# Patient Record
Sex: MALE | Race: WHITE | NOT HISPANIC OR LATINO | Employment: OTHER | URBAN - METROPOLITAN AREA
[De-identification: names, ages, dates, MRNs, and addresses within clinical notes are randomized per-mention and may not be internally consistent; named-entity substitution may affect disease eponyms.]

---

## 2017-02-08 ENCOUNTER — GENERIC CONVERSION - ENCOUNTER (OUTPATIENT)
Dept: OTHER | Facility: OTHER | Age: 67
End: 2017-02-08

## 2017-03-24 ENCOUNTER — GENERIC CONVERSION - ENCOUNTER (OUTPATIENT)
Dept: OTHER | Facility: OTHER | Age: 67
End: 2017-03-24

## 2017-04-10 ENCOUNTER — ALLSCRIPTS OFFICE VISIT (OUTPATIENT)
Dept: OTHER | Facility: OTHER | Age: 67
End: 2017-04-10

## 2017-05-02 ENCOUNTER — ALLSCRIPTS OFFICE VISIT (OUTPATIENT)
Dept: OTHER | Facility: OTHER | Age: 67
End: 2017-05-02

## 2017-05-04 ENCOUNTER — GENERIC CONVERSION - ENCOUNTER (OUTPATIENT)
Dept: OTHER | Facility: OTHER | Age: 67
End: 2017-05-04

## 2017-05-12 ENCOUNTER — GENERIC CONVERSION - ENCOUNTER (OUTPATIENT)
Dept: OTHER | Facility: OTHER | Age: 67
End: 2017-05-12

## 2017-06-14 ENCOUNTER — GENERIC CONVERSION - ENCOUNTER (OUTPATIENT)
Dept: OTHER | Facility: OTHER | Age: 67
End: 2017-06-14

## 2017-07-17 ENCOUNTER — GENERIC CONVERSION - ENCOUNTER (OUTPATIENT)
Dept: OTHER | Facility: OTHER | Age: 67
End: 2017-07-17

## 2017-07-19 ENCOUNTER — ALLSCRIPTS OFFICE VISIT (OUTPATIENT)
Dept: OTHER | Facility: OTHER | Age: 67
End: 2017-07-19

## 2017-08-07 DIAGNOSIS — R06.09 OTHER FORMS OF DYSPNEA: ICD-10-CM

## 2017-08-07 DIAGNOSIS — I48.91 ATRIAL FIBRILLATION (HCC): ICD-10-CM

## 2017-08-07 DIAGNOSIS — R60.9 EDEMA: ICD-10-CM

## 2017-08-23 ENCOUNTER — LAB CONVERSION - ENCOUNTER (OUTPATIENT)
Dept: OTHER | Facility: OTHER | Age: 67
End: 2017-08-23

## 2017-08-23 LAB
A/G RATIO (HISTORICAL): 1.3 (CALC) (ref 1–2.5)
ALBUMIN SERPL BCP-MCNC: 3.7 G/DL (ref 3.6–5.1)
ALP SERPL-CCNC: 74 U/L (ref 40–115)
ALT SERPL W P-5'-P-CCNC: 27 U/L (ref 9–46)
AST SERPL W P-5'-P-CCNC: 31 U/L (ref 10–35)
BILIRUB SERPL-MCNC: 0.4 MG/DL (ref 0.2–1.2)
BNP SERPL-MCNC: 142 PG/ML
BUN SERPL-MCNC: 13 MG/DL (ref 7–25)
BUN/CREA RATIO (HISTORICAL): ABNORMAL (CALC) (ref 6–22)
CALCIUM SERPL-MCNC: 9.4 MG/DL (ref 8.6–10.3)
CHLORIDE SERPL-SCNC: 105 MMOL/L (ref 98–110)
CO2 SERPL-SCNC: 26 MMOL/L (ref 20–31)
CREAT SERPL-MCNC: 0.75 MG/DL (ref 0.7–1.25)
DEPRECATED RDW RBC AUTO: 12.8 % (ref 11–15)
EGFR AFRICAN AMERICAN (HISTORICAL): 109 ML/MIN/1.73M2
EGFR-AMERICAN CALC (HISTORICAL): 94 ML/MIN/1.73M2
GAMMA GLOBULIN (HISTORICAL): 2.9 G/DL (CALC) (ref 1.9–3.7)
GLUCOSE (HISTORICAL): 110 MG/DL (ref 65–99)
HCT VFR BLD AUTO: 40.2 % (ref 38.5–50)
HGB BLD-MCNC: 13.5 G/DL (ref 13.2–17.1)
MCH RBC QN AUTO: 34.7 PG (ref 27–33)
MCHC RBC AUTO-ENTMCNC: 33.6 G/DL (ref 32–36)
MCV RBC AUTO: 103.3 FL (ref 80–100)
PLATELET # BLD AUTO: 183 THOUSAND/UL (ref 140–400)
PMV BLD AUTO: 11.1 FL (ref 7.5–12.5)
POTASSIUM SERPL-SCNC: 4.2 MMOL/L (ref 3.5–5.3)
RBC # BLD AUTO: 3.89 MILLION/UL (ref 4.2–5.8)
SODIUM SERPL-SCNC: 139 MMOL/L (ref 135–146)
TOTAL PROTEIN (HISTORICAL): 6.6 G/DL (ref 6.1–8.1)
TSH SERPL DL<=0.05 MIU/L-ACNC: 3.16 MIU/L (ref 0.4–4.5)
WBC # BLD AUTO: 5.1 THOUSAND/UL (ref 3.8–10.8)

## 2017-08-27 ENCOUNTER — GENERIC CONVERSION - ENCOUNTER (OUTPATIENT)
Dept: OTHER | Facility: OTHER | Age: 67
End: 2017-08-27

## 2017-08-29 ENCOUNTER — GENERIC CONVERSION - ENCOUNTER (OUTPATIENT)
Dept: OTHER | Facility: OTHER | Age: 67
End: 2017-08-29

## 2017-09-08 ENCOUNTER — GENERIC CONVERSION - ENCOUNTER (OUTPATIENT)
Dept: OTHER | Facility: OTHER | Age: 67
End: 2017-09-08

## 2017-09-08 LAB
FOLATE SERPL-MCNC: 12.8 NG/ML
VIT B12 SERPL-MCNC: 253 PG/ML (ref 200–1100)

## 2017-09-14 ENCOUNTER — ALLSCRIPTS OFFICE VISIT (OUTPATIENT)
Dept: OTHER | Facility: OTHER | Age: 67
End: 2017-09-14

## 2017-09-14 DIAGNOSIS — I87.2 VENOUS INSUFFICIENCY (CHRONIC) (PERIPHERAL): ICD-10-CM

## 2017-09-14 DIAGNOSIS — E55.9 VITAMIN D DEFICIENCY: ICD-10-CM

## 2017-09-14 DIAGNOSIS — I48.91 ATRIAL FIBRILLATION (HCC): ICD-10-CM

## 2017-09-14 DIAGNOSIS — I10 ESSENTIAL (PRIMARY) HYPERTENSION: ICD-10-CM

## 2017-09-14 DIAGNOSIS — D75.89 OTHER SPECIFIED DISEASES OF BLOOD AND BLOOD-FORMING ORGANS(289.89): ICD-10-CM

## 2017-10-20 ENCOUNTER — TRANSCRIBE ORDERS (OUTPATIENT)
Dept: SLEEP CENTER | Facility: CLINIC | Age: 67
End: 2017-10-20

## 2017-10-20 ENCOUNTER — ALLSCRIPTS OFFICE VISIT (OUTPATIENT)
Dept: OTHER | Facility: OTHER | Age: 67
End: 2017-10-20

## 2017-10-20 DIAGNOSIS — R06.83 SNORING: Primary | ICD-10-CM

## 2017-10-21 NOTE — PROGRESS NOTES
Assessment  1  Snoring (786 09) (R06 83)   2  Witnessed episode of apnea (786 03) (R06 81)   3  Excessive daytime sleepiness (780 54) (G47 19)   4  Morbid obesity with body mass index (BMI) of 40 0 to 44 9 in adult (278 01,V85 41)   (E66 01,Z68 41)    Plan  Excessive daytime sleepiness, Morbid obesity with body mass index (BMI) of 40 0 to  44 9 in adult, Snoring, Witnessed episode of apnea    · Sleep Study Unattended - Home; Status:Need Information - Financial Authorization; Requested BUP:[id number];     Discussion/Summary    Discussed patient's history involving snoring, witnessed apneas, excessive daytime somnolence, and also combine this with the exam findings of large neck circumference and a fair amount of redundant tissue in the oral cavity and posterior pharynx and the fact that he is morbidly obese and has a cardiac condition, specifically atrial fibrillation  All these factors support sending patient for a diagnostic sleep study which was ordered today  Once results are obtained, the patient will be contacted and he will arrange to be seen by a sleep specialist for consult to determine potential treatment options if he indeed does have confirmed obstructive sleep apnea  The treatment plan was reviewed with the patient/guardian  The patient/guardian understands and agrees with the treatment plan      Chief Complaint  Pt presents to discuss possible sleep study, states he snores excessively at night and at times stops breathing  Pt feels Sx's getting worse  Pt defers flu shot  Pt states he is no longer taking Furosemide and Potassium Chloride  History of Present Illness  HPI: Pt  presents with concerns for possible sleep apnea  He reports several year history of snoring and now reports witnessed apneas and excessive daytime somnolence  He has talked to people who have raised awareness and concern that he may have this condition  He reports his snoring is so severe that it wakes the dogs out at home   He reports he is a side sleeper but does try to make sure he has enough pillow support at night  He has history of atrial fibrillation for which he sees Cardiology and reports this is stable and he denies any history of hypertension  Review of Systems    Constitutional: no fever or chills, feels well, no tiredness, no recent weight loss or weight gain  ENT: as noted in HPI  Cardiovascular: no complaints of slow or fast heart rate, no chest pain, no palpitations, no leg claudication or lower extremity edema  Respiratory: as noted in HPI  Gastrointestinal: no complaints of abdominal pain, no constipation, no nausea or vomiting, no diarrhea or bloody stools  Genitourinary: no complaints of dysuria or incontinence, no hesitancy, no nocturia, no genital lesion, no inadequacy of penile erection  Active Problems  1  Accessory skin tags (757 39) (Q82 8)   2  Atrial fibrillation (427 31) (I48 91)   3  Benign essential hypertension (401 1) (I10)   4  Chronic venous insufficiency (459 81) (I87 2)   5  Dyspnea on exertion (786 09) (R06 09)   6  Edema (782 3) (R60 9)   7  Macrocytosis without anemia (289 89) (D75 89)   8  Otitis externa of both ears, unspecified chronicity, unspecified type (380 10) (H60 93)   9  Vitamin D deficiency (268 9) (E55 9)    Past Medical History  1  History of Abdominal wall strain (848 8) (S39 011A)   2  History of Acute conjunctivitis of both eyes (372 00) (H10 33)   3  History of Encounter for prostate cancer screening (V76 44) (Z12 5)   4  History of Erectile dysfunction of non-organic origin (302 72) (F52 21)   5  History of Fatigue (780 79) (R53 83)   6  History of acute bronchitis (V12 69) (Z87 09)   7  History of acute conjunctivitis (V12 49) (Z86 69)   8  History of acute otitis media (V12 49) (Z86 69)   9  History of acute sinusitis (V12 69) (Z87 09)   10  History of allergic rhinitis (V12 69) (Z87 09)   11  History of sinusitis (V12 69) (Z87 09)   12   History of skin disorder (V13 3) (Z87 2)   13  History of Lumbar strain (847 2) (S39 012A)   14  History of Multiple acquired skin tags (701 9) (L91 8)   15  History of Need for influenza vaccination (V04 81) (Z23)   16  History of Palpitations (785 1) (R00 2)   17  History of Plantar fasciitis (728 71) (M72 2)    Family History  Mother    1  Family history of Cancer    Social History   · Being A Social Drinker   · Never A Smoker  The social history was reviewed and is unchanged  Surgical History  1  History of Cataract Surgery   2  History of Foot Surgery   3  History of Knee Replacement   4  History of Umbilical Hernia Repair    Current Meds   1  Ammonium Lactate 12 % External Lotion; APPLY  AND RUB  IN A THIN FILM TO   AFFECTED AREAS TWICE DAILY  (AM AND PM); Therapy: 31Vil7257 to (Last Rx:27Hnj9745)  Requested for: 51Ija5794 Ordered   2  Aspirin 325 MG Oral Tablet; Take 1 tablet daily Recorded   3  Atenolol 25 MG Oral Tablet; take 1 tablet by mouth daily; Therapy: 48PFF1131 to (Senora Christa)  Requested for: 37WGQ8772; Last   Rx:55Xpc0028 Ordered   4  BL Vitamin C 1000 MG TABS; Take 1 tablet daily Recorded   5  Furosemide 40 MG Oral Tablet; One tablet every 3 days as needed for edema; Therapy: 49Qmu5402 to (Evaluate:15Jan2018)  Requested for: 08UVW3391; Last   Rx:83Fav3943 Ordered   6  Meloxicam 15 MG Oral Tablet; TAKE 1 TABLET BY MOUTH EVERY DAY AS NEEDED; Therapy: 76Nkt8764 to (Evaluate:13Nov2017) Recorded   7  Mens Multiple Vitamin/Lycopene TABS Recorded   8  Potassium Chloride ER 10 MEQ Oral Capsule Extended Release; one tablet every 3   days with furosemide; Therapy: 55SVZ6328 to (Evaluate:15Jan2018)  Requested for: 01KWP9790; Last   Rx:28Etu3007 Ordered   9  Vitamin D 2000 UNIT Oral Capsule; 2 daily; Therapy: 72DTN4588 to (Last Rx:42Qmr1513)  Requested for: 15CGU2444 Ordered    The medication list was reviewed and updated today  Allergies  1  No Known Drug Allergies  2   Seasonal    Vitals Recorded: 57WCL9937 09:09AM   Temperature 98 1 F, Tympanic   Heart Rate 93   Pulse Quality Normal   Respiration Quality Normal   Respiration 16   Systolic 467, LUE, Sitting   Diastolic 74, LUE, Sitting   BP CUFF SIZE Large   Height 6 ft 1 in   Weight 322 lb 5 oz   BMI Calculated 42 52   BSA Calculated 2 64   O2 Saturation 92, RA   Pain Scale 0     Physical Exam    Constitutional   General appearance: No acute distress, well appearing and well nourished  Ears, Nose, Mouth, and Throat   Oropharynx: Abnormal  -- Elongated uvula enlarged tongue making it difficult to visualize posterior pharynx  Pulmonary   Respiratory effort: No increased work of breathing or signs of respiratory distress  Auscultation of lungs: Clear to auscultation, equal breath sounds bilaterally, no wheezes, no rales, no rhonci  Cardiovascular   Auscultation of heart: Normal rate and rhythm, normal S1 and S2, without murmurs  Carotid pulses: Normal     Lymphatic   Palpation of lymph nodes in neck: No lymphadenopathy  Psychiatric   Orientation to person, place and time: Normal     Mood and affect: Normal     Additional Exam:  Vital signs reviewed; patient has very large neck circumference  Signatures   Electronically signed by :  Michele Arango, [facility]; Oct 20 2017 10:26AM EST                       (Author)    Electronically signed by : Eileen Cardona DO; Oct 20 2017 10:03PM EST

## 2017-10-27 ENCOUNTER — APPOINTMENT (OUTPATIENT)
Dept: PHYSICAL THERAPY | Facility: CLINIC | Age: 67
End: 2017-10-27
Payer: COMMERCIAL

## 2017-10-27 PROCEDURE — G8979 MOBILITY GOAL STATUS: HCPCS

## 2017-10-27 PROCEDURE — G8978 MOBILITY CURRENT STATUS: HCPCS

## 2017-10-27 PROCEDURE — 97162 PT EVAL MOD COMPLEX 30 MIN: CPT

## 2017-11-05 ENCOUNTER — GENERIC CONVERSION - ENCOUNTER (OUTPATIENT)
Dept: OTHER | Facility: OTHER | Age: 67
End: 2017-11-05

## 2017-11-06 ENCOUNTER — APPOINTMENT (OUTPATIENT)
Dept: PHYSICAL THERAPY | Facility: CLINIC | Age: 67
End: 2017-11-06
Payer: COMMERCIAL

## 2017-11-06 PROCEDURE — 97140 MANUAL THERAPY 1/> REGIONS: CPT

## 2017-11-08 ENCOUNTER — APPOINTMENT (OUTPATIENT)
Dept: PHYSICAL THERAPY | Facility: CLINIC | Age: 67
End: 2017-11-08
Payer: COMMERCIAL

## 2017-11-08 PROCEDURE — 97140 MANUAL THERAPY 1/> REGIONS: CPT

## 2017-11-10 ENCOUNTER — APPOINTMENT (OUTPATIENT)
Dept: PHYSICAL THERAPY | Facility: CLINIC | Age: 67
End: 2017-11-10
Payer: COMMERCIAL

## 2017-11-10 PROCEDURE — 97140 MANUAL THERAPY 1/> REGIONS: CPT

## 2017-11-13 ENCOUNTER — APPOINTMENT (OUTPATIENT)
Dept: PHYSICAL THERAPY | Facility: CLINIC | Age: 67
End: 2017-11-13
Payer: COMMERCIAL

## 2017-11-13 PROCEDURE — 97140 MANUAL THERAPY 1/> REGIONS: CPT

## 2017-11-14 ENCOUNTER — APPOINTMENT (OUTPATIENT)
Dept: PHYSICAL THERAPY | Facility: CLINIC | Age: 67
End: 2017-11-14
Payer: COMMERCIAL

## 2017-11-15 ENCOUNTER — HOSPITAL ENCOUNTER (OUTPATIENT)
Dept: SLEEP CENTER | Facility: CLINIC | Age: 67
Discharge: HOME | End: 2017-11-15
Payer: COMMERCIAL

## 2017-11-15 ENCOUNTER — APPOINTMENT (OUTPATIENT)
Dept: PHYSICAL THERAPY | Facility: CLINIC | Age: 67
End: 2017-11-15
Payer: COMMERCIAL

## 2017-11-15 DIAGNOSIS — R06.83 SNORING: ICD-10-CM

## 2017-11-15 DIAGNOSIS — G47.33 OBSTRUCTIVE SLEEP APNEA: ICD-10-CM

## 2017-11-15 PROCEDURE — G0399 HOME SLEEP TEST/TYPE 3 PORTA: HCPCS

## 2017-11-15 PROCEDURE — 97140 MANUAL THERAPY 1/> REGIONS: CPT

## 2017-11-17 ENCOUNTER — APPOINTMENT (OUTPATIENT)
Dept: PHYSICAL THERAPY | Facility: CLINIC | Age: 67
End: 2017-11-17
Payer: COMMERCIAL

## 2017-11-17 PROCEDURE — 97140 MANUAL THERAPY 1/> REGIONS: CPT

## 2017-11-20 ENCOUNTER — GENERIC CONVERSION - ENCOUNTER (OUTPATIENT)
Dept: OTHER | Facility: OTHER | Age: 67
End: 2017-11-20

## 2017-11-20 ENCOUNTER — APPOINTMENT (OUTPATIENT)
Dept: PHYSICAL THERAPY | Facility: CLINIC | Age: 67
End: 2017-11-20
Payer: COMMERCIAL

## 2017-11-20 PROCEDURE — 97140 MANUAL THERAPY 1/> REGIONS: CPT

## 2017-11-20 PROCEDURE — G8978 MOBILITY CURRENT STATUS: HCPCS

## 2017-11-20 PROCEDURE — G8979 MOBILITY GOAL STATUS: HCPCS

## 2017-11-22 ENCOUNTER — APPOINTMENT (OUTPATIENT)
Dept: PHYSICAL THERAPY | Facility: CLINIC | Age: 67
End: 2017-11-22
Payer: COMMERCIAL

## 2017-11-27 ENCOUNTER — APPOINTMENT (OUTPATIENT)
Dept: PHYSICAL THERAPY | Facility: CLINIC | Age: 67
End: 2017-11-27
Payer: COMMERCIAL

## 2017-11-27 PROCEDURE — 97140 MANUAL THERAPY 1/> REGIONS: CPT

## 2017-11-29 ENCOUNTER — APPOINTMENT (OUTPATIENT)
Dept: PHYSICAL THERAPY | Facility: CLINIC | Age: 67
End: 2017-11-29
Payer: COMMERCIAL

## 2017-11-29 PROCEDURE — 97140 MANUAL THERAPY 1/> REGIONS: CPT

## 2017-12-01 ENCOUNTER — APPOINTMENT (OUTPATIENT)
Dept: PHYSICAL THERAPY | Facility: CLINIC | Age: 67
End: 2017-12-01
Payer: COMMERCIAL

## 2017-12-01 PROCEDURE — G8978 MOBILITY CURRENT STATUS: HCPCS | Performed by: PHYSICAL THERAPIST

## 2017-12-01 PROCEDURE — G8979 MOBILITY GOAL STATUS: HCPCS | Performed by: PHYSICAL THERAPIST

## 2017-12-01 PROCEDURE — 97140 MANUAL THERAPY 1/> REGIONS: CPT

## 2017-12-08 ENCOUNTER — APPOINTMENT (OUTPATIENT)
Dept: PHYSICAL THERAPY | Facility: CLINIC | Age: 67
End: 2017-12-08
Payer: COMMERCIAL

## 2017-12-08 PROCEDURE — 97140 MANUAL THERAPY 1/> REGIONS: CPT

## 2017-12-13 ENCOUNTER — APPOINTMENT (OUTPATIENT)
Dept: PHYSICAL THERAPY | Facility: CLINIC | Age: 67
End: 2017-12-13
Payer: COMMERCIAL

## 2017-12-13 PROCEDURE — 97140 MANUAL THERAPY 1/> REGIONS: CPT

## 2017-12-14 ENCOUNTER — APPOINTMENT (OUTPATIENT)
Dept: PHYSICAL THERAPY | Facility: CLINIC | Age: 67
End: 2017-12-14
Payer: COMMERCIAL

## 2017-12-14 PROCEDURE — 97140 MANUAL THERAPY 1/> REGIONS: CPT

## 2017-12-14 PROCEDURE — G8980 MOBILITY D/C STATUS: HCPCS | Performed by: PHYSICAL THERAPIST

## 2017-12-16 ENCOUNTER — GENERIC CONVERSION - ENCOUNTER (OUTPATIENT)
Dept: FAMILY MEDICINE CLINIC | Facility: CLINIC | Age: 67
End: 2017-12-16

## 2017-12-16 ENCOUNTER — GENERIC CONVERSION - ENCOUNTER (OUTPATIENT)
Dept: OTHER | Facility: OTHER | Age: 67
End: 2017-12-16

## 2018-01-05 ENCOUNTER — HOSPITAL ENCOUNTER (OUTPATIENT)
Dept: SLEEP CENTER | Facility: CLINIC | Age: 68
Discharge: HOME | End: 2018-01-06
Payer: MEDICARE

## 2018-01-05 ENCOUNTER — TRANSCRIBE ORDERS (OUTPATIENT)
Dept: SLEEP CENTER | Facility: CLINIC | Age: 68
End: 2018-01-05

## 2018-01-05 DIAGNOSIS — G47.33 OBSTRUCTIVE SLEEP APNEA SYNDROME: Primary | ICD-10-CM

## 2018-01-05 DIAGNOSIS — R06.83 SNORING: ICD-10-CM

## 2018-01-05 NOTE — PROGRESS NOTES
Consultation - Demianpatel 4724  75 y o  male  PZK:9[birth year]4  WMQ:[id number]    Physician Requesting Consult: Parminder Blackmon PA-C     Reason for Consult : At your kind request I saw this patient for initial sleep evaluation today  He had a home sleep study and is here to review results and further options  The study demonstrated a respiratory event index of 28 per hour  Minimum oxygen saturation was 74%  The snore index was 39%  Medications, Past, Family and Social Histories were reviewed  Comorbidities include:  Hypertension, atrial fibrillation and swelling of the legs due to chronic venous insufficiency  Herewith findings in summary  Full details are available from our records  HPI:  He reports snoring of over 10 years duration  This is loud enough to disturb others and he has wife has witnessed apneas  He is not aware of breathing difficulties during sleep or modifying factors  He has lower extremity discomfort due to edema but denied restless leg symptoms or features of parasomnia  Sleep Routine:  He is spending 8 hours in bed  He typically falls asleep in less than 15 minutes  Sleep is interrupted 2-4 times a night  He awakens with the aid of an alarm and is not rested  He reports constant fatigue and excessive daytime drowsiness  He rated himself at 13/24 on the [place] Sleepiness Scale, feels like napping and tends to doze off when sedentary at home in the evenings  Habits:  He has never smoked  , he uses alcohol occasionally  ,  has no drug history on file  , he uses limited caffeine  He does not exercise  ROS:  He has had approximately 20 lb weight gain in the past 1 year  He reports episodic irregular heart rhythm and shortness of breath  A 10 point review of systems was otherwise unremarkable  Physical Exam: Salient findings on exam, are a body mass index 43 6  Vitals are stable    Mental state    appears normal   Craniofacial anatomy is normal  Neck Circumference is 56 cm  There is excess fatty tissue and neck is thick  There are no abnormal neck masses  Nasal airway is patent  Mucous membranes appeared normal  The oral airway is crowded  Base of tongue is at Modified Mallampati class IV  Heart sounds are irregularly irregular, there are no murmurs, no JVD  He has bilateral 3+ pedal edema  He has truncal obesity  The rest of his exam was unremarkable  Impression:   1  Severe obstructive sleep apnea  2  Hypersomnolence secondary to the above  3  Swelling of the legs  4  Morbid obesity   5  Comorbidities:  Hypertension and chronic atrial fibrillation    Plan:   1  I reviewed results of the Sleep study with the patient  2  With respect to above conditions, I counseled on pathophysiology, diagnosis, treatment options, risks and benefits; inter-relationship and effects on symptoms and comorbidities; risks of no treatment; costs and insurance aspects  3  I also advised on weight reduction  4  Patient elected positive airway pressure therapy and is to be scheduled for a titration study  5  Follow-up to be scheduled after the study to review results and to initiate therapy           Sincerely,    Kina Cuello MD  Board Certified Specialist

## 2018-01-08 ENCOUNTER — GENERIC CONVERSION - ENCOUNTER (OUTPATIENT)
Dept: FAMILY MEDICINE CLINIC | Facility: CLINIC | Age: 68
End: 2018-01-08

## 2018-01-09 NOTE — RESULT NOTES
Verified Results  (1) COMPREHENSIVE METABOLIC PANEL 51WMC1640 96:02SQ Terry Loose     Test Name Result Flag Reference   GLUCOSE 110 mg/dL H 65-99   Fasting reference interval     For someone without known diabetes, a glucose value  between 100 and 125 mg/dL is consistent with  prediabetes and should be confirmed with a  follow-up test    UREA NITROGEN (BUN) 13 mg/dL  7-25   CREATININE 0 75 mg/dL  0 70-1 25   For patients >52years of age, the reference limit  for Creatinine is approximately 13% higher for people  identified as -American  eGFR NON-AFR   AMERICAN 94 mL/min/1 73m2  > OR = 60   eGFR AFRICAN AMERICAN 109 mL/min/1 73m2  > OR = 60   BUN/CREATININE RATIO   8-14   NOT APPLICABLE (calc)   SODIUM 139 mmol/L  135-146   POTASSIUM 4 2 mmol/L  3 5-5 3   CHLORIDE 105 mmol/L     CARBON DIOXIDE 26 mmol/L  20-31   CALCIUM 9 4 mg/dL  8 6-10 3   PROTEIN, TOTAL 6 6 g/dL  6 1-8 1   ALBUMIN 3 7 g/dL  3 6-5 1   GLOBULIN 2 9 g/dL (calc)  1 9-3 7   ALBUMIN/GLOBULIN RATIO 1 3 (calc)  1 0-2 5   BILIRUBIN, TOTAL 0 4 mg/dL  0 2-1 2   ALKALINE PHOSPHATASE 74 U/L     AST 31 U/L  10-35   ALT 27 U/L  9-46     (Q) CBC (H/H, RBC, INDICES, WBC, PLT) 79Lcu8553 08:35AM Terry Loose     Test Name Result Flag Reference   WHITE BLOOD CELL COUNT 5 1 Thousand/uL  3 8-10 8   RED BLOOD CELL COUNT 3 89 Million/uL L 4 20-5 80   HEMOGLOBIN 13 5 g/dL  13 2-17 1   HEMATOCRIT 40 2 %  38 5-50 0    3 fL H 80 0-100 0   MCH 34 7 pg H 27 0-33 0   MCHC 33 6 g/dL  32 0-36 0   RDW 12 8 %  11 0-15 0   PLATELET COUNT 945 Thousand/uL  140-400   MPV 11 1 fL  7 5-12 5     (Q) TSH, 3RD GENERATION W/REFLEX TO FT4 84Eue3995 08:35AM Terry Loose   REPORT COMMENT:  FASTING:NO     Test Name Result Flag Reference   TSH W/REFLEX TO FT4 3 16 mIU/L  0 40-4 50     (Q) B TYPE NATRIURETIC PEPTIDE (BNP) 79Zjf4211 08:35AM Terry Pool     Test Name Result Flag Reference   B TYPE NATRIURETIC$PEPTIDE (BNP) 142 pg/mL H <100   BNP levels increase with age in the general  population with the highest values seen in  individuals greater than 76years of age  Reference: J  Rocky Grider  Cardiol  2002; 92:422-589

## 2018-01-10 NOTE — RESULT NOTES
Verified Results  (Q) B TYPE NATRIURETIC PEPTIDE (BNP) 30AFI9056 10:46AM Jairon Lambert   REPORT COMMENT:  FASTING:NO     Test Name Result Flag Reference   B TYPE NATRIURETIC$PEPTIDE (BNP) 121 pg/mL H <100   BNP levels increase with age in the general  population with the highest values seen in  individuals greater than 76years of age  Reference: J  Am  Brad Gell  Cardiol  2002; 96:843-043

## 2018-01-10 NOTE — PROGRESS NOTES
Assessment    1  Acute sinusitis (461 9) (J01 90)   2  Acute otitis media (382 9) (H66 90)    Plan  Acute otitis media, Acute sinusitis    · Levofloxacin 500 MG Oral Tablet; Take 1 tablet daily    Chief Complaint    1  Ear Pain  Pt c/o left ear pain for 1 week now  History of Present Illness  HPI: Has had left ear pain for a week now, he tried ear drops and that made it worse  He started with sinus pressure and pain in the teeth  He has pressure and stabbing pain in the ear  Review of Systems    Constitutional: no fever or chills, feels well, no tiredness, no recent weight loss or weight gain  ENT: as noted in HPI  Cardiovascular: no complaints of slow or fast heart rate, no chest pain, no palpitations, no leg claudication or lower extremity edema  Respiratory: no complaints of shortness of breath, no wheezing or cough, no dyspnea on exertion, no orthopnea or PND  Active Problems    1  Acute bronchitis (466 0) (J20 9)   2  Acute sinusitis (461 9) (J01 90)   3  Atrial fibrillation (427 31) (I48 91)   4  Benign essential hypertension (401 1) (I10)   5  Encounter for prostate cancer screening (V76 44) (Z12 5)   6  Vitamin D deficiency (268 9) (E55 9)    Past Medical History    1  History of Abdominal wall strain (848 8) (S39 011A)   2  History of Acute conjunctivitis of both eyes (372 00) (H10 33)   3  History of Allergic rhinitis (477 9) (J30 9)   4  History of Erectile dysfunction of non-organic origin (302 72) (F52 21)   5  History of Fatigue (780 79) (R53 83)   6  History of acute conjunctivitis (V12 49) (Z86 69)   7  History of edema (V13 89) (Z87 898)   8  History of sinusitis (V12 69) (Z87 09)   9  History of skin disorder (V13 3) (Z87 2)   10  History of Lumbar strain (847 2) (S39 012A)   11  History of Need for influenza vaccination (V04 81) (Z23)   12  History of Palpitations (785 1) (R00 2)   13  History of Plantar fasciitis (728 71) (M72 2)    Family History    1   Family history of Cancer    Social History    · Being A Social Drinker   · Never A Smoker    Surgical History    1  History of Cataract Surgery   2  History of Foot Surgery   3  History of Knee Replacement   4  History of Umbilical Hernia Repair    Current Meds   1  Aspirin 325 MG Oral Tablet; Take 1 tablet daily Recorded   2  Atenolol 25 MG Oral Tablet; take 1 tablet by mouth daily; Therapy: 97EAV1776 to (Evaluate:05Nfx8531)  Requested for: 10OXB3234; Last   Rx:76Kue0929 Ordered   3  BL Vitamin C 1000 MG TABS; Take 1 tablet daily Recorded   4  Levitra 20 MG Oral Tablet; TAKE AS DIRECTED; Therapy: 00RUB5675 to (Last Rx:30Jun2015)  Requested for: 37BBR3738 Ordered   5  Levofloxacin 500 MG Oral Tablet; Take 1 tablet daily; Therapy: 76YCG9415 to (Last Rx:59Nga0583)  Requested for: 74Dqk1932 Ordered   6  Mens Multiple Vitamin/Lycopene Oral Tablet Recorded   7  PredniSONE 10 MG Oral Tablet; 6,5,4,3,2,1;   Therapy: 67HWK9701 to (Last Rx:28Fnp3788)  Requested for: 93Zly9492 Ordered   8  Vitamin D 2000 UNIT Oral Capsule; 2 daily; Therapy: 36QMU8125 to (Last Rx:25Bmu7609)  Requested for: 76GHH4189 Ordered    Allergies    1  No Known Drug Allergies    2  Seasonal    Vitals   Recorded: 03ERH1698 10:20AM   Temperature 98 9 F   Heart Rate 87   Systolic 981   Diastolic 82   Height 6 ft 1 in   Weight 304 lb 8 0 oz   BMI Calculated 40 17   BSA Calculated 2 56   O2 Saturation 96     Physical Exam    Constitutional   General appearance: No acute distress, well appearing and well nourished  Eyes   Conjunctiva and lids: No swelling, erythema, or discharge  Pupils and irises: Equal, round and reactive to light  Ears, Nose, Mouth, and Throat   External inspection of ears and nose: Normal     Otoscopic examination: Abnormal   left TM erythematous and mild bulge  Nasal mucosa, septum, and turbinates: Abnormal   mucosal erythema and edema L>R  Oropharynx: Normal with no erythema, edema, exudate or lesions      Pulmonary   Respiratory effort: No increased work of breathing or signs of respiratory distress  Auscultation of lungs: Clear to auscultation, equal breath sounds bilaterally, no wheezes, no rales, no rhonci  Future Appointments    Date/Time Provider Specialty Site   03/22/2016 09:00 AM BERNY Quinn   Cardiology  CARDIOLOGY  [place]     Signatures   Electronically signed by : Alvin Sharp DO; Philippe 15 2016 10:42AM EST                       (Author)

## 2018-01-11 ENCOUNTER — HOSPITAL ENCOUNTER (OUTPATIENT)
Dept: SLEEP CENTER | Facility: CLINIC | Age: 68
Discharge: HOME | End: 2018-01-12
Payer: MEDICARE

## 2018-01-11 DIAGNOSIS — G47.33 OBSTRUCTIVE SLEEP APNEA SYNDROME: ICD-10-CM

## 2018-01-11 PROCEDURE — 95811 POLYSOM 6/>YRS CPAP 4/> PARM: CPT

## 2018-01-12 VITALS
WEIGHT: 315 LBS | SYSTOLIC BLOOD PRESSURE: 150 MMHG | HEIGHT: 73 IN | BODY MASS INDEX: 41.75 KG/M2 | DIASTOLIC BLOOD PRESSURE: 90 MMHG | RESPIRATION RATE: 16 BRPM | TEMPERATURE: 96.8 F | HEART RATE: 87 BPM | OXYGEN SATURATION: 97 %

## 2018-01-13 VITALS
OXYGEN SATURATION: 92 % | WEIGHT: 315 LBS | HEART RATE: 93 BPM | DIASTOLIC BLOOD PRESSURE: 74 MMHG | HEIGHT: 73 IN | SYSTOLIC BLOOD PRESSURE: 102 MMHG | BODY MASS INDEX: 41.75 KG/M2 | RESPIRATION RATE: 16 BRPM | TEMPERATURE: 98.1 F

## 2018-01-13 VITALS
HEART RATE: 87 BPM | SYSTOLIC BLOOD PRESSURE: 115 MMHG | BODY MASS INDEX: 41.48 KG/M2 | WEIGHT: 313 LBS | DIASTOLIC BLOOD PRESSURE: 82 MMHG | HEIGHT: 73 IN

## 2018-01-13 VITALS
OXYGEN SATURATION: 96 % | TEMPERATURE: 98.6 F | HEIGHT: 73 IN | WEIGHT: 315 LBS | BODY MASS INDEX: 41.75 KG/M2 | RESPIRATION RATE: 17 BRPM | SYSTOLIC BLOOD PRESSURE: 112 MMHG | DIASTOLIC BLOOD PRESSURE: 70 MMHG | HEART RATE: 86 BPM

## 2018-01-14 NOTE — RESULT NOTES
Verified Results  ECHO STRESS TEST W CONTRAST IF INDICATED 2016 09:33AM Gerardo Hogan     Test Name Result Flag Reference   ECHO STRESS TEST W CONTRAST IF INDICATED (Report)     [address]  [place], [address]   [phone]     Dobutamine Stress Echocardiography     Study date: 2016     Patient: Justine Smalls   MR number: [id number]   Account number: [de-identified]   : 1950   Age: 76 years   Gender: Male   Study date: 2016   Status: Outpatient   [facility]   Height: 73 in   Weight: 301 lb   BP: 82// 148 mmHg     Indications: Detection of coronary artery disease  Shortness of breath  Sonographer: NAOMI Quiles   Primary Physician: Sajan Morrison DO   Referring Physician: Berry Gonzalez MD   Group: [facility]   Interpreting Physician: Berry Gonzalez MD     IMPRESSIONS:   Normal study after pharmacologic stress  SUMMARY     STRESS RESULTS:   Maximal heart rate during stress was 139 bpm ( 90 % of maximal predicted heart   rate)  Target heart rate was achieved  There was normal resting blood pressure with a blunted response to stress  There was no chest pain during stress  ECG CONCLUSIONS:   The stress ECG was negative for ischemia  BASELINE:   Estimated left ventricular ejection fraction was 60 %   HISTORY: The patient is a 76year old male  Chest pain status: no chest pain  Other symptoms: dyspnea of recent onset  Coronary artery disease risk factors:   hypertension  Cardiovascular history: none significant  Co-morbidity: obesity  Medications: a beta blocker and aspirin  REST ECG: Atrial fibrillation  Poor septal R wave progression  PROCEDURE: The study was performed in the stress lab  The study was performed   in the [address] [place] [address]  The procedure was explained to the   patient and informed consent was obtained   A dobutamine infusion pharmacologic   stress test was performed  Stress and rest echocardiographic evaluation with 2D   imaging, spectral Doppler, and color Doppler was performed from multiple   acoustic windows for evaluation of ventricular function  DOBUTAMINE PROTOCOL:   HR bpm SBP mmHg DBP mmHg Symptoms   Baseline 82 148 95 none   10 mcg/kg/min 96 122 60 --   20 mcg/kg/min 111 -- -- --   30 mcg/kg/min 139 80 50 --   Recovery  118 60 --   Recovery  154 70 --   Ext Recovery 96 -- -- --     MEDICATIONS GIVEN: No medications or fluids given  STRESS RESULTS: Duration of pharmacologic stress was 9 min  The patient   finished protocol stage 3  Maximal heart rate during stress was 139 bpm ( 90 %   of maximal predicted heart rate)  Target heart rate was achieved  The heart   rate response to stress was normal  Maximal systolic blood pressure during   stress was 148 mmHg  There was normal resting blood pressure with a blunted   response to stress  The rate-pressure product for the peak heart rate and blood   pressure was 23587  There was no chest pain during stress  The stress test was   terminated due to achievement of target heart rate  ECG CONCLUSIONS: The stress ECG was negative for ischemia  Arrhythmia during   stress: isolated premature ventricular beats  STRESS 2D ECHO RESULTS:     BASELINE: Left ventricular size was normal  Top normal wall thickness  RV size   normal Biatrial enlargement  Mild MR  Overall left ventricular systolic   function was normal  Estimated left ventricular ejection fraction was 60 %   LOW STRESS: There was an appropriate reduction in left ventricular size  There   was an appropriate augmentation in LV function  PEAK STRESS: There was an appropriate reduction in left ventricular size  There   was an appropriate augmentation in LV function       Prepared and electronically signed by     Victoriano Jon MD   Signed [id number] 12:37:35

## 2018-01-18 NOTE — RESULT NOTES
Verified Results  (Q) VITAMIN B12/FOLATE, SERUM PANEL 71Trt8095 10:25AM Valorie Farnsworth   REPORT COMMENT:  FASTING:NO     Test Name Result Flag Reference   VITAMIN B12 253 pg/mL  200-1100   Please Note: Although the reference range for vitamin  B12 is 200-1100 pg/mL, it has been reported that between  5 and 10% of patients with values between 200 and 400  pg/mL may experience neuropsychiatric and hematologic  abnormalities due to occult B12 deficiency; less than 1%  of patients with values above 400 pg/mL will have symptoms     FOLATE, SERUM 12 8 ng/mL     Reference Range                             Low:           <3 4                             Borderline:    3 4-5 4                             Normal:        >5 4

## 2018-02-02 ENCOUNTER — HOSPITAL ENCOUNTER (OUTPATIENT)
Dept: SLEEP CENTER | Facility: CLINIC | Age: 68
Discharge: HOME | End: 2018-02-02

## 2018-02-02 ENCOUNTER — OFFICE VISIT (OUTPATIENT)
Dept: SLEEP CENTER | Facility: CLINIC | Age: 68
End: 2018-02-02

## 2018-02-02 VITALS
DIASTOLIC BLOOD PRESSURE: 72 MMHG | HEIGHT: 72 IN | WEIGHT: 315 LBS | HEART RATE: 70 BPM | SYSTOLIC BLOOD PRESSURE: 112 MMHG | BODY MASS INDEX: 42.66 KG/M2

## 2018-02-02 DIAGNOSIS — R06.02 SHORTNESS OF BREATH: ICD-10-CM

## 2018-02-02 DIAGNOSIS — G47.33 OBSTRUCTIVE SLEEP APNEA SYNDROME: ICD-10-CM

## 2018-02-02 DIAGNOSIS — E66.01 MORBID OBESITY WITH BMI OF 40.0-44.9, ADULT (HCC): ICD-10-CM

## 2018-02-02 DIAGNOSIS — I10 ESSENTIAL HYPERTENSION: ICD-10-CM

## 2018-02-02 DIAGNOSIS — I48.19 PERSISTENT ATRIAL FIBRILLATION (HCC): ICD-10-CM

## 2018-02-02 DIAGNOSIS — G47.33 OBSTRUCTIVE SLEEP APNEA SYNDROME: Primary | ICD-10-CM

## 2018-02-02 DIAGNOSIS — G47.10 HYPERSOMNIA: ICD-10-CM

## 2018-02-02 RX ORDER — ASPIRIN 325 MG
1 TABLET ORAL DAILY
COMMUNITY
End: 2021-10-12 | Stop reason: ALTCHOICE

## 2018-02-02 RX ORDER — ATENOLOL 25 MG/1
25 TABLET ORAL DAILY
Refills: 6 | COMMUNITY
Start: 2018-01-07 | End: 2018-06-07 | Stop reason: ALTCHOICE

## 2018-02-02 RX ORDER — MULTIVIT-MIN/IRON/FOLIC ACID/K 18-600-40
1000 CAPSULE ORAL DAILY
Status: ON HOLD | COMMUNITY
Start: 2015-05-13

## 2018-02-02 NOTE — PROGRESS NOTES
Follow-up Note - Asael [id number]  75 y o  male  KBE:[birth year]  PSQ:[id number]    Physician Requesting Consult:     I saw Suzanne Simpson in sleep clinic today for his sleep disordered breathing, coexisting sleep complaints & medical conditions  A sleep study to titrate Positive airway pressure therapy was undertaken and patient is here to review results and to initiate therapy  The study demonstrated sleep disordered breathing was adequately remediated with PAP at 13 cm cm H2O  He was observed during stage REM but not while supine  Sleep efficiency was 71%  Medications, Past, Family, Social History & ROS were reviewed  12 point ROS:  Attached  He has ongoing shortness of breath but states swelling of the legs has improved  HPI:  He struggle to adjustTo the mask but tolerated CPAP  He had difficulty maintaining sleep and was up from 3 a m    However, he felt he slept better than usual and was more rested the next day  He has sinus congestion was improved and my head was clear in the morning  He did not have dry mouth, chest or abdominal discomfort  Sleep Routine:  He is getting around 6 hours sleep at night and falls asleep easily but has frequent interruptions of sleep  He typically sleeps in the lateral position at home  [facility] rated himself at Total score: 7 /24 on the [place] sleepiness scale ]  He reported no other interval changes  On Exam: Vitals are stable: Blood pressure 112/72, pulse 70, height 5' 11 5" (1 816 m), weight (!) 144 kg (317 lb 3 2 oz)  BM:Body mass index is 43 62 kg/m²  Dara Soulier Patient is [well groomed] alert, orientated, cooperative [and in no distress]  Mental state [appeared normal]  [  ] Physical findings are essentially unchanged from previous  Impression:  1  Obstructive sleep apnea syndrome  Sleep F/U  - established patient    Cpap DME   2  Hypersomnia     3  Shortness of breath     4  Persistent atrial fibrillation ([address] )     5   Essential hypertension 6  Morbid obesity with BMI of 40 0-44 9, adult ([facility] )         Plan:    1  I reviewed results of the Sleep studies with the patient  2  With respect to above conditions, I counseled on pathophysiology, diagnosis, treatment options, risks and benefits; inter-relationship and effects on symptoms and comorbidities; risks of no treatment; costs and insurance aspects  3  Patient elected positive airway pressure therapy and care coordinated with the DME provider for set up in clinic today  4  Need for compliance with therapy and weight reduction were emphasized  5  Follow-up to be scheduled in 2 months to monitor compliance, progress and to adjust therapy  Thank you for allowing me to participate in the care of this patient  I will keep you apprised of developments      Sincerely,    Christopher Leach MD  Board Certified Specialist

## 2018-02-02 NOTE — PROGRESS NOTES
Review of Systems      Genitourinary erectile dysfunction   Cardiology none   Gastrointestinal none   Neurology muscle weakness and none   Constitutional weight change of 10 or more pounds in the past year gain of 20 pounds   Integumentary none   Psychiatry none   Musculoskeletal none   Pulmonary shortness of breath   ENT nasal or sinus congestion, post nasal drip and ringing in ears   Endocrine intolerance of cold   Hematological none

## 2018-04-13 ENCOUNTER — OFFICE VISIT (OUTPATIENT)
Dept: SLEEP CENTER | Facility: CLINIC | Age: 68
End: 2018-04-13

## 2018-04-13 VITALS
SYSTOLIC BLOOD PRESSURE: 110 MMHG | HEART RATE: 72 BPM | WEIGHT: 315 LBS | DIASTOLIC BLOOD PRESSURE: 68 MMHG | HEIGHT: 72 IN | BODY MASS INDEX: 42.66 KG/M2

## 2018-04-13 DIAGNOSIS — G47.10 HYPERSOMNIA: ICD-10-CM

## 2018-04-13 DIAGNOSIS — I48.19 PERSISTENT ATRIAL FIBRILLATION (HCC): ICD-10-CM

## 2018-04-13 DIAGNOSIS — G47.33 OBSTRUCTIVE SLEEP APNEA SYNDROME: Primary | ICD-10-CM

## 2018-04-13 DIAGNOSIS — R06.02 SHORTNESS OF BREATH: ICD-10-CM

## 2018-04-13 DIAGNOSIS — R68.2 DRY MOUTH: ICD-10-CM

## 2018-04-13 DIAGNOSIS — I10 ESSENTIAL HYPERTENSION: ICD-10-CM

## 2018-04-13 DIAGNOSIS — E66.01 MORBID OBESITY WITH BMI OF 40.0-44.9, ADULT (HCC): ICD-10-CM

## 2018-04-13 NOTE — PROGRESS NOTES
Follow-Up Note - Asael Bailey24  75 y o  male  :1950  EUQ:[id number]    CC: I saw this patient for follow-up in clinic today for his Sleep Disordered Breathing, Coexisting Sleep and Medical Problems  Medications, Past, Family & Social History were reviewed  [Interval changes notable for he is now sleeping better]     He has a current medication list which includes the following prescription(s): ascorbic acid, aspirin, atenolol, vitamin d, cyanocobalamin, and mens multiple vitamin/lycopene  ROS: reviewed, see attached [& significant for ongoing nasal congestion and postnasal drip  He also reports dyspnea on effort and swelling of the legs  ]  HPI:  With respect to positive airway pressure therapy (PAP) compliance data download shows: [ he is using PAP > 4 hours per night 83% of the time and AHI is 2 3/hour at  pressure of 13cm H2O ]   Virginia Hamman reports:   -  some difficulty tolerating PAP; [ dry mouth]  -  benefiting from use ; [more rested ]       Sleep Routine:  Virginia Hamman reports getting 7 hr sleep; he has no difficulty initiating or maintaining sleep   He awakens spontaneously feeling refreshed He denies excessive drowsiness and is now on napping only rarely  [facility] rated himself at Total score: 5 /24 on the [place] sleepiness scale ]  He reports fatigue that he attributes to his atrial fibrillation  EXAM: Vitals are stable: Blood pressure 110/68, pulse 72, height 5' 11 5" (1 816 m), weight (!) 144 kg (317 lb 6 4 oz)  Body mass index is 43 65 kg/m²  Sherre Soulier Neck Circumference: 56 cm  Patient is alert, orientated, cooperative [and in no distress]  Mental state [appears normal]  There are  facial pressure marks from the stress of his mask, but no rashes  Physical findings are essentially unchanged from previous  IMPRESSION:     1  Obstructive sleep apnea syndrome  Cpap DME   2  Hypersomnia     3  Dry mouth     4  Shortness of breath     5  Persistent atrial fibrillation ([address] )     6  Essential hypertension     7  Morbid obesity with BMI of 40 0-44 9, adult ([facility] )         PLAN:  1  Treatment with  PAP is medically necessary and Pat Later is agreable to continue use  2  Pressure setting: [Continue at 12 cm H2O]   3  Instruction on care of equipment and strategies to improve comfort with use of PAP were discussed [:controlling mucosal dryness, nasal symptoms and Mask leaks]  4  Care coordinated with DME provider and prescription to replace supplies as needed was provided  5  Need for compliance with therapy and weight reduction were emphasized  6  With your consent, follow-up is advised in [[place] [or sooner if needed] [to monitor progress, compliance and to adjust therapy]  Thank you for allowing me to participate in the care of this patient      Sincerely,    Jo Pope MD  Board Certified Specialist

## 2018-04-13 NOTE — PROGRESS NOTES
Review of Systems      Genitourinary erectile dysfunction   Cardiology none   Gastrointestinal none   Neurology none   Constitutional weight change of 10 or more pounds in the past year gain of 20 pounds   Integumentary none   Psychiatry none   Musculoskeletal none   Pulmonary shortness of breath   ENT nasal or sinus congestion and post nasal drip   Endocrine none   Hematological none

## 2018-05-09 ENCOUNTER — OFFICE VISIT (OUTPATIENT)
Dept: FAMILY MEDICINE CLINIC | Facility: CLINIC | Age: 68
End: 2018-05-09
Payer: MEDICARE

## 2018-05-09 VITALS
TEMPERATURE: 98.3 F | BODY MASS INDEX: 43.58 KG/M2 | HEART RATE: 98 BPM | WEIGHT: 315 LBS | OXYGEN SATURATION: 98 % | SYSTOLIC BLOOD PRESSURE: 126 MMHG | DIASTOLIC BLOOD PRESSURE: 84 MMHG | RESPIRATION RATE: 20 BRPM

## 2018-05-09 DIAGNOSIS — J40 BRONCHITIS: ICD-10-CM

## 2018-05-09 DIAGNOSIS — J01.00 ACUTE NON-RECURRENT MAXILLARY SINUSITIS: Primary | ICD-10-CM

## 2018-05-09 PROCEDURE — 99213 OFFICE O/P EST LOW 20 MIN: CPT | Performed by: FAMILY MEDICINE

## 2018-05-09 RX ORDER — CEFUROXIME AXETIL 500 MG/1
500 TABLET ORAL EVERY 12 HOURS SCHEDULED
Qty: 20 TABLET | Refills: 0 | Status: SHIPPED | OUTPATIENT
Start: 2018-05-09 | End: 2018-05-19

## 2018-05-09 NOTE — ASSESSMENT & PLAN NOTE
Cough x a few weeks, but worse in the past 2-3 days  Productive at times  No fevers  Non smoker  Patient denies any chest pain  Upon examination today, patient has some mild expiratory wheeze  Will give Rx for Ceftin  Lastly I gave him an Rx for a chest x-ray to get done in 1 week if his symptoms do not improve

## 2018-05-09 NOTE — PROGRESS NOTES
Assessment/Plan:    Acute non-recurrent maxillary sinusitis  Cough x a few weeks, but worse in the past 2-3 days  Productive at times  No fevers  Non smoker  Patient denies any chest pain  Upon examination today, patient has some mild expiratory wheeze  Will give Rx for Ceftin  Lastly I gave him an Rx for a chest x-ray to get done in 1 week if his symptoms do not improve  Diagnoses and all orders for this visit:    Acute non-recurrent maxillary sinusitis  -     cefuroxime (CEFTIN) 500 mg tablet; Take 1 tablet (500 mg total) by mouth every 12 (twelve) hours for 10 days    Bronchitis  -     cefuroxime (CEFTIN) 500 mg tablet; Take 1 tablet (500 mg total) by mouth every 12 (twelve) hours for 10 days  -     XR chest pa & lateral; Future      I ASKED PATIENT TO SCHEDULE A PHYSICAL/AWV IN NEAR FUTURE    Subjective:      Patient ID: Dorothea Nesbitt is a 75 y o  male  Cough x a few weeks, but worse in the past 2-3 days  Productive at times  No fevers  Non smoker  The following portions of the patient's history were reviewed and updated as appropriate: allergies, current medications, past family history, past medical history, past social history, past surgical history and problem list     Review of Systems   Constitutional: Negative for chills and fever  HENT: Positive for congestion and postnasal drip  Respiratory: Positive for cough  Negative for shortness of breath  Cardiovascular: Negative  Gastrointestinal: Negative  Genitourinary: Negative            Objective:      /84 (BP Location: Left arm, Patient Position: Sitting, Cuff Size: Large)   Pulse 98   Temp 98 3 °F (36 8 °C) (Tympanic)   Resp 20   Wt (!) 144 kg (316 lb 14 4 oz)   SpO2 98%   BMI 43 58 kg/m²          Physical Exam

## 2018-05-10 DIAGNOSIS — I10 HYPERTENSION, UNSPECIFIED TYPE: Primary | ICD-10-CM

## 2018-05-10 RX ORDER — ATENOLOL 25 MG/1
25 TABLET ORAL DAILY
Qty: 30 TABLET | Refills: 5 | Status: SHIPPED | OUTPATIENT
Start: 2018-05-10 | End: 2018-10-31 | Stop reason: SDUPTHER

## 2018-06-07 ENCOUNTER — OFFICE VISIT (OUTPATIENT)
Dept: FAMILY MEDICINE CLINIC | Facility: CLINIC | Age: 68
End: 2018-06-07
Payer: MEDICARE

## 2018-06-07 VITALS
TEMPERATURE: 98.8 F | OXYGEN SATURATION: 96 % | RESPIRATION RATE: 16 BRPM | WEIGHT: 313.9 LBS | HEART RATE: 78 BPM | BODY MASS INDEX: 43.94 KG/M2 | DIASTOLIC BLOOD PRESSURE: 78 MMHG | SYSTOLIC BLOOD PRESSURE: 118 MMHG | HEIGHT: 71 IN

## 2018-06-07 DIAGNOSIS — G47.33 OBSTRUCTIVE SLEEP APNEA SYNDROME: ICD-10-CM

## 2018-06-07 DIAGNOSIS — J01.00 ACUTE NON-RECURRENT MAXILLARY SINUSITIS: ICD-10-CM

## 2018-06-07 DIAGNOSIS — Z12.5 SCREENING FOR PROSTATE CANCER: ICD-10-CM

## 2018-06-07 DIAGNOSIS — Z23 NEED FOR VACCINATION: ICD-10-CM

## 2018-06-07 DIAGNOSIS — I48.19 PERSISTENT ATRIAL FIBRILLATION (HCC): ICD-10-CM

## 2018-06-07 DIAGNOSIS — Z00.00 MEDICARE ANNUAL WELLNESS VISIT, INITIAL: Primary | ICD-10-CM

## 2018-06-07 DIAGNOSIS — R73.03 PREDIABETES: ICD-10-CM

## 2018-06-07 DIAGNOSIS — I10 ESSENTIAL HYPERTENSION: ICD-10-CM

## 2018-06-07 DIAGNOSIS — E66.01 MORBID OBESITY WITH BMI OF 40.0-44.9, ADULT (HCC): ICD-10-CM

## 2018-06-07 PROBLEM — J40 BRONCHITIS: Status: RESOLVED | Noted: 2018-05-09 | Resolved: 2018-06-07

## 2018-06-07 PROCEDURE — 99214 OFFICE O/P EST MOD 30 MIN: CPT | Performed by: FAMILY MEDICINE

## 2018-06-07 PROCEDURE — G0438 PPPS, INITIAL VISIT: HCPCS | Performed by: FAMILY MEDICINE

## 2018-06-07 PROCEDURE — G0009 ADMIN PNEUMOCOCCAL VACCINE: HCPCS | Performed by: FAMILY MEDICINE

## 2018-06-07 PROCEDURE — 90670 PCV13 VACCINE IM: CPT | Performed by: FAMILY MEDICINE

## 2018-06-07 NOTE — PROGRESS NOTES
Assessment/Plan:    Obstructive sleep apnea syndrome  Doing pretty well on CPAP  Persistent atrial fibrillation (HCC)  Stable  Rate controlled on atenolol 25 mg qd  Pt also taking asa 325 mg daily  Continue regular f/u w/ cardio (dr Martina Gresham)  Morbid obesity with BMI of 40 0-44 9, adult ([facility])   BMI 43 46  Recommend increase exercise and weight loss  Will continue to follow    Acute non-recurrent maxillary sinusitis   Resolved since last visit       Diagnoses and all orders for this visit:    Medicare annual wellness visit, initial    Obstructive sleep apnea syndrome    Persistent atrial fibrillation ([facility] )    Essential hypertension  -     Lipid Panel with Direct LDL reflex; Future    Morbid obesity with BMI of 40 0-44 9, adult ([facility] )    Need for vaccination  -     PNEUMOCOCCAL CONJUGATE VACCINE 13-VALENT GREATER THAN 6 MONTHS    Acute non-recurrent maxillary sinusitis    Screening for prostate cancer  -     PSA, Total Screen; Future  -     PSA, Total Screen    Prediabetes  -     Comprehensive metabolic panel; Future  -     HEMOGLOBIN A1C W/ EAG ESTIMATION; Future  -     Lipid Panel with Direct LDL reflex; Future  -     TSH, 3rd generation with T4 reflex; Future       WILL GIVE PREVNAR TODAY   RX GIVEN FOR FASTING BLOOD WORK AT [facility]  WILL CALL WITH RESULTS   YEARLY/PRN    Subjective:      Patient ID: Piyush Ferraro is a 75 y o  male  Patient presents for recheck of chronic medical problems today  Overall he is feeling well  Still sees cardiologist for atrial fibrillation  Doing well on CPAP for sleep apnea  Patient's sinus infection has cleared since last visit        The following portions of the patient's history were reviewed and updated as appropriate: allergies, current medications, past family history, past medical history, past social history, past surgical history and problem list     Review of Systems   Respiratory: Negative  Cardiovascular: Negative  Gastrointestinal: Negative  Genitourinary: Negative  Objective:      /78 (BP Location: Right arm, Patient Position: Sitting, Cuff Size: Standard)   Pulse 78   Temp 98 8 °F (37 1 °C) (Tympanic)   Resp 16   Ht 5' 11 26" (1 81 m)   Wt (!) 142 kg (313 lb 14 4 oz)   SpO2 96%   BMI 43 46 kg/m²          Physical Exam   Cardiovascular: Normal rate, regular rhythm, normal heart sounds and intact distal pulses  Carotids: no bruits  Ext: no edema   Pulmonary/Chest: Effort normal  No respiratory distress  He has no wheezes  He has no rales  Psychiatric: He has a normal mood and affect   His behavior is normal  Thought content normal

## 2018-06-07 NOTE — PROGRESS NOTES
Assessment and Plan:    Problem List Items Addressed This Visit     None      Visit Diagnoses     Medicare annual wellness visit, initial    -  Primary        Health Maintenance Due   Topic Date Due    Hepatitis C Screening  1950    DTaP,Tdap,and Td Vaccines (1 - Tdap) 06/29/1970    Fall Risk  06/29/2014    PNEUMOCOCCAL POLYSACCHARIDE VACCINE AGE 72 AND OVER  06/29/2014    GLAUCOMA SCREENING 67+ YR  06/29/2016         HPI:  Erenest Cabot is a 75 y o  male here for his Initial Wellness Visit      Patient Active Problem List   Diagnosis    Obstructive sleep apnea syndrome    Hypersomnia    Shortness of breath    Persistent atrial fibrillation ([address] )    Essential hypertension    Morbid obesity with BMI of 40 0-44 9, adult ([facility])    Dry mouth    Acute non-recurrent maxillary sinusitis    Bronchitis     Past Medical History:   Diagnosis Date    Abdominal wall strain     last assessed: 10/21/14    Allergic rhinitis     last assessed: 05/03/16    Erectile dysfunction of non-organic origin     last assessed: 03/17/14    Lumbar strain     last assessed: 10/21/14    Multiple acquired skin tags     last assessed: 2/18/16    Palpitations     last assessed: 03/17/14    Plantar fasciitis     Skin disorder     last assessed: 05/28/13     Past Surgical History:   Procedure Laterality Date    CATARACT EXTRACTION      FOOT SURGERY      Due to plantar fascitis    REPLACEMENT TOTAL KNEE Left     UMBILICAL HERNIA REPAIR       Family History   Problem Relation Age of Onset    Cancer Mother      History   Smoking Status    Never Smoker   Smokeless Tobacco    Never Used     History   Alcohol Use    Yes     Comment: occasionaly      History   Drug Use No       Current Outpatient Prescriptions   Medication Sig Dispense Refill    ascorbic acid (VITAMIN C) 1000 MG tablet Take 1 tablet by mouth daily      aspirin 325 mg tablet Take 1 tablet by mouth daily      atenolol (TENORMIN) 25 mg tablet Take 25 mg by mouth daily  6    atenolol (TENORMIN) 25 mg tablet Take 1 tablet (25 mg total) by mouth daily 30 tablet 5    Cholecalciferol (VITAMIN D) 2000 units CAPS Take by mouth daily      Cyanocobalamin (VITAMIN B 12 PO) Take by mouth      Multiple Vitamins-Minerals (MENS MULTIPLE VITAMIN/LYCOPENE) TABS Take by mouth       No current facility-administered medications for this visit        Allergies   Allergen Reactions    Seasonal Ic  [Cholestatin]      Immunization History   Administered Date(s) Administered    Influenza Split High Dose Preservative Free IM 10/21/2014    Influenza TIV (IM) 01/16/2013, 01/16/2013       Patient Care Team:  Caroline Soriano DO as PCP - Rosa Guthrie MD      Medicare Screening Tests and Risk Assessments:  AWV Clinical     ISAR:       Once in a Lifetime Medicare Screening:       Medicare Screening Tests and Risk Assessment:   AAA Risk Assessment    Osteoporosis Risk Assessment    HIV Risk Assessment        Drug and Alcohol Use:   Tobacco use    Tobacco use duration    Tobacco Cessation Readiness    Alcohol use    Alcohol Treatment Readiness   Illicit Drug Use        Diet & Exercise:   Diet   How many servings a day of the following:   Exercise        Cognitive Impairment Screening:   Cognitive Impairment Screening        Functional Ability/Level of Safety:   Hearing    Hearing Impairment Assessment    Current Activities    Help needed with the folllowing:    ADL    Fall Risk   Injury History       Home Safety:   Home Safety Risk Factors       Advanced Directives:   Advanced Directives    Patient's End of Life Decisions        Urinary Incontinence:       Glaucoma:            Provider Screening     Preventative Screening/Counseling:   Cardiovascular Screening/Counseling:   (Labs Q5 years, EKG optional one-time)   General:  Risks and Benefits Discussed           Diabetes Screening/Counseling:   (2 tests/year if Pre-Diabetes or 1 test/year if no Diabetes) General:  Risks and Benefits Discussed           Colorectal Cancer Screening/Counseling:   (FOBT Q1 yr; Flex Sig Q4 yrs or Q10 yrs after Screening Colonoscopy; Screening Colonoscpy Q2 yrs High Risk or Q10 yrs Low Risk; Barium Enema Q2 yrs High Risk or Q4 yrs Low Risk)   General:  Risks and Benefits Discussed           Prostate Cancer Screening/Counseling:   (Annual)    General:  Risks and Benefits Discussed          Breast Cancer Screening/Counseling:   (Baseline Age 28 - 43; Annual Age 36+)         Cervical Cancer Screening/Counseling:   (Annual for High Risk or Childbearing Age with Abnormal Pap in Last 3 yrs; Every 2 all others)         Osteoporosis Screening/Counseling:   (Every 2 Yrs if at risk or more if medically necessary)   General:  Risks and Benefits Discussed           AAA Screening/Counseling:   (Once per Lifetime with risk factors)    General:  Risks and Benefits Discussed           Glaucoma Screening/Counseling:   (Annual)   General:  Risks and Benefits Discussed          HIV Screening/Counseling:   (Voluntary; Once annually for high risk OR 3 times for Pregnancy at diagnosis of IUP; 3rd trimester; and at Labor   General:  Risks and Benefits Discussed           Hepatitis C Screening:             Immunizations:   Influenza (annual):  Risks & Benefits Discussed   Pneumococcal (Once in a Lifetime):  Risks & Benefits Discussed   Hepatitis B Series (medium to high risk patients scheduled series):  Risks & Benefits Discussedj   Zostavax (Medicare D Coverage, Pt >66 yo):  Risks & Benefits Discussed   TD (Non-Medicare Wellness  Visit required):  Risks & Benefits Discussed       Other Preventative Couseling (Non-Medicare Wellness Visit Required):   nutrition counseling performed       Referrals (Non-Medicare Wellness Visit Required):       Medical Equipment/Suppliers:            initial Medicare wellness visit today  Depression screen, fall risk assessments were negative today    Patient has a living will and healthcare power of   I also gave him a freezer packet today  Discussed age appropriate vaccinations  Colonoscopy  PSA screenings

## 2018-06-07 NOTE — ASSESSMENT & PLAN NOTE
Stable  Rate controlled on atenolol 25 mg qd  Pt also taking asa 325 mg daily  Continue regular f/u w/ cardio (dr Noble Landin)

## 2018-06-27 ENCOUNTER — OFFICE VISIT (OUTPATIENT)
Dept: FAMILY MEDICINE CLINIC | Facility: CLINIC | Age: 68
End: 2018-06-27
Payer: MEDICARE

## 2018-06-27 VITALS
WEIGHT: 315 LBS | TEMPERATURE: 97.3 F | RESPIRATION RATE: 16 BRPM | SYSTOLIC BLOOD PRESSURE: 138 MMHG | OXYGEN SATURATION: 96 % | HEIGHT: 74 IN | BODY MASS INDEX: 40.43 KG/M2 | HEART RATE: 80 BPM | DIASTOLIC BLOOD PRESSURE: 88 MMHG

## 2018-06-27 DIAGNOSIS — H60.502 ACUTE OTITIS EXTERNA OF LEFT EAR, UNSPECIFIED TYPE: Primary | ICD-10-CM

## 2018-06-27 DIAGNOSIS — H61.21 IMPACTED CERUMEN OF RIGHT EAR: ICD-10-CM

## 2018-06-27 PROCEDURE — 99213 OFFICE O/P EST LOW 20 MIN: CPT | Performed by: PHYSICIAN ASSISTANT

## 2018-06-27 PROCEDURE — 69210 REMOVE IMPACTED EAR WAX UNI: CPT | Performed by: PHYSICIAN ASSISTANT

## 2018-06-27 RX ORDER — NEOMYCIN SULFATE, POLYMYXIN B SULFATE, HYDROCORTISONE 3.5; 10000; 1 MG/ML; [USP'U]/ML; MG/ML
4 SOLUTION/ DROPS AURICULAR (OTIC) EVERY 6 HOURS SCHEDULED
Qty: 10 ML | Refills: 1 | Status: SHIPPED | OUTPATIENT
Start: 2018-06-27 | End: 2018-07-02

## 2018-06-27 NOTE — PROGRESS NOTES
Assessment/Plan:      Diagnoses and all orders for this visit:    Acute otitis externa of left ear, unspecified type  -     neomycin-polymyxin-hydrocortisone (CORTISPORIN) 1 % SOLN; Administer 4 drops into the left ear every 6 (six) hours    Impacted cerumen of right ear    Other orders  -     Ear cerumen removal        [place] male presenting today for concern of bilateral decreased hearing, pain and clogged sensation in his ears which is significantly more prominent on the left side  Overall he does have general narrow ear canals and did have cerumen impaction on the right side  With verbal consent use of irrigation using warm water and peroxide mixture as well as curette instrumentation was used to completely remove the cerumen successfully revealing a normal tympanic membrane on the right side  He tolerated the procedure well  Unfortunately even starting at the opening of the ear canal he has substantial erythema and swelling of the ear canal and I could not visualize distant structures such as the tympanic membrane or any cerumen that could be present  I explained to patient that I could not visualize anything else aside from the extensive swelling and erythema and that he has otitis externa  He still believes that he has ear wax in that year but I told him that the infection could also be causing muffled sounds and pain as well and I do not feel comfortable irrigating the ear if I cannot visualize any cerumen at this time  I recommended that we use antibiotic ear drops and I will see him back in 1 week for recheck and if the ear canal is back to normal at that time and there  Is indeed cerumen I can lavage at that time  Unfortunately it is appearing that Ciprodex is not covered by his insurance and will be quite expensive  Unfortunately the only other option is neomycin-polymyxin -HC which I am concerned with being that I cannot visualize the tympanic membrane    I gave patient the option and he prefers a cheaper option  I discussed with patient risk of using the medicine and  Risk of hearing deficits if there is tympanic membrane trauma and patient understands and would prefer the neomycin polymyxin HC  Once again he will use the eardrops to the left ear as directed  He can use Tylenol as needed for pain  He will follow up in 1 week for recheck  Chief Complaint   Patient presents with    blocked ear     left ear x 3days        Subjective:     Patient ID: Mikey Patterson is a 75 y o  male  68y/o amle here today for ears blocked and some discomfort for past several days  He has chronic sinus pressure and congestion  He has seasonal allergies - takes zyrtec  Denies dizziness  Hearing muffled  Review of Systems   Constitutional: Negative  HENT: Positive for ear pain  Neurological: Negative for dizziness  The following portions of the patient's history were reviewed and updated as appropriate: allergies, current medications, past family history, past medical history, past social history, past surgical history and problem list       Objective:     Physical Exam   Constitutional: He appears well-developed  Morbid obesity   HENT:   Right ear canal very narrow but no redness or swelling within  There is cerumen impaction visible and cannot view TM  Left ear canal with significant swelling and erythema, tenderness especially at opening to ear canal  Even with small speculum unable to visualize any deeper structures  I cannot see TM and I cannot visualize any cerumen secondary to all of the swelling  Neck: Neck supple  Psychiatric: He has a normal mood and affect  Vitals reviewed        Vitals:    06/27/18 0826   BP: 138/88   BP Location: Left arm   Patient Position: Sitting   Cuff Size: Large   Pulse: 80   Resp: 16   Temp: (!) 97 3 °F (36 3 °C)   TempSrc: Tympanic   SpO2: 96%   Weight: (!) 144 kg (317 lb 4 8 oz)   Height: 6' 2" (1 88 m)     Ear cerumen removal  Date/Time: 6/27/2018 10:22 AM  Performed by: Daisy Teran  Authorized by: Daisy Teran     Patient location:  Clinic  Consent:     Consent obtained:  Verbal    Consent given by:  Patient    Risks discussed:  Bleeding, dizziness, infection, incomplete removal, pain and TM perforation  Procedure details:     Location:  R ear    Procedure type comment:  Curette and irrigation    Approach:  External    Visualization (free text):  Otoscope    Equipment used:  Otoscope and curette  Post-procedure details:     Complication:  None    Hearing quality:  Normal    Patient tolerance of procedure:   Tolerated well, no immediate complications

## 2018-07-02 ENCOUNTER — OFFICE VISIT (OUTPATIENT)
Dept: FAMILY MEDICINE CLINIC | Facility: CLINIC | Age: 68
End: 2018-07-02
Payer: MEDICARE

## 2018-07-02 VITALS
TEMPERATURE: 98.6 F | OXYGEN SATURATION: 96 % | HEIGHT: 73 IN | DIASTOLIC BLOOD PRESSURE: 80 MMHG | SYSTOLIC BLOOD PRESSURE: 122 MMHG | BODY MASS INDEX: 41.75 KG/M2 | HEART RATE: 79 BPM | WEIGHT: 315 LBS

## 2018-07-02 DIAGNOSIS — H61.22 CERUMEN DEBRIS ON TYMPANIC MEMBRANE OF LEFT EAR: ICD-10-CM

## 2018-07-02 DIAGNOSIS — H60.502 ACUTE OTITIS EXTERNA OF LEFT EAR, UNSPECIFIED TYPE: Primary | ICD-10-CM

## 2018-07-02 PROBLEM — J01.00 ACUTE NON-RECURRENT MAXILLARY SINUSITIS: Status: RESOLVED | Noted: 2018-05-09 | Resolved: 2018-07-02

## 2018-07-02 PROCEDURE — 69209 REMOVE IMPACTED EAR WAX UNI: CPT | Performed by: PHYSICIAN ASSISTANT

## 2018-07-02 PROCEDURE — 99213 OFFICE O/P EST LOW 20 MIN: CPT | Performed by: PHYSICIAN ASSISTANT

## 2018-07-02 NOTE — PROGRESS NOTES
Assessment/Plan:      Diagnoses and all orders for this visit:    Acute otitis externa of left ear, unspecified type    Cerumen debris on tympanic membrane of left ear    Other orders  -     Ear cerumen removal        69-year-old male presenting today for follow-up to otitis externa of the left ear  On exam there is complete resolution of the redness and swelling of the opening of the ear canal in the ear canal itself  There appears to be some slight cerumen debris against the tympanic membrane  With verbal consent procedure was performed gently below to remove the debris  His  Tympanic membrane does appear slightly distorted possibly with some mild scarring  Which I suspect to be chronic as patient states that he has had ear problems all his life  He feels well and has no complaints at this time  I will have him discontinue ear drops and continue to keep his ear canal is dry as possible  He may resume his swimming but advised avoiding putting his head under the water  He can also use a cotton ball  He should call or follow up with any concerns  Chief Complaint   Patient presents with    Follow-up     L ear pain, better but not gone       Subjective:     Patient ID: Brice Cartagena is a 70 y o  male  69-year-old male presenting today for follow-up to otitis externa of the left ear with possible cerumen impaction  He overall is feeling better with the antibiotic ear drops I gave him  He does still note some slight muffled sound but no pain any more  Review of Systems   Constitutional: Negative  HENT:        As in HPI   Eyes: Negative  Neurological: Negative  The following portions of the patient's history were reviewed and updated as appropriate: allergies, current medications, past family history, past medical history, past social history, past surgical history and problem list       Objective:     Physical Exam   Constitutional: He is oriented to person, place, and time  He appears well-developed and well-nourished  HENT:   Nose: Nose normal    Mouth/Throat: Oropharynx is clear and moist    Left ear canal with resolution of infection within ear canal  There is some cerumen debris against TM  Neck: Neck supple  Lymphadenopathy:     He has no cervical adenopathy  Neurological: He is alert and oriented to person, place, and time  Psychiatric: He has a normal mood and affect  Vitals reviewed  Vitals:    07/02/18 1135   BP: 122/80   BP Location: Left arm   Patient Position: Sitting   Pulse: 79   Temp: 98 6 °F (37 °C)   TempSrc: Tympanic   SpO2: 96%   Weight: (!) 147 kg (324 lb)   Height: 6' 1" (1 854 m)     Ear cerumen removal  Date/Time: 7/2/2018 11:54 AM  Performed by: Berry Putnam  Authorized by: Berry Putnam     Patient location:  Clinic  Consent:     Consent obtained:  Verbal    Consent given by:  Patient    Risks discussed:  Bleeding, dizziness, infection, incomplete removal, pain and TM perforation  Procedure details:     Location:  L ear    Procedure type: irrigation      Approach:  External    Visualization (free text):  Otoscope    Equipment used:  Otoscope, metal syringe  Post-procedure details:     Complication:  None    Hearing quality:  Normal    Patient tolerance of procedure:   Tolerated well, no immediate complications

## 2018-07-29 PROBLEM — I48.21 PERMANENT ATRIAL FIBRILLATION (HCC): Status: ACTIVE | Noted: 2018-04-13

## 2018-07-29 PROBLEM — R60.0 LOWER EXTREMITY EDEMA: Status: ACTIVE | Noted: 2018-07-29

## 2018-07-30 ENCOUNTER — OFFICE VISIT (OUTPATIENT)
Dept: CARDIOLOGY CLINIC | Facility: CLINIC | Age: 68
End: 2018-07-30
Payer: MEDICARE

## 2018-07-30 VITALS
HEART RATE: 70 BPM | DIASTOLIC BLOOD PRESSURE: 70 MMHG | WEIGHT: 315 LBS | BODY MASS INDEX: 41.75 KG/M2 | HEIGHT: 73 IN | RESPIRATION RATE: 18 BRPM | SYSTOLIC BLOOD PRESSURE: 128 MMHG

## 2018-07-30 DIAGNOSIS — R60.0 LOWER EXTREMITY EDEMA: ICD-10-CM

## 2018-07-30 DIAGNOSIS — R06.02 SHORTNESS OF BREATH: ICD-10-CM

## 2018-07-30 DIAGNOSIS — I48.21 PERMANENT ATRIAL FIBRILLATION (HCC): Primary | ICD-10-CM

## 2018-07-30 PROCEDURE — 99213 OFFICE O/P EST LOW 20 MIN: CPT | Performed by: INTERNAL MEDICINE

## 2018-07-30 PROCEDURE — 93000 ELECTROCARDIOGRAM COMPLETE: CPT | Performed by: INTERNAL MEDICINE

## 2018-07-30 NOTE — PROGRESS NOTES
Cardiology Follow Up    Balbir Laughlin  1950  [id number]  [address]  [address] [address] [phone]    Reason for visit: One year FU for Edema (lymphedema), AFIB, AGARWAL      1  Permanent atrial fibrillation (HCC)  POCT ECG   2  Shortness of breath     3  Lower extremity edema         Interval History: Since his last visit he denies CP  He does have ongoing AGARWAL with modest activity  He is being Rx'd for lymphedema  He states this has helped when he wears the compression devices  He denies palpitations or dizziness  Patient Active Problem List   Diagnosis    Obstructive sleep apnea syndrome    Hypersomnia    Shortness of breath    Permanent [facility] fibrillation ([facility]    Morbid obesity with BMI of 40 0-44 9, adult ([facility] )    Dry mouth    Lower extremity edema     Past Medical History:   Diagnosis Date    Abdominal wall strain     last assessed: 10/21/14    Allergic rhinitis     last assessed: 05/03/16    Erectile dysfunction of non-organic origin     last assessed: 03/17/14    Lumbar strain     last assessed: 10/21/14    Multiple acquired skin tags     last assessed: 2/18/16    Palpitations     last assessed: 03/17/14    Plantar fasciitis     Skin disorder     last assessed: 05/28/13     Social History     Social History    Marital status: /Civil Union     Spouse name: N/A    Number of children: N/A    Years of education: N/A     Occupational History    Not on file       Social History Main Topics    Smoking status: Never Smoker    Smokeless tobacco: Never Used    Alcohol use Yes      Comment: occasionaly    Drug use: No    Sexual activity: Not on file     Other Topics Concern    Not on file     Social History Narrative    No narrative on file      Family History   Problem Relation Age of Onset    Cancer Mother      Past Surgical History:   Procedure Laterality Date    CATARACT EXTRACTION      FOOT SURGERY      Due to plantar fascitis    REPLACEMENT TOTAL KNEE Left     UMBILICAL HERNIA REPAIR         Current Outpatient Prescriptions:     ascorbic acid (VITAMIN C) 1000 MG tablet, Take 1 tablet by mouth daily, Disp: , Rfl:     aspirin 325 mg tablet, Take 1 tablet by mouth daily, Disp: , Rfl:     atenolol (TENORMIN) 25 mg tablet, Take 1 tablet (25 mg total) by mouth daily, Disp: 30 tablet, Rfl: 5    Cholecalciferol (VITAMIN D) 2000 units CAPS, Take by mouth daily, Disp: , Rfl:     Cyanocobalamin (VITAMIN B 12 PO), Take by mouth, Disp: , Rfl:     Multiple Vitamins-Minerals (MENS MULTIPLE VITAMIN/LYCOPENE) TABS, Take by mouth, Disp: , Rfl:   Allergies   Allergen Reactions    Seasonal Ic  [Cholestatin]      Review of Systems:  Review of Systems   Constitutional: Positive for fatigue  Respiratory: Positive for shortness of breath  Negative for wheezing  Cardiovascular: Positive for leg swelling  Negative for chest pain and palpitations  Gastrointestinal: Negative for blood in stool, constipation and diarrhea  Genitourinary: Negative for frequency  Musculoskeletal: Positive for arthralgias and back pain  Psychiatric/Behavioral: Negative for agitation, behavioral problems, confusion and decreased concentration  Physical Exam:  Vitals:    07/30/18 0752   BP: 128/70   Pulse: 70   Resp: 18   Weight: (!) 147 kg (324 lb 3 2 oz)   Height: 6' 1" (1 854 m)       Physical Exam   Constitutional: He appears well-developed and well-nourished  No distress  obese   HENT:   Head: Normocephalic and atraumatic  Mouth/Throat: No oropharyngeal exudate  Eyes: Conjunctivae are normal  No scleral icterus  Neck: Neck supple  Normal carotid pulses and no JVD present  Carotid bruit is not present  No thyromegaly present  Cardiovascular: Normal rate  An irregularly irregular rhythm present  Exam reveals no gallop and no friction rub  No murmur heard    Pulses:       Dorsalis pedis pulses are 2+ on the right side, and 2+ on the left side  Pulmonary/Chest: Effort normal  He has no wheezes  He has no rhonchi  He has no rales  Abdominal: Soft  He exhibits no mass  There is no hepatosplenomegaly  There is no tenderness  Musculoskeletal: He exhibits edema (2+ partially pitting edema of the LEs)  Discussion/Summary:  1  [id number]-yzer well controlled on atenolol 25 mg daily  On  mg as AC strategy in light of patient preference and CHADS Vasc2 of only 1  Continue same Rx  2  AGARWAL  Likely not cardiac  BNP was in 140 range last year  No improvement with lasix when he was taking it  3  LE edema    Felt to be due to lymphedema however there is pitting component    Suspect some venostasis disease    Diuretics did not help hence will not prescribe  4  Elevated 10 yr risk of MI based on FLP, gender, age    Patient did not want to be on statin rx         FU one year    Conrad Baltazar MD

## 2018-10-31 DIAGNOSIS — I48.21 PERMANENT ATRIAL FIBRILLATION (HCC): Primary | ICD-10-CM

## 2018-10-31 DIAGNOSIS — I10 HYPERTENSION, UNSPECIFIED TYPE: ICD-10-CM

## 2018-10-31 RX ORDER — ATENOLOL 25 MG/1
TABLET ORAL
Qty: 30 TABLET | Refills: 5 | Status: SHIPPED | OUTPATIENT
Start: 2018-10-31 | End: 2019-03-01 | Stop reason: SDUPTHER

## 2018-11-28 ENCOUNTER — APPOINTMENT (OUTPATIENT)
Dept: LAB | Facility: MEDICAL CENTER | Age: 68
End: 2018-11-28
Payer: MEDICARE

## 2018-11-28 DIAGNOSIS — R73.03 PREDIABETES: ICD-10-CM

## 2018-11-28 DIAGNOSIS — I10 ESSENTIAL HYPERTENSION: ICD-10-CM

## 2018-11-28 LAB
ALBUMIN SERPL BCP-MCNC: 3.7 G/DL (ref 3.5–5)
ALP SERPL-CCNC: 96 U/L (ref 46–116)
ALT SERPL W P-5'-P-CCNC: 43 U/L (ref 12–78)
ANION GAP SERPL CALCULATED.3IONS-SCNC: 5 MMOL/L (ref 4–13)
AST SERPL W P-5'-P-CCNC: 40 U/L (ref 5–45)
BILIRUB SERPL-MCNC: 0.51 MG/DL (ref 0.2–1)
BUN SERPL-MCNC: 12 MG/DL (ref 5–25)
CALCIUM SERPL-MCNC: 9.9 MG/DL (ref 8.3–10.1)
CHLORIDE SERPL-SCNC: 103 MMOL/L (ref 100–108)
CHOLEST SERPL-MCNC: 166 MG/DL (ref 50–200)
CO2 SERPL-SCNC: 27 MMOL/L (ref 21–32)
CREAT SERPL-MCNC: 0.82 MG/DL (ref 0.6–1.3)
EST. AVERAGE GLUCOSE BLD GHB EST-MCNC: 123 MG/DL
GFR SERPL CREATININE-BSD FRML MDRD: 90 ML/MIN/1.73SQ M
GLUCOSE P FAST SERPL-MCNC: 107 MG/DL (ref 65–99)
HBA1C MFR BLD: 5.9 % (ref 4.2–6.3)
HDLC SERPL-MCNC: 45 MG/DL (ref 40–60)
LDLC SERPL CALC-MCNC: 104 MG/DL (ref 0–100)
POTASSIUM SERPL-SCNC: 4.3 MMOL/L (ref 3.5–5.3)
PROT SERPL-MCNC: 8.2 G/DL (ref 6.4–8.2)
SODIUM SERPL-SCNC: 135 MMOL/L (ref 136–145)
TRIGL SERPL-MCNC: 84 MG/DL
TSH SERPL DL<=0.05 MIU/L-ACNC: 3.06 UIU/ML (ref 0.36–3.74)

## 2018-11-28 PROCEDURE — 36415 COLL VENOUS BLD VENIPUNCTURE: CPT

## 2018-11-28 PROCEDURE — 80053 COMPREHEN METABOLIC PANEL: CPT

## 2018-11-28 PROCEDURE — 84443 ASSAY THYROID STIM HORMONE: CPT

## 2018-11-28 PROCEDURE — 80061 LIPID PANEL: CPT

## 2018-11-28 PROCEDURE — G0103 PSA SCREENING: HCPCS

## 2018-11-28 PROCEDURE — 83036 HEMOGLOBIN GLYCOSYLATED A1C: CPT

## 2018-11-29 ENCOUNTER — OFFICE VISIT (OUTPATIENT)
Dept: FAMILY MEDICINE CLINIC | Facility: CLINIC | Age: 68
End: 2018-11-29
Payer: MEDICARE

## 2018-11-29 VITALS
WEIGHT: 315 LBS | BODY MASS INDEX: 43.09 KG/M2 | OXYGEN SATURATION: 96 % | DIASTOLIC BLOOD PRESSURE: 84 MMHG | SYSTOLIC BLOOD PRESSURE: 130 MMHG | TEMPERATURE: 98.7 F | HEART RATE: 83 BPM

## 2018-11-29 DIAGNOSIS — R10.84 GENERALIZED ABDOMINAL PAIN: Primary | ICD-10-CM

## 2018-11-29 PROCEDURE — 99213 OFFICE O/P EST LOW 20 MIN: CPT | Performed by: FAMILY MEDICINE

## 2018-11-29 NOTE — PROGRESS NOTES
Assessment/Plan:      Patient has a vague hard to define abdominal and back pain  His exam is fairly unremarkable  Recent blood work including thyroid CMP lipid and PSA unremarkable  Will check CT of the abdomen and pelvis and follow up accordingly  Diagnoses and all orders for this visit:    Generalized abdominal pain  -     CT abdomen pelvis w contrast; Future          Subjective:      Patient ID: Eloisa Elizabeth is a 70 y o  male  Patient presents with a chief complaint abdominal and back pain  Symptoms have been present for several months  He finds it difficult to accurately describes his symptoms  States that it feels "like I have a brick in my abdomen" symptoms do not seem to change with change in position  He has had note alteration in his bowel or urinary habit  He had a negative  Colonoscopy within the last year  He has no fever chills or shortness of breath  His appetite is normal and his weight has not changed significantly in the last 4 months  The following portions of the patient's history were reviewed and updated as appropriate: allergies, current medications, past family history, past medical history, past social history, past surgical history and problem list     Review of Systems   Gastrointestinal: Positive for abdominal distention and abdominal pain  Musculoskeletal: Positive for back pain  Objective:      /84 (BP Location: Left arm, Patient Position: Sitting)   Pulse 83   Temp 98 7 °F (37 1 °C) (Tympanic)   Wt (!) 148 kg (326 lb 9 6 oz)   SpO2 96%   BMI 43 09 kg/m²          Physical Exam   Constitutional: He is oriented to person, place, and time  He appears well-developed and well-nourished  No distress  HENT:   Head: Normocephalic and atraumatic  Eyes: Pupils are equal, round, and reactive to light  Conjunctivae are normal  Right eye exhibits no discharge  Neck: Normal range of motion  No thyromegaly present     Cardiovascular: Normal rate and regular rhythm  Pulmonary/Chest: Effort normal and breath sounds normal  No respiratory distress  Abdominal: Soft  He exhibits distension  There is no tenderness  Lymphadenopathy:     He has no cervical adenopathy  Neurological: He is alert and oriented to person, place, and time  Skin: Skin is warm and dry  He is not diaphoretic  Psychiatric: He has a normal mood and affect  His behavior is normal  Judgment and thought content normal    Nursing note and vitals reviewed

## 2018-11-30 LAB — PSA SERPL-MCNC: 1 NG/ML (ref 0–4)

## 2018-12-05 ENCOUNTER — TELEPHONE (OUTPATIENT)
Dept: FAMILY MEDICINE CLINIC | Facility: CLINIC | Age: 68
End: 2018-12-05

## 2018-12-05 ENCOUNTER — HOSPITAL ENCOUNTER (OUTPATIENT)
Dept: CT IMAGING | Facility: HOSPITAL | Age: 68
Discharge: HOME | End: 2018-12-05
Payer: MEDICARE

## 2018-12-05 DIAGNOSIS — R10.9 ABDOMINAL PAIN, UNSPECIFIED ABDOMINAL LOCATION: Primary | ICD-10-CM

## 2018-12-05 DIAGNOSIS — R10.84 GENERALIZED ABDOMINAL PAIN: ICD-10-CM

## 2018-12-05 DIAGNOSIS — R93.5 ABNORMAL ABDOMINAL CT SCAN: Primary | ICD-10-CM

## 2018-12-05 PROCEDURE — 74177 CT ABD & PELVIS W/CONTRAST: CPT

## 2018-12-05 RX ORDER — TRAMADOL HYDROCHLORIDE 50 MG/1
50 TABLET ORAL EVERY 6 HOURS PRN
Qty: 20 TABLET | Refills: 0 | Status: SHIPPED | OUTPATIENT
Start: 2018-12-05 | End: 2018-12-18 | Stop reason: SDUPTHER

## 2018-12-05 RX ADMIN — IOHEXOL 100 ML: 350 INJECTION, SOLUTION INTRAVENOUS at 09:34

## 2018-12-05 NOTE — TELEPHONE ENCOUNTER
Patient called stating that he had the done the CT today but he is in a lot of pain, specially back pain, patient is requesting something to help him with the pain until we get results, please advice  If something is prescribe please send to pharmacy on file, thank you

## 2018-12-05 NOTE — TELEPHONE ENCOUNTER
Spoke to patient regarding his CT scan  Abnormal findings in pancreas requiring further workup  Will refer to Surgical Oncology for further evaluation

## 2018-12-06 ENCOUNTER — TELEPHONE (OUTPATIENT)
Dept: FAMILY MEDICINE CLINIC | Facility: CLINIC | Age: 68
End: 2018-12-06

## 2018-12-06 PROBLEM — K86.89 PANCREATIC DUCT DILATED: Status: ACTIVE | Noted: 2018-12-06

## 2018-12-11 ENCOUNTER — CONSULT (OUTPATIENT)
Dept: SURGICAL ONCOLOGY | Facility: CLINIC | Age: 68
End: 2018-12-11
Payer: MEDICARE

## 2018-12-11 ENCOUNTER — TELEPHONE (OUTPATIENT)
Dept: GASTROENTEROLOGY | Facility: CLINIC | Age: 68
End: 2018-12-11

## 2018-12-11 VITALS
RESPIRATION RATE: 18 BRPM | WEIGHT: 315 LBS | BODY MASS INDEX: 44.1 KG/M2 | SYSTOLIC BLOOD PRESSURE: 130 MMHG | TEMPERATURE: 98.7 F | DIASTOLIC BLOOD PRESSURE: 88 MMHG | HEIGHT: 71 IN | HEART RATE: 82 BPM

## 2018-12-11 DIAGNOSIS — R93.5 ABNORMAL ABDOMINAL CT SCAN: ICD-10-CM

## 2018-12-11 DIAGNOSIS — K86.89 PANCREATIC DUCT DILATED: Primary | ICD-10-CM

## 2018-12-11 PROCEDURE — 99214 OFFICE O/P EST MOD 30 MIN: CPT | Performed by: SURGERY

## 2018-12-11 NOTE — LETTER
December 11, 2018     DO Willian Iglesias [address]  [address] [facility]    Patient: Drew Sexton   YOB: 1950   Date of Visit: 12/11/2018       Dear Dr Massiel Gonzalez: Thank you for referring Thomas Gomez to me for evaluation  Below are my notes for this consultation  If you have questions, please do not hesitate to call me  I look forward to following your patient along with you  Sincerely,        Lauro Wagoner MD        CC: DO John Mitchell MD Lunda Genin, MD Walt Campi, MD  12/11/2018 12:03 PM  Sign at close encounter               Surgical Oncology Follow Up       [facility] [address]  [place] [address]  [address] [place] [address]  1950  [id number]  [address] [facility]  [address]  [facility] [address] [facility] [address] [place] [address]  [phone]    Chief Complaint   Patient presents with    Consult     New patient referral, abnormal CT scan, IPMN  Assessment/Plan:    No problem-specific Assessment & Plan notes found for this encounter  Diagnoses and all orders for this visit:    Pancreatic duct dilated  -     MRI abdomen w wo contrast and mrcp; Future  -     Ambulatory referral to Gastroenterology; Future  -     US right upper quadrant; Future  -     Amylase; Future  -     Lipase; Future    Abnormal abdominal CT scan  -     Ambulatory referral to Hematology / Oncology  -     MRI abdomen w wo contrast and mrcp; Future  -     Ambulatory referral to Gastroenterology; Future        Advance Care Planning/Advance Directives:  Discussed disease status, cancer treatment plans and/or cancer treatment goals with the patient  No history exists  History of Present Illness:   Patient is a 42-year-old man with several month history of vague abdominal pain   This led to a CT scan which revealed a slightly dilated pancreatic duct as well as multiple pancreatic cysts  He has been referred for evaluation and treatment  He has had no nausea, vomiting, fevers, chills, night sweats, bowel changes, episodes of jaundice, steatorrhea, melena  He denies a prior history of pancreatitis  He has no known gallbladder history  Review of Systems   Constitutional: Negative  HENT: Negative  Eyes: Negative  Respiratory: Negative  Cardiovascular: Negative  Gastrointestinal: Negative  Endocrine: Negative  Genitourinary: Negative  Musculoskeletal: Negative  Allergic/Immunologic: Negative  Neurological: Negative  Hematological: Negative  Psychiatric/Behavioral: Negative            Patient Active Problem List   Diagnosis    Obstructive sleep apnea syndrome    Hypersomnia    Shortness of breath    Permanent atrial fibrillation (HCC)    Morbid obesity with BMI of 40 0-44 9, adult ([facility] )    Dry mouth    Lower extremity edema    Pancreatic duct dilated     Past Medical History:   Diagnosis Date    A-fib ([facility] )     Abdominal wall strain     last assessed: 10/21/14    Allergic rhinitis     last assessed: 05/03/16    Arthritis     Erectile dysfunction of non-organic origin     last assessed: 03/17/14    Lumbar strain     last assessed: 10/21/14    Multiple acquired skin tags     last assessed: 2/18/16    Palpitations     last assessed: 03/17/14    Plantar fasciitis     Skin disorder     last assessed: 05/28/13     Past Surgical History:   Procedure Laterality Date    CATARACT EXTRACTION      FOOT SURGERY      Due to plantar fascitis    REPLACEMENT TOTAL KNEE Left     UMBILICAL HERNIA REPAIR       Family History   Problem Relation Age of Onset    Breast cancer Mother     Cervical cancer Paternal Aunt     Pancreatic cancer Other     Liver cancer Other      Social History     Social History    Marital status: /Civil Union     Spouse name: N/A  Number of children: N/A    Years of education: N/A     Occupational History    Not on file  Social History Main Topics    Smoking status: Never Smoker    Smokeless tobacco: Never Used    Alcohol use Yes      Comment: occasionaly    Drug use: No    Sexual activity: Not on file     Other Topics Concern    Not on file     Social History Narrative    No narrative on file       Current Outpatient Prescriptions:     ascorbic acid (VITAMIN C) 1000 MG tablet, Take 1 tablet by mouth daily, Disp: , Rfl:     aspirin 325 mg tablet, Take 1 tablet by mouth daily, Disp: , Rfl:     atenolol (TENORMIN) 25 mg tablet, TAKE 1 TABLET BY MOUTH EVERY DAY, Disp: 30 tablet, Rfl: 5    Cholecalciferol (VITAMIN D) 2000 units CAPS, Take by mouth daily, Disp: , Rfl:     Cyanocobalamin (VITAMIN B 12 PO), Take by mouth, Disp: , Rfl:     traMADol (ULTRAM) 50 mg tablet, Take 1 tablet (50 mg total) by mouth every 6 (six) hours as needed for moderate pain, Disp: 20 tablet, Rfl: 0  Allergies   Allergen Reactions    Seasonal Ic  [Cholestatin]      Vitals:    12/11/18 1054   BP: 130/88   Pulse: 82   Resp: 18   Temp: 98 7 °F (37 1 °C)       Physical Exam   Constitutional: He is oriented to person, place, and time  He appears well-developed and well-nourished  HENT:   Head: Normocephalic and atraumatic  Right Ear: External ear normal    Left Ear: External ear normal    Eyes: Pupils are equal, round, and reactive to light  Conjunctivae are normal  No scleral icterus  Neck: Normal range of motion  Neck supple  Cardiovascular: Normal rate, regular rhythm, normal heart sounds and intact distal pulses  Pulmonary/Chest: Effort normal and breath sounds normal    Abdominal: Soft  Bowel sounds are normal  He exhibits no distension and no mass  There is no tenderness  There is no rebound  Musculoskeletal: Normal range of motion  Lymphadenopathy:     He has no cervical adenopathy     Neurological: He is alert and oriented to person, place, and time  Skin: Skin is warm and dry  Psychiatric: He has a normal mood and affect  His behavior is normal  Judgment and thought content normal        Pathology:  none    Labs:  None    Imaging  Ct Abdomen Pelvis W Contrast    Result Date: 12/5/2018  Narrative: CT ABDOMEN AND PELVIS WITH IV CONTRAST INDICATION:   R10 84: Generalized abdominal pain  COMPARISON:  Abdominal ultrasound 2/21/2012  TECHNIQUE:  CT examination of the abdomen and pelvis was performed  Axial, sagittal, and coronal 2D reformatted images were created from the source data and submitted for interpretation  Radiation dose length product (DLP) for this visit:  1407 mGy-cm   This examination, like all CT scans performed in the [facility], was performed utilizing techniques to minimize radiation dose exposure, including the use of iterative reconstruction and automated exposure control  IV Contrast:  100 mL of iohexol (OMNIPAQUE) Enteric Contrast:  Enteric contrast was administered  FINDINGS: ABDOMEN LOWER CHEST:  No clinically significant abnormality identified in the visualized lower chest  LIVER/BILIARY TREE:  Liver is diffusely decreased in density consistent with fatty change  No CT evidence of suspicious hepatic mass  Normal hepatic contours  No biliary dilatation  GALLBLADDER:  No calcified gallstones  No pericholecystic inflammatory change  SPLEEN:  Unremarkable  PANCREAS:  There is dilatation of the cystic duct in the body and tail, measuring approximately 8 mm in caliber  There appear to be relative enlargement of the pancreatic tail with multiple cysts identified, largest measuring 2 0 cm  No inflammatory changes  A mildly enlarged portacaval lymph node measuring 1 5 cm in short axis is noted  ADRENAL GLANDS:  Unremarkable  KIDNEYS/URETERS:  No hydronephrosis  There is a punctate calculus in the interpolar left kidney  STOMACH AND BOWEL:  Unremarkable   APPENDIX:  No findings to suggest appendicitis  ABDOMINOPELVIC CAVITY: Mildly enlarged portacaval lymph node, as above  No other evidence of adenopathy  No ascites  No pneumoperitoneum  VESSELS:  Atherosclerotic changes are present  No evidence of aneurysm  PELVIS REPRODUCTIVE ORGANS:  Unremarkable for patient's age  URINARY BLADDER:  Unremarkable  ABDOMINAL WALL/INGUINAL REGIONS:  Status post umbilical hernia repair  OSSEOUS STRUCTURES:  No acute fracture or destructive osseous lesion  Impression: Enlarged pancreatic duct, with multiple small cysts in the pancreatic tail  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  Because there are worrisome features, recommend further evaluation by gastroenterology for workup by endoscopic ultrasound (EUS )  Mildly enlarged portacaval lymph node noted  Recommendations are based on recent consensus statements on management of pancreatic cystic lesions from [address] [place] [address] Gastro[employer] Association, [employer] of [employer], the journal Pancreatology, and our own institutional consensus  Fatty liver  Punctate nonobstructing left renal calculus  I personally discussed this study with Jayna Jacobo on 12/5/2018 at 2:52 PM  Workstation performed: [id number]     I reviewed the above laboratory and imaging data  Discussion/Summary:  Pancreatic cysts, with slightly enlarged pancreatic duct  Will order gallbladder ultrasound to evaluate for possibility of gallbladder disease as a cause of his pain  Will also order MRI/MRCP along with endoscopic ultrasound and biopsy for further workup  Follow-up here once studies available for review

## 2018-12-11 NOTE — TELEPHONE ENCOUNTER
Request dr Robyn Langston patient referral from dr Britt Poe for an eus  Does he need an office visit prior?

## 2018-12-11 NOTE — PROGRESS NOTES
Surgical Oncology Follow Up       [address] [facility] CANCER CARE SURGICAL ONCOLOGY [facility]  [address]  [facility] [address] [address]  [address] [facility]  1950  [id number]  [address] [facility] SURGICAL ONCOLOGY [facility]  [address]  [facility] [address] [address] [phone]    Chief Complaint   Patient presents with    Consult     New patient referral, abnormal CT scan, IPMN  Assessment/Plan:    No problem-specific Assessment & Plan notes found for this encounter  Diagnoses and all orders for this visit:    Pancreatic duct dilated  -     MRI abdomen w wo contrast and mrcp; Future  -     Ambulatory referral to Gastroenterology; Future  -     US right upper quadrant; Future  -     Amylase; Future  -     Lipase; Future    Abnormal abdominal CT scan  -     Ambulatory referral to Hematology / Oncology  -     MRI abdomen w wo contrast and mrcp; Future  -     Ambulatory referral to Gastroenterology; Future        Advance Care Planning/Advance Directives:  Discussed disease status, cancer treatment plans and/or cancer treatment goals with the patient  No history exists  History of Present Illness:   Patient is a 58-year-old man with several month history of vague abdominal pain  This led to a CT scan which revealed a slightly dilated pancreatic duct as well as multiple pancreatic cysts  He has been referred for evaluation and treatment  He has had no nausea, vomiting, fevers, chills, night sweats, bowel changes, episodes of jaundice, steatorrhea, melena  He denies a prior history of pancreatitis  He has no known gallbladder history  Review of Systems   Constitutional: Negative  HENT: Negative  Eyes: Negative  Respiratory: Negative  Cardiovascular: Negative  Gastrointestinal: Negative  Endocrine: Negative  Genitourinary: Negative  Musculoskeletal: Negative  Allergic/Immunologic: Negative  Neurological: Negative  Hematological: Negative  Psychiatric/Behavioral: Negative  Patient Active Problem List   Diagnosis    Obstructive sleep apnea syndrome    Hypersomnia    Shortness of breath    Permanent atrial fibrillation ([place] [address] )    Morbid obesity with BMI of 40 0-44 9, adult ([facility] )    Dry mouth    Lower extremity edema    Pancreatic duct dilated     Past Medical History:   Diagnosis Date    A-fib ([place] [address] )     Abdominal wall strain     last assessed: 10/21/14    Allergic rhinitis     last assessed: 05/03/16    Arthritis     Erectile dysfunction of non-organic origin     last assessed: 03/17/14    Lumbar strain     last assessed: 10/21/14    Multiple acquired skin tags     last assessed: 2/18/16    Palpitations     last assessed: 03/17/14    Plantar fasciitis     Skin disorder     last assessed: 05/28/13     Past Surgical History:   Procedure Laterality Date    CATARACT EXTRACTION      FOOT SURGERY      Due to plantar fascitis    REPLACEMENT TOTAL KNEE Left     UMBILICAL HERNIA REPAIR       Family History   Problem Relation Age of Onset    Breast cancer Mother     Cervical cancer Paternal Aunt     Pancreatic cancer Other     Liver cancer Other      Social History     Social History    Marital status: /Civil Union     Spouse name: N/A    Number of children: N/A    Years of education: N/A     Occupational History    Not on file       Social History Main Topics    Smoking status: Never Smoker    Smokeless tobacco: Never Used    Alcohol use Yes      Comment: occasionaly    Drug use: No    Sexual activity: Not on file     Other Topics Concern    Not on file     Social History Narrative    No narrative on file       Current Outpatient Prescriptions:     ascorbic acid (VITAMIN C) 1000 MG tablet, Take 1 tablet by mouth daily, Disp: , Rfl:     aspirin 325 mg tablet, Take 1 tablet by mouth daily, Disp: , Rfl:     atenolol (TENORMIN) 25 mg tablet, TAKE 1 TABLET BY MOUTH EVERY DAY, Disp: 30 tablet, Rfl: 5    Cholecalciferol (VITAMIN D) 2000 units CAPS, Take by mouth daily, Disp: , Rfl:     Cyanocobalamin (VITAMIN B 12 PO), Take by mouth, Disp: , Rfl:     traMADol (ULTRAM) 50 mg tablet, Take 1 tablet (50 mg total) by mouth every 6 (six) hours as needed for moderate pain, Disp: 20 tablet, Rfl: 0  Allergies   Allergen Reactions    Seasonal Ic  [Cholestatin]      Vitals:    12/11/18 1054   BP: 130/88   Pulse: 82   Resp: 18   Temp: 98 7 °F (37 1 °C)       Physical Exam   Constitutional: He is oriented to person, place, and time  He appears well-developed and well-nourished  HENT:   Head: Normocephalic and atraumatic  Right Ear: External ear normal    Left Ear: External ear normal    Eyes: Pupils are equal, round, and reactive to light  Conjunctivae are normal  No scleral icterus  Neck: Normal range of motion  Neck supple  Cardiovascular: Normal rate, regular rhythm, normal heart sounds and intact distal pulses  Pulmonary/Chest: Effort normal and breath sounds normal    Abdominal: Soft  Bowel sounds are normal  He exhibits no distension and no mass  There is no tenderness  There is no rebound  Musculoskeletal: Normal range of motion  Lymphadenopathy:     He has no cervical adenopathy  Neurological: He is alert and oriented to person, place, and time  Skin: Skin is warm and dry  Psychiatric: He has a normal mood and affect  His behavior is normal  Judgment and thought content normal        Pathology:  none    Labs:  None    Imaging  Ct Abdomen Pelvis W Contrast    Result Date: 12/5/2018  Narrative: CT ABDOMEN AND PELVIS WITH IV CONTRAST INDICATION:   R10 84: Generalized abdominal pain  COMPARISON:  Abdominal ultrasound 2/21/2012  TECHNIQUE:  CT examination of the abdomen and pelvis was performed  Axial, sagittal, and coronal 2D reformatted images were created from the source data and submitted for interpretation  Radiation dose length product (DLP) for this visit:  1407 mGy-cm   This examination, like all CT scans performed in the [facility], was performed utilizing techniques to minimize radiation dose exposure, including the use of iterative reconstruction and automated exposure control  IV Contrast:  100 mL of iohexol (OMNIPAQUE) Enteric Contrast:  Enteric contrast was administered  FINDINGS: ABDOMEN LOWER CHEST:  No clinically significant abnormality identified in the visualized lower chest  LIVER/BILIARY TREE:  Liver is diffusely decreased in density consistent with fatty change  No CT evidence of suspicious hepatic mass  Normal hepatic contours  No biliary dilatation  GALLBLADDER:  No calcified gallstones  No pericholecystic inflammatory change  SPLEEN:  Unremarkable  PANCREAS:  There is dilatation of the cystic duct in the body and tail, measuring approximately 8 mm in caliber  There appear to be relative enlargement of the pancreatic tail with multiple cysts identified, largest measuring 2 0 cm  No inflammatory changes  A mildly enlarged portacaval lymph node measuring 1 5 cm in short axis is noted  ADRENAL GLANDS:  Unremarkable  KIDNEYS/URETERS:  No hydronephrosis  There is a punctate calculus in the interpolar left kidney  STOMACH AND BOWEL:  Unremarkable  APPENDIX:  No findings to suggest appendicitis  ABDOMINOPELVIC CAVITY: Mildly enlarged portacaval lymph node, as above  No other evidence of adenopathy  No ascites  No pneumoperitoneum  VESSELS:  Atherosclerotic changes are present  No evidence of aneurysm  PELVIS REPRODUCTIVE ORGANS:  Unremarkable for patient's age  URINARY BLADDER:  Unremarkable  ABDOMINAL WALL/INGUINAL REGIONS:  Status post umbilical hernia repair  OSSEOUS STRUCTURES:  No acute fracture or destructive osseous lesion  Impression: Enlarged pancreatic duct, with multiple small cysts in the pancreatic tail   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  Because there are worrisome features, recommend further evaluation by gastroenterology for workup by endoscopic ultrasound (EUS )  Mildly enlarged portacaval lymph node noted  Recommendations are based on recent consensus statements on management of pancreatic cystic lesions from [address] [place] [address] [employer], [employer], the journal Pancreatology, and our own institutional consensus  Fatty liver  Punctate nonobstructing left renal calculus  I personally discussed this study with Karel Jones on 12/5/2018 at 2:52 PM  Workstation performed: [id number]     I reviewed the above laboratory and imaging data  Discussion/Summary:  Pancreatic cysts, with slightly enlarged pancreatic duct  Will order gallbladder ultrasound to evaluate for possibility of gallbladder disease as a cause of his pain  Will also order MRI/MRCP along with endoscopic ultrasound and biopsy for further workup  Follow-up here once studies available for review

## 2018-12-12 ENCOUNTER — HOSPITAL ENCOUNTER (OUTPATIENT)
Dept: ULTRASOUND IMAGING | Facility: HOSPITAL | Age: 68
Discharge: HOME | End: 2018-12-12
Attending: SURGERY
Payer: MEDICARE

## 2018-12-12 ENCOUNTER — APPOINTMENT (OUTPATIENT)
Dept: LAB | Facility: HOSPITAL | Age: 68
End: 2018-12-12
Attending: SURGERY
Payer: MEDICARE

## 2018-12-12 ENCOUNTER — HOSPITAL ENCOUNTER (OUTPATIENT)
Dept: MRI IMAGING | Facility: HOSPITAL | Age: 68
Discharge: HOME | End: 2018-12-12
Attending: SURGERY
Payer: MEDICARE

## 2018-12-12 DIAGNOSIS — K86.89 PANCREATIC DUCT DILATED: ICD-10-CM

## 2018-12-12 DIAGNOSIS — K86.89 PANCREATIC DUCT DILATED: Primary | ICD-10-CM

## 2018-12-12 DIAGNOSIS — R93.5 ABNORMAL ABDOMINAL CT SCAN: ICD-10-CM

## 2018-12-12 LAB
AMYLASE SERPL-CCNC: 41 IU/L (ref 25–115)
LIPASE SERPL-CCNC: 104 U/L (ref 73–393)

## 2018-12-12 PROCEDURE — 82150 ASSAY OF AMYLASE: CPT

## 2018-12-12 PROCEDURE — 36415 COLL VENOUS BLD VENIPUNCTURE: CPT

## 2018-12-12 PROCEDURE — 76705 ECHO EXAM OF ABDOMEN: CPT

## 2018-12-12 PROCEDURE — 83690 ASSAY OF LIPASE: CPT

## 2018-12-13 NOTE — TELEPHONE ENCOUNTER
He can have an office visit before EUS if he prefers  If he wants to schedule EUS directly, that is fine also   Thanks

## 2018-12-15 DIAGNOSIS — R10.9 ABDOMINAL PAIN, UNSPECIFIED ABDOMINAL LOCATION: ICD-10-CM

## 2018-12-16 RX ORDER — TRAMADOL HYDROCHLORIDE 50 MG/1
50 TABLET ORAL EVERY 6 HOURS PRN
Qty: 20 TABLET | Refills: 0 | OUTPATIENT
Start: 2018-12-16

## 2018-12-18 DIAGNOSIS — R10.9 ABDOMINAL PAIN, UNSPECIFIED ABDOMINAL LOCATION: ICD-10-CM

## 2018-12-18 RX ORDER — TRAMADOL HYDROCHLORIDE 50 MG/1
50 TABLET ORAL EVERY 6 HOURS PRN
Qty: 20 TABLET | Refills: 0 | Status: SHIPPED | OUTPATIENT
Start: 2018-12-18 | End: 2018-12-21

## 2018-12-18 RX ORDER — TRAMADOL HYDROCHLORIDE 50 MG/1
50 TABLET ORAL EVERY 6 HOURS PRN
Qty: 12 TABLET | Refills: 0 | Status: SHIPPED | OUTPATIENT
Start: 2018-12-18 | End: 2018-12-21

## 2018-12-18 NOTE — TELEPHONE ENCOUNTER
Pt is currently going to Oncology to Dr Olayinka Ocampo and Alethea Woodruff to Dr Rimma Colon  I stated to pt if he is having pain then he should be asking one of the 2 Dr's he is  currently seeing  Thank you!

## 2018-12-19 ENCOUNTER — TELEPHONE (OUTPATIENT)
Dept: SURGICAL ONCOLOGY | Facility: HOSPITAL | Age: 68
End: 2018-12-19

## 2018-12-21 ENCOUNTER — TELEPHONE (OUTPATIENT)
Dept: SURGICAL ONCOLOGY | Facility: CLINIC | Age: 68
End: 2018-12-21

## 2018-12-21 DIAGNOSIS — K86.2 PANCREAS CYST: Primary | ICD-10-CM

## 2018-12-21 RX ORDER — TRAMADOL HYDROCHLORIDE 50 MG/1
50 TABLET ORAL EVERY 6 HOURS PRN
Qty: 12 TABLET | Refills: 0 | Status: SHIPPED | OUTPATIENT
Start: 2018-12-21 | End: 2019-02-15 | Stop reason: HOSPADM

## 2018-12-21 NOTE — TELEPHONE ENCOUNTER
----- Message from Rosalva Bardales [employer] sent at 12/20/2018 10:01 AM EST -----  Regarding: Med Script  Contact: [phone]  Patient is calling in regards to the Tramadol script he requested yesterday: to request to have the Tramadol script printed and he will pick it [facility] on 12/31 (tomorrow)

## 2019-01-02 ENCOUNTER — HOSPITAL ENCOUNTER (OUTPATIENT)
Dept: RADIOLOGY | Facility: HOSPITAL | Age: 69
Discharge: HOME | End: 2019-01-02
Attending: SURGERY
Payer: MEDICARE

## 2019-01-02 DIAGNOSIS — K86.89 PANCREATIC DUCT DILATED: ICD-10-CM

## 2019-01-02 PROCEDURE — 74183 MRI ABD W/O CNTR FLWD CNTR: CPT

## 2019-01-02 PROCEDURE — 76377 3D RENDER W/INTRP POSTPROCES: CPT

## 2019-01-02 PROCEDURE — A9585 GADOBUTROL INJECTION: HCPCS | Performed by: SURGERY

## 2019-01-02 RX ADMIN — GADOBUTROL 13 ML: 604.72 INJECTION INTRAVENOUS at 17:49

## 2019-01-23 ENCOUNTER — ANESTHESIA EVENT (OUTPATIENT)
Dept: GASTROENTEROLOGY | Facility: HOSPITAL | Age: 69
End: 2019-01-23
Payer: MEDICARE

## 2019-01-23 RX ORDER — SODIUM CHLORIDE 9 MG/ML
125 INJECTION, SOLUTION INTRAVENOUS CONTINUOUS
Status: CANCELLED | OUTPATIENT
Start: 2019-01-23

## 2019-01-24 ENCOUNTER — ANESTHESIA (OUTPATIENT)
Dept: GASTROENTEROLOGY | Facility: HOSPITAL | Age: 69
End: 2019-01-24
Payer: MEDICARE

## 2019-01-24 ENCOUNTER — HOSPITAL ENCOUNTER (OUTPATIENT)
Facility: HOSPITAL | Age: 69
Setting detail: OUTPATIENT SURGERY
Discharge: HOME | End: 2019-01-24
Attending: INTERNAL MEDICINE | Admitting: INTERNAL MEDICINE
Payer: MEDICARE

## 2019-01-24 ENCOUNTER — TELEPHONE (OUTPATIENT)
Dept: GASTROENTEROLOGY | Facility: MEDICAL CENTER | Age: 69
End: 2019-01-24

## 2019-01-24 VITALS
HEART RATE: 66 BPM | BODY MASS INDEX: 40.63 KG/M2 | DIASTOLIC BLOOD PRESSURE: 66 MMHG | OXYGEN SATURATION: 95 % | SYSTOLIC BLOOD PRESSURE: 111 MMHG | WEIGHT: 300 LBS | RESPIRATION RATE: 16 BRPM | HEIGHT: 72 IN | TEMPERATURE: 97.8 F

## 2019-01-24 DIAGNOSIS — K86.2 PANCREATIC CYST: Primary | ICD-10-CM

## 2019-01-24 DIAGNOSIS — K86.89 PANCREATIC DUCT DILATED: ICD-10-CM

## 2019-01-24 PROCEDURE — 88305 TISSUE EXAM BY PATHOLOGIST: CPT | Performed by: PATHOLOGY

## 2019-01-24 PROCEDURE — 43238 EGD US FINE NEEDLE BX/ASPIR: CPT | Performed by: INTERNAL MEDICINE

## 2019-01-24 PROCEDURE — 88112 CYTOPATH CELL ENHANCE TECH: CPT | Performed by: PATHOLOGY

## 2019-01-24 PROCEDURE — 43239 EGD BIOPSY SINGLE/MULTIPLE: CPT | Performed by: INTERNAL MEDICINE

## 2019-01-24 RX ORDER — LIDOCAINE HYDROCHLORIDE 10 MG/ML
INJECTION, SOLUTION INFILTRATION; PERINEURAL AS NEEDED
Status: DISCONTINUED | OUTPATIENT
Start: 2019-01-24 | End: 2019-01-24 | Stop reason: SURG

## 2019-01-24 RX ORDER — PROPOFOL 10 MG/ML
INJECTION, EMULSION INTRAVENOUS AS NEEDED
Status: DISCONTINUED | OUTPATIENT
Start: 2019-01-24 | End: 2019-01-24 | Stop reason: SURG

## 2019-01-24 RX ORDER — PROPOFOL 10 MG/ML
INJECTION, EMULSION INTRAVENOUS CONTINUOUS PRN
Status: DISCONTINUED | OUTPATIENT
Start: 2019-01-24 | End: 2019-01-24 | Stop reason: SURG

## 2019-01-24 RX ORDER — CIPROFLOXACIN 500 MG/1
500 TABLET, FILM COATED ORAL EVERY 12 HOURS SCHEDULED
Qty: 6 TABLET | Refills: 0 | Status: SHIPPED | OUTPATIENT
Start: 2019-01-24 | End: 2019-01-27

## 2019-01-24 RX ORDER — SODIUM CHLORIDE 9 MG/ML
INJECTION, SOLUTION INTRAVENOUS CONTINUOUS PRN
Status: DISCONTINUED | OUTPATIENT
Start: 2019-01-24 | End: 2019-01-24 | Stop reason: SURG

## 2019-01-24 RX ORDER — FENTANYL CITRATE 50 UG/ML
INJECTION, SOLUTION INTRAMUSCULAR; INTRAVENOUS AS NEEDED
Status: DISCONTINUED | OUTPATIENT
Start: 2019-01-24 | End: 2019-01-24 | Stop reason: SURG

## 2019-01-24 RX ADMIN — FENTANYL CITRATE 25 MCG: 50 INJECTION, SOLUTION INTRAMUSCULAR; INTRAVENOUS at 11:42

## 2019-01-24 RX ADMIN — SODIUM CHLORIDE: 0.9 INJECTION, SOLUTION INTRAVENOUS at 11:16

## 2019-01-24 RX ADMIN — PHENYLEPHRINE HYDROCHLORIDE 100 MCG/MIN: 10 INJECTION INTRAVENOUS at 12:05

## 2019-01-24 RX ADMIN — FENTANYL CITRATE 25 MCG: 50 INJECTION, SOLUTION INTRAMUSCULAR; INTRAVENOUS at 11:38

## 2019-01-24 RX ADMIN — LIDOCAINE HYDROCHLORIDE 50 MG: 10 INJECTION, SOLUTION INFILTRATION; PERINEURAL at 11:34

## 2019-01-24 RX ADMIN — CIPROFLOXACIN 400 MG: 2 INJECTION INTRAVENOUS at 12:06

## 2019-01-24 RX ADMIN — FENTANYL CITRATE 25 MCG: 50 INJECTION, SOLUTION INTRAMUSCULAR; INTRAVENOUS at 11:31

## 2019-01-24 RX ADMIN — PROPOFOL 50 MG: 10 INJECTION, EMULSION INTRAVENOUS at 11:35

## 2019-01-24 RX ADMIN — PROPOFOL 50 MG: 10 INJECTION, EMULSION INTRAVENOUS at 11:55

## 2019-01-24 RX ADMIN — PROPOFOL 100 MCG/KG/MIN: 10 INJECTION, EMULSION INTRAVENOUS at 11:37

## 2019-01-24 RX ADMIN — PROPOFOL 50 MG: 10 INJECTION, EMULSION INTRAVENOUS at 11:34

## 2019-01-24 RX ADMIN — FENTANYL CITRATE 25 MCG: 50 INJECTION, SOLUTION INTRAMUSCULAR; INTRAVENOUS at 11:34

## 2019-01-24 RX ADMIN — PROPOFOL 50 MG: 10 INJECTION, EMULSION INTRAVENOUS at 11:38

## 2019-01-24 NOTE — H&P
History and Physical - [facility]  Cheryl Dominguez 70 y o  male MRN: [id number]                  HPI: Cheryl Dominguez is a 70y o  year old male who presents for dilated PD and multiple pancreatic cysts and lymphadenopathy  REVIEW OF SYSTEMS: Per the HPI, and otherwise unremarkable  Historical Information   Past Medical History:   Diagnosis Date    A-fib ([facility] )     Abdominal wall strain     last assessed: 10/21/14    Allergic rhinitis     last assessed: 05/03/16    Arthritis     CPAP (continuous positive airway pressure) dependence     Erectile dysfunction of non-organic origin     last assessed: 03/17/14    Lumbar strain     last assessed: 10/21/14    Multiple acquired skin tags     last assessed: 2/18/16    Palpitations     last assessed: 03/17/14    Pancreas cyst     Plantar fasciitis     Skin disorder     last assessed: 05/28/13    Sleep apnea      Past Surgical History:   Procedure Laterality Date    CATARACT EXTRACTION      FOOT SURGERY      Due to plantar fascitis    REPLACEMENT TOTAL KNEE Left     UMBILICAL HERNIA REPAIR       Social History   History   Alcohol Use    1 2 oz/week    2 Cans of beer per week     Comment: couple times per week;       History   Drug Use No     History   Smoking Status    Never Smoker   Smokeless Tobacco    Never Used     Family History   Problem Relation Age of Onset    Breast cancer Mother     Cervical cancer Paternal Aunt     Pancreatic cancer Other     Liver cancer Other        Meds/Allergies     Prescriptions Prior to Admission   Medication    ascorbic acid (VITAMIN C) 1000 MG tablet    aspirin 325 mg tablet    atenolol (TENORMIN) 25 mg tablet    Cholecalciferol (VITAMIN D) 2000 units CAPS    Cyanocobalamin (VITAMIN B 12 PO)    traMADol (ULTRAM) 50 mg tablet       Allergies   Allergen Reactions    Seasonal Ic  [Cholestatin]        Objective     Blood pressure 145/88, pulse 65, temperature 97 8 °F (36 6 °C), temperature source Oral, resp  rate 20, height 6' (1 829 m), weight 136 kg (300 lb), SpO2 95 %  PHYSICAL EXAM    Gen: NAD  CV: RRR  CHEST: Clear  ABD: soft, NT/ND  EXT: no edema      ASSESSMENT/PLAN:  This is a 70y o  year old male here for EUS with FNA, and he is stable and optimized for his procedure

## 2019-01-24 NOTE — ANESTHESIA POSTPROCEDURE EVALUATION
Post-Op Assessment Note      CV Status:  Stable    Mental Status:  Alert and awake    Hydration Status:  Euvolemic    PONV Controlled:  Controlled    Airway Patency:  Patent    Post Op Vitals Reviewed: Yes          Staff: CRNA           BP   117/75   Temp      Pulse     Resp   22   SpO2   99 RA

## 2019-01-24 NOTE — OP NOTE
ENDOSCOPIC ULTRASOUND    PROCEDURE: ENDOSCOPIC ULTRASOUND    SEDATION: Monitored anesthesia care, check anesthesia records  IV cipro given    ASA Class: 2    INDICATIONS: Pancreatic cysts on cross sectional imaging  CONSENT:  Informed consent was obtained for the procedure, including sedation after explaining the risks and benefits of the procedure  Risks including but not limited to bleeding, perforation, infection, and missed lesion  PREPARATION:   Telemetry, pulse oximetry, blood pressure were monitored throughout the procedure  Patient was identified by myself both verbally and by visual inspection of ID band  DESCRIPTION:   Patient was placed in the left lateral decubitus position and was sedated with the above medication  The echoendoscope was introduced in to the oropharynx and the esophagus was intubated under direct visualization  Scope was passed down the esophagus up to 3rd part of the duodenum  A careful inspection was made as the echoendoscope was withdrawn, including a retroflexed view of the stomach; findings and interventions are described below  FINDINGS:    EGD FINDINGS:      #1  Esophagus- Medium hiatal hernia  Questionable short-segment Tovar's esophagus  This was biopsied  #2  Stomach- normal appearing stomach  #3  Duodenum- no abnormalities noted in the duodenal bulb or second portion of the duodenum    EUS FINDINGS:  Linear echoendoscope was used for this exam   Numerous cysts were tail of the pancreas, the largest measuring up to 3 cm in cross-sectional diameter  The main pancreatic duct was dilated to 10 mm the body of the pancreas  At the head of the pancreas, the pancreatic duct was dilated measuring up to 4 mm  The common bile duct was normal   There were no masses or cysts noted in the head the pancreas  No lymph nodes were appreciated  22 gauge needle was used to puncture the cysts at the tail of the pancreas  Four passes were performed   Minimal fluid was aspirated from this cyst, most likely due to viscosity of the cyst fluid  Fluid was sent to cytology  IMPRESSIONS:      EGD:  Medium hiatal hernia  Questionable short-segment Tovar's esophagus  EUS:  Cluster of cysts in the tail of the pancreas  FNA was performed using a 22 gauge needle however minimal fluid was aspirated  Dilated pancreatic duct at the body and tail of the pancreas  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  RECOMMENDATIONS:     Follow up cytology results  Follow up with Dr William Leon or Dr Iftikhar Lynn  Follow up with Dr Tyson Khan  Depending on cytology results, may repeat EUS with FNA dilated main duct with 25 gauge needle  Cipro [id number] po bid    COMPLICATIONS:  None; patient tolerated the procedure well      SPECIMENS:    ID Type Source Tests Collected by Time Destination   1 : distal esophagus bx - r/o barretts Tissue Esophagus TISSUE EXAM Torie Swan MD 1/24/2019 1138    2 : pancreatic tail cyst fluid Other FNA NON-GYNECOLOGIC CYTOLOGY Torie Swan MD 1/24/2019 1213        ESTIMATED BLOOD LOSS:  Minimal

## 2019-01-24 NOTE — ANESTHESIA PREPROCEDURE EVALUATION
Review of Systems/Medical History          Cardiovascular  Dysrhythmias , atrial fibrillation,    Pulmonary  Not a smoker , Shortness of breath, Sleep apnea CPAP,        GI/Hepatic            Endo/Other    Obesity (BMI-41)  morbid obesity   GYN       Hematology   Musculoskeletal    Arthritis     Neurology   Psychology           Physical Exam    Airway  Comment: Large Neck and Abdomen  Mallampati score: I  TM Distance: >3 FB  Neck ROM: full     Dental       Cardiovascular      Pulmonary      Other Findings        Anesthesia Plan  ASA Score- 3     Anesthesia Type- IV sedation with anesthesia with ASA Monitors  Additional Monitors:   Airway Plan:         Plan Factors-Patient not instructed to abstain from smoking on day of procedure  Patient did not smoke on day of surgery  Induction- intravenous  Postoperative Plan-     Informed Consent- Anesthetic plan and risks discussed with patient  I personally reviewed this patient with the CRNA  Discussed and agreed on the Anesthesia Plan with the CRNA  Sanaz Kurtz

## 2019-01-24 NOTE — TELEPHONE ENCOUNTER
----- Message from Marco A Sawyer MD sent at 1/24/2019  1:05 PM EST -----  Regarding: appt  Please schedule office appt with Dr Miranda Cedeno or Dr Hickman in 1 week

## 2019-01-24 NOTE — DISCHARGE INSTR - AVS FIRST PAGE
ENDOSCOPIC ULTRASOUND    PROCEDURE: ENDOSCOPIC ULTRASOUND    SEDATION: Monitored anesthesia care, check anesthesia records  IV cipro given    ASA Class: 2    INDICATIONS: Pancreatic cysts on cross sectional imaging  CONSENT:  Informed consent was obtained for the procedure, including sedation after explaining the risks and benefits of the procedure  Risks including but not limited to bleeding, perforation, infection, and missed lesion  PREPARATION:   Telemetry, pulse oximetry, blood pressure were monitored throughout the procedure  Patient was identified by myself both verbally and by visual inspection of ID band  DESCRIPTION:   Patient was placed in the left lateral decubitus position and was sedated with the above medication  The echoendoscope was introduced in to the oropharynx and the esophagus was intubated under direct visualization  Scope was passed down the esophagus up to 3rd part of the duodenum  A careful inspection was made as the echoendoscope was withdrawn, including a retroflexed view of the stomach; findings and interventions are described below  FINDINGS:    EGD FINDINGS:      #1  Esophagus- Medium hiatal hernia  Questionable short-segment Tovar's esophagus  This was biopsied  #2  Stomach- normal appearing stomach  #3  Duodenum- no abnormalities noted in the duodenal bulb or second portion of the duodenum    EUS FINDINGS:  Linear echoendoscope was used for this exam   Numerous cysts were tail of the pancreas, the largest measuring up to 3 cm in cross-sectional diameter  The main pancreatic duct was dilated to 10 mm the body of the pancreas  At the head of the pancreas, the pancreatic duct was dilated measuring up to 4 mm  The common bile duct was normal   There were no masses or cysts noted in the head the pancreas  No lymph nodes were appreciated  22 gauge needle was used to puncture the cysts at the tail of the pancreas    Minimal fluid was aspirated from this cyst, most likely due to viscosity of the cyst fluid  Fluid was sent to cytology  IMPRESSIONS:      EGD:  Medium hiatal hernia  Questionable short-segment Tovar's esophagus  EUS:  Cluster of cysts in the tail of the pancreas  FNA was performed using a 22 gauge needle however minimal fluid was aspirated  Dilated pancreatic duct at the body and tail of the pancreas  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  RECOMMENDATIONS:     Follow up cytology results  Follow up with Dr Green Blizzard or Dr Tia Ramos  Follow up with Dr Anish Richmond  Depending on cytology results, may repeat EUS with FNA dilated main duct with 25 gauge needle  Cipro 288AP po bid    COMPLICATIONS:  None; patient tolerated the procedure well      SPECIMENS:    ID Type Source Tests Collected by Time Destination   1 : distal esophagus bx - r/o barretts Tissue Esophagus TISSUE EXAM Morenita Mary MD 1/24/2019 1138    2 : pancreatic tail cyst fluid Other FNA NON-GYNECOLOGIC CYTOLOGY Morenita Mary MD 1/24/2019 1213        ESTIMATED BLOOD LOSS:  Minimal

## 2019-01-27 NOTE — PROGRESS NOTES
I called him with his results and left a voicemail  Biopsies showed short segment Tovar's, so repeat EGD with surveillance biopsies in 3 years

## 2019-01-30 NOTE — PROGRESS NOTES
I called him with his results and left a voicemail to call me back  I called him with his results and left a voicemail  Cytology and EUS findings suggestive of main duct and branch duct IPMN

## 2019-02-05 ENCOUNTER — OFFICE VISIT (OUTPATIENT)
Dept: GASTROENTEROLOGY | Facility: CLINIC | Age: 69
End: 2019-02-05
Payer: MEDICARE

## 2019-02-05 VITALS
TEMPERATURE: 97.1 F | WEIGHT: 315 LBS | HEART RATE: 77 BPM | BODY MASS INDEX: 42.66 KG/M2 | DIASTOLIC BLOOD PRESSURE: 88 MMHG | HEIGHT: 72 IN | SYSTOLIC BLOOD PRESSURE: 148 MMHG

## 2019-02-05 DIAGNOSIS — D49.0 IPMN (INTRADUCTAL PAPILLARY MUCINOUS NEOPLASM): Primary | ICD-10-CM

## 2019-02-05 PROCEDURE — 99214 OFFICE O/P EST MOD 30 MIN: CPT | Performed by: INTERNAL MEDICINE

## 2019-02-05 NOTE — PROGRESS NOTES
[facility] - Outpatient Consultation  Joel Carlos 70 y o  male MRN: [id number]  Encounter: [id number]          ASSESSMENT AND PLAN:      1  IPMN (intraductal papillary mucinous neoplasm)  Pathology consistent with mucinous cells, which is consistent with an IPMN  Given numerable cysts and size of 3 cm, suggest surgical removal with Dr Hyun Plummer who he is already following  Questionable abdominal pain that could be related to the cysts (unlikely scenario)  ______________________________________________________________    HPI:  Joel Carlos is a 70y o  year old male who presents to the office for follow up after EUS and FNA of pancreatic cysts  He had EUS/FNA with Dr Ankush Lemon and myself on 1/24/2019  FNA aspirate was mucinous fluid from the cyst     The pancreas cyst fluid returned as scant atypical mucinous epithelial cells, consistent with the diagnosis of IPMN  Current complaints include epigastric abdominal pain that radiates to the back  It is a vague pain  He says is constant and is not relieved or exacerbated by any specific factors  The ongoing hypothesis is that this could be related to innumerable cysts in his pancreas as his pain could be pancreatic type pain  REVIEW OF SYSTEMS:    CONSTITUTIONAL: Denies any fever, chills, rigors, and weight loss  HEENT: No earache or tinnitus  Denies hearing loss or visual disturbances  CARDIOVASCULAR: No chest pain or palpitations  RESPIRATORY: Denies any cough, hemoptysis, shortness of breath or dyspnea on exertion  GASTROINTESTINAL: As noted in the History of Present Illness  GENITOURINARY: No problems with urination  Denies any hematuria or dysuria  NEUROLOGIC: No dizziness or vertigo, denies headaches  MUSCULOSKELETAL: Denies any muscle or joint pain  SKIN: Denies skin rashes or itching  ENDOCRINE: Denies excessive thirst  Denies intolerance to heat or cold    PSYCHOSOCIAL: Denies depression or anxiety  Denies any recent memory loss  Historical Information   Past Medical History:   Diagnosis Date    A-fib ([address] )     Abdominal wall strain     last assessed: 10/21/14    Allergic rhinitis     last assessed: 05/03/16    Arthritis     CPAP (continuous positive airway pressure) dependence     Erectile dysfunction of non-organic origin     last assessed: 03/17/14    Lumbar strain     last assessed: 10/21/14    Multiple acquired skin tags     last assessed: 2/18/16    Palpitations     last assessed: 03/17/14    Pancreas cyst     Plantar fasciitis     Skin disorder     last assessed: 05/28/13    Sleep apnea      Past Surgical History:   Procedure Laterality Date    CATARACT EXTRACTION      FOOT SURGERY      Due to plantar fascitis    LINEAR ENDOSCOPIC U/S      TN EDG US EXAM SURGICAL ALTER STOM DUODENUM/JEJUNUM N/A 1/24/2019    Procedure: LINEAR ENDOSCOPIC U/S;  Surgeon: Juliana Hernandes MD;  Location: [facility]; Service: Gastroenterology    REPLACEMENT TOTAL KNEE Left     UMBILICAL HERNIA REPAIR       Social History   History   Alcohol Use    1 2 oz/week    2 Cans of beer per week     Comment: couple times per week;       History   Drug Use No     History   Smoking Status    Never Smoker   Smokeless Tobacco    Never Used     Family History   Problem Relation Age of Onset    Breast cancer Mother     Cervical cancer Paternal Aunt     Pancreatic cancer Other     Liver cancer Other        Meds/Allergies       Current Outpatient Prescriptions:     ascorbic acid (VITAMIN C) 1000 MG tablet    aspirin 325 mg tablet    atenolol (TENORMIN) 25 mg tablet    Cholecalciferol (VITAMIN D) 2000 units CAPS    Cyanocobalamin (VITAMIN B 12 PO)    traMADol (ULTRAM) 50 mg tablet    Allergies   Allergen Reactions    Seasonal Ic  [Cholestatin]            Objective     Blood pressure 148/88, pulse 77, temperature (!) 97 1 °F (36 2 °C), temperature source Tympanic, height 6' (1 829 m), weight (!) 148 kg (325 lb 6 4 oz)  Body mass index is 44 13 kg/m²  PHYSICAL EXAM:      General Appearance:   Alert, cooperative, no distress   HEENT:   Normocephalic, atraumatic, anicteric      Neck:  Supple, symmetrical, trachea midline   Lungs:   Clear to auscultation bilaterally; no rales, rhonchi or wheezing; respirations unlabored    Heart[de-identified]   Regular rate and rhythm; no murmur, rub, or gallop  Abdomen:   Soft, non-tender, non-distended; normal bowel sounds; no masses, no organomegaly    Genitalia:   Deferred    Rectal:   Deferred    Extremities:  No cyanosis, clubbing or edema    Pulses:  2+ and symmetric    Skin:  No jaundice, rashes, or lesions    Lymph nodes:  No palpable cervical lymphadenopathy        Lab Results:   No visits with results within 1 Day(s) from this visit  Latest known visit with results is:   Admission on 01/24/2019, Discharged on 01/24/2019   Component Date Value    Case Report 01/24/2019                      Value:Surgical Pathology Report                         Case: [id number]                                   Authorizing Provider:  Elizabeth Nuno MD           Collected:           01/24/2019 1138              Ordering Location:     [address] [place] [address]      Received:            01/24/2019 [address] [facility]                                                           Pathologist:           Benitez Carranza MD                                                   Specimen:    Esophagus, distal esophagus bx - r/o barretts                                              Final Diagnosis 01/24/2019                      Value: This result contains rich text formatting which cannot be displayed here   Additional Information 01/24/2019                      Value: This result contains rich text formatting which cannot be displayed here  [facility] Description 01/24/2019                      Value: This result contains rich text formatting which cannot be displayed here   Case Report 01/24/2019                      Value:Non-gynecologic Cytology                          Case: [id number]                                  Authorizing Provider:  Abelino Forbes MD           Collected:           01/24/2019 1213              Ordering Location:     [address] [place] [address]      Received:            01/24/2019 [place] [address] Endoscopy                                                           Pathologist:           Supa Pierce DO                                                            Specimens:   A) - Pancreas, cyst fluid                                                                           B) - Pancreas, cyst fluid, cell block                                                      Final Diagnosis 01/24/2019                      Value: This result contains rich text formatting which cannot be displayed here  [facility] Description 01/24/2019                      Value: This result contains rich text formatting which cannot be displayed here   Clinical Information 01/24/2019                      Value:EUS FINDINGS:  Linear echoendoscope was used for this exam   Numerous cysts were tail of the pancreas, the largest measuring up to 3 cm in cross-sectional diameter  The main pancreatic duct was dilated to 10 mm the body of the pancreas  At the head of the pancreas, the pancreatic duct was dilated measuring up to 4 mm  The common bile duct was normal   There were no masses or cysts noted in the head the pancreas  No lymph nodes were appreciated      22 gauge needle was used to puncture the cysts at the tail of the pancreas  Four passes were performed  Minimal fluid was aspirated from this cyst, most likely due to viscosity of the cyst fluid  Fluid was sent to cytology  MRI: IMPRESSION:  1  Innumerable pancreatic cysts, mostly in the tail the pancreas, measuring up to 2 cm in diameter    2   Dilatation of the main pancreatic duct, up to 1 cm in diameter  3   Several enlarged peripancreatic and portacaval lymph nodes  4   Based on institutional guidelines, further evaluation of                           the pancreatic cysts is recommended with endoscopic ultrasound; surgical oncology consultation should also be considered   Additional Information 01/24/2019                      Value: This result contains rich text formatting which cannot be displayed here  Radiology Results:   No results found

## 2019-02-15 ENCOUNTER — CLINICAL SUPPORT (OUTPATIENT)
Dept: URGENT CARE | Facility: MEDICAL CENTER | Age: 69
End: 2019-02-15
Payer: MEDICARE

## 2019-02-15 ENCOUNTER — APPOINTMENT (OUTPATIENT)
Dept: RADIOLOGY | Facility: MEDICAL CENTER | Age: 69
End: 2019-02-15
Payer: MEDICARE

## 2019-02-15 ENCOUNTER — OFFICE VISIT (OUTPATIENT)
Dept: SURGICAL ONCOLOGY | Facility: CLINIC | Age: 69
End: 2019-02-15
Payer: MEDICARE

## 2019-02-15 ENCOUNTER — LAB (OUTPATIENT)
Dept: LAB | Facility: MEDICAL CENTER | Age: 69
End: 2019-02-15
Payer: MEDICARE

## 2019-02-15 VITALS
DIASTOLIC BLOOD PRESSURE: 78 MMHG | WEIGHT: 315 LBS | RESPIRATION RATE: 16 BRPM | TEMPERATURE: 99.9 F | HEART RATE: 72 BPM | HEIGHT: 72 IN | SYSTOLIC BLOOD PRESSURE: 122 MMHG | BODY MASS INDEX: 42.66 KG/M2

## 2019-02-15 DIAGNOSIS — D49.0 DUDLEY-KLINGENSTEIN SYNDROME: Primary | ICD-10-CM

## 2019-02-15 DIAGNOSIS — D49.0 IPMN (INTRADUCTAL PAPILLARY MUCINOUS NEOPLASM): ICD-10-CM

## 2019-02-15 DIAGNOSIS — K86.89 PANCREATIC DUCT DILATED: Primary | ICD-10-CM

## 2019-02-15 LAB
ABO GROUP BLD: NORMAL
ALBUMIN SERPL BCP-MCNC: 3.5 G/DL (ref 3.5–5)
ALP SERPL-CCNC: 85 U/L (ref 46–116)
ALT SERPL W P-5'-P-CCNC: 31 U/L (ref 12–78)
ANION GAP SERPL CALCULATED.3IONS-SCNC: 5 MMOL/L (ref 4–13)
APTT PPP: 30 SECONDS (ref 26–38)
AST SERPL W P-5'-P-CCNC: 31 U/L (ref 5–45)
BASOPHILS # BLD AUTO: 0.02 THOUSANDS/ΜL (ref 0–0.1)
BASOPHILS NFR BLD AUTO: 0 % (ref 0–1)
BILIRUB SERPL-MCNC: 0.36 MG/DL (ref 0.2–1)
BLD GP AB SCN SERPL QL: NEGATIVE
BUN SERPL-MCNC: 12 MG/DL (ref 5–25)
CALCIUM SERPL-MCNC: 8.8 MG/DL (ref 8.3–10.1)
CHLORIDE SERPL-SCNC: 107 MMOL/L (ref 100–108)
CO2 SERPL-SCNC: 24 MMOL/L (ref 21–32)
CREAT SERPL-MCNC: 0.73 MG/DL (ref 0.6–1.3)
EOSINOPHIL # BLD AUTO: 0.05 THOUSAND/ΜL (ref 0–0.61)
EOSINOPHIL NFR BLD AUTO: 1 % (ref 0–6)
ERYTHROCYTE [DISTWIDTH] IN BLOOD BY AUTOMATED COUNT: 13.1 % (ref 11.6–15.1)
GFR SERPL CREATININE-BSD FRML MDRD: 95 ML/MIN/1.73SQ M
GLUCOSE P FAST SERPL-MCNC: 108 MG/DL (ref 65–99)
HCT VFR BLD AUTO: 40.6 % (ref 36.5–49.3)
HGB BLD-MCNC: 13.9 G/DL (ref 12–17)
IMM GRANULOCYTES # BLD AUTO: 0.01 THOUSAND/UL (ref 0–0.2)
IMM GRANULOCYTES NFR BLD AUTO: 0 % (ref 0–2)
INR PPP: 1.08 (ref 0.86–1.17)
LYMPHOCYTES # BLD AUTO: 1.1 THOUSANDS/ΜL (ref 0.6–4.47)
LYMPHOCYTES NFR BLD AUTO: 23 % (ref 14–44)
MCH RBC QN AUTO: 35.4 PG (ref 26.8–34.3)
MCHC RBC AUTO-ENTMCNC: 34.2 G/DL (ref 31.4–37.4)
MCV RBC AUTO: 103 FL (ref 82–98)
MONOCYTES # BLD AUTO: 0.68 THOUSAND/ΜL (ref 0.17–1.22)
MONOCYTES NFR BLD AUTO: 14 % (ref 4–12)
NEUTROPHILS # BLD AUTO: 3 THOUSANDS/ΜL (ref 1.85–7.62)
NEUTS SEG NFR BLD AUTO: 62 % (ref 43–75)
NRBC BLD AUTO-RTO: 0 /100 WBCS
PLATELET # BLD AUTO: 149 THOUSANDS/UL (ref 149–390)
PMV BLD AUTO: 11.6 FL (ref 8.9–12.7)
POTASSIUM SERPL-SCNC: 4.1 MMOL/L (ref 3.5–5.3)
PROT SERPL-MCNC: 7.7 G/DL (ref 6.4–8.2)
PROTHROMBIN TIME: 14.1 SECONDS (ref 11.8–14.2)
RBC # BLD AUTO: 3.93 MILLION/UL (ref 3.88–5.62)
RH BLD: NEGATIVE
SODIUM SERPL-SCNC: 136 MMOL/L (ref 136–145)
SPECIMEN EXPIRATION DATE: NORMAL
WBC # BLD AUTO: 4.86 THOUSAND/UL (ref 4.31–10.16)

## 2019-02-15 PROCEDURE — 1123F ACP DISCUSS/DSCN MKR DOCD: CPT | Performed by: SURGERY

## 2019-02-15 PROCEDURE — 99214 OFFICE O/P EST MOD 30 MIN: CPT | Performed by: SURGERY

## 2019-02-15 PROCEDURE — 85730 THROMBOPLASTIN TIME PARTIAL: CPT | Performed by: SURGERY

## 2019-02-15 PROCEDURE — 86850 RBC ANTIBODY SCREEN: CPT

## 2019-02-15 PROCEDURE — 86900 BLOOD TYPING SEROLOGIC ABO: CPT

## 2019-02-15 PROCEDURE — 71046 X-RAY EXAM CHEST 2 VIEWS: CPT

## 2019-02-15 PROCEDURE — 85025 COMPLETE CBC W/AUTO DIFF WBC: CPT | Performed by: SURGERY

## 2019-02-15 PROCEDURE — 36415 COLL VENOUS BLD VENIPUNCTURE: CPT | Performed by: SURGERY

## 2019-02-15 PROCEDURE — 93005 ELECTROCARDIOGRAM TRACING: CPT

## 2019-02-15 PROCEDURE — 85610 PROTHROMBIN TIME: CPT | Performed by: SURGERY

## 2019-02-15 PROCEDURE — 86901 BLOOD TYPING SEROLOGIC RH(D): CPT

## 2019-02-15 PROCEDURE — 80053 COMPREHEN METABOLIC PANEL: CPT | Performed by: SURGERY

## 2019-02-15 NOTE — PATIENT INSTRUCTIONS
Pre-Surgery Instructions:   Medication Instructions    ascorbic acid (VITAMIN C) 1000 MG tablet Stop taking 1 days prior to surgery    aspirin 325 mg tablet Consult prescribing physician    atenolol (TENORMIN) 25 mg tablet Take morning of surgery    Cholecalciferol (VITAMIN D) 2000 units CAPS Stop taking 1 days prior to surgery    Cyanocobalamin (VITAMIN B 12 PO) Stop taking 1 days prior to surgery    Naproxen Sodium (ALEVE PO) Stop taking 1 week prior to surgery             Manpreet-Antonio Drain Care   WHAT YOU NEED TO KNOW:   What is a Manpreet-Antonio drain and how does it work? · A Manpreet-Antonio (STU) drain is used to remove fluids that build up in an area of your body after surgery  The STU drain is a bulb-shaped device connected to a tube  One end of the tube is placed inside you during surgery  The other end comes out through a small cut in your skin  The bulb is connected to this end  You may have a stitch to hold the tube in place  · The STU drain removes fluids by creating suction in the tube  The bulb is squeezed flat and connected to the tube that sticks out of your body  The bulb expands as it fills with fluid  How do I change the bandage around my Manpreet-Antonio drain? If you have a bandage, change it once a day  You may need to change your bandage more than once a day if it gets completely wet  · Wash your hands with soap and water  · Loosen the tape and gently remove the old bandage  Throw the old bandage into a plastic trash bag  · Use soap and water or saline (salt water) solution to clean your STU drain site  Dip a cotton swab or gauze pad in the solution and gently clean your skin  · Pat the area dry  · Place a new bandage on your STU drain site and secure it to your skin with medical tape  · Wash your hands  How do I empty the Manpreet-Antonio drain? Empty the bulb when it is half full or every 8 to 12 hours  · Wash your hands with soap and water       · Remove the plug from the bulb  · Pour the fluid into a measuring cup  · Clean the plug with an alcohol swab or a cotton ball dipped in rubbing alcohol  · Squeeze the bulb flat and put the plug back in  The bulb should stay flat until it starts to fill with fluid again  · Measure the amount of fluid you pour out  Write down how much fluid you empty from the STU drain and the date and time you collected it  · Flush the fluid down the toilet  Wash your hands  What should I do if the tubing becomes clogged? Use the following steps to clear your Manpreet-Antonio tubing:  · Hold the tubing between your thumb and first finger at the place closest to your skin  This hand will prevent the tube from being pulled out of your skin  · Use your other thumb and first finger to slide the clog down the tubing toward the bulb  You may have to repeat the sliding until the tubing is unclogged  When will my Manpreet-Antonio drain be removed? The amount of fluid that you drain will decrease as your wound heals  The STU drain usually is removed when less than 30 milliliters (2 tablespoons) is collected in 24 hours  Ask your healthcare provider when and how your STU drain will be removed  What are the risks of having a Manpreet-Antonio drain? The STU drain site may be painful  You may have trouble lying on the side with your STU drain  Your STU drain site may leak  The STU drain may be pulled out by accident  The tubing may get blocked, crack, or break  The tubing may damage your tissue  You may have a scar  The STU drain site may get infected  This infection could spread inside your body  When should I seek immediate care? · Your STU drain breaks or comes out  · You have cloudy yellow or brown drainage from your STU drain site, or the drainage smells bad  When should I contact my healthcare provider? · You drain less than 30 milliliters (2 tablespoons) in 24 hours  This may mean your drain can be removed      · You suddenly stop draining fluid or think your STU drain is blocked  · You have a fever higher than 101 5°F (38 6°C)  · You have increased pain, redness, or swelling around the drain site  · You have questions about your STU drain care  CARE AGREEMENT:   You have the right to help plan your care  Learn about your health condition and how it may be treated  Discuss treatment options with your caregivers to decide what care you want to receive  You always have the right to refuse treatment  The above information is an  only  It is not intended as medical advice for individual conditions or treatments  Talk to your doctor, nurse or pharmacist before following any medical regimen to see if it is safe and effective for you  © 2017 [address] [employer] [employer] Information is for End User's use only and may not be sold, redistributed or otherwise used for commercial purposes  All illustrations and images included in CareNotes® are the copyrighted property of [employer]  or [employer]

## 2019-02-15 NOTE — PROGRESS NOTES
Surgical Oncology Follow Up       [facility] SURGICAL ONCOLOGY [place]  [address] [address]    Qing Burgess  1950  [id number]  [facility] SURGICAL ONCOLOGY [place]  [address] [facility] [address]    Chief Complaint   Patient presents with    Follow-up     Patient is here following EUS and biopsy  Assessment/Plan:    No problem-specific Assessment & Plan notes found for this encounter  Diagnoses and all orders for this visit:    Pancreatic duct dilated    IPMN (intraductal papillary mucinous neoplasm)  -     Case request operating room: DISTAL PANCREATECTOMY LAPAROSCOPIC/POSSIBLE OPEN; Standing  -     Ambulatory referral to Cardiology; Future  -     Prepare RBC; Future  -     Comprehensive metabolic panel  -     CBC and differential  -     APTT  -     Protime-INR  -     EKG 12 lead; Future  -     XR chest pa & lateral; Future  -     Case request operating room: DISTAL PANCREATECTOMY LAPAROSCOPIC/POSSIBLE OPEN    Other orders  -     Incentive spirometry; Standing  -     Insert and maintain IV line; Standing  -     Void On-Call to O R ; Standing  -     Place sequential compression device; Standing        Advance Care Planning/Advance Directives:  Discussed disease status, cancer treatment plans and/or cancer treatment goals with the patient  No history exists  History of Present Illness:  Patient is a 59-year-old man who we have been working up for pancreatic tail cysts  He underwent EUS with biopsy by Dr Meenu Orr  He is now here to discuss results   -Interval History: He underwent EUS with biopsy by Dr Meenu Orr  He is now here to discuss results  Review of Systems:  Review of Systems   Constitutional: Negative  HENT: Negative  Eyes: Negative  Respiratory: Negative  Cardiovascular: Negative  Gastrointestinal: Negative  Endocrine: Negative  Genitourinary: Negative  Musculoskeletal: Negative  Skin: Negative  Allergic/Immunologic: Negative  Neurological: Negative  Hematological: Negative  Psychiatric/Behavioral: Negative  Patient Active Problem List   Diagnosis    Obstructive sleep apnea syndrome    Hypersomnia    Shortness of breath    Permanent atrial fibrillation ([place] [address] )    Morbid obesity with BMI of 40 0-44 9, adult ([facility] )    Dry mouth    Lower extremity edema    Pancreatic duct dilated    IPMN (intraductal papillary mucinous neoplasm)     Past Medical History:   Diagnosis Date    A-fib ([place] [address] )     Abdominal wall strain     last assessed: 10/21/14    Allergic rhinitis     last assessed: 05/03/16    Arthritis     CPAP (continuous positive airway pressure) dependence     Erectile dysfunction of non-organic origin     last assessed: 03/17/14    Lumbar strain     last assessed: 10/21/14    Multiple acquired skin tags     last assessed: 2/18/16    Palpitations     last assessed: 03/17/14    Pancreas cyst     Plantar fasciitis     Skin disorder     last assessed: 05/28/13    Sleep apnea      Past Surgical History:   Procedure Laterality Date    CATARACT EXTRACTION      FOOT SURGERY      Due to plantar fascitis    LINEAR ENDOSCOPIC U/S      OK EDG US EXAM SURGICAL ALTER STOM DUODENUM/JEJUNUM N/A 1/24/2019    Procedure: LINEAR ENDOSCOPIC U/S;  Surgeon: Sanam Major MD;  [facility];   Service: Gastroenterology    REPLACEMENT TOTAL KNEE Left     UMBILICAL HERNIA REPAIR       Family History   Problem Relation Age of Onset    Breast cancer Mother     Cervical cancer Paternal Aunt     Pancreatic cancer Other     Liver cancer Other      Social History     Socioeconomic History    Marital status: /Civil Union     Spouse name: Not on file    Number of children: Not on file    Years of education: Not on file    Highest education level: Not on file   Occupational History    Not on file   Social Needs    Financial resource strain: Not on file    Food insecurity:     Worry: Not on file     Inability: Not on file    Transportation needs:     Medical: Not on file     Non-medical: Not on file   Tobacco Use    Smoking status: Never Smoker    Smokeless tobacco: Never Used   Substance and Sexual Activity    Alcohol use: Yes     Alcohol/week: 1 2 oz     Types: 2 Cans of beer per week     Comment: couple times per week;     Drug use: No    Sexual activity: Not on file   Lifestyle    Physical activity:     Days per week: Not on file     Minutes per session: Not on file    Stress: Not on file   Relationships    Social connections:     Talks on phone: Not on file     Gets together: Not on file     Attends Yazdanism service: Not on file     Active member of club or organization: Not on file     Attends meetings of clubs or organizations: Not on file     Relationship status: Not on file    Intimate partner violence:     Fear of current or ex partner: Not on file     Emotionally abused: Not on file     Physically abused: Not on file     Forced sexual activity: Not on file   Other Topics Concern    Not on file   Social History Narrative    Not on file       Current Outpatient Medications:     ascorbic acid (VITAMIN C) 1000 MG tablet, Take 1 tablet by mouth daily, Disp: , Rfl:     aspirin 325 mg tablet, Take 1 tablet by mouth daily, Disp: , Rfl:     atenolol (TENORMIN) 25 mg tablet, TAKE 1 TABLET BY MOUTH EVERY DAY, Disp: 30 tablet, Rfl: 5    Cholecalciferol (VITAMIN D) 2000 units CAPS, Take by mouth daily, Disp: , Rfl:     Cyanocobalamin (VITAMIN B 12 PO), Take by mouth, Disp: , Rfl:     Naproxen Sodium (ALEVE PO), Take by mouth, Disp: , Rfl:   Allergies   Allergen Reactions    Seasonal Ic  [Cholestatin]      Vitals:    02/15/19 0915   BP: 122/78   Pulse: 72   Resp: 16   Temp: 99 9 °F (37 7 °C)       Physical Exam   Constitutional: He is oriented to person, place, and time  He appears well-developed and well-nourished  HENT:   Head: Normocephalic and atraumatic  Eyes: Pupils are equal, round, and reactive to light  EOM are normal    Neck: Normal range of motion  Neck supple  Cardiovascular: Normal rate, regular rhythm and normal heart sounds  Pulmonary/Chest: Effort normal and breath sounds normal    Abdominal: Soft  Bowel sounds are normal  He exhibits no distension and no mass  There is no tenderness  There is no rebound and no guarding  Musculoskeletal: Normal range of motion  Neurological: He is alert and oriented to person, place, and time  Skin: Skin is warm and dry  Psychiatric: He has a normal mood and affect  His behavior is normal  Judgment and thought content normal          Results:  Labs:  Case Report   Non-gynecologic Cytology                          Case: [id number]                                   Authorizing Provider: Ching Castrejon MD           Collected:           01/24/2019 1213               Ordering Location:     [facility]      Received:            01/24/2019 [address] [place] [address] Endoscopy                                                            Pathologist:           Luis Vincent DO                                                             Specimens:   A) - Pancreas, cyst fluid                                                                            B) - Pancreas, cyst fluid, cell block                                                      Final Diagnosis   A -B  Pancreas, cyst fluid (Thin-prep and cell block preparations): Atypical ([facility] Category III) -See note    Scant atypical mucinous epithelial cells (see note)      NOTE:  The ThinPrep slide shows a few clusters of atypical mucinous epithelial cells in a background  of extracellular mucin   The findings are suggestive of a neoplastic process, but  the paucity of the sample precludes a definitive diagnosis   Recommend correlation with endoscopic ultrasound findings along with biochemical assays would be helpful        The above diagnostic category is part of the six-tiered system proposed by The Papanicolaou Society of Cytopathology for the reporting of pancreaticobiliary specimens  This proposed scheme provides terminology that correlates the cytologic diagnostic nomenclature with the 2010 WHO classification of pancreatic tumors and standardizes the categorization of the various diseases of the pancreas, some of which are difficult to diagnose specifically by cytology  *The Papanicolaou Society of Cytopathology System for Reporting Pancreaticobiliary Cytology: Definitions, Criteria and Explanatory Notes  Jacob Esquivel; Metcalf, 2015      Interpretation performed at [facility] [address]  [facility] [address] [address]  Intradepartmental consultation is in agreement with the above diagnosis (Diamond Nan)      Electronically signed by Winsome Guzmán DO on 1/28/2019 at  2:11 PM         Imaging    ENDOSCOPIC ULTRASOUND     PROCEDURE: ENDOSCOPIC ULTRASOUND     SEDATION: Monitored anesthesia care, check anesthesia records  IV cipro given     ASA Class: 2     INDICATIONS: Pancreatic cysts on cross sectional imaging      CONSENT:  Informed consent was obtained for the procedure, including sedation after explaining the risks and benefits of the procedure  Risks including but not limited to bleeding, perforation, infection, and missed lesion      PREPARATION:   Telemetry, pulse oximetry, blood pressure were monitored throughout the procedure  Patient was identified by myself both verbally and by visual inspection of ID band      DESCRIPTION:   Patient was placed in the left lateral decubitus position and was sedated with the above medication  The echoendoscope was introduced in to the oropharynx and the esophagus was intubated under direct visualization  Scope was passed down the esophagus up to 3rd part of the duodenum   A careful inspection was made as the echoendoscope was withdrawn, including a retroflexed view of the stomach; findings and interventions are described below       FINDINGS:     EGD FINDINGS:       #1  Esophagus- Medium hiatal hernia  Questionable short-segment Tovar's esophagus  This was biopsied      #2  Stomach- normal appearing stomach      #3  Duodenum- no abnormalities noted in the duodenal bulb or second portion of the duodenum     EUS FINDINGS:  Linear echoendoscope was used for this exam   Numerous cysts were tail of the pancreas, the largest measuring up to 3 cm in cross-sectional diameter  The main pancreatic duct was dilated to 10 mm the body of the pancreas  At the head of the pancreas, the pancreatic duct was dilated measuring up to 4 mm  The common bile duct was normal   There were no masses or cysts noted in the head the pancreas  No lymph nodes were appreciated      22 gauge needle was used to puncture the cysts at the tail of the pancreas  Four passes were performed  Minimal fluid was aspirated from this cyst, most likely due to viscosity of the cyst fluid  Fluid was sent to cytology  IMPRESSIONS:       EGD:  Medium hiatal hernia  Questionable short-segment Tovar's esophagus      EUS:  Cluster of cysts in the tail of the pancreas  FNA was performed using a 22 gauge needle however minimal fluid was aspirated  Dilated pancreatic duct at the body and tail of the pancreas  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          RECOMMENDATIONS:      Follow up cytology results  Follow up with Dr Jimmie Garcia or Dr Yamel Mattson  Follow up with Dr Quevedo Printalissa  Depending on cytology results, may repeat EUS with FNA dilated main duct with 25 gauge needle  Cipro 262IP po bid     COMPLICATIONS:  None; patient tolerated the procedure well      SPECIMENS:    ID Type Source Tests Collected by Time Destination   1 : distal esophagus bx - r/o barretts Tissue Esophagus TISSUE EXAM Aleida Lira MD 1/24/2019 1138     2 : pancreatic tail cyst fluid Other FNA NON-GYNECOLOGIC CYTOLOGY Abelino Forbes MD 1/24/2019 1213           ESTIMATED BLOOD LOSS:  Minimal             Electronically signed by Abelino Forbes MD at 1/24/2019 12:50 PM   Electronically signed by Abelino Forbes MD at 1/24/2019  1:02 PM        MRI OF THE ABDOMEN WITH AND WITHOUT CONTRAST WITH MRCP     INDICATION:  57-year-old man with main pancreatic ductal dilatation and multiple pancreatic cysts noted on prior CT      COMPARISON: CT of the abdomen and pelvis from December 5, 2018        TECHNIQUE:  The following pulse sequences were obtained on a 1 5 T scanner:  Coronal and axial T2 with TE of 90 and 180 respectively, axial T2 with fat saturation, axial FIESTA fat-sat, axial  T1-weighted in-and-out-of phase, axial DWI/ADC, pre-contrast   axial T1 with fat saturation, post-contrast dynamic axial T1 with fat saturation at 20, 70, and 180 seconds, followed by coronal T1 with fat saturation and 7-minute delayed axial T1 with fat saturation  3D MRCP images were obtained with radial thick   slabs and projections  3D rendering was performed from the acquisition scanner      IV Contrast:  13 mL Gadavist       FINDINGS:     PANCREAS:    General: Generalized atrophy  Lesions: Innumerable pancreatic cysts  Several are in the pancreatic head, measuring up to 5 mm without evidence of ductal communication  Most of the cysts are in the distal pancreatic body and tail, clustered together and measuring up to 2 cm in   diameter  Some of the cysts appear to communicate with the main pancreatic duct  No evidence of solid mass or abnormal enhancement  Duct: Dilatation of the main pancreatic duct in the body and tail, up to 1 cm in diameter  In a patient with pancreatic cysts, this degree of ductal dilatation is considered a "high risk" factor for development of carcinoma       BILARY DUCTS:    No intra-hepatic biliary ductal dilatation  Common bile duct is normal in caliber    No evidence of bile duct stricture or choledocholithiasis  No mass identified      LIVER:  Diffusely decreased signal on opposed phase images, indicating fatty infiltration  Several subcentimeter hepatic cysts, best appreciated on MRCP  Otherwise, no evidence of liver mass        GALLBLADDER:  Contracted  Otherwise normal      ADRENAL GLANDS:  Normal      SPLEEN: Normal      KIDNEYS:  Normal      ABDOMINAL CAVITY:  1 6 x 2 1 cm portacaval lymph node (series 5, image 21) several prominent peripancreatic lymph nodes, the largest superior to the proximal pancreatic body, measuring 1 2 x 2 0 cm (series 5, image 17 no other evidence of lymphadenopathy   or mass  No ascites      BOWEL:  Unremarkable MRI appearance      OSSEOUS STRUCTURES:  Normal marrow signal and enhancement  No evidence of recent fracture or marrow replacing process      VASCULAR STRUCTURES:  Visualized vasculature is normal      ABDOMINAL WALL:  Normal      LOWER CHEST:  Unremarkable MRI appearance      IMPRESSION:  1  Innumerable pancreatic cysts, mostly in the tail the pancreas, measuring up to 2 cm in diameter  2   Dilatation of the main pancreatic duct, up to 1 cm in diameter  3   Several enlarged peripancreatic and portacaval lymph nodes  4   Based on institutional guidelines, further evaluation of the pancreatic cysts is recommended with endoscopic ultrasound; surgical oncology consultation should also be considered         The recommendations regarding pancreatic findings assumes that patient does not have family history of pancreatic cancer nor have any symptoms potentially attributable to pancreatic cystic lesions (hyperamylasemia, recent-onset diabetes, severe   epigastric pain, weight loss, steatorrhea, or jaundice ) If these conditions are not true, then management should be deferred to judgement of specialists such as gastroenterologists or oncologic surgeons   Recommendations are based on recent consensus   statements on management of pancreatic cystic lesions from American Gastroenterology Association, [employer] of Radiology, the journal Pancreatology, and our own institutional consensus  I reviewed the above laboratory and imaging data  Discussion/Summary:  Multiple pancreatic cysts, main and side branch IPMN based on imaging and biopsy  Treatment options including distal pancreatectomy were discussed with patient  Rationale for this along with possible risk of malignancy still in main duct IPMN were discussed  All questions answered  Patient like to pursue distal pancreatectomy  Set this up to take place laparoscopically, possible open  Risks and benefits of surgery, including infection, bleeding, pancreatic leak/fistula, were discussed with patient  He understands the plan wishes to proceed with surgery as outlined

## 2019-02-17 LAB
QRS AXIS: -4 DEGREES
QRSD INTERVAL: 82 MS
QT INTERVAL: 382 MS
QTC INTERVAL: 438 MS
T WAVE AXIS: 6 DEGREES
VENTRICULAR RATE: 79 BPM

## 2019-02-17 PROCEDURE — 93010 ELECTROCARDIOGRAM REPORT: CPT | Performed by: INTERNAL MEDICINE

## 2019-02-28 ENCOUNTER — ANESTHESIA EVENT (OUTPATIENT)
Dept: PERIOP | Facility: HOSPITAL | Age: 69
DRG: 406 | End: 2019-02-28
Payer: MEDICARE

## 2019-03-01 DIAGNOSIS — I10 HYPERTENSION, UNSPECIFIED TYPE: Primary | ICD-10-CM

## 2019-03-01 DIAGNOSIS — I48.21 PERMANENT ATRIAL FIBRILLATION (HCC): ICD-10-CM

## 2019-03-04 RX ORDER — ATENOLOL 25 MG/1
TABLET ORAL
Qty: 30 TABLET | Refills: 11 | Status: SHIPPED | OUTPATIENT
Start: 2019-03-04 | End: 2020-03-02 | Stop reason: SDUPTHER

## 2019-03-04 NOTE — PROGRESS NOTES
Cardiology Follow Up        Priyanka Mills      1950      [id number]      Discussion/Summary:  1  Perioperative Risk Stratification  Distal pancreatectomy with Dr Corey Canavan for IPMN  (Intraductal Papillary Mucinous Neoplasm)  Surgery scheduled for 3/12/19 at [facility]    2  Permanent atrial fibrillation  Stroke ppx with full dose aspirin per pt preference  Rate control with atenolol  CHADS VASC = 1    3  Lower Extremity Edema  Noted on prior office visits  None today    Plan:  Pre-op risk stratification: Mild risk given hx of A-fib   Ok to go for surgery   No further cardiac testing needed prior   Recommend to stop aspirin 5-7 days before    A-Fib --> Low CHADS VASC (1)   Stroke ppx with aspirin (held for surgery)   Continue Atenolol   Resume aspirin at recommendation of surgeon post-op    Mr Bill Vieira has a follow up appt with his regular cardiologist, Dr Isidoro Philip, in July  He can proceed with surgery from our standpoint and follow up this summer  I advised him to call our office he anything comes up or he has any questions  Interval History: This is a 70yo gentleman who is normally cared for by Dr Isidoro Philip of our group who is presenting today to obtain cardiac risk stratification for an upcoming surgery  He has a PMH as detailed above which includes permanent atrial fibrillation and chronic lower extremity edema  His last visit with our office was 07/2018  He is maintained on 324mg aspirin daily as stroke prophylaxis due to low CHADS VASC and patient preference over full AC  Over the last few months he has been being worked up by GI and surgical oncology for abdominal pain  He has undergone a CT scan, MRI of abdomen, EUS, and MRCP  Ultimately he was found to have multiple pancreatic cysts and main and side branch intraductal papillary mucinous neoplasm  It was recommended (and agreed with by the patient) that he undergo a laparoscopic distal pancreatectomy   We are seeing him today as risk stratification for his upcoming procedure  Today Mr Kalyani Carter is feeling quite well  He is nervous about his surgery but otherwise in good spirits  He has not specific complaints  Denies chest pain, palpitations, dizziness, syncope, or JESSICA  He does have some shortness of breath but this is baseline and has not changed  Vitals: There were no vitals filed for this visit  /70  Pulse 72  Wt  146 kg      Past Medical History:   Diagnosis Date    A-fib ([facility] [address] )     Abdominal wall strain     last assessed: 10/21/14    Allergic rhinitis     last assessed: 05/03/16    Arthritis     CPAP (continuous positive airway pressure) dependence     Erectile dysfunction of non-organic origin     last assessed: 03/17/14    Lumbar strain     last assessed: 10/21/14    Multiple acquired skin tags     last assessed: 2/18/16    Palpitations     last assessed: 03/17/14    Pancreas cyst     Plantar fasciitis     Skin disorder     last assessed: 05/28/13    Sleep apnea      Social History     Socioeconomic History    Marital status: /Civil Union     Spouse name: Not on file    Number of children: Not on file    Years of education: Not on file    Highest education level: Not on file   Occupational History    Not on file   Social Needs    Financial resource strain: Not on file    Food insecurity:     Worry: Not on file     Inability: Not on file    Transportation needs:     Medical: Not on file     Non-medical: Not on file   Tobacco Use    Smoking status: Never Smoker    Smokeless tobacco: Never Used   Substance and Sexual Activity    Alcohol use:  Yes     Alcohol/week: 1 2 oz     Types: 2 Cans of beer per week     Drinks per session: 1 or 2     Comment: couple times per week;     Drug use: No    Sexual activity: Yes   Lifestyle    Physical activity:     Days per week: Not on file     Minutes per session: Not on file    Stress: Not on file   Relationships    Social connections:     Talks on phone: Not on file     Gets together: Not on file     Attends Buddhism service: Not on file     Active member of club or organization: Not on file     Attends meetings of clubs or organizations: Not on file     Relationship status: Not on file    Intimate partner violence:     Fear of current or ex partner: Not on file     Emotionally abused: Not on file     Physically abused: Not on file     Forced sexual activity: Not on file   Other Topics Concern    Not on file   Social History Narrative    Not on file      Family History   Problem Relation Age of Onset    Breast cancer Mother     Cervical cancer Paternal Aunt     Pancreatic cancer Other     Liver cancer Other     No Known Problems Father      Past Surgical History:   Procedure Laterality Date    CATARACT EXTRACTION Bilateral     COLONOSCOPY      FOOT SURGERY      Due to plantar fascitis    JOINT REPLACEMENT Left     LTK    LINEAR ENDOSCOPIC U/S      CO EDG US EXAM SURGICAL ALTER STOM DUODENUM/JEJUNUM N/A 1/24/2019    Procedure: LINEAR ENDOSCOPIC U/S;  Surgeon: Stevenson Grimm MD;  Location: [facility]; Service: Gastroenterology    REPLACEMENT TOTAL KNEE Left     UMBILICAL HERNIA REPAIR         Current Outpatient Medications:     ascorbic acid (VITAMIN C) 1000 MG tablet, Take 1 tablet by mouth daily, Disp: , Rfl:     aspirin 325 mg tablet, Take 1 tablet by mouth daily, Disp: , Rfl:     atenolol (TENORMIN) 25 mg tablet, TAKE 1 TABLET BY MOUTH EVERY DAY, Disp: 30 tablet, Rfl: 11    Cholecalciferol (VITAMIN D) 2000 units CAPS, Take by mouth daily, Disp: , Rfl:     Cyanocobalamin (VITAMIN B 12 PO), Take by mouth, Disp: , Rfl:     Naproxen Sodium (ALEVE PO), Take by mouth, Disp: , Rfl:         Review of Systems:  Review of Systems   Constitutional: Negative for chills, fatigue, fever and unexpected weight change  HENT: Negative for congestion and sore throat  Respiratory: Positive for shortness of breath   Negative for cough and chest tightness  Cardiovascular: Negative for chest pain, palpitations and leg swelling  Gastrointestinal: Negative for abdominal distention, abdominal pain, diarrhea, nausea and vomiting  Musculoskeletal: Negative for arthralgias and back pain  Neurological: Negative for dizziness and syncope  Physical Exam:  Physical Exam   Constitutional: He is oriented to person, place, and time  He appears well-developed and well-nourished  HENT:   Head: Normocephalic and atraumatic  Eyes: Pupils are equal, round, and reactive to light  Neck: Normal range of motion  Neck supple  Cardiovascular: Exam reveals no gallop and no friction rub  No murmur heard  Irregular rhythm but rate controlled   Pulmonary/Chest: Effort normal and breath sounds normal  No respiratory distress  He has no wheezes  He has no rales  Abdominal: Soft  Bowel sounds are normal  He exhibits no distension  There is no tenderness  obese   Musculoskeletal: Normal range of motion  He exhibits no edema or deformity  Neurological: He is alert and oriented to person, place, and time  Skin: Skin is warm and dry  Psychiatric: He has a normal mood and affect  His behavior is normal        This note was completed in part utilizing [employer] Software  Grammatical errors, random word insertions, spelling mistakes, and incomplete sentences can be an occasional consequence of this system secondary to software limitations, ambient noise, and hardware issues  If you have any questions or concerns about the content, text, or information contained within the body of this dictation, please contact the provider for clarification

## 2019-03-05 ENCOUNTER — OFFICE VISIT (OUTPATIENT)
Dept: CARDIOLOGY CLINIC | Facility: CLINIC | Age: 69
End: 2019-03-05
Payer: MEDICARE

## 2019-03-05 VITALS
SYSTOLIC BLOOD PRESSURE: 120 MMHG | WEIGHT: 315 LBS | HEIGHT: 72 IN | HEART RATE: 72 BPM | BODY MASS INDEX: 42.66 KG/M2 | DIASTOLIC BLOOD PRESSURE: 70 MMHG

## 2019-03-05 DIAGNOSIS — I48.21 PERMANENT ATRIAL FIBRILLATION (HCC): Primary | ICD-10-CM

## 2019-03-05 PROCEDURE — 99214 OFFICE O/P EST MOD 30 MIN: CPT | Performed by: PHYSICIAN ASSISTANT

## 2019-03-05 NOTE — PATIENT INSTRUCTIONS
Cleared for surgery from a cardiac standpoint  Please stop taking your aspirin 5-7 days prior to the surgery  Resume aspirin afterwards at the recommendation of your surgeon  We will see you for your follow up appointment with Dr Sabrina Al in July  Please do not hesitate to call if you have questions in the mean time

## 2019-03-12 ENCOUNTER — ANESTHESIA (OUTPATIENT)
Dept: PERIOP | Facility: HOSPITAL | Age: 69
DRG: 406 | End: 2019-03-12
Payer: MEDICARE

## 2019-03-12 ENCOUNTER — HOSPITAL ENCOUNTER (INPATIENT)
Facility: HOSPITAL | Age: 69
LOS: 7 days | Discharge: HOME | DRG: 406 | End: 2019-03-19
Attending: SURGERY | Admitting: SURGERY
Payer: MEDICARE

## 2019-03-12 DIAGNOSIS — D49.0 IPMN (INTRADUCTAL PAPILLARY MUCINOUS NEOPLASM): ICD-10-CM

## 2019-03-12 LAB
ABO GROUP BLD: NORMAL
BLD GP AB SCN SERPL QL: NEGATIVE
GLUCOSE SERPL-MCNC: 149 MG/DL (ref 65–140)
PLATELET # BLD AUTO: 103 THOUSANDS/UL (ref 149–390)
PMV BLD AUTO: 11.4 FL (ref 8.9–12.7)
RH BLD: NEGATIVE
SPECIMEN EXPIRATION DATE: NORMAL

## 2019-03-12 PROCEDURE — 85014 HEMATOCRIT: CPT

## 2019-03-12 PROCEDURE — 86901 BLOOD TYPING SEROLOGIC RH(D): CPT | Performed by: ANESTHESIOLOGY

## 2019-03-12 PROCEDURE — 86900 BLOOD TYPING SEROLOGIC ABO: CPT | Performed by: ANESTHESIOLOGY

## 2019-03-12 PROCEDURE — 48140 PARTIAL REMOVAL OF PANCREAS: CPT | Performed by: SURGERY

## 2019-03-12 PROCEDURE — 82803 BLOOD GASES ANY COMBINATION: CPT

## 2019-03-12 PROCEDURE — 86923 COMPATIBILITY TEST ELECTRIC: CPT

## 2019-03-12 PROCEDURE — 86850 RBC ANTIBODY SCREEN: CPT | Performed by: ANESTHESIOLOGY

## 2019-03-12 PROCEDURE — 84132 ASSAY OF SERUM POTASSIUM: CPT

## 2019-03-12 PROCEDURE — 82947 ASSAY GLUCOSE BLOOD QUANT: CPT

## 2019-03-12 PROCEDURE — 82330 ASSAY OF CALCIUM: CPT

## 2019-03-12 PROCEDURE — 85049 AUTOMATED PLATELET COUNT: CPT | Performed by: SURGERY

## 2019-03-12 PROCEDURE — 84295 ASSAY OF SERUM SODIUM: CPT

## 2019-03-12 PROCEDURE — 94760 N-INVAS EAR/PLS OXIMETRY 1: CPT

## 2019-03-12 PROCEDURE — 0FBG0ZZ EXCISION OF PANCREAS, OPEN APPROACH: ICD-10-PCS | Performed by: SURGERY

## 2019-03-12 PROCEDURE — 88309 TISSUE EXAM BY PATHOLOGIST: CPT | Performed by: PATHOLOGY

## 2019-03-12 PROCEDURE — 82948 REAGENT STRIP/BLOOD GLUCOSE: CPT

## 2019-03-12 RX ORDER — LIDOCAINE HYDROCHLORIDE 10 MG/ML
INJECTION, SOLUTION INFILTRATION; PERINEURAL AS NEEDED
Status: DISCONTINUED | OUTPATIENT
Start: 2019-03-12 | End: 2019-03-12 | Stop reason: SURG

## 2019-03-12 RX ORDER — EPHEDRINE SULFATE 50 MG/ML
INJECTION INTRAVENOUS AS NEEDED
Status: DISCONTINUED | OUTPATIENT
Start: 2019-03-12 | End: 2019-03-12 | Stop reason: SURG

## 2019-03-12 RX ORDER — DEXAMETHASONE SODIUM PHOSPHATE 10 MG/ML
INJECTION, SOLUTION INTRAMUSCULAR; INTRAVENOUS AS NEEDED
Status: DISCONTINUED | OUTPATIENT
Start: 2019-03-12 | End: 2019-03-12 | Stop reason: SURG

## 2019-03-12 RX ORDER — FENTANYL CITRATE 50 UG/ML
INJECTION, SOLUTION INTRAMUSCULAR; INTRAVENOUS AS NEEDED
Status: DISCONTINUED | OUTPATIENT
Start: 2019-03-12 | End: 2019-03-12 | Stop reason: SURG

## 2019-03-12 RX ORDER — MIDAZOLAM HYDROCHLORIDE 1 MG/ML
INJECTION INTRAMUSCULAR; INTRAVENOUS AS NEEDED
Status: DISCONTINUED | OUTPATIENT
Start: 2019-03-12 | End: 2019-03-12 | Stop reason: SURG

## 2019-03-12 RX ORDER — ALBUMIN, HUMAN INJ 5% 5 %
SOLUTION INTRAVENOUS CONTINUOUS PRN
Status: DISCONTINUED | OUTPATIENT
Start: 2019-03-12 | End: 2019-03-12 | Stop reason: SURG

## 2019-03-12 RX ORDER — ALBUMIN, HUMAN INJ 5% 5 %
12.5 SOLUTION INTRAVENOUS ONCE
Status: COMPLETED | OUTPATIENT
Start: 2019-03-12 | End: 2019-03-12

## 2019-03-12 RX ORDER — HYDROMORPHONE HCL/PF 1 MG/ML
0.5 SYRINGE (ML) INJECTION
Status: DISCONTINUED | OUTPATIENT
Start: 2019-03-12 | End: 2019-03-12 | Stop reason: HOSPADM

## 2019-03-12 RX ORDER — MAGNESIUM HYDROXIDE 1200 MG/15ML
LIQUID ORAL AS NEEDED
Status: DISCONTINUED | OUTPATIENT
Start: 2019-03-12 | End: 2019-03-12 | Stop reason: HOSPADM

## 2019-03-12 RX ORDER — HYDROMORPHONE HCL/PF 1 MG/ML
0.5 SYRINGE (ML) INJECTION
Status: DISCONTINUED | OUTPATIENT
Start: 2019-03-12 | End: 2019-03-15

## 2019-03-12 RX ORDER — ROCURONIUM BROMIDE 10 MG/ML
INJECTION, SOLUTION INTRAVENOUS AS NEEDED
Status: DISCONTINUED | OUTPATIENT
Start: 2019-03-12 | End: 2019-03-12 | Stop reason: SURG

## 2019-03-12 RX ORDER — SUCCINYLCHOLINE/SOD CL,ISO/PF 100 MG/5ML
SYRINGE (ML) INTRAVENOUS AS NEEDED
Status: DISCONTINUED | OUTPATIENT
Start: 2019-03-12 | End: 2019-03-12 | Stop reason: SURG

## 2019-03-12 RX ORDER — SODIUM CHLORIDE, SODIUM LACTATE, POTASSIUM CHLORIDE, CALCIUM CHLORIDE 600; 310; 30; 20 MG/100ML; MG/100ML; MG/100ML; MG/100ML
125 INJECTION, SOLUTION INTRAVENOUS CONTINUOUS
Status: DISCONTINUED | OUTPATIENT
Start: 2019-03-12 | End: 2019-03-13

## 2019-03-12 RX ORDER — LIDOCAINE HYDROCHLORIDE AND EPINEPHRINE 15; 5 MG/ML; UG/ML
INJECTION, SOLUTION EPIDURAL
Status: COMPLETED | OUTPATIENT
Start: 2019-03-12 | End: 2019-03-12

## 2019-03-12 RX ORDER — ONDANSETRON 2 MG/ML
4 INJECTION INTRAMUSCULAR; INTRAVENOUS EVERY 6 HOURS PRN
Status: DISCONTINUED | OUTPATIENT
Start: 2019-03-12 | End: 2019-03-19 | Stop reason: HOSPADM

## 2019-03-12 RX ORDER — PROPOFOL 10 MG/ML
INJECTION, EMULSION INTRAVENOUS AS NEEDED
Status: DISCONTINUED | OUTPATIENT
Start: 2019-03-12 | End: 2019-03-12 | Stop reason: SURG

## 2019-03-12 RX ORDER — ONDANSETRON 2 MG/ML
4 INJECTION INTRAMUSCULAR; INTRAVENOUS ONCE AS NEEDED
Status: DISCONTINUED | OUTPATIENT
Start: 2019-03-12 | End: 2019-03-12 | Stop reason: HOSPADM

## 2019-03-12 RX ORDER — METOCLOPRAMIDE HYDROCHLORIDE 5 MG/ML
10 INJECTION INTRAMUSCULAR; INTRAVENOUS ONCE AS NEEDED
Status: DISCONTINUED | OUTPATIENT
Start: 2019-03-12 | End: 2019-03-12 | Stop reason: HOSPADM

## 2019-03-12 RX ORDER — SODIUM CHLORIDE 9 MG/ML
INJECTION, SOLUTION INTRAVENOUS CONTINUOUS PRN
Status: DISCONTINUED | OUTPATIENT
Start: 2019-03-12 | End: 2019-03-12 | Stop reason: SURG

## 2019-03-12 RX ORDER — ONDANSETRON 2 MG/ML
INJECTION INTRAMUSCULAR; INTRAVENOUS AS NEEDED
Status: DISCONTINUED | OUTPATIENT
Start: 2019-03-12 | End: 2019-03-12 | Stop reason: SURG

## 2019-03-12 RX ORDER — LIDOCAINE HYDROCHLORIDE 10 MG/ML
0.5 INJECTION, SOLUTION INFILTRATION; PERINEURAL ONCE AS NEEDED
Status: COMPLETED | OUTPATIENT
Start: 2019-03-12 | End: 2019-03-12

## 2019-03-12 RX ORDER — LIDOCAINE HYDROCHLORIDE 10 MG/ML
INJECTION, SOLUTION EPIDURAL; INFILTRATION; INTRACAUDAL; PERINEURAL AS NEEDED
Status: DISCONTINUED | OUTPATIENT
Start: 2019-03-12 | End: 2019-03-12 | Stop reason: SURG

## 2019-03-12 RX ORDER — HEPARIN SODIUM 5000 [USP'U]/ML
5000 INJECTION, SOLUTION INTRAVENOUS; SUBCUTANEOUS EVERY 12 HOURS SCHEDULED
Status: DISCONTINUED | OUTPATIENT
Start: 2019-03-12 | End: 2019-03-15

## 2019-03-12 RX ADMIN — PHENYLEPHRINE HYDROCHLORIDE 100 MCG: 10 INJECTION INTRAVENOUS at 13:06

## 2019-03-12 RX ADMIN — HEPARIN SODIUM 5000 UNITS: 5000 INJECTION INTRAVENOUS; SUBCUTANEOUS at 21:40

## 2019-03-12 RX ADMIN — PHENYLEPHRINE HYDROCHLORIDE 100 MCG: 10 INJECTION INTRAVENOUS at 15:37

## 2019-03-12 RX ADMIN — DEXAMETHASONE SODIUM PHOSPHATE 10 MG: 10 INJECTION, SOLUTION INTRAMUSCULAR; INTRAVENOUS at 14:58

## 2019-03-12 RX ADMIN — ALBUMIN (HUMAN): 12.5 SOLUTION INTRAVENOUS at 17:15

## 2019-03-12 RX ADMIN — PHENYLEPHRINE HYDROCHLORIDE 100 MCG: 10 INJECTION INTRAVENOUS at 17:15

## 2019-03-12 RX ADMIN — ROCURONIUM BROMIDE 30 MG: 10 INJECTION INTRAVENOUS at 13:33

## 2019-03-12 RX ADMIN — EPHEDRINE SULFATE 10 MG: 50 INJECTION, SOLUTION INTRAVENOUS at 17:19

## 2019-03-12 RX ADMIN — SODIUM CHLORIDE, SODIUM LACTATE, POTASSIUM CHLORIDE, AND CALCIUM CHLORIDE 125 ML/HR: .6; .31; .03; .02 INJECTION, SOLUTION INTRAVENOUS at 10:49

## 2019-03-12 RX ADMIN — ROPIVACAINE HYDROCHLORIDE: 5 INJECTION, SOLUTION EPIDURAL; INFILTRATION; PERINEURAL at 19:00

## 2019-03-12 RX ADMIN — LIDOCAINE HYDROCHLORIDE 5 ML: 10 INJECTION, SOLUTION EPIDURAL; INFILTRATION; INTRACAUDAL; PERINEURAL at 18:09

## 2019-03-12 RX ADMIN — Medication 3000 MG: at 16:42

## 2019-03-12 RX ADMIN — ALBUMIN (HUMAN): 12.5 SOLUTION INTRAVENOUS at 15:00

## 2019-03-12 RX ADMIN — EPHEDRINE SULFATE 5 MG: 50 INJECTION, SOLUTION INTRAVENOUS at 14:23

## 2019-03-12 RX ADMIN — LIDOCAINE HYDROCHLORIDE 50 MG: 10 INJECTION, SOLUTION INFILTRATION; PERINEURAL at 12:38

## 2019-03-12 RX ADMIN — FENTANYL CITRATE 100 MCG: 50 INJECTION, SOLUTION INTRAMUSCULAR; INTRAVENOUS at 13:12

## 2019-03-12 RX ADMIN — ALBUMIN (HUMAN) 12.5 G: 12.5 SOLUTION INTRAVENOUS at 18:55

## 2019-03-12 RX ADMIN — EPHEDRINE SULFATE 5 MG: 50 INJECTION, SOLUTION INTRAVENOUS at 13:27

## 2019-03-12 RX ADMIN — PHENYLEPHRINE HYDROCHLORIDE 200 MCG: 10 INJECTION INTRAVENOUS at 13:27

## 2019-03-12 RX ADMIN — ALBUMIN (HUMAN): 12.5 SOLUTION INTRAVENOUS at 14:04

## 2019-03-12 RX ADMIN — LIDOCAINE HYDROCHLORIDE 0.5 ML: 10 INJECTION, SOLUTION INFILTRATION; PERINEURAL at 10:50

## 2019-03-12 RX ADMIN — PHENYLEPHRINE HYDROCHLORIDE 100 MCG: 10 INJECTION INTRAVENOUS at 13:25

## 2019-03-12 RX ADMIN — ROCURONIUM BROMIDE 30 MG: 10 INJECTION INTRAVENOUS at 14:55

## 2019-03-12 RX ADMIN — EPHEDRINE SULFATE 5 MG: 50 INJECTION, SOLUTION INTRAVENOUS at 14:19

## 2019-03-12 RX ADMIN — FENTANYL CITRATE 25 MCG: 50 INJECTION, SOLUTION INTRAMUSCULAR; INTRAVENOUS at 11:28

## 2019-03-12 RX ADMIN — Medication 3000 MG: at 12:44

## 2019-03-12 RX ADMIN — PROPOFOL 250 MG: 10 INJECTION, EMULSION INTRAVENOUS at 12:38

## 2019-03-12 RX ADMIN — SODIUM CHLORIDE: 0.9 INJECTION, SOLUTION INTRAVENOUS at 12:42

## 2019-03-12 RX ADMIN — Medication 160 MG: at 12:38

## 2019-03-12 RX ADMIN — LIDOCAINE HYDROCHLORIDE AND EPINEPHRINE 3 ML: 15; 5 INJECTION, SOLUTION EPIDURAL at 11:31

## 2019-03-12 RX ADMIN — PHENYLEPHRINE HYDROCHLORIDE 200 MCG: 10 INJECTION INTRAVENOUS at 17:19

## 2019-03-12 RX ADMIN — PHENYLEPHRINE HYDROCHLORIDE 100 MCG: 10 INJECTION INTRAVENOUS at 14:10

## 2019-03-12 RX ADMIN — PHENYLEPHRINE HYDROCHLORIDE 100 MCG: 10 INJECTION INTRAVENOUS at 14:05

## 2019-03-12 RX ADMIN — PHENYLEPHRINE HYDROCHLORIDE 100 MCG: 10 INJECTION INTRAVENOUS at 12:43

## 2019-03-12 RX ADMIN — SUGAMMADEX 200 MG: 100 INJECTION, SOLUTION INTRAVENOUS at 18:23

## 2019-03-12 RX ADMIN — ROCURONIUM BROMIDE 20 MG: 10 INJECTION INTRAVENOUS at 15:49

## 2019-03-12 RX ADMIN — ROCURONIUM BROMIDE 50 MG: 10 INJECTION INTRAVENOUS at 12:47

## 2019-03-12 RX ADMIN — PHENYLEPHRINE HYDROCHLORIDE 100 MCG: 10 INJECTION INTRAVENOUS at 13:11

## 2019-03-12 RX ADMIN — SODIUM CHLORIDE: 0.9 INJECTION, SOLUTION INTRAVENOUS at 13:56

## 2019-03-12 RX ADMIN — MIDAZOLAM 2 MG: 1 INJECTION INTRAMUSCULAR; INTRAVENOUS at 11:28

## 2019-03-12 RX ADMIN — ONDANSETRON 4 MG: 2 INJECTION INTRAMUSCULAR; INTRAVENOUS at 18:03

## 2019-03-12 NOTE — ANESTHESIA PROCEDURE NOTES
Arterial Line Insertion  Performed by: Ángel Silveira MD  Authorized by: Ángel Silveira MD   Consent: Verbal consent obtained  Written consent obtained  Consent given by: patient  Patient understanding: patient states understanding of the procedure being performed  Patient consent: the patient's understanding of the procedure matches consent given  Procedure consent: procedure consent matches procedure scheduled  Relevant documents: relevant documents present and verified  Test results: test results available and properly labeled  Site marked: the operative site was marked  Radiology Images: Radiology Images displayed and confirmed  If images not available, report reviewed  Patient identity confirmed: verbally with patient and arm band  Time out: Immediately prior to procedure a "time out" was called to verify the correct patient, procedure, equipment, support staff and site/side marked as required  Preparation: Patient was prepped and draped in the usual sterile fashion    Indications: hemodynamic monitoring  Orientation:  Right  Location: radial artery  Procedure Details:  Needle gauge: 20  Number of attempts: 1    Post-procedure:  Post-procedure: dressing applied  Waveform: good waveform  Patient tolerance: Patient tolerated the procedure well with no immediate complications

## 2019-03-12 NOTE — ANESTHESIA PROCEDURE NOTES
Epidural Block    Patient location during procedure: holding area  Start time: 3/12/2019 11:30 AM  Reason for block: procedure for pain and at surgeon's request  Staffing  Anesthesiologist: Abhilash Menezes MD  Performed: anesthesiologist   Preanesthetic Checklist  Completed: patient identified, site marked, surgical consent, pre-op evaluation, timeout performed, IV checked, risks and benefits discussed and monitors and equipment checked  Epidural  Patient position: sitting  Prep: ChloraPrep  Patient monitoring: cardiac monitor and frequent blood pressure checks  Approach: midline  Location: thoracic (1-12) (T8-9)  Injection technique: SHREYAS saline  Needle  Needle type: Tuohy   Needle gauge: 18 G  Catheter type: side hole  Catheter size: 20 G  Catheter at skin depth: 12 5 cm  Test dose: negativelidocaine 1 5% with epinephrine 1:200,000 test dose, 3 mLnegative aspiration for CSF, negative aspiration for heme and no paresthesia on injection  patient tolerated the procedure well with no immediate complications  Additional Notes  One attempt, easy

## 2019-03-12 NOTE — ANESTHESIA POSTPROCEDURE EVALUATION
Post-Op Assessment Note    CV Status:  Stable    Pain management: adequate     Mental Status:  Alert and awake   Hydration Status:  Stable   PONV Controlled:  Controlled   Airway Patency:  Patent   Post Op Vitals Reviewed: Yes      Staff: Anesthesiologist, CRNA           BP   128/66   Temp   98 5   Pulse  88   Resp   18   SpO2   98

## 2019-03-12 NOTE — ANESTHESIA PREPROCEDURE EVALUATION
Review of Systems/Medical History  Patient summary reviewed  Chart reviewed  No history of anesthetic complications     Cardiovascular  Dysrhythmias , atrial fibrillation,    Pulmonary  Not a smoker , Shortness of breath, Sleep apnea CPAP,        GI/Hepatic    GI malignancy (Pancreatic Neoplasm),             Endo/Other    Obesity (BMI-41)  morbid obesity   GYN       Hematology   Musculoskeletal    Arthritis     Neurology   Psychology           Physical Exam    Airway    Mallampati score: II  TM Distance: >3 FB  Neck ROM: full     Dental       Cardiovascular      Pulmonary      Other Findings        Anesthesia Plan  ASA Score- 3     Anesthesia Type- general and epidural with ASA Monitors  Additional Monitors: arterial line  Airway Plan: ETT  Comment: Thoracic Epidural for post op pain per surgeon request secondary to high chance of transition to laparotomy        Plan Factors-    Induction- intravenous  Postoperative Plan-     Informed Consent- Anesthetic plan and risks discussed with patient  I personally reviewed this patient with the CRNA  Discussed and agreed on the Anesthesia Plan with the CRNA  Hafsa Burciaga

## 2019-03-13 LAB
ANION GAP SERPL CALCULATED.3IONS-SCNC: 9 MMOL/L (ref 4–13)
BASOPHILS # BLD AUTO: 0 THOUSANDS/ΜL (ref 0–0.1)
BASOPHILS NFR BLD AUTO: 0 % (ref 0–1)
BUN SERPL-MCNC: 13 MG/DL (ref 5–25)
CALCIUM SERPL-MCNC: 7.7 MG/DL (ref 8.3–10.1)
CHLORIDE SERPL-SCNC: 108 MMOL/L (ref 100–108)
CO2 SERPL-SCNC: 24 MMOL/L (ref 21–32)
CREAT SERPL-MCNC: 0.73 MG/DL (ref 0.6–1.3)
EOSINOPHIL # BLD AUTO: 0.01 THOUSAND/ΜL (ref 0–0.61)
EOSINOPHIL NFR BLD AUTO: 0 % (ref 0–6)
ERYTHROCYTE [DISTWIDTH] IN BLOOD BY AUTOMATED COUNT: 13.3 % (ref 11.6–15.1)
GFR SERPL CREATININE-BSD FRML MDRD: 95 ML/MIN/1.73SQ M
GLUCOSE SERPL-MCNC: 164 MG/DL (ref 65–140)
GLUCOSE SERPL-MCNC: 175 MG/DL (ref 65–140)
GLUCOSE SERPL-MCNC: 177 MG/DL (ref 65–140)
GLUCOSE SERPL-MCNC: 179 MG/DL (ref 65–140)
GLUCOSE SERPL-MCNC: 180 MG/DL (ref 65–140)
HCT VFR BLD AUTO: 33.2 % (ref 36.5–49.3)
HGB BLD-MCNC: 11.3 G/DL (ref 12–17)
IMM GRANULOCYTES # BLD AUTO: 0.03 THOUSAND/UL (ref 0–0.2)
IMM GRANULOCYTES NFR BLD AUTO: 1 % (ref 0–2)
LYMPHOCYTES # BLD AUTO: 0.42 THOUSANDS/ΜL (ref 0.6–4.47)
LYMPHOCYTES NFR BLD AUTO: 9 % (ref 14–44)
MAGNESIUM SERPL-MCNC: 1.9 MG/DL (ref 1.6–2.6)
MCH RBC QN AUTO: 35.4 PG (ref 26.8–34.3)
MCHC RBC AUTO-ENTMCNC: 34 G/DL (ref 31.4–37.4)
MCV RBC AUTO: 104 FL (ref 82–98)
MONOCYTES # BLD AUTO: 0.41 THOUSAND/ΜL (ref 0.17–1.22)
MONOCYTES NFR BLD AUTO: 9 % (ref 4–12)
NEUTROPHILS # BLD AUTO: 3.93 THOUSANDS/ΜL (ref 1.85–7.62)
NEUTS SEG NFR BLD AUTO: 81 % (ref 43–75)
NRBC BLD AUTO-RTO: 0 /100 WBCS
PLATELET # BLD AUTO: 97 THOUSANDS/UL (ref 149–390)
PMV BLD AUTO: 11.7 FL (ref 8.9–12.7)
POTASSIUM SERPL-SCNC: 3.8 MMOL/L (ref 3.5–5.3)
RBC # BLD AUTO: 3.19 MILLION/UL (ref 3.88–5.62)
SODIUM SERPL-SCNC: 141 MMOL/L (ref 136–145)
WBC # BLD AUTO: 4.8 THOUSAND/UL (ref 4.31–10.16)

## 2019-03-13 PROCEDURE — G8978 MOBILITY CURRENT STATUS: HCPCS

## 2019-03-13 PROCEDURE — 82948 REAGENT STRIP/BLOOD GLUCOSE: CPT

## 2019-03-13 PROCEDURE — 97163 PT EVAL HIGH COMPLEX 45 MIN: CPT

## 2019-03-13 PROCEDURE — 97167 OT EVAL HIGH COMPLEX 60 MIN: CPT

## 2019-03-13 PROCEDURE — 94760 N-INVAS EAR/PLS OXIMETRY 1: CPT | Performed by: SOCIAL WORKER

## 2019-03-13 PROCEDURE — G8987 SELF CARE CURRENT STATUS: HCPCS

## 2019-03-13 PROCEDURE — 80048 BASIC METABOLIC PNL TOTAL CA: CPT | Performed by: SURGERY

## 2019-03-13 PROCEDURE — 94762 N-INVAS EAR/PLS OXIMTRY CONT: CPT

## 2019-03-13 PROCEDURE — 83735 ASSAY OF MAGNESIUM: CPT | Performed by: SURGERY

## 2019-03-13 PROCEDURE — G8988 SELF CARE GOAL STATUS: HCPCS

## 2019-03-13 PROCEDURE — G8979 MOBILITY GOAL STATUS: HCPCS

## 2019-03-13 PROCEDURE — 85025 COMPLETE CBC W/AUTO DIFF WBC: CPT | Performed by: SURGERY

## 2019-03-13 PROCEDURE — 94760 N-INVAS EAR/PLS OXIMETRY 1: CPT

## 2019-03-13 RX ORDER — DEXTROSE, SODIUM CHLORIDE, AND POTASSIUM CHLORIDE 5; .45; .15 G/100ML; G/100ML; G/100ML
60 INJECTION INTRAVENOUS CONTINUOUS
Status: DISCONTINUED | OUTPATIENT
Start: 2019-03-13 | End: 2019-03-14

## 2019-03-13 RX ORDER — MAGNESIUM SULFATE 1 G/100ML
1 INJECTION INTRAVENOUS ONCE
Status: COMPLETED | OUTPATIENT
Start: 2019-03-13 | End: 2019-03-13

## 2019-03-13 RX ORDER — ATENOLOL 25 MG/1
25 TABLET ORAL DAILY
Status: DISCONTINUED | OUTPATIENT
Start: 2019-03-14 | End: 2019-03-19 | Stop reason: HOSPADM

## 2019-03-13 RX ORDER — LANOLIN ALCOHOL/MO/W.PET/CERES
3 CREAM (GRAM) TOPICAL
Status: DISCONTINUED | OUTPATIENT
Start: 2019-03-13 | End: 2019-03-19 | Stop reason: HOSPADM

## 2019-03-13 RX ORDER — POTASSIUM CHLORIDE 14.9 MG/ML
20 INJECTION INTRAVENOUS
Status: COMPLETED | OUTPATIENT
Start: 2019-03-13 | End: 2019-03-13

## 2019-03-13 RX ADMIN — MELATONIN 3 MG: at 02:10

## 2019-03-13 RX ADMIN — POTASSIUM CHLORIDE 20 MEQ: 200 INJECTION, SOLUTION INTRAVENOUS at 13:24

## 2019-03-13 RX ADMIN — HYDROMORPHONE HYDROCHLORIDE 0.5 MG: 1 INJECTION, SOLUTION INTRAMUSCULAR; INTRAVENOUS; SUBCUTANEOUS at 04:55

## 2019-03-13 RX ADMIN — POTASSIUM CHLORIDE 20 MEQ: 200 INJECTION, SOLUTION INTRAVENOUS at 09:30

## 2019-03-13 RX ADMIN — MAGNESIUM SULFATE HEPTAHYDRATE 1 G: 1 INJECTION, SOLUTION INTRAVENOUS at 13:31

## 2019-03-13 RX ADMIN — HEPARIN SODIUM 5000 UNITS: 5000 INJECTION INTRAVENOUS; SUBCUTANEOUS at 08:42

## 2019-03-13 RX ADMIN — DEXTROSE, SODIUM CHLORIDE, AND POTASSIUM CHLORIDE 75 ML/HR: 5; .45; .15 INJECTION INTRAVENOUS at 09:20

## 2019-03-13 RX ADMIN — MELATONIN 3 MG: at 21:49

## 2019-03-13 RX ADMIN — SODIUM CHLORIDE, SODIUM LACTATE, POTASSIUM CHLORIDE, AND CALCIUM CHLORIDE 125 ML/HR: .6; .31; .03; .02 INJECTION, SOLUTION INTRAVENOUS at 04:56

## 2019-03-13 RX ADMIN — HEPARIN SODIUM 5000 UNITS: 5000 INJECTION INTRAVENOUS; SUBCUTANEOUS at 21:49

## 2019-03-14 LAB
ABO GROUP BLD BPU: NORMAL
ABO GROUP BLD BPU: NORMAL
ANION GAP SERPL CALCULATED.3IONS-SCNC: 6 MMOL/L (ref 4–13)
BASE EXCESS BLDA CALC-SCNC: -2 MMOL/L (ref -2–3)
BASE EXCESS BLDA CALC-SCNC: -3 MMOL/L (ref -2–3)
BASE EXCESS BLDA CALC-SCNC: -4 MMOL/L (ref -2–3)
BASOPHILS # BLD AUTO: 0.01 THOUSANDS/ΜL (ref 0–0.1)
BASOPHILS NFR BLD AUTO: 0 % (ref 0–1)
BPU ID: NORMAL
BPU ID: NORMAL
BUN SERPL-MCNC: 13 MG/DL (ref 5–25)
CA-I BLD-SCNC: 1.12 MMOL/L (ref 1.12–1.32)
CA-I BLD-SCNC: 1.18 MMOL/L (ref 1.12–1.32)
CA-I BLD-SCNC: 1.21 MMOL/L (ref 1.12–1.32)
CALCIUM SERPL-MCNC: 7.8 MG/DL (ref 8.3–10.1)
CHLORIDE SERPL-SCNC: 108 MMOL/L (ref 100–108)
CO2 SERPL-SCNC: 25 MMOL/L (ref 21–32)
CREAT SERPL-MCNC: 0.66 MG/DL (ref 0.6–1.3)
CROSSMATCH: NORMAL
CROSSMATCH: NORMAL
EOSINOPHIL # BLD AUTO: 0.04 THOUSAND/ΜL (ref 0–0.61)
EOSINOPHIL NFR BLD AUTO: 1 % (ref 0–6)
ERYTHROCYTE [DISTWIDTH] IN BLOOD BY AUTOMATED COUNT: 13.4 % (ref 11.6–15.1)
GFR SERPL CREATININE-BSD FRML MDRD: 99 ML/MIN/1.73SQ M
GLUCOSE SERPL-MCNC: 108 MG/DL (ref 65–140)
GLUCOSE SERPL-MCNC: 110 MG/DL (ref 65–140)
GLUCOSE SERPL-MCNC: 119 MG/DL (ref 65–140)
GLUCOSE SERPL-MCNC: 123 MG/DL (ref 65–140)
GLUCOSE SERPL-MCNC: 124 MG/DL (ref 65–140)
GLUCOSE SERPL-MCNC: 134 MG/DL (ref 65–140)
GLUCOSE SERPL-MCNC: 140 MG/DL (ref 65–140)
HCO3 BLDA-SCNC: 21.7 MMOL/L (ref 22–28)
HCO3 BLDA-SCNC: 23.3 MMOL/L (ref 22–28)
HCO3 BLDA-SCNC: 24.4 MMOL/L (ref 24–30)
HCT VFR BLD AUTO: 32 % (ref 36.5–49.3)
HCT VFR BLD CALC: 35 % (ref 36.5–49.3)
HCT VFR BLD CALC: 36 % (ref 36.5–49.3)
HCT VFR BLD CALC: 36 % (ref 36.5–49.3)
HGB BLD-MCNC: 10.4 G/DL (ref 12–17)
HGB BLDA-MCNC: 11.9 G/DL (ref 12–17)
HGB BLDA-MCNC: 12.2 G/DL (ref 12–17)
HGB BLDA-MCNC: 12.2 G/DL (ref 12–17)
IMM GRANULOCYTES # BLD AUTO: 0.01 THOUSAND/UL (ref 0–0.2)
IMM GRANULOCYTES NFR BLD AUTO: 0 % (ref 0–2)
LYMPHOCYTES # BLD AUTO: 1.35 THOUSANDS/ΜL (ref 0.6–4.47)
LYMPHOCYTES NFR BLD AUTO: 25 % (ref 14–44)
MCH RBC QN AUTO: 35.1 PG (ref 26.8–34.3)
MCHC RBC AUTO-ENTMCNC: 32.5 G/DL (ref 31.4–37.4)
MCV RBC AUTO: 108 FL (ref 82–98)
MONOCYTES # BLD AUTO: 0.68 THOUSAND/ΜL (ref 0.17–1.22)
MONOCYTES NFR BLD AUTO: 13 % (ref 4–12)
NEUTROPHILS # BLD AUTO: 3.29 THOUSANDS/ΜL (ref 1.85–7.62)
NEUTS SEG NFR BLD AUTO: 61 % (ref 43–75)
NRBC BLD AUTO-RTO: 0 /100 WBCS
PCO2 BLD: 23 MMOL/L (ref 21–32)
PCO2 BLD: 25 MMOL/L (ref 21–32)
PCO2 BLD: 26 MMOL/L (ref 21–32)
PCO2 BLD: 39.8 MM HG (ref 36–44)
PCO2 BLD: 45 MM HG (ref 36–44)
PCO2 BLD: 47 MM HG (ref 42–50)
PH BLD: 7.32 [PH] (ref 7.35–7.45)
PH BLD: 7.32 [PH] (ref 7.3–7.4)
PH BLD: 7.34 [PH] (ref 7.35–7.45)
PLATELET # BLD AUTO: 87 THOUSANDS/UL (ref 149–390)
PMV BLD AUTO: 11.7 FL (ref 8.9–12.7)
PO2 BLD: 124 MM HG (ref 75–129)
PO2 BLD: 85 MM HG (ref 75–129)
PO2 BLD: 88 MM HG (ref 35–45)
POTASSIUM BLD-SCNC: 3.8 MMOL/L (ref 3.5–5.3)
POTASSIUM BLD-SCNC: 4 MMOL/L (ref 3.5–5.3)
POTASSIUM BLD-SCNC: 4.2 MMOL/L (ref 3.5–5.3)
POTASSIUM SERPL-SCNC: 3.8 MMOL/L (ref 3.5–5.3)
RBC # BLD AUTO: 2.96 MILLION/UL (ref 3.88–5.62)
SAO2 % BLD FROM PO2: 95 % (ref 95–98)
SAO2 % BLD FROM PO2: 96 % (ref 95–98)
SAO2 % BLD FROM PO2: 99 % (ref 95–98)
SODIUM BLD-SCNC: 140 MMOL/L (ref 136–145)
SODIUM SERPL-SCNC: 139 MMOL/L (ref 136–145)
SPECIMEN SOURCE: ABNORMAL
UNIT DISPENSE STATUS: NORMAL
UNIT DISPENSE STATUS: NORMAL
UNIT PRODUCT CODE: NORMAL
UNIT PRODUCT CODE: NORMAL
UNIT RH: NORMAL
UNIT RH: NORMAL
WBC # BLD AUTO: 5.38 THOUSAND/UL (ref 4.31–10.16)

## 2019-03-14 PROCEDURE — 82948 REAGENT STRIP/BLOOD GLUCOSE: CPT

## 2019-03-14 PROCEDURE — 85025 COMPLETE CBC W/AUTO DIFF WBC: CPT | Performed by: PHYSICIAN ASSISTANT

## 2019-03-14 PROCEDURE — 80048 BASIC METABOLIC PNL TOTAL CA: CPT | Performed by: PHYSICIAN ASSISTANT

## 2019-03-14 RX ORDER — POTASSIUM CHLORIDE 14.9 MG/ML
20 INJECTION INTRAVENOUS
Status: COMPLETED | OUTPATIENT
Start: 2019-03-14 | End: 2019-03-14

## 2019-03-14 RX ADMIN — POTASSIUM CHLORIDE 20 MEQ: 200 INJECTION, SOLUTION INTRAVENOUS at 08:56

## 2019-03-14 RX ADMIN — ROPIVACAINE HYDROCHLORIDE: 5 INJECTION, SOLUTION EPIDURAL; INFILTRATION; PERINEURAL at 08:53

## 2019-03-14 RX ADMIN — HEPARIN SODIUM 5000 UNITS: 5000 INJECTION INTRAVENOUS; SUBCUTANEOUS at 21:06

## 2019-03-14 RX ADMIN — DEXTROSE, SODIUM CHLORIDE, AND POTASSIUM CHLORIDE 75 ML/HR: 5; .45; .15 INJECTION INTRAVENOUS at 01:31

## 2019-03-14 RX ADMIN — MELATONIN 3 MG: at 21:06

## 2019-03-14 RX ADMIN — POTASSIUM CHLORIDE 20 MEQ: 200 INJECTION, SOLUTION INTRAVENOUS at 10:45

## 2019-03-14 RX ADMIN — HYDROMORPHONE HYDROCHLORIDE 0.5 MG: 1 INJECTION, SOLUTION INTRAMUSCULAR; INTRAVENOUS; SUBCUTANEOUS at 19:24

## 2019-03-14 RX ADMIN — ATENOLOL 25 MG: 25 TABLET ORAL at 08:56

## 2019-03-14 RX ADMIN — HEPARIN SODIUM 5000 UNITS: 5000 INJECTION INTRAVENOUS; SUBCUTANEOUS at 08:56

## 2019-03-15 LAB
AMYLASE FLD QL: 17 U/L
ANION GAP SERPL CALCULATED.3IONS-SCNC: 5 MMOL/L (ref 4–13)
BASOPHILS # BLD AUTO: 0.01 THOUSANDS/ΜL (ref 0–0.1)
BASOPHILS NFR BLD AUTO: 0 % (ref 0–1)
BUN SERPL-MCNC: 12 MG/DL (ref 5–25)
CALCIUM SERPL-MCNC: 8 MG/DL (ref 8.3–10.1)
CHLORIDE SERPL-SCNC: 105 MMOL/L (ref 100–108)
CO2 SERPL-SCNC: 26 MMOL/L (ref 21–32)
CREAT SERPL-MCNC: 0.69 MG/DL (ref 0.6–1.3)
EOSINOPHIL # BLD AUTO: 0.06 THOUSAND/ΜL (ref 0–0.61)
EOSINOPHIL NFR BLD AUTO: 1 % (ref 0–6)
ERYTHROCYTE [DISTWIDTH] IN BLOOD BY AUTOMATED COUNT: 13.2 % (ref 11.6–15.1)
GFR SERPL CREATININE-BSD FRML MDRD: 97 ML/MIN/1.73SQ M
GLUCOSE SERPL-MCNC: 104 MG/DL (ref 65–140)
GLUCOSE SERPL-MCNC: 143 MG/DL (ref 65–140)
GLUCOSE SERPL-MCNC: 159 MG/DL (ref 65–140)
GLUCOSE SERPL-MCNC: 160 MG/DL (ref 65–140)
HCT VFR BLD AUTO: 33.6 % (ref 36.5–49.3)
HGB BLD-MCNC: 11 G/DL (ref 12–17)
IMM GRANULOCYTES # BLD AUTO: 0.04 THOUSAND/UL (ref 0–0.2)
IMM GRANULOCYTES NFR BLD AUTO: 1 % (ref 0–2)
LYMPHOCYTES # BLD AUTO: 1.33 THOUSANDS/ΜL (ref 0.6–4.47)
LYMPHOCYTES NFR BLD AUTO: 22 % (ref 14–44)
MAGNESIUM SERPL-MCNC: 2.2 MG/DL (ref 1.6–2.6)
MCH RBC QN AUTO: 34.9 PG (ref 26.8–34.3)
MCHC RBC AUTO-ENTMCNC: 32.7 G/DL (ref 31.4–37.4)
MCV RBC AUTO: 107 FL (ref 82–98)
MONOCYTES # BLD AUTO: 0.8 THOUSAND/ΜL (ref 0.17–1.22)
MONOCYTES NFR BLD AUTO: 13 % (ref 4–12)
NEUTROPHILS # BLD AUTO: 3.71 THOUSANDS/ΜL (ref 1.85–7.62)
NEUTS SEG NFR BLD AUTO: 63 % (ref 43–75)
NRBC BLD AUTO-RTO: 0 /100 WBCS
PLATELET # BLD AUTO: 88 THOUSANDS/UL (ref 149–390)
PMV BLD AUTO: 12 FL (ref 8.9–12.7)
POTASSIUM SERPL-SCNC: 3.9 MMOL/L (ref 3.5–5.3)
RBC # BLD AUTO: 3.15 MILLION/UL (ref 3.88–5.62)
SODIUM SERPL-SCNC: 136 MMOL/L (ref 136–145)
WBC # BLD AUTO: 5.95 THOUSAND/UL (ref 4.31–10.16)

## 2019-03-15 PROCEDURE — 97530 THERAPEUTIC ACTIVITIES: CPT

## 2019-03-15 PROCEDURE — 82150 ASSAY OF AMYLASE: CPT | Performed by: SURGERY

## 2019-03-15 PROCEDURE — 82948 REAGENT STRIP/BLOOD GLUCOSE: CPT

## 2019-03-15 PROCEDURE — 94760 N-INVAS EAR/PLS OXIMETRY 1: CPT

## 2019-03-15 PROCEDURE — 80048 BASIC METABOLIC PNL TOTAL CA: CPT | Performed by: PHYSICIAN ASSISTANT

## 2019-03-15 PROCEDURE — 83735 ASSAY OF MAGNESIUM: CPT | Performed by: PHYSICIAN ASSISTANT

## 2019-03-15 PROCEDURE — 85025 COMPLETE CBC W/AUTO DIFF WBC: CPT | Performed by: PHYSICIAN ASSISTANT

## 2019-03-15 PROCEDURE — 97116 GAIT TRAINING THERAPY: CPT

## 2019-03-15 RX ORDER — HYDROMORPHONE HCL/PF 1 MG/ML
1 SYRINGE (ML) INJECTION
Status: DISCONTINUED | OUTPATIENT
Start: 2019-03-15 | End: 2019-03-16

## 2019-03-15 RX ORDER — HEPARIN SODIUM 5000 [USP'U]/ML
5000 INJECTION, SOLUTION INTRAVENOUS; SUBCUTANEOUS EVERY 8 HOURS SCHEDULED
Status: DISCONTINUED | OUTPATIENT
Start: 2019-03-15 | End: 2019-03-19 | Stop reason: HOSPADM

## 2019-03-15 RX ORDER — HYDROMORPHONE HCL/PF 1 MG/ML
1 SYRINGE (ML) INJECTION
Status: DISCONTINUED | OUTPATIENT
Start: 2019-03-15 | End: 2019-03-15

## 2019-03-15 RX ORDER — ACETAMINOPHEN 325 MG/1
650 TABLET ORAL EVERY 6 HOURS PRN
Status: DISCONTINUED | OUTPATIENT
Start: 2019-03-15 | End: 2019-03-19 | Stop reason: HOSPADM

## 2019-03-15 RX ORDER — TRAMADOL HYDROCHLORIDE 50 MG/1
50 TABLET ORAL EVERY 6 HOURS PRN
Status: DISCONTINUED | OUTPATIENT
Start: 2019-03-15 | End: 2019-03-16

## 2019-03-15 RX ORDER — TRAMADOL HYDROCHLORIDE 50 MG/1
100 TABLET ORAL EVERY 6 HOURS PRN
Status: DISCONTINUED | OUTPATIENT
Start: 2019-03-15 | End: 2019-03-16

## 2019-03-15 RX ADMIN — HYDROMORPHONE HYDROCHLORIDE 1 MG: 1 INJECTION, SOLUTION INTRAMUSCULAR; INTRAVENOUS; SUBCUTANEOUS at 15:23

## 2019-03-15 RX ADMIN — MELATONIN 3 MG: at 21:19

## 2019-03-15 RX ADMIN — HYDROMORPHONE HYDROCHLORIDE 1 MG: 1 INJECTION, SOLUTION INTRAMUSCULAR; INTRAVENOUS; SUBCUTANEOUS at 11:56

## 2019-03-15 RX ADMIN — TRAMADOL HYDROCHLORIDE 100 MG: 50 TABLET, COATED ORAL at 13:07

## 2019-03-15 RX ADMIN — HYDROMORPHONE HYDROCHLORIDE 1 MG: 1 INJECTION, SOLUTION INTRAMUSCULAR; INTRAVENOUS; SUBCUTANEOUS at 18:30

## 2019-03-15 RX ADMIN — ATENOLOL 25 MG: 25 TABLET ORAL at 09:19

## 2019-03-15 RX ADMIN — HEPARIN SODIUM 5000 UNITS: 5000 INJECTION INTRAVENOUS; SUBCUTANEOUS at 21:20

## 2019-03-15 RX ADMIN — HEPARIN SODIUM 5000 UNITS: 5000 INJECTION INTRAVENOUS; SUBCUTANEOUS at 13:07

## 2019-03-15 RX ADMIN — TRAMADOL HYDROCHLORIDE 50 MG: 50 TABLET, FILM COATED ORAL at 10:55

## 2019-03-15 RX ADMIN — TRAMADOL HYDROCHLORIDE 100 MG: 50 TABLET, COATED ORAL at 21:19

## 2019-03-16 LAB
ANION GAP SERPL CALCULATED.3IONS-SCNC: 8 MMOL/L (ref 4–13)
BUN SERPL-MCNC: 11 MG/DL (ref 5–25)
CALCIUM SERPL-MCNC: 8.5 MG/DL (ref 8.3–10.1)
CHLORIDE SERPL-SCNC: 104 MMOL/L (ref 100–108)
CO2 SERPL-SCNC: 24 MMOL/L (ref 21–32)
CREAT SERPL-MCNC: 0.66 MG/DL (ref 0.6–1.3)
GFR SERPL CREATININE-BSD FRML MDRD: 99 ML/MIN/1.73SQ M
GLUCOSE SERPL-MCNC: 121 MG/DL (ref 65–140)
GLUCOSE SERPL-MCNC: 122 MG/DL (ref 65–140)
GLUCOSE SERPL-MCNC: 133 MG/DL (ref 65–140)
GLUCOSE SERPL-MCNC: 178 MG/DL (ref 65–140)
POTASSIUM SERPL-SCNC: 3.9 MMOL/L (ref 3.5–5.3)
SODIUM SERPL-SCNC: 136 MMOL/L (ref 136–145)

## 2019-03-16 PROCEDURE — 80048 BASIC METABOLIC PNL TOTAL CA: CPT | Performed by: PHYSICIAN ASSISTANT

## 2019-03-16 PROCEDURE — 99024 POSTOP FOLLOW-UP VISIT: CPT | Performed by: SURGERY

## 2019-03-16 PROCEDURE — 82948 REAGENT STRIP/BLOOD GLUCOSE: CPT

## 2019-03-16 RX ORDER — HYDROMORPHONE HCL/PF 1 MG/ML
1 SYRINGE (ML) INJECTION EVERY 6 HOURS PRN
Status: DISCONTINUED | OUTPATIENT
Start: 2019-03-16 | End: 2019-03-18

## 2019-03-16 RX ORDER — OXYCODONE HYDROCHLORIDE 10 MG/1
10 TABLET ORAL EVERY 6 HOURS PRN
Status: DISCONTINUED | OUTPATIENT
Start: 2019-03-16 | End: 2019-03-17

## 2019-03-16 RX ORDER — OXYCODONE HYDROCHLORIDE 5 MG/1
5 TABLET ORAL EVERY 4 HOURS PRN
Status: DISCONTINUED | OUTPATIENT
Start: 2019-03-16 | End: 2019-03-18

## 2019-03-16 RX ADMIN — TRAMADOL HYDROCHLORIDE 100 MG: 50 TABLET, COATED ORAL at 15:37

## 2019-03-16 RX ADMIN — HYDROMORPHONE HYDROCHLORIDE 1 MG: 1 INJECTION, SOLUTION INTRAMUSCULAR; INTRAVENOUS; SUBCUTANEOUS at 05:23

## 2019-03-16 RX ADMIN — MELATONIN 3 MG: at 21:51

## 2019-03-16 RX ADMIN — HEPARIN SODIUM 5000 UNITS: 5000 INJECTION INTRAVENOUS; SUBCUTANEOUS at 05:27

## 2019-03-16 RX ADMIN — TRAMADOL HYDROCHLORIDE 100 MG: 50 TABLET, COATED ORAL at 03:18

## 2019-03-16 RX ADMIN — HYDROMORPHONE HYDROCHLORIDE 1 MG: 1 INJECTION, SOLUTION INTRAMUSCULAR; INTRAVENOUS; SUBCUTANEOUS at 19:29

## 2019-03-16 RX ADMIN — HEPARIN SODIUM 5000 UNITS: 5000 INJECTION INTRAVENOUS; SUBCUTANEOUS at 15:34

## 2019-03-16 RX ADMIN — OXYCODONE HYDROCHLORIDE 10 MG: 10 TABLET ORAL at 17:40

## 2019-03-16 RX ADMIN — ATENOLOL 25 MG: 25 TABLET ORAL at 08:37

## 2019-03-16 RX ADMIN — HEPARIN SODIUM 5000 UNITS: 5000 INJECTION INTRAVENOUS; SUBCUTANEOUS at 21:52

## 2019-03-16 RX ADMIN — TRAMADOL HYDROCHLORIDE 100 MG: 50 TABLET, COATED ORAL at 08:40

## 2019-03-16 RX ADMIN — OXYCODONE HYDROCHLORIDE 10 MG: 10 TABLET ORAL at 23:38

## 2019-03-17 LAB
BASOPHILS # BLD AUTO: 0.01 THOUSANDS/ΜL (ref 0–0.1)
BASOPHILS NFR BLD AUTO: 0 % (ref 0–1)
EOSINOPHIL # BLD AUTO: 0.17 THOUSAND/ΜL (ref 0–0.61)
EOSINOPHIL NFR BLD AUTO: 3 % (ref 0–6)
ERYTHROCYTE [DISTWIDTH] IN BLOOD BY AUTOMATED COUNT: 13.1 % (ref 11.6–15.1)
GLUCOSE SERPL-MCNC: 123 MG/DL (ref 65–140)
GLUCOSE SERPL-MCNC: 126 MG/DL (ref 65–140)
GLUCOSE SERPL-MCNC: 174 MG/DL (ref 65–140)
HCT VFR BLD AUTO: 35.7 % (ref 36.5–49.3)
HGB BLD-MCNC: 12 G/DL (ref 12–17)
IMM GRANULOCYTES # BLD AUTO: 0.01 THOUSAND/UL (ref 0–0.2)
IMM GRANULOCYTES NFR BLD AUTO: 0 % (ref 0–2)
LYMPHOCYTES # BLD AUTO: 1.06 THOUSANDS/ΜL (ref 0.6–4.47)
LYMPHOCYTES NFR BLD AUTO: 21 % (ref 14–44)
MCH RBC QN AUTO: 35.4 PG (ref 26.8–34.3)
MCHC RBC AUTO-ENTMCNC: 33.6 G/DL (ref 31.4–37.4)
MCV RBC AUTO: 105 FL (ref 82–98)
MONOCYTES # BLD AUTO: 0.78 THOUSAND/ΜL (ref 0.17–1.22)
MONOCYTES NFR BLD AUTO: 15 % (ref 4–12)
NEUTROPHILS # BLD AUTO: 3.11 THOUSANDS/ΜL (ref 1.85–7.62)
NEUTS SEG NFR BLD AUTO: 61 % (ref 43–75)
NRBC BLD AUTO-RTO: 0 /100 WBCS
PLATELET # BLD AUTO: 109 THOUSANDS/UL (ref 149–390)
PMV BLD AUTO: 12 FL (ref 8.9–12.7)
RBC # BLD AUTO: 3.39 MILLION/UL (ref 3.88–5.62)
WBC # BLD AUTO: 5.14 THOUSAND/UL (ref 4.31–10.16)

## 2019-03-17 PROCEDURE — 99024 POSTOP FOLLOW-UP VISIT: CPT | Performed by: SURGERY

## 2019-03-17 PROCEDURE — 85025 COMPLETE CBC W/AUTO DIFF WBC: CPT | Performed by: SURGERY

## 2019-03-17 PROCEDURE — 82948 REAGENT STRIP/BLOOD GLUCOSE: CPT

## 2019-03-17 RX ORDER — OXYCODONE HYDROCHLORIDE 10 MG/1
10 TABLET ORAL EVERY 6 HOURS PRN
Status: DISCONTINUED | OUTPATIENT
Start: 2019-03-17 | End: 2019-03-18

## 2019-03-17 RX ADMIN — HYDROMORPHONE HYDROCHLORIDE 1 MG: 1 INJECTION, SOLUTION INTRAMUSCULAR; INTRAVENOUS; SUBCUTANEOUS at 08:43

## 2019-03-17 RX ADMIN — HYDROMORPHONE HYDROCHLORIDE 1 MG: 1 INJECTION, SOLUTION INTRAMUSCULAR; INTRAVENOUS; SUBCUTANEOUS at 15:25

## 2019-03-17 RX ADMIN — ATENOLOL 25 MG: 25 TABLET ORAL at 08:38

## 2019-03-17 RX ADMIN — MELATONIN 3 MG: at 20:38

## 2019-03-17 RX ADMIN — HYDROMORPHONE HYDROCHLORIDE 1 MG: 1 INJECTION, SOLUTION INTRAMUSCULAR; INTRAVENOUS; SUBCUTANEOUS at 21:24

## 2019-03-17 RX ADMIN — HEPARIN SODIUM 5000 UNITS: 5000 INJECTION INTRAVENOUS; SUBCUTANEOUS at 20:37

## 2019-03-17 RX ADMIN — HEPARIN SODIUM 5000 UNITS: 5000 INJECTION INTRAVENOUS; SUBCUTANEOUS at 14:38

## 2019-03-17 RX ADMIN — HEPARIN SODIUM 5000 UNITS: 5000 INJECTION INTRAVENOUS; SUBCUTANEOUS at 05:53

## 2019-03-17 RX ADMIN — OXYCODONE HYDROCHLORIDE 10 MG: 10 TABLET ORAL at 20:37

## 2019-03-17 RX ADMIN — OXYCODONE HYDROCHLORIDE 10 MG: 10 TABLET ORAL at 05:53

## 2019-03-17 RX ADMIN — OXYCODONE HYDROCHLORIDE 10 MG: 10 TABLET ORAL at 12:04

## 2019-03-18 VITALS
OXYGEN SATURATION: 98 % | BODY MASS INDEX: 42.66 KG/M2 | DIASTOLIC BLOOD PRESSURE: 84 MMHG | HEART RATE: 82 BPM | TEMPERATURE: 97.9 F | SYSTOLIC BLOOD PRESSURE: 142 MMHG | HEIGHT: 72 IN | WEIGHT: 315 LBS | RESPIRATION RATE: 18 BRPM

## 2019-03-18 LAB
ANION GAP SERPL CALCULATED.3IONS-SCNC: 4 MMOL/L (ref 4–13)
BASOPHILS # BLD AUTO: 0.02 THOUSANDS/ΜL (ref 0–0.1)
BASOPHILS NFR BLD AUTO: 0 % (ref 0–1)
BUN SERPL-MCNC: 10 MG/DL (ref 5–25)
CALCIUM SERPL-MCNC: 8.5 MG/DL (ref 8.3–10.1)
CHLORIDE SERPL-SCNC: 103 MMOL/L (ref 100–108)
CO2 SERPL-SCNC: 27 MMOL/L (ref 21–32)
CREAT SERPL-MCNC: 0.74 MG/DL (ref 0.6–1.3)
EOSINOPHIL # BLD AUTO: 0.16 THOUSAND/ΜL (ref 0–0.61)
EOSINOPHIL NFR BLD AUTO: 3 % (ref 0–6)
ERYTHROCYTE [DISTWIDTH] IN BLOOD BY AUTOMATED COUNT: 13.2 % (ref 11.6–15.1)
GFR SERPL CREATININE-BSD FRML MDRD: 94 ML/MIN/1.73SQ M
GLUCOSE SERPL-MCNC: 111 MG/DL (ref 65–140)
GLUCOSE SERPL-MCNC: 112 MG/DL (ref 65–140)
GLUCOSE SERPL-MCNC: 124 MG/DL (ref 65–140)
GLUCOSE SERPL-MCNC: 127 MG/DL (ref 65–140)
GLUCOSE SERPL-MCNC: 130 MG/DL (ref 65–140)
GLUCOSE SERPL-MCNC: 147 MG/DL (ref 65–140)
HCT VFR BLD AUTO: 34.7 % (ref 36.5–49.3)
HGB BLD-MCNC: 11.8 G/DL (ref 12–17)
IMM GRANULOCYTES # BLD AUTO: 0.02 THOUSAND/UL (ref 0–0.2)
IMM GRANULOCYTES NFR BLD AUTO: 0 % (ref 0–2)
LYMPHOCYTES # BLD AUTO: 0.93 THOUSANDS/ΜL (ref 0.6–4.47)
LYMPHOCYTES NFR BLD AUTO: 18 % (ref 14–44)
MCH RBC QN AUTO: 35.5 PG (ref 26.8–34.3)
MCHC RBC AUTO-ENTMCNC: 34 G/DL (ref 31.4–37.4)
MCV RBC AUTO: 105 FL (ref 82–98)
MONOCYTES # BLD AUTO: 0.76 THOUSAND/ΜL (ref 0.17–1.22)
MONOCYTES NFR BLD AUTO: 15 % (ref 4–12)
NEUTROPHILS # BLD AUTO: 3.22 THOUSANDS/ΜL (ref 1.85–7.62)
NEUTS SEG NFR BLD AUTO: 64 % (ref 43–75)
NRBC BLD AUTO-RTO: 0 /100 WBCS
PLATELET # BLD AUTO: 136 THOUSANDS/UL (ref 149–390)
PMV BLD AUTO: 11.8 FL (ref 8.9–12.7)
POTASSIUM SERPL-SCNC: 3.7 MMOL/L (ref 3.5–5.3)
RBC # BLD AUTO: 3.32 MILLION/UL (ref 3.88–5.62)
SODIUM SERPL-SCNC: 134 MMOL/L (ref 136–145)
WBC # BLD AUTO: 5.11 THOUSAND/UL (ref 4.31–10.16)

## 2019-03-18 PROCEDURE — 97530 THERAPEUTIC ACTIVITIES: CPT

## 2019-03-18 PROCEDURE — 85025 COMPLETE CBC W/AUTO DIFF WBC: CPT | Performed by: PHYSICIAN ASSISTANT

## 2019-03-18 PROCEDURE — 97116 GAIT TRAINING THERAPY: CPT

## 2019-03-18 PROCEDURE — 82948 REAGENT STRIP/BLOOD GLUCOSE: CPT

## 2019-03-18 PROCEDURE — 97535 SELF CARE MNGMENT TRAINING: CPT

## 2019-03-18 PROCEDURE — 80048 BASIC METABOLIC PNL TOTAL CA: CPT | Performed by: PHYSICIAN ASSISTANT

## 2019-03-18 RX ORDER — HYDROMORPHONE HYDROCHLORIDE 2 MG/1
4 TABLET ORAL EVERY 4 HOURS PRN
Status: DISCONTINUED | OUTPATIENT
Start: 2019-03-18 | End: 2019-03-19 | Stop reason: HOSPADM

## 2019-03-18 RX ORDER — ACETAMINOPHEN 160 MG/5ML
650 SUSPENSION, ORAL (FINAL DOSE FORM) ORAL EVERY 6 HOURS
Status: DISCONTINUED | OUTPATIENT
Start: 2019-03-18 | End: 2019-03-19 | Stop reason: HOSPADM

## 2019-03-18 RX ORDER — DOCUSATE SODIUM 100 MG/1
100 CAPSULE, LIQUID FILLED ORAL 2 TIMES DAILY
Status: DISCONTINUED | OUTPATIENT
Start: 2019-03-18 | End: 2019-03-19 | Stop reason: HOSPADM

## 2019-03-18 RX ORDER — POTASSIUM CHLORIDE 20 MEQ/1
20 TABLET, EXTENDED RELEASE ORAL ONCE
Status: COMPLETED | OUTPATIENT
Start: 2019-03-18 | End: 2019-03-18

## 2019-03-18 RX ORDER — HYDROMORPHONE HYDROCHLORIDE 2 MG/1
6 TABLET ORAL EVERY 6 HOURS PRN
Status: DISCONTINUED | OUTPATIENT
Start: 2019-03-18 | End: 2019-03-19 | Stop reason: HOSPADM

## 2019-03-18 RX ADMIN — HYDROMORPHONE HYDROCHLORIDE 6 MG: 2 TABLET ORAL at 05:46

## 2019-03-18 RX ADMIN — POTASSIUM CHLORIDE 20 MEQ: 1500 TABLET, EXTENDED RELEASE ORAL at 11:31

## 2019-03-18 RX ADMIN — DOCUSATE SODIUM 100 MG: 100 CAPSULE, LIQUID FILLED ORAL at 08:32

## 2019-03-18 RX ADMIN — ACETAMINOPHEN 650 MG: 160 SUSPENSION ORAL at 22:45

## 2019-03-18 RX ADMIN — HYDROMORPHONE HYDROCHLORIDE 4 MG: 2 TABLET ORAL at 22:43

## 2019-03-18 RX ADMIN — HEPARIN SODIUM 5000 UNITS: 5000 INJECTION INTRAVENOUS; SUBCUTANEOUS at 05:04

## 2019-03-18 RX ADMIN — ACETAMINOPHEN 650 MG: 160 SUSPENSION ORAL at 05:46

## 2019-03-18 RX ADMIN — HEPARIN SODIUM 5000 UNITS: 5000 INJECTION INTRAVENOUS; SUBCUTANEOUS at 22:44

## 2019-03-18 RX ADMIN — HYDROMORPHONE HYDROCHLORIDE 1 MG: 1 INJECTION, SOLUTION INTRAMUSCULAR; INTRAVENOUS; SUBCUTANEOUS at 03:48

## 2019-03-18 RX ADMIN — ACETAMINOPHEN 650 MG: 160 SUSPENSION ORAL at 17:05

## 2019-03-18 RX ADMIN — HYDROMORPHONE HYDROCHLORIDE 4 MG: 2 TABLET ORAL at 17:05

## 2019-03-18 RX ADMIN — ACETAMINOPHEN 650 MG: 160 SUSPENSION ORAL at 11:31

## 2019-03-18 RX ADMIN — MELATONIN 3 MG: at 22:43

## 2019-03-18 RX ADMIN — HEPARIN SODIUM 5000 UNITS: 5000 INJECTION INTRAVENOUS; SUBCUTANEOUS at 13:01

## 2019-03-18 RX ADMIN — HYDROMORPHONE HYDROCHLORIDE 4 MG: 2 TABLET ORAL at 11:31

## 2019-03-18 RX ADMIN — ATENOLOL 25 MG: 25 TABLET ORAL at 08:32

## 2019-03-18 RX ADMIN — DOCUSATE SODIUM 100 MG: 100 CAPSULE, LIQUID FILLED ORAL at 17:06

## 2019-03-18 RX ADMIN — OXYCODONE HYDROCHLORIDE 10 MG: 10 TABLET ORAL at 02:51

## 2019-03-19 LAB
ANION GAP SERPL CALCULATED.3IONS-SCNC: 3 MMOL/L (ref 4–13)
BASOPHILS # BLD AUTO: 0.02 THOUSANDS/ΜL (ref 0–0.1)
BASOPHILS NFR BLD AUTO: 0 % (ref 0–1)
BUN SERPL-MCNC: 9 MG/DL (ref 5–25)
CALCIUM SERPL-MCNC: 8.3 MG/DL (ref 8.3–10.1)
CHLORIDE SERPL-SCNC: 103 MMOL/L (ref 100–108)
CO2 SERPL-SCNC: 30 MMOL/L (ref 21–32)
CREAT SERPL-MCNC: 0.58 MG/DL (ref 0.6–1.3)
EOSINOPHIL # BLD AUTO: 0.12 THOUSAND/ΜL (ref 0–0.61)
EOSINOPHIL NFR BLD AUTO: 3 % (ref 0–6)
ERYTHROCYTE [DISTWIDTH] IN BLOOD BY AUTOMATED COUNT: 13.1 % (ref 11.6–15.1)
GFR SERPL CREATININE-BSD FRML MDRD: 104 ML/MIN/1.73SQ M
GLUCOSE SERPL-MCNC: 100 MG/DL (ref 65–140)
HCT VFR BLD AUTO: 33.2 % (ref 36.5–49.3)
HGB BLD-MCNC: 11.1 G/DL (ref 12–17)
IMM GRANULOCYTES # BLD AUTO: 0.02 THOUSAND/UL (ref 0–0.2)
IMM GRANULOCYTES NFR BLD AUTO: 0 % (ref 0–2)
LYMPHOCYTES # BLD AUTO: 1.09 THOUSANDS/ΜL (ref 0.6–4.47)
LYMPHOCYTES NFR BLD AUTO: 24 % (ref 14–44)
MCH RBC QN AUTO: 35.6 PG (ref 26.8–34.3)
MCHC RBC AUTO-ENTMCNC: 33.4 G/DL (ref 31.4–37.4)
MCV RBC AUTO: 106 FL (ref 82–98)
MONOCYTES # BLD AUTO: 0.81 THOUSAND/ΜL (ref 0.17–1.22)
MONOCYTES NFR BLD AUTO: 18 % (ref 4–12)
NEUTROPHILS # BLD AUTO: 2.43 THOUSANDS/ΜL (ref 1.85–7.62)
NEUTS SEG NFR BLD AUTO: 55 % (ref 43–75)
NRBC BLD AUTO-RTO: 0 /100 WBCS
PLATELET # BLD AUTO: 109 THOUSANDS/UL (ref 149–390)
PMV BLD AUTO: 12.1 FL (ref 8.9–12.7)
POTASSIUM SERPL-SCNC: 3.8 MMOL/L (ref 3.5–5.3)
RBC # BLD AUTO: 3.12 MILLION/UL (ref 3.88–5.62)
SODIUM SERPL-SCNC: 136 MMOL/L (ref 136–145)
WBC # BLD AUTO: 4.49 THOUSAND/UL (ref 4.31–10.16)

## 2019-03-19 PROCEDURE — 97530 THERAPEUTIC ACTIVITIES: CPT

## 2019-03-19 PROCEDURE — 80048 BASIC METABOLIC PNL TOTAL CA: CPT | Performed by: SURGERY

## 2019-03-19 PROCEDURE — 85025 COMPLETE CBC W/AUTO DIFF WBC: CPT | Performed by: SURGERY

## 2019-03-19 PROCEDURE — 97116 GAIT TRAINING THERAPY: CPT

## 2019-03-19 RX ORDER — ACETAMINOPHEN 325 MG/1
650 TABLET ORAL EVERY 6 HOURS PRN
Qty: 30 TABLET | Refills: 0 | Status: ON HOLD | OUTPATIENT
Start: 2019-03-19

## 2019-03-19 RX ORDER — DOCUSATE SODIUM 100 MG/1
100 CAPSULE, LIQUID FILLED ORAL 2 TIMES DAILY
Qty: 10 CAPSULE | Refills: 0 | Status: SHIPPED | OUTPATIENT
Start: 2019-03-19 | End: 2019-04-11

## 2019-03-19 RX ORDER — HYDROMORPHONE HYDROCHLORIDE 4 MG/1
4 TABLET ORAL EVERY 4 HOURS PRN
Qty: 20 TABLET | Refills: 0 | Status: SHIPPED | OUTPATIENT
Start: 2019-03-19 | End: 2019-03-25

## 2019-03-19 RX ADMIN — DOCUSATE SODIUM 100 MG: 100 CAPSULE, LIQUID FILLED ORAL at 10:26

## 2019-03-19 RX ADMIN — HYDROMORPHONE HYDROCHLORIDE 4 MG: 2 TABLET ORAL at 05:46

## 2019-03-19 RX ADMIN — HYDROMORPHONE HYDROCHLORIDE 4 MG: 2 TABLET ORAL at 10:27

## 2019-03-19 RX ADMIN — HEPARIN SODIUM 5000 UNITS: 5000 INJECTION INTRAVENOUS; SUBCUTANEOUS at 05:45

## 2019-03-19 RX ADMIN — ATENOLOL 25 MG: 25 TABLET ORAL at 10:26

## 2019-03-19 RX ADMIN — ACETAMINOPHEN 650 MG: 160 SUSPENSION ORAL at 05:44

## 2019-03-20 ENCOUNTER — TRANSITIONAL CARE MANAGEMENT (OUTPATIENT)
Dept: FAMILY MEDICINE CLINIC | Facility: CLINIC | Age: 69
End: 2019-03-20

## 2019-03-25 ENCOUNTER — TELEPHONE (OUTPATIENT)
Dept: SURGICAL ONCOLOGY | Facility: CLINIC | Age: 69
End: 2019-03-25

## 2019-03-25 DIAGNOSIS — D49.0 IPMN (INTRADUCTAL PAPILLARY MUCINOUS NEOPLASM): Primary | ICD-10-CM

## 2019-03-25 RX ORDER — HYDROMORPHONE HYDROCHLORIDE 4 MG/1
2 TABLET ORAL EVERY 4 HOURS PRN
Qty: 15 TABLET | Refills: 0 | Status: SHIPPED | OUTPATIENT
Start: 2019-03-25 | End: 2019-03-30

## 2019-03-25 NOTE — TELEPHONE ENCOUNTER
Patient called regarding refill for his hydromorphone prescription post op  Denies signs/symptoms of infection or nausea/vomiing  Patient also stating that since urinary catheter removal he has had issues with holding in his urine  Patient states when he stands up he urinates without being able to control it  Discussed with Dr Barbara Flores  Order placed for a lower dose hydromorphone  Per, Dr Barbara Flores patient was advised to see primary care physician for urinary frequency issues  Patient verbalized understanding

## 2019-04-01 ENCOUNTER — OFFICE VISIT (OUTPATIENT)
Dept: SURGICAL ONCOLOGY | Facility: CLINIC | Age: 69
End: 2019-04-01

## 2019-04-01 VITALS
SYSTOLIC BLOOD PRESSURE: 130 MMHG | WEIGHT: 307 LBS | HEIGHT: 72 IN | RESPIRATION RATE: 16 BRPM | HEART RATE: 92 BPM | DIASTOLIC BLOOD PRESSURE: 78 MMHG | TEMPERATURE: 98.6 F | BODY MASS INDEX: 41.58 KG/M2

## 2019-04-01 DIAGNOSIS — D49.0 IPMN (INTRADUCTAL PAPILLARY MUCINOUS NEOPLASM): Primary | ICD-10-CM

## 2019-04-01 PROCEDURE — 99024 POSTOP FOLLOW-UP VISIT: CPT | Performed by: SURGERY

## 2019-04-01 RX ORDER — TRAMADOL HYDROCHLORIDE 50 MG/1
50 TABLET ORAL EVERY 6 HOURS PRN
Qty: 15 TABLET | Refills: 0 | Status: SHIPPED | OUTPATIENT
Start: 2019-04-01 | End: 2019-04-11

## 2019-04-03 ENCOUNTER — TELEPHONE (OUTPATIENT)
Dept: SURGICAL ONCOLOGY | Facility: HOSPITAL | Age: 69
End: 2019-04-03

## 2019-04-03 DIAGNOSIS — C25.2 MALIGNANT NEOPLASM OF TAIL OF PANCREAS (HCC): Primary | ICD-10-CM

## 2019-04-10 ENCOUNTER — OFFICE VISIT (OUTPATIENT)
Dept: FAMILY MEDICINE CLINIC | Facility: CLINIC | Age: 69
End: 2019-04-10
Payer: MEDICARE

## 2019-04-10 VITALS
OXYGEN SATURATION: 97 % | DIASTOLIC BLOOD PRESSURE: 88 MMHG | SYSTOLIC BLOOD PRESSURE: 136 MMHG | BODY MASS INDEX: 40.76 KG/M2 | TEMPERATURE: 98.1 F | RESPIRATION RATE: 17 BRPM | HEART RATE: 97 BPM | WEIGHT: 300.9 LBS | HEIGHT: 72 IN

## 2019-04-10 DIAGNOSIS — N48.9 PENIS DISORDER: Primary | ICD-10-CM

## 2019-04-10 DIAGNOSIS — E66.01 MORBID OBESITY WITH BMI OF 40.0-44.9, ADULT (HCC): ICD-10-CM

## 2019-04-10 PROCEDURE — 99214 OFFICE O/P EST MOD 30 MIN: CPT | Performed by: FAMILY MEDICINE

## 2019-04-11 ENCOUNTER — CONSULT (OUTPATIENT)
Dept: HEMATOLOGY ONCOLOGY | Facility: CLINIC | Age: 69
End: 2019-04-11
Payer: MEDICARE

## 2019-04-11 VITALS
SYSTOLIC BLOOD PRESSURE: 124 MMHG | WEIGHT: 305 LBS | DIASTOLIC BLOOD PRESSURE: 79 MMHG | OXYGEN SATURATION: 96 % | HEART RATE: 92 BPM | RESPIRATION RATE: 18 BRPM | HEIGHT: 72 IN | BODY MASS INDEX: 41.31 KG/M2

## 2019-04-11 DIAGNOSIS — C25.2 MALIGNANT NEOPLASM OF TAIL OF PANCREAS (HCC): Primary | ICD-10-CM

## 2019-04-11 PROCEDURE — 99205 OFFICE O/P NEW HI 60 MIN: CPT | Performed by: INTERNAL MEDICINE

## 2019-04-15 ENCOUNTER — TELEPHONE (OUTPATIENT)
Dept: HEMATOLOGY ONCOLOGY | Facility: CLINIC | Age: 69
End: 2019-04-15

## 2019-04-15 DIAGNOSIS — R11.0 NAUSEA: Primary | ICD-10-CM

## 2019-04-15 RX ORDER — PROCHLORPERAZINE MALEATE 10 MG
10 TABLET ORAL EVERY 6 HOURS PRN
Qty: 30 TABLET | Refills: 1 | Status: SHIPPED | OUTPATIENT
Start: 2019-04-15 | End: 2019-09-09 | Stop reason: ALTCHOICE

## 2019-04-19 ENCOUNTER — OFFICE VISIT (OUTPATIENT)
Dept: SURGICAL ONCOLOGY | Facility: CLINIC | Age: 69
End: 2019-04-19

## 2019-04-19 ENCOUNTER — APPOINTMENT (OUTPATIENT)
Dept: LAB | Facility: MEDICAL CENTER | Age: 69
End: 2019-04-19
Payer: MEDICARE

## 2019-04-19 VITALS
HEART RATE: 92 BPM | TEMPERATURE: 98.4 F | WEIGHT: 304 LBS | SYSTOLIC BLOOD PRESSURE: 130 MMHG | HEIGHT: 72 IN | RESPIRATION RATE: 16 BRPM | BODY MASS INDEX: 41.17 KG/M2 | DIASTOLIC BLOOD PRESSURE: 80 MMHG

## 2019-04-19 DIAGNOSIS — C25.2 MALIGNANT NEOPLASM OF TAIL OF PANCREAS (HCC): Primary | ICD-10-CM

## 2019-04-19 DIAGNOSIS — C25.2 MALIGNANT NEOPLASM OF TAIL OF PANCREAS (HCC): ICD-10-CM

## 2019-04-19 LAB
ALBUMIN SERPL BCP-MCNC: 3.2 G/DL (ref 3.5–5)
ALP SERPL-CCNC: 118 U/L (ref 46–116)
ALT SERPL W P-5'-P-CCNC: 23 U/L (ref 12–78)
ANION GAP SERPL CALCULATED.3IONS-SCNC: 3 MMOL/L (ref 4–13)
AST SERPL W P-5'-P-CCNC: 25 U/L (ref 5–45)
BASOPHILS # BLD AUTO: 0.03 THOUSANDS/ΜL (ref 0–0.1)
BASOPHILS NFR BLD AUTO: 1 % (ref 0–1)
BILIRUB SERPL-MCNC: 0.42 MG/DL (ref 0.2–1)
BUN SERPL-MCNC: 9 MG/DL (ref 5–25)
CALCIUM SERPL-MCNC: 8.6 MG/DL (ref 8.3–10.1)
CHLORIDE SERPL-SCNC: 104 MMOL/L (ref 100–108)
CO2 SERPL-SCNC: 27 MMOL/L (ref 21–32)
CREAT SERPL-MCNC: 0.78 MG/DL (ref 0.6–1.3)
EOSINOPHIL # BLD AUTO: 0.07 THOUSAND/ΜL (ref 0–0.61)
EOSINOPHIL NFR BLD AUTO: 1 % (ref 0–6)
ERYTHROCYTE [DISTWIDTH] IN BLOOD BY AUTOMATED COUNT: 13.3 % (ref 11.6–15.1)
GFR SERPL CREATININE-BSD FRML MDRD: 92 ML/MIN/1.73SQ M
GLUCOSE SERPL-MCNC: 105 MG/DL (ref 65–140)
HCT VFR BLD AUTO: 38.7 % (ref 36.5–49.3)
HGB BLD-MCNC: 12.8 G/DL (ref 12–17)
IMM GRANULOCYTES # BLD AUTO: 0.01 THOUSAND/UL (ref 0–0.2)
IMM GRANULOCYTES NFR BLD AUTO: 0 % (ref 0–2)
LYMPHOCYTES # BLD AUTO: 1.05 THOUSANDS/ΜL (ref 0.6–4.47)
LYMPHOCYTES NFR BLD AUTO: 19 % (ref 14–44)
MCH RBC QN AUTO: 34.2 PG (ref 26.8–34.3)
MCHC RBC AUTO-ENTMCNC: 33.1 G/DL (ref 31.4–37.4)
MCV RBC AUTO: 104 FL (ref 82–98)
MONOCYTES # BLD AUTO: 0.75 THOUSAND/ΜL (ref 0.17–1.22)
MONOCYTES NFR BLD AUTO: 13 % (ref 4–12)
NEUTROPHILS # BLD AUTO: 3.74 THOUSANDS/ΜL (ref 1.85–7.62)
NEUTS SEG NFR BLD AUTO: 66 % (ref 43–75)
NRBC BLD AUTO-RTO: 0 /100 WBCS
PLATELET # BLD AUTO: 172 THOUSANDS/UL (ref 149–390)
PMV BLD AUTO: 12.8 FL (ref 8.9–12.7)
POTASSIUM SERPL-SCNC: 4.4 MMOL/L (ref 3.5–5.3)
PROT SERPL-MCNC: 7.6 G/DL (ref 6.4–8.2)
RBC # BLD AUTO: 3.74 MILLION/UL (ref 3.88–5.62)
SODIUM SERPL-SCNC: 134 MMOL/L (ref 136–145)
WBC # BLD AUTO: 5.65 THOUSAND/UL (ref 4.31–10.16)

## 2019-04-19 PROCEDURE — 85025 COMPLETE CBC W/AUTO DIFF WBC: CPT

## 2019-04-19 PROCEDURE — 80053 COMPREHEN METABOLIC PANEL: CPT

## 2019-04-19 PROCEDURE — 99024 POSTOP FOLLOW-UP VISIT: CPT | Performed by: SURGERY

## 2019-04-19 PROCEDURE — 36415 COLL VENOUS BLD VENIPUNCTURE: CPT

## 2019-04-23 ENCOUNTER — APPOINTMENT (OUTPATIENT)
Dept: RADIOLOGY | Facility: HOSPITAL | Age: 69
End: 2019-04-23
Attending: SURGERY
Payer: MEDICARE

## 2019-04-23 ENCOUNTER — HOSPITAL ENCOUNTER (OUTPATIENT)
Facility: HOSPITAL | Age: 69
Setting detail: OUTPATIENT SURGERY
Discharge: HOME | End: 2019-04-23
Attending: SURGERY | Admitting: SURGERY
Payer: MEDICARE

## 2019-04-23 ENCOUNTER — ANESTHESIA EVENT (OUTPATIENT)
Dept: PERIOP | Facility: HOSPITAL | Age: 69
End: 2019-04-23
Payer: MEDICARE

## 2019-04-23 ENCOUNTER — APPOINTMENT (OUTPATIENT)
Dept: RADIOLOGY | Facility: HOSPITAL | Age: 69
End: 2019-04-23
Payer: MEDICARE

## 2019-04-23 ENCOUNTER — ANESTHESIA (OUTPATIENT)
Dept: PERIOP | Facility: HOSPITAL | Age: 69
End: 2019-04-23
Payer: MEDICARE

## 2019-04-23 VITALS
OXYGEN SATURATION: 94 % | SYSTOLIC BLOOD PRESSURE: 131 MMHG | HEART RATE: 105 BPM | RESPIRATION RATE: 16 BRPM | TEMPERATURE: 97.2 F | DIASTOLIC BLOOD PRESSURE: 87 MMHG

## 2019-04-23 PROCEDURE — 36561 INSERT TUNNELED CV CATH: CPT | Performed by: SURGERY

## 2019-04-23 PROCEDURE — 71045 X-RAY EXAM CHEST 1 VIEW: CPT

## 2019-04-23 PROCEDURE — 77001 FLUOROGUIDE FOR VEIN DEVICE: CPT

## 2019-04-23 PROCEDURE — C1788 PORT, INDWELLING, IMP: HCPCS | Performed by: SURGERY

## 2019-04-23 DEVICE — 8F PLASTIC PRO-FUSE® CT PORT W/ATTACHABLE CHRONOFLEX® POLYURETHANE CATHETER
Type: IMPLANTABLE DEVICE | Site: CHEST | Status: FUNCTIONAL
Brand: PRO-FUSE® CT PORT

## 2019-04-23 RX ORDER — OXYCODONE HYDROCHLORIDE AND ACETAMINOPHEN 5; 325 MG/1; MG/1
1 TABLET ORAL EVERY 4 HOURS PRN
Status: DISCONTINUED | OUTPATIENT
Start: 2019-04-23 | End: 2019-04-23 | Stop reason: HOSPADM

## 2019-04-23 RX ORDER — SODIUM CHLORIDE, SODIUM LACTATE, POTASSIUM CHLORIDE, CALCIUM CHLORIDE 600; 310; 30; 20 MG/100ML; MG/100ML; MG/100ML; MG/100ML
75 INJECTION, SOLUTION INTRAVENOUS CONTINUOUS
Status: DISCONTINUED | OUTPATIENT
Start: 2019-04-23 | End: 2019-04-23 | Stop reason: HOSPADM

## 2019-04-23 RX ORDER — LIDOCAINE HYDROCHLORIDE 10 MG/ML
INJECTION, SOLUTION EPIDURAL; INFILTRATION; INTRACAUDAL; PERINEURAL AS NEEDED
Status: DISCONTINUED | OUTPATIENT
Start: 2019-04-23 | End: 2019-04-23 | Stop reason: HOSPADM

## 2019-04-23 RX ORDER — MIDAZOLAM HYDROCHLORIDE 1 MG/ML
INJECTION INTRAMUSCULAR; INTRAVENOUS AS NEEDED
Status: DISCONTINUED | OUTPATIENT
Start: 2019-04-23 | End: 2019-04-23 | Stop reason: SURG

## 2019-04-23 RX ORDER — CEFAZOLIN SODIUM 1 G/3ML
INJECTION, POWDER, FOR SOLUTION INTRAMUSCULAR; INTRAVENOUS AS NEEDED
Status: DISCONTINUED | OUTPATIENT
Start: 2019-04-23 | End: 2019-04-23 | Stop reason: SURG

## 2019-04-23 RX ORDER — BUPIVACAINE HYDROCHLORIDE 2.5 MG/ML
INJECTION, SOLUTION EPIDURAL; INFILTRATION; INTRACAUDAL AS NEEDED
Status: DISCONTINUED | OUTPATIENT
Start: 2019-04-23 | End: 2019-04-23 | Stop reason: HOSPADM

## 2019-04-23 RX ORDER — FENTANYL CITRATE/PF 50 MCG/ML
25 SYRINGE (ML) INJECTION
Status: DISCONTINUED | OUTPATIENT
Start: 2019-04-23 | End: 2019-04-23 | Stop reason: HOSPADM

## 2019-04-23 RX ORDER — ATROPINE SULFATE 1 MG/ML
0.25 INJECTION, SOLUTION INTRAMUSCULAR; INTRAVENOUS; SUBCUTANEOUS ONCE
Status: COMPLETED | OUTPATIENT
Start: 2019-04-24 | End: 2019-04-24

## 2019-04-23 RX ORDER — SODIUM CHLORIDE, SODIUM LACTATE, POTASSIUM CHLORIDE, CALCIUM CHLORIDE 600; 310; 30; 20 MG/100ML; MG/100ML; MG/100ML; MG/100ML
INJECTION, SOLUTION INTRAVENOUS CONTINUOUS PRN
Status: DISCONTINUED | OUTPATIENT
Start: 2019-04-23 | End: 2019-04-23 | Stop reason: SURG

## 2019-04-23 RX ORDER — PROPOFOL 10 MG/ML
INJECTION, EMULSION INTRAVENOUS AS NEEDED
Status: DISCONTINUED | OUTPATIENT
Start: 2019-04-23 | End: 2019-04-23 | Stop reason: SURG

## 2019-04-23 RX ORDER — SODIUM CHLORIDE 9 MG/ML
20 INJECTION, SOLUTION INTRAVENOUS CONTINUOUS
Status: DISCONTINUED | OUTPATIENT
Start: 2019-04-24 | End: 2019-04-27 | Stop reason: HOSPADM

## 2019-04-23 RX ORDER — FENTANYL CITRATE 50 UG/ML
INJECTION, SOLUTION INTRAMUSCULAR; INTRAVENOUS AS NEEDED
Status: DISCONTINUED | OUTPATIENT
Start: 2019-04-23 | End: 2019-04-23 | Stop reason: SURG

## 2019-04-23 RX ORDER — ONDANSETRON 2 MG/ML
4 INJECTION INTRAMUSCULAR; INTRAVENOUS ONCE AS NEEDED
Status: DISCONTINUED | OUTPATIENT
Start: 2019-04-23 | End: 2019-04-23 | Stop reason: HOSPADM

## 2019-04-23 RX ORDER — METOPROLOL TARTRATE 5 MG/5ML
INJECTION INTRAVENOUS AS NEEDED
Status: DISCONTINUED | OUTPATIENT
Start: 2019-04-23 | End: 2019-04-23 | Stop reason: SURG

## 2019-04-23 RX ORDER — LIDOCAINE HYDROCHLORIDE 10 MG/ML
INJECTION, SOLUTION INFILTRATION; PERINEURAL AS NEEDED
Status: DISCONTINUED | OUTPATIENT
Start: 2019-04-23 | End: 2019-04-23 | Stop reason: SURG

## 2019-04-23 RX ORDER — MAGNESIUM HYDROXIDE 1200 MG/15ML
LIQUID ORAL AS NEEDED
Status: DISCONTINUED | OUTPATIENT
Start: 2019-04-23 | End: 2019-04-23 | Stop reason: HOSPADM

## 2019-04-23 RX ORDER — DEXAMETHASONE SODIUM PHOSPHATE 4 MG/ML
INJECTION, SOLUTION INTRA-ARTICULAR; INTRALESIONAL; INTRAMUSCULAR; INTRAVENOUS; SOFT TISSUE AS NEEDED
Status: DISCONTINUED | OUTPATIENT
Start: 2019-04-23 | End: 2019-04-23 | Stop reason: SURG

## 2019-04-23 RX ORDER — ONDANSETRON 2 MG/ML
INJECTION INTRAMUSCULAR; INTRAVENOUS AS NEEDED
Status: DISCONTINUED | OUTPATIENT
Start: 2019-04-23 | End: 2019-04-23 | Stop reason: SURG

## 2019-04-23 RX ADMIN — DEXAMETHASONE SODIUM PHOSPHATE 4 MG: 4 INJECTION, SOLUTION INTRAMUSCULAR; INTRAVENOUS at 08:19

## 2019-04-23 RX ADMIN — CEFAZOLIN 3000 MG: 1 INJECTION, POWDER, FOR SOLUTION INTRAVENOUS at 08:13

## 2019-04-23 RX ADMIN — FENTANYL CITRATE 50 MCG: 50 INJECTION, SOLUTION INTRAMUSCULAR; INTRAVENOUS at 08:53

## 2019-04-23 RX ADMIN — MIDAZOLAM 1 MG: 1 INJECTION INTRAMUSCULAR; INTRAVENOUS at 07:59

## 2019-04-23 RX ADMIN — METOPROLOL TARTRATE 1 MG: 1 INJECTION, SOLUTION INTRAVENOUS at 08:22

## 2019-04-23 RX ADMIN — PHENYLEPHRINE HYDROCHLORIDE 100 MCG: 10 INJECTION INTRAVENOUS at 08:41

## 2019-04-23 RX ADMIN — PHENYLEPHRINE HYDROCHLORIDE 100 MCG: 10 INJECTION INTRAVENOUS at 08:36

## 2019-04-23 RX ADMIN — PHENYLEPHRINE HYDROCHLORIDE 100 MCG: 10 INJECTION INTRAVENOUS at 08:30

## 2019-04-23 RX ADMIN — PHENYLEPHRINE HYDROCHLORIDE 100 MCG: 10 INJECTION INTRAVENOUS at 08:44

## 2019-04-23 RX ADMIN — PHENYLEPHRINE HYDROCHLORIDE 100 MCG: 10 INJECTION INTRAVENOUS at 08:22

## 2019-04-23 RX ADMIN — PROPOFOL 350 MG: 10 INJECTION, EMULSION INTRAVENOUS at 08:08

## 2019-04-23 RX ADMIN — PHENYLEPHRINE HYDROCHLORIDE 100 MCG: 10 INJECTION INTRAVENOUS at 08:39

## 2019-04-23 RX ADMIN — PHENYLEPHRINE HYDROCHLORIDE 100 MCG: 10 INJECTION INTRAVENOUS at 08:18

## 2019-04-23 RX ADMIN — MIDAZOLAM 1 MG: 1 INJECTION INTRAMUSCULAR; INTRAVENOUS at 08:05

## 2019-04-23 RX ADMIN — ONDANSETRON 4 MG: 2 INJECTION INTRAMUSCULAR; INTRAVENOUS at 08:19

## 2019-04-23 RX ADMIN — PHENYLEPHRINE HYDROCHLORIDE 100 MCG: 10 INJECTION INTRAVENOUS at 08:34

## 2019-04-23 RX ADMIN — PHENYLEPHRINE HYDROCHLORIDE 100 MCG: 10 INJECTION INTRAVENOUS at 08:48

## 2019-04-23 RX ADMIN — LIDOCAINE HYDROCHLORIDE 50 MG: 10 INJECTION, SOLUTION INFILTRATION; PERINEURAL at 08:08

## 2019-04-23 RX ADMIN — SODIUM CHLORIDE, SODIUM LACTATE, POTASSIUM CHLORIDE, AND CALCIUM CHLORIDE: .6; .31; .03; .02 INJECTION, SOLUTION INTRAVENOUS at 07:29

## 2019-04-23 RX ADMIN — PHENYLEPHRINE HYDROCHLORIDE 100 MCG: 10 INJECTION INTRAVENOUS at 08:32

## 2019-04-23 RX ADMIN — FENTANYL CITRATE 50 MCG: 50 INJECTION, SOLUTION INTRAMUSCULAR; INTRAVENOUS at 08:13

## 2019-04-24 ENCOUNTER — HOSPITAL ENCOUNTER (OUTPATIENT)
Dept: INFUSION CENTER | Facility: CLINIC | Age: 69
Discharge: HOME | End: 2019-04-24
Payer: MEDICARE

## 2019-04-24 VITALS
RESPIRATION RATE: 18 BRPM | SYSTOLIC BLOOD PRESSURE: 113 MMHG | DIASTOLIC BLOOD PRESSURE: 65 MMHG | BODY MASS INDEX: 43.06 KG/M2 | HEIGHT: 71 IN | HEART RATE: 84 BPM | WEIGHT: 307.54 LBS | TEMPERATURE: 98.5 F

## 2019-04-24 DIAGNOSIS — C25.2 MALIGNANT NEOPLASM OF TAIL OF PANCREAS (HCC): Primary | ICD-10-CM

## 2019-04-24 PROCEDURE — 96367 TX/PROPH/DG ADDL SEQ IV INF: CPT

## 2019-04-24 PROCEDURE — 96415 CHEMO IV INFUSION ADDL HR: CPT

## 2019-04-24 PROCEDURE — 96417 CHEMO IV INFUS EACH ADDL SEQ: CPT

## 2019-04-24 PROCEDURE — 96375 TX/PRO/DX INJ NEW DRUG ADDON: CPT

## 2019-04-24 PROCEDURE — 96413 CHEMO IV INFUSION 1 HR: CPT

## 2019-04-24 PROCEDURE — G0498 CHEMO EXTEND IV INFUS W/PUMP: HCPCS

## 2019-04-24 RX ORDER — DEXTROSE MONOHYDRATE 50 MG/ML
20 INJECTION, SOLUTION INTRAVENOUS CONTINUOUS
Status: DISCONTINUED | OUTPATIENT
Start: 2019-04-24 | End: 2019-04-27 | Stop reason: HOSPADM

## 2019-04-24 RX ADMIN — ATROPINE SULFATE 0.25 MG: 1 INJECTION, SOLUTION INTRAMUSCULAR; INTRAVENOUS; SUBCUTANEOUS at 12:32

## 2019-04-24 RX ADMIN — IRINOTECAN HYDROCHLORIDE 340 MG: 20 INJECTION, SOLUTION INTRAVENOUS at 12:32

## 2019-04-24 RX ADMIN — DEXTROSE 20 ML/HR: 5 SOLUTION INTRAVENOUS at 10:20

## 2019-04-24 RX ADMIN — OXALIPLATIN 165 MG: 5 INJECTION, SOLUTION INTRAVENOUS at 10:30

## 2019-04-24 RX ADMIN — DEXAMETHASONE SODIUM PHOSPHATE: 10 INJECTION, SOLUTION INTRAMUSCULAR; INTRAVENOUS at 10:00

## 2019-04-24 NOTE — PROGRESS NOTES
Patient tolerated infusion well  Denies any discomfort  5FU cadd pump connected without complications  Pt is aware to return in 46 hours for disconnect   Refused AVS

## 2019-04-24 NOTE — PLAN OF CARE
Problem: Potential for Falls  Goal: Patient will remain free of falls  Description  INTERVENTIONS:  - Assess patient frequently for physical needs  -  Identify cognitive and physical deficits and behaviors that affect risk of falls    -  [place] fall precautions as indicated by assessment   - Educate patient/family on patient safety including physical limitations  - Instruct patient to call for assistance with activity based on assessment  - Modify environment to reduce risk of injury  - Consider OT/PT consult to assist with strengthening/mobility  Outcome: Progressing

## 2019-04-25 DIAGNOSIS — C25.2 MALIGNANT NEOPLASM OF TAIL OF PANCREAS (HCC): ICD-10-CM

## 2019-04-25 DIAGNOSIS — T45.1X5A CHEMOTHERAPY INDUCED NEUTROPENIA (HCC): Primary | ICD-10-CM

## 2019-04-25 DIAGNOSIS — D70.1 CHEMOTHERAPY INDUCED NEUTROPENIA (HCC): Primary | ICD-10-CM

## 2019-04-25 RX ORDER — SODIUM CHLORIDE 9 MG/ML
20 INJECTION, SOLUTION INTRAVENOUS ONCE AS NEEDED
Status: CANCELLED | OUTPATIENT
Start: 2019-06-19

## 2019-04-25 RX ORDER — DEXTROSE MONOHYDRATE 50 MG/ML
20 INJECTION, SOLUTION INTRAVENOUS ONCE
Status: CANCELLED | OUTPATIENT
Start: 2019-06-19

## 2019-04-25 RX ORDER — DEXTROSE MONOHYDRATE 50 MG/ML
20 INJECTION, SOLUTION INTRAVENOUS ONCE
Status: CANCELLED | OUTPATIENT
Start: 2019-06-05

## 2019-04-25 RX ORDER — ATROPINE SULFATE 1 MG/ML
0.25 INJECTION, SOLUTION INTRAMUSCULAR; INTRAVENOUS; SUBCUTANEOUS ONCE AS NEEDED
Status: CANCELLED | OUTPATIENT
Start: 2019-06-19

## 2019-04-25 RX ORDER — SODIUM CHLORIDE 9 MG/ML
20 INJECTION, SOLUTION INTRAVENOUS ONCE AS NEEDED
Status: CANCELLED | OUTPATIENT
Start: 2019-06-05

## 2019-04-25 RX ORDER — ATROPINE SULFATE 1 MG/ML
0.25 INJECTION, SOLUTION INTRAMUSCULAR; INTRAVENOUS; SUBCUTANEOUS ONCE AS NEEDED
Status: CANCELLED | OUTPATIENT
Start: 2019-06-05

## 2019-04-25 RX ORDER — ATROPINE SULFATE 1 MG/ML
0.25 INJECTION, SOLUTION INTRAMUSCULAR; INTRAVENOUS; SUBCUTANEOUS ONCE
Status: CANCELLED | OUTPATIENT
Start: 2019-06-05

## 2019-04-25 RX ORDER — ATROPINE SULFATE 1 MG/ML
0.25 INJECTION, SOLUTION INTRAMUSCULAR; INTRAVENOUS; SUBCUTANEOUS ONCE
Status: CANCELLED | OUTPATIENT
Start: 2019-06-19

## 2019-04-26 ENCOUNTER — HOSPITAL ENCOUNTER (OUTPATIENT)
Dept: INFUSION CENTER | Facility: CLINIC | Age: 69
Discharge: HOME | End: 2019-04-26
Payer: MEDICARE

## 2019-04-26 VITALS
RESPIRATION RATE: 16 BRPM | TEMPERATURE: 96.6 F | DIASTOLIC BLOOD PRESSURE: 71 MMHG | SYSTOLIC BLOOD PRESSURE: 112 MMHG | HEART RATE: 76 BPM

## 2019-04-26 PROCEDURE — 96372 THER/PROPH/DIAG INJ SC/IM: CPT

## 2019-04-26 RX ADMIN — PEGFILGRASTIM 6 MG: KIT SUBCUTANEOUS at 12:26

## 2019-04-26 NOTE — PROGRESS NOTES
Patient arrived on unit  CADD pump D/C as ordered  Res volume- 0ml  Neulasta Onpro explained to patient and applied as per orders  Information given to patient  Denied any questions at present time  Educated on potential bone discomfort from neulasta  Encouraged to call physician if any questions or concerns after leaving  AVS given to patient   Patient aware of next appointment

## 2019-04-30 ENCOUNTER — DOCUMENTATION (OUTPATIENT)
Dept: HEMATOLOGY ONCOLOGY | Facility: CLINIC | Age: 69
End: 2019-04-30

## 2019-05-06 ENCOUNTER — HOSPITAL ENCOUNTER (OUTPATIENT)
Dept: INFUSION CENTER | Facility: CLINIC | Age: 69
Discharge: HOME | End: 2019-05-06
Payer: MEDICARE

## 2019-05-06 LAB
ALBUMIN SERPL BCP-MCNC: 3.2 G/DL (ref 3.5–5.7)
ALP SERPL-CCNC: 119 U/L (ref 46–116)
ALT SERPL W P-5'-P-CCNC: 26 U/L (ref 12–78)
ANION GAP SERPL CALCULATED.3IONS-SCNC: 7 MMOL/L (ref 4–13)
AST SERPL W P-5'-P-CCNC: 32 U/L (ref 5–45)
BASOPHILS # BLD MANUAL: 0 THOUSAND/UL (ref 0–0.1)
BASOPHILS NFR MAR MANUAL: 0 % (ref 0–1)
BILIRUB SERPL-MCNC: 0.5 MG/DL (ref 0.2–1)
BUN SERPL-MCNC: 11 MG/DL (ref 5–25)
CALCIUM SERPL-MCNC: 8.7 MG/DL (ref 8.3–10.1)
CHLORIDE SERPL-SCNC: 104 MMOL/L (ref 98–108)
CO2 SERPL-SCNC: 28 MMOL/L (ref 21–32)
CREAT SERPL-MCNC: 0.5 MG/DL (ref 0.6–1.3)
EOSINOPHIL # BLD MANUAL: 0.08 THOUSAND/UL (ref 0–0.4)
EOSINOPHIL NFR BLD MANUAL: 1 % (ref 0–6)
ERYTHROCYTE [DISTWIDTH] IN BLOOD BY AUTOMATED COUNT: 14.5 % (ref 11.6–15.1)
GFR SERPL CREATININE-BSD FRML MDRD: 111 ML/MIN/1.73SQ M
GLUCOSE SERPL-MCNC: 124 MG/DL (ref 65–140)
HCT VFR BLD AUTO: 35.6 % (ref 36.5–49.3)
HGB BLD-MCNC: 12.4 G/DL (ref 12–17)
LYMPHOCYTES # BLD AUTO: 1.49 THOUSAND/UL (ref 0.6–4.47)
LYMPHOCYTES # BLD AUTO: 18 % (ref 14–44)
MCH RBC QN AUTO: 34.3 PG (ref 26.8–34.3)
MCHC RBC AUTO-ENTMCNC: 34.8 G/DL (ref 31.4–37.4)
MCV RBC AUTO: 98 FL (ref 82–98)
MONOCYTES # BLD AUTO: 0.83 THOUSAND/UL (ref 0–1.22)
MONOCYTES NFR BLD: 10 % (ref 4–12)
NEUTROPHILS # BLD MANUAL: 5.89 THOUSAND/UL (ref 1.85–7.62)
NEUTS BAND NFR BLD MANUAL: 16 % (ref 0–8)
NEUTS SEG NFR BLD AUTO: 55 % (ref 43–75)
PLATELET # BLD AUTO: 115 THOUSANDS/UL (ref 149–390)
PLATELET BLD QL SMEAR: ABNORMAL
PMV BLD AUTO: 11.7 FL (ref 8.9–12.7)
POTASSIUM SERPL-SCNC: 3.9 MMOL/L (ref 3.5–5.3)
PROT SERPL-MCNC: 6.7 G/DL (ref 6.4–8.2)
RBC # BLD AUTO: 3.62 MILLION/UL (ref 3.88–5.62)
RBC MORPH BLD: NORMAL
SODIUM SERPL-SCNC: 139 MMOL/L (ref 136–145)
TOTAL CELLS COUNTED SPEC: 100
WBC # BLD AUTO: 8.29 THOUSAND/UL (ref 4.31–10.16)

## 2019-05-06 PROCEDURE — 80053 COMPREHEN METABOLIC PANEL: CPT | Performed by: INTERNAL MEDICINE

## 2019-05-06 PROCEDURE — 85027 COMPLETE CBC AUTOMATED: CPT | Performed by: INTERNAL MEDICINE

## 2019-05-06 PROCEDURE — 85007 BL SMEAR W/DIFF WBC COUNT: CPT | Performed by: INTERNAL MEDICINE

## 2019-05-06 NOTE — PLAN OF CARE
Problem: INFECTION - ADULT  Goal: Absence or prevention of progression during hospitalization  Description  INTERVENTIONS:  - Assess and monitor for signs and symptoms of infection  - Monitor lab/diagnostic results  - Monitor all insertion sites, i e  indwelling lines, tubes, and drains  - Monitor endotracheal (as able) and nasal secretions for changes in amount and color  - [place] appropriate cooling/warming therapies per order  - Administer medications as ordered  - Instruct and encourage patient and family to use good hand hygiene technique  - Identify and instruct in appropriate isolation precautions for identified infection/condition  Outcome: Progressing

## 2019-05-07 RX ORDER — SODIUM CHLORIDE 9 MG/ML
20 INJECTION, SOLUTION INTRAVENOUS CONTINUOUS
Status: DISCONTINUED | OUTPATIENT
Start: 2019-05-08 | End: 2019-05-11 | Stop reason: HOSPADM

## 2019-05-07 RX ORDER — DEXTROSE MONOHYDRATE 50 MG/ML
20 INJECTION, SOLUTION INTRAVENOUS CONTINUOUS
Status: DISCONTINUED | OUTPATIENT
Start: 2019-05-08 | End: 2019-05-11 | Stop reason: HOSPADM

## 2019-05-07 RX ORDER — ATROPINE SULFATE 1 MG/ML
0.25 INJECTION, SOLUTION INTRAMUSCULAR; INTRAVENOUS; SUBCUTANEOUS ONCE
Status: COMPLETED | OUTPATIENT
Start: 2019-05-08 | End: 2019-05-08

## 2019-05-08 ENCOUNTER — HOSPITAL ENCOUNTER (OUTPATIENT)
Dept: INFUSION CENTER | Facility: CLINIC | Age: 69
Discharge: HOME | End: 2019-05-08
Payer: MEDICARE

## 2019-05-08 VITALS
TEMPERATURE: 98.9 F | BODY MASS INDEX: 43.04 KG/M2 | HEIGHT: 71 IN | HEART RATE: 71 BPM | WEIGHT: 307.43 LBS | DIASTOLIC BLOOD PRESSURE: 72 MMHG | SYSTOLIC BLOOD PRESSURE: 120 MMHG | RESPIRATION RATE: 20 BRPM

## 2019-05-08 PROCEDURE — 96415 CHEMO IV INFUSION ADDL HR: CPT

## 2019-05-08 PROCEDURE — 96417 CHEMO IV INFUS EACH ADDL SEQ: CPT

## 2019-05-08 PROCEDURE — 96413 CHEMO IV INFUSION 1 HR: CPT

## 2019-05-08 PROCEDURE — 36593 DECLOT VASCULAR DEVICE: CPT

## 2019-05-08 PROCEDURE — G0498 CHEMO EXTEND IV INFUS W/PUMP: HCPCS

## 2019-05-08 PROCEDURE — 96367 TX/PROPH/DG ADDL SEQ IV INF: CPT

## 2019-05-08 PROCEDURE — 96375 TX/PRO/DX INJ NEW DRUG ADDON: CPT

## 2019-05-08 RX ADMIN — ATROPINE SULFATE 0.25 MG: 1 INJECTION, SOLUTION INTRAMUSCULAR; INTRAVENOUS; SUBCUTANEOUS at 13:45

## 2019-05-08 RX ADMIN — OXALIPLATIN 165 MG: 5 INJECTION, SOLUTION INTRAVENOUS at 11:34

## 2019-05-08 RX ADMIN — DEXTROSE 20 ML/HR: 5 SOLUTION INTRAVENOUS at 10:49

## 2019-05-08 RX ADMIN — DEXAMETHASONE SODIUM PHOSPHATE: 10 INJECTION, SOLUTION INTRAMUSCULAR; INTRAVENOUS at 10:49

## 2019-05-08 RX ADMIN — IRINOTECAN HYDROCHLORIDE 340 MG: 20 INJECTION, SOLUTION INTRAVENOUS at 13:47

## 2019-05-08 RX ADMIN — ALTEPLASE 2 MG: 2.2 INJECTION, POWDER, LYOPHILIZED, FOR SOLUTION INTRAVENOUS at 09:41

## 2019-05-08 NOTE — PLAN OF CARE
Problem: Potential for Falls  Goal: Patient will remain free of falls  Description  INTERVENTIONS:  - Assess patient frequently for physical needs  -  Identify cognitive and physical deficits and behaviors that affect risk of falls    -  [place] fall precautions as indicated by assessment   - Educate patient/family on patient safety including physical limitations  - Instruct patient to call for assistance with activity based on assessment  - Modify environment to reduce risk of injury  - Consider OT/PT consult to assist with strengthening/mobility  Outcome: Progressing

## 2019-05-08 NOTE — PROGRESS NOTES
After over 1 hour of TPA instillation, able to aspirate blood from port a cath    Blood return aspirated before chemotherapy and pump connection

## 2019-05-10 ENCOUNTER — HOSPITAL ENCOUNTER (OUTPATIENT)
Dept: INFUSION CENTER | Facility: CLINIC | Age: 69
Discharge: HOME | End: 2019-05-10
Payer: MEDICARE

## 2019-05-10 VITALS — TEMPERATURE: 98.1 F

## 2019-05-10 PROCEDURE — 96372 THER/PROPH/DIAG INJ SC/IM: CPT

## 2019-05-10 RX ADMIN — PEGFILGRASTIM 6 MG: KIT SUBCUTANEOUS at 14:05

## 2019-05-10 NOTE — PROGRESS NOTES
5FU completed via [address] [place] [address] without adverse event  RV - 0  Neulasta ONPRO placed on YENIFER  Pt verbalizes understanding of process  Pt  Instructed medication will be delivered at 5:15 PM tomorrow and should be ready for removal by 6-6:30  Future appointments reviewed    Declined need for AVS

## 2019-05-10 NOTE — PLAN OF CARE
Problem: PAIN - ADULT  Goal: Verbalizes/displays adequate comfort level or baseline comfort level  Description  Interventions:  - Encourage patient to monitor pain and request assistance  - Assess pain using appropriate pain scale  - Administer analgesics based on type and severity of pain and evaluate response  - Implement non-pharmacological measures as appropriate and evaluate response  - Consider cultural and social influences on pain and pain management  - Notify physician/advanced practitioner if interventions unsuccessful or patient reports new pain  Outcome: Progressing     Problem: INFECTION - ADULT  Goal: Absence or prevention of progression during hospitalization  Description  INTERVENTIONS:  - Assess and monitor for signs and symptoms of infection  - Monitor lab/diagnostic results  - Monitor all insertion sites, i e  indwelling lines, tubes, and drains  - Monitor endotracheal (as able) and nasal secretions for changes in amount and color  - [place] appropriate cooling/warming therapies per order  - Administer medications as ordered  - Instruct and encourage patient and family to use good hand hygiene technique  - Identify and instruct in appropriate isolation precautions for identified infection/condition  Outcome: Progressing  Goal: Absence of fever/infection during neutropenic period  Description  INTERVENTIONS:  - Monitor WBC  - Implement neutropenic guidelines  Outcome: Progressing     Problem: Knowledge Deficit  Goal: Patient/family/caregiver demonstrates understanding of disease process, treatment plan, medications, and discharge instructions  Description  Complete learning assessment and assess knowledge base    Interventions:  - Provide teaching at level of understanding  - Provide teaching via preferred learning methods  Outcome: Progressing

## 2019-05-13 ENCOUNTER — DOCUMENTATION (OUTPATIENT)
Dept: HEMATOLOGY ONCOLOGY | Facility: CLINIC | Age: 69
End: 2019-05-13

## 2019-05-13 ENCOUNTER — HOSPITAL ENCOUNTER (OUTPATIENT)
Dept: INFUSION CENTER | Facility: CLINIC | Age: 69
Discharge: HOME | End: 2019-05-13
Payer: MEDICARE

## 2019-05-13 VITALS — TEMPERATURE: 98.9 F

## 2019-05-13 PROCEDURE — 96372 THER/PROPH/DIAG INJ SC/IM: CPT

## 2019-05-13 RX ADMIN — PEGFILGRASTIM 6 MG: 6 INJECTION SUBCUTANEOUS at 10:17

## 2019-05-16 ENCOUNTER — OFFICE VISIT (OUTPATIENT)
Dept: HEMATOLOGY ONCOLOGY | Facility: CLINIC | Age: 69
End: 2019-05-16
Payer: MEDICARE

## 2019-05-16 ENCOUNTER — TELEPHONE (OUTPATIENT)
Dept: HEMATOLOGY ONCOLOGY | Facility: CLINIC | Age: 69
End: 2019-05-16

## 2019-05-16 VITALS
OXYGEN SATURATION: 98 % | BODY MASS INDEX: 42.42 KG/M2 | HEIGHT: 71 IN | RESPIRATION RATE: 16 BRPM | TEMPERATURE: 98.3 F | SYSTOLIC BLOOD PRESSURE: 122 MMHG | WEIGHT: 303 LBS | DIASTOLIC BLOOD PRESSURE: 70 MMHG | HEART RATE: 73 BPM

## 2019-05-16 DIAGNOSIS — C25.2 MALIGNANT NEOPLASM OF TAIL OF PANCREAS (HCC): Primary | ICD-10-CM

## 2019-05-16 DIAGNOSIS — R05.9 COUGH: ICD-10-CM

## 2019-05-16 DIAGNOSIS — R05.9 COUGH: Primary | ICD-10-CM

## 2019-05-16 PROCEDURE — 99214 OFFICE O/P EST MOD 30 MIN: CPT | Performed by: PHYSICIAN ASSISTANT

## 2019-05-16 RX ORDER — BENZONATATE 100 MG/1
100 CAPSULE ORAL 3 TIMES DAILY PRN
Qty: 20 CAPSULE | Refills: 0 | Status: SHIPPED | OUTPATIENT
Start: 2019-05-16 | End: 2019-10-04 | Stop reason: HOSPADM

## 2019-05-16 RX ORDER — GUAIFENESIN 100 MG/5ML
200 SYRUP ORAL 3 TIMES DAILY PRN
Qty: 120 ML | Refills: 0 | Status: SHIPPED | OUTPATIENT
Start: 2019-05-16 | End: 2019-05-26

## 2019-05-20 ENCOUNTER — HOSPITAL ENCOUNTER (OUTPATIENT)
Dept: INFUSION CENTER | Facility: CLINIC | Age: 69
Discharge: HOME | End: 2019-05-20
Payer: MEDICARE

## 2019-05-20 DIAGNOSIS — C25.2 MALIGNANT NEOPLASM OF TAIL OF PANCREAS (HCC): ICD-10-CM

## 2019-05-20 LAB
ALBUMIN SERPL BCP-MCNC: 3.3 G/DL (ref 3.5–5.7)
ALP SERPL-CCNC: 144 U/L (ref 46–116)
ALT SERPL W P-5'-P-CCNC: 29 U/L (ref 12–78)
ANION GAP SERPL CALCULATED.3IONS-SCNC: 7 MMOL/L (ref 4–13)
ANISOCYTOSIS BLD QL SMEAR: PRESENT
AST SERPL W P-5'-P-CCNC: 36 U/L (ref 5–45)
BASOPHILS # BLD MANUAL: 0.12 THOUSAND/UL (ref 0–0.1)
BASOPHILS NFR MAR MANUAL: 1 % (ref 0–1)
BILIRUB SERPL-MCNC: 0.6 MG/DL (ref 0.2–1)
BUN SERPL-MCNC: 9 MG/DL (ref 5–25)
BURR CELLS BLD QL SMEAR: PRESENT
CALCIUM SERPL-MCNC: 8.9 MG/DL (ref 8.3–10.1)
CHLORIDE SERPL-SCNC: 105 MMOL/L (ref 98–108)
CO2 SERPL-SCNC: 28 MMOL/L (ref 21–32)
CREAT SERPL-MCNC: 0.8 MG/DL (ref 0.6–1.3)
EOSINOPHIL # BLD MANUAL: 0 THOUSAND/UL (ref 0–0.4)
EOSINOPHIL NFR BLD MANUAL: 0 % (ref 0–6)
ERYTHROCYTE [DISTWIDTH] IN BLOOD BY AUTOMATED COUNT: 15.9 % (ref 11.6–15.1)
GFR SERPL CREATININE-BSD FRML MDRD: 91 ML/MIN/1.73SQ M
GLUCOSE SERPL-MCNC: 117 MG/DL (ref 65–140)
HCT VFR BLD AUTO: 34.2 % (ref 36.5–49.3)
HGB BLD-MCNC: 11.8 G/DL (ref 12–17)
LYMPHOCYTES # BLD AUTO: 1.85 THOUSAND/UL (ref 0.6–4.47)
LYMPHOCYTES # BLD AUTO: 16 % (ref 14–44)
MCH RBC QN AUTO: 34 PG (ref 26.8–34.3)
MCHC RBC AUTO-ENTMCNC: 34.5 G/DL (ref 31.4–37.4)
MCV RBC AUTO: 99 FL (ref 82–98)
MONOCYTES # BLD AUTO: 0.35 THOUSAND/UL (ref 0–1.22)
MONOCYTES NFR BLD: 3 % (ref 4–12)
NEUTROPHILS # BLD MANUAL: 9.27 THOUSAND/UL (ref 1.85–7.62)
NEUTS BAND NFR BLD MANUAL: 2 % (ref 0–8)
NEUTS SEG NFR BLD AUTO: 78 % (ref 43–75)
NRBC BLD AUTO-RTO: 0 /100 WBCS
PLATELET # BLD AUTO: 58 THOUSANDS/UL (ref 149–390)
PLATELET BLD QL SMEAR: ABNORMAL
PMV BLD AUTO: 12.4 FL (ref 8.9–12.7)
POTASSIUM SERPL-SCNC: 3.8 MMOL/L (ref 3.5–5.3)
PROT SERPL-MCNC: 6.8 G/DL (ref 6.4–8.2)
RBC # BLD AUTO: 3.47 MILLION/UL (ref 3.88–5.62)
RBC MORPH BLD: PRESENT
SODIUM SERPL-SCNC: 140 MMOL/L (ref 136–145)
TOTAL CELLS COUNTED SPEC: 100
WBC # BLD AUTO: 11.59 THOUSAND/UL (ref 4.31–10.16)

## 2019-05-20 PROCEDURE — 85007 BL SMEAR W/DIFF WBC COUNT: CPT | Performed by: INTERNAL MEDICINE

## 2019-05-20 PROCEDURE — 85027 COMPLETE CBC AUTOMATED: CPT | Performed by: INTERNAL MEDICINE

## 2019-05-20 PROCEDURE — 80053 COMPREHEN METABOLIC PANEL: CPT | Performed by: INTERNAL MEDICINE

## 2019-05-21 ENCOUNTER — DOCUMENTATION (OUTPATIENT)
Dept: HEMATOLOGY ONCOLOGY | Facility: CLINIC | Age: 69
End: 2019-05-21

## 2019-05-21 DIAGNOSIS — C25.2 MALIGNANT NEOPLASM OF TAIL OF PANCREAS (HCC): Primary | ICD-10-CM

## 2019-05-22 ENCOUNTER — HOSPITAL ENCOUNTER (OUTPATIENT)
Dept: INFUSION CENTER | Facility: CLINIC | Age: 69
Discharge: HOME | End: 2019-05-22

## 2019-05-22 ENCOUNTER — TELEPHONE (OUTPATIENT)
Dept: HEMATOLOGY ONCOLOGY | Facility: CLINIC | Age: 69
End: 2019-05-22

## 2019-05-23 ENCOUNTER — TELEPHONE (OUTPATIENT)
Dept: HEMATOLOGY ONCOLOGY | Facility: CLINIC | Age: 69
End: 2019-05-23

## 2019-05-28 ENCOUNTER — DOCUMENTATION (OUTPATIENT)
Dept: HEMATOLOGY ONCOLOGY | Facility: CLINIC | Age: 69
End: 2019-05-28

## 2019-05-28 ENCOUNTER — HOSPITAL ENCOUNTER (OUTPATIENT)
Dept: INFUSION CENTER | Facility: CLINIC | Age: 69
Discharge: HOME | End: 2019-05-28
Payer: MEDICARE

## 2019-05-28 DIAGNOSIS — C25.2 MALIGNANT NEOPLASM OF TAIL OF PANCREAS (HCC): Primary | ICD-10-CM

## 2019-05-28 DIAGNOSIS — D61.818 PANCYTOPENIA (HCC): ICD-10-CM

## 2019-05-28 DIAGNOSIS — D69.6 THROMBOCYTOPENIA (HCC): Primary | ICD-10-CM

## 2019-05-28 LAB
BASOPHILS # BLD AUTO: 0.04 THOUSANDS/ΜL (ref 0–0.1)
BASOPHILS NFR BLD AUTO: 1 % (ref 0–1)
EOSINOPHIL # BLD AUTO: 0.07 THOUSAND/ΜL (ref 0–0.61)
EOSINOPHIL NFR BLD AUTO: 1 % (ref 0–6)
ERYTHROCYTE [DISTWIDTH] IN BLOOD BY AUTOMATED COUNT: 17.2 % (ref 11.6–15.1)
HCT VFR BLD AUTO: 33.7 % (ref 36.5–49.3)
HGB BLD-MCNC: 12 G/DL (ref 12–17)
LYMPHOCYTES # BLD AUTO: 1.21 THOUSANDS/ΜL (ref 0.6–4.47)
LYMPHOCYTES NFR BLD AUTO: 22 % (ref 14–44)
MCH RBC QN AUTO: 34.9 PG (ref 26.8–34.3)
MCHC RBC AUTO-ENTMCNC: 35.6 G/DL (ref 31.4–37.4)
MCV RBC AUTO: 98 FL (ref 82–98)
MONOCYTES # BLD AUTO: 0.82 THOUSAND/ΜL (ref 0.17–1.22)
MONOCYTES NFR BLD AUTO: 15 % (ref 4–12)
NEUTROPHILS # BLD AUTO: 3.41 THOUSANDS/ΜL (ref 1.85–7.62)
NEUTS SEG NFR BLD AUTO: 61 % (ref 43–75)
PLATELET # BLD AUTO: 5 THOUSANDS/UL (ref 149–390)
RBC # BLD AUTO: 3.44 MILLION/UL (ref 3.88–5.62)
WBC # BLD AUTO: 5.55 THOUSAND/UL (ref 4.31–10.16)

## 2019-05-28 PROCEDURE — 85025 COMPLETE CBC W/AUTO DIFF WBC: CPT | Performed by: INTERNAL MEDICINE

## 2019-05-28 RX ORDER — SODIUM CHLORIDE 9 MG/ML
20 INJECTION, SOLUTION INTRAVENOUS ONCE
Status: CANCELLED | OUTPATIENT
Start: 2019-05-29

## 2019-05-28 RX ORDER — ACETAMINOPHEN 325 MG/1
650 TABLET ORAL ONCE
Status: CANCELLED | OUTPATIENT
Start: 2019-05-29

## 2019-05-28 RX ORDER — METHYLPREDNISOLONE 4 MG/1
TABLET ORAL
Qty: 1 EACH | Refills: 0 | Status: SHIPPED | OUTPATIENT
Start: 2019-05-28 | End: 2019-06-05 | Stop reason: ALTCHOICE

## 2019-05-29 ENCOUNTER — HOSPITAL ENCOUNTER (OUTPATIENT)
Dept: INFUSION CENTER | Facility: CLINIC | Age: 69
Discharge: HOME | End: 2019-05-29
Payer: MEDICARE

## 2019-05-29 VITALS
TEMPERATURE: 97.5 F | HEART RATE: 71 BPM | DIASTOLIC BLOOD PRESSURE: 85 MMHG | SYSTOLIC BLOOD PRESSURE: 145 MMHG | RESPIRATION RATE: 16 BRPM

## 2019-05-29 DIAGNOSIS — D61.818 PANCYTOPENIA (HCC): Primary | ICD-10-CM

## 2019-05-29 DIAGNOSIS — C25.2 MALIGNANT NEOPLASM OF TAIL OF PANCREAS (HCC): ICD-10-CM

## 2019-05-29 LAB
BASOPHILS # BLD AUTO: 0.01 THOUSANDS/ΜL (ref 0–0.1)
BASOPHILS NFR BLD AUTO: 0 % (ref 0–1)
EOSINOPHIL # BLD AUTO: 0 THOUSAND/ΜL (ref 0–0.61)
EOSINOPHIL NFR BLD AUTO: 0 % (ref 0–6)
ERYTHROCYTE [DISTWIDTH] IN BLOOD BY AUTOMATED COUNT: 16.9 % (ref 11.6–15.1)
HCT VFR BLD AUTO: 32.6 % (ref 36.5–49.3)
HGB BLD-MCNC: 11.6 G/DL (ref 12–17)
LYMPHOCYTES # BLD AUTO: 0.68 THOUSANDS/ΜL (ref 0.6–4.47)
LYMPHOCYTES NFR BLD AUTO: 12 % (ref 14–44)
MCH RBC QN AUTO: 35 PG (ref 26.8–34.3)
MCHC RBC AUTO-ENTMCNC: 35.6 G/DL (ref 31.4–37.4)
MCV RBC AUTO: 99 FL (ref 82–98)
MONOCYTES # BLD AUTO: 0.43 THOUSAND/ΜL (ref 0.17–1.22)
MONOCYTES NFR BLD AUTO: 8 % (ref 4–12)
NEUTROPHILS # BLD AUTO: 4.64 THOUSANDS/ΜL (ref 1.85–7.62)
NEUTS SEG NFR BLD AUTO: 80 % (ref 43–75)
PLATELET # BLD AUTO: 54 THOUSANDS/UL (ref 149–390)
PMV BLD AUTO: 10.2 FL (ref 8.9–12.7)
RBC # BLD AUTO: 3.31 MILLION/UL (ref 3.88–5.62)
WBC # BLD AUTO: 5.76 THOUSAND/UL (ref 4.31–10.16)

## 2019-05-29 PROCEDURE — 36430 TRANSFUSION BLD/BLD COMPNT: CPT

## 2019-05-29 PROCEDURE — P9037 PLATE PHERES LEUKOREDU IRRAD: HCPCS

## 2019-05-29 PROCEDURE — 85025 COMPLETE CBC W/AUTO DIFF WBC: CPT | Performed by: INTERNAL MEDICINE

## 2019-05-29 RX ORDER — SODIUM CHLORIDE 9 MG/ML
20 INJECTION, SOLUTION INTRAVENOUS ONCE
Status: CANCELLED | OUTPATIENT
Start: 2019-05-29

## 2019-05-29 RX ORDER — ACETAMINOPHEN 325 MG/1
650 TABLET ORAL ONCE
Status: CANCELLED | OUTPATIENT
Start: 2019-05-29

## 2019-05-29 RX ORDER — SODIUM CHLORIDE 9 MG/ML
20 INJECTION, SOLUTION INTRAVENOUS ONCE
Status: COMPLETED | OUTPATIENT
Start: 2019-05-29 | End: 2019-05-29

## 2019-05-29 RX ORDER — ACETAMINOPHEN 325 MG/1
650 TABLET ORAL ONCE
Status: COMPLETED | OUTPATIENT
Start: 2019-05-29 | End: 2019-05-29

## 2019-05-29 RX ADMIN — ACETAMINOPHEN 650 MG: 325 TABLET, FILM COATED ORAL at 09:15

## 2019-05-29 RX ADMIN — SODIUM CHLORIDE 20 ML/HR: 0.9 INJECTION, SOLUTION INTRAVENOUS at 09:23

## 2019-05-29 NOTE — PROGRESS NOTES
Patient tolerated transfusion of 2 units of platelets well  CBC drawn 1 hour post transfusion as ordered  Results reviewed with Garfield Xavier RN with Dr Tori Reid  Pt will return on Friday for repeat CBC for possible chemo next well  Pt discharged in stable condition  AVS provided

## 2019-05-30 LAB
ABO GROUP BLD BPU: NORMAL
ABO GROUP BLD BPU: NORMAL
BPU ID: NORMAL
BPU ID: NORMAL
UNIT DISPENSE STATUS: NORMAL
UNIT DISPENSE STATUS: NORMAL
UNIT PRODUCT CODE: NORMAL
UNIT PRODUCT CODE: NORMAL
UNIT RH: NORMAL
UNIT RH: NORMAL

## 2019-05-31 ENCOUNTER — DOCUMENTATION (OUTPATIENT)
Dept: HEMATOLOGY ONCOLOGY | Facility: CLINIC | Age: 69
End: 2019-05-31

## 2019-05-31 ENCOUNTER — HOSPITAL ENCOUNTER (OUTPATIENT)
Dept: INFUSION CENTER | Facility: CLINIC | Age: 69
Discharge: HOME | End: 2019-05-31
Payer: MEDICARE

## 2019-05-31 ENCOUNTER — HOSPITAL ENCOUNTER (OUTPATIENT)
Dept: INFUSION CENTER | Facility: CLINIC | Age: 69
End: 2019-05-31

## 2019-05-31 DIAGNOSIS — D61.818 PANCYTOPENIA (HCC): ICD-10-CM

## 2019-05-31 DIAGNOSIS — C25.2 MALIGNANT NEOPLASM OF TAIL OF PANCREAS (HCC): Primary | ICD-10-CM

## 2019-05-31 LAB
BASOPHILS # BLD AUTO: 0.03 THOUSANDS/ΜL (ref 0–0.1)
BASOPHILS NFR BLD AUTO: 1 % (ref 0–1)
EOSINOPHIL # BLD AUTO: 0 THOUSAND/ΜL (ref 0–0.61)
EOSINOPHIL NFR BLD AUTO: 0 % (ref 0–6)
ERYTHROCYTE [DISTWIDTH] IN BLOOD BY AUTOMATED COUNT: 17.9 % (ref 11.6–15.1)
HCT VFR BLD AUTO: 34.8 % (ref 36.5–49.3)
HGB BLD-MCNC: 12.3 G/DL (ref 12–17)
LYMPHOCYTES # BLD AUTO: 0.89 THOUSANDS/ΜL (ref 0.6–4.47)
LYMPHOCYTES NFR BLD AUTO: 16 % (ref 14–44)
MCH RBC QN AUTO: 35.1 PG (ref 26.8–34.3)
MCHC RBC AUTO-ENTMCNC: 35.3 G/DL (ref 31.4–37.4)
MCV RBC AUTO: 99 FL (ref 82–98)
MONOCYTES # BLD AUTO: 0.78 THOUSAND/ΜL (ref 0.17–1.22)
MONOCYTES NFR BLD AUTO: 14 % (ref 4–12)
NEUTROPHILS # BLD AUTO: 3.8 THOUSANDS/ΜL (ref 1.85–7.62)
NEUTS SEG NFR BLD AUTO: 69 % (ref 43–75)
PLATELET # BLD AUTO: 56 THOUSANDS/UL (ref 149–390)
PMV BLD AUTO: 12.5 FL (ref 8.9–12.7)
RBC # BLD AUTO: 3.5 MILLION/UL (ref 3.88–5.62)
WBC # BLD AUTO: 5.5 THOUSAND/UL (ref 4.31–10.16)

## 2019-05-31 PROCEDURE — 85025 COMPLETE CBC W/AUTO DIFF WBC: CPT | Performed by: INTERNAL MEDICINE

## 2019-05-31 NOTE — PLAN OF CARE
Problem: Potential for Falls  Goal: Patient will remain free of falls  Description  INTERVENTIONS:  - Assess patient frequently for physical needs  -  Identify cognitive and physical deficits and behaviors that affect risk of falls    -  [place] fall precautions as indicated by assessment   - Educate patient/family on patient safety including physical limitations  - Instruct patient to call for assistance with activity based on assessment  - Modify environment to reduce risk of injury  - Consider OT/PT consult to assist with strengthening/mobility  Outcome: Progressing

## 2019-05-31 NOTE — PROGRESS NOTES
Patient here for central labs  Port with good blood return after multiple flushes  Labs drawn and sent  Patient declines AVS, aware of upcoming appointments, left unit in stable condition

## 2019-05-31 NOTE — PROGRESS NOTES
TIME OUT - Call received from [facility] with Dr Mathew Yun  Patient's chemotherapy dose is being reduced and orders have been updated in [place]  Rolf Schroeder in pharmacy made aware

## 2019-06-05 ENCOUNTER — HOSPITAL ENCOUNTER (OUTPATIENT)
Dept: INFUSION CENTER | Facility: CLINIC | Age: 69
Discharge: HOME | End: 2019-06-05
Payer: MEDICARE

## 2019-06-05 VITALS
HEIGHT: 70 IN | RESPIRATION RATE: 16 BRPM | BODY MASS INDEX: 43.08 KG/M2 | WEIGHT: 300.93 LBS | SYSTOLIC BLOOD PRESSURE: 117 MMHG | HEART RATE: 76 BPM | DIASTOLIC BLOOD PRESSURE: 75 MMHG | TEMPERATURE: 98 F

## 2019-06-05 DIAGNOSIS — C25.2 MALIGNANT NEOPLASM OF TAIL OF PANCREAS (HCC): Primary | ICD-10-CM

## 2019-06-05 DIAGNOSIS — D61.818 PANCYTOPENIA (HCC): Primary | ICD-10-CM

## 2019-06-05 DIAGNOSIS — C25.2 MALIGNANT NEOPLASM OF TAIL OF PANCREAS (HCC): ICD-10-CM

## 2019-06-05 LAB
ALBUMIN SERPL BCP-MCNC: 3 G/DL (ref 3.5–5.7)
ALP SERPL-CCNC: 103 U/L (ref 46–116)
ALT SERPL W P-5'-P-CCNC: 38 U/L (ref 12–78)
ANION GAP SERPL CALCULATED.3IONS-SCNC: 10 MMOL/L (ref 4–13)
AST SERPL W P-5'-P-CCNC: 41 U/L (ref 5–45)
BASOPHILS # BLD AUTO: 0.01 THOUSANDS/ΜL (ref 0–0.1)
BASOPHILS NFR BLD AUTO: 0 % (ref 0–1)
BILIRUB SERPL-MCNC: 1.1 MG/DL (ref 0.2–1)
BUN SERPL-MCNC: 14 MG/DL (ref 5–25)
CALCIUM SERPL-MCNC: 8.8 MG/DL (ref 8.3–10.1)
CHLORIDE SERPL-SCNC: 100 MMOL/L (ref 98–108)
CO2 SERPL-SCNC: 28 MMOL/L (ref 21–32)
CREAT SERPL-MCNC: 0.8 MG/DL (ref 0.6–1.3)
EOSINOPHIL # BLD AUTO: 0.04 THOUSAND/ΜL (ref 0–0.61)
EOSINOPHIL NFR BLD AUTO: 1 % (ref 0–6)
ERYTHROCYTE [DISTWIDTH] IN BLOOD BY AUTOMATED COUNT: 18.3 % (ref 11.6–15.1)
GFR SERPL CREATININE-BSD FRML MDRD: 91 ML/MIN/1.73SQ M
GLUCOSE SERPL-MCNC: 167 MG/DL (ref 65–140)
HCT VFR BLD AUTO: 36.1 % (ref 36.5–49.3)
HGB BLD-MCNC: 12.8 G/DL (ref 12–17)
LYMPHOCYTES # BLD AUTO: 1 THOUSANDS/ΜL (ref 0.6–4.47)
LYMPHOCYTES NFR BLD AUTO: 25 % (ref 14–44)
MCH RBC QN AUTO: 35.1 PG (ref 26.8–34.3)
MCHC RBC AUTO-ENTMCNC: 35.5 G/DL (ref 31.4–37.4)
MCV RBC AUTO: 99 FL (ref 82–98)
MONOCYTES # BLD AUTO: 0.62 THOUSAND/ΜL (ref 0.17–1.22)
MONOCYTES NFR BLD AUTO: 16 % (ref 4–12)
NEUTROPHILS # BLD AUTO: 2.3 THOUSANDS/ΜL (ref 1.85–7.62)
NEUTS SEG NFR BLD AUTO: 58 % (ref 43–75)
PLATELET # BLD AUTO: 76 THOUSANDS/UL (ref 149–390)
PMV BLD AUTO: 11.7 FL (ref 8.9–12.7)
POTASSIUM SERPL-SCNC: 3.9 MMOL/L (ref 3.5–5.3)
PROT SERPL-MCNC: 6.9 G/DL (ref 6.4–8.2)
RBC # BLD AUTO: 3.65 MILLION/UL (ref 3.88–5.62)
SODIUM SERPL-SCNC: 138 MMOL/L (ref 136–145)
WBC # BLD AUTO: 3.97 THOUSAND/UL (ref 4.31–10.16)

## 2019-06-05 PROCEDURE — 96413 CHEMO IV INFUSION 1 HR: CPT

## 2019-06-05 PROCEDURE — 85025 COMPLETE CBC W/AUTO DIFF WBC: CPT | Performed by: INTERNAL MEDICINE

## 2019-06-05 PROCEDURE — 96367 TX/PROPH/DG ADDL SEQ IV INF: CPT

## 2019-06-05 PROCEDURE — G0498 CHEMO EXTEND IV INFUS W/PUMP: HCPCS

## 2019-06-05 PROCEDURE — 96375 TX/PRO/DX INJ NEW DRUG ADDON: CPT

## 2019-06-05 PROCEDURE — 80053 COMPREHEN METABOLIC PANEL: CPT | Performed by: INTERNAL MEDICINE

## 2019-06-05 PROCEDURE — 96417 CHEMO IV INFUS EACH ADDL SEQ: CPT

## 2019-06-05 PROCEDURE — 96415 CHEMO IV INFUSION ADDL HR: CPT

## 2019-06-05 RX ORDER — SODIUM CHLORIDE 9 MG/ML
20 INJECTION, SOLUTION INTRAVENOUS ONCE AS NEEDED
Status: DISCONTINUED | OUTPATIENT
Start: 2019-06-05 | End: 2019-06-05

## 2019-06-05 RX ORDER — ATROPINE SULFATE 1 MG/ML
0.25 INJECTION, SOLUTION INTRAMUSCULAR; INTRAVENOUS; SUBCUTANEOUS ONCE AS NEEDED
Status: DISCONTINUED | OUTPATIENT
Start: 2019-06-05 | End: 2019-06-08 | Stop reason: HOSPADM

## 2019-06-05 RX ORDER — DEXTROSE MONOHYDRATE 50 MG/ML
20 INJECTION, SOLUTION INTRAVENOUS ONCE
Status: COMPLETED | OUTPATIENT
Start: 2019-06-05 | End: 2019-06-05

## 2019-06-05 RX ORDER — ATROPINE SULFATE 1 MG/ML
0.25 INJECTION, SOLUTION INTRAMUSCULAR; INTRAVENOUS; SUBCUTANEOUS ONCE
Status: COMPLETED | OUTPATIENT
Start: 2019-06-05 | End: 2019-06-05

## 2019-06-05 RX ADMIN — ATROPINE SULFATE 0.25 MG: 1 INJECTION, SOLUTION INTRAMUSCULAR; INTRAVENOUS; SUBCUTANEOUS at 13:10

## 2019-06-05 RX ADMIN — OXALIPLATIN 151.8 MG: 5 INJECTION, SOLUTION INTRAVENOUS at 11:12

## 2019-06-05 RX ADMIN — DEXAMETHASONE SODIUM PHOSPHATE: 10 INJECTION, SOLUTION INTRAMUSCULAR; INTRAVENOUS at 10:49

## 2019-06-05 RX ADMIN — DEXTROSE 20 ML/HR: 5 SOLUTION INTRAVENOUS at 10:35

## 2019-06-05 RX ADMIN — IRINOTECAN HYDROCHLORIDE 316 MG: 20 INJECTION, SOLUTION INTRAVENOUS at 13:24

## 2019-06-05 NOTE — PROGRESS NOTES
Patient arrived on unit for treatment today  Platelets on 2/17/6371 56,000  Spoke with Drew Salinas Rn with Dr Raúl Lewis  CBC/CMP redrawn to check levels   Patient made aware and understands

## 2019-06-05 NOTE — PROGRESS NOTES
Spoke with Sebastian Kirkpatrick RN who made Dr Maren Dhaliwal aware of today's  Platelets- 74,128  Patient cleared for chemotherapy as per Dr Maren Dhaliwal  Order placed in computer  Patient was dose reduced 1 week ago as per Kisha and Dr Maren Dhaliwal due to previous thrombocytopenia  Patient aware of above   Treatment initiated

## 2019-06-05 NOTE — PROGRESS NOTES
Patient tolerated treatment  Denies any discomforts  CADD pump connected as per orders  Patient aware of next appointment   AVS given to patient

## 2019-06-07 ENCOUNTER — HOSPITAL ENCOUNTER (OUTPATIENT)
Dept: INFUSION CENTER | Facility: CLINIC | Age: 69
Discharge: HOME | End: 2019-06-07
Payer: MEDICARE

## 2019-06-07 ENCOUNTER — TELEPHONE (OUTPATIENT)
Dept: HEMATOLOGY ONCOLOGY | Facility: CLINIC | Age: 69
End: 2019-06-07

## 2019-06-07 DIAGNOSIS — C25.2 MALIGNANT NEOPLASM OF TAIL OF PANCREAS (HCC): Primary | ICD-10-CM

## 2019-06-07 PROCEDURE — 96372 THER/PROPH/DIAG INJ SC/IM: CPT

## 2019-06-07 RX ADMIN — PEGFILGRASTIM 6 MG: KIT SUBCUTANEOUS at 13:26

## 2019-06-07 NOTE — PROGRESS NOTES
Pt  Completed and tolerated 5FU via CADD PUMP  RV 0  Pump disconnected with excellent blood return noted  Neulasta ONPRO 6 mg placed on YENIFER and secured with tape at Pt  Request   Pt  Verbalizes understanding of process and capable of removing device independently  Pt  Verbalizes leakage with last device and will call with any concerns  Future appointments reviewed    Declined need for AVS

## 2019-06-13 ENCOUNTER — OFFICE VISIT (OUTPATIENT)
Dept: HEMATOLOGY ONCOLOGY | Facility: CLINIC | Age: 69
End: 2019-06-13
Payer: MEDICARE

## 2019-06-13 VITALS
SYSTOLIC BLOOD PRESSURE: 142 MMHG | WEIGHT: 303 LBS | DIASTOLIC BLOOD PRESSURE: 82 MMHG | TEMPERATURE: 98.4 F | RESPIRATION RATE: 16 BRPM | HEART RATE: 84 BPM | HEIGHT: 71 IN | BODY MASS INDEX: 42.42 KG/M2

## 2019-06-13 DIAGNOSIS — C25.2 MALIGNANT NEOPLASM OF TAIL OF PANCREAS (HCC): Primary | ICD-10-CM

## 2019-06-13 PROCEDURE — 99214 OFFICE O/P EST MOD 30 MIN: CPT | Performed by: INTERNAL MEDICINE

## 2019-06-14 DIAGNOSIS — C25.2 MALIGNANT NEOPLASM OF TAIL OF PANCREAS (HCC): Primary | ICD-10-CM

## 2019-06-18 ENCOUNTER — HOSPITAL ENCOUNTER (OUTPATIENT)
Dept: INFUSION CENTER | Facility: CLINIC | Age: 69
Discharge: HOME | End: 2019-06-18
Payer: MEDICARE

## 2019-06-18 DIAGNOSIS — D70.1 CHEMOTHERAPY INDUCED NEUTROPENIA (HCC): ICD-10-CM

## 2019-06-18 DIAGNOSIS — T45.1X5A CHEMOTHERAPY INDUCED NEUTROPENIA (HCC): ICD-10-CM

## 2019-06-18 DIAGNOSIS — C25.2 MALIGNANT NEOPLASM OF TAIL OF PANCREAS (HCC): ICD-10-CM

## 2019-06-18 PROCEDURE — 36593 DECLOT VASCULAR DEVICE: CPT

## 2019-06-18 PROCEDURE — 36415 COLL VENOUS BLD VENIPUNCTURE: CPT | Performed by: INTERNAL MEDICINE

## 2019-06-18 PROCEDURE — 86301 IMMUNOASSAY TUMOR CA 19-9: CPT | Performed by: INTERNAL MEDICINE

## 2019-06-18 RX ADMIN — ALTEPLASE 2 MG: 2.2 INJECTION, POWDER, LYOPHILIZED, FOR SOLUTION INTRAVENOUS at 09:41

## 2019-06-18 NOTE — PROGRESS NOTES
Pt here for central labs  Unable to aspirate blood from port  TPA instilled per protocol  After 1 hr able to aspirate excellent blood return from port  Labs drawn and port flushed per protocol  AVS provided

## 2019-06-19 ENCOUNTER — HOSPITAL ENCOUNTER (OUTPATIENT)
Dept: INFUSION CENTER | Facility: CLINIC | Age: 69
Discharge: HOME | End: 2019-06-19
Payer: MEDICARE

## 2019-06-19 VITALS
RESPIRATION RATE: 20 BRPM | BODY MASS INDEX: 44.01 KG/M2 | DIASTOLIC BLOOD PRESSURE: 78 MMHG | WEIGHT: 307.43 LBS | HEART RATE: 90 BPM | TEMPERATURE: 98.5 F | SYSTOLIC BLOOD PRESSURE: 111 MMHG | HEIGHT: 70 IN

## 2019-06-19 DIAGNOSIS — C25.2 MALIGNANT NEOPLASM OF TAIL OF PANCREAS (HCC): Primary | ICD-10-CM

## 2019-06-19 LAB — CANCER AG19-9 SERPL-ACNC: 25 U/ML (ref 0–35)

## 2019-06-19 PROCEDURE — 96375 TX/PRO/DX INJ NEW DRUG ADDON: CPT

## 2019-06-19 PROCEDURE — 96367 TX/PROPH/DG ADDL SEQ IV INF: CPT

## 2019-06-19 PROCEDURE — G0498 CHEMO EXTEND IV INFUS W/PUMP: HCPCS

## 2019-06-19 PROCEDURE — 96417 CHEMO IV INFUS EACH ADDL SEQ: CPT

## 2019-06-19 PROCEDURE — 96415 CHEMO IV INFUSION ADDL HR: CPT

## 2019-06-19 PROCEDURE — 96413 CHEMO IV INFUSION 1 HR: CPT

## 2019-06-19 RX ORDER — DEXTROSE MONOHYDRATE 50 MG/ML
20 INJECTION, SOLUTION INTRAVENOUS ONCE
Status: COMPLETED | OUTPATIENT
Start: 2019-06-19 | End: 2019-06-19

## 2019-06-19 RX ORDER — SODIUM CHLORIDE 9 MG/ML
20 INJECTION, SOLUTION INTRAVENOUS ONCE AS NEEDED
Status: DISCONTINUED | OUTPATIENT
Start: 2019-06-19 | End: 2019-06-22 | Stop reason: HOSPADM

## 2019-06-19 RX ORDER — ATROPINE SULFATE 1 MG/ML
0.25 INJECTION, SOLUTION INTRAMUSCULAR; INTRAVENOUS; SUBCUTANEOUS ONCE
Status: COMPLETED | OUTPATIENT
Start: 2019-06-19 | End: 2019-06-19

## 2019-06-19 RX ORDER — ATROPINE SULFATE 1 MG/ML
0.25 INJECTION, SOLUTION INTRAMUSCULAR; INTRAVENOUS; SUBCUTANEOUS ONCE AS NEEDED
Status: DISCONTINUED | OUTPATIENT
Start: 2019-06-19 | End: 2019-06-22 | Stop reason: HOSPADM

## 2019-06-19 RX ADMIN — IRINOTECAN HYDROCHLORIDE 300 MG: 20 INJECTION, SOLUTION INTRAVENOUS at 12:07

## 2019-06-19 RX ADMIN — DEXAMETHASONE SODIUM PHOSPHATE: 10 INJECTION, SOLUTION INTRAMUSCULAR; INTRAVENOUS at 09:25

## 2019-06-19 RX ADMIN — ATROPINE SULFATE 0.25 MG: 1 INJECTION, SOLUTION INTRAMUSCULAR; INTRAVENOUS; SUBCUTANEOUS at 12:06

## 2019-06-19 RX ADMIN — DEXTROSE 20 ML/HR: 5 SOLUTION INTRAVENOUS at 09:25

## 2019-06-19 RX ADMIN — OXALIPLATIN 151.8 MG: 5 INJECTION, SOLUTION INTRAVENOUS at 10:09

## 2019-06-19 NOTE — PLAN OF CARE
Problem: Potential for Falls  Goal: Patient will remain free of falls  Description  INTERVENTIONS:  - Assess patient frequently for physical needs  -  Identify cognitive and physical deficits and behaviors that affect risk of falls    -  [place] fall precautions as indicated by assessment   - Educate patient/family on patient safety including physical limitations  - Instruct patient to call for assistance with activity based on assessment  - Modify environment to reduce risk of injury  - Consider OT/PT consult to assist with strengthening/mobility  Outcome: Progressing

## 2019-06-19 NOTE — PROGRESS NOTES
Pt connected to CADD pump as ordered  Pt instructed to return to infusion center on Friday 6/21/19 for CADD d/c  Pt received AVS and is aware of appt

## 2019-06-21 ENCOUNTER — HOSPITAL ENCOUNTER (OUTPATIENT)
Dept: INFUSION CENTER | Facility: CLINIC | Age: 69
Discharge: HOME | End: 2019-06-21
Payer: MEDICARE

## 2019-06-21 VITALS
HEART RATE: 85 BPM | SYSTOLIC BLOOD PRESSURE: 124 MMHG | DIASTOLIC BLOOD PRESSURE: 80 MMHG | RESPIRATION RATE: 16 BRPM | TEMPERATURE: 97.6 F

## 2019-06-21 DIAGNOSIS — C25.2 MALIGNANT NEOPLASM OF TAIL OF PANCREAS (HCC): Primary | ICD-10-CM

## 2019-06-21 PROCEDURE — 96372 THER/PROPH/DIAG INJ SC/IM: CPT

## 2019-06-21 RX ADMIN — PEGFILGRASTIM 6 MG: KIT SUBCUTANEOUS at 12:22

## 2019-06-21 NOTE — PROGRESS NOTES
Patient arrived on unit to D//C CADD pump  Denies any discomforts  Res volume- 0ml  Neulasta onpro applied to RA  Patient aware of time it will infuse on 6/22/2019  Patient has f/u for port flush as this is last scheduled treatment at this time  Aware of next appointment   AVS given to patient

## 2019-07-05 ENCOUNTER — OFFICE VISIT (OUTPATIENT)
Dept: SLEEP CENTER | Facility: CLINIC | Age: 69
End: 2019-07-05
Payer: MEDICARE

## 2019-07-05 VITALS
HEIGHT: 70 IN | DIASTOLIC BLOOD PRESSURE: 70 MMHG | BODY MASS INDEX: 43.52 KG/M2 | WEIGHT: 304 LBS | HEART RATE: 85 BPM | SYSTOLIC BLOOD PRESSURE: 132 MMHG

## 2019-07-05 DIAGNOSIS — J34.89 DRY NOSE: ICD-10-CM

## 2019-07-05 DIAGNOSIS — R68.2 DRY MOUTH: ICD-10-CM

## 2019-07-05 DIAGNOSIS — I10 ESSENTIAL HYPERTENSION: ICD-10-CM

## 2019-07-05 DIAGNOSIS — I48.19 PERSISTENT ATRIAL FIBRILLATION (HCC): ICD-10-CM

## 2019-07-05 DIAGNOSIS — E66.01 MORBID OBESITY WITH BMI OF 40.0-44.9, ADULT (HCC): ICD-10-CM

## 2019-07-05 DIAGNOSIS — G47.33 OBSTRUCTIVE SLEEP APNEA SYNDROME: Primary | ICD-10-CM

## 2019-07-05 PROCEDURE — 99214 OFFICE O/P EST MOD 30 MIN: CPT | Performed by: INTERNAL MEDICINE

## 2019-07-05 NOTE — PROGRESS NOTES
Follow-Up Note - Asael [id number]  78 y o  male  :1950  CANDIDO:[id number]    CC: I saw this patient for follow-up in clinic today for his Sleep Disordered Breathing, Coexisting Sleep and Medical Problems  PFSH, Problem List, Medications & Allergies were reviewed in EMR  Interval changes: none reported  He  has a past medical history of A-fib ([address] ), Abdominal wall strain, Allergic rhinitis, Arthritis, CPAP (continuous positive airway pressure) dependence, Erectile dysfunction of non-organic origin, Lumbar strain, Multiple acquired skin tags, Palpitations, Pancreas cyst, Plantar fasciitis, Skin disorder, and Sleep apnea  He has a current medication list which includes the following prescription(s): acetaminophen, ascorbic acid, aspirin, atenolol, vitamin d, cyanocobalamin, benzonatate, and prochlorperazine  ROS: Reviewed (see attached)  DATA REVIEWED:  using PAP > 4 hours/night 70% of the time  Estimated TULIO 0 7/hour at pressure of 13cm H2O    SUBJECTIVE: Regarding use of PAP, Martine Reyes reports:   · He is experiencing some adverse effects: dry mouth and dry nose  · He is   benefiting from use: sleeping better   Sleep Routine: He reports getting 6 5-7 hrs sleep  ; he has no difficulty initiating or maintaining sleep   He awakens spontaneously and feels refreshed  He denied excessive drowsiness   He rated himself at Total score: 5 /24 on the [place] sleepiness scale  Habits: reports that he has never smoked  He has never used smokeless tobacco ,  reports that he drinks about 2 0 standard drinks of alcohol per week  ,  reports that he does not use drugs  , Caffeine use: limited , Exercise routine: none   OBJECTIVE: /70   Pulse 85   Ht 5' 10" (1 778 m)   Wt (!) 138 kg (304 lb)   BMI 43 62 kg/m²    Constitutional: Patient is well groomed; well appearing  Skin/Extrem: warm & dry; col & hydration normal; no edema  Psych: cooperativeand in no distress   Normal mood, affect & thought   CNS: Alert, orientated, clear & coherent speech  H&N: EOMI; NC/AT:no facial pressure marks, no rashes  Neck Circumference: 56 cm  ENMT Mucus membranes normal Nasal airway:patent  Oral airway: crowded  Resp:effort is normal CVS: irreg RR ABD:truncal obesity MSK:Gait normal     ASSESSMENT: Primary Sleep/Secondary(to Medical or Psych conditions) & comorbidities   1  Obstructive sleep apnea syndrome     2  Dry mouth     3  Dry nose     4  Persistent atrial fibrillation ([facility] )     5  Essential hypertension     6  Morbid obesity with BMI of 40 0-44 9, adult ([facility] )       PLAN:  1  Treatment with  PAP is medically necessary and Sb Donaldson is agreable to continue use  2  Care of equipment, methods to improve comfort using PAP and importance of compliance with therapy were discussed  3  Pressure setting: continue 13 cmH2O     4  Rx provided to replace supplies and Care coordinated with DME provider  5  Strategies for weight reduction were discussed  6  Follow-up is advised in 1 year or sooner if needed to monitor progress, compliance and to adjust therapy  Thank you for allowing me to participate in the care of this patient      Sincerely,    Authenticated electronically by Nadeem Palafox MD on 91/76/79   Board Certified Specialist

## 2019-07-05 NOTE — PROGRESS NOTES
Review of Systems      Genitourinary none   Cardiology ankle/leg swelling   Gastrointestinal none   Neurology none   Constitutional none   Integumentary none   Psychiatry none   Musculoskeletal none   Pulmonary shortness of breath with activity   ENT none   Endocrine none   Hematological none

## 2019-07-31 DIAGNOSIS — G47.33 OBSTRUCTIVE SLEEP APNEA SYNDROME: Primary | ICD-10-CM

## 2019-08-08 ENCOUNTER — HOSPITAL ENCOUNTER (OUTPATIENT)
Dept: INFUSION CENTER | Facility: CLINIC | Age: 69
Discharge: HOME | End: 2019-08-08
Payer: MEDICARE

## 2019-08-08 DIAGNOSIS — T45.1X5A CHEMOTHERAPY INDUCED NEUTROPENIA (HCC): ICD-10-CM

## 2019-08-08 DIAGNOSIS — C25.2 MALIGNANT NEOPLASM OF TAIL OF PANCREAS (HCC): Primary | ICD-10-CM

## 2019-08-08 DIAGNOSIS — D70.1 CHEMOTHERAPY INDUCED NEUTROPENIA (HCC): ICD-10-CM

## 2019-08-08 PROCEDURE — 96523 IRRIG DRUG DELIVERY DEVICE: CPT

## 2019-08-08 NOTE — PLAN OF CARE
Problem: Potential for Falls  Goal: Patient will remain free of falls  Description  INTERVENTIONS:  - Assess patient frequently for physical needs  -  Identify cognitive and physical deficits and behaviors that affect risk of falls    -  [place] fall precautions as indicated by assessment   - Educate patient/family on patient safety including physical limitations  - Instruct patient to call for assistance with activity based on assessment  - Modify environment to reduce risk of injury  - Consider OT/PT consult to assist with strengthening/mobility  Outcome: Progressing

## 2019-09-09 ENCOUNTER — OFFICE VISIT (OUTPATIENT)
Dept: FAMILY MEDICINE CLINIC | Facility: CLINIC | Age: 69
End: 2019-09-09
Payer: MEDICARE

## 2019-09-09 VITALS
WEIGHT: 309.4 LBS | TEMPERATURE: 98.6 F | OXYGEN SATURATION: 97 % | DIASTOLIC BLOOD PRESSURE: 88 MMHG | HEIGHT: 70 IN | HEART RATE: 82 BPM | SYSTOLIC BLOOD PRESSURE: 128 MMHG | BODY MASS INDEX: 44.29 KG/M2

## 2019-09-09 DIAGNOSIS — H60.502 ACUTE OTITIS EXTERNA OF LEFT EAR, UNSPECIFIED TYPE: Primary | ICD-10-CM

## 2019-09-09 PROCEDURE — 1124F ACP DISCUSS-NO DSCNMKR DOCD: CPT | Performed by: NURSE PRACTITIONER

## 2019-09-09 PROCEDURE — 99213 OFFICE O/P EST LOW 20 MIN: CPT | Performed by: NURSE PRACTITIONER

## 2019-09-09 RX ORDER — CIPROFLOXACIN AND DEXAMETHASONE 3; 1 MG/ML; MG/ML
4 SUSPENSION/ DROPS AURICULAR (OTIC) 2 TIMES DAILY
Qty: 5 ML | Refills: 0 | Status: SHIPPED | OUTPATIENT
Start: 2019-09-09 | End: 2019-10-04 | Stop reason: HOSPADM

## 2019-09-09 NOTE — PROGRESS NOTES
[facility]    ASSESSMENT AND PLAN     1  Acute otitis externa of left ear, unspecified type  Presents with S/S consistent of external ear infection  Rx today for Cipro drops  Dose and use reviewed  Treat x7 days  Keep ear clean and dry  Re-evaluate if symptoms persist or worsen    - ciprofloxacin-dexamethasone (CIPRODEX) otic suspension; Administer 4 drops into the left ear 2 (two) times a day  Dispense: 5 mL; Refill: 0            SUBJECTIVE       Patient ID: Balbir Laughlin is a 78 y o  male  Chief Complaint   Patient presents with   Fidencio Mitts     left ear infection, started in August       HISTORY [address] today with complaint of left ear pain  States that it feels clogged  It has been intermittently sore for the last several months  It has been worsening lately  Does have some drainage  No decrease in hearing  The following portions of the patient's history were reviewed and updated as appropriate: allergies, current medications, past family history, past medical history, past social history, past surgical history and problem list     REVIEW OF SYSTEMS  Review of Systems   HENT: Positive for ear discharge and ear pain          OBJECTIVE      VITAL SIGNS  /88 (BP Location: Right arm, Patient Position: Sitting, Cuff Size: Large)   Pulse 82   Temp 98 6 °F (37 °C)   Ht 5' 10" (1 778 m)   Wt (!) 140 kg (309 lb 6 4 oz)   SpO2 97%   BMI 44 39 kg/m²     CURRENT MEDICATIONS    Current Outpatient Medications:     ascorbic acid (VITAMIN C) 1000 MG tablet, Take 1 tablet by mouth daily, Disp: , Rfl:     aspirin 325 mg tablet, Take 1 tablet by mouth daily, Disp: , Rfl:     atenolol (TENORMIN) 25 mg tablet, TAKE 1 TABLET BY MOUTH EVERY DAY, Disp: 30 tablet, Rfl: 11    Cholecalciferol (VITAMIN D) 2000 units CAPS, Take by mouth daily, Disp: , Rfl:     Cyanocobalamin (VITAMIN B 12 PO), Take by mouth, Disp: , Rfl:     acetaminophen (TYLENOL) 325 mg tablet, Take 2 tablets (650 mg total) by mouth every 6 (six) hours as needed for mild pain (Patient not taking: Reported on 9/9/2019), Disp: 30 tablet, Rfl: 0    benzonatate (TESSALON PERLES) 100 mg capsule, Take 1 capsule (100 mg total) by mouth 3 (three) times a day as needed for cough (Patient not taking: Reported on 7/5/2019), Disp: 20 capsule, Rfl: 0    ciprofloxacin-dexamethasone (CIPRODEX) otic suspension, Administer 4 drops into the left ear 2 (two) times a day, Disp: 5 mL, Rfl: 0      PHYSICAL EXAMINATION   Physical Exam   Constitutional: He appears well-developed and well-nourished  HENT:   Right Ear: Hearing, tympanic membrane, external ear and ear canal normal    Left Ear: There is drainage and swelling  Left ear canal red/inflamed  Scant drainage noted  Skin on external ear and pinna scaly  Lobe mildly inflamed   Nursing note and vitals reviewed

## 2019-09-10 ENCOUNTER — TELEPHONE (OUTPATIENT)
Dept: HEMATOLOGY ONCOLOGY | Facility: CLINIC | Age: 69
End: 2019-09-10

## 2019-09-18 ENCOUNTER — HOSPITAL ENCOUNTER (OUTPATIENT)
Dept: INFUSION CENTER | Facility: CLINIC | Age: 69
Discharge: HOME | End: 2019-09-18
Payer: MEDICARE

## 2019-09-18 DIAGNOSIS — T45.1X5A CHEMOTHERAPY INDUCED NEUTROPENIA (HCC): ICD-10-CM

## 2019-09-18 DIAGNOSIS — D70.1 CHEMOTHERAPY INDUCED NEUTROPENIA (HCC): ICD-10-CM

## 2019-09-18 DIAGNOSIS — C25.2 MALIGNANT NEOPLASM OF TAIL OF PANCREAS (HCC): Primary | ICD-10-CM

## 2019-09-18 PROCEDURE — 96523 IRRIG DRUG DELIVERY DEVICE: CPT

## 2019-09-18 NOTE — PROGRESS NOTES
Pt seen today for port flush  Port flushed well and blood return noted after numerous attempts at flushing it along with repositioning  AVS provided

## 2019-10-01 ENCOUNTER — HOSPITAL ENCOUNTER (OUTPATIENT)
Dept: CT IMAGING | Facility: HOSPITAL | Age: 69
Discharge: HOME | End: 2019-10-01
Payer: MEDICARE

## 2019-10-01 DIAGNOSIS — D49.0 IPMN (INTRADUCTAL PAPILLARY MUCINOUS NEOPLASM): ICD-10-CM

## 2019-10-01 PROCEDURE — 74177 CT ABD & PELVIS W/CONTRAST: CPT

## 2019-10-01 RX ADMIN — IOHEXOL 100 ML: 350 INJECTION, SOLUTION INTRAVENOUS at 10:45

## 2019-10-04 ENCOUNTER — OFFICE VISIT (OUTPATIENT)
Dept: SURGICAL ONCOLOGY | Facility: CLINIC | Age: 69
End: 2019-10-04
Payer: MEDICARE

## 2019-10-04 ENCOUNTER — OFFICE VISIT (OUTPATIENT)
Dept: CARDIOLOGY CLINIC | Facility: CLINIC | Age: 69
End: 2019-10-04
Payer: MEDICARE

## 2019-10-04 VITALS
SYSTOLIC BLOOD PRESSURE: 112 MMHG | OXYGEN SATURATION: 92 % | WEIGHT: 314 LBS | DIASTOLIC BLOOD PRESSURE: 70 MMHG | BODY MASS INDEX: 44.95 KG/M2 | HEART RATE: 83 BPM | HEIGHT: 70 IN

## 2019-10-04 VITALS
HEART RATE: 76 BPM | BODY MASS INDEX: 44.81 KG/M2 | DIASTOLIC BLOOD PRESSURE: 80 MMHG | SYSTOLIC BLOOD PRESSURE: 110 MMHG | HEIGHT: 70 IN | TEMPERATURE: 98.7 F | RESPIRATION RATE: 16 BRPM | WEIGHT: 313 LBS

## 2019-10-04 DIAGNOSIS — C25.2 MALIGNANT NEOPLASM OF TAIL OF PANCREAS (HCC): Primary | ICD-10-CM

## 2019-10-04 DIAGNOSIS — I48.21 PERMANENT ATRIAL FIBRILLATION (HCC): Primary | ICD-10-CM

## 2019-10-04 DIAGNOSIS — R60.0 LOWER EXTREMITY EDEMA: ICD-10-CM

## 2019-10-04 PROCEDURE — 99214 OFFICE O/P EST MOD 30 MIN: CPT | Performed by: SURGERY

## 2019-10-04 PROCEDURE — 99213 OFFICE O/P EST LOW 20 MIN: CPT | Performed by: INTERNAL MEDICINE

## 2019-10-04 NOTE — PROGRESS NOTES
Cardiology Follow Up    Aric Graham  1950  [id number]  [address]  [address]  [place] [id number] [phone]    Reason for visit: Patient here for FU of chronic AFIB and LE edema    1  Permanent atrial fibrillation     2  Lower extremity edema         Interval History:   Since the patient was last here he had distal pancreatectomy for adenoca in March    He also has chemorx  He has AGARWAL which is about the same as before He does have ongoing edema which is about the same  He denies palpitations or lightheadedness   He denies chest pain    Patient Active Problem List   Diagnosis    Obstructive sleep apnea syndrome    Hypersomnia    Shortness of breath    Permanent atrial fibrillation    Morbid obesity with BMI of 40 0-44 9, adult (HCC)    Dry mouth    Lower extremity edema    Pancreatic duct dilated    Malignant [facility] )    Chemotherapy induced neutropenia (HCC)    Cough    Pancytopenia (HCC)     Past Medical History:   Diagnosis Date    A-fib ([facility] )     Abdominal wall strain     last assessed: 10/21/14    Allergic rhinitis     last assessed: 05/03/16    Arthritis     CPAP (continuous positive airway pressure) dependence     Erectile dysfunction of non-organic origin     last assessed: 03/17/14    Lumbar strain     last assessed: 10/21/14    Multiple acquired skin tags     last assessed: 2/18/16    Palpitations     last assessed: 03/17/14    Pancreas cyst     Plantar fasciitis     Skin disorder     last assessed: 05/28/13    Sleep apnea      Social History     Socioeconomic History    Marital status: /Civil Union     Spouse name: Not on file    Number of children: Not on file    Years of education: Not on file    Highest education level: Not on file   Occupational History    Not on file   Social Needs    Financial resource strain: Not on file   James-Alex insecurity:     Worry: Not on file     Inability: Not on file    Transportation needs:     Medical: Not on file     Non-medical: Not on file   Tobacco Use    Smoking status: Never Smoker    Smokeless tobacco: Never Used   Substance and Sexual Activity    Alcohol use:  Yes     Alcohol/week: 2 0 standard drinks     Types: 2 Cans of beer per week     Frequency: 2-3 times a week     Drinks per session: 1 or 2     Binge frequency: Less than monthly     Comment: couple times per week;     Drug use: No    Sexual activity: Yes   Lifestyle    Physical activity:     Days per week: Not on file     Minutes per session: Not on file    Stress: Not on file   Relationships    Social connections:     Talks on phone: Not on file     Gets together: Not on file     Attends [employer] service: Not on file     Active member of club or organization: Not on file     Attends meetings of clubs or organizations: Not on file     Relationship status: Not on file    Intimate partner violence:     Fear of current or ex partner: Not on file     Emotionally abused: Not on file     Physically abused: Not on file     Forced sexual activity: Not on file   Other Topics Concern    Not on file   Social History Narrative    Not on file      Family History   Problem Relation Age of Onset    Breast cancer Mother     Cervical cancer Paternal Aunt     Pancreatic cancer Other     Liver cancer Other     No Known Problems Father      Past Surgical History:   Procedure Laterality Date    CATARACT EXTRACTION Bilateral     COLONOSCOPY      FL GUIDED CENTRAL VENOUS ACCESS DEVICE INSERTION  4/23/2019    FOOT SURGERY      Due to plantar fascitis    JOINT REPLACEMENT Left     LTK    LINEAR ENDOSCOPIC U/S      PANCREATECTOMY LAPAROSCOPIC N/A 3/12/2019    Procedure: DISTAL PANCREATECTOMY LAPAROSCOPIC/POSSIBLE OPEN;  Surgeon: Joe Josue MD;  [facility];  Service: Surgical Oncology    UT EDG US EXAM SURGICAL ALTER STOM DUODENUM/JEJUNUM N/A 1/24/2019    Procedure: LINEAR ENDOSCOPIC U/S;  Surgeon: Angel Benites MD;  Location[facility]; Service: Gastroenterology    REPLACEMENT TOTAL KNEE Left     TUNNELED VENOUS PORT PLACEMENT Left 4/23/2019    Procedure: INSERTION VENOUS PORT (PORT-A-CATH); Surgeon: Luis Borrero MD;  [facility];  Service: Surgical Oncology    UMBILICAL HERNIA REPAIR         Current Outpatient Medications:     acetaminophen (TYLENOL) 325 mg tablet, Take 2 tablets (650 mg total) by mouth every 6 (six) hours as needed for mild pain, Disp: 30 tablet, Rfl: 0    ascorbic acid (VITAMIN C) 1000 MG tablet, Take 1 tablet by mouth daily, Disp: , Rfl:     aspirin 325 mg tablet, Take 1 tablet by mouth daily, Disp: , Rfl:     atenolol (TENORMIN) 25 mg tablet, TAKE 1 TABLET BY MOUTH EVERY DAY, Disp: 30 tablet, Rfl: 11    Cholecalciferol (VITAMIN D) 2000 units CAPS, Take by mouth daily, Disp: , Rfl:     ciprofloxacin-dexamethasone (CIPRODEX) otic suspension, Administer 4 drops into the left ear 2 (two) times a day, Disp: 5 mL, Rfl: 0    Cyanocobalamin (VITAMIN B 12 PO), Take by mouth, Disp: , Rfl:     benzonatate (TESSALON PERLES) 100 mg capsule, Take 1 capsule (100 mg total) by mouth 3 (three) times a day as needed for cough (Patient not taking: Reported on 10/4/2019), Disp: 20 capsule, Rfl: 0  No Known Allergies      Review of Systems:  Review of Systems   Constitutional: Positive for fatigue  Negative for activity change, appetite change and unexpected weight change  Respiratory: Positive for shortness of breath  Negative for cough and wheezing  Cardiovascular: Positive for leg swelling  Negative for chest pain and palpitations  Gastrointestinal: Positive for diarrhea  Negative for abdominal pain and constipation  Genitourinary: Positive for frequency  Negative for hematuria  Musculoskeletal: Positive for arthralgias and back pain  Neurological: Negative for dizziness and headaches  Psychiatric/Behavioral: Negative for agitation, behavioral problems, confusion and decreased concentration  Physical Exam:  Vitals:    10/04/19 0753   BP: 112/70   BP Location: Left arm   Patient Position: Sitting   Cuff Size: Large   Pulse: 83   SpO2: 92%   Weight: (!) 142 kg (314 lb)   Height: 5' 10" (1 778 m)       Physical Exam   Constitutional: He appears well-developed and well-nourished  No distress  obese   HENT:   Head: Normocephalic and atraumatic  Mouth/Throat: Oropharynx is clear and moist  No oropharyngeal exudate  Eyes: Conjunctivae are normal  No scleral icterus  Neck: Neck supple  Normal carotid pulses and no JVD present  Carotid bruit is not present  No thyromegaly present  Cardiovascular: Normal rate  An irregularly irregular rhythm present  Exam reveals no gallop and no friction rub  No murmur heard  Pulses:       Dorsalis pedis pulses are 2+ on the right side, and 2+ on the left side  Posterior tibial pulses are 2+ on the right side, and 2+ on the left side  Pulmonary/Chest: Breath sounds normal  He has no wheezes  He has no rhonchi  He has no rales  Abdominal: Soft  He exhibits no mass  There is no hepatosplenomegaly  There is no tenderness  Musculoskeletal: He exhibits edema (2+ edema of the LEs)  Discussion/Summary:  1  Permanent atrial fibrillation  Rate well controlled on atenolol  Chads Vasc 2 score is 1 and will continue aspirin only  2  Lower extremity edema  Stable  Does have chronic dyspnea which is no worse  Patient got through a 9 hour operation indicating other than atrial fibrillation he has a normal cardiac status          Follow-up 1 year      Barbara Mathur MD

## 2019-10-04 NOTE — PROGRESS NOTES
Surgical Oncology Follow Up       [facility]  CANCER [facility] SURGICAL ONCOLOGY [facility] [id number]    Teagan Wagner  1950  [id number]  [address]  [facility]ANCER CARE [facility] SURGICAL ONCOLOGY [facility]    Chief Complaint   Patient presents with    Follow-up     6 month follow up  Assessment/Plan:    No problem-specific Assessment & Plan notes found for this encounter  Diagnoses and all orders for this visit:    Malignant neoplasm of tail of pancreas ([facility] )  -     CT chest abdomen pelvis w contrast; Future  -     Basic metabolic panel; Future  -     Cancer antigen 19-9; Future  -     Ambulatory referral to Hematology / Oncology; Future        Advance Care Planning/Advance Directives:  Discussed disease status, cancer treatment plans and/or cancer treatment goals with the patient  Malignant neoplasm of tail of pancreas ([facility] )    3/12/2019 Surgery     Distal pancreatectomy  Several foci of invasive colloid cancer  Grade 1  IPMN with high grade dysplasia at margin  T1b, NO MX Stage IA      4/11/2019 - 6/21/2019 Chemotherapy     Saw Dr Maren Dhaliwal  Will be starting Folfirinox 4/24   Ended 6/21/2019 4/23/2019 -  Chemotherapy     fluorouracil (ADRUCIL) injection 1,010 mg, 400 mg/m2 = 1,010 mg, Intravenous, Once, 2 of 2 cycles  pegfilgrastim (NEULASTA ONPRO) subcutaneous injection kit 6 mg, 6 mg, Subcutaneous, Once, 2 of 2 cycles  Administration: 6 mg (6/21/2019), 6 mg (6/7/2019)  irinotecan (CAMPTOSAR) 455 mg in dextrose 5 % 500 mL chemo infusion, 180 mg/m2 = 455 mg, Intravenous, Once, 4 of 4 cycles  Dose modification: 140 mg/m2 (original dose 180 mg/m2, Cycle 3, Reason: Other (See Comments)), 125 mg/m2 (original dose 180 mg/m2, Cycle 3, Reason: Dose Not Tolerated)  Administration: 316 mg (6/5/2019), 300 mg (6/19/2019)  leucovorin 1,012 mg in dextrose 5 % 250 mL IVPB, 400 mg/m2 = 1,012 mg, Intravenous, Once, 2 of 2 cycles  oxaliplatin (ELOXATIN) 215 05 mg in dextrose 5 % 250 mL chemo infusion, 85 mg/m2 = 215 05 mg, Intravenous, Once, 4 of 4 cycles  Dose modification: 65 mg/m2 (original dose 85 mg/m2, Cycle 3, Reason: Other (See Comments)), 60 mg/m2 (original dose 85 mg/m2, Cycle 3, Reason: Dose Not Tolerated)  Administration: 151 8 mg (6/5/2019), 151 8 mg (6/19/2019)         History of Present Illness:  Stage I pancreatic tail cancer status post distal pancreatectomy followed by chemotherapy  -Interval History:  He is here for surveillance visit with no new complaints to report  Review of Systems:  Review of Systems   Constitutional: Negative  HENT: Negative  Eyes: Negative  Respiratory: Negative  Cardiovascular: Negative  Gastrointestinal: Negative  Endocrine: Negative  Genitourinary: Negative  Musculoskeletal: Negative  Skin: Negative  Allergic/Immunologic: Negative  Neurological: Negative  Hematological: Negative  Psychiatric/Behavioral: Negative          Patient Active Problem List   Diagnosis    Obstructive sleep apnea syndrome    Hypersomnia    Shortness of breath    Permanent atrial fibrillation    Morbid obesity with BMI of 40 0-44 9, adult (HCC)    Dry mouth    Lower extremity edema    Pancreatic duct dilated    Malignant ne[facility] of tail of pancreas [facility]    Chemotherapy induced neutropenia (HCC)    Cough    Pancytopenia ([facility] )     Past Medical History:   Diagnosis Date    A-fib ([facility] )     Abdominal wall strain     last assessed: 10/21/14    Allergic rhinitis     last assessed: 05/03/16    Arthritis     CPAP (continuous positive airway pressure) dependence     Erectile dysfunction of non-organic origin     last assessed: 03/17/14    Lumbar strain     last assessed: 10/21/14    Multiple acquired skin tags     last assessed: 2/18/16    Palpitations     last assessed: 03/17/14    Pancreas cyst     Plantar fasciitis     Skin disorder     last assessed: 05/28/13    Sleep apnea      Past Surgical History:   Procedure Laterality Date    CATARACT EXTRACTION Bilateral     COLONOSCOPY      FL GUIDED CENTRAL VENOUS ACCESS DEVICE INSERTION  4/23/2019    FOOT SURGERY      Due to plantar fascitis    JOINT REPLACEMENT Left     LTK    LINEAR ENDOSCOPIC U/S      PANCREATECTOMY LAPAROSCOPIC N/A 3/12/2019    Procedure: DISTAL PANCREATECTOMY LAPAROSCOPIC/POSSIBLE OPEN;  Surgeon: Rivka Barney MD;  [facility];  Service: Surgical Oncology    OR EDG US EXAM SURGICAL ALTER STOM DUODENUM/JEJUNUM N/A 1/24/2019    Procedure: LINEAR ENDOSCOPIC U/S;  Surgeon: Thuy Restrepo MD;  [facility]; Service: Gastroenterology    REPLACEMENT TOTAL KNEE Left     TUNNELED VENOUS PORT PLACEMENT Left 4/23/2019    Procedure: INSERTION VENOUS PORT (PORT-A-CATH); Surgeon: Rivka Barney MD;  [facility];  Service: Surgical Oncology    UMBILICAL HERNIA REPAIR       Family History   Problem Relation Age of Onset    Breast cancer Mother     Cervical cancer Paternal Aunt     Pancreatic cancer Other     Liver cancer Other     No Known Problems Father      Social History     Socioeconomic History    Marital status: /Civil Union     Spouse name: Not on file    Number of children: Not on file    Years of education: Not on file    Highest education level: Not on file   Occupational History    Not on file   Social Needs    Financial resource strain: Not on file    Food insecurity:     Worry: Not on file     Inability: Not on file    Transportation needs:     Medical: Not on file     Non-medical: Not on file   Tobacco Use    Smoking status: Never Smoker    Smokeless tobacco: Never Used   Substance and Sexual Activity    Alcohol use:  Yes     Alcohol/week: 2 0 standard drinks     Types: 2 Cans of beer per week     Frequency: 2-3 times a week     Drinks per session: 1 or 2     Binge frequency: Less than monthly     Comment: couple times per week;     Drug use: No    Sexual activity: Yes   Lifestyle    Physical activity:     Days per week: Not on file     Minutes per session: Not on file    Stress: Not on file   Relationships    Social connections:     Talks on phone: Not on file     Gets together: Not on file     Attends Yarsanism service: Not on file     Active member of club or organization: Not on file     Attends meetings of clubs or organizations: Not on file     Relationship status: Not on file    Intimate partner violence:     Fear of current or ex partner: Not on file     Emotionally abused: Not on file     Physically abused: Not on file     Forced sexual activity: Not on file   Other Topics Concern    Not on file   Social History Narrative    Not on file       Current Outpatient Medications:     acetaminophen (TYLENOL) 325 mg tablet, Take 2 tablets (650 mg total) by mouth every 6 (six) hours as needed for mild pain, Disp: 30 tablet, Rfl: 0    ascorbic acid (VITAMIN C) 1000 MG tablet, Take 1 tablet by mouth daily, Disp: , Rfl:     aspirin 325 mg tablet, Take 1 tablet by mouth daily, Disp: , Rfl:     atenolol (TENORMIN) 25 mg tablet, TAKE 1 TABLET BY MOUTH EVERY DAY, Disp: 30 tablet, Rfl: 11    Cholecalciferol (VITAMIN D) 2000 units CAPS, Take by mouth daily, Disp: , Rfl:     Cyanocobalamin (VITAMIN B 12 PO), Take by mouth, Disp: , Rfl:   No Known Allergies  Vitals:    10/04/19 1027   BP: 110/80   Pulse: 76   Resp: 16   Temp: 98 7 °F (37 1 °C)       Physical Exam   Constitutional: He is oriented to person, place, and time  He appears well-developed and well-nourished  HENT:   Head: Normocephalic and atraumatic  Right Ear: External ear normal    Left Ear: External ear normal    Eyes: Pupils are equal, round, and reactive to light  EOM are normal    Neck: Normal range of motion  Neck supple  Cardiovascular: Normal rate, regular rhythm and normal heart sounds  Pulmonary/Chest: Effort normal and breath sounds normal    Abdominal: Soft  Bowel sounds are normal  He exhibits no distension and no mass  There is no tenderness  There is no rebound and no guarding  No hernia  Musculoskeletal: Normal range of motion  Neurological: He is alert and oriented to person, place, and time  Skin: Skin is warm and dry  Psychiatric: He has a normal mood and affect  His behavior is normal  Judgment and thought content normal          Results:  Labs:  Component      Latest Ref Rng & Units 6/18/2019   CA 19-9      0 - 35 U/mL 25       Imaging  Ct Abdomen Pelvis W Contrast    Result Date: 10/2/2019  Narrative: CT ABDOMEN AND PELVIS WITH IV CONTRAST INDICATION: Stage IA well-differentiated adenocarcinoma of the distal pancreas status post partial pancreatectomy in March 2019 tumor size between 0 5 cm-1 cm with several foci of invasive colloid carcinoma, tumor confined to the pancreas with IPMN, high-grade dysplasia, no evidence of lymphovascular invasions, no evidence of perineural invasion, 1 regional lymph node is negative for carcinoma and the margins were negative as well  Status post chemotherapy  Restaging exam  COMPARISON:  Abdominal MRI 1/2/2019  CT abdomen and pelvis 12/5/2018  TECHNIQUE:  CT examination of the abdomen and pelvis was performed  Scanning through the abdomen was performed in arterial, venous and delayed phases according a protocol specifically designed to evaluate upper abdominal viscera  Axial, sagittal, and coronal 2D reformatted images were created from the source data and submitted for interpretation  Radiation dose length product (DLP) for this visit:  3550 mGy-cm   This examination, like all CT scans performed in the [facility], was performed utilizing techniques to minimize radiation dose exposure, including the use of iterative reconstruction and automated exposure control  IV Contrast:  100 mL of iohexol (OMNIPAQUE) Enteric Contrast:  Enteric contrast was not administered   FINDINGS: ABDOMEN LOWER CHEST:  No clinically significant abnormality identified in the visualized lower chest  LIVER/BILIARY TREE:  Unremarkable  GALLBLADDER:  No calcified gallstones  No pericholecystic inflammatory change  SPLEEN: Normal size  Small perisplenic varices and splenorenal shunt are identified  PANCREAS: Surgical changes of distal pancreatectomy without measurable residual soft tissue mass, noting mild linear infiltrative soft tissue density within the surgical bed extending inferiorly along the perinephric fascia, likely post surgical granulation tissue (series 5, images 49-59)  The proximal pancreas is homogeneously enhancing, without main ductal dilation  ADRENAL GLANDS:  Unremarkable  KIDNEYS/URETERS:  Unremarkable  No hydronephrosis  STOMACH AND BOWEL: No significant abnormality  Incidental note is made of a small duodenal diverticulum  APPENDIX:  No findings to suggest appendicitis  ABDOMINOPELVIC CAVITY:  No ascites or free intraperitoneal air  Infiltration of the greater omentum within the upper abdomen is likely postsurgical  Again seen are mildly enlarged upper abdominal lymph nodes, as follows: *  Portacaval node measuring 2 4 x 1 6 cm (series 5, image 61), previously measured 2 3 x 1 6 cm  *  Periceliac node measuring 1 4 x 1 3 cm (series 5, image 49), previously measured 1 4 x 1 2 cm  These are nonspecific but stable compared to 12/5/2018 CT  No new or enlarging abdominal or pelvic lymphadenopathy is identified  VESSELS: Moderate aortic atherosclerotic calcification  PELVIS REPRODUCTIVE ORGANS:  Unremarkable for patient's age  URINARY BLADDER: Mild circumferential bladder wall thickening likely related to underdistention  ABDOMINAL WALL/INGUINAL REGIONS: Incisional changes in the anterior abdominal wall  OSSEOUS STRUCTURES:  No acute fracture or destructive osseous lesion  Impression: 1  Status post distal pancreatectomy for well-differentiated pancreatic adenocarcinoma, without measurable residual soft tissue mass  Mild linear infiltrative soft tissue within the surgical bed extending along the perinephric fascia, favored to represent granulation tissue  Attention on follow-up is recommended  2  Residual infiltration of mildly enlarged upper abdominal lymph nodes, stable compared to 12/5/2018 CT; attention on follow-up is recommended  No new or enlarging abdominopelvic lymphadenopathy  3  No findings of distant metastatic disease within the abdomen or pelvis  Workstation performed: [id number]     I reviewed the above laboratory and imaging data  Discussion/Summary:  History of stage I pancreatic cancer status post distal pancreatectomy  He is doing well and is any D  Plan on follow-up in 6 months with repeat CT scan and CA 19 9 levels to be drawn at that time  He will be seen Dr J Carlos Fernandes  Presumably he will discuss removal of port at which time he will call us to get this set up for an office removal   All questions answered  Radiology report reviewed with patient and a copy given to him for his records

## 2019-10-04 NOTE — LETTER
October 4, 2019     Betsey Cheung, [address] [facility] [address]  [address]    Patient: Kimber Cristobal   YOB: 1950   Date of Visit: 10/4/2019       Dear Dr Jane Howe:    Thank you for referring Nga Francois to me for evaluation  Below are my notes for this consultation  If you have questions, please do not hesitate to call me  I look forward to following your patient along with you  Sincerely,        Em Esqueda MD        CC: MD Flora Harris MD Dorrie Sato, MD Elveria Hong, MD  10/4/2019 11:08 AM  Sign at close encounter     Surgical Oncology Follow Up       [facility] [address]  1950  [id number]  [address] [facility] SURGICAL ONCOLOGY [address] [address]    Chief Complaint   Patient presents with    Follow-up     6 month follow up  Assessment/Plan:    No problem-specific Assessment & Plan notes found for this encounter  Diagnoses and all orders for this visit:    Malignant neoplasm of tail of pancreas ([facility] [address] )  -     CT chest abdomen pelvis w contrast; Future  -     Basic metabolic panel; Future  -     Cancer antigen 19-9; Future  -     Ambulatory referral to Hematology / Oncology; Future        Advance Care Planning/Advance Directives:  Discussed disease status, cancer treatment plans and/or cancer treatment goals with the patient  Malignant neoplasm of tail of pancreas ([facility] [address] )    3/12/2019 Surgery     Distal pancreatectomy  Several foci of invasive colloid cancer  Grade 1  IPMN with high grade dysplasia at margin  T1b, NO MX Stage IA      4/11/2019 - 6/21/2019 Chemotherapy     Saw Dr Miki Russell  Will be starting Folfirinox 4/24   Ended 6/21/2019 4/23/2019 -  Chemotherapy     fluorouracil (ADRUCIL) injection 1,010 mg, 400 mg/m2 = 1,010 mg, Intravenous, Once, 2 of 2 cycles  pegfilgrastim (NEULASTA ONPRO) subcutaneous injection kit 6 mg, 6 mg, Subcutaneous, Once, 2 of 2 cycles  Administration: 6 mg (6/21/2019), 6 mg (6/7/2019)  irinotecan (CAMPTOSAR) 455 mg in dextrose 5 % 500 mL chemo infusion, 180 mg/m2 = 455 mg, Intravenous, Once, 4 of 4 cycles  Dose modification: 140 mg/m2 (original dose 180 mg/m2, Cycle 3, Reason: Other (See Comments)), 125 mg/m2 (original dose 180 mg/m2, Cycle 3, Reason: Dose Not Tolerated)  Administration: 316 mg (6/5/2019), 300 mg (6/19/2019)  leucovorin 1,012 mg in dextrose 5 % 250 mL IVPB, 400 mg/m2 = 1,012 mg, Intravenous, Once, 2 of 2 cycles  oxaliplatin (ELOXATIN) 215 05 mg in dextrose 5 % 250 mL chemo infusion, 85 mg/m2 = 215 05 mg, Intravenous, Once, 4 of 4 cycles  Dose modification: 65 mg/m2 (original dose 85 mg/m2, Cycle 3, Reason: Other (See Comments)), 60 mg/m2 (original dose 85 mg/m2, Cycle 3, Reason: Dose Not Tolerated)  Administration: 151 8 mg (6/5/2019), 151 8 mg (6/19/2019)         History of Present Illness:  Stage I pancreatic tail cancer status post distal pancreatectomy followed by chemotherapy  -Interval History:  He is here for surveillance visit with no new complaints to report  Review of Systems:  Review of Systems   Constitutional: Negative  HENT: Negative  Eyes: Negative  Respiratory: Negative  Cardiovascular: Negative  Gastrointestinal: Negative  Endocrine: Negative  Genitourinary: Negative  Musculoskeletal: Negative  Skin: Negative  Allergic/Immunologic: Negative  Neurological: Negative  Hematological: Negative  Psychiatric/Behavioral: Negative          Patient Active Problem List   Diagnosis    Obstructive sleep apnea syndrome    Hypersomnia    Shortness of breath    Permanent atrial fibrillation    Morbid obesity with BMI of 40 0-44 9, adult (HCC)    Dry mouth    Lower extremity edema    Pancreatic duct dilated    Malignant neoplasm of tail of pancreas ([address] )    [facility]    Cough    Pancytopenia ([facility] [address] )     Past Medical History:   Diagnosis Date    A-fib ([facility] )     Abdominal wall strain     last assessed: 10/21/14    Allergic rhinitis     last assessed: 05/03/16    Arthritis     CPAP (continuous positive airway pressure) dependence     Erectile dysfunction of non-organic origin     last assessed: 03/17/14    Lumbar strain     last assessed: 10/21/14    Multiple acquired skin tags     last assessed: 2/18/16    Palpitations     last assessed: 03/17/14    Pancreas cyst     Plantar fasciitis     Skin disorder     last assessed: 05/28/13    Sleep apnea      Past Surgical History:   Procedure Laterality Date    CATARACT EXTRACTION Bilateral     COLONOSCOPY      FL GUIDED CENTRAL VENOUS ACCESS DEVICE INSERTION  4/23/2019    FOOT SURGERY      Due to plantar fascitis    JOINT REPLACEMENT Left     LTK    LINEAR ENDOSCOPIC U/S      PANCREATECTOMY LAPAROSCOPIC N/A 3/12/2019    Procedure: DISTAL PANCREATECTOMY LAPAROSCOPIC/POSSIBLE OPEN;  Surgeon: Nati Ramirez MD;  [facility];  Service: Surgical Oncology    TN EDG US EXAM SURGICAL ALTER STOM DUODENUM/JEJUNUM N/A 1/24/2019    Procedure: LINEAR ENDOSCOPIC U/S;  Surgeon: Guero Zarco MD;  [facility]; Service: Gastroenterology    REPLACEMENT TOTAL KNEE Left     TUNNELED VENOUS PORT PLACEMENT Left 4/23/2019    Procedure: INSERTION VENOUS PORT (PORT-A-CATH);   Surgeon: Nati Ramirez MD;  [facility];  Service: Surgical Oncology    UMBILICAL HERNIA REPAIR       Family History   Problem Relation Age of Onset    Breast cancer Mother     Cervical cancer Paternal Aunt     Pancreatic cancer Other     Liver cancer Other     No Known Problems Father      Social History     Socioeconomic History    Marital status: /Civil Union     Spouse name: Not on file    Number of children: Not on file    Years of education: Not on file    Highest education level: Not on file   Occupational History    Not on file   Social Needs    Financial resource strain: Not on file    Food insecurity:     Worry: Not on file     Inability: Not on file    Transportation needs:     Medical: Not on file     Non-medical: Not on file   Tobacco Use    Smoking status: Never Smoker    Smokeless tobacco: Never Used   Substance and Sexual Activity    Alcohol use:  Yes     Alcohol/week: 2 0 standard drinks     Types: 2 Cans of beer per week     Frequency: 2-3 times a week     Drinks per session: 1 or 2     Binge frequency: Less than monthly     Comment: couple times per week;     Drug use: No    Sexual activity: Yes   Lifestyle    Physical activity:     Days per week: Not on file     Minutes per session: Not on file    Stress: Not on file   Relationships    Social connections:     Talks on phone: Not on file     Gets together: Not on file     Attends Christianity service: Not on file     Active member of club or organization: Not on file     Attends meetings of clubs or organizations: Not on file     Relationship status: Not on file    Intimate partner violence:     Fear of current or ex partner: Not on file     Emotionally abused: Not on file     Physically abused: Not on file     Forced sexual activity: Not on file   Other Topics Concern    Not on file   Social History Narrative    Not on file       Current Outpatient Medications:     acetaminophen (TYLENOL) 325 mg tablet, Take 2 tablets (650 mg total) by mouth every 6 (six) hours as needed for mild pain, Disp: 30 tablet, Rfl: 0    ascorbic acid (VITAMIN C) 1000 MG tablet, Take 1 tablet by mouth daily, Disp: , Rfl:     aspirin 325 mg tablet, Take 1 tablet by mouth daily, Disp: , Rfl:     atenolol (TENORMIN) 25 mg tablet, TAKE 1 TABLET BY MOUTH EVERY DAY, Disp: 30 tablet, Rfl: 11    Cholecalciferol (VITAMIN D) 2000 units CAPS, Take by mouth daily, Disp: , Rfl:     Cyanocobalamin (VITAMIN B 12 PO), Take by mouth, Disp: , Rfl:   No Known Allergies  Vitals: 10/04/19 1027   BP: 110/80   Pulse: 76   Resp: 16   Temp: 98 7 °F (37 1 °C)       Physical Exam   Constitutional: He is oriented to person, place, and time  He appears well-developed and well-nourished  HENT:   Head: Normocephalic and atraumatic  Right Ear: External ear normal    Left Ear: External ear normal    Eyes: Pupils are equal, round, and reactive to light  EOM are normal    Neck: Normal range of motion  Neck supple  Cardiovascular: Normal rate, regular rhythm and normal heart sounds  Pulmonary/Chest: Effort normal and breath sounds normal    Abdominal: Soft  Bowel sounds are normal  He exhibits no distension and no mass  There is no tenderness  There is no rebound and no guarding  No hernia  Musculoskeletal: Normal range of motion  Neurological: He is alert and oriented to person, place, and time  Skin: Skin is warm and dry  Psychiatric: He has a normal mood and affect  His behavior is normal  Judgment and thought content normal          Results:  Labs:  Component      Latest Ref Rng & Units 6/18/2019   CA 19-9      0 - 35 U/mL 25       Imaging  Ct Abdomen Pelvis W Contrast    Result Date: 10/2/2019  Narrative: CT ABDOMEN AND PELVIS WITH IV CONTRAST INDICATION: Stage IA well-differentiated adenocarcinoma of the distal pancreas status post partial pancreatectomy in March 2019 tumor size between 0 5 cm-1 cm with several foci of invasive colloid carcinoma, tumor confined to the pancreas with IPMN, high-grade dysplasia, no evidence of lymphovascular invasions, no evidence of perineural invasion, 1 regional lymph node is negative for carcinoma and the margins were negative as well  Status post chemotherapy  Restaging exam  COMPARISON:  Abdominal MRI 1/2/2019  CT abdomen and pelvis 12/5/2018  TECHNIQUE:  CT examination of the abdomen and pelvis was performed   Scanning through the abdomen was performed in arterial, venous and delayed phases according a protocol specifically designed to evaluate upper abdominal viscera  Axial, sagittal, and coronal 2D reformatted images were created from the source data and submitted for interpretation  Radiation dose length product (DLP) for this visit:  3550 mGy-cm   This examination, like all CT scans performed in the [facility], was performed utilizing techniques to minimize radiation dose exposure, including the use of iterative reconstruction and automated exposure control  IV Contrast:  100 mL of iohexol (OMNIPAQUE) Enteric Contrast:  Enteric contrast was not administered  FINDINGS: ABDOMEN LOWER CHEST:  No clinically significant abnormality identified in the visualized lower chest  LIVER/BILIARY TREE:  Unremarkable  GALLBLADDER:  No calcified gallstones  No pericholecystic inflammatory change  SPLEEN: Normal size  Small perisplenic varices and splenorenal shunt are identified  PANCREAS: Surgical changes of distal pancreatectomy without measurable residual soft tissue mass, noting mild linear infiltrative soft tissue density within the surgical bed extending inferiorly along the perinephric fascia, likely post surgical granulation tissue (series 5, images 49-59)  The proximal pancreas is homogeneously enhancing, without main ductal dilation  ADRENAL GLANDS:  Unremarkable  KIDNEYS/URETERS:  Unremarkable  No hydronephrosis  STOMACH AND BOWEL: No significant abnormality  Incidental note is made of a small duodenal diverticulum  APPENDIX:  No findings to suggest appendicitis  ABDOMINOPELVIC CAVITY:  No ascites or free intraperitoneal air  Infiltration of the greater omentum within the upper abdomen is likely postsurgical  Again seen are mildly enlarged upper abdominal lymph nodes, as follows: *  Portacaval node measuring 2 4 x 1 6 cm (series 5, image 61), previously measured 2 3 x 1 6 cm  *  Periceliac node measuring 1 4 x 1 3 cm (series 5, image 49), previously measured 1 4 x 1 2 cm  These are nonspecific but stable compared to 12/5/2018 CT   No new or enlarging abdominal or pelvic lymphadenopathy is identified  VESSELS: Moderate aortic atherosclerotic calcification  PELVIS REPRODUCTIVE ORGANS:  Unremarkable for patient's age  URINARY BLADDER: Mild circumferential bladder wall thickening likely related to underdistention  ABDOMINAL WALL/INGUINAL REGIONS: Incisional changes in the anterior abdominal wall  OSSEOUS STRUCTURES:  No acute fracture or destructive osseous lesion  Impression: 1  Status post distal pancreatectomy for well-differentiated pancreatic adenocarcinoma, without measurable residual soft tissue mass  Mild linear infiltrative soft tissue within the surgical bed extending along the perinephric fascia, favored to represent granulation tissue  Attention on follow-up is recommended  2  Residual infiltration of mildly enlarged upper abdominal lymph nodes, stable compared to 12/5/2018 CT; attention on follow-up is recommended  No new or enlarging abdominopelvic lymphadenopathy  3  No findings of distant metastatic disease within the abdomen or pelvis  Workstation performed: [id number]     I reviewed the above laboratory and imaging data  Discussion/Summary:  History of stage I pancreatic cancer status post distal pancreatectomy  He is doing well and is any D  Plan on follow-up in 6 months with repeat CT scan and CA 19 9 levels to be drawn at that time  He will be seen Dr Phuong Nunez  Presumably he will discuss removal of port at which time he will call us to get this set up for an office removal   All questions answered  Radiology report reviewed with patient and a copy given to him for his records

## 2019-10-09 ENCOUNTER — OFFICE VISIT (OUTPATIENT)
Dept: FAMILY MEDICINE CLINIC | Facility: CLINIC | Age: 69
End: 2019-10-09
Payer: MEDICARE

## 2019-10-09 VITALS
WEIGHT: 315 LBS | TEMPERATURE: 97.7 F | BODY MASS INDEX: 42.66 KG/M2 | SYSTOLIC BLOOD PRESSURE: 130 MMHG | HEIGHT: 72 IN | HEART RATE: 69 BPM | DIASTOLIC BLOOD PRESSURE: 86 MMHG | OXYGEN SATURATION: 97 %

## 2019-10-09 DIAGNOSIS — Z13.29 SCREENING FOR THYROID DISORDER: ICD-10-CM

## 2019-10-09 DIAGNOSIS — Z11.59 NEED FOR HEPATITIS C SCREENING TEST: ICD-10-CM

## 2019-10-09 DIAGNOSIS — C25.2 MALIGNANT NEOPLASM OF TAIL OF PANCREAS (HCC): ICD-10-CM

## 2019-10-09 DIAGNOSIS — Z13.220 SCREENING FOR LIPID DISORDERS: ICD-10-CM

## 2019-10-09 DIAGNOSIS — Z12.5 SCREENING FOR PROSTATE CANCER: ICD-10-CM

## 2019-10-09 DIAGNOSIS — R60.0 LOWER EXTREMITY EDEMA: ICD-10-CM

## 2019-10-09 DIAGNOSIS — R73.03 PREDIABETES: ICD-10-CM

## 2019-10-09 DIAGNOSIS — I48.21 PERMANENT ATRIAL FIBRILLATION (HCC): ICD-10-CM

## 2019-10-09 DIAGNOSIS — Z00.00 MEDICARE ANNUAL WELLNESS VISIT, SUBSEQUENT: ICD-10-CM

## 2019-10-09 DIAGNOSIS — Z23 NEED FOR DTAP VACCINE: ICD-10-CM

## 2019-10-09 DIAGNOSIS — H60.502 ACUTE OTITIS EXTERNA OF LEFT EAR, UNSPECIFIED TYPE: Primary | ICD-10-CM

## 2019-10-09 PROCEDURE — G0439 PPPS, SUBSEQ VISIT: HCPCS | Performed by: NURSE PRACTITIONER

## 2019-10-09 PROCEDURE — 99214 OFFICE O/P EST MOD 30 MIN: CPT | Performed by: NURSE PRACTITIONER

## 2019-10-09 RX ORDER — NEOMYCIN SULFATE, POLYMYXIN B SULFATE, HYDROCORTISONE 3.5; 10000; 1 MG/ML; [USP'U]/ML; MG/ML
4 SOLUTION/ DROPS AURICULAR (OTIC) EVERY 6 HOURS SCHEDULED
Qty: 10 ML | Refills: 1 | Status: SHIPPED | OUTPATIENT
Start: 2019-10-09 | End: 2020-02-14 | Stop reason: HOSPADM

## 2019-10-09 NOTE — PROGRESS NOTES
Assessment and Plan:     Presents for Medicare wellness visit    Problem List Items Addressed This Visit     None        BMI Counseling: Body mass index is 43 55 kg/m²  The BMI is above normal  Nutrition recommendations include decreasing portion sizes, encouraging healthy choices of fruits and vegetables and reducing intake of saturated and trans fat  No pharmacotherapy was ordered  Preventive health issues were discussed with patient, and age appropriate screening tests were ordered as noted in patient's After Visit Summary  Personalized health advice and appropriate referrals for health education or preventive services given if needed, as noted in patient's After Visit Summary  History of Present Illness:     Patient presents for Welcome to Medicare visit  Patient Care Team:  Gulshan Bolton DO as PCP - Immanuel Reis MD as Cardiologist (Cardiology)  Gaurang Qureshi MD as Surgeon (Surgical Oncology)  Sheila Owens MD as Consulting Physician (Gastroenterology)  Lakeshia Nicole MD (Hematology and Oncology)     Review of Systems:     Review of Systems   Constitutional: Negative  HENT: Positive for ear discharge (left) and ear pain (left)  Negative for postnasal drip and sore throat  Respiratory: Negative  Cardiovascular: Positive for leg swelling  Negative for chest pain  Palpitations: occasional    Gastrointestinal: Negative  Genitourinary: Negative  Neurological: Negative  Psychiatric/Behavioral: Negative         Problem List:     Patient Active Problem List   Diagnosis    Obstructive sleep apnea syndrome    Hypersomnia    Shortness of breath    Permanent atrial fibrillation    Morbid obesity with BMI of 40 0-44 9, adult (HCC)    Dry mouth    Lower extremity edema    Pancreatic duct dilated    Malignant neoplasm of tail of pancreas ([facility] [address] )    Chemotherapy induced neutropenia (HCC)    Cough    Pancytopenia ([facility] [address] )      Past Medical and Surgical History:     Past Medical History:   Diagnosis Date    [facility])     Abdominal wall strain     last assessed: 10/21/14    Allergic rhinitis     last assessed: 05/03/16    Arthritis     CPAP (continuous positive airway pressure) dependence     Erectile dysfunction of non-organic origin     last assessed: 03/17/14    Lumbar strain     last assessed: 10/21/14    Multiple acquired skin tags     last assessed: 2/18/16    Palpitations     last assessed: 03/17/14    Pancreas cyst     Plantar fasciitis     Skin disorder     last assessed: 05/28/13    Sleep apnea      Past Surgical History:   Procedure Laterality Date    CATARACT EXTRACTION Bilateral     COLONOSCOPY      FL GUIDED CENTRAL VENOUS ACCESS DEVICE INSERTION  4/23/2019    FOOT SURGERY      Due to plantar fascitis    JOINT REPLACEMENT Left     LTK    LINEAR ENDOSCOPIC U/S      PANCREATECTOMY LAPAROSCOPIC N/A 3/12/2019    Procedure: DISTAL PANCREATECTOMY LAPAROSCOPIC/POSSIBLE OPEN;  Surgeon: Siva Stanley MD;  [facility];  Service: Surgical Oncology    RI EDG US EXAM SURGICAL ALTER STOM DUODENUM/JEJUNUM N/A 1/24/2019    Procedure: LINEAR ENDOSCOPIC U/S;  Surgeon: Saud Manjarrez MD;  [facility]; Service: Gastroenterology    REPLACEMENT TOTAL KNEE Left     TUNNELED VENOUS PORT PLACEMENT Left 4/23/2019    Procedure: INSERTION VENOUS PORT (PORT-A-CATH);   Surgeon: Siva Stanley MD;  [facility];  Service: Surgical Oncology    UMBILICAL HERNIA REPAIR        Family History:     Family History   Problem Relation Age of Onset    Breast cancer Mother     Cervical cancer Paternal Aunt     Pancreatic cancer Other     Liver cancer Other     No Known Problems Father       Social History:     Social History     Socioeconomic History    Marital status: /Civil Union     Spouse name: None    Number of children: None    Years of education: None    Highest education level: None   Occupational History    None   Social Needs    Financial resource strain: None    Food insecurity:     Worry: None     Inability: None    Transportation needs:     Medical: None     Non-medical: None   Tobacco Use    Smoking status: Never Smoker    Smokeless tobacco: Never Used   Substance and Sexual Activity    Alcohol use: Yes     Alcohol/week: 2 0 standard drinks     Types: 2 Cans of beer per week     Frequency: 2-3 times a week     Drinks per session: 1 or 2     Binge frequency: Less than monthly     Comment: couple times per week;     Drug use: No    Sexual activity: Yes   Lifestyle    Physical activity:     Days per week: None     Minutes per session: None    Stress: None   Relationships    Social connections:     Talks on phone: None     Gets together: None     Attends Church service: None     Active member of club or organization: None     Attends meetings of clubs or organizations: None     Relationship status: None    Intimate partner violence:     Fear of current or ex partner: None     Emotionally abused: None     Physically abused: None     Forced sexual activity: None   Other Topics Concern    None   Social History Narrative    None      Medications and Allergies:     Current Outpatient Medications   Medication Sig Dispense Refill    acetaminophen (TYLENOL) 325 mg tablet Take 2 tablets (650 mg total) by mouth every 6 (six) hours as needed for mild pain 30 tablet 0    ascorbic acid (VITAMIN C) 1000 MG tablet Take 1 tablet by mouth daily      aspirin 325 mg tablet Take 1 tablet by mouth daily      atenolol (TENORMIN) 25 mg tablet TAKE 1 TABLET BY MOUTH EVERY DAY 30 tablet 11    Cholecalciferol (VITAMIN D) 2000 units CAPS Take by mouth daily      Cyanocobalamin (VITAMIN B 12 PO) Take by mouth      pancrelipase, Lip-Prot-Amyl, (CREON) 6,000 units delayed release capsule Take 6,000 units of lipase by mouth 3 (three) times a day with meals 90 capsule 3     No current facility-administered medications for this visit        No Known Allergies   Immunizations:     Immunization History   Administered Date(s) Administered    Influenza Split High Dose Preservative Free IM 10/21/2014    Influenza TIV (IM) 01/16/2013, 01/16/2013    Pneumococcal Conjugate 13-Valent 06/07/2018      Health Maintenance:         Topic Date Due    Hepatitis C Screening  1950    CRC Screening: Colonoscopy  02/22/2019    CRC Screening: Sigmoidoscopy  07/25/2024         Topic Date Due    DTaP,Tdap,and Td Vaccines (1 - Tdap) 06/29/1970    Pneumococcal Vaccine: 65+ Years (2 of 2 - PPSV23) 06/07/2019      Medicare Screening Tests and Risk Assessments:     Fadi Harrell is here for his Subsequent Wellness visit  Health Risk Assessment:   Patient rates overall health as good  Patient feels that their physical health rating is same  Eyesight was rated as slightly worse  Hearing was rated as same  Patient feels that their emotional and mental health rating is same  Pain experienced in the last 7 days has been none  Patient states that he has experienced no weight loss or gain in last 6 months  BMI 43 5  Diet and exercise reviewed    Depression Screening:   PHQ-2 Score: 0      Fall Risk Screening: In the past year, patient has experienced: history of falling in past year    Number of falls: 2 or more  Injured during fall?: No    Feels unsteady when standing or walking?: No    Worried about falling?: No      Home Safety:  Patient does not have trouble with stairs inside or outside of their home  Patient has working smoke alarms and has working carbon monoxide detector  Home safety hazards include: none  Nutrition:   Current diet is Regular  Medications:   Patient is currently taking over-the-counter supplements  OTC medications include: see medication list  Patient is able to manage medications   Admits to inconsistently taking ASA    Activities of Daily Living (ADLs)/Instrumental Activities of Daily Living (IADLs):   Walk and transfer into and out of bed and chair?: Yes  Dress and groom yourself?: Yes    Bathe or shower yourself?: Yes    Do your laundry/housekeeping?: Yes  Manage your money, pay your bills and track your expenses?: Yes  Make your own meals?: Yes    Do your own shopping?: Yes    Previous Hospitalizations:   Any hospitalizations or ED visits within the last 12 months?: Yes    How many hospitalizations have you had in the last year?: 1-2    Advance Care Planning:   Living will: Yes    Durable POA for healthcare: Yes    Advanced directive: Yes    Advanced directive counseling given: Yes    Five wishes given: Yes    End of Life Decisions reviewed with patient: Yes    Provider agrees with end of life decisions: Yes      Cognitive Screening:   Provider or family/friend/caregiver concerned regarding cognition?: No    PREVENTIVE SCREENINGS      Cardiovascular Screening:    General: Screening Current      Diabetes Screening:     General: Screening Current      Colorectal Cancer Screening:     General: Screening Current      Prostate Cancer Screening:    General: Screening Current      Abdominal Aortic Aneurysm (AAA) Screening:    Risk factors include: age between 73-67 yo        Lung Cancer Screening:     General: Screening Not Indicated      Hepatitis C Screening:    General: Risks and Benefits Discussed    Hep C Screening Accepted: Yes      No exam data present     Physical Exam:     /86 (BP Location: Left arm, Patient Position: Sitting, Cuff Size: Large)   Pulse 69   Temp 97 7 °F (36 5 °C)   Ht 5' 11 65" (1 82 m)   Wt (!) 144 kg (318 lb)   SpO2 97%   BMI 43 55 kg/m²   Repeat BP: 128/78     Physical Exam   Constitutional: He is oriented to person, place, and time  Vital signs are normal  He appears well-developed and well-nourished  HENT:   Head: Normocephalic  Right Ear: Hearing, tympanic membrane, external ear and ear canal normal    Left Ear: Hearing normal  There is drainage, swelling and tenderness  Tympanic membrane is scarred     Nose: Nose normal    Mouth/Throat: Uvula is midline and oropharynx is clear and moist    Neck: Carotid bruit is not present  Cardiovascular: Normal rate  An irregular rhythm present  Mild nonpitting edema bilaterally-chronic  History a fib   Pulmonary/Chest: Effort normal and breath sounds normal  No respiratory distress  Musculoskeletal: Normal range of motion  Lymphadenopathy:        Head (right side): No submental and no submandibular adenopathy present  Head (left side): No submental and no submandibular adenopathy present  Neurological: He is alert and oriented to person, place, and time  Skin: Skin is warm, dry and intact  Skin with dry/scaly areas  Psychiatric: He has a normal mood and affect  Judgment and thought content normal  Cognition and memory are normal    Nursing note and vitals reviewed        MIRNA Dong

## 2019-10-09 NOTE — PATIENT INSTRUCTIONS
Medicare Preventive Visit Patient Instructions  Thank you for completing your Welcome to Medicare Visit or Medicare Annual Wellness Visit today  Your next wellness visit will be due in one year (10/9/2020)  The screening/preventive services that you may require over the next 5-10 years are detailed below  Some tests may not apply to you based off risk factors and/or age  Screening tests ordered at today's visit but not completed yet may show as past due  Also, please note that scanned in results may not display below  Preventive Screenings:  Service Recommendations Previous Testing/Comments   Colorectal Cancer Screening  · Colonoscopy    · Fecal Occult Blood Test (FOBT)/Fecal Immunochemical Test (FIT)  · Fecal DNA/Cologuard Test  · Flexible Sigmoidoscopy Age: 54-65 years old   Colonoscopy: every 10 years (May be performed more frequently if at higher risk)  OR  FOBT/FIT: every 1 year  OR  Cologuard: every 3 years  OR  Sigmoidoscopy: every 5 years  Screening may be recommended earlier than age 48 if at higher risk for colorectal cancer  Also, an individualized decision between you and your healthcare provider will decide whether screening between the ages of 74-80 would be appropriate   Colonoscopy: 02/22/2018  FOBT/FIT: Not on file  Cologuard: Not on file  Sigmoidoscopy: 07/25/2019    Screening Current     Prostate Cancer Screening Individualized decision between patient and health care provider in men between ages of 53-78   Medicare will cover every 12 months beginning on the day after your 50th birthday PSA: 1 0 ng/mL     Screening Current     Hepatitis C Screening Once for adults born between [facility]  More frequently in patients at high risk for Hepatitis C Hep C Antibody: Not on file       Diabetes Screening 1-2 times per year if you're at risk for diabetes or have pre-diabetes Fasting glucose: 108 mg/dL   A1C: 5 9 %    Screening Current   Cholesterol Screening Once every 5 years if you don't have a lipid disorder  May order more often based on risk factors  Lipid panel: 11/28/2018    Screening Current      Other Preventive Screenings Covered by Medicare:  1  Abdominal Aortic Aneurysm (AAA) Screening: covered once if your at risk  You're considered to be at risk if you have a family history of AAA or a male between the age of 73-68 who smoking at least 100 cigarettes in your lifetime  2  Lung Cancer Screening: covers low dose CT scan once per year if you meet all of the following conditions: (1) Age 50-69; (2) No signs or symptoms of lung cancer; (3) Current smoker or have quit smoking within the last 15 years; (4) You have a tobacco smoking history of at least 30 pack years (packs per day x number of years you smoked); (5) You get a written order from a healthcare provider  3  Glaucoma Screening: covered annually if you're considered high risk: (1) You have diabetes OR (2) Family history of glaucoma OR (3)  aged 48 and older OR (3)  American aged 72 and older  3  Osteoporosis Screening: covered every 2 years if you meet one of the following conditions: (1) Have a vertebral abnormality; (2) On glucocorticoid therapy for more than 3 months; (3) Have primary hyperparathyroidism; (4) On osteoporosis medications and need to assess response to drug therapy  5  HIV Screening: covered annually if you're between the age of 12-76  Also covered annually if you are younger than 13 and older than 72 with risk factors for HIV infection  For pregnant patients, it is covered up to 3 times per pregnancy      Immunizations:  Immunization Recommendations   Influenza Vaccine Annual influenza vaccination during flu season is recommended for all persons aged >= 6 months who do not have contraindications   Pneumococcal Vaccine (Prevnar and Pneumovax)  * Prevnar = PCV13  * Pneumovax = PPSV23 Adults 25-60 years old: 1-3 doses may be recommended based on certain risk factors  Adults 72 years old: Prevnar (PCV13) vaccine recommended followed by Pneumovax (PPSV23) vaccine  If already received PPSV23 since turning 65, then PCV13 recommended at least one year after PPSV23 dose  Hepatitis B Vaccine 3 dose series if at intermediate or high risk (ex: diabetes, end stage renal disease, liver disease)   Tetanus (Td) Vaccine - COST NOT COVERED BY MEDICARE PART B Following completion of primary series, a booster dose should be given every 10 years to maintain immunity against tetanus  Td may also be given as tetanus wound prophylaxis  Tdap Vaccine - COST NOT COVERED BY MEDICARE PART B Recommended at least once for all adults  For pregnant patients, recommended with each pregnancy  Shingles Vaccine (Shingrix) - COST NOT COVERED BY MEDICARE PART B  2 shot series recommended in those aged 48 and above     Health Maintenance Due:      Topic Date Due    Hepatitis C Screening  1950    CRC Screening: Colonoscopy  02/22/2019    CRC Screening: Sigmoidoscopy  07/25/2024     Immunizations Due:      Topic Date Due    DTaP,Tdap,and Td Vaccines (1 - Tdap) 06/29/1970    Pneumococcal Vaccine: 65+ Years (2 of 2 - PPSV23) 06/07/2019     Advance Directives   What are advance directives? Advance directives are legal documents that state your wishes and plans for medical care  These plans are made ahead of time in case you lose your ability to make decisions for yourself  Advance directives can apply to any medical decision, such as the treatments you want, and if you want to donate organs  What are the types of advance directives? There are many types of advance directives, and each state has rules about how to use them  You may choose a combination of any of the following:  · Living will: This is a written record of the treatment you want  You can also choose which treatments you do not want, which to limit, and which to stop at a certain time  This includes surgery, medicine, IV fluid, and tube feedings     · Durable power of  for [facility]): This is a written record that states who you want to make healthcare choices for you when you are unable to make them for yourself  This person, called a proxy, is usually a family member or a friend  You may choose more than 1 proxy  · Do not resuscitate (DNR) order:  A DNR order is used in case your heart stops beating or you stop breathing  It is a request not to have certain forms of treatment, such as CPR  A DNR order may be included in other types of advance directives  · Medical directive: This covers the care that you want if you are in a coma, near death, or unable to make decisions for yourself  You can list the treatments you want for each condition  Treatment may include pain medicine, surgery, blood transfusions, dialysis, IV or tube feedings, and a ventilator (breathing machine)  · Values history: This document has questions about your views, beliefs, and how you feel and think about life  This information can help others choose the care that you would choose  Why are advance directives important? An advance directive helps you control your care  Although spoken wishes may be used, it is better to have your wishes written down  Spoken wishes can be misunderstood, or not followed  Treatments may be given even if you do not want them  An advance directive may make it easier for your family to make difficult choices about your care  Fall Prevention    Fall prevention  includes ways to make your home and other areas safer  It also includes ways you can move more carefully to prevent a fall  Health conditions that cause changes in your blood pressure, vision, or muscle strength and coordination may increase your risk for falls  Medicines may also increase your risk for falls if they make you dizzy, weak, or sleepy  Fall prevention tips:   · Stand or sit up slowly  · Use assistive devices as directed  · Wear shoes that fit well and have soles that   · Wear a personal alarm  · Stay active  · Manage your medical conditions  Home Safety Tips:  · Add items to prevent falls in the bathroom  · Keep paths clear  · Install bright lights in your home  · Keep items you use often on shelves within reach  · Paint or place reflective tape on the edges of your stairs  Weight Management   Why it is important to manage your weight:  Being overweight increases your risk of health conditions such as heart disease, high blood pressure, type 2 diabetes, and certain types of cancer  It can also increase your risk for osteoarthritis, sleep apnea, and other respiratory problems  Aim for a slow, steady weight loss  Even a small amount of weight loss can lower your risk of health problems  How to lose weight safely:  A safe and healthy way to lose weight is to eat fewer calories and get regular exercise  You can lose up about 1 pound a week by decreasing the number of calories you eat by 500 calories each day  Healthy meal plan for weight management:  A healthy meal plan includes a variety of foods, contains fewer calories, and helps you stay healthy  A healthy meal plan includes the following:  · Eat whole-grain foods more often  A healthy meal plan should contain fiber  Fiber is the part of grains, fruits, and vegetables that is not broken down by your body  Whole-grain foods are healthy and provide extra fiber in your diet  Some examples of whole-grain foods are whole-wheat breads and pastas, oatmeal, brown rice, and bulgur  · Eat a variety of vegetables every day  Include dark, leafy greens such as spinach, kale, april greens, and mustard greens  Eat yellow and orange vegetables such as carrots, sweet potatoes, and winter squash  · Eat a variety of fruits every day  Choose fresh or canned fruit (canned in its own juice or light syrup) instead of juice  Fruit juice has very little or no fiber  · Eat low-fat dairy foods    Drink fat-free (skim) milk or 1% milk  Eat fat-free yogurt and low-fat cottage cheese  Try low-fat cheeses such as mozzarella and other reduced-fat cheeses  · Choose meat and other protein foods that are low in fat  Choose beans or other legumes such as split peas or lentils  Choose fish, skinless poultry (chicken or turkey), or lean cuts of red meat (beef or pork)  Before you cook meat or poultry, cut off any visible fat  · Use less fat and oil  Try baking foods instead of frying them  Add less fat, such as margarine, sour cream, regular salad dressing and mayonnaise to foods  Eat fewer high-fat foods  Some examples of high-fat foods include french fries, doughnuts, ice cream, and cakes  · Eat fewer sweets  Limit foods and drinks that are high in sugar  This includes candy, cookies, regular soda, and sweetened drinks  Exercise:  Exercise at least 30 minutes per day on most days of the week  Some examples of exercise include walking, biking, dancing, and swimming  You can also fit in more physical activity by taking the stairs instead of the elevator or parking farther away from stores  Ask your healthcare provider about the best exercise plan for you  © Copyright [employer] 2018 Information is for End User's use only and may not be sold, redistributed or otherwise used for commercial purposes   All illustrations and images included in CareNotes® are the copyrighted property of A D A M , Inc  or [address] [place] [address]

## 2019-10-09 NOTE — PROGRESS NOTES
[facility]    ASSESSMENT AND PLAN     Presents today for a checkup and annual Medicare wellness  1  Acute otitis externa of left ear, unspecified type  S/S consistent with otitis externa  Frequent recurrences  Rx today for Cortisporin  Consider referral to ENT if continues to persist    - neomycin-polymyxin-hydrocortisone (CORTISPORIN) 1 % SOLN; Administer 4 drops into the left ear every 6 (six) hours  Dispense: 10 mL; Refill: 1    2  BMI 40 0-44 9, adult (HCC)  BMI noted  43 5  Diet and exercise reviewed  Portion size discussed    3  Need for hepatitis C screening test  Age-appropriate screening    - Hepatitis C antibody; Future    4  Lower extremity edema  Chronic, lower extremity edema  Generalized today  Nonpitting  May benefit from a/compression wraps  Elevate as able    5  Prediabetes  Last hemoglobin A1c 11/2018:5 9    Reassess    - HEMOGLOBIN A1C W/ EAG ESTIMATION; Future    6  Screening for prostate cancer    - PSA, total and free; Future    7  Screening for thyroid disorder    - TSH, 3rd generation with Free T4 reflex; Future    8  Screening for lipid disorders  Last lipid panel within normal limits 11/2018  Repeat    - Lipid panel; Future    9  Permanent atrial fibrillation  Admits to only taking  on occasion  Recommend daily  Follows with Cardiology  Most recent visit 10/4    - CBC and differential; Future    10  Need for DTaP vaccine  Last Tdap not on file  Patient is a  and works frequently with [employer]   Recommend obtaining for prophylactic treatment  Rx given to obtain through the local pharmacy    - tetanus-diphtheria-acellular pertussis (ADACEL) 5-2-15 5 LF-mcg/0 5 injection; Inject 0 5 mL into a muscle once for 1 dose  Dispense: 0 5 mL; Refill: 0    11  Medicare annual wellness visit, subsequent  Medicare wellness visit completed today  12  Malignant neoplasm of tail of pancreas ([facility] [address] )  Follows with Oncology    Most recent visit 10/2019        SUBJECTIVE       Patient ID: Christella Bumpers is a 78 y o  male  Chief Complaint   Patient presents with   [facility] Wellness Visit       HISTORY OF PRESENT ILLNESS    Presents today for Medicare wellness visit  Feels generally well  No complaints other than some left ear pain and drainage  Happens frequently  He is out of drops        The following portions of the patient's history were reviewed and updated as appropriate: allergies, current medications, past family history, past medical history, past social history, past surgical history and problem list     REVIEW OF SYSTEMS  Review of Systems   Constitutional: Negative  HENT: Positive for ear discharge and ear pain  Respiratory: Negative  Cardiovascular: Positive for palpitations and leg swelling  Negative for chest pain  Gastrointestinal: Negative  Genitourinary: Negative  Neurological: Negative  Psychiatric/Behavioral: Negative          OBJECTIVE      VITAL SIGNS  /86 (BP Location: Left arm, Patient Position: Sitting, Cuff Size: Large)   Pulse 69   Temp 97 7 °F (36 5 °C)   Ht 5' 11 65" (1 82 m)   Wt (!) 144 kg (318 lb)   SpO2 97%   BMI 43 55 kg/m²     CURRENT MEDICATIONS    Current Outpatient Medications:     acetaminophen (TYLENOL) 325 mg tablet, Take 2 tablets (650 mg total) by mouth every 6 (six) hours as needed for mild pain, Disp: 30 tablet, Rfl: 0    ascorbic acid (VITAMIN C) 1000 MG tablet, Take 1 tablet by mouth daily, Disp: , Rfl:     aspirin 325 mg tablet, Take 1 tablet by mouth daily, Disp: , Rfl:     atenolol (TENORMIN) 25 mg tablet, TAKE 1 TABLET BY MOUTH EVERY DAY, Disp: 30 tablet, Rfl: 11    Cholecalciferol (VITAMIN D) 2000 units CAPS, Take by mouth daily, Disp: , Rfl:     Cyanocobalamin (VITAMIN B 12 PO), Take by mouth, Disp: , Rfl:     pancrelipase, Lip-Prot-Amyl, (CREON) 6,000 units delayed release capsule, Take 6,000 units of lipase by mouth 3 (three) times a day with meals, Disp: 90 capsule, Rfl: 3    neomycin-polymyxin-hydrocortisone (CORTISPORIN) 1 % SOLN, Administer 4 drops into the left ear every 6 (six) hours, Disp: 10 mL, Rfl: 1    tetanus-diphtheria-acellular pertussis (ADACEL) 5-2-15 5 LF-mcg/0 5 injection, Inject 0 5 mL into a muscle once for 1 dose, Disp: 0 5 mL, Rfl: 0      PHYSICAL EXAMINATION   Physical Exam   Constitutional: He is oriented to person, place, and time  Vital signs are normal  He appears well-developed and well-nourished  HENT:   Head: Normocephalic  Right Ear: Hearing, tympanic membrane, external ear and ear canal normal  No middle ear effusion  Left Ear: Hearing normal  There is drainage, swelling and tenderness  Tympanic membrane is scarred  Nose: Nose normal    Mouth/Throat: Uvula is midline and mucous membranes are normal    Cardiovascular: Normal rate  An irregular rhythm present  Bilateral lower extremity edema-nonpitting  Chronic AFib   Pulmonary/Chest: Effort normal and breath sounds normal  No respiratory distress  Musculoskeletal: Normal range of motion  Lymphadenopathy:        Head (right side): No submental and no submandibular adenopathy present  Head (left side): No submental and no submandibular adenopathy present  Neurological: He is alert and oriented to person, place, and time  Skin: Skin is warm, dry and intact  Psychiatric: He has a normal mood and affect  Judgment and thought content normal  Cognition and memory are normal    Nursing note and vitals reviewed

## 2019-10-25 ENCOUNTER — APPOINTMENT (OUTPATIENT)
Dept: LAB | Facility: MEDICAL CENTER | Age: 69
End: 2019-10-25
Payer: MEDICARE

## 2019-10-25 DIAGNOSIS — I48.21 PERMANENT ATRIAL FIBRILLATION (HCC): ICD-10-CM

## 2019-10-25 DIAGNOSIS — Z13.29 SCREENING FOR THYROID DISORDER: ICD-10-CM

## 2019-10-25 DIAGNOSIS — Z11.59 NEED FOR HEPATITIS C SCREENING TEST: ICD-10-CM

## 2019-10-25 DIAGNOSIS — Z13.220 SCREENING FOR LIPID DISORDERS: ICD-10-CM

## 2019-10-25 DIAGNOSIS — R73.03 PREDIABETES: ICD-10-CM

## 2019-10-25 DIAGNOSIS — Z12.5 SCREENING FOR PROSTATE CANCER: ICD-10-CM

## 2019-10-25 DIAGNOSIS — C25.2 MALIGNANT NEOPLASM OF TAIL OF PANCREAS (HCC): ICD-10-CM

## 2019-10-25 LAB
ALBUMIN SERPL BCP-MCNC: 3.5 G/DL (ref 3.5–5)
ALP SERPL-CCNC: 124 U/L (ref 46–116)
ALT SERPL W P-5'-P-CCNC: 40 U/L (ref 12–78)
ANION GAP SERPL CALCULATED.3IONS-SCNC: 6 MMOL/L (ref 4–13)
AST SERPL W P-5'-P-CCNC: 43 U/L (ref 5–45)
BASOPHILS # BLD AUTO: 0.02 THOUSANDS/ΜL (ref 0–0.1)
BASOPHILS NFR BLD AUTO: 1 % (ref 0–1)
BILIRUB SERPL-MCNC: 0.54 MG/DL (ref 0.2–1)
BUN SERPL-MCNC: 9 MG/DL (ref 5–25)
CALCIUM SERPL-MCNC: 9.2 MG/DL (ref 8.3–10.1)
CHLORIDE SERPL-SCNC: 105 MMOL/L (ref 100–108)
CHOLEST SERPL-MCNC: 141 MG/DL (ref 50–200)
CO2 SERPL-SCNC: 26 MMOL/L (ref 21–32)
CREAT SERPL-MCNC: 0.82 MG/DL (ref 0.6–1.3)
EOSINOPHIL # BLD AUTO: 0.13 THOUSAND/ΜL (ref 0–0.61)
EOSINOPHIL NFR BLD AUTO: 3 % (ref 0–6)
ERYTHROCYTE [DISTWIDTH] IN BLOOD BY AUTOMATED COUNT: 13.2 % (ref 11.6–15.1)
EST. AVERAGE GLUCOSE BLD GHB EST-MCNC: 151 MG/DL
GFR SERPL CREATININE-BSD FRML MDRD: 90 ML/MIN/1.73SQ M
GLUCOSE P FAST SERPL-MCNC: 128 MG/DL (ref 65–99)
HBA1C MFR BLD: 6.9 % (ref 4.2–6.3)
HCT VFR BLD AUTO: 46.1 % (ref 36.5–49.3)
HCV AB SER QL: NORMAL
HDLC SERPL-MCNC: 32 MG/DL
HGB BLD-MCNC: 15.4 G/DL (ref 12–17)
IMM GRANULOCYTES # BLD AUTO: 0.01 THOUSAND/UL (ref 0–0.2)
IMM GRANULOCYTES NFR BLD AUTO: 0 % (ref 0–2)
LDLC SERPL CALC-MCNC: 86 MG/DL (ref 0–100)
LYMPHOCYTES # BLD AUTO: 0.68 THOUSANDS/ΜL (ref 0.6–4.47)
LYMPHOCYTES NFR BLD AUTO: 16 % (ref 14–44)
MCH RBC QN AUTO: 34.8 PG (ref 26.8–34.3)
MCHC RBC AUTO-ENTMCNC: 33.4 G/DL (ref 31.4–37.4)
MCV RBC AUTO: 104 FL (ref 82–98)
MONOCYTES # BLD AUTO: 0.68 THOUSAND/ΜL (ref 0.17–1.22)
MONOCYTES NFR BLD AUTO: 16 % (ref 4–12)
NEUTROPHILS # BLD AUTO: 2.75 THOUSANDS/ΜL (ref 1.85–7.62)
NEUTS SEG NFR BLD AUTO: 64 % (ref 43–75)
NONHDLC SERPL-MCNC: 109 MG/DL
NRBC BLD AUTO-RTO: 0 /100 WBCS
PLATELET # BLD AUTO: 113 THOUSANDS/UL (ref 149–390)
PMV BLD AUTO: 12 FL (ref 8.9–12.7)
POTASSIUM SERPL-SCNC: 4.4 MMOL/L (ref 3.5–5.3)
PROT SERPL-MCNC: 8.5 G/DL (ref 6.4–8.2)
RBC # BLD AUTO: 4.43 MILLION/UL (ref 3.88–5.62)
SODIUM SERPL-SCNC: 137 MMOL/L (ref 136–145)
TRIGL SERPL-MCNC: 117 MG/DL
TSH SERPL DL<=0.05 MIU/L-ACNC: 3.5 UIU/ML (ref 0.36–3.74)
WBC # BLD AUTO: 4.27 THOUSAND/UL (ref 4.31–10.16)

## 2019-10-25 PROCEDURE — 84443 ASSAY THYROID STIM HORMONE: CPT

## 2019-10-25 PROCEDURE — 86803 HEPATITIS C AB TEST: CPT

## 2019-10-25 PROCEDURE — 80061 LIPID PANEL: CPT

## 2019-10-25 PROCEDURE — 80053 COMPREHEN METABOLIC PANEL: CPT

## 2019-10-25 PROCEDURE — 36415 COLL VENOUS BLD VENIPUNCTURE: CPT

## 2019-10-25 PROCEDURE — 85025 COMPLETE CBC W/AUTO DIFF WBC: CPT

## 2019-10-25 PROCEDURE — G0103 PSA SCREENING: HCPCS

## 2019-10-25 PROCEDURE — 83036 HEMOGLOBIN GLYCOSYLATED A1C: CPT

## 2019-10-26 LAB
PSA FREE MFR SERPL: 15 %
PSA FREE SERPL-MCNC: 0.18 NG/ML
PSA SERPL-MCNC: 1.2 NG/ML (ref 0–4)

## 2019-10-30 ENCOUNTER — HOSPITAL ENCOUNTER (OUTPATIENT)
Dept: INFUSION CENTER | Facility: CLINIC | Age: 69
Discharge: HOME | End: 2019-10-30
Payer: MEDICARE

## 2019-10-30 DIAGNOSIS — D70.1 CHEMOTHERAPY INDUCED NEUTROPENIA (HCC): ICD-10-CM

## 2019-10-30 DIAGNOSIS — C25.2 MALIGNANT NEOPLASM OF TAIL OF PANCREAS (HCC): Primary | ICD-10-CM

## 2019-10-30 DIAGNOSIS — T45.1X5A CHEMOTHERAPY INDUCED NEUTROPENIA (HCC): ICD-10-CM

## 2019-10-30 PROCEDURE — 96523 IRRIG DRUG DELIVERY DEVICE: CPT

## 2019-10-30 NOTE — PLAN OF CARE

## 2019-11-07 ENCOUNTER — APPOINTMENT (OUTPATIENT)
Dept: LAB | Facility: MEDICAL CENTER | Age: 69
End: 2019-11-07
Payer: MEDICARE

## 2019-11-07 ENCOUNTER — OFFICE VISIT (OUTPATIENT)
Dept: HEMATOLOGY ONCOLOGY | Facility: CLINIC | Age: 69
End: 2019-11-07
Payer: MEDICARE

## 2019-11-07 VITALS
TEMPERATURE: 98.1 F | BODY MASS INDEX: 43.54 KG/M2 | SYSTOLIC BLOOD PRESSURE: 142 MMHG | DIASTOLIC BLOOD PRESSURE: 80 MMHG | HEART RATE: 81 BPM | WEIGHT: 311 LBS | RESPIRATION RATE: 14 BRPM | HEIGHT: 71 IN

## 2019-11-07 DIAGNOSIS — R60.0 LOWER EXTREMITY EDEMA: ICD-10-CM

## 2019-11-07 DIAGNOSIS — C25.2 MALIGNANT NEOPLASM OF TAIL OF PANCREAS (HCC): ICD-10-CM

## 2019-11-07 DIAGNOSIS — C25.2 MALIGNANT NEOPLASM OF TAIL OF PANCREAS (HCC): Primary | ICD-10-CM

## 2019-11-07 PROBLEM — T45.1X5A CHEMOTHERAPY INDUCED NEUTROPENIA (HCC): Status: RESOLVED | Noted: 2019-04-25 | Resolved: 2019-11-07

## 2019-11-07 PROBLEM — D61.818 PANCYTOPENIA (HCC): Status: RESOLVED | Noted: 2019-05-28 | Resolved: 2019-11-07

## 2019-11-07 PROBLEM — R06.02 SHORTNESS OF BREATH: Status: RESOLVED | Noted: 2018-04-13 | Resolved: 2019-11-07

## 2019-11-07 PROBLEM — R68.2 DRY MOUTH: Status: RESOLVED | Noted: 2018-04-13 | Resolved: 2019-11-07

## 2019-11-07 PROBLEM — R05.9 COUGH: Status: RESOLVED | Noted: 2019-05-16 | Resolved: 2019-11-07

## 2019-11-07 PROBLEM — D70.1 CHEMOTHERAPY INDUCED NEUTROPENIA (HCC): Status: RESOLVED | Noted: 2019-04-25 | Resolved: 2019-11-07

## 2019-11-07 PROCEDURE — 99214 OFFICE O/P EST MOD 30 MIN: CPT | Performed by: INTERNAL MEDICINE

## 2019-11-07 PROCEDURE — 86301 IMMUNOASSAY TUMOR CA 19-9: CPT

## 2019-11-07 PROCEDURE — 36415 COLL VENOUS BLD VENIPUNCTURE: CPT

## 2019-11-07 NOTE — PROGRESS NOTES
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, high-grade dysplasia, no evidence of lymphovascular invasion, no evidence of perineural invasion, 1 regional lymph node negative for carcinoma and the margins were negative (pT1b, pN0, grade 1) status post adjuvant chemotherapy with FOLFIRINOX for 4 cycles for finished in June 2019    No evidence of disease by CT scan    Patient to have CA 19-9 today if this is normal you might proceed with Port-A-Cath removal    2  Malabsorption and greasy diarrhea, he is on Creon    3  Visual disturbance, I suggest evaluation by optometrist    4   Follow-up on as-needed basis he will follow up with Surgical Oncology with frequent CT scans             HPI:  Dc Ernst is a 78-year-old  male seen initially 4/11/2019 regarding Stage I adenocarcinoma of the body and the distal pancreas status post resection referred by surgical oncology  Deja Ornelas was experiencing several months of vague abdominal pain   CT scan 12/5/2018  revealed a slightly dilated pancreatic duct as well as multiple pancreatic cysts   He met with Dr Yasmeen Schaefer      MRI abdomen 1/2/2019:  Innumerable pancreatic cysts, mostly in the tail the pancreas, measuring up to 2 cm in diameter   Dilatation of the main pancreatic duct, up to 1 cm in diameter   Several enlarged peripancreatic and portacaval lymph nodes      EUS  January 2019 with numerous cyst in the tail of the pancreas the largest 1 measuring 3 cm with dilatation of the main pancreatic duct to 10 mm at the body of the pancreas, the needle was used to puncture the cysts at the tail of the pancreas with for past this performed, cytology showed atypical changes category 3 with cluster of atypical mucinous epithelial cells suggestive of neoplastic process     CA 19-9 was normal     Status post distal pancreatectomy on 03/12/2019, final pathology 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), well-differentiated measuring between 0 5 cm to 1 cm with several foci of invasive carcinoma  present the largest 0 6 cm   The tumor is confined to the pancreas with IPMN with high-grade dysplasia  present at the margin of the resection, no evidence of lymphovascular invasion, no evidence of perineural invasion, 1 lymph node negative for carcinoma with background of extensive chronic pancreatitis at least stage IA (pT1b, pN0, grade 1)  Treated with FOLFIRINOX for 4 cycles in June 2019    CT scan in October 2019 showed no evidence of disease, he has upper abdomen lymphadenopathy stable from 2019 measuring 2 cm      Interval History:        Previous Treatment:         Test Results:    Imaging: No results found  Labs:   Lab Results   Component Value Date    WBC 4 27 (L) 10/25/2019    HGB 15 4 10/25/2019    HCT 46 1 10/25/2019     (H) 10/25/2019     (L) 10/25/2019     Lab Results   Component Value Date     08/22/2017    K 4 4 10/25/2019     10/25/2019    CO2 26 10/25/2019    BUN 9 10/25/2019    CREATININE 0 82 10/25/2019    GLUCOSE 123 03/12/2019    GLUF 128 (H) 10/25/2019    CALCIUM 9 2 10/25/2019    AST 43 10/25/2019    ALT 40 10/25/2019    ALKPHOS 124 (H) 10/25/2019    PROT 6 6 08/22/2017    BILITOT 0 4 08/22/2017    EGFR 90 10/25/2019       No results found for: IRON, TIBC, FERRITIN    Lab Results   Component Value Date    XCERMMEN30 253 09/07/2017         ROS: Review of Systems   Constitutional: Negative  Negative for appetite change, chills, diaphoresis, fatigue, fever and unexpected weight change  HENT:   Negative for hearing loss, lump/mass, mouth sores, nosebleeds, sore throat, trouble swallowing and voice change      Eyes: Positive for eye problems ( which will problem bilaterally not able to see the fine print)  Negative for icterus  Respiratory: Negative  Negative for chest tightness, cough, hemoptysis and shortness of breath  Cardiovascular: Negative for chest pain and leg swelling  Gastrointestinal: Positive for diarrhea (Greasy most in the morning)  Negative for abdominal distention, abdominal pain, blood in stool, constipation and nausea  Endocrine: Negative  Genitourinary: Negative for dysuria, frequency, hematuria and pelvic pain  Musculoskeletal: Negative  Negative for arthralgias, back pain, flank pain, gait problem, myalgias and neck stiffness  Skin: Negative for itching and rash  Neurological: Negative for dizziness, gait problem, headaches, light-headedness, numbness and speech difficulty  Hematological: Negative for adenopathy  Does not bruise/bleed easily  Psychiatric/Behavioral: Negative for confusion, decreased concentration, depression and sleep disturbance  The patient is not nervous/anxious  Current Medications: Reviewed  Allergies: Reviewed  PMH/FH/SH:  Reviewed      Physical Exam:    Body surface area is 2 54 meters squared  Wt Readings from Last 3 Encounters:   11/07/19 (!) 141 kg (311 lb)   10/09/19 (!) 144 kg (318 lb)   10/04/19 (!) 142 kg (313 lb)        Temp Readings from Last 3 Encounters:   11/07/19 98 1 °F (36 7 °C)   10/09/19 97 7 °F (36 5 °C)   10/04/19 98 7 °F (37 1 °C)        BP Readings from Last 3 Encounters:   11/07/19 142/80   10/09/19 130/86   10/04/19 110/80         Pulse Readings from Last 3 Encounters:   11/07/19 81   10/09/19 69   10/04/19 76        Physical Exam   Constitutional: He is oriented to person, place, and time  He appears well-developed and well-nourished  No distress  HENT:   Head: Normocephalic and atraumatic  Eyes: Conjunctivae are normal    Neck: Normal range of motion  Neck supple  No tracheal deviation present     Cardiovascular: Normal rate and regular rhythm  Exam reveals no gallop and no friction rub  No murmur heard  Pulmonary/Chest: Effort normal and breath sounds normal  No respiratory distress  He has no wheezes  He has no rales  He exhibits no tenderness  Abdominal: Soft  He exhibits no distension  There is no tenderness  Obese   Musculoskeletal: He exhibits edema (Plus two edema bilaterally)  Lymphadenopathy:     He has no cervical adenopathy  Neurological: He is alert and oriented to person, place, and time  Skin: Skin is warm and dry  He is not diaphoretic  No erythema  No pallor  Psychiatric: He has a normal mood and affect  His behavior is normal  Judgment and thought content normal    Vitals reviewed  ECO    Goals and Barriers:  Current Goal: Minimize effects of disease  Barriers: None  Patient's Capacity to Self Care:  Patient is able to self care      Code Status: @[facility] CorporationCHANTEPres[facility]@

## 2019-11-08 LAB — CANCER AG19-9 SERPL-ACNC: 16 U/ML (ref 0–35)

## 2019-11-25 ENCOUNTER — OFFICE VISIT (OUTPATIENT)
Dept: FAMILY MEDICINE CLINIC | Facility: CLINIC | Age: 69
End: 2019-11-25
Payer: MEDICARE

## 2019-11-25 VITALS
RESPIRATION RATE: 17 BRPM | TEMPERATURE: 97.6 F | OXYGEN SATURATION: 97 % | WEIGHT: 314.2 LBS | DIASTOLIC BLOOD PRESSURE: 72 MMHG | HEIGHT: 72 IN | HEART RATE: 66 BPM | SYSTOLIC BLOOD PRESSURE: 120 MMHG | BODY MASS INDEX: 42.56 KG/M2

## 2019-11-25 DIAGNOSIS — I48.21 PERMANENT ATRIAL FIBRILLATION (HCC): ICD-10-CM

## 2019-11-25 DIAGNOSIS — E11.9 TYPE 2 DIABETES MELLITUS WITHOUT COMPLICATION, WITHOUT LONG-TERM CURRENT USE OF INSULIN (HCC): ICD-10-CM

## 2019-11-25 DIAGNOSIS — C25.2 MALIGNANT NEOPLASM OF TAIL OF PANCREAS (HCC): Primary | ICD-10-CM

## 2019-11-25 PROCEDURE — 99214 OFFICE O/P EST MOD 30 MIN: CPT | Performed by: FAMILY MEDICINE

## 2019-11-25 NOTE — PROGRESS NOTES
[facility]      NAME: Hilaria Galeano  AGE: 78 y o  SEX: male  : 1950   MRN: [id number]    DATE: 2019  TIME: 10:35 AM    Assessment and Plan     Problem List Items Addressed This Visit     Type 2 diabetes mellitus without complication, without long-term current use of insulin ([facility] )       Lab Results   Component Value Date    HGBA1C 6 9 (H) 10/25/2019   Hemoglobin A1c up to 6 9  Discussed diet and exercise habits  Will recheck in 3 months and if he has been unable to lower his blood sugars will need to consider treatment with oral agent  Permanent atrial fibrillation     Continue atenolol  Malignant neoplasm of tail of pancreas ([facility] ) - Primary     Stable per Oncology  CA 19 9 is normal                    Return to office in:  3 months    Chief Complaint     Chief Complaint   Patient presents with    Follow-up     BW (High A1C)       History of Present Illness     Patient presents to follow-up on recent blood work  He had chemistry panel lipid hemoglobin A1c and CA 19-9 done recently  He has a history of pancreatic carcinoma which was treated with partial resection  His last hemoglobin A1c was 5 9  He is on no medications for his blood sugar  He takes atenolol for blood pressure and Creon for his pancreatic insufficiency  The following portions of the patient's history were reviewed and updated as appropriate: allergies, current medications, past family history, past medical history, past social history, past surgical history and problem list     Review of Systems   Review of Systems   Constitutional: Negative  Respiratory: Negative  Cardiovascular: Negative  Gastrointestinal: Negative  Genitourinary: Negative  Musculoskeletal: Negative  Psychiatric/Behavioral: Negative          Active Problem List     Patient Active Problem List   Diagnosis    Obstructive sleep apnea syndrome    Hypersomnia    Permanent atrial fibrillation    Morbid obesity with BMI of 40 0-44 9, adult ([facility] )    Lower extremity edema    Pancreatic duct dilated    Malignant neoplasm of tail of pancreas (HCC)    Type 2 diabetes mellitus without complication, without long-term current use of insulin (HCC)       Objective   /72 (BP Location: Left arm, Patient Position: Sitting, Cuff Size: Large)   Pulse 66   Temp 97 6 °F (36 4 °C) (Tympanic)   Resp 17   Ht 5' 11 7" (1 821 m)   Wt (!) 143 kg (314 lb 3 2 oz)   SpO2 97%   BMI 42 97 kg/m²     Physical Exam   Constitutional: He is oriented to person, place, and time  He appears well-developed and well-nourished  No distress  HENT:   Head: Normocephalic and atraumatic  Eyes: Pupils are equal, round, and reactive to light  Conjunctivae are normal  Right eye exhibits no discharge  Neck: Normal range of motion  No thyromegaly present  Cardiovascular: Normal rate and regular rhythm  Pulmonary/Chest: Effort normal and breath sounds normal  No respiratory distress  Lymphadenopathy:     He has no cervical adenopathy  Neurological: He is alert and oriented to person, place, and time  Skin: Skin is warm and dry  He is not diaphoretic  Psychiatric: He has a normal mood and affect  His behavior is normal  Judgment and thought content normal    Nursing note and vitals reviewed          Current Medications     Current Outpatient Medications:     acetaminophen (TYLENOL) 325 mg tablet, Take 2 tablets (650 mg total) by mouth every 6 (six) hours as needed for mild pain, Disp: 30 tablet, Rfl: 0    ascorbic acid (VITAMIN C) 1000 MG tablet, Take 1 tablet by mouth daily, Disp: , Rfl:     aspirin 325 mg tablet, Take 1 tablet by mouth daily, Disp: , Rfl:     atenolol (TENORMIN) 25 mg tablet, TAKE 1 TABLET BY MOUTH EVERY DAY, Disp: 30 tablet, Rfl: 11    Cholecalciferol (VITAMIN D) 2000 units CAPS, Take by mouth daily, Disp: , Rfl:     Cyanocobalamin (VITAMIN B 12 PO), Take by mouth, Disp: , Rfl:     pancrelipase, Lip-Prot-Amyl, (CREON) 6,000 units delayed release capsule, Take 6,000 units of lipase by mouth 3 (three) times a day with meals, Disp: 90 capsule, Rfl: 3    neomycin-polymyxin-hydrocortisone (CORTISPORIN) 1 % SOLN, Administer 4 drops into the left ear every 6 (six) hours (Patient not taking: Reported on 11/25/2019), Disp: 10 mL, Rfl: 1    Health Maintenance     Health Maintenance   Topic Date Due    Diabetic Foot Exam  06/29/1959    DM Eye Exam  06/29/1959    URINE MICROALBUMIN  06/29/1959    DTaP,Tdap,and Td Vaccines (1 - Tdap) 06/29/1960    Falls: Plan of Care  06/29/2014    CRC Screening: Colonoscopy  02/22/2019    Pneumococcal Vaccine: 65+ Years (2 of 2 - PPSV23) 06/07/2019    Influenza Vaccine  12/02/2019 (Originally 7/1/2019)    HEMOGLOBIN A1C  04/25/2020    Fall Risk  10/09/2020    Depression Screening PHQ  10/09/2020    Medicare Annual Wellness Visit (AWV)  10/09/2020    BMI: Followup Plan  10/09/2020    BMI: Adult  11/25/2020    CRC Screening: Sigmoidoscopy  07/25/2024    Hepatitis C Screening  Completed    Pneumococcal Vaccine: Pediatrics (0 to 5 Years) and At-Risk Patients (6 to 59 Years)  Aged Out    HIB Vaccine  Aged Out    Hepatitis B Vaccine  Aged Out    IPV Vaccine  Aged Out    Hepatitis A Vaccine  Aged Out    Meningococcal ACWY Vaccine  Aged Out    HPV Vaccine  Aged [employer] History   Administered Date(s) Administered    Influenza Split High Dose Preservative Free IM 10/21/2014    Influenza TIV (IM) 01/16/2013, 01/16/2013    Pneumococcal Conjugate 13-Valent 06/07/2018       Tobi Ovalle DO  [facility]

## 2019-11-25 NOTE — ASSESSMENT & PLAN NOTE
Lab Results   Component Value Date    HGBA1C 6 9 (H) 10/25/2019   Hemoglobin A1c up to 6 9  Discussed diet and exercise habits  Will recheck in 3 months and if he has been unable to lower his blood sugars will need to consider treatment with oral agent

## 2019-11-27 ENCOUNTER — HOSPITAL ENCOUNTER (OUTPATIENT)
Dept: INFUSION CENTER | Facility: CLINIC | Age: 69
Discharge: HOME | End: 2019-11-27
Payer: MEDICARE

## 2019-11-27 DIAGNOSIS — C25.2 MALIGNANT NEOPLASM OF TAIL OF PANCREAS (HCC): Primary | ICD-10-CM

## 2019-11-27 PROCEDURE — 96523 IRRIG DRUG DELIVERY DEVICE: CPT

## 2019-11-27 NOTE — PLAN OF CARE
Problem: Potential for Falls  Goal: Patient will remain free of falls  Description  INTERVENTIONS:  - Assess patient frequently for physical needs  -  Identify cognitive and physical deficits and behaviors that affect risk of falls    -  [place] fall precautions as indicated by assessment   - Educate patient/family on patient safety including physical limitations  - Instruct patient to call for assistance with activity based on assessment  - Modify environment to reduce risk of injury  - Consider OT/PT consult to assist with strengthening/mobility  Outcome: Progressing

## 2019-12-04 DIAGNOSIS — C25.2 MALIGNANT NEOPLASM OF TAIL OF PANCREAS (HCC): Primary | ICD-10-CM

## 2020-01-15 ENCOUNTER — HOSPITAL ENCOUNTER (OUTPATIENT)
Dept: INFUSION CENTER | Facility: CLINIC | Age: 70
Discharge: HOME | End: 2020-01-15
Payer: MEDICARE

## 2020-01-15 DIAGNOSIS — C25.2 MALIGNANT NEOPLASM OF TAIL OF PANCREAS (HCC): Primary | ICD-10-CM

## 2020-01-15 PROCEDURE — 96523 IRRIG DRUG DELIVERY DEVICE: CPT

## 2020-01-25 DIAGNOSIS — C25.2 MALIGNANT NEOPLASM OF TAIL OF PANCREAS (HCC): ICD-10-CM

## 2020-01-27 LAB
LEFT EYE DIABETIC RETINOPATHY: NORMAL
RIGHT EYE DIABETIC RETINOPATHY: NORMAL

## 2020-01-27 RX ORDER — PANCRELIPASE 30000; 6000; 19000 [USP'U]/1; [USP'U]/1; [USP'U]/1
CAPSULE, DELAYED RELEASE PELLETS ORAL
Qty: 90 CAPSULE | Refills: 0 | Status: SHIPPED | OUTPATIENT
Start: 2020-01-27 | End: 2020-01-28

## 2020-01-28 DIAGNOSIS — C25.2 MALIGNANT NEOPLASM OF TAIL OF PANCREAS (HCC): ICD-10-CM

## 2020-01-28 RX ORDER — PANCRELIPASE 30000; 6000; 19000 [USP'U]/1; [USP'U]/1; [USP'U]/1
CAPSULE, DELAYED RELEASE PELLETS ORAL
Qty: 90 CAPSULE | Refills: 0 | Status: SHIPPED | OUTPATIENT
Start: 2020-01-28 | End: 2020-02-26 | Stop reason: ALTCHOICE

## 2020-02-07 ENCOUNTER — HOSPITAL ENCOUNTER (OUTPATIENT)
Dept: CT IMAGING | Facility: HOSPITAL | Age: 70
Discharge: HOME | End: 2020-02-07
Payer: MEDICARE

## 2020-02-07 DIAGNOSIS — C25.2 MALIGNANT NEOPLASM OF TAIL OF PANCREAS (HCC): ICD-10-CM

## 2020-02-07 PROCEDURE — 71260 CT THORAX DX C+: CPT

## 2020-02-07 PROCEDURE — 74177 CT ABD & PELVIS W/CONTRAST: CPT

## 2020-02-07 RX ADMIN — IOHEXOL 100 ML: 350 INJECTION, SOLUTION INTRAVENOUS at 10:08

## 2020-02-12 ENCOUNTER — HOSPITAL ENCOUNTER (OUTPATIENT)
Dept: INFUSION CENTER | Facility: CLINIC | Age: 70
Discharge: HOME | End: 2020-02-12
Payer: MEDICARE

## 2020-02-12 DIAGNOSIS — C25.2 MALIGNANT NEOPLASM OF TAIL OF PANCREAS (HCC): Primary | ICD-10-CM

## 2020-02-12 PROCEDURE — 96523 IRRIG DRUG DELIVERY DEVICE: CPT

## 2020-02-12 NOTE — PROGRESS NOTES
Presented today for port flush, port flushed per protocol, tolerated well, will return for next appointment as scheduled

## 2020-02-13 PROBLEM — Z08 ENCOUNTER FOR FOLLOW-UP SURVEILLANCE OF PANCREATIC CANCER: Status: ACTIVE | Noted: 2020-02-13

## 2020-02-13 PROBLEM — Z85.07 ENCOUNTER FOR FOLLOW-UP SURVEILLANCE OF PANCREATIC CANCER: Status: ACTIVE | Noted: 2020-02-13

## 2020-02-13 PROBLEM — Z85.07 HISTORY OF PANCREATIC CANCER: Status: ACTIVE | Noted: 2019-04-11

## 2020-02-14 ENCOUNTER — APPOINTMENT (OUTPATIENT)
Dept: LAB | Facility: MEDICAL CENTER | Age: 70
End: 2020-02-14
Payer: MEDICARE

## 2020-02-14 ENCOUNTER — OFFICE VISIT (OUTPATIENT)
Dept: SURGICAL ONCOLOGY | Facility: CLINIC | Age: 70
End: 2020-02-14
Payer: MEDICARE

## 2020-02-14 VITALS
SYSTOLIC BLOOD PRESSURE: 122 MMHG | WEIGHT: 310 LBS | HEART RATE: 60 BPM | RESPIRATION RATE: 16 BRPM | DIASTOLIC BLOOD PRESSURE: 80 MMHG | TEMPERATURE: 98.7 F | HEIGHT: 71 IN | BODY MASS INDEX: 43.4 KG/M2

## 2020-02-14 DIAGNOSIS — Z85.07 ENCOUNTER FOR FOLLOW-UP SURVEILLANCE OF PANCREATIC CANCER: Primary | ICD-10-CM

## 2020-02-14 DIAGNOSIS — Z08 ENCOUNTER FOR FOLLOW-UP SURVEILLANCE OF PANCREATIC CANCER: Primary | ICD-10-CM

## 2020-02-14 DIAGNOSIS — Z85.07 HISTORY OF PANCREATIC CANCER: ICD-10-CM

## 2020-02-14 DIAGNOSIS — Z08 ENCOUNTER FOR FOLLOW-UP SURVEILLANCE OF PANCREATIC CANCER: ICD-10-CM

## 2020-02-14 DIAGNOSIS — Z85.07 ENCOUNTER FOR FOLLOW-UP SURVEILLANCE OF PANCREATIC CANCER: ICD-10-CM

## 2020-02-14 LAB
ANION GAP SERPL CALCULATED.3IONS-SCNC: 6 MMOL/L (ref 4–13)
BUN SERPL-MCNC: 15 MG/DL (ref 5–25)
CALCIUM SERPL-MCNC: 9.3 MG/DL (ref 8.3–10.1)
CHLORIDE SERPL-SCNC: 108 MMOL/L (ref 100–108)
CO2 SERPL-SCNC: 25 MMOL/L (ref 21–32)
CREAT SERPL-MCNC: 0.79 MG/DL (ref 0.6–1.3)
GFR SERPL CREATININE-BSD FRML MDRD: 91 ML/MIN/1.73SQ M
GLUCOSE SERPL-MCNC: 149 MG/DL (ref 65–140)
POTASSIUM SERPL-SCNC: 4.2 MMOL/L (ref 3.5–5.3)
SODIUM SERPL-SCNC: 139 MMOL/L (ref 136–145)

## 2020-02-14 PROCEDURE — 1160F RVW MEDS BY RX/DR IN RCRD: CPT | Performed by: SURGERY

## 2020-02-14 PROCEDURE — 80048 BASIC METABOLIC PNL TOTAL CA: CPT

## 2020-02-14 PROCEDURE — 36415 COLL VENOUS BLD VENIPUNCTURE: CPT

## 2020-02-14 PROCEDURE — 3008F BODY MASS INDEX DOCD: CPT | Performed by: SURGERY

## 2020-02-14 PROCEDURE — 3074F SYST BP LT 130 MM HG: CPT | Performed by: SURGERY

## 2020-02-14 PROCEDURE — 4040F PNEUMOC VAC/ADMIN/RCVD: CPT | Performed by: SURGERY

## 2020-02-14 PROCEDURE — 86301 IMMUNOASSAY TUMOR CA 19-9: CPT

## 2020-02-14 PROCEDURE — 3079F DIAST BP 80-89 MM HG: CPT | Performed by: SURGERY

## 2020-02-14 PROCEDURE — 1036F TOBACCO NON-USER: CPT | Performed by: SURGERY

## 2020-02-14 PROCEDURE — 99213 OFFICE O/P EST LOW 20 MIN: CPT | Performed by: SURGERY

## 2020-02-14 NOTE — LETTER
February 14, 2020     Tessradames SmithDO  [address]  [address]    Patient: Jacobo Cassidy   YOB: 1950   Date of Visit: 2/14/2020       Dear Dr Ivania Ortiz: Thank you for referring Ilene Ramirez to me for evaluation  Below are my notes for this consultation  If you have questions, please do not hesitate to call me  I look forward to following your patient along with you  Sincerely,        Rivka Barney MD        CC: MD Thuy Shane MD Kemp Patch, MD Cletis Loge, MD  2/14/2020 10:06 AM  Sign at close encounter     Surgical Oncology Follow Up       [address] [id number]    Jacobo Cassidy  1950  [id number]  [facility]  [facility]  [phone]    Chief Complaint   Patient presents with    Follow-up     F/U after CT scan  Assessment/Plan:    No problem-specific Assessment & Plan notes found for this encounter  Diagnoses and all orders for this visit:    Encounter for follow-up surveillance of pancreatic cancer  -     MRI lumbar spine w wo contrast; Future  -     Basic metabolic panel; Future  -     Cancer antigen 19-9; Future    History of pancreatic cancer        Advance Care Planning/Advance Directives:  Discussed disease status, cancer treatment plans and/or cancer treatment goals with the patient  History of pancreatic cancer    3/12/2019 Surgery     Distal pancreatectomy  Several foci of invasive colloid cancer  Grade 1  IPMN with high grade dysplasia at margin  T1b, NO MX Stage IA      4/11/2019 - 6/21/2019 Chemotherapy     Saw Dr Cantu Dad  Will be starting Folfirinox 4/24   Ended 6/21/2019 4/23/2019 -  Chemotherapy     fluorouracil (ADRUCIL) injection 1,010 mg, 400 mg/m2 = 1,010 mg, Intravenous, Once, 2 of 2 cycles  pegfilgrastim (NEULASTA ONPRO) subcutaneous injection kit 6 mg, 6 mg, Subcutaneous, Once, 2 of 2 cycles  Administration: 6 mg (6/21/2019), 6 mg (6/7/2019)  irinotecan (CAMPTOSAR) 455 mg in dextrose 5 % 500 mL chemo infusion, 180 mg/m2 = 455 mg, Intravenous, Once, 4 of 4 cycles  Dose modification: 140 mg/m2 (original dose 180 mg/m2, Cycle 3, Reason: Other (See Comments)), 125 mg/m2 (original dose 180 mg/m2, Cycle 3, Reason: Dose Not Tolerated)  Administration: 316 mg (6/5/2019), 300 mg (6/19/2019)  leucovorin 1,012 mg in dextrose 5 % 250 mL IVPB, 400 mg/m2 = 1,012 mg, Intravenous, Once, 2 of 2 cycles  oxaliplatin (ELOXATIN) 215 05 mg in dextrose 5 % 250 mL chemo infusion, 85 mg/m2 = 215 05 mg, Intravenous, Once, 4 of 4 cycles  Dose modification: 65 mg/m2 (original dose 85 mg/m2, Cycle 3, Reason: Other (See Comments)), 60 mg/m2 (original dose 85 mg/m2, Cycle 3, Reason: Dose Not Tolerated)  Administration: 151 8 mg (6/5/2019), 151 8 mg (6/19/2019)         History of Present Illness:  Patient is a 71-year-old man with a history of low-grade stage I pancreas cancer status post distal pancreatectomy here for surveillance visit   -Interval History:  He has done well, though over the last several weeks he has had right-sided abdominal pain along with right-sided back pain  He otherwise has no GI complaints  No nausea, vomiting, stool changes, or weight loss  He is able to eat fairly well without any difficulty  The pain is constant, and is reported as being a dull achiness in the right back radiating towards the right flank and abdomen  Review of Systems:  Review of Systems   Constitutional: Negative  HENT: Negative  Eyes: Negative  Respiratory: Negative  Cardiovascular: Negative  Gastrointestinal: Positive for abdominal pain  Endocrine: Negative  Genitourinary: Negative  Musculoskeletal: Positive for back pain  Skin: Negative  Allergic/Immunologic: Negative  Neurological: Negative  Hematological: Negative  Psychiatric/Behavioral: Negative  Patient Active Problem List   Diagnosis    Obstructive sleep apnea syndrome    Hypersomnia    Permanent atrial fibrillation    Morbid obesity with BMI of 40 0-44 9, adult ([facility] )    Lower extremity edema    Pancreatic duct dilated    History of pancreatic cancer    Type 2 diabetes mellitus without complication, without long-term current use of insulin ([facility] )    Encounter for follow-up surveillance of pancreatic cancer     Past Medical History:   Diagnosis Date    A-fib ([place] [address] )     Abdominal wall strain     last assessed: 10/21/14    Allergic rhinitis     last assessed: 05/03/16    Arthritis     CPAP (continuous positive airway pressure) dependence     Erectile dysfunction of non-organic origin     last assessed: 03/17/14    Lumbar strain     last assessed: 10/21/14    Multiple acquired skin tags     last assessed: 2/18/16    Palpitations     last assessed: 03/17/14    Pancreas cyst     Plantar fasciitis     Skin disorder     last assessed: 05/28/13    Sleep apnea      Past Surgical History:   Procedure Laterality Date    CATARACT EXTRACTION Bilateral     COLONOSCOPY      FL GUIDED CENTRAL VENOUS ACCESS DEVICE INSERTION  4/23/2019    FOOT SURGERY      Due to plantar fascitis    JOINT REPLACEMENT Left     LTK    LINEAR ENDOSCOPIC U/S      PANCREATECTOMY LAPAROSCOPIC N/A 3/12/2019    Procedure: DISTAL PANCREATECTOMY LAPAROSCOPIC/POSSIBLE OPEN;  Surgeon: Siva Stanley MD;  [facility]: [facility];  Service: Surgical Oncology    MT EDG US EXAM SURGICAL ALTER STOM DUODENUM/JEJUNUM N/A 1/24/2019    Procedure: LINEAR ENDOSCOPIC U/S;  Surgeon: Saud Manjarrez MD;  Location: [facility]; Service: Gastroenterology    REPLACEMENT TOTAL KNEE Left     TUNNELED VENOUS PORT PLACEMENT Left 4/23/2019    Procedure: INSERTION VENOUS PORT (PORT-A-CATH);   Surgeon: Siva Stanley MD;  [facility]: [facility];  Service: Surgical Oncology    UMBILICAL HERNIA REPAIR Family History   Problem Relation Age of Onset    Breast cancer Mother     Cervical cancer Paternal Aunt     Pancreatic cancer Other     Liver cancer Other     No Known Problems Father      Social History     Socioeconomic History    Marital status: /Civil Union     Spouse name: Not on file    Number of children: Not on file    Years of education: Not on file    Highest education level: Not on file   Occupational History    Not on file   Social Needs    Financial resource strain: Not on file    Food insecurity:     Worry: Not on file     Inability: Not on file    Transportation needs:     Medical: Not on file     Non-medical: Not on file   Tobacco Use    Smoking status: Never Smoker    Smokeless tobacco: Never Used   Substance and Sexual Activity    Alcohol use:  Yes     Alcohol/week: 2 0 standard drinks     Types: 2 Cans of beer per week     Frequency: 2-3 times a week     Drinks per session: 1 or 2     Binge frequency: Less than monthly     Comment: couple times per week;     Drug use: No    Sexual activity: Yes   Lifestyle    Physical activity:     Days per week: Not on file     Minutes per session: Not on file    Stress: Not on file   Relationships    Social connections:     Talks on phone: Not on file     Gets together: Not on file     Attends [employer] service: Not on file     Active member of club or organization: Not on file     Attends meetings of clubs or organizations: Not on file     Relationship status: Not on file    Intimate partner violence:     Fear of current or ex partner: Not on file     Emotionally abused: Not on file     Physically abused: Not on file     Forced sexual activity: Not on file   Other Topics Concern    Not on file   Social History Narrative    Not on file       Current Outpatient Medications:     ascorbic acid (VITAMIN C) 1000 MG tablet, Take 1 tablet by mouth daily, Disp: , Rfl:     aspirin 325 mg tablet, Take 1 tablet by mouth daily, Disp: , Rfl:     atenolol (TENORMIN) 25 mg tablet, TAKE 1 TABLET BY MOUTH EVERY DAY, Disp: 30 tablet, Rfl: 11    Cholecalciferol (VITAMIN D) 2000 units CAPS, Take by mouth daily, Disp: , Rfl:     Cyanocobalamin (VITAMIN B 12 PO), Take by mouth, Disp: , Rfl:     acetaminophen (TYLENOL) 325 mg tablet, Take 2 tablets (650 mg total) by mouth every 6 (six) hours as needed for mild pain (Patient not taking: Reported on 2/14/2020), Disp: 30 tablet, Rfl: 0    CREON 6000 units delayed release capsule, TAKE 6,000 UNITS OF LIPASE BY MOUTH 3 (THREE) TIMES A DAY WITH MEALS (Patient not taking: Reported on 2/14/2020), Disp: 90 capsule, Rfl: 0    pancrelipase, Lip-Prot-Amyl, (CREON) 6,000 units delayed release capsule, Take 6,000 units of lipase by mouth 3 (three) times a day with meals Take 3 tablets before each meal (Patient not taking: Reported on 2/14/2020), Disp: 240 capsule, Rfl: 3  No Known Allergies  Vitals:    02/14/20 0923   BP: 122/80   Pulse: 60   Resp: 16   Temp: 98 7 °F (37 1 °C)       Physical Exam   Constitutional: He is oriented to person, place, and time  He appears well-developed and well-nourished  HENT:   Head: Normocephalic and atraumatic  Right Ear: External ear normal    Left Ear: External ear normal    Eyes: Pupils are equal, round, and reactive to light  EOM are normal    Neck: Normal range of motion  Neck supple  Cardiovascular: Normal rate and regular rhythm  Pulmonary/Chest: Effort normal and breath sounds normal    Abdominal: Soft  Bowel sounds are normal  He exhibits no distension and no mass  There is no tenderness  There is no rebound and no guarding  No hernia  Musculoskeletal: Normal range of motion  Neurological: He is alert and oriented to person, place, and time  Skin: Skin is warm and dry  Psychiatric: He has a normal mood and affect   His behavior is normal  Judgment and thought content normal          Results:  Labs:  none    Imaging  Ct Chest Abdomen Pelvis W Contrast    Result Date: 2/11/2020  Narrative: CT CHEST, ABDOMEN AND PELVIS WITH IV CONTRAST INDICATION:   C25 2: Malignant neoplasm of tail of pancreas  COMPARISON:  10/1/2019 and 12/5/2018 TECHNIQUE: CT examination of the chest, abdomen and pelvis was performed  Axial, sagittal, and coronal 2D reformatted images were created from the source data and submitted for interpretation  Radiation dose length product (DLP) for this visit:  96 [id number] mGy-cm   This examination, like all CT scans performed in the [facility], was performed utilizing techniques to minimize radiation dose exposure, including the use of iterative reconstruction and automated exposure control  IV Contrast:  100 mL of iohexol (OMNIPAQUE) Enteric Contrast: Enteric contrast was administered  FINDINGS: CHEST LUNGS:  There is very mild peripheral reticular and groundglass density, diffusely distributed  There is a 4 mm right upper lobe nodule on series 3, image 42  There is no tracheal or endobronchial lesion  PLEURA:  Unremarkable  HEART/GREAT VESSELS:  Unremarkable for patient's age  MEDIASTINUM AND JOSE:  Multiple small mediastinal lymph nodes are identified  These measure up to 8 mm  No pathologic lymphadenopathy by CT criteria is seen  Note that there is no prior chest CT for comparison, however  CHEST WALL AND LOWER NECK:   Unremarkable  ABDOMEN LIVER/BILIARY TREE:  There is geographic hepatic steatosis  GALLBLADDER:  No calcified gallstones  No pericholecystic inflammatory change  SPLEEN:  Unremarkable  PANCREAS:  The patient is status post distal pancreatectomy  Again seen is mild fluid in the surgical bed tracking inferiorly, unchanged from the prior study  There is no recurrent soft tissue mass identified  There is mild focal dilatation of the pancreatic duct in the mid pancreas extending to the staple line    While this is difficult to visualize on the most recent prior study, the appearance is unchanged from the preoperative CT and MRI  There is a small side branch IPMN in this region measuring approximately 5 mm, also seen previously on MRCP images  The pancreatic duct within the head and neck is normal in caliber  ADRENAL GLANDS:  Unremarkable  KIDNEYS/URETERS:  Unremarkable  No hydronephrosis  STOMACH AND BOWEL:  Unremarkable  APPENDIX:  No findings to suggest appendicitis  ABDOMINOPELVIC CAVITY:  No ascites or free intraperitoneal air  No lymphadenopathy  There is improving density in the omentum, likely resolving omental infarct related to prior surgery as this was not present on preoperative imaging  VESSELS:  Unremarkable for patient's age  PELVIS REPRODUCTIVE ORGANS:  Unremarkable for patient's age  URINARY BLADDER:  There is stable mild circumferential bladder wall thickening  ABDOMINAL WALL/INGUINAL REGIONS:  Unremarkable  OSSEOUS STRUCTURES:  No acute fracture or destructive osseous lesion  Impression: 1  No definite evidence of recurrent or metastatic disease in the chest, abdomen, or pelvis  A 4 mm right upper lobe lung nodule is nonspecific and three-month follow-up chest CT is recommended  2   Focal dilatation of the pancreatic duct from the mid gland to the anastomosis with side branch IPMN, unchanged from the preoperative imaging  Attention on follow-up recommended  3   Shoddy mediastinal lymphadenopathy  While there are no prior studies available for comparison, there are no lymph nodes which meet criteria for pathologic enlargement  4   Stable mild fluid in the surgical bed extending into the pelvis  5   Improving omental densities which may represent resolving omental infarct related to prior surgery  6   Subtle subpleural reticular and groundglass densities throughout the lung fields  This is likely early interstitial lung disease and indeterminate for UIP pattern based on ATS criteria  7   Geographic hepatic steatosis  The study was marked in EPIC for significant notification   Workstation performed: [id number]     I reviewed the above laboratory and imaging data  Discussion/Summary:  History of stage I pancreas cancer distal pancreatectomy  No evidence of recurrence  Back pain  Will order MRI for further workup  Otherwise, plan on follow-up in 3-6 months per protocol

## 2020-02-14 NOTE — PROGRESS NOTES
Surgical Oncology Follow Up       [facility] [place]  [facility] [id number]    Uzma Solomon  1950  [id number]  [address] [place]  [address] [address]    Chief Complaint   Patient presents with    Follow-up     F/U after CT scan  Assessment/Plan:    No problem-specific Assessment & Plan notes found for this encounter  Diagnoses and all orders for this visit:    Encounter for follow-up surveillance of pancreatic cancer  -     MRI lumbar spine w wo contrast; Future  -     Basic metabolic panel; Future  -     Cancer antigen 19-9; Future    History of pancreatic cancer        Advance Care Planning/Advance Directives:  Discussed disease status, cancer treatment plans and/or cancer treatment goals with the patient  History of pancreatic cancer    3/12/2019 Surgery     Distal pancreatectomy  Several foci of invasive colloid cancer  Grade 1  IPMN with high grade dysplasia at margin  T1b, NO MX Stage IA      4/11/2019 - 6/21/2019 Chemotherapy     Saw Dr Bassam Bhandari  Will be starting Folfirinox 4/24   Ended 6/21/2019 4/23/2019 -  Chemotherapy     fluorouracil (ADRUCIL) injection 1,010 mg, 400 mg/m2 = 1,010 mg, Intravenous, Once, 2 of 2 cycles  pegfilgrastim (NEULASTA ONPRO) subcutaneous injection kit 6 mg, 6 mg, Subcutaneous, Once, 2 of 2 cycles  Administration: 6 mg (6/21/2019), 6 mg (6/7/2019)  irinotecan (CAMPTOSAR) 455 mg in dextrose 5 % 500 mL chemo infusion, 180 mg/m2 = 455 mg, Intravenous, Once, 4 of 4 cycles  Dose modification: 140 mg/m2 (original dose 180 mg/m2, Cycle 3, Reason: Other (See Comments)), 125 mg/m2 (original dose 180 mg/m2, Cycle 3, Reason: Dose Not Tolerated)  Administration: 316 mg (6/5/2019), 300 mg (6/19/2019)  leucovorin 1,012 mg in dextrose 5 % 250 mL IVPB, 400 mg/m2 = 1,012 mg, Intravenous, Once, 2 of 2 cycles  oxaliplatin (ELOXATIN) 215 05 mg in dextrose 5 % 250 mL chemo infusion, 85 mg/m2 = 215 05 mg, Intravenous, Once, 4 of 4 cycles  Dose modification: 65 mg/m2 (original dose 85 mg/m2, Cycle 3, Reason: Other (See Comments)), 60 mg/m2 (original dose 85 mg/m2, Cycle 3, Reason: Dose Not Tolerated)  Administration: 151 8 mg (6/5/2019), 151 8 mg (6/19/2019)         History of Present Illness:  Patient is a 79-year-old man with a history of low-grade stage I pancreas cancer status post distal pancreatectomy here for surveillance visit   -Interval History:  He has done well, though over the last several weeks he has had right-sided abdominal pain along with right-sided back pain  He otherwise has no GI complaints  No nausea, vomiting, stool changes, or weight loss  He is able to eat fairly well without any difficulty  The pain is constant, and is reported as being a dull achiness in the right back radiating towards the right flank and abdomen  Review of Systems:  Review of Systems   Constitutional: Negative  HENT: Negative  Eyes: Negative  Respiratory: Negative  Cardiovascular: Negative  Gastrointestinal: Positive for abdominal pain  Endocrine: Negative  Genitourinary: Negative  Musculoskeletal: Positive for back pain  Skin: Negative  Allergic/Immunologic: Negative  Neurological: Negative  Hematological: Negative  Psychiatric/Behavioral: Negative          Patient Active Problem List   Diagnosis    Obstructive sleep apnea syndrome    Hypersomnia    Permanent atrial fibrillation    Morbid obesity with BMI of 40 0-44 9, adult ([place] [address] )    Lower extremity edema    Pancreatic duct dilated    History of pancreatic cancer    Type 2 diabetes mellitus without complication, without long-term current use of insulin ([place] [address] )    Encounter for follow-up surveillance of pancreatic cancer     Past Medical History:   Diagnosis Date    A-fib ([place] [address] )     Abdominal wall strain     last assessed: 10/21/14    Allergic rhinitis     last assessed: 05/03/16    Arthritis     CPAP (continuous positive airway pressure) dependence     Erectile dysfunction of non-organic origin     last assessed: 03/17/14    Lumbar strain     last assessed: 10/21/14    Multiple acquired skin tags     last assessed: 2/18/16    Palpitations     last assessed: 03/17/14    Pancreas cyst     Plantar fasciitis     Skin disorder     last assessed: 05/28/13    Sleep apnea      Past Surgical History:   Procedure Laterality Date    CATARACT EXTRACTION Bilateral     COLONOSCOPY      FL GUIDED CENTRAL VENOUS ACCESS DEVICE INSERTION  4/23/2019    FOOT SURGERY      Due to plantar fascitis    JOINT REPLACEMENT Left     LTK    LINEAR ENDOSCOPIC U/S      PANCREATECTOMY LAPAROSCOPIC N/A 3/12/2019    Procedure: DISTAL PANCREATECTOMY LAPAROSCOPIC/POSSIBLE OPEN;  Surgeon: Maurice Bremeo MD;  [facility];  Service: Surgical Oncology    DC EDG US EXAM SURGICAL ALTER STOM DUODENUM/JEJUNUM N/A 1/24/2019    Procedure: LINEAR ENDOSCOPIC U/S;  Surgeon: Pancho Michelle MD;  Location: [facility]; Service: Gastroenterology    REPLACEMENT TOTAL KNEE Left     TUNNELED VENOUS PORT PLACEMENT Left 4/23/2019    Procedure: INSERTION VENOUS PORT (PORT-A-CATH);   Surgeon: Maurice Bermeo MD;  [facility];  Service: Surgical Oncology    UMBILICAL HERNIA REPAIR       Family History   Problem Relation Age of Onset    Breast cancer Mother     Cervical cancer Paternal Aunt     Pancreatic cancer Other     Liver cancer Other     No Known Problems Father      Social History     Socioeconomic History    Marital status: /Civil Union     Spouse name: Not on file    Number of children: Not on file    Years of education: Not on file    Highest education level: Not on file   Occupational History    Not on file   Social Needs    Financial resource strain: Not on file    Food insecurity:     Worry: Not on file     Inability: Not on file   Kaden Taylor Transportation needs:     Medical: Not on file     Non-medical: Not on file   Tobacco Use    Smoking status: Never Smoker    Smokeless tobacco: Never Used   Substance and Sexual Activity    Alcohol use:  Yes     Alcohol/week: 2 0 standard drinks     Types: 2 Cans of beer per week     Frequency: 2-3 times a week     Drinks per session: 1 or 2     Binge frequency: Less than monthly     Comment: couple times per week;     Drug use: No    Sexual activity: Yes   Lifestyle    Physical activity:     Days per week: Not on file     Minutes per session: Not on file    Stress: Not on file   Relationships    Social connections:     Talks on phone: Not on file     Gets together: Not on file     Attends Latter day service: Not on file     Active member of club or organization: Not on file     Attends meetings of clubs or organizations: Not on file     Relationship status: Not on file    Intimate partner violence:     Fear of current or ex partner: Not on file     Emotionally abused: Not on file     Physically abused: Not on file     Forced sexual activity: Not on file   Other Topics Concern    Not on file   Social History Narrative    Not on file       Current Outpatient Medications:     ascorbic acid (VITAMIN C) 1000 MG tablet, Take 1 tablet by mouth daily, Disp: , Rfl:     aspirin 325 mg tablet, Take 1 tablet by mouth daily, Disp: , Rfl:     atenolol (TENORMIN) 25 mg tablet, TAKE 1 TABLET BY MOUTH EVERY DAY, Disp: 30 tablet, Rfl: 11    Cholecalciferol (VITAMIN D) 2000 units CAPS, Take by mouth daily, Disp: , Rfl:     Cyanocobalamin (VITAMIN B 12 PO), Take by mouth, Disp: , Rfl:     acetaminophen (TYLENOL) 325 mg tablet, Take 2 tablets (650 mg total) by mouth every 6 (six) hours as needed for mild pain (Patient not taking: Reported on 2/14/2020), Disp: 30 tablet, Rfl: 0    CREON 6000 units delayed release capsule, TAKE 6,000 UNITS OF LIPASE BY MOUTH 3 (THREE) TIMES A DAY WITH MEALS (Patient not taking: Reported on 2/14/2020), Disp: 90 capsule, Rfl: 0    pancrelipase, Lip-Prot-Amyl, (CREON) 6,000 units delayed release capsule, Take 6,000 units of lipase by mouth 3 (three) times a day with meals Take 3 tablets before each meal (Patient not taking: Reported on 2/14/2020), Disp: 240 capsule, Rfl: 3  No Known Allergies  Vitals:    02/14/20 0923   BP: 122/80   Pulse: 60   Resp: 16   Temp: 98 7 °F (37 1 °C)       Physical Exam   Constitutional: He is oriented to person, place, and time  He appears well-developed and well-nourished  HENT:   Head: Normocephalic and atraumatic  Right Ear: External ear normal    Left Ear: External ear normal    Eyes: Pupils are equal, round, and reactive to light  EOM are normal    Neck: Normal range of motion  Neck supple  Cardiovascular: Normal rate and regular rhythm  Pulmonary/Chest: Effort normal and breath sounds normal    Abdominal: Soft  Bowel sounds are normal  He exhibits no distension and no mass  There is no tenderness  There is no rebound and no guarding  No hernia  Musculoskeletal: Normal range of motion  Neurological: He is alert and oriented to person, place, and time  Skin: Skin is warm and dry  Psychiatric: He has a normal mood and affect  His behavior is normal  Judgment and thought content normal          Results:  Labs:  none    Imaging  Ct Chest Abdomen Pelvis W Contrast    Result Date: 2/11/2020  Narrative: CT CHEST, ABDOMEN AND PELVIS WITH IV CONTRAST INDICATION:   C25 2: Malignant neoplasm of tail of pancreas  COMPARISON:  10/1/2019 and 12/5/2018 TECHNIQUE: CT examination of the chest, abdomen and pelvis was performed  Axial, sagittal, and coronal 2D reformatted images were created from the source data and submitted for interpretation  Radiation dose length product (DLP) for this visit:  96 [id number] mGy-cm     This examination, like all CT scans performed in the [facility], was performed utilizing techniques to minimize radiation dose exposure, including the use of iterative reconstruction and automated exposure control  IV Contrast:  100 mL of iohexol (OMNIPAQUE) Enteric Contrast: Enteric contrast was administered  FINDINGS: CHEST LUNGS:  There is very mild peripheral reticular and groundglass density, diffusely distributed  There is a 4 mm right upper lobe nodule on series 3, image 42  There is no tracheal or endobronchial lesion  PLEURA:  Unremarkable  HEART/GREAT VESSELS:  Unremarkable for patient's age  MEDIASTINUM AND JOSE:  Multiple small mediastinal lymph nodes are identified  These measure up to 8 mm  No pathologic lymphadenopathy by CT criteria is seen  Note that there is no prior chest CT for comparison, however  CHEST WALL AND LOWER NECK:   Unremarkable  ABDOMEN LIVER/BILIARY TREE:  There is geographic hepatic steatosis  GALLBLADDER:  No calcified gallstones  No pericholecystic inflammatory change  SPLEEN:  Unremarkable  PANCREAS:  The patient is status post distal pancreatectomy  Again seen is mild fluid in the surgical bed tracking inferiorly, unchanged from the prior study  There is no recurrent soft tissue mass identified  There is mild focal dilatation of the pancreatic duct in the mid pancreas extending to the staple line  While this is difficult to visualize on the most recent prior study, the appearance is unchanged from the preoperative CT and MRI  There is a small side branch IPMN in this region measuring approximately 5 mm, also seen previously on MRCP images  The pancreatic duct within the head and neck is normal in caliber  ADRENAL GLANDS:  Unremarkable  KIDNEYS/URETERS:  Unremarkable  No hydronephrosis  STOMACH AND BOWEL:  Unremarkable  APPENDIX:  No findings to suggest appendicitis  ABDOMINOPELVIC CAVITY:  No ascites or free intraperitoneal air  No lymphadenopathy  There is improving density in the omentum, likely resolving omental infarct related to prior surgery as this was not present on preoperative imaging  VESSELS:  Unremarkable for patient's age  PELVIS REPRODUCTIVE ORGANS:  Unremarkable for patient's age  URINARY BLADDER:  There is stable mild circumferential bladder wall thickening  ABDOMINAL WALL/INGUINAL REGIONS:  Unremarkable  OSSEOUS STRUCTURES:  No acute fracture or destructive osseous lesion  Impression: 1  No definite evidence of recurrent or metastatic disease in the chest, abdomen, or pelvis  A 4 mm right upper lobe lung nodule is nonspecific and three-month follow-up chest CT is recommended  2   Focal dilatation of the pancreatic duct from the mid gland to the anastomosis with side branch IPMN, unchanged from the preoperative imaging  Attention on follow-up recommended  3   Shoddy mediastinal lymphadenopathy  While there are no prior studies available for comparison, there are no lymph nodes which meet criteria for pathologic enlargement  4   Stable mild fluid in the surgical bed extending into the pelvis  5   Improving omental densities which may represent resolving omental infarct related to prior surgery  6   Subtle subpleural reticular and groundglass densities throughout the lung fields  This is likely early interstitial lung disease and indeterminate for UIP pattern based on ATS criteria  7   Geographic hepatic steatosis  The study was marked in EPIC for significant notification  Workstation performed: [id number]     I reviewed the above laboratory and imaging data  Discussion/Summary:  History of stage I pancreas cancer distal pancreatectomy  No evidence of recurrence  Back pain  Will order MRI for further workup  Otherwise, plan on follow-up in 3-6 months per protocol

## 2020-02-15 LAB — CANCER AG19-9 SERPL-ACNC: 16 U/ML (ref 0–35)

## 2020-02-26 ENCOUNTER — OFFICE VISIT (OUTPATIENT)
Dept: FAMILY MEDICINE CLINIC | Facility: CLINIC | Age: 70
End: 2020-02-26
Payer: MEDICARE

## 2020-02-26 VITALS
HEIGHT: 71 IN | OXYGEN SATURATION: 96 % | SYSTOLIC BLOOD PRESSURE: 116 MMHG | BODY MASS INDEX: 44.1 KG/M2 | WEIGHT: 315 LBS | DIASTOLIC BLOOD PRESSURE: 70 MMHG | HEART RATE: 91 BPM | TEMPERATURE: 98.6 F

## 2020-02-26 DIAGNOSIS — Z12.5 SCREENING FOR PROSTATE CANCER: ICD-10-CM

## 2020-02-26 DIAGNOSIS — E66.01 MORBID OBESITY WITH BMI OF 40.0-44.9, ADULT (HCC): ICD-10-CM

## 2020-02-26 DIAGNOSIS — D69.6 THROMBOCYTOPENIA (HCC): ICD-10-CM

## 2020-02-26 DIAGNOSIS — Z12.11 SCREENING FOR COLON CANCER: ICD-10-CM

## 2020-02-26 DIAGNOSIS — E11.9 TYPE 2 DIABETES MELLITUS WITHOUT COMPLICATION, WITHOUT LONG-TERM CURRENT USE OF INSULIN (HCC): ICD-10-CM

## 2020-02-26 DIAGNOSIS — Z12.11 ENCOUNTER FOR SCREENING COLONOSCOPY: Primary | ICD-10-CM

## 2020-02-26 DIAGNOSIS — Z85.07 HISTORY OF PANCREATIC CANCER: ICD-10-CM

## 2020-02-26 DIAGNOSIS — I48.21 PERMANENT ATRIAL FIBRILLATION (HCC): ICD-10-CM

## 2020-02-26 PROCEDURE — 3078F DIAST BP <80 MM HG: CPT | Performed by: FAMILY MEDICINE

## 2020-02-26 PROCEDURE — 3074F SYST BP LT 130 MM HG: CPT | Performed by: FAMILY MEDICINE

## 2020-02-26 PROCEDURE — 1160F RVW MEDS BY RX/DR IN RCRD: CPT | Performed by: FAMILY MEDICINE

## 2020-02-26 PROCEDURE — 4040F PNEUMOC VAC/ADMIN/RCVD: CPT | Performed by: FAMILY MEDICINE

## 2020-02-26 PROCEDURE — 99214 OFFICE O/P EST MOD 30 MIN: CPT | Performed by: FAMILY MEDICINE

## 2020-02-26 PROCEDURE — 1036F TOBACCO NON-USER: CPT | Performed by: FAMILY MEDICINE

## 2020-02-26 PROCEDURE — 3008F BODY MASS INDEX DOCD: CPT | Performed by: FAMILY MEDICINE

## 2020-02-26 NOTE — ASSESSMENT & PLAN NOTE
Lab Results   Component Value Date    HGBA1C 6 9 (H) 10/25/2019   Hemoglobin A1c 6 9 in October  Recheck level now  If further elevated will definitely need to be treated medically  Continue diet and exercise

## 2020-02-26 NOTE — PROGRESS NOTES
[facility]      NAME: Koffi Schroeder  AGE: 78 y o  SEX: male  : 1950   MRN: [id number]    DATE: 2020  TIME: 11:07 AM    Assessment and Plan     Problem List Items Addressed This Visit     Type 2 diabetes mellitus without complication, without long-term current use of insulin ([facility] )       Lab Results   Component Value Date    HGBA1C 6 9 (H) 10/25/2019   Hemoglobin A1c 6 9 in October  Recheck level now  If further elevated will definitely need to be treated medically  Continue diet and exercise  Relevant Orders    Comprehensive metabolic panel    Hemoglobin A1C    Thrombocytopenia (HCC)    Permanent atrial fibrillation     Stable on no anticoagulation other than daily aspirin  Morbid obesity with BMI of 40 0-44 9, adult ([facility] )    History of pancreatic cancer     Asymptomatic with no sign of recurrent disease  Continue follow-up with Surgical Oncology  Other Visit Diagnoses     Encounter for screening colonoscopy    -  Primary    Screening for colon cancer        Screening for prostate cancer        Relevant Orders    PSA, Total Screen        BMI Counseling: Body mass index is 44 02 kg/m²  The BMI is above normal  Nutrition recommendations include decreasing portion sizes, encouraging healthy choices of fruits and vegetables, consuming healthier snacks and moderation in carbohydrate intake  Exercise recommendations include exercising 3-5 times per week  No pharmacotherapy was ordered  Return to office in:  6 months, annual wellness visit    Chief Complaint     Chief Complaint   Patient presents with    Follow-up     Pt is here for a 3 month follow up  Pt has no new complains at this moment  History of Present Illness     Patient presents for routine follow-up of chronic medical problems and to review recent blood work  His past medical history is positive for pancreatic cancer    He has approximately 1 year status post pancreatic surgery  His tumor markers have remained low     His only medication is atorvastatin for hyperlipidemia  The following portions of the patient's history were reviewed and updated as appropriate: allergies, current medications, past family history, past medical history, past social history, past surgical history and problem list     Review of Systems   Review of Systems   Constitutional: Negative  Respiratory: Negative  Cardiovascular: Negative  Gastrointestinal: Negative  Genitourinary: Negative  Musculoskeletal: Positive for back pain  Psychiatric/Behavioral: Negative  Active Problem List     Patient Active Problem List   Diagnosis    Obstructive sleep apnea syndrome    Hypersomnia    Permanent atrial fibrillation    Morbid obesity with BMI of 40 0-44 9, adult ([facility] )    Lower extremity edema    Pancreatic duct dilated    History of pancreatic cancer    Type 2 diabetes mellitus without complication, without long-term current use of insulin ([facility] )    Encounter for follow-up surveillance of pancreatic cancer    Thrombocytopenia (HCC)       Objective   /70 (BP Location: Left arm, Patient Position: Sitting, Cuff Size: Adult)   Pulse 91   Temp 98 6 °F (37 °C) (Tympanic)   Ht 5' 11" (1 803 m)   Wt (!) 143 kg (315 lb 9 6 oz)   SpO2 96%   BMI 44 02 kg/m²     Physical Exam   Constitutional: He is oriented to person, place, and time  He appears well-developed and well-nourished  No distress  HENT:   Head: Normocephalic and atraumatic  Eyes: Pupils are equal, round, and reactive to light  Conjunctivae are normal  Right eye exhibits no discharge  Neck: Normal range of motion  No thyromegaly present  Cardiovascular: Normal rate and regular rhythm  Pulses are no weak pulses  Pulmonary/Chest: Effort normal and breath sounds normal  No respiratory distress  Lymphadenopathy:     He has no cervical adenopathy     Neurological: He is alert and oriented to person, place, and time    Skin: Skin is warm and dry  He is not diaphoretic  Psychiatric: He has a normal mood and affect  His behavior is normal  Judgment and thought content normal    Nursing note and vitals reviewed  Patient's shoes and socks removed  Assign Risk Category:  No deformity present; No loss of protective sensation;  No weak pulses       Risk: 0        Current Medications     Current Outpatient Medications:     ascorbic acid (VITAMIN C) 1000 MG tablet, Take 1 tablet by mouth daily, Disp: , Rfl:     aspirin 325 mg tablet, Take 1 tablet by mouth daily, Disp: , Rfl:     atenolol (TENORMIN) 25 mg tablet, TAKE 1 TABLET BY MOUTH EVERY DAY, Disp: 30 tablet, Rfl: 11    Cholecalciferol (VITAMIN D) 2000 units CAPS, Take by mouth daily, Disp: , Rfl:     Cyanocobalamin (VITAMIN B 12 PO), Take by mouth, Disp: , Rfl:     acetaminophen (TYLENOL) 325 mg tablet, Take 2 tablets (650 mg total) by mouth every 6 (six) hours as needed for mild pain (Patient not taking: Reported on 2/14/2020), Disp: 30 tablet, Rfl: 0    Health Maintenance     Health Maintenance   Topic Date Due    Diabetic Foot Exam  06/29/1959    DM Eye Exam  06/29/1959    URINE MICROALBUMIN  06/29/1959    DTaP,Tdap,and Td Vaccines (1 - Tdap) 06/29/1960    Falls: Plan of Care  06/29/2014    Pneumococcal Vaccine: 65+ Years (2 of 2 - PPSV23) 06/07/2019    Influenza Vaccine  03/24/2020 (Originally 7/1/2019)    HEMOGLOBIN A1C  04/25/2020    Fall Risk  10/09/2020    Depression Screening PHQ  10/09/2020    Medicare Annual Wellness Visit (AWV)  10/09/2020    BMI: Followup Plan  10/09/2020    BMI: Adult  02/14/2021    CRC Screening: Sigmoidoscopy  07/25/2024    Hepatitis C Screening  Completed    Pneumococcal Vaccine: Pediatrics (0 to 5 Years) and At-Risk Patients (6 to 59 Years)  Aged Out    HIB Vaccine  Aged Out    Hepatitis B Vaccine  Aged Out    IPV Vaccine  Aged Out    Hepatitis A Vaccine  Aged Out    Meningococcal ACWY Vaccine  Aged Out    HPV Vaccine  Aged Out     Immunization History   Administered Date(s) Administered    Influenza Split High Dose Preservative Free IM 10/21/2014    Influenza TIV (IM) 01/16/2013, 01/16/2013    Pneumococcal Conjugate 13-Valent 06/07/2018       Anant De Luna DO  [facility]

## 2020-02-28 ENCOUNTER — APPOINTMENT (OUTPATIENT)
Dept: LAB | Facility: MEDICAL CENTER | Age: 70
End: 2020-02-28
Payer: MEDICARE

## 2020-02-28 DIAGNOSIS — Z12.5 SCREENING FOR PROSTATE CANCER: ICD-10-CM

## 2020-02-28 DIAGNOSIS — E11.9 TYPE 2 DIABETES MELLITUS WITHOUT COMPLICATION, WITHOUT LONG-TERM CURRENT USE OF INSULIN (HCC): ICD-10-CM

## 2020-02-28 LAB
ALBUMIN SERPL BCP-MCNC: 3.5 G/DL (ref 3.5–5)
ALP SERPL-CCNC: 106 U/L (ref 46–116)
ALT SERPL W P-5'-P-CCNC: 38 U/L (ref 12–78)
ANION GAP SERPL CALCULATED.3IONS-SCNC: 5 MMOL/L (ref 4–13)
AST SERPL W P-5'-P-CCNC: 40 U/L (ref 5–45)
BILIRUB SERPL-MCNC: 0.61 MG/DL (ref 0.2–1)
BUN SERPL-MCNC: 16 MG/DL (ref 5–25)
CALCIUM SERPL-MCNC: 9.2 MG/DL (ref 8.3–10.1)
CHLORIDE SERPL-SCNC: 106 MMOL/L (ref 100–108)
CO2 SERPL-SCNC: 27 MMOL/L (ref 21–32)
CREAT SERPL-MCNC: 0.86 MG/DL (ref 0.6–1.3)
EST. AVERAGE GLUCOSE BLD GHB EST-MCNC: 143 MG/DL
GFR SERPL CREATININE-BSD FRML MDRD: 88 ML/MIN/1.73SQ M
GLUCOSE P FAST SERPL-MCNC: 139 MG/DL (ref 65–99)
HBA1C MFR BLD: 6.6 %
POTASSIUM SERPL-SCNC: 4.9 MMOL/L (ref 3.5–5.3)
PROT SERPL-MCNC: 7.9 G/DL (ref 6.4–8.2)
PSA SERPL-MCNC: 1.2 NG/ML (ref 0–4)
SODIUM SERPL-SCNC: 138 MMOL/L (ref 136–145)

## 2020-02-28 PROCEDURE — 83036 HEMOGLOBIN GLYCOSYLATED A1C: CPT

## 2020-02-28 PROCEDURE — G0103 PSA SCREENING: HCPCS

## 2020-02-28 PROCEDURE — 36415 COLL VENOUS BLD VENIPUNCTURE: CPT

## 2020-02-28 PROCEDURE — 80053 COMPREHEN METABOLIC PANEL: CPT

## 2020-03-02 ENCOUNTER — HOSPITAL ENCOUNTER (OUTPATIENT)
Dept: RADIOLOGY | Facility: HOSPITAL | Age: 70
Discharge: HOME | End: 2020-03-02
Payer: MEDICARE

## 2020-03-02 ENCOUNTER — HOSPITAL ENCOUNTER (OUTPATIENT)
Dept: RADIOLOGY | Facility: HOSPITAL | Age: 70
Discharge: HOME | End: 2020-03-02
Attending: SURGERY
Payer: MEDICARE

## 2020-03-02 DIAGNOSIS — Z85.07 PERSONAL HISTORY OF MALIGNANT NEOPLASM OF PANCREAS: ICD-10-CM

## 2020-03-02 DIAGNOSIS — I10 HYPERTENSION, UNSPECIFIED TYPE: ICD-10-CM

## 2020-03-02 DIAGNOSIS — I48.21 PERMANENT ATRIAL FIBRILLATION (HCC): ICD-10-CM

## 2020-03-02 DIAGNOSIS — Z08 ENCOUNTER FOR FOLLOW-UP SURVEILLANCE OF PANCREATIC CANCER: ICD-10-CM

## 2020-03-02 DIAGNOSIS — Z85.07 ENCOUNTER FOR FOLLOW-UP SURVEILLANCE OF PANCREATIC CANCER: ICD-10-CM

## 2020-03-02 PROCEDURE — A9585 GADOBUTROL INJECTION: HCPCS | Performed by: SURGERY

## 2020-03-02 PROCEDURE — 72158 MRI LUMBAR SPINE W/O & W/DYE: CPT

## 2020-03-02 RX ORDER — ATENOLOL 25 MG/1
25 TABLET ORAL DAILY
Qty: 30 TABLET | Refills: 11 | Status: SHIPPED | OUTPATIENT
Start: 2020-03-02 | End: 2021-02-24

## 2020-03-02 RX ADMIN — GADOBUTROL 14 ML: 604.72 INJECTION INTRAVENOUS at 08:38

## 2020-03-02 NOTE — TELEPHONE ENCOUNTER
Mr Wilfredo Jacob called the office today and left a message on the office prescription line stating that he is needing his Atenolol 25MG to be refilled to the [facility] pharmacy  The patient is a patient of Dr Sonny Martínez and was last seen on 10/04/2019

## 2020-03-18 ENCOUNTER — HOSPITAL ENCOUNTER (OUTPATIENT)
Dept: INFUSION CENTER | Facility: CLINIC | Age: 70
Discharge: HOME | End: 2020-03-18

## 2020-03-18 NOTE — PROGRESS NOTES
Patient to the unit for port flush  Port accessed, good blood return, flushed per protocol  Tolerated without adverse reaction  AVS given, aware of future appointment  Voices no questions or concerns at discharge

## 2020-03-18 NOTE — PLAN OF CARE
Problem: PAIN - ADULT  Goal: Verbalizes/displays adequate comfort level or baseline comfort level  Description  Interventions:  - Encourage patient to monitor pain and request assistance  - Assess pain using appropriate pain scale  - Administer analgesics based on type and severity of pain and evaluate response  - Implement non-pharmacological measures as appropriate and evaluate response  - Consider cultural and social influences on pain and pain management  - Notify physician/advanced practitioner if interventions unsuccessful or patient reports new pain  Outcome: Progressing     Problem: SAFETY ADULT  Goal: Patient will remain free of falls  Description  INTERVENTIONS:  - Assess patient frequently for physical needs  -  Identify cognitive and physical deficits and behaviors that affect risk of falls    -  [place] fall precautions as indicated by assessment   - Educate patient/family on patient safety including physical limitations  - Instruct patient to call for assistance with activity based on assessment  - Modify environment to reduce risk of injury  - Consider OT/PT consult to assist with strengthening/mobility  Outcome: Progressing     Problem: DISCHARGE PLANNING  Goal: Discharge to home or other facility with appropriate resources  Description  INTERVENTIONS:  - Identify barriers to discharge w/patient and caregiver  - Arrange for needed discharge resources and transportation as appropriate  - Identify discharge learning needs (meds, wound care, etc )  - Arrange for interpretive services to assist at discharge as needed  - Refer to Case Management Department for coordinating discharge planning if the patient needs post-hospital services based on physician/advanced practitioner order or complex needs related to functional status, cognitive ability, or social support system  Outcome: Progressing     Problem: Knowledge Deficit  Goal: Patient/family/caregiver demonstrates understanding of disease process, treatment plan, medications, and discharge instructions  Description  Complete learning assessment and assess knowledge base    Interventions:  - Provide teaching at level of understanding  - Provide teaching via preferred learning methods  Outcome: Progressing

## 2020-04-23 ENCOUNTER — HOSPITAL ENCOUNTER (OUTPATIENT)
Dept: INFUSION CENTER | Facility: CLINIC | Age: 70
Discharge: HOME | End: 2020-04-23
Payer: MEDICARE

## 2020-04-23 VITALS — TEMPERATURE: 98 F

## 2020-04-23 DIAGNOSIS — Z85.07 HISTORY OF PANCREATIC CANCER: Primary | ICD-10-CM

## 2020-04-23 PROCEDURE — 96523 IRRIG DRUG DELIVERY DEVICE: CPT

## 2020-05-27 DIAGNOSIS — Z85.07 HISTORY OF PANCREATIC CANCER: Primary | ICD-10-CM

## 2020-05-28 ENCOUNTER — TELEPHONE (OUTPATIENT)
Dept: SURGICAL ONCOLOGY | Facility: CLINIC | Age: 70
End: 2020-05-28

## 2020-06-04 ENCOUNTER — HOSPITAL ENCOUNTER (OUTPATIENT)
Dept: INFUSION CENTER | Facility: CLINIC | Age: 70
Discharge: HOME | End: 2020-06-04
Payer: MEDICARE

## 2020-06-04 VITALS — TEMPERATURE: 99.5 F

## 2020-06-04 DIAGNOSIS — Z85.07 HISTORY OF PANCREATIC CANCER: Primary | ICD-10-CM

## 2020-06-04 PROCEDURE — 96523 IRRIG DRUG DELIVERY DEVICE: CPT

## 2020-06-10 ENCOUNTER — APPOINTMENT (OUTPATIENT)
Dept: LAB | Facility: MEDICAL CENTER | Age: 70
End: 2020-06-10
Payer: MEDICARE

## 2020-06-10 DIAGNOSIS — Z85.07 HISTORY OF PANCREATIC CANCER: ICD-10-CM

## 2020-06-10 LAB
ANION GAP SERPL CALCULATED.3IONS-SCNC: 5 MMOL/L (ref 4–13)
BUN SERPL-MCNC: 12 MG/DL (ref 5–25)
CALCIUM SERPL-MCNC: 8.7 MG/DL (ref 8.3–10.1)
CHLORIDE SERPL-SCNC: 103 MMOL/L (ref 100–108)
CO2 SERPL-SCNC: 26 MMOL/L (ref 21–32)
CREAT SERPL-MCNC: 0.8 MG/DL (ref 0.6–1.3)
GFR SERPL CREATININE-BSD FRML MDRD: 91 ML/MIN/1.73SQ M
GLUCOSE P FAST SERPL-MCNC: 164 MG/DL (ref 65–99)
POTASSIUM SERPL-SCNC: 4.2 MMOL/L (ref 3.5–5.3)
SODIUM SERPL-SCNC: 134 MMOL/L (ref 136–145)

## 2020-06-10 PROCEDURE — 86301 IMMUNOASSAY TUMOR CA 19-9: CPT

## 2020-06-10 PROCEDURE — 36415 COLL VENOUS BLD VENIPUNCTURE: CPT

## 2020-06-10 PROCEDURE — 80048 BASIC METABOLIC PNL TOTAL CA: CPT

## 2020-06-11 LAB — CANCER AG19-9 SERPL-ACNC: 16 U/ML (ref 0–35)

## 2020-06-13 ENCOUNTER — HOSPITAL ENCOUNTER (OUTPATIENT)
Dept: CT IMAGING | Facility: HOSPITAL | Age: 70
Discharge: HOME | End: 2020-06-13
Attending: SURGERY
Payer: MEDICARE

## 2020-06-13 DIAGNOSIS — Z85.07 HISTORY OF PANCREATIC CANCER: ICD-10-CM

## 2020-06-13 PROCEDURE — 74177 CT ABD & PELVIS W/CONTRAST: CPT

## 2020-06-13 PROCEDURE — 71260 CT THORAX DX C+: CPT

## 2020-06-13 RX ADMIN — IOHEXOL 100 ML: 350 INJECTION, SOLUTION INTRAVENOUS at 10:54

## 2020-06-18 ENCOUNTER — TELEPHONE (OUTPATIENT)
Dept: SURGICAL ONCOLOGY | Facility: CLINIC | Age: 70
End: 2020-06-18

## 2020-06-19 ENCOUNTER — OFFICE VISIT (OUTPATIENT)
Dept: SURGICAL ONCOLOGY | Facility: CLINIC | Age: 70
End: 2020-06-19
Payer: MEDICARE

## 2020-06-19 VITALS
DIASTOLIC BLOOD PRESSURE: 88 MMHG | RESPIRATION RATE: 16 BRPM | TEMPERATURE: 98.6 F | HEART RATE: 74 BPM | HEIGHT: 71 IN | BODY MASS INDEX: 44.1 KG/M2 | SYSTOLIC BLOOD PRESSURE: 126 MMHG | WEIGHT: 315 LBS

## 2020-06-19 DIAGNOSIS — Z85.07 ENCOUNTER FOR FOLLOW-UP SURVEILLANCE OF PANCREATIC CANCER: Primary | ICD-10-CM

## 2020-06-19 DIAGNOSIS — Z85.07 HISTORY OF PANCREATIC CANCER: ICD-10-CM

## 2020-06-19 DIAGNOSIS — Z08 ENCOUNTER FOR FOLLOW-UP SURVEILLANCE OF PANCREATIC CANCER: Primary | ICD-10-CM

## 2020-06-19 PROCEDURE — 99214 OFFICE O/P EST MOD 30 MIN: CPT | Performed by: SURGERY

## 2020-06-19 PROCEDURE — 4040F PNEUMOC VAC/ADMIN/RCVD: CPT | Performed by: SURGERY

## 2020-06-19 PROCEDURE — 3008F BODY MASS INDEX DOCD: CPT | Performed by: SURGERY

## 2020-06-19 PROCEDURE — 1160F RVW MEDS BY RX/DR IN RCRD: CPT | Performed by: SURGERY

## 2020-06-19 PROCEDURE — 3074F SYST BP LT 130 MM HG: CPT | Performed by: SURGERY

## 2020-06-19 PROCEDURE — 1036F TOBACCO NON-USER: CPT | Performed by: SURGERY

## 2020-06-19 PROCEDURE — 3079F DIAST BP 80-89 MM HG: CPT | Performed by: SURGERY

## 2020-06-19 PROCEDURE — 3044F HG A1C LEVEL LT 7.0%: CPT | Performed by: SURGERY

## 2020-07-16 ENCOUNTER — TELEPHONE (OUTPATIENT)
Dept: SURGICAL ONCOLOGY | Facility: CLINIC | Age: 70
End: 2020-07-16

## 2020-07-16 NOTE — TELEPHONE ENCOUNTER
Patient is not currently experiencing any symptoms of fever, cough, shortness of breath, chills, repeated shaking with chills, muscle pain, headache, sore throat, or new loss of taste/smell  Patient has not been tested for COVID-19  Patient has not been in contact with someone confirmed to have COVID-19  Reviewed visitor policy with patient  Reviewed masking policy with patient  Instructed patient to call office if any symptoms develop between now and appointment time

## 2020-07-17 ENCOUNTER — PROCEDURE VISIT (OUTPATIENT)
Dept: SURGICAL ONCOLOGY | Facility: CLINIC | Age: 70
End: 2020-07-17

## 2020-07-17 VITALS
BODY MASS INDEX: 42.84 KG/M2 | HEIGHT: 71 IN | SYSTOLIC BLOOD PRESSURE: 118 MMHG | TEMPERATURE: 98.6 F | WEIGHT: 306 LBS | HEART RATE: 85 BPM | RESPIRATION RATE: 16 BRPM | DIASTOLIC BLOOD PRESSURE: 80 MMHG

## 2020-07-17 DIAGNOSIS — Z85.07 HISTORY OF PANCREATIC CANCER: Primary | ICD-10-CM

## 2020-07-17 NOTE — PROGRESS NOTES
Insert/Remove Port     Date/Time 7/17/2020 2:47 PM     Performed by  Gertrude Fisher MD     Authorized by Gertrude Fisher MD      Universal Protocol Consent: Verbal consent obtained  Written consent obtained  Consent given by: patient        Preparation: Patient was prepped and draped in the usual sterile fashion  Site preparation: Betadine and Isopropyl alcohol    Local anesthesia used: yes      Anesthesia: local infiltration     Anesthesia   Local anesthesia used: yes  Local Anesthetic: lidocaine 1% without epinephrine  Anesthetic total: 20 mL     Sedation   Patient sedated: no        Specimen: no    Culture: no   Procedure Details   Procedure Notes: The port site was prepped with alcohol and Betadine, then anesthetized with local anesthesia  Following this, an incision was made over the previous scar  Cautery was used to dissect through the dermis and subcutaneous tissue down to the port capsule  The port capsule was then entered, exposing the port  Anchoring sutures were then cut  The port was then removed easily in its entirety  Pressure was held for hemostasis as needed  Cautery was also used for hemostasis  Once we were sure of hemostasis, closure was performed using 3-0 Vicryl to close the subcutaneous fat, followed by 3-0 Vicryl to close the dermis in interrupted fashion, followed by 4-0 Monocryl placed in running subcuticular fashion to close the skin  Benzoin and Steri-Strips were applied followed by a 4 x 4 dressing and Tegaderm gauze  The patient tolerated the procedure well without any difficulties    Patient tolerance: Patient tolerated the procedure well with no immediate complications

## 2020-08-14 ENCOUNTER — OFFICE VISIT (OUTPATIENT)
Dept: SLEEP CENTER | Facility: CLINIC | Age: 70
End: 2020-08-14
Payer: MEDICARE

## 2020-08-14 VITALS
DIASTOLIC BLOOD PRESSURE: 74 MMHG | HEIGHT: 72 IN | OXYGEN SATURATION: 96 % | HEART RATE: 65 BPM | SYSTOLIC BLOOD PRESSURE: 122 MMHG | BODY MASS INDEX: 42.42 KG/M2 | WEIGHT: 313.2 LBS

## 2020-08-14 DIAGNOSIS — R68.2 DRY MOUTH: ICD-10-CM

## 2020-08-14 DIAGNOSIS — G47.33 OBSTRUCTIVE SLEEP APNEA SYNDROME: Primary | ICD-10-CM

## 2020-08-14 DIAGNOSIS — I10 ESSENTIAL HYPERTENSION: ICD-10-CM

## 2020-08-14 DIAGNOSIS — E66.01 MORBID OBESITY WITH BMI OF 40.0-44.9, ADULT (HCC): ICD-10-CM

## 2020-08-14 DIAGNOSIS — I48.19 PERSISTENT ATRIAL FIBRILLATION (HCC): ICD-10-CM

## 2020-08-14 PROCEDURE — 4040F PNEUMOC VAC/ADMIN/RCVD: CPT | Performed by: INTERNAL MEDICINE

## 2020-08-14 PROCEDURE — 3008F BODY MASS INDEX DOCD: CPT | Performed by: INTERNAL MEDICINE

## 2020-08-14 PROCEDURE — 99214 OFFICE O/P EST MOD 30 MIN: CPT | Performed by: INTERNAL MEDICINE

## 2020-08-14 PROCEDURE — 1160F RVW MEDS BY RX/DR IN RCRD: CPT | Performed by: INTERNAL MEDICINE

## 2020-08-14 PROCEDURE — 3078F DIAST BP <80 MM HG: CPT | Performed by: INTERNAL MEDICINE

## 2020-08-14 PROCEDURE — 3044F HG A1C LEVEL LT 7.0%: CPT | Performed by: INTERNAL MEDICINE

## 2020-08-14 PROCEDURE — 1036F TOBACCO NON-USER: CPT | Performed by: INTERNAL MEDICINE

## 2020-08-14 PROCEDURE — 3074F SYST BP LT 130 MM HG: CPT | Performed by: INTERNAL MEDICINE

## 2020-08-14 NOTE — PROGRESS NOTES
Review of Systems      Genitourinary difficulty with erection   Cardiology ankle/leg swelling   Gastrointestinal none   Neurology none   Constitutional none   Integumentary none   Psychiatry none   Musculoskeletal none   Pulmonary shortness of breath with activity   ENT none   Endocrine none   Hematological none

## 2020-08-14 NOTE — PATIENT INSTRUCTIONS

## 2020-08-14 NOTE — PROGRESS NOTES
Follow-Up Note - Asael Uriarte  69 y o  male  :1950  BPW:[id number]    CC: I saw this patient for follow-up in clinic today for Sleep disordered breathing, Coexisting Sleep and Medical Problems  Results of prior studies:  Home sleep testing in 2017 demonstrated a respiratory event index of 28 per hour  Minimum oxygen saturation was 74%  The snore index was 39%  During the subsequent therapeutic study, sleep disordered breathing was adequately remediated with nasal CPAP at 13 cm H2O  PFSH, Problem List, Medications & Allergies were reviewed in EMR  Interval changes: none reported  He  has a past medical history of A-fib ([address] ), Abdominal wall strain, Allergic rhinitis, Arthritis, CPAP (continuous positive airway pressure) dependence, Erectile dysfunction of non-organic origin, Lumbar strain, Multiple acquired skin tags, Palpitations, Pancreas cyst, Plantar fasciitis, Skin disorder, and Sleep apnea  He has a current medication list which includes the following prescription(s): acetaminophen, ascorbic acid, aspirin, atenolol, vitamin d, and cyanocobalamin  ROS: constitutional, psychiatric, ENT, respiratory,CVS, GI, UGS, CNS, MSK, integumentary, endocrine, hematological reviewed  Significant for some weight gain  He is in permanent atrial fibrillation  He has some dyspnea on effort  Noemi Fields DATA REVIEWED:  using PAP > 4 hours/night 100% of the time  Estimated TULIO 0 9/hour at pressure of 13cm H2O     SUBJECTIVE: Regarding use of PAP, Alvarez Bright reports:   · He is experiencing minor adverse effects: dry mouth/throat  · He is   benefiting from use: sleeping better and no longer snoring / having breathing difficulties   Sleep Routine: He reports getting 8 hrs sleep  ; he has no difficulty initiating or maintaining sleep   He awakens spontaneously and feels refreshed  He denied excessive drowsiness   He rated himself at Total score: 4 /24 on the [place] sleepiness scale  Habits: reports that he has never smoked  He has never used smokeless tobacco ,  reports current alcohol use of about 2 0 standard drinks of alcohol per week  ,  reports no history of drug use , Caffeine use: limited , Exercise routine: none   EXAM: /74   Pulse 65   Ht 6' (1 829 m)   Wt (!) 142 kg (313 lb 3 2 oz)   SpO2 96%   BMI 42 48 kg/m²     Patient is well groomed; well appearing  Skin/Extrem: warm & dry; col & hydration normal; no edema  Psych: cooperativeand in no distress  Mental state appears normal   CNS: Alert, orientated, clear & coherent speech  H&N: EOMI; NC/AT:no facial pressure marks, no rashes  Neck Circumference: 22 cm  ENMT Mucus membranes appear normal Nasal airway:patent  Oral airway:  crowded  Resp:effort is normal CVS:  Irregularly irregular ABD:truncal obesity MSK:Gait normal     IMPRESSION: Primary Sleep/Secondary(to Medical or Psych conditions) & comorbidities   1  Obstructive sleep apnea syndrome  PAP DME Resupply/Reorder   2  Dry mouth     3  Persistent atrial fibrillation ([facility] )     4  Essential hypertension     5  Morbid obesity with BMI of 40 0-44 9, adult ([facility] )         PLAN:  1  Treatment with  PAP is medically necessary and Malinda Greene is agreable to continue use  2  Care of equipment, methods to improve comfort using PAP and importance of compliance with therapy were discussed  3  Pressure setting: continue 13 cmH2O     4  Rx provided to replace supplies and Care coordinated with DME provider  5  Strategies for weight reduction were discussed  6  Follow-up is advised in 1 year or sooner if needed to monitor progress, compliance and to adjust therapy  Thank you for allowing me to participate in the care of this patient      Sincerely,    Authenticated electronically by Elen Saavedra MD on 99/78/43   Board Certified Specialist

## 2020-08-17 ENCOUNTER — TELEPHONE (OUTPATIENT)
Dept: SLEEP CENTER | Facility: CLINIC | Age: 70
End: 2020-08-17

## 2020-10-06 ENCOUNTER — OFFICE VISIT (OUTPATIENT)
Dept: CARDIOLOGY CLINIC | Facility: CLINIC | Age: 70
End: 2020-10-06
Payer: MEDICARE

## 2020-10-06 VITALS
HEIGHT: 72 IN | BODY MASS INDEX: 41.63 KG/M2 | TEMPERATURE: 98.2 F | DIASTOLIC BLOOD PRESSURE: 70 MMHG | HEART RATE: 71 BPM | SYSTOLIC BLOOD PRESSURE: 122 MMHG | WEIGHT: 307.4 LBS

## 2020-10-06 DIAGNOSIS — R53.83 FATIGUE, UNSPECIFIED TYPE: ICD-10-CM

## 2020-10-06 DIAGNOSIS — R60.0 LOWER EXTREMITY EDEMA: ICD-10-CM

## 2020-10-06 DIAGNOSIS — N52.9 ERECTILE DYSFUNCTION, UNSPECIFIED ERECTILE DYSFUNCTION TYPE: ICD-10-CM

## 2020-10-06 DIAGNOSIS — I48.21 PERMANENT ATRIAL FIBRILLATION (HCC): Primary | ICD-10-CM

## 2020-10-06 PROCEDURE — 99213 OFFICE O/P EST LOW 20 MIN: CPT | Performed by: INTERNAL MEDICINE

## 2020-10-06 PROCEDURE — 93000 ELECTROCARDIOGRAM COMPLETE: CPT | Performed by: INTERNAL MEDICINE

## 2020-10-06 RX ORDER — SILDENAFIL 50 MG/1
50 TABLET, FILM COATED ORAL DAILY PRN
Qty: 30 TABLET | Refills: 1
Start: 2020-10-06 | End: 2020-10-06 | Stop reason: SDUPTHER

## 2020-10-06 RX ORDER — SILDENAFIL 50 MG/1
50 TABLET, FILM COATED ORAL DAILY PRN
Qty: 30 TABLET | Refills: 1 | Status: SHIPPED | OUTPATIENT
Start: 2020-10-06 | End: 2021-10-12 | Stop reason: ALTCHOICE

## 2020-10-14 ENCOUNTER — TELEPHONE (OUTPATIENT)
Dept: ADMINISTRATIVE | Facility: OTHER | Age: 70
End: 2020-10-14

## 2020-10-14 ENCOUNTER — OFFICE VISIT (OUTPATIENT)
Dept: FAMILY MEDICINE CLINIC | Facility: CLINIC | Age: 70
End: 2020-10-14
Payer: MEDICARE

## 2020-10-14 VITALS
SYSTOLIC BLOOD PRESSURE: 124 MMHG | BODY MASS INDEX: 42.56 KG/M2 | HEART RATE: 78 BPM | OXYGEN SATURATION: 96 % | WEIGHT: 314.2 LBS | DIASTOLIC BLOOD PRESSURE: 82 MMHG | TEMPERATURE: 97.5 F | HEIGHT: 72 IN

## 2020-10-14 DIAGNOSIS — R35.0 BENIGN PROSTATIC HYPERPLASIA WITH URINARY FREQUENCY: ICD-10-CM

## 2020-10-14 DIAGNOSIS — Z23 NEED FOR VACCINATION: ICD-10-CM

## 2020-10-14 DIAGNOSIS — Z85.07 HISTORY OF PANCREATIC CANCER: ICD-10-CM

## 2020-10-14 DIAGNOSIS — I48.21 PERMANENT ATRIAL FIBRILLATION (HCC): ICD-10-CM

## 2020-10-14 DIAGNOSIS — Z00.00 MEDICARE ANNUAL WELLNESS VISIT, SUBSEQUENT: ICD-10-CM

## 2020-10-14 DIAGNOSIS — E66.01 MORBID OBESITY WITH BMI OF 40.0-44.9, ADULT (HCC): ICD-10-CM

## 2020-10-14 DIAGNOSIS — Z23 NEED FOR INFLUENZA VACCINATION: ICD-10-CM

## 2020-10-14 DIAGNOSIS — E11.9 TYPE 2 DIABETES MELLITUS WITHOUT COMPLICATION, WITHOUT LONG-TERM CURRENT USE OF INSULIN (HCC): Primary | ICD-10-CM

## 2020-10-14 DIAGNOSIS — N40.1 BENIGN PROSTATIC HYPERPLASIA WITH URINARY FREQUENCY: ICD-10-CM

## 2020-10-14 LAB — SL AMB POCT HEMOGLOBIN AIC: 9.9 (ref ?–6.5)

## 2020-10-14 PROCEDURE — G0008 ADMIN INFLUENZA VIRUS VAC: HCPCS | Performed by: FAMILY MEDICINE

## 2020-10-14 PROCEDURE — G0439 PPPS, SUBSEQ VISIT: HCPCS | Performed by: FAMILY MEDICINE

## 2020-10-14 PROCEDURE — 83036 HEMOGLOBIN GLYCOSYLATED A1C: CPT | Performed by: FAMILY MEDICINE

## 2020-10-14 PROCEDURE — 90732 PPSV23 VACC 2 YRS+ SUBQ/IM: CPT | Performed by: FAMILY MEDICINE

## 2020-10-14 PROCEDURE — 99214 OFFICE O/P EST MOD 30 MIN: CPT | Performed by: FAMILY MEDICINE

## 2020-10-14 PROCEDURE — G0009 ADMIN PNEUMOCOCCAL VACCINE: HCPCS | Performed by: FAMILY MEDICINE

## 2020-10-14 PROCEDURE — 90662 IIV NO PRSV INCREASED AG IM: CPT | Performed by: FAMILY MEDICINE

## 2020-11-11 ENCOUNTER — OFFICE VISIT (OUTPATIENT)
Dept: UROLOGY | Facility: CLINIC | Age: 70
End: 2020-11-11
Payer: MEDICARE

## 2020-11-11 VITALS
HEART RATE: 72 BPM | SYSTOLIC BLOOD PRESSURE: 144 MMHG | DIASTOLIC BLOOD PRESSURE: 90 MMHG | HEIGHT: 72 IN | BODY MASS INDEX: 42.26 KG/M2 | WEIGHT: 312 LBS

## 2020-11-11 DIAGNOSIS — N39.41 URGENCY INCONTINENCE: Primary | ICD-10-CM

## 2020-11-11 DIAGNOSIS — N48.1 BALANITIS: ICD-10-CM

## 2020-11-11 LAB
SL AMB  POCT GLUCOSE, UA: ABNORMAL
SL AMB LEUKOCYTE ESTERASE,UA: ABNORMAL
SL AMB POCT BILIRUBIN,UA: ABNORMAL
SL AMB POCT BLOOD,UA: ABNORMAL
SL AMB POCT CLARITY,UA: CLEAR
SL AMB POCT COLOR,UA: YELLOW
SL AMB POCT KETONES,UA: ABNORMAL
SL AMB POCT NITRITE,UA: ABNORMAL
SL AMB POCT PH,UA: 5
SL AMB POCT SPECIFIC GRAVITY,UA: 1.02
SL AMB POCT URINE PROTEIN: ABNORMAL
SL AMB POCT UROBILINOGEN: ABNORMAL

## 2020-11-11 PROCEDURE — 81002 URINALYSIS NONAUTO W/O SCOPE: CPT | Performed by: UROLOGY

## 2020-11-11 PROCEDURE — 99203 OFFICE O/P NEW LOW 30 MIN: CPT | Performed by: UROLOGY

## 2020-11-11 RX ORDER — NYSTATIN AND TRIAMCINOLONE ACETONIDE 100000; 1 [USP'U]/G; MG/G
OINTMENT TOPICAL 2 TIMES DAILY
Qty: 30 G | Refills: 0 | Status: SHIPPED | OUTPATIENT
Start: 2020-11-11 | End: 2022-01-19 | Stop reason: ALTCHOICE

## 2020-12-09 ENCOUNTER — LAB (OUTPATIENT)
Dept: LAB | Facility: MEDICAL CENTER | Age: 70
End: 2020-12-09
Payer: MEDICARE

## 2020-12-09 DIAGNOSIS — R53.83 FATIGUE, UNSPECIFIED TYPE: ICD-10-CM

## 2020-12-09 DIAGNOSIS — Z08 ENCOUNTER FOR FOLLOW-UP SURVEILLANCE OF PANCREATIC CANCER: ICD-10-CM

## 2020-12-09 DIAGNOSIS — Z85.07 ENCOUNTER FOR FOLLOW-UP SURVEILLANCE OF PANCREATIC CANCER: ICD-10-CM

## 2020-12-09 LAB
ANION GAP SERPL CALCULATED.3IONS-SCNC: 5 MMOL/L (ref 4–13)
BUN SERPL-MCNC: 11 MG/DL (ref 5–25)
CALCIUM SERPL-MCNC: 9.7 MG/DL (ref 8.3–10.1)
CHLORIDE SERPL-SCNC: 103 MMOL/L (ref 100–108)
CO2 SERPL-SCNC: 29 MMOL/L (ref 21–32)
CREAT SERPL-MCNC: 0.85 MG/DL (ref 0.6–1.3)
CREAT UR-MCNC: 217 MG/DL
ERYTHROCYTE [DISTWIDTH] IN BLOOD BY AUTOMATED COUNT: 13 % (ref 11.6–15.1)
GFR SERPL CREATININE-BSD FRML MDRD: 88 ML/MIN/1.73SQ M
GLUCOSE P FAST SERPL-MCNC: 235 MG/DL (ref 65–99)
HCT VFR BLD AUTO: 39.6 % (ref 36.5–49.3)
HGB BLD-MCNC: 13.1 G/DL (ref 12–17)
MCH RBC QN AUTO: 35.2 PG (ref 26.8–34.3)
MCHC RBC AUTO-ENTMCNC: 33.1 G/DL (ref 31.4–37.4)
MCV RBC AUTO: 107 FL (ref 82–98)
MICROALBUMIN UR-MCNC: 164 MG/L (ref 0–20)
MICROALBUMIN/CREAT 24H UR: 76 MG/G CREATININE (ref 0–30)
PLATELET # BLD AUTO: 96 THOUSANDS/UL (ref 149–390)
PMV BLD AUTO: 12.7 FL (ref 8.9–12.7)
POTASSIUM SERPL-SCNC: 4.7 MMOL/L (ref 3.5–5.3)
RBC # BLD AUTO: 3.72 MILLION/UL (ref 3.88–5.62)
SODIUM SERPL-SCNC: 137 MMOL/L (ref 136–145)
TSH SERPL DL<=0.05 MIU/L-ACNC: 3.58 UIU/ML (ref 0.36–3.74)
WBC # BLD AUTO: 4.14 THOUSAND/UL (ref 4.31–10.16)

## 2020-12-09 PROCEDURE — 36415 COLL VENOUS BLD VENIPUNCTURE: CPT

## 2020-12-09 PROCEDURE — 82570 ASSAY OF URINE CREATININE: CPT | Performed by: FAMILY MEDICINE

## 2020-12-09 PROCEDURE — 84443 ASSAY THYROID STIM HORMONE: CPT

## 2020-12-09 PROCEDURE — 80048 BASIC METABOLIC PNL TOTAL CA: CPT

## 2020-12-09 PROCEDURE — 82043 UR ALBUMIN QUANTITATIVE: CPT | Performed by: FAMILY MEDICINE

## 2020-12-09 PROCEDURE — 85027 COMPLETE CBC AUTOMATED: CPT

## 2020-12-09 PROCEDURE — 86301 IMMUNOASSAY TUMOR CA 19-9: CPT

## 2020-12-10 LAB — CANCER AG19-9 SERPL-ACNC: 22 U/ML (ref 0–35)

## 2020-12-15 ENCOUNTER — PROCEDURE VISIT (OUTPATIENT)
Dept: UROLOGY | Facility: MEDICAL CENTER | Age: 70
End: 2020-12-15
Payer: MEDICARE

## 2020-12-15 VITALS — WEIGHT: 312 LBS | HEIGHT: 72 IN | BODY MASS INDEX: 42.26 KG/M2

## 2020-12-15 DIAGNOSIS — N32.81 OAB (OVERACTIVE BLADDER): Primary | ICD-10-CM

## 2020-12-15 DIAGNOSIS — N40.0 BPH WITHOUT OBSTRUCTION/LOWER URINARY TRACT SYMPTOMS: ICD-10-CM

## 2020-12-15 PROCEDURE — 81003 URINALYSIS AUTO W/O SCOPE: CPT | Performed by: UROLOGY

## 2020-12-15 PROCEDURE — 99214 OFFICE O/P EST MOD 30 MIN: CPT | Performed by: UROLOGY

## 2020-12-15 PROCEDURE — 52000 CYSTOURETHROSCOPY: CPT | Performed by: UROLOGY

## 2020-12-15 RX ORDER — OXYBUTYNIN CHLORIDE 5 MG/1
5 TABLET ORAL 3 TIMES DAILY PRN
Qty: 90 TABLET | Refills: 3 | Status: SHIPPED | OUTPATIENT
Start: 2020-12-15 | End: 2020-12-23

## 2020-12-19 ENCOUNTER — HOSPITAL ENCOUNTER (OUTPATIENT)
Dept: CT IMAGING | Facility: HOSPITAL | Age: 70
Discharge: HOME | End: 2020-12-19
Payer: MEDICARE

## 2020-12-19 DIAGNOSIS — Z85.07 ENCOUNTER FOR FOLLOW-UP SURVEILLANCE OF PANCREATIC CANCER: ICD-10-CM

## 2020-12-19 DIAGNOSIS — Z08 ENCOUNTER FOR FOLLOW-UP SURVEILLANCE OF PANCREATIC CANCER: ICD-10-CM

## 2020-12-19 PROCEDURE — 71260 CT THORAX DX C+: CPT

## 2020-12-19 PROCEDURE — 74177 CT ABD & PELVIS W/CONTRAST: CPT

## 2020-12-19 RX ADMIN — IOHEXOL 100 ML: 350 INJECTION, SOLUTION INTRAVENOUS at 09:14

## 2020-12-22 DIAGNOSIS — N32.81 OAB (OVERACTIVE BLADDER): Primary | ICD-10-CM

## 2020-12-23 RX ORDER — TOLTERODINE 4 MG/1
4 CAPSULE, EXTENDED RELEASE ORAL DAILY
Qty: 30 CAPSULE | Refills: 3 | Status: SHIPPED | OUTPATIENT
Start: 2020-12-23 | End: 2020-12-29 | Stop reason: SDUPTHER

## 2020-12-29 ENCOUNTER — TELEPHONE (OUTPATIENT)
Dept: SURGICAL ONCOLOGY | Facility: CLINIC | Age: 70
End: 2020-12-29

## 2020-12-29 RX ORDER — TOLTERODINE 4 MG/1
4 CAPSULE, EXTENDED RELEASE ORAL DAILY
Qty: 30 CAPSULE | Refills: 3 | Status: SHIPPED | OUTPATIENT
Start: 2020-12-29 | End: 2021-02-19 | Stop reason: SDUPTHER

## 2020-12-30 ENCOUNTER — OFFICE VISIT (OUTPATIENT)
Dept: SURGICAL ONCOLOGY | Facility: CLINIC | Age: 70
End: 2020-12-30
Payer: MEDICARE

## 2020-12-30 VITALS
WEIGHT: 310 LBS | TEMPERATURE: 97.2 F | DIASTOLIC BLOOD PRESSURE: 80 MMHG | SYSTOLIC BLOOD PRESSURE: 126 MMHG | BODY MASS INDEX: 41.99 KG/M2 | RESPIRATION RATE: 16 BRPM | HEART RATE: 79 BPM | HEIGHT: 72 IN

## 2020-12-30 DIAGNOSIS — Z08 ENCOUNTER FOR FOLLOW-UP SURVEILLANCE OF PANCREATIC CANCER: Primary | ICD-10-CM

## 2020-12-30 DIAGNOSIS — Z85.07 HISTORY OF PANCREATIC CANCER: ICD-10-CM

## 2020-12-30 DIAGNOSIS — Z85.07 ENCOUNTER FOR FOLLOW-UP SURVEILLANCE OF PANCREATIC CANCER: Primary | ICD-10-CM

## 2020-12-30 PROCEDURE — 99214 OFFICE O/P EST MOD 30 MIN: CPT | Performed by: SURGERY

## 2021-01-15 ENCOUNTER — TELEPHONE (OUTPATIENT)
Dept: ADMINISTRATIVE | Facility: OTHER | Age: 71
End: 2021-01-15

## 2021-01-15 ENCOUNTER — OFFICE VISIT (OUTPATIENT)
Dept: FAMILY MEDICINE CLINIC | Facility: CLINIC | Age: 71
End: 2021-01-15
Payer: MEDICARE

## 2021-01-15 VITALS
HEART RATE: 95 BPM | OXYGEN SATURATION: 97 % | TEMPERATURE: 97.1 F | RESPIRATION RATE: 20 BRPM | WEIGHT: 313.6 LBS | DIASTOLIC BLOOD PRESSURE: 82 MMHG | SYSTOLIC BLOOD PRESSURE: 132 MMHG | HEIGHT: 72 IN | BODY MASS INDEX: 42.48 KG/M2

## 2021-01-15 DIAGNOSIS — E66.01 MORBID OBESITY WITH BMI OF 40.0-44.9, ADULT (HCC): ICD-10-CM

## 2021-01-15 DIAGNOSIS — E11.9 TYPE 2 DIABETES MELLITUS WITHOUT COMPLICATION, WITHOUT LONG-TERM CURRENT USE OF INSULIN (HCC): Primary | ICD-10-CM

## 2021-01-15 LAB — SL AMB POCT HEMOGLOBIN AIC: 9 (ref ?–6.5)

## 2021-01-15 PROCEDURE — 83036 HEMOGLOBIN GLYCOSYLATED A1C: CPT | Performed by: FAMILY MEDICINE

## 2021-01-15 PROCEDURE — 99214 OFFICE O/P EST MOD 30 MIN: CPT | Performed by: FAMILY MEDICINE

## 2021-01-15 NOTE — PROGRESS NOTES
[facility]      NAME: Jadon Hinojosa  AGE: 69 y o  SEX: male  : 1950   MRN: [id number]    DATE: 1/15/2021  TIME: 9:15 AM    Assessment and Plan     Problem List Items Addressed This Visit     Type 2 diabetes mellitus without complication, without long-term current use of insulin ([facility] ) - Primary       Lab Results   Component Value Date    HGBA1C 9 0 (A) 01/15/2021   Hemoglobin A1c in the office today has decreased from 9 9-9 0  Increase metformin to 1000 mg twice daily  Relevant Medications    metFORMIN (GLUCOPHAGE) 1000 MG tablet    Other Relevant Orders    POCT hemoglobin A1c (Completed)    Morbid obesity with BMI of 40 0-44 9, adult ([facility] )      Recommend diet and exercise for weight loss  Continue with other current treatment including surveillance with Hematology-Oncology, urology for BPH  Continue all other regular medications including blood pressure medication  Return to office in:  3 months    Chief Complaint     Chief Complaint   Patient presents with    Follow-up     3 month check       History of Present Illness     Patient presents today to follow-up primarily on his type 2 diabetes  In October he was found have a hemoglobin A1c of 9 9  Was started on metformin that time  He is taking 500 mg twice daily  He is tolerating medication  He is still undergoing surveillance for his pancreatic cancer  There is currently no evidence of recurrent disease  The following portions of the patient's history were reviewed and updated as appropriate: allergies, current medications, past family history, past medical history, past social history, past surgical history and problem list     Review of Systems   Review of Systems   Constitutional: Negative  Eyes: Positive for visual disturbance  Respiratory: Negative  Cardiovascular: Negative  Gastrointestinal: Negative  Genitourinary: Negative  Musculoskeletal: Negative      Psychiatric/Behavioral: Negative  Active Problem List     Patient Active Problem List   Diagnosis    Obstructive sleep apnea syndrome    Hypersomnia    Permanent atrial fibrillation (HCC)    Morbid obesity with BMI of 40 0-44 9, adult ([facility] )    Lower extremity edema    Pancreatic duct dilated    History of pancreatic cancer    Type 2 diabetes mellitus without complication, without long-term current use of insulin ([facility] )    Encounter for follow-up surveillance of pancreatic cancer    Thrombocytopenia ([facility] )    Urgency incontinence    Balanitis    OAB (overactive bladder)    BPH without obstruction/lower urinary tract symptoms       Objective   /82 (BP Location: Left arm, Patient Position: Sitting)   Pulse 95   Temp (!) 97 1 °F (36 2 °C) (Tympanic)   Resp 20   Ht 6' (1 829 m)   Wt (!) 142 kg (313 lb 9 6 oz)   SpO2 97%   BMI 42 53 kg/m²     Physical Exam  Vitals signs and nursing note reviewed  Constitutional:       General: He is not in acute distress  Appearance: He is well-developed  He is not diaphoretic  HENT:      Head: Normocephalic and atraumatic  Eyes:      General:         Right eye: No discharge  Conjunctiva/sclera: Conjunctivae normal       Pupils: Pupils are equal, round, and reactive to light  Neck:      Musculoskeletal: Normal range of motion  Thyroid: No thyromegaly  Cardiovascular:      Rate and Rhythm: Normal rate and regular rhythm  Pulses: no weak pulses          Dorsalis pedis pulses are 2+ on the right side and 2+ on the left side  Posterior tibial pulses are 2+ on the right side and 2+ on the left side  Pulmonary:      Effort: Pulmonary effort is normal  No respiratory distress  Breath sounds: Normal breath sounds  Feet:      Right foot:      Skin integrity: No ulcer, skin breakdown, erythema, warmth, callus or dry skin  Left foot:      Skin integrity: No ulcer, skin breakdown, erythema, warmth, callus or dry skin     Lymphadenopathy: Cervical: No cervical adenopathy  Skin:     General: Skin is warm and dry  Neurological:      Mental Status: He is alert and oriented to person, place, and time  Psychiatric:         Behavior: Behavior normal          Thought Content: Thought content normal          Judgment: Judgment normal          Patient's shoes and socks removed  Right Foot/Ankle   Right Foot Inspection  Skin Exam: skin normal and skin intact no dry skin, no warmth, no callus, no erythema, no maceration, no abnormal color, no pre-ulcer, no ulcer and no callus                          Toe Exam: ROM and strength within normal limits  Sensory   Vibration: intact  Proprioception: intact   Monofilament testing: intact  Vascular  Capillary refills: < 3 seconds  The right DP pulse is 2+  The right PT pulse is 2+  Left Foot/Ankle  Left Foot Inspection  Skin Exam: skin normal and skin intactno dry skin, no warmth, no erythema, no maceration, normal color, no pre-ulcer, no ulcer and no callus                         Toe Exam: ROM and strength within normal limits                   Sensory   Vibration: intact  Proprioception: intact  Monofilament: intact  Vascular  Capillary refills: < 3 seconds  The left DP pulse is 2+  The left PT pulse is 2+  Assign Risk Category:  No deformity present; No loss of protective sensation;  No weak pulses       Risk: 0    Current Medications     Current Outpatient Medications:     acetaminophen (TYLENOL) 325 mg tablet, Take 2 tablets (650 mg total) by mouth every 6 (six) hours as needed for mild pain, Disp: 30 tablet, Rfl: 0    ascorbic acid (VITAMIN C) 1000 MG tablet, Take 1 tablet by mouth daily, Disp: , Rfl:     aspirin 325 mg tablet, Take 1 tablet by mouth daily, Disp: , Rfl:     atenolol (TENORMIN) 25 mg tablet, Take 1 tablet (25 mg total) by mouth daily, Disp: 30 tablet, Rfl: 11    Cholecalciferol (VITAMIN D) 2000 units CAPS, Take by mouth daily, Disp: , Rfl:     Cyanocobalamin (VITAMIN B 12 PO), Take by mouth, Disp: , Rfl:     dicyclomine (BENTYL) 10 mg capsule, Take 1 capsule (10 mg total) by mouth daily, Disp: 90 capsule, Rfl: 1    metFORMIN (GLUCOPHAGE) 1000 MG tablet, Take 1 tablet (1,000 mg total) by mouth 2 (two) times a day with meals, Disp: 180 tablet, Rfl: 1    nystatin-triamcinolone (MYCOLOG-II) ointment, Apply topically 2 (two) times a day, Disp: 30 g, Rfl: 0    sildenafil (VIAGRA) 50 MG tablet, Take 1 tablet (50 mg total) by mouth daily as needed for erectile dysfunction, Disp: 30 tablet, Rfl: 1    tolterodine (DETROL LA) 4 mg 24 hr capsule, Take 1 capsule (4 mg total) by mouth daily, Disp: 30 capsule, Rfl: 3    Health Maintenance     Health Maintenance   Topic Date Due    DM Eye Exam  06/29/1959    DTaP,Tdap,and Td Vaccines (1 - Tdap) 06/29/1970    BMI: Followup Plan  02/26/2021    HEMOGLOBIN A1C  07/15/2021    Fall Risk  10/14/2021    Depression Screening PHQ  10/14/2021    Medicare Annual Wellness Visit (AWV)  10/14/2021    URINE MICROALBUMIN  12/09/2021    BMI: Adult  01/15/2022    Diabetic Foot Exam  01/15/2022    Colorectal Cancer Screening  02/22/2028    Hepatitis C Screening  Completed    Pneumococcal Vaccine: 65+ Years  Completed    Influenza Vaccine  Completed    HIB Vaccine  Aged Out    Hepatitis B Vaccine  Aged Out    IPV Vaccine  Aged Out    Hepatitis A Vaccine  Aged Out    Meningococcal ACWY Vaccine  Aged Out    HPV Vaccine  Aged Out     Immunization History   Administered Date(s) Administered    Influenza Split High Dose Preservative Free IM 10/21/2014    Influenza, high dose seasonal 0 7 mL 10/14/2020    Influenza, seasonal, injectable 01/16/2013, 01/16/2013    Pneumococcal Conjugate 13-Valent 06/07/2018    Pneumococcal Polysaccharide PPV23 10/14/2020       Martha Jain DO  [facility]

## 2021-01-15 NOTE — TELEPHONE ENCOUNTER
Upon review of the In Basket request and the patient's chart, initial outreach has been made via fax, please see Contacts section for details       Thank you  Voncile Merlin, MA

## 2021-01-15 NOTE — ASSESSMENT & PLAN NOTE
Lab Results   Component Value Date    HGBA1C 9 0 (A) 01/15/2021   Hemoglobin A1c in the office today has decreased from 9 9-9 0  Increase metformin to 1000 mg twice daily

## 2021-01-15 NOTE — TELEPHONE ENCOUNTER
----- Message from Magda Rodriguez sent at 1/15/2021  9:05 AM EST -----  Regarding: [place] [facility]  01/15/21 9:06 AM    Hello, our patient Morgan Esteban has had Diabetic Eye Exam completed/performed  Please assist in updating the patient chart by making an External outreach to [facility] facility located on [facility]  The date of service is 2020      Thank you,  Zhanna Last MA  [facility]

## 2021-01-15 NOTE — LETTER
Diabetic Eye Exam Form    Date Requested: 01/15/21  Patient: Dominique Bailey  Patient : 1950   Referring Provider: Rogelio Calixto,     Dilated Retinal Exam, Optomap-Iris Exam, or Fundus Photography Done         Yes ([place] one above)         No     Date of Diabetic Eye Exam ______________________________  Left Eye      Exam did show retinopathy    Exam did not show retinopathy         Mild       Moderate       None       Proliferative       Severe     Right Eye     Exam did show retinopathy    Exam did not show retinopathy         Mild       Moderate       None       Proliferative       Severe     Comments __________________________________________________________    Practice Providing Exam ______________________________________________    Exam Performed By (print name) _______________________________________      Provider Signature ___________________________________________________      These reports are needed for  compliance    Please fax this completed form and a copy of the Diabetic Eye Exam report to our office located at [place] [address] as soon as possible to [phone] jase Cuevas: Phone [phone]    We thank you for your assistance in treating our mutual patient

## 2021-01-18 NOTE — TELEPHONE ENCOUNTER
Upon review of the In Basket request we were able to locate, review, and update the patient chart as requested for Diabetic Eye Exam     Any additional questions or concerns should be emailed to the Practice Liaisons via [email or web address] email, please do not reply via In Basket      Thank you  Evie Higuera MA

## 2021-01-20 ENCOUNTER — OFFICE VISIT (OUTPATIENT)
Dept: UROLOGY | Facility: CLINIC | Age: 71
End: 2021-01-20
Payer: MEDICARE

## 2021-01-20 VITALS
BODY MASS INDEX: 42.26 KG/M2 | HEART RATE: 81 BPM | DIASTOLIC BLOOD PRESSURE: 84 MMHG | SYSTOLIC BLOOD PRESSURE: 144 MMHG | HEIGHT: 72 IN | WEIGHT: 312 LBS

## 2021-01-20 DIAGNOSIS — N32.81 OAB (OVERACTIVE BLADDER): Primary | ICD-10-CM

## 2021-01-20 DIAGNOSIS — N52.8 OTHER MALE ERECTILE DYSFUNCTION: ICD-10-CM

## 2021-01-20 PROCEDURE — 99214 OFFICE O/P EST MOD 30 MIN: CPT | Performed by: UROLOGY

## 2021-01-20 PROCEDURE — 81003 URINALYSIS AUTO W/O SCOPE: CPT | Performed by: UROLOGY

## 2021-01-20 RX ORDER — TADALAFIL 5 MG/1
5 TABLET ORAL DAILY PRN
Qty: 30 TABLET | Refills: 5 | Status: SHIPPED | OUTPATIENT
Start: 2021-01-20 | End: 2021-10-12 | Stop reason: SDUPTHER

## 2021-01-20 NOTE — PROGRESS NOTES
Assessment/Plan:    OAB (overactive bladder)  Continue anticholinergics  Reassess in 1 year  Diagnoses and all orders for this visit:    OAB (overactive bladder)    Other male erectile dysfunction  -     tadalafil (CIALIS) 5 MG tablet; Take 1 tablet (5 mg total) by mouth daily as needed for erectile dysfunction          Subjective:      Patient ID: [place] is a 69 y o  male  HPI  Overactive bladder:  Cystoscopy on December 15, 2020 indicated the patient's primary problem is overactive bladder rather than obstructive BPH  He was placed on oxybutynin which was extremely expensive and then switched to tolterodine with a Good Rx coupon  Notes a dry mouth  Prior to treatment, the patient's AUA score was 17 and his quality of life was pleased  Paradoxically, his current AUA score 7 but he lists his quality of life as "unhappy" because his penis is to short to clear his pannus  Penis is engulfed  He would like his penis to be longer to be able to clear his pannus and the engulfment  He wants to  try tadalafil 5 mg daily thinking it will lengthen the shaft of his penis  I am doubtful, but he still wants to give it a try         The following portions of the patient's history were reviewed and updated as appropriate: allergies, current medications, past family history, past medical history, past social history, past surgical history and problem list     Review of Systems    Constitutional: Negative for activity change and fatigue  Respiratory: Negative for shortness of breath and wheezing     Cardiovascular: Negative for chest pain         Hypertension    Gastrointestinal: Negative for abdominal pain         Status post partial pancreatectomy for cancer   No evidence of active disease now    Endocrine:        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    Genitourinary: Negative for difficulty urinating, dysuria, frequency, hematuria and urgency     Musculoskeletal: Negative for back pain and gait problem  Skin: Negative     Allergic/Immunologic: Negative     Neurological: Negative     Psychiatric/Behavioral: Negative      Objective:      /84   Pulse 81   Ht 6' (1 829 m)   Wt (!) 142 kg (312 lb)   BMI 42 31 kg/m²          Physical Exam  Constitutional:       Appearance: He is well-developed  HENT:      Head: Normocephalic and atraumatic  Neck:      Musculoskeletal: Normal range of motion  Pulmonary:      Effort: Pulmonary effort is normal    Musculoskeletal: Normal range of motion  Skin:     General: Skin is warm and dry  Neurological:      Mental Status: He is alert and oriented to person, place, and time  Psychiatric:         Behavior: Behavior normal          Thought Content:  Thought content normal          Judgment: Judgment normal

## 2021-02-19 DIAGNOSIS — N32.81 OAB (OVERACTIVE BLADDER): ICD-10-CM

## 2021-02-19 RX ORDER — TOLTERODINE 4 MG/1
4 CAPSULE, EXTENDED RELEASE ORAL DAILY
Qty: 30 CAPSULE | Refills: 11 | Status: SHIPPED | OUTPATIENT
Start: 2021-02-19 | End: 2021-02-24 | Stop reason: CLARIF

## 2021-02-19 NOTE — TELEPHONE ENCOUNTER
Patient left a message on the Medication Refill voice mail line requesting a new prescription for Tolterodine ER 4mg, 30 day supply  Pharmacy was not specified

## 2021-02-19 NOTE — TELEPHONE ENCOUNTER
The patient was last seen on 1/20/21 by Dr Dickson Hunt in the [facility] location; continuation of the medication was authorized at that time    Request for same, 30 day supply with 11 refills was queued and forwarded to the Advanced Practitioner covering the [facility] location for approval

## 2021-02-22 ENCOUNTER — HOSPITAL ENCOUNTER (OUTPATIENT)
Dept: RADIOLOGY | Facility: HOSPITAL | Age: 71
Discharge: HOME | End: 2021-02-22
Attending: SURGERY
Payer: MEDICARE

## 2021-02-22 DIAGNOSIS — Z85.07 ENCOUNTER FOR FOLLOW-UP SURVEILLANCE OF PANCREATIC CANCER: ICD-10-CM

## 2021-02-22 DIAGNOSIS — Z08 ENCOUNTER FOR FOLLOW-UP SURVEILLANCE OF PANCREATIC CANCER: ICD-10-CM

## 2021-02-22 PROCEDURE — G1004 CDSM NDSC: HCPCS

## 2021-02-22 PROCEDURE — 74183 MRI ABD W/O CNTR FLWD CNTR: CPT

## 2021-02-22 PROCEDURE — A9585 GADOBUTROL INJECTION: HCPCS | Performed by: SURGERY

## 2021-02-22 RX ADMIN — GADOBUTROL 13 ML: 604.72 INJECTION INTRAVENOUS at 16:22

## 2021-02-24 DIAGNOSIS — I48.21 PERMANENT ATRIAL FIBRILLATION (HCC): ICD-10-CM

## 2021-02-24 RX ORDER — ATENOLOL 25 MG/1
TABLET ORAL
Qty: 90 TABLET | Refills: 3 | Status: SHIPPED | OUTPATIENT
Start: 2021-02-24 | End: 2022-01-31

## 2021-02-24 RX ORDER — TOLTERODINE 4 MG/1
4 CAPSULE, EXTENDED RELEASE ORAL DAILY
Qty: 30 CAPSULE | Refills: 11 | Status: SHIPPED | OUTPATIENT
Start: 2021-02-24 | End: 2021-06-22

## 2021-02-24 NOTE — TELEPHONE ENCOUNTER
I spoke with the patient and again verified what pill he needed  Apparently script sent to wrong pharmacy, the medication refill was not wrong      Script requeued and forwarded to the Advanced Practitioner covering the [facility] location for approval

## 2021-02-24 NOTE — TELEPHONE ENCOUNTER
Pt states wrong pill was called into pharmacy, he says it is a blue pill, he does not know the name of the pill and the pharmacy had thrown his old bottle away, he wants someone to call him, says the pill that was called in he did not need yet, that he can not be running all around to pharmacies, that he lives in [facility], he disconnected call

## 2021-03-02 ENCOUNTER — TRANSCRIBE ORDERS (OUTPATIENT)
Dept: INTERNAL MEDICINE CLINIC | Facility: CLINIC | Age: 71
End: 2021-03-02

## 2021-03-02 DIAGNOSIS — Z03.818 ENCOUNTER FOR OBSERVATION FOR SUSPECTED EXPOSURE TO OTHER BIOLOGICAL AGENTS RULED OUT: Primary | ICD-10-CM

## 2021-03-02 DIAGNOSIS — Z03.818 ENCOUNTER FOR OBSERVATION FOR SUSPECTED EXPOSURE TO OTHER BIOLOGICAL AGENTS RULED OUT: ICD-10-CM

## 2021-03-02 PROCEDURE — U0005 INFEC AGEN DETEC AMPLI PROBE: HCPCS | Performed by: FAMILY MEDICINE

## 2021-03-02 PROCEDURE — U0003 INFECTIOUS AGENT DETECTION BY NUCLEIC ACID (DNA OR RNA); SEVERE ACUTE RESPIRATORY SYNDROME CORONAVIRUS 2 (SARS-COV-2) (CORONAVIRUS DISEASE [COVID-19]), AMPLIFIED PROBE TECHNIQUE, MAKING USE OF HIGH THROUGHPUT TECHNOLOGIES AS DESCRIBED BY CMS-2020-01-R: HCPCS | Performed by: FAMILY MEDICINE

## 2021-03-03 ENCOUNTER — TELEMEDICINE (OUTPATIENT)
Dept: FAMILY MEDICINE CLINIC | Facility: CLINIC | Age: 71
End: 2021-03-03
Payer: MEDICARE

## 2021-03-03 VITALS
HEART RATE: 72 BPM | TEMPERATURE: 96.7 F | WEIGHT: 300 LBS | DIASTOLIC BLOOD PRESSURE: 96 MMHG | HEIGHT: 72 IN | BODY MASS INDEX: 40.63 KG/M2 | SYSTOLIC BLOOD PRESSURE: 159 MMHG

## 2021-03-03 DIAGNOSIS — E66.01 MORBID OBESITY WITH BMI OF 40.0-44.9, ADULT (HCC): ICD-10-CM

## 2021-03-03 DIAGNOSIS — E11.9 TYPE 2 DIABETES MELLITUS WITHOUT COMPLICATION, WITHOUT LONG-TERM CURRENT USE OF INSULIN (HCC): ICD-10-CM

## 2021-03-03 DIAGNOSIS — G47.33 OBSTRUCTIVE SLEEP APNEA SYNDROME: ICD-10-CM

## 2021-03-03 DIAGNOSIS — U07.1 COVID-19 VIRUS INFECTION: Primary | ICD-10-CM

## 2021-03-03 DIAGNOSIS — Z85.07 HISTORY OF PANCREATIC CANCER: ICD-10-CM

## 2021-03-03 LAB — SARS-COV-2 RNA RESP QL NAA+PROBE: POSITIVE

## 2021-03-03 PROCEDURE — 99213 OFFICE O/P EST LOW 20 MIN: CPT | Performed by: FAMILY MEDICINE

## 2021-03-03 RX ORDER — ACETAMINOPHEN 325 MG/1
650 TABLET ORAL ONCE AS NEEDED
Status: CANCELLED | OUTPATIENT
Start: 2021-03-04

## 2021-03-03 RX ORDER — SODIUM CHLORIDE 9 MG/ML
20 INJECTION, SOLUTION INTRAVENOUS ONCE
Status: CANCELLED | OUTPATIENT
Start: 2021-03-04

## 2021-03-03 RX ORDER — ALBUTEROL SULFATE 90 UG/1
3 AEROSOL, METERED RESPIRATORY (INHALATION) ONCE AS NEEDED
Status: CANCELLED | OUTPATIENT
Start: 2021-03-04

## 2021-03-03 NOTE — PROGRESS NOTES
COVID-19 Virtual Visit     Assessment/Plan:    Problem List Items Addressed This Visit        Endocrine    Type 2 diabetes mellitus without complication, without long-term current use of insulin ([facility] )       Respiratory    Obstructive sleep apnea syndrome       Other    Morbid obesity with BMI of 40 0-44 9, adult ([facility] )    History of pancreatic cancer    COVID-19 virus infection - Primary     Patient tested positive for COVID-19  Patient agrees to monoclonal antibody treatment  Patient was advised to self-quarantine at home to reduce risk of spreading infection  The patient was counseled on risk factors and what signs and symptoms to monitor for:  Shortness of breath, chest pain, difficulty breathing, or fever that is not responding to antipyretic  If symptoms worsen the patient was advised to notify their PCP and go to the ED for immediate attention  Increase fluid intake and get plenty of rest     You may use over the counter cough syrup with cough suppressant, such as Robitussin DM or Mucinex DM  You may also use Acetaminophen containing product for symptom relief  According to current CDC guidelines, people with COVID-19 who have stayed home (home isolated) can stop home isolation under the following conditions:     1) You have had no fever for at least 72 hours (that is three full days of no fever without the use medicine that reduces fevers)   AND   2) other symptoms have improved (for example, when your cough or shortness of breath have improved)   AND   3) at least 10 days have passed since your symptoms first appeared  Please call the office if you have any questions  The patient verbalized understanding of treatment plan                  Disposition:     I recommended continued isolation until at least 24 hours have passed since recovery defined as resolution of fever without the use of fever-reducing medications AND improvement in COVID symptoms AND 10 days have passed since onset of symptoms (or 10 days have passed since date of first positive viral diagnostic test for asymptomatic patients)  Patient is a candidate for Bamlanivimab  They were counseled in regards to risks, benefits, and side effects of this infusion  Possible side effects of bamlanivimab are: Allergic reactions   Allergic reactions can happen during and after infusion with bamlanivimab which include:    Fever, chills, nausea, headache, shortness of breath, low blood pressure, wheezing, swelling of your lips, face, or throat, rash including hives, itching, muscle aches, and dizziness  The side effects of getting any medicine by vein may include brief pain, bleeding, bruising of the skin, soreness, swelling, and possible infection at the infusion site  These are not all the possible side effects of bamlanivimab  Not a lot of people have been given bamlanivimab  Serious and unexpected side effects may happen  Pod Valentina Marques is still being studied so it is possible that all of the risks are not known at this time  Please note that this drug was approved under the Emergency Use Authorization of the FDA and has not gone through the full, formal FDA approval process    It is possible that bamlanivimab could interfere with your body's own ability to fight off a future infection of SARS-CoV-2  Similarly, bamlanivimab may reduce your bodys immune response to a vaccine for SARS-CoV-2  Specific studies have not been conducted to address these possible risks  Currently there is no data or safety or efficacy of COVID-19 vaccination in persons who received monoclonal antibodies as part of COVID-19 treatment  Treatment should be deferred for at least 90 days to avoid interference of the treatment with vaccine-induced immune responses (this is based on estimated half-life of therapies and evidence suggesting reinfection is uncommon within 90 days of initial infection)      The patient consents to proceed with bamlanivimab infusion  *[email or web address]  [email or web address]    I have spent 15 minutes directly with the patient  Greater than 50% of this time was spent in counseling/coordination of care regarding: diagnostic results, prognosis, risks and benefits of treatment options, instructions for management, patient and family education, importance of treatment compliance, risk factor reductions and impressions  Encounter provider Atul Sanon DO    Provider located at [place] [id number] [facility] [address] [facility]  [phone]    Recent Visits  No visits were found meeting these conditions  Showing recent visits within past 7 days and meeting all other requirements     Today's Visits  Date Type Provider Dept   03/03/21 Telemedicine [place] [id number] [address] today's visits and meeting all other requirements     Future Appointments  No visits were found meeting these conditions  Showing future appointments within next 150 days and meeting all other requirements      This virtual check-in was done via [employer] and patient was informed that this is a secure, HIPAA-compliant platform  He agrees to proceed  Patient agrees to participate in a virtual check in via telephone or video visit instead of presenting to the office to address urgent/immediate medical needs  Patient is aware this is a billable service  After connecting through [facility], the patient was identified by name and date of birth  Antwan Argueta was informed that this was a telemedicine visit and that the exam was being conducted confidentially over secure lines  My office door was closed  No one else was in the room  Antwan Argueta acknowledged consent and understanding of privacy and security of the telemedicine visit   I informed the patient that I have reviewed his record in Epic and presented the opportunity for him to ask any questions regarding the visit today  The patient agreed to participate  Subjective:   Snow Mattson is a 69 y o  male who has been screened for COVID-19  Symptom change since last report: improving  Patient's symptoms include chills, fatigue, cough and myalgias  Patient denies fever, malaise, congestion, rhinorrhea, sore throat, anosmia, loss of taste, shortness of breath, chest tightness, abdominal pain, nausea, vomiting, diarrhea and headaches  Collin Bailey has been staying home and has isolated themselves in his home  He is taking care to not share personal items and is cleaning all surfaces that are touched often, like counters, tabletops, and doorknobs using household cleaning sprays or wipes  He is wearing a mask when he leaves his room  Date of symptom onset: 2/26/2021  Date of positive COVID-19 PCR: 3/2/2021    Patient started feeling sick over the weekend  He felt fatigue, cough and chills  He did not take any medications  Today the patient reports improvement in his symptoms  His cough has resolved        Lab Results   Component Value Date    SARSCOV2 Positive (A) 03/02/2021     Past Medical History:   Diagnosis Date    A-fib ([facility] )     Abdominal wall strain     last assessed: 10/21/14    Allergic rhinitis     last assessed: 05/03/16    Arthritis     CPAP (continuous positive airway pressure) dependence     Erectile dysfunction of non-organic origin     last assessed: 03/17/14    Lumbar strain     last assessed: 10/21/14    Multiple acquired skin tags     last assessed: 2/18/16    Palpitations     last assessed: 03/17/14    Pancreas cyst     Plantar fasciitis     Skin disorder     last assessed: 05/28/13    Sleep apnea      Past Surgical History:   Procedure Laterality Date    CATARACT EXTRACTION Bilateral     COLONOSCOPY  01/17/2013    COLONOSCOPY  01/08/2018    EGD  06/05/2020    EGD AND COLONOSCOPY  02/06/2014, 2/8/2017    FL GUIDED CENTRAL VENOUS ACCESS DEVICE INSERTION  4/23/2019    FLEXIBLE SIGMOIDOSCOPY  06/11/2019    FOOT SURGERY      Due to plantar fascitis    JOINT REPLACEMENT Left     LTK    LINEAR ENDOSCOPIC U/S      PANCREATECTOMY LAPAROSCOPIC N/A 3/12/2019    Procedure: DISTAL PANCREATECTOMY LAPAROSCOPIC/POSSIBLE OPEN;  Surgeon: Dale Case MD;  [facility]: [facility];  Service: Surgical Oncology    OK EDG US EXAM SURGICAL ALTER STOM DUODENUM/JEJUNUM N/A 1/24/2019    Procedure: LINEAR ENDOSCOPIC U/S;  Surgeon: Shelli Live MD;  Location: [facility]; Service: Gastroenterology    REPLACEMENT TOTAL KNEE Left     TUNNELED VENOUS PORT PLACEMENT Left 4/23/2019    Procedure: INSERTION VENOUS PORT (PORT-A-CATH);   Surgeon: Dale Case MD;  [facility]: [facility];  Service: Surgical Oncology    UMBILICAL HERNIA REPAIR       Current Outpatient Medications   Medication Sig Dispense Refill    acetaminophen (TYLENOL) 325 mg tablet Take 2 tablets (650 mg total) by mouth every 6 (six) hours as needed for mild pain 30 tablet 0    ascorbic acid (VITAMIN C) 1000 MG tablet Take 1 tablet by mouth daily      aspirin 325 mg tablet Take 1 tablet by mouth daily      atenolol (TENORMIN) 25 mg tablet TAKE 1 TABLET BY MOUTH EVERY DAY 90 tablet 3    Cholecalciferol (VITAMIN D) 2000 units CAPS Take by mouth daily      Cyanocobalamin (VITAMIN B 12 PO) Take by mouth      dicyclomine (BENTYL) 10 mg capsule Take 1 capsule (10 mg total) by mouth daily 90 capsule 1    metFORMIN (GLUCOPHAGE) 1000 MG tablet Take 1 tablet (1,000 mg total) by mouth 2 (two) times a day with meals 180 tablet 1    tadalafil (CIALIS) 5 MG tablet Take 1 tablet (5 mg total) by mouth daily as needed for erectile dysfunction 30 tablet 5    tolterodine (DETROL LA) 4 mg 24 hr capsule Take 1 capsule (4 mg total) by mouth daily 30 capsule 11    Na Sulfate-K Sulfate-Mg Sulf (Suprep Bowel Prep Kit) 17 5-3 13-1 6 GM/177ML SOLN Follow office instructions (Patient not taking: Reported on 3/3/2021) 1 Bottle 0    nystatin-triamcinolone (MYCOLOG-II) ointment Apply topically 2 (two) times a day (Patient not taking: Reported on 1/20/2021) 30 g 0    sildenafil (VIAGRA) 50 MG tablet Take 1 tablet (50 mg total) by mouth daily as needed for erectile dysfunction (Patient not taking: Reported on 1/20/2021) 30 tablet 1     No current facility-administered medications for this visit  No Known Allergies    Review of Systems   Constitutional: Positive for chills and fatigue  Negative for fever  HENT: Negative for congestion, rhinorrhea and sore throat  Respiratory: Positive for cough  Negative for chest tightness and shortness of breath  Gastrointestinal: Negative for abdominal pain, diarrhea, nausea and vomiting  Musculoskeletal: Positive for myalgias  Neurological: Negative for headaches  Objective:    Vitals:    03/03/21 1712   BP: 159/96   BP Location: Left arm   Patient Position: Sitting   Cuff Size: Large   Pulse: 72   Temp: (!) 96 7 °F (35 9 °C)   TempSrc: Temporal   Weight: 136 kg (300 lb)   Height: 6' (1 829 m)       Physical Exam  Constitutional:       General: He is not in acute distress  Appearance: Normal appearance  He is not ill-appearing  HENT:      Head: Normocephalic and atraumatic  Pulmonary:      Effort: Pulmonary effort is normal  No respiratory distress  Neurological:      General: No focal deficit present  Mental Status: He is alert and oriented to person, place, and time  Psychiatric:         Mood and Affect: Mood normal          Behavior: Behavior normal          Thought Content: Thought content normal        VIRTUAL VISIT DISCLAIMER    Penny Lennon acknowledges that he has consented to an online visit or consultation   He understands that the online visit is based solely on information provided by him, and that, in the absence of a face-to-face physical evaluation by the physician, the diagnosis he receives is both limited and provisional in terms of accuracy and completeness  This is not intended to replace a full medical face-to-face evaluation by the physician  Aric Graham understands and accepts these terms

## 2021-03-03 NOTE — ASSESSMENT & PLAN NOTE
Patient tested positive for COVID-19  Patient agrees to monoclonal antibody treatment  Patient was advised to self-quarantine at home to reduce risk of spreading infection  The patient was counseled on risk factors and what signs and symptoms to monitor for:  Shortness of breath, chest pain, difficulty breathing, or fever that is not responding to antipyretic  If symptoms worsen the patient was advised to notify their PCP and go to the ED for immediate attention  Increase fluid intake and get plenty of rest     You may use over the counter cough syrup with cough suppressant, such as Robitussin DM or Mucinex DM  You may also use Acetaminophen containing product for symptom relief  According to current CDC guidelines, people with COVID-19 who have stayed home (home isolated) can stop home isolation under the following conditions:     1) You have had no fever for at least 72 hours (that is three full days of no fever without the use medicine that reduces fevers)   AND   2) other symptoms have improved (for example, when your cough or shortness of breath have improved)   AND   3) at least 10 days have passed since your symptoms first appeared  Please call the office if you have any questions  The patient verbalized understanding of treatment plan

## 2021-03-04 ENCOUNTER — HOSPITAL ENCOUNTER (OUTPATIENT)
Dept: INFUSION CENTER | Facility: HOSPITAL | Age: 71
Discharge: HOME | End: 2021-03-04
Payer: MEDICARE

## 2021-03-04 VITALS
TEMPERATURE: 97.8 F | HEART RATE: 90 BPM | DIASTOLIC BLOOD PRESSURE: 74 MMHG | RESPIRATION RATE: 18 BRPM | SYSTOLIC BLOOD PRESSURE: 133 MMHG | OXYGEN SATURATION: 97 %

## 2021-03-04 DIAGNOSIS — Z85.07 HISTORY OF PANCREATIC CANCER: ICD-10-CM

## 2021-03-04 DIAGNOSIS — E11.9 TYPE 2 DIABETES MELLITUS WITHOUT COMPLICATION, WITHOUT LONG-TERM CURRENT USE OF INSULIN (HCC): ICD-10-CM

## 2021-03-04 DIAGNOSIS — E66.01 MORBID OBESITY WITH BMI OF 40.0-44.9, ADULT (HCC): ICD-10-CM

## 2021-03-04 DIAGNOSIS — G47.33 OBSTRUCTIVE SLEEP APNEA SYNDROME: Primary | ICD-10-CM

## 2021-03-04 DIAGNOSIS — U07.1 COVID-19 VIRUS INFECTION: ICD-10-CM

## 2021-03-04 PROCEDURE — M0239 BAMLANIVIMAB-XXXX INFUSION: HCPCS | Performed by: FAMILY MEDICINE

## 2021-03-04 RX ORDER — ACETAMINOPHEN 325 MG/1
650 TABLET ORAL ONCE AS NEEDED
Status: CANCELLED | OUTPATIENT
Start: 2021-03-04

## 2021-03-04 RX ORDER — ACETAMINOPHEN 325 MG/1
650 TABLET ORAL ONCE AS NEEDED
Status: DISCONTINUED | OUTPATIENT
Start: 2021-03-04 | End: 2021-03-07 | Stop reason: HOSPADM

## 2021-03-04 RX ORDER — SODIUM CHLORIDE 9 MG/ML
20 INJECTION, SOLUTION INTRAVENOUS ONCE
Status: COMPLETED | OUTPATIENT
Start: 2021-03-04 | End: 2021-03-04

## 2021-03-04 RX ORDER — ALBUTEROL SULFATE 90 UG/1
3 AEROSOL, METERED RESPIRATORY (INHALATION) ONCE AS NEEDED
Status: CANCELLED | OUTPATIENT
Start: 2021-03-04

## 2021-03-04 RX ORDER — SODIUM CHLORIDE 9 MG/ML
20 INJECTION, SOLUTION INTRAVENOUS ONCE
Status: CANCELLED | OUTPATIENT
Start: 2021-03-04

## 2021-03-04 RX ORDER — ALBUTEROL SULFATE 90 UG/1
3 AEROSOL, METERED RESPIRATORY (INHALATION) ONCE AS NEEDED
Status: DISCONTINUED | OUTPATIENT
Start: 2021-03-04 | End: 2021-03-07 | Stop reason: HOSPADM

## 2021-03-04 RX ADMIN — SODIUM CHLORIDE 20 ML/HR: 0.9 INJECTION, SOLUTION INTRAVENOUS at 12:39

## 2021-03-04 RX ADMIN — SODIUM CHLORIDE 700 MG: 9 INJECTION, SOLUTION INTRAVENOUS at 12:52

## 2021-03-04 NOTE — NURSING NOTE
Pt has entered 1 hour obs period, remains free of s/s of reaction, aware to alert RNs to development of symptoms   Will continue to monitor

## 2021-03-04 NOTE — PLAN OF CARE
Problem: PAIN - ADULT  Goal: Verbalizes/displays adequate comfort level or baseline comfort level  Description: Interventions:  - Encourage patient to monitor pain and request assistance  - Assess pain using appropriate pain scale  - Administer analgesics based on type and severity of pain and evaluate response  - Implement non-pharmacological measures as appropriate and evaluate response  - Consider cultural and social influences on pain and pain management  - Notify physician/advanced practitioner if interventions unsuccessful or patient reports new pain  Outcome: Progressing     Problem: INFECTION - ADULT  Goal: Absence or prevention of progression during hospitalization  Description: INTERVENTIONS:  - Assess and monitor for signs and symptoms of infection  - Monitor lab/diagnostic results  - Monitor all insertion sites, i e  indwelling lines, tubes, and drains  - Monitor endotracheal if appropriate and nasal secretions for changes in amount and color  - [place] appropriate cooling/warming therapies per order  - Administer medications as ordered  - Instruct and encourage patient and family to use good hand hygiene technique  - Identify and instruct in appropriate isolation precautions for identified infection/condition  Outcome: Progressing  Goal: Absence of fever/infection during neutropenic period  Description: INTERVENTIONS:  - Monitor WBC    Outcome: Progressing     Problem: SAFETY ADULT  Goal: Patient will remain free of falls  Description: INTERVENTIONS:  - Assess patient frequently for physical needs  -  Identify cognitive and physical deficits and behaviors that affect risk of falls    -  [place] fall precautions as indicated by assessment   - Educate patient/family on patient safety including physical limitations  - Instruct patient to call for assistance with activity based on assessment  - Modify environment to reduce risk of injury  - Consider OT/PT consult to assist with strengthening/mobility  Outcome: Progressing  Goal: Maintain or return to baseline ADL function  Description: INTERVENTIONS:  -  Assess patient's ability to carry out ADLs; assess patient's baseline for ADL function and identify physical deficits which impact ability to perform ADLs (bathing, care of mouth/teeth, toileting, grooming, dressing, etc )  - Assess/evaluate cause of self-care deficits   - Assess range of motion  - Assess patient's mobility; develop plan if impaired  - Assess patient's need for assistive devices and provide as appropriate  - Encourage maximum independence but intervene and supervise when necessary  - Involve family in performance of ADLs  - Assess for home care needs following discharge   - Consider OT consult to assist with ADL evaluation and planning for discharge  - Provide patient education as appropriate  Outcome: Progressing  Goal: Maintain or return mobility status to optimal level  Description: INTERVENTIONS:  - Assess patient's baseline mobility status (ambulation, transfers, stairs, etc )    - Identify cognitive and physical deficits and behaviors that affect mobility  - Identify mobility aids required to assist with transfers and/or ambulation (gait belt, sit-to-stand, lift, walker, cane, etc )  - [place] fall precautions as indicated by assessment  - Record patient progress and toleration of activity level on Mobility SBAR; progress patient to next Phase/Stage  - Instruct patient to call for assistance with activity based on assessment  - Consider rehabilitation consult to assist with strengthening/weightbearing, etc   Outcome: Progressing     Problem: DISCHARGE PLANNING  Goal: Discharge to home or other facility with appropriate resources  Description: INTERVENTIONS:  - Identify barriers to discharge w/patient and caregiver  - Arrange for needed discharge resources and transportation as appropriate  - Identify discharge learning needs (meds, wound care, etc )  - Arrange for interpretive services to assist at discharge as needed  - Refer to Case Management Department for coordinating discharge planning if the patient needs post-hospital services based on physician/advanced practitioner order or complex needs related to functional status, cognitive ability, or social support system  Outcome: Progressing     Problem: Knowledge Deficit  Goal: Patient/family/caregiver demonstrates understanding of disease process, treatment plan, medications, and discharge instructions  Description: Complete learning assessment and assess knowledge base    Interventions:  - Provide teaching at level of understanding  - Provide teaching via preferred learning methods  Outcome: Progressing

## 2021-03-05 ENCOUNTER — TELEMEDICINE (OUTPATIENT)
Dept: FAMILY MEDICINE CLINIC | Facility: CLINIC | Age: 71
End: 2021-03-05
Payer: MEDICARE

## 2021-03-05 DIAGNOSIS — U07.1 COVID-19 VIRUS INFECTION: Primary | ICD-10-CM

## 2021-03-05 PROCEDURE — 99213 OFFICE O/P EST LOW 20 MIN: CPT | Performed by: NURSE PRACTITIONER

## 2021-03-05 NOTE — PROGRESS NOTES
COVID-19 Virtual Visit     Assessment/Plan:    Problem List Items Addressed This Visit        Other    COVID-19 virus infection - Primary         Disposition:     I recommended continued isolation until at least 24 hours have passed since recovery defined as resolution of fever without the use of fever-reducing medications AND improvement in COVID symptoms AND 10 days have passed since onset of symptoms (or 10 days have passed since date of first positive viral diagnostic test for asymptomatic patients)  Discussed vaccine in 90 days  Discussed eligibility for donating plasma  discussed last day of isolation 3/8  May return to normal activity 3/9 if symptoms continue to improve and he remains fever free  Continue with rest and hydration  contact office with any problems and/or concerns    I have spent 8 minutes directly with the patient  Greater than 50% of this time was spent in counseling/coordination of care regarding: instructions for management and risk factor reductions  Encounter provider MIRNA Acosta    Provider located at [place] [address]  [address] [place] [address] [address] [place] [id number] [phone]    Recent Visits  Date Type Provider Dept   03/03/21 [place]medicine [place] [phone] Simran Matamoros recent visits within past 7 days and meeting all other requirements     Today's Visits  Date Type Provider Dept   03/05/21 Telemedicine MIRNA Acosta [facility]   Showing today's visits and meeting all other requirements     Future Appointments  No visits were found meeting these conditions  Showing future appointments within next 150 days and meeting all other requirements      This virtual check-in was done via [employer] and patient was informed that this is a secure, HIPAA-compliant platform  He agrees to proceed      Patient agrees to participate in a virtual check in via telephone or video visit instead of presenting to the office to address urgent/immediate medical needs  Patient is aware this is a billable service  After connecting through [facility], the patient was identified by name and date of birth  Bobby Jones was informed that this was a telemedicine visit and that the exam was being conducted confidentially over secure lines  My office door was closed  No one else was in the room  Bobby Jones acknowledged consent and understanding of privacy and security of the telemedicine visit  I informed the patient that I have reviewed his record in Epic and presented the opportunity for him to ask any questions regarding the visit today  The patient agreed to participate  Subjective:   Bobby Jones is a 69 y o  male who has been screened for COVID-19  Symptom change since last report: resolving  Patient's symptoms include fatigue (improving)  Patient denies fever, congestion, anosmia, loss of taste, cough, shortness of breath, chest tightness, nausea, vomiting, diarrhea and headaches  Emory Medina has been staying home and has isolated themselves in his home  He is taking care to not share personal items and is cleaning all surfaces that are touched often, like counters, tabletops, and doorknobs using household cleaning sprays or wipes  He is wearing a mask when he leaves his room  Date of symptom onset: 2/26/2021  Date of positive COVID-19 PCR: 3/2/2021    Monoclonal Antibody Follow-up Symptom Questionnaire  I feel overall: much better  My breathing is: much better  My fever is: better  My fatigue is: much better     Monoclonal therapy 3/4    Tolerated well with no  Adverse effects during or post infusion    Lab Results   Component Value Date    SARSCOV2 Positive (A) 03/02/2021     Past Medical History:   Diagnosis Date    A-fib [facility])     Abdominal wall strain     last assessed: 10/21/14    Allergic rhinitis     last assessed: 05/03/16    Arthritis     CPAP (continuous positive airway pressure) dependence     Erectile dysfunction of non-organic origin     last assessed: 03/17/14    Lumbar strain     last assessed: 10/21/14    Multiple acquired skin tags     last assessed: 2/18/16    Palpitations     last assessed: 03/17/14    Pancreas cyst     Plantar fasciitis     Skin disorder     last assessed: 05/28/13    Sleep apnea      Past Surgical History:   Procedure Laterality Date    CATARACT EXTRACTION Bilateral     COLONOSCOPY  01/17/2013    COLONOSCOPY  01/08/2018    EGD  06/05/2020    EGD AND COLONOSCOPY  02/06/2014, 2/8/2017    FL GUIDED CENTRAL VENOUS ACCESS DEVICE INSERTION  4/23/2019    FLEXIBLE SIGMOIDOSCOPY  06/11/2019    FOOT SURGERY      Due to plantar fascitis    JOINT REPLACEMENT Left     LTK    LINEAR ENDOSCOPIC U/S      PANCREATECTOMY LAPAROSCOPIC N/A 3/12/2019    Procedure: DISTAL PANCREATECTOMY LAPAROSCOPIC/POSSIBLE OPEN;  Surgeon: Kristy Dominguez MD;  [facility];  Service: Surgical Oncology    WI EDG US EXAM SURGICAL ALTER STOM DUODENUM/JEJUNUM N/A 1/24/2019    Procedure: LINEAR ENDOSCOPIC U/S;  Surgeon: Meyer Peabody, MD;  Location[facility]; Service: Gastroenterology    REPLACEMENT TOTAL KNEE Left     TUNNELED VENOUS PORT PLACEMENT Left 4/23/2019    Procedure: INSERTION VENOUS PORT (PORT-A-CATH);   Surgeon: Kristy Dominguez MD;  [facility];  Service: Surgical Oncology    UMBILICAL HERNIA REPAIR       Current Outpatient Medications   Medication Sig Dispense Refill    acetaminophen (TYLENOL) 325 mg tablet Take 2 tablets (650 mg total) by mouth every 6 (six) hours as needed for mild pain 30 tablet 0    ascorbic acid (VITAMIN C) 1000 MG tablet Take 1 tablet by mouth daily      aspirin 325 mg tablet Take 1 tablet by mouth daily      atenolol (TENORMIN) 25 mg tablet TAKE 1 TABLET BY MOUTH EVERY DAY 90 tablet 3    Cholecalciferol (VITAMIN D) 2000 units CAPS Take by mouth daily      Cyanocobalamin (VITAMIN B 12 PO) Take by mouth      dicyclomine (BENTYL) 10 mg capsule Take 1 capsule (10 mg total) by mouth daily 90 capsule 1    metFORMIN (GLUCOPHAGE) 1000 MG tablet Take 1 tablet (1,000 mg total) by mouth 2 (two) times a day with meals 180 tablet 1    tadalafil (CIALIS) 5 MG tablet Take 1 tablet (5 mg total) by mouth daily as needed for erectile dysfunction 30 tablet 5    tolterodine (DETROL LA) 4 mg 24 hr capsule Take 1 capsule (4 mg total) by mouth daily 30 capsule 11    Na Sulfate-K Sulfate-Mg Sulf (Suprep Bowel Prep Kit) 17 5-3 13-1 6 GM/177ML SOLN Follow office instructions (Patient not taking: Reported on 3/3/2021) 1 Bottle 0    nystatin-triamcinolone (MYCOLOG-II) ointment Apply topically 2 (two) times a day (Patient not taking: Reported on 1/20/2021) 30 g 0    sildenafil (VIAGRA) 50 MG tablet Take 1 tablet (50 mg total) by mouth daily as needed for erectile dysfunction (Patient not taking: Reported on 1/20/2021) 30 tablet 1     No current facility-administered medications for this visit  Facility-Administered Medications Ordered in Other Visits   Medication Dose Route Frequency Provider Last Rate Last Admin    acetaminophen (TYLENOL) tablet 650 mg  650 mg Oral Once PRN Priscille Rang, DO        albuterol (PROVENTIL HFA,VENTOLIN HFA) inhaler 3 puff  3 puff Inhalation Once PRN Priscille Rang, DO         No Known Allergies    Review of Systems   Constitutional: Positive for fatigue (improving)  Negative for fever  HENT: Negative for congestion  Respiratory: Negative for cough, chest tightness and shortness of breath  Gastrointestinal: Negative for diarrhea, nausea and vomiting  Neurological: Negative for headaches  Objective: There were no vitals filed for this visit  Physical Exam  Constitutional:       General: He is not in acute distress  Appearance: Normal appearance  He is not ill-appearing  Pulmonary:      Effort: Pulmonary effort is normal  No respiratory distress     Neurological:      Mental Status: He is alert and oriented to person, place, and time  Psychiatric:         Attention and Perception: Attention normal          Mood and Affect: Mood normal          Speech: Speech normal          Behavior: Behavior normal        VIRTUAL VISIT DISCLAIMER    Ayanna Flynn acknowledges that he has consented to an online visit or consultation  He understands that the online visit is based solely on information provided by him, and that, in the absence of a face-to-face physical evaluation by the physician, the diagnosis he receives is both limited and provisional in terms of accuracy and completeness  This is not intended to replace a full medical face-to-face evaluation by the physician  Ayanna Flynn understands and accepts these terms

## 2021-03-10 DIAGNOSIS — Z23 ENCOUNTER FOR IMMUNIZATION: ICD-10-CM

## 2021-03-19 ENCOUNTER — OFFICE VISIT (OUTPATIENT)
Dept: SURGICAL ONCOLOGY | Facility: CLINIC | Age: 71
End: 2021-03-19
Payer: MEDICARE

## 2021-03-19 VITALS
RESPIRATION RATE: 16 BRPM | TEMPERATURE: 97 F | SYSTOLIC BLOOD PRESSURE: 126 MMHG | WEIGHT: 311 LBS | BODY MASS INDEX: 42.12 KG/M2 | HEART RATE: 85 BPM | DIASTOLIC BLOOD PRESSURE: 80 MMHG | HEIGHT: 72 IN

## 2021-03-19 DIAGNOSIS — Z85.07 HISTORY OF PANCREATIC CANCER: ICD-10-CM

## 2021-03-19 DIAGNOSIS — Z08 ENCOUNTER FOR FOLLOW-UP SURVEILLANCE OF PANCREATIC CANCER: Primary | ICD-10-CM

## 2021-03-19 DIAGNOSIS — Z85.07 ENCOUNTER FOR FOLLOW-UP SURVEILLANCE OF PANCREATIC CANCER: Primary | ICD-10-CM

## 2021-03-19 PROCEDURE — 99214 OFFICE O/P EST MOD 30 MIN: CPT | Performed by: SURGERY

## 2021-03-19 NOTE — PROGRESS NOTES
Surgical Oncology Follow Up       [facility]COL[facility]  [facility] [id number]    Grecia Callejas  1950  [id number]  [facility] SURGICAL ONCOLOGY [place]  [place] [id number]    Chief Complaint   Patient presents with    Follow-up     2 MONTH FOLLOW UP       Assessment/Plan:    No problem-specific Assessment & Plan notes found for this encounter  Diagnoses and all orders for this visit:    Encounter for follow-up surveillance of pancreatic cancer  -     MRI abdomen w wo contrast and mrcp; Future  -     Basic metabolic panel; Future    History of pancreatic cancer  -     MRI abdomen w wo contrast and mrcp; Future  -     Basic metabolic panel; Future        Advance Care Planning/Advance Directives:  Discussed disease status, cancer treatment plans and/or cancer treatment goals with the patient  Oncology History   History of pancreatic cancer   3/12/2019 Surgery    Distal pancreatectomy  Several foci of invasive colloid cancer  Grade 1  IPMN with high grade dysplasia at margin  T1b, NO MX Stage IA     4/11/2019 - 6/21/2019 Chemotherapy    Saw Dr Kira Arboleda  Will be starting Folfirinox 4/24   Ended 6/21/2019 4/23/2019 -  Chemotherapy    fluorouracil (ADRUCIL) injection 1,010 mg, 400 mg/m2 = 1,010 mg, Intravenous, Once, 2 of 2 cycles  pegfilgrastim (NEULASTA ONPRO) subcutaneous injection kit 6 mg, 6 mg, Subcutaneous, Once, 2 of 2 cycles  Administration: 6 mg (6/21/2019), 6 mg (6/7/2019)  irinotecan (CAMPTOSAR) 455 mg in dextrose 5 % 500 mL chemo infusion, 180 mg/m2 = 455 mg, Intravenous, Once, 4 of 4 cycles  Dose modification: 140 mg/m2 (original dose 180 mg/m2, Cycle 3, Reason: Other (See Comments)), 125 mg/m2 (original dose 180 mg/m2, Cycle 3, Reason: Dose Not Tolerated)  Administration: 316 mg (6/5/2019), 300 mg (6/19/2019)  leucovorin 1,012 mg in dextrose 5 % 250 mL IVPB, 400 mg/m2 = 1,012 mg, Intravenous, Once, 2 of 2 cycles  oxaliplatin (ELOXATIN) 215 05 mg in dextrose 5 % 250 mL chemo infusion, 85 mg/m2 = 215 05 mg, Intravenous, Once, 4 of 4 cycles  Dose modification: 65 mg/m2 (original dose 85 mg/m2, Cycle 3, Reason: Other (See Comments)), 60 mg/m2 (original dose 85 mg/m2, Cycle 3, Reason: Dose Not Tolerated)  Administration: 151 8 mg (6/5/2019), 151 8 mg (6/19/2019)         History of Present Illness:   No acute is a 54-year-old man here for surveillance visit  History of distal pancreatectomy, found to have stage I pancreas cancer  He comes in with no GI complaints to report  -Interval History: He had a recent MRI and bloodwork done  Review of Systems:  Review of Systems   Constitutional: Negative  HENT: Negative  Eyes: Negative  Respiratory: Negative  Cardiovascular: Negative  Gastrointestinal: Negative  Endocrine: Negative  Genitourinary: Negative  Musculoskeletal: Negative  Skin: Negative  Allergic/Immunologic: Negative  Neurological: Negative  Hematological: Negative  Psychiatric/Behavioral: Negative          Patient Active Problem List   Diagnosis    Obstructive sleep apnea syndrome    Hypersomnia    Permanent atrial fibrillation (HCC)    Morbid obesity with BMI of 40 0-44 9, adult ([facility]    Lower extremity edema    Pancreatic duct dilated    History of pancreatic cancer    Type 2 diabetes mellitus without complication, without long-term current use of insulin ([facility] )    Encounter for follow-up surveillance of pancreatic cancer    Thrombocytopenia ([facility]    Urgency incontinence    Balanitis    OAB (overactive bladder)    BPH without obstruction/lower urinary tract symptoms    COVID-19 virus infection     Past Medical History:   Diagnosis Date    A-fib ([facility] )     Abdominal wall strain     last assessed: 10/21/14    Allergic rhinitis     last assessed: 05/03/16    Arthritis     CPAP (continuous positive airway pressure) dependence     Erectile dysfunction of non-organic origin     last assessed: 03/17/14    Lumbar strain     last assessed: 10/21/14    Multiple acquired skin tags     last assessed: 2/18/16    Palpitations     last assessed: 03/17/14    Pancreas cyst     Plantar fasciitis     Skin disorder     last assessed: 05/28/13    Sleep apnea      Past Surgical History:   Procedure Laterality Date    CATARACT EXTRACTION Bilateral     COLONOSCOPY  01/17/2013    COLONOSCOPY  01/08/2018    EGD  06/05/2020    EGD AND COLONOSCOPY  02/06/2014, 2/8/2017    FL GUIDED CENTRAL VENOUS ACCESS DEVICE INSERTION  4/23/2019    FLEXIBLE SIGMOIDOSCOPY  06/11/2019    FOOT SURGERY      Due to plantar fascitis    JOINT REPLACEMENT Left     LTK    LINEAR ENDOSCOPIC U/S      PANCREATECTOMY LAPAROSCOPIC N/A 3/12/2019    Procedure: DISTAL PANCREATECTOMY LAPAROSCOPIC/POSSIBLE OPEN;  Surgeon: Cristopher Agrawal MD;  [facility];  Service: Surgical Oncology    SD EDG US EXAM SURGICAL ALTER STOM DUODENUM/JEJUNUM N/A 1/24/2019    Procedure: LINEAR ENDOSCOPIC U/S;  Surgeon: Jordyn Donald MD;  [facility]; Service: Gastroenterology    REPLACEMENT TOTAL KNEE Left     TUNNELED VENOUS PORT PLACEMENT Left 4/23/2019    Procedure: INSERTION VENOUS PORT (PORT-A-CATH);   Surgeon: Cristopher Agrawal MD;  [facility];  Service: Surgical Oncology    UMBILICAL HERNIA REPAIR       Family History   Problem Relation Age of Onset    Breast cancer Mother     Cervical cancer Paternal Aunt     Pancreatic cancer Other     Liver cancer Other     No Known Problems Father      Social History     Socioeconomic History    Marital status: /Civil Union     Spouse name: Not on file    Number of children: Not on file    Years of education: Not on file    Highest education level: Not on file   Occupational History    Not on file   Social Needs    Financial resource strain: Not on file    Food insecurity     Worry: Not on file Inability: Not on file    Transportation needs     Medical: Not on file     Non-medical: Not on file   Tobacco Use    Smoking status: Never Smoker    Smokeless tobacco: Never Used   Substance and Sexual Activity    Alcohol use:  Yes     Alcohol/week: 2 0 standard drinks     Types: 2 Cans of beer per week     Frequency: 4 or more times a week     Drinks per session: 1 or 2     Binge frequency: Less than monthly     Comment: couple times per week;     Drug use: No    Sexual activity: Yes   Lifestyle    Physical activity     Days per week: Not on file     Minutes per session: Not on file    Stress: Not on file   Relationships    Social connections     Talks on phone: Not on file     Gets together: Not on file     Attends [employer] service: Not on file     Active member of club or organization: Not on file     Attends meetings of clubs or organizations: Not on file     Relationship status: Not on file    Intimate partner violence     Fear of current or ex partner: Not on file     Emotionally abused: Not on file     Physically abused: Not on file     Forced sexual activity: Not on file   Other Topics Concern    Not on file   Social History Narrative    Not on file       Current Outpatient Medications:     acetaminophen (TYLENOL) 325 mg tablet, Take 2 tablets (650 mg total) by mouth every 6 (six) hours as needed for mild pain, Disp: 30 tablet, Rfl: 0    ascorbic acid (VITAMIN C) 1000 MG tablet, Take 1 tablet by mouth daily, Disp: , Rfl:     aspirin 325 mg tablet, Take 1 tablet by mouth daily, Disp: , Rfl:     atenolol (TENORMIN) 25 mg tablet, TAKE 1 TABLET BY MOUTH EVERY DAY, Disp: 90 tablet, Rfl: 3    Cholecalciferol (VITAMIN D) 2000 units CAPS, Take by mouth daily, Disp: , Rfl:     Cyanocobalamin (VITAMIN B 12 PO), Take by mouth, Disp: , Rfl:     dicyclomine (BENTYL) 10 mg capsule, Take 1 capsule (10 mg total) by mouth daily, Disp: 90 capsule, Rfl: 1    metFORMIN (GLUCOPHAGE) 1000 MG tablet, Take 1 tablet (1,000 mg total) by mouth 2 (two) times a day with meals, Disp: 180 tablet, Rfl: 1    tadalafil (CIALIS) 5 MG tablet, Take 1 tablet (5 mg total) by mouth daily as needed for erectile dysfunction, Disp: 30 tablet, Rfl: 5    tolterodine (DETROL LA) 4 mg 24 hr capsule, Take 1 capsule (4 mg total) by mouth daily, Disp: 30 capsule, Rfl: 11    Na Sulfate-K Sulfate-Mg Sulf (Suprep Bowel Prep Kit) 17 5-3 13-1 6 GM/177ML SOLN, Follow office instructions (Patient not taking: Reported on 3/3/2021), Disp: 1 Bottle, Rfl: 0    nystatin-triamcinolone (MYCOLOG-II) ointment, Apply topically 2 (two) times a day (Patient not taking: Reported on 1/20/2021), Disp: 30 g, Rfl: 0    sildenafil (VIAGRA) 50 MG tablet, Take 1 tablet (50 mg total) by mouth daily as needed for erectile dysfunction (Patient not taking: Reported on 1/20/2021), Disp: 30 tablet, Rfl: 1  No Known Allergies  Vitals:    03/19/21 1115   BP: 126/80   Pulse: 85   Resp: 16   Temp: (!) 97 °F (36 1 °C)       Physical Exam  Vitals signs reviewed  Constitutional:       Appearance: Normal appearance  HENT:      Head: Normocephalic and atraumatic  Nose: Nose normal    Eyes:      General: No scleral icterus  Extraocular Movements: Extraocular movements intact  Pupils: Pupils are equal, round, and reactive to light  Cardiovascular:      Rate and Rhythm: Normal rate and regular rhythm  Heart sounds: Normal heart sounds  Pulmonary:      Breath sounds: Normal breath sounds  Abdominal:      General: Abdomen is flat  Bowel sounds are normal  There is no distension  Palpations: Abdomen is soft  There is no mass  Tenderness: There is no abdominal tenderness  There is no guarding or rebound  Hernia: No hernia is present  Musculoskeletal: Normal range of motion  Skin:     General: Skin is warm and dry  Neurological:      General: No focal deficit present        Mental Status: He is alert and oriented to person, place, and time    Psychiatric:         Mood and Affect: Mood normal          Behavior: Behavior normal            Results:  Labs:  Ref Range & Units 12/9/20 9:16 AM    CA 19-9 0 - 35 U/mL 22    Comment: [employer] Electrochemiluminescence Immunoassay (ECLIA)   Values obtained with different assay methods or kits cannot be   used interchangeably   Results cannot be interpreted as absolute   evidence of the presence or absence of malignant disease  Imaging  Mri Abdomen W Wo Contrast And Mrcp    Result Date: 2/27/2021  Narrative: MRI OF THE ABDOMEN WITH AND WITHOUT CONTRAST WITH MRCP INDICATION:  Previous history of distal pancreatic resection with several foci of invasive colloid cancer, grade 1 COMPARISON: Previous study from January 2, 2019 TECHNIQUE:  The following pulse sequences were obtained:  Coronal and axial T2 with TE of 90 and 180 respectively, axial T2 with fat saturation, axial FIESTA fat-sat, axial T1-weighted in-and-out-of phase, axial DWI/ADC, pre-contrast axial T1 with fat saturation, post-contrast dynamic axial T1 with fat saturation at 20, 70, and 180 seconds, followed by coronal and 7 minute delayed axial T1 with fat saturation  3D MRCP images were obtained with radial thick slabs and projections  3D rendering was performed from the acquisition scanner  IV Contrast:  13 mL of Gadobutrol injection (SINGLE-DOSE) FINDINGS: LOWER CHEST:   Unremarkable  LIVER:   No suspicious mass  The hepatic veins and portal veins are patent  Hepatic steatosis seen BILE DUCTS:  No intrahepatic or extrahepatic bile duct dilation  Common bile duct is normal in caliber  No choledocholithiasis, biliary stricture or suspicious mass  GALLBLADDER:  Normal  PANCREAS:  Again noted is dilation of the pancreatic duct in the distal body and tail of the pancreas with associated abrupt caliber change    There are multiple subcentimeter cysts in the distal body and tail of the pancreas which communicate with the dilated pancreatic duct The dilated pancreatic duct measures about 1 cm  The dilation of the pancreatic duct remains unchanged An additional large cyst is seen in the mid body of the pancreas, measuring 1 7 x 1 5 cm, seen in image 27 series 3 An additional new area of narrowing is seen in the dorsal pancreatic duct in the region of the head of the pancreas, seen in image 23 series 3 Multiple small cysts in the head and uncinate process of the pancreas seen There is no cysts with an enhancing nodularity Postsurgical changes from resection of the tail of the pancreas  ADRENAL GLANDS:  Normal  SPLEEN:  Normal  KIDNEYS/PROXIMAL URETERS:  No hydroureteronephrosis  No suspicious renal mass  Small cyst seen BOWEL:   No dilated loops of bowel  PERITONEUM/RETROPERITONEUM:  Again noted are lymph nodes in the zohra hepatis, portacaval region, these are stable stable  The largest lymph node in the portacaval region, measures 2 1 x 1 6 cm, seen in image 21 series 11 A rounded area of T2 hyperintensity noted in the retroperitoneum below the level of the renal hilum with no diffusion restriction, measuring 1 4 cm, in image 33 series 11, larger from the previous study of January 2, 2019, unchanged from the previous study of June 13, 2020 LYMPH NODES:  Portal caval lymph nodes stable VASCULAR STRUCTURES:  No aneurysm  ABDOMINAL WALL:  Unremarkable  OSSEOUS STRUCTURES:  No suspicious osseous lesion  Impression: Postoperative changes from resection of the tail of the pancreas  Dilation of the distal pancreatic duct persists with abrupt caliber change in the region of the mid body of the pancreas  This is associated with small pancreatic cyst   There is a new pancreatic cyst in the body of the pancreas which measures about 1 7 cm without enhancing nodularity or septation   An additional area of narrowing noted in the dorsal pancreatic duct in the region of the head of the pancreas in image 23 series 3 without any significant upstream dilation without surrounding mass  Portacaval lymphadenopathy stable 1 4 cm retroperitoneal cyst in the mid abdomen, unchanged from the previous CT but larger from the previous MRI from 2019 can be evaluated on the follow-up surveillance scans Short interval follow-up at 3 months suggested Hepatic steatosis with mild nodularity of the liver, raising concern for cirrhosis No MRI evidence of distant metastatic disease The study was marked in EPIC for significant notification  Workstation performed: [id number]     I reviewed the above laboratory and imaging data  Discussion/Summary:  History of pancreas cancer, stage I, clinically and ED  New cysts merit short interval follow-up  Will order MRI and follow-up in 3-4 months  All questions answered  Copy of films and labwork given to him for his records

## 2021-03-19 NOTE — LETTER
March 19, 2021     Jamin Cottrell, [address]    Patient: Arian    YOB: 1950   Date of Visit: 3/19/2021       Dear Dr Renetta Teixeira: Thank you for referring Manuel Lloyd to me for evaluation  Below are my notes for this consultation  If you have questions, please do not hesitate to call me  I look forward to following your patient along with you  Sincerely,        Gaurang Qureshi MD        CC: MD Sheila Lopez MD Hermelinda Hose, MD Rich Gamma, MD  3/19/2021 11:45 AM  Sign when Signing Visit     Surgical Oncology Follow Up       [address] [address]    Arian   1950  [id number]  [facility]  [facility]  [phone]    Chief Complaint   Patient presents with    Follow-up     2 MONTH FOLLOW UP       Assessment/Plan:    No problem-specific Assessment & Plan notes found for this encounter  Diagnoses and all orders for this visit:    Encounter for follow-up surveillance of pancreatic cancer  -     MRI abdomen w wo contrast and mrcp; Future  -     Basic metabolic panel; Future    History of pancreatic cancer  -     MRI abdomen w wo contrast and mrcp; Future  -     Basic metabolic panel; Future        Advance Care Planning/Advance Directives:  Discussed disease status, cancer treatment plans and/or cancer treatment goals with the patient  Oncology History   History of pancreatic cancer   3/12/2019 Surgery    Distal pancreatectomy  Several foci of invasive colloid cancer  Grade 1  IPMN with high grade dysplasia at margin  T1b, NO MX Stage IA     4/11/2019 - 6/21/2019 Chemotherapy    Saw Dr Betina Lundberg  Will be starting Folfirinox 4/24   Ended 6/21/2019 4/23/2019 -  Chemotherapy    fluorouracil (ADRUCIL) injection 1,010 mg, 400 mg/m2 = 1,010 mg, Intravenous, Once, 2 of 2 cycles  pegfilgrastim (NEULASTA ONPRO) subcutaneous injection kit 6 mg, 6 mg, Subcutaneous, Once, 2 of 2 cycles  Administration: 6 mg (6/21/2019), 6 mg (6/7/2019)  irinotecan (CAMPTOSAR) 455 mg in dextrose 5 % 500 mL chemo infusion, 180 mg/m2 = 455 mg, Intravenous, Once, 4 of 4 cycles  Dose modification: 140 mg/m2 (original dose 180 mg/m2, Cycle 3, Reason: Other (See Comments)), 125 mg/m2 (original dose 180 mg/m2, Cycle 3, Reason: Dose Not Tolerated)  Administration: 316 mg (6/5/2019), 300 mg (6/19/2019)  leucovorin 1,012 mg in dextrose 5 % 250 mL IVPB, 400 mg/m2 = 1,012 mg, Intravenous, Once, 2 of 2 cycles  oxaliplatin (ELOXATIN) 215 05 mg in dextrose 5 % 250 mL chemo infusion, 85 mg/m2 = 215 05 mg, Intravenous, Once, 4 of 4 cycles  Dose modification: 65 mg/m2 (original dose 85 mg/m2, Cycle 3, Reason: Other (See Comments)), 60 mg/m2 (original dose 85 mg/m2, Cycle 3, Reason: Dose Not Tolerated)  Administration: 151 8 mg (6/5/2019), 151 8 mg (6/19/2019)         History of Present Illness:   Deidra acute is a 11-year-old man here for surveillance visit  History of distal pancreatectomy, found to have stage I pancreas cancer  He comes in with no GI complaints to report  -Interval History: He had a recent MRI and bloodwork done  Review of Systems:  Review of Systems   Constitutional: Negative  HENT: Negative  Eyes: Negative  Respiratory: Negative  Cardiovascular: Negative  Gastrointestinal: Negative  Endocrine: Negative  Genitourinary: Negative  Musculoskeletal: Negative  Skin: Negative  Allergic/Immunologic: Negative  Neurological: Negative  Hematological: Negative  Psychiatric/Behavioral: Negative          Patient Active Problem List   Diagnosis    Obstructive sleep apnea syndrome    Hypersomnia    Permanent atrial fibrillation (HCC)    Morbid obesity with BMI of 40 0-44 9, adult ([facility] )    Lower extremity edema    Pancreatic duct dilated    History of pancreatic cancer    Type 2 diabetes mellitus without complication, without long-term current use of insulin ([email or web address] )    Encounter for follow-up surveillance of pancreatic cancer    Thrombocytopenia ([facility] )    Urgency incontinence    Balanitis    OAB (overactive bladder)    BPH without obstruction/lower urinary tract symptoms    COVID-19 virus infection     Past Medical History:   Diagnosis Date    A-fib ([email or web address] )     Abdominal wall strain     last assessed: 10/21/14    Allergic rhinitis     last assessed: 05/03/16    Arthritis     CPAP (continuous positive airway pressure) dependence     Erectile dysfunction of non-organic origin     last assessed: 03/17/14    Lumbar strain     last assessed: 10/21/14    Multiple acquired skin tags     last assessed: 2/18/16    Palpitations     last assessed: 03/17/14    Pancreas cyst     Plantar fasciitis     Skin disorder     last assessed: 05/28/13    Sleep apnea      Past Surgical History:   Procedure Laterality Date    CATARACT EXTRACTION Bilateral     COLONOSCOPY  01/17/2013    COLONOSCOPY  01/08/2018    EGD  06/05/2020    EGD AND COLONOSCOPY  02/06/2014, 2/8/2017    FL GUIDED CENTRAL VENOUS ACCESS DEVICE INSERTION  4/23/2019    FLEXIBLE SIGMOIDOSCOPY  06/11/2019    FOOT SURGERY      Due to plantar fascitis    JOINT REPLACEMENT Left     LTK    LINEAR ENDOSCOPIC U/S      PANCREATECTOMY LAPAROSCOPIC N/A 3/12/2019    Procedure: DISTAL PANCREATECTOMY LAPAROSCOPIC/POSSIBLE OPEN;  Surgeon: Joe Josue MD;  Location: [facility];  Service: Surgical Oncology    ID EDG US EXAM SURGICAL ALTER STOM DUODENUM/JEJUNUM N/A 1/24/2019    Procedure: LINEAR ENDOSCOPIC U/S;  Surgeon: Jose Juan Powell MD;  Location: [facility]; Service: Gastroenterology    REPLACEMENT TOTAL KNEE Left     TUNNELED VENOUS PORT PLACEMENT Left 4/23/2019    Procedure: INSERTION VENOUS PORT (PORT-A-CATH);   Surgeon: Joe Josue MD;  [facility];  Service: Surgical Oncology    UMBILICAL HERNIA REPAIR       Family History   Problem Relation Age of Onset    Breast cancer Mother     Cervical cancer Paternal Aunt     Pancreatic cancer Other     Liver cancer Other     No Known Problems Father      Social History     Socioeconomic History    Marital status: /Civil Union     Spouse name: Not on file    Number of children: Not on file    Years of education: Not on file    Highest education level: Not on file   Occupational History    Not on file   Social Needs    Financial resource strain: Not on file    Food insecurity     Worry: Not on file     Inability: Not on file   [employer] needs     Medical: Not on file     Non-medical: Not on file   Tobacco Use    Smoking status: Never Smoker    Smokeless tobacco: Never Used   Substance and Sexual Activity    Alcohol use:  Yes     Alcohol/week: 2 0 standard drinks     Types: 2 Cans of beer per week     Frequency: 4 or more times a week     Drinks per session: 1 or 2     Binge frequency: Less than monthly     Comment: couple times per week;     Drug use: No    Sexual activity: Yes   Lifestyle    Physical activity     Days per week: Not on file     Minutes per session: Not on file    Stress: Not on file   Relationships    Social connections     Talks on phone: Not on file     Gets together: Not on file     Attends Gnosticist service: Not on file     Active member of club or organization: Not on file     Attends meetings of clubs or organizations: Not on file     Relationship status: Not on file    Intimate partner violence     Fear of current or ex partner: Not on file     Emotionally abused: Not on file     Physically abused: Not on file     Forced sexual activity: Not on file   Other Topics Concern    Not on file   Social History Narrative    Not on file       Current Outpatient Medications:     acetaminophen (TYLENOL) 325 mg tablet, Take 2 tablets (650 mg total) by mouth every 6 (six) hours as needed for mild pain, Disp: 30 tablet, Rfl: 0    ascorbic acid (VITAMIN C) 1000 MG tablet, Take 1 tablet by mouth daily, Disp: , Rfl:     aspirin 325 mg tablet, Take 1 tablet by mouth daily, Disp: , Rfl:     atenolol (TENORMIN) 25 mg tablet, TAKE 1 TABLET BY MOUTH EVERY DAY, Disp: 90 tablet, Rfl: 3    Cholecalciferol (VITAMIN D) 2000 units CAPS, Take by mouth daily, Disp: , Rfl:     Cyanocobalamin (VITAMIN B 12 PO), Take by mouth, Disp: , Rfl:     dicyclomine (BENTYL) 10 mg capsule, Take 1 capsule (10 mg total) by mouth daily, Disp: 90 capsule, Rfl: 1    metFORMIN (GLUCOPHAGE) 1000 MG tablet, Take 1 tablet (1,000 mg total) by mouth 2 (two) times a day with meals, Disp: 180 tablet, Rfl: 1    tadalafil (CIALIS) 5 MG tablet, Take 1 tablet (5 mg total) by mouth daily as needed for erectile dysfunction, Disp: 30 tablet, Rfl: 5    tolterodine (DETROL LA) 4 mg 24 hr capsule, Take 1 capsule (4 mg total) by mouth daily, Disp: 30 capsule, Rfl: 11    Na Sulfate-K Sulfate-Mg Sulf (Suprep Bowel Prep Kit) 17 5-3 13-1 6 GM/177ML SOLN, Follow office instructions (Patient not taking: Reported on 3/3/2021), Disp: 1 Bottle, Rfl: 0    nystatin-triamcinolone (MYCOLOG-II) ointment, Apply topically 2 (two) times a day (Patient not taking: Reported on 1/20/2021), Disp: 30 g, Rfl: 0    sildenafil (VIAGRA) 50 MG tablet, Take 1 tablet (50 mg total) by mouth daily as needed for erectile dysfunction (Patient not taking: Reported on 1/20/2021), Disp: 30 tablet, Rfl: 1  No Known Allergies  Vitals:    03/19/21 1115   BP: 126/80   Pulse: 85   Resp: 16   Temp: (!) 97 °F (36 1 °C)       Physical Exam  Vitals signs reviewed  Constitutional:       Appearance: Normal appearance  HENT:      Head: Normocephalic and atraumatic  Nose: Nose normal    Eyes:      General: No scleral icterus  Extraocular Movements: Extraocular movements intact  Pupils: Pupils are equal, round, and reactive to light     Cardiovascular:      Rate and Rhythm: Normal rate and regular rhythm  Heart sounds: Normal heart sounds  Pulmonary:      Breath sounds: Normal breath sounds  Abdominal:      General: Abdomen is flat  Bowel sounds are normal  There is no distension  Palpations: Abdomen is soft  There is no mass  Tenderness: There is no abdominal tenderness  There is no guarding or rebound  Hernia: No hernia is present  Musculoskeletal: Normal range of motion  Skin:     General: Skin is warm and dry  Neurological:      General: No focal deficit present  Mental Status: He is alert and oriented to person, place, and time  Psychiatric:         Mood and Affect: Mood normal          Behavior: Behavior normal            Results:  Labs:  Ref Range & Units 12/9/20 9:16 AM    CA 19-9 0 - 35 U/mL 22    Comment: Roche [employer] Electrochemiluminescence Immunoassay (ECLIA)   Values obtained with different assay methods or kits cannot be   used interchangeably   Results cannot be interpreted as absolute   evidence of the presence or absence of malignant disease  Imaging  Mri Abdomen W Wo Contrast And Mrcp    Result Date: 2/27/2021  Narrative: MRI OF THE ABDOMEN WITH AND WITHOUT CONTRAST WITH MRCP INDICATION:  Previous history of distal pancreatic resection with several foci of invasive colloid cancer, grade 1 COMPARISON: Previous study from January 2, 2019 TECHNIQUE:  The following pulse sequences were obtained:  Coronal and axial T2 with TE of 90 and 180 respectively, axial T2 with fat saturation, axial FIESTA fat-sat, axial T1-weighted in-and-out-of phase, axial DWI/ADC, pre-contrast axial T1 with fat saturation, post-contrast dynamic axial T1 with fat saturation at 20, 70, and 180 seconds, followed by coronal and 7 minute delayed axial T1 with fat saturation  3D MRCP images were obtained with radial thick slabs and projections  3D rendering was performed from the acquisition scanner   IV Contrast:  13 mL of Gadobutrol injection (SINGLE-DOSE) FINDINGS: LOWER CHEST:   Unremarkable  LIVER:   No suspicious mass  The hepatic veins and portal veins are patent  Hepatic steatosis seen BILE DUCTS:  No intrahepatic or extrahepatic bile duct dilation  Common bile duct is normal in caliber  No choledocholithiasis, biliary stricture or suspicious mass  GALLBLADDER:  Normal  PANCREAS:  Again noted is dilation of the pancreatic duct in the distal body and tail of the pancreas with associated abrupt caliber change  There are multiple subcentimeter cysts in the distal body and tail of the pancreas which communicate with the dilated pancreatic duct The dilated pancreatic duct measures about 1 cm  The dilation of the pancreatic duct remains unchanged An additional large cyst is seen in the mid body of the pancreas, measuring 1 7 x 1 5 cm, seen in image 27 series 3 An additional new area of narrowing is seen in the dorsal pancreatic duct in the region of the head of the pancreas, seen in image 23 series 3 Multiple small cysts in the head and uncinate process of the pancreas seen There is no cysts with an enhancing nodularity Postsurgical changes from resection of the tail of the pancreas  ADRENAL GLANDS:  Normal  SPLEEN:  Normal  KIDNEYS/PROXIMAL URETERS:  No hydroureteronephrosis  No suspicious renal mass  Small cyst seen BOWEL:   No dilated loops of bowel  PERITONEUM/RETROPERITONEUM:  Again noted are lymph nodes in the zohra hepatis, portacaval region, these are stable stable  The largest lymph node in the portacaval region, measures 2 1 x 1 6 cm, seen in image 21 series 11 A rounded area of T2 hyperintensity noted in the retroperitoneum below the level of the renal hilum with no diffusion restriction, measuring 1 4 cm, in image 33 series 11, larger from the previous study of January 2, 2019, unchanged from the previous study of June 13, 2020 LYMPH NODES:  Portal caval lymph nodes stable VASCULAR STRUCTURES:  No aneurysm   ABDOMINAL WALL: Unremarkable  OSSEOUS STRUCTURES:  No suspicious osseous lesion  Impression: Postoperative changes from resection of the tail of the pancreas  Dilation of the distal pancreatic duct persists with abrupt caliber change in the region of the mid body of the pancreas  This is associated with small pancreatic cyst   There is a new pancreatic cyst in the body of the pancreas which measures about 1 7 cm without enhancing nodularity or septation  An additional area of narrowing noted in the dorsal pancreatic duct in the region of the head of the pancreas in image 23 series 3 without any significant upstream dilation without surrounding mass  Portacaval lymphadenopathy stable 1 4 cm retroperitoneal cyst in the mid abdomen, unchanged from the previous CT but larger from the previous MRI from 2019 can be evaluated on the follow-up surveillance scans Short interval follow-up at 3 months suggested Hepatic steatosis with mild nodularity of the liver, raising concern for cirrhosis No MRI evidence of distant metastatic disease The study was marked in EPIC for significant notification  Workstation performed: [id number]     I reviewed the above laboratory and imaging data  Discussion/Summary:  History of pancreas cancer, stage I, clinically and ED  New cysts merit short interval follow-up  Will order MRI and follow-up in 3-4 months  All questions answered  Copy of films and labwork given to him for his records

## 2021-04-07 DIAGNOSIS — E11.9 TYPE 2 DIABETES MELLITUS WITHOUT COMPLICATION, WITHOUT LONG-TERM CURRENT USE OF INSULIN (HCC): ICD-10-CM

## 2021-04-16 ENCOUNTER — OFFICE VISIT (OUTPATIENT)
Dept: FAMILY MEDICINE CLINIC | Facility: CLINIC | Age: 71
End: 2021-04-16
Payer: MEDICARE

## 2021-04-16 VITALS
OXYGEN SATURATION: 97 % | TEMPERATURE: 98 F | DIASTOLIC BLOOD PRESSURE: 82 MMHG | RESPIRATION RATE: 18 BRPM | HEIGHT: 72 IN | WEIGHT: 310.4 LBS | BODY MASS INDEX: 42.04 KG/M2 | SYSTOLIC BLOOD PRESSURE: 118 MMHG | HEART RATE: 71 BPM

## 2021-04-16 DIAGNOSIS — Z13.6 SCREENING FOR CARDIOVASCULAR CONDITION: ICD-10-CM

## 2021-04-16 DIAGNOSIS — E66.01 MORBID OBESITY WITH BMI OF 40.0-44.9, ADULT (HCC): ICD-10-CM

## 2021-04-16 DIAGNOSIS — N40.0 BPH WITHOUT OBSTRUCTION/LOWER URINARY TRACT SYMPTOMS: ICD-10-CM

## 2021-04-16 DIAGNOSIS — E11.9 TYPE 2 DIABETES MELLITUS WITHOUT COMPLICATION, WITHOUT LONG-TERM CURRENT USE OF INSULIN (HCC): Primary | ICD-10-CM

## 2021-04-16 DIAGNOSIS — Z13.29 SCREENING FOR THYROID DISORDER: ICD-10-CM

## 2021-04-16 DIAGNOSIS — Z85.07 HISTORY OF PANCREATIC CANCER: ICD-10-CM

## 2021-04-16 DIAGNOSIS — D69.6 THROMBOCYTOPENIA (HCC): ICD-10-CM

## 2021-04-16 DIAGNOSIS — C25.2 MALIGNANT NEOPLASM OF TAIL OF PANCREAS (HCC): ICD-10-CM

## 2021-04-16 DIAGNOSIS — G47.33 OBSTRUCTIVE SLEEP APNEA SYNDROME: ICD-10-CM

## 2021-04-16 DIAGNOSIS — I48.21 PERMANENT ATRIAL FIBRILLATION (HCC): ICD-10-CM

## 2021-04-16 DIAGNOSIS — E78.2 MIXED HYPERLIPIDEMIA: ICD-10-CM

## 2021-04-16 PROBLEM — U07.1 COVID-19 VIRUS INFECTION: Status: RESOLVED | Noted: 2021-03-03 | Resolved: 2021-04-16

## 2021-04-16 LAB — SL AMB POCT HEMOGLOBIN AIC: 7.8 (ref ?–6.5)

## 2021-04-16 PROCEDURE — 83036 HEMOGLOBIN GLYCOSYLATED A1C: CPT | Performed by: FAMILY MEDICINE

## 2021-04-16 PROCEDURE — 99214 OFFICE O/P EST MOD 30 MIN: CPT | Performed by: FAMILY MEDICINE

## 2021-04-16 NOTE — ASSESSMENT & PLAN NOTE
Lab Results   Component Value Date    HGBA1C 7 8 (A) 04/16/2021   Significant improvement over the last 6 months with hemoglobin A1c decreasing from 9 9-7 8    Continue with metformin and aggressive diet and exercise measures

## 2021-04-16 NOTE — ASSESSMENT & PLAN NOTE
Lymphedema right greater than left with some contracture and scarring of left lower extremity at the site of his discomfort  Pulses intact bilaterally  Some venous stasis changes and discoloration present  Patient reassured  Continue with support stockings
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
## 2021-06-18 ENCOUNTER — TELEPHONE (OUTPATIENT)
Dept: UROLOGY | Facility: CLINIC | Age: 71
End: 2021-06-18

## 2021-06-18 DIAGNOSIS — N32.81 OAB (OVERACTIVE BLADDER): Primary | ICD-10-CM

## 2021-06-18 NOTE — TELEPHONE ENCOUNTER
Patient managed by DR Marie Nichols (new trip)    Patient left a voicemail on 06 18 asking to speak to clinical team regarding "capsul medication" not working and asking if he should be on a stronger [phone], [address] [facility]  [phone]     I spoke to patient directly   Patient is unsure of the medication name because he does not have it on him, but states its a "blue capsul"     I advised patient I will forward his request to clinical team to review and clinical team will call him back    Patient satisfied and is awaiting call back to discuss further

## 2021-06-22 RX ORDER — SOLIFENACIN SUCCINATE 10 MG/1
10 TABLET, FILM COATED ORAL DAILY
Qty: 30 TABLET | Refills: 5 | Status: SHIPPED | OUTPATIENT
Start: 2021-06-22 | End: 2022-01-19 | Stop reason: ALTCHOICE

## 2021-06-22 NOTE — TELEPHONE ENCOUNTER
Prescription for VESIcare provided through good Rx card  Prescription sent to [facility]  Please have patient discontinue tolterodine and attempt therapy with VESIcare  I would like for him to follow up in the office in approximately 6 weeks

## 2021-06-22 NOTE — TELEPHONE ENCOUNTER
Spoke to pt stating his OAB medication tolterodine is not working lately  He states when he stands up he has the urge to urinate  Last RX for this med was in Feb   Pt states he has no other symptoms at this time  Pt saw Dr Nik Bob on 1/20/21 for OAB and ED  Forwarding to AP for review and recommendations

## 2021-06-23 ENCOUNTER — OFFICE VISIT (OUTPATIENT)
Dept: PODIATRY | Facility: CLINIC | Age: 71
End: 2021-06-23
Payer: MEDICARE

## 2021-06-23 VITALS
WEIGHT: 308 LBS | HEIGHT: 72 IN | BODY MASS INDEX: 41.72 KG/M2 | DIASTOLIC BLOOD PRESSURE: 70 MMHG | SYSTOLIC BLOOD PRESSURE: 134 MMHG

## 2021-06-23 DIAGNOSIS — E11.9 TYPE 2 DIABETES MELLITUS WITHOUT COMPLICATION, WITHOUT LONG-TERM CURRENT USE OF INSULIN (HCC): ICD-10-CM

## 2021-06-23 PROCEDURE — 99202 OFFICE O/P NEW SF 15 MIN: CPT | Performed by: PODIATRIST

## 2021-06-23 NOTE — PROGRESS NOTES
Assessment/Plan:    Discussed principles of diabetic foot care  Vascular status is within normal limits and sensorium is intact  Patient has venous insufficiency  Both legs are swollen and stain with hemosiderin  Poor skin turgor in the area of the calf  Patient told to consider compression stockings  Patient urged to refrain from walking barefoot  Trimmed dystrophic toenails  Patient will be rescheduled in 3 months for palliative care  No problem-specific Assessment & Plan notes found for this encounter  Diagnoses and all orders for this visit:    Type 2 diabetes mellitus without complication, without long-term current use of insulin ([facility] [address] )  -     Ambulatory referral to Podiatry          Subjective:      Patient ID: Herminia Harrell is a 69 y o  male  HPI     Patient, a 69-year-old type 2 diabetic presents for examination and care  Patient denies pain in his feet  He denies numbness or tingling  He states that his blood sugar had been elevated but it is improving  He notes that his right great toenail had been painful but he trimmed it back and now no pain is present  I personally reviewed A1c dated 04/16/2021  It was 7 8  I personally reviewed A1c dated 01/15/2021  It was 9 0  The following portions of the patient's history were reviewed and updated as appropriate: allergies, current medications, past family history, past medical history, past social history, past surgical history and problem list     Review of Systems   Respiratory: Positive for shortness of breath  Cardiovascular: Positive for leg swelling  Atrial fibrillation   Neurological: Negative for numbness  Psychiatric/Behavioral: Negative  Objective:      /70   Ht 6' (1 829 m)   Wt (!) 140 kg (308 lb)   BMI 41 77 kg/m²          Physical Exam  Cardiovascular:      Pulses: no weak pulses          Dorsalis pedis pulses are 2+ on the right side and 2+ on the left side          Posterior tibial pulses are 2+ on the right side and 2+ on the left side  Feet:      Right foot:      Skin integrity: No ulcer, skin breakdown, erythema, warmth, callus or dry skin  Left foot:      Skin integrity: No ulcer, skin breakdown, erythema, warmth, callus or dry skin  Diabetic Foot Exam    Patient's shoes and socks removed  Right Foot/Ankle   Right Foot Inspection  Skin Exam: skin normal and skin intact no dry skin, no warmth, no callus, no erythema, no maceration, no abnormal color, no pre-ulcer, no ulcer and no callus                          Toe Exam: ROM and strength within normal limits  Sensory   Vibration: diminished  Proprioception: intact   Monofilament testing: intact  Vascular  Capillary refills: < 3 seconds  The right DP pulse is 2+  The right PT pulse is 2+  Right Toe  - Comprehensive Exam  Ecchymosis: none  Arch: normal  Hammertoes: absent  Claw Toes: absent  Swelling: none   Tenderness: none         Left Foot/Ankle  Left Foot Inspection  Skin Exam: skin normal and skin intactno dry skin, no warmth, no erythema, no maceration, normal color, no pre-ulcer, no ulcer and no callus                         Toe Exam: ROM and strength within normal limits                   Sensory   Vibration: diminished  Proprioception: intact  Monofilament: intact  Vascular  Capillary refills: < 3 seconds  The left DP pulse is 2+  The left PT pulse is 2+  Left Toe  - Comprehensive Exam  Ecchymosis: none  Arch: normal  Hammertoes: absent  Claw toes: absent  Swelling: none   Tenderness: none       Assign Risk Category:  No deformity present; No loss of protective sensation;  No weak pulses       Risk: 0

## 2021-06-23 NOTE — TELEPHONE ENCOUNTER
Call placed to patient and spoke with him  Informed him of the recommendations of the CRNP at this time  Pt is aware and will take medication as directed  Pt is scheduled for a follow up with Dr Shruthi Palacios in 6-7 weeks for medication review and follow up

## 2021-07-09 ENCOUNTER — APPOINTMENT (OUTPATIENT)
Dept: LAB | Facility: MEDICAL CENTER | Age: 71
End: 2021-07-09
Payer: MEDICARE

## 2021-07-09 DIAGNOSIS — E11.9 TYPE 2 DIABETES MELLITUS WITHOUT COMPLICATION, WITHOUT LONG-TERM CURRENT USE OF INSULIN (HCC): ICD-10-CM

## 2021-07-09 DIAGNOSIS — Z13.29 SCREENING FOR THYROID DISORDER: ICD-10-CM

## 2021-07-09 DIAGNOSIS — Z13.6 SCREENING FOR CARDIOVASCULAR CONDITION: ICD-10-CM

## 2021-07-09 DIAGNOSIS — D69.6 THROMBOCYTOPENIA (HCC): ICD-10-CM

## 2021-07-09 LAB
ALBUMIN SERPL BCP-MCNC: 3 G/DL (ref 3.5–5)
ALP SERPL-CCNC: 99 U/L (ref 46–116)
ALT SERPL W P-5'-P-CCNC: 34 U/L (ref 12–78)
ANION GAP SERPL CALCULATED.3IONS-SCNC: 6 MMOL/L (ref 4–13)
AST SERPL W P-5'-P-CCNC: 36 U/L (ref 5–45)
BASOPHILS # BLD AUTO: 0.03 THOUSANDS/ΜL (ref 0–0.1)
BASOPHILS NFR BLD AUTO: 1 % (ref 0–1)
BILIRUB SERPL-MCNC: 0.76 MG/DL (ref 0.2–1)
BUN SERPL-MCNC: 13 MG/DL (ref 5–25)
CALCIUM ALBUM COR SERPL-MCNC: 10.3 MG/DL (ref 8.3–10.1)
CALCIUM SERPL-MCNC: 9.5 MG/DL (ref 8.3–10.1)
CHLORIDE SERPL-SCNC: 103 MMOL/L (ref 100–108)
CHOLEST SERPL-MCNC: 155 MG/DL (ref 50–200)
CO2 SERPL-SCNC: 25 MMOL/L (ref 21–32)
CREAT SERPL-MCNC: 0.78 MG/DL (ref 0.6–1.3)
EOSINOPHIL # BLD AUTO: 0.09 THOUSAND/ΜL (ref 0–0.61)
EOSINOPHIL NFR BLD AUTO: 2 % (ref 0–6)
ERYTHROCYTE [DISTWIDTH] IN BLOOD BY AUTOMATED COUNT: 12.7 % (ref 11.6–15.1)
EST. AVERAGE GLUCOSE BLD GHB EST-MCNC: 171 MG/DL
GFR SERPL CREATININE-BSD FRML MDRD: 90 ML/MIN/1.73SQ M
GLUCOSE P FAST SERPL-MCNC: 147 MG/DL (ref 65–99)
HBA1C MFR BLD: 7.6 %
HCT VFR BLD AUTO: 39.9 % (ref 36.5–49.3)
HDLC SERPL-MCNC: 45 MG/DL
HGB BLD-MCNC: 13.6 G/DL (ref 12–17)
IMM GRANULOCYTES # BLD AUTO: 0.01 THOUSAND/UL (ref 0–0.2)
IMM GRANULOCYTES NFR BLD AUTO: 0 % (ref 0–2)
LDLC SERPL CALC-MCNC: 99 MG/DL (ref 0–100)
LYMPHOCYTES # BLD AUTO: 0.92 THOUSANDS/ΜL (ref 0.6–4.47)
LYMPHOCYTES NFR BLD AUTO: 21 % (ref 14–44)
MCH RBC QN AUTO: 36.8 PG (ref 26.8–34.3)
MCHC RBC AUTO-ENTMCNC: 34.1 G/DL (ref 31.4–37.4)
MCV RBC AUTO: 108 FL (ref 82–98)
MONOCYTES # BLD AUTO: 0.66 THOUSAND/ΜL (ref 0.17–1.22)
MONOCYTES NFR BLD AUTO: 15 % (ref 4–12)
NEUTROPHILS # BLD AUTO: 2.7 THOUSANDS/ΜL (ref 1.85–7.62)
NEUTS SEG NFR BLD AUTO: 61 % (ref 43–75)
NRBC BLD AUTO-RTO: 0 /100 WBCS
PLATELET # BLD AUTO: 95 THOUSANDS/UL (ref 149–390)
PMV BLD AUTO: 12.1 FL (ref 8.9–12.7)
POTASSIUM SERPL-SCNC: 5.1 MMOL/L (ref 3.5–5.3)
PROT SERPL-MCNC: 7.9 G/DL (ref 6.4–8.2)
RBC # BLD AUTO: 3.7 MILLION/UL (ref 3.88–5.62)
SODIUM SERPL-SCNC: 134 MMOL/L (ref 136–145)
TRIGL SERPL-MCNC: 55 MG/DL
TSH SERPL DL<=0.05 MIU/L-ACNC: 3.43 UIU/ML (ref 0.36–3.74)
WBC # BLD AUTO: 4.41 THOUSAND/UL (ref 4.31–10.16)

## 2021-07-09 PROCEDURE — 85025 COMPLETE CBC W/AUTO DIFF WBC: CPT

## 2021-07-09 PROCEDURE — 84443 ASSAY THYROID STIM HORMONE: CPT

## 2021-07-09 PROCEDURE — 80061 LIPID PANEL: CPT

## 2021-07-09 PROCEDURE — 83036 HEMOGLOBIN GLYCOSYLATED A1C: CPT

## 2021-07-09 PROCEDURE — 80053 COMPREHEN METABOLIC PANEL: CPT

## 2021-07-09 PROCEDURE — 36415 COLL VENOUS BLD VENIPUNCTURE: CPT

## 2021-07-26 ENCOUNTER — TELEPHONE (OUTPATIENT)
Dept: OTHER | Facility: OTHER | Age: 71
End: 2021-07-26

## 2021-07-26 NOTE — TELEPHONE ENCOUNTER
Patient states that the medication that Dr Juan Luis Mccarthy gave him about a month ago is not working at all and he needs something else  The medication was solifenacin 10mg  Please call back

## 2021-07-30 ENCOUNTER — HOSPITAL ENCOUNTER (OUTPATIENT)
Dept: RADIOLOGY | Facility: HOSPITAL | Age: 71
Discharge: HOME | End: 2021-07-30
Attending: SURGERY
Payer: MEDICARE

## 2021-07-30 DIAGNOSIS — Z85.07 ENCOUNTER FOR FOLLOW-UP SURVEILLANCE OF PANCREATIC CANCER: ICD-10-CM

## 2021-07-30 DIAGNOSIS — Z85.07 HISTORY OF PANCREATIC CANCER: ICD-10-CM

## 2021-07-30 DIAGNOSIS — Z08 ENCOUNTER FOR FOLLOW-UP SURVEILLANCE OF PANCREATIC CANCER: ICD-10-CM

## 2021-07-30 PROCEDURE — G1004 CDSM NDSC: HCPCS

## 2021-07-30 PROCEDURE — A9585 GADOBUTROL INJECTION: HCPCS | Performed by: SURGERY

## 2021-07-30 PROCEDURE — 74183 MRI ABD W/O CNTR FLWD CNTR: CPT

## 2021-07-30 RX ADMIN — GADOBUTROL 13 ML: 604.72 INJECTION INTRAVENOUS at 17:58

## 2021-08-03 PROBLEM — Z08 ENCOUNTER FOR FOLLOW-UP SURVEILLANCE OF PANCREATIC CANCER: Status: ACTIVE | Noted: 2021-08-03

## 2021-08-03 PROBLEM — Z85.07 ENCOUNTER FOR FOLLOW-UP SURVEILLANCE OF PANCREATIC CANCER: Status: ACTIVE | Noted: 2021-08-03

## 2021-08-06 ENCOUNTER — OFFICE VISIT (OUTPATIENT)
Dept: SURGICAL ONCOLOGY | Facility: CLINIC | Age: 71
End: 2021-08-06
Payer: MEDICARE

## 2021-08-06 VITALS
RESPIRATION RATE: 18 BRPM | SYSTOLIC BLOOD PRESSURE: 140 MMHG | HEART RATE: 91 BPM | DIASTOLIC BLOOD PRESSURE: 82 MMHG | OXYGEN SATURATION: 97 % | BODY MASS INDEX: 41.04 KG/M2 | TEMPERATURE: 98 F | HEIGHT: 72 IN | WEIGHT: 303 LBS

## 2021-08-06 DIAGNOSIS — Z85.07 HISTORY OF PANCREATIC CANCER: ICD-10-CM

## 2021-08-06 DIAGNOSIS — Z85.07 ENCOUNTER FOR FOLLOW-UP SURVEILLANCE OF PANCREATIC CANCER: Primary | ICD-10-CM

## 2021-08-06 DIAGNOSIS — Z08 ENCOUNTER FOR FOLLOW-UP SURVEILLANCE OF PANCREATIC CANCER: Primary | ICD-10-CM

## 2021-08-06 PROCEDURE — 99214 OFFICE O/P EST MOD 30 MIN: CPT | Performed by: SURGERY

## 2021-08-06 NOTE — PROGRESS NOTES
Surgical Oncology Follow Up       [facility]  [facility] [id number]    Dayan Garcia  1950  [id number]  [facility] [place]  [place] [address]    Chief Complaint   Patient presents with    Follow-up       Assessment/Plan:    No problem-specific Assessment & Plan notes found for this encounter  Diagnoses and all orders for this visit:    Encounter for follow-up surveillance of pancreatic cancer    History of pancreatic cancer        Advance Care Planning/Advance Directives:  Discussed disease status, cancer treatment plans and/or cancer treatment goals with the patient  Oncology History   History of pancreatic cancer   3/12/2019 Surgery    Distal pancreatectomy  Several foci of invasive colloid cancer  Grade 1  IPMN with high grade dysplasia at margin  T1b, NO MX Stage IA     4/11/2019 - 6/21/2019 Chemotherapy    Saw Dr Patricia Ricci  Will be starting Folfirinox 4/24   Ended 6/21/2019 4/24/2019 - 7/2/2019 Chemotherapy    fluorouracil (ADRUCIL), 400 mg/m2 = 1,010 mg, Intravenous, Once, 2 of 2 cycles  pegfilgrastim (Edmonia Peon), 6 mg, Subcutaneous, Once, 2 of 2 cycles  Administration: 6 mg (6/21/2019), 6 mg (6/7/2019)  irinotecan (CAMPTOSAR) chemo infusion, 180 mg/m2 = 455 mg, Intravenous, Once, 4 of 4 cycles  Dose modification: 140 mg/m2 (original dose 180 mg/m2, Cycle 3, Reason: Other (Must fill in a comment)), 125 mg/m2 (original dose 180 mg/m2, Cycle 3, Reason: Dose Not Tolerated)  Administration: 316 mg (6/5/2019), 300 mg (6/19/2019)  leucovorin calcium IVPB, 400 mg/m2 = 1,012 mg, Intravenous, Once, 2 of 2 cycles  oxaliplatin (ELOXATIN) chemo infusion, 85 mg/m2 = 215 05 mg, Intravenous, Once, 4 of 4 cycles  Dose modification: 65 mg/m2 (original dose 85 mg/m2, Cycle 3, Reason: Other (Must fill in a comment)), 60 mg/m2 (original dose 85 mg/m2, Cycle 3, Reason: Dose Not Tolerated)  Administration: 151 8 mg (6/5/2019), 151 8 mg (6/19/2019)  fluorouracil (ADRUCIL) ambulatory infusion Soln, 1,200 mg/m2/day = 6,070 mg, Intravenous, Over 46 hours, 4 of 4 cycles         History of Present Illness:   Patient is a 26-year-old man with history of stage I pancreas cancer status post distal pancreatectomy  He is status post chemotherapy completed 2 years ago  He is doing well clinically  He had recent MRI done in anticipation of today's visit   -Interval History:  No jaundice or weight loss  Review of Systems:  Review of Systems   Constitutional: Negative  HENT: Negative  Eyes: Negative  Respiratory: Negative  Cardiovascular: Negative  Gastrointestinal: Negative  Endocrine: Negative  Genitourinary: Negative  Musculoskeletal: Negative  Skin: Negative  Allergic/Immunologic: Negative  Neurological: Negative  Hematological: Negative  Psychiatric/Behavioral: Negative          Patient Active Problem List   Diagnosis    Obstructive sleep apnea syndrome    Hypersomnia    Permanent atrial fibrillation (HCC)    Morbid obesity with BMI of 40 0-44 9, adult ([facility] )    Lower extremity edema    Pancreatic duct dilated    History of pancreatic cancer    Type 2 diabetes mellitus without complication, without long-term current use of insulin (HCC)    Malignant neoplasm of tail of pancreas (HCC)    Thrombocytopenia (HCC)    Urgency incontinence    Balanitis    OAB (overactive bladder)    BPH without obstruction/lower urinary tract symptoms    Encounter for follow-up surveillance of pancreatic cancer     Past Medical History:   Diagnosis Date    A-fib ([facility] )     Abdominal wall strain     last assessed: 10/21/14    Allergic rhinitis     last assessed: 05/03/16    Arthritis     COVID-19 virus infection 3/3/2021    CPAP (continuous positive airway pressure) dependence     Erectile dysfunction of non-organic origin     last assessed: 03/17/14    Lumbar strain     last assessed: 10/21/14    Multiple acquired skin tags     last assessed: 2/18/16    Palpitations     last assessed: 03/17/14    Pancreas cyst     Plantar fasciitis     Skin disorder     last assessed: 05/28/13    Sleep apnea      Past Surgical History:   Procedure Laterality Date    CATARACT EXTRACTION Bilateral     COLONOSCOPY  01/17/2013    COLONOSCOPY  01/08/2018    COLONOSCOPY  04/2021    EGD  06/05/2020    EGD AND COLONOSCOPY  02/06/2014, 2/8/2017    FL GUIDED CENTRAL VENOUS ACCESS DEVICE INSERTION  4/23/2019    FLEXIBLE SIGMOIDOSCOPY  06/11/2019    FOOT SURGERY      Due to plantar fascitis    JOINT REPLACEMENT Left     LTK    LINEAR ENDOSCOPIC U/S      PANCREATECTOMY LAPAROSCOPIC N/A 3/12/2019    Procedure: DISTAL PANCREATECTOMY LAPAROSCOPIC/POSSIBLE OPEN;  Surgeon: Alfred Ferrari MD;  [facility];  Service: Surgical Oncology    PA EDG US EXAM SURGICAL ALTER STOM DUODENUM/JEJUNUM N/A 1/24/2019    Procedure: LINEAR ENDOSCOPIC U/S;  Surgeon: Stacey Acosta MD;  Location: [facility]; Service: Gastroenterology    REPLACEMENT TOTAL KNEE Left     TUNNELED VENOUS PORT PLACEMENT Left 4/23/2019    Procedure: INSERTION VENOUS PORT (PORT-A-CATH);   Surgeon: Alfred Ferrari MD;  [facility];  Service: Surgical Oncology    UMBILICAL HERNIA REPAIR       Family History   Problem Relation Age of Onset    Breast cancer Mother     Cervical cancer Paternal Aunt     Pancreatic cancer Other     Liver cancer Other     No Known Problems Father      Social History     Socioeconomic History    Marital status: /Civil Union     Spouse name: Not on file    Number of children: Not on file    Years of education: Not on file    Highest education level: Not on file   Occupational History    Not on file   Tobacco Use    Smoking status: Never Smoker    Smokeless tobacco: Never Used   Vaping Use    Vaping Use: Never used   Substance and Sexual Activity  Alcohol use: Yes     Alcohol/week: 2 0 standard drinks     Types: 2 Cans of beer per week     Comment: couple times per week;     Drug use: No    Sexual activity: Yes   Other Topics Concern    Not on file   Social History Narrative    Not on file     Social Determinants of Health     Financial Resource Strain:     Difficulty of Paying Living Expenses:    Food Insecurity:     Worried About Running Out of Food in the Last Year:     Ran Out of Food in the Last Year:    Transportation Needs:     Lack of Transportation (Medical):      Lack of Transportation (Non-Medical):    Physical Activity:     Days of Exercise per Week:     Minutes of Exercise per Session:    Stress:     Feeling of Stress :    Social Connections:     Frequency of Communication with Friends and Family:     Frequency of Social Gatherings with Friends and Family:     Attends Rastafari Services:     Active Member of Clubs or Organizations:     Attends Club or Organization Meetings:     Marital Status:    Intimate Partner Violence:     Fear of Current or Ex-Partner:     Emotionally Abused:     Physically Abused:     Sexually Abused:        Current Outpatient Medications:     acetaminophen (TYLENOL) 325 mg tablet, Take 2 tablets (650 mg total) by mouth every 6 (six) hours as needed for mild pain, Disp: 30 tablet, Rfl: 0    ascorbic acid (VITAMIN C) 1000 MG tablet, Take 1 tablet by mouth daily, Disp: , Rfl:     aspirin 325 mg tablet, Take 1 tablet by mouth daily, Disp: , Rfl:     atenolol (TENORMIN) 25 mg tablet, TAKE 1 TABLET BY MOUTH EVERY DAY, Disp: 90 tablet, Rfl: 3    Cholecalciferol (VITAMIN D) 2000 units CAPS, Take by mouth daily, Disp: , Rfl:     Cyanocobalamin (VITAMIN B 12 PO), Take by mouth, Disp: , Rfl:     dicyclomine (BENTYL) 10 mg capsule, TAKE 1 CAPSULE BY MOUTH EVERY DAY, Disp: 90 capsule, Rfl: 1    dicyclomine (BENTYL) 10 mg capsule, Take 1 capsule (10 mg total) by mouth daily, Disp: 90 capsule, Rfl: 2   metFORMIN (GLUCOPHAGE) 1000 MG tablet, Take 1 tablet (1,000 mg total) by mouth 2 (two) times a day with meals, Disp: 180 tablet, Rfl: 1    solifenacin (VESICARE) 10 MG tablet, Take 1 tablet (10 mg total) by mouth daily, Disp: 30 tablet, Rfl: 5    tadalafil (CIALIS) 5 MG tablet, Take 1 tablet (5 mg total) by mouth daily as needed for erectile dysfunction, Disp: 30 tablet, Rfl: 5    metFORMIN (GLUCOPHAGE) 500 mg tablet, TAKE 1 TABLET BY MOUTH TWICE A DAY WITH MEALS (Patient not taking: Reported on 4/16/2021), Disp: 180 tablet, Rfl: 1    Na Sulfate-K Sulfate-Mg Sulf (Suprep Bowel Prep Kit) 17 5-3 13-1 6 GM/177ML SOLN, Follow office instructions (Patient not taking: Reported on 3/3/2021), Disp: 1 Bottle, Rfl: 0    nystatin-triamcinolone (MYCOLOG-II) ointment, Apply topically 2 (two) times a day (Patient not taking: Reported on 1/20/2021), Disp: 30 g, Rfl: 0    sildenafil (VIAGRA) 50 MG tablet, Take 1 tablet (50 mg total) by mouth daily as needed for erectile dysfunction (Patient not taking: Reported on 1/20/2021), Disp: 30 tablet, Rfl: 1  No Known Allergies  Vitals:    08/06/21 0951   BP: 140/82   Pulse: 91   Resp: 18   Temp: 98 °F (36 7 °C)   SpO2: 97%       Physical Exam  Constitutional:       Appearance: Normal appearance  HENT:      Head: Normocephalic and atraumatic  Eyes:      Extraocular Movements: Extraocular movements intact  Pupils: Pupils are equal, round, and reactive to light  Cardiovascular:      Rate and Rhythm: Normal rate and regular rhythm  Pulses: Normal pulses  Heart sounds: Normal heart sounds  Pulmonary:      Effort: Pulmonary effort is normal       Breath sounds: Normal breath sounds  Abdominal:      General: Abdomen is flat  Bowel sounds are normal       Palpations: Abdomen is soft  Musculoskeletal:         General: Normal range of motion  Cervical back: Normal range of motion and neck supple  Skin:     General: Skin is warm and dry     Neurological: General: No focal deficit present  Mental Status: He is alert and oriented to person, place, and time  Psychiatric:         Mood and Affect: Mood normal          Behavior: Behavior normal          Thought Content: Thought content normal            Results:  Labs:   0 Result Notes   Ref Range & Units 12/9/20  9:16 AM   CA 19-9 0 - 35 U/mL 22                Imaging  MRI abdomen w wo contrast and mrcp    Result Date: 8/4/2021  Narrative: MRI OF THE ABDOMEN WITH AND WITHOUT CONTRAST WITH MRCP INDICATION:  Pancreatic malignancy diagnosed in March 2019 at the time of distal pancreatectomy  Pathology revealed several foci of invasive colloid carcinoma and intraductal papillary and mucinous neoplasm with high-grade dysplasia, present at the resection margin  Additionally, chronic pancreatitis was noted  Patient has being treated with chemotherapy  COMPARISON: Multiple prior examinations including most recent abdominal MRI performed February 22, 2021 and most recent CT examination of the chest, abdomen and pelvis performed December 2020  TECHNIQUE:  The following pulse sequences were obtained:  Coronal and axial T2 with TE of 90 and 180 respectively, axial T2 with fat saturation, axial FIESTA fat-sat, axial T1-weighted in-and-out-of phase, axial DWI/ADC, pre-contrast axial T1 with fat saturation, post-contrast dynamic axial T1 with fat saturation at 20, 70, and 180 seconds, followed by coronal and 7 minute delayed axial T1 with fat saturation  3D MRCP images were obtained with radial thick slabs and projections  3D rendering was performed from the acquisition scanner  IV Contrast:  13 mL of Gadobutrol injection (SINGLE-DOSE) FINDINGS: LOWER CHEST:   Unremarkable  LIVER: There is mild hepatomegaly  The borders of the left hepatic lobe are lobulated  There is diffuse dropout of signal on out of phase gradient T1 imaging consistent with steatosis  The hepatic veins and portal veins are patent    No suspicious solid hepatic mass  BILE DUCTS:  No intrahepatic or extrahepatic bile duct dilation  Common bile duct is normal in caliber  No choledocholithiasis, biliary stricture or suspicious mass  GALLBLADDER:  Mild diffuse gallbladder wall thickening  No gallstones  PANCREAS:  Again noted there are surgical changes of distal pancreatectomy  Also again noted are innumerable pancreatic parenchymal cysts in continuity with the main pancreatic duct and enlargement of main pancreatic duct to as much as 9 mm  There is focal narrowing of the pancreatic duct in the pancreatic head similar from prior examination  The cysts vary in size with the largest measuring approximately 12 to 15 mm in greatest dimension in the pancreatic body, not significantly changed from prior examination  No new or enlarging solid pancreatic mass  No pancreatic or peripancreatic collection seen  There are enlarged peripancreatic/periceliac lymph nodes that are not significantly changed from February 2021  A representative paraceliac node on image 50 of series 12 measures 13 mm  A representative portacaval node on image 72 of series 12 measures  19 mm  ADRENAL GLANDS:  Normal  SPLEEN:  Normal  KIDNEYS/PROXIMAL URETERS:  No hydroureteronephrosis  No suspicious renal mass  Tiny cortical cyst noted at the lower pole the left kidney  BOWEL:   No dilated loops of bowel  PERITONEUM/RETROPERITONEUM:  Again noted are unchanged borderline and mildly enlarged nodes in the zohra hepatis, portacaval region, and periceliac nodes, described in greater detail above  An unchanged small 13 mm retroperitoneal cyst or lymphocele on image 33 of series 7 is unchanged from previous examinations as far back as June 2020  No new or enlarging lymphadenopathy  No abdominal or pelvic abscess  LYMPH NODES:  See above description of periportal appendix VASCULAR STRUCTURES:  No aneurysm  ABDOMINAL WALL:  Unremarkable  OSSEOUS STRUCTURES:  No suspicious osseous lesion  Impression: No significant interval change from February 22, 2021  Specifically, surgical changes of prior distal pancreatectomy  Persistent enlargement of the main pancreatic duct with cysts distributed throughout the pancreatic parenchyma  In this patient who was shown to have intraductal papillary and mucinous neoplasm with high-grade dysplasia present at the resection margin at the time of distal pancreatectomy, 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  Continued management as per surgical oncology is recommended  Hepatic parenchymal disease, possibly early cirrhosis, as evidenced by hepatomegaly, steatosis, and lobulated hepatic contours  Borderline and mildly enlarged periportal and periceliac lymph nodes are unchanged from previous examination  Workstation performed: [id number]     I reviewed the above laboratory and imaging data  Discussion/Summary:66 Year old man, history of stage I pancreas cancer, here without evidence of recurrence  He does have 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

## 2021-08-12 ENCOUNTER — DOCUMENTATION (OUTPATIENT)
Dept: HEMATOLOGY ONCOLOGY | Facility: CLINIC | Age: 71
End: 2021-08-12

## 2021-08-12 NOTE — PROGRESS NOTES
RECTAL/GI MULTIDISCIPLINARY CASE REVIEW    DATE: 8/12/21      PRESENTING DOCTOR: Dr Colleen Dawn      DIAGNOSIS: Pancreas Cancer      Susan Conn was presented at the Rectal/GI Multidisciplinary Conference today  PHYSICIAN RECOMMENDED PLAN:    -Dr Colleen Dawn to discuss surgical options with patient  -MRI and follow up appointment in December  -Genetic counselor recommends genetic testing    Team agreed to plan  The final treatment plan will be left to the discretion of the patient and the treating physician  DISCLAIMERS:  TO THE TREATING PHYSICIAN:  This conference is a meeting of clinicians from various specialty areas who evaluate and discuss patients for whom a multidisciplinary treatment approach is being considered  Please note that the above opinion was a consensus of the conference attendees and is intended only to assist in quality care of the discussed patient  The responsibility for follow up on the input given during the conference, along with any final decisions regarding plan of care, is that of the patient and the patient's provider  Accordingly, appointments have only been recommended based on this information and have NOT been scheduled unless otherwise noted  TO THE PATIENT:  This summary is a brief record of major aspects of your cancer treatment  You may choose to share a copy with any of your doctors or nurses  However, this is not a detailed or comprehensive record of your care        NCCN guidelines were readily available for review at this discussion

## 2021-08-13 ENCOUNTER — TELEPHONE (OUTPATIENT)
Dept: SURGICAL ONCOLOGY | Facility: CLINIC | Age: 71
End: 2021-08-13

## 2021-08-13 NOTE — TELEPHONE ENCOUNTER
I discussed recent MRI findings again with Mr  Bridgett Lundborg  He has what appears to be main duct IPMN, and is status post prior distal pancreatectomy for what turned out to be a stage I pancreas cancer  Risks and benefits of continued observation versus surgery were discussed  New data on patient's over 79 with IPMNs discussed as well  He has an MRI scheduled for December of this year  Will continue as planned with that study, and follow-up thereafter  Further management will depend on results of that study  All questions answered

## 2021-08-24 ENCOUNTER — OFFICE VISIT (OUTPATIENT)
Dept: SLEEP CENTER | Facility: CLINIC | Age: 71
End: 2021-08-24
Payer: MEDICARE

## 2021-08-24 VITALS
BODY MASS INDEX: 41.99 KG/M2 | DIASTOLIC BLOOD PRESSURE: 72 MMHG | HEIGHT: 72 IN | SYSTOLIC BLOOD PRESSURE: 136 MMHG | WEIGHT: 310 LBS

## 2021-08-24 DIAGNOSIS — R68.2 DRY MOUTH: ICD-10-CM

## 2021-08-24 DIAGNOSIS — I10 ESSENTIAL HYPERTENSION: ICD-10-CM

## 2021-08-24 DIAGNOSIS — G47.33 OBSTRUCTIVE SLEEP APNEA SYNDROME: Primary | ICD-10-CM

## 2021-08-24 DIAGNOSIS — I48.19 PERSISTENT ATRIAL FIBRILLATION (HCC): ICD-10-CM

## 2021-08-24 PROCEDURE — 99214 OFFICE O/P EST MOD 30 MIN: CPT | Performed by: INTERNAL MEDICINE

## 2021-08-24 NOTE — PROGRESS NOTES
Follow-Up Note - Sleep Center   Vince Fernandez  66 y o  male  OYV:2/86/7357  [facility]:[id number]  DOS:8/24/2021    CC: I saw this patient for follow-up in clinic today for Sleep disordered breathing, Coexisting Sleep and Medical Problems  Results of prior studies:  Home sleep testing in 2017 demonstrated a respiratory event index of 28 per hour  Minimum oxygen saturation was 74% and 4 2% of the study was spent with saturations below 90%  The snore index was 39%  The subsequent therapeutic study demonstrated sleep disordered breathing was adequately remediated with PAP at 13 cm H2O  He was observed during stage REM but not while supine  Sleep efficiency was 71%  PFSH, Problem List, Medications & Allergies were reviewed in EMR  Interval changes: none reported  He  has a past medical history of A-fib ([address] ), Abdominal wall strain, Allergic rhinitis, Arthritis, COVID-19 virus infection (3/3/2021), CPAP (continuous positive airway pressure) dependence, Erectile dysfunction of non-organic origin, Lumbar strain, Multiple acquired skin tags, Palpitations, Pancreas cyst, Plantar fasciitis, Skin disorder, and Sleep apnea  He has a current medication list which includes the following prescription(s): acetaminophen, ascorbic acid, aspirin, atenolol, vitamin d, cyanocobalamin, dicyclomine, dicyclomine, metformin, solifenacin, tadalafil, metformin, suprep bowel prep kit, nystatin-triamcinolone, and sildenafil  PHYSIOLOGICAL DATA REVIEW AND INTERPRETATION:   using PAP > 4 hours/night 100%  Estimated TULIO 1 3/hour with pressure of 13cm H2O @90th percentile; Compliance: excellent; Sleep disordered breathing:stable & within target range; Patient has not been using ozone based or other devices to sanitize the machine  SUBJECTIVE: Regarding use of PAP, Virginia Hamman reports:   · He is experiencing no  adverse effects: ; has noticed black fibres or foreign material in water chamber just once yesterday for the 1st time  · He is benefiting from use: sleeping better and unable to sleep without PAP   Sleep Routine: Rob Hightower reports getting 8-9 hrs sleep  ; he has no difficulty initiating or maintaining sleep   He arises spontaneously and feels refreshed  Rob Hightower denies Excessive Daytime Sleepiness, on nap and rated himself at Total score: 10 /24 on the [employer] Sleepiness Scale  Habits: reports that he has never smoked  He has never used smokeless tobacco ,  reports current alcohol use of about 2 0 standard drinks of alcohol per week  ,  reports no history of drug use , Caffeine use: limited , Exercise routine: sometimes   ROS: as attached  Significant for few lb weight reduction  He reported no nasal, respiratory or cardiac symptoms  Marshall Olvera EXAM: /72   Ht 6' (1 829 m)   Wt (!) 141 kg (310 lb)   BMI 42 04 kg/m²     Patient is well groomed; well appearing  H&N: EOMI; NC/AT:no facial pressure marks, no rashes  Skin/Extrem: col & hydration normal; no edema  Psych: cooperativeand in no distress  Mental state:appears normal   Resp: Respiratory effort is normal  CNS: Alert, orientated, clear & coherent speech  Physical findings otherwise essentially unchanged from previous  IMPRESSION: Problem List Items & Comorbidities Addressed this Visit    1  Obstructive sleep apnea syndrome  PAP DME Resupply/Reorder   2  Dry mouth     3  Persistent atrial fibrillation ([address] )     4  Essential hypertension         PLAN:  I reviewed results of prior studies and physiologic data with the patient  Notified regarding recall of [employer] devices and the recommendation to discontinue use pending resolution of degradation of the foam by replacing it  I discussed treatment options with risks and benefits  Risks of discontinuing PAP vs continuing use while awaiting resolution of the recall were explained and patient indicates understanding      Procedure for registering machine with [employer] provided to patients who have not already done so  Advised to track on Eva Mcintosh  Further updates may be avaialble on web sites of DME provider, [facility] and [facility]  Patient elected to continue using Pap while awaiting remediation  He will attempt to obtain a rental machine (unfortunately none are available from [employer] at present)  Care of equipment, methods to improve comfort using PAP and importance of compliance with therapy were discussed  Instructed not to use ozone based or other cleaning devices unless approved by /FDA  Pressure settin cmH2O  Rx provided to replace  supplies and Care coordinated with DME provider  Discussed strategies for weight reduction  Follow-up is advised in 1 year or sooner if needed to monitor progress, compliance and to adjust therapy  Thank you for allowing me to participate in the care of this patient  Sincerely,    Authenticated electronically by Silver Solano MD on    Board Certified Specialist     Portions of the record may have been created with voice recognition software  Occasional wrong word or "sound a like" substitutions may have occurred due to the inherent limitations of voice recognition software  There may also be notations and random deletions of words or characters from malfunctioning software  Read the chart carefully and recognize, using context, where substitutions/deletions have occurred

## 2021-08-24 NOTE — PROGRESS NOTES
Review of Systems      Genitourinary need to urinate more than twice a night and difficulty with erection   Cardiology ankle/leg swelling   Gastrointestinal none   Neurology none   Constitutional none   Integumentary none   Psychiatry none   Musculoskeletal none   Pulmonary shortness of breath with activity   ENT none   Endocrine excessive thirst and frequent urination   Hematological none

## 2021-08-24 NOTE — PATIENT INSTRUCTIONS
Continuous Positive Airway Pressure (CPAP) therapy was prescribed to you as a medical necessity and there are risks of discontinuing  use of the device, some of which may be long term  Symptoms you experienced before using CPAP may return such as snoring, apneas, excessive daytime sleepiness, hypertension, cardiac arrhythmias, risk of stroke, congestive heart-failure, exacerbation of COPD and potential respiratory failure  Ultimately, it is a personal decision for you to make if you continue use of an affected device or discontinue until a replacement is provided  Unfortunately, [employer] has not yet provided us with information about available devices  You can visit their website at www  [employer]/scr-update to register your device or www  [employer]  expertinquiry  com to learn more about how [place] to replace your device  Another option would be to check with your medical equipment provider to determine if you are eligible for a new machine through your insurance, if not you can pay out of pocket for a new machine  According to [facility], an in line bacterial filter is not recommended for use with CPAP/BiPAP  If you wish to get a replacement machine, we are able to provide you with a script  P;ease include your mask type  Nursing Support:  When: Monday through Friday 7A-5PM except holidays  Where: Our direct line is [phone]  If you are having a true emergency please call 911  In the event that the line is busy or it is after hours please leave a voice message and we will return your call  Please speak clearly, leaving your full name, birth date, best number to reach you and the reason for your call  Medication refills: We will need the name of the medication, the dosage, the ordering provider, whether you get a 30 or 90 day refill, and the pharmacy name and address  Medications will be ordered by the provider only  Nurses cannot call in prescriptions    Please allow 7 days for medication refills  Physician requested updates: If your provider requested that you call with an update after starting medication, please be ready to provide us the medication and dosage, what time you take your medication, the time you attempt to fall asleep, time you fall asleep, when you wake up, and what time you get out of bed  Sleep Study Results: We will contact you with sleep study results and/or next steps after the physician has reviewed your testing

## 2021-08-25 ENCOUNTER — TELEPHONE (OUTPATIENT)
Dept: SLEEP CENTER | Facility: CLINIC | Age: 71
End: 2021-08-25

## 2021-08-27 DIAGNOSIS — E11.9 TYPE 2 DIABETES MELLITUS WITHOUT COMPLICATION, WITHOUT LONG-TERM CURRENT USE OF INSULIN (HCC): ICD-10-CM

## 2021-08-27 NOTE — PROGRESS NOTES
Noted. Thank you!     Future Appointments   Date Time Provider Missael Luz Marina   9/15/2021  3:30 PM TOMY Sow - [facility] Xiomara Chavarria   10/21/2021  4:15 PM [facility]  Pt is aware to return to infusion center for CADD d/c and Neulasta onpro

## 2021-09-02 ENCOUNTER — TELEPHONE (OUTPATIENT)
Dept: OTHER | Facility: OTHER | Age: 71
End: 2021-09-02

## 2021-09-02 NOTE — TELEPHONE ENCOUNTER
Patient called concerning his upcoming urology appointment, would like his urology provider Dr Alka Humphrey know that he had stopped taking Vesicare and Cialis  Patient stated that these medications are not working for him

## 2021-09-08 ENCOUNTER — TELEPHONE (OUTPATIENT)
Dept: UROLOGY | Facility: AMBULATORY SURGERY CENTER | Age: 71
End: 2021-09-08

## 2021-09-10 ENCOUNTER — TELEPHONE (OUTPATIENT)
Dept: UROLOGY | Facility: CLINIC | Age: 71
End: 2021-09-10

## 2021-09-10 NOTE — TELEPHONE ENCOUNTER
Received message from patient concerning his upcoming appointment with Dr Felix Patel at the [facility] office on 9/15/21  Patient reports that the medication he was prescribed is not helping at all and does not want to waste his time or Dr Vivas [place] time if the appointment was only to discuss how the medication is working  Patient can be reached at [phone]

## 2021-09-15 ENCOUNTER — OFFICE VISIT (OUTPATIENT)
Dept: UROLOGY | Facility: CLINIC | Age: 71
End: 2021-09-15
Payer: MEDICARE

## 2021-09-15 VITALS
HEIGHT: 72 IN | BODY MASS INDEX: 41.58 KG/M2 | DIASTOLIC BLOOD PRESSURE: 80 MMHG | HEART RATE: 72 BPM | WEIGHT: 307 LBS | SYSTOLIC BLOOD PRESSURE: 144 MMHG

## 2021-09-15 DIAGNOSIS — N32.81 OAB (OVERACTIVE BLADDER): Primary | ICD-10-CM

## 2021-09-15 PROCEDURE — 99215 OFFICE O/P EST HI 40 MIN: CPT | Performed by: UROLOGY

## 2021-09-15 NOTE — PROGRESS NOTES
Assessment/Plan:    OAB (overactive bladder)  Multiple oral medications have not worked  Botox injections scheduled  Diagnoses and all orders for this visit:    OAB (overactive bladder)          Subjective:      Patient ID: Miguel Whelan is a 66 y o  male  HPI  Overactive bladder:  Cystoscopy on December 15, 2020 indicated the patient's primary problem is overactive bladder rather than obstructive BPH  He was placed on oxybutynin which was extremely expensive and then switched to tolterodine with a Good Rx coupon  He notes urinary frequency and urinary urgency  He denies other significant urinary symptoms  He denies gross hematuria, urinary tract infections or incontinence  He is taking Solifenacin (VESIcare)  for his symptoms  PSA:  [  0   Lab Value Date/Time    PSA 1 2 02/28/2020 0751    PSA 1 2 10/25/2019 0935    PSA 1 0 11/28/2018 1034   ]    AUA SYMPTOM SCORE      Most Recent Value   AUA SYMPTOM SCORE   How often have you had a sensation of not emptying your bladder completely after you finished urinating? 0   How often have you had to urinate again less than two hours after you finished urinating? 4   How often have you found you stopped and started again several times when you urinate? 1   How often have you found it difficult to postpone urination? 5   How often have you had a weak urinary stream?  1   How often have you had to push or strain to begin urination? 0   How many times did you most typically get up to urinate from the time you went to bed at night until the time you got up in the morning?   3   Quality of Life: If you were to spend the rest of your life with your urinary condition just the way it is now, how would you feel about that?  6   AUA SYMPTOM SCORE  14          The following portions of the patient's history were reviewed and updated as appropriate: allergies, current medications, past family history, past medical history, past social history, past surgical history and problem list     Review of Systems    Constitutional: Negative for activity change and fatigue  Respiratory: Negative for shortness of breath and wheezing     Cardiovascular: Negative for chest pain         Hypertension    Gastrointestinal: Negative for abdominal pain         Status post partial pancreatectomy for cancer   No evidence of active disease now    Endocrine:        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    Genitourinary: Negative for difficulty urinating, dysuria, frequency, hematuria and urgency  Musculoskeletal: Negative for back pain and gait problem  Skin: Negative     Allergic/Immunologic: Negative     Neurological: Negative     Psychiatric/Behavioral: Negative      Objective:      /80   Pulse 72   Ht 6' (1 829 m)   Wt (!) 139 kg (307 lb)   BMI 41 64 kg/m²          Physical Exam  Constitutional:       Appearance: He is well-developed  HENT:      Head: Normocephalic and atraumatic  Cardiovascular:      Rate and Rhythm: Normal rate and regular rhythm  Heart sounds: Normal heart sounds  Pulmonary:      Effort: Pulmonary effort is normal       Breath sounds: Normal breath sounds  Abdominal:      Palpations: Abdomen is soft  Tenderness: There is no abdominal tenderness  Genitourinary:     Comments: The prostate is approximately 20 g and benign feeling  Musculoskeletal:         General: Normal range of motion  Cervical back: Normal range of motion and neck supple  Skin:     General: Skin is warm and dry  Neurological:      Mental Status: He is alert and oriented to person, place, and time  Psychiatric:         Behavior: Behavior normal          Thought Content:  Thought content normal          Judgment: Judgment normal

## 2021-09-24 ENCOUNTER — TELEPHONE (OUTPATIENT)
Dept: UROLOGY | Facility: MEDICAL CENTER | Age: 71
End: 2021-09-24

## 2021-09-24 ENCOUNTER — PREP FOR PROCEDURE (OUTPATIENT)
Dept: UROLOGY | Facility: MEDICAL CENTER | Age: 71
End: 2021-09-24

## 2021-09-24 DIAGNOSIS — N32.81 OAB (OVERACTIVE BLADDER): Primary | ICD-10-CM

## 2021-09-24 NOTE — TELEPHONE ENCOUNTER
Rico Carnes 1950  Dr Nabila Flores  11/5/2021  [address] [address] [place] [address] [address],  [address]  [id number]  [facility]

## 2021-09-24 NOTE — TELEPHONE ENCOUNTER
I spoke to the patient and scheduled his Cysto, Botox ordered by Dr Dayanna Davidson for 11/5/2021 at [address] with Dr Norman Murillo     -instructions given verbally and Emailed  -CBC, CMP, Urine C&S and EKG 2 weeks prior  -patient [place] top his Aspirin 7 days prior  -[facility] [facility] - sent ot [facility] 5808

## 2021-09-29 ENCOUNTER — OFFICE VISIT (OUTPATIENT)
Dept: PODIATRY | Facility: CLINIC | Age: 71
End: 2021-09-29

## 2021-09-29 VITALS
BODY MASS INDEX: 41.26 KG/M2 | DIASTOLIC BLOOD PRESSURE: 70 MMHG | SYSTOLIC BLOOD PRESSURE: 132 MMHG | HEIGHT: 72 IN | WEIGHT: 304.6 LBS

## 2021-09-29 DIAGNOSIS — I87.2 PERIPHERAL VENOUS INSUFFICIENCY: Primary | ICD-10-CM

## 2021-09-29 DIAGNOSIS — E11.9 TYPE 2 DIABETES MELLITUS WITHOUT COMPLICATION, WITHOUT LONG-TERM CURRENT USE OF INSULIN (HCC): ICD-10-CM

## 2021-09-29 PROCEDURE — NCFTCARE PR NON-COVERED FOOT CARE: Performed by: PODIATRIST

## 2021-09-30 NOTE — H&P
Assessment/Plan:    OAB (overactive bladder)  Multiple oral medications have not worked  Botox injections scheduled  Diagnoses and all orders for this visit:    OAB (overactive bladder)          Subjective:      Patient ID: Adam Maldonado is a 66 y o  male  HPI  Overactive bladder:  Cystoscopy on December 15, 2020 indicated the patient's primary problem is overactive bladder rather than obstructive BPH  He was placed on oxybutynin which was extremely expensive and then switched to tolterodine with a Good Rx coupon  He notes urinary frequency and urinary urgency  He denies other significant urinary symptoms  He denies gross hematuria, urinary tract infections or incontinence  He is taking Solifenacin (VESIcare)  for his symptoms  PSA:  [  0   Lab Value Date/Time    PSA 1 2 02/28/2020 0751    PSA 1 2 10/25/2019 0935    PSA 1 0 11/28/2018 1034   ]    AUA SYMPTOM SCORE      Most Recent Value   AUA SYMPTOM SCORE   How often have you had a sensation of not emptying your bladder completely after you finished urinating? 0   How often have you had to urinate again less than two hours after you finished urinating? 4   How often have you found you stopped and started again several times when you urinate? 1   How often have you found it difficult to postpone urination? 5   How often have you had a weak urinary stream?  1   How often have you had to push or strain to begin urination? 0   How many times did you most typically get up to urinate from the time you went to bed at night until the time you got up in the morning?   3   Quality of Life: If you were to spend the rest of your life with your urinary condition just the way it is now, how would you feel about that?  6   AUA SYMPTOM SCORE  14          The following portions of the patient's history were reviewed and updated as appropriate: allergies, current medications, past family history, past medical history, past social history, past surgical history and problem list     Review of Systems    Constitutional: Negative for activity change and fatigue  Respiratory: Negative for shortness of breath and wheezing     Cardiovascular: Negative for chest pain         Hypertension    Gastrointestinal: Negative for abdominal pain         Status post partial pancreatectomy for cancer   No evidence of active disease now    Endocrine:        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    Genitourinary: Negative for difficulty urinating, dysuria, frequency, hematuria and urgency  Musculoskeletal: Negative for back pain and gait problem  Skin: Negative     Allergic/Immunologic: Negative     Neurological: Negative     Psychiatric/Behavioral: Negative      Objective:      /80   Pulse 72   Ht 6' (1 829 m)   Wt (!) 139 kg (307 lb)   BMI 41 64 kg/m²          Physical Exam  Constitutional:       Appearance: He is well-developed  HENT:      Head: Normocephalic and atraumatic  Cardiovascular:      Rate and Rhythm: Normal rate and regular rhythm  Heart sounds: Normal heart sounds  Pulmonary:      Effort: Pulmonary effort is normal       Breath sounds: Normal breath sounds  Abdominal:      Palpations: Abdomen is soft  Tenderness: There is no abdominal tenderness  Genitourinary:     Comments: The prostate is approximately *** g, firm, smooth, non-tender  Musculoskeletal:         General: Normal range of motion  Cervical back: Normal range of motion and neck supple  Skin:     General: Skin is warm and dry  Neurological:      Mental Status: He is alert and oriented to person, place, and time  Psychiatric:         Behavior: Behavior normal          Thought Content:  Thought content normal          Judgment: Judgment normal

## 2021-10-11 ENCOUNTER — APPOINTMENT (OUTPATIENT)
Dept: LAB | Facility: MEDICAL CENTER | Age: 71
End: 2021-10-11
Attending: UROLOGY
Payer: MEDICARE

## 2021-10-11 DIAGNOSIS — N32.81 OAB (OVERACTIVE BLADDER): ICD-10-CM

## 2021-10-11 DIAGNOSIS — Z01.812 PRE-OPERATIVE LABORATORY EXAMINATION: ICD-10-CM

## 2021-10-11 DIAGNOSIS — R39.89 SUSPECTED UTI: ICD-10-CM

## 2021-10-11 LAB
ALBUMIN SERPL BCP-MCNC: 3 G/DL (ref 3.5–5)
ALP SERPL-CCNC: 132 U/L (ref 46–116)
ALT SERPL W P-5'-P-CCNC: 43 U/L (ref 12–78)
ANION GAP SERPL CALCULATED.3IONS-SCNC: 5 MMOL/L (ref 4–13)
AST SERPL W P-5'-P-CCNC: 42 U/L (ref 5–45)
BASOPHILS # BLD AUTO: 0.01 THOUSANDS/ΜL (ref 0–0.1)
BASOPHILS NFR BLD AUTO: 0 % (ref 0–1)
BILIRUB SERPL-MCNC: 0.84 MG/DL (ref 0.2–1)
BUN SERPL-MCNC: 13 MG/DL (ref 5–25)
CALCIUM ALBUM COR SERPL-MCNC: 10.7 MG/DL (ref 8.3–10.1)
CALCIUM SERPL-MCNC: 9.9 MG/DL (ref 8.3–10.1)
CHLORIDE SERPL-SCNC: 105 MMOL/L (ref 100–108)
CO2 SERPL-SCNC: 26 MMOL/L (ref 21–32)
CREAT SERPL-MCNC: 0.87 MG/DL (ref 0.6–1.3)
EOSINOPHIL # BLD AUTO: 0.07 THOUSAND/ΜL (ref 0–0.61)
EOSINOPHIL NFR BLD AUTO: 2 % (ref 0–6)
ERYTHROCYTE [DISTWIDTH] IN BLOOD BY AUTOMATED COUNT: 13.1 % (ref 11.6–15.1)
GFR SERPL CREATININE-BSD FRML MDRD: 86 ML/MIN/1.73SQ M
GLUCOSE P FAST SERPL-MCNC: 164 MG/DL (ref 65–99)
HCT VFR BLD AUTO: 40.9 % (ref 36.5–49.3)
HGB BLD-MCNC: 13.8 G/DL (ref 12–17)
IMM GRANULOCYTES # BLD AUTO: 0.01 THOUSAND/UL (ref 0–0.2)
IMM GRANULOCYTES NFR BLD AUTO: 0 % (ref 0–2)
LYMPHOCYTES # BLD AUTO: 0.88 THOUSANDS/ΜL (ref 0.6–4.47)
LYMPHOCYTES NFR BLD AUTO: 20 % (ref 14–44)
MCH RBC QN AUTO: 36.4 PG (ref 26.8–34.3)
MCHC RBC AUTO-ENTMCNC: 33.7 G/DL (ref 31.4–37.4)
MCV RBC AUTO: 108 FL (ref 82–98)
MONOCYTES # BLD AUTO: 0.62 THOUSAND/ΜL (ref 0.17–1.22)
MONOCYTES NFR BLD AUTO: 14 % (ref 4–12)
NEUTROPHILS # BLD AUTO: 2.89 THOUSANDS/ΜL (ref 1.85–7.62)
NEUTS SEG NFR BLD AUTO: 64 % (ref 43–75)
NRBC BLD AUTO-RTO: 0 /100 WBCS
PLATELET # BLD AUTO: 91 THOUSANDS/UL (ref 149–390)
PMV BLD AUTO: 12.8 FL (ref 8.9–12.7)
POTASSIUM SERPL-SCNC: 4.7 MMOL/L (ref 3.5–5.3)
PROT SERPL-MCNC: 8.1 G/DL (ref 6.4–8.2)
RBC # BLD AUTO: 3.79 MILLION/UL (ref 3.88–5.62)
SODIUM SERPL-SCNC: 136 MMOL/L (ref 136–145)
WBC # BLD AUTO: 4.48 THOUSAND/UL (ref 4.31–10.16)

## 2021-10-11 PROCEDURE — 80053 COMPREHEN METABOLIC PANEL: CPT

## 2021-10-11 PROCEDURE — 87086 URINE CULTURE/COLONY COUNT: CPT

## 2021-10-11 PROCEDURE — 36415 COLL VENOUS BLD VENIPUNCTURE: CPT

## 2021-10-11 PROCEDURE — 85025 COMPLETE CBC W/AUTO DIFF WBC: CPT

## 2021-10-12 ENCOUNTER — OFFICE VISIT (OUTPATIENT)
Dept: CARDIOLOGY CLINIC | Facility: CLINIC | Age: 71
End: 2021-10-12
Payer: MEDICARE

## 2021-10-12 VITALS
WEIGHT: 306 LBS | SYSTOLIC BLOOD PRESSURE: 136 MMHG | BODY MASS INDEX: 41.45 KG/M2 | DIASTOLIC BLOOD PRESSURE: 80 MMHG | HEIGHT: 72 IN | HEART RATE: 77 BPM

## 2021-10-12 DIAGNOSIS — R60.0 LOWER EXTREMITY EDEMA: ICD-10-CM

## 2021-10-12 DIAGNOSIS — N52.8 OTHER MALE ERECTILE DYSFUNCTION: ICD-10-CM

## 2021-10-12 DIAGNOSIS — N52.9 ERECTILE DYSFUNCTION, UNSPECIFIED ERECTILE DYSFUNCTION TYPE: ICD-10-CM

## 2021-10-12 DIAGNOSIS — I48.21 PERMANENT ATRIAL FIBRILLATION (HCC): Primary | ICD-10-CM

## 2021-10-12 LAB — BACTERIA UR CULT: NORMAL

## 2021-10-12 PROCEDURE — 93000 ELECTROCARDIOGRAM COMPLETE: CPT | Performed by: INTERNAL MEDICINE

## 2021-10-12 PROCEDURE — 99213 OFFICE O/P EST LOW 20 MIN: CPT | Performed by: INTERNAL MEDICINE

## 2021-10-12 RX ORDER — TADALAFIL 20 MG/1
20 TABLET ORAL DAILY PRN
Qty: 10 TABLET | Refills: 5 | Status: SHIPPED | OUTPATIENT
Start: 2021-10-12 | End: 2022-01-14

## 2021-10-12 RX ORDER — TADALAFIL 20 MG/1
20 TABLET ORAL DAILY PRN
Qty: 10 TABLET | Refills: 5 | Status: SHIPPED | OUTPATIENT
Start: 2021-10-12 | End: 2021-10-12 | Stop reason: SDUPTHER

## 2021-10-15 NOTE — TELEPHONE ENCOUNTER
Rescheduled due to OR changes: I spoke to the patient and rescheduled to 11/12/21 at [facility]

## 2021-10-25 ENCOUNTER — OFFICE VISIT (OUTPATIENT)
Dept: FAMILY MEDICINE CLINIC | Facility: CLINIC | Age: 71
End: 2021-10-25
Payer: MEDICARE

## 2021-10-25 VITALS
SYSTOLIC BLOOD PRESSURE: 124 MMHG | BODY MASS INDEX: 41.66 KG/M2 | WEIGHT: 307.6 LBS | TEMPERATURE: 96.5 F | HEART RATE: 60 BPM | OXYGEN SATURATION: 97 % | DIASTOLIC BLOOD PRESSURE: 78 MMHG | HEIGHT: 72 IN

## 2021-10-25 DIAGNOSIS — Z23 NEED FOR VACCINATION: ICD-10-CM

## 2021-10-25 DIAGNOSIS — N40.0 BPH WITHOUT OBSTRUCTION/LOWER URINARY TRACT SYMPTOMS: ICD-10-CM

## 2021-10-25 DIAGNOSIS — E11.9 TYPE 2 DIABETES MELLITUS WITHOUT COMPLICATION, WITHOUT LONG-TERM CURRENT USE OF INSULIN (HCC): ICD-10-CM

## 2021-10-25 DIAGNOSIS — Z00.00 MEDICARE ANNUAL WELLNESS VISIT, SUBSEQUENT: Primary | ICD-10-CM

## 2021-10-25 DIAGNOSIS — N32.81 OAB (OVERACTIVE BLADDER): ICD-10-CM

## 2021-10-25 DIAGNOSIS — I48.21 PERMANENT ATRIAL FIBRILLATION (HCC): ICD-10-CM

## 2021-10-25 DIAGNOSIS — E78.2 MIXED HYPERLIPIDEMIA: ICD-10-CM

## 2021-10-25 DIAGNOSIS — D69.6 THROMBOCYTOPENIA (HCC): ICD-10-CM

## 2021-10-25 DIAGNOSIS — C25.2 MALIGNANT NEOPLASM OF TAIL OF PANCREAS (HCC): ICD-10-CM

## 2021-10-25 PROCEDURE — 99214 OFFICE O/P EST MOD 30 MIN: CPT | Performed by: FAMILY MEDICINE

## 2021-10-25 PROCEDURE — 90662 IIV NO PRSV INCREASED AG IM: CPT | Performed by: FAMILY MEDICINE

## 2021-10-25 PROCEDURE — G0439 PPPS, SUBSEQ VISIT: HCPCS | Performed by: FAMILY MEDICINE

## 2021-10-25 PROCEDURE — G0008 ADMIN INFLUENZA VIRUS VAC: HCPCS | Performed by: FAMILY MEDICINE

## 2021-10-25 PROCEDURE — 1123F ACP DISCUSS/DSCN MKR DOCD: CPT | Performed by: FAMILY MEDICINE

## 2021-11-04 ENCOUNTER — ANESTHESIA EVENT (OUTPATIENT)
Dept: PERIOP | Facility: HOSPITAL | Age: 71
End: 2021-11-04
Payer: MEDICARE

## 2021-11-08 ENCOUNTER — TELEPHONE (OUTPATIENT)
Dept: CARDIOLOGY CLINIC | Facility: CLINIC | Age: 71
End: 2021-11-08

## 2021-11-12 ENCOUNTER — ANESTHESIA (OUTPATIENT)
Dept: PERIOP | Facility: HOSPITAL | Age: 71
End: 2021-11-12
Payer: MEDICARE

## 2021-11-12 ENCOUNTER — HOSPITAL ENCOUNTER (OUTPATIENT)
Facility: HOSPITAL | Age: 71
Setting detail: OUTPATIENT SURGERY
Discharge: HOME | End: 2021-11-12
Attending: UROLOGY | Admitting: UROLOGY
Payer: MEDICARE

## 2021-11-12 VITALS
RESPIRATION RATE: 18 BRPM | HEIGHT: 72 IN | HEART RATE: 65 BPM | BODY MASS INDEX: 40.63 KG/M2 | WEIGHT: 300 LBS | TEMPERATURE: 97.2 F | SYSTOLIC BLOOD PRESSURE: 136 MMHG | DIASTOLIC BLOOD PRESSURE: 73 MMHG | OXYGEN SATURATION: 95 %

## 2021-11-12 LAB
EOSINOPHIL # BLD AUTO: 0.1 THOUSAND/UL (ref 0–0.4)
EOSINOPHIL NFR BLD MANUAL: 2 % (ref 0–6)
ERYTHROCYTE [DISTWIDTH] IN BLOOD BY AUTOMATED COUNT: 13.9 %
GLUCOSE SERPL-MCNC: 172 MG/DL (ref 65–140)
GLUCOSE SERPL-MCNC: 185 MG/DL (ref 65–140)
HCT VFR BLD AUTO: 40.1 % (ref 41–53)
HGB BLD-MCNC: 13.9 G/DL (ref 13.5–17.5)
LYMPHOCYTES # BLD AUTO: 0.59 THOUSAND/UL (ref 0.5–4)
LYMPHOCYTES # BLD AUTO: 12 % (ref 25–45)
MACROCYTES BLD QL AUTO: PRESENT
MCH RBC QN AUTO: 37.1 PG (ref 26–34)
MCHC RBC AUTO-ENTMCNC: 34.5 G/DL (ref 31–36)
MCV RBC AUTO: 107 FL (ref 80–100)
MONOCYTES # BLD AUTO: 0.25 THOUSAND/UL (ref 0.2–0.9)
MONOCYTES NFR BLD AUTO: 5 % (ref 1–10)
NEUTS SEG # BLD: 3.97 THOUSAND/UL (ref 1.8–7.8)
NEUTS SEG NFR BLD AUTO: 81 %
PLATELET # BLD AUTO: 118 THOUSANDS/UL (ref 150–450)
PLATELET BLD QL SMEAR: ABNORMAL
PMV BLD AUTO: 10.7 FL (ref 8.9–12.7)
RBC # BLD AUTO: 3.74 MILLION/UL (ref 4.5–5.9)
RBC MORPH BLD: ABNORMAL
TOTAL CELLS COUNTED SPEC: 100
WBC # BLD AUTO: 4.9 THOUSAND/UL (ref 4.5–11)

## 2021-11-12 PROCEDURE — 85007 BL SMEAR W/DIFF WBC COUNT: CPT | Performed by: NURSE PRACTITIONER

## 2021-11-12 PROCEDURE — 82948 REAGENT STRIP/BLOOD GLUCOSE: CPT

## 2021-11-12 PROCEDURE — NC001 PR NO CHARGE: Performed by: UROLOGY

## 2021-11-12 PROCEDURE — 85027 COMPLETE CBC AUTOMATED: CPT | Performed by: NURSE PRACTITIONER

## 2021-11-12 PROCEDURE — 52287 CYSTOSCOPY CHEMODENERVATION: CPT | Performed by: UROLOGY

## 2021-11-12 RX ORDER — PROPOFOL 10 MG/ML
INJECTION, EMULSION INTRAVENOUS AS NEEDED
Status: DISCONTINUED | OUTPATIENT
Start: 2021-11-12 | End: 2021-11-12

## 2021-11-12 RX ORDER — FENTANYL CITRATE 50 UG/ML
INJECTION, SOLUTION INTRAMUSCULAR; INTRAVENOUS AS NEEDED
Status: DISCONTINUED | OUTPATIENT
Start: 2021-11-12 | End: 2021-11-12

## 2021-11-12 RX ORDER — ONDANSETRON 2 MG/ML
4 INJECTION INTRAMUSCULAR; INTRAVENOUS ONCE AS NEEDED
Status: DISCONTINUED | OUTPATIENT
Start: 2021-11-12 | End: 2021-11-12 | Stop reason: HOSPADM

## 2021-11-12 RX ORDER — ACETAMINOPHEN 325 MG/1
650 TABLET ORAL EVERY 6 HOURS PRN
Status: CANCELLED | OUTPATIENT
Start: 2021-11-12

## 2021-11-12 RX ORDER — FENTANYL CITRATE/PF 50 MCG/ML
25 SYRINGE (ML) INJECTION
Status: DISCONTINUED | OUTPATIENT
Start: 2021-11-12 | End: 2021-11-12 | Stop reason: HOSPADM

## 2021-11-12 RX ORDER — LIDOCAINE HYDROCHLORIDE 10 MG/ML
INJECTION, SOLUTION EPIDURAL; INFILTRATION; INTRACAUDAL; PERINEURAL AS NEEDED
Status: DISCONTINUED | OUTPATIENT
Start: 2021-11-12 | End: 2021-11-12

## 2021-11-12 RX ORDER — ONDANSETRON 2 MG/ML
INJECTION INTRAMUSCULAR; INTRAVENOUS AS NEEDED
Status: DISCONTINUED | OUTPATIENT
Start: 2021-11-12 | End: 2021-11-12

## 2021-11-12 RX ORDER — SODIUM CHLORIDE 9 MG/ML
INJECTION, SOLUTION INTRAVENOUS AS NEEDED
Status: DISCONTINUED | OUTPATIENT
Start: 2021-11-12 | End: 2021-11-12 | Stop reason: HOSPADM

## 2021-11-12 RX ORDER — SODIUM CHLORIDE, SODIUM LACTATE, POTASSIUM CHLORIDE, CALCIUM CHLORIDE 600; 310; 30; 20 MG/100ML; MG/100ML; MG/100ML; MG/100ML
INJECTION, SOLUTION INTRAVENOUS CONTINUOUS PRN
Status: DISCONTINUED | OUTPATIENT
Start: 2021-11-12 | End: 2021-11-12

## 2021-11-12 RX ADMIN — SODIUM CHLORIDE, SODIUM LACTATE, POTASSIUM CHLORIDE, AND CALCIUM CHLORIDE: .6; .31; .03; .02 INJECTION, SOLUTION INTRAVENOUS at 11:55

## 2021-11-12 RX ADMIN — FENTANYL CITRATE 100 MCG: 50 INJECTION, SOLUTION INTRAMUSCULAR; INTRAVENOUS at 12:02

## 2021-11-12 RX ADMIN — CEFAZOLIN 3000 MG: 1 INJECTION, POWDER, FOR SOLUTION INTRAMUSCULAR; INTRAVENOUS; PARENTERAL at 11:58

## 2021-11-12 RX ADMIN — LIDOCAINE HYDROCHLORIDE 50 MG: 10 INJECTION, SOLUTION EPIDURAL; INFILTRATION; INTRACAUDAL; PERINEURAL at 12:03

## 2021-11-12 RX ADMIN — ONDANSETRON 4 MG: 2 INJECTION INTRAMUSCULAR; INTRAVENOUS at 12:08

## 2021-11-12 RX ADMIN — PROPOFOL 200 MG: 10 INJECTION, EMULSION INTRAVENOUS at 12:05

## 2021-11-12 RX ADMIN — SODIUM CHLORIDE, SODIUM LACTATE, POTASSIUM CHLORIDE, AND CALCIUM CHLORIDE: .6; .31; .03; .02 INJECTION, SOLUTION INTRAVENOUS at 12:29

## 2021-11-29 ENCOUNTER — APPOINTMENT (OUTPATIENT)
Dept: LAB | Facility: MEDICAL CENTER | Age: 71
End: 2021-11-29
Payer: MEDICARE

## 2021-11-29 ENCOUNTER — TELEPHONE (OUTPATIENT)
Dept: SLEEP CENTER | Facility: CLINIC | Age: 71
End: 2021-11-29

## 2021-11-29 DIAGNOSIS — Z08 ENCOUNTER FOR FOLLOW-UP SURVEILLANCE OF PANCREATIC CANCER: ICD-10-CM

## 2021-11-29 DIAGNOSIS — Z85.07 ENCOUNTER FOR FOLLOW-UP SURVEILLANCE OF PANCREATIC CANCER: ICD-10-CM

## 2021-11-29 LAB
ANION GAP SERPL CALCULATED.3IONS-SCNC: 4 MMOL/L (ref 4–13)
BUN SERPL-MCNC: 12 MG/DL (ref 5–25)
CALCIUM SERPL-MCNC: 10.5 MG/DL (ref 8.3–10.1)
CHLORIDE SERPL-SCNC: 104 MMOL/L (ref 100–108)
CO2 SERPL-SCNC: 27 MMOL/L (ref 21–32)
CREAT SERPL-MCNC: 0.77 MG/DL (ref 0.6–1.3)
GFR SERPL CREATININE-BSD FRML MDRD: 91 ML/MIN/1.73SQ M
GLUCOSE P FAST SERPL-MCNC: 144 MG/DL (ref 65–99)
POTASSIUM SERPL-SCNC: 5 MMOL/L (ref 3.5–5.3)
SODIUM SERPL-SCNC: 135 MMOL/L (ref 136–145)

## 2021-11-29 PROCEDURE — 80048 BASIC METABOLIC PNL TOTAL CA: CPT

## 2021-11-29 PROCEDURE — 86301 IMMUNOASSAY TUMOR CA 19-9: CPT

## 2021-11-29 PROCEDURE — 36415 COLL VENOUS BLD VENIPUNCTURE: CPT

## 2021-11-30 LAB — CANCER AG19-9 SERPL-ACNC: 33 U/ML (ref 0–35)

## 2021-12-06 ENCOUNTER — HOSPITAL ENCOUNTER (OUTPATIENT)
Dept: RADIOLOGY | Facility: HOSPITAL | Age: 71
Discharge: HOME | End: 2021-12-06
Attending: SURGERY

## 2021-12-06 DIAGNOSIS — Z85.07 ENCOUNTER FOR FOLLOW-UP SURVEILLANCE OF PANCREATIC CANCER: ICD-10-CM

## 2021-12-06 DIAGNOSIS — Z08 ENCOUNTER FOR FOLLOW-UP SURVEILLANCE OF PANCREATIC CANCER: ICD-10-CM

## 2021-12-08 ENCOUNTER — OFFICE VISIT (OUTPATIENT)
Dept: UROLOGY | Facility: MEDICAL CENTER | Age: 71
End: 2021-12-08
Payer: MEDICARE

## 2021-12-08 VITALS
WEIGHT: 300 LBS | HEART RATE: 82 BPM | HEIGHT: 72 IN | DIASTOLIC BLOOD PRESSURE: 80 MMHG | SYSTOLIC BLOOD PRESSURE: 130 MMHG | BODY MASS INDEX: 40.63 KG/M2

## 2021-12-08 DIAGNOSIS — N32.81 OAB (OVERACTIVE BLADDER): Primary | ICD-10-CM

## 2021-12-08 LAB — POST-VOID RESIDUAL VOLUME, ML POC: 249 ML

## 2021-12-08 PROCEDURE — 99213 OFFICE O/P EST LOW 20 MIN: CPT | Performed by: PHYSICIAN ASSISTANT

## 2021-12-08 PROCEDURE — 51798 US URINE CAPACITY MEASURE: CPT | Performed by: PHYSICIAN ASSISTANT

## 2021-12-08 RX ORDER — TAMSULOSIN HYDROCHLORIDE 0.4 MG/1
0.4 CAPSULE ORAL
Qty: 30 CAPSULE | Refills: 0 | Status: ON HOLD | OUTPATIENT
Start: 2021-12-08 | End: 2022-02-15 | Stop reason: ALTCHOICE

## 2021-12-10 ENCOUNTER — HOSPITAL ENCOUNTER (OUTPATIENT)
Dept: RADIOLOGY | Facility: HOSPITAL | Age: 71
Discharge: HOME | End: 2021-12-10
Attending: SURGERY
Payer: MEDICARE

## 2021-12-10 PROCEDURE — 74183 MRI ABD W/O CNTR FLWD CNTR: CPT

## 2021-12-10 PROCEDURE — G1004 CDSM NDSC: HCPCS

## 2021-12-10 PROCEDURE — A9585 GADOBUTROL INJECTION: HCPCS | Performed by: SURGERY

## 2021-12-10 RX ADMIN — GADOBUTROL 12 ML: 604.72 INJECTION INTRAVENOUS at 13:45

## 2021-12-15 ENCOUNTER — OFFICE VISIT (OUTPATIENT)
Dept: SURGICAL ONCOLOGY | Facility: CLINIC | Age: 71
End: 2021-12-15
Payer: MEDICARE

## 2021-12-15 VITALS
DIASTOLIC BLOOD PRESSURE: 80 MMHG | WEIGHT: 309 LBS | RESPIRATION RATE: 16 BRPM | HEART RATE: 76 BPM | SYSTOLIC BLOOD PRESSURE: 132 MMHG | HEIGHT: 72 IN | TEMPERATURE: 99 F | BODY MASS INDEX: 41.85 KG/M2 | OXYGEN SATURATION: 97 %

## 2021-12-15 DIAGNOSIS — Z85.07 HISTORY OF PANCREATIC CANCER: ICD-10-CM

## 2021-12-15 DIAGNOSIS — Z85.07 ENCOUNTER FOR FOLLOW-UP SURVEILLANCE OF PANCREATIC CANCER: Primary | ICD-10-CM

## 2021-12-15 DIAGNOSIS — Z08 ENCOUNTER FOR FOLLOW-UP SURVEILLANCE OF PANCREATIC CANCER: Primary | ICD-10-CM

## 2021-12-15 PROCEDURE — 99213 OFFICE O/P EST LOW 20 MIN: CPT | Performed by: SURGERY

## 2021-12-21 ENCOUNTER — PREP FOR PROCEDURE (OUTPATIENT)
Dept: GASTROENTEROLOGY | Facility: CLINIC | Age: 71
End: 2021-12-21

## 2021-12-21 ENCOUNTER — TELEPHONE (OUTPATIENT)
Dept: GASTROENTEROLOGY | Facility: CLINIC | Age: 71
End: 2021-12-21

## 2021-12-21 DIAGNOSIS — C25.9 MALIGNANT NEOPLASM OF PANCREAS, UNSPECIFIED LOCATION OF MALIGNANCY (HCC): Primary | ICD-10-CM

## 2021-12-30 ENCOUNTER — TELEPHONE (OUTPATIENT)
Dept: GASTROENTEROLOGY | Facility: HOSPITAL | Age: 71
End: 2021-12-30

## 2021-12-30 ENCOUNTER — DOCUMENTATION (OUTPATIENT)
Dept: HEMATOLOGY ONCOLOGY | Facility: CLINIC | Age: 71
End: 2021-12-30

## 2022-01-01 ENCOUNTER — APPOINTMENT (INPATIENT)
Dept: RADIOLOGY | Facility: HOSPITAL | Age: 72
DRG: 405 | End: 2022-01-01
Payer: MEDICARE

## 2022-01-01 ENCOUNTER — TELEPHONE (OUTPATIENT)
Dept: ENDOCRINOLOGY | Facility: CLINIC | Age: 72
End: 2022-01-01

## 2022-01-01 ENCOUNTER — TELEPHONE (OUTPATIENT)
Dept: SURGICAL ONCOLOGY | Facility: CLINIC | Age: 72
End: 2022-01-01

## 2022-01-01 ENCOUNTER — APPOINTMENT (INPATIENT)
Dept: NON INVASIVE DIAGNOSTICS | Facility: HOSPITAL | Age: 72
DRG: 405 | End: 2022-01-01
Payer: MEDICARE

## 2022-01-01 ENCOUNTER — PREP FOR PROCEDURE (OUTPATIENT)
Dept: SURGICAL ONCOLOGY | Facility: CLINIC | Age: 72
End: 2022-01-01

## 2022-01-01 ENCOUNTER — TELEPHONE (OUTPATIENT)
Dept: HEMATOLOGY ONCOLOGY | Facility: CLINIC | Age: 72
End: 2022-01-01

## 2022-01-01 ENCOUNTER — OFFICE VISIT (OUTPATIENT)
Dept: SURGICAL ONCOLOGY | Facility: CLINIC | Age: 72
End: 2022-01-01

## 2022-01-01 ENCOUNTER — OFFICE VISIT (OUTPATIENT)
Dept: PODIATRY | Facility: CLINIC | Age: 72
End: 2022-01-01
Payer: MEDICARE

## 2022-01-01 ENCOUNTER — HOSPITAL ENCOUNTER (INPATIENT)
Facility: HOSPITAL | Age: 72
LOS: 20 days | DRG: 405 | End: 2022-08-15
Attending: SURGERY | Admitting: SURGERY
Payer: MEDICARE

## 2022-01-01 ENCOUNTER — PATIENT OUTREACH (OUTPATIENT)
Dept: HEMATOLOGY ONCOLOGY | Facility: CLINIC | Age: 72
End: 2022-01-01

## 2022-01-01 ENCOUNTER — ANESTHESIA (OUTPATIENT)
Dept: PERIOP | Facility: HOSPITAL | Age: 72
DRG: 405 | End: 2022-01-01
Payer: MEDICARE

## 2022-01-01 ENCOUNTER — APPOINTMENT (OUTPATIENT)
Dept: LAB | Facility: HOSPITAL | Age: 72
End: 2022-01-01
Attending: SURGERY
Payer: MEDICARE

## 2022-01-01 ENCOUNTER — APPOINTMENT (OUTPATIENT)
Dept: RADIOLOGY | Facility: MEDICAL CENTER | Age: 72
End: 2022-01-01
Payer: MEDICARE

## 2022-01-01 ENCOUNTER — APPOINTMENT (OUTPATIENT)
Dept: LAB | Facility: CLINIC | Age: 72
End: 2022-01-01
Payer: COMMERCIAL

## 2022-01-01 ENCOUNTER — OFFICE VISIT (OUTPATIENT)
Dept: ENDOCRINOLOGY | Facility: CLINIC | Age: 72
End: 2022-01-01
Payer: MEDICARE

## 2022-01-01 ENCOUNTER — TELEPHONE (OUTPATIENT)
Dept: OTHER | Facility: OTHER | Age: 72
End: 2022-01-01

## 2022-01-01 ENCOUNTER — APPOINTMENT (OUTPATIENT)
Dept: LAB | Facility: MEDICAL CENTER | Age: 72
End: 2022-01-01
Payer: MEDICARE

## 2022-01-01 ENCOUNTER — CONSULT (OUTPATIENT)
Dept: ANESTHESIOLOGY | Facility: CLINIC | Age: 72
End: 2022-01-01
Payer: MEDICARE

## 2022-01-01 ENCOUNTER — TELEPHONE (OUTPATIENT)
Dept: FAMILY MEDICINE CLINIC | Facility: CLINIC | Age: 72
End: 2022-01-01

## 2022-01-01 ENCOUNTER — ANESTHESIA EVENT (OUTPATIENT)
Dept: PERIOP | Facility: HOSPITAL | Age: 72
DRG: 405 | End: 2022-01-01
Payer: MEDICARE

## 2022-01-01 ENCOUNTER — APPOINTMENT (INPATIENT)
Dept: GASTROENTEROLOGY | Facility: HOSPITAL | Age: 72
DRG: 405 | End: 2022-01-01
Payer: MEDICARE

## 2022-01-01 VITALS
OXYGEN SATURATION: 97 % | RESPIRATION RATE: 14 BRPM | DIASTOLIC BLOOD PRESSURE: 73 MMHG | WEIGHT: 293.8 LBS | TEMPERATURE: 96 F | BODY MASS INDEX: 41.74 KG/M2 | HEART RATE: 78 BPM | SYSTOLIC BLOOD PRESSURE: 137 MMHG

## 2022-01-01 VITALS
HEIGHT: 70 IN | BODY MASS INDEX: 40.97 KG/M2 | WEIGHT: 286.2 LBS | DIASTOLIC BLOOD PRESSURE: 80 MMHG | HEART RATE: 76 BPM | SYSTOLIC BLOOD PRESSURE: 126 MMHG

## 2022-01-01 VITALS
HEIGHT: 70 IN | RESPIRATION RATE: 16 BRPM | BODY MASS INDEX: 40.37 KG/M2 | HEART RATE: 73 BPM | OXYGEN SATURATION: 92 % | SYSTOLIC BLOOD PRESSURE: 126 MMHG | DIASTOLIC BLOOD PRESSURE: 86 MMHG | WEIGHT: 282 LBS | TEMPERATURE: 97.8 F

## 2022-01-01 VITALS
SYSTOLIC BLOOD PRESSURE: 122 MMHG | HEIGHT: 70 IN | WEIGHT: 286 LBS | DIASTOLIC BLOOD PRESSURE: 62 MMHG | BODY MASS INDEX: 40.94 KG/M2

## 2022-01-01 VITALS
HEART RATE: 135 BPM | WEIGHT: 297.62 LBS | TEMPERATURE: 98.6 F | BODY MASS INDEX: 40.31 KG/M2 | RESPIRATION RATE: 10 BRPM | SYSTOLIC BLOOD PRESSURE: 83 MMHG | HEIGHT: 72 IN | OXYGEN SATURATION: 95 % | DIASTOLIC BLOOD PRESSURE: 45 MMHG

## 2022-01-01 DIAGNOSIS — D13.7 BENIGN NEOPLASM OF ENDOCRINE PANCREAS: ICD-10-CM

## 2022-01-01 DIAGNOSIS — T17.908A ASPIRATION INTO AIRWAY, INITIAL ENCOUNTER: ICD-10-CM

## 2022-01-01 DIAGNOSIS — Z79.4 TYPE 2 DIABETES MELLITUS WITH HYPERGLYCEMIA, WITH LONG-TERM CURRENT USE OF INSULIN (HCC): ICD-10-CM

## 2022-01-01 DIAGNOSIS — C25.8 OVERLAPPING MALIGNANT NEOPLASM OF PANCREAS (HCC): ICD-10-CM

## 2022-01-01 DIAGNOSIS — D72.829 LEUKOCYTOSIS, UNSPECIFIED TYPE: ICD-10-CM

## 2022-01-01 DIAGNOSIS — D49.0 IPMN (INTRADUCTAL PAPILLARY MUCINOUS NEOPLASM): Primary | ICD-10-CM

## 2022-01-01 DIAGNOSIS — T45.1X5A CHEMOTHERAPY-INDUCED NEUTROPENIA (HCC): ICD-10-CM

## 2022-01-01 DIAGNOSIS — N17.9 AKI (ACUTE KIDNEY INJURY) (HCC): ICD-10-CM

## 2022-01-01 DIAGNOSIS — T45.1X5A CHEMOTHERAPY-INDUCED NEUTROPENIA (HCC): Primary | ICD-10-CM

## 2022-01-01 DIAGNOSIS — I48.21 PERMANENT ATRIAL FIBRILLATION (HCC): ICD-10-CM

## 2022-01-01 DIAGNOSIS — E87.70 VOLUME OVERLOAD: ICD-10-CM

## 2022-01-01 DIAGNOSIS — D70.1 CHEMOTHERAPY-INDUCED NEUTROPENIA (HCC): ICD-10-CM

## 2022-01-01 DIAGNOSIS — Z01.89 ENCOUNTER FOR GERIATRIC ASSESSMENT: ICD-10-CM

## 2022-01-01 DIAGNOSIS — E11.9 TYPE 2 DIABETES MELLITUS WITHOUT COMPLICATION, WITHOUT LONG-TERM CURRENT USE OF INSULIN (HCC): Primary | ICD-10-CM

## 2022-01-01 DIAGNOSIS — C25.8 OVERLAPPING MALIGNANT NEOPLASM OF PANCREAS (HCC): Primary | ICD-10-CM

## 2022-01-01 DIAGNOSIS — T45.1X5A CHEMOTHERAPY INDUCED NEUTROPENIA (HCC): ICD-10-CM

## 2022-01-01 DIAGNOSIS — E08.65 DIABETES MELLITUS DUE TO UNDERLYING CONDITION WITH HYPERGLYCEMIA, WITHOUT LONG-TERM CURRENT USE OF INSULIN (HCC): ICD-10-CM

## 2022-01-01 DIAGNOSIS — Z01.818 PRE-OP EXAMINATION: Primary | ICD-10-CM

## 2022-01-01 DIAGNOSIS — R60.0 LOCALIZED EDEMA: Primary | ICD-10-CM

## 2022-01-01 DIAGNOSIS — D70.1 CHEMOTHERAPY INDUCED NEUTROPENIA (HCC): ICD-10-CM

## 2022-01-01 DIAGNOSIS — Z79.4 TYPE 2 DIABETES MELLITUS WITH HYPERGLYCEMIA, WITH LONG-TERM CURRENT USE OF INSULIN (HCC): Primary | ICD-10-CM

## 2022-01-01 DIAGNOSIS — E11.9 TYPE 2 DIABETES MELLITUS WITHOUT COMPLICATION, WITHOUT LONG-TERM CURRENT USE OF INSULIN (HCC): ICD-10-CM

## 2022-01-01 DIAGNOSIS — D70.1 CHEMOTHERAPY-INDUCED NEUTROPENIA (HCC): Primary | ICD-10-CM

## 2022-01-01 DIAGNOSIS — D69.6 THROMBOCYTOPENIA (HCC): ICD-10-CM

## 2022-01-01 DIAGNOSIS — R18.8 INTRA-ABDOMINAL FLUID COLLECTION: ICD-10-CM

## 2022-01-01 DIAGNOSIS — E11.65 TYPE 2 DIABETES MELLITUS WITH HYPERGLYCEMIA, WITH LONG-TERM CURRENT USE OF INSULIN (HCC): ICD-10-CM

## 2022-01-01 DIAGNOSIS — K76.7 HEPATORENAL FAILURE (HCC): ICD-10-CM

## 2022-01-01 DIAGNOSIS — R34 OLIGURIA: ICD-10-CM

## 2022-01-01 DIAGNOSIS — G47.33 OBSTRUCTIVE SLEEP APNEA SYNDROME: ICD-10-CM

## 2022-01-01 DIAGNOSIS — E11.65 TYPE 2 DIABETES MELLITUS WITH HYPERGLYCEMIA, WITH LONG-TERM CURRENT USE OF INSULIN (HCC): Primary | ICD-10-CM

## 2022-01-01 LAB
2HR DELTA HS TROPONIN: -1 NG/L
4HR DELTA HS TROPONIN: -2 NG/L
ABO GROUP BLD BPU: NORMAL
ABO GROUP BLD: NORMAL
ALBUMIN SERPL BCP-MCNC: 1.5 G/DL (ref 3.5–5)
ALBUMIN SERPL BCP-MCNC: 1.6 G/DL (ref 3.5–5)
ALBUMIN SERPL BCP-MCNC: 1.8 G/DL (ref 3.5–5)
ALBUMIN SERPL BCP-MCNC: 1.8 G/DL (ref 3.5–5)
ALBUMIN SERPL BCP-MCNC: 2 G/DL (ref 3.5–5)
ALBUMIN SERPL BCP-MCNC: 2 G/DL (ref 3.5–5)
ALBUMIN SERPL BCP-MCNC: 2.1 G/DL (ref 3.5–5)
ALBUMIN SERPL BCP-MCNC: 2.3 G/DL (ref 3.5–5)
ALBUMIN SERPL BCP-MCNC: 2.4 G/DL (ref 3.5–5)
ALBUMIN SERPL BCP-MCNC: 2.4 G/DL (ref 3.5–5)
ALBUMIN SERPL BCP-MCNC: 2.5 G/DL (ref 3.5–5)
ALBUMIN SERPL BCP-MCNC: 2.6 G/DL (ref 3.5–5)
ALBUMIN SERPL BCP-MCNC: 2.9 G/DL (ref 3.5–5)
ALBUMIN SERPL BCP-MCNC: 2.9 G/DL (ref 3.5–5)
ALBUMIN SERPL BCP-MCNC: 3 G/DL (ref 3.5–5)
ALP SERPL-CCNC: 112 U/L (ref 46–116)
ALP SERPL-CCNC: 113 U/L (ref 46–116)
ALP SERPL-CCNC: 116 U/L (ref 46–116)
ALP SERPL-CCNC: 128 U/L (ref 46–116)
ALP SERPL-CCNC: 140 U/L (ref 46–116)
ALP SERPL-CCNC: 142 U/L (ref 46–116)
ALP SERPL-CCNC: 154 U/L (ref 46–116)
ALP SERPL-CCNC: 155 U/L (ref 46–116)
ALP SERPL-CCNC: 161 U/L (ref 46–116)
ALP SERPL-CCNC: 177 U/L (ref 46–116)
ALP SERPL-CCNC: 191 U/L (ref 46–116)
ALP SERPL-CCNC: 193 U/L (ref 46–116)
ALP SERPL-CCNC: 199 U/L (ref 46–116)
ALP SERPL-CCNC: 221 U/L (ref 46–116)
ALP SERPL-CCNC: 59 U/L (ref 46–116)
ALP SERPL-CCNC: 69 U/L (ref 46–116)
ALP SERPL-CCNC: 73 U/L (ref 46–116)
ALP SERPL-CCNC: 94 U/L (ref 46–116)
ALP SERPL-CCNC: 99 U/L (ref 46–116)
ALT SERPL W P-5'-P-CCNC: 20 U/L (ref 12–78)
ALT SERPL W P-5'-P-CCNC: 21 U/L (ref 12–78)
ALT SERPL W P-5'-P-CCNC: 24 U/L (ref 12–78)
ALT SERPL W P-5'-P-CCNC: 25 U/L (ref 12–78)
ALT SERPL W P-5'-P-CCNC: 26 U/L (ref 12–78)
ALT SERPL W P-5'-P-CCNC: 28 U/L (ref 12–78)
ALT SERPL W P-5'-P-CCNC: 28 U/L (ref 12–78)
ALT SERPL W P-5'-P-CCNC: 29 U/L (ref 12–78)
ALT SERPL W P-5'-P-CCNC: 32 U/L (ref 12–78)
ALT SERPL W P-5'-P-CCNC: 32 U/L (ref 12–78)
ALT SERPL W P-5'-P-CCNC: 57 U/L (ref 12–78)
ALT SERPL W P-5'-P-CCNC: 67 U/L (ref 12–78)
ALT SERPL W P-5'-P-CCNC: 68 U/L (ref 12–78)
ALT SERPL W P-5'-P-CCNC: 68 U/L (ref 12–78)
AMMONIA PLAS-SCNC: 41 UMOL/L (ref 11–35)
AMMONIA PLAS-SCNC: 49 UMOL/L (ref 11–35)
AMMONIA PLAS-SCNC: 61 UMOL/L (ref 11–35)
AMYLASE FLD QL: 24 U/L
AMYLASE FLD QL: 56 U/L
ANION GAP SERPL CALCULATED.3IONS-SCNC: 0 MMOL/L (ref 4–13)
ANION GAP SERPL CALCULATED.3IONS-SCNC: 1 MMOL/L (ref 4–13)
ANION GAP SERPL CALCULATED.3IONS-SCNC: 1 MMOL/L (ref 4–13)
ANION GAP SERPL CALCULATED.3IONS-SCNC: 10 MMOL/L (ref 4–13)
ANION GAP SERPL CALCULATED.3IONS-SCNC: 10 MMOL/L (ref 4–13)
ANION GAP SERPL CALCULATED.3IONS-SCNC: 11 MMOL/L (ref 4–13)
ANION GAP SERPL CALCULATED.3IONS-SCNC: 11 MMOL/L (ref 4–13)
ANION GAP SERPL CALCULATED.3IONS-SCNC: 12 MMOL/L (ref 4–13)
ANION GAP SERPL CALCULATED.3IONS-SCNC: 12 MMOL/L (ref 4–13)
ANION GAP SERPL CALCULATED.3IONS-SCNC: 13 MMOL/L (ref 4–13)
ANION GAP SERPL CALCULATED.3IONS-SCNC: 14 MMOL/L (ref 4–13)
ANION GAP SERPL CALCULATED.3IONS-SCNC: 2 MMOL/L (ref 4–13)
ANION GAP SERPL CALCULATED.3IONS-SCNC: 3 MMOL/L (ref 4–13)
ANION GAP SERPL CALCULATED.3IONS-SCNC: 4 MMOL/L (ref 4–13)
ANION GAP SERPL CALCULATED.3IONS-SCNC: 5 MMOL/L (ref 4–13)
ANION GAP SERPL CALCULATED.3IONS-SCNC: 6 MMOL/L (ref 4–13)
ANION GAP SERPL CALCULATED.3IONS-SCNC: 7 MMOL/L (ref 4–13)
ANION GAP SERPL CALCULATED.3IONS-SCNC: 8 MMOL/L (ref 4–13)
ANISOCYTOSIS BLD QL SMEAR: PRESENT
AORTIC ROOT: 3.6 CM
AORTIC ROOT: 3.9 CM
APICAL FOUR CHAMBER EJECTION FRACTION: 53 %
APTT PPP: 32 SECONDS (ref 23–37)
APTT PPP: 37 SECONDS (ref 23–37)
APTT PPP: 48 SECONDS (ref 23–37)
ARTERIAL PATENCY WRIST A: NO
ARTERIAL PATENCY WRIST A: YES
ASCENDING AORTA: 4.1 CM
AST SERPL W P-5'-P-CCNC: 22 U/L (ref 5–45)
AST SERPL W P-5'-P-CCNC: 25 U/L (ref 5–45)
AST SERPL W P-5'-P-CCNC: 29 U/L (ref 5–45)
AST SERPL W P-5'-P-CCNC: 32 U/L (ref 5–45)
AST SERPL W P-5'-P-CCNC: 35 U/L (ref 5–45)
AST SERPL W P-5'-P-CCNC: 35 U/L (ref 5–45)
AST SERPL W P-5'-P-CCNC: 37 U/L (ref 5–45)
AST SERPL W P-5'-P-CCNC: 40 U/L (ref 5–45)
AST SERPL W P-5'-P-CCNC: 43 U/L (ref 5–45)
AST SERPL W P-5'-P-CCNC: 45 U/L (ref 5–45)
AST SERPL W P-5'-P-CCNC: 50 U/L (ref 5–45)
AST SERPL W P-5'-P-CCNC: 51 U/L (ref 5–45)
AST SERPL W P-5'-P-CCNC: 56 U/L (ref 5–45)
AST SERPL W P-5'-P-CCNC: 57 U/L (ref 5–45)
AST SERPL W P-5'-P-CCNC: 59 U/L (ref 5–45)
AST SERPL W P-5'-P-CCNC: 64 U/L (ref 5–45)
AST SERPL W P-5'-P-CCNC: 69 U/L (ref 5–45)
AST SERPL W P-5'-P-CCNC: 97 U/L (ref 5–45)
AST SERPL W P-5'-P-CCNC: 98 U/L (ref 5–45)
ATRIAL RATE: 100 BPM
ATRIAL RATE: 113 BPM
ATRIAL RATE: 127 BPM
ATRIAL RATE: 250 BPM
ATRIAL RATE: 357 BPM
ATRIAL RATE: 357 BPM
ATRIAL RATE: 441 BPM
ATRIAL RATE: 88 BPM
BACTERIA BLD CULT: ABNORMAL
BACTERIA BLD CULT: NORMAL
BACTERIA BRONCH AEROBE CULT: ABNORMAL
BACTERIA BRONCH AEROBE CULT: ABNORMAL
BACTERIA SPEC BFLD CULT: ABNORMAL
BACTERIA UR QL AUTO: ABNORMAL /HPF
BASE EX.OXY STD BLDV CALC-SCNC: 77.9 % (ref 60–80)
BASE EX.OXY STD BLDV CALC-SCNC: 82.7 % (ref 60–80)
BASE EXCESS BLDA CALC-SCNC: -0.5 MMOL/L
BASE EXCESS BLDA CALC-SCNC: -0.7 MMOL/L
BASE EXCESS BLDA CALC-SCNC: -1 MMOL/L
BASE EXCESS BLDA CALC-SCNC: -1 MMOL/L
BASE EXCESS BLDA CALC-SCNC: -1 MMOL/L (ref -2–3)
BASE EXCESS BLDA CALC-SCNC: -1.5 MMOL/L
BASE EXCESS BLDA CALC-SCNC: -1.5 MMOL/L
BASE EXCESS BLDA CALC-SCNC: -2 MMOL/L
BASE EXCESS BLDA CALC-SCNC: -2.1 MMOL/L
BASE EXCESS BLDA CALC-SCNC: -2.1 MMOL/L
BASE EXCESS BLDA CALC-SCNC: -2.3 MMOL/L
BASE EXCESS BLDA CALC-SCNC: -2.6 MMOL/L
BASE EXCESS BLDA CALC-SCNC: -3.1 MMOL/L
BASE EXCESS BLDA CALC-SCNC: -3.3 MMOL/L
BASE EXCESS BLDA CALC-SCNC: -3.5 MMOL/L
BASE EXCESS BLDA CALC-SCNC: -3.6 MMOL/L
BASE EXCESS BLDA CALC-SCNC: -3.9 MMOL/L
BASE EXCESS BLDA CALC-SCNC: -4.6 MMOL/L
BASE EXCESS BLDA CALC-SCNC: -4.6 MMOL/L
BASE EXCESS BLDA CALC-SCNC: -5 MMOL/L (ref -2–3)
BASE EXCESS BLDA CALC-SCNC: -5.5 MMOL/L
BASE EXCESS BLDA CALC-SCNC: -6.1 MMOL/L
BASE EXCESS BLDA CALC-SCNC: -6.1 MMOL/L
BASE EXCESS BLDA CALC-SCNC: -6.5 MMOL/L
BASE EXCESS BLDA CALC-SCNC: -8.4 MMOL/L
BASE EXCESS BLDA CALC-SCNC: -9.5 MMOL/L
BASE EXCESS BLDA CALC-SCNC: 0 MMOL/L
BASE EXCESS BLDA CALC-SCNC: 0 MMOL/L (ref -2–3)
BASE EXCESS BLDA CALC-SCNC: 0.1 MMOL/L
BASE EXCESS BLDA CALC-SCNC: 0.1 MMOL/L
BASE EXCESS BLDA CALC-SCNC: 0.5 MMOL/L
BASE EXCESS BLDA CALC-SCNC: 0.5 MMOL/L
BASE EXCESS BLDA CALC-SCNC: 0.8 MMOL/L
BASE EXCESS BLDA CALC-SCNC: 0.9 MMOL/L
BASE EXCESS BLDA CALC-SCNC: 1.4 MMOL/L
BASE EXCESS BLDA CALC-SCNC: 1.5 MMOL/L
BASE EXCESS BLDA CALC-SCNC: 11 MMOL/L (ref -2–3)
BASE EXCESS BLDA CALC-SCNC: 2.1 MMOL/L
BASE EXCESS BLDA CALC-SCNC: 2.1 MMOL/L
BASE EXCESS BLDA CALC-SCNC: 2.4 MMOL/L
BASE EXCESS BLDV CALC-SCNC: -4.6 MMOL/L
BASE EXCESS BLDV CALC-SCNC: -7.4 MMOL/L
BASO STIPL BLD QL SMEAR: PRESENT
BASOPHILS # BLD AUTO: 0 THOUSANDS/ΜL (ref 0–0.1)
BASOPHILS # BLD AUTO: 0.02 THOUSANDS/ΜL (ref 0–0.1)
BASOPHILS # BLD AUTO: 0.03 THOUSANDS/ΜL (ref 0–0.1)
BASOPHILS # BLD AUTO: 0.03 THOUSANDS/ΜL (ref 0–0.1)
BASOPHILS # BLD AUTO: 0.05 THOUSANDS/ΜL (ref 0–0.1)
BASOPHILS # BLD MANUAL: 0 THOUSAND/UL (ref 0–0.1)
BASOPHILS # BLD MANUAL: 0.04 THOUSAND/UL (ref 0–0.1)
BASOPHILS NFR BLD AUTO: 0 % (ref 0–1)
BASOPHILS NFR BLD AUTO: 1 % (ref 0–1)
BASOPHILS NFR MAR MANUAL: 0 % (ref 0–1)
BASOPHILS NFR MAR MANUAL: 2 % (ref 0–1)
BILIRUB DIRECT SERPL-MCNC: 2.42 MG/DL (ref 0–0.2)
BILIRUB DIRECT SERPL-MCNC: 2.65 MG/DL (ref 0–0.2)
BILIRUB DIRECT SERPL-MCNC: 2.68 MG/DL (ref 0–0.2)
BILIRUB DIRECT SERPL-MCNC: 2.79 MG/DL (ref 0–0.2)
BILIRUB DIRECT SERPL-MCNC: 3.02 MG/DL (ref 0–0.2)
BILIRUB DIRECT SERPL-MCNC: 3.78 MG/DL (ref 0–0.2)
BILIRUB DIRECT SERPL-MCNC: 3.79 MG/DL (ref 0–0.2)
BILIRUB DIRECT SERPL-MCNC: 3.8 MG/DL (ref 0–0.2)
BILIRUB DIRECT SERPL-MCNC: 4.34 MG/DL (ref 0–0.2)
BILIRUB SERPL-MCNC: 0.85 MG/DL (ref 0.2–1)
BILIRUB SERPL-MCNC: 1.14 MG/DL (ref 0.2–1)
BILIRUB SERPL-MCNC: 1.46 MG/DL (ref 0.2–1)
BILIRUB SERPL-MCNC: 1.49 MG/DL (ref 0.2–1)
BILIRUB SERPL-MCNC: 1.72 MG/DL (ref 0.2–1)
BILIRUB SERPL-MCNC: 2.01 MG/DL (ref 0.2–1)
BILIRUB SERPL-MCNC: 2.33 MG/DL (ref 0.2–1)
BILIRUB SERPL-MCNC: 2.96 MG/DL (ref 0.2–1)
BILIRUB SERPL-MCNC: 3.11 MG/DL (ref 0.2–1)
BILIRUB SERPL-MCNC: 3.31 MG/DL (ref 0.2–1)
BILIRUB SERPL-MCNC: 3.74 MG/DL (ref 0.2–1)
BILIRUB SERPL-MCNC: 3.87 MG/DL (ref 0.2–1)
BILIRUB SERPL-MCNC: 3.98 MG/DL (ref 0.2–1)
BILIRUB SERPL-MCNC: 4.03 MG/DL (ref 0.2–1)
BILIRUB SERPL-MCNC: 4.73 MG/DL (ref 0.2–1)
BILIRUB SERPL-MCNC: 5.25 MG/DL (ref 0.2–1)
BILIRUB SERPL-MCNC: 5.34 MG/DL (ref 0.2–1)
BILIRUB SERPL-MCNC: 5.38 MG/DL (ref 0.2–1)
BILIRUB SERPL-MCNC: 5.95 MG/DL (ref 0.2–1)
BILIRUB UR QL STRIP: NEGATIVE
BLD GP AB SCN SERPL QL: NEGATIVE
BLD SMEAR INTERP: NORMAL
BODY TEMPERATURE: 97.5 DEGREES FEHRENHEIT
BODY TEMPERATURE: 97.9 DEGREES FEHRENHEIT
BODY TEMPERATURE: 98.1 DEGREES FEHRENHEIT
BODY TEMPERATURE: 98.1 DEGREES FEHRENHEIT
BODY TEMPERATURE: 98.2 DEGREES FEHRENHEIT
BODY TEMPERATURE: 98.2 DEGREES FEHRENHEIT
BODY TEMPERATURE: 98.4 DEGREES FEHRENHEIT
BODY TEMPERATURE: 98.4 DEGREES FEHRENHEIT
BODY TEMPERATURE: 98.8 DEGREES FEHRENHEIT
BPU ID: NORMAL
BUN SERPL-MCNC: 10 MG/DL (ref 5–25)
BUN SERPL-MCNC: 11 MG/DL (ref 5–25)
BUN SERPL-MCNC: 12 MG/DL (ref 5–25)
BUN SERPL-MCNC: 13 MG/DL (ref 5–25)
BUN SERPL-MCNC: 14 MG/DL (ref 5–25)
BUN SERPL-MCNC: 15 MG/DL (ref 5–25)
BUN SERPL-MCNC: 16 MG/DL (ref 5–25)
BUN SERPL-MCNC: 17 MG/DL (ref 5–25)
BUN SERPL-MCNC: 17 MG/DL (ref 5–25)
BUN SERPL-MCNC: 18 MG/DL (ref 5–25)
BUN SERPL-MCNC: 19 MG/DL (ref 5–25)
BUN SERPL-MCNC: 21 MG/DL (ref 5–25)
BUN SERPL-MCNC: 22 MG/DL (ref 5–25)
BUN SERPL-MCNC: 23 MG/DL (ref 5–25)
BUN SERPL-MCNC: 23 MG/DL (ref 5–25)
BUN SERPL-MCNC: 24 MG/DL (ref 5–25)
BUN SERPL-MCNC: 25 MG/DL (ref 5–25)
BUN SERPL-MCNC: 27 MG/DL (ref 5–25)
BUN SERPL-MCNC: 29 MG/DL (ref 5–25)
BUN SERPL-MCNC: 31 MG/DL (ref 5–25)
BUN SERPL-MCNC: 32 MG/DL (ref 5–25)
BUN SERPL-MCNC: 33 MG/DL (ref 5–25)
BUN SERPL-MCNC: 34 MG/DL (ref 5–25)
BUN SERPL-MCNC: 41 MG/DL (ref 5–25)
BUN SERPL-MCNC: 41 MG/DL (ref 5–25)
BUN SERPL-MCNC: 42 MG/DL (ref 5–25)
BUN SERPL-MCNC: 43 MG/DL (ref 5–25)
BUN SERPL-MCNC: 45 MG/DL (ref 5–25)
BUN SERPL-MCNC: 8 MG/DL (ref 5–25)
BUN SERPL-MCNC: 9 MG/DL (ref 5–25)
BURR CELLS BLD QL SMEAR: PRESENT
CA-I BLD-SCNC: 0.98 MMOL/L (ref 1.12–1.32)
CA-I BLD-SCNC: 1.02 MMOL/L (ref 1.12–1.32)
CA-I BLD-SCNC: 1.02 MMOL/L (ref 1.12–1.32)
CA-I BLD-SCNC: 1.05 MMOL/L (ref 1.12–1.32)
CA-I BLD-SCNC: 1.06 MMOL/L (ref 1.12–1.32)
CA-I BLD-SCNC: 1.07 MMOL/L (ref 1.12–1.32)
CA-I BLD-SCNC: 1.09 MMOL/L (ref 1.12–1.32)
CA-I BLD-SCNC: 1.13 MMOL/L (ref 1.12–1.32)
CA-I BLD-SCNC: 1.14 MMOL/L (ref 1.12–1.32)
CA-I BLD-SCNC: 1.15 MMOL/L (ref 1.12–1.32)
CA-I BLD-SCNC: 1.16 MMOL/L (ref 1.12–1.32)
CA-I BLD-SCNC: 1.17 MMOL/L (ref 1.12–1.32)
CA-I BLD-SCNC: 1.18 MMOL/L (ref 1.12–1.32)
CA-I BLD-SCNC: 1.19 MMOL/L (ref 1.12–1.32)
CA-I BLD-SCNC: 1.2 MMOL/L (ref 1.12–1.32)
CA-I BLD-SCNC: 1.2 MMOL/L (ref 1.12–1.32)
CA-I BLD-SCNC: 1.21 MMOL/L (ref 1.12–1.32)
CA-I BLD-SCNC: 1.22 MMOL/L (ref 1.12–1.32)
CA-I BLD-SCNC: 1.23 MMOL/L (ref 1.12–1.32)
CA-I BLD-SCNC: 1.23 MMOL/L (ref 1.12–1.32)
CA-I BLD-SCNC: 1.24 MMOL/L (ref 1.12–1.32)
CA-I BLD-SCNC: 1.24 MMOL/L (ref 1.12–1.32)
CA-I BLD-SCNC: 1.25 MMOL/L (ref 1.12–1.32)
CA-I BLD-SCNC: 1.29 MMOL/L (ref 1.12–1.32)
CA-I BLD-SCNC: 1.31 MMOL/L (ref 1.12–1.32)
CALCIUM ALBUM COR SERPL-MCNC: 10.2 MG/DL (ref 8.3–10.1)
CALCIUM ALBUM COR SERPL-MCNC: 9.1 MG/DL (ref 8.3–10.1)
CALCIUM ALBUM COR SERPL-MCNC: 9.2 MG/DL (ref 8.3–10.1)
CALCIUM ALBUM COR SERPL-MCNC: 9.2 MG/DL (ref 8.3–10.1)
CALCIUM ALBUM COR SERPL-MCNC: 9.3 MG/DL (ref 8.3–10.1)
CALCIUM ALBUM COR SERPL-MCNC: 9.5 MG/DL (ref 8.3–10.1)
CALCIUM ALBUM COR SERPL-MCNC: 9.6 MG/DL (ref 8.3–10.1)
CALCIUM ALBUM COR SERPL-MCNC: 9.7 MG/DL (ref 8.3–10.1)
CALCIUM ALBUM COR SERPL-MCNC: 9.8 MG/DL (ref 8.3–10.1)
CALCIUM ALBUM COR SERPL-MCNC: 9.9 MG/DL (ref 8.3–10.1)
CALCIUM SERPL-MCNC: 7.3 MG/DL (ref 8.3–10.1)
CALCIUM SERPL-MCNC: 7.5 MG/DL (ref 8.3–10.1)
CALCIUM SERPL-MCNC: 7.5 MG/DL (ref 8.3–10.1)
CALCIUM SERPL-MCNC: 7.6 MG/DL (ref 8.3–10.1)
CALCIUM SERPL-MCNC: 7.6 MG/DL (ref 8.3–10.1)
CALCIUM SERPL-MCNC: 7.7 MG/DL (ref 8.3–10.1)
CALCIUM SERPL-MCNC: 7.8 MG/DL (ref 8.3–10.1)
CALCIUM SERPL-MCNC: 7.9 MG/DL (ref 8.3–10.1)
CALCIUM SERPL-MCNC: 8 MG/DL (ref 8.3–10.1)
CALCIUM SERPL-MCNC: 8.1 MG/DL (ref 8.3–10.1)
CALCIUM SERPL-MCNC: 8.2 MG/DL (ref 8.3–10.1)
CALCIUM SERPL-MCNC: 8.3 MG/DL (ref 8.3–10.1)
CALCIUM SERPL-MCNC: 8.4 MG/DL (ref 8.3–10.1)
CALCIUM SERPL-MCNC: 8.5 MG/DL (ref 8.3–10.1)
CALCIUM SERPL-MCNC: 8.6 MG/DL (ref 8.3–10.1)
CALCIUM SERPL-MCNC: 8.6 MG/DL (ref 8.3–10.1)
CALCIUM SERPL-MCNC: 8.7 MG/DL (ref 8.3–10.1)
CALCIUM SERPL-MCNC: 8.8 MG/DL (ref 8.3–10.1)
CALCIUM SERPL-MCNC: 8.8 MG/DL (ref 8.3–10.1)
CALCIUM SERPL-MCNC: 8.9 MG/DL (ref 8.3–10.1)
CALCIUM SERPL-MCNC: 9 MG/DL (ref 8.3–10.1)
CANCER AG19-9 SERPL-ACNC: 33 U/ML (ref 0–35)
CARDIAC TROPONIN I PNL SERPL HS: 5 NG/L
CARDIAC TROPONIN I PNL SERPL HS: 6 NG/L
CARDIAC TROPONIN I PNL SERPL HS: 63 NG/L (ref 8–18)
CARDIAC TROPONIN I PNL SERPL HS: 7 NG/L
CEA SERPL-MCNC: 2.3 NG/ML (ref 0–3)
CFFMA (FUNCTIONAL FIBRINOGEN MAX AMPLITUDE): 13.9 MM (ref 15–32)
CHLORIDE SERPL-SCNC: 102 MMOL/L (ref 100–108)
CHLORIDE SERPL-SCNC: 102 MMOL/L (ref 96–108)
CHLORIDE SERPL-SCNC: 103 MMOL/L (ref 96–108)
CHLORIDE SERPL-SCNC: 104 MMOL/L (ref 96–108)
CHLORIDE SERPL-SCNC: 105 MMOL/L (ref 96–108)
CHLORIDE SERPL-SCNC: 106 MMOL/L (ref 96–108)
CHLORIDE SERPL-SCNC: 107 MMOL/L (ref 96–108)
CHLORIDE SERPL-SCNC: 108 MMOL/L (ref 96–108)
CHLORIDE SERPL-SCNC: 109 MMOL/L (ref 96–108)
CHLORIDE SERPL-SCNC: 110 MMOL/L (ref 96–108)
CHLORIDE SERPL-SCNC: 111 MMOL/L (ref 96–108)
CHLORIDE SERPL-SCNC: 112 MMOL/L (ref 96–108)
CHLORIDE SERPL-SCNC: 113 MMOL/L (ref 96–108)
CKLY30: 0.3 % (ref 0–2.6)
CKR(REACTION TIME): 5.6 MIN (ref 4.6–9.1)
CLARITY UR: CLEAR
CO2 SERPL-SCNC: 18 MMOL/L (ref 21–32)
CO2 SERPL-SCNC: 21 MMOL/L (ref 21–32)
CO2 SERPL-SCNC: 22 MMOL/L (ref 21–32)
CO2 SERPL-SCNC: 23 MMOL/L (ref 21–32)
CO2 SERPL-SCNC: 24 MMOL/L (ref 21–32)
CO2 SERPL-SCNC: 25 MMOL/L (ref 21–32)
CO2 SERPL-SCNC: 26 MMOL/L (ref 21–32)
CO2 SERPL-SCNC: 27 MMOL/L (ref 21–32)
CO2 SERPL-SCNC: 28 MMOL/L (ref 21–32)
CO2 SERPL-SCNC: 29 MMOL/L (ref 21–32)
COLOR UR: YELLOW
CORTIS SERPL-MCNC: 13.5 UG/DL
CORTIS SERPL-MCNC: 22.2 UG/DL
CREAT SERPL-MCNC: 0.47 MG/DL (ref 0.6–1.3)
CREAT SERPL-MCNC: 0.54 MG/DL (ref 0.6–1.3)
CREAT SERPL-MCNC: 0.57 MG/DL (ref 0.6–1.3)
CREAT SERPL-MCNC: 0.57 MG/DL (ref 0.6–1.3)
CREAT SERPL-MCNC: 0.58 MG/DL (ref 0.6–1.3)
CREAT SERPL-MCNC: 0.58 MG/DL (ref 0.6–1.3)
CREAT SERPL-MCNC: 0.6 MG/DL (ref 0.6–1.3)
CREAT SERPL-MCNC: 0.6 MG/DL (ref 0.6–1.3)
CREAT SERPL-MCNC: 0.61 MG/DL (ref 0.6–1.3)
CREAT SERPL-MCNC: 0.63 MG/DL (ref 0.6–1.3)
CREAT SERPL-MCNC: 0.64 MG/DL (ref 0.6–1.3)
CREAT SERPL-MCNC: 0.65 MG/DL (ref 0.6–1.3)
CREAT SERPL-MCNC: 0.66 MG/DL (ref 0.6–1.3)
CREAT SERPL-MCNC: 0.66 MG/DL (ref 0.6–1.3)
CREAT SERPL-MCNC: 0.67 MG/DL (ref 0.6–1.3)
CREAT SERPL-MCNC: 0.67 MG/DL (ref 0.6–1.3)
CREAT SERPL-MCNC: 0.7 MG/DL (ref 0.6–1.3)
CREAT SERPL-MCNC: 0.7 MG/DL (ref 0.6–1.3)
CREAT SERPL-MCNC: 0.73 MG/DL (ref 0.6–1.3)
CREAT SERPL-MCNC: 0.74 MG/DL (ref 0.6–1.3)
CREAT SERPL-MCNC: 0.76 MG/DL (ref 0.6–1.3)
CREAT SERPL-MCNC: 0.77 MG/DL (ref 0.6–1.3)
CREAT SERPL-MCNC: 0.78 MG/DL (ref 0.6–1.3)
CREAT SERPL-MCNC: 0.79 MG/DL (ref 0.6–1.3)
CREAT SERPL-MCNC: 0.79 MG/DL (ref 0.6–1.3)
CREAT SERPL-MCNC: 0.8 MG/DL (ref 0.6–1.3)
CREAT SERPL-MCNC: 0.8 MG/DL (ref 0.6–1.3)
CREAT SERPL-MCNC: 0.81 MG/DL (ref 0.6–1.3)
CREAT SERPL-MCNC: 0.81 MG/DL (ref 0.6–1.3)
CREAT SERPL-MCNC: 0.83 MG/DL (ref 0.6–1.3)
CREAT SERPL-MCNC: 0.86 MG/DL (ref 0.6–1.3)
CREAT SERPL-MCNC: 0.9 MG/DL (ref 0.6–1.3)
CREAT SERPL-MCNC: 0.9 MG/DL (ref 0.6–1.3)
CREAT SERPL-MCNC: 0.93 MG/DL (ref 0.6–1.3)
CREAT SERPL-MCNC: 0.97 MG/DL (ref 0.6–1.3)
CREAT SERPL-MCNC: 0.97 MG/DL (ref 0.6–1.3)
CREAT SERPL-MCNC: 0.98 MG/DL (ref 0.6–1.3)
CREAT SERPL-MCNC: 1 MG/DL (ref 0.6–1.3)
CREAT SERPL-MCNC: 1.03 MG/DL (ref 0.6–1.3)
CREAT SERPL-MCNC: 1.03 MG/DL (ref 0.6–1.3)
CREAT SERPL-MCNC: 1.09 MG/DL (ref 0.6–1.3)
CREAT SERPL-MCNC: 1.14 MG/DL (ref 0.6–1.3)
CREAT SERPL-MCNC: 1.16 MG/DL (ref 0.6–1.3)
CREAT SERPL-MCNC: 1.17 MG/DL (ref 0.6–1.3)
CREAT SERPL-MCNC: 1.27 MG/DL (ref 0.6–1.3)
CREAT SERPL-MCNC: 1.35 MG/DL (ref 0.6–1.3)
CREAT SERPL-MCNC: 1.39 MG/DL (ref 0.6–1.3)
CREAT SERPL-MCNC: 1.4 MG/DL (ref 0.6–1.3)
CREAT SERPL-MCNC: 1.74 MG/DL (ref 0.6–1.3)
CREAT SERPL-MCNC: 1.98 MG/DL (ref 0.6–1.3)
CREAT SERPL-MCNC: 2.18 MG/DL (ref 0.6–1.3)
CREAT SERPL-MCNC: 2.6 MG/DL (ref 0.6–1.3)
CREAT SERPL-MCNC: 2.8 MG/DL (ref 0.6–1.3)
CREAT SERPL-MCNC: 2.97 MG/DL (ref 0.6–1.3)
CREAT SERPL-MCNC: 3.02 MG/DL (ref 0.6–1.3)
CREAT SERPL-MCNC: 3.14 MG/DL (ref 0.6–1.3)
CREAT SERPL-MCNC: 3.14 MG/DL (ref 0.6–1.3)
CROSSMATCH: NORMAL
CRTMA(RAPIDTEG MAX AMPLITUDE): 49.3 MM (ref 52–70)
D DIMER PPP FEU-MCNC: 12.43 UG/ML FEU
DACRYOCYTES BLD QL SMEAR: PRESENT
DEPRECATED AT III PPP: 31 % OF NORMAL (ref 92–136)
EOSINOPHIL # BLD AUTO: 0 THOUSAND/ΜL (ref 0–0.61)
EOSINOPHIL # BLD AUTO: 0.01 THOUSAND/ΜL (ref 0–0.61)
EOSINOPHIL # BLD AUTO: 0.02 THOUSAND/ΜL (ref 0–0.61)
EOSINOPHIL # BLD AUTO: 0.03 THOUSAND/ΜL (ref 0–0.61)
EOSINOPHIL # BLD AUTO: 0.07 THOUSAND/ΜL (ref 0–0.61)
EOSINOPHIL # BLD AUTO: 0.08 THOUSAND/ΜL (ref 0–0.61)
EOSINOPHIL # BLD AUTO: 0.15 THOUSAND/ΜL (ref 0–0.61)
EOSINOPHIL # BLD AUTO: 0.19 THOUSAND/ΜL (ref 0–0.61)
EOSINOPHIL # BLD MANUAL: 0 THOUSAND/UL (ref 0–0.4)
EOSINOPHIL # BLD MANUAL: 0.02 THOUSAND/UL (ref 0–0.4)
EOSINOPHIL # BLD MANUAL: 0.09 THOUSAND/UL (ref 0–0.4)
EOSINOPHIL # BLD MANUAL: 0.17 THOUSAND/UL (ref 0–0.4)
EOSINOPHIL # BLD MANUAL: 0.27 THOUSAND/UL (ref 0–0.4)
EOSINOPHIL NFR BLD AUTO: 0 % (ref 0–6)
EOSINOPHIL NFR BLD AUTO: 2 % (ref 0–6)
EOSINOPHIL NFR BLD AUTO: 2 % (ref 0–6)
EOSINOPHIL NFR BLD AUTO: 3 % (ref 0–6)
EOSINOPHIL NFR BLD AUTO: 3 % (ref 0–6)
EOSINOPHIL NFR BLD MANUAL: 0 % (ref 0–6)
EOSINOPHIL NFR BLD MANUAL: 1 % (ref 0–6)
EOSINOPHIL NFR BLD MANUAL: 2 % (ref 0–6)
ERYTHROCYTE [DISTWIDTH] IN BLOOD BY AUTOMATED COUNT: 13.8 % (ref 11.6–15.1)
ERYTHROCYTE [DISTWIDTH] IN BLOOD BY AUTOMATED COUNT: 14.4 % (ref 11.6–15.1)
ERYTHROCYTE [DISTWIDTH] IN BLOOD BY AUTOMATED COUNT: 14.6 % (ref 11.6–15.1)
ERYTHROCYTE [DISTWIDTH] IN BLOOD BY AUTOMATED COUNT: 15.8 % (ref 11.6–15.1)
ERYTHROCYTE [DISTWIDTH] IN BLOOD BY AUTOMATED COUNT: 15.8 % (ref 11.6–15.1)
ERYTHROCYTE [DISTWIDTH] IN BLOOD BY AUTOMATED COUNT: 15.9 % (ref 11.6–15.1)
ERYTHROCYTE [DISTWIDTH] IN BLOOD BY AUTOMATED COUNT: 16.1 % (ref 11.6–15.1)
ERYTHROCYTE [DISTWIDTH] IN BLOOD BY AUTOMATED COUNT: 16.2 % (ref 11.6–15.1)
ERYTHROCYTE [DISTWIDTH] IN BLOOD BY AUTOMATED COUNT: 16.3 % (ref 11.6–15.1)
ERYTHROCYTE [DISTWIDTH] IN BLOOD BY AUTOMATED COUNT: 16.5 % (ref 11.6–15.1)
ERYTHROCYTE [DISTWIDTH] IN BLOOD BY AUTOMATED COUNT: 16.6 % (ref 11.6–15.1)
ERYTHROCYTE [DISTWIDTH] IN BLOOD BY AUTOMATED COUNT: 16.7 % (ref 11.6–15.1)
ERYTHROCYTE [DISTWIDTH] IN BLOOD BY AUTOMATED COUNT: 16.8 % (ref 11.6–15.1)
ERYTHROCYTE [DISTWIDTH] IN BLOOD BY AUTOMATED COUNT: 17 % (ref 11.6–15.1)
ERYTHROCYTE [DISTWIDTH] IN BLOOD BY AUTOMATED COUNT: 17.1 % (ref 11.6–15.1)
ERYTHROCYTE [DISTWIDTH] IN BLOOD BY AUTOMATED COUNT: 17.2 % (ref 11.6–15.1)
ERYTHROCYTE [DISTWIDTH] IN BLOOD BY AUTOMATED COUNT: 17.5 % (ref 11.6–15.1)
ERYTHROCYTE [DISTWIDTH] IN BLOOD BY AUTOMATED COUNT: 21.3 % (ref 11.6–15.1)
ERYTHROCYTE [DISTWIDTH] IN BLOOD BY AUTOMATED COUNT: 21.6 % (ref 11.6–15.1)
ERYTHROCYTE [DISTWIDTH] IN BLOOD BY AUTOMATED COUNT: 21.7 % (ref 11.6–15.1)
ERYTHROCYTE [DISTWIDTH] IN BLOOD BY AUTOMATED COUNT: 23.3 % (ref 11.6–15.1)
ERYTHROCYTE [DISTWIDTH] IN BLOOD BY AUTOMATED COUNT: 23.7 % (ref 11.6–15.1)
ERYTHROCYTE [DISTWIDTH] IN BLOOD BY AUTOMATED COUNT: 23.9 % (ref 11.6–15.1)
ERYTHROCYTE [DISTWIDTH] IN BLOOD BY AUTOMATED COUNT: 24 % (ref 11.6–15.1)
ERYTHROCYTE [DISTWIDTH] IN BLOOD BY AUTOMATED COUNT: 24.2 % (ref 11.6–15.1)
ERYTHROCYTE [DISTWIDTH] IN BLOOD BY AUTOMATED COUNT: 24.3 % (ref 11.6–15.1)
ERYTHROCYTE [DISTWIDTH] IN BLOOD BY AUTOMATED COUNT: 24.5 % (ref 11.6–15.1)
ERYTHROCYTE [DISTWIDTH] IN BLOOD BY AUTOMATED COUNT: 24.7 % (ref 11.6–15.1)
EST. AVERAGE GLUCOSE BLD GHB EST-MCNC: 203 MG/DL
EST. AVERAGE GLUCOSE BLD GHB EST-MCNC: 217 MG/DL
FDP BLD QL AGGL: >20 <40
FIBRINOGEN PPP-MCNC: 191 MG/DL (ref 227–495)
FIBRINOGEN PPP-MCNC: 201 MG/DL (ref 227–495)
FIO2: 100 %
FIO2: 40 %
FRACTIONAL SHORTENING: 20 % (ref 28–44)
FRACTIONAL SHORTENING: 30 % (ref 28–44)
GFR SERPL CREATININE-BSD FRML MDRD: 101 ML/MIN/1.73SQ M
GFR SERPL CREATININE-BSD FRML MDRD: 104 ML/MIN/1.73SQ M
GFR SERPL CREATININE-BSD FRML MDRD: 110 ML/MIN/1.73SQ M
GFR SERPL CREATININE-BSD FRML MDRD: 18 ML/MIN/1.73SQ M
GFR SERPL CREATININE-BSD FRML MDRD: 18 ML/MIN/1.73SQ M
GFR SERPL CREATININE-BSD FRML MDRD: 19 ML/MIN/1.73SQ M
GFR SERPL CREATININE-BSD FRML MDRD: 19 ML/MIN/1.73SQ M
GFR SERPL CREATININE-BSD FRML MDRD: 21 ML/MIN/1.73SQ M
GFR SERPL CREATININE-BSD FRML MDRD: 23 ML/MIN/1.73SQ M
GFR SERPL CREATININE-BSD FRML MDRD: 28 ML/MIN/1.73SQ M
GFR SERPL CREATININE-BSD FRML MDRD: 32 ML/MIN/1.73SQ M
GFR SERPL CREATININE-BSD FRML MDRD: 38 ML/MIN/1.73SQ M
GFR SERPL CREATININE-BSD FRML MDRD: 49 ML/MIN/1.73SQ M
GFR SERPL CREATININE-BSD FRML MDRD: 49 ML/MIN/1.73SQ M
GFR SERPL CREATININE-BSD FRML MDRD: 51 ML/MIN/1.73SQ M
GFR SERPL CREATININE-BSD FRML MDRD: 55 ML/MIN/1.73SQ M
GFR SERPL CREATININE-BSD FRML MDRD: 61 ML/MIN/1.73SQ M
GFR SERPL CREATININE-BSD FRML MDRD: 62 ML/MIN/1.73SQ M
GFR SERPL CREATININE-BSD FRML MDRD: 63 ML/MIN/1.73SQ M
GFR SERPL CREATININE-BSD FRML MDRD: 66 ML/MIN/1.73SQ M
GFR SERPL CREATININE-BSD FRML MDRD: 71 ML/MIN/1.73SQ M
GFR SERPL CREATININE-BSD FRML MDRD: 71 ML/MIN/1.73SQ M
GFR SERPL CREATININE-BSD FRML MDRD: 74 ML/MIN/1.73SQ M
GFR SERPL CREATININE-BSD FRML MDRD: 76 ML/MIN/1.73SQ M
GFR SERPL CREATININE-BSD FRML MDRD: 77 ML/MIN/1.73SQ M
GFR SERPL CREATININE-BSD FRML MDRD: 77 ML/MIN/1.73SQ M
GFR SERPL CREATININE-BSD FRML MDRD: 81 ML/MIN/1.73SQ M
GFR SERPL CREATININE-BSD FRML MDRD: 84 ML/MIN/1.73SQ M
GFR SERPL CREATININE-BSD FRML MDRD: 84 ML/MIN/1.73SQ M
GFR SERPL CREATININE-BSD FRML MDRD: 86 ML/MIN/1.73SQ M
GFR SERPL CREATININE-BSD FRML MDRD: 87 ML/MIN/1.73SQ M
GFR SERPL CREATININE-BSD FRML MDRD: 88 ML/MIN/1.73SQ M
GFR SERPL CREATININE-BSD FRML MDRD: 89 ML/MIN/1.73SQ M
GFR SERPL CREATININE-BSD FRML MDRD: 90 ML/MIN/1.73SQ M
GFR SERPL CREATININE-BSD FRML MDRD: 90 ML/MIN/1.73SQ M
GFR SERPL CREATININE-BSD FRML MDRD: 91 ML/MIN/1.73SQ M
GFR SERPL CREATININE-BSD FRML MDRD: 92 ML/MIN/1.73SQ M
GFR SERPL CREATININE-BSD FRML MDRD: 93 ML/MIN/1.73SQ M
GFR SERPL CREATININE-BSD FRML MDRD: 93 ML/MIN/1.73SQ M
GFR SERPL CREATININE-BSD FRML MDRD: 95 ML/MIN/1.73SQ M
GFR SERPL CREATININE-BSD FRML MDRD: 96 ML/MIN/1.73SQ M
GFR SERPL CREATININE-BSD FRML MDRD: 97 ML/MIN/1.73SQ M
GFR SERPL CREATININE-BSD FRML MDRD: 97 ML/MIN/1.73SQ M
GFR SERPL CREATININE-BSD FRML MDRD: 99 ML/MIN/1.73SQ M
GIANT PLATELETS BLD QL SMEAR: PRESENT
GLUCOSE P FAST SERPL-MCNC: 315 MG/DL (ref 65–99)
GLUCOSE SERPL-MCNC: 101 MG/DL (ref 65–140)
GLUCOSE SERPL-MCNC: 101 MG/DL (ref 65–140)
GLUCOSE SERPL-MCNC: 102 MG/DL (ref 65–140)
GLUCOSE SERPL-MCNC: 103 MG/DL (ref 65–140)
GLUCOSE SERPL-MCNC: 103 MG/DL (ref 65–140)
GLUCOSE SERPL-MCNC: 104 MG/DL (ref 65–140)
GLUCOSE SERPL-MCNC: 104 MG/DL (ref 65–140)
GLUCOSE SERPL-MCNC: 105 MG/DL (ref 65–140)
GLUCOSE SERPL-MCNC: 107 MG/DL (ref 65–140)
GLUCOSE SERPL-MCNC: 107 MG/DL (ref 65–140)
GLUCOSE SERPL-MCNC: 108 MG/DL (ref 65–140)
GLUCOSE SERPL-MCNC: 108 MG/DL (ref 65–140)
GLUCOSE SERPL-MCNC: 111 MG/DL (ref 65–140)
GLUCOSE SERPL-MCNC: 111 MG/DL (ref 65–140)
GLUCOSE SERPL-MCNC: 113 MG/DL (ref 65–140)
GLUCOSE SERPL-MCNC: 114 MG/DL (ref 65–140)
GLUCOSE SERPL-MCNC: 115 MG/DL (ref 65–140)
GLUCOSE SERPL-MCNC: 116 MG/DL (ref 65–140)
GLUCOSE SERPL-MCNC: 117 MG/DL (ref 65–140)
GLUCOSE SERPL-MCNC: 118 MG/DL (ref 65–140)
GLUCOSE SERPL-MCNC: 119 MG/DL (ref 65–140)
GLUCOSE SERPL-MCNC: 120 MG/DL (ref 65–140)
GLUCOSE SERPL-MCNC: 121 MG/DL (ref 65–140)
GLUCOSE SERPL-MCNC: 122 MG/DL (ref 65–140)
GLUCOSE SERPL-MCNC: 123 MG/DL (ref 65–140)
GLUCOSE SERPL-MCNC: 123 MG/DL (ref 65–140)
GLUCOSE SERPL-MCNC: 124 MG/DL (ref 65–140)
GLUCOSE SERPL-MCNC: 124 MG/DL (ref 65–140)
GLUCOSE SERPL-MCNC: 125 MG/DL (ref 65–140)
GLUCOSE SERPL-MCNC: 125 MG/DL (ref 65–140)
GLUCOSE SERPL-MCNC: 126 MG/DL (ref 65–140)
GLUCOSE SERPL-MCNC: 127 MG/DL (ref 65–140)
GLUCOSE SERPL-MCNC: 128 MG/DL (ref 65–140)
GLUCOSE SERPL-MCNC: 129 MG/DL (ref 65–140)
GLUCOSE SERPL-MCNC: 129 MG/DL (ref 65–140)
GLUCOSE SERPL-MCNC: 130 MG/DL (ref 65–140)
GLUCOSE SERPL-MCNC: 130 MG/DL (ref 65–140)
GLUCOSE SERPL-MCNC: 131 MG/DL (ref 65–140)
GLUCOSE SERPL-MCNC: 132 MG/DL (ref 65–140)
GLUCOSE SERPL-MCNC: 132 MG/DL (ref 65–140)
GLUCOSE SERPL-MCNC: 133 MG/DL (ref 65–140)
GLUCOSE SERPL-MCNC: 134 MG/DL (ref 65–140)
GLUCOSE SERPL-MCNC: 135 MG/DL (ref 65–140)
GLUCOSE SERPL-MCNC: 135 MG/DL (ref 65–140)
GLUCOSE SERPL-MCNC: 136 MG/DL (ref 65–140)
GLUCOSE SERPL-MCNC: 137 MG/DL (ref 65–140)
GLUCOSE SERPL-MCNC: 137 MG/DL (ref 65–140)
GLUCOSE SERPL-MCNC: 138 MG/DL (ref 65–140)
GLUCOSE SERPL-MCNC: 139 MG/DL (ref 65–140)
GLUCOSE SERPL-MCNC: 140 MG/DL (ref 65–140)
GLUCOSE SERPL-MCNC: 141 MG/DL (ref 65–140)
GLUCOSE SERPL-MCNC: 141 MG/DL (ref 65–140)
GLUCOSE SERPL-MCNC: 142 MG/DL (ref 65–140)
GLUCOSE SERPL-MCNC: 142 MG/DL (ref 65–140)
GLUCOSE SERPL-MCNC: 143 MG/DL (ref 65–140)
GLUCOSE SERPL-MCNC: 144 MG/DL (ref 65–140)
GLUCOSE SERPL-MCNC: 144 MG/DL (ref 65–140)
GLUCOSE SERPL-MCNC: 145 MG/DL (ref 65–140)
GLUCOSE SERPL-MCNC: 146 MG/DL (ref 65–140)
GLUCOSE SERPL-MCNC: 147 MG/DL (ref 65–140)
GLUCOSE SERPL-MCNC: 147 MG/DL (ref 65–140)
GLUCOSE SERPL-MCNC: 148 MG/DL (ref 65–140)
GLUCOSE SERPL-MCNC: 149 MG/DL (ref 65–140)
GLUCOSE SERPL-MCNC: 150 MG/DL (ref 65–140)
GLUCOSE SERPL-MCNC: 151 MG/DL (ref 65–140)
GLUCOSE SERPL-MCNC: 153 MG/DL (ref 65–140)
GLUCOSE SERPL-MCNC: 154 MG/DL (ref 65–140)
GLUCOSE SERPL-MCNC: 155 MG/DL (ref 65–140)
GLUCOSE SERPL-MCNC: 155 MG/DL (ref 65–140)
GLUCOSE SERPL-MCNC: 156 MG/DL (ref 65–140)
GLUCOSE SERPL-MCNC: 157 MG/DL (ref 65–140)
GLUCOSE SERPL-MCNC: 158 MG/DL (ref 65–140)
GLUCOSE SERPL-MCNC: 158 MG/DL (ref 65–140)
GLUCOSE SERPL-MCNC: 159 MG/DL (ref 65–140)
GLUCOSE SERPL-MCNC: 159 MG/DL (ref 65–140)
GLUCOSE SERPL-MCNC: 160 MG/DL (ref 65–140)
GLUCOSE SERPL-MCNC: 161 MG/DL (ref 65–140)
GLUCOSE SERPL-MCNC: 162 MG/DL (ref 65–140)
GLUCOSE SERPL-MCNC: 162 MG/DL (ref 65–140)
GLUCOSE SERPL-MCNC: 163 MG/DL (ref 65–140)
GLUCOSE SERPL-MCNC: 164 MG/DL (ref 65–140)
GLUCOSE SERPL-MCNC: 165 MG/DL (ref 65–140)
GLUCOSE SERPL-MCNC: 166 MG/DL (ref 65–140)
GLUCOSE SERPL-MCNC: 166 MG/DL (ref 65–140)
GLUCOSE SERPL-MCNC: 168 MG/DL (ref 65–140)
GLUCOSE SERPL-MCNC: 168 MG/DL (ref 65–140)
GLUCOSE SERPL-MCNC: 170 MG/DL (ref 65–140)
GLUCOSE SERPL-MCNC: 171 MG/DL (ref 65–140)
GLUCOSE SERPL-MCNC: 172 MG/DL (ref 65–140)
GLUCOSE SERPL-MCNC: 173 MG/DL (ref 65–140)
GLUCOSE SERPL-MCNC: 174 MG/DL (ref 65–140)
GLUCOSE SERPL-MCNC: 174 MG/DL (ref 65–140)
GLUCOSE SERPL-MCNC: 175 MG/DL (ref 65–140)
GLUCOSE SERPL-MCNC: 176 MG/DL (ref 65–140)
GLUCOSE SERPL-MCNC: 176 MG/DL (ref 65–140)
GLUCOSE SERPL-MCNC: 177 MG/DL (ref 65–140)
GLUCOSE SERPL-MCNC: 178 MG/DL (ref 65–140)
GLUCOSE SERPL-MCNC: 178 MG/DL (ref 65–140)
GLUCOSE SERPL-MCNC: 190 MG/DL (ref 65–140)
GLUCOSE SERPL-MCNC: 191 MG/DL (ref 65–140)
GLUCOSE SERPL-MCNC: 192 MG/DL (ref 65–140)
GLUCOSE SERPL-MCNC: 197 MG/DL (ref 65–140)
GLUCOSE SERPL-MCNC: 198 MG/DL (ref 65–140)
GLUCOSE SERPL-MCNC: 200 MG/DL (ref 65–140)
GLUCOSE SERPL-MCNC: 200 MG/DL (ref 65–140)
GLUCOSE SERPL-MCNC: 201 MG/DL (ref 65–140)
GLUCOSE SERPL-MCNC: 201 MG/DL (ref 65–140)
GLUCOSE SERPL-MCNC: 203 MG/DL (ref 65–140)
GLUCOSE SERPL-MCNC: 204 MG/DL (ref 65–140)
GLUCOSE SERPL-MCNC: 206 MG/DL (ref 65–140)
GLUCOSE SERPL-MCNC: 206 MG/DL (ref 65–140)
GLUCOSE SERPL-MCNC: 212 MG/DL (ref 65–140)
GLUCOSE SERPL-MCNC: 215 MG/DL (ref 65–140)
GLUCOSE SERPL-MCNC: 220 MG/DL (ref 65–140)
GLUCOSE SERPL-MCNC: 221 MG/DL (ref 65–140)
GLUCOSE SERPL-MCNC: 248 MG/DL (ref 65–140)
GLUCOSE SERPL-MCNC: 254 MG/DL (ref 65–140)
GLUCOSE SERPL-MCNC: 261 MG/DL (ref 65–140)
GLUCOSE SERPL-MCNC: 306 MG/DL (ref 65–140)
GLUCOSE SERPL-MCNC: 317 MG/DL (ref 65–140)
GLUCOSE SERPL-MCNC: 324 MG/DL (ref 65–140)
GLUCOSE SERPL-MCNC: 356 MG/DL (ref 65–140)
GLUCOSE SERPL-MCNC: 370 MG/DL (ref 65–140)
GLUCOSE SERPL-MCNC: 410 MG/DL (ref 65–140)
GLUCOSE SERPL-MCNC: 418 MG/DL (ref 65–140)
GLUCOSE SERPL-MCNC: 422 MG/DL (ref 65–140)
GLUCOSE SERPL-MCNC: 467 MG/DL (ref 65–140)
GLUCOSE SERPL-MCNC: 475 MG/DL (ref 65–140)
GLUCOSE SERPL-MCNC: 490 MG/DL (ref 65–140)
GLUCOSE SERPL-MCNC: 492 MG/DL (ref 65–140)
GLUCOSE SERPL-MCNC: 56 MG/DL (ref 65–140)
GLUCOSE SERPL-MCNC: 62 MG/DL (ref 65–140)
GLUCOSE SERPL-MCNC: 62 MG/DL (ref 65–140)
GLUCOSE SERPL-MCNC: 71 MG/DL (ref 65–140)
GLUCOSE SERPL-MCNC: 76 MG/DL (ref 65–140)
GLUCOSE SERPL-MCNC: 77 MG/DL (ref 65–140)
GLUCOSE SERPL-MCNC: 82 MG/DL (ref 65–140)
GLUCOSE SERPL-MCNC: 82 MG/DL (ref 65–140)
GLUCOSE SERPL-MCNC: 84 MG/DL (ref 65–140)
GLUCOSE SERPL-MCNC: 88 MG/DL (ref 65–140)
GLUCOSE SERPL-MCNC: 89 MG/DL (ref 65–140)
GLUCOSE SERPL-MCNC: 91 MG/DL (ref 65–140)
GLUCOSE SERPL-MCNC: 92 MG/DL (ref 65–140)
GLUCOSE SERPL-MCNC: 92 MG/DL (ref 65–140)
GLUCOSE SERPL-MCNC: 93 MG/DL (ref 65–140)
GLUCOSE SERPL-MCNC: 94 MG/DL (ref 65–140)
GLUCOSE SERPL-MCNC: 94 MG/DL (ref 65–140)
GLUCOSE SERPL-MCNC: >500 MG/DL (ref 65–140)
GLUCOSE SERPL-MCNC: >500 MG/DL (ref 65–140)
GLUCOSE UR STRIP-MCNC: NEGATIVE MG/DL
GRAM STN SPEC: ABNORMAL
HAPTOGLOB SERPL-MCNC: 67 MG/DL (ref 34–355)
HBA1C MFR BLD: 8.7 %
HBA1C MFR BLD: 9.2 %
HCO3 BLDA-SCNC: 17.4 MMOL/L (ref 22–28)
HCO3 BLDA-SCNC: 17.7 MMOL/L (ref 22–28)
HCO3 BLDA-SCNC: 18.4 MMOL/L (ref 22–28)
HCO3 BLDA-SCNC: 19.2 MMOL/L (ref 22–28)
HCO3 BLDA-SCNC: 20.3 MMOL/L (ref 22–28)
HCO3 BLDA-SCNC: 20.7 MMOL/L (ref 22–28)
HCO3 BLDA-SCNC: 20.9 MMOL/L (ref 22–28)
HCO3 BLDA-SCNC: 21.2 MMOL/L (ref 22–28)
HCO3 BLDA-SCNC: 21.4 MMOL/L (ref 22–28)
HCO3 BLDA-SCNC: 21.5 MMOL/L (ref 22–28)
HCO3 BLDA-SCNC: 21.7 MMOL/L (ref 22–28)
HCO3 BLDA-SCNC: 21.8 MMOL/L (ref 22–28)
HCO3 BLDA-SCNC: 21.9 MMOL/L (ref 22–28)
HCO3 BLDA-SCNC: 22 MMOL/L (ref 22–28)
HCO3 BLDA-SCNC: 22.1 MMOL/L (ref 22–28)
HCO3 BLDA-SCNC: 22.4 MMOL/L (ref 22–28)
HCO3 BLDA-SCNC: 22.5 MMOL/L (ref 22–28)
HCO3 BLDA-SCNC: 22.7 MMOL/L (ref 22–28)
HCO3 BLDA-SCNC: 22.7 MMOL/L (ref 22–28)
HCO3 BLDA-SCNC: 22.8 MMOL/L (ref 22–28)
HCO3 BLDA-SCNC: 22.9 MMOL/L (ref 22–28)
HCO3 BLDA-SCNC: 23.5 MMOL/L (ref 22–28)
HCO3 BLDA-SCNC: 23.7 MMOL/L (ref 22–28)
HCO3 BLDA-SCNC: 23.7 MMOL/L (ref 22–28)
HCO3 BLDA-SCNC: 23.9 MMOL/L (ref 22–28)
HCO3 BLDA-SCNC: 23.9 MMOL/L (ref 22–28)
HCO3 BLDA-SCNC: 24.4 MMOL/L (ref 22–28)
HCO3 BLDA-SCNC: 24.5 MMOL/L (ref 22–28)
HCO3 BLDA-SCNC: 24.6 MMOL/L (ref 22–28)
HCO3 BLDA-SCNC: 25.3 MMOL/L (ref 22–28)
HCO3 BLDA-SCNC: 25.6 MMOL/L (ref 22–28)
HCO3 BLDA-SCNC: 25.7 MMOL/L (ref 22–28)
HCO3 BLDA-SCNC: 25.7 MMOL/L (ref 22–28)
HCO3 BLDA-SCNC: 26.1 MMOL/L (ref 22–28)
HCO3 BLDA-SCNC: 26.3 MMOL/L (ref 22–28)
HCO3 BLDA-SCNC: 26.3 MMOL/L (ref 22–28)
HCO3 BLDA-SCNC: 26.4 MMOL/L (ref 22–28)
HCO3 BLDA-SCNC: 26.6 MMOL/L (ref 22–28)
HCO3 BLDA-SCNC: 26.7 MMOL/L (ref 22–28)
HCO3 BLDA-SCNC: 27.1 MMOL/L (ref 22–28)
HCO3 BLDA-SCNC: 27.3 MMOL/L (ref 22–28)
HCO3 BLDA-SCNC: 27.4 MMOL/L (ref 22–28)
HCO3 BLDA-SCNC: 28.4 MMOL/L (ref 22–28)
HCO3 BLDA-SCNC: 37.1 MMOL/L (ref 22–28)
HCO3 BLDV-SCNC: 19.3 MMOL/L (ref 24–30)
HCO3 BLDV-SCNC: 19.6 MMOL/L (ref 24–30)
HCT VFR BLD AUTO: 21.2 % (ref 36.5–49.3)
HCT VFR BLD AUTO: 21.6 % (ref 36.5–49.3)
HCT VFR BLD AUTO: 21.7 % (ref 36.5–49.3)
HCT VFR BLD AUTO: 21.8 % (ref 36.5–49.3)
HCT VFR BLD AUTO: 21.9 % (ref 36.5–49.3)
HCT VFR BLD AUTO: 22.3 % (ref 36.5–49.3)
HCT VFR BLD AUTO: 22.6 % (ref 36.5–49.3)
HCT VFR BLD AUTO: 22.8 % (ref 36.5–49.3)
HCT VFR BLD AUTO: 22.9 % (ref 36.5–49.3)
HCT VFR BLD AUTO: 24.1 % (ref 36.5–49.3)
HCT VFR BLD AUTO: 24.3 % (ref 36.5–49.3)
HCT VFR BLD AUTO: 24.4 % (ref 36.5–49.3)
HCT VFR BLD AUTO: 24.4 % (ref 36.5–49.3)
HCT VFR BLD AUTO: 24.5 % (ref 36.5–49.3)
HCT VFR BLD AUTO: 24.8 % (ref 36.5–49.3)
HCT VFR BLD AUTO: 24.9 % (ref 36.5–49.3)
HCT VFR BLD AUTO: 25 % (ref 36.5–49.3)
HCT VFR BLD AUTO: 25.1 % (ref 36.5–49.3)
HCT VFR BLD AUTO: 25.2 % (ref 36.5–49.3)
HCT VFR BLD AUTO: 25.3 % (ref 36.5–49.3)
HCT VFR BLD AUTO: 25.5 % (ref 36.5–49.3)
HCT VFR BLD AUTO: 25.6 % (ref 36.5–49.3)
HCT VFR BLD AUTO: 25.7 % (ref 36.5–49.3)
HCT VFR BLD AUTO: 25.9 % (ref 36.5–49.3)
HCT VFR BLD AUTO: 26.4 % (ref 36.5–49.3)
HCT VFR BLD AUTO: 26.6 % (ref 36.5–49.3)
HCT VFR BLD AUTO: 27.1 % (ref 36.5–49.3)
HCT VFR BLD AUTO: 27.6 % (ref 36.5–49.3)
HCT VFR BLD AUTO: 27.9 % (ref 36.5–49.3)
HCT VFR BLD AUTO: 29.3 % (ref 36.5–49.3)
HCT VFR BLD AUTO: 29.5 % (ref 36.5–49.3)
HCT VFR BLD AUTO: 29.6 % (ref 36.5–49.3)
HCT VFR BLD AUTO: 30 % (ref 36.5–49.3)
HCT VFR BLD AUTO: 31.2 % (ref 36.5–49.3)
HCT VFR BLD AUTO: 33.9 % (ref 36.5–49.3)
HCT VFR BLD AUTO: 35.5 % (ref 36.5–49.3)
HCT VFR BLD AUTO: 36.5 % (ref 36.5–49.3)
HCT VFR BLD AUTO: 38.2 % (ref 36.5–49.3)
HCT VFR BLD CALC: 23 % (ref 36.5–49.3)
HCT VFR BLD CALC: 26 % (ref 36.5–49.3)
HCT VFR BLD CALC: 28 % (ref 36.5–49.3)
HCT VFR BLD CALC: 29 % (ref 36.5–49.3)
HCT VFR BLD CALC: 30 % (ref 36.5–49.3)
HCT VFR BLD CALC: 31 % (ref 36.5–49.3)
HGB BLD-MCNC: 10 G/DL (ref 12–17)
HGB BLD-MCNC: 11.4 G/DL (ref 12–17)
HGB BLD-MCNC: 12.2 G/DL (ref 12–17)
HGB BLD-MCNC: 12.3 G/DL (ref 12–17)
HGB BLD-MCNC: 13 G/DL (ref 12–17)
HGB BLD-MCNC: 6.6 G/DL (ref 12–17)
HGB BLD-MCNC: 6.6 G/DL (ref 12–17)
HGB BLD-MCNC: 7 G/DL (ref 12–17)
HGB BLD-MCNC: 7.1 G/DL (ref 12–17)
HGB BLD-MCNC: 7.2 G/DL (ref 12–17)
HGB BLD-MCNC: 7.2 G/DL (ref 12–17)
HGB BLD-MCNC: 7.3 G/DL (ref 12–17)
HGB BLD-MCNC: 7.4 G/DL (ref 12–17)
HGB BLD-MCNC: 7.4 G/DL (ref 12–17)
HGB BLD-MCNC: 7.6 G/DL (ref 12–17)
HGB BLD-MCNC: 7.7 G/DL (ref 12–17)
HGB BLD-MCNC: 7.8 G/DL (ref 12–17)
HGB BLD-MCNC: 7.9 G/DL (ref 12–17)
HGB BLD-MCNC: 7.9 G/DL (ref 12–17)
HGB BLD-MCNC: 8 G/DL (ref 12–17)
HGB BLD-MCNC: 8.1 G/DL (ref 12–17)
HGB BLD-MCNC: 8.2 G/DL (ref 12–17)
HGB BLD-MCNC: 8.3 G/DL (ref 12–17)
HGB BLD-MCNC: 8.3 G/DL (ref 12–17)
HGB BLD-MCNC: 8.6 G/DL (ref 12–17)
HGB BLD-MCNC: 9 G/DL (ref 12–17)
HGB BLD-MCNC: 9.4 G/DL (ref 12–17)
HGB BLD-MCNC: 9.4 G/DL (ref 12–17)
HGB BLD-MCNC: 9.9 G/DL (ref 12–17)
HGB BLDA-MCNC: 10.2 G/DL (ref 12–17)
HGB BLDA-MCNC: 10.5 G/DL (ref 12–17)
HGB BLDA-MCNC: 7.8 G/DL (ref 12–17)
HGB BLDA-MCNC: 8.8 G/DL (ref 12–17)
HGB BLDA-MCNC: 9.5 G/DL (ref 12–17)
HGB BLDA-MCNC: 9.9 G/DL (ref 12–17)
HGB UR QL STRIP.AUTO: NEGATIVE
HOROWITZ INDEX BLDA+IHG-RTO: 100 MM[HG]
HOROWITZ INDEX BLDA+IHG-RTO: 50 MM[HG]
HOROWITZ INDEX BLDA+IHG-RTO: 50 MM[HG]
HOROWITZ INDEX BLDA+IHG-RTO: 85 MM[HG]
HOROWITZ INDEX BLDA+IHG-RTO: 85 MM[HG]
HOROWITZ INDEX BLDA+IHG-RTO: 95 MM[HG]
I-TIME: 1
IMM GRANULOCYTES # BLD AUTO: 0.01 THOUSAND/UL (ref 0–0.2)
IMM GRANULOCYTES # BLD AUTO: 0.02 THOUSAND/UL (ref 0–0.2)
IMM GRANULOCYTES # BLD AUTO: 0.02 THOUSAND/UL (ref 0–0.2)
IMM GRANULOCYTES # BLD AUTO: 0.21 THOUSAND/UL (ref 0–0.2)
IMM GRANULOCYTES # BLD AUTO: 0.44 THOUSAND/UL (ref 0–0.2)
IMM GRANULOCYTES # BLD AUTO: >0.5 THOUSAND/UL (ref 0–0.2)
IMM GRANULOCYTES NFR BLD AUTO: 0 % (ref 0–2)
IMM GRANULOCYTES NFR BLD AUTO: 1 % (ref 0–2)
IMM GRANULOCYTES NFR BLD AUTO: 1 % (ref 0–2)
IMM GRANULOCYTES NFR BLD AUTO: 2 % (ref 0–2)
IMM GRANULOCYTES NFR BLD AUTO: 5 % (ref 0–2)
INR PPP: 1.16 (ref 0.84–1.19)
INR PPP: 1.3 (ref 0.84–1.19)
INR PPP: 1.61 (ref 0.84–1.19)
INR PPP: 1.63 (ref 0.84–1.19)
INR PPP: 1.67 (ref 0.84–1.19)
INR PPP: 1.78 (ref 0.84–1.19)
INR PPP: 1.78 (ref 0.84–1.19)
INR PPP: 1.82 (ref 0.84–1.19)
INR PPP: 1.96 (ref 0.84–1.19)
INTERVENTRICULAR SEPTUM IN DIASTOLE (PARASTERNAL SHORT AXIS VIEW): 1.1 CM
INTERVENTRICULAR SEPTUM IN DIASTOLE (PARASTERNAL SHORT AXIS VIEW): 1.6 CM
INTERVENTRICULAR SEPTUM: 1.1 CM (ref 0.6–1.1)
INTERVENTRICULAR SEPTUM: 1.6 CM (ref 0.6–1.1)
KETONES UR STRIP-MCNC: NEGATIVE MG/DL
LAAS-AP2: 25.6 CM2
LAAS-AP4: 34.9 CM2
LACTATE SERPL-SCNC: 1.9 MMOL/L (ref 0.5–2)
LACTATE SERPL-SCNC: 2.2 MMOL/L (ref 0.5–2)
LACTATE SERPL-SCNC: 2.4 MMOL/L (ref 0.5–2)
LACTATE SERPL-SCNC: 2.4 MMOL/L (ref 0.5–2)
LACTATE SERPL-SCNC: 2.5 MMOL/L (ref 0.5–2)
LACTATE SERPL-SCNC: 2.6 MMOL/L (ref 0.5–2)
LACTATE SERPL-SCNC: 3 MMOL/L (ref 0.5–2)
LACTATE SERPL-SCNC: 3.2 MMOL/L (ref 0.5–2)
LACTATE SERPL-SCNC: 3.3 MMOL/L (ref 0.5–2)
LACTATE SERPL-SCNC: 3.3 MMOL/L (ref 0.5–2)
LACTATE SERPL-SCNC: 3.5 MMOL/L (ref 0.5–2)
LACTATE SERPL-SCNC: 3.6 MMOL/L (ref 0.5–2)
LACTATE SERPL-SCNC: 3.7 MMOL/L (ref 0.5–2)
LACTATE SERPL-SCNC: 3.7 MMOL/L (ref 0.5–2)
LACTATE SERPL-SCNC: 3.8 MMOL/L (ref 0.5–2)
LACTATE SERPL-SCNC: 3.9 MMOL/L (ref 0.5–2)
LACTATE SERPL-SCNC: 3.9 MMOL/L (ref 0.5–2)
LACTATE SERPL-SCNC: 4 MMOL/L (ref 0.5–2)
LACTATE SERPL-SCNC: 4.1 MMOL/L (ref 0.5–2)
LACTATE SERPL-SCNC: 4.6 MMOL/L (ref 0.5–2)
LACTATE SERPL-SCNC: 4.7 MMOL/L (ref 0.5–2)
LACTATE SERPL-SCNC: 4.9 MMOL/L (ref 0.5–2)
LACTATE SERPL-SCNC: 4.9 MMOL/L (ref 0.5–2)
LACTATE SERPL-SCNC: 5 MMOL/L (ref 0.5–2)
LACTATE SERPL-SCNC: 5.3 MMOL/L (ref 0.5–2)
LACTATE SERPL-SCNC: 5.6 MMOL/L (ref 0.5–2)
LACTATE SERPL-SCNC: 5.7 MMOL/L (ref 0.5–2)
LACTATE SERPL-SCNC: 5.9 MMOL/L (ref 0.5–2)
LACTATE SERPL-SCNC: 6.1 MMOL/L (ref 0.5–2)
LACTATE SERPL-SCNC: 6.9 MMOL/L (ref 0.5–2)
LACTATE SERPL-SCNC: 7 MMOL/L (ref 0.5–2)
LACTATE SERPL-SCNC: 7.1 MMOL/L (ref 0.5–2)
LACTATE SERPL-SCNC: 7.6 MMOL/L (ref 0.5–2)
LDH SERPL-CCNC: 299 U/L (ref 81–234)
LEFT ATRIUM SIZE: 4.2 CM
LEFT ATRIUM SIZE: 5.7 CM
LEFT INTERNAL DIMENSION IN SYSTOLE: 3.9 CM (ref 2.1–4)
LEFT INTERNAL DIMENSION IN SYSTOLE: 4.5 CM (ref 2.1–4)
LEFT VENTRICULAR INTERNAL DIMENSION IN DIASTOLE: 5.6 CM (ref 3.5–6)
LEFT VENTRICULAR INTERNAL DIMENSION IN DIASTOLE: 5.6 CM (ref 3.5–6)
LEFT VENTRICULAR POSTERIOR WALL IN END DIASTOLE: 1.1 CM
LEFT VENTRICULAR POSTERIOR WALL IN END DIASTOLE: 1.5 CM
LEFT VENTRICULAR STROKE VOLUME: 58 ML
LEFT VENTRICULAR STROKE VOLUME: 87 ML
LEUKOCYTE ESTERASE UR QL STRIP: NEGATIVE
LVSV (TEICH): 58 ML
LVSV (TEICH): 87 ML
LYMPHOCYTES # BLD AUTO: 0 % (ref 14–44)
LYMPHOCYTES # BLD AUTO: 0 THOUSAND/UL (ref 0.6–4.47)
LYMPHOCYTES # BLD AUTO: 0.13 THOUSAND/UL (ref 0.6–4.47)
LYMPHOCYTES # BLD AUTO: 0.14 THOUSAND/UL (ref 0.6–4.47)
LYMPHOCYTES # BLD AUTO: 0.17 THOUSAND/UL (ref 0.6–4.47)
LYMPHOCYTES # BLD AUTO: 0.33 THOUSAND/UL (ref 0.6–4.47)
LYMPHOCYTES # BLD AUTO: 0.37 THOUSAND/UL (ref 0.6–4.47)
LYMPHOCYTES # BLD AUTO: 0.55 THOUSAND/UL (ref 0.6–4.47)
LYMPHOCYTES # BLD AUTO: 0.59 THOUSANDS/ΜL (ref 0.6–4.47)
LYMPHOCYTES # BLD AUTO: 0.65 THOUSANDS/ΜL (ref 0.6–4.47)
LYMPHOCYTES # BLD AUTO: 0.65 THOUSANDS/ΜL (ref 0.6–4.47)
LYMPHOCYTES # BLD AUTO: 0.86 THOUSANDS/ΜL (ref 0.6–4.47)
LYMPHOCYTES # BLD AUTO: 0.87 THOUSANDS/ΜL (ref 0.6–4.47)
LYMPHOCYTES # BLD AUTO: 0.92 THOUSANDS/ΜL (ref 0.6–4.47)
LYMPHOCYTES # BLD AUTO: 0.94 THOUSANDS/ΜL (ref 0.6–4.47)
LYMPHOCYTES # BLD AUTO: 1 % (ref 14–44)
LYMPHOCYTES # BLD AUTO: 1 % (ref 14–44)
LYMPHOCYTES # BLD AUTO: 1.31 THOUSANDS/ΜL (ref 0.6–4.47)
LYMPHOCYTES # BLD AUTO: 17 % (ref 14–44)
LYMPHOCYTES # BLD AUTO: 2 % (ref 14–44)
LYMPHOCYTES # BLD AUTO: 2 % (ref 14–44)
LYMPHOCYTES # BLD AUTO: 8 % (ref 14–44)
LYMPHOCYTES NFR BLD AUTO: 13 % (ref 14–44)
LYMPHOCYTES NFR BLD AUTO: 16 % (ref 14–44)
LYMPHOCYTES NFR BLD AUTO: 16 % (ref 14–44)
LYMPHOCYTES NFR BLD AUTO: 18 % (ref 14–44)
LYMPHOCYTES NFR BLD AUTO: 3 % (ref 14–44)
LYMPHOCYTES NFR BLD AUTO: 5 % (ref 14–44)
LYMPHOCYTES NFR BLD AUTO: 6 % (ref 14–44)
LYMPHOCYTES NFR BLD AUTO: 8 % (ref 14–44)
MACROCYTES BLD QL AUTO: PRESENT
MAGNESIUM SERPL-MCNC: 1.2 MG/DL (ref 1.6–2.6)
MAGNESIUM SERPL-MCNC: 1.5 MG/DL (ref 1.6–2.6)
MAGNESIUM SERPL-MCNC: 1.6 MG/DL (ref 1.6–2.6)
MAGNESIUM SERPL-MCNC: 1.7 MG/DL (ref 1.6–2.6)
MAGNESIUM SERPL-MCNC: 1.8 MG/DL (ref 1.6–2.6)
MAGNESIUM SERPL-MCNC: 1.9 MG/DL (ref 1.6–2.6)
MAGNESIUM SERPL-MCNC: 2 MG/DL (ref 1.6–2.6)
MAGNESIUM SERPL-MCNC: 2.1 MG/DL (ref 1.6–2.6)
MAGNESIUM SERPL-MCNC: 2.2 MG/DL (ref 1.6–2.6)
MAGNESIUM SERPL-MCNC: 2.2 MG/DL (ref 1.6–2.6)
MAGNESIUM SERPL-MCNC: 2.4 MG/DL (ref 1.6–2.6)
MAGNESIUM SERPL-MCNC: 2.5 MG/DL (ref 1.6–2.6)
MCH RBC QN AUTO: 33.5 PG (ref 26.8–34.3)
MCH RBC QN AUTO: 33.6 PG (ref 26.8–34.3)
MCH RBC QN AUTO: 34.1 PG (ref 26.8–34.3)
MCH RBC QN AUTO: 34.4 PG (ref 26.8–34.3)
MCH RBC QN AUTO: 34.4 PG (ref 26.8–34.3)
MCH RBC QN AUTO: 34.7 PG (ref 26.8–34.3)
MCH RBC QN AUTO: 34.9 PG (ref 26.8–34.3)
MCH RBC QN AUTO: 35 PG (ref 26.8–34.3)
MCH RBC QN AUTO: 35 PG (ref 26.8–34.3)
MCH RBC QN AUTO: 35.3 PG (ref 26.8–34.3)
MCH RBC QN AUTO: 35.5 PG (ref 26.8–34.3)
MCH RBC QN AUTO: 35.5 PG (ref 26.8–34.3)
MCH RBC QN AUTO: 35.6 PG (ref 26.8–34.3)
MCH RBC QN AUTO: 35.7 PG (ref 26.8–34.3)
MCH RBC QN AUTO: 35.8 PG (ref 26.8–34.3)
MCH RBC QN AUTO: 35.9 PG (ref 26.8–34.3)
MCH RBC QN AUTO: 36 PG (ref 26.8–34.3)
MCH RBC QN AUTO: 36.1 PG (ref 26.8–34.3)
MCH RBC QN AUTO: 36.4 PG (ref 26.8–34.3)
MCH RBC QN AUTO: 36.5 PG (ref 26.8–34.3)
MCH RBC QN AUTO: 36.6 PG (ref 26.8–34.3)
MCH RBC QN AUTO: 36.7 PG (ref 26.8–34.3)
MCH RBC QN AUTO: 36.9 PG (ref 26.8–34.3)
MCH RBC QN AUTO: 36.9 PG (ref 26.8–34.3)
MCH RBC QN AUTO: 37.1 PG (ref 26.8–34.3)
MCH RBC QN AUTO: 37.2 PG (ref 26.8–34.3)
MCH RBC QN AUTO: 37.7 PG (ref 26.8–34.3)
MCH RBC QN AUTO: 37.7 PG (ref 26.8–34.3)
MCH RBC QN AUTO: 37.8 PG (ref 26.8–34.3)
MCHC RBC AUTO-ENTMCNC: 30 G/DL (ref 31.4–37.4)
MCHC RBC AUTO-ENTMCNC: 30.6 G/DL (ref 31.4–37.4)
MCHC RBC AUTO-ENTMCNC: 30.8 G/DL (ref 31.4–37.4)
MCHC RBC AUTO-ENTMCNC: 30.8 G/DL (ref 31.4–37.4)
MCHC RBC AUTO-ENTMCNC: 31.1 G/DL (ref 31.4–37.4)
MCHC RBC AUTO-ENTMCNC: 31.1 G/DL (ref 31.4–37.4)
MCHC RBC AUTO-ENTMCNC: 31.3 G/DL (ref 31.4–37.4)
MCHC RBC AUTO-ENTMCNC: 31.4 G/DL (ref 31.4–37.4)
MCHC RBC AUTO-ENTMCNC: 31.5 G/DL (ref 31.4–37.4)
MCHC RBC AUTO-ENTMCNC: 31.6 G/DL (ref 31.4–37.4)
MCHC RBC AUTO-ENTMCNC: 31.7 G/DL (ref 31.4–37.4)
MCHC RBC AUTO-ENTMCNC: 31.8 G/DL (ref 31.4–37.4)
MCHC RBC AUTO-ENTMCNC: 31.8 G/DL (ref 31.4–37.4)
MCHC RBC AUTO-ENTMCNC: 31.9 G/DL (ref 31.4–37.4)
MCHC RBC AUTO-ENTMCNC: 32 G/DL (ref 31.4–37.4)
MCHC RBC AUTO-ENTMCNC: 32.2 G/DL (ref 31.4–37.4)
MCHC RBC AUTO-ENTMCNC: 32.3 G/DL (ref 31.4–37.4)
MCHC RBC AUTO-ENTMCNC: 32.3 G/DL (ref 31.4–37.4)
MCHC RBC AUTO-ENTMCNC: 32.4 G/DL (ref 31.4–37.4)
MCHC RBC AUTO-ENTMCNC: 32.5 G/DL (ref 31.4–37.4)
MCHC RBC AUTO-ENTMCNC: 32.5 G/DL (ref 31.4–37.4)
MCHC RBC AUTO-ENTMCNC: 32.6 G/DL (ref 31.4–37.4)
MCHC RBC AUTO-ENTMCNC: 33.2 G/DL (ref 31.4–37.4)
MCHC RBC AUTO-ENTMCNC: 33.2 G/DL (ref 31.4–37.4)
MCHC RBC AUTO-ENTMCNC: 33.3 G/DL (ref 31.4–37.4)
MCHC RBC AUTO-ENTMCNC: 33.6 G/DL (ref 31.4–37.4)
MCHC RBC AUTO-ENTMCNC: 33.6 G/DL (ref 31.4–37.4)
MCHC RBC AUTO-ENTMCNC: 33.7 G/DL (ref 31.4–37.4)
MCHC RBC AUTO-ENTMCNC: 33.8 G/DL (ref 31.4–37.4)
MCHC RBC AUTO-ENTMCNC: 34 G/DL (ref 31.4–37.4)
MCHC RBC AUTO-ENTMCNC: 34.4 G/DL (ref 31.4–37.4)
MCV RBC AUTO: 106 FL (ref 82–98)
MCV RBC AUTO: 107 FL (ref 82–98)
MCV RBC AUTO: 108 FL (ref 82–98)
MCV RBC AUTO: 109 FL (ref 82–98)
MCV RBC AUTO: 110 FL (ref 82–98)
MCV RBC AUTO: 111 FL (ref 82–98)
MCV RBC AUTO: 112 FL (ref 82–98)
MCV RBC AUTO: 113 FL (ref 82–98)
MCV RBC AUTO: 114 FL (ref 82–98)
MCV RBC AUTO: 115 FL (ref 82–98)
METAMYELOCYTES NFR BLD MANUAL: 1 % (ref 0–1)
MONOCYTES # BLD AUTO: 0 THOUSAND/UL (ref 0–1.22)
MONOCYTES # BLD AUTO: 0 THOUSAND/UL (ref 0–1.22)
MONOCYTES # BLD AUTO: 0.05 THOUSAND/UL (ref 0–1.22)
MONOCYTES # BLD AUTO: 0.17 THOUSAND/UL (ref 0–1.22)
MONOCYTES # BLD AUTO: 0.25 THOUSAND/UL (ref 0–1.22)
MONOCYTES # BLD AUTO: 0.53 THOUSAND/UL (ref 0–1.22)
MONOCYTES # BLD AUTO: 0.53 THOUSAND/ΜL (ref 0.17–1.22)
MONOCYTES # BLD AUTO: 0.69 THOUSAND/ΜL (ref 0.17–1.22)
MONOCYTES # BLD AUTO: 0.74 THOUSAND/ΜL (ref 0.17–1.22)
MONOCYTES # BLD AUTO: 0.74 THOUSAND/ΜL (ref 0.17–1.22)
MONOCYTES # BLD AUTO: 0.76 THOUSAND/ΜL (ref 0.17–1.22)
MONOCYTES # BLD AUTO: 0.77 THOUSAND/ΜL (ref 0.17–1.22)
MONOCYTES # BLD AUTO: 0.83 THOUSAND/UL (ref 0–1.22)
MONOCYTES # BLD AUTO: 0.85 THOUSAND/ΜL (ref 0.17–1.22)
MONOCYTES # BLD AUTO: 0.93 THOUSAND/ΜL (ref 0.17–1.22)
MONOCYTES NFR BLD AUTO: 1 % (ref 4–12)
MONOCYTES NFR BLD AUTO: 11 % (ref 4–12)
MONOCYTES NFR BLD AUTO: 11 % (ref 4–12)
MONOCYTES NFR BLD AUTO: 13 % (ref 4–12)
MONOCYTES NFR BLD AUTO: 16 % (ref 4–12)
MONOCYTES NFR BLD AUTO: 19 % (ref 4–12)
MONOCYTES NFR BLD AUTO: 4 % (ref 4–12)
MONOCYTES NFR BLD AUTO: 6 % (ref 4–12)
MONOCYTES NFR BLD: 0 % (ref 4–12)
MONOCYTES NFR BLD: 0 % (ref 4–12)
MONOCYTES NFR BLD: 1 % (ref 4–12)
MONOCYTES NFR BLD: 13 % (ref 4–12)
MONOCYTES NFR BLD: 2 % (ref 4–12)
MONOCYTES NFR BLD: 3 % (ref 4–12)
MONOCYTES NFR BLD: 4 % (ref 4–12)
MUCOUS THREADS UR QL AUTO: ABNORMAL
MYELOCYTES NFR BLD MANUAL: 1 % (ref 0–1)
MYELOCYTES NFR BLD MANUAL: 1 % (ref 0–1)
NASAL CANNULA: 2
NASAL CANNULA: 2
NASAL CANNULA: 6
NASAL CANNULA: ABNORMAL
NEUTROPHILS # BLD AUTO: 12.78 THOUSANDS/ΜL (ref 1.85–7.62)
NEUTROPHILS # BLD AUTO: 16.33 THOUSANDS/ΜL (ref 1.85–7.62)
NEUTROPHILS # BLD AUTO: 2.28 THOUSANDS/ΜL (ref 1.85–7.62)
NEUTROPHILS # BLD AUTO: 2.89 THOUSANDS/ΜL (ref 1.85–7.62)
NEUTROPHILS # BLD AUTO: 2.9 THOUSANDS/ΜL (ref 1.85–7.62)
NEUTROPHILS # BLD AUTO: 4.98 THOUSANDS/ΜL (ref 1.85–7.62)
NEUTROPHILS # BLD AUTO: 48.47 THOUSANDS/ΜL (ref 1.85–7.62)
NEUTROPHILS # BLD AUTO: 6.52 THOUSANDS/ΜL (ref 1.85–7.62)
NEUTROPHILS # BLD MANUAL: 1.22 THOUSAND/UL (ref 1.85–7.62)
NEUTROPHILS # BLD MANUAL: 12.64 THOUSAND/UL (ref 1.85–7.62)
NEUTROPHILS # BLD MANUAL: 13.23 THOUSAND/UL (ref 1.85–7.62)
NEUTROPHILS # BLD MANUAL: 26.19 THOUSAND/UL (ref 1.85–7.62)
NEUTROPHILS # BLD MANUAL: 4.11 THOUSAND/UL (ref 1.85–7.62)
NEUTROPHILS # BLD MANUAL: 48.36 THOUSAND/UL (ref 1.85–7.62)
NEUTROPHILS # BLD MANUAL: 8.04 THOUSAND/UL (ref 1.85–7.62)
NEUTS BAND NFR BLD MANUAL: 10 % (ref 0–8)
NEUTS BAND NFR BLD MANUAL: 15 % (ref 0–8)
NEUTS BAND NFR BLD MANUAL: 17 % (ref 0–8)
NEUTS BAND NFR BLD MANUAL: 2 % (ref 0–8)
NEUTS BAND NFR BLD MANUAL: 6 % (ref 0–8)
NEUTS SEG NFR BLD AUTO: 60 % (ref 43–75)
NEUTS SEG NFR BLD AUTO: 61 % (ref 43–75)
NEUTS SEG NFR BLD AUTO: 62 % (ref 43–75)
NEUTS SEG NFR BLD AUTO: 68 % (ref 43–75)
NEUTS SEG NFR BLD AUTO: 73 % (ref 43–75)
NEUTS SEG NFR BLD AUTO: 80 % (ref 43–75)
NEUTS SEG NFR BLD AUTO: 81 % (ref 43–75)
NEUTS SEG NFR BLD AUTO: 82 % (ref 43–75)
NEUTS SEG NFR BLD AUTO: 85 % (ref 43–75)
NEUTS SEG NFR BLD AUTO: 87 % (ref 43–75)
NEUTS SEG NFR BLD AUTO: 89 % (ref 43–75)
NEUTS SEG NFR BLD AUTO: 89 % (ref 43–75)
NEUTS SEG NFR BLD AUTO: 91 % (ref 43–75)
NEUTS SEG NFR BLD AUTO: 91 % (ref 43–75)
NEUTS SEG NFR BLD AUTO: 93 % (ref 43–75)
NITRITE UR QL STRIP: NEGATIVE
NON-SQ EPI CELLS URNS QL MICRO: ABNORMAL /HPF
NRBC BLD AUTO-RTO: 0 /100 WBCS
NRBC BLD AUTO-RTO: 1 /100 WBCS
NRBC BLD AUTO-RTO: 1 /100 WBCS
NRBC BLD AUTO-RTO: 10 /100 WBC (ref 0–2)
NRBC BLD AUTO-RTO: 2 /100 WBC (ref 0–2)
O2 CT BLDA-SCNC: 10 ML/DL (ref 16–23)
O2 CT BLDA-SCNC: 10 ML/DL (ref 16–23)
O2 CT BLDA-SCNC: 10.1 ML/DL (ref 16–23)
O2 CT BLDA-SCNC: 10.5 ML/DL (ref 16–23)
O2 CT BLDA-SCNC: 10.7 ML/DL (ref 16–23)
O2 CT BLDA-SCNC: 10.9 ML/DL (ref 16–23)
O2 CT BLDA-SCNC: 11 ML/DL (ref 16–23)
O2 CT BLDA-SCNC: 11 ML/DL (ref 16–23)
O2 CT BLDA-SCNC: 11.2 ML/DL (ref 16–23)
O2 CT BLDA-SCNC: 11.2 ML/DL (ref 16–23)
O2 CT BLDA-SCNC: 11.3 ML/DL (ref 16–23)
O2 CT BLDA-SCNC: 11.4 ML/DL (ref 16–23)
O2 CT BLDA-SCNC: 11.5 ML/DL (ref 16–23)
O2 CT BLDA-SCNC: 11.6 ML/DL (ref 16–23)
O2 CT BLDA-SCNC: 11.7 ML/DL (ref 16–23)
O2 CT BLDA-SCNC: 11.7 ML/DL (ref 16–23)
O2 CT BLDA-SCNC: 11.9 ML/DL (ref 16–23)
O2 CT BLDA-SCNC: 12.1 ML/DL (ref 16–23)
O2 CT BLDA-SCNC: 12.1 ML/DL (ref 16–23)
O2 CT BLDA-SCNC: 12.3 ML/DL (ref 16–23)
O2 CT BLDA-SCNC: 12.4 ML/DL (ref 16–23)
O2 CT BLDA-SCNC: 12.9 ML/DL (ref 16–23)
O2 CT BLDA-SCNC: 12.9 ML/DL (ref 16–23)
O2 CT BLDA-SCNC: 13.6 ML/DL (ref 16–23)
O2 CT BLDA-SCNC: 13.7 ML/DL (ref 16–23)
O2 CT BLDA-SCNC: 13.9 ML/DL (ref 16–23)
O2 CT BLDA-SCNC: 14.8 ML/DL (ref 16–23)
O2 CT BLDA-SCNC: 15.1 ML/DL (ref 16–23)
O2 CT BLDA-SCNC: 15.4 ML/DL (ref 16–23)
O2 CT BLDA-SCNC: 15.7 ML/DL (ref 16–23)
O2 CT BLDA-SCNC: 15.9 ML/DL (ref 16–23)
O2 CT BLDA-SCNC: 9.5 ML/DL (ref 16–23)
O2 CT BLDV-SCNC: 12.8 ML/DL
O2 CT BLDV-SCNC: 8.8 ML/DL
OXYHGB MFR BLDA: 82.4 % (ref 94–97)
OXYHGB MFR BLDA: 86.8 % (ref 94–97)
OXYHGB MFR BLDA: 87.5 % (ref 94–97)
OXYHGB MFR BLDA: 88.1 % (ref 94–97)
OXYHGB MFR BLDA: 89.8 % (ref 94–97)
OXYHGB MFR BLDA: 91 % (ref 94–97)
OXYHGB MFR BLDA: 91.3 % (ref 94–97)
OXYHGB MFR BLDA: 91.5 % (ref 94–97)
OXYHGB MFR BLDA: 92.6 % (ref 94–97)
OXYHGB MFR BLDA: 93.2 % (ref 94–97)
OXYHGB MFR BLDA: 93.9 % (ref 94–97)
OXYHGB MFR BLDA: 94.4 % (ref 94–97)
OXYHGB MFR BLDA: 94.6 % (ref 94–97)
OXYHGB MFR BLDA: 94.9 % (ref 94–97)
OXYHGB MFR BLDA: 95 % (ref 94–97)
OXYHGB MFR BLDA: 95.3 % (ref 94–97)
OXYHGB MFR BLDA: 95.8 % (ref 94–97)
OXYHGB MFR BLDA: 96.1 % (ref 94–97)
OXYHGB MFR BLDA: 96.1 % (ref 94–97)
OXYHGB MFR BLDA: 96.4 % (ref 94–97)
OXYHGB MFR BLDA: 96.8 % (ref 94–97)
OXYHGB MFR BLDA: 96.8 % (ref 94–97)
OXYHGB MFR BLDA: 96.9 % (ref 94–97)
OXYHGB MFR BLDA: 97.1 % (ref 94–97)
OXYHGB MFR BLDA: 97.1 % (ref 94–97)
OXYHGB MFR BLDA: 97.2 % (ref 94–97)
OXYHGB MFR BLDA: 97.6 % (ref 94–97)
OXYHGB MFR BLDA: 97.7 % (ref 94–97)
OXYHGB MFR BLDA: 97.7 % (ref 94–97)
OXYHGB MFR BLDA: 97.9 % (ref 94–97)
OXYHGB MFR BLDA: 98.1 % (ref 94–97)
PAPPENHEIMER BOD BLD QL SMEAR: PRESENT
PCO2 BLD: 22 MMOL/L (ref 21–32)
PCO2 BLD: 23 MMOL/L (ref 21–32)
PCO2 BLD: 24 MMOL/L (ref 21–32)
PCO2 BLD: 25 MMOL/L (ref 21–32)
PCO2 BLD: 25 MMOL/L (ref 21–32)
PCO2 BLD: 26 MMOL/L (ref 21–32)
PCO2 BLD: 26 MMOL/L (ref 21–32)
PCO2 BLD: 30.6 MM HG (ref 36–44)
PCO2 BLD: 32.3 MM HG (ref 36–44)
PCO2 BLD: 35.8 MM HG (ref 36–44)
PCO2 BLD: 36.7 MM HG (ref 36–44)
PCO2 BLD: 39 MMOL/L (ref 21–32)
PCO2 BLD: 39.4 MM HG (ref 36–44)
PCO2 BLD: 40.6 MM HG (ref 36–44)
PCO2 BLD: 40.7 MM HG (ref 36–44)
PCO2 BLD: 47.4 MM HG (ref 42–50)
PCO2 BLD: 59.8 MM HG (ref 36–44)
PCO2 BLDA: 30.1 MM HG (ref 36–44)
PCO2 BLDA: 30.5 MM HG (ref 36–44)
PCO2 BLDA: 32 MM HG (ref 36–44)
PCO2 BLDA: 32.5 MM HG (ref 36–44)
PCO2 BLDA: 32.7 MM HG (ref 36–44)
PCO2 BLDA: 32.7 MM HG (ref 36–44)
PCO2 BLDA: 33.8 MM HG (ref 36–44)
PCO2 BLDA: 34 MM HG (ref 36–44)
PCO2 BLDA: 34.2 MM HG (ref 36–44)
PCO2 BLDA: 34.7 MM HG (ref 36–44)
PCO2 BLDA: 34.8 MM HG (ref 36–44)
PCO2 BLDA: 35.8 MM HG (ref 36–44)
PCO2 BLDA: 36.5 MM HG (ref 36–44)
PCO2 BLDA: 38.7 MM HG (ref 36–44)
PCO2 BLDA: 39.4 MM HG (ref 36–44)
PCO2 BLDA: 39.4 MM HG (ref 36–44)
PCO2 BLDA: 40.1 MM HG (ref 36–44)
PCO2 BLDA: 41.5 MM HG (ref 36–44)
PCO2 BLDA: 42.9 MM HG (ref 36–44)
PCO2 BLDA: 44.9 MM HG (ref 36–44)
PCO2 BLDA: 46.5 MM HG (ref 36–44)
PCO2 BLDA: 46.9 MM HG (ref 36–44)
PCO2 BLDA: 48.1 MM HG (ref 36–44)
PCO2 BLDA: 48.1 MM HG (ref 36–44)
PCO2 BLDA: 49.8 MM HG (ref 36–44)
PCO2 BLDA: 51.8 MM HG (ref 36–44)
PCO2 BLDA: 53.9 MM HG (ref 36–44)
PCO2 BLDA: 54.4 MM HG (ref 36–44)
PCO2 BLDA: 54.5 MM HG (ref 36–44)
PCO2 BLDA: 57.3 MM HG (ref 36–44)
PCO2 BLDA: 57.4 MM HG (ref 36–44)
PCO2 BLDA: 57.7 MM HG (ref 36–44)
PCO2 BLDA: 58 MM HG (ref 36–44)
PCO2 BLDA: 58.5 MM HG (ref 36–44)
PCO2 BLDA: 62.2 MM HG (ref 36–44)
PCO2 BLDA: 69.9 MM HG (ref 36–44)
PCO2 BLDV: 31.6 MM HG (ref 42–50)
PCO2 BLDV: 47.6 MM HG (ref 42–50)
PCO2 TEMP ADJ BLDA: 33.4 MM HG (ref 36–44)
PCO2 TEMP ADJ BLDA: 34.1 MM HG (ref 36–44)
PCO2 TEMP ADJ BLDA: 38.9 MM HG (ref 36–44)
PCO2 TEMP ADJ BLDA: 39.1 MM HG (ref 36–44)
PCO2 TEMP ADJ BLDA: 41.8 MM HG (ref 36–44)
PCO2 TEMP ADJ BLDA: 46.1 MM HG (ref 36–44)
PEEP RESPIRATORY: 16 CM[H2O]
PEEP RESPIRATORY: 16 CM[H2O]
PEEP RESPIRATORY: 8 CM[H2O]
PH BLD: 7.23 [PH] (ref 7.3–7.4)
PH BLD: 7.29 [PH] (ref 7.35–7.45)
PH BLD: 7.32 [PH] (ref 7.35–7.45)
PH BLD: 7.38 [PH] (ref 7.35–7.45)
PH BLD: 7.4 [PH] (ref 7.35–7.45)
PH BLD: 7.4 [PH] (ref 7.35–7.45)
PH BLD: 7.41 [PH] (ref 7.35–7.45)
PH BLD: 7.42 [PH] (ref 7.35–7.45)
PH BLD: 7.43 [PH] (ref 7.35–7.45)
PH BLD: 7.43 [PH] (ref 7.35–7.45)
PH BLD: 7.44 [PH] (ref 7.35–7.45)
PH BLD: 7.45 [PH] (ref 7.35–7.45)
PH BLD: 7.45 [PH] (ref 7.35–7.45)
PH BLD: 7.46 [PH] (ref 7.35–7.45)
PH BLD: 7.46 [PH] (ref 7.35–7.45)
PH BLDA: 7.14 [PH] (ref 7.35–7.45)
PH BLDA: 7.23 [PH] (ref 7.35–7.45)
PH BLDA: 7.23 [PH] (ref 7.35–7.45)
PH BLDA: 7.26 [PH] (ref 7.35–7.45)
PH BLDA: 7.27 [PH] (ref 7.35–7.45)
PH BLDA: 7.28 [PH] (ref 7.35–7.45)
PH BLDA: 7.28 [PH] (ref 7.35–7.45)
PH BLDA: 7.29 [PH] (ref 7.35–7.45)
PH BLDA: 7.31 [PH] (ref 7.35–7.45)
PH BLDA: 7.31 [PH] (ref 7.35–7.45)
PH BLDA: 7.32 [PH] (ref 7.35–7.45)
PH BLDA: 7.32 [PH] (ref 7.35–7.45)
PH BLDA: 7.37 [PH] (ref 7.35–7.45)
PH BLDA: 7.38 [PH] (ref 7.35–7.45)
PH BLDA: 7.4 [PH] (ref 7.35–7.45)
PH BLDA: 7.41 [PH] (ref 7.35–7.45)
PH BLDA: 7.42 [PH] (ref 7.35–7.45)
PH BLDA: 7.43 [PH] (ref 7.35–7.45)
PH BLDA: 7.44 [PH] (ref 7.35–7.45)
PH BLDA: 7.45 [PH] (ref 7.35–7.45)
PH BLDA: 7.46 [PH] (ref 7.35–7.45)
PH BLDA: 7.47 [PH] (ref 7.35–7.45)
PH BLDV: 7.23 [PH] (ref 7.3–7.4)
PH BLDV: 7.4 [PH] (ref 7.3–7.4)
PH UR STRIP.AUTO: 5.5 [PH]
PHOSPHATE SERPL-MCNC: 1.7 MG/DL (ref 2.3–4.1)
PHOSPHATE SERPL-MCNC: 1.8 MG/DL (ref 2.3–4.1)
PHOSPHATE SERPL-MCNC: 1.9 MG/DL (ref 2.3–4.1)
PHOSPHATE SERPL-MCNC: 2 MG/DL (ref 2.3–4.1)
PHOSPHATE SERPL-MCNC: 2.1 MG/DL (ref 2.3–4.1)
PHOSPHATE SERPL-MCNC: 2.2 MG/DL (ref 2.3–4.1)
PHOSPHATE SERPL-MCNC: 2.3 MG/DL (ref 2.3–4.1)
PHOSPHATE SERPL-MCNC: 2.4 MG/DL (ref 2.3–4.1)
PHOSPHATE SERPL-MCNC: 2.5 MG/DL (ref 2.3–4.1)
PHOSPHATE SERPL-MCNC: 2.6 MG/DL (ref 2.3–4.1)
PHOSPHATE SERPL-MCNC: 2.8 MG/DL (ref 2.3–4.1)
PHOSPHATE SERPL-MCNC: 2.9 MG/DL (ref 2.3–4.1)
PHOSPHATE SERPL-MCNC: 2.9 MG/DL (ref 2.3–4.1)
PHOSPHATE SERPL-MCNC: 3 MG/DL (ref 2.3–4.1)
PHOSPHATE SERPL-MCNC: 3 MG/DL (ref 2.3–4.1)
PHOSPHATE SERPL-MCNC: 3.4 MG/DL (ref 2.3–4.1)
PHOSPHATE SERPL-MCNC: 3.4 MG/DL (ref 2.3–4.1)
PHOSPHATE SERPL-MCNC: 3.7 MG/DL (ref 2.3–4.1)
PHOSPHATE SERPL-MCNC: 3.8 MG/DL (ref 2.3–4.1)
PHOSPHATE SERPL-MCNC: 3.9 MG/DL (ref 2.3–4.1)
PHOSPHATE SERPL-MCNC: 4 MG/DL (ref 2.3–4.1)
PHOSPHATE SERPL-MCNC: 4.2 MG/DL (ref 2.3–4.1)
PHOSPHATE SERPL-MCNC: 4.4 MG/DL (ref 2.3–4.1)
PHOSPHATE SERPL-MCNC: 4.7 MG/DL (ref 2.3–4.1)
PHOSPHATE SERPL-MCNC: 4.8 MG/DL (ref 2.3–4.1)
PHOSPHATE SERPL-MCNC: 5.4 MG/DL (ref 2.3–4.1)
PLATELET # BLD AUTO: 101 THOUSANDS/UL (ref 149–390)
PLATELET # BLD AUTO: 107 THOUSANDS/UL (ref 149–390)
PLATELET # BLD AUTO: 108 THOUSANDS/UL (ref 149–390)
PLATELET # BLD AUTO: 112 THOUSANDS/UL (ref 149–390)
PLATELET # BLD AUTO: 126 THOUSANDS/UL (ref 149–390)
PLATELET # BLD AUTO: 130 THOUSANDS/UL (ref 149–390)
PLATELET # BLD AUTO: 14 THOUSANDS/UL (ref 149–390)
PLATELET # BLD AUTO: 147 THOUSANDS/UL (ref 149–390)
PLATELET # BLD AUTO: 147 THOUSANDS/UL (ref 149–390)
PLATELET # BLD AUTO: 20 THOUSANDS/UL (ref 149–390)
PLATELET # BLD AUTO: 20 THOUSANDS/UL (ref 149–390)
PLATELET # BLD AUTO: 21 THOUSANDS/UL (ref 149–390)
PLATELET # BLD AUTO: 21 THOUSANDS/UL (ref 149–390)
PLATELET # BLD AUTO: 22 THOUSANDS/UL (ref 149–390)
PLATELET # BLD AUTO: 27 THOUSANDS/UL (ref 149–390)
PLATELET # BLD AUTO: 29 THOUSANDS/UL (ref 149–390)
PLATELET # BLD AUTO: 30 THOUSANDS/UL (ref 149–390)
PLATELET # BLD AUTO: 31 THOUSANDS/UL (ref 149–390)
PLATELET # BLD AUTO: 35 THOUSANDS/UL (ref 149–390)
PLATELET # BLD AUTO: 39 THOUSANDS/UL (ref 149–390)
PLATELET # BLD AUTO: 39 THOUSANDS/UL (ref 149–390)
PLATELET # BLD AUTO: 50 THOUSANDS/UL (ref 149–390)
PLATELET # BLD AUTO: 60 THOUSANDS/UL (ref 149–390)
PLATELET # BLD AUTO: 70 THOUSANDS/UL (ref 149–390)
PLATELET # BLD AUTO: 72 THOUSANDS/UL (ref 149–390)
PLATELET # BLD AUTO: 73 THOUSANDS/UL (ref 149–390)
PLATELET # BLD AUTO: 92 THOUSANDS/UL (ref 149–390)
PLATELET # BLD AUTO: 95 THOUSANDS/UL (ref 149–390)
PLATELET # BLD AUTO: 97 THOUSANDS/UL (ref 149–390)
PLATELET # BLD AUTO: 97 THOUSANDS/UL (ref 149–390)
PLATELET BLD QL SMEAR: ABNORMAL
PLOW: 0
PLOW: 0
PLOW: 5
PMV BLD AUTO: 11.3 FL (ref 8.9–12.7)
PMV BLD AUTO: 11.4 FL (ref 8.9–12.7)
PMV BLD AUTO: 11.5 FL (ref 8.9–12.7)
PMV BLD AUTO: 11.6 FL (ref 8.9–12.7)
PMV BLD AUTO: 11.7 FL (ref 8.9–12.7)
PMV BLD AUTO: 11.8 FL (ref 8.9–12.7)
PMV BLD AUTO: 11.9 FL (ref 8.9–12.7)
PMV BLD AUTO: 12 FL (ref 8.9–12.7)
PMV BLD AUTO: 12 FL (ref 8.9–12.7)
PMV BLD AUTO: 12.1 FL (ref 8.9–12.7)
PMV BLD AUTO: 12.2 FL (ref 8.9–12.7)
PMV BLD AUTO: 12.2 FL (ref 8.9–12.7)
PMV BLD AUTO: 12.3 FL (ref 8.9–12.7)
PMV BLD AUTO: 12.3 FL (ref 8.9–12.7)
PMV BLD AUTO: 12.4 FL (ref 8.9–12.7)
PMV BLD AUTO: 12.5 FL (ref 8.9–12.7)
PMV BLD AUTO: 12.7 FL (ref 8.9–12.7)
PMV BLD AUTO: 12.7 FL (ref 8.9–12.7)
PMV BLD AUTO: 13.4 FL (ref 8.9–12.7)
PMV BLD AUTO: 14.5 FL (ref 8.9–12.7)
PMV BLD AUTO: 15 FL (ref 8.9–12.7)
PO2 BLD: 105.4 MM HG (ref 75–129)
PO2 BLD: 125 MM HG (ref 75–129)
PO2 BLD: 129 MM HG (ref 75–129)
PO2 BLD: 137.6 MM HG (ref 75–129)
PO2 BLD: 163 MM HG (ref 75–129)
PO2 BLD: 173 MM HG (ref 75–129)
PO2 BLD: 188 MM HG (ref 75–129)
PO2 BLD: 196 MM HG (ref 75–129)
PO2 BLD: 59 MM HG (ref 75–129)
PO2 BLD: 70 MM HG (ref 75–129)
PO2 BLD: 80 MM HG (ref 75–129)
PO2 BLD: 80.1 MM HG (ref 75–129)
PO2 BLD: 80.1 MM HG (ref 75–129)
PO2 BLD: 95 MM HG (ref 75–129)
PO2 BLDA: 100.4 MM HG (ref 75–129)
PO2 BLDA: 101.2 MM HG (ref 75–129)
PO2 BLDA: 108.5 MM HG (ref 75–129)
PO2 BLDA: 109 MM HG (ref 75–129)
PO2 BLDA: 110.3 MM HG (ref 75–129)
PO2 BLDA: 110.5 MM HG (ref 75–129)
PO2 BLDA: 122.9 MM HG (ref 75–129)
PO2 BLDA: 127 MM HG (ref 75–129)
PO2 BLDA: 130.2 MM HG (ref 75–129)
PO2 BLDA: 130.7 MM HG (ref 75–129)
PO2 BLDA: 130.8 MM HG (ref 75–129)
PO2 BLDA: 134.4 MM HG (ref 75–129)
PO2 BLDA: 135.2 MM HG (ref 75–129)
PO2 BLDA: 137.1 MM HG (ref 75–129)
PO2 BLDA: 138.2 MM HG (ref 75–129)
PO2 BLDA: 150.8 MM HG (ref 75–129)
PO2 BLDA: 164.9 MM HG (ref 75–129)
PO2 BLDA: 54.8 MM HG (ref 75–129)
PO2 BLDA: 58.6 MM HG (ref 75–129)
PO2 BLDA: 65.5 MM HG (ref 75–129)
PO2 BLDA: 66.6 MM HG (ref 75–129)
PO2 BLDA: 67.9 MM HG (ref 75–129)
PO2 BLDA: 68.1 MM HG (ref 75–129)
PO2 BLDA: 71.7 MM HG (ref 75–129)
PO2 BLDA: 72 MM HG (ref 75–129)
PO2 BLDA: 77.1 MM HG (ref 75–129)
PO2 BLDA: 79 MM HG (ref 75–129)
PO2 BLDA: 81.2 MM HG (ref 75–129)
PO2 BLDA: 81.7 MM HG (ref 75–129)
PO2 BLDA: 82.7 MM HG (ref 75–129)
PO2 BLDA: 84.9 MM HG (ref 75–129)
PO2 BLDA: 85.4 MM HG (ref 75–129)
PO2 BLDA: 89.3 MM HG (ref 75–129)
PO2 BLDA: 89.5 MM HG (ref 75–129)
PO2 BLDA: 91 MM HG (ref 75–129)
PO2 BLDA: 96.2 MM HG (ref 75–129)
PO2 BLDV: 49.3 MM HG (ref 35–45)
PO2 BLDV: 50.9 MM HG (ref 35–45)
PO2 VENOUS TEMP CORRECTED: 49 MM HG (ref 35–45)
POIKILOCYTOSIS BLD QL SMEAR: PRESENT
POLYCHROMASIA BLD QL SMEAR: PRESENT
POTASSIUM BLD-SCNC: 3.5 MMOL/L (ref 3.5–5.3)
POTASSIUM BLD-SCNC: 3.7 MMOL/L (ref 3.5–5.3)
POTASSIUM BLD-SCNC: 3.7 MMOL/L (ref 3.5–5.3)
POTASSIUM BLD-SCNC: 4.6 MMOL/L (ref 3.5–5.3)
POTASSIUM BLD-SCNC: 4.9 MMOL/L (ref 3.5–5.3)
POTASSIUM BLD-SCNC: 5.3 MMOL/L (ref 3.5–5.3)
POTASSIUM SERPL-SCNC: 3.1 MMOL/L (ref 3.5–5.3)
POTASSIUM SERPL-SCNC: 3.2 MMOL/L (ref 3.5–5.3)
POTASSIUM SERPL-SCNC: 3.5 MMOL/L (ref 3.5–5.3)
POTASSIUM SERPL-SCNC: 3.5 MMOL/L (ref 3.5–5.3)
POTASSIUM SERPL-SCNC: 3.7 MMOL/L (ref 3.5–5.3)
POTASSIUM SERPL-SCNC: 3.8 MMOL/L (ref 3.5–5.3)
POTASSIUM SERPL-SCNC: 3.9 MMOL/L (ref 3.5–5.3)
POTASSIUM SERPL-SCNC: 4 MMOL/L (ref 3.5–5.3)
POTASSIUM SERPL-SCNC: 4.1 MMOL/L (ref 3.5–5.3)
POTASSIUM SERPL-SCNC: 4.2 MMOL/L (ref 3.5–5.3)
POTASSIUM SERPL-SCNC: 4.3 MMOL/L (ref 3.5–5.3)
POTASSIUM SERPL-SCNC: 4.4 MMOL/L (ref 3.5–5.3)
POTASSIUM SERPL-SCNC: 4.5 MMOL/L (ref 3.5–5.3)
POTASSIUM SERPL-SCNC: 4.6 MMOL/L (ref 3.5–5.3)
POTASSIUM SERPL-SCNC: 4.7 MMOL/L (ref 3.5–5.3)
POTASSIUM SERPL-SCNC: 4.8 MMOL/L (ref 3.5–5.3)
POTASSIUM SERPL-SCNC: 4.9 MMOL/L (ref 3.5–5.3)
POTASSIUM SERPL-SCNC: 4.9 MMOL/L (ref 3.5–5.3)
POTASSIUM SERPL-SCNC: 5.1 MMOL/L (ref 3.5–5.3)
POTASSIUM SERPL-SCNC: 5.2 MMOL/L (ref 3.5–5.3)
POTASSIUM SERPL-SCNC: 5.4 MMOL/L (ref 3.5–5.3)
PREALB SERPL-MCNC: 12.2 MG/DL (ref 18–40)
PREALB SERPL-MCNC: 3.8 MG/DL (ref 18–40)
PRESSURE CONTROL: 13
PROT SERPL-MCNC: 4.2 G/DL (ref 6.4–8.4)
PROT SERPL-MCNC: 4.5 G/DL (ref 6.4–8.4)
PROT SERPL-MCNC: 4.5 G/DL (ref 6.4–8.4)
PROT SERPL-MCNC: 4.6 G/DL (ref 6.4–8.4)
PROT SERPL-MCNC: 4.6 G/DL (ref 6.4–8.4)
PROT SERPL-MCNC: 4.8 G/DL (ref 6.4–8.4)
PROT SERPL-MCNC: 5 G/DL (ref 6.4–8.4)
PROT SERPL-MCNC: 5 G/DL (ref 6.4–8.4)
PROT SERPL-MCNC: 5.1 G/DL (ref 6.4–8.4)
PROT SERPL-MCNC: 5.2 G/DL (ref 6.4–8.4)
PROT SERPL-MCNC: 5.2 G/DL (ref 6.4–8.4)
PROT SERPL-MCNC: 5.4 G/DL (ref 6.4–8.4)
PROT SERPL-MCNC: 5.4 G/DL (ref 6.4–8.4)
PROT SERPL-MCNC: 5.5 G/DL (ref 6.4–8.4)
PROT SERPL-MCNC: 5.6 G/DL (ref 6.4–8.4)
PROT SERPL-MCNC: 5.8 G/DL (ref 6.4–8.4)
PROT SERPL-MCNC: 6 G/DL (ref 6.4–8.4)
PROT SERPL-MCNC: 7.2 G/DL (ref 6.4–8.2)
PROT SERPL-MCNC: 7.5 G/DL (ref 6.4–8.4)
PROT UR STRIP-MCNC: ABNORMAL MG/DL
PROTHROMBIN TIME: 14.4 SECONDS (ref 11.6–14.5)
PROTHROMBIN TIME: 16.4 SECONDS (ref 11.6–14.5)
PROTHROMBIN TIME: 19.4 SECONDS (ref 11.6–14.5)
PROTHROMBIN TIME: 19.6 SECONDS (ref 11.6–14.5)
PROTHROMBIN TIME: 20 SECONDS (ref 11.6–14.5)
PROTHROMBIN TIME: 21 SECONDS (ref 11.6–14.5)
PROTHROMBIN TIME: 21 SECONDS (ref 11.6–14.5)
PROTHROMBIN TIME: 21.3 SECONDS (ref 11.6–14.5)
PROTHROMBIN TIME: 22.6 SECONDS (ref 11.6–14.5)
PS CM H2O: 16
PS VENT FIO2: 60
PS VENT PEEP: 12
QRS AXIS: 11 DEGREES
QRS AXIS: 12 DEGREES
QRS AXIS: 15 DEGREES
QRS AXIS: 17 DEGREES
QRS AXIS: 40 DEGREES
QRS AXIS: 57 DEGREES
QRS AXIS: 66 DEGREES
QRS AXIS: 8 DEGREES
QRSD INTERVAL: 106 MS
QRSD INTERVAL: 68 MS
QRSD INTERVAL: 82 MS
QRSD INTERVAL: 84 MS
QRSD INTERVAL: 88 MS
QRSD INTERVAL: 88 MS
QRSD INTERVAL: 90 MS
QRSD INTERVAL: 92 MS
QT INTERVAL: 270 MS
QT INTERVAL: 304 MS
QT INTERVAL: 304 MS
QT INTERVAL: 324 MS
QT INTERVAL: 338 MS
QT INTERVAL: 370 MS
QT INTERVAL: 388 MS
QT INTERVAL: 394 MS
QTC INTERVAL: 400 MS
QTC INTERVAL: 417 MS
QTC INTERVAL: 429 MS
QTC INTERVAL: 436 MS
QTC INTERVAL: 440 MS
QTC INTERVAL: 457 MS
QTC INTERVAL: 479 MS
QTC INTERVAL: 485 MS
RBC # BLD AUTO: 1.84 MILLION/UL (ref 3.88–5.62)
RBC # BLD AUTO: 1.9 MILLION/UL (ref 3.88–5.62)
RBC # BLD AUTO: 1.91 MILLION/UL (ref 3.88–5.62)
RBC # BLD AUTO: 1.96 MILLION/UL (ref 3.88–5.62)
RBC # BLD AUTO: 1.97 MILLION/UL (ref 3.88–5.62)
RBC # BLD AUTO: 1.99 MILLION/UL (ref 3.88–5.62)
RBC # BLD AUTO: 2.04 MILLION/UL (ref 3.88–5.62)
RBC # BLD AUTO: 2.05 MILLION/UL (ref 3.88–5.62)
RBC # BLD AUTO: 2.13 MILLION/UL (ref 3.88–5.62)
RBC # BLD AUTO: 2.17 MILLION/UL (ref 3.88–5.62)
RBC # BLD AUTO: 2.18 MILLION/UL (ref 3.88–5.62)
RBC # BLD AUTO: 2.2 MILLION/UL (ref 3.88–5.62)
RBC # BLD AUTO: 2.21 MILLION/UL (ref 3.88–5.62)
RBC # BLD AUTO: 2.26 MILLION/UL (ref 3.88–5.62)
RBC # BLD AUTO: 2.27 MILLION/UL (ref 3.88–5.62)
RBC # BLD AUTO: 2.28 MILLION/UL (ref 3.88–5.62)
RBC # BLD AUTO: 2.28 MILLION/UL (ref 3.88–5.62)
RBC # BLD AUTO: 2.31 MILLION/UL (ref 3.88–5.62)
RBC # BLD AUTO: 2.32 MILLION/UL (ref 3.88–5.62)
RBC # BLD AUTO: 2.32 MILLION/UL (ref 3.88–5.62)
RBC # BLD AUTO: 2.33 MILLION/UL (ref 3.88–5.62)
RBC # BLD AUTO: 2.34 MILLION/UL (ref 3.88–5.62)
RBC # BLD AUTO: 2.45 MILLION/UL (ref 3.88–5.62)
RBC # BLD AUTO: 2.45 MILLION/UL (ref 3.88–5.62)
RBC # BLD AUTO: 2.48 MILLION/UL (ref 3.88–5.62)
RBC # BLD AUTO: 2.5 MILLION/UL (ref 3.88–5.62)
RBC # BLD AUTO: 2.64 MILLION/UL (ref 3.88–5.62)
RBC # BLD AUTO: 2.66 MILLION/UL (ref 3.88–5.62)
RBC # BLD AUTO: 2.68 MILLION/UL (ref 3.88–5.62)
RBC # BLD AUTO: 2.75 MILLION/UL (ref 3.88–5.62)
RBC # BLD AUTO: 2.77 MILLION/UL (ref 3.88–5.62)
RBC # BLD AUTO: 3.07 MILLION/UL (ref 3.88–5.62)
RBC # BLD AUTO: 3.23 MILLION/UL (ref 3.88–5.62)
RBC # BLD AUTO: 3.26 MILLION/UL (ref 3.88–5.62)
RBC # BLD AUTO: 3.45 MILLION/UL (ref 3.88–5.62)
RBC #/AREA URNS AUTO: ABNORMAL /HPF
RBC MORPH BLD: PRESENT
RH BLD: NEGATIVE
RIGHT ATRIUM AREA SYSTOLE A4C: 32.1 CM2
RIGHT VENTRICLE ID DIMENSION: 5.7 CM
SAO2 % BLD FROM PO2: 100 % (ref 60–85)
SAO2 % BLD FROM PO2: 93 % (ref 60–85)
SAO2 % BLD FROM PO2: 97 % (ref 60–85)
SAO2 % BLD FROM PO2: 98 % (ref 60–85)
SAO2 % BLD FROM PO2: 99 % (ref 60–85)
SAO2 % BLD FROM PO2: 99 % (ref 60–85)
SARS-COV-2 RNA RESP QL NAA+PROBE: NEGATIVE
SL CV LEFT ATRIUM LENGTH A2C: 5.9 CM
SL CV LV EF: 45
SL CV LV EF: 60
SL CV PED ECHO LEFT VENTRICLE DIASTOLIC VOLUME (MOD BIPLANE) 2D: 152 ML
SL CV PED ECHO LEFT VENTRICLE DIASTOLIC VOLUME (MOD BIPLANE) 2D: 154 ML
SL CV PED ECHO LEFT VENTRICLE SYSTOLIC VOLUME (MOD BIPLANE) 2D: 68 ML
SL CV PED ECHO LEFT VENTRICLE SYSTOLIC VOLUME (MOD BIPLANE) 2D: 94 ML
SODIUM BLD-SCNC: 135 MMOL/L (ref 136–145)
SODIUM BLD-SCNC: 138 MMOL/L (ref 136–145)
SODIUM BLD-SCNC: 138 MMOL/L (ref 136–145)
SODIUM BLD-SCNC: 139 MMOL/L (ref 136–145)
SODIUM BLD-SCNC: 140 MMOL/L (ref 136–145)
SODIUM BLD-SCNC: 142 MMOL/L (ref 136–145)
SODIUM SERPL-SCNC: 134 MMOL/L (ref 136–145)
SODIUM SERPL-SCNC: 135 MMOL/L (ref 135–147)
SODIUM SERPL-SCNC: 136 MMOL/L (ref 135–147)
SODIUM SERPL-SCNC: 137 MMOL/L (ref 135–147)
SODIUM SERPL-SCNC: 138 MMOL/L (ref 135–147)
SODIUM SERPL-SCNC: 139 MMOL/L (ref 135–147)
SODIUM SERPL-SCNC: 140 MMOL/L (ref 135–147)
SODIUM SERPL-SCNC: 141 MMOL/L (ref 135–147)
SODIUM SERPL-SCNC: 141 MMOL/L (ref 135–147)
SODIUM SERPL-SCNC: 142 MMOL/L (ref 135–147)
SODIUM SERPL-SCNC: 143 MMOL/L (ref 135–147)
SP GR UR STRIP.AUTO: 1.04 (ref 1–1.03)
SPECIMEN EXPIRATION DATE: NORMAL
SPECIMEN SOURCE: ABNORMAL
STREPTOCOCCUS DNA BLD POS NAA+NON-PROBE: DETECTED
T WAVE AXIS: 154 DEGREES
T WAVE AXIS: 16 DEGREES
T WAVE AXIS: 185 DEGREES
T WAVE AXIS: 185 DEGREES
T WAVE AXIS: 2 DEGREES
T WAVE AXIS: 256 DEGREES
T WAVE AXIS: 266 DEGREES
T WAVE AXIS: 266 DEGREES
T4 FREE SERPL-MCNC: 1.1 NG/DL (ref 0.76–1.46)
T4 FREE SERPL-MCNC: 1.11 NG/DL (ref 0.76–1.46)
TARGETS BLD QL SMEAR: PRESENT
THIGH: 2.9
TLOW: 0.4
TLOW: 1
TOXIC GRANULES BLD QL SMEAR: PRESENT
TPA PPP QL CHRO: 44 % OF NORMAL (ref 77–138)
TRIGL SERPL-MCNC: 105 MG/DL
TSH SERPL DL<=0.05 MIU/L-ACNC: 5.94 UIU/ML (ref 0.45–4.5)
TSH SERPL DL<=0.05 MIU/L-ACNC: 7.34 UIU/ML (ref 0.45–4.5)
UNIT DISPENSE STATUS: NORMAL
UNIT PRODUCT CODE: NORMAL
UNIT PRODUCT VOLUME: 198 ML
UNIT PRODUCT VOLUME: 250 ML
UNIT PRODUCT VOLUME: 264 ML
UNIT PRODUCT VOLUME: 280 ML
UNIT PRODUCT VOLUME: 300 ML
UNIT PRODUCT VOLUME: 300 ML
UNIT PRODUCT VOLUME: 350 ML
UNIT RH: NORMAL
UROBILINOGEN UR STRIP-ACNC: 2 MG/DL
VARIANT LYMPHS # BLD AUTO: 4 %
VENT - PS: ABNORMAL
VENT AC: 14
VENT AC: 14
VENT AC: 24
VENT AC: 24
VENT AC: 28
VENT AC: 28
VENT APRV: 15
VENT- AC: AC
VENT- APRV: ABNORMAL
VENTRICULAR RATE: 100 BPM
VENTRICULAR RATE: 101 BPM
VENTRICULAR RATE: 124 BPM
VENTRICULAR RATE: 126 BPM
VENTRICULAR RATE: 132 BPM
VENTRICULAR RATE: 81 BPM
VENTRICULAR RATE: 94 BPM
VENTRICULAR RATE: 97 BPM
VT SETTING VENT: 15 ML
VT SETTING VENT: 490 ML
VT SETTING VENT: 550 ML
WBC # BLD AUTO: 1.96 THOUSAND/UL (ref 4.31–10.16)
WBC # BLD AUTO: 10.37 THOUSAND/UL (ref 4.31–10.16)
WBC # BLD AUTO: 10.47 THOUSAND/UL (ref 4.31–10.16)
WBC # BLD AUTO: 10.98 THOUSAND/UL (ref 4.31–10.16)
WBC # BLD AUTO: 11.21 THOUSAND/UL (ref 4.31–10.16)
WBC # BLD AUTO: 11.46 THOUSAND/UL (ref 4.31–10.16)
WBC # BLD AUTO: 11.79 THOUSAND/UL (ref 4.31–10.16)
WBC # BLD AUTO: 11.89 THOUSAND/UL (ref 4.31–10.16)
WBC # BLD AUTO: 12.71 THOUSAND/UL (ref 4.31–10.16)
WBC # BLD AUTO: 12.8 THOUSAND/UL (ref 4.31–10.16)
WBC # BLD AUTO: 13.31 THOUSAND/UL (ref 4.31–10.16)
WBC # BLD AUTO: 13.64 THOUSAND/UL (ref 4.31–10.16)
WBC # BLD AUTO: 14.55 THOUSAND/UL (ref 4.31–10.16)
WBC # BLD AUTO: 14.91 THOUSAND/UL (ref 4.31–10.16)
WBC # BLD AUTO: 15.01 THOUSAND/UL (ref 4.31–10.16)
WBC # BLD AUTO: 15.19 THOUSAND/UL (ref 4.31–10.16)
WBC # BLD AUTO: 18.4 THOUSAND/UL (ref 4.31–10.16)
WBC # BLD AUTO: 24.39 THOUSAND/UL (ref 4.31–10.16)
WBC # BLD AUTO: 27.57 THOUSAND/UL (ref 4.31–10.16)
WBC # BLD AUTO: 3.68 THOUSAND/UL (ref 4.31–10.16)
WBC # BLD AUTO: 3.88 THOUSAND/UL (ref 4.31–10.16)
WBC # BLD AUTO: 4.18 THOUSAND/UL (ref 4.31–10.16)
WBC # BLD AUTO: 4.62 THOUSAND/UL (ref 4.31–10.16)
WBC # BLD AUTO: 4.74 THOUSAND/UL (ref 4.31–10.16)
WBC # BLD AUTO: 48.85 THOUSAND/UL (ref 4.31–10.16)
WBC # BLD AUTO: 5.2 THOUSAND/UL (ref 4.31–10.16)
WBC # BLD AUTO: 5.43 THOUSAND/UL (ref 4.31–10.16)
WBC # BLD AUTO: 53.13 THOUSAND/UL (ref 4.31–10.16)
WBC # BLD AUTO: 53.82 THOUSAND/UL (ref 4.31–10.16)
WBC # BLD AUTO: 6.9 THOUSAND/UL (ref 4.31–10.16)
WBC # BLD AUTO: 8.03 THOUSAND/UL (ref 4.31–10.16)
WBC # BLD AUTO: 8.34 THOUSAND/UL (ref 4.31–10.16)
WBC # BLD AUTO: 8.48 THOUSAND/UL (ref 4.31–10.16)
WBC # BLD AUTO: 8.65 THOUSAND/UL (ref 4.31–10.16)
WBC # BLD AUTO: 9.67 THOUSAND/UL (ref 4.31–10.16)
WBC #/AREA URNS AUTO: ABNORMAL /HPF

## 2022-01-01 PROCEDURE — 77012 CT SCAN FOR NEEDLE BIOPSY: CPT

## 2022-01-01 PROCEDURE — 83615 LACTATE (LD) (LDH) ENZYME: CPT | Performed by: INTERNAL MEDICINE

## 2022-01-01 PROCEDURE — 99024 POSTOP FOLLOW-UP VISIT: CPT | Performed by: STUDENT IN AN ORGANIZED HEALTH CARE EDUCATION/TRAINING PROGRAM

## 2022-01-01 PROCEDURE — 88304 TISSUE EXAM BY PATHOLOGIST: CPT | Performed by: PATHOLOGY

## 2022-01-01 PROCEDURE — 80048 BASIC METABOLIC PNL TOTAL CA: CPT | Performed by: PHYSICIAN ASSISTANT

## 2022-01-01 PROCEDURE — 84443 ASSAY THYROID STIM HORMONE: CPT | Performed by: SURGERY

## 2022-01-01 PROCEDURE — 76937 US GUIDE VASCULAR ACCESS: CPT | Performed by: SURGERY

## 2022-01-01 PROCEDURE — 80076 HEPATIC FUNCTION PANEL: CPT | Performed by: PHYSICIAN ASSISTANT

## 2022-01-01 PROCEDURE — 94760 N-INVAS EAR/PLS OXIMETRY 1: CPT

## 2022-01-01 PROCEDURE — 94003 VENT MGMT INPAT SUBQ DAY: CPT

## 2022-01-01 PROCEDURE — 84100 ASSAY OF PHOSPHORUS: CPT | Performed by: NURSE PRACTITIONER

## 2022-01-01 PROCEDURE — 94640 AIRWAY INHALATION TREATMENT: CPT

## 2022-01-01 PROCEDURE — 83605 ASSAY OF LACTIC ACID: CPT | Performed by: PHYSICIAN ASSISTANT

## 2022-01-01 PROCEDURE — 90945 DIALYSIS ONE EVALUATION: CPT

## 2022-01-01 PROCEDURE — 99152 MOD SED SAME PHYS/QHP 5/>YRS: CPT

## 2022-01-01 PROCEDURE — 71045 X-RAY EXAM CHEST 1 VIEW: CPT

## 2022-01-01 PROCEDURE — 87077 CULTURE AEROBIC IDENTIFY: CPT | Performed by: PHYSICIAN ASSISTANT

## 2022-01-01 PROCEDURE — 82805 BLOOD GASES W/O2 SATURATION: CPT | Performed by: SURGERY

## 2022-01-01 PROCEDURE — 85018 HEMOGLOBIN: CPT | Performed by: SURGERY

## 2022-01-01 PROCEDURE — 82330 ASSAY OF CALCIUM: CPT | Performed by: SURGERY

## 2022-01-01 PROCEDURE — 83605 ASSAY OF LACTIC ACID: CPT

## 2022-01-01 PROCEDURE — 82948 REAGENT STRIP/BLOOD GLUCOSE: CPT

## 2022-01-01 PROCEDURE — 99024 POSTOP FOLLOW-UP VISIT: CPT | Performed by: SURGERY

## 2022-01-01 PROCEDURE — 86850 RBC ANTIBODY SCREEN: CPT

## 2022-01-01 PROCEDURE — 82805 BLOOD GASES W/O2 SATURATION: CPT | Performed by: PHYSICIAN ASSISTANT

## 2022-01-01 PROCEDURE — 36556 INSERT NON-TUNNEL CV CATH: CPT | Performed by: SURGERY

## 2022-01-01 PROCEDURE — 99232 SBSQ HOSP IP/OBS MODERATE 35: CPT | Performed by: NURSE PRACTITIONER

## 2022-01-01 PROCEDURE — 99232 SBSQ HOSP IP/OBS MODERATE 35: CPT | Performed by: INTERNAL MEDICINE

## 2022-01-01 PROCEDURE — 85027 COMPLETE CBC AUTOMATED: CPT | Performed by: PHYSICIAN ASSISTANT

## 2022-01-01 PROCEDURE — 93306 TTE W/DOPPLER COMPLETE: CPT | Performed by: INTERNAL MEDICINE

## 2022-01-01 PROCEDURE — 82947 ASSAY GLUCOSE BLOOD QUANT: CPT

## 2022-01-01 PROCEDURE — 83735 ASSAY OF MAGNESIUM: CPT | Performed by: SURGERY

## 2022-01-01 PROCEDURE — NC001 PR NO CHARGE: Performed by: PHYSICIAN ASSISTANT

## 2022-01-01 PROCEDURE — 82330 ASSAY OF CALCIUM: CPT | Performed by: INTERNAL MEDICINE

## 2022-01-01 PROCEDURE — 87070 CULTURE OTHR SPECIMN AEROBIC: CPT | Performed by: PHYSICIAN ASSISTANT

## 2022-01-01 PROCEDURE — 82378 CARCINOEMBRYONIC ANTIGEN: CPT

## 2022-01-01 PROCEDURE — 99291 CRITICAL CARE FIRST HOUR: CPT | Performed by: SURGERY

## 2022-01-01 PROCEDURE — 90945 DIALYSIS ONE EVALUATION: CPT | Performed by: INTERNAL MEDICINE

## 2022-01-01 PROCEDURE — 5A1D90Z PERFORMANCE OF URINARY FILTRATION, CONTINUOUS, GREATER THAN 18 HOURS PER DAY: ICD-10-PCS | Performed by: INTERNAL MEDICINE

## 2022-01-01 PROCEDURE — 80076 HEPATIC FUNCTION PANEL: CPT | Performed by: SURGERY

## 2022-01-01 PROCEDURE — 82805 BLOOD GASES W/O2 SATURATION: CPT | Performed by: NURSE PRACTITIONER

## 2022-01-01 PROCEDURE — 99232 SBSQ HOSP IP/OBS MODERATE 35: CPT | Performed by: ANESTHESIOLOGY

## 2022-01-01 PROCEDURE — 87181 SC STD AGAR DILUTION PER AGT: CPT | Performed by: PHYSICIAN ASSISTANT

## 2022-01-01 PROCEDURE — 99223 1ST HOSP IP/OBS HIGH 75: CPT | Performed by: INTERNAL MEDICINE

## 2022-01-01 PROCEDURE — 02HV33Z INSERTION OF INFUSION DEVICE INTO SUPERIOR VENA CAVA, PERCUTANEOUS APPROACH: ICD-10-PCS | Performed by: RADIOLOGY

## 2022-01-01 PROCEDURE — 82140 ASSAY OF AMMONIA: CPT

## 2022-01-01 PROCEDURE — C9113 INJ PANTOPRAZOLE SODIUM, VIA: HCPCS

## 2022-01-01 PROCEDURE — 85384 FIBRINOGEN ACTIVITY: CPT | Performed by: INTERNAL MEDICINE

## 2022-01-01 PROCEDURE — 0FT40ZZ RESECTION OF GALLBLADDER, OPEN APPROACH: ICD-10-PCS | Performed by: SURGERY

## 2022-01-01 PROCEDURE — 80053 COMPREHEN METABOLIC PANEL: CPT

## 2022-01-01 PROCEDURE — 93010 ELECTROCARDIOGRAM REPORT: CPT | Performed by: INTERNAL MEDICINE

## 2022-01-01 PROCEDURE — NC001 PR NO CHARGE: Performed by: SURGERY

## 2022-01-01 PROCEDURE — 85014 HEMATOCRIT: CPT

## 2022-01-01 PROCEDURE — 83605 ASSAY OF LACTIC ACID: CPT | Performed by: STUDENT IN AN ORGANIZED HEALTH CARE EDUCATION/TRAINING PROGRAM

## 2022-01-01 PROCEDURE — 82533 TOTAL CORTISOL: CPT | Performed by: SURGERY

## 2022-01-01 PROCEDURE — C9113 INJ PANTOPRAZOLE SODIUM, VIA: HCPCS | Performed by: NURSE PRACTITIONER

## 2022-01-01 PROCEDURE — 85610 PROTHROMBIN TIME: CPT

## 2022-01-01 PROCEDURE — P9037 PLATE PHERES LEUKOREDU IRRAD: HCPCS

## 2022-01-01 PROCEDURE — 71260 CT THORAX DX C+: CPT

## 2022-01-01 PROCEDURE — 85610 PROTHROMBIN TIME: CPT | Performed by: SURGERY

## 2022-01-01 PROCEDURE — 36573 INSJ PICC RS&I 5 YR+: CPT

## 2022-01-01 PROCEDURE — 31622 DX BRONCHOSCOPE/WASH: CPT | Performed by: SURGERY

## 2022-01-01 PROCEDURE — 84100 ASSAY OF PHOSPHORUS: CPT | Performed by: INTERNAL MEDICINE

## 2022-01-01 PROCEDURE — 99233 SBSQ HOSP IP/OBS HIGH 50: CPT | Performed by: INTERNAL MEDICINE

## 2022-01-01 PROCEDURE — 92526 ORAL FUNCTION THERAPY: CPT

## 2022-01-01 PROCEDURE — 84100 ASSAY OF PHOSPHORUS: CPT | Performed by: SURGERY

## 2022-01-01 PROCEDURE — 31500 INSERT EMERGENCY AIRWAY: CPT | Performed by: PHYSICIAN ASSISTANT

## 2022-01-01 PROCEDURE — 84484 ASSAY OF TROPONIN QUANT: CPT | Performed by: SURGERY

## 2022-01-01 PROCEDURE — 82805 BLOOD GASES W/O2 SATURATION: CPT | Performed by: STUDENT IN AN ORGANIZED HEALTH CARE EDUCATION/TRAINING PROGRAM

## 2022-01-01 PROCEDURE — 85384 FIBRINOGEN ACTIVITY: CPT | Performed by: STUDENT IN AN ORGANIZED HEALTH CARE EDUCATION/TRAINING PROGRAM

## 2022-01-01 PROCEDURE — 84132 ASSAY OF SERUM POTASSIUM: CPT

## 2022-01-01 PROCEDURE — 83735 ASSAY OF MAGNESIUM: CPT | Performed by: NURSE PRACTITIONER

## 2022-01-01 PROCEDURE — 83605 ASSAY OF LACTIC ACID: CPT | Performed by: SURGERY

## 2022-01-01 PROCEDURE — 80048 BASIC METABOLIC PNL TOTAL CA: CPT

## 2022-01-01 PROCEDURE — 84100 ASSAY OF PHOSPHORUS: CPT | Performed by: PHYSICIAN ASSISTANT

## 2022-01-01 PROCEDURE — 80048 BASIC METABOLIC PNL TOTAL CA: CPT | Performed by: INTERNAL MEDICINE

## 2022-01-01 PROCEDURE — 36556 INSERT NON-TUNNEL CV CATH: CPT | Performed by: PHYSICIAN ASSISTANT

## 2022-01-01 PROCEDURE — 85025 COMPLETE CBC W/AUTO DIFF WBC: CPT

## 2022-01-01 PROCEDURE — 84134 ASSAY OF PREALBUMIN: CPT

## 2022-01-01 PROCEDURE — 87106 FUNGI IDENTIFICATION YEAST: CPT | Performed by: PHYSICIAN ASSISTANT

## 2022-01-01 PROCEDURE — 99292 CRITICAL CARE ADDL 30 MIN: CPT | Performed by: PHYSICIAN ASSISTANT

## 2022-01-01 PROCEDURE — 80048 BASIC METABOLIC PNL TOTAL CA: CPT | Performed by: STUDENT IN AN ORGANIZED HEALTH CARE EDUCATION/TRAINING PROGRAM

## 2022-01-01 PROCEDURE — 87040 BLOOD CULTURE FOR BACTERIA: CPT | Performed by: PHYSICIAN ASSISTANT

## 2022-01-01 PROCEDURE — C9113 INJ PANTOPRAZOLE SODIUM, VIA: HCPCS | Performed by: PHYSICIAN ASSISTANT

## 2022-01-01 PROCEDURE — 93321 DOPPLER ECHO F-UP/LMTD STD: CPT | Performed by: INTERNAL MEDICINE

## 2022-01-01 PROCEDURE — 74177 CT ABD & PELVIS W/CONTRAST: CPT

## 2022-01-01 PROCEDURE — 84295 ASSAY OF SERUM SODIUM: CPT

## 2022-01-01 PROCEDURE — 99214 OFFICE O/P EST MOD 30 MIN: CPT | Performed by: INTERNAL MEDICINE

## 2022-01-01 PROCEDURE — 85610 PROTHROMBIN TIME: CPT | Performed by: INTERNAL MEDICINE

## 2022-01-01 PROCEDURE — 82330 ASSAY OF CALCIUM: CPT

## 2022-01-01 PROCEDURE — 03HY32Z INSERTION OF MONITORING DEVICE INTO UPPER ARTERY, PERCUTANEOUS APPROACH: ICD-10-PCS | Performed by: EMERGENCY MEDICINE

## 2022-01-01 PROCEDURE — 87106 FUNGI IDENTIFICATION YEAST: CPT | Performed by: STUDENT IN AN ORGANIZED HEALTH CARE EDUCATION/TRAINING PROGRAM

## 2022-01-01 PROCEDURE — 85027 COMPLETE CBC AUTOMATED: CPT | Performed by: SURGERY

## 2022-01-01 PROCEDURE — 80076 HEPATIC FUNCTION PANEL: CPT | Performed by: INTERNAL MEDICINE

## 2022-01-01 PROCEDURE — 85025 COMPLETE CBC W/AUTO DIFF WBC: CPT | Performed by: PHYSICIAN ASSISTANT

## 2022-01-01 PROCEDURE — 83735 ASSAY OF MAGNESIUM: CPT | Performed by: PHYSICIAN ASSISTANT

## 2022-01-01 PROCEDURE — 85362 FIBRIN DEGRADATION PRODUCTS: CPT | Performed by: INTERNAL MEDICINE

## 2022-01-01 PROCEDURE — 84100 ASSAY OF PHOSPHORUS: CPT

## 2022-01-01 PROCEDURE — 85347 COAGULATION TIME ACTIVATED: CPT | Performed by: PHYSICIAN ASSISTANT

## 2022-01-01 PROCEDURE — 48150 PARTIAL REMOVAL OF PANCREAS: CPT | Performed by: SURGERY

## 2022-01-01 PROCEDURE — 99222 1ST HOSP IP/OBS MODERATE 55: CPT | Performed by: INTERNAL MEDICINE

## 2022-01-01 PROCEDURE — 85027 COMPLETE CBC AUTOMATED: CPT

## 2022-01-01 PROCEDURE — 0BJ08ZZ INSPECTION OF TRACHEOBRONCHIAL TREE, VIA NATURAL OR ARTIFICIAL OPENING ENDOSCOPIC: ICD-10-PCS | Performed by: SURGERY

## 2022-01-01 PROCEDURE — 93005 ELECTROCARDIOGRAM TRACING: CPT

## 2022-01-01 PROCEDURE — 85007 BL SMEAR W/DIFF WBC COUNT: CPT | Performed by: NURSE PRACTITIONER

## 2022-01-01 PROCEDURE — 88309 TISSUE EXAM BY PATHOLOGIST: CPT | Performed by: PATHOLOGY

## 2022-01-01 PROCEDURE — C1751 CATH, INF, PER/CENT/MIDLINE: HCPCS

## 2022-01-01 PROCEDURE — 85300 ANTITHROMBIN III ACTIVITY: CPT | Performed by: INTERNAL MEDICINE

## 2022-01-01 PROCEDURE — 87186 SC STD MICRODIL/AGAR DIL: CPT | Performed by: SURGERY

## 2022-01-01 PROCEDURE — 99214 OFFICE O/P EST MOD 30 MIN: CPT | Performed by: NURSE PRACTITIONER

## 2022-01-01 PROCEDURE — 94645 CONT INHLJ TX EACH ADDL HOUR: CPT

## 2022-01-01 PROCEDURE — 85379 FIBRIN DEGRADATION QUANT: CPT | Performed by: INTERNAL MEDICINE

## 2022-01-01 PROCEDURE — 36415 COLL VENOUS BLD VENIPUNCTURE: CPT

## 2022-01-01 PROCEDURE — 83605 ASSAY OF LACTIC ACID: CPT | Performed by: NURSE PRACTITIONER

## 2022-01-01 PROCEDURE — 86901 BLOOD TYPING SEROLOGIC RH(D): CPT

## 2022-01-01 PROCEDURE — 85025 COMPLETE CBC W/AUTO DIFF WBC: CPT | Performed by: SURGERY

## 2022-01-01 PROCEDURE — P9040 RBC LEUKOREDUCED IRRADIATED: HCPCS

## 2022-01-01 PROCEDURE — 84439 ASSAY OF FREE THYROXINE: CPT

## 2022-01-01 PROCEDURE — 84478 ASSAY OF TRIGLYCERIDES: CPT | Performed by: PHYSICIAN ASSISTANT

## 2022-01-01 PROCEDURE — 86923 COMPATIBILITY TEST ELECTRIC: CPT

## 2022-01-01 PROCEDURE — 94002 VENT MGMT INPAT INIT DAY: CPT

## 2022-01-01 PROCEDURE — 83036 HEMOGLOBIN GLYCOSYLATED A1C: CPT

## 2022-01-01 PROCEDURE — 82542 COL CHROMOTOGRAPHY QUAL/QUAN: CPT | Performed by: PHYSICIAN ASSISTANT

## 2022-01-01 PROCEDURE — 85018 HEMOGLOBIN: CPT | Performed by: PHYSICIAN ASSISTANT

## 2022-01-01 PROCEDURE — 80076 HEPATIC FUNCTION PANEL: CPT

## 2022-01-01 PROCEDURE — 87186 SC STD MICRODIL/AGAR DIL: CPT | Performed by: STUDENT IN AN ORGANIZED HEALTH CARE EDUCATION/TRAINING PROGRAM

## 2022-01-01 PROCEDURE — 03HY32Z INSERTION OF MONITORING DEVICE INTO UPPER ARTERY, PERCUTANEOUS APPROACH: ICD-10-PCS | Performed by: SURGERY

## 2022-01-01 PROCEDURE — 99233 SBSQ HOSP IP/OBS HIGH 50: CPT | Performed by: ANESTHESIOLOGY

## 2022-01-01 PROCEDURE — 82805 BLOOD GASES W/O2 SATURATION: CPT

## 2022-01-01 PROCEDURE — 36620 INSERTION CATHETER ARTERY: CPT | Performed by: PHYSICIAN ASSISTANT

## 2022-01-01 PROCEDURE — 99223 1ST HOSP IP/OBS HIGH 75: CPT | Performed by: PHYSICIAN ASSISTANT

## 2022-01-01 PROCEDURE — 99291 CRITICAL CARE FIRST HOUR: CPT | Performed by: PHYSICIAN ASSISTANT

## 2022-01-01 PROCEDURE — 99291 CRITICAL CARE FIRST HOUR: CPT | Performed by: EMERGENCY MEDICINE

## 2022-01-01 PROCEDURE — 5A1955Z RESPIRATORY VENTILATION, GREATER THAN 96 CONSECUTIVE HOURS: ICD-10-PCS | Performed by: ANESTHESIOLOGY

## 2022-01-01 PROCEDURE — 85610 PROTHROMBIN TIME: CPT | Performed by: PHYSICIAN ASSISTANT

## 2022-01-01 PROCEDURE — 83010 ASSAY OF HAPTOGLOBIN QUANT: CPT | Performed by: INTERNAL MEDICINE

## 2022-01-01 PROCEDURE — 85730 THROMBOPLASTIN TIME PARTIAL: CPT

## 2022-01-01 PROCEDURE — 80053 COMPREHEN METABOLIC PANEL: CPT | Performed by: SURGERY

## 2022-01-01 PROCEDURE — 85007 BL SMEAR W/DIFF WBC COUNT: CPT

## 2022-01-01 PROCEDURE — NC001 PR NO CHARGE: Performed by: NURSE PRACTITIONER

## 2022-01-01 PROCEDURE — ND001 PR NO DOCUMENTATION: Performed by: NURSE PRACTITIONER

## 2022-01-01 PROCEDURE — C1729 CATH, DRAINAGE: HCPCS

## 2022-01-01 PROCEDURE — 05HM33Z INSERTION OF INFUSION DEVICE INTO RIGHT INTERNAL JUGULAR VEIN, PERCUTANEOUS APPROACH: ICD-10-PCS | Performed by: SURGERY

## 2022-01-01 PROCEDURE — 0W9F30Z DRAINAGE OF ABDOMINAL WALL WITH DRAINAGE DEVICE, PERCUTANEOUS APPROACH: ICD-10-PCS | Performed by: RADIOLOGY

## 2022-01-01 PROCEDURE — 99292 CRITICAL CARE ADDL 30 MIN: CPT | Performed by: SURGERY

## 2022-01-01 PROCEDURE — U0005 INFEC AGEN DETEC AMPLI PROBE: HCPCS

## 2022-01-01 PROCEDURE — 86900 BLOOD TYPING SEROLOGIC ABO: CPT

## 2022-01-01 PROCEDURE — 94644 CONT INHLJ TX 1ST HOUR: CPT

## 2022-01-01 PROCEDURE — 84439 ASSAY OF FREE THYROXINE: CPT | Performed by: SURGERY

## 2022-01-01 PROCEDURE — 85576 BLOOD PLATELET AGGREGATION: CPT | Performed by: PHYSICIAN ASSISTANT

## 2022-01-01 PROCEDURE — 85027 COMPLETE CBC AUTOMATED: CPT | Performed by: NURSE PRACTITIONER

## 2022-01-01 PROCEDURE — 88342 IMHCHEM/IMCYTCHM 1ST ANTB: CPT | Performed by: PATHOLOGY

## 2022-01-01 PROCEDURE — 97163 PT EVAL HIGH COMPLEX 45 MIN: CPT

## 2022-01-01 PROCEDURE — 82330 ASSAY OF CALCIUM: CPT | Performed by: PHYSICIAN ASSISTANT

## 2022-01-01 PROCEDURE — 83735 ASSAY OF MAGNESIUM: CPT

## 2022-01-01 PROCEDURE — 86850 RBC ANTIBODY SCREEN: CPT | Performed by: PHYSICIAN ASSISTANT

## 2022-01-01 PROCEDURE — 80053 COMPREHEN METABOLIC PANEL: CPT | Performed by: NURSE PRACTITIONER

## 2022-01-01 PROCEDURE — 82803 BLOOD GASES ANY COMBINATION: CPT

## 2022-01-01 PROCEDURE — 83735 ASSAY OF MAGNESIUM: CPT | Performed by: STUDENT IN AN ORGANIZED HEALTH CARE EDUCATION/TRAINING PROGRAM

## 2022-01-01 PROCEDURE — 71046 X-RAY EXAM CHEST 2 VIEWS: CPT

## 2022-01-01 PROCEDURE — 86901 BLOOD TYPING SEROLOGIC RH(D): CPT | Performed by: PHYSICIAN ASSISTANT

## 2022-01-01 PROCEDURE — 80048 BASIC METABOLIC PNL TOTAL CA: CPT | Performed by: SURGERY

## 2022-01-01 PROCEDURE — P9017 PLASMA 1 DONOR FRZ W/IN 8 HR: HCPCS

## 2022-01-01 PROCEDURE — 85014 HEMATOCRIT: CPT | Performed by: SURGERY

## 2022-01-01 PROCEDURE — 93325 DOPPLER ECHO COLOR FLOW MAPG: CPT | Performed by: INTERNAL MEDICINE

## 2022-01-01 PROCEDURE — 31500 INSERT EMERGENCY AIRWAY: CPT

## 2022-01-01 PROCEDURE — 74018 RADEX ABDOMEN 1 VIEW: CPT

## 2022-01-01 PROCEDURE — 87205 SMEAR GRAM STAIN: CPT | Performed by: SURGERY

## 2022-01-01 PROCEDURE — G1004 CDSM NDSC: HCPCS

## 2022-01-01 PROCEDURE — 82140 ASSAY OF AMMONIA: CPT | Performed by: SURGERY

## 2022-01-01 PROCEDURE — 85049 AUTOMATED PLATELET COUNT: CPT | Performed by: INTERNAL MEDICINE

## 2022-01-01 PROCEDURE — 85420 FIBRINOLYTIC PLASMINOGEN: CPT | Performed by: INTERNAL MEDICINE

## 2022-01-01 PROCEDURE — 84484 ASSAY OF TROPONIN QUANT: CPT | Performed by: STUDENT IN AN ORGANIZED HEALTH CARE EDUCATION/TRAINING PROGRAM

## 2022-01-01 PROCEDURE — 0BH18EZ INSERTION OF ENDOTRACHEAL AIRWAY INTO TRACHEA, VIA NATURAL OR ARTIFICIAL OPENING ENDOSCOPIC: ICD-10-PCS | Performed by: SURGERY

## 2022-01-01 PROCEDURE — C8924 2D TTE W OR W/O FOL W/CON,FU: HCPCS

## 2022-01-01 PROCEDURE — 87186 SC STD MICRODIL/AGAR DIL: CPT | Performed by: PHYSICIAN ASSISTANT

## 2022-01-01 PROCEDURE — 87103 BLOOD FUNGUS CULTURE: CPT | Performed by: PHYSICIAN ASSISTANT

## 2022-01-01 PROCEDURE — 92610 EVALUATE SWALLOWING FUNCTION: CPT

## 2022-01-01 PROCEDURE — 30243N1 TRANSFUSION OF NONAUTOLOGOUS RED BLOOD CELLS INTO CENTRAL VEIN, PERCUTANEOUS APPROACH: ICD-10-PCS | Performed by: SURGERY

## 2022-01-01 PROCEDURE — 94664 DEMO&/EVAL PT USE INHALER: CPT

## 2022-01-01 PROCEDURE — 82533 TOTAL CORTISOL: CPT

## 2022-01-01 PROCEDURE — 87102 FUNGUS ISOLATION CULTURE: CPT | Performed by: STUDENT IN AN ORGANIZED HEALTH CARE EDUCATION/TRAINING PROGRAM

## 2022-01-01 PROCEDURE — 87077 CULTURE AEROBIC IDENTIFY: CPT | Performed by: STUDENT IN AN ORGANIZED HEALTH CARE EDUCATION/TRAINING PROGRAM

## 2022-01-01 PROCEDURE — 3E0436Z INTRODUCTION OF NUTRITIONAL SUBSTANCE INTO CENTRAL VEIN, PERCUTANEOUS APPROACH: ICD-10-PCS | Performed by: STUDENT IN AN ORGANIZED HEALTH CARE EDUCATION/TRAINING PROGRAM

## 2022-01-01 PROCEDURE — NC001 PR NO CHARGE: Performed by: ANESTHESIOLOGY

## 2022-01-01 PROCEDURE — 86022 PLATELET ANTIBODIES: CPT | Performed by: PHYSICIAN ASSISTANT

## 2022-01-01 PROCEDURE — 36573 INSJ PICC RS&I 5 YR+: CPT | Performed by: RADIOLOGY

## 2022-01-01 PROCEDURE — 80048 BASIC METABOLIC PNL TOTAL CA: CPT | Performed by: NURSE PRACTITIONER

## 2022-01-01 PROCEDURE — U0003 INFECTIOUS AGENT DETECTION BY NUCLEIC ACID (DNA OR RNA); SEVERE ACUTE RESPIRATORY SYNDROME CORONAVIRUS 2 (SARS-COV-2) (CORONAVIRUS DISEASE [COVID-19]), AMPLIFIED PROBE TECHNIQUE, MAKING USE OF HIGH THROUGHPUT TECHNOLOGIES AS DESCRIBED BY CMS-2020-01-R: HCPCS

## 2022-01-01 PROCEDURE — 97167 OT EVAL HIGH COMPLEX 60 MIN: CPT

## 2022-01-01 PROCEDURE — 4A133B1 MONITORING OF ARTERIAL PRESSURE, PERIPHERAL, PERCUTANEOUS APPROACH: ICD-10-PCS | Performed by: EMERGENCY MEDICINE

## 2022-01-01 PROCEDURE — 85397 CLOTTING FUNCT ACTIVITY: CPT | Performed by: PHYSICIAN ASSISTANT

## 2022-01-01 PROCEDURE — 86301 IMMUNOASSAY TUMOR CA 19-9: CPT

## 2022-01-01 PROCEDURE — 87070 CULTURE OTHR SPECIMN AEROBIC: CPT | Performed by: STUDENT IN AN ORGANIZED HEALTH CARE EDUCATION/TRAINING PROGRAM

## 2022-01-01 PROCEDURE — 30233R1 TRANSFUSION OF NONAUTOLOGOUS PLATELETS INTO PERIPHERAL VEIN, PERCUTANEOUS APPROACH: ICD-10-PCS | Performed by: SURGERY

## 2022-01-01 PROCEDURE — 87070 CULTURE OTHR SPECIMN AEROBIC: CPT | Performed by: SURGERY

## 2022-01-01 PROCEDURE — 87147 CULTURE TYPE IMMUNOLOGIC: CPT | Performed by: PHYSICIAN ASSISTANT

## 2022-01-01 PROCEDURE — 87205 SMEAR GRAM STAIN: CPT | Performed by: PHYSICIAN ASSISTANT

## 2022-01-01 PROCEDURE — 88331 PATH CONSLTJ SURG 1 BLK 1SPC: CPT | Performed by: PATHOLOGY

## 2022-01-01 PROCEDURE — 3E1F88Z IRRIGATION OF RESPIRATORY TRACT USING IRRIGATING SUBSTANCE, VIA NATURAL OR ARTIFICIAL OPENING ENDOSCOPIC: ICD-10-PCS | Performed by: SURGERY

## 2022-01-01 PROCEDURE — 85384 FIBRINOGEN ACTIVITY: CPT | Performed by: PHYSICIAN ASSISTANT

## 2022-01-01 PROCEDURE — 86900 BLOOD TYPING SEROLOGIC ABO: CPT | Performed by: PHYSICIAN ASSISTANT

## 2022-01-01 PROCEDURE — 84100 ASSAY OF PHOSPHORUS: CPT | Performed by: STUDENT IN AN ORGANIZED HEALTH CARE EDUCATION/TRAINING PROGRAM

## 2022-01-01 PROCEDURE — 99291 CRITICAL CARE FIRST HOUR: CPT | Performed by: STUDENT IN AN ORGANIZED HEALTH CARE EDUCATION/TRAINING PROGRAM

## 2022-01-01 PROCEDURE — NC001 PR NO CHARGE: Performed by: STUDENT IN AN ORGANIZED HEALTH CARE EDUCATION/TRAINING PROGRAM

## 2022-01-01 PROCEDURE — 85027 COMPLETE CBC AUTOMATED: CPT | Performed by: ANESTHESIOLOGY

## 2022-01-01 PROCEDURE — 49406 IMAGE CATH FLUID PERI/RETRO: CPT | Performed by: RADIOLOGY

## 2022-01-01 PROCEDURE — 85014 HEMATOCRIT: CPT | Performed by: STUDENT IN AN ORGANIZED HEALTH CARE EDUCATION/TRAINING PROGRAM

## 2022-01-01 PROCEDURE — 81001 URINALYSIS AUTO W/SCOPE: CPT | Performed by: PHYSICIAN ASSISTANT

## 2022-01-01 PROCEDURE — PREOP

## 2022-01-01 PROCEDURE — 93306 TTE W/DOPPLER COMPLETE: CPT

## 2022-01-01 PROCEDURE — 80053 COMPREHEN METABOLIC PANEL: CPT | Performed by: PHYSICIAN ASSISTANT

## 2022-01-01 PROCEDURE — G0127 TRIM NAIL(S): HCPCS | Performed by: PODIATRIST

## 2022-01-01 PROCEDURE — 85018 HEMOGLOBIN: CPT | Performed by: STUDENT IN AN ORGANIZED HEALTH CARE EDUCATION/TRAINING PROGRAM

## 2022-01-01 PROCEDURE — 49406 IMAGE CATH FLUID PERI/RETRO: CPT

## 2022-01-01 PROCEDURE — 87106 FUNGI IDENTIFICATION YEAST: CPT | Performed by: SURGERY

## 2022-01-01 PROCEDURE — 82140 ASSAY OF AMMONIA: CPT | Performed by: PHYSICIAN ASSISTANT

## 2022-01-01 PROCEDURE — 87154 CUL TYP ID BLD PTHGN 6+ TRGT: CPT | Performed by: PHYSICIAN ASSISTANT

## 2022-01-01 PROCEDURE — 4A133J1 MONITORING OF ARTERIAL PULSE, PERIPHERAL, PERCUTANEOUS APPROACH: ICD-10-PCS | Performed by: EMERGENCY MEDICINE

## 2022-01-01 PROCEDURE — 0DT90ZZ RESECTION OF DUODENUM, OPEN APPROACH: ICD-10-PCS | Performed by: SURGERY

## 2022-01-01 PROCEDURE — 87077 CULTURE AEROBIC IDENTIFY: CPT | Performed by: SURGERY

## 2022-01-01 PROCEDURE — 4A133J1 MONITORING OF ARTERIAL PULSE, PERIPHERAL, PERCUTANEOUS APPROACH: ICD-10-PCS | Performed by: SURGERY

## 2022-01-01 PROCEDURE — 82150 ASSAY OF AMYLASE: CPT | Performed by: PHYSICIAN ASSISTANT

## 2022-01-01 PROCEDURE — 0DQV0ZZ REPAIR MESENTERY, OPEN APPROACH: ICD-10-PCS | Performed by: SURGERY

## 2022-01-01 PROCEDURE — 99233 SBSQ HOSP IP/OBS HIGH 50: CPT | Performed by: EMERGENCY MEDICINE

## 2022-01-01 PROCEDURE — 97530 THERAPEUTIC ACTIVITIES: CPT

## 2022-01-01 PROCEDURE — 84443 ASSAY THYROID STIM HORMONE: CPT

## 2022-01-01 PROCEDURE — 0DNW0ZZ RELEASE PERITONEUM, OPEN APPROACH: ICD-10-PCS | Performed by: SURGERY

## 2022-01-01 PROCEDURE — 4A133B1 MONITORING OF ARTERIAL PRESSURE, PERIPHERAL, PERCUTANEOUS APPROACH: ICD-10-PCS | Performed by: SURGERY

## 2022-01-01 PROCEDURE — 99153 MOD SED SAME PHYS/QHP EA: CPT

## 2022-01-01 PROCEDURE — 85730 THROMBOPLASTIN TIME PARTIAL: CPT | Performed by: INTERNAL MEDICINE

## 2022-01-01 PROCEDURE — 93308 TTE F-UP OR LMTD: CPT | Performed by: INTERNAL MEDICINE

## 2022-01-01 PROCEDURE — 85007 BL SMEAR W/DIFF WBC COUNT: CPT | Performed by: PHYSICIAN ASSISTANT

## 2022-01-01 PROCEDURE — 36569 INSJ PICC 5 YR+ W/O IMAGING: CPT

## 2022-01-01 PROCEDURE — 30233K1 TRANSFUSION OF NONAUTOLOGOUS FROZEN PLASMA INTO PERIPHERAL VEIN, PERCUTANEOUS APPROACH: ICD-10-PCS | Performed by: SURGERY

## 2022-01-01 PROCEDURE — 85007 BL SMEAR W/DIFF WBC COUNT: CPT | Performed by: STUDENT IN AN ORGANIZED HEALTH CARE EDUCATION/TRAINING PROGRAM

## 2022-01-01 PROCEDURE — 83605 ASSAY OF LACTIC ACID: CPT | Performed by: EMERGENCY MEDICINE

## 2022-01-01 PROCEDURE — 0FBG0ZZ EXCISION OF PANCREAS, OPEN APPROACH: ICD-10-PCS | Performed by: SURGERY

## 2022-01-01 PROCEDURE — 83735 ASSAY OF MAGNESIUM: CPT | Performed by: INTERNAL MEDICINE

## 2022-01-01 PROCEDURE — 85027 COMPLETE CBC AUTOMATED: CPT | Performed by: STUDENT IN AN ORGANIZED HEALTH CARE EDUCATION/TRAINING PROGRAM

## 2022-01-01 RX ORDER — PANTOPRAZOLE SODIUM 40 MG/1
40 TABLET, DELAYED RELEASE ORAL
Status: DISCONTINUED | OUTPATIENT
Start: 2022-01-01 | End: 2022-01-01

## 2022-01-01 RX ORDER — INSULIN GLARGINE 100 [IU]/ML
8 INJECTION, SOLUTION SUBCUTANEOUS
Status: DISCONTINUED | OUTPATIENT
Start: 2022-01-01 | End: 2022-01-01

## 2022-01-01 RX ORDER — HYDROMORPHONE HCL/PF 1 MG/ML
0.5 SYRINGE (ML) INJECTION EVERY 4 HOURS PRN
Status: DISCONTINUED | OUTPATIENT
Start: 2022-01-01 | End: 2022-01-01

## 2022-01-01 RX ORDER — LIDOCAINE HYDROCHLORIDE 10 MG/ML
INJECTION, SOLUTION EPIDURAL; INFILTRATION; INTRACAUDAL; PERINEURAL AS NEEDED
Status: DISCONTINUED | OUTPATIENT
Start: 2022-01-01 | End: 2022-01-01

## 2022-01-01 RX ORDER — FENTANYL CITRATE 50 UG/ML
100 INJECTION, SOLUTION INTRAMUSCULAR; INTRAVENOUS ONCE
Status: COMPLETED | OUTPATIENT
Start: 2022-01-01 | End: 2022-01-01

## 2022-01-01 RX ORDER — OXYCODONE HYDROCHLORIDE 10 MG/1
10 TABLET ORAL EVERY 4 HOURS PRN
Status: DISCONTINUED | OUTPATIENT
Start: 2022-01-01 | End: 2022-01-01

## 2022-01-01 RX ORDER — FENTANYL CITRATE-0.9 % NACL/PF 10 MCG/ML
100 PLASTIC BAG, INJECTION (ML) INTRAVENOUS CONTINUOUS
Status: DISCONTINUED | OUTPATIENT
Start: 2022-01-01 | End: 2022-08-15 | Stop reason: HOSPADM

## 2022-01-01 RX ORDER — GLYCOPYRROLATE 0.2 MG/ML
INJECTION INTRAMUSCULAR; INTRAVENOUS AS NEEDED
Status: DISCONTINUED | OUTPATIENT
Start: 2022-01-01 | End: 2022-01-01

## 2022-01-01 RX ORDER — DEXMEDETOMIDINE HYDROCHLORIDE 4 UG/ML
.1-1 INJECTION, SOLUTION INTRAVENOUS
Status: DISCONTINUED | OUTPATIENT
Start: 2022-01-01 | End: 2022-01-01

## 2022-01-01 RX ORDER — LANOLIN ALCOHOL/MO/W.PET/CERES
6 CREAM (GRAM) TOPICAL
Status: DISCONTINUED | OUTPATIENT
Start: 2022-01-01 | End: 2022-01-01

## 2022-01-01 RX ORDER — POLYETHYLENE GLYCOL 3350 17 G/17G
17 POWDER, FOR SOLUTION ORAL DAILY
Status: DISCONTINUED | OUTPATIENT
Start: 2022-01-01 | End: 2022-01-01

## 2022-01-01 RX ORDER — ALBUMIN, HUMAN INJ 5% 5 %
25 SOLUTION INTRAVENOUS ONCE
Status: COMPLETED | OUTPATIENT
Start: 2022-01-01 | End: 2022-01-01

## 2022-01-01 RX ORDER — VECURONIUM BROMIDE 1 MG/ML
10 INJECTION, POWDER, LYOPHILIZED, FOR SOLUTION INTRAVENOUS ONCE
Status: COMPLETED | OUTPATIENT
Start: 2022-01-01 | End: 2022-01-01

## 2022-01-01 RX ORDER — LIDOCAINE HYDROCHLORIDE 10 MG/ML
INJECTION, SOLUTION EPIDURAL; INFILTRATION; INTRACAUDAL; PERINEURAL
Status: COMPLETED
Start: 2022-01-01 | End: 2022-01-01

## 2022-01-01 RX ORDER — ATROPINE SULFATE 1 MG/ML
0.25 INJECTION, SOLUTION INTRAVENOUS ONCE
Status: CANCELLED | OUTPATIENT
Start: 2022-01-01

## 2022-01-01 RX ORDER — ROPIVACAINE HYDROCHLORIDE 2 MG/ML
INJECTION, SOLUTION EPIDURAL; INFILTRATION; PERINEURAL CONTINUOUS PRN
Status: DISCONTINUED | OUTPATIENT
Start: 2022-01-01 | End: 2022-01-01

## 2022-01-01 RX ORDER — FUROSEMIDE 20 MG/1
20 TABLET ORAL 2 TIMES DAILY
Qty: 30 TABLET | Refills: 0 | Status: SHIPPED | OUTPATIENT
Start: 2022-01-01 | End: 2022-08-15

## 2022-01-01 RX ORDER — DEXTROSE MONOHYDRATE 50 MG/ML
20 INJECTION, SOLUTION INTRAVENOUS ONCE
Status: CANCELLED | OUTPATIENT
Start: 2022-01-01

## 2022-01-01 RX ORDER — ALBUMIN, HUMAN INJ 5% 5 %
SOLUTION INTRAVENOUS
Status: COMPLETED
Start: 2022-01-01 | End: 2022-01-01

## 2022-01-01 RX ORDER — ACETAMINOPHEN 160 MG/5ML
975 SUSPENSION, ORAL (FINAL DOSE FORM) ORAL EVERY 8 HOURS
Status: DISCONTINUED | OUTPATIENT
Start: 2022-01-01 | End: 2022-01-01 | Stop reason: SDUPTHER

## 2022-01-01 RX ORDER — SODIUM CHLORIDE, SODIUM GLUCONATE, SODIUM ACETATE, POTASSIUM CHLORIDE, MAGNESIUM CHLORIDE, SODIUM PHOSPHATE, DIBASIC, AND POTASSIUM PHOSPHATE .53; .5; .37; .037; .03; .012; .00082 G/100ML; G/100ML; G/100ML; G/100ML; G/100ML; G/100ML; G/100ML
500 INJECTION, SOLUTION INTRAVENOUS ONCE
Status: COMPLETED | OUTPATIENT
Start: 2022-01-01 | End: 2022-01-01

## 2022-01-01 RX ORDER — HYDROMORPHONE HCL/PF 1 MG/ML
0.5 SYRINGE (ML) INJECTION EVERY 2 HOUR PRN
Status: DISCONTINUED | OUTPATIENT
Start: 2022-01-01 | End: 2022-01-01

## 2022-01-01 RX ORDER — DEXTROSE MONOHYDRATE 25 G/50ML
INJECTION, SOLUTION INTRAVENOUS
Status: COMPLETED
Start: 2022-01-01 | End: 2022-01-01

## 2022-01-01 RX ORDER — CHLORHEXIDINE GLUCONATE 0.12 MG/ML
15 RINSE ORAL EVERY 12 HOURS SCHEDULED
Status: DISCONTINUED | OUTPATIENT
Start: 2022-01-01 | End: 2022-01-01

## 2022-01-01 RX ORDER — FUROSEMIDE 10 MG/ML
40 INJECTION INTRAMUSCULAR; INTRAVENOUS ONCE
Status: CANCELLED | OUTPATIENT
Start: 2022-01-01

## 2022-01-01 RX ORDER — CALCIUM GLUCONATE 20 MG/ML
1 INJECTION, SOLUTION INTRAVENOUS ONCE
Status: COMPLETED | OUTPATIENT
Start: 2022-01-01 | End: 2022-01-01

## 2022-01-01 RX ORDER — INSULIN LISPRO 100 [IU]/ML
3 INJECTION, SOLUTION INTRAVENOUS; SUBCUTANEOUS
Status: DISCONTINUED | OUTPATIENT
Start: 2022-01-01 | End: 2022-01-01

## 2022-01-01 RX ORDER — OXYCODONE HYDROCHLORIDE 5 MG/1
5 TABLET ORAL EVERY 4 HOURS PRN
Status: DISCONTINUED | OUTPATIENT
Start: 2022-01-01 | End: 2022-01-01

## 2022-01-01 RX ORDER — ACETYLCYSTEINE 200 MG/ML
3 SOLUTION ORAL; RESPIRATORY (INHALATION)
Status: DISCONTINUED | OUTPATIENT
Start: 2022-01-01 | End: 2022-01-01

## 2022-01-01 RX ORDER — MAGNESIUM SULFATE 1 G/100ML
1 INJECTION INTRAVENOUS ONCE
Status: COMPLETED | OUTPATIENT
Start: 2022-01-01 | End: 2022-01-01

## 2022-01-01 RX ORDER — INSULIN LISPRO 100 [IU]/ML
2-12 INJECTION, SOLUTION INTRAVENOUS; SUBCUTANEOUS EVERY 6 HOURS SCHEDULED
Status: COMPLETED | OUTPATIENT
Start: 2022-01-01 | End: 2022-01-01

## 2022-01-01 RX ORDER — POTASSIUM CHLORIDE 29.8 MG/ML
40 INJECTION INTRAVENOUS ONCE
Status: COMPLETED | OUTPATIENT
Start: 2022-01-01 | End: 2022-01-01

## 2022-01-01 RX ORDER — SODIUM CHLORIDE, SODIUM GLUCONATE, SODIUM ACETATE, POTASSIUM CHLORIDE, MAGNESIUM CHLORIDE, SODIUM PHOSPHATE, DIBASIC, AND POTASSIUM PHOSPHATE .53; .5; .37; .037; .03; .012; .00082 G/100ML; G/100ML; G/100ML; G/100ML; G/100ML; G/100ML; G/100ML
1000 INJECTION, SOLUTION INTRAVENOUS ONCE
Status: COMPLETED | OUTPATIENT
Start: 2022-01-01 | End: 2022-01-01

## 2022-01-01 RX ORDER — SODIUM CHLORIDE 9 MG/ML
20 INJECTION, SOLUTION INTRAVENOUS ONCE AS NEEDED
Status: CANCELLED | OUTPATIENT
Start: 2022-01-01

## 2022-01-01 RX ORDER — EPINEPHRINE 1 MG/ML
0.1 INJECTION, SOLUTION, CONCENTRATE INTRAVENOUS ONCE
Status: COMPLETED | OUTPATIENT
Start: 2022-01-01 | End: 2022-01-01

## 2022-01-01 RX ORDER — SODIUM CHLORIDE 9 MG/ML
INJECTION, SOLUTION INTRAVENOUS CONTINUOUS PRN
Status: DISCONTINUED | OUTPATIENT
Start: 2022-01-01 | End: 2022-01-01

## 2022-01-01 RX ORDER — OLANZAPINE 10 MG/1
10 INJECTION, POWDER, LYOPHILIZED, FOR SOLUTION INTRAMUSCULAR
Status: DISCONTINUED | OUTPATIENT
Start: 2022-01-01 | End: 2022-01-01

## 2022-01-01 RX ORDER — INSULIN LISPRO 100 [IU]/ML
1-5 INJECTION, SOLUTION INTRAVENOUS; SUBCUTANEOUS EVERY 6 HOURS SCHEDULED
Status: DISCONTINUED | OUTPATIENT
Start: 2022-01-01 | End: 2022-01-01

## 2022-01-01 RX ORDER — PANTOPRAZOLE SODIUM 40 MG/10ML
40 INJECTION, POWDER, LYOPHILIZED, FOR SOLUTION INTRAVENOUS
Status: DISCONTINUED | OUTPATIENT
Start: 2022-01-01 | End: 2022-01-01

## 2022-01-01 RX ORDER — CALCIUM CHLORIDE 100 MG/ML
INJECTION INTRAVENOUS; INTRAVENTRICULAR AS NEEDED
Status: DISCONTINUED | OUTPATIENT
Start: 2022-01-01 | End: 2022-01-01

## 2022-01-01 RX ORDER — SODIUM CHLORIDE FOR INHALATION 3 %
4 VIAL, NEBULIZER (ML) INHALATION
Status: DISCONTINUED | OUTPATIENT
Start: 2022-01-01 | End: 2022-01-01

## 2022-01-01 RX ORDER — HYDROMORPHONE HCL/PF 1 MG/ML
0.5 SYRINGE (ML) INJECTION ONCE
Status: COMPLETED | OUTPATIENT
Start: 2022-01-01 | End: 2022-01-01

## 2022-01-01 RX ORDER — CALCIUM GLUCONATE 20 MG/ML
2 INJECTION, SOLUTION INTRAVENOUS ONCE
Status: COMPLETED | OUTPATIENT
Start: 2022-01-01 | End: 2022-01-01

## 2022-01-01 RX ORDER — NOREPINEPHRINE BITARTRATE 1 MG/ML
INJECTION, SOLUTION INTRAVENOUS
Status: COMPLETED
Start: 2022-01-01 | End: 2022-01-01

## 2022-01-01 RX ORDER — INSULIN LISPRO 100 [IU]/ML
1-5 INJECTION, SOLUTION INTRAVENOUS; SUBCUTANEOUS
Status: DISCONTINUED | OUTPATIENT
Start: 2022-01-01 | End: 2022-01-01

## 2022-01-01 RX ORDER — MIDAZOLAM HYDROCHLORIDE 2 MG/2ML
2 INJECTION, SOLUTION INTRAMUSCULAR; INTRAVENOUS ONCE
Status: COMPLETED | OUTPATIENT
Start: 2022-01-01 | End: 2022-01-01

## 2022-01-01 RX ORDER — MIDAZOLAM HYDROCHLORIDE 2 MG/2ML
4 INJECTION, SOLUTION INTRAMUSCULAR; INTRAVENOUS ONCE
Status: COMPLETED | OUTPATIENT
Start: 2022-01-01 | End: 2022-01-01

## 2022-01-01 RX ORDER — ALBUMIN (HUMAN) 12.5 G/50ML
25 SOLUTION INTRAVENOUS EVERY 6 HOURS
Status: DISCONTINUED | OUTPATIENT
Start: 2022-01-01 | End: 2022-01-01

## 2022-01-01 RX ORDER — AMOXICILLIN 250 MG
1 CAPSULE ORAL 2 TIMES DAILY
Status: DISCONTINUED | OUTPATIENT
Start: 2022-01-01 | End: 2022-01-01

## 2022-01-01 RX ORDER — MAGNESIUM SULFATE HEPTAHYDRATE 40 MG/ML
2 INJECTION, SOLUTION INTRAVENOUS ONCE
Status: COMPLETED | OUTPATIENT
Start: 2022-01-01 | End: 2022-01-01

## 2022-01-01 RX ORDER — INSULIN LISPRO 100 [IU]/ML
1-5 INJECTION, SOLUTION INTRAVENOUS; SUBCUTANEOUS EVERY 6 HOURS
Status: DISCONTINUED | OUTPATIENT
Start: 2022-01-01 | End: 2022-01-01

## 2022-01-01 RX ORDER — METOPROLOL TARTRATE 5 MG/5ML
5 INJECTION INTRAVENOUS EVERY 6 HOURS PRN
Status: DISCONTINUED | OUTPATIENT
Start: 2022-01-01 | End: 2022-01-01

## 2022-01-01 RX ORDER — DEXTROSE MONOHYDRATE 25 G/50ML
INJECTION, SOLUTION INTRAVENOUS AS NEEDED
Status: DISCONTINUED | OUTPATIENT
Start: 2022-01-01 | End: 2022-01-01

## 2022-01-01 RX ORDER — ATROPINE SULFATE 1 MG/ML
0.25 INJECTION, SOLUTION INTRAVENOUS ONCE AS NEEDED
Status: CANCELLED | OUTPATIENT
Start: 2022-01-01

## 2022-01-01 RX ORDER — EPINEPHRINE 1 MG/ML
INJECTION, SOLUTION, CONCENTRATE INTRAVENOUS
Status: DISPENSED
Start: 2022-01-01 | End: 2022-01-01

## 2022-01-01 RX ORDER — INSULIN LISPRO 100 [IU]/ML
5 INJECTION, SOLUTION INTRAVENOUS; SUBCUTANEOUS
Status: DISCONTINUED | OUTPATIENT
Start: 2022-01-01 | End: 2022-01-01

## 2022-01-01 RX ORDER — INSULIN GLARGINE 100 [IU]/ML
5 INJECTION, SOLUTION SUBCUTANEOUS ONCE
Status: COMPLETED | OUTPATIENT
Start: 2022-01-01 | End: 2022-01-01

## 2022-01-01 RX ORDER — LANCETS 33 GAUGE
EACH MISCELLANEOUS
Qty: 100 EACH | Refills: 1 | Status: SHIPPED | OUTPATIENT
Start: 2022-01-01

## 2022-01-01 RX ORDER — METOPROLOL TARTRATE 5 MG/5ML
5 INJECTION INTRAVENOUS ONCE
Status: COMPLETED | OUTPATIENT
Start: 2022-01-01 | End: 2022-01-01

## 2022-01-01 RX ORDER — ONDANSETRON 2 MG/ML
INJECTION INTRAMUSCULAR; INTRAVENOUS AS NEEDED
Status: DISCONTINUED | OUTPATIENT
Start: 2022-01-01 | End: 2022-01-01

## 2022-01-01 RX ORDER — MAGNESIUM SULFATE HEPTAHYDRATE 40 MG/ML
4 INJECTION, SOLUTION INTRAVENOUS ONCE
Status: COMPLETED | OUTPATIENT
Start: 2022-01-01 | End: 2022-01-01

## 2022-01-01 RX ORDER — ALBUMIN, HUMAN INJ 5% 5 %
12.5 SOLUTION INTRAVENOUS ONCE
Status: COMPLETED | OUTPATIENT
Start: 2022-01-01 | End: 2022-01-01

## 2022-01-01 RX ORDER — HALOPERIDOL 5 MG/ML
0.5 INJECTION INTRAMUSCULAR EVERY 2 HOUR PRN
Status: DISCONTINUED | OUTPATIENT
Start: 2022-01-01 | End: 2022-08-15 | Stop reason: HOSPADM

## 2022-01-01 RX ORDER — LANOLIN ALCOHOL/MO/W.PET/CERES
3 CREAM (GRAM) TOPICAL
Status: DISCONTINUED | OUTPATIENT
Start: 2022-01-01 | End: 2022-01-01

## 2022-01-01 RX ORDER — SODIUM CHLORIDE, SODIUM GLUCONATE, SODIUM ACETATE, POTASSIUM CHLORIDE, MAGNESIUM CHLORIDE, SODIUM PHOSPHATE, DIBASIC, AND POTASSIUM PHOSPHATE .53; .5; .37; .037; .03; .012; .00082 G/100ML; G/100ML; G/100ML; G/100ML; G/100ML; G/100ML; G/100ML
75 INJECTION, SOLUTION INTRAVENOUS CONTINUOUS
Status: DISCONTINUED | OUTPATIENT
Start: 2022-01-01 | End: 2022-01-01

## 2022-01-01 RX ORDER — FENTANYL CITRATE 50 UG/ML
50 INJECTION, SOLUTION INTRAMUSCULAR; INTRAVENOUS ONCE
Status: DISCONTINUED | OUTPATIENT
Start: 2022-01-01 | End: 2022-01-01

## 2022-01-01 RX ORDER — FENTANYL CITRATE 50 UG/ML
50 INJECTION, SOLUTION INTRAMUSCULAR; INTRAVENOUS
Status: DISCONTINUED | OUTPATIENT
Start: 2022-01-01 | End: 2022-01-01

## 2022-01-01 RX ORDER — MIDAZOLAM HYDROCHLORIDE 2 MG/2ML
INJECTION, SOLUTION INTRAMUSCULAR; INTRAVENOUS AS NEEDED
Status: DISCONTINUED | OUTPATIENT
Start: 2022-01-01 | End: 2022-01-01

## 2022-01-01 RX ORDER — WATER 1000 ML/1000ML
INJECTION, SOLUTION INTRAVENOUS
Status: COMPLETED
Start: 2022-01-01 | End: 2022-01-01

## 2022-01-01 RX ORDER — POTASSIUM CHLORIDE 14.9 MG/ML
INJECTION INTRAVENOUS CONTINUOUS PRN
Status: DISCONTINUED | OUTPATIENT
Start: 2022-01-01 | End: 2022-01-01

## 2022-01-01 RX ORDER — BISACODYL 10 MG
10 SUPPOSITORY, RECTAL RECTAL DAILY
Status: DISCONTINUED | OUTPATIENT
Start: 2022-01-01 | End: 2022-01-01

## 2022-01-01 RX ORDER — NOREPINEPHRINE BITARTRATE 1 MG/ML
INJECTION, SOLUTION INTRAVENOUS
Status: DISPENSED
Start: 2022-01-01 | End: 2022-01-01

## 2022-01-01 RX ORDER — METHOCARBAMOL 500 MG/1
500 TABLET, FILM COATED ORAL EVERY 6 HOURS PRN
Status: DISCONTINUED | OUTPATIENT
Start: 2022-01-01 | End: 2022-01-01

## 2022-01-01 RX ORDER — FENTANYL CITRATE 50 UG/ML
50 INJECTION, SOLUTION INTRAMUSCULAR; INTRAVENOUS ONCE
Status: COMPLETED | OUTPATIENT
Start: 2022-01-01 | End: 2022-01-01

## 2022-01-01 RX ORDER — HYDROMORPHONE HCL IN WATER/PF 6 MG/30 ML
0.2 PATIENT CONTROLLED ANALGESIA SYRINGE INTRAVENOUS EVERY 4 HOURS PRN
Status: DISCONTINUED | OUTPATIENT
Start: 2022-01-01 | End: 2022-01-01

## 2022-01-01 RX ORDER — CEFAZOLIN SODIUM 1 G/3ML
INJECTION, POWDER, FOR SOLUTION INTRAMUSCULAR; INTRAVENOUS AS NEEDED
Status: DISCONTINUED | OUTPATIENT
Start: 2022-01-01 | End: 2022-01-01

## 2022-01-01 RX ORDER — HYDROMORPHONE HCL/PF 1 MG/ML
0.5 SYRINGE (ML) INJECTION
Status: DISCONTINUED | OUTPATIENT
Start: 2022-01-01 | End: 2022-08-15 | Stop reason: HOSPADM

## 2022-01-01 RX ORDER — PANTOPRAZOLE SODIUM 40 MG/10ML
40 INJECTION, POWDER, LYOPHILIZED, FOR SOLUTION INTRAVENOUS EVERY 12 HOURS SCHEDULED
Status: DISCONTINUED | OUTPATIENT
Start: 2022-01-01 | End: 2022-01-01

## 2022-01-01 RX ORDER — LEVALBUTEROL 1.25 MG/.5ML
1.25 SOLUTION, CONCENTRATE RESPIRATORY (INHALATION)
Status: DISCONTINUED | OUTPATIENT
Start: 2022-01-01 | End: 2022-01-01

## 2022-01-01 RX ORDER — MAGNESIUM HYDROXIDE 1200 MG/15ML
LIQUID ORAL AS NEEDED
Status: DISCONTINUED | OUTPATIENT
Start: 2022-01-01 | End: 2022-01-01 | Stop reason: HOSPADM

## 2022-01-01 RX ORDER — FLUCONAZOLE 2 MG/ML
400 INJECTION, SOLUTION INTRAVENOUS EVERY 24 HOURS
Status: DISCONTINUED | OUTPATIENT
Start: 2022-01-01 | End: 2022-01-01

## 2022-01-01 RX ORDER — BUMETANIDE 0.25 MG/ML
2 INJECTION, SOLUTION INTRAMUSCULAR; INTRAVENOUS ONCE
Status: COMPLETED | OUTPATIENT
Start: 2022-01-01 | End: 2022-01-01

## 2022-01-01 RX ORDER — CEFAZOLIN SODIUM 2 G/50ML
2000 SOLUTION INTRAVENOUS ONCE
Status: CANCELLED | OUTPATIENT
Start: 2022-01-01

## 2022-01-01 RX ORDER — ALBUMIN, HUMAN INJ 5% 5 %
SOLUTION INTRAVENOUS
Status: DISCONTINUED
Start: 2022-01-01 | End: 2022-08-15 | Stop reason: HOSPADM

## 2022-01-01 RX ORDER — INSULIN LISPRO 100 [IU]/ML
4-20 INJECTION, SOLUTION INTRAVENOUS; SUBCUTANEOUS
Status: DISCONTINUED | OUTPATIENT
Start: 2022-01-01 | End: 2022-01-01

## 2022-01-01 RX ORDER — POTASSIUM CHLORIDE 14.9 MG/ML
20 INJECTION INTRAVENOUS 2 TIMES DAILY
Status: DISPENSED | OUTPATIENT
Start: 2022-01-01 | End: 2022-01-01

## 2022-01-01 RX ORDER — QUETIAPINE FUMARATE 25 MG/1
50 TABLET, FILM COATED ORAL 2 TIMES DAILY
Status: DISCONTINUED | OUTPATIENT
Start: 2022-01-01 | End: 2022-01-01

## 2022-01-01 RX ORDER — CEFAZOLIN SODIUM 2 G/50ML
2000 SOLUTION INTRAVENOUS ONCE
Status: DISCONTINUED | OUTPATIENT
Start: 2022-01-01 | End: 2022-01-01

## 2022-01-01 RX ORDER — ROCURONIUM BROMIDE 10 MG/ML
INJECTION, SOLUTION INTRAVENOUS AS NEEDED
Status: DISCONTINUED | OUTPATIENT
Start: 2022-01-01 | End: 2022-01-01

## 2022-01-01 RX ORDER — DEXTROSE MONOHYDRATE 25 G/50ML
25 INJECTION, SOLUTION INTRAVENOUS ONCE
Status: COMPLETED | OUTPATIENT
Start: 2022-01-01 | End: 2022-01-01

## 2022-01-01 RX ORDER — ETOMIDATE 2 MG/ML
30 INJECTION INTRAVENOUS ONCE
Status: COMPLETED | OUTPATIENT
Start: 2022-01-01 | End: 2022-01-01

## 2022-01-01 RX ORDER — MIDAZOLAM HYDROCHLORIDE 2 MG/2ML
1 INJECTION, SOLUTION INTRAMUSCULAR; INTRAVENOUS ONCE
Status: COMPLETED | OUTPATIENT
Start: 2022-01-01 | End: 2022-01-01

## 2022-01-01 RX ORDER — ATENOLOL 25 MG/1
25 TABLET ORAL DAILY
Status: DISCONTINUED | OUTPATIENT
Start: 2022-01-01 | End: 2022-01-01

## 2022-01-01 RX ORDER — INSULIN GLARGINE 100 [IU]/ML
15 INJECTION, SOLUTION SUBCUTANEOUS
Status: DISCONTINUED | OUTPATIENT
Start: 2022-01-01 | End: 2022-01-01

## 2022-01-01 RX ORDER — ALBUMIN (HUMAN) 12.5 G/50ML
25 SOLUTION INTRAVENOUS ONCE
Status: COMPLETED | OUTPATIENT
Start: 2022-01-01 | End: 2022-01-01

## 2022-01-01 RX ORDER — FENTANYL CITRATE-0.9 % NACL/PF 10 MCG/ML
50 PLASTIC BAG, INJECTION (ML) INTRAVENOUS CONTINUOUS
Status: DISCONTINUED | OUTPATIENT
Start: 2022-01-01 | End: 2022-01-01

## 2022-01-01 RX ORDER — IPRATROPIUM BROMIDE AND ALBUTEROL SULFATE 2.5; .5 MG/3ML; MG/3ML
3 SOLUTION RESPIRATORY (INHALATION) EVERY 4 HOURS PRN
Status: DISCONTINUED | OUTPATIENT
Start: 2022-01-01 | End: 2022-01-01

## 2022-01-01 RX ORDER — ONDANSETRON 2 MG/ML
4 INJECTION INTRAMUSCULAR; INTRAVENOUS ONCE AS NEEDED
Status: CANCELLED | OUTPATIENT
Start: 2022-01-01

## 2022-01-01 RX ORDER — GLYCOPYRROLATE 0.2 MG/ML
0.1 INJECTION INTRAMUSCULAR; INTRAVENOUS EVERY 4 HOURS PRN
Status: DISCONTINUED | OUTPATIENT
Start: 2022-01-01 | End: 2022-08-15 | Stop reason: HOSPADM

## 2022-01-01 RX ORDER — ALBUMIN, HUMAN INJ 5% 5 %
SOLUTION INTRAVENOUS
Status: DISPENSED
Start: 2022-01-01 | End: 2022-01-01

## 2022-01-01 RX ORDER — METOPROLOL TARTRATE 5 MG/5ML
2.5 INJECTION INTRAVENOUS ONCE
Status: DISCONTINUED | OUTPATIENT
Start: 2022-01-01 | End: 2022-01-01

## 2022-01-01 RX ORDER — HEPARIN SODIUM 5000 [USP'U]/ML
5000 INJECTION, SOLUTION INTRAVENOUS; SUBCUTANEOUS EVERY 8 HOURS SCHEDULED
Status: DISCONTINUED | OUTPATIENT
Start: 2022-01-01 | End: 2022-01-01

## 2022-01-01 RX ORDER — HYDROCHLOROTHIAZIDE 25 MG/1
25 TABLET ORAL DAILY
Qty: 30 TABLET | Refills: 0 | Status: SHIPPED | OUTPATIENT
Start: 2022-01-01 | End: 2022-08-15

## 2022-01-01 RX ORDER — SODIUM CHLORIDE, SODIUM LACTATE, POTASSIUM CHLORIDE, CALCIUM CHLORIDE 600; 310; 30; 20 MG/100ML; MG/100ML; MG/100ML; MG/100ML
50 INJECTION, SOLUTION INTRAVENOUS CONTINUOUS
Status: DISCONTINUED | OUTPATIENT
Start: 2022-01-01 | End: 2022-01-01

## 2022-01-01 RX ORDER — ALBUMIN, HUMAN INJ 5% 5 %
SOLUTION INTRAVENOUS CONTINUOUS PRN
Status: DISCONTINUED | OUTPATIENT
Start: 2022-01-01 | End: 2022-01-01

## 2022-01-01 RX ORDER — HEPARIN SODIUM 5000 [USP'U]/ML
7500 INJECTION, SOLUTION INTRAVENOUS; SUBCUTANEOUS EVERY 8 HOURS SCHEDULED
Status: DISCONTINUED | OUTPATIENT
Start: 2022-01-01 | End: 2022-01-01

## 2022-01-01 RX ORDER — INSULIN GLARGINE 100 [IU]/ML
5 INJECTION, SOLUTION SUBCUTANEOUS ONCE
Status: DISCONTINUED | OUTPATIENT
Start: 2022-01-01 | End: 2022-01-01

## 2022-01-01 RX ORDER — METRONIDAZOLE 500 MG/100ML
500 INJECTION, SOLUTION INTRAVENOUS ONCE
Status: COMPLETED | OUTPATIENT
Start: 2022-01-01 | End: 2022-01-01

## 2022-01-01 RX ORDER — KETAMINE HCL IN NACL, ISO-OSM 100MG/10ML
SYRINGE (ML) INJECTION AS NEEDED
Status: DISCONTINUED | OUTPATIENT
Start: 2022-01-01 | End: 2022-01-01

## 2022-01-01 RX ORDER — ONDANSETRON 2 MG/ML
4 INJECTION INTRAMUSCULAR; INTRAVENOUS EVERY 6 HOURS PRN
Status: DISCONTINUED | OUTPATIENT
Start: 2022-01-01 | End: 2022-08-15 | Stop reason: HOSPADM

## 2022-01-01 RX ORDER — PROPOFOL 10 MG/ML
INJECTION, EMULSION INTRAVENOUS CONTINUOUS PRN
Status: DISCONTINUED | OUTPATIENT
Start: 2022-01-01 | End: 2022-01-01

## 2022-01-01 RX ORDER — DEXMEDETOMIDINE HYDROCHLORIDE 4 UG/ML
.1-1.2 INJECTION, SOLUTION INTRAVENOUS
Status: DISCONTINUED | OUTPATIENT
Start: 2022-01-01 | End: 2022-01-01

## 2022-01-01 RX ORDER — HYDROMORPHONE HCL/PF 1 MG/ML
0.4 SYRINGE (ML) INJECTION
Status: DISCONTINUED | OUTPATIENT
Start: 2022-01-01 | End: 2022-01-01

## 2022-01-01 RX ORDER — IPRATROPIUM BROMIDE AND ALBUTEROL SULFATE 2.5; .5 MG/3ML; MG/3ML
3 SOLUTION RESPIRATORY (INHALATION)
Status: DISCONTINUED | OUTPATIENT
Start: 2022-01-01 | End: 2022-01-01

## 2022-01-01 RX ORDER — ONDANSETRON 2 MG/ML
4 INJECTION INTRAMUSCULAR; INTRAVENOUS ONCE
Status: COMPLETED | OUTPATIENT
Start: 2022-01-01 | End: 2022-01-01

## 2022-01-01 RX ORDER — HYDROMORPHONE HCL/PF 1 MG/ML
0.5 SYRINGE (ML) INJECTION
Status: DISCONTINUED | OUTPATIENT
Start: 2022-01-01 | End: 2022-01-01

## 2022-01-01 RX ORDER — FENTANYL CITRATE 50 UG/ML
50 INJECTION, SOLUTION INTRAMUSCULAR; INTRAVENOUS EVERY 2 HOUR PRN
Status: DISCONTINUED | OUTPATIENT
Start: 2022-01-01 | End: 2022-01-01

## 2022-01-01 RX ORDER — ONDANSETRON 2 MG/ML
INJECTION INTRAMUSCULAR; INTRAVENOUS
Status: COMPLETED
Start: 2022-01-01 | End: 2022-01-01

## 2022-01-01 RX ORDER — SUCCINYLCHOLINE/SOD CL,ISO/PF 100 MG/5ML
SYRINGE (ML) INTRAVENOUS AS NEEDED
Status: DISCONTINUED | OUTPATIENT
Start: 2022-01-01 | End: 2022-01-01

## 2022-01-01 RX ORDER — INSULIN GLARGINE 100 [IU]/ML
5 INJECTION, SOLUTION SUBCUTANEOUS
Status: DISCONTINUED | OUTPATIENT
Start: 2022-01-01 | End: 2022-01-01

## 2022-01-01 RX ORDER — MIDAZOLAM HYDROCHLORIDE 2 MG/2ML
0.5 INJECTION, SOLUTION INTRAMUSCULAR; INTRAVENOUS ONCE
Status: COMPLETED | OUTPATIENT
Start: 2022-01-01 | End: 2022-01-01

## 2022-01-01 RX ORDER — ETOMIDATE 2 MG/ML
20 INJECTION INTRAVENOUS ONCE
Status: DISCONTINUED | OUTPATIENT
Start: 2022-01-01 | End: 2022-01-01

## 2022-01-01 RX ORDER — INSULIN LISPRO 100 [IU]/ML
2-12 INJECTION, SOLUTION INTRAVENOUS; SUBCUTANEOUS EVERY 6 HOURS SCHEDULED
Status: DISCONTINUED | OUTPATIENT
Start: 2022-01-01 | End: 2022-01-01

## 2022-01-01 RX ORDER — MAGNESIUM SULFATE HEPTAHYDRATE 40 MG/ML
2 INJECTION, SOLUTION INTRAVENOUS ONCE
Status: DISCONTINUED | OUTPATIENT
Start: 2022-01-01 | End: 2022-01-01

## 2022-01-01 RX ORDER — EPINEPHRINE 0.1 MG/ML
SYRINGE (ML) INJECTION
Status: DISCONTINUED
Start: 2022-01-01 | End: 2022-01-01 | Stop reason: WASHOUT

## 2022-01-01 RX ORDER — ALBUMIN (HUMAN) 12.5 G/50ML
12.5 SOLUTION INTRAVENOUS ONCE
Status: COMPLETED | OUTPATIENT
Start: 2022-01-01 | End: 2022-01-01

## 2022-01-01 RX ORDER — LORAZEPAM 2 MG/ML
1 INJECTION INTRAMUSCULAR
Status: DISCONTINUED | OUTPATIENT
Start: 2022-01-01 | End: 2022-08-15 | Stop reason: HOSPADM

## 2022-01-01 RX ORDER — SODIUM CHLORIDE 9 MG/ML
10 INJECTION INTRAVENOUS DAILY
Qty: 300 ML | Refills: 0 | Status: SHIPPED | OUTPATIENT
Start: 2022-01-01 | End: 2022-11-01

## 2022-01-01 RX ORDER — INSULIN GLARGINE 100 [IU]/ML
18 INJECTION, SOLUTION SUBCUTANEOUS
Status: DISCONTINUED | OUTPATIENT
Start: 2022-01-01 | End: 2022-01-01

## 2022-01-01 RX ORDER — FENTANYL CITRATE 50 UG/ML
25 INJECTION, SOLUTION INTRAMUSCULAR; INTRAVENOUS EVERY 2 HOUR PRN
Status: DISCONTINUED | OUTPATIENT
Start: 2022-01-01 | End: 2022-08-15 | Stop reason: HOSPADM

## 2022-01-01 RX ORDER — ALBUMIN (HUMAN) 12.5 G/50ML
12.5 SOLUTION INTRAVENOUS EVERY 6 HOURS
Status: DISCONTINUED | OUTPATIENT
Start: 2022-01-01 | End: 2022-01-01

## 2022-01-01 RX ORDER — FUROSEMIDE 10 MG/ML
20 INJECTION INTRAMUSCULAR; INTRAVENOUS ONCE
Status: COMPLETED | OUTPATIENT
Start: 2022-01-01 | End: 2022-01-01

## 2022-01-01 RX ORDER — HYDROMORPHONE HCL/PF 1 MG/ML
0.5 SYRINGE (ML) INJECTION
Status: CANCELLED | OUTPATIENT
Start: 2022-01-01

## 2022-01-01 RX ORDER — PROPOFOL 10 MG/ML
INJECTION, EMULSION INTRAVENOUS AS NEEDED
Status: DISCONTINUED | OUTPATIENT
Start: 2022-01-01 | End: 2022-01-01

## 2022-01-01 RX ORDER — SODIUM CHLORIDE FOR INHALATION 0.9 %
VIAL, NEBULIZER (ML) INHALATION
Status: COMPLETED
Start: 2022-01-01 | End: 2022-01-01

## 2022-01-01 RX ORDER — FUROSEMIDE 20 MG/1
20 TABLET ORAL 2 TIMES DAILY
Status: DISCONTINUED | OUTPATIENT
Start: 2022-01-01 | End: 2022-01-01

## 2022-01-01 RX ORDER — METRONIDAZOLE 500 MG/100ML
500 INJECTION, SOLUTION INTRAVENOUS ONCE
Status: CANCELLED | OUTPATIENT
Start: 2022-01-01

## 2022-01-01 RX ORDER — INSULIN GLARGINE 100 [IU]/ML
10 INJECTION, SOLUTION SUBCUTANEOUS
Status: DISCONTINUED | OUTPATIENT
Start: 2022-01-01 | End: 2022-01-01

## 2022-01-01 RX ORDER — FENTANYL CITRATE 50 UG/ML
INJECTION, SOLUTION INTRAMUSCULAR; INTRAVENOUS AS NEEDED
Status: DISCONTINUED | OUTPATIENT
Start: 2022-01-01 | End: 2022-01-01

## 2022-01-01 RX ORDER — WATER 1000 ML/1000ML
INJECTION, SOLUTION INTRAVENOUS
Status: DISPENSED
Start: 2022-01-01 | End: 2022-01-01

## 2022-01-01 RX ORDER — CISATRACURIUM BESYLATE 2 MG/ML
20 INJECTION, SOLUTION INTRAVENOUS ONCE
Status: DISCONTINUED | OUTPATIENT
Start: 2022-01-01 | End: 2022-01-01

## 2022-01-01 RX ORDER — MIDAZOLAM HYDROCHLORIDE 2 MG/2ML
INJECTION, SOLUTION INTRAMUSCULAR; INTRAVENOUS CODE/TRAUMA/SEDATION MEDICATION
Status: COMPLETED | OUTPATIENT
Start: 2022-01-01 | End: 2022-01-01

## 2022-01-01 RX ORDER — PROPOFOL 10 MG/ML
5-50 INJECTION, EMULSION INTRAVENOUS
Status: DISCONTINUED | OUTPATIENT
Start: 2022-01-01 | End: 2022-01-01

## 2022-01-01 RX ORDER — DEXTROSE MONOHYDRATE 25 G/50ML
25 INJECTION, SOLUTION INTRAVENOUS ONCE
Status: DISCONTINUED | OUTPATIENT
Start: 2022-01-01 | End: 2022-01-01

## 2022-01-01 RX ORDER — SUCCINYLCHOLINE/SOD CL,ISO/PF 100 MG/5ML
1 SYRINGE (ML) INTRAVENOUS ONCE
Status: COMPLETED | OUTPATIENT
Start: 2022-01-01 | End: 2022-01-01

## 2022-01-01 RX ORDER — DEXMEDETOMIDINE HYDROCHLORIDE 4 UG/ML
.1-.9 INJECTION, SOLUTION INTRAVENOUS
Status: DISCONTINUED | OUTPATIENT
Start: 2022-01-01 | End: 2022-01-01

## 2022-01-01 RX ORDER — ACETAMINOPHEN 160 MG/5ML
975 SUSPENSION, ORAL (FINAL DOSE FORM) ORAL EVERY 8 HOURS
Status: DISCONTINUED | OUTPATIENT
Start: 2022-01-01 | End: 2022-01-01

## 2022-01-01 RX ORDER — ACETAMINOPHEN 325 MG/1
975 TABLET ORAL EVERY 6 HOURS SCHEDULED
Status: DISCONTINUED | OUTPATIENT
Start: 2022-01-01 | End: 2022-01-01

## 2022-01-01 RX ORDER — INSULIN LISPRO 100 [IU]/ML
1-6 INJECTION, SOLUTION INTRAVENOUS; SUBCUTANEOUS EVERY 6 HOURS SCHEDULED
Status: DISCONTINUED | OUTPATIENT
Start: 2022-01-01 | End: 2022-01-01

## 2022-01-01 RX ORDER — PHENYLEPHRINE HYDROCHLORIDE 10 MG/ML
INJECTION INTRAVENOUS
Status: DISCONTINUED
Start: 2022-01-01 | End: 2022-08-15 | Stop reason: HOSPADM

## 2022-01-01 RX ORDER — EPHEDRINE SULFATE 50 MG/ML
INJECTION INTRAVENOUS AS NEEDED
Status: DISCONTINUED | OUTPATIENT
Start: 2022-01-01 | End: 2022-01-01

## 2022-01-01 RX ORDER — AMOXICILLIN 250 MG
2 CAPSULE ORAL 2 TIMES DAILY
Status: DISCONTINUED | OUTPATIENT
Start: 2022-01-01 | End: 2022-01-01

## 2022-01-01 RX ORDER — BUMETANIDE 0.25 MG/ML
2 INJECTION INTRAMUSCULAR; INTRAVENOUS CONTINUOUS
Status: DISCONTINUED | OUTPATIENT
Start: 2022-01-01 | End: 2022-01-01

## 2022-01-01 RX ORDER — FENTANYL CITRATE 50 UG/ML
INJECTION, SOLUTION INTRAMUSCULAR; INTRAVENOUS CODE/TRAUMA/SEDATION MEDICATION
Status: COMPLETED | OUTPATIENT
Start: 2022-01-01 | End: 2022-01-01

## 2022-01-01 RX ADMIN — MIDAZOLAM 2 MG: 1 INJECTION INTRAMUSCULAR; INTRAVENOUS at 19:27

## 2022-01-01 RX ADMIN — NOREPINEPHRINE BITARTRATE 9 MCG/MIN: 1 INJECTION, SOLUTION, CONCENTRATE INTRAVENOUS at 13:13

## 2022-01-01 RX ADMIN — Medication 20000 ML: at 23:05

## 2022-01-01 RX ADMIN — CHLORHEXIDINE GLUCONATE 15 ML: 1.2 SOLUTION ORAL at 09:00

## 2022-01-01 RX ADMIN — SODIUM CHLORIDE SOLN NEBU 3% 4 ML: 3 NEBU SOLN at 14:09

## 2022-01-01 RX ADMIN — INSULIN GLARGINE 8 UNITS: 100 INJECTION, SOLUTION SUBCUTANEOUS at 22:33

## 2022-01-01 RX ADMIN — SODIUM CHLORIDE, SODIUM LACTATE, POTASSIUM CHLORIDE, AND CALCIUM CHLORIDE: .6; .31; .03; .02 INJECTION, SOLUTION INTRAVENOUS at 15:43

## 2022-01-01 RX ADMIN — HEPARIN SODIUM 5000 UNITS: 5000 INJECTION INTRAVENOUS; SUBCUTANEOUS at 21:42

## 2022-01-01 RX ADMIN — FENTANYL CITRATE 100 MCG/HR: 50 INJECTION INTRAMUSCULAR; INTRAVENOUS at 22:18

## 2022-01-01 RX ADMIN — HEPARIN SODIUM 5000 UNITS: 5000 INJECTION INTRAVENOUS; SUBCUTANEOUS at 21:13

## 2022-01-01 RX ADMIN — IPRATROPIUM BROMIDE 0.5 MG: 0.5 SOLUTION RESPIRATORY (INHALATION) at 13:46

## 2022-01-01 RX ADMIN — ALBUMIN (HUMAN) 12.5 G: 0.25 INJECTION, SOLUTION INTRAVENOUS at 03:56

## 2022-01-01 RX ADMIN — ACETAMINOPHEN 975 MG: 325 TABLET ORAL at 01:24

## 2022-01-01 RX ADMIN — SODIUM CHLORIDE 6 UNITS/HR: 9 INJECTION, SOLUTION INTRAVENOUS at 15:56

## 2022-01-01 RX ADMIN — ACETYLCYSTEINE 5000 MG: 200 INJECTION, SOLUTION INTRAVENOUS at 10:58

## 2022-01-01 RX ADMIN — ALBUMIN (HUMAN) 25 G: 0.25 INJECTION, SOLUTION INTRAVENOUS at 01:14

## 2022-01-01 RX ADMIN — HYDROMORPHONE HYDROCHLORIDE 0.4 MG: 1 INJECTION, SOLUTION INTRAMUSCULAR; INTRAVENOUS; SUBCUTANEOUS at 05:05

## 2022-01-01 RX ADMIN — MAGNESIUM SULFATE HEPTAHYDRATE 4 G: 40 INJECTION, SOLUTION INTRAVENOUS at 22:42

## 2022-01-01 RX ADMIN — WATER 2.1 ML: 1 INJECTION INTRAMUSCULAR; INTRAVENOUS; SUBCUTANEOUS at 23:11

## 2022-01-01 RX ADMIN — LEVALBUTEROL HYDROCHLORIDE 1.25 MG: 1.25 SOLUTION, CONCENTRATE RESPIRATORY (INHALATION) at 00:12

## 2022-01-01 RX ADMIN — SODIUM PHOSPHATE, MONOBASIC, MONOHYDRATE 6 MMOL: 276; 142 INJECTION, SOLUTION INTRAVENOUS at 07:23

## 2022-01-01 RX ADMIN — INSULIN GLARGINE 15 UNITS: 100 INJECTION, SOLUTION SUBCUTANEOUS at 21:13

## 2022-01-01 RX ADMIN — CHLORHEXIDINE GLUCONATE 15 ML: 1.2 SOLUTION ORAL at 20:30

## 2022-01-01 RX ADMIN — LORAZEPAM 1 MG: 2 INJECTION INTRAMUSCULAR; INTRAVENOUS at 22:44

## 2022-01-01 RX ADMIN — LEVALBUTEROL HYDROCHLORIDE 1.25 MG: 1.25 SOLUTION, CONCENTRATE RESPIRATORY (INHALATION) at 14:09

## 2022-01-01 RX ADMIN — ASCORBIC ACID: 500 INJECTION INTRAVENOUS at 21:23

## 2022-01-01 RX ADMIN — PROPOFOL 50 MCG/KG/MIN: 10 INJECTION, EMULSION INTRAVENOUS at 21:33

## 2022-01-01 RX ADMIN — SODIUM PHOSPHATE, MONOBASIC, MONOHYDRATE 6 MMOL: 276; 142 INJECTION, SOLUTION INTRAVENOUS at 03:55

## 2022-01-01 RX ADMIN — ALBUMIN (HUMAN) 25 G: 0.25 INJECTION, SOLUTION INTRAVENOUS at 20:48

## 2022-01-01 RX ADMIN — LEVALBUTEROL HYDROCHLORIDE 1.25 MG: 1.25 SOLUTION, CONCENTRATE RESPIRATORY (INHALATION) at 00:37

## 2022-01-01 RX ADMIN — MAGNESIUM SULFATE HEPTAHYDRATE 2 G: 40 INJECTION, SOLUTION INTRAVENOUS at 08:00

## 2022-01-01 RX ADMIN — MIDAZOLAM 1 MG: 1 INJECTION INTRAMUSCULAR; INTRAVENOUS at 16:18

## 2022-01-01 RX ADMIN — LEVALBUTEROL HYDROCHLORIDE 1.25 MG: 1.25 SOLUTION, CONCENTRATE RESPIRATORY (INHALATION) at 02:13

## 2022-01-01 RX ADMIN — HYDROMORPHONE HYDROCHLORIDE 0.5 MG: 1 INJECTION, SOLUTION INTRAMUSCULAR; INTRAVENOUS; SUBCUTANEOUS at 06:21

## 2022-01-01 RX ADMIN — PIPERACILLIN AND TAZOBACTAM 3.38 G: 36; 4.5 INJECTION, POWDER, FOR SOLUTION INTRAVENOUS at 05:08

## 2022-01-01 RX ADMIN — PROPOFOL 10 MCG/KG/MIN: 10 INJECTION, EMULSION INTRAVENOUS at 21:24

## 2022-01-01 RX ADMIN — CHLORHEXIDINE GLUCONATE 15 ML: 1.2 SOLUTION ORAL at 21:22

## 2022-01-01 RX ADMIN — ACETAMINOPHEN 975 MG: 650 SUSPENSION ORAL at 14:24

## 2022-01-01 RX ADMIN — SODIUM PHOSPHATE, MONOBASIC, MONOHYDRATE 6 MMOL: 276; 142 INJECTION, SOLUTION INTRAVENOUS at 14:27

## 2022-01-01 RX ADMIN — HEPARIN SODIUM 5000 UNITS: 5000 INJECTION INTRAVENOUS; SUBCUTANEOUS at 14:04

## 2022-01-01 RX ADMIN — ONDANSETRON 4 MG: 2 INJECTION INTRAMUSCULAR; INTRAVENOUS at 07:34

## 2022-01-01 RX ADMIN — VASOPRESSIN 0.04 UNITS/MIN: 20 INJECTION INTRAVENOUS at 00:25

## 2022-01-01 RX ADMIN — Medication 20000 ML: at 07:16

## 2022-01-01 RX ADMIN — MAGNESIUM SULFATE IN DEXTROSE 1 G: 10 INJECTION, SOLUTION INTRAVENOUS at 13:46

## 2022-01-01 RX ADMIN — SODIUM CHLORIDE SOLN NEBU 3% 4 ML: 3 NEBU SOLN at 19:37

## 2022-01-01 RX ADMIN — IPRATROPIUM BROMIDE 0.5 MG: 0.5 SOLUTION RESPIRATORY (INHALATION) at 19:00

## 2022-01-01 RX ADMIN — IPRATROPIUM BROMIDE AND ALBUTEROL SULFATE 3 ML: 2.5; .5 SOLUTION RESPIRATORY (INHALATION) at 08:08

## 2022-01-01 RX ADMIN — SENNOSIDES AND DOCUSATE SODIUM 1 TABLET: 50; 8.6 TABLET ORAL at 18:03

## 2022-01-01 RX ADMIN — HEPARIN SODIUM 5000 UNITS: 5000 INJECTION INTRAVENOUS; SUBCUTANEOUS at 14:33

## 2022-01-01 RX ADMIN — Medication 20000 ML: at 00:15

## 2022-01-01 RX ADMIN — HEPARIN SODIUM 5000 UNITS: 5000 INJECTION INTRAVENOUS; SUBCUTANEOUS at 21:22

## 2022-01-01 RX ADMIN — EPOPROSTENOL 50 NG/KG/MIN: 1.5 INJECTION, POWDER, LYOPHILIZED, FOR SOLUTION INTRAVENOUS at 03:40

## 2022-01-01 RX ADMIN — Medication 1.2 MCG/KG/HR: at 12:44

## 2022-01-01 RX ADMIN — NOREPINEPHRINE BITARTRATE 9 MCG/MIN: 1 INJECTION, SOLUTION, CONCENTRATE INTRAVENOUS at 06:07

## 2022-01-01 RX ADMIN — AMIODARONE HYDROCHLORIDE 0.5 MG/MIN: 50 INJECTION, SOLUTION INTRAVENOUS at 18:18

## 2022-01-01 RX ADMIN — HYDROMORPHONE HYDROCHLORIDE 0.5 MG: 1 INJECTION, SOLUTION INTRAMUSCULAR; INTRAVENOUS; SUBCUTANEOUS at 12:42

## 2022-01-01 RX ADMIN — HEPARIN SODIUM 5000 UNITS: 5000 INJECTION INTRAVENOUS; SUBCUTANEOUS at 06:06

## 2022-01-01 RX ADMIN — Medication 20000 ML: at 16:15

## 2022-01-01 RX ADMIN — IPRATROPIUM BROMIDE 0.5 MG: 0.5 SOLUTION RESPIRATORY (INHALATION) at 13:22

## 2022-01-01 RX ADMIN — NOREPINEPHRINE BITARTRATE 14 MCG/MIN: 1 INJECTION, SOLUTION, CONCENTRATE INTRAVENOUS at 17:27

## 2022-01-01 RX ADMIN — HEPARIN SODIUM 5000 UNITS: 5000 INJECTION INTRAVENOUS; SUBCUTANEOUS at 22:02

## 2022-01-01 RX ADMIN — PHENYLEPHRINE HYDROCHLORIDE 100 MCG/MIN: 10 INJECTION INTRAVENOUS at 18:55

## 2022-01-01 RX ADMIN — INSULIN GLARGINE 5 UNITS: 100 INJECTION, SOLUTION SUBCUTANEOUS at 22:47

## 2022-01-01 RX ADMIN — POTASSIUM PHOSPHATE, MONOBASIC AND POTASSIUM PHOSPHATE, DIBASIC 21 MMOL: 224; 236 INJECTION, SOLUTION, CONCENTRATE INTRAVENOUS at 08:24

## 2022-01-01 RX ADMIN — INSULIN LISPRO 2 UNITS: 100 INJECTION, SOLUTION INTRAVENOUS; SUBCUTANEOUS at 21:32

## 2022-01-01 RX ADMIN — OLANZAPINE 10 MG: 10 INJECTION, POWDER, LYOPHILIZED, FOR SOLUTION INTRAMUSCULAR at 22:17

## 2022-01-01 RX ADMIN — VASOPRESSIN 0.04 UNITS/MIN: 20 INJECTION INTRAVENOUS at 02:10

## 2022-01-01 RX ADMIN — Medication 1.2 MCG/KG/HR: at 08:09

## 2022-01-01 RX ADMIN — PANTOPRAZOLE SODIUM 40 MG: 40 TABLET, DELAYED RELEASE ORAL at 05:41

## 2022-01-01 RX ADMIN — SODIUM CHLORIDE 3 G: 9 INJECTION, SOLUTION INTRAVENOUS at 04:10

## 2022-01-01 RX ADMIN — SODIUM CHLORIDE, SODIUM GLUCONATE, SODIUM ACETATE, POTASSIUM CHLORIDE, MAGNESIUM CHLORIDE, SODIUM PHOSPHATE, DIBASIC, AND POTASSIUM PHOSPHATE 1000 ML: .53; .5; .37; .037; .03; .012; .00082 INJECTION, SOLUTION INTRAVENOUS at 05:45

## 2022-01-01 RX ADMIN — LEVALBUTEROL HYDROCHLORIDE 1.25 MG: 1.25 SOLUTION, CONCENTRATE RESPIRATORY (INHALATION) at 08:40

## 2022-01-01 RX ADMIN — CHLORHEXIDINE GLUCONATE 15 ML: 1.2 SOLUTION ORAL at 08:25

## 2022-01-01 RX ADMIN — LEVALBUTEROL HYDROCHLORIDE 1.25 MG: 1.25 SOLUTION, CONCENTRATE RESPIRATORY (INHALATION) at 19:31

## 2022-01-01 RX ADMIN — DEXMEDETOMIDINE HYDROCHLORIDE 0.4 MCG/KG/HR: 400 INJECTION INTRAVENOUS at 12:43

## 2022-01-01 RX ADMIN — SODIUM CHLORIDE SOLN NEBU 3% 4 ML: 3 NEBU SOLN at 08:10

## 2022-01-01 RX ADMIN — MIDAZOLAM 2 MG: 1 INJECTION INTRAMUSCULAR; INTRAVENOUS at 14:02

## 2022-01-01 RX ADMIN — CALCIUM GLUCONATE 3 G: 98 INJECTION, SOLUTION INTRAVENOUS at 08:46

## 2022-01-01 RX ADMIN — ACETAMINOPHEN 975 MG: 650 SUSPENSION ORAL at 13:45

## 2022-01-01 RX ADMIN — CALCIUM GLUCONATE 2 G: 20 INJECTION, SOLUTION INTRAVENOUS at 08:00

## 2022-01-01 RX ADMIN — MAGNESIUM SULFATE IN DEXTROSE 1 G: 10 INJECTION, SOLUTION INTRAVENOUS at 07:26

## 2022-01-01 RX ADMIN — SENNOSIDES AND DOCUSATE SODIUM 1 TABLET: 50; 8.6 TABLET ORAL at 17:47

## 2022-01-01 RX ADMIN — MAGNESIUM SULFATE IN DEXTROSE 1 G: 10 INJECTION, SOLUTION INTRAVENOUS at 14:42

## 2022-01-01 RX ADMIN — DEXMEDETOMIDINE HYDROCHLORIDE 0.9 MCG/KG/HR: 400 INJECTION INTRAVENOUS at 21:46

## 2022-01-01 RX ADMIN — Medication 6 MG: at 21:12

## 2022-01-01 RX ADMIN — HYDROCORTISONE SODIUM SUCCINATE 50 MG: 100 INJECTION, POWDER, FOR SOLUTION INTRAMUSCULAR; INTRAVENOUS at 18:36

## 2022-01-01 RX ADMIN — ACETYLCYSTEINE 600 MG: 200 SOLUTION ORAL; RESPIRATORY (INHALATION) at 07:42

## 2022-01-01 RX ADMIN — POTASSIUM CHLORIDE 40 MEQ: 29.8 INJECTION, SOLUTION INTRAVENOUS at 08:18

## 2022-01-01 RX ADMIN — Medication 20000 ML: at 05:30

## 2022-01-01 RX ADMIN — CEFAZOLIN 3000 MG: 1 INJECTION, POWDER, FOR SOLUTION INTRAMUSCULAR; INTRAVENOUS at 15:59

## 2022-01-01 RX ADMIN — Medication 20000 ML: at 18:11

## 2022-01-01 RX ADMIN — CALCIUM GLUCONATE 1 G: 20 INJECTION, SOLUTION INTRAVENOUS at 07:21

## 2022-01-01 RX ADMIN — VASOPRESSIN 0.04 UNITS/MIN: 20 INJECTION INTRAVENOUS at 00:53

## 2022-01-01 RX ADMIN — METRONIDAZOLE: 500 INJECTION, SOLUTION INTRAVENOUS at 14:46

## 2022-01-01 RX ADMIN — NOREPINEPHRINE BITARTRATE 10 MCG/MIN: 1 INJECTION, SOLUTION, CONCENTRATE INTRAVENOUS at 16:05

## 2022-01-01 RX ADMIN — INSULIN LISPRO 6 UNITS: 100 INJECTION, SOLUTION INTRAVENOUS; SUBCUTANEOUS at 00:29

## 2022-01-01 RX ADMIN — ACETAMINOPHEN 975 MG: 650 SUSPENSION ORAL at 21:56

## 2022-01-01 RX ADMIN — ALBUMIN (HUMAN): 12.5 INJECTION, SOLUTION INTRAVENOUS at 15:17

## 2022-01-01 RX ADMIN — NOREPINEPHRINE BITARTRATE 5 MCG/MIN: 1 INJECTION, SOLUTION, CONCENTRATE INTRAVENOUS at 00:37

## 2022-01-01 RX ADMIN — PANTOPRAZOLE SODIUM 40 MG: 40 INJECTION, POWDER, FOR SOLUTION INTRAVENOUS at 08:19

## 2022-01-01 RX ADMIN — INSULIN LISPRO 2 UNITS: 100 INJECTION, SOLUTION INTRAVENOUS; SUBCUTANEOUS at 11:58

## 2022-01-01 RX ADMIN — CALCIUM GLUCONATE 1 G: 20 INJECTION, SOLUTION INTRAVENOUS at 13:49

## 2022-01-01 RX ADMIN — VASOPRESSIN 0.04 UNITS/MIN: 20 INJECTION INTRAVENOUS at 00:34

## 2022-01-01 RX ADMIN — SENNOSIDES AND DOCUSATE SODIUM 1 TABLET: 50; 8.6 TABLET ORAL at 08:25

## 2022-01-01 RX ADMIN — HEPARIN SODIUM 5000 UNITS: 5000 INJECTION INTRAVENOUS; SUBCUTANEOUS at 14:02

## 2022-01-01 RX ADMIN — CHLORHEXIDINE GLUCONATE 15 ML: 1.2 SOLUTION ORAL at 20:18

## 2022-01-01 RX ADMIN — EPHEDRINE SULFATE 10 MG: 50 INJECTION INTRAVENOUS at 08:19

## 2022-01-01 RX ADMIN — DESMOPRESSIN ACETATE 40 MCG: 4 SOLUTION INTRAVENOUS at 03:29

## 2022-01-01 RX ADMIN — NOREPINEPHRINE BITARTRATE 35 MCG/MIN: 1 INJECTION, SOLUTION, CONCENTRATE INTRAVENOUS at 20:55

## 2022-01-01 RX ADMIN — LEVALBUTEROL HYDROCHLORIDE 1.25 MG: 1.25 SOLUTION, CONCENTRATE RESPIRATORY (INHALATION) at 07:24

## 2022-01-01 RX ADMIN — CHLORHEXIDINE GLUCONATE 15 ML: 1.2 SOLUTION ORAL at 09:20

## 2022-01-01 RX ADMIN — BISACODYL 10 MG: 10 SUPPOSITORY RECTAL at 09:00

## 2022-01-01 RX ADMIN — HYDROCORTISONE SODIUM SUCCINATE 50 MG: 100 INJECTION, POWDER, FOR SOLUTION INTRAMUSCULAR; INTRAVENOUS at 11:50

## 2022-01-01 RX ADMIN — PANTOPRAZOLE SODIUM 40 MG: 40 TABLET, DELAYED RELEASE ORAL at 05:39

## 2022-01-01 RX ADMIN — SENNOSIDES AND DOCUSATE SODIUM 2 TABLET: 50; 8.6 TABLET ORAL at 08:40

## 2022-01-01 RX ADMIN — ACETYLCYSTEINE 600 MG: 200 SOLUTION ORAL; RESPIRATORY (INHALATION) at 14:09

## 2022-01-01 RX ADMIN — VASOPRESSIN 0.04 UNITS/MIN: 20 INJECTION INTRAVENOUS at 16:13

## 2022-01-01 RX ADMIN — LEVALBUTEROL HYDROCHLORIDE 1.25 MG: 1.25 SOLUTION, CONCENTRATE RESPIRATORY (INHALATION) at 07:42

## 2022-01-01 RX ADMIN — LEVALBUTEROL HYDROCHLORIDE 1.25 MG: 1.25 SOLUTION, CONCENTRATE RESPIRATORY (INHALATION) at 19:37

## 2022-01-01 RX ADMIN — CALCIUM GLUCONATE 1 G: 20 INJECTION, SOLUTION INTRAVENOUS at 13:31

## 2022-01-01 RX ADMIN — ASCORBIC ACID: 500 INJECTION INTRAVENOUS at 22:44

## 2022-01-01 RX ADMIN — HYDROCORTISONE SODIUM SUCCINATE 50 MG: 100 INJECTION, POWDER, FOR SOLUTION INTRAMUSCULAR; INTRAVENOUS at 05:15

## 2022-01-01 RX ADMIN — SENNOSIDES AND DOCUSATE SODIUM 1 TABLET: 8.6; 5 TABLET ORAL at 17:22

## 2022-01-01 RX ADMIN — ACETAMINOPHEN 975 MG: 650 SUSPENSION ORAL at 14:14

## 2022-01-01 RX ADMIN — Medication 1.2 MCG/KG/HR: at 11:43

## 2022-01-01 RX ADMIN — ALBUMIN (HUMAN): 12.5 INJECTION, SOLUTION INTRAVENOUS at 12:22

## 2022-01-01 RX ADMIN — FENTANYL CITRATE 100 MCG/HR: 50 INJECTION INTRAMUSCULAR; INTRAVENOUS at 16:28

## 2022-01-01 RX ADMIN — INSULIN LISPRO 2 UNITS: 100 INJECTION, SOLUTION INTRAVENOUS; SUBCUTANEOUS at 00:54

## 2022-01-01 RX ADMIN — ROPIVACAINE HYDROCHLORIDE 250 ML: 5 INJECTION EPIDURAL; INFILTRATION; PERINEURAL at 12:17

## 2022-01-01 RX ADMIN — MAGNESIUM SULFATE IN DEXTROSE 1 G: 10 INJECTION, SOLUTION INTRAVENOUS at 21:23

## 2022-01-01 RX ADMIN — LEVALBUTEROL HYDROCHLORIDE 1.25 MG: 1.25 SOLUTION, CONCENTRATE RESPIRATORY (INHALATION) at 13:34

## 2022-01-01 RX ADMIN — ALBUMIN (HUMAN): 12.5 INJECTION, SOLUTION INTRAVENOUS at 13:30

## 2022-01-01 RX ADMIN — PANTOPRAZOLE SODIUM 40 MG: 40 INJECTION, POWDER, FOR SOLUTION INTRAVENOUS at 21:29

## 2022-01-01 RX ADMIN — ASCORBIC ACID: 500 INJECTION INTRAVENOUS at 21:15

## 2022-01-01 RX ADMIN — DEXTROSE MONOHYDRATE 12.5 G: 500 INJECTION PARENTERAL at 19:59

## 2022-01-01 RX ADMIN — CALCIUM GLUCONATE 3 G: 98 INJECTION, SOLUTION INTRAVENOUS at 07:57

## 2022-01-01 RX ADMIN — SODIUM PHOSPHATE, MONOBASIC, MONOHYDRATE 6 MMOL: 276; 142 INJECTION, SOLUTION INTRAVENOUS at 14:20

## 2022-01-01 RX ADMIN — IPRATROPIUM BROMIDE 0.5 MG: 0.5 SOLUTION RESPIRATORY (INHALATION) at 07:52

## 2022-01-01 RX ADMIN — MICAFUNGIN SODIUM 100 MG: 50 INJECTION, POWDER, LYOPHILIZED, FOR SOLUTION INTRAVENOUS at 16:10

## 2022-01-01 RX ADMIN — FENTANYL CITRATE 100 MCG/HR: 50 INJECTION INTRAMUSCULAR; INTRAVENOUS at 13:16

## 2022-01-01 RX ADMIN — HEPARIN SODIUM 5000 UNITS: 5000 INJECTION INTRAVENOUS; SUBCUTANEOUS at 22:56

## 2022-01-01 RX ADMIN — METHOCARBAMOL 500 MG: 500 TABLET ORAL at 14:04

## 2022-01-01 RX ADMIN — SODIUM PHOSPHATE, MONOBASIC, MONOHYDRATE 6 MMOL: 276; 142 INJECTION, SOLUTION INTRAVENOUS at 08:16

## 2022-01-01 RX ADMIN — CALCIUM GLUCONATE 1 G: 20 INJECTION, SOLUTION INTRAVENOUS at 07:43

## 2022-01-01 RX ADMIN — DEXMEDETOMIDINE HYDROCHLORIDE 0.7 MCG/KG/HR: 400 INJECTION INTRAVENOUS at 21:30

## 2022-01-01 RX ADMIN — HYDROCORTISONE SODIUM SUCCINATE 50 MG: 100 INJECTION, POWDER, FOR SOLUTION INTRAMUSCULAR; INTRAVENOUS at 17:53

## 2022-01-01 RX ADMIN — NOREPINEPHRINE BITARTRATE 2 MCG/MIN: 1 INJECTION, SOLUTION, CONCENTRATE INTRAVENOUS at 20:36

## 2022-01-01 RX ADMIN — PANTOPRAZOLE SODIUM 40 MG: 40 INJECTION, POWDER, FOR SOLUTION INTRAVENOUS at 20:25

## 2022-01-01 RX ADMIN — FENTANYL CITRATE 50 MCG: 50 INJECTION INTRAMUSCULAR; INTRAVENOUS at 19:55

## 2022-01-01 RX ADMIN — HYDROMORPHONE HYDROCHLORIDE 0.5 MG: 1 INJECTION, SOLUTION INTRAMUSCULAR; INTRAVENOUS; SUBCUTANEOUS at 17:25

## 2022-01-01 RX ADMIN — ROCURONIUM BROMIDE 50 MG: 50 INJECTION INTRAVENOUS at 10:55

## 2022-01-01 RX ADMIN — CALCIUM GLUCONATE 1 G: 20 INJECTION, SOLUTION INTRAVENOUS at 19:37

## 2022-01-01 RX ADMIN — FENTANYL CITRATE 50 MCG: 50 INJECTION INTRAMUSCULAR; INTRAVENOUS at 21:00

## 2022-01-01 RX ADMIN — HEPARIN SODIUM 5000 UNITS: 5000 INJECTION INTRAVENOUS; SUBCUTANEOUS at 14:31

## 2022-01-01 RX ADMIN — POLYETHYLENE GLYCOL 3350 17 G: 17 POWDER, FOR SOLUTION ORAL at 10:02

## 2022-01-01 RX ADMIN — EPOPROSTENOL 50 NG/KG/MIN: 1.5 INJECTION, POWDER, LYOPHILIZED, FOR SOLUTION INTRAVENOUS at 08:04

## 2022-01-01 RX ADMIN — PANTOPRAZOLE SODIUM 40 MG: 40 INJECTION, POWDER, FOR SOLUTION INTRAVENOUS at 08:44

## 2022-01-01 RX ADMIN — NOREPINEPHRINE BITARTRATE: 1 INJECTION, SOLUTION, CONCENTRATE INTRAVENOUS at 01:52

## 2022-01-01 RX ADMIN — ACETAMINOPHEN 975 MG: 650 SUSPENSION ORAL at 21:38

## 2022-01-01 RX ADMIN — HEPARIN SODIUM 5000 UNITS: 5000 INJECTION INTRAVENOUS; SUBCUTANEOUS at 06:46

## 2022-01-01 RX ADMIN — IPRATROPIUM BROMIDE 0.5 MG: 0.5 SOLUTION RESPIRATORY (INHALATION) at 14:10

## 2022-01-01 RX ADMIN — Medication: at 13:34

## 2022-01-01 RX ADMIN — PANTOPRAZOLE SODIUM 40 MG: 40 INJECTION, POWDER, FOR SOLUTION INTRAVENOUS at 09:00

## 2022-01-01 RX ADMIN — VECURONIUM BROMIDE 10 MG: 1 INJECTION, POWDER, LYOPHILIZED, FOR SOLUTION INTRAVENOUS at 11:48

## 2022-01-01 RX ADMIN — FENTANYL CITRATE 100 MCG/HR: 50 INJECTION INTRAMUSCULAR; INTRAVENOUS at 10:51

## 2022-01-01 RX ADMIN — PIPERACILLIN AND TAZOBACTAM 3.38 G: 36; 4.5 INJECTION, POWDER, FOR SOLUTION INTRAVENOUS at 20:18

## 2022-01-01 RX ADMIN — Medication 20000 ML: at 17:58

## 2022-01-01 RX ADMIN — DEXMEDETOMIDINE HYDROCHLORIDE 0.9 MCG/KG/HR: 400 INJECTION INTRAVENOUS at 04:57

## 2022-01-01 RX ADMIN — FENTANYL CITRATE 50 MCG: 50 INJECTION INTRAMUSCULAR; INTRAVENOUS at 16:18

## 2022-01-01 RX ADMIN — CHLORHEXIDINE GLUCONATE 15 ML: 1.2 SOLUTION ORAL at 08:20

## 2022-01-01 RX ADMIN — FENTANYL CITRATE 100 MCG/HR: 50 INJECTION INTRAMUSCULAR; INTRAVENOUS at 04:27

## 2022-01-01 RX ADMIN — HEPARIN SODIUM 5000 UNITS: 5000 INJECTION INTRAVENOUS; SUBCUTANEOUS at 13:19

## 2022-01-01 RX ADMIN — VASOPRESSIN 0.04 UNITS/MIN: 20 INJECTION INTRAVENOUS at 08:16

## 2022-01-01 RX ADMIN — IPRATROPIUM BROMIDE 0.5 MG: 0.5 SOLUTION RESPIRATORY (INHALATION) at 00:37

## 2022-01-01 RX ADMIN — CHLORHEXIDINE GLUCONATE 15 ML: 1.2 SOLUTION ORAL at 08:19

## 2022-01-01 RX ADMIN — MICAFUNGIN SODIUM 100 MG: 50 INJECTION, POWDER, LYOPHILIZED, FOR SOLUTION INTRAVENOUS at 16:07

## 2022-01-01 RX ADMIN — SENNOSIDES AND DOCUSATE SODIUM 2 TABLET: 50; 8.6 TABLET ORAL at 08:57

## 2022-01-01 RX ADMIN — ALBUMIN (HUMAN): 12.5 INJECTION, SOLUTION INTRAVENOUS at 09:55

## 2022-01-01 RX ADMIN — INSULIN LISPRO 2 UNITS: 100 INJECTION, SOLUTION INTRAVENOUS; SUBCUTANEOUS at 12:00

## 2022-01-01 RX ADMIN — DEXMEDETOMIDINE HYDROCHLORIDE 0.2 MCG/KG/HR: 400 INJECTION INTRAVENOUS at 09:38

## 2022-01-01 RX ADMIN — MAGNESIUM SULFATE IN DEXTROSE 1 G: 10 INJECTION, SOLUTION INTRAVENOUS at 19:33

## 2022-01-01 RX ADMIN — METOROPROLOL TARTRATE 5 MG: 5 INJECTION, SOLUTION INTRAVENOUS at 23:02

## 2022-01-01 RX ADMIN — Medication 0.6 MCG/KG/HR: at 14:14

## 2022-01-01 RX ADMIN — SODIUM CHLORIDE, SODIUM GLUCONATE, SODIUM ACETATE, POTASSIUM CHLORIDE, MAGNESIUM CHLORIDE, SODIUM PHOSPHATE, DIBASIC, AND POTASSIUM PHOSPHATE 1000 ML: .53; .5; .37; .037; .03; .012; .00082 INJECTION, SOLUTION INTRAVENOUS at 21:40

## 2022-01-01 RX ADMIN — HEPARIN SODIUM 5000 UNITS: 5000 INJECTION INTRAVENOUS; SUBCUTANEOUS at 06:39

## 2022-01-01 RX ADMIN — SODIUM CHLORIDE SOLN NEBU 3% 4 ML: 3 NEBU SOLN at 19:31

## 2022-01-01 RX ADMIN — NOREPINEPHRINE BITARTRATE 2 MCG/MIN: 1 INJECTION, SOLUTION, CONCENTRATE INTRAVENOUS at 22:05

## 2022-01-01 RX ADMIN — Medication: at 03:22

## 2022-01-01 RX ADMIN — ACETYLCYSTEINE 600 MG: 200 SOLUTION ORAL; RESPIRATORY (INHALATION) at 08:10

## 2022-01-01 RX ADMIN — HYDROMORPHONE HYDROCHLORIDE 0.4 MG: 1 INJECTION, SOLUTION INTRAMUSCULAR; INTRAVENOUS; SUBCUTANEOUS at 07:46

## 2022-01-01 RX ADMIN — CALCIUM CHLORIDE 0.5 G: 100 INJECTION INTRAVENOUS; INTRAVENTRICULAR at 18:20

## 2022-01-01 RX ADMIN — Medication 50 MG: at 08:03

## 2022-01-01 RX ADMIN — ACETAMINOPHEN 975 MG: 650 SUSPENSION ORAL at 14:02

## 2022-01-01 RX ADMIN — INSULIN LISPRO 2 UNITS: 100 INJECTION, SOLUTION INTRAVENOUS; SUBCUTANEOUS at 18:27

## 2022-01-01 RX ADMIN — SENNOSIDES AND DOCUSATE SODIUM 1 TABLET: 50; 8.6 TABLET ORAL at 09:30

## 2022-01-01 RX ADMIN — HYDROMORPHONE HYDROCHLORIDE 0.5 MG: 1 INJECTION, SOLUTION INTRAMUSCULAR; INTRAVENOUS; SUBCUTANEOUS at 08:32

## 2022-01-01 RX ADMIN — SODIUM PHOSPHATE, MONOBASIC, MONOHYDRATE 6 MMOL: 276; 142 INJECTION, SOLUTION INTRAVENOUS at 02:34

## 2022-01-01 RX ADMIN — IPRATROPIUM BROMIDE 0.5 MG: 0.5 SOLUTION RESPIRATORY (INHALATION) at 19:31

## 2022-01-01 RX ADMIN — MAGNESIUM SULFATE HEPTAHYDRATE 1 G: 1 INJECTION, SOLUTION INTRAVENOUS at 01:16

## 2022-01-01 RX ADMIN — OLANZAPINE 10 MG: 10 INJECTION, POWDER, LYOPHILIZED, FOR SOLUTION INTRAMUSCULAR at 23:07

## 2022-01-01 RX ADMIN — PIPERACILLIN AND TAZOBACTAM 3.38 G: 36; 4.5 INJECTION, POWDER, FOR SOLUTION INTRAVENOUS at 11:50

## 2022-01-01 RX ADMIN — LEVALBUTEROL HYDROCHLORIDE 1.25 MG: 1.25 SOLUTION, CONCENTRATE RESPIRATORY (INHALATION) at 01:34

## 2022-01-01 RX ADMIN — PIPERACILLIN AND TAZOBACTAM 4.5 G: 36; 4.5 INJECTION, POWDER, FOR SOLUTION INTRAVENOUS at 05:26

## 2022-01-01 RX ADMIN — HEPARIN SODIUM 5000 UNITS: 5000 INJECTION INTRAVENOUS; SUBCUTANEOUS at 14:10

## 2022-01-01 RX ADMIN — BUMETANIDE 2 MG: 0.25 INJECTION INTRAMUSCULAR; INTRAVENOUS at 10:03

## 2022-01-01 RX ADMIN — CHLOROTHIAZIDE SODIUM 500 MG: 500 INJECTION, POWDER, LYOPHILIZED, FOR SOLUTION INTRAVENOUS at 14:42

## 2022-01-01 RX ADMIN — POTASSIUM PHOSPHATE, MONOBASIC AND POTASSIUM PHOSPHATE, DIBASIC 21 MMOL: 224; 236 INJECTION, SOLUTION, CONCENTRATE INTRAVENOUS at 07:36

## 2022-01-01 RX ADMIN — CHLORHEXIDINE GLUCONATE 15 ML: 1.2 SOLUTION ORAL at 22:31

## 2022-01-01 RX ADMIN — Medication 1.2 MCG/KG/HR: at 03:19

## 2022-01-01 RX ADMIN — EPHEDRINE SULFATE 10 MG: 50 INJECTION INTRAVENOUS at 08:23

## 2022-01-01 RX ADMIN — ACETAMINOPHEN 975 MG: 325 TABLET ORAL at 17:25

## 2022-01-01 RX ADMIN — ACETAMINOPHEN 975 MG: 650 SUSPENSION ORAL at 14:58

## 2022-01-01 RX ADMIN — NOREPINEPHRINE BITARTRATE 10 MCG/MIN: 1 INJECTION, SOLUTION, CONCENTRATE INTRAVENOUS at 21:02

## 2022-01-01 RX ADMIN — MAGNESIUM SULFATE IN DEXTROSE 1 G: 10 INJECTION, SOLUTION INTRAVENOUS at 14:43

## 2022-01-01 RX ADMIN — LIDOCAINE HYDROCHLORIDE 300 MG: 10 INJECTION, SOLUTION EPIDURAL; INFILTRATION; INTRACAUDAL; PERINEURAL at 12:03

## 2022-01-01 RX ADMIN — MIDAZOLAM 2 MG: 1 INJECTION INTRAMUSCULAR; INTRAVENOUS at 12:28

## 2022-01-01 RX ADMIN — NOREPINEPHRINE BITARTRATE 30 MCG/MIN: 1 INJECTION, SOLUTION, CONCENTRATE INTRAVENOUS at 13:06

## 2022-01-01 RX ADMIN — HYDROCORTISONE SODIUM SUCCINATE 50 MG: 100 INJECTION, POWDER, FOR SOLUTION INTRAMUSCULAR; INTRAVENOUS at 05:10

## 2022-01-01 RX ADMIN — NOREPINEPHRINE BITARTRATE 22 MCG/MIN: 1 INJECTION, SOLUTION, CONCENTRATE INTRAVENOUS at 18:57

## 2022-01-01 RX ADMIN — MAGNESIUM SULFATE HEPTAHYDRATE 1 G: 1 INJECTION, SOLUTION INTRAVENOUS at 01:18

## 2022-01-01 RX ADMIN — ONDANSETRON: 2 INJECTION INTRAMUSCULAR; INTRAVENOUS at 15:30

## 2022-01-01 RX ADMIN — SODIUM PHOSPHATE, MONOBASIC, MONOHYDRATE 9 MMOL: 276; 142 INJECTION, SOLUTION INTRAVENOUS at 07:45

## 2022-01-01 RX ADMIN — Medication 20000 ML: at 10:24

## 2022-01-01 RX ADMIN — VASOPRESSIN 0.04 UNITS/MIN: 20 INJECTION INTRAVENOUS at 19:38

## 2022-01-01 RX ADMIN — SODIUM CHLORIDE, SODIUM GLUCONATE, SODIUM ACETATE, POTASSIUM CHLORIDE, MAGNESIUM CHLORIDE, SODIUM PHOSPHATE, DIBASIC, AND POTASSIUM PHOSPHATE 125 ML/HR: .53; .5; .37; .037; .03; .012; .00082 INJECTION, SOLUTION INTRAVENOUS at 17:37

## 2022-01-01 RX ADMIN — PANTOPRAZOLE SODIUM 40 MG: 40 INJECTION, POWDER, FOR SOLUTION INTRAVENOUS at 08:27

## 2022-01-01 RX ADMIN — FENTANYL CITRATE 100 MCG/HR: 50 INJECTION INTRAMUSCULAR; INTRAVENOUS at 03:45

## 2022-01-01 RX ADMIN — ACETAMINOPHEN 975 MG: 325 TABLET ORAL at 12:37

## 2022-01-01 RX ADMIN — METRONIDAZOLE: 500 INJECTION, SOLUTION INTRAVENOUS at 08:33

## 2022-01-01 RX ADMIN — HYDROCORTISONE SODIUM SUCCINATE 50 MG: 100 INJECTION, POWDER, FOR SOLUTION INTRAMUSCULAR; INTRAVENOUS at 12:11

## 2022-01-01 RX ADMIN — INSULIN LISPRO 2 UNITS: 100 INJECTION, SOLUTION INTRAVENOUS; SUBCUTANEOUS at 21:14

## 2022-01-01 RX ADMIN — LEVALBUTEROL HYDROCHLORIDE 1.25 MG: 1.25 SOLUTION, CONCENTRATE RESPIRATORY (INHALATION) at 19:53

## 2022-01-01 RX ADMIN — Medication 1.2 MCG/KG/HR: at 11:00

## 2022-01-01 RX ADMIN — Medication 1.2 MCG/KG/HR: at 07:06

## 2022-01-01 RX ADMIN — INSULIN HUMAN 6 UNITS: 100 INJECTION, SOLUTION PARENTERAL at 07:06

## 2022-01-01 RX ADMIN — QUETIAPINE FUMARATE 50 MG: 25 TABLET ORAL at 10:04

## 2022-01-01 RX ADMIN — INSULIN LISPRO 1 UNITS: 100 INJECTION, SOLUTION INTRAVENOUS; SUBCUTANEOUS at 05:06

## 2022-01-01 RX ADMIN — Medication 20000 ML: at 09:02

## 2022-01-01 RX ADMIN — SODIUM PHOSPHATE, MONOBASIC, MONOHYDRATE 21 MMOL: 276; 142 INJECTION, SOLUTION INTRAVENOUS at 03:07

## 2022-01-01 RX ADMIN — CALCIUM GLUCONATE 1 G: 20 INJECTION, SOLUTION INTRAVENOUS at 06:34

## 2022-01-01 RX ADMIN — IOHEXOL 70 ML: 350 INJECTION, SOLUTION INTRAVENOUS at 15:52

## 2022-01-01 RX ADMIN — SODIUM CHLORIDE, SODIUM GLUCONATE, SODIUM ACETATE, POTASSIUM CHLORIDE, MAGNESIUM CHLORIDE, SODIUM PHOSPHATE, DIBASIC, AND POTASSIUM PHOSPHATE 125 ML/HR: .53; .5; .37; .037; .03; .012; .00082 INJECTION, SOLUTION INTRAVENOUS at 18:37

## 2022-01-01 RX ADMIN — METHYLNALTREXONE BROMIDE 8 MG: 8 INJECTION, SOLUTION SUBCUTANEOUS at 14:26

## 2022-01-01 RX ADMIN — SODIUM CHLORIDE 17 UNITS/HR: 9 INJECTION, SOLUTION INTRAVENOUS at 23:12

## 2022-01-01 RX ADMIN — INSULIN LISPRO 1 UNITS: 100 INJECTION, SOLUTION INTRAVENOUS; SUBCUTANEOUS at 11:47

## 2022-01-01 RX ADMIN — FENTANYL CITRATE 100 MCG/HR: 50 INJECTION INTRAMUSCULAR; INTRAVENOUS at 06:07

## 2022-01-01 RX ADMIN — PANTOPRAZOLE SODIUM 40 MG: 40 INJECTION, POWDER, FOR SOLUTION INTRAVENOUS at 07:59

## 2022-01-01 RX ADMIN — FENTANYL CITRATE 50 MCG: 50 INJECTION INTRAMUSCULAR; INTRAVENOUS at 00:38

## 2022-01-01 RX ADMIN — SENNOSIDES AND DOCUSATE SODIUM 1 TABLET: 50; 8.6 TABLET ORAL at 17:03

## 2022-01-01 RX ADMIN — PANTOPRAZOLE SODIUM 40 MG: 40 INJECTION, POWDER, FOR SOLUTION INTRAVENOUS at 09:20

## 2022-01-01 RX ADMIN — PANTOPRAZOLE SODIUM 40 MG: 40 TABLET, DELAYED RELEASE ORAL at 05:28

## 2022-01-01 RX ADMIN — ACETYLCYSTEINE 600 MG: 200 SOLUTION ORAL; RESPIRATORY (INHALATION) at 15:16

## 2022-01-01 RX ADMIN — Medication 0.6 MCG/KG/HR: at 19:21

## 2022-01-01 RX ADMIN — FENTANYL CITRATE 50 MCG: 50 INJECTION INTRAMUSCULAR; INTRAVENOUS at 06:08

## 2022-01-01 RX ADMIN — LEVALBUTEROL HYDROCHLORIDE 1.25 MG: 1.25 SOLUTION, CONCENTRATE RESPIRATORY (INHALATION) at 01:54

## 2022-01-01 RX ADMIN — PIPERACILLIN AND TAZOBACTAM 3.38 G: 36; 4.5 INJECTION, POWDER, FOR SOLUTION INTRAVENOUS at 21:08

## 2022-01-01 RX ADMIN — MAGNESIUM SULFATE IN DEXTROSE 1 G: 10 INJECTION, SOLUTION INTRAVENOUS at 08:43

## 2022-01-01 RX ADMIN — CALCIUM GLUCONATE 1 G: 20 INJECTION, SOLUTION INTRAVENOUS at 01:27

## 2022-01-01 RX ADMIN — IPRATROPIUM BROMIDE 0.5 MG: 0.5 SOLUTION RESPIRATORY (INHALATION) at 20:07

## 2022-01-01 RX ADMIN — DEXMEDETOMIDINE HYDROCHLORIDE 0.4 MCG/KG/HR: 400 INJECTION INTRAVENOUS at 09:38

## 2022-01-01 RX ADMIN — NOREPINEPHRINE BITARTRATE 4 MG: 1 INJECTION, SOLUTION, CONCENTRATE INTRAVENOUS at 13:30

## 2022-01-01 RX ADMIN — INSULIN LISPRO 1 UNITS: 100 INJECTION, SOLUTION INTRAVENOUS; SUBCUTANEOUS at 17:43

## 2022-01-01 RX ADMIN — ASCORBIC ACID: 500 INJECTION INTRAVENOUS at 11:48

## 2022-01-01 RX ADMIN — Medication 20000 ML: at 23:16

## 2022-01-01 RX ADMIN — ACETYLCYSTEINE 15000 MG: 200 INJECTION, SOLUTION INTRAVENOUS at 09:48

## 2022-01-01 RX ADMIN — Medication 20000 ML: at 03:00

## 2022-01-01 RX ADMIN — SODIUM PHOSPHATE, MONOBASIC, MONOHYDRATE 6 MMOL: 276; 142 INJECTION, SOLUTION INTRAVENOUS at 04:07

## 2022-01-01 RX ADMIN — SODIUM CHLORIDE, SODIUM GLUCONATE, SODIUM ACETATE, POTASSIUM CHLORIDE, MAGNESIUM CHLORIDE, SODIUM PHOSPHATE, DIBASIC, AND POTASSIUM PHOSPHATE 75 ML/HR: .53; .5; .37; .037; .03; .012; .00082 INJECTION, SOLUTION INTRAVENOUS at 06:05

## 2022-01-01 RX ADMIN — DEXTROSE 150 MG: 50 INJECTION, SOLUTION INTRAVENOUS at 07:59

## 2022-01-01 RX ADMIN — SODIUM CHLORIDE 3 UNITS/HR: 9 INJECTION, SOLUTION INTRAVENOUS at 18:41

## 2022-01-01 RX ADMIN — FENTANYL CITRATE 100 MCG/HR: 50 INJECTION INTRAMUSCULAR; INTRAVENOUS at 02:19

## 2022-01-01 RX ADMIN — SODIUM CHLORIDE SOLN NEBU 3% 4 ML: 3 NEBU SOLN at 07:28

## 2022-01-01 RX ADMIN — FUROSEMIDE 20 MG: 20 TABLET ORAL at 08:13

## 2022-01-01 RX ADMIN — SODIUM PHOSPHATE, MONOBASIC, MONOHYDRATE 6 MMOL: 276; 142 INJECTION, SOLUTION INTRAVENOUS at 20:30

## 2022-01-01 RX ADMIN — CHLORHEXIDINE GLUCONATE 15 ML: 1.2 SOLUTION ORAL at 08:21

## 2022-01-01 RX ADMIN — HYDROMORPHONE HYDROCHLORIDE 0.5 MG: 1 INJECTION, SOLUTION INTRAMUSCULAR; INTRAVENOUS; SUBCUTANEOUS at 19:22

## 2022-01-01 RX ADMIN — ACETAMINOPHEN 975 MG: 325 TABLET ORAL at 05:27

## 2022-01-01 RX ADMIN — VASOPRESSIN 0.04 UNITS/MIN: 20 INJECTION INTRAVENOUS at 14:38

## 2022-01-01 RX ADMIN — EPOPROSTENOL 50 NG/KG/MIN: 1.5 INJECTION, POWDER, LYOPHILIZED, FOR SOLUTION INTRAVENOUS at 21:01

## 2022-01-01 RX ADMIN — Medication 20000 ML: at 17:51

## 2022-01-01 RX ADMIN — DEXMEDETOMIDINE HYDROCHLORIDE 0.5 MCG/KG/HR: 400 INJECTION INTRAVENOUS at 22:54

## 2022-01-01 RX ADMIN — IPRATROPIUM BROMIDE 0.5 MG: 0.5 SOLUTION RESPIRATORY (INHALATION) at 01:54

## 2022-01-01 RX ADMIN — HEPARIN SODIUM 5000 UNITS: 5000 INJECTION INTRAVENOUS; SUBCUTANEOUS at 15:13

## 2022-01-01 RX ADMIN — ATENOLOL 25 MG: 25 TABLET ORAL at 08:00

## 2022-01-01 RX ADMIN — CHLORHEXIDINE GLUCONATE 15 ML: 1.2 SOLUTION ORAL at 20:36

## 2022-01-01 RX ADMIN — SODIUM CHLORIDE, SODIUM GLUCONATE, SODIUM ACETATE, POTASSIUM CHLORIDE, MAGNESIUM CHLORIDE, SODIUM PHOSPHATE, DIBASIC, AND POTASSIUM PHOSPHATE 125 ML/HR: .53; .5; .37; .037; .03; .012; .00082 INJECTION, SOLUTION INTRAVENOUS at 10:02

## 2022-01-01 RX ADMIN — Medication: at 21:37

## 2022-01-01 RX ADMIN — LEVALBUTEROL HYDROCHLORIDE 1.25 MG: 1.25 SOLUTION, CONCENTRATE RESPIRATORY (INHALATION) at 13:25

## 2022-01-01 RX ADMIN — NOREPINEPHRINE BITARTRATE 6 MCG/MIN: 1 INJECTION, SOLUTION, CONCENTRATE INTRAVENOUS at 01:52

## 2022-01-01 RX ADMIN — PIPERACILLIN AND TAZOBACTAM 3.38 G: 36; 4.5 INJECTION, POWDER, FOR SOLUTION INTRAVENOUS at 04:12

## 2022-01-01 RX ADMIN — MICAFUNGIN SODIUM 100 MG: 50 INJECTION, POWDER, LYOPHILIZED, FOR SOLUTION INTRAVENOUS at 16:16

## 2022-01-01 RX ADMIN — ACETAMINOPHEN 975 MG: 650 SUSPENSION ORAL at 13:32

## 2022-01-01 RX ADMIN — QUETIAPINE FUMARATE 50 MG: 25 TABLET ORAL at 00:06

## 2022-01-01 RX ADMIN — HEPARIN SODIUM 5000 UNITS: 5000 INJECTION INTRAVENOUS; SUBCUTANEOUS at 00:49

## 2022-01-01 RX ADMIN — DEXMEDETOMIDINE HYDROCHLORIDE 0.4 MCG/KG/HR: 400 INJECTION INTRAVENOUS at 04:04

## 2022-01-01 RX ADMIN — Medication 20000 ML: at 10:30

## 2022-01-01 RX ADMIN — LIDOCAINE HYDROCHLORIDE: 10 INJECTION, SOLUTION EPIDURAL; INFILTRATION; INTRACAUDAL at 01:53

## 2022-01-01 RX ADMIN — ROCURONIUM BROMIDE 20 MG: 50 INJECTION INTRAVENOUS at 13:46

## 2022-01-01 RX ADMIN — HEPARIN SODIUM 5000 UNITS: 5000 INJECTION INTRAVENOUS; SUBCUTANEOUS at 06:35

## 2022-01-01 RX ADMIN — IPRATROPIUM BROMIDE 0.5 MG: 0.5 SOLUTION RESPIRATORY (INHALATION) at 13:25

## 2022-01-01 RX ADMIN — ACETYLCYSTEINE 10000 MG: 200 INJECTION, SOLUTION INTRAVENOUS at 16:05

## 2022-01-01 RX ADMIN — FENTANYL CITRATE 50 MCG: 50 INJECTION INTRAMUSCULAR; INTRAVENOUS at 13:29

## 2022-01-01 RX ADMIN — INSULIN LISPRO 5 UNITS: 100 INJECTION, SOLUTION INTRAVENOUS; SUBCUTANEOUS at 17:44

## 2022-01-01 RX ADMIN — HYDROMORPHONE HYDROCHLORIDE 0.5 MG: 1 INJECTION, SOLUTION INTRAMUSCULAR; INTRAVENOUS; SUBCUTANEOUS at 18:08

## 2022-01-01 RX ADMIN — HYDROCORTISONE SODIUM SUCCINATE 50 MG: 100 INJECTION, POWDER, FOR SOLUTION INTRAMUSCULAR; INTRAVENOUS at 18:10

## 2022-01-01 RX ADMIN — CHLORHEXIDINE GLUCONATE 15 ML: 1.2 SOLUTION ORAL at 20:42

## 2022-01-01 RX ADMIN — SENNOSIDES AND DOCUSATE SODIUM 2 TABLET: 50; 8.6 TABLET ORAL at 18:45

## 2022-01-01 RX ADMIN — SODIUM PHOSPHATE, MONOBASIC, MONOHYDRATE 9 MMOL: 276; 142 INJECTION, SOLUTION INTRAVENOUS at 14:45

## 2022-01-01 RX ADMIN — ACETAMINOPHEN 975 MG: 325 TABLET ORAL at 21:13

## 2022-01-01 RX ADMIN — ACETAMINOPHEN 975 MG: 650 SUSPENSION ORAL at 13:17

## 2022-01-01 RX ADMIN — HYDROMORPHONE HYDROCHLORIDE 0.4 MG: 1 INJECTION, SOLUTION INTRAMUSCULAR; INTRAVENOUS; SUBCUTANEOUS at 20:11

## 2022-01-01 RX ADMIN — SODIUM CHLORIDE SOLN NEBU 3% 4 ML: 3 NEBU SOLN at 07:42

## 2022-01-01 RX ADMIN — PIPERACILLIN AND TAZOBACTAM 4.5 G: 36; 4.5 INJECTION, POWDER, FOR SOLUTION INTRAVENOUS at 04:12

## 2022-01-01 RX ADMIN — SODIUM PHOSPHATE, MONOBASIC, MONOHYDRATE 6 MMOL: 276; 142 INJECTION, SOLUTION INTRAVENOUS at 14:36

## 2022-01-01 RX ADMIN — EPINEPHRINE 10 MCG/MIN: 1 INJECTION, SOLUTION, CONCENTRATE INTRAVENOUS at 11:46

## 2022-01-01 RX ADMIN — IPRATROPIUM BROMIDE AND ALBUTEROL SULFATE 3 ML: .5; 3 SOLUTION RESPIRATORY (INHALATION) at 18:21

## 2022-01-01 RX ADMIN — CEFAZOLIN 3000 MG: 1 INJECTION, POWDER, FOR SOLUTION INTRAMUSCULAR; INTRAVENOUS at 08:15

## 2022-01-01 RX ADMIN — ASCORBIC ACID: 500 INJECTION INTRAVENOUS at 22:14

## 2022-01-01 RX ADMIN — INSULIN LISPRO 5 UNITS: 100 INJECTION, SOLUTION INTRAVENOUS; SUBCUTANEOUS at 07:37

## 2022-01-01 RX ADMIN — AMIODARONE HYDROCHLORIDE 0.5 MG/MIN: 50 INJECTION, SOLUTION INTRAVENOUS at 15:44

## 2022-01-01 RX ADMIN — DEXMEDETOMIDINE HYDROCHLORIDE 0.2 MCG/KG/HR: 400 INJECTION INTRAVENOUS at 08:14

## 2022-01-01 RX ADMIN — Medication 20000 ML: at 02:56

## 2022-01-01 RX ADMIN — SODIUM CHLORIDE, SODIUM GLUCONATE, SODIUM ACETATE, POTASSIUM CHLORIDE, MAGNESIUM CHLORIDE, SODIUM PHOSPHATE, DIBASIC, AND POTASSIUM PHOSPHATE 1000 ML: .53; .5; .37; .037; .03; .012; .00082 INJECTION, SOLUTION INTRAVENOUS at 23:39

## 2022-01-01 RX ADMIN — MICAFUNGIN SODIUM 100 MG: 50 INJECTION, POWDER, LYOPHILIZED, FOR SOLUTION INTRAVENOUS at 16:42

## 2022-01-01 RX ADMIN — METOROPROLOL TARTRATE 5 MG: 5 INJECTION, SOLUTION INTRAVENOUS at 18:07

## 2022-01-01 RX ADMIN — VASOPRESSIN 0.04 UNITS/MIN: 20 INJECTION INTRAVENOUS at 06:07

## 2022-01-01 RX ADMIN — FENTANYL CITRATE 100 MCG/HR: 50 INJECTION INTRAMUSCULAR; INTRAVENOUS at 11:43

## 2022-01-01 RX ADMIN — Medication 20000 ML: at 01:44

## 2022-01-01 RX ADMIN — INSULIN LISPRO 5 UNITS: 100 INJECTION, SOLUTION INTRAVENOUS; SUBCUTANEOUS at 16:12

## 2022-01-01 RX ADMIN — CALCIUM GLUCONATE 1 G: 20 INJECTION, SOLUTION INTRAVENOUS at 21:10

## 2022-01-01 RX ADMIN — SODIUM CHLORIDE, SODIUM GLUCONATE, SODIUM ACETATE, POTASSIUM CHLORIDE, MAGNESIUM CHLORIDE, SODIUM PHOSPHATE, DIBASIC, AND POTASSIUM PHOSPHATE 1000 ML: .53; .5; .37; .037; .03; .012; .00082 INJECTION, SOLUTION INTRAVENOUS at 09:19

## 2022-01-01 RX ADMIN — FENTANYL CITRATE 100 MCG/HR: 50 INJECTION INTRAMUSCULAR; INTRAVENOUS at 22:19

## 2022-01-01 RX ADMIN — PROPOFOL 20 MCG/KG/MIN: 10 INJECTION, EMULSION INTRAVENOUS at 06:33

## 2022-01-01 RX ADMIN — SODIUM PHOSPHATE, MONOBASIC, MONOHYDRATE 9 MMOL: 276; 142 INJECTION, SOLUTION INTRAVENOUS at 22:28

## 2022-01-01 RX ADMIN — Medication 20000 ML: at 13:55

## 2022-01-01 RX ADMIN — FENTANYL CITRATE 50 MCG: 50 INJECTION INTRAMUSCULAR; INTRAVENOUS at 05:14

## 2022-01-01 RX ADMIN — FLUCONAZOLE IN SODIUM CHLORIDE 400 MG: 2 INJECTION, SOLUTION INTRAVENOUS at 07:58

## 2022-01-01 RX ADMIN — OLANZAPINE 10 MG: 10 INJECTION, POWDER, LYOPHILIZED, FOR SOLUTION INTRAMUSCULAR at 22:20

## 2022-01-01 RX ADMIN — SODIUM CHLORIDE, SODIUM GLUCONATE, SODIUM ACETATE, POTASSIUM CHLORIDE, MAGNESIUM CHLORIDE, SODIUM PHOSPHATE, DIBASIC, AND POTASSIUM PHOSPHATE 500 ML: .53; .5; .37; .037; .03; .012; .00082 INJECTION, SOLUTION INTRAVENOUS at 19:31

## 2022-01-01 RX ADMIN — INSULIN LISPRO 1 UNITS: 100 INJECTION, SOLUTION INTRAVENOUS; SUBCUTANEOUS at 08:20

## 2022-01-01 RX ADMIN — SODIUM CHLORIDE, SODIUM LACTATE, POTASSIUM CHLORIDE, AND CALCIUM CHLORIDE: .6; .31; .03; .02 INJECTION, SOLUTION INTRAVENOUS at 13:10

## 2022-01-01 RX ADMIN — MAGNESIUM SULFATE IN DEXTROSE 1 G: 10 INJECTION, SOLUTION INTRAVENOUS at 10:09

## 2022-01-01 RX ADMIN — AMIODARONE HYDROCHLORIDE 0.5 MG/MIN: 50 INJECTION, SOLUTION INTRAVENOUS at 21:15

## 2022-01-01 RX ADMIN — SODIUM CHLORIDE 3 UNITS/HR: 9 INJECTION, SOLUTION INTRAVENOUS at 15:24

## 2022-01-01 RX ADMIN — EPINEPHRINE 10 MCG/MIN: 1 INJECTION, SOLUTION, CONCENTRATE INTRAVENOUS at 23:40

## 2022-01-01 RX ADMIN — CALCIUM GLUCONATE 1 G: 20 INJECTION, SOLUTION INTRAVENOUS at 18:59

## 2022-01-01 RX ADMIN — NOREPINEPHRINE BITARTRATE 26 MCG/MIN: 1 INJECTION, SOLUTION, CONCENTRATE INTRAVENOUS at 09:19

## 2022-01-01 RX ADMIN — POTASSIUM CHLORIDE 40 MEQ: 29.8 INJECTION, SOLUTION INTRAVENOUS at 02:00

## 2022-01-01 RX ADMIN — WATER 10 ML: 1 INJECTION INTRAMUSCULAR; INTRAVENOUS; SUBCUTANEOUS at 22:20

## 2022-01-01 RX ADMIN — CHLORHEXIDINE GLUCONATE 15 ML: 1.2 SOLUTION ORAL at 20:49

## 2022-01-01 RX ADMIN — Medication 20000 ML: at 15:50

## 2022-01-01 RX ADMIN — QUETIAPINE FUMARATE 50 MG: 25 TABLET ORAL at 10:55

## 2022-01-01 RX ADMIN — ACETAMINOPHEN 975 MG: 650 SUSPENSION ORAL at 05:07

## 2022-01-01 RX ADMIN — ISODIUM CHLORIDE: 0.03 SOLUTION RESPIRATORY (INHALATION) at 19:34

## 2022-01-01 RX ADMIN — SODIUM PHOSPHATE, MONOBASIC, MONOHYDRATE 6 MMOL: 276; 142 INJECTION, SOLUTION INTRAVENOUS at 09:16

## 2022-01-01 RX ADMIN — Medication 20000 ML: at 00:50

## 2022-01-01 RX ADMIN — AMIODARONE HYDROCHLORIDE 0.5 MG/MIN: 50 INJECTION, SOLUTION INTRAVENOUS at 06:29

## 2022-01-01 RX ADMIN — IPRATROPIUM BROMIDE 0.5 MG: 0.5 SOLUTION RESPIRATORY (INHALATION) at 02:22

## 2022-01-01 RX ADMIN — Medication 1.1 MCG/KG/HR: at 07:44

## 2022-01-01 RX ADMIN — ATENOLOL 25 MG: 25 TABLET ORAL at 08:13

## 2022-01-01 RX ADMIN — SODIUM CHLORIDE, SODIUM GLUCONATE, SODIUM ACETATE, POTASSIUM CHLORIDE, MAGNESIUM CHLORIDE, SODIUM PHOSPHATE, DIBASIC, AND POTASSIUM PHOSPHATE 125 ML/HR: .53; .5; .37; .037; .03; .012; .00082 INJECTION, SOLUTION INTRAVENOUS at 00:59

## 2022-01-01 RX ADMIN — PROPOFOL 200 MG: 10 INJECTION, EMULSION INTRAVENOUS at 08:03

## 2022-01-01 RX ADMIN — HEPARIN SODIUM 5000 UNITS: 5000 INJECTION INTRAVENOUS; SUBCUTANEOUS at 05:10

## 2022-01-01 RX ADMIN — INSULIN LISPRO 1 UNITS: 100 INJECTION, SOLUTION INTRAVENOUS; SUBCUTANEOUS at 12:00

## 2022-01-01 RX ADMIN — INSULIN LISPRO 1 UNITS: 100 INJECTION, SOLUTION INTRAVENOUS; SUBCUTANEOUS at 05:34

## 2022-01-01 RX ADMIN — SODIUM CHLORIDE 3 G: 9 INJECTION, SOLUTION INTRAVENOUS at 21:25

## 2022-01-01 RX ADMIN — VASOPRESSIN 0.04 UNITS/MIN: 20 INJECTION INTRAVENOUS at 05:04

## 2022-01-01 RX ADMIN — NOREPINEPHRINE BITARTRATE 4 MCG/MIN: 1 INJECTION, SOLUTION, CONCENTRATE INTRAVENOUS at 19:43

## 2022-01-01 RX ADMIN — Medication 2 MG/HR: at 21:01

## 2022-01-01 RX ADMIN — SODIUM CHLORIDE, SODIUM GLUCONATE, SODIUM ACETATE, POTASSIUM CHLORIDE, MAGNESIUM CHLORIDE, SODIUM PHOSPHATE, DIBASIC, AND POTASSIUM PHOSPHATE 125 ML/HR: .53; .5; .37; .037; .03; .012; .00082 INJECTION, SOLUTION INTRAVENOUS at 07:47

## 2022-01-01 RX ADMIN — DEXMEDETOMIDINE HYDROCHLORIDE 0.7 MCG/KG/HR: 400 INJECTION INTRAVENOUS at 17:20

## 2022-01-01 RX ADMIN — DEXMEDETOMIDINE HYDROCHLORIDE 0.3 MCG/KG/HR: 400 INJECTION INTRAVENOUS at 06:28

## 2022-01-01 RX ADMIN — SODIUM CHLORIDE, SODIUM GLUCONATE, SODIUM ACETATE, POTASSIUM CHLORIDE, MAGNESIUM CHLORIDE, SODIUM PHOSPHATE, DIBASIC, AND POTASSIUM PHOSPHATE 1000 ML: .53; .5; .37; .037; .03; .012; .00082 INJECTION, SOLUTION INTRAVENOUS at 07:27

## 2022-01-01 RX ADMIN — POLYETHYLENE GLYCOL 3350 17 G: 17 POWDER, FOR SOLUTION ORAL at 09:30

## 2022-01-01 RX ADMIN — ALBUMIN (HUMAN) 12.5 G: 0.25 INJECTION, SOLUTION INTRAVENOUS at 01:18

## 2022-01-01 RX ADMIN — IPRATROPIUM BROMIDE 0.5 MG: 0.5 SOLUTION RESPIRATORY (INHALATION) at 08:16

## 2022-01-01 RX ADMIN — BISACODYL 10 MG: 10 SUPPOSITORY RECTAL at 10:10

## 2022-01-01 RX ADMIN — DEXMEDETOMIDINE HYDROCHLORIDE 0.4 MCG/KG/HR: 400 INJECTION INTRAVENOUS at 00:28

## 2022-01-01 RX ADMIN — Medication 200 MG: at 08:03

## 2022-01-01 RX ADMIN — ROCURONIUM BROMIDE 30 MG: 50 INJECTION INTRAVENOUS at 17:21

## 2022-01-01 RX ADMIN — NOREPINEPHRINE BITARTRATE 4 MCG/MIN: 1 INJECTION, SOLUTION, CONCENTRATE INTRAVENOUS at 10:48

## 2022-01-01 RX ADMIN — CALCIUM GLUCONATE 1 G: 20 INJECTION, SOLUTION INTRAVENOUS at 01:07

## 2022-01-01 RX ADMIN — IPRATROPIUM BROMIDE 0.5 MG: 0.5 SOLUTION RESPIRATORY (INHALATION) at 19:53

## 2022-01-01 RX ADMIN — ASCORBIC ACID: 500 INJECTION INTRAVENOUS at 13:12

## 2022-01-01 RX ADMIN — SODIUM PHOSPHATE, MONOBASIC, MONOHYDRATE 9 MMOL: 276; 142 INJECTION, SOLUTION INTRAVENOUS at 08:23

## 2022-01-01 RX ADMIN — INSULIN LISPRO 4 UNITS: 100 INJECTION, SOLUTION INTRAVENOUS; SUBCUTANEOUS at 06:54

## 2022-01-01 RX ADMIN — PANTOPRAZOLE SODIUM 40 MG: 40 INJECTION, POWDER, FOR SOLUTION INTRAVENOUS at 08:17

## 2022-01-01 RX ADMIN — INSULIN GLARGINE 5 UNITS: 100 INJECTION, SOLUTION SUBCUTANEOUS at 21:37

## 2022-01-01 RX ADMIN — SODIUM CHLORIDE: 0.9 INJECTION, SOLUTION INTRAVENOUS at 19:50

## 2022-01-01 RX ADMIN — INSULIN LISPRO 1 UNITS: 100 INJECTION, SOLUTION INTRAVENOUS; SUBCUTANEOUS at 00:59

## 2022-01-01 RX ADMIN — MAGNESIUM SULFATE IN DEXTROSE 1 G: 10 INJECTION, SOLUTION INTRAVENOUS at 19:47

## 2022-01-01 RX ADMIN — SENNOSIDES AND DOCUSATE SODIUM 1 TABLET: 50; 8.6 TABLET ORAL at 10:01

## 2022-01-01 RX ADMIN — ALBUMIN (HUMAN) 25 G: 0.25 INJECTION, SOLUTION INTRAVENOUS at 09:21

## 2022-01-01 RX ADMIN — LIDOCAINE HYDROCHLORIDE 300 MG: 10 INJECTION, SOLUTION EPIDURAL; INFILTRATION; INTRACAUDAL; PERINEURAL at 14:09

## 2022-01-01 RX ADMIN — HYDROMORPHONE HYDROCHLORIDE 0.4 MG: 1 INJECTION, SOLUTION INTRAMUSCULAR; INTRAVENOUS; SUBCUTANEOUS at 01:10

## 2022-01-01 RX ADMIN — LEVALBUTEROL HYDROCHLORIDE 1.25 MG: 1.25 SOLUTION, CONCENTRATE RESPIRATORY (INHALATION) at 08:16

## 2022-01-01 RX ADMIN — MIDAZOLAM 0.5 MG: 1 INJECTION INTRAMUSCULAR; INTRAVENOUS at 18:39

## 2022-01-01 RX ADMIN — PIPERACILLIN AND TAZOBACTAM 4.5 G: 36; 4.5 INJECTION, POWDER, FOR SOLUTION INTRAVENOUS at 21:11

## 2022-01-01 RX ADMIN — MAGNESIUM SULFATE HEPTAHYDRATE 1 G: 1 INJECTION, SOLUTION INTRAVENOUS at 02:22

## 2022-01-01 RX ADMIN — IPRATROPIUM BROMIDE 0.5 MG: 0.5 SOLUTION RESPIRATORY (INHALATION) at 02:13

## 2022-01-01 RX ADMIN — Medication 20000 ML: at 05:28

## 2022-01-01 RX ADMIN — ACETAMINOPHEN 975 MG: 650 SUSPENSION ORAL at 22:20

## 2022-01-01 RX ADMIN — ACETAMINOPHEN 975 MG: 650 SUSPENSION ORAL at 21:15

## 2022-01-01 RX ADMIN — CHLORHEXIDINE GLUCONATE 15 ML: 1.2 SOLUTION ORAL at 08:17

## 2022-01-01 RX ADMIN — LEVALBUTEROL HYDROCHLORIDE 1.25 MG: 1.25 SOLUTION, CONCENTRATE RESPIRATORY (INHALATION) at 19:02

## 2022-01-01 RX ADMIN — Medication 1.2 MCG/KG/HR: at 04:27

## 2022-01-01 RX ADMIN — POTASSIUM PHOSPHATE, MONOBASIC AND POTASSIUM PHOSPHATE, DIBASIC 21 MMOL: 224; 236 INJECTION, SOLUTION, CONCENTRATE INTRAVENOUS at 06:56

## 2022-01-01 RX ADMIN — FENTANYL CITRATE 100 MCG/HR: 50 INJECTION INTRAMUSCULAR; INTRAVENOUS at 00:30

## 2022-01-01 RX ADMIN — NOREPINEPHRINE BITARTRATE 14 MCG/MIN: 1 INJECTION, SOLUTION, CONCENTRATE INTRAVENOUS at 16:01

## 2022-01-01 RX ADMIN — CALCIUM GLUCONATE 1 G: 20 INJECTION, SOLUTION INTRAVENOUS at 00:31

## 2022-01-01 RX ADMIN — SODIUM CHLORIDE, SODIUM GLUCONATE, SODIUM ACETATE, POTASSIUM CHLORIDE, MAGNESIUM CHLORIDE, SODIUM PHOSPHATE, DIBASIC, AND POTASSIUM PHOSPHATE 125 ML/HR: .53; .5; .37; .037; .03; .012; .00082 INJECTION, SOLUTION INTRAVENOUS at 19:12

## 2022-01-01 RX ADMIN — POTASSIUM CHLORIDE: 14.9 INJECTION, SOLUTION INTRAVENOUS at 13:18

## 2022-01-01 RX ADMIN — PERFLUTREN 0.8 ML/MIN: 6.52 INJECTION, SUSPENSION INTRAVENOUS at 09:00

## 2022-01-01 RX ADMIN — Medication 20000 ML: at 12:18

## 2022-01-01 RX ADMIN — HEPARIN SODIUM 5000 UNITS: 5000 INJECTION INTRAVENOUS; SUBCUTANEOUS at 07:39

## 2022-01-01 RX ADMIN — LEVALBUTEROL HYDROCHLORIDE 1.25 MG: 1.25 SOLUTION, CONCENTRATE RESPIRATORY (INHALATION) at 13:22

## 2022-01-01 RX ADMIN — PIPERACILLIN AND TAZOBACTAM 3.38 G: 36; 4.5 INJECTION, POWDER, FOR SOLUTION INTRAVENOUS at 12:14

## 2022-01-01 RX ADMIN — ACETYLCYSTEINE 600 MG: 200 SOLUTION ORAL; RESPIRATORY (INHALATION) at 01:54

## 2022-01-01 RX ADMIN — EPOPROSTENOL 50 NG/KG/MIN: 1.5 INJECTION, POWDER, LYOPHILIZED, FOR SOLUTION INTRAVENOUS at 14:36

## 2022-01-01 RX ADMIN — CALCIUM GLUCONATE 1 G: 20 INJECTION, SOLUTION INTRAVENOUS at 20:26

## 2022-01-01 RX ADMIN — CEFAZOLIN 3000 MG: 1 INJECTION, POWDER, FOR SOLUTION INTRAMUSCULAR; INTRAVENOUS at 12:11

## 2022-01-01 RX ADMIN — PIPERACILLIN AND TAZOBACTAM 3.38 G: 36; 4.5 INJECTION, POWDER, FOR SOLUTION INTRAVENOUS at 04:05

## 2022-01-01 RX ADMIN — IPRATROPIUM BROMIDE 0.5 MG: 0.5 SOLUTION RESPIRATORY (INHALATION) at 07:28

## 2022-01-01 RX ADMIN — HYDROMORPHONE HYDROCHLORIDE 0.5 MG: 1 INJECTION, SOLUTION INTRAMUSCULAR; INTRAVENOUS; SUBCUTANEOUS at 11:41

## 2022-01-01 RX ADMIN — DEXMEDETOMIDINE HYDROCHLORIDE 0.2 MCG/KG/HR: 400 INJECTION INTRAVENOUS at 19:37

## 2022-01-01 RX ADMIN — FENTANYL CITRATE 100 MCG/HR: 50 INJECTION INTRAMUSCULAR; INTRAVENOUS at 15:06

## 2022-01-01 RX ADMIN — SENNOSIDES AND DOCUSATE SODIUM 1 TABLET: 8.6; 5 TABLET ORAL at 07:59

## 2022-01-01 RX ADMIN — ALBUMIN (HUMAN) 12.5 G: 0.25 INJECTION, SOLUTION INTRAVENOUS at 20:38

## 2022-01-01 RX ADMIN — NOREPINEPHRINE BITARTRATE 2 MCG/MIN: 1 INJECTION, SOLUTION, CONCENTRATE INTRAVENOUS at 20:21

## 2022-01-01 RX ADMIN — IPRATROPIUM BROMIDE 0.5 MG: 0.5 SOLUTION RESPIRATORY (INHALATION) at 07:32

## 2022-01-01 RX ADMIN — PANTOPRAZOLE SODIUM 40 MG: 40 TABLET, DELAYED RELEASE ORAL at 07:37

## 2022-01-01 RX ADMIN — FENTANYL CITRATE 50 MCG/HR: 50 INJECTION INTRAMUSCULAR; INTRAVENOUS at 21:54

## 2022-01-01 RX ADMIN — PANTOPRAZOLE SODIUM 40 MG: 40 INJECTION, POWDER, FOR SOLUTION INTRAVENOUS at 08:25

## 2022-01-01 RX ADMIN — PIPERACILLIN AND TAZOBACTAM 3.38 G: 36; 4.5 INJECTION, POWDER, FOR SOLUTION INTRAVENOUS at 20:17

## 2022-01-01 RX ADMIN — IPRATROPIUM BROMIDE 0.5 MG: 0.5 SOLUTION RESPIRATORY (INHALATION) at 08:05

## 2022-01-01 RX ADMIN — FENTANYL CITRATE 50 MCG: 50 INJECTION INTRAMUSCULAR; INTRAVENOUS at 01:57

## 2022-01-01 RX ADMIN — GLYCOPYRROLATE 0.1 MG: 0.2 INJECTION, SOLUTION INTRAMUSCULAR; INTRAVENOUS at 07:58

## 2022-01-01 RX ADMIN — ALBUMIN (HUMAN) 12.5 G: 0.25 INJECTION, SOLUTION INTRAVENOUS at 08:09

## 2022-01-01 RX ADMIN — INSULIN LISPRO 1 UNITS: 100 INJECTION, SOLUTION INTRAVENOUS; SUBCUTANEOUS at 07:37

## 2022-01-01 RX ADMIN — LEVALBUTEROL HYDROCHLORIDE 1.25 MG: 1.25 SOLUTION, CONCENTRATE RESPIRATORY (INHALATION) at 07:32

## 2022-01-01 RX ADMIN — POTASSIUM CHLORIDE 40 MEQ: 29.8 INJECTION, SOLUTION INTRAVENOUS at 03:58

## 2022-01-01 RX ADMIN — MICAFUNGIN SODIUM 100 MG: 50 INJECTION, POWDER, LYOPHILIZED, FOR SOLUTION INTRAVENOUS at 15:56

## 2022-01-01 RX ADMIN — LEVALBUTEROL HYDROCHLORIDE 1.25 MG: 1.25 SOLUTION, CONCENTRATE RESPIRATORY (INHALATION) at 07:28

## 2022-01-01 RX ADMIN — SODIUM CHLORIDE 3 G: 9 INJECTION, SOLUTION INTRAVENOUS at 02:30

## 2022-01-01 RX ADMIN — Medication 20000 ML: at 10:15

## 2022-01-01 RX ADMIN — POLYETHYLENE GLYCOL 3350 17 G: 17 POWDER, FOR SOLUTION ORAL at 08:57

## 2022-01-01 RX ADMIN — PANTOPRAZOLE SODIUM 40 MG: 40 INJECTION, POWDER, FOR SOLUTION INTRAVENOUS at 21:15

## 2022-01-01 RX ADMIN — Medication 20000 ML: at 04:24

## 2022-01-01 RX ADMIN — HEPARIN SODIUM 5000 UNITS: 5000 INJECTION INTRAVENOUS; SUBCUTANEOUS at 22:35

## 2022-01-01 RX ADMIN — ACETAMINOPHEN 975 MG: 650 SUSPENSION ORAL at 06:08

## 2022-01-01 RX ADMIN — INSULIN LISPRO 2 UNITS: 100 INJECTION, SOLUTION INTRAVENOUS; SUBCUTANEOUS at 17:50

## 2022-01-01 RX ADMIN — IPRATROPIUM BROMIDE 0.5 MG: 0.5 SOLUTION RESPIRATORY (INHALATION) at 18:54

## 2022-01-01 RX ADMIN — FENTANYL CITRATE 50 MCG: 50 INJECTION INTRAMUSCULAR; INTRAVENOUS at 03:24

## 2022-01-01 RX ADMIN — HEPARIN SODIUM 5000 UNITS: 5000 INJECTION INTRAVENOUS; SUBCUTANEOUS at 14:15

## 2022-01-01 RX ADMIN — VASOPRESSIN 0.04 UNITS/MIN: 20 INJECTION INTRAVENOUS at 05:08

## 2022-01-01 RX ADMIN — Medication 20000 ML: at 20:15

## 2022-01-01 RX ADMIN — SODIUM PHOSPHATE, MONOBASIC, MONOHYDRATE 6 MMOL: 276; 142 INJECTION, SOLUTION INTRAVENOUS at 07:57

## 2022-01-01 RX ADMIN — IOHEXOL 75 ML: 350 INJECTION, SOLUTION INTRAVENOUS at 01:04

## 2022-01-01 RX ADMIN — HYDROMORPHONE HYDROCHLORIDE 0.5 MG: 1 INJECTION, SOLUTION INTRAMUSCULAR; INTRAVENOUS; SUBCUTANEOUS at 09:34

## 2022-01-01 RX ADMIN — FENTANYL CITRATE 25 MCG/HR: 0.05 INJECTION, SOLUTION INTRAMUSCULAR; INTRAVENOUS at 23:01

## 2022-01-01 RX ADMIN — FUROSEMIDE 20 MG: 20 TABLET ORAL at 21:12

## 2022-01-01 RX ADMIN — SODIUM CHLORIDE: 0.9 INJECTION, SOLUTION INTRAVENOUS at 08:27

## 2022-01-01 RX ADMIN — INSULIN GLARGINE 15 UNITS: 100 INJECTION, SOLUTION SUBCUTANEOUS at 21:32

## 2022-01-01 RX ADMIN — SODIUM CHLORIDE 3 G: 9 INJECTION, SOLUTION INTRAVENOUS at 12:30

## 2022-01-01 RX ADMIN — INSULIN GLARGINE 15 UNITS: 100 INJECTION, SOLUTION SUBCUTANEOUS at 22:35

## 2022-01-01 RX ADMIN — CHLORHEXIDINE GLUCONATE 15 ML: 1.2 SOLUTION ORAL at 09:13

## 2022-01-01 RX ADMIN — ACETAMINOPHEN 975 MG: 325 TABLET ORAL at 00:44

## 2022-01-01 RX ADMIN — HEPARIN SODIUM 5000 UNITS: 5000 INJECTION INTRAVENOUS; SUBCUTANEOUS at 13:29

## 2022-01-01 RX ADMIN — LEVALBUTEROL HYDROCHLORIDE 1.25 MG: 1.25 SOLUTION, CONCENTRATE RESPIRATORY (INHALATION) at 08:51

## 2022-01-01 RX ADMIN — SODIUM CHLORIDE, SODIUM GLUCONATE, SODIUM ACETATE, POTASSIUM CHLORIDE, MAGNESIUM CHLORIDE, SODIUM PHOSPHATE, DIBASIC, AND POTASSIUM PHOSPHATE 500 ML: .53; .5; .37; .037; .03; .012; .00082 INJECTION, SOLUTION INTRAVENOUS at 11:47

## 2022-01-01 RX ADMIN — SODIUM CHLORIDE SOLN NEBU 3% 4 ML: 3 NEBU SOLN at 15:21

## 2022-01-01 RX ADMIN — IPRATROPIUM BROMIDE 0.5 MG: 0.5 SOLUTION RESPIRATORY (INHALATION) at 19:37

## 2022-01-01 RX ADMIN — Medication 6 MG: at 21:42

## 2022-01-01 RX ADMIN — DEXMEDETOMIDINE HYDROCHLORIDE 0.2 MCG/KG/HR: 400 INJECTION INTRAVENOUS at 16:40

## 2022-01-01 RX ADMIN — ONDANSETRON 4 MG: 2 INJECTION INTRAMUSCULAR; INTRAVENOUS at 19:26

## 2022-01-01 RX ADMIN — ACETAMINOPHEN 975 MG: 650 SUSPENSION ORAL at 05:16

## 2022-01-01 RX ADMIN — PIPERACILLIN AND TAZOBACTAM 3.38 G: 36; 4.5 INJECTION, POWDER, FOR SOLUTION INTRAVENOUS at 04:27

## 2022-01-01 RX ADMIN — ROCURONIUM BROMIDE 30 MG: 50 INJECTION INTRAVENOUS at 12:01

## 2022-01-01 RX ADMIN — VASOPRESSIN 0.04 UNITS/MIN: 20 INJECTION INTRAVENOUS at 00:04

## 2022-01-01 RX ADMIN — HYDROCORTISONE SODIUM SUCCINATE 50 MG: 100 INJECTION, POWDER, FOR SOLUTION INTRAMUSCULAR; INTRAVENOUS at 12:18

## 2022-01-01 RX ADMIN — VASOPRESSIN 0.04 UNITS/MIN: 20 INJECTION INTRAVENOUS at 09:35

## 2022-01-01 RX ADMIN — POLYETHYLENE GLYCOL 3350 17 G: 17 POWDER, FOR SOLUTION ORAL at 09:00

## 2022-01-01 RX ADMIN — ACETAMINOPHEN 975 MG: 650 SUSPENSION ORAL at 22:52

## 2022-01-01 RX ADMIN — MAGNESIUM SULFATE HEPTAHYDRATE 1 G: 1 INJECTION, SOLUTION INTRAVENOUS at 19:34

## 2022-01-01 RX ADMIN — ROCURONIUM BROMIDE 30 MG: 50 INJECTION INTRAVENOUS at 18:11

## 2022-01-01 RX ADMIN — FENTANYL CITRATE 100 MCG/HR: 50 INJECTION INTRAMUSCULAR; INTRAVENOUS at 16:18

## 2022-01-01 RX ADMIN — IPRATROPIUM BROMIDE 0.5 MG: 0.5 SOLUTION RESPIRATORY (INHALATION) at 02:05

## 2022-01-01 RX ADMIN — Medication 0.3 MCG/KG/HR: at 10:47

## 2022-01-01 RX ADMIN — SODIUM CHLORIDE 3 UNITS/HR: 9 INJECTION, SOLUTION INTRAVENOUS at 12:25

## 2022-01-01 RX ADMIN — NOREPINEPHRINE BITARTRATE 16 MCG/MIN: 1 INJECTION, SOLUTION, CONCENTRATE INTRAVENOUS at 01:59

## 2022-01-01 RX ADMIN — ACETAMINOPHEN 975 MG: 650 SUSPENSION ORAL at 22:17

## 2022-01-01 RX ADMIN — FENTANYL CITRATE 50 MCG: 50 INJECTION INTRAMUSCULAR; INTRAVENOUS at 06:55

## 2022-01-01 RX ADMIN — ALBUMIN (HUMAN) 25 G: 12.5 INJECTION, SOLUTION INTRAVENOUS at 11:52

## 2022-01-01 RX ADMIN — PIPERACILLIN AND TAZOBACTAM 3.38 G: 36; 4.5 INJECTION, POWDER, FOR SOLUTION INTRAVENOUS at 12:13

## 2022-01-01 RX ADMIN — Medication 1.2 MCG/KG/HR: at 09:21

## 2022-01-01 RX ADMIN — Medication 1.1 MCG/KG/HR: at 11:18

## 2022-01-01 RX ADMIN — Medication 20000 ML: at 11:18

## 2022-01-01 RX ADMIN — AMIODARONE HYDROCHLORIDE 0.5 MG/MIN: 50 INJECTION, SOLUTION INTRAVENOUS at 15:00

## 2022-01-01 RX ADMIN — Medication 1.2 MCG/KG/HR: at 01:53

## 2022-01-01 RX ADMIN — DEXMEDETOMIDINE HYDROCHLORIDE 0.6 MCG/KG/HR: 400 INJECTION INTRAVENOUS at 13:57

## 2022-01-01 RX ADMIN — ACETAMINOPHEN 975 MG: 650 SUSPENSION ORAL at 05:11

## 2022-01-01 RX ADMIN — SODIUM PHOSPHATE, MONOBASIC, MONOHYDRATE 6 MMOL: 276; 142 INJECTION, SOLUTION INTRAVENOUS at 10:11

## 2022-01-01 RX ADMIN — SODIUM CHLORIDE SOLN NEBU 3% 4 ML: 3 NEBU SOLN at 14:12

## 2022-01-01 RX ADMIN — PIPERACILLIN AND TAZOBACTAM 3.38 G: 36; 4.5 INJECTION, POWDER, FOR SOLUTION INTRAVENOUS at 05:23

## 2022-01-01 RX ADMIN — FENTANYL CITRATE 50 MCG: 50 INJECTION INTRAMUSCULAR; INTRAVENOUS at 21:10

## 2022-01-01 RX ADMIN — IPRATROPIUM BROMIDE 0.5 MG: 0.5 SOLUTION RESPIRATORY (INHALATION) at 15:16

## 2022-01-01 RX ADMIN — PIPERACILLIN AND TAZOBACTAM 4.5 G: 36; 4.5 INJECTION, POWDER, FOR SOLUTION INTRAVENOUS at 17:08

## 2022-01-01 RX ADMIN — Medication 20000 ML: at 06:13

## 2022-01-01 RX ADMIN — CALCIUM GLUCONATE 2 G: 20 INJECTION, SOLUTION INTRAVENOUS at 07:22

## 2022-01-01 RX ADMIN — INSULIN GLARGINE 5 UNITS: 100 INJECTION, SOLUTION SUBCUTANEOUS at 21:16

## 2022-01-01 RX ADMIN — INSULIN LISPRO 5 UNITS: 100 INJECTION, SOLUTION INTRAVENOUS; SUBCUTANEOUS at 08:08

## 2022-01-01 RX ADMIN — IPRATROPIUM BROMIDE 0.5 MG: 0.5 SOLUTION RESPIRATORY (INHALATION) at 13:06

## 2022-01-01 RX ADMIN — LIDOCAINE HYDROCHLORIDE 50 MG: 10 INJECTION, SOLUTION EPIDURAL; INFILTRATION; INTRACAUDAL; PERINEURAL at 08:03

## 2022-01-01 RX ADMIN — IPRATROPIUM BROMIDE 0.5 MG: 0.5 SOLUTION RESPIRATORY (INHALATION) at 08:40

## 2022-01-01 RX ADMIN — CALCIUM GLUCONATE 2 G: 20 INJECTION, SOLUTION INTRAVENOUS at 04:56

## 2022-01-01 RX ADMIN — Medication 1.1 MCG/KG/HR: at 17:04

## 2022-01-01 RX ADMIN — DEXTROSE MONOHYDRATE 25 ML: 25 INJECTION, SOLUTION INTRAVENOUS at 05:33

## 2022-01-01 RX ADMIN — SODIUM CHLORIDE SOLN NEBU 3% 4 ML: 3 NEBU SOLN at 20:08

## 2022-01-01 RX ADMIN — POTASSIUM CHLORIDE: 14.9 INJECTION, SOLUTION INTRAVENOUS at 10:47

## 2022-01-01 RX ADMIN — FENTANYL CITRATE 50 MCG: 50 INJECTION INTRAMUSCULAR; INTRAVENOUS at 05:55

## 2022-01-01 RX ADMIN — PIPERACILLIN AND TAZOBACTAM 3.38 G: 36; 4.5 INJECTION, POWDER, FOR SOLUTION INTRAVENOUS at 03:31

## 2022-01-01 RX ADMIN — PIPERACILLIN AND TAZOBACTAM 3.38 G: 36; 4.5 INJECTION, POWDER, FOR SOLUTION INTRAVENOUS at 05:05

## 2022-01-01 RX ADMIN — ALBUMIN (HUMAN) 12.5 G: 12.5 INJECTION, SOLUTION INTRAVENOUS at 11:41

## 2022-01-01 RX ADMIN — Medication 20000 ML: at 09:57

## 2022-01-01 RX ADMIN — CALCIUM GLUCONATE 1 G: 20 INJECTION, SOLUTION INTRAVENOUS at 02:13

## 2022-01-01 RX ADMIN — Medication 20000 ML: at 22:23

## 2022-01-01 RX ADMIN — MIDAZOLAM 1 MG: 1 INJECTION INTRAMUSCULAR; INTRAVENOUS at 16:01

## 2022-01-01 RX ADMIN — CHLORHEXIDINE GLUCONATE 15 ML: 1.2 SOLUTION ORAL at 20:35

## 2022-01-01 RX ADMIN — LEVALBUTEROL HYDROCHLORIDE 1.25 MG: 1.25 SOLUTION, CONCENTRATE RESPIRATORY (INHALATION) at 13:46

## 2022-01-01 RX ADMIN — CHLORHEXIDINE GLUCONATE 15 ML: 1.2 SOLUTION ORAL at 21:29

## 2022-01-01 RX ADMIN — Medication 20000 ML: at 08:33

## 2022-01-01 RX ADMIN — SODIUM CHLORIDE, SODIUM GLUCONATE, SODIUM ACETATE, POTASSIUM CHLORIDE, MAGNESIUM CHLORIDE, SODIUM PHOSPHATE, DIBASIC, AND POTASSIUM PHOSPHATE 125 ML/HR: .53; .5; .37; .037; .03; .012; .00082 INJECTION, SOLUTION INTRAVENOUS at 10:38

## 2022-01-01 RX ADMIN — CALCIUM CHLORIDE 0.5 G: 100 INJECTION INTRAVENOUS; INTRAVENTRICULAR at 19:39

## 2022-01-01 RX ADMIN — HYDROMORPHONE HYDROCHLORIDE 0.5 MG: 1 INJECTION, SOLUTION INTRAMUSCULAR; INTRAVENOUS; SUBCUTANEOUS at 07:27

## 2022-01-01 RX ADMIN — CISATRACURIUM BESYLATE 0.5 MCG/KG/MIN: 200 INJECTION INTRAVENOUS at 13:14

## 2022-01-01 RX ADMIN — FENTANYL CITRATE 50 MCG: 50 INJECTION INTRAMUSCULAR; INTRAVENOUS at 14:31

## 2022-01-01 RX ADMIN — VASOPRESSIN 0.04 UNITS/MIN: 20 INJECTION INTRAVENOUS at 23:42

## 2022-01-01 RX ADMIN — HYDROMORPHONE HYDROCHLORIDE 0.5 MG: 1 INJECTION, SOLUTION INTRAMUSCULAR; INTRAVENOUS; SUBCUTANEOUS at 19:53

## 2022-01-01 RX ADMIN — POLYETHYLENE GLYCOL 3350 17 G: 17 POWDER, FOR SOLUTION ORAL at 08:42

## 2022-01-01 RX ADMIN — NOREPINEPHRINE BITARTRATE 4 MCG/MIN: 1 INJECTION, SOLUTION, CONCENTRATE INTRAVENOUS at 17:44

## 2022-01-01 RX ADMIN — PROPOFOL 20 MCG/KG/MIN: 10 INJECTION, EMULSION INTRAVENOUS at 20:08

## 2022-01-01 RX ADMIN — CHLORHEXIDINE GLUCONATE 15 ML: 1.2 SOLUTION ORAL at 21:15

## 2022-01-01 RX ADMIN — SODIUM CHLORIDE, SODIUM GLUCONATE, SODIUM ACETATE, POTASSIUM CHLORIDE, MAGNESIUM CHLORIDE, SODIUM PHOSPHATE, DIBASIC, AND POTASSIUM PHOSPHATE 500 ML: .53; .5; .37; .037; .03; .012; .00082 INJECTION, SOLUTION INTRAVENOUS at 22:13

## 2022-01-01 RX ADMIN — LEVALBUTEROL HYDROCHLORIDE 1.25 MG: 1.25 SOLUTION, CONCENTRATE RESPIRATORY (INHALATION) at 07:52

## 2022-01-01 RX ADMIN — ACETYLCYSTEINE 600 MG: 200 SOLUTION ORAL; RESPIRATORY (INHALATION) at 08:51

## 2022-01-01 RX ADMIN — PANTOPRAZOLE SODIUM 40 MG: 40 INJECTION, POWDER, FOR SOLUTION INTRAVENOUS at 20:42

## 2022-01-01 RX ADMIN — FUROSEMIDE 20 MG: 20 TABLET ORAL at 08:00

## 2022-01-01 RX ADMIN — HYDROCORTISONE SODIUM SUCCINATE 50 MG: 100 INJECTION, POWDER, FOR SOLUTION INTRAMUSCULAR; INTRAVENOUS at 05:16

## 2022-01-01 RX ADMIN — DEXMEDETOMIDINE HYDROCHLORIDE 0.9 MCG/KG/HR: 400 INJECTION INTRAVENOUS at 03:51

## 2022-01-01 RX ADMIN — FENTANYL CITRATE 100 MCG/HR: 50 INJECTION INTRAMUSCULAR; INTRAVENOUS at 00:28

## 2022-01-01 RX ADMIN — MAGNESIUM SULFATE HEPTAHYDRATE 1 G: 1 INJECTION, SOLUTION INTRAVENOUS at 20:37

## 2022-01-01 RX ADMIN — ROPIVACAINE HYDROCHLORIDE 3 ML/HR: 2 INJECTION, SOLUTION EPIDURAL; INFILTRATION at 11:46

## 2022-01-01 RX ADMIN — MICAFUNGIN SODIUM 100 MG: 50 INJECTION, POWDER, LYOPHILIZED, FOR SOLUTION INTRAVENOUS at 15:48

## 2022-01-01 RX ADMIN — PIPERACILLIN AND TAZOBACTAM 3.38 G: 36; 4.5 INJECTION, POWDER, FOR SOLUTION INTRAVENOUS at 12:23

## 2022-01-01 RX ADMIN — Medication 1.2 MCG/KG/HR: at 04:21

## 2022-01-01 RX ADMIN — FENTANYL CITRATE 25 MCG: 50 INJECTION INTRAMUSCULAR; INTRAVENOUS at 23:10

## 2022-01-01 RX ADMIN — CALCIUM GLUCONATE 3 G: 98 INJECTION, SOLUTION INTRAVENOUS at 21:06

## 2022-01-01 RX ADMIN — LEVALBUTEROL HYDROCHLORIDE 1.25 MG: 1.25 SOLUTION, CONCENTRATE RESPIRATORY (INHALATION) at 19:00

## 2022-01-01 RX ADMIN — ACETAMINOPHEN 975 MG: 650 SUSPENSION ORAL at 22:08

## 2022-01-01 RX ADMIN — CALCIUM GLUCONATE 1 G: 20 INJECTION, SOLUTION INTRAVENOUS at 06:20

## 2022-01-01 RX ADMIN — FENTANYL CITRATE 75 MCG/HR: 50 INJECTION INTRAMUSCULAR; INTRAVENOUS at 08:44

## 2022-01-01 RX ADMIN — ETOMIDATE 30 MG: 20 INJECTION, SOLUTION INTRAVENOUS at 20:30

## 2022-01-01 RX ADMIN — PIPERACILLIN AND TAZOBACTAM 3.38 G: 36; 4.5 INJECTION, POWDER, FOR SOLUTION INTRAVENOUS at 12:40

## 2022-01-01 RX ADMIN — FENTANYL CITRATE 50 MCG: 50 INJECTION INTRAMUSCULAR; INTRAVENOUS at 12:15

## 2022-01-01 RX ADMIN — Medication 20000 ML: at 05:23

## 2022-01-01 RX ADMIN — PANTOPRAZOLE SODIUM 40 MG: 40 INJECTION, POWDER, FOR SOLUTION INTRAVENOUS at 20:18

## 2022-01-01 RX ADMIN — SODIUM CHLORIDE, SODIUM LACTATE, POTASSIUM CHLORIDE, AND CALCIUM CHLORIDE: .6; .31; .03; .02 INJECTION, SOLUTION INTRAVENOUS at 07:22

## 2022-01-01 RX ADMIN — PHENYLEPHRINE HYDROCHLORIDE 50 MCG/MIN: 10 INJECTION INTRAVENOUS at 08:26

## 2022-01-01 RX ADMIN — LEVALBUTEROL HYDROCHLORIDE 1.25 MG: 1.25 SOLUTION, CONCENTRATE RESPIRATORY (INHALATION) at 13:31

## 2022-01-01 RX ADMIN — PANTOPRAZOLE SODIUM 40 MG: 40 INJECTION, POWDER, FOR SOLUTION INTRAVENOUS at 08:40

## 2022-01-01 RX ADMIN — HYDROMORPHONE HYDROCHLORIDE 0.5 MG: 1 INJECTION, SOLUTION INTRAMUSCULAR; INTRAVENOUS; SUBCUTANEOUS at 22:47

## 2022-01-01 RX ADMIN — PANTOPRAZOLE SODIUM 40 MG: 40 INJECTION, POWDER, FOR SOLUTION INTRAVENOUS at 09:13

## 2022-01-01 RX ADMIN — AMIODARONE HYDROCHLORIDE 1 MG/MIN: 50 INJECTION, SOLUTION INTRAVENOUS at 12:23

## 2022-01-01 RX ADMIN — NOREPINEPHRINE BITARTRATE 30 MCG/MIN: 1 INJECTION, SOLUTION, CONCENTRATE INTRAVENOUS at 02:50

## 2022-01-01 RX ADMIN — HEPARIN SODIUM 5000 UNITS: 5000 INJECTION INTRAVENOUS; SUBCUTANEOUS at 05:05

## 2022-01-01 RX ADMIN — FENTANYL CITRATE 50 MCG: 50 INJECTION INTRAMUSCULAR; INTRAVENOUS at 22:24

## 2022-01-01 RX ADMIN — ACETYLCYSTEINE 600 MG: 200 SOLUTION ORAL; RESPIRATORY (INHALATION) at 00:37

## 2022-01-01 RX ADMIN — NOREPINEPHRINE BITARTRATE 2 MCG/MIN: 1 INJECTION, SOLUTION, CONCENTRATE INTRAVENOUS at 13:33

## 2022-01-01 RX ADMIN — CISATRACURIUM BESYLATE 0.5 MCG/KG/MIN: 200 INJECTION INTRAVENOUS at 20:05

## 2022-01-01 RX ADMIN — Medication 20000 ML: at 06:47

## 2022-01-01 RX ADMIN — Medication 0.6 MCG/KG/HR: at 19:50

## 2022-01-01 RX ADMIN — MAGNESIUM SULFATE IN DEXTROSE 1 G: 10 INJECTION, SOLUTION INTRAVENOUS at 20:51

## 2022-01-01 RX ADMIN — ONDANSETRON 4 MG: 2 INJECTION INTRAMUSCULAR; INTRAVENOUS at 19:54

## 2022-01-01 RX ADMIN — ACETAMINOPHEN 975 MG: 650 SUSPENSION ORAL at 22:56

## 2022-01-01 RX ADMIN — DEXMEDETOMIDINE HYDROCHLORIDE 0.5 MCG/KG/HR: 400 INJECTION INTRAVENOUS at 18:34

## 2022-01-01 RX ADMIN — HYDROCORTISONE SODIUM SUCCINATE 50 MG: 100 INJECTION, POWDER, FOR SOLUTION INTRAMUSCULAR; INTRAVENOUS at 17:46

## 2022-01-01 RX ADMIN — PANTOPRAZOLE SODIUM 40 MG: 40 INJECTION, POWDER, FOR SOLUTION INTRAVENOUS at 09:17

## 2022-01-01 RX ADMIN — LEVALBUTEROL HYDROCHLORIDE 1.25 MG: 1.25 SOLUTION, CONCENTRATE RESPIRATORY (INHALATION) at 19:07

## 2022-01-01 RX ADMIN — VASOPRESSIN 0.04 UNITS/MIN: 20 INJECTION INTRAVENOUS at 12:46

## 2022-01-01 RX ADMIN — OXYCODONE HYDROCHLORIDE 10 MG: 10 TABLET ORAL at 07:24

## 2022-01-01 RX ADMIN — ASCORBIC ACID: 500 INJECTION INTRAVENOUS at 21:10

## 2022-01-01 RX ADMIN — HYDROMORPHONE HYDROCHLORIDE 0.5 MG: 1 INJECTION, SOLUTION INTRAMUSCULAR; INTRAVENOUS; SUBCUTANEOUS at 06:05

## 2022-01-01 RX ADMIN — SODIUM CHLORIDE SOLN NEBU 3% 4 ML: 3 NEBU SOLN at 13:46

## 2022-01-01 RX ADMIN — IPRATROPIUM BROMIDE 0.5 MG: 0.5 SOLUTION RESPIRATORY (INHALATION) at 13:34

## 2022-01-01 RX ADMIN — ACETAMINOPHEN 975 MG: 650 SUSPENSION ORAL at 06:06

## 2022-01-01 RX ADMIN — IPRATROPIUM BROMIDE 0.5 MG: 0.5 SOLUTION RESPIRATORY (INHALATION) at 00:12

## 2022-01-01 RX ADMIN — LEVALBUTEROL HYDROCHLORIDE 1.25 MG: 1.25 SOLUTION, CONCENTRATE RESPIRATORY (INHALATION) at 02:04

## 2022-01-01 RX ADMIN — NOREPINEPHRINE BITARTRATE: 1 INJECTION, SOLUTION, CONCENTRATE INTRAVENOUS at 20:36

## 2022-01-01 RX ADMIN — ROCURONIUM BROMIDE 40 MG: 50 INJECTION INTRAVENOUS at 08:18

## 2022-01-01 RX ADMIN — LEVOTHYROXINE SODIUM 25 MCG: 100 TABLET ORAL at 05:16

## 2022-01-01 RX ADMIN — Medication 144 MG: at 20:26

## 2022-01-01 RX ADMIN — HYDROCORTISONE SODIUM SUCCINATE 100 MG: 100 INJECTION, POWDER, FOR SOLUTION INTRAMUSCULAR; INTRAVENOUS at 23:58

## 2022-01-01 RX ADMIN — PIPERACILLIN AND TAZOBACTAM 3.38 G: 36; 4.5 INJECTION, POWDER, FOR SOLUTION INTRAVENOUS at 04:23

## 2022-01-01 RX ADMIN — FENTANYL CITRATE 50 MCG: 50 INJECTION INTRAMUSCULAR; INTRAVENOUS at 18:05

## 2022-01-01 RX ADMIN — VASOPRESSIN 0.04 UNITS/MIN: 20 INJECTION INTRAVENOUS at 11:07

## 2022-01-01 RX ADMIN — SODIUM PHOSPHATE, MONOBASIC, MONOHYDRATE 6 MMOL: 276; 142 INJECTION, SOLUTION INTRAVENOUS at 20:18

## 2022-01-01 RX ADMIN — SODIUM CHLORIDE, SODIUM GLUCONATE, SODIUM ACETATE, POTASSIUM CHLORIDE, MAGNESIUM CHLORIDE, SODIUM PHOSPHATE, DIBASIC, AND POTASSIUM PHOSPHATE 125 ML/HR: .53; .5; .37; .037; .03; .012; .00082 INJECTION, SOLUTION INTRAVENOUS at 01:51

## 2022-01-01 RX ADMIN — INSULIN LISPRO 6 UNITS: 100 INJECTION, SOLUTION INTRAVENOUS; SUBCUTANEOUS at 06:08

## 2022-01-01 RX ADMIN — Medication 20000 ML: at 17:36

## 2022-01-01 RX ADMIN — MAGNESIUM SULFATE HEPTAHYDRATE 2 G: 40 INJECTION, SOLUTION INTRAVENOUS at 08:26

## 2022-01-01 RX ADMIN — ALBUMIN (HUMAN) 25 G: 12.5 INJECTION, SOLUTION INTRAVENOUS at 01:43

## 2022-01-01 RX ADMIN — SODIUM PHOSPHATE, MONOBASIC, MONOHYDRATE 6 MMOL: 276; 142 INJECTION, SOLUTION INTRAVENOUS at 13:53

## 2022-01-01 RX ADMIN — POTASSIUM CHLORIDE 40 MEQ: 29.8 INJECTION, SOLUTION INTRAVENOUS at 02:01

## 2022-01-01 RX ADMIN — METOROPROLOL TARTRATE 5 MG: 5 INJECTION, SOLUTION INTRAVENOUS at 01:47

## 2022-01-01 RX ADMIN — VASOPRESSIN 0.04 UNITS/MIN: 20 INJECTION INTRAVENOUS at 15:15

## 2022-01-01 RX ADMIN — OXYCODONE HYDROCHLORIDE 10 MG: 10 TABLET ORAL at 12:38

## 2022-01-01 RX ADMIN — BISACODYL 10 MG: 10 SUPPOSITORY RECTAL at 08:25

## 2022-01-01 RX ADMIN — CALCIUM CHLORIDE 0.5 G: 100 INJECTION INTRAVENOUS; INTRAVENTRICULAR at 19:58

## 2022-01-01 RX ADMIN — PANTOPRAZOLE SODIUM 40 MG: 40 INJECTION, POWDER, FOR SOLUTION INTRAVENOUS at 08:56

## 2022-01-01 RX ADMIN — METOROPROLOL TARTRATE 5 MG: 5 INJECTION, SOLUTION INTRAVENOUS at 06:21

## 2022-01-01 RX ADMIN — LEVALBUTEROL HYDROCHLORIDE 1.25 MG: 1.25 SOLUTION, CONCENTRATE RESPIRATORY (INHALATION) at 02:21

## 2022-01-01 RX ADMIN — Medication 1 MG/HR: at 10:21

## 2022-01-01 RX ADMIN — SODIUM PHOSPHATE, MONOBASIC, MONOHYDRATE 9 MMOL: 276; 142 INJECTION, SOLUTION INTRAVENOUS at 01:40

## 2022-01-01 RX ADMIN — ACETAMINOPHEN 975 MG: 325 TABLET ORAL at 05:43

## 2022-01-01 RX ADMIN — FENTANYL CITRATE 100 MCG/HR: 50 INJECTION INTRAMUSCULAR; INTRAVENOUS at 13:02

## 2022-01-01 RX ADMIN — EPINEPHRINE 0.1 MG: 1 INJECTION, SOLUTION, CONCENTRATE INTRAVENOUS at 11:47

## 2022-01-01 RX ADMIN — POLYETHYLENE GLYCOL 3350 17 G: 17 POWDER, FOR SOLUTION ORAL at 08:25

## 2022-01-01 RX ADMIN — IPRATROPIUM BROMIDE 0.5 MG: 0.5 SOLUTION RESPIRATORY (INHALATION) at 13:31

## 2022-01-01 RX ADMIN — MICAFUNGIN SODIUM 100 MG: 50 INJECTION, POWDER, LYOPHILIZED, FOR SOLUTION INTRAVENOUS at 17:33

## 2022-01-01 RX ADMIN — ACETAMINOPHEN 975 MG: 325 TABLET ORAL at 05:38

## 2022-01-01 RX ADMIN — PANTOPRAZOLE SODIUM 40 MG: 40 INJECTION, POWDER, FOR SOLUTION INTRAVENOUS at 08:08

## 2022-01-01 RX ADMIN — SODIUM CHLORIDE SOLN NEBU 3% 4 ML: 3 NEBU SOLN at 19:02

## 2022-01-01 RX ADMIN — IPRATROPIUM BROMIDE 0.5 MG: 0.5 SOLUTION RESPIRATORY (INHALATION) at 01:34

## 2022-01-01 RX ADMIN — Medication 1.2 MCG/KG/HR: at 15:10

## 2022-01-01 RX ADMIN — MIDAZOLAM 2 MG: 1 INJECTION INTRAMUSCULAR; INTRAVENOUS at 07:33

## 2022-01-01 RX ADMIN — DEXMEDETOMIDINE HYDROCHLORIDE 0.9 MCG/KG/HR: 400 INJECTION INTRAVENOUS at 00:42

## 2022-01-01 RX ADMIN — VASOPRESSIN 0.04 UNITS/MIN: 20 INJECTION INTRAVENOUS at 08:22

## 2022-01-01 RX ADMIN — PIPERACILLIN AND TAZOBACTAM 3.38 G: 36; 4.5 INJECTION, POWDER, FOR SOLUTION INTRAVENOUS at 20:21

## 2022-01-01 RX ADMIN — PIPERACILLIN AND TAZOBACTAM 3.38 G: 36; 4.5 INJECTION, POWDER, FOR SOLUTION INTRAVENOUS at 13:21

## 2022-01-01 RX ADMIN — BISACODYL 10 MG: 10 SUPPOSITORY RECTAL at 08:57

## 2022-01-01 RX ADMIN — PIPERACILLIN AND TAZOBACTAM 4.5 G: 36; 4.5 INJECTION, POWDER, FOR SOLUTION INTRAVENOUS at 01:09

## 2022-01-01 RX ADMIN — POTASSIUM CHLORIDE 40 MEQ: 29.8 INJECTION, SOLUTION INTRAVENOUS at 19:12

## 2022-01-01 RX ADMIN — FENTANYL CITRATE 100 MCG/HR: 50 INJECTION INTRAMUSCULAR; INTRAVENOUS at 22:24

## 2022-01-01 RX ADMIN — SODIUM CHLORIDE 3 G: 9 INJECTION, SOLUTION INTRAVENOUS at 12:05

## 2022-01-01 RX ADMIN — SODIUM CHLORIDE SOLN NEBU 3% 4 ML: 3 NEBU SOLN at 13:25

## 2022-01-01 RX ADMIN — ACETAMINOPHEN 975 MG: 325 TABLET ORAL at 18:09

## 2022-01-01 RX ADMIN — Medication 0.6 MCG/KG/HR: at 00:25

## 2022-01-01 RX ADMIN — EPOPROSTENOL 50 NG/KG/MIN: 1.5 INJECTION, POWDER, LYOPHILIZED, FOR SOLUTION INTRAVENOUS at 09:26

## 2022-01-01 RX ADMIN — NOREPINEPHRINE BITARTRATE 8 MCG/MIN: 1 INJECTION, SOLUTION, CONCENTRATE INTRAVENOUS at 08:52

## 2022-01-01 RX ADMIN — Medication 1.2 MCG/KG/HR: at 00:56

## 2022-01-01 RX ADMIN — PIPERACILLIN AND TAZOBACTAM 3.38 G: 36; 4.5 INJECTION, POWDER, FOR SOLUTION INTRAVENOUS at 21:15

## 2022-01-01 RX ADMIN — Medication 2 MG/HR: at 14:57

## 2022-01-01 RX ADMIN — Medication 20000 ML: at 00:16

## 2022-01-01 RX ADMIN — PIPERACILLIN AND TAZOBACTAM 3.38 G: 36; 4.5 INJECTION, POWDER, FOR SOLUTION INTRAVENOUS at 12:28

## 2022-01-01 RX ADMIN — ACETAMINOPHEN 975 MG: 325 TABLET ORAL at 00:01

## 2022-01-01 RX ADMIN — SODIUM PHOSPHATE, MONOBASIC, MONOHYDRATE 6 MMOL: 276; 142 INJECTION, SOLUTION INTRAVENOUS at 19:47

## 2022-01-01 RX ADMIN — ASCORBIC ACID: 500 INJECTION INTRAVENOUS at 21:19

## 2022-01-01 RX ADMIN — HYDROMORPHONE HYDROCHLORIDE 0.2 MG: 0.2 INJECTION, SOLUTION INTRAMUSCULAR; INTRAVENOUS; SUBCUTANEOUS at 13:33

## 2022-01-01 RX ADMIN — SODIUM CHLORIDE SOLN NEBU 3% 4 ML: 3 NEBU SOLN at 08:51

## 2022-01-01 RX ADMIN — HEPARIN SODIUM 5000 UNITS: 5000 INJECTION INTRAVENOUS; SUBCUTANEOUS at 22:32

## 2022-01-01 RX ADMIN — HEPARIN SODIUM 5000 UNITS: 5000 INJECTION INTRAVENOUS; SUBCUTANEOUS at 05:43

## 2022-01-01 RX ADMIN — ALBUMIN, HUMAN INJ 5% 12.5 G: 5 SOLUTION at 11:41

## 2022-01-01 RX ADMIN — VASOPRESSIN 0.04 UNITS/MIN: 20 INJECTION INTRAVENOUS at 15:25

## 2022-01-01 RX ADMIN — VASOPRESSIN 0.04 UNITS/MIN: 20 INJECTION INTRAVENOUS at 01:32

## 2022-01-01 RX ADMIN — PROPOFOL 15 MCG/KG/MIN: 10 INJECTION, EMULSION INTRAVENOUS at 10:34

## 2022-01-01 RX ADMIN — HYDROCORTISONE SODIUM SUCCINATE 50 MG: 100 INJECTION, POWDER, FOR SOLUTION INTRAMUSCULAR; INTRAVENOUS at 23:07

## 2022-01-01 RX ADMIN — SODIUM CHLORIDE SOLN NEBU 3% 4 ML: 3 NEBU SOLN at 07:52

## 2022-01-01 RX ADMIN — PANTOPRAZOLE SODIUM 40 MG: 40 INJECTION, POWDER, FOR SOLUTION INTRAVENOUS at 08:21

## 2022-01-01 RX ADMIN — INSULIN HUMAN 10 UNITS: 100 INJECTION, SOLUTION PARENTERAL at 19:59

## 2022-01-01 RX ADMIN — NOREPINEPHRINE BITARTRATE 4 MCG/MIN: 1 INJECTION, SOLUTION, CONCENTRATE INTRAVENOUS at 22:32

## 2022-01-01 RX ADMIN — BISACODYL 10 MG: 10 SUPPOSITORY RECTAL at 09:13

## 2022-01-01 RX ADMIN — PIPERACILLIN AND TAZOBACTAM 3.38 G: 36; 4.5 INJECTION, POWDER, FOR SOLUTION INTRAVENOUS at 04:06

## 2022-01-01 RX ADMIN — SODIUM PHOSPHATE, MONOBASIC, MONOHYDRATE 9 MMOL: 276; 142 INJECTION, SOLUTION INTRAVENOUS at 15:15

## 2022-01-01 RX ADMIN — MICAFUNGIN SODIUM 100 MG: 50 INJECTION, POWDER, LYOPHILIZED, FOR SOLUTION INTRAVENOUS at 16:27

## 2022-01-01 RX ADMIN — PIPERACILLIN AND TAZOBACTAM 4.5 G: 36; 4.5 INJECTION, POWDER, FOR SOLUTION INTRAVENOUS at 09:47

## 2022-01-01 RX ADMIN — SODIUM CHLORIDE 3 UNITS/HR: 9 INJECTION, SOLUTION INTRAVENOUS at 18:11

## 2022-01-01 RX ADMIN — HEPARIN SODIUM 5000 UNITS: 5000 INJECTION INTRAVENOUS; SUBCUTANEOUS at 21:33

## 2022-01-01 RX ADMIN — CHLORHEXIDINE GLUCONATE 15 ML: 1.2 SOLUTION ORAL at 08:57

## 2022-01-01 RX ADMIN — SENNOSIDES AND DOCUSATE SODIUM 1 TABLET: 50; 8.6 TABLET ORAL at 09:00

## 2022-01-01 RX ADMIN — Medication 0.8 MCG/KG/HR: at 23:21

## 2022-01-01 RX ADMIN — FENTANYL CITRATE 50 MCG: 50 INJECTION INTRAMUSCULAR; INTRAVENOUS at 23:09

## 2022-01-01 RX ADMIN — INSULIN GLARGINE 15 UNITS: 100 INJECTION, SOLUTION SUBCUTANEOUS at 22:32

## 2022-01-01 RX ADMIN — ACETAMINOPHEN 975 MG: 325 TABLET ORAL at 06:38

## 2022-01-01 RX ADMIN — PANTOPRAZOLE SODIUM 40 MG: 40 INJECTION, POWDER, FOR SOLUTION INTRAVENOUS at 21:22

## 2022-01-01 RX ADMIN — INSULIN HUMAN 10 UNITS: 100 INJECTION, SOLUTION PARENTERAL at 10:03

## 2022-01-01 RX ADMIN — HYDROMORPHONE HYDROCHLORIDE 0.5 MG: 1 INJECTION, SOLUTION INTRAMUSCULAR; INTRAVENOUS; SUBCUTANEOUS at 05:41

## 2022-01-01 RX ADMIN — NOREPINEPHRINE BITARTRATE 2 MCG/MIN: 1 INJECTION, SOLUTION, CONCENTRATE INTRAVENOUS at 06:38

## 2022-01-01 RX ADMIN — HYDROCORTISONE SODIUM SUCCINATE 50 MG: 100 INJECTION, POWDER, FOR SOLUTION INTRAMUSCULAR; INTRAVENOUS at 12:52

## 2022-01-01 RX ADMIN — Medication 20000 ML: at 11:20

## 2022-01-01 RX ADMIN — IPRATROPIUM BROMIDE 0.5 MG: 0.5 SOLUTION RESPIRATORY (INHALATION) at 02:44

## 2022-01-01 RX ADMIN — FENTANYL CITRATE 50 MCG: 50 INJECTION INTRAMUSCULAR; INTRAVENOUS at 20:20

## 2022-01-01 RX ADMIN — NOREPINEPHRINE BITARTRATE 5 MCG/MIN: 1 INJECTION, SOLUTION, CONCENTRATE INTRAVENOUS at 10:25

## 2022-01-01 RX ADMIN — ALBUMIN (HUMAN) 12.5 G: 12.5 INJECTION, SOLUTION INTRAVENOUS at 16:58

## 2022-01-01 RX ADMIN — NOREPINEPHRINE BITARTRATE 25 MCG/MIN: 1 INJECTION, SOLUTION, CONCENTRATE INTRAVENOUS at 07:27

## 2022-01-01 RX ADMIN — Medication 1.1 MCG/KG/HR: at 14:12

## 2022-01-01 RX ADMIN — HYDROMORPHONE HYDROCHLORIDE 0.5 MG: 1 INJECTION, SOLUTION INTRAMUSCULAR; INTRAVENOUS; SUBCUTANEOUS at 14:40

## 2022-01-01 RX ADMIN — ACETAMINOPHEN 975 MG: 325 TABLET ORAL at 11:17

## 2022-01-01 RX ADMIN — SODIUM PHOSPHATE, MONOBASIC, MONOHYDRATE 6 MMOL: 276; 142 INJECTION, SOLUTION INTRAVENOUS at 22:11

## 2022-01-01 RX ADMIN — LACTULOSE 200 G: 10 SOLUTION ORAL; RECTAL at 12:48

## 2022-01-01 RX ADMIN — IPRATROPIUM BROMIDE 0.5 MG: 0.5 SOLUTION RESPIRATORY (INHALATION) at 14:09

## 2022-01-01 RX ADMIN — INSULIN LISPRO 1 UNITS: 100 INJECTION, SOLUTION INTRAVENOUS; SUBCUTANEOUS at 23:48

## 2022-01-01 RX ADMIN — FENTANYL CITRATE 50 MCG: 50 INJECTION INTRAMUSCULAR; INTRAVENOUS at 16:01

## 2022-01-01 RX ADMIN — ALBUMIN (HUMAN) 12.5 G: 0.25 INJECTION, SOLUTION INTRAVENOUS at 15:04

## 2022-01-01 RX ADMIN — FENTANYL CITRATE 100 MCG/HR: 50 INJECTION INTRAMUSCULAR; INTRAVENOUS at 00:19

## 2022-01-01 RX ADMIN — MICAFUNGIN SODIUM 100 MG: 50 INJECTION, POWDER, LYOPHILIZED, FOR SOLUTION INTRAVENOUS at 17:17

## 2022-01-01 RX ADMIN — FENTANYL CITRATE 50 MCG: 50 INJECTION INTRAMUSCULAR; INTRAVENOUS at 14:02

## 2022-01-01 RX ADMIN — PIPERACILLIN AND TAZOBACTAM 4.5 G: 36; 4.5 INJECTION, POWDER, FOR SOLUTION INTRAVENOUS at 13:12

## 2022-01-01 RX ADMIN — LEVALBUTEROL HYDROCHLORIDE 1.25 MG: 1.25 SOLUTION, CONCENTRATE RESPIRATORY (INHALATION) at 14:10

## 2022-01-01 RX ADMIN — MAGNESIUM SULFATE IN DEXTROSE 1 G: 10 INJECTION, SOLUTION INTRAVENOUS at 08:15

## 2022-01-01 RX ADMIN — VASOPRESSIN 0.04 UNITS/MIN: 20 INJECTION INTRAVENOUS at 06:35

## 2022-01-01 RX ADMIN — PROPOFOL 25 MCG/KG/MIN: 10 INJECTION, EMULSION INTRAVENOUS at 01:39

## 2022-01-01 RX ADMIN — ALBUMIN (HUMAN) 25 G: 0.25 INJECTION, SOLUTION INTRAVENOUS at 22:58

## 2022-01-01 RX ADMIN — CALCIUM GLUCONATE 1 G: 20 INJECTION, SOLUTION INTRAVENOUS at 19:19

## 2022-01-01 RX ADMIN — ALBUMIN (HUMAN): 12.5 INJECTION, SOLUTION INTRAVENOUS at 17:07

## 2022-01-01 RX ADMIN — VASOPRESSIN 0.04 UNITS/MIN: 20 INJECTION INTRAVENOUS at 22:29

## 2022-01-01 RX ADMIN — IPRATROPIUM BROMIDE 0.5 MG: 0.5 SOLUTION RESPIRATORY (INHALATION) at 19:02

## 2022-01-01 RX ADMIN — BISACODYL 10 MG: 10 SUPPOSITORY RECTAL at 08:21

## 2022-01-01 RX ADMIN — CALCIUM GLUCONATE 1 G: 20 INJECTION, SOLUTION INTRAVENOUS at 14:37

## 2022-01-01 RX ADMIN — FUROSEMIDE 20 MG: 10 INJECTION, SOLUTION INTRAVENOUS at 07:39

## 2022-01-01 RX ADMIN — FENTANYL CITRATE 50 MCG: 50 INJECTION INTRAMUSCULAR; INTRAVENOUS at 04:42

## 2022-01-01 RX ADMIN — ACETAMINOPHEN 975 MG: 325 TABLET ORAL at 11:50

## 2022-01-01 RX ADMIN — SODIUM CHLORIDE, SODIUM LACTATE, POTASSIUM CHLORIDE, AND CALCIUM CHLORIDE 2500 ML: .6; .31; .03; .02 INJECTION, SOLUTION INTRAVENOUS at 01:53

## 2022-01-01 RX ADMIN — SODIUM CHLORIDE, SODIUM GLUCONATE, SODIUM ACETATE, POTASSIUM CHLORIDE, MAGNESIUM CHLORIDE, SODIUM PHOSPHATE, DIBASIC, AND POTASSIUM PHOSPHATE 125 ML/HR: .53; .5; .37; .037; .03; .012; .00082 INJECTION, SOLUTION INTRAVENOUS at 22:41

## 2022-01-01 RX ADMIN — INSULIN LISPRO 1 UNITS: 100 INJECTION, SOLUTION INTRAVENOUS; SUBCUTANEOUS at 08:02

## 2022-01-01 RX ADMIN — INSULIN LISPRO 3 UNITS: 100 INJECTION, SOLUTION INTRAVENOUS; SUBCUTANEOUS at 17:27

## 2022-01-01 RX ADMIN — Medication 0.9 MCG/KG/HR: at 05:10

## 2022-01-01 RX ADMIN — FUROSEMIDE 20 MG: 20 TABLET ORAL at 16:11

## 2022-01-01 RX ADMIN — SODIUM PHOSPHATE, MONOBASIC, MONOHYDRATE 6 MMOL: 276; 142 INJECTION, SOLUTION INTRAVENOUS at 21:18

## 2022-01-01 RX ADMIN — LEVALBUTEROL HYDROCHLORIDE 1.25 MG: 1.25 SOLUTION, CONCENTRATE RESPIRATORY (INHALATION) at 08:05

## 2022-01-01 RX ADMIN — ALBUMIN (HUMAN) 12.5 G: 0.25 INJECTION, SOLUTION INTRAVENOUS at 09:57

## 2022-01-01 RX ADMIN — PROPOFOL 10 MCG/KG/MIN: 10 INJECTION, EMULSION INTRAVENOUS at 10:42

## 2022-01-01 RX ADMIN — MICAFUNGIN SODIUM 100 MG: 50 INJECTION, POWDER, LYOPHILIZED, FOR SOLUTION INTRAVENOUS at 17:03

## 2022-01-01 RX ADMIN — ASCORBIC ACID: 500 INJECTION INTRAVENOUS at 21:18

## 2022-01-01 RX ADMIN — LEVALBUTEROL HYDROCHLORIDE 1.25 MG: 1.25 SOLUTION, CONCENTRATE RESPIRATORY (INHALATION) at 20:07

## 2022-01-01 RX ADMIN — FENTANYL CITRATE 50 MCG: 50 INJECTION INTRAMUSCULAR; INTRAVENOUS at 11:45

## 2022-01-01 RX ADMIN — ALBUMIN (HUMAN) 12.5 G: 12.5 INJECTION, SOLUTION INTRAVENOUS at 18:41

## 2022-01-01 RX ADMIN — HYDROCORTISONE SODIUM SUCCINATE 50 MG: 100 INJECTION, POWDER, FOR SOLUTION INTRAMUSCULAR; INTRAVENOUS at 23:58

## 2022-01-01 RX ADMIN — BISACODYL 10 MG: 10 SUPPOSITORY RECTAL at 07:59

## 2022-01-01 RX ADMIN — SODIUM CHLORIDE 0.5 UNITS/HR: 9 INJECTION, SOLUTION INTRAVENOUS at 22:29

## 2022-01-01 RX ADMIN — INSULIN GLARGINE 15 UNITS: 100 INJECTION, SOLUTION SUBCUTANEOUS at 22:36

## 2022-01-01 RX ADMIN — FENTANYL CITRATE 50 MCG: 50 INJECTION INTRAMUSCULAR; INTRAVENOUS at 05:22

## 2022-01-01 RX ADMIN — SODIUM BICARBONATE 100 MEQ: 84 INJECTION INTRAVENOUS at 00:53

## 2022-01-01 RX ADMIN — NOREPINEPHRINE BITARTRATE 17 MCG/MIN: 1 INJECTION, SOLUTION, CONCENTRATE INTRAVENOUS at 03:14

## 2022-01-01 RX ADMIN — ALBUMIN (HUMAN) 25 G: 12.5 INJECTION, SOLUTION INTRAVENOUS at 02:17

## 2022-01-01 RX ADMIN — Medication 3 MG: at 21:33

## 2022-01-01 RX ADMIN — ALBUMIN (HUMAN): 12.5 INJECTION, SOLUTION INTRAVENOUS at 09:13

## 2022-01-01 RX ADMIN — ACETAMINOPHEN 975 MG: 650 SUSPENSION ORAL at 21:22

## 2022-01-01 RX ADMIN — INSULIN LISPRO 3 UNITS: 100 INJECTION, SOLUTION INTRAVENOUS; SUBCUTANEOUS at 12:33

## 2022-01-01 RX ADMIN — QUETIAPINE FUMARATE 50 MG: 25 TABLET ORAL at 10:37

## 2022-01-01 RX ADMIN — ROCURONIUM BROMIDE 10 MG: 50 INJECTION INTRAVENOUS at 08:03

## 2022-01-01 RX ADMIN — CALCIUM GLUCONATE 1 G: 20 INJECTION, SOLUTION INTRAVENOUS at 19:15

## 2022-01-01 RX ADMIN — CALCIUM GLUCONATE 2 G: 20 INJECTION, SOLUTION INTRAVENOUS at 05:43

## 2022-01-01 RX ADMIN — CALCIUM GLUCONATE 1 G: 20 INJECTION, SOLUTION INTRAVENOUS at 00:18

## 2022-01-01 RX ADMIN — ASCORBIC ACID: 500 INJECTION INTRAVENOUS at 22:32

## 2022-01-01 RX ADMIN — VASOPRESSIN 0.04 UNITS/MIN: 20 INJECTION INTRAVENOUS at 19:43

## 2022-01-01 RX ADMIN — SODIUM CHLORIDE, SODIUM LACTATE, POTASSIUM CHLORIDE, AND CALCIUM CHLORIDE: .6; .31; .03; .02 INJECTION, SOLUTION INTRAVENOUS at 09:50

## 2022-01-01 RX ADMIN — GLYCOPYRROLATE 0.1 MG: 0.2 INJECTION, SOLUTION INTRAMUSCULAR; INTRAVENOUS at 22:46

## 2022-01-01 RX ADMIN — CALCIUM GLUCONATE 1 G: 20 INJECTION, SOLUTION INTRAVENOUS at 02:00

## 2022-01-01 RX ADMIN — NOREPINEPHRINE BITARTRATE 11 MCG/MIN: 1 INJECTION, SOLUTION, CONCENTRATE INTRAVENOUS at 00:48

## 2022-01-01 RX ADMIN — FENTANYL CITRATE 100 MCG/HR: 50 INJECTION INTRAMUSCULAR; INTRAVENOUS at 14:18

## 2022-01-01 RX ADMIN — SODIUM CHLORIDE 12 UNITS/HR: 9 INJECTION, SOLUTION INTRAVENOUS at 18:53

## 2022-01-01 RX ADMIN — PANTOPRAZOLE SODIUM 40 MG: 40 INJECTION, POWDER, FOR SOLUTION INTRAVENOUS at 08:20

## 2022-01-01 RX ADMIN — ALBUMIN, HUMAN INJ 5% 12.5 G: 5 SOLUTION at 12:59

## 2022-01-01 RX ADMIN — HYDROMORPHONE HYDROCHLORIDE 0.5 MG: 1 INJECTION, SOLUTION INTRAMUSCULAR; INTRAVENOUS; SUBCUTANEOUS at 01:56

## 2022-01-01 RX ADMIN — MAGNESIUM SULFATE HEPTAHYDRATE 1 G: 1 INJECTION, SOLUTION INTRAVENOUS at 14:28

## 2022-01-01 RX ADMIN — INSULIN LISPRO 20 UNITS: 100 INJECTION, SOLUTION INTRAVENOUS; SUBCUTANEOUS at 12:26

## 2022-01-01 RX ADMIN — IPRATROPIUM BROMIDE 0.5 MG: 0.5 SOLUTION RESPIRATORY (INHALATION) at 07:24

## 2022-01-01 RX ADMIN — PIPERACILLIN AND TAZOBACTAM 3.38 G: 36; 4.5 INJECTION, POWDER, FOR SOLUTION INTRAVENOUS at 20:10

## 2022-01-01 RX ADMIN — Medication 1.2 MCG/KG/HR: at 22:19

## 2022-01-01 RX ADMIN — ALBUMIN (HUMAN): 12.5 INJECTION, SOLUTION INTRAVENOUS at 10:59

## 2022-01-01 RX ADMIN — IPRATROPIUM BROMIDE 0.5 MG: 0.5 SOLUTION RESPIRATORY (INHALATION) at 01:57

## 2022-01-01 RX ADMIN — INSULIN GLARGINE 5 UNITS: 100 INJECTION, SOLUTION SUBCUTANEOUS at 21:43

## 2022-01-01 RX ADMIN — CEFAZOLIN 1000 MG: 1 INJECTION, POWDER, FOR SOLUTION INTRAMUSCULAR; INTRAVENOUS at 19:59

## 2022-01-01 RX ADMIN — HYDROCORTISONE SODIUM SUCCINATE 100 MG: 100 INJECTION, POWDER, FOR SOLUTION INTRAMUSCULAR; INTRAVENOUS at 02:34

## 2022-01-01 RX ADMIN — ACETAMINOPHEN 975 MG: 325 TABLET ORAL at 05:41

## 2022-01-01 RX ADMIN — LEVALBUTEROL HYDROCHLORIDE 1.25 MG: 1.25 SOLUTION, CONCENTRATE RESPIRATORY (INHALATION) at 01:57

## 2022-01-01 RX ADMIN — FENTANYL CITRATE 100 MCG: 50 INJECTION INTRAMUSCULAR; INTRAVENOUS at 08:03

## 2022-01-01 RX ADMIN — VASOPRESSIN 0.04 UNITS/MIN: 20 INJECTION INTRAVENOUS at 16:38

## 2022-01-01 RX ADMIN — INSULIN LISPRO 2 UNITS: 100 INJECTION, SOLUTION INTRAVENOUS; SUBCUTANEOUS at 12:16

## 2022-01-01 RX ADMIN — SODIUM PHOSPHATE, MONOBASIC, MONOHYDRATE 6 MMOL: 276; 142 INJECTION, SOLUTION INTRAVENOUS at 13:43

## 2022-01-01 RX ADMIN — IPRATROPIUM BROMIDE 0.5 MG: 0.5 SOLUTION RESPIRATORY (INHALATION) at 07:42

## 2022-01-01 RX ADMIN — LEVALBUTEROL HYDROCHLORIDE 1.25 MG: 1.25 SOLUTION, CONCENTRATE RESPIRATORY (INHALATION) at 15:16

## 2022-01-01 RX ADMIN — HYDROMORPHONE HYDROCHLORIDE 0.5 MG: 1 INJECTION, SOLUTION INTRAMUSCULAR; INTRAVENOUS; SUBCUTANEOUS at 01:33

## 2022-01-01 RX ADMIN — Medication 20000 ML: at 22:02

## 2022-01-01 RX ADMIN — CALCIUM CHLORIDE 0.5 G: 100 INJECTION INTRAVENOUS; INTRAVENTRICULAR at 17:04

## 2022-01-01 RX ADMIN — Medication 1.2 MCG/KG/HR: at 20:33

## 2022-01-01 RX ADMIN — CALCIUM GLUCONATE 1 G: 20 INJECTION, SOLUTION INTRAVENOUS at 13:07

## 2022-01-01 RX ADMIN — SODIUM PHOSPHATE, MONOBASIC, MONOHYDRATE 6 MMOL: 276; 142 INJECTION, SOLUTION INTRAVENOUS at 01:45

## 2022-01-01 RX ADMIN — FENTANYL CITRATE 100 MCG/HR: 50 INJECTION INTRAMUSCULAR; INTRAVENOUS at 07:07

## 2022-01-01 RX ADMIN — MIDAZOLAM 1 MG: 1 INJECTION INTRAMUSCULAR; INTRAVENOUS at 19:56

## 2022-01-01 RX ADMIN — HYDROMORPHONE HYDROCHLORIDE 0.2 MG: 0.2 INJECTION, SOLUTION INTRAMUSCULAR; INTRAVENOUS; SUBCUTANEOUS at 08:42

## 2022-01-01 RX ADMIN — LEVALBUTEROL HYDROCHLORIDE 1.25 MG: 1.25 SOLUTION, CONCENTRATE RESPIRATORY (INHALATION) at 13:06

## 2022-01-01 RX ADMIN — CHLORHEXIDINE GLUCONATE 15 ML: 1.2 SOLUTION ORAL at 09:17

## 2022-01-01 RX ADMIN — Medication 20000 ML: at 06:27

## 2022-01-01 RX ADMIN — PIPERACILLIN AND TAZOBACTAM 4.5 G: 36; 4.5 INJECTION, POWDER, FOR SOLUTION INTRAVENOUS at 20:03

## 2022-01-01 RX ADMIN — PIPERACILLIN AND TAZOBACTAM 3.38 G: 36; 4.5 INJECTION, POWDER, FOR SOLUTION INTRAVENOUS at 13:05

## 2022-01-01 RX ADMIN — HYDROMORPHONE HYDROCHLORIDE 0.5 MG: 1 INJECTION, SOLUTION INTRAMUSCULAR; INTRAVENOUS; SUBCUTANEOUS at 20:20

## 2022-01-01 RX ADMIN — SODIUM CHLORIDE 3 G: 9 INJECTION, SOLUTION INTRAVENOUS at 15:13

## 2022-01-01 RX ADMIN — IPRATROPIUM BROMIDE 0.5 MG: 0.5 SOLUTION RESPIRATORY (INHALATION) at 08:51

## 2022-01-01 RX ADMIN — PANTOPRAZOLE SODIUM 40 MG: 40 INJECTION, POWDER, FOR SOLUTION INTRAVENOUS at 20:35

## 2022-01-01 RX ADMIN — SODIUM BICARBONATE 25 MEQ: 84 INJECTION, SOLUTION INTRAVENOUS at 17:19

## 2022-01-01 RX ADMIN — MAGNESIUM SULFATE IN DEXTROSE 1 G: 10 INJECTION, SOLUTION INTRAVENOUS at 03:27

## 2022-01-01 RX ADMIN — PIPERACILLIN AND TAZOBACTAM 3.38 G: 36; 4.5 INJECTION, POWDER, FOR SOLUTION INTRAVENOUS at 20:05

## 2022-01-01 RX ADMIN — CALCIUM GLUCONATE 1 G: 20 INJECTION, SOLUTION INTRAVENOUS at 06:23

## 2022-01-01 RX ADMIN — ASCORBIC ACID: 500 INJECTION INTRAVENOUS at 20:35

## 2022-01-01 RX ADMIN — HYDROCORTISONE SODIUM SUCCINATE 50 MG: 100 INJECTION, POWDER, FOR SOLUTION INTRAMUSCULAR; INTRAVENOUS at 00:10

## 2022-01-01 RX ADMIN — ACETAMINOPHEN 975 MG: 650 SUSPENSION ORAL at 05:15

## 2022-01-01 RX ADMIN — Medication 20000 ML: at 22:36

## 2022-01-01 RX ADMIN — HEPARIN SODIUM 5000 UNITS: 5000 INJECTION INTRAVENOUS; SUBCUTANEOUS at 00:36

## 2022-01-01 RX ADMIN — ALBUMIN (HUMAN) 12.5 G: 12.5 INJECTION, SOLUTION INTRAVENOUS at 12:59

## 2022-01-01 RX ADMIN — MAGNESIUM SULFATE IN DEXTROSE 1 G: 10 INJECTION, SOLUTION INTRAVENOUS at 21:18

## 2022-01-01 RX ADMIN — Medication 20000 ML: at 03:57

## 2022-01-01 RX ADMIN — ACETAMINOPHEN 975 MG: 650 SUSPENSION ORAL at 14:15

## 2022-01-01 RX ADMIN — PIPERACILLIN AND TAZOBACTAM 4.5 G: 36; 4.5 INJECTION, POWDER, FOR SOLUTION INTRAVENOUS at 12:24

## 2022-01-01 RX ADMIN — MAGNESIUM SULFATE IN DEXTROSE 1 G: 10 INJECTION, SOLUTION INTRAVENOUS at 01:33

## 2022-01-01 RX ADMIN — EPHEDRINE SULFATE 10 MG: 50 INJECTION INTRAVENOUS at 11:41

## 2022-01-01 RX ADMIN — SODIUM PHOSPHATE, MONOBASIC, MONOHYDRATE 21 MMOL: 276; 142 INJECTION, SOLUTION INTRAVENOUS at 09:40

## 2022-01-01 RX ADMIN — Medication 1.2 MCG/KG/HR: at 23:21

## 2022-01-01 RX ADMIN — DEXMEDETOMIDINE HYDROCHLORIDE 1 MCG/KG/HR: 400 INJECTION INTRAVENOUS at 07:19

## 2022-01-01 RX ADMIN — ACETAMINOPHEN 975 MG: 650 SUSPENSION ORAL at 14:26

## 2022-01-01 RX ADMIN — INSULIN LISPRO 5 UNITS: 100 INJECTION, SOLUTION INTRAVENOUS; SUBCUTANEOUS at 11:17

## 2022-01-01 RX ADMIN — INSULIN LISPRO 1 UNITS: 100 INJECTION, SOLUTION INTRAVENOUS; SUBCUTANEOUS at 23:30

## 2022-01-01 RX ADMIN — HYDROMORPHONE HYDROCHLORIDE 0.2 MG: 0.2 INJECTION, SOLUTION INTRAMUSCULAR; INTRAVENOUS; SUBCUTANEOUS at 12:19

## 2022-01-01 RX ADMIN — LEVALBUTEROL HYDROCHLORIDE 1.25 MG: 1.25 SOLUTION, CONCENTRATE RESPIRATORY (INHALATION) at 02:44

## 2022-01-01 RX ADMIN — SENNOSIDES AND DOCUSATE SODIUM 1 TABLET: 50; 8.6 TABLET ORAL at 09:13

## 2022-01-01 RX ADMIN — ACETAMINOPHEN 975 MG: 325 TABLET ORAL at 16:12

## 2022-01-01 RX ADMIN — HYDROMORPHONE HYDROCHLORIDE 0.4 MG: 1 INJECTION, SOLUTION INTRAMUSCULAR; INTRAVENOUS; SUBCUTANEOUS at 10:10

## 2022-01-01 RX ADMIN — LEVALBUTEROL HYDROCHLORIDE 1.25 MG: 1.25 SOLUTION, CONCENTRATE RESPIRATORY (INHALATION) at 18:54

## 2022-01-01 RX ADMIN — POTASSIUM CHLORIDE 40 MEQ: 29.8 INJECTION, SOLUTION INTRAVENOUS at 14:35

## 2022-01-01 RX ADMIN — MAGNESIUM SULFATE HEPTAHYDRATE 1 G: 1 INJECTION, SOLUTION INTRAVENOUS at 04:02

## 2022-01-01 RX ADMIN — DEXMEDETOMIDINE HYDROCHLORIDE 0.7 MCG/KG/HR: 400 INJECTION INTRAVENOUS at 17:44

## 2022-01-01 RX ADMIN — Medication 20000 ML: at 21:16

## 2022-01-01 RX ADMIN — POLYETHYLENE GLYCOL 3350 17 G: 17 POWDER, FOR SOLUTION ORAL at 09:13

## 2022-01-01 RX ADMIN — FENTANYL CITRATE 50 MCG/HR: 0.05 INJECTION, SOLUTION INTRAMUSCULAR; INTRAVENOUS at 17:41

## 2022-01-01 RX ADMIN — VASOPRESSIN 0.04 UNITS/MIN: 20 INJECTION INTRAVENOUS at 07:58

## 2022-01-01 RX ADMIN — ONDANSETRON 4 MG: 2 INJECTION INTRAMUSCULAR; INTRAVENOUS at 07:58

## 2022-01-01 RX ADMIN — Medication 1.1 MCG/KG/HR: at 19:43

## 2022-01-01 RX ADMIN — VANCOMYCIN HYDROCHLORIDE 2000 MG: 1 INJECTION, POWDER, LYOPHILIZED, FOR SOLUTION INTRAVENOUS at 04:58

## 2022-01-01 RX ADMIN — FENTANYL CITRATE 50 MCG: 50 INJECTION INTRAMUSCULAR; INTRAVENOUS at 16:21

## 2022-01-01 RX ADMIN — INSULIN LISPRO 1 UNITS: 100 INJECTION, SOLUTION INTRAVENOUS; SUBCUTANEOUS at 07:11

## 2022-01-01 RX ADMIN — ALBUMIN (HUMAN) 12.5 G: 12.5 INJECTION, SOLUTION INTRAVENOUS at 19:26

## 2022-01-01 RX ADMIN — ACETAMINOPHEN 975 MG: 650 SUSPENSION ORAL at 15:17

## 2022-01-01 RX ADMIN — IPRATROPIUM BROMIDE 0.5 MG: 0.5 SOLUTION RESPIRATORY (INHALATION) at 19:07

## 2022-01-01 RX ADMIN — DEXMEDETOMIDINE HYDROCHLORIDE 0.4 MCG/KG/HR: 400 INJECTION INTRAVENOUS at 02:33

## 2022-01-01 RX ADMIN — HYDROMORPHONE HYDROCHLORIDE 0.5 MG: 1 INJECTION, SOLUTION INTRAMUSCULAR; INTRAVENOUS; SUBCUTANEOUS at 02:26

## 2022-01-01 RX ADMIN — Medication: at 15:13

## 2022-01-01 RX ADMIN — CHLORHEXIDINE GLUCONATE 15 ML: 1.2 SOLUTION ORAL at 08:08

## 2022-01-01 RX ADMIN — Medication 0.6 MCG/KG/HR: at 05:29

## 2022-01-01 RX ADMIN — Medication 1.2 MCG/KG/HR: at 05:37

## 2022-01-01 RX ADMIN — Medication: at 17:52

## 2022-01-01 RX ADMIN — SODIUM PHOSPHATE, MONOBASIC, MONOHYDRATE 6 MMOL: 276; 142 INJECTION, SOLUTION INTRAVENOUS at 20:07

## 2022-01-01 RX ADMIN — Medication 0.7 MCG/KG/HR: at 15:15

## 2022-01-01 RX ADMIN — ROCURONIUM BROMIDE 30 MG: 50 INJECTION INTRAVENOUS at 15:54

## 2022-01-01 RX ADMIN — MAGNESIUM SULFATE IN DEXTROSE 1 G: 10 INJECTION, SOLUTION INTRAVENOUS at 01:45

## 2022-01-01 RX ADMIN — Medication 20000 ML: at 16:57

## 2022-01-01 RX ADMIN — OXYCODONE HYDROCHLORIDE 10 MG: 10 TABLET ORAL at 14:04

## 2022-01-01 RX ADMIN — Medication 1.2 MCG/KG/HR: at 17:47

## 2022-01-02 ENCOUNTER — ANESTHESIA EVENT (OUTPATIENT)
Dept: ANESTHESIOLOGY | Facility: HOSPITAL | Age: 72
End: 2022-01-02

## 2022-01-02 ENCOUNTER — ANESTHESIA (OUTPATIENT)
Dept: ANESTHESIOLOGY | Facility: HOSPITAL | Age: 72
End: 2022-01-02

## 2022-01-03 ENCOUNTER — HOSPITAL ENCOUNTER (OUTPATIENT)
Dept: GASTROENTEROLOGY | Facility: HOSPITAL | Age: 72
Setting detail: OUTPATIENT SURGERY
Discharge: HOME | End: 2022-01-03
Attending: INTERNAL MEDICINE | Admitting: INTERNAL MEDICINE
Payer: MEDICARE

## 2022-01-03 ENCOUNTER — ANESTHESIA (OUTPATIENT)
Dept: GASTROENTEROLOGY | Facility: HOSPITAL | Age: 72
End: 2022-01-03

## 2022-01-03 ENCOUNTER — ANESTHESIA EVENT (OUTPATIENT)
Dept: GASTROENTEROLOGY | Facility: HOSPITAL | Age: 72
End: 2022-01-03

## 2022-01-03 VITALS
HEART RATE: 70 BPM | TEMPERATURE: 97.6 F | SYSTOLIC BLOOD PRESSURE: 103 MMHG | RESPIRATION RATE: 18 BRPM | DIASTOLIC BLOOD PRESSURE: 68 MMHG | OXYGEN SATURATION: 93 %

## 2022-01-03 DIAGNOSIS — C25.9 MALIGNANT NEOPLASM OF PANCREAS, UNSPECIFIED LOCATION OF MALIGNANCY (HCC): ICD-10-CM

## 2022-01-03 PROCEDURE — 43242 EGD US FINE NEEDLE BX/ASPIR: CPT | Performed by: INTERNAL MEDICINE

## 2022-01-03 PROCEDURE — 88173 CYTOPATH EVAL FNA REPORT: CPT | Performed by: PATHOLOGY

## 2022-01-03 PROCEDURE — 88305 TISSUE EXAM BY PATHOLOGIST: CPT | Performed by: PATHOLOGY

## 2022-01-03 PROCEDURE — 88172 CYTP DX EVAL FNA 1ST EA SITE: CPT | Performed by: PATHOLOGY

## 2022-01-03 PROCEDURE — 43239 EGD BIOPSY SINGLE/MULTIPLE: CPT | Performed by: INTERNAL MEDICINE

## 2022-01-03 RX ORDER — SODIUM CHLORIDE 9 MG/ML
INJECTION, SOLUTION INTRAVENOUS CONTINUOUS PRN
Status: DISCONTINUED | OUTPATIENT
Start: 2022-01-03 | End: 2022-01-03

## 2022-01-03 RX ORDER — PROPOFOL 10 MG/ML
INJECTION, EMULSION INTRAVENOUS AS NEEDED
Status: DISCONTINUED | OUTPATIENT
Start: 2022-01-03 | End: 2022-01-03

## 2022-01-03 RX ORDER — LIDOCAINE HYDROCHLORIDE 10 MG/ML
INJECTION, SOLUTION EPIDURAL; INFILTRATION; INTRACAUDAL; PERINEURAL AS NEEDED
Status: DISCONTINUED | OUTPATIENT
Start: 2022-01-03 | End: 2022-01-03

## 2022-01-03 RX ORDER — SODIUM CHLORIDE, SODIUM LACTATE, POTASSIUM CHLORIDE, CALCIUM CHLORIDE 600; 310; 30; 20 MG/100ML; MG/100ML; MG/100ML; MG/100ML
INJECTION, SOLUTION INTRAVENOUS CONTINUOUS PRN
Status: DISCONTINUED | OUTPATIENT
Start: 2022-01-03 | End: 2022-01-03

## 2022-01-03 RX ORDER — GLYCOPYRROLATE 0.2 MG/ML
INJECTION INTRAMUSCULAR; INTRAVENOUS AS NEEDED
Status: DISCONTINUED | OUTPATIENT
Start: 2022-01-03 | End: 2022-01-03

## 2022-01-03 RX ORDER — FENTANYL CITRATE 50 UG/ML
INJECTION, SOLUTION INTRAMUSCULAR; INTRAVENOUS AS NEEDED
Status: DISCONTINUED | OUTPATIENT
Start: 2022-01-03 | End: 2022-01-03

## 2022-01-03 RX ORDER — PROPOFOL 10 MG/ML
INJECTION, EMULSION INTRAVENOUS CONTINUOUS PRN
Status: DISCONTINUED | OUTPATIENT
Start: 2022-01-03 | End: 2022-01-03

## 2022-01-03 RX ADMIN — LIDOCAINE HYDROCHLORIDE 50 MG: 10 INJECTION, SOLUTION EPIDURAL; INFILTRATION; INTRACAUDAL; PERINEURAL at 12:13

## 2022-01-03 RX ADMIN — PROPOFOL 50 MG: 10 INJECTION, EMULSION INTRAVENOUS at 12:14

## 2022-01-03 RX ADMIN — FENTANYL CITRATE 50 MCG: 50 INJECTION INTRAMUSCULAR; INTRAVENOUS at 12:13

## 2022-01-03 RX ADMIN — PROPOFOL 100 MCG/KG/MIN: 10 INJECTION, EMULSION INTRAVENOUS at 12:15

## 2022-01-03 RX ADMIN — SODIUM CHLORIDE: 0.9 INJECTION, SOLUTION INTRAVENOUS at 12:06

## 2022-01-03 RX ADMIN — TOPICAL ANESTHETIC 1 SPRAY: 200 SPRAY DENTAL; PERIODONTAL at 12:04

## 2022-01-03 RX ADMIN — GLYCOPYRROLATE 0.1 MG: 0.2 INJECTION, SOLUTION INTRAMUSCULAR; INTRAVENOUS at 12:04

## 2022-01-03 RX ADMIN — PROPOFOL 50 MG: 10 INJECTION, EMULSION INTRAVENOUS at 12:13

## 2022-01-03 NOTE — ANESTHESIA POSTPROCEDURE EVALUATION
Post-Op Assessment Note    CV Status:  Stable  Pain Score: 0    Pain management: adequate     Mental Status:  Alert and awake   Hydration Status:  Euvolemic   PONV Controlled:  Controlled   Airway Patency:  Patent      Post Op Vitals Reviewed: Yes      Staff: Anesthesiologist, CRNA         No complications documented      BP   109/68   Temp     Pulse  66   Resp   14   SpO2  99% on room air

## 2022-01-03 NOTE — ANESTHESIA PREPROCEDURE EVALUATION
Procedure:  ENDOSCOPIC ULTRASOUND (UPPER)    Relevant Problems   ANESTHESIA (within normal limits)      CARDIO   (+) Permanent atrial fibrillation (HCC)      ENDO   (+) Type 2 diabetes mellitus without complication, without long-term current use of insulin (HCC)      GI/HEPATIC   (+) Encounter for follow-up surveillance of pancreatic cancer   (+) Malignant neoplasm of tail of pancreas (HCC)   (+) Pancreatic duct dilated      /RENAL   (+) BPH without obstruction/lower urinary tract symptoms      HEMATOLOGY   (+) Thrombocytopenia (HCC)      NEURO/PSYCH   (+) History of pancreatic cancer      PULMONARY   (+) Obstructive sleep apnea syndrome      Other   (+) Morbid obesity with BMI of 40 0-44 9, adult ([facility] [address] )      EKG 10/12/2021:  Heart rate 77  QRS duration 96 milliseconds   QT corrected 445 milliseconds   QRS axis 60°   Impression:  Atrial fibrillation with low-voltage QRS    Dobutamine Stress 2016:  Normal study after pharmacologic stress  Lab Results   Component Value Date    WBC 4 90 11/12/2021    HGB 13 9 11/12/2021    HCT 40 1 (L) 11/12/2021     (H) 11/12/2021     (L) 11/12/2021     Lab Results   Component Value Date    SODIUM 135 (L) 11/29/2021    K 5 0 11/29/2021     11/29/2021    CO2 27 11/29/2021    BUN 12 11/29/2021    CREATININE 0 77 11/29/2021    GLUC 149 (H) 02/14/2020    CALCIUM 10 5 (H) 11/29/2021     Lab Results   Component Value Date    INR 1 08 02/15/2019    PROTIME 14 1 02/15/2019     Lab Results   Component Value Date    HGBA1C 7 6 (H) 07/09/2021            Physical Exam    Airway    Mallampati score: III  TM Distance: >3 FB  Neck ROM: full     Dental       Cardiovascular  Rhythm: irregular,     Pulmonary  Pulmonary exam normal     Other Findings        Anesthesia Plan  ASA Score- 3     Anesthesia Type- IV sedation with anesthesia with ASA Monitors  Additional Monitors:   Airway Plan:           Plan Factors-Exercise tolerance (METS): >4 METS  Chart reviewed   EKG reviewed  Existing labs reviewed  Patient summary reviewed  Induction- intravenous  Postoperative Plan-     Informed Consent- Anesthetic plan and risks discussed with patient  I personally reviewed this patient with the CRNA  Discussed and agreed on the Anesthesia Plan with the CRNA  Medhat Dumont

## 2022-01-03 NOTE — ANESTHESIA PREPROCEDURE EVALUATION
Procedure:  PRE-OP ONLY    Relevant Problems   CARDIO   (+) Permanent atrial fibrillation (HCC)      ENDO   (+) Type 2 diabetes mellitus without complication, without long-term current use of insulin (HCC)      GI/HEPATIC   (+) Encounter for follow-up surveillance of pancreatic cancer   (+) Malignant neoplasm of tail of pancreas ([facility] [address] )   (+) Pancreatic duct dilated      /RENAL   (+) BPH without obstruction/lower urinary tract symptoms      HEMATOLOGY   (+) Thrombocytopenia (HCC)      NEURO/PSYCH   (+) History of pancreatic cancer      PULMONARY   (+) Obstructive sleep apnea syndrome      EKG 10/12/2021:  Heart rate 77  QRS duration 96 milliseconds   QT corrected 445 milliseconds   QRS axis 60°   Impression:  Atrial fibrillation with low-voltage QRS    Dobutamine Stress 2016:  Normal study after pharmacologic stress  Lab Results   Component Value Date    WBC 4 90 11/12/2021    HGB 13 9 11/12/2021    HCT 40 1 (L) 11/12/2021     (H) 11/12/2021     (L) 11/12/2021     Lab Results   Component Value Date    SODIUM 135 (L) 11/29/2021    K 5 0 11/29/2021     11/29/2021    CO2 27 11/29/2021    BUN 12 11/29/2021    CREATININE 0 77 11/29/2021    GLUC 149 (H) 02/14/2020    CALCIUM 10 5 (H) 11/29/2021     Lab Results   Component Value Date    INR 1 08 02/15/2019    PROTIME 14 1 02/15/2019     Lab Results   Component Value Date    HGBA1C 7 6 (H) 07/09/2021                 Anesthesia Plan  ASA Score- 3     Anesthesia Type- IV sedation with anesthesia with ASA Monitors  Additional Monitors:   Airway Plan:           Plan Factors-    Chart reviewed  EKG reviewed  Existing labs reviewed  Patient summary reviewed  Induction- intravenous      Postoperative Plan-     Informed Consent-

## 2022-01-13 ENCOUNTER — DOCUMENTATION (OUTPATIENT)
Dept: HEMATOLOGY ONCOLOGY | Facility: CLINIC | Age: 72
End: 2022-01-13

## 2022-01-13 NOTE — PROGRESS NOTES
Per Dr Cadet Springfield request rescheduled patient's appointment with Dr America Joyce from 2/24 to [email or web address] TERESA    Patient aware and agreeable

## 2022-01-17 ENCOUNTER — OFFICE VISIT (OUTPATIENT)
Dept: SURGICAL ONCOLOGY | Facility: CLINIC | Age: 72
End: 2022-01-17
Payer: MEDICARE

## 2022-01-17 ENCOUNTER — TELEPHONE (OUTPATIENT)
Dept: ANESTHESIOLOGY | Facility: CLINIC | Age: 72
End: 2022-01-17

## 2022-01-17 VITALS
HEIGHT: 72 IN | DIASTOLIC BLOOD PRESSURE: 80 MMHG | TEMPERATURE: 97.9 F | OXYGEN SATURATION: 96 % | RESPIRATION RATE: 16 BRPM | HEART RATE: 79 BPM | WEIGHT: 302.03 LBS | BODY MASS INDEX: 40.91 KG/M2 | SYSTOLIC BLOOD PRESSURE: 138 MMHG

## 2022-01-17 DIAGNOSIS — K86.2 PANCREAS CYST: ICD-10-CM

## 2022-01-17 DIAGNOSIS — E11.65 TYPE 2 DIABETES MELLITUS WITH HYPERGLYCEMIA, UNSPECIFIED WHETHER LONG TERM INSULIN USE (HCC): ICD-10-CM

## 2022-01-17 DIAGNOSIS — Z85.07 ENCOUNTER FOR FOLLOW-UP SURVEILLANCE OF PANCREATIC CANCER: Primary | ICD-10-CM

## 2022-01-17 DIAGNOSIS — E10.65 TYPE 1 DIABETES MELLITUS WITH HYPERGLYCEMIA (HCC): ICD-10-CM

## 2022-01-17 DIAGNOSIS — Z85.07 HISTORY OF PANCREATIC CANCER: ICD-10-CM

## 2022-01-17 DIAGNOSIS — Z08 ENCOUNTER FOR FOLLOW-UP SURVEILLANCE OF PANCREATIC CANCER: Primary | ICD-10-CM

## 2022-01-17 DIAGNOSIS — K83.8 OTHER SPECIFIED DISEASES OF BILIARY TRACT: ICD-10-CM

## 2022-01-17 PROCEDURE — 99215 OFFICE O/P EST HI 40 MIN: CPT | Performed by: SURGERY

## 2022-01-17 RX ORDER — TRAMADOL HYDROCHLORIDE 50 MG/1
50 TABLET ORAL EVERY 6 HOURS PRN
Qty: 10 TABLET | Refills: 0 | Status: SHIPPED | OUTPATIENT
Start: 2022-01-17 | End: 2022-02-22 | Stop reason: HOSPADM

## 2022-01-17 NOTE — LETTER
January 17, 2022     Ortiz Ta DO  [address]  [address]    Patient: Herberth Kiser   YOB: 1950   Date of Visit: 1/17/2022       Dear Dr Dean Chang: Thank you for referring Prince Eisenberg to me for evaluation  Below are my notes for this consultation  If you have questions, please do not hesitate to call me  I look forward to following your patient along with you  Sincerely,        Beckie Hernandes MD        CC: MD Elizabeth Cotter MD Haidee Hartigan, MD Adron Corpus, MD  1/17/2022  1:32 PM  Sign when Signing Visit     Surgical Oncology Follow Up       [address] [facility] [place] [address]  [address] [facility]  1950  [id number]  [address]  [facility] [address] [place] [address]  [phone]    No chief complaint on file  Assessment/Plan:    No problem-specific Assessment & Plan notes found for this encounter  Diagnoses and all orders for this visit:    Encounter for follow-up surveillance of pancreatic cancer    History of pancreatic cancer    Pancreas cyst        Advance Care Planning/Advance Directives:  Discussed disease status, cancer treatment plans and/or cancer treatment goals with the patient  Oncology History   History of pancreatic cancer   3/12/2019 Surgery    Distal pancreatectomy  Several foci of invasive colloid cancer  Grade 1  IPMN with high grade dysplasia at margin  T1b, NO MX Stage IA     4/11/2019 - 6/21/2019 Chemotherapy    Saw Dr Mariann Aceves  Will be starting Folfirinox 4/24   Ended 6/21/2019 4/24/2019 - 7/2/2019 Chemotherapy    fluorouracil (ADRUCIL), 400 mg/m2 = 1,010 mg, Intravenous, Once, 2 of 2 cycles  pegfilgrastim (Nikki Greg), 6 mg, Subcutaneous, Once, 2 of 2 cycles  Administration: 6 mg (6/21/2019), 6 mg (6/7/2019)  irinotecan (CAMPTOSAR) chemo infusion, 180 mg/m2 = 455 mg, Intravenous, Once, 4 of 4 cycles  Dose modification: 140 mg/m2 (original dose 180 mg/m2, Cycle 3, Reason: Other (Must fill in a comment)), 125 mg/m2 (original dose 180 mg/m2, Cycle 3, Reason: Dose Not Tolerated)  Administration: 316 mg (6/5/2019), 300 mg (6/19/2019)  leucovorin calcium IVPB, 400 mg/m2 = 1,012 mg, Intravenous, Once, 2 of 2 cycles  oxaliplatin (ELOXATIN) chemo infusion, 85 mg/m2 = 215 05 mg, Intravenous, Once, 4 of 4 cycles  Dose modification: 65 mg/m2 (original dose 85 mg/m2, Cycle 3, Reason: Other (Must fill in a comment)), 60 mg/m2 (original dose 85 mg/m2, Cycle 3, Reason: Dose Not Tolerated)  Administration: 151 8 mg (6/5/2019), 151 8 mg (6/19/2019)  fluorouracil (ADRUCIL) ambulatory infusion Soln, 1,200 mg/m2/day = 6,070 mg, Intravenous, Over 46 hours, 4 of 4 cycles         History of Present Illness:  Patient is a 65-year-old man with history of stage I pancreas cancer status post prior laparoscopic distal pancreatectomy   -Interval History:  He was found have enlarging pancreatic cyst involving the mid body of the pancreas, 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  He underwent recent EUS with biopsy confirming atypical cells, possibly severe dysplasia, and what appears to be main duct IPMN, with cyst measuring 3 cm  His case was discussed at multidisciplinary tumor board, and based on the likelihood of main duct IPMN, surgical resection was recommended  Review of Systems:  Review of Systems   Constitutional: Negative  HENT: Negative  Eyes: Negative  Respiratory: Negative  Cardiovascular: Negative  Gastrointestinal: Positive for abdominal pain  Endocrine: Negative  Genitourinary: Negative  Musculoskeletal: Negative  Skin: Negative  Allergic/Immunologic: Negative  Neurological: Negative  Hematological: Negative  Psychiatric/Behavioral: Negative      All other systems reviewed and are negative        Patient Active Problem List   Diagnosis    Obstructive sleep apnea syndrome    Hypersomnia    Permanent atrial fibrillation ([place] [address] )    Morbid obesity with BMI of 40 0-44 9, adult ([place] [address] )    Lower extremity edema    Pancreatic duct dilated    History of pancreatic cancer    Type 2 diabetes mellitus without complication, without long-term current use of insulin ([place] [address] )    Malignant neoplasm of tail of pancreas (HCC)    Thrombocytopenia ([place] [address] )    Urgency incontinence    Balanitis    OAB (overactive bladder)    BPH without obstruction/lower urinary tract symptoms    Encounter for follow-up surveillance of pancreatic cancer     Past Medical History:   Diagnosis Date    A-fib ([place] [address] )     Abdominal wall strain     last assessed: 10/21/14    Allergic rhinitis     last assessed: 05/03/16    Arthritis     Cancer ([place] [address] )     COVID-19 virus infection 3/3/2021    CPAP (continuous positive airway pressure) dependence     Diabetes mellitus ([place] [address] )     Erectile dysfunction of non-organic origin     last assessed: 03/17/14    Irregular heart beat     Pt with A fib     Lumbar strain     last assessed: 10/21/14    Multiple acquired skin tags     last assessed: 2/18/16    Palpitations     last assessed: 03/17/14    Pancreas cyst     Plantar fasciitis     Skin disorder     last assessed: 05/28/13    Sleep apnea      Past Surgical History:   Procedure Laterality Date    BOTOX INJECTION N/A 11/12/2021    Procedure: Kathi Redcynthia;  Surgeon: Shelby Davila MD;  Location: [facility] MAIN OR;  Service: Urology    CATARACT EXTRACTION Bilateral     COLONOSCOPY  01/17/2013    COLONOSCOPY  01/08/2018    COLONOSCOPY  04/2021    EGD  06/05/2020    EGD AND COLONOSCOPY  02/06/2014, 2/8/2017    FL GUIDED CENTRAL VENOUS ACCESS DEVICE INSERTION  4/23/2019    FLEXIBLE SIGMOIDOSCOPY  06/11/2019    FOOT SURGERY      Due to plantar fascitis    JOINT REPLACEMENT Left     LTK    LINEAR ENDOSCOPIC U/S      PANCREATECTOMY LAPAROSCOPIC N/A 3/12/2019    Procedure: DISTAL PANCREATECTOMY LAPAROSCOPIC/POSSIBLE OPEN;  Surgeon: Yasmine Gaytan MD;  [facility];  Service: Surgical Oncology    FL EDG US EXAM SURGICAL ALTER STOM DUODENUM/JEJUNUM N/A 1/24/2019    Procedure: LINEAR ENDOSCOPIC U/S;  Surgeon: Nils Moritz, MD;  Location: [facility]; Service: Gastroenterology    REPLACEMENT TOTAL KNEE Left     TUNNELED VENOUS PORT PLACEMENT Left 4/23/2019    Procedure: INSERTION VENOUS PORT (PORT-A-CATH); Surgeon: Yasmine Gaytan MD;  [facility];  Service: Surgical Oncology- 11/8/21 Pt reports PAC removed     UMBILICAL HERNIA REPAIR       Family History   Problem Relation Age of Onset    Breast cancer Mother     Cervical cancer Paternal Aunt     Pancreatic cancer Other     Liver cancer Other     No Known Problems Father      Social History     Socioeconomic History    Marital status: /Civil Union     Spouse name: Not on file    Number of children: Not on file    Years of education: Not on file    Highest education level: Not on file   Occupational History    Not on file   Tobacco Use    Smoking status: Never Smoker    Smokeless tobacco: Never Used   Vaping Use    Vaping Use: Never used   Substance and Sexual Activity    Alcohol use:  Yes     Alcohol/week: 2 0 standard drinks     Types: 2 Cans of beer per week     Comment: couple times per week;     Drug use: No     Comment: Denies     Sexual activity: Yes     Comment: Denies any chest pain or shortness of breath with activity   Other Topics Concern    Not on file   Social History Narrative    Not on file     Social Determinants of Health     Financial Resource Strain: Not on file   Food Insecurity: Not on file   Transportation Needs: Not on file   Physical Activity: Not on file   Stress: Not on file   Social Connections: Not on file   Intimate Partner Violence: Not on file   Housing Stability: Not on file       Current Outpatient Medications:     acetaminophen (TYLENOL) 325 mg tablet, Take 2 tablets (650 mg total) by mouth every 6 (six) hours as needed for mild pain (Patient not taking: Reported on 12/15/2021 ), Disp: 30 tablet, Rfl: 0    apixaban (Eliquis) 5 mg, Take 1 tablet (5 mg total) by mouth 2 (two) times a day (Patient not taking: Reported on 12/8/2021 ), Disp: 60 tablet, Rfl: 5    ascorbic acid (VITAMIN C) 1000 MG tablet, Take 2,000 mg by mouth daily 11/8/21 Instructed pt to HOLD starting 11/9/21 up to and including DOS  , Disp: , Rfl:     aspirin (ECOTRIN) 325 mg EC tablet, Take 325 mg by mouth daily 11/8/21 Pt instructed as per urology instruction sheet - pt reports stopped on 11/7/21  Instructed pt to verify with cardiology regarding stopping of ASA and not starting Eliquis at this time prior to surgery  , Disp: , Rfl:     atenolol (TENORMIN) 25 mg tablet, TAKE 1 TABLET BY MOUTH EVERY DAY (Patient taking differently: Take 25 mg by mouth daily Takes in the morning  ), Disp: 90 tablet, Rfl: 3    Cholecalciferol (VITAMIN D) 2000 units CAPS, Take 1,000 Units by mouth daily 11/8/21 Pt takes three tabs of Vitamin D daily  Instructed to start HOLD 11/9/21 up to and including DOS  , Disp: , Rfl:     Cyanocobalamin (VITAMIN B 12 PO), Take by mouth daily 11/8/21 Pt reports takes two tabs daily   , Disp: , Rfl:     dicyclomine (BENTYL) 10 mg capsule, TAKE 1 CAPSULE BY MOUTH EVERY DAY (Patient not taking: Reported on 12/15/2021), Disp: 90 capsule, Rfl: 0    metFORMIN (GLUCOPHAGE) 1000 MG tablet, TAKE 1 TABLET BY MOUTH TWICE A DAY WITH MEALS, Disp: 180 tablet, Rfl: 1    Na Sulfate-K Sulfate-Mg Sulf (Suprep Bowel Prep Kit) 17 5-3 13-1 6 GM/177ML SOLN, Follow office instructions (Patient not taking: Reported on 12/15/2021 ), Disp: 1 Bottle, Rfl: 0    nystatin-triamcinolone (MYCOLOG-II) ointment, Apply topically 2 (two) times a day (Patient not taking: Reported on 12/15/2021 ), Disp: 30 g, Rfl: 0    solifenacin (VESICARE) 10 MG tablet, Take 1 tablet (10 mg total) by mouth daily (Patient not taking: Reported on 10/12/2021), Disp: 30 tablet, Rfl: 5    tadalafil (CIALIS) 20 MG tablet, TAKE 1 TABLET BY MOUTH DAILY AS NEEDED FOR ERECTILE DYSFUNCTION, Disp: 10 tablet, Rfl: 0    tamsulosin (FLOMAX) 0 4 mg, Take 1 capsule (0 4 mg total) by mouth daily with dinner, Disp: 30 capsule, Rfl: 0  No Known Allergies  There were no vitals filed for this visit  Physical Exam  Vitals reviewed  Constitutional:       Appearance: Normal appearance  HENT:      Head: Normocephalic and atraumatic  Right Ear: External ear normal       Left Ear: External ear normal       Mouth/Throat:      Mouth: Mucous membranes are dry  Eyes:      Extraocular Movements: Extraocular movements intact  Pupils: Pupils are equal, round, and reactive to light  Cardiovascular:      Rate and Rhythm: Normal rate and regular rhythm  Pulses: Normal pulses  Heart sounds: Normal heart sounds  Pulmonary:      Effort: Pulmonary effort is normal       Breath sounds: Normal breath sounds  Abdominal:      General: Abdomen is flat  Bowel sounds are normal  There is no distension  Palpations: Abdomen is soft  There is no mass  Tenderness: There is no abdominal tenderness  There is no guarding or rebound  Hernia: No hernia is present  Musculoskeletal:         General: Normal range of motion  Cervical back: Normal range of motion and neck supple  Skin:     General: Skin is warm and dry  Neurological:      General: No focal deficit present  Mental Status: He is alert and oriented to person, place, and time     Psychiatric:         Mood and Affect: Mood normal          Behavior: Behavior normal          Judgment: Judgment normal            Results:  Labs:    Component Ref Range & Units 11/29/21 11:38 AM 12/9/20  9:16 AM 6/10/20  9:50 AM 2/14/20 10:38 AM 11/7/19  9:41 AM 6/18/19  9:45 AM   CA 19-9 0 - 35 U/mL 33           Case Report Non-gynecologic Cytology                          Case: [id number]                                   Authorizing Provider: Roshan Nathan MD         Collected:           01/03/2022 1230               Ordering Location:     [facility]      Received:            01/03/2022 1314                                      [facility]                                                            Pathologist:           Shirin Arevalo MD                                                            Specimens:   A) - Pancreas, pancreatic body tail cystic mass                                                      B) - Pancreas, pancreatic body tail cystic mass                                            Final Diagnosis   A B  Pancreas, pancreatic body tail cystic mass ( ThinPrep and smear preparations ):  Neoplastic, Other (PSC Category IV) -See note      Note: The sample shows acellular specimen with atypical mucinous epithelium compatible with a neoplastic mucinous cyst with high-grade atypia  Extracellular mucin is present, supporting a mucinous differentiation  The atypia is compatible with at least high-grade dysplasia, though an invasive component cannot be excluded on cytology preparation  Correlation with clinical impression, other tissue sampling as applicable and pending CEA analysis is recommended      Satisfactory for evaluation      The above diagnostic category is part of the six-tiered system proposed by The Papanicolaou Society of Cytopathology for the reporting of pancreaticobiliary specimens   This proposed scheme provides terminology that correlates the cytologic diagnostic nomenclature with the 2010 WHO classification of pancreatic tumors and standardizes the categorization of the various diseases of the pancreas, some of which are difficult to diagnose specifically by cytology      *The Papanicolaou Society of Cytopathology System for Reporting Pancreaticobiliary Cytology: Definitions, Criteria and Explanatory Notes  Vijay Grimaldo; Metcalf, 2015             Electronically signed by Teodora Sales MD on 1/5/2022 at  3:21 PM         Imaging  Endoscopic ultrasonography, GI (Upper) Linear    Result Date: 1/3/2022  Narrative: [facility] [address] Endoscopy [address]: 1/03/22 PHYSICIAN(S): Bo Larry MD - Attending Physician INDICATION: Malignant neoplasm of pancreas, unspecified location of malignancy ([facility] [address] ) POST-OP DIAGNOSIS: See the impression below  PREPROCEDURE: Informed consent was obtained for the procedure, including sedation  Risks of perforation, hemorrhage, adverse drug reaction and aspiration were discussed  The patient was placed in the left lateral decubitus position  Patient was explained about the risks and benefits of the procedure  Risks including but not limited to bleeding, infection, and perforation were explained in detail  Also explained about less than 100% sensitivity with the exam and other alternatives  DETAILS OF PROCEDURE: Patient was taken to the procedure room where a time out was performed to confirm correct patient and correct procedure  The patient underwent monitored anesthesia care, which was administered by an anesthesia professional  The patient's blood pressure, heart rate, oxygen, respirations and level of consciousness were monitored throughout the procedure  The patient experienced no blood loss  The procedure was not difficult  The patient tolerated the procedure well  There were no apparent complications   ANESTHESIA INFORMATION: ASA: III Anesthesia Type: IV Sedation with Anesthesia MEDICATIONS: No administrations occurring from 1203 to 1309 on 01/03/22 FINDINGS: EGD C1M2 Tovar's esophagus observed where the Z-line is 43 cm from the incisors and top of gastric folds are 44 cm from the incisors with 1 tongue and no associated nodule; narrow band imaging (NBI) used; performed targeted cold forceps biopsy Small hiatal hernia The duodenum appeared normal  EUS Irregular, heterogeneous and macrocystic cyst measuring 29 mm x 30 mm with debris present, main pancreatic duct involvement and well-defined margins in the body of the pancreas and tail of the pancreas  Fine needle aspiration passes were taken with a 19 gauge Domee needle using a transgastric approach guided by Doppler  Obtained 2 mL of clear and thick-appearing aspirate, sent sample for chemistry and cytology analysis  Fine needle biopsy passes were taken with a 22 gauge Checkd.In needle using a transgastric approach guided by Doppler and performed cytology analysis  Onsite cytologist was present and cytology results were preliminarily atypical  Five or more anechoic cysts with well-defined margins and smooth margins in the head of the pancreas, neck of the pancreas and body of the pancreas  SPECIMENS: ID Type Source Tests Collected by Time Destination 1 : distal esophagus R/O gonzalez's Tissue Esophagus TISSUE EXAM Shelli Ramsey MD 1/3/2022 12:17 PM  2 : pancreatic body tail cystic mass Other FNA NON-GYNECOLOGIC CYTOLOGY, FINE NEEDLE ASPIRATION Shelli Ramsey MD 1/3/2022 12:30 PM       Impression: EGD: Short segment Gonzalez's esophagus s/p targeted biopsy Small hiatus hernia Normal duodenum EUS: 3 cm heterogenous cyst with solid component/debris in the distal pancreas at the margin of resection with communication with the main duct  This was biopsied with FNB needle and thick mucinous appearing aspirate sent for CEA/amylase and cytology  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  RECOMMENDATION: Follow up cytology and CEA/amylase of cyst fluid  Follow up with surgical oncology  Shelli Ramsey MD     I reviewed the above laboratory and imaging data      MRI OF THE ABDOMEN WITH AND WITHOUT CONTRAST WITH MRCP     INDICATION:  Follow-up pancreatic cysts, history of distal pancreatectomy      COMPARISON: 7/30/2021     TECHNIQUE:  The following pulse sequences were obtained:  Coronal and axial T2 with TE of 90 and 180 respectively, axial T2 with fat saturation, axial FIESTA fat-sat, axial T1-weighted in-and-out-of phase, axial DWI/ADC, pre-contrast axial T1 with fat   saturation, post-contrast dynamic axial T1 with fat saturation at 20, 70, and 180 seconds, followed by coronal and 7 minute delayed axial T1 with fat saturation  3D MRCP images were obtained with radial thick slabs and projections  3D rendering was   performed from the acquisition scanner  n     IV Contrast:  12 mL of Gadobutrol injection (SINGLE-DOSE)      FINDINGS:     LOWER CHEST:   Unremarkable      LIVER: Somewhat nodular hepatic contour is again identified  Mild loss of signal is noted on out of phase T1-weighted pulse sequences consistent with mild hepatic steatosis     No suspicious mass  The hepatic veins and portal veins are patent      BILE DUCTS:  No intrahepatic or extrahepatic bile duct dilation  Common bile duct is normal in caliber  No choledocholithiasis, biliary stricture or suspicious mass       GALLBLADDER:  Normal      PANCREAS:  Patient is again status post distal pancreatectomy  Innumerable parenchymal cysts multiple parenchymal cysts are again identified there is interval enlargement of the distal most pancreatic cyst measuring approximately 2 3 x 2 1  This is   inseparable from the main pancreatic duct which has slightly increased in dilatation now measuring 10 mm  Findings are again suspicious for IPMN with main duct involvement versus main duct IPMN     ADRENAL GLANDS:  Normal      SPLEEN:  Normal      KIDNEYS/PROXIMAL URETERS:  No hydroureteronephrosis  No suspicious renal mass      BOWEL:   No dilated loops of bowel       PERITONEUM/RETROPERITONEUM:  No ascites      LYMPH NODES:  Stable 13 mm portacaval lymph node is identified    Smaller peripancreatic node again identified      VASCULAR STRUCTURES:  No aneurysm      ABDOMINAL WALL: Unremarkable      OSSEOUS STRUCTURES:  No suspicious osseous lesion      IMPRESSION:     Enlarging cystic lesion within the most distal residual pancreas with associated interval enlargement of the main pancreatic duct  Findings are suggestive of enlarging IPMN  with main duct involvement versus primary main duct IPMN     Somewhat nodular hepatic contour again seen potentially reflecting underlying hepatocellular disease  Hepatic steatosis      Stable peripancreatic and aortocaval lymph nodes         Findings were related to Dr Darrel Ibrahim via secure [email or web address] messaging      Workstation performed: [id number]       Discussion/Summary:  76-year-old man, history of stage I pancreas cancer, now with what appears to be main duct IPMN/cyst and body of pancreas  Case discussed at tumor Board  Based on findings, as well as pathology report, recommend distal pancreatectomy  Given location of cyst, as well as history of prior surgery, would opt for open resection  Rationale for this along with risks and benefits of surgery including infection, bleeding, heart trouble, lung trouble, kidney trouble, stroke, even death, were discussed  We also discussed possibly pancreatic leak or fistula, along with need for drain after this operation  All questions answered and consents signed at this visit  Will obtain preop clearance given medical comorbidities

## 2022-01-17 NOTE — H&P (VIEW-ONLY)
Surgical Oncology Follow Up       [address] [facility] CANCER [facility] [place]  [address] [facility] Varun Titus [facility] [id number] [phone]    Qing Burgess  1950  [id number]  [address] [facility]ER [facility] Ariela Arce  [address] [facility] [id number] [phone]    No chief complaint on file  Assessment/Plan:    No problem-specific Assessment & Plan notes found for this encounter  Diagnoses and all orders for this visit:    Encounter for follow-up surveillance of pancreatic cancer    History of pancreatic cancer    Pancreas cyst        Advance Care Planning/Advance Directives:  Discussed disease status, cancer treatment plans and/or cancer treatment goals with the patient  Oncology History   History of pancreatic cancer   3/12/2019 Surgery    Distal pancreatectomy  Several foci of invasive colloid cancer  Grade 1  IPMN with high grade dysplasia at margin  T1b, NO MX Stage IA     4/11/2019 - 6/21/2019 Chemotherapy    Saw Dr Anne Veras  Will be starting Folfirinox 4/24   Ended 6/21/2019 4/24/2019 - 7/2/2019 Chemotherapy    fluorouracil (ADRUCIL), 400 mg/m2 = 1,010 mg, Intravenous, Once, 2 of 2 cycles  pegfilgrastim ([place] Gram), 6 mg, Subcutaneous, Once, 2 of 2 cycles  Administration: 6 mg (6/21/2019), 6 mg (6/7/2019)  irinotecan (CAMPTOSAR) chemo infusion, 180 mg/m2 = 455 mg, Intravenous, Once, 4 of 4 cycles  Dose modification: 140 mg/m2 (original dose 180 mg/m2, Cycle 3, Reason: Other (Must fill in a comment)), 125 mg/m2 (original dose 180 mg/m2, Cycle 3, Reason: Dose Not Tolerated)  Administration: 316 mg (6/5/2019), 300 mg (6/19/2019)  leucovorin calcium IVPB, 400 mg/m2 = 1,012 mg, Intravenous, Once, 2 of 2 cycles  oxaliplatin (ELOXATIN) chemo infusion, 85 mg/m2 = 215 05 mg, Intravenous, Once, 4 of 4 cycles  Dose modification: 65 mg/m2 (original dose 85 mg/m2, Cycle 3, Reason: Other (Must fill in a comment)), 60 mg/m2 (original dose 85 mg/m2, Cycle 3, Reason: Dose Not Tolerated)  Administration: 151 8 mg (6/5/2019), 151 8 mg (6/19/2019)  fluorouracil (ADRUCIL) ambulatory infusion Soln, 1,200 mg/m2/day = 6,070 mg, Intravenous, Over 46 hours, 4 of 4 cycles         History of Present Illness:  Patient is a 69-year-old man with history of stage I pancreas cancer status post prior laparoscopic distal pancreatectomy   -Interval History:  He was found have enlarging pancreatic cyst involving the mid body of the pancreas, 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  He underwent recent EUS with biopsy confirming atypical cells, possibly severe dysplasia, and what appears to be main duct IPMN, with cyst measuring 3 cm  His case was discussed at multidisciplinary tumor board, and based on the likelihood of main duct IPMN, surgical resection was recommended  Review of Systems:  Review of Systems   Constitutional: Negative  HENT: Negative  Eyes: Negative  Respiratory: Negative  Cardiovascular: Negative  Gastrointestinal: Positive for abdominal pain  Endocrine: Negative  Genitourinary: Negative  Musculoskeletal: Negative  Skin: Negative  Allergic/Immunologic: Negative  Neurological: Negative  Hematological: Negative  Psychiatric/Behavioral: Negative  All other systems reviewed and are negative        Patient Active Problem List   Diagnosis    Obstructive sleep apnea syndrome    Hypersomnia    Permanent atrial fibrillation (HCC)    Morbid obesity with BMI of 40 0-44 9, adult ([address] )    Lower extremity edema    Pancreatic duct dilated    History of pancreatic cancer    Type 2 diabetes mellitus without complication, without long-term current use of insulin (HCC)    Malignant neoplasm of tail of pancreas (HCC)    Thrombocytopenia (HCC)    Urgency incontinence    Balanitis    OAB (overactive bladder)    BPH without obstruction/lower urinary tract symptoms    Encounter for follow-up surveillance of pancreatic cancer     Past Medical History:   Diagnosis Date    A-fib ([facility] )     Abdominal wall strain     last assessed: 10/21/14    Allergic rhinitis     last assessed: 05/03/16    Arthritis     Cancer ([facility] )     COVID-19 virus infection 3/3/2021    CPAP (continuous positive airway pressure) dependence     Diabetes mellitus ([facility] )     Erectile dysfunction of non-organic origin     last assessed: 03/17/14    Irregular heart beat     Pt with A fib     Lumbar strain     last assessed: 10/21/14    Multiple acquired skin tags     last assessed: 2/18/16    Palpitations     last assessed: 03/17/14    Pancreas cyst     Plantar fasciitis     Skin disorder     last assessed: 05/28/13    Sleep apnea      Past Surgical History:   Procedure Laterality Date    BOTOX INJECTION N/A 11/12/2021    Procedure: Mendoza Nagy;  Surgeon: Catarina Skelton MD;  Location: [address] [place] [address];  Service: Urology    CATARACT EXTRACTION Bilateral     COLONOSCOPY  01/17/2013    COLONOSCOPY  01/08/2018    COLONOSCOPY  04/2021    EGD  06/05/2020    EGD AND COLONOSCOPY  02/06/2014, 2/8/2017    FL GUIDED CENTRAL VENOUS ACCESS DEVICE INSERTION  4/23/2019    FLEXIBLE SIGMOIDOSCOPY  06/11/2019    FOOT SURGERY      Due to plantar fascitis    JOINT REPLACEMENT Left     LTK    LINEAR ENDOSCOPIC U/S      PANCREATECTOMY LAPAROSCOPIC N/A 3/12/2019    Procedure: DISTAL PANCREATECTOMY LAPAROSCOPIC/POSSIBLE OPEN;  Surgeon: Natalya Lazcano MD;  [facility];  Service: Surgical Oncology    IL EDG US EXAM SURGICAL ALTER STOM DUODENUM/JEJUNUM N/A 1/24/2019    Procedure: LINEAR ENDOSCOPIC U/S;  Surgeon: Ignacio Hassan MD;  Location: [facility]; Service: Gastroenterology    REPLACEMENT TOTAL KNEE Left     TUNNELED VENOUS PORT PLACEMENT Left 4/23/2019    Procedure: INSERTION VENOUS PORT (PORT-A-CATH);   Surgeon: Natalya Lazcano MD;  [facility];  Service: Surgical Oncology- 11/8/21 Pt reports PAC removed  UMBILICAL HERNIA REPAIR       Family History   Problem Relation Age of Onset    Breast cancer Mother     Cervical cancer Paternal Aunt     Pancreatic cancer Other     Liver cancer Other     No Known Problems Father      Social History     Socioeconomic History    Marital status: /Civil Union     Spouse name: Not on file    Number of children: Not on file    Years of education: Not on file    Highest education level: Not on file   Occupational History    Not on file   Tobacco Use    Smoking status: Never Smoker    Smokeless tobacco: Never Used   Vaping Use    Vaping Use: Never used   Substance and Sexual Activity    Alcohol use: Yes     Alcohol/week: 2 0 standard drinks     Types: 2 Cans of beer per week     Comment: couple times per week;     Drug use: No     Comment: Denies     Sexual activity: Yes     Comment: Denies any chest pain or shortness of breath with activity   Other Topics Concern    Not on file   Social History Narrative    Not on file     Social Determinants of Health     Financial Resource Strain: Not on file   Food Insecurity: Not on file   Transportation Needs: Not on file   Physical Activity: Not on file   Stress: Not on file   Social Connections: Not on file   Intimate Partner Violence: Not on file   Housing Stability: Not on file       Current Outpatient Medications:     acetaminophen (TYLENOL) 325 mg tablet, Take 2 tablets (650 mg total) by mouth every 6 (six) hours as needed for mild pain (Patient not taking: Reported on 12/15/2021 ), Disp: 30 tablet, Rfl: 0    apixaban (Eliquis) 5 mg, Take 1 tablet (5 mg total) by mouth 2 (two) times a day (Patient not taking: Reported on 12/8/2021 ), Disp: 60 tablet, Rfl: 5    ascorbic acid (VITAMIN C) 1000 MG tablet, Take 2,000 mg by mouth daily 11/8/21 Instructed pt to HOLD starting 11/9/21 up to and including DOS   , Disp: , Rfl:     aspirin (ECOTRIN) 325 mg EC tablet, Take 325 mg by mouth daily 11/8/21 Pt instructed as per urology instruction sheet - pt reports stopped on 11/7/21  Instructed pt to verify with cardiology regarding stopping of ASA and not starting Eliquis at this time prior to surgery  , Disp: , Rfl:     atenolol (TENORMIN) 25 mg tablet, TAKE 1 TABLET BY MOUTH EVERY DAY (Patient taking differently: Take 25 mg by mouth daily Takes in the morning  ), Disp: 90 tablet, Rfl: 3    Cholecalciferol (VITAMIN D) 2000 units CAPS, Take 1,000 Units by mouth daily 11/8/21 Pt takes three tabs of Vitamin D daily  Instructed to start HOLD 11/9/21 up to and including DOS  , Disp: , Rfl:     Cyanocobalamin (VITAMIN B 12 PO), Take by mouth daily 11/8/21 Pt reports takes two tabs daily  , Disp: , Rfl:     dicyclomine (BENTYL) 10 mg capsule, TAKE 1 CAPSULE BY MOUTH EVERY DAY (Patient not taking: Reported on 12/15/2021), Disp: 90 capsule, Rfl: 0    metFORMIN (GLUCOPHAGE) 1000 MG tablet, TAKE 1 TABLET BY MOUTH TWICE A DAY WITH MEALS, Disp: 180 tablet, Rfl: 1    Na Sulfate-K Sulfate-Mg Sulf (Suprep Bowel Prep Kit) 17 5-3 13-1 6 GM/177ML SOLN, Follow office instructions (Patient not taking: Reported on 12/15/2021 ), Disp: 1 Bottle, Rfl: 0    nystatin-triamcinolone (MYCOLOG-II) ointment, Apply topically 2 (two) times a day (Patient not taking: Reported on 12/15/2021 ), Disp: 30 g, Rfl: 0    solifenacin (VESICARE) 10 MG tablet, Take 1 tablet (10 mg total) by mouth daily (Patient not taking: Reported on 10/12/2021), Disp: 30 tablet, Rfl: 5    tadalafil (CIALIS) 20 MG tablet, TAKE 1 TABLET BY MOUTH DAILY AS NEEDED FOR ERECTILE DYSFUNCTION, Disp: 10 tablet, Rfl: 0    tamsulosin (FLOMAX) 0 4 mg, Take 1 capsule (0 4 mg total) by mouth daily with dinner, Disp: 30 capsule, Rfl: 0  No Known Allergies  There were no vitals filed for this visit  Physical Exam  Vitals reviewed  Constitutional:       Appearance: Normal appearance  HENT:      Head: Normocephalic and atraumatic        Right Ear: External ear normal       Left Ear: External ear normal       Mouth/Throat:      Mouth: Mucous membranes are dry  Eyes:      Extraocular Movements: Extraocular movements intact  Pupils: Pupils are equal, round, and reactive to light  Cardiovascular:      Rate and Rhythm: Normal rate and regular rhythm  Pulses: Normal pulses  Heart sounds: Normal heart sounds  Pulmonary:      Effort: Pulmonary effort is normal       Breath sounds: Normal breath sounds  Abdominal:      General: Abdomen is flat  Bowel sounds are normal  There is no distension  Palpations: Abdomen is soft  There is no mass  Tenderness: There is no abdominal tenderness  There is no guarding or rebound  Hernia: No hernia is present  Musculoskeletal:         General: Normal range of motion  Cervical back: Normal range of motion and neck supple  Skin:     General: Skin is warm and dry  Neurological:      General: No focal deficit present  Mental Status: He is alert and oriented to person, place, and time     Psychiatric:         Mood and Affect: Mood normal          Behavior: Behavior normal          Judgment: Judgment normal            Results:  Labs:    Component Ref Range & Units 11/29/21 11:38 AM 12/9/20  9:16 AM 6/10/20  9:50 AM 2/14/20 10:38 AM 11/7/19  9:41 AM 6/18/19  9:45 AM   CA 19-9 0 - 35 U/mL 33           Case Report   Non-gynecologic Cytology                          Case: [id number]                                   Authorizing Provider: Yana Judge MD         Collected:           01/03/2022 1230               Ordering Location:     [facility]      Received:            01/03/2022 1314                                      [facility]                                                            Pathologist:           Jeana Gonzales MD                                                            Specimens:   A) - Pancreas, pancreatic body tail cystic mass                                                      B) - Pancreas, pancreatic body tail cystic mass                                            Final Diagnosis   A B  Pancreas, pancreatic body tail cystic mass ( ThinPrep and smear preparations ):  Neoplastic, Other (PSC Category IV) -See note      Note: The sample shows acellular specimen with atypical mucinous epithelium compatible with a neoplastic mucinous cyst with high-grade atypia  Extracellular mucin is present, supporting a mucinous differentiation  The atypia is compatible with at least high-grade dysplasia, though an invasive component cannot be excluded on cytology preparation  Correlation with clinical impression, other tissue sampling as applicable and pending CEA analysis is recommended      Satisfactory for evaluation      The above diagnostic category is part of the six-tiered system proposed by The Papanicolaou Society of Cytopathology for the reporting of pancreaticobiliary specimens  This proposed scheme provides terminology that correlates the cytologic diagnostic nomenclature with the 2010 WHO classification of pancreatic tumors and standardizes the categorization of the various diseases of the pancreas, some of which are difficult to diagnose specifically by cytology      *The Papanicolaou Society of Cytopathology System for Reporting Pancreaticobiliary Cytology: Definitions, Criteria and Explanatory Notes  Corine Stiles Adjutant; [place], 2015             Electronically signed by Kirsty Morton MD on 1/5/2022 at  3:21 PM         Imaging  Endoscopic ultrasonography, GI (Upper) Linear    Result Date: 1/3/2022  Narrative: [address] [place] [address] Endoscopy [facility] [place] [address]: 1/03/22 PHYSICIAN(S): Yovany Ceballos MD - Attending Physician INDICATION: Malignant neoplasm of pancreas, unspecified location of malignancy ([address] ) POST-OP DIAGNOSIS: See the impression below  PREPROCEDURE: Informed consent was obtained for the procedure, including sedation  Risks of perforation, hemorrhage, adverse drug reaction and aspiration were discussed  The patient was placed in the left lateral decubitus position  Patient was explained about the risks and benefits of the procedure  Risks including but not limited to bleeding, infection, and perforation were explained in detail  Also explained about less than 100% sensitivity with the exam and other alternatives  DETAILS OF PROCEDURE: Patient was taken to the procedure room where a time out was performed to confirm correct patient and correct procedure  The patient underwent monitored anesthesia care, which was administered by an anesthesia professional  The patient's blood pressure, heart rate, oxygen, respirations and level of consciousness were monitored throughout the procedure  The patient experienced no blood loss  The procedure was not difficult  The patient tolerated the procedure well  There were no apparent complications  ANESTHESIA INFORMATION: ASA: III Anesthesia Type: IV Sedation with Anesthesia MEDICATIONS: No administrations occurring from 1203 to 1309 on 01/03/22 FINDINGS: EGD C1M2 Tovar's esophagus observed where the Z-line is 43 cm from the incisors and top of gastric folds are 44 cm from the incisors with 1 tongue and no associated nodule; narrow band imaging (NBI) used; performed targeted cold forceps biopsy Small hiatal hernia The duodenum appeared normal  EUS Irregular, heterogeneous and macrocystic cyst measuring 29 mm x 30 mm with debris present, main pancreatic duct involvement and well-defined margins in the body of the pancreas and tail of the pancreas  Fine needle aspiration passes were taken with a 19 gauge [employer] needle using a transgastric approach guided by Doppler  Obtained 2 mL of clear and thick-appearing aspirate, sent sample for chemistry and cytology analysis   Fine needle biopsy passes were taken with a 22 gauge [employer] needle using a transgastric approach guided by Doppler and performed cytology analysis  Onsite cytologist was present and cytology results were preliminarily atypical  Five or more anechoic cysts with well-defined margins and smooth margins in the head of the pancreas, neck of the pancreas and body of the pancreas  SPECIMENS: ID Type Source Tests Collected by Time Destination 1 : distal esophagus R/O gonzalez's Tissue Esophagus TISSUE EXAM Gioia Schirmer, MD 1/3/2022 12:17 PM  2 : pancreatic body tail cystic mass Other FNA NON-GYNECOLOGIC CYTOLOGY, FINE NEEDLE ASPIRATION Gioia Schirmer, MD 1/3/2022 12:30 PM       Impression: EGD: Short segment Gonzalez's esophagus s/p targeted biopsy Small hiatus hernia Normal duodenum EUS: 3 cm heterogenous cyst with solid component/debris in the distal pancreas at the margin of resection with communication with the main duct  This was biopsied with FNB needle and thick mucinous appearing aspirate sent for CEA/amylase and cytology  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  RECOMMENDATION: Follow up cytology and CEA/amylase of cyst fluid  Follow up with surgical oncology  Gioia Schirmer, MD     I reviewed the above laboratory and imaging data  MRI OF THE ABDOMEN WITH AND WITHOUT CONTRAST WITH MRCP     INDICATION:  Follow-up pancreatic cysts, history of distal pancreatectomy      COMPARISON: 7/30/2021     TECHNIQUE:  The following pulse sequences were obtained:  Coronal and axial T2 with TE of 90 and 180 respectively, axial T2 with fat saturation, axial FIESTA fat-sat, axial T1-weighted in-and-out-of phase, axial DWI/ADC, pre-contrast axial T1 with fat   saturation, post-contrast dynamic axial T1 with fat saturation at 20, 70, and 180 seconds, followed by coronal and 7 minute delayed axial T1 with fat saturation  3D MRCP images were obtained with radial thick slabs and projections  3D rendering was   performed from the acquisition scanner     n     IV Contrast:  12 mL of Gadobutrol injection (SINGLE-DOSE)      FINDINGS:     LOWER CHEST: Unremarkable      LIVER: Somewhat nodular hepatic contour is again identified  Mild loss of signal is noted on out of phase T1-weighted pulse sequences consistent with mild hepatic steatosis     No suspicious mass  The hepatic veins and portal veins are patent      BILE DUCTS:  No intrahepatic or extrahepatic bile duct dilation  Common bile duct is normal in caliber  No choledocholithiasis, biliary stricture or suspicious mass       GALLBLADDER:  Normal      PANCREAS:  Patient is again status post distal pancreatectomy  Innumerable parenchymal cysts multiple parenchymal cysts are again identified there is interval enlargement of the distal most pancreatic cyst measuring approximately 2 3 x 2 1  This is   inseparable from the main pancreatic duct which has slightly increased in dilatation now measuring 10 mm  Findings are again suspicious for IPMN with main duct involvement versus main duct IPMN     ADRENAL GLANDS:  Normal      SPLEEN:  Normal      KIDNEYS/PROXIMAL URETERS:  No hydroureteronephrosis  No suspicious renal mass      BOWEL:   No dilated loops of bowel       PERITONEUM/RETROPERITONEUM:  No ascites      LYMPH NODES:  Stable 13 mm portacaval lymph node is identified  Smaller peripancreatic node again identified      VASCULAR STRUCTURES:  No aneurysm      ABDOMINAL WALL:  Unremarkable      OSSEOUS STRUCTURES:  No suspicious osseous lesion      IMPRESSION:     Enlarging cystic lesion within the most distal residual pancreas with associated interval enlargement of the main pancreatic duct  Findings are suggestive of enlarging IPMN  with main duct involvement versus primary main duct IPMN     Somewhat nodular hepatic contour again seen potentially reflecting underlying hepatocellular disease    Hepatic steatosis      Stable peripancreatic and aortocaval lymph nodes         Findings were related to Dr Max Hussein via secure Peer[employer] messaging      Workstation performed: [id number]       Discussion/Summary:  21-year-old man, history of stage I pancreas cancer, now with what appears to be main duct IPMN/cyst and body of pancreas  Case discussed at tumor Board  Based on findings, as well as pathology report, recommend distal pancreatectomy  Given location of cyst, as well as history of prior surgery, would opt for open resection  Rationale for this along with risks and benefits of surgery including infection, bleeding, heart trouble, lung trouble, kidney trouble, stroke, even death, were discussed  We also discussed possibly pancreatic leak or fistula, along with need for drain after this operation  All questions answered and consents signed at this visit  Will obtain preop clearance given medical comorbidities

## 2022-01-17 NOTE — PROGRESS NOTES
Surgical Oncology Follow Up       [address] [facility] [address]  [facility] [address] [phone]    Halliday Craw  1950  [id number]  [address] [facility] CANCER [facility] [address] [id number] [phone]    No chief complaint on file  Assessment/Plan:    No problem-specific Assessment & Plan notes found for this encounter  Diagnoses and all orders for this visit:    Encounter for follow-up surveillance of pancreatic cancer    History of pancreatic cancer    Pancreas cyst        Advance Care Planning/Advance Directives:  Discussed disease status, cancer treatment plans and/or cancer treatment goals with the patient  Oncology History   History of pancreatic cancer   3/12/2019 Surgery    Distal pancreatectomy  Several foci of invasive colloid cancer  Grade 1  IPMN with high grade dysplasia at margin  T1b, NO MX Stage IA     4/11/2019 - 6/21/2019 Chemotherapy    Saw Dr Parag Ratliff  Will be starting Folfirinox 4/24   Ended 6/21/2019 4/24/2019 - 7/2/2019 Chemotherapy    fluorouracil (ADRUCIL), 400 mg/m2 = 1,010 mg, Intravenous, Once, 2 of 2 cycles  pegfilgrastim (Erman See), 6 mg, Subcutaneous, Once, 2 of 2 cycles  Administration: 6 mg (6/21/2019), 6 mg (6/7/2019)  irinotecan (CAMPTOSAR) chemo infusion, 180 mg/m2 = 455 mg, Intravenous, Once, 4 of 4 cycles  Dose modification: 140 mg/m2 (original dose 180 mg/m2, Cycle 3, Reason: Other (Must fill in a comment)), 125 mg/m2 (original dose 180 mg/m2, Cycle 3, Reason: Dose Not Tolerated)  Administration: 316 mg (6/5/2019), 300 mg (6/19/2019)  leucovorin calcium IVPB, 400 mg/m2 = 1,012 mg, Intravenous, Once, 2 of 2 cycles  oxaliplatin (ELOXATIN) chemo infusion, 85 mg/m2 = 215 05 mg, Intravenous, Once, 4 of 4 cycles  Dose modification: 65 mg/m2 (original dose 85 mg/m2, Cycle 3, Reason: Other (Must fill in a comment)), 60 mg/m2 (original dose 85 mg/m2, Cycle 3, Reason: Dose Not Tolerated)  Administration: 151 8 mg (6/5/2019), 151 8 mg (6/19/2019)  fluorouracil (ADRUCIL) ambulatory infusion Soln, 1,200 mg/m2/day = 6,070 mg, Intravenous, Over 46 hours, 4 of 4 cycles         History of Present Illness:  Patient is a 69-year-old man with history of stage I pancreas cancer status post prior laparoscopic distal pancreatectomy   -Interval History:  He was found have enlarging pancreatic cyst involving the mid body of the pancreas, 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  He underwent recent EUS with biopsy confirming atypical cells, possibly severe dysplasia, and what appears to be main duct IPMN, with cyst measuring 3 cm  His case was discussed at multidisciplinary tumor board, and based on the likelihood of main duct IPMN, surgical resection was recommended  Review of Systems:  Review of Systems   Constitutional: Negative  HENT: Negative  Eyes: Negative  Respiratory: Negative  Cardiovascular: Negative  Gastrointestinal: Positive for abdominal pain  Endocrine: Negative  Genitourinary: Negative  Musculoskeletal: Negative  Skin: Negative  Allergic/Immunologic: Negative  Neurological: Negative  Hematological: Negative  Psychiatric/Behavioral: Negative  All other systems reviewed and are negative        Patient Active Problem List   Diagnosis    Obstructive sleep apnea syndrome    Hypersomnia    Permanent atrial fibrillation (HCC)    Morbid obesity with BMI of 40 0-44 9, adult ([address] )    Lower extremity edema    Pancreatic duct dilated    History of pancreatic cancer    Type 2 diabetes mellitus without complication, without long-term current use of insulin (HCC)    Malignant neoplasm of tail of pancreas (HCC)    Thrombocytopenia (HCC)    Urgency incontinence    Balanitis    OAB (overactive bladder)    BPH without obstruction/lower urinary tract symptoms    Encounter for follow-up surveillance of pancreatic cancer     Past Medical History:   Diagnosis Date    A-fib ([facility] )     Abdominal wall strain     last assessed: 10/21/14    Allergic rhinitis     last assessed: 05/03/16    Arthritis     Cancer ([facility] )     COVID-19 virus infection 3/3/2021    CPAP (continuous positive airway pressure) dependence     Diabetes mellitus ([facility] )     Erectile dysfunction of non-organic origin     last assessed: 03/17/14    Irregular heart beat     Pt with A fib     Lumbar strain     last assessed: 10/21/14    Multiple acquired skin tags     last assessed: 2/18/16    Palpitations     last assessed: 03/17/14    Pancreas cyst     Plantar fasciitis     Skin disorder     last assessed: 05/28/13    Sleep apnea      Past Surgical History:   Procedure Laterality Date    BOTOX INJECTION N/A 11/12/2021    Procedure: Garfield Painting;  Surgeon: Manasa Barbosa MD;  Location: [address] [place] [address];  Service: Urology    CATARACT EXTRACTION Bilateral     COLONOSCOPY  01/17/2013    COLONOSCOPY  01/08/2018    COLONOSCOPY  04/2021    EGD  06/05/2020    EGD AND COLONOSCOPY  02/06/2014, 2/8/2017    FL GUIDED CENTRAL VENOUS ACCESS DEVICE INSERTION  4/23/2019    FLEXIBLE SIGMOIDOSCOPY  06/11/2019    FOOT SURGERY      Due to plantar fascitis    JOINT REPLACEMENT Left     LTK    LINEAR ENDOSCOPIC U/S      PANCREATECTOMY LAPAROSCOPIC N/A 3/12/2019    Procedure: DISTAL PANCREATECTOMY LAPAROSCOPIC/POSSIBLE OPEN;  Surgeon: Marylou Rodriguez MD;  [facility];  Service: Surgical Oncology    WA EDG US EXAM SURGICAL ALTER STOM DUODENUM/JEJUNUM N/A 1/24/2019    Procedure: LINEAR ENDOSCOPIC U/S;  Surgeon: Lise Koch MD;  Location: [facility]; Service: Gastroenterology    REPLACEMENT TOTAL KNEE Left     TUNNELED VENOUS PORT PLACEMENT Left 4/23/2019    Procedure: INSERTION VENOUS PORT (PORT-A-CATH);   Surgeon: Marylou Rodriguez MD;  [facility];  Service: Surgical Oncology- 11/8/21 Pt reports PAC removed  UMBILICAL HERNIA REPAIR       Family History   Problem Relation Age of Onset    Breast cancer Mother     Cervical cancer Paternal Aunt     Pancreatic cancer Other     Liver cancer Other     No Known Problems Father      Social History     Socioeconomic History    Marital status: /Civil Union     Spouse name: Not on file    Number of children: Not on file    Years of education: Not on file    Highest education level: Not on file   Occupational History    Not on file   Tobacco Use    Smoking status: Never Smoker    Smokeless tobacco: Never Used   Vaping Use    Vaping Use: Never used   Substance and Sexual Activity    Alcohol use: Yes     Alcohol/week: 2 0 standard drinks     Types: 2 Cans of beer per week     Comment: couple times per week;     Drug use: No     Comment: Denies     Sexual activity: Yes     Comment: Denies any chest pain or shortness of breath with activity   Other Topics Concern    Not on file   Social History Narrative    Not on file     Social Determinants of Health     Financial Resource Strain: Not on file   Food Insecurity: Not on file   Transportation Needs: Not on file   Physical Activity: Not on file   Stress: Not on file   Social Connections: Not on file   Intimate Partner Violence: Not on file   Housing Stability: Not on file       Current Outpatient Medications:     acetaminophen (TYLENOL) 325 mg tablet, Take 2 tablets (650 mg total) by mouth every 6 (six) hours as needed for mild pain (Patient not taking: Reported on 12/15/2021 ), Disp: 30 tablet, Rfl: 0    apixaban (Eliquis) 5 mg, Take 1 tablet (5 mg total) by mouth 2 (two) times a day (Patient not taking: Reported on 12/8/2021 ), Disp: 60 tablet, Rfl: 5    ascorbic acid (VITAMIN C) 1000 MG tablet, Take 2,000 mg by mouth daily 11/8/21 Instructed pt to HOLD starting 11/9/21 up to and including DOS   , Disp: , Rfl:     aspirin (ECOTRIN) 325 mg EC tablet, Take 325 mg by mouth daily 11/8/21 Pt instructed as per urology instruction sheet - pt reports stopped on 11/7/21  Instructed pt to verify with cardiology regarding stopping of ASA and not starting Eliquis at this time prior to surgery  , Disp: , Rfl:     atenolol (TENORMIN) 25 mg tablet, TAKE 1 TABLET BY MOUTH EVERY DAY (Patient taking differently: Take 25 mg by mouth daily Takes in the morning  ), Disp: 90 tablet, Rfl: 3    Cholecalciferol (VITAMIN D) 2000 units CAPS, Take 1,000 Units by mouth daily 11/8/21 Pt takes three tabs of Vitamin D daily  Instructed to start HOLD 11/9/21 up to and including DOS  , Disp: , Rfl:     Cyanocobalamin (VITAMIN B 12 PO), Take by mouth daily 11/8/21 Pt reports takes two tabs daily  , Disp: , Rfl:     dicyclomine (BENTYL) 10 mg capsule, TAKE 1 CAPSULE BY MOUTH EVERY DAY (Patient not taking: Reported on 12/15/2021), Disp: 90 capsule, Rfl: 0    metFORMIN (GLUCOPHAGE) 1000 MG tablet, TAKE 1 TABLET BY MOUTH TWICE A DAY WITH MEALS, Disp: 180 tablet, Rfl: 1    Na Sulfate-K Sulfate-Mg Sulf (Suprep Bowel Prep Kit) 17 5-3 13-1 6 GM/177ML SOLN, Follow office instructions (Patient not taking: Reported on 12/15/2021 ), Disp: 1 Bottle, Rfl: 0    nystatin-triamcinolone (MYCOLOG-II) ointment, Apply topically 2 (two) times a day (Patient not taking: Reported on 12/15/2021 ), Disp: 30 g, Rfl: 0    solifenacin (VESICARE) 10 MG tablet, Take 1 tablet (10 mg total) by mouth daily (Patient not taking: Reported on 10/12/2021), Disp: 30 tablet, Rfl: 5    tadalafil (CIALIS) 20 MG tablet, TAKE 1 TABLET BY MOUTH DAILY AS NEEDED FOR ERECTILE DYSFUNCTION, Disp: 10 tablet, Rfl: 0    tamsulosin (FLOMAX) 0 4 mg, Take 1 capsule (0 4 mg total) by mouth daily with dinner, Disp: 30 capsule, Rfl: 0  No Known Allergies  There were no vitals filed for this visit  Physical Exam  Vitals reviewed  Constitutional:       Appearance: Normal appearance  HENT:      Head: Normocephalic and atraumatic        Right Ear: External ear normal       Left Ear: External ear normal       Mouth/Throat:      Mouth: Mucous membranes are dry  Eyes:      Extraocular Movements: Extraocular movements intact  Pupils: Pupils are equal, round, and reactive to light  Cardiovascular:      Rate and Rhythm: Normal rate and regular rhythm  Pulses: Normal pulses  Heart sounds: Normal heart sounds  Pulmonary:      Effort: Pulmonary effort is normal       Breath sounds: Normal breath sounds  Abdominal:      General: Abdomen is flat  Bowel sounds are normal  There is no distension  Palpations: Abdomen is soft  There is no mass  Tenderness: There is no abdominal tenderness  There is no guarding or rebound  Hernia: No hernia is present  Musculoskeletal:         General: Normal range of motion  Cervical back: Normal range of motion and neck supple  Skin:     General: Skin is warm and dry  Neurological:      General: No focal deficit present  Mental Status: He is alert and oriented to person, place, and time     Psychiatric:         Mood and Affect: Mood normal          Behavior: Behavior normal          Judgment: Judgment normal            Results:  Labs:    Component Ref Range & Units 11/29/21 11:38 AM 12/9/20  9:16 AM 6/10/20  9:50 AM 2/14/20 10:38 AM 11/7/19  9:41 AM 6/18/19  9:45 AM   CA 19-9 0 - 35 U/mL 33           Case Report   Non-gynecologic Cytology                          Case: [id number]                                   Authorizing Provider: Molina Chavarria MD         Collected:           01/03/2022 1230               Ordering Location:     [facility]      Received:            01/03/2022 1314                                      [facility]                                                            Pathologist:           Daisha Gillespie MD                                                            Specimens:   A) - Pancreas, pancreatic body tail cystic mass                                                      B) - Pancreas, pancreatic body tail cystic mass                                            Final Diagnosis   A B  Pancreas, pancreatic body tail cystic mass ( ThinPrep and smear preparations ):  Neoplastic, Other (PSC Category IV) -See note      Note: The sample shows acellular specimen with atypical mucinous epithelium compatible with a neoplastic mucinous cyst with high-grade atypia  Extracellular mucin is present, supporting a mucinous differentiation  The atypia is compatible with at least high-grade dysplasia, though an invasive component cannot be excluded on cytology preparation  Correlation with clinical impression, other tissue sampling as applicable and pending CEA analysis is recommended      Satisfactory for evaluation      The above diagnostic category is part of the six-tiered system proposed by The Papanicolaou Society of Cytopathology for the reporting of pancreaticobiliary specimens  This proposed scheme provides terminology that correlates the cytologic diagnostic nomenclature with the 2010 WHO classification of pancreatic tumors and standardizes the categorization of the various diseases of the pancreas, some of which are difficult to diagnose specifically by cytology      *The Papanicolaou Society of Cytopathology System for Reporting Pancreaticobiliary Cytology: Definitions, Criteria and Explanatory Notes  Lucille Navy Darryle Aldo; [place], 2015             Electronically signed by Theresa Santos MD on 1/5/2022 at  3:21 PM         Imaging  Endoscopic ultrasonography, GI (Upper) Linear    Result Date: 1/3/2022  Narrative: [address] [place] [address] [id number] [place] [address] [address]: 1/03/22 PHYSICIAN(S): Gioia Schirmer, MD - Attending Physician INDICATION: Malignant neoplasm of pancreas, unspecified location of malignancy ([address] ) POST-OP DIAGNOSIS: See the impression below  PREPROCEDURE: Informed consent was obtained for the procedure, including sedation  Risks of perforation, hemorrhage, adverse drug reaction and aspiration were discussed  The patient was placed in the left lateral decubitus position  Patient was explained about the risks and benefits of the procedure  Risks including but not limited to bleeding, infection, and perforation were explained in detail  Also explained about less than 100% sensitivity with the exam and other alternatives  DETAILS OF PROCEDURE: Patient was taken to the procedure room where a time out was performed to confirm correct patient and correct procedure  The patient underwent monitored anesthesia care, which was administered by an anesthesia professional  The patient's blood pressure, heart rate, oxygen, respirations and level of consciousness were monitored throughout the procedure  The patient experienced no blood loss  The procedure was not difficult  The patient tolerated the procedure well  There were no apparent complications  ANESTHESIA INFORMATION: ASA: III Anesthesia Type: IV Sedation with Anesthesia MEDICATIONS: No administrations occurring from 1203 to 1309 on 01/03/22 FINDINGS: EGD C1M2 Tovar's esophagus observed where the Z-line is 43 cm from the incisors and top of gastric folds are 44 cm from the incisors with 1 tongue and no associated nodule; narrow band imaging (NBI) used; performed targeted cold forceps biopsy Small hiatal hernia The duodenum appeared normal  EUS Irregular, heterogeneous and macrocystic cyst measuring 29 mm x 30 mm with debris present, main pancreatic duct involvement and well-defined margins in the body of the pancreas and tail of the pancreas  Fine needle aspiration passes were taken with a 19 gauge [employer] needle using a transgastric approach guided by Doppler  Obtained 2 mL of clear and thick-appearing aspirate, sent sample for chemistry and cytology analysis   Fine needle biopsy passes were taken with a 22 gauge MyRoll needle using a transgastric approach guided by Doppler and performed cytology analysis  Onsite cytologist was present and cytology results were preliminarily atypical  Five or more anechoic cysts with well-defined margins and smooth margins in the head of the pancreas, neck of the pancreas and body of the pancreas  SPECIMENS: ID Type Source Tests Collected by Time Destination 1 : distal esophagus R/O gonzalez's Tissue Esophagus TISSUE EXAM Timmy Vickers MD 1/3/2022 12:17 PM  2 : pancreatic body tail cystic mass Other [facility]NA NON-GYNECOLOGIC CYTOLOGY, FINE NEEDLE ASPIRATION Timmy Vickers MD 1/3/2022 12:30 PM       Impression: EGD: Short segment Gonzalez's esophagus s/p targeted biopsy Small hiatus hernia Normal duodenum EUS: 3 cm heterogenous cyst with solid component/debris in the distal pancreas at the margin of resection with communication with the main duct  This was biopsied with FNB needle and thick mucinous appearing aspirate sent for CEA/amylase and cytology  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  RECOMMENDATION: Follow up cytology and CEA/amylase of cyst fluid  Follow up with surgical oncology  Timmy Vickers MD     I reviewed the above laboratory and imaging data  MRI OF THE ABDOMEN WITH AND WITHOUT CONTRAST WITH MRCP     INDICATION:  Follow-up pancreatic cysts, history of distal pancreatectomy      COMPARISON: 7/30/2021     TECHNIQUE:  The following pulse sequences were obtained:  Coronal and axial T2 with TE of 90 and 180 respectively, axial T2 with fat saturation, axial FIESTA fat-sat, axial T1-weighted in-and-out-of phase, axial DWI/ADC, pre-contrast axial T1 with fat   saturation, post-contrast dynamic axial T1 with fat saturation at 20, 70, and 180 seconds, followed by coronal and 7 minute delayed axial T1 with fat saturation  3D MRCP images were obtained with radial thick slabs and projections  3D rendering was   performed from the acquisition scanner     n     IV Contrast:  12 mL of Gadobutrol injection (SINGLE-DOSE)      FINDINGS:     LOWER CHEST: Unremarkable      LIVER: Somewhat nodular hepatic contour is again identified  Mild loss of signal is noted on out of phase T1-weighted pulse sequences consistent with mild hepatic steatosis     No suspicious mass  The hepatic veins and portal veins are patent      BILE DUCTS:  No intrahepatic or extrahepatic bile duct dilation  Common bile duct is normal in caliber  No choledocholithiasis, biliary stricture or suspicious mass       GALLBLADDER:  Normal      PANCREAS:  Patient is again status post distal pancreatectomy  Innumerable parenchymal cysts multiple parenchymal cysts are again identified there is interval enlargement of the distal most pancreatic cyst measuring approximately 2 3 x 2 1  This is   inseparable from the main pancreatic duct which has slightly increased in dilatation now measuring 10 mm  Findings are again suspicious for IPMN with main duct involvement versus main duct IPMN     ADRENAL GLANDS:  Normal      SPLEEN:  Normal      KIDNEYS/PROXIMAL URETERS:  No hydroureteronephrosis  No suspicious renal mass      BOWEL:   No dilated loops of bowel       PERITONEUM/RETROPERITONEUM:  No ascites      LYMPH NODES:  Stable 13 mm portacaval lymph node is identified  Smaller peripancreatic node again identified      VASCULAR STRUCTURES:  No aneurysm      ABDOMINAL WALL:  Unremarkable      OSSEOUS STRUCTURES:  No suspicious osseous lesion      IMPRESSION:     Enlarging cystic lesion within the most distal residual pancreas with associated interval enlargement of the main pancreatic duct  Findings are suggestive of enlarging IPMN  with main duct involvement versus primary main duct IPMN     Somewhat nodular hepatic contour again seen potentially reflecting underlying hepatocellular disease    Hepatic steatosis      Stable peripancreatic and aortocaval lymph nodes         Findings were related to Dr Petra Paulson via secure [employer] messaging      Workstation performed: [id number]       Discussion/Summary:  78-year-old man, history of stage I pancreas cancer, now with what appears to be main duct IPMN/cyst and body of pancreas  Case discussed at tumor Board  Based on findings, as well as pathology report, recommend distal pancreatectomy  Given location of cyst, as well as history of prior surgery, would opt for open resection  Rationale for this along with risks and benefits of surgery including infection, bleeding, heart trouble, lung trouble, kidney trouble, stroke, even death, were discussed  We also discussed possibly pancreatic leak or fistula, along with need for drain after this operation  All questions answered and consents signed at this visit  Will obtain preop clearance given medical comorbidities

## 2022-01-18 ENCOUNTER — APPOINTMENT (OUTPATIENT)
Dept: LAB | Facility: MEDICAL CENTER | Age: 72
End: 2022-01-18
Payer: MEDICARE

## 2022-01-18 ENCOUNTER — LAB REQUISITION (OUTPATIENT)
Dept: LAB | Facility: HOSPITAL | Age: 72
End: 2022-01-18
Payer: MEDICARE

## 2022-01-18 DIAGNOSIS — E10.65 TYPE 1 DIABETES MELLITUS WITH HYPERGLYCEMIA (HCC): ICD-10-CM

## 2022-01-18 DIAGNOSIS — Z08 ENCOUNTER FOR FOLLOW-UP SURVEILLANCE OF PANCREATIC CANCER: ICD-10-CM

## 2022-01-18 DIAGNOSIS — E11.65 TYPE 2 DIABETES MELLITUS WITH HYPERGLYCEMIA, UNSPECIFIED WHETHER LONG TERM INSULIN USE (HCC): ICD-10-CM

## 2022-01-18 DIAGNOSIS — K83.8 OTHER SPECIFIED DISEASES OF BILIARY TRACT: ICD-10-CM

## 2022-01-18 DIAGNOSIS — Z85.07 ENCOUNTER FOR FOLLOW-UP SURVEILLANCE OF PANCREATIC CANCER: ICD-10-CM

## 2022-01-18 PROBLEM — Z01.89 ENCOUNTER FOR GERIATRIC ASSESSMENT: Status: ACTIVE | Noted: 2022-01-01

## 2022-01-18 LAB
ABO GROUP BLD: NORMAL
ALBUMIN SERPL BCP-MCNC: 3.3 G/DL (ref 3.5–5)
ALP SERPL-CCNC: 125 U/L (ref 46–116)
ALT SERPL W P-5'-P-CCNC: 28 U/L (ref 12–78)
ANION GAP SERPL CALCULATED.3IONS-SCNC: 5 MMOL/L (ref 4–13)
APTT PPP: 33 SECONDS (ref 23–37)
AST SERPL W P-5'-P-CCNC: 32 U/L (ref 5–45)
BASOPHILS # BLD AUTO: 0.02 THOUSANDS/ΜL (ref 0–0.1)
BASOPHILS NFR BLD AUTO: 0 % (ref 0–1)
BILIRUB SERPL-MCNC: 0.99 MG/DL (ref 0.2–1)
BLD GP AB SCN SERPL QL: NEGATIVE
BUN SERPL-MCNC: 12 MG/DL (ref 5–25)
CALCIUM ALBUM COR SERPL-MCNC: 9.6 MG/DL (ref 8.3–10.1)
CALCIUM SERPL-MCNC: 9 MG/DL (ref 8.3–10.1)
CHLORIDE SERPL-SCNC: 104 MMOL/L (ref 100–108)
CO2 SERPL-SCNC: 26 MMOL/L (ref 21–32)
CREAT SERPL-MCNC: 0.85 MG/DL (ref 0.6–1.3)
EOSINOPHIL # BLD AUTO: 0.1 THOUSAND/ΜL (ref 0–0.61)
EOSINOPHIL NFR BLD AUTO: 2 % (ref 0–6)
ERYTHROCYTE [DISTWIDTH] IN BLOOD BY AUTOMATED COUNT: 12.9 % (ref 11.6–15.1)
EST. AVERAGE GLUCOSE BLD GHB EST-MCNC: 151 MG/DL
GFR SERPL CREATININE-BSD FRML MDRD: 87 ML/MIN/1.73SQ M
GLUCOSE P FAST SERPL-MCNC: 184 MG/DL (ref 65–99)
HBA1C MFR BLD: 6.9 %
HCT VFR BLD AUTO: 39.3 % (ref 36.5–49.3)
HGB BLD-MCNC: 13.6 G/DL (ref 12–17)
IMM GRANULOCYTES # BLD AUTO: 0.02 THOUSAND/UL (ref 0–0.2)
IMM GRANULOCYTES NFR BLD AUTO: 0 % (ref 0–2)
INR PPP: 1.12 (ref 0.84–1.19)
LYMPHOCYTES # BLD AUTO: 0.98 THOUSANDS/ΜL (ref 0.6–4.47)
LYMPHOCYTES NFR BLD AUTO: 19 % (ref 14–44)
MCH RBC QN AUTO: 35.9 PG (ref 26.8–34.3)
MCHC RBC AUTO-ENTMCNC: 34.6 G/DL (ref 31.4–37.4)
MCV RBC AUTO: 104 FL (ref 82–98)
MONOCYTES # BLD AUTO: 0.59 THOUSAND/ΜL (ref 0.17–1.22)
MONOCYTES NFR BLD AUTO: 11 % (ref 4–12)
NEUTROPHILS # BLD AUTO: 3.49 THOUSANDS/ΜL (ref 1.85–7.62)
NEUTS SEG NFR BLD AUTO: 68 % (ref 43–75)
NRBC BLD AUTO-RTO: 0 /100 WBCS
PLATELET # BLD AUTO: 110 THOUSANDS/UL (ref 149–390)
PMV BLD AUTO: 12.5 FL (ref 8.9–12.7)
POTASSIUM SERPL-SCNC: 4.2 MMOL/L (ref 3.5–5.3)
PROT SERPL-MCNC: 8.2 G/DL (ref 6.4–8.2)
PROTHROMBIN TIME: 13.9 SECONDS (ref 11.6–14.5)
RBC # BLD AUTO: 3.79 MILLION/UL (ref 3.88–5.62)
RH BLD: NEGATIVE
SODIUM SERPL-SCNC: 135 MMOL/L (ref 136–145)
SPECIMEN EXPIRATION DATE: NORMAL
WBC # BLD AUTO: 5.2 THOUSAND/UL (ref 4.31–10.16)

## 2022-01-18 PROCEDURE — 85025 COMPLETE CBC W/AUTO DIFF WBC: CPT

## 2022-01-18 PROCEDURE — 86850 RBC ANTIBODY SCREEN: CPT | Performed by: SURGERY

## 2022-01-18 PROCEDURE — 86901 BLOOD TYPING SEROLOGIC RH(D): CPT | Performed by: SURGERY

## 2022-01-18 PROCEDURE — 85730 THROMBOPLASTIN TIME PARTIAL: CPT

## 2022-01-18 PROCEDURE — 36415 COLL VENOUS BLD VENIPUNCTURE: CPT

## 2022-01-18 PROCEDURE — 83036 HEMOGLOBIN GLYCOSYLATED A1C: CPT

## 2022-01-18 PROCEDURE — 86900 BLOOD TYPING SEROLOGIC ABO: CPT | Performed by: SURGERY

## 2022-01-18 PROCEDURE — 85610 PROTHROMBIN TIME: CPT

## 2022-01-18 PROCEDURE — 80053 COMPREHEN METABOLIC PANEL: CPT

## 2022-01-19 NOTE — PRE-PROCEDURE INSTRUCTIONS
Pre-Surgery Instructions:   Medication Instructions    acetaminophen (TYLENOL) 325 mg tablet PRN    apixaban (Eliquis) 5 mg DO NOT START YET    ascorbic acid (VITAMIN C) 1000 MG tablet LD 2/7    aspirin (ECOTRIN) 325 mg EC tablet LD 2/10    atenolol (TENORMIN) 25 mg tablet WILL TAKE DOS SM SIP    Cholecalciferol (VITAMIN D) 2000 units CAPS LD 2/7    Cyanocobalamin (VITAMIN B 12 PO) LD 2/7    metFORMIN (GLUCOPHAGE) 1000 MG tablet LD 2/14    tadalafil (CIALIS) 20 MG tablet PRN    tamsulosin (FLOMAX) 0 4 mg HS MED    traMADol (ULTRAM) 50 mg tablet PRN    INSTR  ON ADMIT LOC ,BRING PHOTO ID/MED LIST/INS  INFO , SHOWER REV , NO ASA/NSAIDS/VIT 1 WEEK PREOP  Pre Procedure Consult Calls    1  Are you currently experiencing symptoms of fever >100 4, cough, or shortness of breath or sore throat? ______YES    ___X__NO   If yes, then please call your PCP immediately for further direction  If you are completing this form on site, please find the safest and most direct route to the nearest exit and avoid close contact with others until you can get further advice from your PCP  2  Have you recently traveled to any foreign country or area within the [place] that has reported cases of COVID-19? ______YES      ___X__NO   IF YES, LIST LOCATION(S): __________________________   3  Have you recently been in contact with someone who is a suspected or confirmed case of COVID-19?   ______ YES   _____X_ No   INSTRUCTED ON NEW COVID VISITOR POLICY- ONLY 1 VISITOR, ALL MUST WEAR MASKS, PT VERBALIZES UNDERSTANDING

## 2022-01-20 ENCOUNTER — APPOINTMENT (OUTPATIENT)
Dept: RADIOLOGY | Facility: MEDICAL CENTER | Age: 72
End: 2022-01-20
Payer: MEDICARE

## 2022-01-20 DIAGNOSIS — Z08 ENCOUNTER FOR FOLLOW-UP SURVEILLANCE OF PANCREATIC CANCER: ICD-10-CM

## 2022-01-20 DIAGNOSIS — Z85.07 ENCOUNTER FOR FOLLOW-UP SURVEILLANCE OF PANCREATIC CANCER: ICD-10-CM

## 2022-01-20 PROCEDURE — 71046 X-RAY EXAM CHEST 2 VIEWS: CPT

## 2022-01-25 ENCOUNTER — TELEPHONE (OUTPATIENT)
Dept: SURGICAL ONCOLOGY | Facility: CLINIC | Age: 72
End: 2022-01-25

## 2022-01-25 DIAGNOSIS — C25.2 MALIGNANT NEOPLASM OF TAIL OF PANCREAS (HCC): Primary | ICD-10-CM

## 2022-01-25 RX ORDER — OXYCODONE HCL/ACETAMINOPHEN 5 MG-325MG
1 TABLET ORAL EVERY 4 HOURS PRN
Qty: 20 TABLET | Refills: 0 | Status: SHIPPED | OUTPATIENT
Start: 2022-01-25 | End: 2022-02-04

## 2022-01-25 NOTE — TELEPHONE ENCOUNTER
Patient contacted the office, stating that the Tramadol that was ordered for his pain was not sufficient  Discussed with Dr Omari Song and percocet was ordered  Spoke to the patient and let him know that this was ordered and sent to his pharmacy  Patient verbalized understanding

## 2022-01-31 DIAGNOSIS — E11.9 TYPE 2 DIABETES MELLITUS WITHOUT COMPLICATION, WITHOUT LONG-TERM CURRENT USE OF INSULIN (HCC): ICD-10-CM

## 2022-02-02 ENCOUNTER — OFFICE VISIT (OUTPATIENT)
Dept: PODIATRY | Facility: CLINIC | Age: 72
End: 2022-02-02

## 2022-02-02 VITALS
BODY MASS INDEX: 39.96 KG/M2 | HEIGHT: 72 IN | WEIGHT: 295 LBS | DIASTOLIC BLOOD PRESSURE: 70 MMHG | SYSTOLIC BLOOD PRESSURE: 128 MMHG

## 2022-02-02 DIAGNOSIS — E11.9 TYPE 2 DIABETES MELLITUS WITHOUT COMPLICATION, WITHOUT LONG-TERM CURRENT USE OF INSULIN (HCC): Primary | ICD-10-CM

## 2022-02-02 PROCEDURE — NCFTCARE PR NON-COVERED FOOT CARE: Performed by: PODIATRIST

## 2022-02-02 NOTE — PROGRESS NOTES
Patient presents for palliative diabetic foot care  No acute disorder noted  Pedal pulses are palpable  Toenails are elongated  Treatment consisted of trimming of multiple dystrophic toenails

## 2022-02-04 ENCOUNTER — TELEPHONE (OUTPATIENT)
Dept: OTHER | Facility: OTHER | Age: 72
End: 2022-02-04

## 2022-02-04 ENCOUNTER — TELEPHONE (OUTPATIENT)
Dept: SURGICAL ONCOLOGY | Facility: CLINIC | Age: 72
End: 2022-02-04

## 2022-02-04 DIAGNOSIS — C25.2 MALIGNANT NEOPLASM OF TAIL OF PANCREAS (HCC): Primary | ICD-10-CM

## 2022-02-04 RX ORDER — OXYCODONE AND ACETAMINOPHEN 7.5; 325 MG/1; MG/1
1 TABLET ORAL EVERY 4 HOURS PRN
Qty: 30 TABLET | Refills: 0 | Status: SHIPPED | OUTPATIENT
Start: 2022-02-04 | End: 2022-02-22 | Stop reason: HOSPADM

## 2022-02-04 NOTE — TELEPHONE ENCOUNTER
Spoke to patient and let him know that Dr Barbara Flores sent a new prescription for pain medication to his [facility]  Patient verbalized understanding and thanks

## 2022-02-10 ENCOUNTER — TELEPHONE (OUTPATIENT)
Dept: SLEEP CENTER | Facility: CLINIC | Age: 72
End: 2022-02-10

## 2022-02-10 NOTE — TELEPHONE ENCOUNTER
Patient called upset about his broken CPAP and he still hasn't received a new machine  I emailed Jovanny Fortune and Roopa Ordoñez from [employer]  Response from [facility],  I saw an order with tracking but no actual tracking  I had our warehouse overnight a new unit  @Vin Nieto can you call pt to confirm this should arrive tomorrow or Saturday

## 2022-02-15 ENCOUNTER — ANESTHESIA (OUTPATIENT)
Dept: PERIOP | Facility: HOSPITAL | Age: 72
DRG: 406 | End: 2022-02-15
Payer: MEDICARE

## 2022-02-15 ENCOUNTER — HOSPITAL ENCOUNTER (INPATIENT)
Facility: HOSPITAL | Age: 72
LOS: 7 days | Discharge: HOME HEALTH | DRG: 406 | End: 2022-02-22
Attending: SURGERY | Admitting: SURGERY
Payer: MEDICARE

## 2022-02-15 ENCOUNTER — ANESTHESIA EVENT (OUTPATIENT)
Dept: PERIOP | Facility: HOSPITAL | Age: 72
DRG: 406 | End: 2022-02-15
Payer: MEDICARE

## 2022-02-15 DIAGNOSIS — K86.2 PANCREAS CYST: ICD-10-CM

## 2022-02-15 DIAGNOSIS — Z85.07 ENCOUNTER FOR FOLLOW-UP SURVEILLANCE OF PANCREATIC CANCER: ICD-10-CM

## 2022-02-15 DIAGNOSIS — C25.2 MALIGNANT NEOPLASM OF TAIL OF PANCREAS (HCC): Primary | ICD-10-CM

## 2022-02-15 DIAGNOSIS — Z08 ENCOUNTER FOR FOLLOW-UP SURVEILLANCE OF PANCREATIC CANCER: ICD-10-CM

## 2022-02-15 LAB
ANION GAP SERPL CALCULATED.3IONS-SCNC: 7 MMOL/L (ref 4–13)
ANION GAP SERPL CALCULATED.3IONS-SCNC: 8 MMOL/L (ref 4–13)
BASE EXCESS BLDA CALC-SCNC: -1 MMOL/L (ref -2–3)
BASE EXCESS BLDA CALC-SCNC: -1 MMOL/L (ref -2–3)
BASE EXCESS BLDA CALC-SCNC: 0 MMOL/L (ref -2–3)
BASOPHILS # BLD AUTO: 0.01 THOUSANDS/ΜL (ref 0–0.1)
BASOPHILS NFR BLD AUTO: 0 % (ref 0–1)
BUN SERPL-MCNC: 7 MG/DL (ref 5–25)
BUN SERPL-MCNC: 9 MG/DL (ref 5–25)
CA-I BLD-SCNC: 1.1 MMOL/L (ref 1.12–1.32)
CA-I BLD-SCNC: 1.13 MMOL/L (ref 1.12–1.32)
CA-I BLD-SCNC: 1.19 MMOL/L (ref 1.12–1.32)
CALCIUM SERPL-MCNC: 7.1 MG/DL (ref 8.3–10.1)
CALCIUM SERPL-MCNC: 8.4 MG/DL (ref 8.3–10.1)
CHLORIDE SERPL-SCNC: 107 MMOL/L (ref 100–108)
CHLORIDE SERPL-SCNC: 112 MMOL/L (ref 100–108)
CO2 SERPL-SCNC: 19 MMOL/L (ref 21–32)
CO2 SERPL-SCNC: 23 MMOL/L (ref 21–32)
CREAT SERPL-MCNC: 0.49 MG/DL (ref 0.6–1.3)
CREAT SERPL-MCNC: 0.72 MG/DL (ref 0.6–1.3)
EOSINOPHIL # BLD AUTO: 0 THOUSAND/ΜL (ref 0–0.61)
EOSINOPHIL NFR BLD AUTO: 0 % (ref 0–6)
ERYTHROCYTE [DISTWIDTH] IN BLOOD BY AUTOMATED COUNT: 12.6 % (ref 11.6–15.1)
GFR SERPL CREATININE-BSD FRML MDRD: 109 ML/MIN/1.73SQ M
GFR SERPL CREATININE-BSD FRML MDRD: 93 ML/MIN/1.73SQ M
GLUCOSE SERPL-MCNC: 135 MG/DL (ref 65–140)
GLUCOSE SERPL-MCNC: 140 MG/DL (ref 65–140)
GLUCOSE SERPL-MCNC: 153 MG/DL (ref 65–140)
GLUCOSE SERPL-MCNC: 158 MG/DL (ref 65–140)
GLUCOSE SERPL-MCNC: 170 MG/DL (ref 65–140)
GLUCOSE SERPL-MCNC: 175 MG/DL (ref 65–140)
GLUCOSE SERPL-MCNC: 197 MG/DL (ref 65–140)
GLUCOSE SERPL-MCNC: <20 MG/DL (ref 65–140)
HCO3 BLDA-SCNC: 23.8 MMOL/L (ref 22–28)
HCO3 BLDA-SCNC: 24.6 MMOL/L (ref 22–28)
HCO3 BLDA-SCNC: 24.6 MMOL/L (ref 22–28)
HCT VFR BLD AUTO: 36.9 % (ref 36.5–49.3)
HCT VFR BLD CALC: 31 % (ref 36.5–49.3)
HCT VFR BLD CALC: 32 % (ref 36.5–49.3)
HCT VFR BLD CALC: 32 % (ref 36.5–49.3)
HGB BLD-MCNC: 12.6 G/DL (ref 12–17)
HGB BLDA-MCNC: 10.5 G/DL (ref 12–17)
HGB BLDA-MCNC: 10.9 G/DL (ref 12–17)
HGB BLDA-MCNC: 10.9 G/DL (ref 12–17)
IMM GRANULOCYTES # BLD AUTO: 0.03 THOUSAND/UL (ref 0–0.2)
IMM GRANULOCYTES NFR BLD AUTO: 0 % (ref 0–2)
LYMPHOCYTES # BLD AUTO: 0.43 THOUSANDS/ΜL (ref 0.6–4.47)
LYMPHOCYTES NFR BLD AUTO: 5 % (ref 14–44)
MAGNESIUM SERPL-MCNC: 1.5 MG/DL (ref 1.6–2.6)
MCH RBC QN AUTO: 36 PG (ref 26.8–34.3)
MCHC RBC AUTO-ENTMCNC: 34.1 G/DL (ref 31.4–37.4)
MCV RBC AUTO: 105 FL (ref 82–98)
MONOCYTES # BLD AUTO: 0.56 THOUSAND/ΜL (ref 0.17–1.22)
MONOCYTES NFR BLD AUTO: 6 % (ref 4–12)
NEUTROPHILS # BLD AUTO: 8.3 THOUSANDS/ΜL (ref 1.85–7.62)
NEUTS SEG NFR BLD AUTO: 89 % (ref 43–75)
NRBC BLD AUTO-RTO: 0 /100 WBCS
PCO2 BLD: 25 MMOL/L (ref 21–32)
PCO2 BLD: 26 MMOL/L (ref 21–32)
PCO2 BLD: 26 MMOL/L (ref 21–32)
PCO2 BLD: 39.8 MM HG (ref 36–44)
PCO2 BLD: 40.6 MM HG (ref 36–44)
PCO2 BLD: 42.4 MM HG (ref 36–44)
PH BLD: 7.37 [PH] (ref 7.35–7.45)
PH BLD: 7.38 [PH] (ref 7.35–7.45)
PH BLD: 7.39 [PH] (ref 7.35–7.45)
PLATELET # BLD AUTO: 138 THOUSANDS/UL (ref 149–390)
PMV BLD AUTO: 10.7 FL (ref 8.9–12.7)
PO2 BLD: 161 MM HG (ref 75–129)
PO2 BLD: 305 MM HG (ref 75–129)
PO2 BLD: 81 MM HG (ref 75–129)
POTASSIUM BLD-SCNC: 3.9 MMOL/L (ref 3.5–5.3)
POTASSIUM BLD-SCNC: 3.9 MMOL/L (ref 3.5–5.3)
POTASSIUM BLD-SCNC: 4.2 MMOL/L (ref 3.5–5.3)
POTASSIUM SERPL-SCNC: 4.1 MMOL/L (ref 3.5–5.3)
POTASSIUM SERPL-SCNC: 4.4 MMOL/L (ref 3.5–5.3)
RBC # BLD AUTO: 3.5 MILLION/UL (ref 3.88–5.62)
SAO2 % BLD FROM PO2: 100 % (ref 60–85)
SAO2 % BLD FROM PO2: 96 % (ref 60–85)
SAO2 % BLD FROM PO2: 99 % (ref 60–85)
SODIUM BLD-SCNC: 137 MMOL/L (ref 136–145)
SODIUM BLD-SCNC: 138 MMOL/L (ref 136–145)
SODIUM BLD-SCNC: 138 MMOL/L (ref 136–145)
SODIUM SERPL-SCNC: 137 MMOL/L (ref 136–145)
SODIUM SERPL-SCNC: 139 MMOL/L (ref 136–145)
SPECIMEN SOURCE: ABNORMAL
WBC # BLD AUTO: 9.33 THOUSAND/UL (ref 4.31–10.16)

## 2022-02-15 PROCEDURE — 82947 ASSAY GLUCOSE BLOOD QUANT: CPT

## 2022-02-15 PROCEDURE — 83735 ASSAY OF MAGNESIUM: CPT | Performed by: STUDENT IN AN ORGANIZED HEALTH CARE EDUCATION/TRAINING PROGRAM

## 2022-02-15 PROCEDURE — 86923 COMPATIBILITY TEST ELECTRIC: CPT

## 2022-02-15 PROCEDURE — 82803 BLOOD GASES ANY COMBINATION: CPT

## 2022-02-15 PROCEDURE — 80048 BASIC METABOLIC PNL TOTAL CA: CPT | Performed by: STUDENT IN AN ORGANIZED HEALTH CARE EDUCATION/TRAINING PROGRAM

## 2022-02-15 PROCEDURE — 85025 COMPLETE CBC W/AUTO DIFF WBC: CPT | Performed by: STUDENT IN AN ORGANIZED HEALTH CARE EDUCATION/TRAINING PROGRAM

## 2022-02-15 PROCEDURE — 88309 TISSUE EXAM BY PATHOLOGIST: CPT | Performed by: PATHOLOGY

## 2022-02-15 PROCEDURE — 48140 PARTIAL REMOVAL OF PANCREAS: CPT | Performed by: SURGERY

## 2022-02-15 PROCEDURE — 88311 DECALCIFY TISSUE: CPT | Performed by: PATHOLOGY

## 2022-02-15 PROCEDURE — 88331 PATH CONSLTJ SURG 1 BLK 1SPC: CPT | Performed by: PATHOLOGY

## 2022-02-15 PROCEDURE — 84295 ASSAY OF SERUM SODIUM: CPT

## 2022-02-15 PROCEDURE — G9197 ORDER FOR CEPH: HCPCS | Performed by: SURGERY

## 2022-02-15 PROCEDURE — 85014 HEMATOCRIT: CPT

## 2022-02-15 PROCEDURE — 82948 REAGENT STRIP/BLOOD GLUCOSE: CPT

## 2022-02-15 PROCEDURE — 88331 PATH CONSLTJ SURG 1 BLK 1SPC: CPT | Performed by: SURGERY

## 2022-02-15 PROCEDURE — 84132 ASSAY OF SERUM POTASSIUM: CPT

## 2022-02-15 PROCEDURE — 82330 ASSAY OF CALCIUM: CPT

## 2022-02-15 PROCEDURE — 0FBG0ZZ EXCISION OF PANCREAS, OPEN APPROACH: ICD-10-PCS | Performed by: SURGERY

## 2022-02-15 RX ORDER — ROCURONIUM BROMIDE 10 MG/ML
INJECTION, SOLUTION INTRAVENOUS AS NEEDED
Status: DISCONTINUED | OUTPATIENT
Start: 2022-02-15 | End: 2022-02-15

## 2022-02-15 RX ORDER — EPHEDRINE SULFATE 50 MG/ML
INJECTION INTRAVENOUS AS NEEDED
Status: DISCONTINUED | OUTPATIENT
Start: 2022-02-15 | End: 2022-02-15

## 2022-02-15 RX ORDER — FENTANYL CITRATE/PF 50 MCG/ML
50 SYRINGE (ML) INJECTION
Status: DISCONTINUED | OUTPATIENT
Start: 2022-02-15 | End: 2022-02-15 | Stop reason: HOSPADM

## 2022-02-15 RX ORDER — DEXAMETHASONE SODIUM PHOSPHATE 10 MG/ML
INJECTION, SOLUTION INTRAMUSCULAR; INTRAVENOUS AS NEEDED
Status: DISCONTINUED | OUTPATIENT
Start: 2022-02-15 | End: 2022-02-15

## 2022-02-15 RX ORDER — FENTANYL CITRATE 50 UG/ML
INJECTION, SOLUTION INTRAMUSCULAR; INTRAVENOUS AS NEEDED
Status: DISCONTINUED | OUTPATIENT
Start: 2022-02-15 | End: 2022-02-15

## 2022-02-15 RX ORDER — ONDANSETRON 2 MG/ML
INJECTION INTRAMUSCULAR; INTRAVENOUS AS NEEDED
Status: DISCONTINUED | OUTPATIENT
Start: 2022-02-15 | End: 2022-02-15

## 2022-02-15 RX ORDER — ONDANSETRON 2 MG/ML
4 INJECTION INTRAMUSCULAR; INTRAVENOUS EVERY 6 HOURS PRN
Status: DISCONTINUED | OUTPATIENT
Start: 2022-02-15 | End: 2022-02-22 | Stop reason: HOSPADM

## 2022-02-15 RX ORDER — SODIUM CHLORIDE 9 MG/ML
INJECTION, SOLUTION INTRAVENOUS CONTINUOUS PRN
Status: DISCONTINUED | OUTPATIENT
Start: 2022-02-15 | End: 2022-02-15

## 2022-02-15 RX ORDER — SODIUM CHLORIDE, SODIUM LACTATE, POTASSIUM CHLORIDE, CALCIUM CHLORIDE 600; 310; 30; 20 MG/100ML; MG/100ML; MG/100ML; MG/100ML
INJECTION, SOLUTION INTRAVENOUS CONTINUOUS PRN
Status: DISCONTINUED | OUTPATIENT
Start: 2022-02-15 | End: 2022-02-15

## 2022-02-15 RX ORDER — HEPARIN SODIUM 5000 [USP'U]/ML
5000 INJECTION, SOLUTION INTRAVENOUS; SUBCUTANEOUS EVERY 8 HOURS SCHEDULED
Status: DISCONTINUED | OUTPATIENT
Start: 2022-02-15 | End: 2022-02-22 | Stop reason: HOSPADM

## 2022-02-15 RX ORDER — MAGNESIUM HYDROXIDE 1200 MG/15ML
LIQUID ORAL AS NEEDED
Status: DISCONTINUED | OUTPATIENT
Start: 2022-02-15 | End: 2022-02-15 | Stop reason: HOSPADM

## 2022-02-15 RX ORDER — HYDROMORPHONE HCL/PF 1 MG/ML
1 SYRINGE (ML) INJECTION EVERY 2 HOUR PRN
Status: DISCONTINUED | OUTPATIENT
Start: 2022-02-15 | End: 2022-02-17

## 2022-02-15 RX ORDER — SODIUM CHLORIDE, SODIUM LACTATE, POTASSIUM CHLORIDE, CALCIUM CHLORIDE 600; 310; 30; 20 MG/100ML; MG/100ML; MG/100ML; MG/100ML
125 INJECTION, SOLUTION INTRAVENOUS CONTINUOUS
Status: DISCONTINUED | OUTPATIENT
Start: 2022-02-15 | End: 2022-02-15 | Stop reason: SDUPTHER

## 2022-02-15 RX ORDER — ROPIVACAINE HYDROCHLORIDE 2 MG/ML
INJECTION, SOLUTION EPIDURAL; INFILTRATION; PERINEURAL CONTINUOUS PRN
Status: DISCONTINUED | OUTPATIENT
Start: 2022-02-15 | End: 2022-02-15

## 2022-02-15 RX ORDER — SODIUM CHLORIDE, SODIUM LACTATE, POTASSIUM CHLORIDE, CALCIUM CHLORIDE 600; 310; 30; 20 MG/100ML; MG/100ML; MG/100ML; MG/100ML
125 INJECTION, SOLUTION INTRAVENOUS CONTINUOUS
Status: DISCONTINUED | OUTPATIENT
Start: 2022-02-15 | End: 2022-02-16

## 2022-02-15 RX ORDER — LIDOCAINE HYDROCHLORIDE AND EPINEPHRINE 15; 5 MG/ML; UG/ML
INJECTION, SOLUTION EPIDURAL
Status: COMPLETED | OUTPATIENT
Start: 2022-02-15 | End: 2022-02-15

## 2022-02-15 RX ORDER — ALBUMIN, HUMAN INJ 5% 5 %
SOLUTION INTRAVENOUS CONTINUOUS PRN
Status: DISCONTINUED | OUTPATIENT
Start: 2022-02-15 | End: 2022-02-15

## 2022-02-15 RX ORDER — LIDOCAINE HYDROCHLORIDE 10 MG/ML
INJECTION, SOLUTION EPIDURAL; INFILTRATION; INTRACAUDAL; PERINEURAL AS NEEDED
Status: DISCONTINUED | OUTPATIENT
Start: 2022-02-15 | End: 2022-02-15

## 2022-02-15 RX ORDER — XYLITOL/YERBA SANTA
5 AEROSOL, SPRAY WITH PUMP (ML) MUCOUS MEMBRANE 4 TIMES DAILY PRN
Status: DISCONTINUED | OUTPATIENT
Start: 2022-02-15 | End: 2022-02-22 | Stop reason: HOSPADM

## 2022-02-15 RX ORDER — ONDANSETRON 2 MG/ML
4 INJECTION INTRAMUSCULAR; INTRAVENOUS ONCE AS NEEDED
Status: DISCONTINUED | OUTPATIENT
Start: 2022-02-15 | End: 2022-02-15 | Stop reason: HOSPADM

## 2022-02-15 RX ORDER — HYDROMORPHONE HCL/PF 1 MG/ML
0.5 SYRINGE (ML) INJECTION
Status: DISCONTINUED | OUTPATIENT
Start: 2022-02-15 | End: 2022-02-15 | Stop reason: HOSPADM

## 2022-02-15 RX ORDER — ACETAMINOPHEN 325 MG/1
650 TABLET ORAL EVERY 6 HOURS
Status: DISCONTINUED | OUTPATIENT
Start: 2022-02-15 | End: 2022-02-19

## 2022-02-15 RX ORDER — MIDAZOLAM HYDROCHLORIDE 2 MG/2ML
INJECTION, SOLUTION INTRAMUSCULAR; INTRAVENOUS AS NEEDED
Status: DISCONTINUED | OUTPATIENT
Start: 2022-02-15 | End: 2022-02-15

## 2022-02-15 RX ORDER — LIDOCAINE HYDROCHLORIDE 10 MG/ML
INJECTION, SOLUTION EPIDURAL; INFILTRATION; INTRACAUDAL; PERINEURAL
Status: COMPLETED | OUTPATIENT
Start: 2022-02-15 | End: 2022-02-15

## 2022-02-15 RX ORDER — PROPOFOL 10 MG/ML
INJECTION, EMULSION INTRAVENOUS AS NEEDED
Status: DISCONTINUED | OUTPATIENT
Start: 2022-02-15 | End: 2022-02-15

## 2022-02-15 RX ORDER — DIPHENHYDRAMINE HYDROCHLORIDE 50 MG/ML
25 INJECTION INTRAMUSCULAR; INTRAVENOUS EVERY 6 HOURS PRN
Status: DISCONTINUED | OUTPATIENT
Start: 2022-02-15 | End: 2022-02-19

## 2022-02-15 RX ADMIN — ALBUMIN (HUMAN): 12.5 INJECTION, SOLUTION INTRAVENOUS at 15:57

## 2022-02-15 RX ADMIN — ROCURONIUM BROMIDE 50 MG: 50 INJECTION, SOLUTION INTRAVENOUS at 12:30

## 2022-02-15 RX ADMIN — PROPOFOL 40 MG: 10 INJECTION, EMULSION INTRAVENOUS at 13:15

## 2022-02-15 RX ADMIN — PHENYLEPHRINE HYDROCHLORIDE 40 MCG/MIN: 10 INJECTION INTRAVENOUS at 12:43

## 2022-02-15 RX ADMIN — ROCURONIUM BROMIDE 20 MG: 50 INJECTION, SOLUTION INTRAVENOUS at 15:38

## 2022-02-15 RX ADMIN — SODIUM CHLORIDE: 0.9 INJECTION, SOLUTION INTRAVENOUS at 17:24

## 2022-02-15 RX ADMIN — LIDOCAINE HYDROCHLORIDE 5 ML: 10 INJECTION, SOLUTION EPIDURAL; INFILTRATION; INTRACAUDAL; PERINEURAL at 12:15

## 2022-02-15 RX ADMIN — EPHEDRINE SULFATE 25 MG: 50 INJECTION INTRAVENOUS at 18:43

## 2022-02-15 RX ADMIN — PROPOFOL 40 MG: 10 INJECTION, EMULSION INTRAVENOUS at 16:11

## 2022-02-15 RX ADMIN — SODIUM CHLORIDE, SODIUM LACTATE, POTASSIUM CHLORIDE, AND CALCIUM CHLORIDE 125 ML/HR: .6; .31; .03; .02 INJECTION, SOLUTION INTRAVENOUS at 11:25

## 2022-02-15 RX ADMIN — SODIUM CHLORIDE, SODIUM LACTATE, POTASSIUM CHLORIDE, AND CALCIUM CHLORIDE: .6; .31; .03; .02 INJECTION, SOLUTION INTRAVENOUS at 15:43

## 2022-02-15 RX ADMIN — Medication 3000 MG: at 12:40

## 2022-02-15 RX ADMIN — ROCURONIUM BROMIDE 20 MG: 50 INJECTION, SOLUTION INTRAVENOUS at 15:00

## 2022-02-15 RX ADMIN — ROCURONIUM BROMIDE 10 MG: 50 INJECTION, SOLUTION INTRAVENOUS at 14:24

## 2022-02-15 RX ADMIN — ROPIVACAINE HYDROCHLORIDE 6 ML/HR: 2 INJECTION, SOLUTION EPIDURAL; INFILTRATION at 12:55

## 2022-02-15 RX ADMIN — ROCURONIUM BROMIDE 50 MG: 50 INJECTION, SOLUTION INTRAVENOUS at 13:15

## 2022-02-15 RX ADMIN — ROCURONIUM BROMIDE 30 MG: 50 INJECTION, SOLUTION INTRAVENOUS at 16:10

## 2022-02-15 RX ADMIN — FENTANYL CITRATE: 50 INJECTION INTRAMUSCULAR; INTRAVENOUS at 13:36

## 2022-02-15 RX ADMIN — LIDOCAINE HYDROCHLORIDE AND EPINEPHRINE 3 ML: 15; 5 INJECTION, SOLUTION EPIDURAL at 12:15

## 2022-02-15 RX ADMIN — DEXAMETHASONE SODIUM PHOSPHATE 10 MG: 10 INJECTION, SOLUTION INTRAMUSCULAR; INTRAVENOUS at 12:30

## 2022-02-15 RX ADMIN — HEPARIN SODIUM 5000 UNITS: 5000 INJECTION INTRAVENOUS; SUBCUTANEOUS at 22:55

## 2022-02-15 RX ADMIN — ACETAMINOPHEN 650 MG: 325 TABLET, FILM COATED ORAL at 21:47

## 2022-02-15 RX ADMIN — ALBUMIN (HUMAN): 12.5 INJECTION, SOLUTION INTRAVENOUS at 13:53

## 2022-02-15 RX ADMIN — PROPOFOL 50 MG: 10 INJECTION, EMULSION INTRAVENOUS at 12:32

## 2022-02-15 RX ADMIN — FENTANYL CITRATE 100 MCG: 50 INJECTION INTRAMUSCULAR; INTRAVENOUS at 12:30

## 2022-02-15 RX ADMIN — SODIUM CHLORIDE: 0.9 INJECTION, SOLUTION INTRAVENOUS at 12:40

## 2022-02-15 RX ADMIN — Medication 1 SPRAY: at 23:09

## 2022-02-15 RX ADMIN — PROPOFOL 200 MG: 10 INJECTION, EMULSION INTRAVENOUS at 12:30

## 2022-02-15 RX ADMIN — SODIUM CHLORIDE, SODIUM LACTATE, POTASSIUM CHLORIDE, AND CALCIUM CHLORIDE: .6; .31; .03; .02 INJECTION, SOLUTION INTRAVENOUS at 11:50

## 2022-02-15 RX ADMIN — SODIUM CHLORIDE, SODIUM LACTATE, POTASSIUM CHLORIDE, AND CALCIUM CHLORIDE 125 ML/HR: .6; .31; .03; .02 INJECTION, SOLUTION INTRAVENOUS at 19:22

## 2022-02-15 RX ADMIN — Medication 3000 MG: at 16:33

## 2022-02-15 RX ADMIN — MIDAZOLAM 2 MG: 1 INJECTION INTRAMUSCULAR; INTRAVENOUS at 11:50

## 2022-02-15 RX ADMIN — ONDANSETRON 4 MG: 2 INJECTION INTRAMUSCULAR; INTRAVENOUS at 18:13

## 2022-02-15 RX ADMIN — SODIUM CHLORIDE: 0.9 INJECTION, SOLUTION INTRAVENOUS at 14:59

## 2022-02-15 RX ADMIN — SUGAMMADEX 400 MG: 100 INJECTION, SOLUTION INTRAVENOUS at 18:17

## 2022-02-15 RX ADMIN — ALBUMIN (HUMAN): 12.5 INJECTION, SOLUTION INTRAVENOUS at 13:30

## 2022-02-15 RX ADMIN — LIDOCAINE HYDROCHLORIDE 100 MG: 10 INJECTION, SOLUTION EPIDURAL; INFILTRATION; INTRACAUDAL; PERINEURAL at 12:30

## 2022-02-15 RX ADMIN — INSULIN LISPRO 2 UNITS: 100 INJECTION, SOLUTION INTRAVENOUS; SUBCUTANEOUS at 21:55

## 2022-02-15 NOTE — ANESTHESIA PREPROCEDURE EVALUATION
Procedure:  PANCREATECTOMY DISTAL-OPEN (N/A Abdomen)    Relevant Problems   CARDIO   (+) Permanent atrial fibrillation (HCC)      ENDO   (+) Type 2 diabetes mellitus without complication, without long-term current use of insulin (HCC)      GI/HEPATIC   (+) Encounter for follow-up surveillance of pancreatic cancer   (+) Malignant neoplasm of tail of pancreas (HCC)   (+) Pancreatic duct dilated      /RENAL   (+) BPH without obstruction/lower urinary tract symptoms      HEMATOLOGY   (+) Thrombocytopenia (HCC)      NEURO/PSYCH   (+) History of pancreatic cancer      PULMONARY   (+) Obstructive sleep apnea syndrome        Physical Exam    Airway    Mallampati score: I  TM Distance: >3 FB  Neck ROM: full     Dental   No notable dental hx     Cardiovascular      Pulmonary      Other Findings        Anesthesia Plan  ASA Score- 3     Anesthesia Type- general with ASA Monitors  Additional Monitors: arterial line  Airway Plan: ETT  Plan Factors-Exercise tolerance (METS): >4 METS  Chart reviewed  EKG reviewed  Existing labs reviewed  Patient summary reviewed  Patient is not a current smoker  Induction- intravenous  Postoperative Plan- Plan for postoperative opioid use  Informed Consent- Anesthetic plan and risks discussed with patient  I personally reviewed this patient with the CRNA  Discussed and agreed on the Anesthesia Plan with the CRNA  Nathaly Jurado

## 2022-02-15 NOTE — OP NOTE
PERATIVE REPORT  PATIENT NAME: Sandra Capps    :  1950  MRN: [id number]  Pt Location: [facility] OR ROOM 04    SURGERY DATE: 2/15/2022    Surgeon(s) and Role:     * Devora Arreola MD - Primary     * Dejuan Christian MD - Assisting    Preop Diagnosis:  Pancreas cyst [K86 2]    Post-Op Diagnosis Codes:     * Pancreas cyst [K86 2]    Procedure(s) (LRB):  PANCREATECTOMY SUB-TOTAL-OPEN (N/A)  LYSIS OF ADHESIONS    Specimen(s):  ID Type Source Tests Collected by Time Destination   1 : Subtotal pancreatectomy Tissue Pancreas TISSUE EXAM Devora Arreola MD 2/15/2022  5:13 PM        Estimated Blood Loss:   475 mL    Drains:  Closed/Suction Drain Left Abdomen Bulb 19 Fr  (Active)   Number of days: 0       Urethral Catheter Non-latex;Straight-tip 16 Fr  (Active)   Collection Container Standard drainage bag 02/15/22 1235   Number of days: 0       Anesthesia Type:   General    Operative Indications:  Pancreas cyst [K86 2]  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, neoplastic mucinous cyst with high-grade atypia  Operative Findings:  Bulky cystic pancreas, evidence of pancreatitis in past, small abscess likely from previous EUS and biopsy, with small fluid collection trapped between pancreas and posterior gastric wall; multiple adhesions from prior surgery     Complications:   None    Procedure and Technique:  The patient is a 76-year-old man status post prior laparoscopic distal pancreatectomy for an early stage 1 pancreas cancer  On surveillance imaging, he was found to have enlargement of the duct  Biopsy was done confirming mucinous cyst with high-grade dysplasia  He was therefore taken for distal pancreatectomy  He was brought to the operating room and identified by proper time-out  Following this he was intubated by the anesthesia team   Lines and Dockery catheter were then placed  He was then prepped and draped in the usual fashion  Midline incision was made in the epigastric area    Cautery was used to dissect down to the dermis and subcutaneous tissue down to the midline  The abdomen was then entered with care to avoid injury to the underlying viscera  There fair amount of adhesions that were noted and had to be taken down in order to gain exposure  Specifically, omentum was tacked up to the liver, anterior abdominal wall, and to the stomach  These were taken down with the energy device, which unable us to place our Bookwalter retractors for exposure  Ultimately, we were able to localize the stomach and free the greater curvature of the stomach from the transverse colon  This put us into the retroperitoneal space  The pancreas was visualized as this was done  We mobilized the greater curvature of the stomach up to the level of the short gastrics  More distally, we mobilized the antrum to the level of the pylorus to allow for exposure of the pancreatic neck, body, and tail  There is a fair amount of fibrosis suggestive of prior pancreatitis in the area  Specifically, there was what appeared to be a small abscess pocket that was formed between the anterior surface of the pancreas and the posterior wall of stomach, likely from prior EUS and biopsy  We dissected through this  There was no evidence of a defect in the posterior wall of the stomach, consistent with a small contained perforation, likely iatrogenic from biopsy  We then proceeded to mobilize the inferior edge of the pancreas using the energy device  This was fairly fibrotic, with the lower edge of the pancreas quite adherent to the distal duodenum and jejunum  Careful dissection was done in order to free the pancreas from the bowel  Dissection was completed from the level of the SMV towards the distal portion of pancreas and tail  We also proceeded to mobilize the superior edge of the pancreas with the energy device  We saw the splenic artery at this time    We created a tunnel we also dissected in this area to expose the portal vein and were able to then create a tunnel between the portal vein more cephalad underneath the pancreatic neck towards the SMV more caudally  We then opted to transect pancreas with a black load  The pancreas was dissected off the SMV/portal vein/splenic vein confluence with the energy device  This was done in the direction of the body towards the more distal aspect of the pancreas  In the process, we were able to isolate and transect the splenic artery which was densely adherent to the pancreatic body with a vascular stapler  We proceeded to mobilize the pancreas from all directions using both the energy device to help dissect the specimen off the retroperitoneum  Small vessels were taken with the energy device in hemostatic fashion  Additionally, when we encountered larger vessels, including the splenic vein and artery more distally, vascular stapler loads were used to transect these in hemostatic fashion and help mobilize the specimen off the retroperitoneal bed  Ultimately, we were able to remove the specimen  Frozen section was sent of the margin which had been marked with a stitch  Frozen section was suggestive of atypia, possibly high-grade dysplasia, though diagnosis of cancer could not be made definitively on frozen section  Given this, along with the fact that the pancreatic neck stump was fairly frozen and adherent to the portal vein at this point, and that surgery would necessitate removal of the head/remaining pancreas/completion-total pancreatectomy, we opted to instead leave clips at the pancreatic margin in the event that cancer should be confirmed, and in the event that radiation therapy may be part of future treatment  We then irrigated the field, and placed FloSeal at the pancreatic stump along with the splenic vein stump off the portal vein/SMV  A 19 Western Latoya Quique drain was placed in the left upper quadrant and positioned internally next to the pancreatic stump    It was anchored to the skin with 3-0 nylon suture  We then proceeded with closure using 1  Nonlooped PDS to close the midline fascia followed by 3-0 Vicryl for the dermis followed by staple skin closure  Dressings were applied in the usual fashion  Sponge and instrument counts were correct at the end the case  Patient was awakened transferred to the recovery room in stable condition       I was present for the entire procedure    Patient Disposition:  PACU       SIGNATURE: Eber Barnard MD  DATE: February 15, 2022  TIME: 6:33 PM

## 2022-02-15 NOTE — QUICK NOTE
Post-Op Check Note - Surgical Oncology    S: Patient is s/p open subtotal pancreatectomy with lysis of adhesions  He tolerated the procedure well without any complications  EBL was 475 cc  Postoperatively, patient states he is feeling well  O:   Vitals:    02/15/22 1955   BP: 128/75   Pulse: 84   Resp:    Temp:    SpO2: 98%       I/O last 3 completed shifts:   In: 7653 [I V :3800; IV Piggyback:750]  Out: 1295 [Urine:775; Drains:45; Blood:475]  I/O this shift:  In: 200 [I V :200]  Out: 215 [Urine:125; Drains:90]    PE:  NAD, alert and oriented x3  Normocephalic, atraumatic  MMM, NGT in place  Norm resp effort on 2 L NC  Regular rate   Abd soft, nondistended, incision c/d/i, STU with serosanguinous drainage  Hanna in place with clear yellow urine   No calf tenderness or peripheral edema  Motor/sensation intact in distal extremities  CN grossly intact  -rash/lesions      Lab Results   Component Value Date    WBC 9 33 02/15/2022    HGB 12 6 02/15/2022    HCT 36 9 02/15/2022     (H) 02/15/2022     (L) 02/15/2022     Lab Results   Component Value Date    GLUCOSE 158 (H) 02/15/2022    CALCIUM 7 1 (L) 02/15/2022     08/22/2017    K 4 1 02/15/2022    CO2 19 (L) 02/15/2022     (H) 02/15/2022    BUN 7 02/15/2022    CREATININE 0 49 (L) 02/15/2022     STU drain with 90 cc serosanguinous drainage  Urine output 900 cc    A/P: 66 y o  M w/ history of an IPMN, now s/p open subtotal pancreatectomy with lysis of adhesions 2/15     - NPO/NGT  - [email or web address]  - PCEA for pain control   - Maintain hanna  - Maintain STU drain to bulb suction, monitor output   - Hold Eliquis  - PT/OT  - DVT ppx     Dominick Perez DO

## 2022-02-15 NOTE — ANESTHESIA POSTPROCEDURE EVALUATION
Post-Op Assessment Note    CV Status:  Stable    Pain management: adequate     Mental Status:  Awake and alert   Hydration Status:  Stable   PONV Controlled:  Controlled   Airway Patency:  Patent      Post Op Vitals Reviewed: Yes      Staff: Anesthesiologist, CRNA         No complications documented      BP   106/64   Temp  98 1   Pulse  74   Resp   18   SpO2   98

## 2022-02-15 NOTE — ANESTHESIA PROCEDURE NOTES
Epidural Block    Patient location during procedure: holding area  Start time: 2/15/2022 12:00 PM  Reason for block: at surgeon's request and post-op pain management  Staffing  Performed: Anesthesiologist   Anesthesiologist: Vilma Cotto MD  Preanesthetic Checklist  Completed: patient identified, IV checked, site marked, risks and benefits discussed, surgical consent, monitors and equipment checked, pre-op evaluation and timeout performed  Epidural  Patient position: sitting  Prep: ChloraPrep and site prepped and draped  Patient monitoring: continuous pulse ox and heart rate  Approach: midline  Location: thoracic  Injection technique: SHREYAS saline  Needle  Needle type: Tuohy   Needle gauge: 18 G  Catheter type: side hole  Catheter size: 16 G  Catheter at skin depth: 11 cm  Catheter securement method: stabilization device  Test dose: negativelidocaine 1 5% with epinephrine 1:200,000 test dose, 3 mL  lidocaine (PF) (XYLOCAINE-MPF) 1 % infiltration, 5 mL  Assessment  Number of attempts: 1negative aspiration for CSF, negative aspiration for heme and no paresthesia on injection  patient tolerated the procedure well with no immediate complications

## 2022-02-15 NOTE — ANESTHESIA PROCEDURE NOTES
Arterial Line Insertion  Performed by: Judson Palacios CRNA  Authorized by: Jazlyn Hagan MD   Preparation: Patient was prepped and draped in the usual sterile fashion    Indications: hemodynamic monitoring  Orientation:  Left  Location: radial artery  Sedation:  Patient sedated: yes    Procedure Details:  Parth's test normal: yes  Needle gauge: 20  Number of attempts: 1    Post-procedure:  Post-procedure: dressing applied  Waveform: good waveform  Patient tolerance: Patient tolerated the procedure well with no immediate complications

## 2022-02-16 LAB
ABO GROUP BLD BPU: NORMAL
ABO GROUP BLD BPU: NORMAL
ANION GAP SERPL CALCULATED.3IONS-SCNC: 6 MMOL/L (ref 4–13)
BASOPHILS # BLD AUTO: 0 THOUSANDS/ΜL (ref 0–0.1)
BASOPHILS NFR BLD AUTO: 0 % (ref 0–1)
BPU ID: NORMAL
BPU ID: NORMAL
BUN SERPL-MCNC: 12 MG/DL (ref 5–25)
CALCIUM SERPL-MCNC: 8.2 MG/DL (ref 8.3–10.1)
CHLORIDE SERPL-SCNC: 108 MMOL/L (ref 100–108)
CO2 SERPL-SCNC: 22 MMOL/L (ref 21–32)
CREAT SERPL-MCNC: 0.67 MG/DL (ref 0.6–1.3)
CROSSMATCH: NORMAL
CROSSMATCH: NORMAL
EOSINOPHIL # BLD AUTO: 0 THOUSAND/ΜL (ref 0–0.61)
EOSINOPHIL NFR BLD AUTO: 0 % (ref 0–6)
ERYTHROCYTE [DISTWIDTH] IN BLOOD BY AUTOMATED COUNT: 12.8 % (ref 11.6–15.1)
GFR SERPL CREATININE-BSD FRML MDRD: 95 ML/MIN/1.73SQ M
GLUCOSE SERPL-MCNC: 187 MG/DL (ref 65–140)
GLUCOSE SERPL-MCNC: 193 MG/DL (ref 65–140)
GLUCOSE SERPL-MCNC: 200 MG/DL (ref 65–140)
GLUCOSE SERPL-MCNC: 224 MG/DL (ref 65–140)
GLUCOSE SERPL-MCNC: 230 MG/DL (ref 65–140)
GLUCOSE SERPL-MCNC: 243 MG/DL (ref 65–140)
HCT VFR BLD AUTO: 34.9 % (ref 36.5–49.3)
HGB BLD-MCNC: 11.3 G/DL (ref 12–17)
IMM GRANULOCYTES # BLD AUTO: 0.02 THOUSAND/UL (ref 0–0.2)
IMM GRANULOCYTES NFR BLD AUTO: 0 % (ref 0–2)
LYMPHOCYTES # BLD AUTO: 0.44 THOUSANDS/ΜL (ref 0.6–4.47)
LYMPHOCYTES NFR BLD AUTO: 8 % (ref 14–44)
MAGNESIUM SERPL-MCNC: 1.7 MG/DL (ref 1.6–2.6)
MCH RBC QN AUTO: 36.5 PG (ref 26.8–34.3)
MCHC RBC AUTO-ENTMCNC: 32.4 G/DL (ref 31.4–37.4)
MCV RBC AUTO: 113 FL (ref 82–98)
MONOCYTES # BLD AUTO: 0.52 THOUSAND/ΜL (ref 0.17–1.22)
MONOCYTES NFR BLD AUTO: 10 % (ref 4–12)
NEUTROPHILS # BLD AUTO: 4.35 THOUSANDS/ΜL (ref 1.85–7.62)
NEUTS SEG NFR BLD AUTO: 82 % (ref 43–75)
NRBC BLD AUTO-RTO: 0 /100 WBCS
PHOSPHATE SERPL-MCNC: 3.1 MG/DL (ref 2.3–4.1)
PLATELET # BLD AUTO: 115 THOUSANDS/UL (ref 149–390)
PMV BLD AUTO: 11.6 FL (ref 8.9–12.7)
POTASSIUM SERPL-SCNC: 4.4 MMOL/L (ref 3.5–5.3)
RBC # BLD AUTO: 3.1 MILLION/UL (ref 3.88–5.62)
SODIUM SERPL-SCNC: 136 MMOL/L (ref 136–145)
UNIT DISPENSE STATUS: NORMAL
UNIT DISPENSE STATUS: NORMAL
UNIT PRODUCT CODE: NORMAL
UNIT PRODUCT CODE: NORMAL
UNIT PRODUCT VOLUME: 350 ML
UNIT PRODUCT VOLUME: 350 ML
UNIT RH: NORMAL
UNIT RH: NORMAL
WBC # BLD AUTO: 5.33 THOUSAND/UL (ref 4.31–10.16)

## 2022-02-16 PROCEDURE — 82948 REAGENT STRIP/BLOOD GLUCOSE: CPT

## 2022-02-16 PROCEDURE — 97166 OT EVAL MOD COMPLEX 45 MIN: CPT

## 2022-02-16 PROCEDURE — 85025 COMPLETE CBC W/AUTO DIFF WBC: CPT | Performed by: STUDENT IN AN ORGANIZED HEALTH CARE EDUCATION/TRAINING PROGRAM

## 2022-02-16 PROCEDURE — 83735 ASSAY OF MAGNESIUM: CPT | Performed by: STUDENT IN AN ORGANIZED HEALTH CARE EDUCATION/TRAINING PROGRAM

## 2022-02-16 PROCEDURE — 84100 ASSAY OF PHOSPHORUS: CPT | Performed by: STUDENT IN AN ORGANIZED HEALTH CARE EDUCATION/TRAINING PROGRAM

## 2022-02-16 PROCEDURE — 99024 POSTOP FOLLOW-UP VISIT: CPT | Performed by: SURGERY

## 2022-02-16 PROCEDURE — 80048 BASIC METABOLIC PNL TOTAL CA: CPT | Performed by: STUDENT IN AN ORGANIZED HEALTH CARE EDUCATION/TRAINING PROGRAM

## 2022-02-16 PROCEDURE — 97163 PT EVAL HIGH COMPLEX 45 MIN: CPT

## 2022-02-16 RX ORDER — LIDOCAINE HYDROCHLORIDE 20 MG/ML
15 SOLUTION OROPHARYNGEAL 4 TIMES DAILY PRN
Status: DISCONTINUED | OUTPATIENT
Start: 2022-02-16 | End: 2022-02-22 | Stop reason: HOSPADM

## 2022-02-16 RX ORDER — DEXTROSE, SODIUM CHLORIDE, AND POTASSIUM CHLORIDE 5; .45; .15 G/100ML; G/100ML; G/100ML
100 INJECTION INTRAVENOUS CONTINUOUS
Status: DISCONTINUED | OUTPATIENT
Start: 2022-02-16 | End: 2022-02-18

## 2022-02-16 RX ORDER — ATENOLOL 25 MG/1
25 TABLET ORAL DAILY
Status: DISCONTINUED | OUTPATIENT
Start: 2022-02-16 | End: 2022-02-22 | Stop reason: HOSPADM

## 2022-02-16 RX ORDER — LANOLIN ALCOHOL/MO/W.PET/CERES
6 CREAM (GRAM) TOPICAL
Status: DISCONTINUED | OUTPATIENT
Start: 2022-02-16 | End: 2022-02-22 | Stop reason: HOSPADM

## 2022-02-16 RX ADMIN — SODIUM CHLORIDE, SODIUM LACTATE, POTASSIUM CHLORIDE, AND CALCIUM CHLORIDE 125 ML/HR: .6; .31; .03; .02 INJECTION, SOLUTION INTRAVENOUS at 02:15

## 2022-02-16 RX ADMIN — INSULIN LISPRO 2 UNITS: 100 INJECTION, SOLUTION INTRAVENOUS; SUBCUTANEOUS at 00:10

## 2022-02-16 RX ADMIN — INSULIN LISPRO 2 UNITS: 100 INJECTION, SOLUTION INTRAVENOUS; SUBCUTANEOUS at 05:11

## 2022-02-16 RX ADMIN — HEPARIN SODIUM 5000 UNITS: 5000 INJECTION INTRAVENOUS; SUBCUTANEOUS at 21:03

## 2022-02-16 RX ADMIN — ACETAMINOPHEN 650 MG: 325 TABLET, FILM COATED ORAL at 11:49

## 2022-02-16 RX ADMIN — DEXTROSE, SODIUM CHLORIDE, AND POTASSIUM CHLORIDE 100 ML/HR: 5; .45; .15 INJECTION INTRAVENOUS at 16:11

## 2022-02-16 RX ADMIN — MAGNESIUM SULFATE HEPTAHYDRATE 3 G: 500 INJECTION, SOLUTION INTRAMUSCULAR; INTRAVENOUS at 09:21

## 2022-02-16 RX ADMIN — INSULIN LISPRO 4 UNITS: 100 INJECTION, SOLUTION INTRAVENOUS; SUBCUTANEOUS at 11:50

## 2022-02-16 RX ADMIN — HEPARIN SODIUM 5000 UNITS: 5000 INJECTION INTRAVENOUS; SUBCUTANEOUS at 13:38

## 2022-02-16 RX ADMIN — Medication 6 MG: at 22:06

## 2022-02-16 RX ADMIN — ACETAMINOPHEN 650 MG: 325 TABLET, FILM COATED ORAL at 17:30

## 2022-02-16 RX ADMIN — INSULIN LISPRO 4 UNITS: 100 INJECTION, SOLUTION INTRAVENOUS; SUBCUTANEOUS at 17:26

## 2022-02-16 RX ADMIN — DEXTROSE, SODIUM CHLORIDE, AND POTASSIUM CHLORIDE 100 ML/HR: 5; .45; .15 INJECTION INTRAVENOUS at 06:18

## 2022-02-16 RX ADMIN — HEPARIN SODIUM 5000 UNITS: 5000 INJECTION INTRAVENOUS; SUBCUTANEOUS at 05:03

## 2022-02-16 NOTE — PLAN OF CARE
Problem: PHYSICAL THERAPY ADULT  Goal: Performs mobility at highest level of function for planned discharge setting  See evaluation for individualized goals  Description: Treatment/Interventions: (P) Functional transfer training,LE strengthening/ROM,Elevations,Therapeutic exercise,Endurance training,Patient/family training,Equipment eval/education,Bed mobility,Gait training,OT,Spoke to nursing  Equipment Recommended: (P) Radha [place] (has RW at home)       See flowsheet documentation for full assessment, interventions and recommendations  Note: Prognosis: (P) Good  Problem List: (P) Decreased endurance,Impaired balance,Decreased mobility  Assessment: (P) PT completed evaluation of 66year old male admitted to [facility] on 2/15/2022 for the following planned procedure: open subtotal pancreatectomy secondary to enlarging pancreatic cysts  Current status instabilities include PCA pump, hanna catheter, STU drain, use of new AD, and a regression in functional status from baseline  PMH is significant for sleep apnea, afib, morbid obesity, h/o pancreatic cancer, and L TKA  Prior to this admission patient resides with spouse in a 2 level home ([facility]) where at baseline he is I with mobility (no use of AD), ADLs, and iADLS  Current impairments include decreased activity tolerance, balance, and gross strength, and gait deviations  During PT evaluation patient was able to perform supine-->sit transfer, sit<-->stand transfer, and ambulation S level  He ambulated 30 feet x 2 w/ use of RW presenting with reduced gait speed  I anticipate with continued mobility this admission patient will achieve PT goals and recommend d/c home with family assist PRN  In future sessions plan to trial stairs and progress ambulation (anticipate will not require RW for d/c)  Patient will continue to benefit from continued skilled PT this admission to achieve maximal function and safety              PT Discharge Recommendation: (S) (P) No rehabilitation needs          See flowsheet documentation for full assessment

## 2022-02-16 NOTE — PROGRESS NOTES
Progress Note - Surgical Oncology  : TERESA White Surgery Resident on Samy Montero 66 y o  male MRN: [id number]  Unit/Bed#: [facility] 803-01 Encounter: [id number]      Assessment:  66 y o  male POD 2 s/p subtotal pancreatectomy for IPMN with high-grade atypia      cc since OR, 0 25 cc / kg / hr    STU suction bulb was noticed to be not present during pre-rounds, and could not be found  Nurse called down to storeroom to have replacement sent up  Plan: Will discuss bolusing patient for low UOP on rounds  Likely advance diet  MIVF 100, to decrease as patient's PO intake and UOPimproves  Keep PCEA  Will discuss discontinuation of Dockery catheter  OOB / Ambulate  PT / OT  IS / Pulmonary toilet  DVT prophylaxis with SQH        Subjective: Feels well, endorses some mild pain, tolerating sips of clears, feels as though he's "almost" passing flatus      Objective:     Physical Exam:  GEN: NAD   Ab: Soft, appropriately somewhat tender/ND, CDI  STU drain with SS in tubing but bulb not connected to tubing  Lung: Normal effort on   CV: RRR   Extrem: No CCE   Neuro:  A+Ox3       VTE Pharmacologic Prophylaxis: Heparin      Rishabh Quintero MD  2/16/2022 5:50 PM

## 2022-02-16 NOTE — PLAN OF CARE
Problem: Prexisting or High Potential for Compromised Skin Integrity  Goal: Skin integrity is maintained or improved  Description: INTERVENTIONS:  - Identify patients at risk for skin breakdown  - Assess and monitor skin integrity  - Assess and monitor nutrition and hydration status  - Monitor labs   - Assess for incontinence   - Turn and reposition patient  - Assist with mobility/ambulation  - Relieve pressure over bony prominences  - Avoid friction and shearing  - Provide appropriate hygiene as needed including keeping skin clean and dry  - Evaluate need for skin moisturizer/barrier cream  - Collaborate with interdisciplinary team   - Patient/family teaching  - Consider wound care consult   Outcome: Progressing     Problem: GASTROINTESTINAL - ADULT  Goal: Minimal or absence of nausea and/or vomiting  Description: INTERVENTIONS:  - Administer IV fluids if ordered to ensure adequate hydration  - Maintain NPO status until nausea and vomiting are resolved  - Nasogastric tube if ordered  - Administer ordered antiemetic medications as needed  - Provide nonpharmacologic comfort measures as appropriate  - Advance diet as tolerated, if ordered  - Consider nutrition services referral to assist patient with adequate nutrition and appropriate food choices  Outcome: Progressing  Goal: Maintains or returns to baseline bowel function  Description: INTERVENTIONS:  - Assess bowel function  - Encourage oral fluids to ensure adequate hydration  - Administer IV fluids if ordered to ensure adequate hydration  - Administer ordered medications as needed  - Encourage mobilization and activity  - Consider nutritional services referral to assist patient with adequate nutrition and appropriate food choices  Outcome: Progressing     Problem: SKIN/TISSUE INTEGRITY - ADULT  Goal: Incision(s), wounds(s) or drain site(s) healing without S/S of infection  Description: INTERVENTIONS  - Assess and document dressing, incision, wound bed, drain sites and surrounding tissue  - Provide patient and family education  - Perform skin care/dressing changes every shift  Outcome: Progressing     Problem: MUSCULOSKELETAL - ADULT  Goal: Maintain or return mobility to safest level of function  Description: INTERVENTIONS:  - Assess patient's ability to carry out ADLs; assess patient's baseline for ADL function and identify physical deficits which impact ability to perform ADLs (bathing, care of mouth/teeth, toileting, grooming, dressing, etc )  - Assess/evaluate cause of self-care deficits   - Assess range of motion  - Assess patient's mobility  - Assess patient's need for assistive devices and provide as appropriate  - Encourage maximum independence but intervene and supervise when necessary  - Involve family in performance of ADLs  - Assess for home care needs following discharge   - Consider OT consult to assist with ADL evaluation and planning for discharge  - Provide patient education as appropriate  Outcome: Progressing     Problem: PAIN - ADULT  Goal: Verbalizes/displays adequate comfort level or baseline comfort level  Description: Interventions:  - Encourage patient to monitor pain and request assistance  - Assess pain using appropriate pain scale  - Administer analgesics based on type and severity of pain and evaluate response  - Implement non-pharmacological measures as appropriate and evaluate response  - Consider cultural and social influences on pain and pain management  - Notify physician/advanced practitioner if interventions unsuccessful or patient reports new pain  Outcome: Progressing

## 2022-02-16 NOTE — PROGRESS NOTES
Progress Note - Surgical Oncology   Matthew Gonzáles 66 y o  male MRN: [id number]  Unit/Bed#: [facility] 803-01 Encounter: [id number]    Assessment/Plan:  66 y o  male with enlarging pancreatic cysts concerning for IPMN now s/p open subtotal pancreatectomy (2/15)    · NPO/NGT - add viscous lidocaine PRN for patient comfort  · Maintenance IVF  · Continue PCEA   · Maintain STU drain  · Continue Dockery catheter  · Encourage incentive spirometry  · OOB to chair today  · DVT ppx w/ SQH    Subjective/Objective     Subjective: No acute events  Complaining of sore throat  Pain well controlled  Denies BM/flatus  Objective:     Vitals: Temp:  [97 2 °F (36 2 °C)-98 1 °F (36 7 °C)] 98 °F (36 7 °C)  HR:  [] 87  Resp:  [18-22] 18  BP: ()/(61-89) 117/70  Arterial Line BP: ()/(52-66) 84/66  Body mass index is 40 01 kg/m²  I/O       02/14 0701  02/15 0700 02/15 0701 02/16 0700    I V  (mL/kg)  6308 (47 1)    IV Piggyback  750    Total Intake(mL/kg)  7058 (52 7)    Urine (mL/kg/hr)  1325    Drains  200    Blood  475    Total Output  2000    Net  +5058                Physical Exam:  GEN: NAD  HEENT: MMM, +NGT with no appreciable drainage  CV: warm/well perfused  Lung: Normal effort  Ab: Soft, round/protuberant  Nontender  Dressing c/d/i  STU light SS  Extrem: No CCE  Neuro:  A+Ox3    Lab, Imaging and other studies:   CBC with diff:   Lab Results   Component Value Date    WBC 9 33 02/15/2022    HGB 12 6 02/15/2022    HCT 36 9 02/15/2022     (H) 02/15/2022     (L) 02/15/2022    MCH 36 0 (H) 02/15/2022    MCHC 34 1 02/15/2022    RDW 12 6 02/15/2022    MPV 10 7 02/15/2022    NRBC 0 02/15/2022   , BMP/CMP:   Lab Results   Component Value Date    SODIUM 137 02/15/2022    K 4 4 02/15/2022     02/15/2022    CO2 23 02/15/2022    CO2 26 02/15/2022    BUN 9 02/15/2022    CREATININE 0 72 02/15/2022    GLUCOSE 158 (H) 02/15/2022    CALCIUM 8 4 02/15/2022    EGFR 93 02/15/2022   , Magnesium: No components found for: MAG, Coags: No results found for: PT, PTT, INR  VTE Pharmacologic Prophylaxis: Heparin  VTE Mechanical Prophylaxis: sequential compression device

## 2022-02-16 NOTE — PHYSICAL THERAPY NOTE
Physical Therapy Evaluation     Patient's Name: Li Sinclair    Admitting Diagnosis  Pancreas cyst [K86 2]    Problem List  Patient Active Problem List   Diagnosis    Obstructive sleep apnea syndrome    Hypersomnia    [facility]    Morbid obesity with BMI of 40 0-44 9, adult ([facility] )    Lower extremity edema    Pancreatic duct dilated    History of pancreatic cancer    Type 2 diabetes mellitus without complication, without long-term current use of insulin ([facility] )    Malignant neoplasm of tail of pancreas (HCC)    Thrombocytopenia (HCC)    Urgency incontinence    Balanitis    OAB (overactive bladder)    BPH without obstruction/lower urinary tract symptoms    Encounter for follow-up surveillance of pancreatic cancer    Encounter for geriatric assessment       Past Medical History  Past Medical History:   Diagnosis Date    A-fib ([place] [address] )     Abdominal wall strain     last assessed: 10/21/14    Allergic rhinitis     last assessed: 05/03/16    Arthritis     Cancer [facility]     PACNCREATIC    COVID-19 virus infection 3/3/2021    CPAP (continuous positive airway pressure) dependence     Diabetes mellitus ([facility]     Erectile dysfunction of non-organic origin     last assessed: 03/17/14    Irregular heart beat     Pt with A fib     Lumbar strain     last assessed: 10/21/14    Multiple acquired skin tags     last assessed: 2/18/16    Palpitations     last assessed: 03/17/14    Pancreas cyst     Plantar fasciitis     Skin disorder     last assessed: 05/28/13    Sleep apnea     CPAP BROKE IN OCTOBER HAS BEEN TRYING TO GET NEW ONE BUT UNABLE AS OF YET    Thrombocytopenia ([facility] )        Past Surgical History  Past Surgical History:   Procedure Laterality Date    BOTOX INJECTION N/A 11/12/2021    Procedure: Thomas Ibarra;  Surgeon: Anjum Heard MD;  Location:  MAIN OR;  Service: Urology    CATARACT EXTRACTION Bilateral     COLONOSCOPY  01/17/2013    COLONOSCOPY 01/08/2018    COLONOSCOPY  04/2021    CYSTOSCOPY      INJECT WITH BOTOX    DISTAL PANCREATECTOMY N/A 2/15/2022    Procedure: PANCREATECTOMY SUB-TOTALL-OPEN;  Surgeon: Benedict Barba MD;  [facility] MAIN OR;  Service: Surgical Oncology    EGD  06/05/2020    EGD AND COLONOSCOPY  02/06/2014, 2/8/2017    FL GUIDED CENTRAL VENOUS ACCESS DEVICE INSERTION  4/23/2019    FLEXIBLE SIGMOIDOSCOPY  06/11/2019    FOOT SURGERY      Due to plantar fascitis    JOINT REPLACEMENT Left     LTK    LINEAR ENDOSCOPIC U/S      PANCREATECTOMY LAPAROSCOPIC N/A 3/12/2019    Procedure: DISTAL PANCREATECTOMY LAPAROSCOPIC/POSSIBLE OPEN;  Surgeon: Benedict Barba MD;  Location: [facility] MAIN [facility];  Service: Surgical Oncology    SD EDG US EXAM SURGICAL ALTER STOM DUODENUM/JEJUNUM N/A 1/24/2019    Procedure: LINEAR ENDOSCOPIC U/S;  Surgeon: Phuong Frazier MD;  Location: [facility] GI LAB; Service: Gastroenterology    REPLACEMENT TOTAL KNEE Left     TUNNELED VENOUS PORT PLACEMENT Left 4/23/2019    Procedure: INSERTION VENOUS PORT (PORT-A-CATH); Surgeon: Benedict Barba MD;  [facility];  Service: Surgical Oncology- 11/8/21 Pt reports PAC removed     UMBILICAL HERNIA REPAIR          02/16/22 0849   PT Last Visit   PT Visit Date 02/16/22   Note Type   Note type Evaluation   Pain Assessment   Pain Assessment Tool 0-10   Pain Score No Pain   Restrictions/Precautions   Weight Bearing Precautions Per Order No   Braces or Orthoses   (none)   Other Precautions Fall Risk;Telemetry;Multiple lines  (PCA pump, hanna, STU drain )   Home Living   Type of [address] Two level;Stairs to enter with rails  (2 echo)   Bathroom Shower/Tub Walk-in shower   Bathroom Toilet Raised   Bathroom Equipment Commode   Home Equipment Walker;Cane   Additional Comments Prior to this admission patient resides with spouse in a 2 level home (2 ECHO) where at baseline he is I with mobility (no use of AD), ADLs, and iADLS      Prior Function   Level of [place] Independent with ADLs and functional mobility   Lives With Spouse   Receives Help From Family   ADL Assistance Independent   IADLs Independent   Falls in the last 6 months 0   Vocational Self employed   General   Additional Pertinent History  open subtotal pancreatectomy secondary to enlarging pancreatic cysts  Family/Caregiver Present No   Cognition   Overall Cognitive Status WFL   Arousal/Participation Alert   Orientation Level Oriented X4   Memory Within functional limits   Following Commands Follows all commands and directions without difficulty   Subjective   Subjective "we can walk"   RUE Assessment   RUE Assessment WFL   LUE Assessment   LUE Assessment WFL   RLE Assessment   RLE Assessment WFL   LLE Assessment   LLE Assessment WFL   Bed Mobility   Supine to Sit 5  Supervision   Additional items HOB elevated; Increased time required   Sit to Supine Unable to assess   Additional Comments in chair post PT eval    Transfers   Sit to Stand 5  Supervision   Additional items Increased time required   Stand to Sit 5  Supervision   Additional items Increased time required   Additional Comments w/ RW   Ambulation/Elevation   Gait pattern Excessively slow;Decreased foot clearance   Gait Assistance 5  Supervision   Additional items Assist x 1  (to manage lines)   Assistive Device Rolling walker   Distance 30 feet x 2   Balance   Static Sitting Good   Static Standing Fair   Ambulatory Fair   Endurance Deficit   Endurance Deficit Yes   Endurance Deficit Description fatigue post-op   Activity Tolerance   Activity Tolerance Patient tolerated treatment well;Patient limited by fatigue   Medical Staff Made Aware This high complexity evaluation was performed with an occupational therapist due to the patient's co-morbidities, clinically unstable presentation, and present impairments which are a regression from the patient's baseline      Nurse Made Aware samson to see per CHATO Musa   Assessment   Prognosis Good Problem List Decreased endurance; Impaired balance;Decreased mobility   Assessment PT completed evaluation of 66year old male [facility] for the following planned procedure: open subtotal pancreatectomy secondary to enlarging pancreatic cysts  Current status instabilities include PCA pump, hanna catheter, STU drain, use of new AD, and a regression in functional status from baseline  PMH is significant for sleep apnea, afib, morbid obesity, h/o pancreatic cancer, and L TKA  Prior to this admission patient resides with spouse in a 2 level home (2 CLAUDIA) where at baseline he is I with mobility (no use of AD), ADLs, and iADLS  Current impairments include decreased activity tolerance, balance, and gross strength, and gait deviations  During PT evaluation patient was able to perform supine-->sit transfer, sit<-->stand transfer, and ambulation S level  He ambulated 30 feet x 2 w/ use of RW presenting with reduced gait speed  I anticipate with continued mobility this admission patient will achieve PT goals and recommend d/c home with family assist PRN  In future sessions plan to trial stairs and progress ambulation (anticipate will not require RW for d/c)  Patient will continue to benefit from continued skilled PT this admission to achieve maximal function and safety      Goals   Patient Goals to feel better   LT Expiration Date 03/02/22   Long Term Goal #1 1) Perform bed mobility mod-I to participate in frequent repositioning and improve skin integrity; 2) Perform functional transfers mod-I to promote I with toileting and OOB mobility; 3) Ambulate 200 feet mod-I with least restrictive device to participate in household and community level mobility; 4) Improve b/l LE strength by 1/2 grade in order to improve efficiency of tranfers; 5) Improve balance by 1 grade to reduce risk for falls; 6) Improve overall activity tolerance to 60 minutes in order to increase patient's ability to engage in mobility tasks; 7) Navigate 12 steps S level in order to safely navigate multiple floors at home   PT Treatment Day 0   Plan   Treatment/Interventions Functional transfer training;LE strengthening/ROM; Elevations; Therapeutic exercise; Endurance training;Patient/family training;Equipment eval/education; Bed mobility;Gait training;OT;Spoke to nursing   PT Frequency 2-3x/wk   Recommendation   PT Discharge Recommendation No rehabilitation needs   Equipment Recommended Walker  (has RW at home)   Kaitlin 8 in Bed Without Bedrails 3   Lying on Back to Sitting on Edge of Flat Bed 3   Moving Bed to Chair 4   Standing Up From Chair 4   Walk in Room 4   Climb 3-5 Stairs 3   Basic Mobility Inpatient Raw Score 21   Basic Mobility Standardized Score 45 55   Highest Level Of Mobility   -[facility] Goal 6: Walk 10 steps or more     The patient's AM-PAC Basic Mobility Inpatient Standardized Score is greater than 42 9, suggesting this patient may benefit from discharge to home  Please also refer to the recommendation of the Physical Therapist for safe discharge planning          Nila Ross, PT, DPT

## 2022-02-16 NOTE — CONSULTS
Consult Note- Acute Pain Service   Eloisa Elizabeth 66 y o  male MRN: [id number]  Unit/Bed#: [facility] 803-01 Encounter: [id number]               Assessment/Plan     Assessment:   Patient Active Problem List   Diagnosis    Obstructive sleep apnea syndrome    Hypersomnia    [facility]    Morbid obesity with BMI of 40 0-44 9, adult ([facility] )    Lower extremity edema    Pancreatic duct dilated    History of pancreatic cancer    Type 2 diabetes mellitus without complication, without long-term current use of insulin (HCC)    Malignant neoplasm of tail of pancreas (HCC)    Thrombocytopenia (HCC)    Urgency incontinence    Balanitis    OAB (overactive bladder)    BPH without obstruction/lower urinary tract symptoms    Encounter for follow-up surveillance of pancreatic cancer    Encounter for geriatric assessment      Eloisa Elizabeth is a 66 y o  male who is POD1 s/p open subtotal pancreatectomy for enlarging pancreatic cysts  A pre-operative thoracic epidural was placed for post-operative analgesia  Unremarkable epidural placement  APS consulted for post-operative pain/epidural management  Upon patient evaluation today, he endorses no abdominal pain  The epidural is working well without side-effects  He was able to get OOB into chair and ambulate with assistance  Denies opioid-induced side effects including nausea/vomiting/itching/constipation  Epidural site c/di/i  Able to move LE bilaterally  Endorses some itching around chest     Plan: Pain is well controlled with PCEA, facilitating appropriate recovery  I encouraged patient to use the epidural button if he experiences pain   Itching may be related to epidural fentanyl      - Continue thoracic epidural, 0 1% ropivacaine/2mcg/ml fentanyl, 6/3/10/3  - Currently NPO except clear sips/NGT  - Continue other MMA: PO tylenol 650mg q6, IV dilaudid 1mg q2hr PRN for breakthrough pain  - Avoid opioids if possible as morbidly obese patients have increased sensitivity to respiratory depressant effects of opioids  - Consider NSAIDs if pain worsens/OK from surgical standpoint (GFR > 80)  - OK with IV benadryl PRN for itching  Monitor for excessive sedation/delirium  - Lidoderm not necessary with PCEA  - Consider robaxin or ketamine if pain worsens    - Bowel regimen when appropriate from surgical perspective    APS will continue to follow  Please contact Acute Pain Service - [facility] via [employer] from [phone] with additional questions or concerns  See TigerText or Amion for additional contacts and after hours information  History of Present Illness    Admit Date:  2/15/2022  Hospital Day:  1 day  Primary Service:  Oncology-Medical  Attending Provider:  Reinaldo Diaz MD  Reason for Consult / Principal Problem: Post-operative abdominal pain  HPI: Corrine Waller is a 66 y o  male who presents with post-operative abdominal pain after pancreatectomy on 2/15    Current pain location(s): Abdomen  Pain Scale:   0/10  Quality: N/A  Current Analgesic regimen:  See above    Pain History: See above  Pain Management Provider:  N/A    I have reviewed the patient's controlled substance dispensing history in the Prescription Drug Monitoring Program in compliance with the [facility] regulations before prescribing any controlled substances  Inpatient consult to Acute Pain Service  Consult performed by: Agustin Massey MD  Consult ordered by: Dalton Cloud MD          Review of Systems   Constitutional: Negative  HENT: Negative  Eyes: Negative  Respiratory: Negative  Cardiovascular: Negative  Gastrointestinal: Negative  Genitourinary: Negative  Musculoskeletal: Negative  Skin: Negative  Neurological: Negative  Psychiatric/Behavioral: Negative          Historical Information   Past Medical History:   Diagnosis Date    A-fib [facility])     Abdominal wall strain     last assessed: 10/21/14    Allergic rhinitis     last assessed: 05/03/16    Arthritis     Cancer ([facility] )     PACNCREATIC    COVID-19 virus infection 3/3/2021    CPAP (continuous positive airway pressure) dependence     Diabetes mellitus ([facility] )     Erectile dysfunction of non-organic origin     last assessed: 03/17/14    Irregular heart beat     Pt with A fib     Lumbar strain     last assessed: 10/21/14    Multiple acquired skin tags     last assessed: 2/18/16    Palpitations     last assessed: 03/17/14    Pancreas cyst     Plantar fasciitis     Skin disorder     last assessed: 05/28/13    Sleep apnea     CPAP BROKE IN OCTOBER HAS BEEN TRYING TO GET NEW ONE BUT UNABLE AS OF YET    Thrombocytopenia ([facility] )      Past Surgical History:   Procedure Laterality Date    BOTOX INJECTION N/A 11/12/2021    Procedure: Mendoza Nagy;  Surgeon: Catarina Skelton MD;  [facility] MA[facility];  Service: Urology    CATARACT EXTRACTION Bilateral     COLONOSCOPY  01/17/2013    COLONOSCOPY  01/08/2018    COLONOSCOPY  04/2021    CYSTOSCOPY      INJECT WITH BOTOX    EGD  06/05/2020    EGD AND COLONOSCOPY  02/06/2014, 2/8/2017    FL GUIDED CENTRAL VENOUS ACCESS DEVICE INSERTION  4/23/2019    FLEXIBLE SIGMOIDOSCOPY  06/11/2019    FOOT SURGERY      Due to plantar fascitis    JOINT REPLACEMENT Left     LTK    LINEAR ENDOSCOPIC U/S      PANCREATECTOMY LAPAROSCOPIC N/A 3/12/2019    Procedure: DISTAL PANCREATECTOMY LAPAROSCOPIC/POSSIBLE OPEN;  Surgeon: Natalya Lazcano MD;  [facility];  Service: Surgical Oncology    NE EDG US EXAM SURGICAL ALTER STOM DUODENUM/JEJUNUM N/A 1/24/2019    Procedure: LINEAR ENDOSCOPIC U/S;  Surgeon: Ignacio Hassan MD;  Location: [facility]; Service: Gastroenterology    REPLACEMENT TOTAL KNEE Left     TUNNELED VENOUS PORT PLACEMENT Left 4/23/2019    Procedure: INSERTION VENOUS PORT (PORT-A-CATH);   Surgeon: Natalya Lazcano MD;  [facility];  Service: Surgical Oncology- 11/8/21 Pt reports PAC removed    [address] History   Social History     Substance and Sexual Activity   Alcohol Use Yes    Alcohol/week: 2 0 standard drinks    Types: 2 Cans of beer per week    Comment: couple times per week; Social History     Substance and Sexual Activity   Drug Use No    Comment: Denies      Social History     Tobacco Use   Smoking Status Never Smoker   Smokeless Tobacco Never Used     Family History: non-contributory    Meds/Allergies   all current active meds have been reviewed    No Known Allergies    Objective   Temp:  [97 2 °F (36 2 °C)-98 1 °F (36 7 °C)] 97 8 °F (36 6 °C)  HR:  [] 73  Resp:  [18-22] 18  BP: ()/(61-89) 107/69  Arterial Line BP: ()/(52-66) 84/66    Intake/Output Summary (Last 24 hours) at 2/16/2022 1023  Last data filed at 2/16/2022 0617  Gross per 24 hour   Intake 7203 79 ml   Output 2000 ml   Net 5203 79 ml       Physical Exam  Vitals reviewed  Constitutional:       Appearance: He is obese  HENT:      Head: Normocephalic  Mouth/Throat:      Mouth: Mucous membranes are dry  Eyes:      Extraocular Movements: Extraocular movements intact  Cardiovascular:      Rate and Rhythm: Normal rate  Pulses: Normal pulses  Pulmonary:      Effort: Pulmonary effort is normal    Abdominal:      General: Abdomen is flat  Comments: Abdominal surgical site c/d/i   Musculoskeletal:         General: Normal range of motion  Cervical back: Normal range of motion  Skin:     General: Skin is dry  Comments: Epidural site c/d/i   Neurological:      General: No focal deficit present  Mental Status: He is alert  Psychiatric:         Mood and Affect: Mood normal          Lab Results: I have personally reviewed pertinent labs  Imaging Studies: I have personally reviewed pertinent reports  EKG, Pathology, and Other Studies: I have personally reviewed pertinent reports  Counseling / Coordination of Care  Total floor / unit time spent today Level 1 = 20 minutes   Greater than 50% of total time was spent with the patient and / or family counseling and / or coordination of care  Please note that the APS provides consultative services regarding pain management only  With the exception of ketamine and epidural infusions and except when indicated, final decisions regarding starting or changing doses of analgesic medications are at the discretion of the consulting service  Off hours consultation and/or medication management is generally not available      Carlos Robb MD  Acute Pain Service

## 2022-02-16 NOTE — PLAN OF CARE
Problem: Prexisting or High Potential for Compromised Skin Integrity  Goal: Skin integrity is maintained or improved  Description: INTERVENTIONS:  - Identify patients at risk for skin breakdown  - Assess and monitor skin integrity  - Assess and monitor nutrition and hydration status  - Monitor labs   - Assess for incontinence   - Turn and reposition patient  - Assist with mobility/ambulation  - Relieve pressure over bony prominences  - Avoid friction and shearing  - Provide appropriate hygiene as needed including keeping skin clean and dry  - Evaluate need for skin moisturizer/barrier cream  - Collaborate with interdisciplinary team   - Patient/family teaching  - Consider wound care consult   Outcome: Progressing     Problem: GASTROINTESTINAL - ADULT  Goal: Minimal or absence of nausea and/or vomiting  Description: INTERVENTIONS:  - Administer IV fluids if ordered to ensure adequate hydration  - Maintain NPO status until nausea and vomiting are resolved  - Nasogastric tube if ordered  - Administer ordered antiemetic medications as needed  - Provide nonpharmacologic comfort measures as appropriate  - Advance diet as tolerated, if ordered  - Consider nutrition services referral to assist patient with adequate nutrition and appropriate food choices  Outcome: Progressing  Goal: Maintains or returns to baseline bowel function  Description: INTERVENTIONS:  - Assess bowel function  - Encourage oral fluids to ensure adequate hydration  - Administer IV fluids if ordered to ensure adequate hydration  - Administer ordered medications as needed  - Encourage mobilization and activity  - Consider nutritional services referral to assist patient with adequate nutrition and appropriate food choices  Outcome: Progressing     Problem: SKIN/TISSUE INTEGRITY - ADULT  Goal: Incision(s), wounds(s) or drain site(s) healing without S/S of infection  Description: INTERVENTIONS  - Assess and document dressing, incision, wound bed, drain sites and surrounding tissue  - Provide patient and family education  - Perform skin care/dressing changes every shift  Outcome: Progressing     Problem: MUSCULOSKELETAL - ADULT  Goal: Maintain or return mobility to safest level of function  Description: INTERVENTIONS:  - Assess patient's ability to carry out ADLs; assess patient's baseline for ADL function and identify physical deficits which impact ability to perform ADLs (bathing, care of mouth/teeth, toileting, grooming, dressing, etc )  - Assess/evaluate cause of self-care deficits   - Assess range of motion  - Assess patient's mobility  - Assess patient's need for assistive devices and provide as appropriate  - Encourage maximum independence but intervene and supervise when necessary  - Involve family in performance of ADLs  - Assess for home care needs following discharge   - Consider OT consult to assist with ADL evaluation and planning for discharge  - Provide patient education as appropriate  Outcome: Progressing     Problem: PAIN - ADULT  Goal: Verbalizes/displays adequate comfort level or baseline comfort level  Description: Interventions:  - Encourage patient to monitor pain and request assistance  - Assess pain using appropriate pain scale  - Administer analgesics based on type and severity of pain and evaluate response  - Implement non-pharmacological measures as appropriate and evaluate response  - Consider cultural and social influences on pain and pain management  - Notify physician/advanced practitioner if interventions unsuccessful or patient reports new pain  Outcome: Progressing     Problem: MOBILITY - ADULT  Goal: Maintain or return to baseline ADL function  Description: INTERVENTIONS:  -  Assess patient's ability to carry out ADLs; assess patient's baseline for ADL function and identify physical deficits which impact ability to perform ADLs (bathing, care of mouth/teeth, toileting, grooming, dressing, etc )  - Assess/evaluate cause of self-care deficits   - Assess range of motion  - Assess patient's mobility; develop plan if impaired  - Assess patient's need for assistive devices and provide as appropriate  - Encourage maximum independence but intervene and supervise when necessary  - Involve family in performance of ADLs  - Assess for home care needs following discharge   - Consider OT consult to assist with ADL evaluation and planning for discharge  - Provide patient education as appropriate  Outcome: Progressing  Goal: Maintains/Returns to pre admission functional level  Description: INTERVENTIONS:  - Perform BMAT or MOVE assessment daily    - Set and communicate daily mobility goal to care team and patient/family/caregiver  - Collaborate with rehabilitation services on mobility goals if consulted  - Perform Range of Motion 3 times a day  - Reposition patient every 2 hours    - Dangle patient 3 times a day  - Stand patient 3 times a day  - Ambulate patient 3 times a day  - Out of bed to chair 3 times a day   - Out of bed for meals 3 times a day  - Out of bed for toileting  - Record patient progress and toleration of activity level   Outcome: Progressing     Problem: Potential for Falls  Goal: Patient will remain free of falls  Description: INTERVENTIONS:  - Educate patient/family on patient safety including physical limitations  - Instruct patient to call for assistance with activity   - Consult OT/PT to assist with strengthening/mobility   - Keep Call bell within reach  - Keep bed low and locked with side rails adjusted as appropriate  - Keep care items and personal belongings within reach  - Initiate and maintain comfort rounds  - Make Fall Risk Sign visible to staff  - Apply yellow socks and bracelet for high fall risk patients  - Consider moving patient to room near nurses station  Outcome: Progressing

## 2022-02-16 NOTE — CASE MANAGEMENT
Case Management Assessment & Discharge Planning Note    Patient name Priyanka Nails  Location [address] 803/PPHP 925-83 MRN [id number]  : 1950 Date 2022       Current Admission Date: 2/15/2022  Current Admission Diagnosis:Malignant neoplasm of tail of pancreas [facility])   Patient Active Problem List    Diagnosis Date Noted    Encounter for geriatric assessment 2022    Encounter for follow-up surveillance of pancreatic cancer 2021    OAB (overactive bladder) 12/15/2020    BPH without obstruction/lower urinary tract symptoms 12/15/2020    Urgency incontinence 2020    Balanitis 2020    Thrombocytopenia ([facility] [address] ) 2020    Malignant neoplasm of tail of pancreas ([facility] [address] ) 2020    Type 2 diabetes mellitus without complication, without long-term current use of insulin ([email or web address] ) 2019    History of pancreatic cancer 2019    Pancreatic duct dilated 2018    Lower extremity edema 2018    Obstructive sleep apnea syndrome 2018    Hypersomnia 2018    Permanent atrial fibrillation ([facility] [facility] ) 2018    Morbid obesity with BMI of 40 0-44 9, adult ([facility] [address] ) 2018      LOS (days): 1  Geometric Mean LOS (GMLOS) (days): 4 60  Days to GMLOS:3 7     OBJECTIVE:    Risk of Unplanned Readmission Score: 14         Current admission status: Inpatient       Preferred Pharmacy:   [facility]/pharmacy #[id number] [facility] [address]  Phone: [phone] Fax: [address] [facility] [address] [place] [address]  Phone: [phone] Fax: [address] [place] [address] [facility] 94 [facility] 38 210 [facility]  Phone: [phone] Fax: [phone]    Primary Care Provider: Nancy Richardson DO    Primary Insurance: MEDICARE  Secondary Insurance: [employer]    ASSESSMENT:  [address] - Spouse   Primary Phone: [phone] [facility])  Mobile Phone: [phone]                         Readmission Root Cause  30 Day Readmission: No    Patient Information  Admitted from[de-identified] Home  Mental Status: Alert  During Assessment patient was accompanied by: Spouse  Assessment information provided by[de-identified] Patient  Primary Caregiver: Self  Support Systems: Spouse/significant other,Family members  [place] of Residence: [address] [facility] do you live in?: [address] [facility] entry access options   Select all that apply : Stairs  Number of steps to enter home : 2  Do the steps have railings?: Yes  Type of Current Residence: [address] home  Upon entering residence, is there a bedroom on the main floor (no further steps)?: Yes  Upon entering residence, is there a bathroom on the main floor (no further steps)?: Yes  In the last 12 months, was there a time when you were not able to pay the mortgage or rent on time?: No  In the last 12 months, was there a time when you did not have a steady place to sleep or slept in a shelter (including now)?: No  Homeless/housing insecurity resource given?: N/A  Living Arrangements: Lives w/ Spouse/significant other    Activities of Daily Living Prior to Admission  Functional Status: Independent  Completes ADLs independently?: Yes  Ambulates independently?: Yes  Does patient use assisted devices?: Yes  Assisted Devices (DME) used: Kaitlin Dingavisl  Does patient currently own DME?: Yes  What DME does the patient currently own?: Kaitlin Dingavisl  Does patient have a history of Outpatient Therapy (PT/OT)?: No  Does the patient have a history of Short-Term Rehab?: No  Does patient have a history of HHC?: Yes [facility])  Does patient currently have [address]?: No         Patient Information Continued  Income Source: Self-employed  Does patient have prescription coverage?: Yes  Within the past 12 months, you worried that your food would run out before you got the money to buy more : Never true  Within the past 12 months, the food you bought just didnt last and you didnt have money to get more : Never true  Food insecurity resource given?: N/A  Does patient receive dialysis treatments?: No  Does patient have a history of substance abuse?: No  Does patient have a history of Mental Health Diagnosis?: No         Means of Transportation  Means of Transport to Appts[de-identified] Drives Self  In the past 12 months, has lack of transportation kept you from medical appointments or from getting medications?: No  In the past 12 months, has lack of transportation kept you from meetings, work, or from getting things needed for daily living?: No  Was application for public transport provided?: N/A        DISCHARGE DETAILS:    Discharge planning discussed with[de-identified] Pt, pt's spouse  Freedom of Choice: Yes  Comments - Freedom of Choice: Pt prefers [facility]  CM contacted family/caregiver?: Yes  Were Treatment Team discharge recommendations reviewed with patient/caregiver?: Yes  Did patient/caregiver verbalize understanding of patient care needs?: Yes  Were patient/caregiver advised of the risks associated with not following Treatment Team discharge recommendations?: Yes      Other Referral/Resources/Interventions Provided:  Referral Comments: No anticipated needs at this time

## 2022-02-16 NOTE — OCCUPATIONAL THERAPY NOTE
Occupational Therapy Evaluation     Patient Name: Michell ENRIQUEZ Date: 2/16/2022  Problem List  Principal Problem:    Malignant neoplasm of tail of pancreas [facility])    Past Medical History  Past Medical History:   Diagnosis Date    A-fib ([facility] )     Abdominal wall strain     last assessed: 10/21/14    Allergic rhinitis     last assessed: 05/03/16    Arthritis     Cancer ([facility] )     PACNCREATIC    COVID-19 virus infection 3/3/2021    CPAP (continuous positive airway pressure) dependence     Diabetes mellitus ([facility] )     Erectile dysfunction of non-organic origin     last assessed: 03/17/14    Irregular heart beat     Pt with A fib     Lumbar strain     last assessed: 10/21/14    Multiple acquired skin tags     last assessed: 2/18/16    Palpitations     last assessed: 03/17/14    Pancreas cyst     Plantar fasciitis     Skin disorder     last assessed: 05/28/13    Sleep apnea     CPAP BROKE IN OCTOBER HAS BEEN TRYING TO GET NEW ONE BUT UNABLE AS OF YET    Thrombocytopenia ([facility] )      Past Surgical History  Past Surgical History:   Procedure Laterality Date    BOTOX INJECTION N/A 11/12/2021    Procedure: Viloa Smith;  Surgeon: Zuleyma Dawkins MD;  Location: [facility] MAIN OR;  Service: Urology    CATARACT EXTRACTION Bilateral     COLONOSCOPY  01/17/2013    COLONOSCOPY  01/08/2018    COLONOSCOPY  04/2021    CYSTOSCOPY      INJECT WITH BOTOX    EGD  06/05/2020    EGD AND COLONOSCOPY  02/06/2014, 2/8/2017    FL GUIDED CENTRAL VENOUS ACCESS DEVICE INSERTION  4/23/2019    FLEXIBLE SIGMOIDOSCOPY  06/11/2019    FOOT SURGERY      Due to plantar fascitis    JOINT REPLACEMENT Left     LTK    LINEAR ENDOSCOPIC U/S      PANCREATECTOMY LAPAROSCOPIC N/A 3/12/2019    Procedure: DISTAL PANCREATECTOMY LAPAROSCOPIC/POSSIBLE OPEN;  Surgeon: Isatu Talamantes MD;  Location:  MAIN OR;  Service: Surgical Oncology    MI EDG US EXAM SURGICAL ALTER STOM DUODENUM/JEJUNUM N/A 1/24/2019    Procedure: LINEAR ENDOSCOPIC U/S;  Surgeon: Ignacio Hassan MD;  Location: [facility] GI [facility]; Service: Gastroenterology    REPLACEMENT TOTAL KNEE Left     TUNNELED VENOUS PORT PLACEMENT Left 4/23/2019    Procedure: INSERTION VENOUS PORT (PORT-A-CATH); Surgeon: Natalya Lazcano MD;  Location: [facility] MAIN OR;  Service: Surgical Oncology- 11/8/21 Pt reports PAC removed     UMBILICAL HERNIA REPAIR               02/16/22 0850   OT Last Visit   OT Visit Date 02/16/22   Note Type   Note type Evaluation   Restrictions/Precautions   Weight Bearing Precautions Per Order No   Other Precautions Fall Risk;Pain;Multiple lines  (x1 STU, pCA, hanna)   Pain Assessment   Pain Assessment Tool 0-10   Pain Score No Pain   Home Living   Type of Home House   Home Layout Two level;Bed/bath upstairs;1/2 bath on main level   Bathroom Shower/Tub Walk-in shower   Bathroom Toilet Raised   Bathroom Equipment   (denies)   Home Equipment Walker;Cane  (x2 RW)   Additional Comments Pt lives with spouse in [facility] with 2 [address]  Prior Function   Level of [place] Independent with ADLs and functional mobility   Lives With Spouse   Receives Help From Family   ADL Assistance Independent   IADLs Independent   Falls in the last 6 months 0   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)   Comments PTA, Pt I with ADLs/IADLs/no AD/ +  Pt with supportive spouse able to assist upon DC  Lifestyle   Autonomy   I with ADLs/IADLs/no AD/ +  Reciprocal Relationships Spouse   Service to Others Owns his own business   [address] motorcycles and playing with his dog named Pablo     Psychosocial   Psychosocial (WDL) WDL   ADL   Where Assessed Edge of bed   Eating Assistance 7  Independent   Grooming Assistance 7  Independent   UB Bathing Assistance 5  Supervision/Setup   LB Bathing Assistance 4  Minimal Assistance   UB Dressing Assistance 5  Supervision/Setup   LB Dressing Assistance 4  Minimal [address]  4  Minimal Assistance   Functional Assistance 4  Minimal Assistance   Additional Comments Pt educated on energy conservation techniques with LB ADLs upon DC home  Bed Mobility   Supine to Sit 5  Supervision   Additional items HOB elevated; Bedrails; Increased time required   Sit to Supine Unable to assess   Additional Comments Pt greeted supine in bed and left OOB in recliner chair at end of session  All needs met and call bell nearby  Pt educated on log rolling for bed mobility  Transfers   Sit to Stand 5  Supervision   Additional items Increased time required;Verbal cues   Stand to Sit 5  Supervision   Additional items Increased time required;Verbal cues   Additional Comments with RW   Functional Mobility   Functional Mobility 5  Supervision   Additional Comments Short household distances  Additional items Rolling walker   Balance   Static Sitting Good   Dynamic Sitting Fair +   Static Standing Fair   Dynamic Standing Fair -   Ambulatory Fair -   Activity Tolerance   Activity Tolerance Patient tolerated treatment well   Medical Staff Made Aware Co-eval with MARRY Barajas 2* to Pt's medical complexity, decreased endurance, and post-surgical   Nurse Made Anahi Laird RN cleared/updated  RUE Assessment   RUE Assessment WFL   LUE Assessment   LUE Assessment WFL   Hand Function   Gross Motor Coordination Functional   Fine Motor Coordination Functional   Sensation   Light Touch No apparent deficits   Vision-Basic Assessment   Current Vision Wears glasses only for reading   Cognition   Overall Cognitive Status [facility]   Arousal/Participation Alert; Cooperative   Attention Within functional limits   Orientation Level Oriented X4   Memory Within functional limits   Following Commands Follows all commands and directions without difficulty   Comments Pt very pleasant and cooperative during OT session  Assessment   Limitation Decreased ADL status; Decreased UE ROM; Decreased UE strength;Decreased Safe judgement during ADL;Decreased cognition;Decreased endurance;Decreased high-level ADLs; Decreased self-care trans   Prognosis Fair   Assessment Pt is a 67 yo Male who [facility] on 2/15/2022 with malignant neoplasm of tail of pancreas  Pt s/p Pancreatectomy sub-total open and lysis of adhesions on 2/16/2022  Pt  has a past medical history of A-fib ([facility] [address] ), Abdominal wall strain, Allergic rhinitis, Arthritis, Cancer ([facility] [address] ), COVID-19 virus infection (3/3/2021), CPAP (continuous positive airway pressure) dependence, Diabetes mellitus ([facility] [address] ), Erectile dysfunction of non-organic origin, Irregular heart beat, Lumbar strain, Multiple acquired skin tags, Palpitations, Pancreas cyst, Plantar fasciitis, Skin disorder, Sleep apnea, and Thrombocytopenia ([facility] [address] )  Pt greeted bedside for OT evaluation on 2/16/2022  Pt lives with spouse in [facility] with [address]  PTA, Pt I with ADLs/IADLs/no AD/ +  Pt with supportive spouse able to assist upon DC  Pt demonstrating the following occupational deficits: S with UB ADLs, min A with LB ADLs, S with bed mobility, S with functional transfers, and S with functional mobility with RW  Pt with supportive spouse able to assist upon DC and Pt educated on LB ADL compensatory techniques  Pt with no further acute needs and Pt encouraged to continue participating in ADLs with restorative and nursing staff  Pt would benefit from returning home with increased social support upon DC to maximize safety and independence with ADLs and functional tasks of choice  DC skilled OT services  Goals   Patient Goals To feel better  Plan   OT Frequency Eval only   Recommendation   OT Discharge Recommendation No rehabilitation needs   Equipment Recommended Shower/Tub chair with back ($)   OT - OK to Discharge Yes   Additional Comments  The patient's raw score on the AM-PAC Daily Activity inpatient short form is 19, standardized score is 40 22, greater than 39 4  Patients at this level are likely to benefit from discharge to home   Please refer to the recommendation of the Occupational Therapist for safe discharge planning  Additional Comments 2 Pt educated on potential need for SC upon DC home and Pt has assess to one prn     AM-PAC Daily Activity Inpatient   Lower Body Dressing 3   Bathing 3   Toileting 3   Upper Body Dressing 3   Grooming 3   Eating 4   Daily Activity Raw Score 19   Daily Activity Standardized Score (Calc for Raw Score >=11) 40 22   AM-PAC Applied Cognition Inpatient   Following a Speech/Presentation 4   Understanding Ordinary Conversation 4   Taking Medications 4   Remembering Where Things Are Placed or Put Away 4   Remembering List of 4-5 Errands 4   Taking Care of Complicated Tasks 4   Applied Cognition Raw Score 24   Applied Cognition Standardized Score 62 21       Marielena Negron MS, OTR/L

## 2022-02-16 NOTE — RESTORATIVE TECHNICIAN NOTE
Restorative Technician Note      Patient Name: Lorren Homans     Restorative Tech Visit Date: 02/16/22  Note Type: Mobility  Patient Position Upon Consult: Bedside chair  Activity Performed: Ambulated  Assistive Device: Standard walker  Patient Position at End of Consult: Bedside chair;  All needs within reach    Veronica Suarez  DPT, Restorative Technician

## 2022-02-17 LAB
ANION GAP SERPL CALCULATED.3IONS-SCNC: 3 MMOL/L (ref 4–13)
BASOPHILS # BLD AUTO: 0.02 THOUSANDS/ΜL (ref 0–0.1)
BASOPHILS NFR BLD AUTO: 0 % (ref 0–1)
BUN SERPL-MCNC: 10 MG/DL (ref 5–25)
CALCIUM SERPL-MCNC: 7.9 MG/DL (ref 8.3–10.1)
CHLORIDE SERPL-SCNC: 110 MMOL/L (ref 100–108)
CO2 SERPL-SCNC: 25 MMOL/L (ref 21–32)
CREAT SERPL-MCNC: 0.6 MG/DL (ref 0.6–1.3)
EOSINOPHIL # BLD AUTO: 0.09 THOUSAND/ΜL (ref 0–0.61)
EOSINOPHIL NFR BLD AUTO: 2 % (ref 0–6)
ERYTHROCYTE [DISTWIDTH] IN BLOOD BY AUTOMATED COUNT: 12.8 % (ref 11.6–15.1)
GFR SERPL CREATININE-BSD FRML MDRD: 100 ML/MIN/1.73SQ M
GLUCOSE SERPL-MCNC: 164 MG/DL (ref 65–140)
GLUCOSE SERPL-MCNC: 170 MG/DL (ref 65–140)
GLUCOSE SERPL-MCNC: 178 MG/DL (ref 65–140)
GLUCOSE SERPL-MCNC: 182 MG/DL (ref 65–140)
HCT VFR BLD AUTO: 31.3 % (ref 36.5–49.3)
HGB BLD-MCNC: 10.4 G/DL (ref 12–17)
IMM GRANULOCYTES # BLD AUTO: 0.01 THOUSAND/UL (ref 0–0.2)
IMM GRANULOCYTES NFR BLD AUTO: 0 % (ref 0–2)
LYMPHOCYTES # BLD AUTO: 1.26 THOUSANDS/ΜL (ref 0.6–4.47)
LYMPHOCYTES NFR BLD AUTO: 24 % (ref 14–44)
MAGNESIUM SERPL-MCNC: 2 MG/DL (ref 1.6–2.6)
MCH RBC QN AUTO: 35.9 PG (ref 26.8–34.3)
MCHC RBC AUTO-ENTMCNC: 33.2 G/DL (ref 31.4–37.4)
MCV RBC AUTO: 108 FL (ref 82–98)
MONOCYTES # BLD AUTO: 0.62 THOUSAND/ΜL (ref 0.17–1.22)
MONOCYTES NFR BLD AUTO: 12 % (ref 4–12)
NEUTROPHILS # BLD AUTO: 3.27 THOUSANDS/ΜL (ref 1.85–7.62)
NEUTS SEG NFR BLD AUTO: 62 % (ref 43–75)
NRBC BLD AUTO-RTO: 0 /100 WBCS
PLATELET # BLD AUTO: 105 THOUSANDS/UL (ref 149–390)
PMV BLD AUTO: 11.1 FL (ref 8.9–12.7)
POTASSIUM SERPL-SCNC: 4 MMOL/L (ref 3.5–5.3)
RBC # BLD AUTO: 2.9 MILLION/UL (ref 3.88–5.62)
SODIUM SERPL-SCNC: 138 MMOL/L (ref 136–145)
WBC # BLD AUTO: 5.27 THOUSAND/UL (ref 4.31–10.16)

## 2022-02-17 PROCEDURE — 99232 SBSQ HOSP IP/OBS MODERATE 35: CPT | Performed by: INTERNAL MEDICINE

## 2022-02-17 PROCEDURE — 80048 BASIC METABOLIC PNL TOTAL CA: CPT | Performed by: PHYSICIAN ASSISTANT

## 2022-02-17 PROCEDURE — 85025 COMPLETE CBC W/AUTO DIFF WBC: CPT | Performed by: PHYSICIAN ASSISTANT

## 2022-02-17 PROCEDURE — 82948 REAGENT STRIP/BLOOD GLUCOSE: CPT

## 2022-02-17 PROCEDURE — 99024 POSTOP FOLLOW-UP VISIT: CPT | Performed by: SURGERY

## 2022-02-17 PROCEDURE — 83735 ASSAY OF MAGNESIUM: CPT | Performed by: PHYSICIAN ASSISTANT

## 2022-02-17 RX ORDER — HYDROMORPHONE HCL/PF 1 MG/ML
0.5 SYRINGE (ML) INJECTION EVERY 2 HOUR PRN
Status: DISCONTINUED | OUTPATIENT
Start: 2022-02-17 | End: 2022-02-21

## 2022-02-17 RX ADMIN — ACETAMINOPHEN 650 MG: 325 TABLET, FILM COATED ORAL at 11:40

## 2022-02-17 RX ADMIN — FENTANYL CITRATE: 50 INJECTION INTRAMUSCULAR; INTRAVENOUS at 03:36

## 2022-02-17 RX ADMIN — DEXTROSE, SODIUM CHLORIDE, AND POTASSIUM CHLORIDE 100 ML/HR: 5; .45; .15 INJECTION INTRAVENOUS at 11:47

## 2022-02-17 RX ADMIN — DEXTROSE, SODIUM CHLORIDE, AND POTASSIUM CHLORIDE 100 ML/HR: 5; .45; .15 INJECTION INTRAVENOUS at 02:06

## 2022-02-17 RX ADMIN — INSULIN LISPRO 2 UNITS: 100 INJECTION, SOLUTION INTRAVENOUS; SUBCUTANEOUS at 17:30

## 2022-02-17 RX ADMIN — Medication 6 MG: at 22:32

## 2022-02-17 RX ADMIN — ACETAMINOPHEN 650 MG: 325 TABLET, FILM COATED ORAL at 17:31

## 2022-02-17 RX ADMIN — INSULIN LISPRO 2 UNITS: 100 INJECTION, SOLUTION INTRAVENOUS; SUBCUTANEOUS at 06:01

## 2022-02-17 RX ADMIN — HEPARIN SODIUM 5000 UNITS: 5000 INJECTION INTRAVENOUS; SUBCUTANEOUS at 06:01

## 2022-02-17 RX ADMIN — ATENOLOL 25 MG: 25 TABLET ORAL at 08:33

## 2022-02-17 RX ADMIN — HEPARIN SODIUM 5000 UNITS: 5000 INJECTION INTRAVENOUS; SUBCUTANEOUS at 22:32

## 2022-02-17 RX ADMIN — INSULIN LISPRO 2 UNITS: 100 INJECTION, SOLUTION INTRAVENOUS; SUBCUTANEOUS at 11:41

## 2022-02-17 RX ADMIN — DEXTROSE, SODIUM CHLORIDE, AND POTASSIUM CHLORIDE 100 ML/HR: 5; .45; .15 INJECTION INTRAVENOUS at 21:12

## 2022-02-17 RX ADMIN — HEPARIN SODIUM 5000 UNITS: 5000 INJECTION INTRAVENOUS; SUBCUTANEOUS at 13:26

## 2022-02-17 RX ADMIN — INSULIN LISPRO 4 UNITS: 100 INJECTION, SOLUTION INTRAVENOUS; SUBCUTANEOUS at 00:01

## 2022-02-17 NOTE — PROGRESS NOTES
Epidural Follow-up Note - Acute Pain Service    Gavi Khan 66 y o  male MRN: [id number]  Unit/Bed#: [facility] 803-01 Encounter: [id number]      Assessment:   Principal Problem:    Malignant neoplasm of tail of pancreas ([facility] [address] )      Gavi Khan is a 66y o  year old male POD 2 s/p subtotal pancreatectomy with thoracic epidural in place  Pt's pain is well controlled with PCEA  He has been OOB and is in the chair this am   He expresses hesitation on using the PCEA  Reassured pt that it is safe to use  Otherwise denies n/v, pruritis, LE weakness    Plan:  Analgesia:  - Continue Thoracic epidural infusion of Ropivacaine 0 1% with fentanyl 2 mcg/mL at 6/3/10/3  - Continue Dilaudid 0 5 mg IV q2hr PRN for breakthrough pain  - Continue scheduled tylenol  - Anticipate epidural removal and transition to primarily PO regimen in 2-3 dyas    Bowel Regimen:  - Bowel regimen when appropriate from surgical perspective    APS will continue to follow  Please contact Acute Pain Service - [facility] via [employer] from [phone] with additional questions or concerns  See Alan or Bernardino for additional contacts and after hours information      Pain History  Current pain location(s): abdomen  Pain Scale:   4/10  Quality: sharp  24 hour history: as above    PCEA use: minimal  Opioid requirement previous 24 hours: none    Meds/Allergies   current meds:   Current Facility-Administered Medications   Medication Dose Route Frequency    acetaminophen (TYLENOL) tablet 650 mg  650 mg Oral Q6H    atenolol (TENORMIN) tablet 25 mg  25 mg Oral Daily    dextrose 5 % and sodium chloride 0 45 % with KCl 20 mEq/L infusion  100 mL/hr Intravenous Continuous    diphenhydrAMINE (BENADRYL) injection 25 mg  25 mg Intravenous Q6H PRN    heparin (porcine) subcutaneous injection 5,000 Units  5,000 Units Subcutaneous Q8H [address]    HYDROmorphone (DILAUDID) injection 0 5 mg  0 5 mg Intravenous Q2H PRN    insulin lispro (HumaLOG) 100 units/mL subcutaneous injection 2-12 Units  2-12 Units Subcutaneous Q6H [address]    Lidocaine Viscous HCl (XYLOCAINE) 2 % mucosal solution 15 mL  15 mL Swish & Spit 4x Daily PRN    melatonin tablet 6 mg  6 mg Oral HS    ondansetron (ZOFRAN) injection 4 mg  4 mg Intravenous Q6H PRN    phenol (CHLORASEPTIC) 1 4 % mucosal liquid 1 spray  1 spray Mouth/Throat Q2H PRN    ropivacaine 0 1% and fentaNYL 2 mcg/mL PCEA   Epidural Continuous    saliva substitute (MOUTH KOTE) mucosal solution 5 spray  5 spray Mouth/Throat 4x Daily PRN       No Known Allergies    Objective     Temp:  [97 2 °F (36 2 °C)-98 2 °F (36 8 °C)] 97 3 °F (36 3 °C)  HR:  [81-98] 81  Resp:  [16-18] 18  BP: (108-131)/(69-87) 115/84    Physical Exam  Vitals reviewed  Constitutional:       General: He is not in acute distress  HENT:      Head: Normocephalic  Mouth/Throat:      Mouth: Mucous membranes are moist    Eyes:      Extraocular Movements: Extraocular movements intact  Cardiovascular:      Rate and Rhythm: Normal rate  Pulmonary:      Effort: No respiratory distress  Abdominal:      Palpations: Abdomen is soft  Musculoskeletal:         General: Normal range of motion  Skin:     General: Skin is warm  Neurological:      General: No focal deficit present  Mental Status: He is alert and oriented to person, place, and time     Psychiatric:         Mood and Affect: Mood normal          Behavior: Behavior normal        Epidural: Site clean/dry/intact, no surrounding erythema/edema/induration, infusion functioning appropriately    Lab Results:   Results from last 7 days   Lab Units 02/17/22  0706   WBC Thousand/uL 5 27   HEMOGLOBIN g/dL 10 4*   HEMATOCRIT % 31 3*   PLATELETS Thousands/uL 105*      Results from last 7 days   Lab Units 02/17/22  0706 02/15/22  1839 02/15/22  1811   POTASSIUM mmol/L 4 0   < >  --    CHLORIDE mmol/L 110*   < >  --    CO2 mmol/L 25   < >  --    CO2, I-STAT mmol/L  --   --  26   BUN mg/dL 10   < >  --    CREATININE mg/dL 0 60 < >  --    CALCIUM mg/dL 7 9*   < >  --    GLUCOSE, ISTAT mg/dl  --   --  158*    < > = values in this interval not displayed  Please note that the APS provides consultative services regarding pain management only  With the exception of ketamine, peripheral nerve catheters, and epidural infusions (and except when indicated), final decisions regarding starting or changing doses of analgesic medications are at the discretion of the consulting service  Off hours consultation and/or medication management is generally not available      Lorenza Luque MD  Acute Pain Service

## 2022-02-17 NOTE — RESTORATIVE TECHNICIAN NOTE
Restorative Technician Note      Patient Name: Joel Carlos     Restorative Tech Visit Date: 02/17/22  Note Type: Mobility  Patient Position Upon Consult: Supine  Activity Performed: Ambulated  Assistive Device: Standard walker  Patient Position at End of Consult: Bedside chair;  All needs within reach    Mirta Monsivais  DPT, Restorative Technician

## 2022-02-17 NOTE — PLAN OF CARE
Problem: Prexisting or High Potential for Compromised Skin Integrity  Goal: Skin integrity is maintained or improved  Description: INTERVENTIONS:  - Identify patients at risk for skin breakdown  - Assess and monitor skin integrity  - Assess and monitor nutrition and hydration status  - Monitor labs   - Assess for incontinence   - Turn and reposition patient  - Assist with mobility/ambulation  - Relieve pressure over bony prominences  - Avoid friction and shearing  - Provide appropriate hygiene as needed including keeping skin clean and dry  - Evaluate need for skin moisturizer/barrier cream  - Collaborate with interdisciplinary team   - Patient/family teaching  - Consider wound care consult   Outcome: Progressing     Problem: GASTROINTESTINAL - ADULT  Goal: Minimal or absence of nausea and/or vomiting  Description: INTERVENTIONS:  - Administer IV fluids if ordered to ensure adequate hydration  - Maintain NPO status until nausea and vomiting are resolved  - Nasogastric tube if ordered  - Administer ordered antiemetic medications as needed  - Provide nonpharmacologic comfort measures as appropriate  - Advance diet as tolerated, if ordered  - Consider nutrition services referral to assist patient with adequate nutrition and appropriate food choices  Outcome: Progressing  Goal: Maintains or returns to baseline bowel function  Description: INTERVENTIONS:  - Assess bowel function  - Encourage oral fluids to ensure adequate hydration  - Administer IV fluids if ordered to ensure adequate hydration  - Administer ordered medications as needed  - Encourage mobilization and activity  - Consider nutritional services referral to assist patient with adequate nutrition and appropriate food choices  Outcome: Progressing     Problem: SKIN/TISSUE INTEGRITY - ADULT  Goal: Incision(s), wounds(s) or drain site(s) healing without S/S of infection  Description: INTERVENTIONS  - Assess and document dressing, incision, wound bed, drain sites and surrounding tissue  - Provide patient and family education  - Perform skin care/dressing changes every shift  Outcome: Progressing     Problem: PAIN - ADULT  Goal: Verbalizes/displays adequate comfort level or baseline comfort level  Description: Interventions:  - Encourage patient to monitor pain and request assistance  - Assess pain using appropriate pain scale  - Administer analgesics based on type and severity of pain and evaluate response  - Implement non-pharmacological measures as appropriate and evaluate response  - Consider cultural and social influences on pain and pain management  - Notify physician/advanced practitioner if interventions unsuccessful or patient reports new pain  Outcome: Progressing     Problem: MUSCULOSKELETAL - ADULT  Goal: Maintain or return mobility to safest level of function  Description: INTERVENTIONS:  - Assess patient's ability to carry out ADLs; assess patient's baseline for ADL function and identify physical deficits which impact ability to perform ADLs (bathing, care of mouth/teeth, toileting, grooming, dressing, etc )  - Assess/evaluate cause of self-care deficits   - Assess range of motion  - Assess patient's mobility  - Assess patient's need for assistive devices and provide as appropriate  - Encourage maximum independence but intervene and supervise when necessary  - Involve family in performance of ADLs  - Assess for home care needs following discharge   - Consider OT consult to assist with ADL evaluation and planning for discharge  - Provide patient education as appropriate  Outcome: Progressing     Problem: MOBILITY - ADULT  Goal: Maintain or return to baseline ADL function  Description: INTERVENTIONS:  -  Assess patient's ability to carry out ADLs; assess patient's baseline for ADL function and identify physical deficits which impact ability to perform ADLs (bathing, care of mouth/teeth, toileting, grooming, dressing, etc )  - Assess/evaluate cause of self-care deficits   - Assess range of motion  - Assess patient's mobility; develop plan if impaired  - Assess patient's need for assistive devices and provide as appropriate  - Encourage maximum independence but intervene and supervise when necessary  - Involve family in performance of ADLs  - Assess for home care needs following discharge   - Consider OT consult to assist with ADL evaluation and planning for discharge  - Provide patient education as appropriate  Outcome: Progressing  Goal: Maintains/Returns to pre admission functional level  Description: INTERVENTIONS:  - Perform BMAT or MOVE assessment daily    - Set and communicate daily mobility goal to care team and patient/family/caregiver  - Collaborate with rehabilitation services on mobility goals if consulted  - Out of bed for toileting  - Record patient progress and toleration of activity level   Outcome: Progressing     Problem: Potential for Falls  Goal: Patient will remain free of falls  Description: INTERVENTIONS:  - Educate patient/family on patient safety including physical limitations  - Instruct patient to call for assistance with activity   - Consult OT/PT to assist with strengthening/mobility   - Keep Call bell within reach  - Keep bed low and locked with side rails adjusted as appropriate  - Keep care items and personal belongings within reach  - Initiate and maintain comfort rounds  - Make Fall Risk Sign visible to staff  - Apply yellow socks and bracelet for high fall risk patients  - Consider moving patient to room near nurses station  Outcome: Progressing     Problem: Nutrition/Hydration-ADULT  Goal: Nutrient/Hydration intake appropriate for improving, restoring or maintaining nutritional needs  Description: Monitor and assess patient's nutrition/hydration status for malnutrition  Collaborate with interdisciplinary team and initiate plan and interventions as ordered  Monitor patient's weight and dietary intake as ordered or per policy  Utilize nutrition screening tool and intervene as necessary  Determine patient's food preferences and provide high-protein, high-caloric foods as appropriate       INTERVENTIONS:  - Monitor oral intake, urinary output, labs, and treatment plans  - Assess nutrition and hydration status and recommend course of action  - Evaluate amount of meals eaten  - Assist patient with eating if necessary   - Allow adequate time for meals  - Recommend/ encourage appropriate diets, oral nutritional supplements, and vitamin/mineral supplements  - Order, calculate, and assess calorie counts as needed  - Recommend, monitor, and adjust tube feedings and TPN/PPN based on assessed needs  - Assess need for intravenous fluids  - Provide specific nutrition/hydration education as appropriate  - Include patient/family/caregiver in decisions related to nutrition  Outcome: Progressing

## 2022-02-17 NOTE — RESTORATIVE TECHNICIAN NOTE
Restorative Technician Note      Patient Name: Kassidy Heard     Restorative Tech Visit Date: 02/17/22  Note Type: Mobility  Patient Position Upon Consult: Bedside chair  Activity Performed: Ambulated  Assistive Device: Standard walker  Patient Position at End of Consult: Bedside chair;  All needs within reach    Camrine Luu  DPT, Restorative Technician

## 2022-02-17 NOTE — PLAN OF CARE
Problem: Prexisting or High Potential for Compromised Skin Integrity  Goal: Skin integrity is maintained or improved  Description: INTERVENTIONS:  - Identify patients at risk for skin breakdown  - Assess and monitor skin integrity  - Assess and monitor nutrition and hydration status  - Monitor labs   - Assess for incontinence   - Turn and reposition patient  - Assist with mobility/ambulation  - Relieve pressure over bony prominences  - Avoid friction and shearing  - Provide appropriate hygiene as needed including keeping skin clean and dry  - Evaluate need for skin moisturizer/barrier cream  - Collaborate with interdisciplinary team   - Patient/family teaching  - Consider wound care consult   Outcome: Progressing     Problem: GASTROINTESTINAL - ADULT  Goal: Minimal or absence of nausea and/or vomiting  Description: INTERVENTIONS:  - Administer IV fluids if ordered to ensure adequate hydration  - Maintain NPO status until nausea and vomiting are resolved  - Nasogastric tube if ordered  - Administer ordered antiemetic medications as needed  - Provide nonpharmacologic comfort measures as appropriate  - Advance diet as tolerated, if ordered  - Consider nutrition services referral to assist patient with adequate nutrition and appropriate food choices  Outcome: Progressing  Goal: Maintains or returns to baseline bowel function  Description: INTERVENTIONS:  - Assess bowel function  - Encourage oral fluids to ensure adequate hydration  - Administer IV fluids if ordered to ensure adequate hydration  - Administer ordered medications as needed  - Encourage mobilization and activity  - Consider nutritional services referral to assist patient with adequate nutrition and appropriate food choices  Outcome: Progressing     Problem: SKIN/TISSUE INTEGRITY - ADULT  Goal: Incision(s), wounds(s) or drain site(s) healing without S/S of infection  Description: INTERVENTIONS  - Assess and document dressing, incision, wound bed, drain sites and surrounding tissue  - Provide patient and family education  - Perform skin care/dressing changes every shift  Outcome: Progressing     Problem: MUSCULOSKELETAL - ADULT  Goal: Maintain or return mobility to safest level of function  Description: INTERVENTIONS:  - Assess patient's ability to carry out ADLs; assess patient's baseline for ADL function and identify physical deficits which impact ability to perform ADLs (bathing, care of mouth/teeth, toileting, grooming, dressing, etc )  - Assess/evaluate cause of self-care deficits   - Assess range of motion  - Assess patient's mobility  - Assess patient's need for assistive devices and provide as appropriate  - Encourage maximum independence but intervene and supervise when necessary  - Involve family in performance of ADLs  - Assess for home care needs following discharge   - Consider OT consult to assist with ADL evaluation and planning for discharge  - Provide patient education as appropriate  Outcome: Progressing     Problem: PAIN - ADULT  Goal: Verbalizes/displays adequate comfort level or baseline comfort level  Description: Interventions:  - Encourage patient to monitor pain and request assistance  - Assess pain using appropriate pain scale  - Administer analgesics based on type and severity of pain and evaluate response  - Implement non-pharmacological measures as appropriate and evaluate response  - Consider cultural and social influences on pain and pain management  - Notify physician/advanced practitioner if interventions unsuccessful or patient reports new pain  Outcome: Progressing     Problem: MOBILITY - ADULT  Goal: Maintain or return to baseline ADL function  Description: INTERVENTIONS:  -  Assess patient's ability to carry out ADLs; assess patient's baseline for ADL function and identify physical deficits which impact ability to perform ADLs (bathing, care of mouth/teeth, toileting, grooming, dressing, etc )  - Assess/evaluate cause of self-care deficits   - Assess range of motion  - Assess patient's mobility; develop plan if impaired  - Assess patient's need for assistive devices and provide as appropriate  - Encourage maximum independence but intervene and supervise when necessary  - Involve family in performance of ADLs  - Assess for home care needs following discharge   - Consider OT consult to assist with ADL evaluation and planning for discharge  - Provide patient education as appropriate  Outcome: Progressing  Goal: Maintains/Returns to pre admission functional level  Description: INTERVENTIONS:  - Perform BMAT or MOVE assessment daily    - Set and communicate daily mobility goal to care team and patient/family/caregiver  - Collaborate with rehabilitation services on mobility goals if consulted  - Perform Range of Motion 3 times a day  - Reposition patient every 2 hours    - Dangle patient 3 times a day  - Stand patient 3 times a day  - Ambulate patient 3 times a day  - Out of bed to chair 3 times a day   - Out of bed for meals 3 times a day  - Out of bed for toileting  - Record patient progress and toleration of activity level   Outcome: Progressing     Problem: Potential for Falls  Goal: Patient will remain free of falls  Description: INTERVENTIONS:  - Educate patient/family on patient safety including physical limitations  - Instruct patient to call for assistance with activity   - Consult OT/PT to assist with strengthening/mobility   - Keep Call bell within reach  - Keep bed low and locked with side rails adjusted as appropriate  - Keep care items and personal belongings within reach  - Initiate and maintain comfort rounds  - Make Fall Risk Sign visible to staff  - Offer Toileting every 2 Hours, in advance of need  - Initiate/Maintain bed alarm  - Obtain necessary fall risk management equipment: yellow nonslip socks  - Apply yellow socks and bracelet for high fall risk patients  - Consider moving patient to room near nurses station  Outcome: Progressing     Problem: Nutrition/Hydration-ADULT  Goal: Nutrient/Hydration intake appropriate for improving, restoring or maintaining nutritional needs  Description: Monitor and assess patient's nutrition/hydration status for malnutrition  Collaborate with interdisciplinary team and initiate plan and interventions as ordered  Monitor patient's weight and dietary intake as ordered or per policy  Utilize nutrition screening tool and intervene as necessary  Determine patient's food preferences and provide high-protein, high-caloric foods as appropriate       INTERVENTIONS:  - Monitor oral intake, urinary output, labs, and treatment plans  - Assess nutrition and hydration status and recommend course of action  - Evaluate amount of meals eaten  - Assist patient with eating if necessary   - Allow adequate time for meals  - Recommend/ encourage appropriate diets, oral nutritional supplements, and vitamin/mineral supplements  - Order, calculate, and assess calorie counts as needed  - Recommend, monitor, and adjust tube feedings and TPN/PPN based on assessed needs  - Assess need for intravenous fluids  - Provide specific nutrition/hydration education as appropriate  - Include patient/family/caregiver in decisions related to nutrition  Outcome: Progressing

## 2022-02-18 LAB
AMYLASE FLD QL: 32 U/L
GLUCOSE SERPL-MCNC: 118 MG/DL (ref 65–140)
GLUCOSE SERPL-MCNC: 147 MG/DL (ref 65–140)
GLUCOSE SERPL-MCNC: 151 MG/DL (ref 65–140)
GLUCOSE SERPL-MCNC: 169 MG/DL (ref 65–140)
GLUCOSE SERPL-MCNC: 186 MG/DL (ref 65–140)

## 2022-02-18 PROCEDURE — 82150 ASSAY OF AMYLASE: CPT

## 2022-02-18 PROCEDURE — 99024 POSTOP FOLLOW-UP VISIT: CPT | Performed by: SURGERY

## 2022-02-18 PROCEDURE — 97116 GAIT TRAINING THERAPY: CPT

## 2022-02-18 PROCEDURE — 99232 SBSQ HOSP IP/OBS MODERATE 35: CPT | Performed by: INTERNAL MEDICINE

## 2022-02-18 PROCEDURE — 97530 THERAPEUTIC ACTIVITIES: CPT

## 2022-02-18 PROCEDURE — 82948 REAGENT STRIP/BLOOD GLUCOSE: CPT

## 2022-02-18 PROCEDURE — 99222 1ST HOSP IP/OBS MODERATE 55: CPT | Performed by: NURSE PRACTITIONER

## 2022-02-18 RX ADMIN — ACETAMINOPHEN 650 MG: 325 TABLET, FILM COATED ORAL at 05:58

## 2022-02-18 RX ADMIN — HEPARIN SODIUM 5000 UNITS: 5000 INJECTION INTRAVENOUS; SUBCUTANEOUS at 05:58

## 2022-02-18 RX ADMIN — INSULIN LISPRO 2 UNITS: 100 INJECTION, SOLUTION INTRAVENOUS; SUBCUTANEOUS at 00:08

## 2022-02-18 RX ADMIN — ACETAMINOPHEN 650 MG: 325 TABLET, FILM COATED ORAL at 11:34

## 2022-02-18 RX ADMIN — PANCRELIPASE 6000 UNITS: 30000; 6000; 19000 CAPSULE, DELAYED RELEASE PELLETS ORAL at 08:27

## 2022-02-18 RX ADMIN — HEPARIN SODIUM 5000 UNITS: 5000 INJECTION INTRAVENOUS; SUBCUTANEOUS at 14:39

## 2022-02-18 RX ADMIN — HEPARIN SODIUM 5000 UNITS: 5000 INJECTION INTRAVENOUS; SUBCUTANEOUS at 21:08

## 2022-02-18 RX ADMIN — PANCRELIPASE 6000 UNITS: 30000; 6000; 19000 CAPSULE, DELAYED RELEASE PELLETS ORAL at 11:34

## 2022-02-18 RX ADMIN — ACETAMINOPHEN 650 MG: 325 TABLET, FILM COATED ORAL at 23:38

## 2022-02-18 RX ADMIN — FENTANYL CITRATE: 50 INJECTION INTRAMUSCULAR; INTRAVENOUS at 13:25

## 2022-02-18 RX ADMIN — Medication 6 MG: at 21:08

## 2022-02-18 RX ADMIN — INSULIN LISPRO 2 UNITS: 100 INJECTION, SOLUTION INTRAVENOUS; SUBCUTANEOUS at 05:58

## 2022-02-18 RX ADMIN — INSULIN LISPRO 2 UNITS: 100 INJECTION, SOLUTION INTRAVENOUS; SUBCUTANEOUS at 21:17

## 2022-02-18 RX ADMIN — ACETAMINOPHEN 650 MG: 325 TABLET, FILM COATED ORAL at 17:23

## 2022-02-18 RX ADMIN — ATENOLOL 25 MG: 25 TABLET ORAL at 08:27

## 2022-02-18 RX ADMIN — PANCRELIPASE 6000 UNITS: 30000; 6000; 19000 CAPSULE, DELAYED RELEASE PELLETS ORAL at 17:24

## 2022-02-18 NOTE — PHYSICAL THERAPY NOTE
PT Treatment       02/18/22 1445   PT Last Visit   PT Visit Date 02/18/22   Note Type   Note Type Treatment   Pain Assessment   Pain Assessment Tool FLACC   Pain Location/Orientation Location: Abdomen   Pain Rating: FLACC (Rest) - Face 0   Pain Rating: FLACC (Rest) - Legs 0   Pain Rating: FLACC (Rest) - Activity 0   Pain Rating: FLACC (Rest) - Cry 0   Pain Rating: FLACC (Rest) - Consolability 0   Score: FLACC (Rest) 0   Pain Rating: FLACC (Activity) - Face 1   Pain Rating: FLACC (Activity) - Legs 0   Pain Rating: FLACC (Activity) - Activity 0   Pain Rating: FLACC (Activity) - Cry 0   Pain Rating: FLACC (Activity) - Consolability 0   Score: FLACC (Activity) 1   Restrictions/Precautions   Other Precautions Fall Risk;Multiple lines  (PCA PUMP, STU DRAIN, )   General   Chart Reviewed Yes   Response to Previous Treatment Patient with no complaints from previous session  Family/Caregiver Present Yes  (spouse)   Cognition   Overall Cognitive Status WFL   Arousal/Participation Alert; Cooperative   Attention Within functional limits   Orientation Level Oriented X4   Memory Within functional limits   Following Commands Follows all commands and directions without difficulty   Comments pleasant, motivated to mobilize   Subjective   Subjective "I went for a walk yesterday"   Bed Mobility   Supine to Sit Unable to assess   Sit to Supine Unable to assess   Additional Comments patient received sitting in chair and was in chair at end of PT treatment    Transfers   Sit to Stand 5  Supervision   Additional items Increased time required;Verbal cues   Stand to Sit 5  Supervision   Additional items Increased time required;Verbal cues   Additional Comments w/ RW; VC to push from chair    Ambulation/Elevation   Gait pattern Excessively slow; Foward flexed   Gait Assistance 5  Supervision   Additional items Assist x 1  (to manage IV pole)   Assistive Device Rolling walker   Distance 250 feet    Balance   Static Sitting Good   Static Standing Fair   Ambulatory Fair   Endurance Deficit   Endurance Deficit Yes   Endurance Deficit Description abdominal pain, fatigue post-op   Activity Tolerance   Activity Tolerance Patient tolerated treatment well   Nurse Made Aware samson to see per CHATO zhong    Assessment   Prognosis Good   Problem List Decreased strength;Decreased endurance; Impaired balance;Decreased mobility;Obesity   Assessment PT initiated treatment session in order to assist patient in achieving goals to improve transfers, gait training, and overall activity tolerance  Patient demonstrated progress toward achieving functional mobility goals as evidenced by increased ambulatory distance  He is ambulating community distances with use of RW S level  His gait speed is reduced and VC provided to encourage correction for flexed posture and to increased LE clearance and stride length  Assessment of stairs was deferred today secondary to fatigue but pan to assess next session as appropriate  PT d/c recommendation remains for home with family assistance PRN  Patient will continue to benefit from continued skilled PT this admission to achieve maximal function and safety  Goals   Patient Goals to go home next week    LTG Expiration Date 03/02/22   PT Treatment Day 1   Plan   Treatment/Interventions Endurance training;Patient/family training;Equipment eval/education; Bed mobility;Gait training;OT;Spoke to nursing;Elevations; Functional transfer training   Progress Progressing toward goals   PT Frequency 2-3x/wk   Recommendation   PT Discharge Recommendation No rehabilitation needs   Equipment Recommended   (has RW at home )   Additional Comments Patient and spouse participated in 10 minutes of education regarding mobility this admission and upon d/c   This included the following: confirmation of home set up for d/c (patient does plan on negotiating stairs to 2nd floor) , dme for d/c (patient has one RW, discussed attaining 2nd RW for patient to have one on each floor), family assistance as necessary (ie lower body dressing/washing tasks), and recommendation to change positions at least 1x/hour (even if sit<-->stand) and to ambulate 3-5x within home to continue participation in mobility upon d/c  AM-PAC Basic Mobility Inpatient   Turning in Bed Without Bedrails 3   Lying on Back to Sitting on Edge of Flat Bed 3   Moving Bed to Chair 4   Standing Up From Chair 4   Walk in Room 4   Climb 3-5 Stairs 3   Basic Mobility Inpatient Raw Score 21   Basic Mobility Standardized Score 45 55   Highest Level Of Mobility   [facility] Goal 6: Walk 10 steps or more     The patient's AM-PAC Basic Mobility Inpatient Standardized Score is greater than 42 9, suggesting this patient may benefit from discharge to home  Please also refer to the recommendation of the Physical Therapist for safe discharge planning      RENATA BARROS PT, DPT

## 2022-02-18 NOTE — PROGRESS NOTES
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     VSS   PO 120cc  UOP 1 475L  STU 50cc serosang      Plan:  Diet as tolerated - Diabetic clears  PCEA per APS  OOB / Ambulate  PT / OT  IS / Pulmonary toilet  DVT prophylaxis with SQH      Subjective:   No acute complaints  Tolerating clears  No nausea, vomiting  No flatus or BM  Voiding spontaneously    Objective:     Physical Exam:  General - no acute distress, responsive and cooperative  CV - warm, regular rate  Pulm - normal work of breathing, no respiratory distress  Abd - soft, appropriately tender to palpation, STU drain ss, incision c/d/i  Neuro - m/s grossly intact, cn grossly intact  Ext - moving all extremities        VTE Pharmacologic Prophylaxis: Heparin    Vitals:    02/17/22 1507 02/17/22 1900 02/17/22 2222 02/18/22 0221   BP: 119/70 115/65 144/77 139/78   BP Location:   Left arm    Pulse: 62 74 66 80   Resp: 19 16 16    Temp:   (!) 96 8 °F (36 °C) (!) 97 °F (36 1 °C)   TempSrc:   Oral    SpO2: 96% 95% 98% 96%   Weight:       Height:         Lab Results   Component Value Date    WBC 5 27 02/17/2022    HGB 10 4 (L) 02/17/2022    HCT 31 3 (L) 02/17/2022     (H) 02/17/2022     (L) 02/17/2022       Lab Results   Component Value Date    SODIUM 138 02/17/2022    K 4 0 02/17/2022     (H) 02/17/2022    CO2 25 02/17/2022    BUN 10 02/17/2022    CREATININE 0 60 02/17/2022    GLUC 182 (H) 02/17/2022    CALCIUM 7 9 (L) 02/17/2022       I have personally reviewed pertinent films in PACS      Shantel Segovia MD  2/18/2022 5:37 AM

## 2022-02-18 NOTE — PLAN OF CARE
Problem: PHYSICAL THERAPY ADULT  Goal: Performs mobility at highest level of function for planned discharge setting  See evaluation for individualized goals  Description: Treatment/Interventions: (P) Functional transfer training,LE strengthening/ROM,Elevations,Therapeutic exercise,Endurance training,Patient/family training,Equipment eval/education,Bed mobility,Gait training,OT,Spoke to nursing  Equipment Recommended: (P) [place] (has RW at home)       See flowsheet documentation for full assessment, interventions and recommendations  2/18/2022 1455 by Aimee Zimmerman PT  Outcome: Progressing  Note: Prognosis: Good  Problem List: Decreased strength,Decreased endurance,Impaired balance,Decreased mobility,Obesity  Assessment: PT initiated treatment session in order to assist patient in achieving goals to improve transfers, gait training, and overall activity tolerance  Patient demonstrated progress toward achieving functional mobility goals as evidenced by increased ambulatory distance  He is ambulating community distances with use of RW S level  His gait speed is reduced and VC provided to encourage correction for flexed posture and to increased LE clearance and stride length  Assessment of stairs was deferred today secondary to fatigue but pan to assess next session as appropriate  PT d/c recommendation remains for home with family assistance PRN  Patient will continue to benefit from continued skilled PT this admission to achieve maximal function and safety  PT Discharge Recommendation: (S) No rehabilitation needs          See flowsheet documentation for full assessment  2/18/2022 1455 by Aimee Zimmerman PT  Note: Prognosis: Good  Problem List: Decreased strength,Decreased endurance,Impaired balance,Decreased mobility,Obesity  Assessment: PT initiated treatment session in order to assist patient in achieving goals to improve transfers, gait training, and overall activity tolerance   Patient demonstrated progress toward achieving functional mobility goals as evidenced by increased ambulatory distance  He is ambulating community distances with use of RW S level  His gait speed is reduced and VC provided to encourage correction for flexed posture and to increased LE clearance and stride length  Assessment of stairs was deferred today secondary to fatigue but pan to assess next session as appropriate  PT d/c recommendation remains for home with family assistance PRN  Patient will continue to benefit from continued skilled PT this admission to achieve maximal function and safety  PT Discharge Recommendation: (S) No rehabilitation needs          See flowsheet documentation for full assessment

## 2022-02-18 NOTE — PLAN OF CARE
Problem: Prexisting or High Potential for Compromised Skin Integrity  Goal: Skin integrity is maintained or improved  Description: INTERVENTIONS:  - Identify patients at risk for skin breakdown  - Assess and monitor skin integrity  - Assess and monitor nutrition and hydration status  - Monitor labs   - Assess for incontinence   - Turn and reposition patient  - Assist with mobility/ambulation  - Relieve pressure over bony prominences  - Avoid friction and shearing  - Provide appropriate hygiene as needed including keeping skin clean and dry  - Evaluate need for skin moisturizer/barrier cream  - Collaborate with interdisciplinary team   - Patient/family teaching  - Consider wound care consult   Outcome: Progressing     Problem: GASTROINTESTINAL - ADULT  Goal: Minimal or absence of nausea and/or vomiting  Description: INTERVENTIONS:  - Administer IV fluids if ordered to ensure adequate hydration  - Maintain NPO status until nausea and vomiting are resolved  - Nasogastric tube if ordered  - Administer ordered antiemetic medications as needed  - Provide nonpharmacologic comfort measures as appropriate  - Advance diet as tolerated, if ordered  - Consider nutrition services referral to assist patient with adequate nutrition and appropriate food choices  Outcome: Progressing  Goal: Maintains or returns to baseline bowel function  Description: INTERVENTIONS:  - Assess bowel function  - Encourage oral fluids to ensure adequate hydration  - Administer IV fluids if ordered to ensure adequate hydration  - Administer ordered medications as needed  - Encourage mobilization and activity  - Consider nutritional services referral to assist patient with adequate nutrition and appropriate food choices  Outcome: Progressing     Problem: SKIN/TISSUE INTEGRITY - ADULT  Goal: Incision(s), wounds(s) or drain site(s) healing without S/S of infection  Description: INTERVENTIONS  - Assess and document dressing, incision, wound bed, drain sites and surrounding tissue  - Provide patient and family education  - Perform skin care/dressing changes every shift  Outcome: Progressing     Problem: MUSCULOSKELETAL - ADULT  Goal: Maintain or return mobility to safest level of function  Description: INTERVENTIONS:  - Assess patient's ability to carry out ADLs; assess patient's baseline for ADL function and identify physical deficits which impact ability to perform ADLs (bathing, care of mouth/teeth, toileting, grooming, dressing, etc )  - Assess/evaluate cause of self-care deficits   - Assess range of motion  - Assess patient's mobility  - Assess patient's need for assistive devices and provide as appropriate  - Encourage maximum independence but intervene and supervise when necessary  - Involve family in performance of ADLs  - Assess for home care needs following discharge   - Consider OT consult to assist with ADL evaluation and planning for discharge  - Provide patient education as appropriate  Outcome: Progressing     Problem: MOBILITY - ADULT  Goal: Maintain or return to baseline ADL function  Description: INTERVENTIONS:  -  Assess patient's ability to carry out ADLs; assess patient's baseline for ADL function and identify physical deficits which impact ability to perform ADLs (bathing, care of mouth/teeth, toileting, grooming, dressing, etc )  - Assess/evaluate cause of self-care deficits   - Assess range of motion  - Assess patient's mobility; develop plan if impaired  - Assess patient's need for assistive devices and provide as appropriate  - Encourage maximum independence but intervene and supervise when necessary  - Involve family in performance of ADLs  - Assess for home care needs following discharge   - Consider OT consult to assist with ADL evaluation and planning for discharge  - Provide patient education as appropriate  Outcome: Progressing  Goal: Maintains/Returns to pre admission functional level  Description: INTERVENTIONS:  - Perform BMAT or MOVE assessment daily    - Set and communicate daily mobility goal to care team and patient/family/caregiver  - Collaborate with rehabilitation services on mobility goals if consulted  - Out of bed for toileting  - Record patient progress and toleration of activity level   Outcome: Progressing     Problem: PAIN - ADULT  Goal: Verbalizes/displays adequate comfort level or baseline comfort level  Description: Interventions:  - Encourage patient to monitor pain and request assistance  - Assess pain using appropriate pain scale  - Administer analgesics based on type and severity of pain and evaluate response  - Implement non-pharmacological measures as appropriate and evaluate response  - Consider cultural and social influences on pain and pain management  - Notify physician/advanced practitioner if interventions unsuccessful or patient reports new pain  Outcome: Progressing     Problem: Nutrition/Hydration-ADULT  Goal: Nutrient/Hydration intake appropriate for improving, restoring or maintaining nutritional needs  Description: Monitor and assess patient's nutrition/hydration status for malnutrition  Collaborate with interdisciplinary team and initiate plan and interventions as ordered  Monitor patient's weight and dietary intake as ordered or per policy  Utilize nutrition screening tool and intervene as necessary  Determine patient's food preferences and provide high-protein, high-caloric foods as appropriate       INTERVENTIONS:  - Monitor oral intake, urinary output, labs, and treatment plans  - Assess nutrition and hydration status and recommend course of action  - Evaluate amount of meals eaten  - Assist patient with eating if necessary   - Allow adequate time for meals  - Recommend/ encourage appropriate diets, oral nutritional supplements, and vitamin/mineral supplements  - Order, calculate, and assess calorie counts as needed  - Recommend, monitor, and adjust tube feedings and TPN/PPN based on assessed needs  - Assess need for intravenous fluids  - Provide specific nutrition/hydration education as appropriate  - Include patient/family/caregiver in decisions related to nutrition  Outcome: Progressing     Problem: Potential for Falls  Goal: Patient will remain free of falls  Description: INTERVENTIONS:  - Educate patient/family on patient safety including physical limitations  - Instruct patient to call for assistance with activity   - Consult OT/PT to assist with strengthening/mobility   - Keep Call bell within reach  - Keep bed low and locked with side rails adjusted as appropriate  - Keep care items and personal belongings within reach  - Initiate and maintain comfort rounds  - Make Fall Risk Sign visible to staff  - Apply yellow socks and bracelet for high fall risk patients  - Consider moving patient to room near nurses station  Outcome: Progressing

## 2022-02-18 NOTE — CONSULTS
Consultation - Palliative and Supportive Care   Kassidy Heard 66 y o  male [id number]    Patient Active Problem List   Diagnosis    Obstructive sleep apnea syndrome    Hypersomnia    Permanent atrial fibrillation (HCC)    Morbid obesity with BMI of 40 0-44 9, adult ([facility] [address] )    Lower extremity edema    Pancreatic duct dilated    History of pancreatic cancer    Type 2 diabetes mellitus without complication, without long-term current use of insulin (HCC)    Malignant neoplasm of tail of pancreas (HCC)    Thrombocytopenia (HCC)    Urgency incontinence    Balanitis    OAB (overactive bladder)    BPH without obstruction/lower urinary tract symptoms    Encounter for follow-up surveillance of pancreatic cancer    Encounter for geriatric assessment     Active issues specifically addressed today include:   Recurrent Pancreatic cancer  Cancer related pain  DM2    Plan:  1  Symptom management  Pain  -  APS currently managing immediate postoperative pain with thoracic epidural   Anticipate discontinuation of epidural Saturday or Sunday   -  Recommend initiating  hydromorphone PO 2mg -4 mg PO Q 4 hours prn with IV hydromorphone for breakthrough pain  2  Goals   -Full Cares, no limits      Code Status: Full Code  - Level 1   Decisional apparatus:  Patient is competent on my exam today  If competence is lost, patient's substitute decision maker would default to wifeStephie by [place]  Advance Directive / Living Will / POLST:  None on file    I have reviewed the patient's controlled substance dispensing history in the Prescription Drug Monitoring Program in compliance with the [facility] regulations before prescribing any controlled substances  We appreciate the invitation to be involved in this patient's care  We will continue to follow  Please do not hesitate to reach our on call provider through our clinic answering service at  should you have acute symptom control concerns      April D Marcie Varghese  Palliative and Supportive Care  Clinic/Answering Service: [phone]  You can find me on [employer]! IDENTIFICATION:  Inpatient consult to Palliative Care  Consult performed by: MIRNA Odell  Consult ordered by: Travis Du PA-C        Physician Requesting Consult: Reinaldo Diaz MD  Reason for Consult / Principal Problem: symptom management   Hx and PE limited by: no limitations    HISTORY OF PRESENT ILLNESS:       Corrine Waller is a 66 y o  male with a past medical history significant for pancreatic cancer,diabetes who presented for resection of a pancreatic cyst on February 15th  Previously the patient had a distal pancreatectomy in March of 2019 followed by chemotherapy  During the immediate postoperative period, The patient had thoracic epidural infusion of ropivicaine and Fentanyl which is being managed by the acute acute pain team   His pain is well controlled  Anticipate the epidural being removed on Saturday or Sunday and palliative Care will help with transition of pain medications  The patient reports oxycodone being ineffective for pain int he past   He states that he was given a pain pill in 2019 when he had his previous surgery which was effective without side effects  During that admission the patient was taking hydromorphone 4 mg PO Q 4 hours prn for pain control  Would recommend starting hydromorphone PO for pain control with discontinuation of the epidural     Pending final pathology patient may benefit from outpatient palliative follow up may be beneficial                Review of Systems   Gastrointestinal: Positive for abdominal pain  All other systems reviewed and are negative        Past Medical History:   Diagnosis Date    A-fib [facility])     Abdominal wall strain     last assessed: 10/21/14    Allergic rhinitis     last assessed: 05/03/16    Arthritis     Cancer ([facility] [address] )     PACNCREATIC    COVID-19 virus infection 3/3/2021    CPAP (continuous positive airway pressure) dependence     Diabetes mellitus ([facility] [address] )     Erectile dysfunction of non-organic origin     last assessed: 03/17/14    Irregular heart beat     Pt with A fib     Lumbar strain     last assessed: 10/21/14    Multiple acquired skin tags     last assessed: 2/18/16    Palpitations     last assessed: 03/17/14    Pancreas cyst     Plantar fasciitis     Skin disorder     last assessed: 05/28/13    Sleep apnea     CPAP BROKE IN OCTOBER HAS BEEN TRYING TO GET NEW ONE BUT UNABLE AS OF YET    Thrombocytopenia ([facility] [address] )      Past Surgical History:   Procedure Laterality Date    BOTOX INJECTION N/A 11/12/2021    Procedure: Amber Casper;  Surgeon: Sheryle Chambers, MD;  Location: [address] [place] [address] MAIN OR;  Service: Urology    CATARACT EXTRACTION Bilateral     COLONOSCOPY  01/17/2013    COLONOSCOPY  01/08/2018    COLONOSCOPY  04/2021    CYSTOSCOPY      INJECT WITH BOTOX    DISTAL PANCREATECTOMY N/A 2/15/2022    Procedure: PANCREATECTOMY SUB-TOTALL-OPEN;  Surgeon: Denis Coe MD;  [facility]: [facility] MAIN OR;  Service: Surgical Oncology    EGD  06/05/2020    EGD AND COLONOSCOPY  02/06/2014, 2/8/2017    FL GUIDED CENTRAL VENOUS ACCESS DEVICE INSERTION  4/23/2019    FLEXIBLE SIGMOIDOSCOPY  06/11/2019    FOOT SURGERY      Due to plantar fascitis    JOINT REPLACEMENT Left     LTK    LINEAR ENDOSCOPIC U/S      PANCREATECTOMY LAPAROSCOPIC N/A 3/12/2019    Procedure: DISTAL PANCREATECTOMY LAPAROSCOPIC/POSSIBLE OPEN;  Surgeon: Denis Coe MD;  [facility]: [facility] MAIN OR;  Service: Surgical Oncology    OR EDG US EXAM SURGICAL ALTER STOM DUODENUM/JEJUNUM N/A 1/24/2019    Procedure: LINEAR ENDOSCOPIC U/S;  Surgeon: Stevenson Grimm MD;  Location: [facility]; Service: Gastroenterology    REPLACEMENT TOTAL KNEE Left     TUNNELED VENOUS PORT PLACEMENT Left 4/23/2019    Procedure: INSERTION VENOUS PORT (PORT-A-CATH);   Surgeon: Denis Coe MD;  [facility] MAIN [facility];  Service: Surgical Oncology- 11/8/21 Pt reports PAC removed     UMBILICAL HERNIA REPAIR       Social History     Socioeconomic History    Marital status: /Civil Union     Spouse name: Not on file    Number of children: Not on file    Years of education: Not on file    Highest education level: Not on file   Occupational History    Not on file   Tobacco Use    Smoking status: Never Smoker    Smokeless tobacco: Never Used   Vaping Use    Vaping Use: Never used   Substance and Sexual Activity    Alcohol use: Yes     Alcohol/week: 2 0 standard drinks     Types: 2 Cans of beer per week     Comment: couple times per week;     Drug use: No     Comment: Denies     Sexual activity: Yes     Comment: Denies any chest pain or shortness of breath with activity   Other Topics Concern    Not on file   Social History Narrative    Not on file     Social Determinants of Health     Financial Resource Strain: Not on file   Food Insecurity: No Food Insecurity    Worried About Running Out of Food in the Last Year: Never true    Tisha of Food in the Last Year: Never true   Transportation Needs: No Transportation Needs    Lack of Transportation (Medical): No    Lack of Transportation (Non-Medical):  No   Physical Activity: Not on file   Stress: Not on file   Social Connections: Not on file   Intimate Partner Violence: Not on file   Housing Stability: Unknown    Unable to Pay for Housing in the Last Year: No    Number of Places Lived in the Last Year: Not on file    Unstable Housing in the Last Year: No     Family History   Problem Relation Age of Onset    Breast cancer Mother     Cervical cancer Paternal Aunt     Pancreatic cancer Other     Liver cancer Other     No Known Problems Father        MEDICATIONS / ALLERGIES:    current meds:   Current Facility-Administered Medications   Medication Dose Route Frequency    acetaminophen (TYLENOL) tablet 650 mg  650 mg Oral Q6H    atenolol (TENORMIN) tablet 25 mg  25 mg Oral Daily    diphenhydrAMINE (BENADRYL) injection 25 mg  25 mg Intravenous Q6H PRN    heparin (porcine) subcutaneous injection 5,000 Units  5,000 Units Subcutaneous Q8H [facility]    HYDROmorphone (DILAUDID) injection 0 5 mg  0 5 mg Intravenous Q2H PRN    insulin lispro (HumaLOG) 100 units/mL subcutaneous injection 2-12 Units  2-12 Units Subcutaneous Q6H [facility]    Lidocaine Viscous HCl (XYLOCAINE) 2 % mucosal solution 15 mL  15 mL Swish & Spit 4x Daily PRN    melatonin tablet 6 mg  6 mg Oral HS    ondansetron (ZOFRAN) injection 4 mg  4 mg Intravenous Q6H PRN    pancrelipase (Lip-Prot-Amyl) (CREON) delayed release capsule 6,000 Units  6,000 Units Oral TID With Meals    phenol (CHLORASEPTIC) 1 4 % mucosal liquid 1 spray  1 spray Mouth/Throat Q2H PRN    ropivacaine 0 1% and fentaNYL 2 mcg/mL PCEA   Epidural Continuous    saliva substitute (MOUTH KOTE) mucosal solution 5 spray  5 spray Mouth/Throat 4x Daily PRN       No Known Allergies    OBJECTIVE:    Physical Exam  Physical Exam  Vitals and nursing note reviewed  Constitutional:       General: He is awake  Appearance: Normal appearance  He is obese  HENT:      Head: Normocephalic and atraumatic  Mouth/Throat:      Lips: Pink  Mouth: Mucous membranes are moist       Tongue: No lesions  Pharynx: Oropharynx is clear  Eyes:      General: Lids are normal       Extraocular Movements:      Right eye: Abnormal extraocular motion present  Conjunctiva/sclera: Conjunctivae normal    Cardiovascular:      Rate and Rhythm: Normal rate and regular rhythm  Pulses: Normal pulses  Pulmonary:      Effort: Pulmonary effort is normal       Breath sounds: Normal breath sounds  Abdominal:      General: Bowel sounds are decreased  Tenderness: There is abdominal tenderness  Musculoskeletal:      Cervical back: Normal range of motion and neck supple  Skin:     General: Skin is warm and dry  Coloration: Skin is not jaundiced     Neurological: General: No focal deficit present  Mental Status: He is alert and oriented to person, place, and time  GCS: GCS eye subscore is 4  GCS verbal subscore is 5  GCS motor subscore is 6  Psychiatric:         Attention and Perception: Attention and perception normal          Mood and Affect: Mood normal          Speech: Speech normal          Behavior: Behavior is cooperative  Cognition and Memory: Cognition and memory normal          Lab Results: CBC: No results found for: WBC, HGB, HCT, MCV, PLT, ADJUSTEDWBC, MCH, MCHC, RDW, MPV, NRBC, CMP: No results found for: SODIUM, K, CL, CO2, ANIONGAP, BUN, CREATININE, GLUCOSE, CALCIUM, AST, ALT, ALKPHOS, PROT, BILITOT, EGFR, PT/PTT:No results found for: PT, PTT      Counseling / Coordination of Care    Total floor / unit time spent today 60+ minutes  Greater than 50% of total time was spent with the patient and / or family counseling and / or coordination of care  A description of the counseling / coordination of care: chart, review, introduction of palliative care, offered support, gave information, opioid recommendations made

## 2022-02-18 NOTE — PROGRESS NOTES
Epidural Follow-up Note - Acute Pain Service    Drew Sexton 66 y o  male MRN: [id number]  Unit/Bed#: [facility] 803-01 Encounter: [id number]      Assessment:   Principal Problem:    Malignant neoplasm of tail of pancreas ([address] )      Drew Sexton is a 66y o  year old male POD 3 s/p subtotal pancreatectomy with thoracic epidural in place  Pt seen sitting in bed this am   Pain well controlled with PCEA  Tolerating clears  Will advance diet today  Otherwise denies n/v, pruritis, LE weakness    Plan: Continue PCEA at current rate  If tolerates diet advancement and passes flatus/BM can remove tomorrow    Analgesia:  - Continue Thoracic epidural infusion of Ropivacaine 0 1% with fentanyl 2 mcg/mL at 6/3/10/3  - Continue Dilaudid 0 5 mg IV q2hr PRN for breakthrough pain  - Continue scheduled tylenol  - Anticipate epidural removal and transition to primarily PO regimen in 1-2 dyas    Bowel Regimen:  - Bowel regimen when appropriate from surgical perspective    APS will continue to follow  Please contact Acute Pain Service - [facility] via [employer] from [phone] with additional questions or concerns  See [employer] or [place] for additional contacts and after hours information      Pain History  Current pain location(s): abdomen  Pain Scale:   5/10  Quality: dull aching  24 hour history: as above    PCEA use: minimal  Opioid requirement previous 24 hours: none    Meds/Allergies   current meds:   Current Facility-Administered Medications   Medication Dose Route Frequency    acetaminophen (TYLENOL) tablet 650 mg  650 mg Oral Q6H    atenolol (TENORMIN) tablet 25 mg  25 mg Oral Daily    diphenhydrAMINE (BENADRYL) injection 25 mg  25 mg Intravenous Q6H PRN    heparin (porcine) subcutaneous injection 5,000 Units  5,000 Units Subcutaneous Q8H [address]    HYDROmorphone (DILAUDID) injection 0 5 mg  0 5 mg Intravenous Q2H PRN    insulin lispro (HumaLOG) 100 units/mL subcutaneous injection 2-12 Units  2-12 Units Subcutaneous Q6H [address]  Lidocaine Viscous HCl (XYLOCAINE) 2 % mucosal solution 15 mL  15 mL Swish & Spit 4x Daily PRN    melatonin tablet 6 mg  6 mg Oral HS    ondansetron (ZOFRAN) injection 4 mg  4 mg Intravenous Q6H PRN    pancrelipase (Lip-Prot-Amyl) (CREON) delayed release capsule 6,000 Units  6,000 Units Oral TID With Meals    phenol (CHLORASEPTIC) 1 4 % mucosal liquid 1 spray  1 spray Mouth/Throat Q2H PRN    ropivacaine 0 1% and fentaNYL 2 mcg/mL PCEA   Epidural Continuous    saliva substitute (MOUTH KOTE) mucosal solution 5 spray  5 spray Mouth/Throat 4x Daily PRN       No Known Allergies    Objective     Temp:  [96 8 °F (36 °C)-98 5 °F (36 9 °C)] 98 5 °F (36 9 °C)  HR:  [62-80] 64  Resp:  [16-19] 18  BP: (110-150)/(65-85) 150/84    Physical Exam  Vitals reviewed  Constitutional:       General: He is not in acute distress  HENT:      Head: Normocephalic  Mouth/Throat:      Mouth: Mucous membranes are moist    Eyes:      Extraocular Movements: Extraocular movements intact  Conjunctiva/sclera: Conjunctivae normal    Cardiovascular:      Rate and Rhythm: Normal rate  Pulmonary:      Effort: Pulmonary effort is normal  No respiratory distress  Abdominal:      Palpations: Abdomen is soft  Musculoskeletal:         General: Normal range of motion  Comments: Distal BLE motor and sensory intact   Skin:     General: Skin is warm  Neurological:      General: No focal deficit present  Mental Status: He is alert and oriented to person, place, and time     Psychiatric:         Mood and Affect: Mood normal          Behavior: Behavior normal        Epidural: Site clean/dry/intact, no surrounding erythema/edema/induration, infusion functioning appropriately    Lab Results:   Results from last 7 days   Lab Units 02/17/22  0706   WBC Thousand/uL 5 27   HEMOGLOBIN g/dL 10 4*   HEMATOCRIT % 31 3*   PLATELETS Thousands/uL 105*      Results from last 7 days   Lab Units 02/17/22  0706 02/15/22  1839 02/15/22  1811 POTASSIUM mmol/L 4 0   < >  --    CHLORIDE mmol/L 110*   < >  --    CO2 mmol/L 25   < >  --    CO2, I-STAT mmol/L  --   --  26   BUN mg/dL 10   < >  --    CREATININE mg/dL 0 60   < >  --    CALCIUM mg/dL 7 9*   < >  --    GLUCOSE, ISTAT mg/dl  --   --  158*    < > = values in this interval not displayed  Please note that the APS provides consultative services regarding pain management only  With the exception of ketamine, peripheral nerve catheters, and epidural infusions (and except when indicated), final decisions regarding starting or changing doses of analgesic medications are at the discretion of the consulting service  Off hours consultation and/or medication management is generally not available      Celine Kelsey MD  Acute Pain Service

## 2022-02-18 NOTE — PLAN OF CARE
Problem: SKIN/TISSUE INTEGRITY - ADULT  Goal: Incision(s), wounds(s) or drain site(s) healing without S/S of infection  Description: INTERVENTIONS  - Assess and document dressing, incision, wound bed, drain sites and surrounding tissue  - Provide patient and family education  - Perform skin care/dressing changes every shift  Outcome: Progressing

## 2022-02-19 LAB
ANION GAP SERPL CALCULATED.3IONS-SCNC: 6 MMOL/L (ref 4–13)
BASOPHILS # BLD AUTO: 0.02 THOUSANDS/ΜL (ref 0–0.1)
BASOPHILS NFR BLD AUTO: 0 % (ref 0–1)
BUN SERPL-MCNC: 8 MG/DL (ref 5–25)
CALCIUM SERPL-MCNC: 8.8 MG/DL (ref 8.3–10.1)
CHLORIDE SERPL-SCNC: 107 MMOL/L (ref 100–108)
CO2 SERPL-SCNC: 24 MMOL/L (ref 21–32)
CREAT SERPL-MCNC: 0.54 MG/DL (ref 0.6–1.3)
EOSINOPHIL # BLD AUTO: 0.15 THOUSAND/ΜL (ref 0–0.61)
EOSINOPHIL NFR BLD AUTO: 3 % (ref 0–6)
ERYTHROCYTE [DISTWIDTH] IN BLOOD BY AUTOMATED COUNT: 12.8 % (ref 11.6–15.1)
GFR SERPL CREATININE-BSD FRML MDRD: 104 ML/MIN/1.73SQ M
GLUCOSE SERPL-MCNC: 131 MG/DL (ref 65–140)
GLUCOSE SERPL-MCNC: 148 MG/DL (ref 65–140)
GLUCOSE SERPL-MCNC: 163 MG/DL (ref 65–140)
GLUCOSE SERPL-MCNC: 178 MG/DL (ref 65–140)
HCT VFR BLD AUTO: 32.7 % (ref 36.5–49.3)
HGB BLD-MCNC: 11.1 G/DL (ref 12–17)
IMM GRANULOCYTES # BLD AUTO: 0.02 THOUSAND/UL (ref 0–0.2)
IMM GRANULOCYTES NFR BLD AUTO: 0 % (ref 0–2)
LYMPHOCYTES # BLD AUTO: 1.25 THOUSANDS/ΜL (ref 0.6–4.47)
LYMPHOCYTES NFR BLD AUTO: 23 % (ref 14–44)
MCH RBC QN AUTO: 35.8 PG (ref 26.8–34.3)
MCHC RBC AUTO-ENTMCNC: 33.9 G/DL (ref 31.4–37.4)
MCV RBC AUTO: 106 FL (ref 82–98)
MONOCYTES # BLD AUTO: 0.58 THOUSAND/ΜL (ref 0.17–1.22)
MONOCYTES NFR BLD AUTO: 11 % (ref 4–12)
NEUTROPHILS # BLD AUTO: 3.38 THOUSANDS/ΜL (ref 1.85–7.62)
NEUTS SEG NFR BLD AUTO: 63 % (ref 43–75)
NRBC BLD AUTO-RTO: 0 /100 WBCS
PLATELET # BLD AUTO: 128 THOUSANDS/UL (ref 149–390)
PMV BLD AUTO: 11.9 FL (ref 8.9–12.7)
POTASSIUM SERPL-SCNC: 4 MMOL/L (ref 3.5–5.3)
RBC # BLD AUTO: 3.1 MILLION/UL (ref 3.88–5.62)
SODIUM SERPL-SCNC: 137 MMOL/L (ref 136–145)
WBC # BLD AUTO: 5.4 THOUSAND/UL (ref 4.31–10.16)

## 2022-02-19 PROCEDURE — 80048 BASIC METABOLIC PNL TOTAL CA: CPT

## 2022-02-19 PROCEDURE — 99024 POSTOP FOLLOW-UP VISIT: CPT | Performed by: STUDENT IN AN ORGANIZED HEALTH CARE EDUCATION/TRAINING PROGRAM

## 2022-02-19 PROCEDURE — 82948 REAGENT STRIP/BLOOD GLUCOSE: CPT

## 2022-02-19 PROCEDURE — 85025 COMPLETE CBC W/AUTO DIFF WBC: CPT

## 2022-02-19 PROCEDURE — 99232 SBSQ HOSP IP/OBS MODERATE 35: CPT | Performed by: ANESTHESIOLOGY

## 2022-02-19 PROCEDURE — 99232 SBSQ HOSP IP/OBS MODERATE 35: CPT | Performed by: INTERNAL MEDICINE

## 2022-02-19 RX ORDER — HYDROMORPHONE HYDROCHLORIDE 2 MG/1
2 TABLET ORAL EVERY 4 HOURS PRN
Status: DISCONTINUED | OUTPATIENT
Start: 2022-02-19 | End: 2022-02-20

## 2022-02-19 RX ORDER — HYDROMORPHONE HYDROCHLORIDE 2 MG/1
4 TABLET ORAL EVERY 4 HOURS PRN
Status: DISCONTINUED | OUTPATIENT
Start: 2022-02-19 | End: 2022-02-20

## 2022-02-19 RX ORDER — ACETAMINOPHEN 325 MG/1
975 TABLET ORAL EVERY 8 HOURS
Status: DISCONTINUED | OUTPATIENT
Start: 2022-02-19 | End: 2022-02-22 | Stop reason: HOSPADM

## 2022-02-19 RX ORDER — POLYETHYLENE GLYCOL 3350 17 G/17G
17 POWDER, FOR SOLUTION ORAL DAILY
Status: DISCONTINUED | OUTPATIENT
Start: 2022-02-19 | End: 2022-02-22 | Stop reason: HOSPADM

## 2022-02-19 RX ORDER — AMOXICILLIN 250 MG
1 CAPSULE ORAL 2 TIMES DAILY PRN
Status: DISCONTINUED | OUTPATIENT
Start: 2022-02-19 | End: 2022-02-22 | Stop reason: HOSPADM

## 2022-02-19 RX ORDER — ACETAMINOPHEN 325 MG/1
975 TABLET ORAL EVERY 6 HOURS
Status: DISCONTINUED | OUTPATIENT
Start: 2022-02-19 | End: 2022-02-19

## 2022-02-19 RX ORDER — AMOXICILLIN 250 MG
1 CAPSULE ORAL
Status: DISCONTINUED | OUTPATIENT
Start: 2022-02-19 | End: 2022-02-19

## 2022-02-19 RX ORDER — DIPHENHYDRAMINE HCL 25 MG
25 TABLET ORAL EVERY 6 HOURS PRN
Status: DISCONTINUED | OUTPATIENT
Start: 2022-02-19 | End: 2022-02-22 | Stop reason: HOSPADM

## 2022-02-19 RX ADMIN — HEPARIN SODIUM 5000 UNITS: 5000 INJECTION INTRAVENOUS; SUBCUTANEOUS at 21:50

## 2022-02-19 RX ADMIN — INSULIN LISPRO 2 UNITS: 100 INJECTION, SOLUTION INTRAVENOUS; SUBCUTANEOUS at 11:05

## 2022-02-19 RX ADMIN — POLYETHYLENE GLYCOL 3350 17 G: 17 POWDER, FOR SOLUTION ORAL at 08:25

## 2022-02-19 RX ADMIN — HYDROMORPHONE HYDROCHLORIDE 4 MG: 2 TABLET ORAL at 21:55

## 2022-02-19 RX ADMIN — HEPARIN SODIUM 5000 UNITS: 5000 INJECTION INTRAVENOUS; SUBCUTANEOUS at 05:05

## 2022-02-19 RX ADMIN — ATENOLOL 25 MG: 25 TABLET ORAL at 08:25

## 2022-02-19 RX ADMIN — ACETAMINOPHEN 975 MG: 325 TABLET, FILM COATED ORAL at 11:04

## 2022-02-19 RX ADMIN — PANCRELIPASE 6000 UNITS: 30000; 6000; 19000 CAPSULE, DELAYED RELEASE PELLETS ORAL at 16:38

## 2022-02-19 RX ADMIN — INSULIN LISPRO 2 UNITS: 100 INJECTION, SOLUTION INTRAVENOUS; SUBCUTANEOUS at 16:38

## 2022-02-19 RX ADMIN — PANCRELIPASE 6000 UNITS: 30000; 6000; 19000 CAPSULE, DELAYED RELEASE PELLETS ORAL at 08:25

## 2022-02-19 RX ADMIN — PANCRELIPASE 6000 UNITS: 30000; 6000; 19000 CAPSULE, DELAYED RELEASE PELLETS ORAL at 11:04

## 2022-02-19 RX ADMIN — HYDROMORPHONE HYDROCHLORIDE 0.5 MG: 1 INJECTION, SOLUTION INTRAMUSCULAR; INTRAVENOUS; SUBCUTANEOUS at 19:30

## 2022-02-19 RX ADMIN — Medication 6 MG: at 21:51

## 2022-02-19 RX ADMIN — ACETAMINOPHEN 975 MG: 325 TABLET, FILM COATED ORAL at 21:51

## 2022-02-19 RX ADMIN — HYDROMORPHONE HYDROCHLORIDE 0.5 MG: 1 INJECTION, SOLUTION INTRAMUSCULAR; INTRAVENOUS; SUBCUTANEOUS at 06:27

## 2022-02-19 RX ADMIN — HYDROMORPHONE HYDROCHLORIDE 4 MG: 2 TABLET ORAL at 16:38

## 2022-02-19 RX ADMIN — ACETAMINOPHEN 650 MG: 325 TABLET, FILM COATED ORAL at 05:06

## 2022-02-19 NOTE — PROGRESS NOTES
Epidural Follow-up Note - Acute Pain Service    Corrine Waller 66 y o  male MRN: [id number]  Unit/Bed#: [facility] 803-01 Encounter: [id number]      Assessment:   Principal Problem:    Malignant neoplasm of tail of pancreas ([address] )      Corrine Waller is a 66y o  year old male  POD 4 s/p subtotal pancreatectomy with thoracic epidural for postop pain control  Pt seen in his room, he is sitting up off the edge of the bed  He is awake and communicative  His just finished a meal  He admits to doing fairly well  He is aware that epidural request to be removed from surgical team  He is apprehensive with the transition to PO pain regimen, but agreeable to the plan  Surgery team requests epidural removal today  Last dose hep sq at 5 am      Epidural dc'd  Tip intact  Area cleaned and dressed with bandaid  - done at 1220  Plan:  - Continue Dilaudid 0 5 mg IV q2hr PRN for breakthrough pain  - Continue tylenol 650 mg PO QID (LFTs from 1/2022 are normal)  - palliative care recommendations are for dilaudid 2-4 mg PO Q 4 hrs PRN mod-severe pain once epidural removed  Will relay to surgical team      Bowel Regimen:  - Bowel regimen when appropriate from surgical perspective    APS will continue to follow  Please contact Acute Pain Service - [facility] via [employer] from [phone] with additional questions or concerns  See Alan or Bernardino for additional contacts and after hours information      Pain History  Current pain location(s): surgical incision site  Pain Scale:   4-7/10  24 hour history: epidural use only, no breakthrough IV meds provided    Meds/Allergies   all current active meds have been reviewed, current meds:   Current Facility-Administered Medications   Medication Dose Route Frequency    acetaminophen (TYLENOL) tablet 650 mg  650 mg Oral Q6H    atenolol (TENORMIN) tablet 25 mg  25 mg Oral Daily    diphenhydrAMINE (BENADRYL) injection 25 mg  25 mg Intravenous Q6H PRN    heparin (porcine) subcutaneous injection 5,000 Units  5,000 Units Subcutaneous Q8H [address]    HYDROmorphone (DILAUDID) injection 0 5 mg  0 5 mg Intravenous Q2H PRN    insulin lispro (HumaLOG) 100 units/mL subcutaneous injection 2-12 Units  2-12 Units Subcutaneous Q6H [address]    Lidocaine Viscous HCl (XYLOCAINE) 2 % mucosal solution 15 mL  15 mL Swish & Spit 4x Daily PRN    melatonin tablet 6 mg  6 mg Oral HS    ondansetron (ZOFRAN) injection 4 mg  4 mg Intravenous Q6H PRN    pancrelipase (Lip-Prot-Amyl) (CREON) delayed release capsule 6,000 Units  6,000 Units Oral TID With Meals    phenol (CHLORASEPTIC) 1 4 % mucosal liquid 1 spray  1 spray Mouth/Throat Q2H PRN    ropivacaine 0 1% and fentaNYL 2 mcg/mL PCEA   Epidural Continuous    saliva substitute (MOUTH KOTE) mucosal solution 5 spray  5 spray Mouth/Throat 4x Daily PRN    and PTA meds:   Prior to Admission Medications   Prescriptions Last Dose Informant Patient Reported? Taking? Cholecalciferol (VITAMIN D) 2000 units CAPS 2/8/2022 Self Yes Yes   Sig: Take 1,000 Units by mouth daily 11/8/21 Pt takes three tabs of Vitamin D daily  Instructed to start HOLD 11/9/21 up to and including DOS  Cyanocobalamin (VITAMIN B 12 PO) 2/8/2022 Self Yes Yes   Sig: Take by mouth daily 11/8/21 Pt reports takes two tabs daily  Na Sulfate-K Sulfate-Mg Sulf (Suprep Bowel Prep Kit) 17 5-3 13-1 6 GM/177ML SOLN  Self No No   Sig: Follow office instructions   acetaminophen (TYLENOL) 325 mg tablet Past Week at Unknown time Self No Yes   Sig: Take 2 tablets (650 mg total) by mouth every 6 (six) hours as needed for mild pain   apixaban (Eliquis) 5 mg  Self No Yes   Sig: Take 1 tablet (5 mg total) by mouth 2 (two) times a day   ascorbic acid (VITAMIN C) 1000 MG tablet 2/8/2022 Self Yes Yes   Sig: Take 2,000 mg by mouth daily 11/8/21 Instructed pt to HOLD starting 11/9/21 up to and including DOS      aspirin (ECOTRIN) 325 mg EC tablet 2/8/2022 Self Yes Yes   Sig: Take 325 mg by mouth daily 11/8/21 Pt instructed as per urology instruction sheet - pt reports stopped on 11/7/21  Instructed pt to verify with cardiology regarding stopping of ASA and not starting Eliquis at this time prior to surgery  atenolol (TENORMIN) 25 mg tablet 2/15/2022 at 0800  No Yes   Sig: TAKE 1 TABLET BY MOUTH EVERY DAY   metFORMIN (GLUCOPHAGE) 1000 MG tablet 2/8/2022  No No   Sig: TAKE 1 TABLET BY MOUTH TWICE A DAY WITH MEALS   oxyCODONE-acetaminophen (Percocet) 7 5-325 MG per tablet Past Week at Unknown time  No Yes   Sig: Take 1 tablet by mouth every 4 (four) hours as needed for moderate pain Max Daily Amount: 6 tablets   tadalafil (CIALIS) 20 MG tablet Unknown at Unknown time Self No No   Sig: TAKE 1 TABLET BY MOUTH DAILY AS NEEDED FOR ERECTILE DYSFUNCTION   traMADol (ULTRAM) 50 mg tablet Past Month at Unknown time  No Yes   Sig: Take 1 tablet (50 mg total) by mouth every 6 (six) hours as needed for moderate pain      Facility-Administered Medications: None       No Known Allergies    Objective     Temp:  [96 8 °F (36 °C)-98 5 °F (36 9 °C)] 97 6 °F (36 4 °C)  HR:  [64-80] 80  Resp:  [16-18] 18  BP: (134-150)/(77-95) 141/95    Physical Exam  Vitals and nursing note reviewed  Constitutional:       Appearance: Normal appearance  He is obese  HENT:      Head: Normocephalic and atraumatic  Nose: Nose normal       Mouth/Throat:      Mouth: Mucous membranes are moist    Eyes:      Extraocular Movements: Extraocular movements intact  Pupils: Pupils are equal, round, and reactive to light  Cardiovascular:      Rate and Rhythm: Normal rate  Pulses: Normal pulses  Pulmonary:      Effort: Pulmonary effort is normal    Musculoskeletal:         General: Normal range of motion  Cervical back: Normal range of motion  Skin:     General: Skin is warm  Neurological:      General: No focal deficit present  Mental Status: He is alert and oriented to person, place, and time  Mental status is at baseline     Psychiatric:         Mood and Affect: Mood normal          Behavior: Behavior normal          Thought Content: Thought content normal          Judgment: Judgment normal        Lab Results:   Results from last 7 days   Lab Units 02/17/22  0706   WBC Thousand/uL 5 27   HEMOGLOBIN g/dL 10 4*   HEMATOCRIT % 31 3*   PLATELETS Thousands/uL 105*      Results from last 7 days   Lab Units 02/17/22  0706 02/15/22  1839 02/15/22  1811   POTASSIUM mmol/L 4 0   < >  --    CHLORIDE mmol/L 110*   < >  --    CO2 mmol/L 25   < >  --    CO2, I-STAT mmol/L  --   --  26   BUN mg/dL 10   < >  --    CREATININE mg/dL 0 60   < >  --    CALCIUM mg/dL 7 9*   < >  --    GLUCOSE, ISTAT mg/dl  --   --  158*    < > = values in this interval not displayed  Imaging Studies: I have personally reviewed pertinent reports  EKG, Pathology, and Other Studies: I have personally reviewed pertinent reports  Counseling / Coordination of Care  Total floor / unit time spent today 20 minutes  Greater than 50% of total time was spent with the patient and / or family counseling and / or coordination of care  A description of the counseling / coordination of care: epidural removed  Discussed transition to PO/IV pain regimen  Please note that the APS provides consultative services regarding pain management only  With the exception of ketamine, peripheral nerve catheters, and epidural infusions (and except when indicated), final decisions regarding starting or changing doses of analgesic medications are at the discretion of the consulting service  Off hours consultation and/or medication management is generally not available      Marlen Marmolejo MD  Acute Pain Service

## 2022-02-19 NOTE — PLAN OF CARE
Problem: GASTROINTESTINAL - ADULT  Goal: Minimal or absence of nausea and/or vomiting  Description: INTERVENTIONS:  - Administer IV fluids if ordered to ensure adequate hydration  - Maintain NPO status until nausea and vomiting are resolved  - Nasogastric tube if ordered  - Administer ordered antiemetic medications as needed  - Provide nonpharmacologic comfort measures as appropriate  - Advance diet as tolerated, if ordered  - Consider nutrition services referral to assist patient with adequate nutrition and appropriate food choices  2/18/2022 2116 by Pa Welch RN  Outcome: Progressing  2/18/2022 1344 by Pa Welch RN  Outcome: Progressing  Goal: Maintains or returns to baseline bowel function  Description: INTERVENTIONS:  - Assess bowel function  - Encourage oral fluids to ensure adequate hydration  - Administer IV fluids if ordered to ensure adequate hydration  - Administer ordered medications as needed  - Encourage mobilization and activity  - Consider nutritional services referral to assist patient with adequate nutrition and appropriate food choices  2/18/2022 2116 by Pa Welch RN  Outcome: Progressing  2/18/2022 1344 by Pa Welch RN  Outcome: Progressing

## 2022-02-19 NOTE — PLAN OF CARE
Problem: Prexisting or High Potential for Compromised Skin Integrity  Goal: Skin integrity is maintained or improved  Description: INTERVENTIONS:  - Identify patients at risk for skin breakdown  - Assess and monitor skin integrity  - Assess and monitor nutrition and hydration status  - Monitor labs   - Assess for incontinence   - Turn and reposition patient  - Assist with mobility/ambulation  - Relieve pressure over bony prominences  - Avoid friction and shearing  - Provide appropriate hygiene as needed including keeping skin clean and dry  - Evaluate need for skin moisturizer/barrier cream  - Collaborate with interdisciplinary team   - Patient/family teaching  - Consider wound care consult   Outcome: Progressing     Problem: GASTROINTESTINAL - ADULT  Goal: Minimal or absence of nausea and/or vomiting  Description: INTERVENTIONS:  - Administer IV fluids if ordered to ensure adequate hydration  - Maintain NPO status until nausea and vomiting are resolved  - Nasogastric tube if ordered  - Administer ordered antiemetic medications as needed  - Provide nonpharmacologic comfort measures as appropriate  - Advance diet as tolerated, if ordered  - Consider nutrition services referral to assist patient with adequate nutrition and appropriate food choices  Outcome: Progressing  Goal: Maintains or returns to baseline bowel function  Description: INTERVENTIONS:  - Assess bowel function  - Encourage oral fluids to ensure adequate hydration  - Administer IV fluids if ordered to ensure adequate hydration  - Administer ordered medications as needed  - Encourage mobilization and activity  - Consider nutritional services referral to assist patient with adequate nutrition and appropriate food choices  Outcome: Progressing  Goal: Maintains adequate nutritional intake  Description: INTERVENTIONS:  - Monitor percentage of each meal consumed  - Identify factors contributing to decreased intake, treat as appropriate  - Assist with meals as needed  - Monitor I&O, weight, and lab values if indicated  - Obtain nutrition services referral as needed  Outcome: Progressing     Problem: SKIN/TISSUE INTEGRITY - ADULT  Goal: Incision(s), wounds(s) or drain site(s) healing without S/S of infection  Description: INTERVENTIONS  - Assess and document dressing, incision, wound bed, drain sites and surrounding tissue  - Provide patient and family education  - Perform skin care/dressing changes every shift  Outcome: Progressing     Problem: MUSCULOSKELETAL - ADULT  Goal: Maintain or return mobility to safest level of function  Description: INTERVENTIONS:  - Assess patient's ability to carry out ADLs; assess patient's baseline for ADL function and identify physical deficits which impact ability to perform ADLs (bathing, care of mouth/teeth, toileting, grooming, dressing, etc )  - Assess/evaluate cause of self-care deficits   - Assess range of motion  - Assess patient's mobility  - Assess patient's need for assistive devices and provide as appropriate  - Encourage maximum independence but intervene and supervise when necessary  - Involve family in performance of ADLs  - Assess for home care needs following discharge   - Consider OT consult to assist with ADL evaluation and planning for discharge  - Provide patient education as appropriate  Outcome: Progressing     Problem: PAIN - ADULT  Goal: Verbalizes/displays adequate comfort level or baseline comfort level  Description: Interventions:  - Encourage patient to monitor pain and request assistance  - Assess pain using appropriate pain scale  - Administer analgesics based on type and severity of pain and evaluate response  - Implement non-pharmacological measures as appropriate and evaluate response  - Consider cultural and social influences on pain and pain management  - Notify physician/advanced practitioner if interventions unsuccessful or patient reports new pain  Outcome: Progressing     Problem: MOBILITY - ADULT  Goal: Maintain or return to baseline ADL function  Description: INTERVENTIONS:  -  Assess patient's ability to carry out ADLs; assess patient's baseline for ADL function and identify physical deficits which impact ability to perform ADLs (bathing, care of mouth/teeth, toileting, grooming, dressing, etc )  - Assess/evaluate cause of self-care deficits   - Assess range of motion  - Assess patient's mobility; develop plan if impaired  - Assess patient's need for assistive devices and provide as appropriate  - Encourage maximum independence but intervene and supervise when necessary  - Involve family in performance of ADLs  - Assess for home care needs following discharge   - Consider OT consult to assist with ADL evaluation and planning for discharge  - Provide patient education as appropriate  Outcome: Progressing  Goal: Maintains/Returns to pre admission functional level  Description: INTERVENTIONS:  - Perform BMAT or MOVE assessment daily    - Set and communicate daily mobility goal to care team and patient/family/caregiver     - Collaborate with rehabilitation services on mobility goals if consulted  - Out of bed for toileting  - Record patient progress and toleration of activity level   Outcome: Progressing     Problem: Potential for Falls  Goal: Patient will remain free of falls  Description: INTERVENTIONS:  - Educate patient/family on patient safety including physical limitations  - Instruct patient to call for assistance with activity   - Consult OT/PT to assist with strengthening/mobility   - Keep Call bell within reach  - Keep bed low and locked with side rails adjusted as appropriate  - Keep care items and personal belongings within reach  - Initiate and maintain comfort rounds  - Make Fall Risk Sign visible to staff  - Apply yellow socks and bracelet for high fall risk patients  - Consider moving patient to room near nurses station  Outcome: Progressing     Problem: Nutrition/Hydration-ADULT  Goal: Nutrient/Hydration intake appropriate for improving, restoring or maintaining nutritional needs  Description: Monitor and assess patient's nutrition/hydration status for malnutrition  Collaborate with interdisciplinary team and initiate plan and interventions as ordered  Monitor patient's weight and dietary intake as ordered or per policy  Utilize nutrition screening tool and intervene as necessary  Determine patient's food preferences and provide high-protein, high-caloric foods as appropriate       INTERVENTIONS:  - Monitor oral intake, urinary output, labs, and treatment plans  - Assess nutrition and hydration status and recommend course of action  - Evaluate amount of meals eaten  - Assist patient with eating if necessary   - Allow adequate time for meals  - Recommend/ encourage appropriate diets, oral nutritional supplements, and vitamin/mineral supplements  - Order, calculate, and assess calorie counts as needed  - Recommend, monitor, and adjust tube feedings and TPN/PPN based on assessed needs  - Assess need for intravenous fluids  - Provide specific nutrition/hydration education as appropriate  - Include patient/family/caregiver in decisions related to nutrition  Outcome: Progressing     Problem: METABOLIC, FLUID AND ELECTROLYTES - ADULT  Goal: Electrolytes maintained within normal limits  Description: INTERVENTIONS:  - Monitor labs and assess patient for signs and symptoms of electrolyte imbalances  - Administer electrolyte replacement as ordered  - Monitor response to electrolyte replacements, including repeat lab results as appropriate  - Instruct patient on fluid and nutrition as appropriate  Outcome: Progressing

## 2022-02-19 NOTE — PROGRESS NOTES
Progress Note - Palliative & Supportive Care  Herberth Kiser  66 y o   male  PP[facility] 803/[facility]HP 803-01   MRN: [id number]  Encounter: [id number]     ASSESSMENT:    Patient Active Problem List   Diagnosis    Obstructive sleep apnea syndrome    Hypersomnia    Permanent atrial fibrillation ([facility] [address] )    Morbid obesity with BMI of 40 0-44 9, adult ([facility] [address] )    Lower extremity edema    Pancreatic duct dilated    History of pancreatic cancer    Type 2 diabetes mellitus without complication, without long-term current use of insulin (HCC)    Malignant neoplasm of tail of pancreas (HCC)    Thrombocytopenia (HCC)    Urgency incontinence    Balanitis    OAB (overactive bladder)    BPH without obstruction/lower urinary tract symptoms    Encounter for follow-up surveillance of pancreatic cancer    Encounter for geriatric assessment     Active issues specifically addressed today include: pancreatic cancer, cancer-related pain, opioid titration, opioid-induced constipation, psychosocial support    PLAN:    1  Goals:    Full cares w/o limit  Recommend ambulatory follow-up with the closest / appropriate [facility] clinic, after discharge from the hospital  Our office will contact the patient to arrange  2  Social Support:   Supportive listening provided   Normalized experience of patient/family    3  Symptom management:   Continue Tylenol, change to TID ATC at 975mg   Start hydromorphone 2-4mg PO q4h PRN moderate-severe pain   Continue hydromorphone 0 5mg q4h PRN breakthrough pain   Continue daily Miralax   Changes Sennakot to BID PRN constipation   Continue melatonin QHS   Continue Mouth Kote PRN   Continue Zofran PRN   Continue viscous lidocaine PRN    Code status: Level 1 - Full Code   Decisional apparatus:  Patient does have capacity to make medical decisions on my exam today  If such capacity is lost, patient's substitute decision maker would default to spouse by [place]     Advance Directive / Living Will / POLST: Nothing on file    We appreciate the opportunity to participate in this patient's care  We will continue to follow  Please do not hesitate to contact our on-call provider through our clinic answering service at [phone] should you have acute symptom control concerns  INTERVAL HISTORY:    Epidural discontinued today and plan is to transition to PO medications  When oxycodone was mentioned, patient stated "that doesn't work" though he does not recall trying doses higher than 7 5mg  He accepts counseling on bowel regimen for OIC  Positive mood  No N/V  Abdominal pain currently managed      MEDICATIONS / ALLERGIES:  all current active meds have been reviewed and current meds:   Current Facility-Administered Medications   Medication Dose Route Frequency    acetaminophen (TYLENOL) tablet 975 mg  975 mg Oral Q8H    atenolol (TENORMIN) tablet 25 mg  25 mg Oral Daily    diphenhydrAMINE (BENADRYL) tablet 25 mg  25 mg Oral Q6H PRN    heparin (porcine) subcutaneous injection 5,000 Units  5,000 Units Subcutaneous Q8H [address]    HYDROmorphone (DILAUDID) injection 0 5 mg  0 5 mg Intravenous Q2H PRN    HYDROmorphone (DILAUDID) tablet 2 mg  2 mg Oral Q4H PRN    Or    HYDROmorphone (DILAUDID) tablet 4 mg  4 mg Oral Q4H PRN    insulin lispro (HumaLOG) 100 units/mL subcutaneous injection 2-12 Units  2-12 Units Subcutaneous Q6H [address]    Lidocaine Viscous HCl (XYLOCAINE) 2 % mucosal solution 15 mL  15 mL Swish & Spit 4x Daily PRN    melatonin tablet 6 mg  6 mg Oral HS    ondansetron (ZOFRAN) injection 4 mg  4 mg Intravenous Q6H PRN    pancrelipase (Lip-Prot-Amyl) (CREON) delayed release capsule 6,000 Units  6,000 Units Oral TID With Meals    phenol (CHLORASEPTIC) 1 4 % mucosal liquid 1 spray  1 spray Mouth/Throat Q2H PRN    polyethylene glycol (MIRALAX) packet 17 g  17 g Oral Daily    saliva substitute (MOUTH KOTE) mucosal solution 5 spray  5 spray Mouth/Throat 4x Daily PRN    senna-docusate sodium (SENOKOT S) 8 6-50 mg per tablet 1 tablet  1 tablet Oral BID PRN       No Known Allergies    OBJECTIVE:  /82   Pulse 84   Temp 98 8 °F (37 1 °C)   Resp 19   Ht 6' (1 829 m)   Wt 134 kg (295 lb)   SpO2 94%   BMI 40 01 kg/m²   Nursing notes reviewed  Physical Exam  Vitals reviewed  Constitutional:       General: He is not in acute distress  Appearance: He is obese  He is ill-appearing (chronically)  He is not toxic-appearing  HENT:      Head: Normocephalic and atraumatic  Right Ear: External ear normal       Left Ear: External ear normal    Eyes:      General: No scleral icterus  Right eye: No discharge  Left eye: No discharge  Extraocular Movements: Extraocular movements intact  Conjunctiva/sclera: Conjunctivae normal       Pupils: Pupils are equal, round, and reactive to light  Cardiovascular:      Rate and Rhythm: Normal rate  Pulmonary:      Effort: Pulmonary effort is normal  No tachypnea, bradypnea, accessory muscle usage or respiratory distress  Abdominal:      General: Abdomen is protuberant  There is no distension  Tenderness: There is no guarding  Skin:     General: Skin is dry  Coloration: Skin is pale  Neurological:      Mental Status: He is alert and oriented to person, place, and time  Cranial Nerves: No dysarthria or facial asymmetry  Psychiatric:         Attention and Perception: Attention normal          Mood and Affect: Mood and affect normal          Speech: Speech normal          Behavior: Behavior normal  Behavior is cooperative  Thought Content: Thought content normal          Cognition and Memory: Cognition and memory normal          Judgment: Judgment normal       Comments: Positive mood  Lab Results: I have personally reviewed pertinent labs    CBC:  Lab Results   Component Value Date    WBC 5 40 02/19/2022    HGB 11 1 (L) 02/19/2022    HCT 32 7 (L) 02/19/2022     (H) 02/19/2022     (L) 02/19/2022    MCH 35 8 (H) 02/19/2022    MCHC 33 9 02/19/2022    RDW 12 8 02/19/2022    MPV 11 9 02/19/2022    NRBC 0 02/19/2022     CMP:  Lab Results   Component Value Date    SODIUM 137 02/19/2022    K 4 0 02/19/2022     02/19/2022    CO2 24 02/19/2022    BUN 8 02/19/2022    CREATININE 0 54 (L) 02/19/2022    CALCIUM 8 8 02/19/2022    EGFR 104 02/19/2022     Albumin:  0   Lab Value Date/Time    ALB 3 3 (L) 01/18/2022 1057    ALB 3 7 08/22/2017 0835     Imaging Studies: I have personally reviewed pertinent reports  EKG, Pathology, and Other Studies: I have personally reviewed pertinent reports  Counseling / Coordination of Care: Total floor / unit time spent today 25+ minutes  Greater than 50% of total time was spent with the patient and / or family counseling and / or coordination of care  A description of the counseling / coordination of care: symptom assessment and management, medication review and adjustment, psychosocial support, chart review, imaging review, lab review, supportive listening and anticipatory guidance  Karthikeyan Martini MD  [facility] Palliative and Supportive Care      Portions of this document may have been created using dictation software and as such some "sound alike" terms may have been generated by the system  Do not hesitate to contact me with any questions or clarifications

## 2022-02-19 NOTE — PROGRESS NOTES
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     VSS   PO 780cc   (0 2mg/kg/hr)  STU 20cc serosang      Plan:  Diet as tolerated - Diabetic   PCEA per APS, possible removal today  OOB / Ambulate  PT / OT  IS / Pulmonary toilet  DVT prophylaxis with SQH      Subjective:   Endorses abdominal pain- same as yesterday  Tolerating diet  Small BM and +flatus    Objective:     Physical Exam:  General - no acute distress, responsive and cooperative  CV - warm, regular rate  Pulm - normal work of breathing, no respiratory distress  Abd - soft, mildly tender to palpation, STU drain ss, incision c/d/i  Neuro - m/s grossly intact, cn grossly intact  Ext - moving all extremities        VTE Pharmacologic Prophylaxis: Heparin    Vitals:    02/18/22 1503 02/18/22 1851 02/18/22 2246 02/19/22 0249   BP: 146/86 141/95 138/75 141/77   Pulse: 69 80 75 69   Resp: 18 18 18 19   Temp: 98 1 °F (36 7 °C) 97 6 °F (36 4 °C) 98 3 °F (36 8 °C) 98 3 °F (36 8 °C)   TempSrc:       SpO2: 96% 96% 97% 96%   Weight:       Height:         Lab Results   Component Value Date    WBC 5 27 02/17/2022    HGB 10 4 (L) 02/17/2022    HCT 31 3 (L) 02/17/2022     (H) 02/17/2022     (L) 02/17/2022       Lab Results   Component Value Date    SODIUM 138 02/17/2022    K 4 0 02/17/2022     (H) 02/17/2022    CO2 25 02/17/2022    BUN 10 02/17/2022    CREATININE 0 60 02/17/2022    GLUC 182 (H) 02/17/2022    CALCIUM 7 9 (L) 02/17/2022       I have personally reviewed pertinent films in PACS      Chris Clemons MD  2/19/2022 7:04 AM

## 2022-02-20 LAB
AMYLASE FLD QL: 30 U/L
ANION GAP SERPL CALCULATED.3IONS-SCNC: 4 MMOL/L (ref 4–13)
BASOPHILS # BLD AUTO: 0.03 THOUSANDS/ΜL (ref 0–0.1)
BASOPHILS NFR BLD AUTO: 1 % (ref 0–1)
BUN SERPL-MCNC: 8 MG/DL (ref 5–25)
CALCIUM SERPL-MCNC: 8.8 MG/DL (ref 8.3–10.1)
CHLORIDE SERPL-SCNC: 106 MMOL/L (ref 100–108)
CO2 SERPL-SCNC: 28 MMOL/L (ref 21–32)
CREAT SERPL-MCNC: 0.53 MG/DL (ref 0.6–1.3)
EOSINOPHIL # BLD AUTO: 0.19 THOUSAND/ΜL (ref 0–0.61)
EOSINOPHIL NFR BLD AUTO: 4 % (ref 0–6)
ERYTHROCYTE [DISTWIDTH] IN BLOOD BY AUTOMATED COUNT: 12.6 % (ref 11.6–15.1)
GFR SERPL CREATININE-BSD FRML MDRD: 105 ML/MIN/1.73SQ M
GLUCOSE SERPL-MCNC: 137 MG/DL (ref 65–140)
GLUCOSE SERPL-MCNC: 146 MG/DL (ref 65–140)
GLUCOSE SERPL-MCNC: 163 MG/DL (ref 65–140)
GLUCOSE SERPL-MCNC: 192 MG/DL (ref 65–140)
GLUCOSE SERPL-MCNC: 233 MG/DL (ref 65–140)
GLUCOSE SERPL-MCNC: 245 MG/DL (ref 65–140)
HCT VFR BLD AUTO: 32.6 % (ref 36.5–49.3)
HGB BLD-MCNC: 11.2 G/DL (ref 12–17)
IMM GRANULOCYTES # BLD AUTO: 0 THOUSAND/UL (ref 0–0.2)
IMM GRANULOCYTES NFR BLD AUTO: 0 % (ref 0–2)
LYMPHOCYTES # BLD AUTO: 1.28 THOUSANDS/ΜL (ref 0.6–4.47)
LYMPHOCYTES NFR BLD AUTO: 25 % (ref 14–44)
MAGNESIUM SERPL-MCNC: 1.7 MG/DL (ref 1.6–2.6)
MCH RBC QN AUTO: 36.1 PG (ref 26.8–34.3)
MCHC RBC AUTO-ENTMCNC: 34.4 G/DL (ref 31.4–37.4)
MCV RBC AUTO: 105 FL (ref 82–98)
MONOCYTES # BLD AUTO: 0.7 THOUSAND/ΜL (ref 0.17–1.22)
MONOCYTES NFR BLD AUTO: 14 % (ref 4–12)
NEUTROPHILS # BLD AUTO: 3 THOUSANDS/ΜL (ref 1.85–7.62)
NEUTS SEG NFR BLD AUTO: 56 % (ref 43–75)
NRBC BLD AUTO-RTO: 0 /100 WBCS
PHOSPHATE SERPL-MCNC: 3.6 MG/DL (ref 2.3–4.1)
PLATELET # BLD AUTO: 120 THOUSANDS/UL (ref 149–390)
PMV BLD AUTO: 11.2 FL (ref 8.9–12.7)
POTASSIUM SERPL-SCNC: 4.1 MMOL/L (ref 3.5–5.3)
RBC # BLD AUTO: 3.1 MILLION/UL (ref 3.88–5.62)
SODIUM SERPL-SCNC: 138 MMOL/L (ref 136–145)
WBC # BLD AUTO: 5.2 THOUSAND/UL (ref 4.31–10.16)

## 2022-02-20 PROCEDURE — 85025 COMPLETE CBC W/AUTO DIFF WBC: CPT | Performed by: STUDENT IN AN ORGANIZED HEALTH CARE EDUCATION/TRAINING PROGRAM

## 2022-02-20 PROCEDURE — 82948 REAGENT STRIP/BLOOD GLUCOSE: CPT

## 2022-02-20 PROCEDURE — 97116 GAIT TRAINING THERAPY: CPT

## 2022-02-20 PROCEDURE — 83735 ASSAY OF MAGNESIUM: CPT | Performed by: STUDENT IN AN ORGANIZED HEALTH CARE EDUCATION/TRAINING PROGRAM

## 2022-02-20 PROCEDURE — 82150 ASSAY OF AMYLASE: CPT | Performed by: SURGERY

## 2022-02-20 PROCEDURE — 99232 SBSQ HOSP IP/OBS MODERATE 35: CPT | Performed by: INTERNAL MEDICINE

## 2022-02-20 PROCEDURE — 84100 ASSAY OF PHOSPHORUS: CPT | Performed by: STUDENT IN AN ORGANIZED HEALTH CARE EDUCATION/TRAINING PROGRAM

## 2022-02-20 PROCEDURE — 99024 POSTOP FOLLOW-UP VISIT: CPT | Performed by: STUDENT IN AN ORGANIZED HEALTH CARE EDUCATION/TRAINING PROGRAM

## 2022-02-20 PROCEDURE — 80048 BASIC METABOLIC PNL TOTAL CA: CPT | Performed by: STUDENT IN AN ORGANIZED HEALTH CARE EDUCATION/TRAINING PROGRAM

## 2022-02-20 RX ORDER — HYDROMORPHONE HYDROCHLORIDE 2 MG/1
3 TABLET ORAL EVERY 4 HOURS PRN
Status: DISCONTINUED | OUTPATIENT
Start: 2022-02-20 | End: 2022-02-22 | Stop reason: HOSPADM

## 2022-02-20 RX ORDER — HYDROMORPHONE HYDROCHLORIDE 2 MG/1
6 TABLET ORAL EVERY 4 HOURS PRN
Status: DISCONTINUED | OUTPATIENT
Start: 2022-02-20 | End: 2022-02-22 | Stop reason: HOSPADM

## 2022-02-20 RX ORDER — MAGNESIUM SULFATE HEPTAHYDRATE 40 MG/ML
2 INJECTION, SOLUTION INTRAVENOUS ONCE
Status: COMPLETED | OUTPATIENT
Start: 2022-02-20 | End: 2022-02-20

## 2022-02-20 RX ADMIN — MAGNESIUM SULFATE IN WATER 2 G: 40 INJECTION, SOLUTION INTRAVENOUS at 08:40

## 2022-02-20 RX ADMIN — ATENOLOL 25 MG: 25 TABLET ORAL at 08:40

## 2022-02-20 RX ADMIN — HYDROMORPHONE HYDROCHLORIDE 0.5 MG: 1 INJECTION, SOLUTION INTRAMUSCULAR; INTRAVENOUS; SUBCUTANEOUS at 05:48

## 2022-02-20 RX ADMIN — ACETAMINOPHEN 975 MG: 325 TABLET, FILM COATED ORAL at 22:01

## 2022-02-20 RX ADMIN — POLYETHYLENE GLYCOL 3350 17 G: 17 POWDER, FOR SOLUTION ORAL at 08:40

## 2022-02-20 RX ADMIN — HEPARIN SODIUM 5000 UNITS: 5000 INJECTION INTRAVENOUS; SUBCUTANEOUS at 13:13

## 2022-02-20 RX ADMIN — INSULIN LISPRO 2 UNITS: 100 INJECTION, SOLUTION INTRAVENOUS; SUBCUTANEOUS at 17:09

## 2022-02-20 RX ADMIN — INSULIN LISPRO 4 UNITS: 100 INJECTION, SOLUTION INTRAVENOUS; SUBCUTANEOUS at 23:47

## 2022-02-20 RX ADMIN — HYDROMORPHONE HYDROCHLORIDE 4 MG: 2 TABLET ORAL at 02:27

## 2022-02-20 RX ADMIN — PANCRELIPASE 6000 UNITS: 30000; 6000; 19000 CAPSULE, DELAYED RELEASE PELLETS ORAL at 08:40

## 2022-02-20 RX ADMIN — HYDROMORPHONE HYDROCHLORIDE 4 MG: 2 TABLET ORAL at 08:40

## 2022-02-20 RX ADMIN — Medication 6 MG: at 22:01

## 2022-02-20 RX ADMIN — PANCRELIPASE 6000 UNITS: 30000; 6000; 19000 CAPSULE, DELAYED RELEASE PELLETS ORAL at 11:25

## 2022-02-20 RX ADMIN — HEPARIN SODIUM 5000 UNITS: 5000 INJECTION INTRAVENOUS; SUBCUTANEOUS at 05:42

## 2022-02-20 RX ADMIN — ACETAMINOPHEN 975 MG: 325 TABLET, FILM COATED ORAL at 05:42

## 2022-02-20 RX ADMIN — ACETAMINOPHEN 975 MG: 325 TABLET, FILM COATED ORAL at 13:13

## 2022-02-20 RX ADMIN — INSULIN LISPRO 2 UNITS: 100 INJECTION, SOLUTION INTRAVENOUS; SUBCUTANEOUS at 11:25

## 2022-02-20 RX ADMIN — HEPARIN SODIUM 5000 UNITS: 5000 INJECTION INTRAVENOUS; SUBCUTANEOUS at 22:01

## 2022-02-20 RX ADMIN — HYDROMORPHONE HYDROCHLORIDE 6 MG: 2 TABLET ORAL at 13:13

## 2022-02-20 RX ADMIN — PANCRELIPASE 6000 UNITS: 30000; 6000; 19000 CAPSULE, DELAYED RELEASE PELLETS ORAL at 17:09

## 2022-02-20 NOTE — PROGRESS NOTES
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     VSS    UO 1 25  STU 20cc serosang      Plan:  Diet as tolerated - Diabetic   Appreciate APS recs  OOB / Ambulate patient   Encourage frequent ambulation  PT / OT  IS / Pulmonary toilet  DVT prophylaxis with SQH      Subjective:   Pain control issues  Endorses abdominal pain whenever he moves  States +flatus and small amount of BM overnight      Objective:     Physical Exam:  General - no acute distress, responsive and cooperative  CV - warm, regular rate  Pulm - normal work of breathing, no respiratory distress, ORA  Abd - soft, mild tenderness to palpation, STU with serosang drainage, incision c d i  Neuro - m/s grossly intact, cn grossly intact  Ext - moving all extremities        VTE Pharmacologic Prophylaxis: Heparin    Vitals:    02/19/22 1045 02/19/22 1514 02/19/22 2143 02/20/22 0710   BP: 134/82 138/71 138/73 136/76   Pulse: 84 67 76 77   Resp: 19 18 20    Temp: 98 8 °F (37 1 °C) (!) 97 3 °F (36 3 °C) 98 °F (36 7 °C) (!) 97 4 °F (36 3 °C)   TempSrc:       SpO2: 94% 96% 97% 96%   Weight:       Height:         Lab Results   Component Value Date    WBC 5 20 02/20/2022    HGB 11 2 (L) 02/20/2022    HCT 32 6 (L) 02/20/2022     (H) 02/20/2022     (L) 02/20/2022       Lab Results   Component Value Date    SODIUM 138 02/20/2022    K 4 1 02/20/2022     02/20/2022    CO2 28 02/20/2022    BUN 8 02/20/2022    CREATININE 0 53 (L) 02/20/2022    GLUC 146 (H) 02/20/2022    CALCIUM 8 8 02/20/2022       I have personally reviewed pertinent films in PACS      Janett Walls MD  2/20/2022 8:06 AM

## 2022-02-20 NOTE — PROGRESS NOTES
Progress Note - Palliative & Supportive Care  Drew Sexton  66 y o   male  PPHP 803/[facility]HP 803-01   MRN: [id number]  Encounter: [id number]     ASSESSMENT:    Patient Active Problem List   Diagnosis    Obstructive sleep apnea syndrome    Hypersomnia    Permanent [facility] fibrillation ([facility] )    Morbid obesity with BMI of 40 0-44 9, adult ([facility] )    Lower extremity edema    Pancreatic duct dilated    History of pancreatic cancer    Type 2 diabetes mellitus without complication, without long-term current use of insulin (HCC)    Malignant neoplasm of tail of pancreas (HCC)    Thrombocytopenia (HCC)    Urgency incontinence    Balanitis    OAB (overactive bladder)    BPH without obstruction/lower urinary tract symptoms    Encounter for follow-up surveillance of pancreatic cancer    Encounter for geriatric assessment     Active issues specifically addressed today include: pancreatic cancer, cancer-related pain, opioid titration, opioid-induced constipation, psychosocial support    PLAN:    1  Goals:    Continue full cares w/o limit  Recommend ambulatory follow-up with the closest / appropriate [facility] clinic, after discharge from the hospital  Our office will contact the patient to arrange  2  Social Support:   Supportive listening provided   Normalized experience of patient/family    3  Symptom management:   Continue Tylenol 975mg q8h ATC   Change hydromorphone to 3-6mg PO q4h PRN moderate-severe pain   Continue hydromorphone 0 5mg q4h PRN breakthrough pain   Continue daily Miralax   Continue Sennakot BID PRN constipation   Continue melatonin QHS   Continue Mouth Kote PRN   Continue Zofran PRN   Continue viscous lidocaine PRN    Code status: Level 1 - Full Code   Decisional apparatus:  Patient does have capacity to make medical decisions on my exam today  If such capacity is lost, patient's substitute decision maker would default to spouse [place]     Advance Directive / Living Will / POLST: Nothing on file    We appreciate the opportunity to participate in this patient's care  We will continue to follow  Please do not hesitate to contact our on-call provider through our clinic answering service at [phone] should you have acute symptom control concerns  INTERVAL HISTORY:    Patient seen and examined at bedside  He has been tolerating PO medications well  He notes improvement in his pain w/ PO hydromorphone but feels the 4mg dose is insufficient for his pain needs; he has needed 2 doses of IV hydromorphone for breakthrough pain in the past 24 hours  He had taken 4mf PO hydromorphone 3x in the past 24 hours  He reports "no solid" stool output but he has been passing flatus, and states "everything is working" in terms of bowel function  No N/V  Mood stable      MEDICATIONS / ALLERGIES:  all current active meds have been reviewed and current meds:   Current Facility-Administered Medications   Medication Dose Route Frequency    acetaminophen (TYLENOL) tablet 975 mg  975 mg Oral Q8H    atenolol (TENORMIN) tablet 25 mg  25 mg Oral Daily    diphenhydrAMINE (BENADRYL) tablet 25 mg  25 mg Oral Q6H PRN    heparin (porcine) subcutaneous injection 5,000 Units  5,000 Units Subcutaneous Q8H [address]    HYDROmorphone (DILAUDID) injection 0 5 mg  0 5 mg Intravenous Q2H PRN    HYDROmorphone (DILAUDID) tablet 3 mg  3 mg Oral Q4H PRN    Or    HYDROmorphone (DILAUDID) tablet 6 mg  6 mg Oral Q4H PRN    insulin lispro (HumaLOG) 100 units/mL subcutaneous injection 2-12 Units  2-12 Units Subcutaneous Q6H [address]    Lidocaine Viscous HCl (XYLOCAINE) 2 % mucosal solution 15 mL  15 mL Swish & Spit 4x Daily PRN    melatonin tablet 6 mg  6 mg Oral HS    ondansetron (ZOFRAN) injection 4 mg  4 mg Intravenous Q6H PRN    pancrelipase (Lip-Prot-Amyl) (CREON) delayed release capsule 6,000 Units  6,000 Units Oral TID With Meals    phenol (CHLORASEPTIC) 1 4 % mucosal liquid 1 spray  1 spray Mouth/Throat Q2H PRN    polyethylene glycol (MIRALAX) packet 17 g  17 g Oral Daily    saliva substitute (MOUTH KOTE) mucosal solution 5 spray  5 spray Mouth/Throat 4x Daily PRN    senna-docusate sodium (SENOKOT S) 8 6-50 mg per tablet 1 tablet  1 tablet Oral BID PRN       No Known Allergies    OBJECTIVE:  /76   Pulse 77   Temp (!) 97 4 °F (36 3 °C)   Resp 20   Ht 6' (1 829 m)   Wt 134 kg (295 lb)   SpO2 96%   BMI 40 01 kg/m²   Nursing notes reviewed  Physical Exam  Vitals reviewed  Constitutional:       General: He is not in acute distress  Appearance: He is obese  He is ill-appearing (chronically)  HENT:      Head: Normocephalic and atraumatic  Right Ear: External ear normal       Left Ear: External ear normal    Eyes:      General: No scleral icterus  Right eye: No discharge  Left eye: No discharge  Extraocular Movements: Extraocular movements intact  Conjunctiva/sclera: Conjunctivae normal       Pupils: Pupils are equal, round, and reactive to light  Pulmonary:      Effort: Pulmonary effort is normal  No respiratory distress  Abdominal:      General: There is no distension  Tenderness: There is no guarding  Skin:     General: Skin is dry  Coloration: Skin is pale  Neurological:      Mental Status: He is alert and oriented to person, place, and time  Psychiatric:         Attention and Perception: Attention normal          Mood and Affect: Mood and affect normal          Speech: Speech normal          Behavior: Behavior normal  Behavior is cooperative  Thought Content: Thought content normal          Cognition and Memory: Cognition and memory normal          Judgment: Judgment normal        Lab Results: I have personally reviewed pertinent labs    CBC:  Lab Results   Component Value Date    WBC 5 20 02/20/2022    HGB 11 2 (L) 02/20/2022    HCT 32 6 (L) 02/20/2022     (H) 02/20/2022     (L) 02/20/2022    MCH 36 1 (H) 02/20/2022    MCHC 34 4 02/20/2022    RDW 12 6 02/20/2022    MPV 11 2 02/20/2022    NRBC 0 02/20/2022     CMP:  Lab Results   Component Value Date    SODIUM 138 02/20/2022    K 4 1 02/20/2022     02/20/2022    CO2 28 02/20/2022    BUN 8 02/20/2022    CREATININE 0 53 (L) 02/20/2022    CALCIUM 8 8 02/20/2022    EGFR 105 02/20/2022     Albumin:  0   Lab Value Date/Time    ALB 3 3 (L) 01/18/2022 1057    ALB 3 7 08/22/2017 0835     Imaging Studies: I have personally reviewed pertinent reports  EKG, Pathology, and Other Studies: I have personally reviewed pertinent reports  Counseling / Coordination of Care: Total floor / unit time spent today 25+ minutes  Greater than 50% of total time was spent with the patient and / or family counseling and / or coordination of care  A description of the counseling / coordination of care: symptom assessment and management, medication review and adjustment, psychosocial support, chart review, imaging review, lab review, supportive listening and anticipatory guidance  Isaak Cottrell MD  [facility] Palliative and Supportive [facility]  [phone]    Portions of this document may have been created using dictation software and as such some "sound alike" terms may have been generated by the system  Do not hesitate to contact me with any questions or clarifications

## 2022-02-20 NOTE — PHYSICAL THERAPY NOTE
PHYSICAL THERAPY Evaluation NOTE    Patient Name: Lorren Homans  SBOBJ'B Date: 2/20/2022 02/20/22 1070   PT Last Visit   PT Visit Date 02/20/22   Note Type   Note Type Treatment   Pain Assessment   Pain Assessment Tool 0-10   Pain Score No Pain   Restrictions/Precautions   Weight Bearing Precautions Per Order No   Other Precautions Fall Risk;Pain;Multiple lines   General   Chart Reviewed Yes   Additional Pertinent History Pt  is a 67 yo M who presents subtotal pancreatectomy, POD 5   Family/Caregiver Present No   Cognition   Overall Cognitive Status WFL   Arousal/Participation Cooperative   Attention Within functional limits   Orientation Level Oriented X4   Memory Within functional limits   Following Commands Follows all commands and directions without difficulty   Comments Pt  was identified with full name and birthdate   Subjective   Subjective Pt  agreeable to PT session   Bed Mobility   Additional Comments Pt  seated OOB in recliner prior to and following PT session   Transfers   Sit to Stand 5  Supervision   Additional items Increased time required   Stand to Sit 5  Supervision   Additional items Increased time required   Ambulation/Elevation   Gait pattern Improper Weight shift;Narrow ESE; Forward Flexion;Decreased foot clearance;Shuffling; Inconsistent rakesh; Redundant gait at times; Excessively slow   Gait Assistance 5  Supervision   Additional items Assist x 1;Verbal cues  (for posture and gait mechanics)   Assistive Device Rolling walker   Distance 300'x1   Balance   Static Sitting Good   Dynamic Sitting Fair +   Static Standing Fair   Dynamic Standing Fair -   Ambulatory Fair -   Endurance Deficit   Endurance Deficit Yes   Endurance Deficit Description postural and gait degradation noted with fatigue   Activity Tolerance   Activity Tolerance Patient tolerated treatment well   Nurse Made Aware spoke to RN, cleared for PT session   Assessment   Prognosis Good   Problem List Decreased strength;Decreased range of motion;Decreased endurance; Impaired balance;Decreased mobility; Decreased coordination   Assessment Pt  is a 65 yo M who presents subtotal pancreatectomy, POD 5  Pt  Pt  Agreeable to PT session, Pt  Identified with full name and birthdate  Pt  Demonstrated increased functional independence and increased activity tolerance as evidenced above  Pt  Tolerates increased gait distances and demonstrates improved activity tolerance this session  Pt  Is progressing towards goals, as expected and will benefit from continued skilled therapy to increase functional independence and aid patient in return to PLOF with decreased burden of care  D/C recommendation is Home with family support when medically appropriate  Goals   Patient Goals to get home   LTG Expiration Date 03/02/22   PT Treatment Day 2   Plan   Treatment/Interventions Functional transfer training;LE strengthening/ROM; Therapeutic exercise; Endurance training;Patient/family training;Equipment eval/education; Bed mobility;Gait training;Spoke to nursing;Spoke to case management   Progress Progressing toward goals   PT Frequency 2-3x/wk   Recommendation   PT Discharge Recommendation No rehabilitation needs   AM-PAC Basic Mobility Inpatient   Turning in Bed Without Bedrails 3   Lying on Back to Sitting on Edge of Flat Bed 3   Moving Bed to Chair 4   Standing Up From Chair 4   Walk in Room 4   Climb 3-5 Stairs 3   Basic Mobility Inpatient Raw Score 21   Basic Mobility Standardized Score 45 55   Highest Level Of Mobility   JH-HLM Goal 6: Walk 10 steps or more   JH-HLM Highest Level of Mobility 8: Walk 250 feet ot more   JH-HLM Goal Achieved Yes   Education   Education Provided Mobility training   Patient Demonstrates acceptance/verbal understanding   End of Consult   Patient Position at End of Consult Bedside chair; All needs within reach     The patient's AM-PAC Basic Mobility Inpatient Short Form Raw Score is 21  A Raw score of greater than 16 suggests the patient may benefit from discharge to home  Please also refer to the recommendation of the Physical Therapist for safe discharge planning      Mayda Arriola, PT, DPT 2/20/2022

## 2022-02-20 NOTE — PLAN OF CARE
Problem: PHYSICAL THERAPY ADULT  Goal: Performs mobility at highest level of function for planned discharge setting  See evaluation for individualized goals  Description: Treatment/Interventions: (P) Functional transfer training,LE strengthening/ROM,Elevations,Therapeutic exercise,Endurance training,Patient/family training,Equipment eval/education,Bed mobility,Gait training,OT,Spoke to nursing  Equipment Recommended: (P) Daniela Gill (has RW at home)       See flowsheet documentation for full assessment, interventions and recommendations  Outcome: Progressing  Note: Prognosis: Good  Problem List: Decreased strength,Decreased range of motion,Decreased endurance,Impaired balance,Decreased mobility,Decreased coordination  Assessment: Pt  is a 65 yo M who presents subtotal pancreatectomy, POD 5  Pt  Pt  Agreeable to PT session, Pt  Identified with full name and birthdate  Pt  Demonstrated increased functional independence and increased activity tolerance as evidenced above  Pt  Tolerates increased gait distances and demonstrates improved activity tolerance this session  Pt  Is progressing towards goals, as expected and will benefit from continued skilled therapy to increase functional independence and aid patient in return to PLOF with decreased burden of care  D/C recommendation is Home with family support when medically appropriate  PT Discharge Recommendation: No rehabilitation needs          See flowsheet documentation for full assessment

## 2022-02-21 LAB
ALBUMIN SERPL BCP-MCNC: 2.6 G/DL (ref 3.5–5)
ALP SERPL-CCNC: 143 U/L (ref 46–116)
ALT SERPL W P-5'-P-CCNC: 28 U/L (ref 12–78)
ANION GAP SERPL CALCULATED.3IONS-SCNC: 4 MMOL/L (ref 4–13)
AST SERPL W P-5'-P-CCNC: 25 U/L (ref 5–45)
BASOPHILS # BLD AUTO: 0.02 THOUSANDS/ΜL (ref 0–0.1)
BASOPHILS NFR BLD AUTO: 0 % (ref 0–1)
BILIRUB SERPL-MCNC: 0.49 MG/DL (ref 0.2–1)
BUN SERPL-MCNC: 12 MG/DL (ref 5–25)
CALCIUM ALBUM COR SERPL-MCNC: 10.2 MG/DL (ref 8.3–10.1)
CALCIUM SERPL-MCNC: 9.1 MG/DL (ref 8.3–10.1)
CHLORIDE SERPL-SCNC: 101 MMOL/L (ref 100–108)
CO2 SERPL-SCNC: 29 MMOL/L (ref 21–32)
CREAT SERPL-MCNC: 0.64 MG/DL (ref 0.6–1.3)
EOSINOPHIL # BLD AUTO: 0.21 THOUSAND/ΜL (ref 0–0.61)
EOSINOPHIL NFR BLD AUTO: 4 % (ref 0–6)
ERYTHROCYTE [DISTWIDTH] IN BLOOD BY AUTOMATED COUNT: 12.8 % (ref 11.6–15.1)
GFR SERPL CREATININE-BSD FRML MDRD: 97 ML/MIN/1.73SQ M
GLUCOSE SERPL-MCNC: 162 MG/DL (ref 65–140)
GLUCOSE SERPL-MCNC: 166 MG/DL (ref 65–140)
GLUCOSE SERPL-MCNC: 171 MG/DL (ref 65–140)
GLUCOSE SERPL-MCNC: 173 MG/DL (ref 65–140)
GLUCOSE SERPL-MCNC: 184 MG/DL (ref 65–140)
HCT VFR BLD AUTO: 32.4 % (ref 36.5–49.3)
HGB BLD-MCNC: 10.8 G/DL (ref 12–17)
IMM GRANULOCYTES # BLD AUTO: 0.02 THOUSAND/UL (ref 0–0.2)
IMM GRANULOCYTES NFR BLD AUTO: 0 % (ref 0–2)
LYMPHOCYTES # BLD AUTO: 1.11 THOUSANDS/ΜL (ref 0.6–4.47)
LYMPHOCYTES NFR BLD AUTO: 23 % (ref 14–44)
MAGNESIUM SERPL-MCNC: 1.8 MG/DL (ref 1.6–2.6)
MCH RBC QN AUTO: 35.5 PG (ref 26.8–34.3)
MCHC RBC AUTO-ENTMCNC: 33.3 G/DL (ref 31.4–37.4)
MCV RBC AUTO: 107 FL (ref 82–98)
MONOCYTES # BLD AUTO: 0.69 THOUSAND/ΜL (ref 0.17–1.22)
MONOCYTES NFR BLD AUTO: 14 % (ref 4–12)
NEUTROPHILS # BLD AUTO: 2.81 THOUSANDS/ΜL (ref 1.85–7.62)
NEUTS SEG NFR BLD AUTO: 59 % (ref 43–75)
NRBC BLD AUTO-RTO: 0 /100 WBCS
PLATELET # BLD AUTO: 118 THOUSANDS/UL (ref 149–390)
PMV BLD AUTO: 12 FL (ref 8.9–12.7)
POTASSIUM SERPL-SCNC: 4.2 MMOL/L (ref 3.5–5.3)
PROT SERPL-MCNC: 7 G/DL (ref 6.4–8.2)
RBC # BLD AUTO: 3.04 MILLION/UL (ref 3.88–5.62)
SODIUM SERPL-SCNC: 134 MMOL/L (ref 136–145)
WBC # BLD AUTO: 4.86 THOUSAND/UL (ref 4.31–10.16)

## 2022-02-21 PROCEDURE — 80053 COMPREHEN METABOLIC PANEL: CPT | Performed by: SURGERY

## 2022-02-21 PROCEDURE — 99232 SBSQ HOSP IP/OBS MODERATE 35: CPT | Performed by: NURSE PRACTITIONER

## 2022-02-21 PROCEDURE — 85025 COMPLETE CBC W/AUTO DIFF WBC: CPT | Performed by: SURGERY

## 2022-02-21 PROCEDURE — 82948 REAGENT STRIP/BLOOD GLUCOSE: CPT

## 2022-02-21 PROCEDURE — NC001 PR NO CHARGE: Performed by: SURGERY

## 2022-02-21 PROCEDURE — 83735 ASSAY OF MAGNESIUM: CPT | Performed by: SURGERY

## 2022-02-21 PROCEDURE — 99024 POSTOP FOLLOW-UP VISIT: CPT | Performed by: SURGERY

## 2022-02-21 RX ORDER — HYDROMORPHONE HYDROCHLORIDE 2 MG/1
3-6 TABLET ORAL EVERY 4 HOURS PRN
Qty: 60 TABLET | Refills: 0 | Status: SHIPPED | OUTPATIENT
Start: 2022-02-21 | End: 2022-02-23 | Stop reason: SDUPTHER

## 2022-02-21 RX ADMIN — PANCRELIPASE 6000 UNITS: 30000; 6000; 19000 CAPSULE, DELAYED RELEASE PELLETS ORAL at 08:03

## 2022-02-21 RX ADMIN — ACETAMINOPHEN 975 MG: 325 TABLET, FILM COATED ORAL at 06:38

## 2022-02-21 RX ADMIN — HEPARIN SODIUM 5000 UNITS: 5000 INJECTION INTRAVENOUS; SUBCUTANEOUS at 22:22

## 2022-02-21 RX ADMIN — INSULIN LISPRO 2 UNITS: 100 INJECTION, SOLUTION INTRAVENOUS; SUBCUTANEOUS at 06:45

## 2022-02-21 RX ADMIN — ATENOLOL 25 MG: 25 TABLET ORAL at 08:03

## 2022-02-21 RX ADMIN — POLYETHYLENE GLYCOL 3350 17 G: 17 POWDER, FOR SOLUTION ORAL at 08:03

## 2022-02-21 RX ADMIN — HYDROMORPHONE HYDROCHLORIDE 6 MG: 2 TABLET ORAL at 11:56

## 2022-02-21 RX ADMIN — HEPARIN SODIUM 5000 UNITS: 5000 INJECTION INTRAVENOUS; SUBCUTANEOUS at 11:57

## 2022-02-21 RX ADMIN — PANCRELIPASE 6000 UNITS: 30000; 6000; 19000 CAPSULE, DELAYED RELEASE PELLETS ORAL at 16:47

## 2022-02-21 RX ADMIN — PANCRELIPASE 6000 UNITS: 30000; 6000; 19000 CAPSULE, DELAYED RELEASE PELLETS ORAL at 11:57

## 2022-02-21 RX ADMIN — Medication 6 MG: at 22:22

## 2022-02-21 RX ADMIN — HEPARIN SODIUM 5000 UNITS: 5000 INJECTION INTRAVENOUS; SUBCUTANEOUS at 06:38

## 2022-02-21 RX ADMIN — HYDROMORPHONE HYDROCHLORIDE 6 MG: 2 TABLET ORAL at 00:43

## 2022-02-21 RX ADMIN — HYDROMORPHONE HYDROCHLORIDE 6 MG: 2 TABLET ORAL at 22:21

## 2022-02-21 NOTE — CASE MANAGEMENT
Case Management Discharge Planning Note    Patient name Priyanka Nails  Location [address] 803/PPHP 767-76 MRN [id number]  : 1950 Date 2022       Current Admission Date: 2/15/2022  Current Admission Diagnosis:Malignant neoplasm of tail of pancreas [facility])   Patient Active Problem List    Diagnosis Date Noted    Encounter for geriatric assessment 2022    Encounter for follow-up surveillance of pancreatic cancer 2021    OAB (overactive bladder) 12/15/2020    BPH without obstruction/lower urinary tract symptoms 12/15/2020    Urgency incontinence 2020    Balanitis 2020    Thrombocytopenia ([facility] ) 2020    Malignant neoplasm of tail of pancreas ([facility] ) 2020    Type 2 diabetes mellitus without complication, without long-term current use of insulin [facility] ) 2019    History of pancreatic cancer 2019    Pancreatic duct dilated 2018    Lower extremity edema 2018    Obstructive sleep apnea syndrome 2018    Hypersomnia 2018    Permanent atrial fibrillation ([facility] ) 2018    Morbid obesity with BMI of 40 0-44 9, adult ([facility] ) 2018      LOS (days): 6  Geometric Mean LOS (GMLOS) (days): 4 60  Days to GMLOS:-1 1     OBJECTIVE:  Risk of Unplanned Readmission Score: 14         Current admission status: Inpatient   Preferred Pharmacy:   [id number] [address] [facility] [place] [address]  Phone: [phone] Fax: [address] [place] [address]  Phone: [phone] Fax: [address], [place] [address] [facility]  Phone: [phone] Fax: [phone]    Primary Care Provider: Nancy Richardson DO    Primary Insurance: MEDICARE  Secondary Insurance: [employer]    DISCHARGE DETAILS:      Other Referral/Resources/Interventions Provided:  Referral Comments: Pt referred to [facility]  IMM Given (Date):: 02/21/22  IMM Given to[de-identified] Patient     Additional Comments: CM called Kathrynjavad Schulz to check price on 21 day supply of Lovenox, which was $149 08  CM reviewed the price with the pt, who confirmed with CM that he can afford it  Pt stated he prefers [facility] for VN and referral was made

## 2022-02-21 NOTE — RESTORATIVE TECHNICIAN NOTE
Restorative Technician Note      Patient Name: Jenni Luna     Restorative Tech Visit Date: 02/21/22  Note Type: Mobility  Patient Position Upon Consult: Seated edge of bed  Activity Performed: Ambulated  Assistive Device: Standard walker  Patient Position at End of Consult: Seated edge of bed;  All needs within reach    Jemal Woodard  DPT, Restorative Technician

## 2022-02-21 NOTE — DISCHARGE INSTRUCTIONS
DISCHARGE INSTRUCTIONS    Follow Up: Follow up with Dr Dipti Rojas on 3/4 at 10:30AM  -Lovenox injection 40mg daily for 21 days    Diet: You may resume a diabetic diet    Pain: Dilaudid 2mg as needed every 6 hours for moderate/severe pain  Tylenol for mild pain control  Shower: You may shower over the wound  Do not bathe or use a pool or hot tub until cleared by the physician  Activity: As tolerated  You may go up and down stairs, walk as much as you are comfortable, but walk at least 3 times each day  Do not lift anything heavier than 15 pounds for at least 2-4 weeks, unless cleared by the doctor  Driving: Do not drive or make any important decisions while on narcotic pain medication  Generally, you may drive when your off all narcotic pain medications  Medications: Resume all of your previous medications, unless told otherwise by the doctor  You do not need to take the narcotic pain medications unless you are having significant pain and discomfort  Call the office: If you are experiencing any of the following, fevers above 101 5° or chills, significant nausea or vomiting, increase in abdominal pain, if the wound develops drainage and/or is excessive redness around the wound, or if you have significant diarrhea or other worsening symptoms

## 2022-02-21 NOTE — DISCHARGE SUMMARY
Discharge Summary - Surgical Oncology  Gearline Candis 66 y o  male MRN: [id number]  [facility] Encounter: [id number]    Admission Date: 2/15/2022     Discharge Date:2/22/22    Admitting Diagnosis: Pancreas cyst [K86 2]    Discharge Diagnosis:  Pancreatic cyst, IPMN with high-grade atypia status post ex lap, subtotal pancreatectomy by Dr Dipti Rojas    Attending and Service: Dr Dipti Rojas, surgical Oncology Services  Consulting Physician(s):  APS, Palliative    Imaging and Procedures Performed:  02/15/2022:  Exploratory laparotomy, subtotal pancreatectomy by Dr Dipti Rojas    Pathology:   Preliminary Diagnosis   Intradepartmental review pending     A  Pancreas (subtotal pancreatectomy):     - Mucinous neoplasm, 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, with at least high-grade 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  Preliminary result electronically signed by Ofilia Harada, MD on 2/21/2022 at 04 00 [phone]   Comments: This is an appended report  These results have been appended to a previously preliminary verified report  Additional Information P    All reported additional testing was performed with appropriately reactive controls   These tests were developed and their performance characteristics determined by [facility] or appropriate performing facility, though some tests may be performed on tissues which have not been validated for performance characteristics (such as staining performed on alcohol exposed cell blocks and decalcified tissues)   Results should be interpreted with caution and in the context of the patients clinical condition  These tests may not be cleared or approved by the [place]  Food and Drug Administration, though the FDA has determined that such clearance or approval is not necessary  These tests are used for clinical purposes and they should not be regarded as investigational or for research   This laboratory has been approved by CLIA 88, designated as a high-complexity laboratory and is qualified to perform these tests  Interpretation performed at [facility], [address] [facility], [facility] [address]  Jessica Carlos Intraoperative Consultation P       FSDxA (area of stitch at designated margin):  Marked glandular atypia, favor at least high-grade dysplasia  History of IPMN and invasive carcinoma noted  Per surgeon, additional margin cannot be taken  Dr Genaro Eisenberg spoke with Dr Rosa Godoy at 5:35 pm on 02/15/2022  Interpretation performed at [facility], [address] [facility] [address] [facility] Course: Jenni Luna is a 56-year-old male with PMHx of AFib (patient states he does not take eliquis)  Diabetes mellitus, MORALES on CPAP presented for elective procedure including exploratory laparotomy subtotal pancreatectomy by Dr Rosa Godoy  Patient underwent surgical intervention without any complications  Postoperatively patient was transferred to the medical-surgical unit  He was made NPO with an NG tube in place, IV fluids running, epidural for pain control, Dockery in place for accurate eyes nose, and STU drain to bulb suction  He was started on subcu heparin for DVT prophylaxis  POD1 his NGT was removed, his Dockery remained in place for accurate I's and O's, he was encouraged to be out of bed and ambulating and he was started on subcu heparin for DVT prophylaxis  Once his NG tube was removed his diet was slowly advanced over his hospital stay  His epidural was removed on postoperative day 4 and he was transition to p o  Pain control regimen  On postoperative day 7 patient was cleared for discharge from a surgical standpoint  His surgical STU drain was removed on discharge  Lovenox for 21 days was ordered  Pain scripts sent per palliative care team     All discharge instructions as well as postoperative follow-up appointments were discussed with the patient and patient is in agreement with plan      On discharge, the patient is instructed to follow-up with the patient's primary care provider within the next 2 weeks to review the events of the recent hospitalization  The patient is instructed to follow-up with Dr Alisha Box on 3/4 at 10:30AM      Condition at Discharge: good     Discharge instructions/Information to patient and family:   See after visit summary for information provided to patient and family  Provisions for Follow-Up Care:  See after visit summary for information related to follow-up care and any pertinent home health orders  Disposition: Home w/ [facility]    Planned Readmission: No    Discharge Statement   I spent 30 minutes discharging the patient  This time was spent on the day of discharge  I had direct contact with the patient on the day of discharge  Additional documentation is required if more than 30 minutes were spent on discharge  Discharge Medications:  See after visit summary for reconciled discharge medications provided to patient and family

## 2022-02-21 NOTE — PLAN OF CARE
Problem: Prexisting or High Potential for Compromised Skin Integrity  Goal: Skin integrity is maintained or improved  Description: INTERVENTIONS:  - Identify patients at risk for skin breakdown  - Assess and monitor skin integrity  - Assess and monitor nutrition and hydration status  - Monitor labs   - Assess for incontinence   - Turn and reposition patient  - Assist with mobility/ambulation  - Relieve pressure over bony prominences  - Avoid friction and shearing  - Provide appropriate hygiene as needed including keeping skin clean and dry  - Evaluate need for skin moisturizer/barrier cream  - Collaborate with interdisciplinary team   - Patient/family teaching  - Consider wound care consult   Outcome: Progressing     Problem: GASTROINTESTINAL - ADULT  Goal: Minimal or absence of nausea and/or vomiting  Description: INTERVENTIONS:  - Administer IV fluids if ordered to ensure adequate hydration  - Maintain NPO status until nausea and vomiting are resolved  - Nasogastric tube if ordered  - Administer ordered antiemetic medications as needed  - Provide nonpharmacologic comfort measures as appropriate  - Advance diet as tolerated, if ordered  - Consider nutrition services referral to assist patient with adequate nutrition and appropriate food choices  Outcome: Progressing  Goal: Maintains or returns to baseline bowel function  Description: INTERVENTIONS:  - Assess bowel function  - Encourage oral fluids to ensure adequate hydration  - Administer IV fluids if ordered to ensure adequate hydration  - Administer ordered medications as needed  - Encourage mobilization and activity  - Consider nutritional services referral to assist patient with adequate nutrition and appropriate food choices  Outcome: Progressing  Goal: Maintains adequate nutritional intake  Description: INTERVENTIONS:  - Monitor percentage of each meal consumed  - Identify factors contributing to decreased intake, treat as appropriate  - Assist with meals as needed  - Monitor I&O, weight, and lab values if indicated  - Obtain nutrition services referral as needed  Outcome: Progressing     Problem: MUSCULOSKELETAL - ADULT  Goal: Maintain or return mobility to safest level of function  Description: INTERVENTIONS:  - Assess patient's ability to carry out ADLs; assess patient's baseline for ADL function and identify physical deficits which impact ability to perform ADLs (bathing, care of mouth/teeth, toileting, grooming, dressing, etc )  - Assess/evaluate cause of self-care deficits   - Assess range of motion  - Assess patient's mobility  - Assess patient's need for assistive devices and provide as appropriate  - Encourage maximum independence but intervene and supervise when necessary  - Involve family in performance of ADLs  - Assess for home care needs following discharge   - Consider OT consult to assist with ADL evaluation and planning for discharge  - Provide patient education as appropriate  Outcome: Progressing     Problem: PAIN - ADULT  Goal: Verbalizes/displays adequate comfort level or baseline comfort level  Description: Interventions:  - Encourage patient to monitor pain and request assistance  - Assess pain using appropriate pain scale  - Administer analgesics based on type and severity of pain and evaluate response  - Implement non-pharmacological measures as appropriate and evaluate response  - Consider cultural and social influences on pain and pain management  - Notify physician/advanced practitioner if interventions unsuccessful or patient reports new pain  Outcome: Progressing     Problem: Nutrition/Hydration-ADULT  Goal: Nutrient/Hydration intake appropriate for improving, restoring or maintaining nutritional needs  Description: Monitor and assess patient's nutrition/hydration status for malnutrition  Collaborate with interdisciplinary team and initiate plan and interventions as ordered    Monitor patient's weight and dietary intake as ordered or per policy  Utilize nutrition screening tool and intervene as necessary  Determine patient's food preferences and provide high-protein, high-caloric foods as appropriate       INTERVENTIONS:  - Monitor oral intake, urinary output, labs, and treatment plans  - Assess nutrition and hydration status and recommend course of action  - Evaluate amount of meals eaten  - Assist patient with eating if necessary   - Allow adequate time for meals  - Recommend/ encourage appropriate diets, oral nutritional supplements, and vitamin/mineral supplements  - Order, calculate, and assess calorie counts as needed  - Recommend, monitor, and adjust tube feedings and TPN/PPN based on assessed needs  - Assess need for intravenous fluids  - Provide specific nutrition/hydration education as appropriate  - Include patient/family/caregiver in decisions related to nutrition  Outcome: Progressing

## 2022-02-21 NOTE — DISCHARGE INSTR - AVS FIRST PAGE
DISCHARGE INSTRUCTIONS    Follow Up: Follow up with Dr Gianni Villasenor on 3/4 at 10:30AM  -Lovenox injection 40mg daily for 21 days    Diet: You may resume a diabetic diet    Pain: Dilaudid 2mg as needed every 6 hours for moderate/severe pain  Tylenol for mild pain control  Shower: You may shower over the wound  Do not bathe or use a pool or hot tub until cleared by the physician  Activity: As tolerated  You may go up and down stairs, walk as much as you are comfortable, but walk at least 3 times each day  Do not lift anything heavier than 15 pounds for at least 2-4 weeks, unless cleared by the doctor  Driving: Do not drive or make any important decisions while on narcotic pain medication  Generally, you may drive when your off all narcotic pain medications  Medications: Resume all of your previous medications, unless told otherwise by the doctor  You do not need to take the narcotic pain medications unless you are having significant pain and discomfort  Call the office: If you are experiencing any of the following, fevers above 101 5° or chills, significant nausea or vomiting, increase in abdominal pain, if the wound develops drainage and/or is excessive redness around the wound, or if you have significant diarrhea or other worsening symptoms

## 2022-02-21 NOTE — PROGRESS NOTES
Progress note - Palliative and Supportive Care   Joel Carlos 66 y o  male [id number]    Patient Active Problem List   Diagnosis    Obstructive sleep apnea syndrome    Hypersomnia    Permanent atrial fibrillation (HCC)    Morbid obesity with BMI of 40 0-44 9, adult (HCC)    Lower extremity edema    Pancreatic duct dilated    History of pancreatic cancer    Type 2 diabetes mellitus without complication, without long-term current use of insulin (HCC)    Malignant neoplasm of tail of pancreas (HCC)    Thrombocytopenia (HCC)    Urgency incontinence    Balanitis    OAB (overactive bladder)    BPH without obstruction/lower urinary tract symptoms    Encounter for follow-up surveillance of pancreatic cancer    Encounter for geriatric assessment     Active issues specifically addressed today include:   pancreatic cancer, cancer-related pain, opioid titration, opioid-induced constipation, psychosocial support     Plan:  1  Symptom management  · Continue Tylenol 975mg q8h ATC  · Continue hydromorphone to 3-6mg PO q4h PRN moderate-severe pain  · Continue hydromorphone 0 5mg q4h PRN breakthrough pain  · Continue daily Miralax  · Continue Sennakot BID PRN constipation  · Continue melatonin QHS  · Continue Mouth Kote PRN  · Continue Zofran PRN  · Continue viscous lidocaine PRN  PO hydromorphone 3 doses in 24 hours, 16 mg total    2  Goals  -  Full cares without limits     Code Status: Full Code  - Level 1   Decisional apparatus:  Patient is competent on my exam today  If competence is lost, patient's substitute decision maker would default to spouse [place]  Advance Directive / Living Will / POLST:  None on file   Interval history:      Patient reports adequate pain control on current regimen  He states he moved his bowels and is passing flatus  Denies feeling constipated  Explained regimen for discharge and follow up with palliative care as an outpatient  Patient receptive to follow up  MEDICATIONS / ALLERGIES:     current meds:   Current Facility-Administered Medications   Medication Dose Route Frequency    acetaminophen (TYLENOL) tablet 975 mg  975 mg Oral Q8H    atenolol (TENORMIN) tablet 25 mg  25 mg Oral Daily    diphenhydrAMINE (BENADRYL) tablet 25 mg  25 mg Oral Q6H PRN    heparin (porcine) subcutaneous injection 5,000 Units  5,000 Units Subcutaneous Q8H [address]    HYDROmorphone (DILAUDID) tablet 3 mg  3 mg Oral Q4H PRN    Or    HYDROmorphone (DILAUDID) tablet 6 mg  6 mg Oral Q4H PRN    insulin lispro (HumaLOG) 100 units/mL subcutaneous injection 2-12 Units  2-12 Units Subcutaneous Q6H [address]    Lidocaine Viscous HCl (XYLOCAINE) 2 % mucosal solution 15 mL  15 mL Swish & Spit 4x Daily PRN    melatonin tablet 6 mg  6 mg Oral HS    ondansetron (ZOFRAN) injection 4 mg  4 mg Intravenous Q6H PRN    pancrelipase (Lip-Prot-Amyl) (CREON) delayed release capsule 6,000 Units  6,000 Units Oral TID With Meals    phenol (CHLORASEPTIC) 1 4 % mucosal liquid 1 spray  1 spray Mouth/Throat Q2H PRN    polyethylene glycol (MIRALAX) packet 17 g  17 g Oral Daily    saliva substitute (MOUTH KOTE) mucosal solution 5 spray  5 spray Mouth/Throat 4x Daily PRN    senna-docusate sodium (SENOKOT S) 8 6-50 mg per tablet 1 tablet  1 tablet Oral BID PRN       No Known Allergies    OBJECTIVE:    Physical Exam  Physical Exam  Vitals and nursing note reviewed  Constitutional:       General: He is awake  Appearance: Normal appearance  HENT:      Head: Normocephalic and atraumatic  Jaw: There is normal jaw occlusion  Mouth/Throat:      Lips: Pink  Mouth: Mucous membranes are moist       Pharynx: Oropharynx is clear  Eyes:      Extraocular Movements: Extraocular movements intact  Cardiovascular:      Rate and Rhythm: Normal rate and regular rhythm  Pulses: Normal pulses  Pulmonary:      Effort: Pulmonary effort is normal       Breath sounds: Normal breath sounds     Abdominal: General: Bowel sounds are decreased  Musculoskeletal:      Cervical back: Normal range of motion and neck supple  Comments: Equal extremity strength   Skin:     General: Skin is dry  Coloration: Skin is not jaundiced or pale  Neurological:      General: No focal deficit present  Mental Status: He is alert and oriented to person, place, and time  GCS: GCS eye subscore is 4  GCS verbal subscore is 5  GCS motor subscore is 6  Psychiatric:         Attention and Perception: Attention normal          Mood and Affect: Mood normal          Speech: Speech normal          Behavior: Behavior is cooperative  Cognition and Memory: Cognition and memory normal          Lab Results:   CBC:   Lab Results   Component Value Date    WBC 4 86 02/21/2022    HGB 10 8 (L) 02/21/2022    HCT 32 4 (L) 02/21/2022     (H) 02/21/2022     (L) 02/21/2022    MCH 35 5 (H) 02/21/2022    MCHC 33 3 02/21/2022    RDW 12 8 02/21/2022    MPV 12 0 02/21/2022    NRBC 0 02/21/2022   , CMP:   Lab Results   Component Value Date    SODIUM 134 (L) 02/21/2022    K 4 2 02/21/2022     02/21/2022    CO2 29 02/21/2022    BUN 12 02/21/2022    CREATININE 0 64 02/21/2022    CALCIUM 9 1 02/21/2022    AST 25 02/21/2022    ALT 28 02/21/2022    ALKPHOS 143 (H) 02/21/2022    EGFR 97 02/21/2022   , PT/PTT:No results found for: PT, PTT      Counseling / Coordination of Care    Total floor / unit time spent today 35+  minutes  Greater than 50% of total time was spent with the patient and / or family counseling and / or coordination of care   A description of the counseling / coordination of care: review of opioid regimen, supportive listening, offered information, review of symptoms,

## 2022-02-22 VITALS
BODY MASS INDEX: 39.96 KG/M2 | HEIGHT: 72 IN | SYSTOLIC BLOOD PRESSURE: 157 MMHG | RESPIRATION RATE: 18 BRPM | HEART RATE: 94 BPM | TEMPERATURE: 98.5 F | DIASTOLIC BLOOD PRESSURE: 109 MMHG | WEIGHT: 295 LBS | OXYGEN SATURATION: 96 %

## 2022-02-22 LAB
GLUCOSE SERPL-MCNC: 132 MG/DL (ref 65–140)
GLUCOSE SERPL-MCNC: 173 MG/DL (ref 65–140)

## 2022-02-22 PROCEDURE — 99024 POSTOP FOLLOW-UP VISIT: CPT | Performed by: SURGERY

## 2022-02-22 PROCEDURE — 82948 REAGENT STRIP/BLOOD GLUCOSE: CPT

## 2022-02-22 RX ADMIN — HYDROMORPHONE HYDROCHLORIDE 6 MG: 2 TABLET ORAL at 08:27

## 2022-02-22 RX ADMIN — POLYETHYLENE GLYCOL 3350 17 G: 17 POWDER, FOR SOLUTION ORAL at 08:27

## 2022-02-22 RX ADMIN — PANCRELIPASE 6000 UNITS: 30000; 6000; 19000 CAPSULE, DELAYED RELEASE PELLETS ORAL at 08:27

## 2022-02-22 RX ADMIN — METFORMIN HYDROCHLORIDE 1000 MG: 500 TABLET ORAL at 08:27

## 2022-02-22 RX ADMIN — HEPARIN SODIUM 5000 UNITS: 5000 INJECTION INTRAVENOUS; SUBCUTANEOUS at 07:00

## 2022-02-22 RX ADMIN — PANCRELIPASE 6000 UNITS: 30000; 6000; 19000 CAPSULE, DELAYED RELEASE PELLETS ORAL at 11:17

## 2022-02-22 RX ADMIN — ATENOLOL 25 MG: 25 TABLET ORAL at 08:27

## 2022-02-22 NOTE — PLAN OF CARE
Problem: Prexisting or High Potential for Compromised Skin Integrity  Goal: Skin integrity is maintained or improved  Description: INTERVENTIONS:  - Identify patients at risk for skin breakdown  - Assess and monitor skin integrity  - Assess and monitor nutrition and hydration status  - Monitor labs   - Assess for incontinence   - Turn and reposition patient  - Assist with mobility/ambulation  - Relieve pressure over bony prominences  - Avoid friction and shearing  - Provide appropriate hygiene as needed including keeping skin clean and dry  - Evaluate need for skin moisturizer/barrier cream  - Collaborate with interdisciplinary team   - Patient/family teaching  - Consider wound care consult   2/22/2022 1241 by Shirlie Boas, RN  Outcome: Completed  2/22/2022 0729 by Shirlie Boas, RN  Outcome: Progressing     Problem: GASTROINTESTINAL - ADULT  Goal: Minimal or absence of nausea and/or vomiting  Description: INTERVENTIONS:  - Administer IV fluids if ordered to ensure adequate hydration  - Maintain NPO status until nausea and vomiting are resolved  - Nasogastric tube if ordered  - Administer ordered antiemetic medications as needed  - Provide nonpharmacologic comfort measures as appropriate  - Advance diet as tolerated, if ordered  - Consider nutrition services referral to assist patient with adequate nutrition and appropriate food choices  2/22/2022 1241 by Shirlie Boas, RN  Outcome: Completed  2/22/2022 0729 by Shirlie Boas, RN  Outcome: Progressing  Goal: Maintains or returns to baseline bowel function  Description: INTERVENTIONS:  - Assess bowel function  - Encourage oral fluids to ensure adequate hydration  - Administer IV fluids if ordered to ensure adequate hydration  - Administer ordered medications as needed  - Encourage mobilization and activity  - Consider nutritional services referral to assist patient with adequate nutrition and appropriate food choices  2/22/2022 1241 by Libia Cobb Demetria Clay RN  Outcome: Completed  2/22/2022 0729 by Hayley Kaufman RN  Outcome: Progressing  Goal: Maintains adequate nutritional intake  Description: INTERVENTIONS:  - Monitor percentage of each meal consumed  - Identify factors contributing to decreased intake, treat as appropriate  - Assist with meals as needed  - Monitor I&O, weight, and lab values if indicated  - Obtain nutrition services referral as needed  2/22/2022 1241 by Hayley Kaufman RN  Outcome: Completed  2/22/2022 0729 by Hayley Kaufman RN  Outcome: Progressing     Problem: SKIN/TISSUE INTEGRITY - ADULT  Goal: Incision(s), wounds(s) or drain site(s) healing without S/S of infection  Description: INTERVENTIONS  - Assess and document dressing, incision, wound bed, drain sites and surrounding tissue  - Provide patient and family education  - Perform skin care/dressing changes every shift  2/22/2022 1241 by Hayley Kaufman RN  Outcome: Completed  2/22/2022 0729 by Hayley Kaufman RN  Outcome: Progressing     Problem: MUSCULOSKELETAL - ADULT  Goal: Maintain or return mobility to safest level of function  Description: INTERVENTIONS:  - Assess patient's ability to carry out ADLs; assess patient's baseline for ADL function and identify physical deficits which impact ability to perform ADLs (bathing, care of mouth/teeth, toileting, grooming, dressing, etc )  - Assess/evaluate cause of self-care deficits   - Assess range of motion  - Assess patient's mobility  - Assess patient's need for assistive devices and provide as appropriate  - Encourage maximum independence but intervene and supervise when necessary  - Involve family in performance of ADLs  - Assess for home care needs following discharge   - Consider OT consult to assist with ADL evaluation and planning for discharge  - Provide patient education as appropriate  2/22/2022 1241 by Hayley Kaufman RN  Outcome: Completed  2/22/2022 0729 by Hayley Kaufman RN  Outcome: Progressing Problem: PAIN - ADULT  Goal: Verbalizes/displays adequate comfort level or baseline comfort level  Description: Interventions:  - Encourage patient to monitor pain and request assistance  - Assess pain using appropriate pain scale  - Administer analgesics based on type and severity of pain and evaluate response  - Implement non-pharmacological measures as appropriate and evaluate response  - Consider cultural and social influences on pain and pain management  - Notify physician/advanced practitioner if interventions unsuccessful or patient reports new pain  2/22/2022 1241 by Maria Dolores Michelle RN  Outcome: Completed  2/22/2022 0729 by Maria Dolores Michelle RN  Outcome: Progressing     Problem: MOBILITY - ADULT  Goal: Maintain or return to baseline ADL function  Description: INTERVENTIONS:  -  Assess patient's ability to carry out ADLs; assess patient's baseline for ADL function and identify physical deficits which impact ability to perform ADLs (bathing, care of mouth/teeth, toileting, grooming, dressing, etc )  - Assess/evaluate cause of self-care deficits   - Assess range of motion  - Assess patient's mobility; develop plan if impaired  - Assess patient's need for assistive devices and provide as appropriate  - Encourage maximum independence but intervene and supervise when necessary  - Involve family in performance of ADLs  - Assess for home care needs following discharge   - Consider OT consult to assist with ADL evaluation and planning for discharge  - Provide patient education as appropriate  2/22/2022 1241 by Maria Dolores Michelle RN  Outcome: Completed  2/22/2022 0729 by Maria Dolores Michelle RN  Outcome: Progressing  Goal: Maintains/Returns to pre admission functional level  Description: INTERVENTIONS:  - Perform BMAT or MOVE assessment daily    - Set and communicate daily mobility goal to care team and patient/family/caregiver     - Collaborate with rehabilitation services on mobility goals if consulted  - Out of bed for toileting  - Record patient progress and toleration of activity level   2/22/2022 1241 by Bethany Kiran RN  Outcome: Completed  2/22/2022 0729 by Bethany Kiran RN  Outcome: Progressing     Problem: Potential for Falls  Goal: Patient will remain free of falls  Description: INTERVENTIONS:  - Educate patient/family on patient safety including physical limitations  - Instruct patient to call for assistance with activity   - Consult OT/PT to assist with strengthening/mobility   - Keep Call bell within reach  - Keep bed low and locked with side rails adjusted as appropriate  - Keep care items and personal belongings within reach  - Initiate and maintain comfort rounds  - Make Fall Risk Sign visible to staff  - Apply yellow socks and bracelet for high fall risk patients  - Consider moving patient to room near nurses station  2/22/2022 1241 by Bethany Kiran RN  Outcome: Completed  2/22/2022 0729 by Bethany Kiran RN  Outcome: Progressing     Problem: Nutrition/Hydration-ADULT  Goal: Nutrient/Hydration intake appropriate for improving, restoring or maintaining nutritional needs  Description: Monitor and assess patient's nutrition/hydration status for malnutrition  Collaborate with interdisciplinary team and initiate plan and interventions as ordered  Monitor patient's weight and dietary intake as ordered or per policy  Utilize nutrition screening tool and intervene as necessary  Determine patient's food preferences and provide high-protein, high-caloric foods as appropriate       INTERVENTIONS:  - Monitor oral intake, urinary output, labs, and treatment plans  - Assess nutrition and hydration status and recommend course of action  - Evaluate amount of meals eaten  - Assist patient with eating if necessary   - Allow adequate time for meals  - Recommend/ encourage appropriate diets, oral nutritional supplements, and vitamin/mineral supplements  - Order, calculate, and assess calorie counts as needed  - Recommend, monitor, and adjust tube feedings and TPN/PPN based on assessed needs  - Assess need for intravenous fluids  - Provide specific nutrition/hydration education as appropriate  - Include patient/family/caregiver in decisions related to nutrition  2/22/2022 1241 by Shanita James RN  Outcome: Completed  2/22/2022 0729 by Shanita James RN  Outcome: Progressing     Problem: METABOLIC, FLUID AND ELECTROLYTES - ADULT  Goal: Electrolytes maintained within normal limits  Description: INTERVENTIONS:  - Monitor labs and assess patient for signs and symptoms of electrolyte imbalances  - Administer electrolyte replacement as ordered  - Monitor response to electrolyte replacements, including repeat lab results as appropriate  - Instruct patient on fluid and nutrition as appropriate  2/22/2022 1241 by Shanita James RN  Outcome: Completed  2/22/2022 0729 by Shanita James RN  Outcome: Progressing

## 2022-02-22 NOTE — PROGRESS NOTES
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     Vital signs stable  UOP: 550 cc  STU: 30 cc SS    Plan:  Anticipate D/C home today  D/C STU drain prior to D/C home  Diabetic diet as tolerated  APS pain recs appreciated   OOB/ambulate  ISS  PT/OT  DVT PPx - [facility]      Subjective/Objective     Subjective: No acute events overnight  Pt endorses minimal abdominal pain along incision site and states pain is well controlled on PO pain medication  Tolerating diet with no N/V  Endorses flatus, but no BM overnight  Denies F/C, SOB, or chest pain  Objective:   General: no acute distress, resting comfortably in bed  Neuro: AA&O 3x  Cardiac: warm, RRR  Pulmonary: normal effort, lungs clear to auscultation bilaterally  Abdomen: soft, non-distended, tenderness at incision site, obese abdomen  Incision: clean/dry/intact  Lower extremity; mild edema, motor and sensation grossly intact    Blood pressure 138/64, pulse 70, temperature 97 7 °F (36 5 °C), resp  rate 18, height 6' (1 829 m), weight 134 kg (295 lb), SpO2 98 %  ,Body mass index is 40 01 kg/m²  Intake/Output Summary (Last 24 hours) at 2/22/2022 0540  Last data filed at 2/21/2022 2001  Gross per 24 hour   Intake --   Output 550 ml   Net -550 ml       Invasive Devices  Report    Central Venous Catheter Line            Port A Cath 04/23/19 Left Chest 1035 days          Peripheral Intravenous Line            Peripheral IV 02/15/22 Right Antecubital 6 days          Drain            Closed/Suction Drain Left Abdomen Bulb 19 Fr  7 days                Lab, Imaging and other studies:I have personally reviewed pertinent lab results      VTE Pharmacologic Prophylaxis: Heparin  VTE Mechanical Prophylaxis: sequential compression device     Wilber Pickett, MS4  Oncology-Surgical

## 2022-02-23 ENCOUNTER — TRANSITIONAL CARE MANAGEMENT (OUTPATIENT)
Dept: FAMILY MEDICINE CLINIC | Facility: CLINIC | Age: 72
End: 2022-02-23

## 2022-02-23 ENCOUNTER — TELEPHONE (OUTPATIENT)
Dept: PALLIATIVE MEDICINE | Facility: CLINIC | Age: 72
End: 2022-02-23

## 2022-02-23 NOTE — TELEPHONE ENCOUNTER
No Gomes (spouse) called office regarding script for Dilaudid  [facility] will not have in stock till Friday  Spouse requesting script be sent to [place] [id number] and they do have the Dilaudid in stock  Phone call to [facility] to cancel out Dilaudid script there

## 2022-03-01 PROBLEM — D49.0 IPMN (INTRADUCTAL PAPILLARY MUCINOUS NEOPLASM): Status: ACTIVE | Noted: 2020-02-13

## 2022-03-04 ENCOUNTER — OFFICE VISIT (OUTPATIENT)
Dept: SURGICAL ONCOLOGY | Facility: CLINIC | Age: 72
End: 2022-03-04

## 2022-03-04 VITALS
HEART RATE: 89 BPM | SYSTOLIC BLOOD PRESSURE: 150 MMHG | DIASTOLIC BLOOD PRESSURE: 88 MMHG | HEIGHT: 72 IN | TEMPERATURE: 97.9 F | BODY MASS INDEX: 39.96 KG/M2 | WEIGHT: 295 LBS | RESPIRATION RATE: 16 BRPM | OXYGEN SATURATION: 98 %

## 2022-03-04 DIAGNOSIS — C25.8 OVERLAPPING MALIGNANT NEOPLASM OF PANCREAS (HCC): Primary | ICD-10-CM

## 2022-03-04 DIAGNOSIS — D49.0 IPMN (INTRADUCTAL PAPILLARY MUCINOUS NEOPLASM): ICD-10-CM

## 2022-03-04 DIAGNOSIS — E11.9 TYPE 2 DIABETES MELLITUS WITHOUT COMPLICATION, WITHOUT LONG-TERM CURRENT USE OF INSULIN (HCC): ICD-10-CM

## 2022-03-04 PROBLEM — C25.9 PANCREATIC CANCER (HCC): Status: ACTIVE | Noted: 2019-04-11

## 2022-03-04 PROCEDURE — 99024 POSTOP FOLLOW-UP VISIT: CPT | Performed by: SURGERY

## 2022-03-04 NOTE — LETTER
March 4, 2022     Mackenziemuna JenkinsDO  [address]  [address]    Patient: Lorren Homans   YOB: 1950   Date of Visit: 3/4/2022       Dear Dr Iain Deluca: Thank you for referring Umesh Phillips to me for evaluation  Below are my notes for this consultation  If you have questions, please do not hesitate to call me  I look forward to following your patient along with you  Sincerely,        Marco Avitia MD        CC: MD Bela Villalba MD Oretha Mires, MD Hipolito Lory, MD  3/4/2022 11:18 AM  Sign when Signing Visit     Surgical Oncology Follow Up       [address] [id number]    Lorren Homans  1950  [id number]  [facility]  [facility]  [phone]    Chief Complaint   Patient presents with    Post-op       Assessment/Plan:    No problem-specific Assessment & Plan notes found for this encounter  Diagnoses and all orders for this visit:    Overlapping malignant neoplasm of pancreas [facility])  -     Ambulatory Referral to Oncology Genetics; Future  -     Ambulatory referral to Hematology / Oncology; Future  -     Ambulatory referral to Radiation Oncology; Future  -     CT chest abdomen pelvis w contrast; Future  -     Cancer antigen 19-9; Future  -     Ambulatory Referral to Endocrinology; Future  -     HEMOGLOBIN A1C W/ EAG ESTIMATION; Future    IPMN (intraductal papillary mucinous neoplasm)    Type 2 diabetes mellitus without complication, without long-term current use of insulin (HCC)   -     HEMOGLOBIN A1C W/ EAG ESTIMATION; Future        Advance Care Planning/Advance Directives:  Discussed disease status, cancer treatment plans and/or cancer treatment goals with the patient       Oncology History   Overlapping malignant neoplasm of pancreas ([facility] )   3/12/2019 Surgery    Distal pancreatectomy  Several foci of invasive colloid cancer  Grade 1  IPMN with high grade dysplasia at margin  T1b, NO MX Stage IA     4/11/2019 - 6/21/2019 Chemotherapy    Saw Dr Agatha Manzo  Will be starting Folfirinox 4/24  Ended 6/21/2019 4/24/2019 - 7/2/2019 Chemotherapy    fluorouracil (ADRUCIL), 400 mg/m2 = 1,010 mg, Intravenous, Once, 2 of 2 cycles  pegfilgrastim (Brandi Savanna), 6 mg, Subcutaneous, Once, 2 of 2 cycles  Administration: 6 mg (6/21/2019), 6 mg (6/7/2019)  irinotecan (CAMPTOSAR) chemo infusion, 180 mg/m2 = 455 mg, Intravenous, Once, 4 of 4 cycles  Dose modification: 140 mg/m2 (original dose 180 mg/m2, Cycle 3, Reason: Other (Must fill in a comment)), 125 mg/m2 (original dose 180 mg/m2, Cycle 3, Reason: Dose Not Tolerated)  Administration: 316 mg (6/5/2019), 300 mg (6/19/2019)  leucovorin calcium IVPB, 400 mg/m2 = 1,012 mg, Intravenous, Once, 2 of 2 cycles  oxaliplatin (ELOXATIN) chemo infusion, 85 mg/m2 = 215 05 mg, Intravenous, Once, 4 of 4 cycles  Dose modification: 65 mg/m2 (original dose 85 mg/m2, Cycle 3, Reason: Other (Must fill in a comment)), 60 mg/m2 (original dose 85 mg/m2, Cycle 3, Reason: Dose Not Tolerated)  Administration: 151 8 mg (6/5/2019), 151 8 mg (6/19/2019)  fluorouracil (ADRUCIL) ambulatory infusion Soln, 1,200 mg/m2/day = 6,070 mg, Intravenous, Over 46 hours, 4 of 4 cycles     2/15/2022 Surgery    Pancreas (subtotal pancreatectomy):     - 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 (largest focus 0 8 cm) arising in association with intraductal papillary mucinous neoplasm (IPMN) with high-grade dysplasia  - Surgical margin positive for colloid carcinoma and IPMN with high-grade dysplasia       - Fifteen (15) lymph nodes, negative for carcinomaRestore     IPMN (intraductal papillary mucinous neoplasm)   2/15/2022 Surgery    Pancreas (subtotal pancreatectomy):     - 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 (largest focus 0 8 cm) arising in association with intraductal papillary mucinous neoplasm (IPMN) with high-grade dysplasia  - Surgical margin positive for colloid carcinoma and IPMN with high-grade dysplasia  - [de-identified] (15) lymph nodes, negative for carcinoma         History of Present Illness:  Patient is a 78-year-old man status post subtotal pancreatectomy  He still hasthe pancreatic head left intact     -Interval History: This was done after he was found to have changes suggestive of main duct IPMN his most recent MRI  He underwent the  He is here for postop check  Appetite is fair  He has had diarrhea and has been taking 1 Creon pill with each meal     Review of Systems:  Review of Systems   Constitutional: Positive for fatigue  HENT: Negative  Eyes: Negative  Respiratory: Negative  Cardiovascular: Negative  Gastrointestinal: Positive for diarrhea  Endocrine: Negative  Genitourinary: Negative  Musculoskeletal: Negative  Skin: Negative  Allergic/Immunologic: Negative  Neurological: Negative  Hematological: Negative  Psychiatric/Behavioral: Negative  All other systems reviewed and are negative        Patient Active Problem List   Diagnosis    Obstructive sleep apnea syndrome    Hypersomnia    Permanent atrial fibrillation (HCC)    Morbid obesity with BMI of 40 0-44 9, adult ([facility] )    Lower extremity edema    Pancreatic duct dilated    Overlapping malignant neoplasm of pancreas (HCC)    Type 2 diabetes mellitus without complication, without long-term current use of insulin (HCC)    IPMN (intraductal papillary mucinous neoplasm)    Thrombocytopenia (HCC)    Urgency incontinence    Balanitis    OAB (overactive bladder)    BPH without obstruction/lower urinary tract symptoms    Encounter for follow-up surveillance of pancreatic cancer    Encounter for geriatric assessment     Past Medical History:   Diagnosis Date    A-fib ([facility] )     Abdominal wall strain     last assessed: 10/21/14    Allergic rhinitis     last assessed: 05/03/16    Arthritis     Cancer ([facility] )     PACNCREATIC    COVID-19 virus infection 3/3/2021    CPAP (continuous positive airway pressure) dependence     Diabetes mellitus ([facility] )     Erectile dysfunction of non-organic origin     last assessed: 03/17/14    Irregular heart beat     Pt with A fib     Lumbar strain     last assessed: 10/21/14    Multiple acquired skin tags     last assessed: 2/18/16    Palpitations     last assessed: 03/17/14    Pancreas cyst     Plantar fasciitis     Skin disorder     last assessed: 05/28/13    Sleep apnea     CPAP BROKE IN OCTOBER HAS BEEN TRYING TO GET NEW ONE BUT UNABLE AS OF YET    [facility]rombocyt[facility] )      Past Surgical History:   Procedure Laterality Date    BOTOX INJECTION N/A 11/12/2021    Procedure: Sherif Guardian;  Surgeon: Latosha Velasco MD;  Location: [facility] MAIN OR;  Service: Urology    CATARACT EXTRACTION Bilateral     COLONOSCOPY  01/17/2013    COLONOSCOPY  01/08/2018    COLONOSCOPY  04/2021    CYSTOSCOPY      INJECT WITH BOTOX    DISTAL PANCREATECTOMY N/A 2/15/2022    Procedure: PANCREATECTOMY SUB-TOTALL-OPEN;  Surgeon: Eber Barnard MD;  [facility]: [facility] MAIN OR;  Service: Surgical Oncology    EGD  06/05/2020    EGD AND COLONOSCOPY  02/06/2014, 2/8/2017    FL GUIDED CENTRAL VENOUS ACCESS DEVICE INSERTION  4/23/2019    FLEXIBLE SIGMOIDOSCOPY  06/11/2019    FOOT SURGERY      Due to plantar fascitis    JOINT REPLACEMENT Left     LTK    LINEAR ENDOSCOPIC U/S      PANCREATECTOMY LAPAROSCOPIC N/A 3/12/2019    Procedure: DISTAL PANCREATECTOMY LAPAROSCOPIC/POSSIBLE OPEN;  Surgeon: Eber Barnard MD;  [facility] MAIN [facility];  Service: Surgical Oncology    IA EDG US EXAM SURGICAL ALTER STOM DUODENUM/JEJUNUM N/A 1/24/2019    Procedure: LINEAR ENDOSCOPIC U/S;  Surgeon: Domitila Scott MD;  Location: [facility];   Service: Gastroenterology    REPLACEMENT TOTAL KNEE Left     TUNNELED VENOUS PORT PLACEMENT Left 4/23/2019    Procedure: INSERTION VENOUS PORT (PORT-A-CATH); Surgeon: Beckie Hernandes MD;  [facility];  Service: Surgical Oncology- 11/8/21 Pt reports PAC removed     UMBILICAL HERNIA REPAIR       Family History   Problem Relation Age of Onset    Breast cancer Mother     Cervical cancer Paternal Aunt     Pancreatic cancer Other     Liver cancer Other     No Known Problems Father      Social History     Socioeconomic History    Marital status: /Civil Union     Spouse name: Not on file    Number of children: Not on file    Years of education: Not on file    Highest education level: Not on file   Occupational History    Not on file   Tobacco Use    Smoking status: Never Smoker    Smokeless tobacco: Never Used   Vaping Use    Vaping Use: Never used   Substance and Sexual Activity    Alcohol use: Yes     Alcohol/week: 2 0 standard drinks     Types: 2 Cans of beer per week     Comment: couple times per week;     Drug use: No     Comment: Denies     Sexual activity: Yes     Comment: Denies any chest pain or shortness of breath with activity   Other Topics Concern    Not on file   Social History Narrative    Not on file     Social Determinants of Health     Financial Resource Strain: Not on file   Food Insecurity: No Food Insecurity    Worried About Running Out of Food in the Last Year: Never true    Tisha of Food in the Last Year: Never true   Transportation Needs: No Transportation Needs    Lack of Transportation (Medical): No    Lack of Transportation (Non-Medical):  No   Physical Activity: Not on file   Stress: Not on file   Social Connections: Not on file   Intimate Partner Violence: Not on file   Housing Stability: Unknown    Unable to Pay for Housing in the Last Year: No    Number of Places Lived in the Last Year: Not on file    Unstable Housing in the Last Year: No       Current Outpatient Medications:     acetaminophen (TYLENOL) 325 mg tablet, Take 2 tablets (650 mg total) by mouth every 6 (six) hours as needed for mild pain, Disp: 30 tablet, Rfl: 0    ascorbic acid (VITAMIN C) 1000 MG tablet, Take 2,000 mg by mouth daily 11/8/21 Instructed pt to HOLD starting 11/9/21 up to and including DOS  , Disp: , Rfl:     aspirin (ECOTRIN) 325 mg EC tablet, Take 325 mg by mouth daily 11/8/21 Pt instructed as per urology instruction sheet - pt reports stopped on 11/7/21  Instructed pt to verify with cardiology regarding stopping of ASA and not starting Eliquis at this time prior to surgery  , Disp: , Rfl:     atenolol (TENORMIN) 25 mg tablet, TAKE 1 TABLET BY MOUTH EVERY DAY, Disp: 90 tablet, Rfl: 3    Cholecalciferol (VITAMIN D) 2000 units CAPS, Take 1,000 Units by mouth daily 11/8/21 Pt takes three tabs of Vitamin D daily  Instructed to start HOLD 11/9/21 up to and including DOS  , Disp: , Rfl:     Cyanocobalamin (VITAMIN B 12 PO), Take by mouth daily 11/8/21 Pt reports takes two tabs daily   , Disp: , Rfl:     enoxaparin (LOVENOX) 40 mg/0 4 mL, Inject 0 4 mL (40 mg total) under the skin in the morning for 21 days, Disp: 8 4 mL, Rfl: 0    HYDROmorphone (DILAUDID) 2 mg tablet, Take 1 5-3 tablets (3-6 mg total) by mouth every 4 (four) hours as needed for severe pain for up to 10 days Max Daily Amount: 36 mg, Disp: 60 tablet, Rfl: 0    metFORMIN (GLUCOPHAGE) 1000 MG tablet, TAKE 1 TABLET BY MOUTH TWICE A DAY WITH MEALS, Disp: 180 tablet, Rfl: 1    pancrelipase, Lip-Prot-Amyl, (CREON) 6,000 units delayed release capsule, Take 1 capsule (6,000 Units total) by mouth 3 (three) times a day with meals, Disp: 90 capsule, Rfl: 0    tadalafil (CIALIS) 20 MG tablet, TAKE 1 TABLET BY MOUTH DAILY AS NEEDED FOR ERECTILE DYSFUNCTION, Disp: 10 tablet, Rfl: 0    Na Sulfate-K Sulfate-Mg Sulf (Suprep Bowel Prep Kit) 17 5-3 13-1 6 GM/177ML SOLN, Follow office instructions (Patient not taking: Reported on 3/4/2022 ), Disp: 1 Bottle, Rfl: 0  No Known Allergies  Vitals:    03/04/22 1031   BP: 150/88   Pulse: 89   Resp: 16   Temp: 97 9 °F (36 6 °C)   SpO2: 98%       Physical Exam  Vitals reviewed  Constitutional:       Appearance: Normal appearance  HENT:      Head: Normocephalic and atraumatic  Abdominal:      Comments: Incision clean dry intact  Musculoskeletal:      Cervical back: Normal range of motion and neck supple  Neurological:      Mental Status: He is alert  Results:  Labs:    Case Report   Surgical Pathology Report                         Case: [id number]                                    Authorizing Provider: Caron Landeros MD        Collected:           02/15/2022 1713               Ordering Location:     [address]      Received:            02/16/2022 [facility]                                      Hospital Operating Room                                                       Pathologist:           Elise Ramirez MD                                                                 Specimen:    Pancreas, Subtotal pancreatectomy                                                          Final Diagnosis   A  Pancreas (subtotal pancreatectomy):     - 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 (largest focus 0 8 cm) arising in association with intraductal papillary mucinous neoplasm (IPMN) with high-grade dysplasia  - Surgical margin positive for colloid carcinoma and IPMN with high-grade dysplasia  - Fifteen (15) lymph nodes, negative for carcinoma  - Therapy effect noted in background pancreas (fibrosis and inflammation)       Comment:  The tumor appears largely viable  Few collections of acellular mucin are noted  Electronically signed by Elise Ramirez MD on 2/23/2022 at  2:13 PM   Comments: This is an appended report  These results have been appended to a previously preliminary verified report  Preliminary Diagnosis   Intradepartmental review pending     A   Pancreas (subtotal pancreatectomy):     - Mucinous neoplasm, likely IPMN, with at least high-grade 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  Preliminary result electronically signed by Paul Velez MD on 2/21/2022 at 10:49 AM   Comments: This is an appended report  These results have been appended to a previously preliminary verified report  Microscopic Description    Representative section: A108  Comment: This is an appended report  These results have been appended to a previously preliminary verified report  Note    This case was reviewed at the pathology consensus conference  Comment: This is an appended report  These results have been appended to a previously preliminary verified report  Imaging  No results found  I reviewed the above laboratory and imaging data  Discussion/Summary:  Status post distal pancreatectomy, with mucinous/colloid carcinoma scattered throughout, including at margins  Surgery would involve completion/total pancreatectomy this was to be addressed by operation  Will make referral to Medical Oncology and Radiation Oncology to see if radiation is less invasive options may be an option for him, given nature of disease  Make referral to Genetics for additional workup and/or testing  Will make referral to endocrinology given anticipated abnormalities with blood sugar  Plan of follow-up in 3 months with CT scan for surveillance  Will also present or a multidisciplinary tumor board

## 2022-03-04 NOTE — PROGRESS NOTES
Surgical Oncology Follow Up       [facility] SURGICAL ONCOLOGY JOYCELipscombROHITH  [facility] [id number]    Sinda Foot  1950  [id number]  [facility] SURGICAL ONCOLOGY [place]  [place] [id number]    Chief Complaint   Patient presents with    Post-op       Assessment/Plan:    No problem-specific Assessment & Plan notes found for this encounter  Diagnoses and all orders for this visit:    Overlapping malignant neoplasm of pancreas [facility])  -     Ambulatory Referral to Oncology Genetics; Future  -     Ambulatory referral to Hematology / Oncology; Future  -     Ambulatory referral to Radiation Oncology; Future  -     CT chest abdomen pelvis w contrast; Future  -     Cancer antigen 19-9; Future  -     Ambulatory Referral to Endocrinology; Future  -     HEMOGLOBIN A1C W/ EAG ESTIMATION; Future    IPMN (intraductal papillary mucinous neoplasm)    Type 2 diabetes mellitus without complication, without long-term current use of insulin (HCC)   -     HEMOGLOBIN A1C W/ EAG ESTIMATION; Future        Advance Care Planning/Advance Directives:  Discussed disease status, cancer treatment plans and/or cancer treatment goals with the patient  Oncology History   Overlapping malignant neoplasm of pancreas ([facility] [address] )   3/12/2019 Surgery    Distal pancreatectomy  Several foci of invasive colloid cancer  Grade 1  IPMN with high grade dysplasia at margin  T1b, NO MX Stage IA     4/11/2019 - 6/21/2019 Chemotherapy    Saw Dr Geo Spear  Will be starting Folfirinox 4/24   Ended 6/21/2019 4/24/2019 - 7/2/2019 Chemotherapy    fluorouracil (ADRUCIL), 400 mg/m2 = 1,010 mg, Intravenous, Once, 2 of 2 cycles  pegfilgrastim (Malinda Anger), 6 mg, Subcutaneous, Once, 2 of 2 cycles  Administration: 6 mg (6/21/2019), 6 mg (6/7/2019)  irinotecan (CAMPTOSAR) chemo infusion, 180 mg/m2 = 455 mg, Intravenous, Once, 4 of 4 cycles  Dose modification: 140 mg/m2 (original dose 180 mg/m2, Cycle 3, Reason: Other (Must fill in a comment)), 125 mg/m2 (original dose 180 mg/m2, Cycle 3, Reason: Dose Not Tolerated)  Administration: 316 mg (6/5/2019), 300 mg (6/19/2019)  leucovorin calcium IVPB, 400 mg/m2 = 1,012 mg, Intravenous, Once, 2 of 2 cycles  oxaliplatin (ELOXATIN) chemo infusion, 85 mg/m2 = 215 05 mg, Intravenous, Once, 4 of 4 cycles  Dose modification: 65 mg/m2 (original dose 85 mg/m2, Cycle 3, Reason: Other (Must fill in a comment)), 60 mg/m2 (original dose 85 mg/m2, Cycle 3, Reason: Dose Not Tolerated)  Administration: 151 8 mg (6/5/2019), 151 8 mg (6/19/2019)  fluorouracil (ADRUCIL) ambulatory infusion Soln, 1,200 mg/m2/day = 6,070 mg, Intravenous, Over 46 hours, 4 of 4 cycles     2/15/2022 Surgery    Pancreas (subtotal pancreatectomy):     - 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 (largest focus 0 8 cm) arising in association with intraductal papillary mucinous neoplasm (IPMN) with high-grade dysplasia  - Surgical margin positive for colloid carcinoma and IPMN with high-grade dysplasia  - Fifteen (15) lymph nodes, negative for carcinomaRestore     IPMN (intraductal papillary mucinous neoplasm)   2/15/2022 Surgery    Pancreas (subtotal pancreatectomy):     - 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 (largest focus 0 8 cm) arising in association with intraductal papillary mucinous neoplasm (IPMN) with high-grade dysplasia  - Surgical margin positive for colloid carcinoma and IPMN with high-grade dysplasia  - [de-identified] (15) lymph nodes, negative for carcinoma         History of Present Illness:  Patient is a 78-year-old man status post subtotal pancreatectomy  He still hasthe pancreatic head left intact     -Interval History: This was done after he was found to have changes suggestive of main duct IPMN his most recent MRI  He underwent the  He is here for postop check  Appetite is fair  He has had diarrhea and has been taking 1 Creon pill with each meal     Review of Systems:  Review of Systems   Constitutional: Positive for fatigue  HENT: Negative  Eyes: Negative  Respiratory: Negative  Cardiovascular: Negative  Gastrointestinal: Positive for diarrhea  Endocrine: Negative  Genitourinary: Negative  Musculoskeletal: Negative  Skin: Negative  Allergic/Immunologic: Negative  Neurological: Negative  Hematological: Negative  Psychiatric/Behavioral: Negative  All other systems reviewed and are negative        Patient Active Problem List   Diagnosis    Obstructive sleep apnea syndrome    Hypersomnia    Permanent atrial fibrillation (HCC)    Morbid obesity with BMI of 40 0-44 9, adult ([facility] )    Lower extremity edema    Pancreatic duct dilated    Overlapping malignant neoplasm of pancreas (HCC)    Type 2 diabetes mellitus without complication, without long-term current use of insulin (HCC)    IPMN (intraductal papillary mucinous neoplasm)    Thrombocytopenia (HCC)    Urgency incontinence    Balanitis    OAB (overactive bladder)    BPH without obstruction/lower urinary tract symptoms    Encounter for follow-up surveillance of pancreatic cancer    Encounter for geriatric assessment     Past Medical History:   Diagnosis Date    A-fib ([place] [address] )     Abdominal wall strain     last assessed: 10/21/14    Allergic rhinitis     last assessed: 05/03/16    Arthritis     Cancer ([place] [address] )     PACNCREATIC    COVID-19 virus infection 3/3/2021    CPAP (continuous positive airway pressure) dependence     [facility]     Erectile dysfunction of non-organic origin     last assessed: 03/17/14    Irregular heart beat     Pt with A fib     Lumbar strain     last assessed: 10/21/14    Multiple acquired skin tags     last assessed: 2/18/16    Palpitations     last assessed: 03/17/14    Pancreas cyst     Plantar fasciitis     Skin disorder last assessed: 05/28/13    Sleep apnea     CPAP BROKE IN OCTOBER HAS BEEN TRYING TO GET NEW ONE BUT UNABLE AS OF YET    Thrombocytopenia ([facility] [address] )      Past Surgical History:   Procedure Laterality Date    BOTOX INJECTION N/A 11/12/2021    Procedure: Ardelia Sloane;  Surgeon: Betty Henao MD;  [facility];  Service: Urology    CATARACT EXTRACTION Bilateral     COLONOSCOPY  01/17/2013    COLONOSCOPY  01/08/2018    COLONOSCOPY  04/2021    CYSTOSCOPY      INJECT WITH BOTOX    DISTAL PANCREATECTOMY N/A 2/15/2022    Procedure: PANCREATECTOMY SUB-TOTALL-OPEN;  Surgeon: Sharath Cooper MD;  [facility];  Service: Surgical Oncology    EGD  06/05/2020    EGD AND COLONOSCOPY  02/06/2014, 2/8/2017    FL GUIDED CENTRAL VENOUS ACCESS DEVICE INSERTION  4/23/2019    FLEXIBLE SIGMOIDOSCOPY  06/11/2019    FOOT SURGERY      Due to plantar fascitis    JOINT REPLACEMENT Left     LTK    LINEAR ENDOSCOPIC U/S      PANCREATECTOMY LAPAROSCOPIC N/A 3/12/2019    Procedure: DISTAL PANCREATECTOMY LAPAROSCOPIC/POSSIBLE OPEN;  Surgeon: Sharath Cooper MD;  [facility];  Service: Surgical Oncology    CT EDG US EXAM SURGICAL ALTER STOM DUODENUM/JEJUNUM N/A 1/24/2019    Procedure: LINEAR ENDOSCOPIC U/S;  Surgeon: Leann Layton MD;  Location: [facility]; Service: Gastroenterology    REPLACEMENT TOTAL KNEE Left     TUNNELED VENOUS PORT PLACEMENT Left 4/23/2019    Procedure: INSERTION VENOUS PORT (PORT-A-CATH);   Surgeon: Sharath Cooper MD;  [facility];  Service: Surgical Oncology- 11/8/21 Pt reports PAC removed     UMBILICAL HERNIA REPAIR       Family History   Problem Relation Age of Onset    Breast cancer Mother     Cervical cancer Paternal Aunt     Pancreatic cancer Other     Liver cancer Other     No Known Problems Father      Social History     Socioeconomic History    Marital status: /Civil Union     Spouse name: Not on file    Number of children: Not on file    Years of education: Not on file    Highest education level: Not on file   Occupational History    Not on file   Tobacco Use    Smoking status: Never Smoker    Smokeless tobacco: Never Used   Vaping Use    Vaping Use: Never used   Substance and Sexual Activity    Alcohol use: Yes     Alcohol/week: 2 0 standard drinks     Types: 2 Cans of beer per week     Comment: couple times per week;     Drug use: No     Comment: Denies     Sexual activity: Yes     Comment: Denies any chest pain or shortness of breath with activity   Other Topics Concern    Not on file   Social History Narrative    Not on file     Social Determinants of Health     Financial Resource Strain: Not on file   Food Insecurity: No Food Insecurity    Worried About Running Out of Food in the Last Year: Never true    Tisha of Food in the Last Year: Never true   Transportation Needs: No Transportation Needs    Lack of Transportation (Medical): No    Lack of Transportation (Non-Medical): No   Physical Activity: Not on file   Stress: Not on file   Social Connections: Not on file   Intimate Partner Violence: Not on file   Housing Stability: Unknown    Unable to Pay for Housing in the Last Year: No    Number of Places Lived in the Last Year: Not on file    Unstable Housing in the Last Year: No       Current Outpatient Medications:     acetaminophen (TYLENOL) 325 mg tablet, Take 2 tablets (650 mg total) by mouth every 6 (six) hours as needed for mild pain, Disp: 30 tablet, Rfl: 0    ascorbic acid (VITAMIN C) 1000 MG tablet, Take 2,000 mg by mouth daily 11/8/21 Instructed pt to HOLD starting 11/9/21 up to and including DOS  , Disp: , Rfl:     aspirin (ECOTRIN) 325 mg EC tablet, Take 325 mg by mouth daily 11/8/21 Pt instructed as per urology instruction sheet - pt reports stopped on 11/7/21  Instructed pt to verify with cardiology regarding stopping of ASA and not starting Eliquis at this time prior to surgery  , Disp: , Rfl:     atenolol (TENORMIN) 25 mg tablet, TAKE 1 TABLET BY MOUTH EVERY DAY, Disp: 90 tablet, Rfl: 3    Cholecalciferol (VITAMIN D) 2000 units CAPS, Take 1,000 Units by mouth daily 11/8/21 Pt takes three tabs of Vitamin D daily  Instructed to start HOLD 11/9/21 up to and including DOS  , Disp: , Rfl:     Cyanocobalamin (VITAMIN B 12 PO), Take by mouth daily 11/8/21 Pt reports takes two tabs daily  , Disp: , Rfl:     enoxaparin (LOVENOX) 40 mg/0 4 mL, Inject 0 4 mL (40 mg total) under the skin in the morning for 21 days, Disp: 8 4 mL, Rfl: 0    HYDROmorphone (DILAUDID) 2 mg tablet, Take 1 5-3 tablets (3-6 mg total) by mouth every 4 (four) hours as needed for severe pain for up to 10 days Max Daily Amount: 36 mg, Disp: 60 tablet, Rfl: 0    metFORMIN (GLUCOPHAGE) 1000 MG tablet, TAKE 1 TABLET BY MOUTH TWICE A DAY WITH MEALS, Disp: 180 tablet, Rfl: 1    pancrelipase, Lip-Prot-Amyl, (CREON) 6,000 units delayed release capsule, Take 1 capsule (6,000 Units total) by mouth 3 (three) times a day with meals, Disp: 90 capsule, Rfl: 0    tadalafil (CIALIS) 20 MG tablet, TAKE 1 TABLET BY MOUTH DAILY AS NEEDED FOR ERECTILE DYSFUNCTION, Disp: 10 tablet, Rfl: 0    Na Sulfate-K Sulfate-Mg Sulf (Suprep Bowel Prep Kit) 17 5-3 13-1 6 GM/177ML SOLN, Follow office instructions (Patient not taking: Reported on 3/4/2022 ), Disp: 1 Bottle, Rfl: 0  No Known Allergies  Vitals:    03/04/22 1031   BP: 150/88   Pulse: 89   Resp: 16   Temp: 97 9 °F (36 6 °C)   SpO2: 98%       Physical Exam  Vitals reviewed  Constitutional:       Appearance: Normal appearance  HENT:      Head: Normocephalic and atraumatic  Abdominal:      Comments: Incision clean dry intact  Musculoskeletal:      Cervical back: Normal range of motion and neck supple  Neurological:      Mental Status: He is alert             Results:  Labs:    Case Report   Surgical Pathology Report                         Case: [id number]                                    Authorizing Provider: Muna Reese MD        Collected:           02/15/2022 1713               Ordering Location:     [facility]      Received:            02/16/2022 0738                                      Hospital Operating Room                                                       Pathologist:           John Arredondo MD                                                                 Specimen:    Pancreas, Subtotal pancreatectomy                                                          Final Diagnosis   A  Pancreas (subtotal pancreatectomy):     - 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 (largest focus 0 8 cm) arising in association with intraductal papillary mucinous neoplasm (IPMN) with high-grade dysplasia  - Surgical margin positive for colloid carcinoma and IPMN with high-grade dysplasia  - Fifteen (15) lymph nodes, negative for carcinoma  - Therapy effect noted in background pancreas (fibrosis and inflammation)       Comment:  The tumor appears largely viable  Few collections of acellular mucin are noted  Electronically signed by John Arredondo MD on 2/23/2022 at  2:13 PM   Comments: This is an appended report  These results have been appended to a previously preliminary verified report  Preliminary Diagnosis   Intradepartmental review pending     A  Pancreas (subtotal pancreatectomy):     - Mucinous neoplasm, likely IPMN, with at least high-grade 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  Preliminary result electronically signed by John Arredondo MD on 2/21/2022 at 10:49 AM   Comments: This is an appended report  These results have been appended to a previously preliminary verified report  Microscopic Description    Representative section: A108  Comment: This is an appended report  These results have been appended to a previously preliminary verified report  Note    This case was reviewed at the pathology consensus conference  Comment: This is an appended report  These results have been appended to a previously preliminary verified report  Imaging  No results found  I reviewed the above laboratory and imaging data  Discussion/Summary:  Status post distal pancreatectomy, with mucinous/colloid carcinoma scattered throughout, including at margins  Surgery would involve completion/total pancreatectomy this was to be addressed by operation  Will make referral to Medical Oncology and Radiation Oncology to see if radiation is less invasive options may be an option for him, given nature of disease  Make referral to Genetics for additional workup and/or testing  Will make referral to endocrinology given anticipated abnormalities with blood sugar  Plan of follow-up in 3 months with CT scan for surveillance  Will also present or a multidisciplinary tumor board

## 2022-03-07 ENCOUNTER — TELEPHONE (OUTPATIENT)
Dept: GENETICS | Facility: CLINIC | Age: 72
End: 2022-03-07

## 2022-03-07 ENCOUNTER — OFFICE VISIT (OUTPATIENT)
Dept: FAMILY MEDICINE CLINIC | Facility: CLINIC | Age: 72
End: 2022-03-07
Payer: MEDICARE

## 2022-03-07 VITALS
OXYGEN SATURATION: 98 % | WEIGHT: 278 LBS | BODY MASS INDEX: 37.65 KG/M2 | SYSTOLIC BLOOD PRESSURE: 124 MMHG | TEMPERATURE: 97.6 F | HEIGHT: 72 IN | HEART RATE: 92 BPM | DIASTOLIC BLOOD PRESSURE: 80 MMHG

## 2022-03-07 DIAGNOSIS — Z85.07 ENCOUNTER FOR FOLLOW-UP SURVEILLANCE OF PANCREATIC CANCER: ICD-10-CM

## 2022-03-07 DIAGNOSIS — C25.8 OVERLAPPING MALIGNANT NEOPLASM OF PANCREAS (HCC): ICD-10-CM

## 2022-03-07 DIAGNOSIS — Z08 ENCOUNTER FOR FOLLOW-UP SURVEILLANCE OF PANCREATIC CANCER: ICD-10-CM

## 2022-03-07 DIAGNOSIS — I48.21 PERMANENT ATRIAL FIBRILLATION (HCC): Primary | ICD-10-CM

## 2022-03-07 DIAGNOSIS — E11.9 TYPE 2 DIABETES MELLITUS WITHOUT COMPLICATION, WITHOUT LONG-TERM CURRENT USE OF INSULIN (HCC): ICD-10-CM

## 2022-03-07 PROCEDURE — 99495 TRANSJ CARE MGMT MOD F2F 14D: CPT | Performed by: FAMILY MEDICINE

## 2022-03-07 NOTE — PROGRESS NOTES
[facility]      NAME: Humberto Flores  AGE: 66 y o  SEX: male  : 1950   MRN: [id number]    DATE: 3/7/2022  TIME: 11:08 AM    Assessment and Plan     Reviewed patient's recent hospitalization postop course  Discussed medications and ongoing treatment plan  Patient will continue with Radiation Oncology and Surgical Oncology  He will also see Cardiology for his atrial fibrillation and endocrinology for his diabetic management  Medication list was reviewed and updated  Problem List Items Addressed This Visit     Type 2 diabetes mellitus without complication, without long-term current use of insulin ([facility] )       Lab Results   Component Value Date    HGBA1C 6 9 (H) 2022   Most recent hemoglobin A1c less than 7  Continue metformin  Follow-up with endocrinology within the next 2 weeks  Permanent atrial fibrillation (HCC) - Primary     Stable  Follow-up with Cardiology  Overlapping malignant neoplasm of pancreas ([facility] [address] )     Status post subtotal pancreatectomy  Doing well postoperatively  Encounter for follow-up surveillance of pancreatic cancer     Continue with surgical oncology and radiation oncology  Return to office in:  P r n  Chief Complaint     Chief Complaint   Patient presents with    Transition of Care Management       History of Present Illness     TCM Call (since 2022)     Date and time call was made  2022  3:20 PM    Hospital care reviewed  Records reviewed        Patient was hospitialized at  [facility]        Date of Admission  02/15/22    Date of discharge  22    Diagnosis  pancreatic cancer    Disposition  Home    Were the patients medications reviewed and updated  No    Current Symptoms  None      TCM Call (since 2022)     Post hospital issues  Reduced activity    Should patient be enrolled in anticoag monitoring? No    Scheduled for follow up?   Yes    Did you obtain your prescribed medications  Yes    Do you need help managing your prescriptions or medications  No    Is transportation to your appointment needed  No    I have advised the patient to call PCP with any new or worsening symptoms    Kan Bernard MA    Living Arrangements  Spouse or Significiant other    Are you recieving any outpatient services  No    Are you recieving home care services  Yes    Types of home care services  Nurse visit    Are you using any community resources  No    Current waiver services  No    Have you fallen in the last 12 months  No    Interperter language line needed  No      Patient was seen for follow-up of recent hospitalization  This is a transition of care visit  He was admitted for surgery and had a subtotal pancreatectomy done as part of his treatment for his pancreatic cancer  His recovery has been uneventful  He is eating and drinking without difficulty  He is not currently taking pain medications  He is tolerating his Creon  The following portions of the patient's history were reviewed and updated as appropriate: allergies, current medications, past family history, past medical history, past social history, past surgical history and problem list     Review of Systems   Review of Systems   Constitutional: Negative  Respiratory: Negative  Cardiovascular: Negative  Gastrointestinal: Negative  Genitourinary: Negative  Musculoskeletal: Negative  Psychiatric/Behavioral: Negative          Active Problem List     Patient Active Problem List   Diagnosis    Obstructive sleep apnea syndrome    Hypersomnia    Permanent atrial fibrillation (HCC)    Morbid obesity with BMI of 40 0-44 9, adult ([facility] [address] )    Lower extremity edema    Pancreatic duct dilated    Overlapping malignant neoplasm of pancreas ([facility] )    Type 2 diabetes mellitus without complication, without long-term current use of insulin (HCC)    IPMN (intraductal papillary mucinous neoplasm)    Thrombocytopenia ([facility] )    Urgency incontinence    Balanitis    OAB (overactive bladder)    BPH without obstruction/lower urinary tract symptoms    Encounter for follow-up surveillance of pancreatic cancer    Encounter for geriatric assessment       Objective   /80 (BP Location: Left arm, Patient Position: Sitting, Cuff Size: Large)   Pulse 92   Temp 97 6 °F (36 4 °C) (Tympanic)   Ht 6' (1 829 m)   Wt 126 kg (278 lb)   SpO2 98%   BMI 37 70 kg/m²     Physical Exam  Vitals and nursing note reviewed  Constitutional:       General: He is not in acute distress  Appearance: He is well-developed  He is not diaphoretic  HENT:      Head: Normocephalic and atraumatic  Eyes:      General:         Right eye: No discharge  Conjunctiva/sclera: Conjunctivae normal       Pupils: Pupils are equal, round, and reactive to light  Neck:      Thyroid: No thyromegaly  Cardiovascular:      Rate and Rhythm: Normal rate and regular rhythm  Pulmonary:      Effort: Pulmonary effort is normal  No respiratory distress  Breath sounds: Normal breath sounds  Musculoskeletal:      Cervical back: Normal range of motion  Lymphadenopathy:      Cervical: No cervical adenopathy  Skin:     General: Skin is warm and dry  Neurological:      Mental Status: He is alert and oriented to person, place, and time  Psychiatric:         Behavior: Behavior normal          Thought Content: Thought content normal          Judgment: Judgment normal            Current Medications     Current Outpatient Medications:     acetaminophen (TYLENOL) 325 mg tablet, Take 2 tablets (650 mg total) by mouth every 6 (six) hours as needed for mild pain, Disp: 30 tablet, Rfl: 0    ascorbic acid (VITAMIN C) 1000 MG tablet, Take 2,000 mg by mouth daily 11/8/21 Instructed pt to HOLD starting 11/9/21 up to and including DOS   , Disp: , Rfl:     aspirin (ECOTRIN) 325 mg EC tablet, Take 325 mg by mouth daily 11/8/21 Pt instructed as per urology instruction sheet - pt reports stopped on 11/7/21  Instructed pt to verify with cardiology regarding stopping of ASA and not starting Eliquis at this time prior to surgery  , Disp: , Rfl:     atenolol (TENORMIN) 25 mg tablet, TAKE 1 TABLET BY MOUTH EVERY DAY, Disp: 90 tablet, Rfl: 3    Cholecalciferol (VITAMIN D) 2000 units CAPS, Take 1,000 Units by mouth daily 11/8/21 Pt takes three tabs of Vitamin D daily  Instructed to start HOLD 11/9/21 up to and including DOS  , Disp: , Rfl:     Cyanocobalamin (VITAMIN B 12 PO), Take by mouth daily 11/8/21 Pt reports takes two tabs daily   , Disp: , Rfl:     enoxaparin (LOVENOX) 40 mg/0 4 mL, Inject 0 4 mL (40 mg total) under the skin in the morning for 21 days, Disp: 8 4 mL, Rfl: 0    metFORMIN (GLUCOPHAGE) 1000 MG tablet, TAKE 1 TABLET BY MOUTH TWICE A DAY WITH MEALS, Disp: 180 tablet, Rfl: 1    pancrelipase, Lip-Prot-Amyl, (CREON) 6,000 units delayed release capsule, Take 1 capsule (6,000 Units total) by mouth 3 (three) times a day with meals, Disp: 90 capsule, Rfl: 0    tadalafil (CIALIS) 20 MG tablet, TAKE 1 TABLET BY MOUTH DAILY AS NEEDED FOR ERECTILE DYSFUNCTION, Disp: 10 tablet, Rfl: 0    Na Sulfate-K Sulfate-Mg Sulf (Suprep Bowel Prep Kit) 17 5-3 13-1 6 GM/177ML SOLN, Follow office instructions (Patient not taking: Reported on 3/4/2022 ), Disp: 1 Bottle, Rfl: 0    Health Maintenance     Health Maintenance   Topic Date Due    COVID-19 Vaccine (1) Never done    DTaP,Tdap,and Td Vaccines (1 - Tdap) Never done    URINE MICROALBUMIN  12/09/2021    DM Eye Exam  01/27/2022    Diabetic Foot Exam  06/23/2022    HEMOGLOBIN A1C  07/18/2022    Medicare Annual Wellness Visit (AWV)  10/25/2022    BMI: Followup Plan  10/25/2022    Fall Risk  01/19/2023    Depression Screening  01/19/2023    BMI: Adult  03/07/2023    Colorectal Cancer Screening  04/09/2024    Hepatitis C Screening  Completed    Pneumococcal Vaccine: 65+ Years  Completed    Influenza Vaccine  Completed    HIB Vaccine Aged Out    Hepatitis B Vaccine  Aged Out    IPV Vaccine  Aged Out    Hepatitis A Vaccine  Aged Out    Meningococcal ACWY Vaccine  Aged Out    HPV Vaccine  Aged Out     Immunization History   Administered Date(s) Administered    Influenza Split High Dose Preservative Free IM 10/21/2014    Influenza, high dose seasonal 0 7 mL 10/14/2020, 10/25/2021    Influenza, seasonal, injectable 01/16/2013, 01/16/2013    Pneumococcal Conjugate 13-Valent 06/07/2018    Pneumococcal Polysaccharide PPV23 10/14/2020       Floridalma Andrade DO  [facility]

## 2022-03-07 NOTE — TELEPHONE ENCOUNTER
I called Alvarez Bright to schedule a new patient appointment with the Cancer Risk and Genetics Program       Outcome:   Spoke with patient, genetic appt scheduled for 3/9 at 11 am with Tootie

## 2022-03-07 NOTE — ASSESSMENT & PLAN NOTE
Lab Results   Component Value Date    HGBA1C 6 9 (H) 01/18/2022   Most recent hemoglobin A1c less than 7  Continue metformin  Follow-up with endocrinology within the next 2 weeks

## 2022-03-08 ENCOUNTER — CLINICAL SUPPORT (OUTPATIENT)
Dept: RADIATION ONCOLOGY | Facility: CLINIC | Age: 72
End: 2022-03-08
Attending: INTERNAL MEDICINE
Payer: MEDICARE

## 2022-03-08 ENCOUNTER — APPOINTMENT (OUTPATIENT)
Dept: LAB | Facility: MEDICAL CENTER | Age: 72
End: 2022-03-08
Payer: MEDICARE

## 2022-03-08 VITALS
HEIGHT: 72 IN | BODY MASS INDEX: 37.89 KG/M2 | TEMPERATURE: 96.6 F | OXYGEN SATURATION: 97 % | SYSTOLIC BLOOD PRESSURE: 126 MMHG | HEART RATE: 81 BPM | DIASTOLIC BLOOD PRESSURE: 86 MMHG | WEIGHT: 279.76 LBS

## 2022-03-08 DIAGNOSIS — C25.8 OVERLAPPING MALIGNANT NEOPLASM OF PANCREAS (HCC): Primary | ICD-10-CM

## 2022-03-08 DIAGNOSIS — E11.9 TYPE 2 DIABETES MELLITUS WITHOUT COMPLICATION, WITHOUT LONG-TERM CURRENT USE OF INSULIN (HCC): ICD-10-CM

## 2022-03-08 DIAGNOSIS — C25.8 OVERLAPPING MALIGNANT NEOPLASM OF PANCREAS (HCC): ICD-10-CM

## 2022-03-08 LAB
BUN SERPL-MCNC: 8 MG/DL (ref 5–25)
CREAT SERPL-MCNC: 0.73 MG/DL (ref 0.6–1.3)
EST. AVERAGE GLUCOSE BLD GHB EST-MCNC: 131 MG/DL
GFR SERPL CREATININE-BSD FRML MDRD: 92 ML/MIN/1.73SQ M
HBA1C MFR BLD: 6.2 %

## 2022-03-08 PROCEDURE — 36415 COLL VENOUS BLD VENIPUNCTURE: CPT

## 2022-03-08 PROCEDURE — 84520 ASSAY OF UREA NITROGEN: CPT

## 2022-03-08 PROCEDURE — 99211 OFF/OP EST MAY X REQ PHY/QHP: CPT | Performed by: INTERNAL MEDICINE

## 2022-03-08 PROCEDURE — 99204 OFFICE O/P NEW MOD 45 MIN: CPT | Performed by: INTERNAL MEDICINE

## 2022-03-08 PROCEDURE — 86301 IMMUNOASSAY TUMOR CA 19-9: CPT

## 2022-03-08 PROCEDURE — 83036 HEMOGLOBIN GLYCOSYLATED A1C: CPT

## 2022-03-08 PROCEDURE — 82565 ASSAY OF CREATININE: CPT

## 2022-03-08 NOTE — PROGRESS NOTES
Pre-Test Genetic Counseling Consult Note    Patient Name: Priyanka Mills   /Age: 1950/71 y o  Referring Provider: Pillo Lopes MD    Date of Service: 3/9/2022  Genetic Counselor: Tiffanie Jacobs MS, [facility] [place]pretation Services: None  [facility]      Gemini Santiago was referred to the [address] [place] [address] and Genetic Assessment Program due to his personal history of pancreatic cancer and family history of breast and pancreatic cancer  He presents today to discuss the possibility of a hereditary cancer syndrome, options for genetic testing, and implications for him and his family  Cancer History and Treatment:     Personal History: Personal history of pancreatic cancer at age 70    Oncology History   Overlapping malignant neoplasm of pancreas ([facility] [address] )   3/12/2019 Surgery     Distal pancreatectomy  Several foci of invasive colloid cancer  Grade 1  IPMN with high grade dysplasia at margin  T1b, NO MX Stage IA      2019 - 2019 Chemotherapy     Saw Dr Esther Juárez  Will be starting Folfirinox    Ended 2019 - 2019 Chemotherapy     fluorouracil (ADRUCIL), 400 mg/m2 = 1,010 mg, Intravenous, Once, 2 of 2 cycles  pegfilgrastim (Marci Marchi), 6 mg, Subcutaneous, Once, 2 of 2 cycles  Administration: 6 mg (2019), 6 mg (2019)  irinotecan (CAMPTOSAR) chemo infusion, 180 mg/m2 = 455 mg, Intravenous, Once, 4 of 4 cycles  Dose modification: 140 mg/m2 (original dose 180 mg/m2, Cycle 3, Reason: Other (Must fill in a comment)), 125 mg/m2 (original dose 180 mg/m2, Cycle 3, Reason: Dose Not Tolerated)  Administration: 316 mg (2019), 300 mg (2019)  leucovorin calcium IVPB, 400 mg/m2 = 1,012 mg, Intravenous, Once, 2 of 2 cycles  oxaliplatin (ELOXATIN) chemo infusion, 85 mg/m2 = 215 05 mg, Intravenous, Once, 4 of 4 cycles  Dose modification: 65 mg/m2 (original dose 85 mg/m2, Cycle 3, Reason: Other (Must fill in a comment)), 60 mg/m2 (original dose 85 mg/m2, Cycle 3, Reason: Dose Not Tolerated)  Administration: 151 8 mg (6/5/2019), 151 8 mg (6/19/2019)  fluorouracil (ADRUCIL) ambulatory infusion Soln, 1,200 mg/m2/day = 6,070 mg, Intravenous, Over 46 hours, 4 of 4 cycles      2/15/2022 Surgery     Pancreas (subtotal pancreatectomy):     - 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 (largest focus 0 8 cm) arising in association with intraductal papillary mucinous neoplasm (IPMN) with high-grade dysplasia  - Surgical margin positive for colloid carcinoma and IPMN with high-grade dysplasia       - Fifteen (15) lymph nodes, negative for carcinomaRestore      IPMN (intraductal papillary mucinous neoplasm)   2/15/2022 Surgery     Pancreas (subtotal pancreatectomy):     - 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 (largest focus 0 8 cm) arising in association with intraductal papillary mucinous neoplasm (IPMN) with high-grade dysplasia       - Surgical margin positive for colloid carcinoma and IPMN with high-grade dysplasia      - Fifteen (15) lymph nodes, negative for carcinoma     Screening Hx:     Colon:  Colonoscopy: Most recent 4/9/21; history of 4+ colon polyps     Skin:  Skin cancer screening: Not assessed     Prostate:  Prostate screening: Not assessed     Medical and Surgical History  Pertinent surgical history:   Past Surgical History:   Procedure Laterality Date    BOTOX INJECTION N/A 11/12/2021    Procedure: Jas Michael;  Surgeon: Oj Mathews MD;  Location: [address] [place] [address] MAIN OR;  Service: Urology    CATARACT EXTRACTION Bilateral     COLONOSCOPY  01/17/2013    COLONOSCOPY  01/08/2018    COLONOSCOPY  04/2021    CYSTOSCOPY      INJECT WITH BOTOX    DISTAL PANCREATECTOMY N/A 2/15/2022    Procedure: PANCREATECTOMY SUB-TOTALL-OPEN;  Surgeon: Tessa Lanza MD;  Location:  MAIN OR;  Service: Surgical Oncology    EGD  06/05/2020    EGD AND COLONOSCOPY  02/06/2014, 2/8/2017    FL GUIDED CENTRAL VENOUS ACCESS DEVICE INSERTION  4/23/2019    FLEXIBLE SIGMOIDOSCOPY  06/11/2019    FOOT SURGERY      Due to plantar fascitis    JOINT REPLACEMENT Left     LTK    LINEAR ENDOSCOPIC U/S      PANCREATECTOMY LAPAROSCOPIC N/A 3/12/2019    Procedure: DISTAL PANCREATECTOMY LAPAROSCOPIC/POSSIBLE OPEN;  Surgeon: Devora Arreola MD;  [facility]: [facility] MAIN OR;  Service: Surgical Oncology    IN EDG US EXAM SURGICAL ALTER STOM DUODENUM/JEJUNUM N/A 1/24/2019    Procedure: LINEAR ENDOSCOPIC U/S;  Surgeon: Chris Giron MD;  Location: [facility] GI LAB; Service: Gastroenterology    REPLACEMENT TOTAL KNEE Left     TUNNELED VENOUS PORT PLACEMENT Left 4/23/2019    Procedure: INSERTION VENOUS PORT (PORT-A-CATH);   Surgeon: Devora Arreola MD;  Location: [facility] MAIN OR;  Service: Surgical Oncology- 11/8/21 Pt reports PAC removed     UMBILICAL HERNIA REPAIR        Pertinent medical history:  Past Medical History:   Diagnosis Date    A-fib ([facility] [address] )     Abdominal wall strain     last assessed: 10/21/14    Allergic rhinitis     last assessed: 05/03/16    Arthritis     Cancer ([facility] [address] )     PACNCREATIC    COVID-19 virus infection 3/3/2021    CPAP (continuous positive airway pressure) dependence     Diabetes mellitus ([facility] [address] )     Erectile dysfunction of non-organic origin     last assessed: 03/17/14    Irregular heart beat     Pt with A fib     Lumbar strain     last assessed: 10/21/14    Multiple acquired skin tags     last assessed: 2/18/16    Palpitations     last assessed: 03/17/14    Pancreas cyst     Plantar fasciitis     Skin disorder     last assessed: 05/28/13    Sleep apnea     CPAP BROKE IN OCTOBER HAS BEEN TRYING TO GET NEW ONE BUT UNABLE AS OF YET    Thrombocytopenia ([facility] [address] )        Other History:  Height:   Ht Readings from Last 1 Encounters:   03/08/22 6' (1 829 m)     Weight:   Wt Readings from Last 1 Encounters:   03/08/22 127 kg (279 lb 12 2 oz)     Relevant Family History   Patient reports no Ashkenazi Mormon ancestry  - Mother (d age [de-identified]) with breast cancer  Maternal Aunt (d age [de-identified]) with Cancer NOS  - Father (d age 80) with no history of cancer  Paternal [de-identified] (d age [de-identified]) with abdominal cancer     Please refer to the scanned pedigree in the Media Tab for a complete family history     *All history is reported as provided by the patient; records are not available for review, except where indicated  Assessment:  We discussed sporadic, familial and hereditary cancer  We also discussed the many factors that influence our risk for cancer such as age, environmental exposures, lifestyle choices and family history  We reviewed the indications suggestive of a hereditary predisposition to cancer  Genetic testing is indicated for Gennaro Washington based on the following criteria: Meets NCCN [id number] Testing Criteria for Pancreatic Cancer Susceptibility Genes: Personal history of pancreatic cancer      The risks, benefits, and limitations of genetic testing were reviewed with the patient, as well as genetic discrimination laws, and possible test results (positive, negative, variants of uncertain significance) and their clinical implications  If positive for a mutation, options for managing cancer risk including increased surveillance, chemoprevention, and in some cases prophylactic surgery were discussed  Gennaro Washington was informed that if a hereditary cancer syndrome was identified in him, first degree relatives (parents, siblings, and children) have a chance of also inheriting the condition  Genetic testing would allow for predictive genetic testing in other relatives, who may also be at risk depending on their degree of relation  Plan: Patient decided to proceed with testing and provided consent  Summary:     Sample Collection:  Saliva was collected in the office on 3/9/2022   Originally we planned to order [address] however Brenton Andrea is taking blood thinners and we were unable to obtain a blood sample in the office today  We provided [facility] with the options of attempting saliva and/or taking a kit with him the next time he has blood drawn at a [facility] lab  [facility] opted for a saliva sample which was obtained in the office today  Genetic Testing Preformed: CancerNext (36 genes): APC, SIGRID, AXIN2 BARD1, BRCA1, BRCA2, BRIP1, BMPR1A, CDH1, CDK4, CDKN2A, CHEK2, DICER1, EPCAM, GREM1, HOXB13, MLH1, MSH2, MSH3, MSH6, MUTYH, NBN, NF1, NTHL1, PALB2, PMS2, POLD1, POLE, PTEN, RAD51C, RAD51D, RECQL SMAD4, SMARCA4, STK11, TP53    Results take approximately 2-3 weeks to complete once test is started  We will contact [place] once results are available  Additional recommendations for surveillance/medical management will be made pending genetic test results

## 2022-03-08 NOTE — PROGRESS NOTES
Evie Pardo 1950 is a 66 y o  male  presents today to discuss radiation therapy for   Referred by Dr Mary Ellen Kessler  66year old male with history of stage I pancreas cancer status post prior laparoscopic distal pancreatectomy on 3/12/2019  He received adjuvant chemotherapy from 4/2019 - 7/2019  He has been followed with surveillance imaging  12/10/2021 MRI revealed enlarging cystic lesion in distal residual pancreas  12/10/21 MRI abdomen w wo contrast and mrcp  IMPRESSION:   Enlarging cystic lesion within the most distal residual pancreas with associated interval enlargement of the main pancreatic duct  Findings are suggestive of enlarging IPMN  with main duct involvement versus primary main duct IPMN  Somewhat nodular hepatic contour again seen potentially reflecting underlying hepatocellular disease  Hepatic steatosis  Stable peripancreatic and aortocaval lymph nodes  [facility]  Set up for EUS with biopsy for further workup  Present to tumor Board  Further management will depend on EUS/biopsy results  12/30/21 RECTAL/GI MULTIDISCIPLINARY CASE REVIEW  DIAGNOSIS: Enlarging pancreatic cyst    PHYSICIAN RECOMMENDED PLAN:   -Scheduled EUS on 1/3/2022 with Dr Chuy Conn  -Recommend reverse whipple procedure  -Office visit scheduled with Dr Mary Ellen Kessler on 1/19/2022 to discuss surgical plan with the patient  1/3/22 EGD/EUS   IMPRESSION:  EGD:   1  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  2  Small hiatus hernia  3  Normal duodenum  EUS:   3 cm heterogenous cyst with solid component/debris in the distal pancreas at the margin of resection with communication with the main duct  This was biopsied with FNB needle and thick mucinous appearing aspirate sent for CEA/amylase and cytology  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  RECOMMENDATION:  Follow up cytology and CEA/amylase of cyst fluid  Follow up with surgical oncology    Pathology:  A B   Pancreas, pancreatic body tail cystic mass ( ThinPrep and smear preparations ):  Neoplastic, Other ([facility] Category IV) -See note  Note: The sample shows acellular specimen with atypical mucinous epithelium compatible with a neoplastic mucinous cyst with high-grade atypia  Extracellular mucin is present, supporting a mucinous differentiation  The atypia is compatible with at least high-grade dysplasia, though an invasive component cannot be excluded on cytology preparation  Correlation with clinical impression, other tissue sampling as applicable and pending CEA analysis is recommended  2/15/22 PANCREATECTOMY SUB-TOTAL-OPEN, LYSIS OF ADHESIONS  A  Pancreas (subtotal pancreatectomy):     - 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 (largest focus 0 8 cm) arising in association with intraductal papillary mucinous neoplasm (IPMN) with high-grade dysplasia  - Surgical margin positive for colloid carcinoma and IPMN with high-grade dysplasia  - Fifteen (15) lymph nodes, negative for carcinoma  - Therapy effect noted in background pancreas (fibrosis and inflammation)  Synoptic Checklist     PANCREAS (EXOCRINE)  8th Edition - Protocol posted: 7/27/2021  PANCREAS (EXOCRINE): RESECTION - All Specimens  SPECIMEN   Procedure  Subtotal pancreatectomy    TUMOR   Tumor Site  Not specified    Histologic Type  Colloid carcinoma (mucinous noncystic carcinoma)    Histologic Grade  G2, moderately differentiated    Tumor Size  Greatest Dimension (Centimeters): 0 8 cm   Site(s) Involved by Direct Tumor Extension  Confined to pancreas    Treatment Effect  Cannot be determined: The tumor appears largely viable  Few collections of acellular mucin are noted      Lymphovascular Invasion  Not identified    Perineural Invasion  Not identified    MARGINS   Margin Status for Invasive Carcinoma  Invasive carcinoma present at margin    Margin(s) Involved by Invasive Carcinoma  Designated surgical margin    Margin Status for Dysplasia and Intraepithelial Neoplasia  High-grade dysplasia present at margin    Margin(s) Involved by High-Grade Dysplasia  Designated surgical margin    REGIONAL LYMPH NODES   Regional Lymph Node Status  All regional lymph nodes negative for tumor    Number of Lymph Nodes Examined  15    PATHOLOGIC STAGE CLASSIFICATION  (pTNM, AJCC 8th Edition)   Reporting of pT, pN, and (when applicable) pM categories is based on information available to the pathologist at the time the report is issued  As per the AJCC (Chapter 1, 8th Ed ) it is the managing physicians responsibility to establish the final pathologic stage based upon all pertinent information, including but potentially not limited to this pathology report  TNM Descriptors  m (multiple primary tumors)      y (post-treatment)    pT Category#  pT1b    pN Category  pN0    ADDITIONAL FINDINGS   Additional Findings  Chronic pancreatitis      IPMN with high-grade dysplasia    Comment(s)  AJCC stage group IA                 3/4/22 SurgOdilon Landa  Treatment options discussed, including surgery would involve completion/total pancreatectomy  Refer to medical and radiation oncology to discuss options  Refer to genetics for additional workup/testing  Refer to endocrinology  Present at [facility]      Upcoming:  3/16/22 Palliative care, Dr Chandler Ruiz  3/23/22 Endocrinology, Dr Molina Torres  6/4/22 CT chest abdomen pelvis  6/10/22 SurgOnc, Dr Odilon Green        Oncology History   Overlapping malignant neoplasm of pancreas ([facility] [address] )   3/12/2019 Surgery    Distal pancreatectomy  Several foci of invasive colloid cancer  Grade 1  IPMN with high grade dysplasia at margin  T1b, NO MX Stage IA     4/11/2019 - 6/21/2019 Chemotherapy    Saw Dr Katherine Terry  Will be starting Folfirinox 4/24   Ended 6/21/2019 4/24/2019 - 7/2/2019 Chemotherapy    fluorouracil (ADRUCIL), 400 mg/m2 = 1,010 mg, Intravenous, Once, 2 of 2 cycles  pegfilgrastim (NEULASTA ONPRO), 6 mg, Subcutaneous, Once, 2 of 2 cycles  Administration: 6 mg (6/21/2019), 6 mg (6/7/2019)  irinotecan (CAMPTOSAR) chemo infusion, 180 mg/m2 = 455 mg, Intravenous, Once, 4 of 4 cycles  Dose modification: 140 mg/m2 (original dose 180 mg/m2, Cycle 3, Reason: Other (Must fill in a comment)), 125 mg/m2 (original dose 180 mg/m2, Cycle 3, Reason: Dose Not Tolerated)  Administration: 316 mg (6/5/2019), 300 mg (6/19/2019)  leucovorin calcium IVPB, 400 mg/m2 = 1,012 mg, Intravenous, Once, 2 of 2 cycles  oxaliplatin (ELOXATIN) chemo infusion, 85 mg/m2 = 215 05 mg, Intravenous, Once, 4 of 4 cycles  Dose modification: 65 mg/m2 (original dose 85 mg/m2, Cycle 3, Reason: Other (Must fill in a comment)), 60 mg/m2 (original dose 85 mg/m2, Cycle 3, Reason: Dose Not Tolerated)  Administration: 151 8 mg (6/5/2019), 151 8 mg (6/19/2019)  fluorouracil (ADRUCIL) ambulatory infusion Soln, 1,200 mg/m2/day = 6,070 mg, Intravenous, Over 46 hours, 4 of 4 cycles     2/15/2022 Surgery    Pancreas (subtotal pancreatectomy):     - 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 (largest focus 0 8 cm) arising in association with intraductal papillary mucinous neoplasm (IPMN) with high-grade dysplasia  - Surgical margin positive for colloid carcinoma and IPMN with high-grade dysplasia  - Fifteen (15) lymph nodes, negative for carcinomaRestore     IPMN (intraductal papillary mucinous neoplasm)   2/15/2022 Surgery    Pancreas (subtotal pancreatectomy):     - 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 (largest focus 0 8 cm) arising in association with intraductal papillary mucinous neoplasm (IPMN) with high-grade dysplasia  - Surgical margin positive for colloid carcinoma and IPMN with high-grade dysplasia  - Fifteen (15) lymph nodes, negative for carcinoma         Review of Systems:  Review of Systems   Constitutional: Negative  HENT: Negative  Eyes:        Wears glasses    Respiratory: Negative  Cardiovascular: Negative  Gastrointestinal: Positive for diarrhea (Not a new sx )  Sx-Feb 2022  Endocrine: Negative  Genitourinary: Negative  Musculoskeletal: Positive for arthralgias (General body discomfort )  Skin: Negative  Allergic/Immunologic: Negative  Neurological: Negative  Hematological: Negative  Psychiatric/Behavioral: Positive for sleep disturbance (Insomnia )         Clinical Trial: no        Pain assessment: 0    PFT    Prior Radiation  Chemo 2019/No previous RT     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       Implantable Devices (Port, pacemaker, pain stimulator)  No     Hip Replacement No     Covid Vaccine Status  Vaccinated/No Booster     Health Maintenance   Topic Date Due    COVID-19 Vaccine (1) Never done    DTaP,Tdap,and Td Vaccines (1 - Tdap) Never done    URINE MICROALBUMIN  12/09/2021    DM Eye Exam  01/27/2022    Diabetic Foot Exam  06/23/2022    HEMOGLOBIN A1C  09/08/2022    Medicare Annual Wellness Visit (AWV)  10/25/2022    BMI: Followup Plan  10/25/2022    Fall Risk  01/19/2023    BMI: Adult  03/07/2023    Depression Screening  03/08/2023    Colorectal Cancer Screening  04/09/2024    Hepatitis C Screening  Completed    Pneumococcal Vaccine: 65+ Years  Completed    Influenza Vaccine  Completed    HIB Vaccine  Aged Out    Hepatitis B Vaccine  Aged Out    IPV Vaccine  Aged Out    Hepatitis A Vaccine  Aged Out    Meningococcal ACWY Vaccine  Aged Out    HPV Vaccine  Aged Out       Past Medical History:   Diagnosis Date    A[facility]fib ([facility] [address] [facility]     Abdominal wall strain     last assessed: 10/21/14    Allergic rhinitis     last assessed: 05/03/16    Arthritis     Cancer ([facility] [address] )     PACNCREATIC    COVID-19 virus infection 3/3/2021    CPAP (continuous positive airway pressure) dependence     Diabetes mellitus ([facility] [address] )     Erectile dysfunction of non-organic origin     last assessed: 03/17/14    Irregular heart beat     Pt with A fib     Lumbar strain     last assessed: 10/21/14    Multiple acquired skin tags     last assessed: 2/18/16    Palpitations     last assessed: 03/17/14    Pancreas cyst     Plantar fasciitis     Skin disorder     last assessed: 05/28/13    Sleep apnea     CPAP BROKE IN OCTOBER HAS BEEN TRYING TO GET NEW ONE BUT UNABLE AS OF YET    Thrombocytopenia ([facility] [address] )        Past Surgical History:   Procedure Laterality Date    BOTOX INJECTION N/A 11/12/2021    Procedure: Viola Smith;  Surgeon: Zuleyma Dawkins MD;  Location: [facility] MAIN OR;  Service: Urology    CATARACT EXTRACTION Bilateral     COLONOSCOPY  01/17/2013    COLONOSCOPY  01/08/2018    COLONOSCOPY  04/2021    CYSTOSCOPY      INJECT WITH BOTOX    DISTAL PANCREATECTOMY N/A 2/15/2022    Procedure: PANCREATECTOMY SUB-TOTALL-OPEN;  Surgeon: Isatu Talamantes MD;  [facility] MAIN [facility];  Service: Surgical Oncology    EGD  06/05/2020    EGD AND COLONOSCOPY  02/06/2014, 2/8/2017    FL GUIDED CENTRAL VENOUS ACCESS DEVICE INSERTION  4/23/2019    FLEXIBLE SIGMOIDOSCOPY  06/11/2019    FOOT SURGERY      Due to plantar fascitis    JOINT REPLACEMENT Left     LTK    LINEAR ENDOSCOPIC U/S      PANCREATECTOMY LAPAROSCOPIC N/A 3/12/2019    Procedure: DISTAL PANCREATECTOMY LAPAROSCOPIC/POSSIBLE OPEN;  Surgeon: Isatu Talamantes MD;  [facility] MAIN [facility];  Service: Surgical Oncology    ND EDG US EXAM SURGICAL ALTER STOM DUODENUM/JEJUNUM N/A 1/24/2019    Procedure: LINEAR ENDOSCOPIC U/S;  Surgeon: Abelino Forbes MD;  Location: [facility] GI LAB; Service: Gastroenterology    REPLACEMENT TOTAL KNEE Left     TUNNELED VENOUS PORT PLACEMENT Left 4/23/2019    Procedure: INSERTION VENOUS PORT (PORT-A-CATH);   Surgeon: Isatu Talamantes MD;  [facility] MA[facility];  Service: Surgical Oncology- 11/8/21 Pt reports PAC removed     UMBILICAL HERNIA REPAIR         Family History   Problem Relation Age of Onset    Breast cancer Mother     Cervical cancer Paternal Aunt     Pancreatic cancer Other     Liver cancer Other     No Known Problems Father        Social History     Tobacco Use    Smoking status: Never Smoker    Smokeless tobacco: Never Used   Vaping Use    Vaping Use: Never used   Substance Use Topics    Alcohol use: Yes     Alcohol/week: 2 0 standard drinks     Types: 2 Cans of beer per week     Comment: couple times per week;     Drug use: No     Comment: Denies           Current Outpatient Medications:     acetaminophen (TYLENOL) 325 mg tablet, Take 2 tablets (650 mg total) by mouth every 6 (six) hours as needed for mild pain, Disp: 30 tablet, Rfl: 0    ascorbic acid (VITAMIN C) 1000 MG tablet, Take 2,000 mg by mouth daily 11/8/21 Instructed pt to HOLD starting 11/9/21 up to and including DOS  , Disp: , Rfl:     aspirin (ECOTRIN) 325 mg EC tablet, Take 325 mg by mouth daily 11/8/21 Pt instructed as per urology instruction sheet - pt reports stopped on 11/7/21  Instructed pt to verify with cardiology regarding stopping of ASA and not starting Eliquis at this time prior to surgery  , Disp: , Rfl:     atenolol (TENORMIN) 25 mg tablet, TAKE 1 TABLET BY MOUTH EVERY DAY, Disp: 90 tablet, Rfl: 3    Cholecalciferol (VITAMIN D) 2000 units CAPS, Take 1,000 Units by mouth daily 11/8/21 Pt takes three tabs of Vitamin D daily  Instructed to start HOLD 11/9/21 up to and including DOS  , Disp: , Rfl:     Cyanocobalamin (VITAMIN B 12 PO), Take by mouth daily 11/8/21 Pt reports takes two tabs daily   , Disp: , Rfl:     enoxaparin (LOVENOX) 40 mg/0 4 mL, Inject 0 4 mL (40 mg total) under the skin in the morning for 21 days, Disp: 8 4 mL, Rfl: 0    metFORMIN (GLUCOPHAGE) 1000 MG tablet, TAKE 1 TABLET BY MOUTH TWICE A DAY WITH MEALS, Disp: 180 tablet, Rfl: 1    pancrelipase, Lip-Prot-Amyl, (CREON) 6,000 units delayed release capsule, Take 1 capsule (6,000 Units total) by mouth 3 (three) times a day with meals, Disp: 90 capsule, Rfl: 0    Na Sulfate-K Sulfate-Mg Sulf (Suprep Bowel Prep Kit) 17 5-3 13-1 6 GM/177ML SOLN, Follow office instructions (Patient not taking: Reported on 3/4/2022 ), Disp: 1 Bottle, Rfl: 0    tadalafil (CIALIS) 20 MG tablet, TAKE 1 TABLET BY MOUTH DAILY AS NEEDED FOR ERECTILE DYSFUNCTION (Patient not taking: Reported on 3/8/2022), Disp: 10 tablet, Rfl: 0    No Known Allergies     Vitals:    03/08/22 1405   BP: 126/86   Pulse: 81   Temp: (!) 96 6 °F (35 9 °C)   SpO2: 97%   Weight: 127 kg (279 lb 12 2 oz)   Height: 6' (1 829 m)       Pain Score:   3

## 2022-03-08 NOTE — LETTER
2022     Angeles Patel MD  [address]  [place] [address]    Patient: Carson Keith   YOB: 1950   Date of Visit: 3/8/2022       Dear Dr Bertrand Plunkett and Dr Nelia Palma,    Thank you for referring Verneice English to me for evaluation  Below are my notes for this consultation  If you have questions, please do not hesitate to call me  I look forward to following your patient along with you  Sincerely,        Yvon Marc MD        CC: MD Yvon Leal MD  3/8/2022  3:44 PM  Sign when Signing Visit  Consultation - Radiation Oncology      Patient Name: Carson Keith [id number] : 1950  Encounter: [id number]  Referring Provider: Fely Aguilar MD    CHIEF COMPLAINT  Chief Complaint   Patient presents with    Consult     Rad Onc      Cancer Staging  Overlapping malignant neoplasm of pancreas [facility])  Staging form: Pancreas, AJCC 8th Edition  - Pathologic stage from 2/15/2022: Stage IA (ypT1b(m), pN0, cM0) - Signed by Yvon Marc MD on 3/8/2022  Stage prefix: Post-therapy  Total positive nodes: 0  Multiple tumors: Yes    History of Present Illness  Patient presents today to discuss radiation therapy, referred by Dr Bertrand Plunkett      66year old male with history of stage I pancreas cancer status post prior laparoscopic distal pancreatectomy on 3/12/2019  He received adjuvant chemotherapy from 2019 - 2019  He has been followed with surveillance imaging  12/10/2021 MRI revealed enlarging cystic lesion in distal residual pancreas  3/12/2019 Distal pancreatectomy   A   Distal pancreas (distal pancreatectomy):  - 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 in association with intraductal papillary mucinous neoplasm (IPMN) with extensive high grade dysplasia  [9 5 cm IPMN]  - One (1) lymph node negative for carcinoma (0/1)  - Margins are negative for invasive carcinoma  - IPMN with high grade dysplasia is present at margin of resection  1  Specimen identification:     - Specimen: Pancreas body and tail     - Procedure: Partial pancreatectomy, pancreatic tail    2  Tumor     - Tumor site: Pancreatic tail and body      - Tumor size (pT1b): Tumor >0 5 cm and <1 cm in greatest dimension                 - Several foci of invasive colloid carcinoma are present, the largest of which measures 0 6 cm (A60)     - Histologic type: Colloid carcinoma (mucinous noncystic carcinoma)      - Histologic grade: Well differentiated (G1)     - Microscopic tumor extension: Tumor is confined to pancreas    3  Margins:  IPMN with high grade dysplasia is present at margin of resection    4  Treatment effect: No known presurgical therapy   5  Lymph-vascular invasion: Not identified    6  Perineural invasion: Not identified    7  Regional lymph nodes (pN0): One (1) lymph node negative for carcinoma (0/1)   8  Additional pathologic findings: Chronic pancreatitis, extensive mucin extravasation   8  Ancillary studies: Blocks A45, A58, A60 are available for molecular testing  9  8th Ed AJCC Tumor Stage:  at least Stage IA - pT1b, pN0, G1    4/11/2019 - 6/21/2019  Chemotherapy   He then underwent 4 cycles of FOLFIRINOX with Dr Anne Veras      12/10/21 MRI abdomen w wo contrast and mrcp  IMPRESSION:   Enlarging cystic lesion within the most distal residual pancreas with associated interval enlargement of the main pancreatic duct   Findings are suggestive of enlarging IPMN with main duct involvement versus primary main duct IPMN     Somewhat nodular hepatic contour again seen potentially reflecting underlying hepatocellular disease   Hepatic steatosis    Stable peripancreatic and aortocaval lymph nodes      12/15/21 SurgOnc, Dr America Joyce  Set up for EUS with biopsy for further workup     Present to tumor Board     Further management will depend on EUS/biopsy results      12/30/21 RECTAL/GI MULTIDISCIPLINARY CASE REVIEW  Bharati Hodge pancreatic cyst  PHYSICIAN RECOMMENDED PLAN:   -Scheduled EUS on 1/3/2022 with Dr Ace Gavin  -Recommend reverse whipple procedure  -Office visit scheduled with Dr Corey Canavan on 1/19/2022 to discuss surgical plan with the patient      1/3/22 EGD/EUS  IMPRESSION:  EGD:   1  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  2  Small hiatus hernia  3  Normal duodenum  EUS:   3 cm heterogenous cyst with solid component/debris in the distal pancreas at the margin of resection with communication with the main duct  This was biopsied with FNB needle and thick mucinous appearing aspirate sent for CEA/amylase and cytology  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  RECOMMENDATION:  Follow up cytology and CEA/amylase of cyst fluid  Follow up with surgical oncology     Pathology:  A B  Pancreas, pancreatic body tail cystic mass ( ThinPrep and smear preparations ):  Neoplastic, Other ([facility] Category IV) -See note  Note: The sample shows acellular specimen with atypical mucinous epithelium compatible with a neoplastic mucinous cyst with high-grade atypia   Extracellular mucin is present, supporting a mucinous differentiation   The atypia is compatible with at least high-grade dysplasia, though an invasive component cannot be excluded on cytology preparation   Correlation with clinical impression, other tissue sampling as applicable and pending CEA analysis is recommended      2/15/22 PANCREATECTOMY SUB-TOTAL-OPEN, LYSIS OF ADHESIONS  A  Pancreas (subtotal pancreatectomy):     - 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 (largest focus 0 8 cm) arising in association with intraductal papillary mucinous neoplasm (IPMN) with high-grade dysplasia       - Surgical margin positive for colloid carcinoma and IPMN with high-grade dysplasia      - Fifteen (15) lymph nodes, negative for carcinoma       - Therapy effect noted in background pancreas (fibrosis and inflammation)        Synoptic Checklist   PANCREAS (EXOCRINE)  8th Edition - Protocol posted: 7/27/2021  PANCREAS (EXOCRINE): RESECTION - All Specimens       SPECIMEN   Procedure   Subtotal pancreatectomy    TUMOR   Tumor Site   Not specified    Histologic Type   Colloid carcinoma (mucinous noncystic carcinoma)    Histologic Grade   G2, moderately differentiated    Tumor Size   Greatest Dimension (Centimeters): 0 8 cm   Site(s) Involved by Direct Tumor Extension   Confined to pancreas    Treatment Effect   Cannot be determined: The tumor appears largely viable  Few collections of acellular mucin are noted  Lymphovascular Invasion   Not identified    Perineural Invasion   Not identified    MARGINS   Margin Status for Invasive Carcinoma   Invasive carcinoma present at margin    Margin(s) Involved by Invasive Carcinoma   Designated surgical margin    Margin Status for Dysplasia and Intraepithelial Neoplasia   High-grade dysplasia present at margin    Margin(s) Involved by High-Grade Dysplasia   Designated surgical margin    REGIONAL LYMPH NODES   Regional Lymph Node Status   All regional lymph nodes negative for tumor    Number of Lymph Nodes Examined   15    PATHOLOGIC STAGE CLASSIFICATION  (pTNM, AJCC 8th Edition)   Reporting of pT, pN, and (when applicable) pM categories is based on information available to the pathologist at the time the report is issued  As per the AJCC (Chapter 1, 8th Ed ) it is the managing physicians responsibility to establish the final pathologic stage based upon all pertinent information, including but potentially not limited to this pathology report  TNM Descriptors   m (multiple primary tumors)        y (post-treatment)    pT Category#   pT1b    pN Category   pN0    ADDITIONAL FINDINGS   Additional Findings   Chronic pancreatitis        IPMN with high-grade dysplasia    Comment(s)   AJCC stage group IA              3/4/22 Ashutosh Sousa  Treatment options discussed, including surgery would involve completion/total pancreatectomy    Refer to medical and radiation oncology to discuss options  Refer to genetics for additional workup/testing  Refer to endocrinology  Present at [facility]     Upcoming:  3/16/22 Palliative care, Dr Chance Samayoa  3/23/22 Endocrinology, Dr Nathaly Pickett  6/4/22 CT chest abdomen pelvis  6/10/22 SurgOn, Dr Elliott Mcgraw    Today he reports feeling well overall  He has mild soreness in the left upper quadrant but otherwise no new issues  He presents with his wife  Oncology History   Overlapping malignant neoplasm of pancreas ([address] )   3/12/2019 Surgery    Distal pancreatectomy  Several foci of invasive colloid cancer  Grade 1  IPMN with high grade dysplasia at margin  T1b, NO MX Stage IA     4/11/2019 - 6/21/2019 Chemotherapy    Saw Dr Miryam Hinson  Will be starting Folfirinox 4/24   Ended 6/21/2019 4/24/2019 - 7/2/2019 Chemotherapy    fluorouracil (ADRUCIL), 400 mg/m2 = 1,010 mg, Intravenous, Once, 2 of 2 cycles  pegfilgrastim (Funmi Suzette), 6 mg, Subcutaneous, Once, 2 of 2 cycles  Administration: 6 mg (6/21/2019), 6 mg (6/7/2019)  irinotecan (CAMPTOSAR) chemo infusion, 180 mg/m2 = 455 mg, Intravenous, Once, 4 of 4 cycles  Dose modification: 140 mg/m2 (original dose 180 mg/m2, Cycle 3, Reason: Other (Must fill in a comment)), 125 mg/m2 (original dose 180 mg/m2, Cycle 3, Reason: Dose Not Tolerated)  Administration: 316 mg (6/5/2019), 300 mg (6/19/2019)  leucovorin calcium IVPB, 400 mg/m2 = 1,012 mg, Intravenous, Once, 2 of 2 cycles  oxaliplatin (ELOXATIN) chemo infusion, 85 mg/m2 = 215 05 mg, Intravenous, Once, 4 of 4 cycles  Dose modification: 65 mg/m2 (original dose 85 mg/m2, Cycle 3, Reason: Other (Must fill in a comment)), 60 mg/m2 (original dose 85 mg/m2, Cycle 3, Reason: Dose Not Tolerated)  Administration: 151 8 mg (6/5/2019), 151 8 mg (6/19/2019)  fluorouracil (ADRUCIL) ambulatory infusion Soln, 1,200 mg/m2/day = 6,070 mg, Intravenous, Over 46 hours, 4 of 4 cycles     2/15/2022 Surgery    Pancreas (subtotal pancreatectomy):     - 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 (largest focus 0 8 cm) arising in association with intraductal papillary mucinous neoplasm (IPMN) with high-grade dysplasia  - Surgical margin positive for colloid carcinoma and IPMN with high-grade dysplasia  - Fifteen (15) lymph nodes, negative for carcinomaRestore     2/15/2022 -  Cancer Staged    Staging form: Pancreas, AJCC 8th Edition  - Pathologic stage from 2/15/2022: Stage IA (ypT1b(m), pN0, cM0) - Signed by Emerson Valenzuela MD on 3/8/2022  Stage prefix: Post-therapy  Total positive nodes: 0  Multiple tumors: Yes       IPMN (intraductal papillary mucinous neoplasm)   2/15/2022 Surgery    Pancreas (subtotal pancreatectomy):     - 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 (largest focus 0 8 cm) arising in association with intraductal papillary mucinous neoplasm (IPMN) with high-grade dysplasia  - Surgical margin positive for colloid carcinoma and IPMN with high-grade dysplasia       - Fifteen (15) lymph nodes, negative for carcinoma       Historical Information   Past Medical History:   Diagnosis Date    A-fib ([address] )     Abdominal wall strain     last assessed: 10/21/14    Allergic rhinitis     last assessed: 05/03/16    Arthritis     Cancer ([address] )     PACNCREATIC    COVID-19 virus infection 3/3/2021    CPAP (continuous positive airway pressure) dependence     Diabetes mellitus ([address] )     Erectile dysfunction of non-organic origin     last assessed: 03/17/14    Irregular heart beat     Pt with A fib     Lumbar strain     last assessed: 10/21/14    Multiple acquired skin tags     last assessed: 2/18/16    Palpitations     last assessed: 03/17/14    Pancreas cyst     Plantar fasciitis     Skin disorder     last assessed: 05/28/13    Sleep apnea     CPAP BROKE IN OCTOBER HAS BEEN TRYING TO GET NEW ONE BUT UNABLE AS OF YET    Thrombocytopenia ([address] )      Past Surgical History:   Procedure Laterality Date    BOTOX INJECTION N/A 11/12/2021    Procedure: Damaso Ontiveros;  Surgeon: Josefina Painting MD;  [facility];  Service: Urology    CATARACT EXTRACTION Bilateral     COLONOSCOPY  01/17/2013    COLONOSCOPY  01/08/2018    COLONOSCOPY  04/2021    CYSTOSCOPY      INJECT WITH BOTOX    DISTAL PANCREATECTOMY N/A 2/15/2022    Procedure: PANCREATECTOMY SUB-TOTALL-OPEN;  Surgeon: Christ Fu MD;  [facility];  Service: Surgical Oncology    EGD  06/05/2020    EGD AND COLONOSCOPY  02/06/2014, 2/8/2017    FL GUIDED CENTRAL VENOUS ACCESS DEVICE INSERTION  4/23/2019    FLEXIBLE SIGMOIDOSCOPY  06/11/2019    FOOT SURGERY      Due to plantar fascitis    JOINT REPLACEMENT Left     LTK    LINEAR ENDOSCOPIC U/S      PANCREATECTOMY LAPAROSCOPIC N/A 3/12/2019    Procedure: DISTAL PANCREATECTOMY LAPAROSCOPIC/POSSIBLE OPEN;  Surgeon: Christ Fu MD;  [facility];  Service: Surgical Oncology    NJ EDG US EXAM SURGICAL ALTER STOM DUODENUM/JEJUNUM N/A 1/24/2019    Procedure: LINEAR ENDOSCOPIC U/S;  Surgeon: Jeff Yo MD;  Location: [facility]; Service: Gastroenterology    REPLACEMENT TOTAL KNEE Left     TUNNELED VENOUS PORT PLACEMENT Left 4/23/2019    Procedure: INSERTION VENOUS PORT (PORT-A-CATH); Surgeon: Christ Fu MD;  [facility];  Service: Surgical Oncology- 11/8/21 Pt reports PAC removed     UMBILICAL HERNIA REPAIR       Family History   Problem Relation Age of Onset    Breast cancer Mother     Cervical cancer Paternal Aunt     Pancreatic cancer Other     Liver cancer Other     No Known Problems Father      Social History   Social History     Substance and Sexual Activity   Alcohol Use Yes    Alcohol/week: 2 0 standard drinks    Types: 2 Cans of beer per week    Comment: couple times per week;       Social History     Substance and Sexual Activity   Drug Use No    Comment: Denies      Social History     Tobacco Use   Smoking Status Never Smoker   Smokeless Tobacco Never Used     Meds/Allergies     Current Outpatient Medications:     acetaminophen (TYLENOL) 325 mg tablet, Take 2 tablets (650 mg total) by mouth every 6 (six) hours as needed for mild pain, Disp: 30 tablet, Rfl: 0    ascorbic acid (VITAMIN C) 1000 MG tablet, Take 2,000 mg by mouth daily 11/8/21 Instructed pt to HOLD starting 11/9/21 up to and including DOS  , Disp: , Rfl:     aspirin (ECOTRIN) 325 mg EC tablet, Take 325 mg by mouth daily 11/8/21 Pt instructed as per urology instruction sheet - pt reports stopped on 11/7/21  Instructed pt to verify with cardiology regarding stopping of ASA and not starting Eliquis at this time prior to surgery  , Disp: , Rfl:     atenolol (TENORMIN) 25 mg tablet, TAKE 1 TABLET BY MOUTH EVERY DAY, Disp: 90 tablet, Rfl: 3    Cholecalciferol (VITAMIN D) 2000 units CAPS, Take 1,000 Units by mouth daily 11/8/21 Pt takes three tabs of Vitamin D daily  Instructed to start HOLD 11/9/21 up to and including DOS  , Disp: , Rfl:     Cyanocobalamin (VITAMIN B 12 PO), Take by mouth daily 11/8/21 Pt reports takes two tabs daily   , Disp: , Rfl:     enoxaparin (LOVENOX) 40 mg/0 4 mL, Inject 0 4 mL (40 mg total) under the skin in the morning for 21 days, Disp: 8 4 mL, Rfl: 0    metFORMIN (GLUCOPHAGE) 1000 MG tablet, TAKE 1 TABLET BY MOUTH TWICE A DAY WITH MEALS, Disp: 180 tablet, Rfl: 1    pancrelipase, Lip-Prot-Amyl, (CREON) 6,000 units delayed release capsule, Take 1 capsule (6,000 Units total) by mouth 3 (three) times a day with meals, Disp: 90 capsule, Rfl: 0    Na Sulfate-K Sulfate-Mg Sulf (Suprep Bowel Prep Kit) 17 5-3 13-1 6 GM/177ML SOLN, Follow office instructions (Patient not taking: Reported on 3/4/2022 ), Disp: 1 Bottle, Rfl: 0    tadalafil (CIALIS) 20 MG tablet, TAKE 1 TABLET BY MOUTH DAILY AS NEEDED FOR ERECTILE DYSFUNCTION (Patient not taking: Reported on 3/8/2022), Disp: 10 tablet, Rfl: 0  No Known Allergies    Lab Results/Imaging Studies Chemistry        Component Value Date/Time     2017 0835    K 4 2 2022 0449    K 4 2 2017 0835     2022 0449     2017 0835    CO2 29 2022 0449    CO2 26 02/15/2022 1811    BUN 8 2022 0918    BUN 13 2017 0835    CREATININE 0 73 2022 0918    CREATININE 0 75 2017 0835        Component Value Date/Time    CALCIUM 9 1 2022 0449    CALCIUM 9 4 2017 0835    ALKPHOS 143 (H) 2022 0449    ALKPHOS 74 2017 0835    AST 25 2022 0449    AST 31 2017 0835    ALT 28 2022 0449    ALT 27 2017 0835    BILITOT 0 4 2017 0835          Lab Results   Component Value Date    WBC 4 86 2022    HGB 10 8 (L) 2022    HCT 32 4 (L) 2022     (H) 2022     (L) 2022     Imaging Studies  No results found  Review of Systems  Constitutional: Negative  HENT: Negative  Eyes:        Wears glasses    Respiratory: Negative  Cardiovascular: Negative  Gastrointestinal: Positive for diarrhea (Not a new sx )  Sx-2022  Endocrine: Negative  Genitourinary: Negative  Musculoskeletal: Positive for arthralgias (General body discomfort )  Skin: Negative  Allergic/Immunologic: Negative  Neurological: Negative  Hematological: Negative  Psychiatric/Behavioral: Positive for sleep disturbance (Insomnia )  OBJECTIVE:   /86   Pulse 81   Temp (!) 96 6 °F (35 9 °C)   Ht 6' (1 829 m)   Wt 127 kg (279 lb 12 2 oz)   SpO2 97%   BMI 37 94 kg/m²   Pain Assessment:  0  Performance Status: ECO - Symptomatic but completely ambulatory    Physical Exam  General Appearance:  Alert, cooperative, no distress, appears stated age  HEENT: normocephalic/atraumatic, neck supple  Cardiovascular:  Extremities warm and well perfused, no lower extremity edema    Lungs: Respirations unlabored, no cyanosis, able to speak in complete sentences without dyspnea  Abdomen: Soft, no guarding  Midline incision healing well with steristrips intact  +bowel sounds  Mild soreness with palpation of the LUQ, but otherwise nontender to palpation  Prior trochar sites noted  Extremities: Trace non-pitting LE edema, no joint swelling  Skin: No rash or dermatitis  Neurologic: ANOx3    Pathology:  See above  ASSESSMENT  1  Overlapping malignant neoplasm of pancreas [facility])  Ambulatory referral to Radiation Oncology       Cancer Staging  No matching staging information was found for the patient  Vicki Chi is a 66 y o  male with colloid carcinoma of the pancreas, initially s/p distal pancreatectomy on 3/12/19, pathology notable for well-differentiated colloid carcinoma in association with IMPN and high grade dyplasia with margins negative for invasive disease, positive for IMPN with high grade dysplasia and 0/1 LN involvement  He was recommended adjuvant chemotherapy and underwent 4 cycles of FOLFIRINOX between April and June 2019  Surveillance imaging showed surgical changes, cysts distributed throughout the pancreas, and no evidence of recurrence until MRI on 12/10/21 showed an enlarging cystic lesion within the most distal residual pancreas with enlargement of the main pancreatic duct  EUS on 1/3/22 showed atypia, cannot rule out invasive component  Thus, he underwent subtotal pancreatectomy on 2/15/22 with Dr Rosa Godoy, revealing 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 (largest 8mm) associated with IPMN with high-grade dysplasia  0/15 LN were involvement  There was invasive carcinoma and high grade dysplasia present at the margin  Per OP note, given the fact that the pancreatic neck stump was adherent to the portal vein, and the fact that surgery would necessitate completion pancreatectomy, clips were left at the pancreatic margin intraoperatively  Mr Sherryle Perna is now referred to discuss next steps      We reviewed Mr  Winston's diagnosis of colloid carcinoma, which is a rare histology of pancreas cancer without significant randomized data to guide treatment management  Based on literature review, colloid carcinoma is thought to have a more indolent natural history  In general, adjuvant radiation therapy for pancreas cancer is the subject of continued study as per RTOG 0848  That being said, adjuvant radiation therapy is often considered in the setting of positive margins  We discussed treatment options including completion resection or adjuvant treatment involving external beam radiation therapy to 45Gy in 25 fractions with dose escalation to the area of positive margin  Acute and long-term side effects were discussed and include, but are not limited to, fatigue, nausea, diarrhea, abdominal pain, fistulization, ulceration, permanent change in bowel habits, malnutrition, or development of adhesions  We also discussed that the disease may progress/recur despite radiation therapy  Informed consent was signed to document the above  His case is scheduled to be discussed at the next upper-GI tumor board on 3/17/22  We will await multidisciplinary discussion and consensus and proceed accordingly  He is agreeable to proceed with radiation if recommended at the tumor board  Thank you for the opportunity to participate in the care of this patient  Jim Macdonald MD  Department of [address] [place] [address]    No orders of the defined types were placed in this encounter  Portions of the record may have been created with voice recognition software  Occasional wrong word or "sound a like" substitutions may have occurred due to the inherent limitations of voice recognition software  Read the chart carefully and recognize, using context, where substitutions have occurred

## 2022-03-08 NOTE — PROGRESS NOTES
Consultation - Radiation Oncology      Patient Name: Joe Roger YKF:[id number] : 1950  Encounter: [id number]  Referring Provider: Bennett Garnett MD    CHIEF COMPLAINT  Chief Complaint   Patient presents with    Consult     Rad Onc      Cancer Staging  Overlapping malignant neoplasm of pancreas [facility])  Staging form: Pancreas, AJCC 8th Edition  - Pathologic stage from 2/15/2022: Stage IA (ypT1b(m), pN0, cM0) - Signed by Ela Serrano MD on 3/8/2022  Stage prefix: Post-therapy  Total positive nodes: 0  Multiple tumors: Yes    History of Present Illness  Patient presents today to discuss radiation therapy, referred by Dr Ashutosh King      66year old male with history of stage I pancreas cancer status post prior laparoscopic distal pancreatectomy on 3/12/2019  He received adjuvant chemotherapy from 2019 - 2019  He has been followed with surveillance imaging  12/10/2021 MRI revealed enlarging cystic lesion in distal residual pancreas  3/12/2019 Distal pancreatectomy   A  Distal pancreas (distal pancreatectomy):  - 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 in association with intraductal papillary mucinous neoplasm (IPMN) with extensive high grade dysplasia  [9 5 cm IPMN]  - One (1) lymph node negative for carcinoma (0/1)  - Margins are negative for invasive carcinoma  - IPMN with high grade dysplasia is present at margin of resection  1  Specimen identification:     - Specimen: Pancreas body and tail     - Procedure: Partial pancreatectomy, pancreatic tail    2  Tumor     - Tumor site: Pancreatic tail and body      - Tumor size (pT1b): Tumor >0 5 cm and <1 cm in greatest dimension                 - Several foci of invasive colloid carcinoma are present, the largest of which measures 0 6 cm (A60)     - Histologic type: Colloid carcinoma (mucinous noncystic carcinoma)      - Histologic grade:  Well differentiated (G1)     - Microscopic tumor extension: Tumor is confined to pancreas    3  Margins:  IPMN with high grade dysplasia is present at margin of resection    4  Treatment effect: No known presurgical therapy   5  Lymph-vascular invasion: Not identified    6  Perineural invasion: Not identified    7  Regional lymph nodes (pN0): One (1) lymph node negative for carcinoma (0/1)   8  Additional pathologic findings: Chronic pancreatitis, extensive mucin extravasation   8  Ancillary studies: Blocks A45, A58, A60 are available for molecular testing  9  8th Ed AJCC Tumor Stage:  at least Stage IA - pT1b, pN0, G1    4/11/2019 - 6/21/2019  Chemotherapy   He then underwent 4 cycles of FOLFIRINOX with Dr Benjie Hernandez      12/10/21 MRI abdomen w wo contrast and mrcp  IMPRESSION:   Enlarging cystic lesion within the most distal residual pancreas with associated interval enlargement of the main pancreatic duct   Findings are suggestive of enlarging IPMN with main duct involvement versus primary main duct IPMN     Somewhat nodular hepatic contour again seen potentially reflecting underlying hepatocellular disease   Hepatic steatosis    Stable peripancreatic and aortocaval lymph nodes      12/15/21 SurgOnc, Dr Sameer Callejas  Set up for EUS with biopsy for further workup     Present to tumor Board     Further management will depend on EUS/biopsy results      12/30/21 RECTAL/GI MULTIDISCIPLINARY CASE REVIEW  DIAGNOSIS: Enlarging pancreatic cyst  PHYSICIAN RECOMMENDED PLAN:   -Scheduled EUS on 1/3/2022 with Dr Rogena Spatz  -Recommend reverse whipple procedure  -Office visit scheduled with Dr Sameer Callejas on 1/19/2022 to discuss surgical plan with the patient      1/3/22 EGD/EUS  IMPRESSION:  EGD:   1  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  2  Small hiatus hernia  3  Normal duodenum  EUS:   3 cm heterogenous cyst with solid component/debris in the distal pancreas at the margin of resection with communication with the main duct   This was biopsied with FNB needle and thick mucinous appearing aspirate sent for CEA/amylase and cytology  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  RECOMMENDATION:  Follow up cytology and CEA/amylase of cyst fluid  Follow up with surgical oncology     Pathology:  A B  Pancreas, pancreatic body tail cystic mass ( ThinPrep and smear preparations ):  Neoplastic, Other ([facility] Category IV) -See note  Note: The sample shows acellular specimen with atypical mucinous epithelium compatible with a neoplastic mucinous cyst with high-grade atypia   Extracellular mucin is present, supporting a mucinous differentiation   The atypia is compatible with at least high-grade dysplasia, though an invasive component cannot be excluded on cytology preparation   Correlation with clinical impression, other tissue sampling as applicable and pending CEA analysis is recommended      2/15/22 PANCREATECTOMY SUB-TOTAL-OPEN, LYSIS OF ADHESIONS  A  Pancreas (subtotal pancreatectomy):     - 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 (largest focus 0 8 cm) arising in association with intraductal papillary mucinous neoplasm (IPMN) with high-grade dysplasia       - Surgical margin positive for colloid carcinoma and IPMN with high-grade dysplasia      - Fifteen (15) lymph nodes, negative for carcinoma       - Therapy effect noted in background pancreas (fibrosis and inflammation)     Synoptic Checklist   PANCREAS (EXOCRINE)  8th Edition - Protocol posted: 7/27/2021  PANCREAS (EXOCRINE): RESECTION - All Specimens       SPECIMEN   Procedure   Subtotal pancreatectomy    TUMOR   Tumor Site   Not specified    Histologic Type   Colloid carcinoma (mucinous noncystic carcinoma)    Histologic Grade   G2, moderately differentiated    Tumor Size   Greatest Dimension (Centimeters): 0 8 cm   Site(s) Involved by Direct Tumor Extension   Confined to pancreas    Treatment Effect   Cannot be determined: The tumor appears largely viable  Few collections of acellular mucin are noted      Lymphovascular Invasion   Not identified    Perineural Invasion   Not identified    MARGINS   Margin Status for Invasive Carcinoma   Invasive carcinoma present at margin    Margin(s) Involved by Invasive Carcinoma   Designated surgical margin    Margin Status for Dysplasia and Intraepithelial Neoplasia   High-grade dysplasia present at margin    Margin(s) Involved by High-Grade Dysplasia   Designated surgical margin    REGIONAL LYMPH NODES   Regional Lymph Node Status   All regional lymph nodes negative for tumor    Number of Lymph Nodes Examined   15    PATHOLOGIC STAGE CLASSIFICATION  (pTNM, AJCC 8th Edition)   Reporting of pT, pN, and (when applicable) pM categories is based on information available to the pathologist at the time the report is issued  As per the AJCC (Chapter 1, 8th Ed ) it is the managing physicians responsibility to establish the final pathologic stage based upon all pertinent information, including but potentially not limited to this pathology report  TNM Descriptors   m (multiple primary tumors)        y (post-treatment)    pT Category#   pT1b    pN Category   pN0    ADDITIONAL FINDINGS   Additional Findings   Chronic pancreatitis        IPMN with high-grade dysplasia    Comment(s)   AJCC stage group IA              3/4/22 SurgOnc, Jo Gottron  Treatment options discussed, including surgery would involve completion/total pancreatectomy  Refer to medical and radiation oncology to discuss options  Refer to genetics for additional workup/testing  Refer to endocrinology  Present at [facility]     Upcoming:  3/16/22 Palliative care, Dr Osito James  3/23/22 Endocrinology, Dr Joni Pride  6/4/22 CT chest abdomen pelvis  6/10/22 SurgOnc, Dr Jo Gottron    Today he reports feeling well overall  He has mild soreness in the left upper quadrant but otherwise no new issues  He presents with his wife       Oncology History   Overlapping malignant neoplasm of pancreas ([facility] [address] )   3/12/2019 Surgery    Distal pancreatectomy  Several foci of invasive colloid cancer  Grade 1  IPMN with high grade dysplasia at margin  T1b, NO MX Stage IA     4/11/2019 - 6/21/2019 Chemotherapy    Saw Dr Lorrie Eaton  Will be starting Folfirinox 4/24  Ended 6/21/2019 4/24/2019 - 7/2/2019 Chemotherapy    fluorouracil (ADRUCIL), 400 mg/m2 = 1,010 mg, Intravenous, Once, 2 of 2 cycles  pegfilgrastim (Viva Po), 6 mg, Subcutaneous, Once, 2 of 2 cycles  Administration: 6 mg (6/21/2019), 6 mg (6/7/2019)  irinotecan (CAMPTOSAR) chemo infusion, 180 mg/m2 = 455 mg, Intravenous, Once, 4 of 4 cycles  Dose modification: 140 mg/m2 (original dose 180 mg/m2, Cycle 3, Reason: Other (Must fill in a comment)), 125 mg/m2 (original dose 180 mg/m2, Cycle 3, Reason: Dose Not Tolerated)  Administration: 316 mg (6/5/2019), 300 mg (6/19/2019)  leucovorin calcium IVPB, 400 mg/m2 = 1,012 mg, Intravenous, Once, 2 of 2 cycles  oxaliplatin (ELOXATIN) chemo infusion, 85 mg/m2 = 215 05 mg, Intravenous, Once, 4 of 4 cycles  Dose modification: 65 mg/m2 (original dose 85 mg/m2, Cycle 3, Reason: Other (Must fill in a comment)), 60 mg/m2 (original dose 85 mg/m2, Cycle 3, Reason: Dose Not Tolerated)  Administration: 151 8 mg (6/5/2019), 151 8 mg (6/19/2019)  fluorouracil (ADRUCIL) ambulatory infusion Soln, 1,200 mg/m2/day = 6,070 mg, Intravenous, Over 46 hours, 4 of 4 cycles     2/15/2022 Surgery    Pancreas (subtotal pancreatectomy):     - 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 (largest focus 0 8 cm) arising in association with intraductal papillary mucinous neoplasm (IPMN) with high-grade dysplasia  - Surgical margin positive for colloid carcinoma and IPMN with high-grade dysplasia       - Fifteen (15) lymph nodes, negative for carcinomaRestore     2/15/2022 -  Cancer Staged    Staging form: Pancreas, AJCC 8th Edition  - Pathologic stage from 2/15/2022: Stage IA (ypT1b(m), pN0, cM0) - Signed by Calli Joshi MD on 3/8/2022  Stage prefix: Post-therapy  Total positive nodes: 0  Multiple tumors: Yes       IPMN (intraductal papillary mucinous neoplasm)   2/15/2022 Surgery    Pancreas (subtotal pancreatectomy):     - 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 (largest focus 0 8 cm) arising in association with intraductal papillary mucinous neoplasm (IPMN) with high-grade dysplasia  - Surgical margin positive for colloid carcinoma and IPMN with high-grade dysplasia       - Fifteen (15) lymph nodes, negative for carcinoma       Historical Information   Past Medical History:   Diagnosis Date    A-fib ([facility] [address] )     Abdominal wall strain     last assessed: 10/21/14    Allergic rhinitis     last assessed: 05/03/16    Arthritis     Cancer ([facility] [address] )     PACNCREATIC    COVID-19 virus infection 3/3/2021    CPAP (continuous positive airway pressure) dependence     Diabetes mellitus ([facility] [address] )     Erectile dysfunction of non-organic origin     last assessed: 03/17/14    Irregular heart beat     Pt with A fib     Lumbar strain     last assessed: 10/21/14    Multiple acquired skin tags     last assessed: 2/18/16    Palpitations     last assessed: 03/17/14    Pancreas cyst     Plantar fasciitis     Skin disorder     last assessed: 05/28/13    Sleep apnea     CPAP BROKE IN OCTOBER HAS BEEN TRYING TO GET NEW ONE BUT UNABLE AS OF YET    Thrombocytopenia ([facility] [address] )      Past Surgical History:   Procedure Laterality Date    BOTOX INJECTION N/A 11/12/2021    Procedure: Cristopher Adams;  Surgeon: Nadeem Parisi MD;  Location: [facility] MAIN OR;  Service: Urology    CATARACT EXTRACTION Bilateral     COLONOSCOPY  01/17/2013    COLONOSCOPY  01/08/2018    COLONOSCOPY  04/2021    CYSTOSCOPY      INJECT WITH BOTOX    DISTAL PANCREATECTOMY N/A 2/15/2022    Procedure: PANCREATECTOMY SUB-TOTALL-OPEN;  Surgeon: Bc Abdul MD;  Location:  MAIN OR;  Service: Surgical Oncology    EGD  06/05/2020    EGD AND COLONOSCOPY  02/06/2014, 2/8/2017    FL GUIDED CENTRAL VENOUS ACCESS DEVICE INSERTION  4/23/2019    FLEXIBLE SIGMOIDOSCOPY  06/11/2019    FOOT SURGERY      Due to plantar fascitis    JOINT REPLACEMENT Left     LTK    LINEAR ENDOSCOPIC U/S      PANCREATECTOMY LAPAROSCOPIC N/A 3/12/2019    Procedure: DISTAL PANCREATECTOMY LAPAROSCOPIC/POSSIBLE OPEN;  Surgeon: Pillo Lopes MD;  [facility]: [facility] MAIN [facility];  Service: Surgical Oncology    NC EDG US EXAM SURGICAL ALTER STOM DUODENUM/JEJUNUM N/A 1/24/2019    Procedure: LINEAR ENDOSCOPIC U/S;  Surgeon: Juliana Hernandes MD;  Location: [facility] GI [facility]; Service: Gastroenterology    REPLACEMENT TOTAL KNEE Left     TUNNELED VENOUS PORT PLACEMENT Left 4/23/2019    Procedure: INSERTION VENOUS PORT (PORT-A-CATH); Surgeon: Pillo Lopes MD;  Location: [facility] MAIN OR;  Service: Surgical Oncology- 11/8/21 Pt reports PAC removed     UMBILICAL HERNIA REPAIR       Family History   Problem Relation Age of Onset    Breast cancer Mother     Cervical cancer Paternal Aunt     Pancreatic cancer Other     Liver cancer Other     No Known Problems Father      Social History   Social History     Substance and Sexual Activity   Alcohol Use Yes    Alcohol/week: 2 0 standard drinks    Types: 2 Cans of beer per week    Comment: couple times per week; Social History     Substance and Sexual Activity   Drug Use No    Comment: Denies      Social History     Tobacco Use   Smoking Status Never Smoker   Smokeless Tobacco Never Used     Meds/Allergies     Current Outpatient Medications:     acetaminophen (TYLENOL) 325 mg tablet, Take 2 tablets (650 mg total) by mouth every 6 (six) hours as needed for mild pain, Disp: 30 tablet, Rfl: 0    ascorbic acid (VITAMIN C) 1000 MG tablet, Take 2,000 mg by mouth daily 11/8/21 Instructed pt to HOLD starting 11/9/21 up to and including DOS  , Disp: , Rfl:     aspirin (ECOTRIN) 325 mg EC tablet, Take 325 mg by mouth daily 11/8/21 Pt instructed as per urology instruction sheet - pt reports stopped on 11/7/21  Instructed pt to verify with cardiology regarding stopping of ASA and not starting Eliquis at this time prior to surgery  , Disp: , Rfl:     atenolol (TENORMIN) 25 mg tablet, TAKE 1 TABLET BY MOUTH EVERY DAY, Disp: 90 tablet, Rfl: 3    Cholecalciferol (VITAMIN D) 2000 units CAPS, Take 1,000 Units by mouth daily 11/8/21 Pt takes three tabs of Vitamin D daily  Instructed to start HOLD 11/9/21 up to and including DOS  , Disp: , Rfl:     Cyanocobalamin (VITAMIN B 12 PO), Take by mouth daily 11/8/21 Pt reports takes two tabs daily   , Disp: , Rfl:     enoxaparin (LOVENOX) 40 mg/0 4 mL, Inject 0 4 mL (40 mg total) under the skin in the morning for 21 days, Disp: 8 4 mL, Rfl: 0    metFORMIN (GLUCOPHAGE) 1000 MG tablet, TAKE 1 TABLET BY MOUTH TWICE A DAY WITH MEALS, Disp: 180 tablet, Rfl: 1    pancrelipase, Lip-Prot-Amyl, (CREON) 6,000 units delayed release capsule, Take 1 capsule (6,000 Units total) by mouth 3 (three) times a day with meals, Disp: 90 capsule, Rfl: 0    Na Sulfate-K Sulfate-Mg Sulf (Suprep Bowel Prep Kit) 17 5-3 13-1 6 GM/177ML SOLN, Follow office instructions (Patient not taking: Reported on 3/4/2022 ), Disp: 1 Bottle, Rfl: 0    tadalafil (CIALIS) 20 MG tablet, TAKE 1 TABLET BY MOUTH DAILY AS NEEDED FOR ERECTILE DYSFUNCTION (Patient not taking: Reported on 3/8/2022), Disp: 10 tablet, Rfl: 0  No Known Allergies    Lab Results/Imaging Studies       Chemistry        Component Value Date/Time     08/22/2017 0835    K 4 2 02/21/2022 0449    K 4 2 08/22/2017 0835     02/21/2022 0449     08/22/2017 0835    CO2 29 02/21/2022 0449    CO2 26 02/15/2022 1811    BUN 8 03/08/2022 0918    BUN 13 08/22/2017 0835    CREATININE 0 73 03/08/2022 0918    CREATININE 0 75 08/22/2017 0835        Component Value Date/Time    CALCIUM 9 1 02/21/2022 0449    CALCIUM 9 4 08/22/2017 0835    ALKPHOS 143 (H) 02/21/2022 0449    ALKPHOS 74 08/22/2017 0835    AST 25 02/21/2022 0449    AST 31 08/22/2017 0835    ALT 28 2022 0449    ALT 27 2017 0835    BILITOT 0 4 2017 0835          Lab Results   Component Value Date    WBC 4 86 2022    HGB 10 8 (L) 2022    HCT 32 4 (L) 2022     (H) 2022     (L) 2022     Imaging Studies  No results found  Review of Systems  Constitutional: Negative  HENT: Negative  Eyes:        Wears glasses    Respiratory: Negative  Cardiovascular: Negative  Gastrointestinal: Positive for diarrhea (Not a new sx )  Sx-2022  Endocrine: Negative  Genitourinary: Negative  Musculoskeletal: Positive for arthralgias (General body discomfort )  Skin: Negative  Allergic/Immunologic: Negative  Neurological: Negative  Hematological: Negative  Psychiatric/Behavioral: Positive for sleep disturbance (Insomnia )  OBJECTIVE:   /86   Pulse 81   Temp (!) 96 6 °F (35 9 °C)   Ht 6' (1 829 m)   Wt 127 kg (279 lb 12 2 oz)   SpO2 97%   BMI 37 94 kg/m²   Pain Assessment:  0  Performance Status: ECO - Symptomatic but completely ambulatory    Physical Exam  General Appearance:  Alert, cooperative, no distress, appears stated age  HEENT: normocephalic/atraumatic, neck supple  Cardiovascular:  Extremities warm and well perfused, no lower extremity edema  Lungs: Respirations unlabored, no cyanosis, able to speak in complete sentences without dyspnea  Abdomen: Soft, no guarding  Midline incision healing well with steristrips intact  +bowel sounds  Mild soreness with palpation of the LUQ, but otherwise nontender to palpation  Prior trochar sites noted  Extremities: Trace non-pitting LE edema, no joint swelling  Skin: No rash or dermatitis  Neurologic: ANOx3    Pathology:  See above  ASSESSMENT  1   Overlapping malignant neoplasm of pancreas [facility])  Ambulatory referral to Radiation Oncology       Cancer Staging  Overlapping malignant neoplasm of pancreas [facility])  Staging form: Pancreas, AJCC 8th Edition  - Pathologic stage from 2/15/2022: Stage IA (ypT1b(m), pN0, cM0) - Signed by Cristopher Carey MD on 3/8/2022  Stage prefix: Post-therapy  Total positive nodes: 0  Multiple tumors: Yes    PLAN  Teto Ferraro is a 66 y o  male with colloid carcinoma of the pancreas, initially s/p distal pancreatectomy on 3/12/19, pathology notable for well-differentiated colloid carcinoma in association with IMPN and high grade dyplasia with margins negative for invasive disease, positive for IMPN with high grade dysplasia and 0/1 LN involvement  He was recommended adjuvant chemotherapy and underwent 4 cycles of FOLFIRINOX between April and June 2019  Surveillance imaging showed surgical changes, cysts distributed throughout the pancreas, and no evidence of recurrence until MRI on 12/10/21 showed an enlarging cystic lesion within the most distal residual pancreas with enlargement of the main pancreatic duct  EUS on 1/3/22 showed atypia, cannot rule out invasive component  Thus, he underwent subtotal pancreatectomy on 2/15/22 with Dr Tyson Khan, revealing 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 (largest 8mm) associated with IPMN with high-grade dysplasia  0/15 LN were involvement  There was invasive carcinoma and high grade dysplasia present at the margin  Per OP note, given the fact that the pancreatic neck stump was adherent to the portal vein, and the fact that surgery would necessitate completion pancreatectomy, clips were left at the pancreatic margin intraoperatively  Mr Yaquelin Rasheed is now referred to discuss next steps  We reviewed Mr Bryce White diagnosis of colloid carcinoma, which is a rare histology of pancreas cancer without significant randomized data to guide treatment management  Based on literature review, colloid carcinoma is thought to have a more indolent natural history       In general, adjuvant radiation therapy for pancreas cancer is the subject of continued study as per RTOG 5  That being said, adjuvant radiation therapy is often considered in the setting of positive margins  We discussed treatment options including completion resection or adjuvant treatment involving external beam radiation therapy to 45Gy in 25 fractions with dose escalation to the area of positive margin  Acute and long-term side effects were discussed and include, but are not limited to, fatigue, nausea, diarrhea, abdominal pain, fistulization, ulceration, permanent change in bowel habits, malnutrition, or development of adhesions  We also discussed that the disease may progress/recur despite radiation therapy  Informed consent was signed to document the above  His case is scheduled to be discussed at the next upper-GI tumor board on 3/17/22  We will await multidisciplinary discussion and consensus and proceed accordingly  He is agreeable to proceed with radiation if recommended at the tumor board  Thank you for the opportunity to participate in the care of this patient  Cora Mirza MD  Department of [address] [place] [address]    No orders of the defined types were placed in this encounter  Portions of the record may have been created with voice recognition software  Occasional wrong word or "sound a like" substitutions may have occurred due to the inherent limitations of voice recognition software  Read the chart carefully and recognize, using context, where substitutions have occurred

## 2022-03-09 ENCOUNTER — DOCUMENTATION (OUTPATIENT)
Dept: GENETICS | Facility: CLINIC | Age: 72
End: 2022-03-09

## 2022-03-09 ENCOUNTER — CLINICAL SUPPORT (OUTPATIENT)
Dept: GENETICS | Facility: CLINIC | Age: 72
End: 2022-03-09
Payer: MEDICARE

## 2022-03-09 ENCOUNTER — TELEPHONE (OUTPATIENT)
Dept: HEMATOLOGY ONCOLOGY | Facility: CLINIC | Age: 72
End: 2022-03-09

## 2022-03-09 DIAGNOSIS — C25.8 OVERLAPPING MALIGNANT NEOPLASM OF PANCREAS (HCC): Primary | ICD-10-CM

## 2022-03-09 DIAGNOSIS — Z80.3 FAMILY HISTORY OF BREAST CANCER: ICD-10-CM

## 2022-03-09 LAB — CANCER AG19-9 SERPL-ACNC: 30 U/ML (ref 0–35)

## 2022-03-09 PROCEDURE — NC001 PR NO CHARGE: Performed by: GENETIC COUNSELOR, MS

## 2022-03-09 PROCEDURE — 36415 COLL VENOUS BLD VENIPUNCTURE: CPT | Performed by: GENETIC COUNSELOR, MS

## 2022-03-09 NOTE — TELEPHONE ENCOUNTER
Received request from nurse navigator Aicha to schedule appt with Dr Gabriel Mcdowell  Called pt and he would prefer to stick with [facility] location  Pt last seen November 2019 so will schedule consult samson with [facility] provider  Appt scheduled for 3/11 with Dr Sulaiman Mendez and pt provided with this info  Aicha and Yas notified

## 2022-03-09 NOTE — LETTER
2022     Marco Avitia MD  [address] [facility]  [place]     Patient: Lorren Homans  YOB: 1950  Date of Visit: 3/9/2022      Dear Dr Sanjana Aparicio: Thank you for referring Umesh Phillips to me for evaluation  Below are my notes for this consultation  If you have questions, please do not hesitate to call me  I look forward to following your patient along with you  Sincerely,        Tootie Merida GC        CC: No Recipients        Pre-Test Genetic Counseling Consult Note    Patient Name: Lorren Homans   /Age: 1950/71 y o  Referring Provider: Marco Avitia MD    Date of Service: 3/9/2022  Genetic Counselor: Brandon Wood MS, [facility]  Interpretation Services: None  Location: In-person consult at [facility]: 61 minutes      Roxanne Morales was referred to the [address] [place] [address] and Genetic Assessment Program due to his personal history of pancreatic cancer and family history of breast and pancreatic cancer  He presents today to discuss the possibility of a hereditary cancer syndrome, options for genetic testing, and implications for him and his family  Cancer History and Treatment:     Personal History: Personal history of pancreatic cancer at age 70    Oncology History   Overlapping malignant neoplasm of pancreas ([facility] )   3/12/2019 Surgery     Distal pancreatectomy  Several foci of invasive colloid cancer  Grade 1  IPMN with high grade dysplasia at margin  T1b, NO MX Stage IA      2019 - 2019 Chemotherapy     Saw Dr Junior Monsivais  Will be starting Folfirinox    Ended 2019 - 2019 Chemotherapy     fluorouracil (ADRUCIL), 400 mg/m2 = 1,010 mg, Intravenous, Once, 2 of 2 cycles  pegfilgrastim (Becca Hail), 6 mg, Subcutaneous, Once, 2 of 2 cycles  Administration: 6 mg (2019), 6 mg (2019)  irinotecan (CAMPTOSAR) chemo infusion, 180 mg/m2 = 455 mg, Intravenous, Once, 4 of 4 cycles  Dose modification: 140 mg/m2 (original dose 180 mg/m2, Cycle 3, Reason: Other (Must fill in a comment)), 125 mg/m2 (original dose 180 mg/m2, Cycle 3, Reason: Dose Not Tolerated)  Administration: 316 mg (6/5/2019), 300 mg (6/19/2019)  leucovorin calcium IVPB, 400 mg/m2 = 1,012 mg, Intravenous, Once, 2 of 2 cycles  oxaliplatin (ELOXATIN) chemo infusion, 85 mg/m2 = 215 05 mg, Intravenous, Once, 4 of 4 cycles  Dose modification: 65 mg/m2 (original dose 85 mg/m2, Cycle 3, Reason: Other (Must fill in a comment)), 60 mg/m2 (original dose 85 mg/m2, Cycle 3, Reason: Dose Not Tolerated)  Administration: 151 8 mg (6/5/2019), 151 8 mg (6/19/2019)  fluorouracil (ADRUCIL) ambulatory infusion Soln, 1,200 mg/m2/day = 6,070 mg, Intravenous, Over 46 hours, 4 of 4 cycles      2/15/2022 Surgery     Pancreas (subtotal pancreatectomy):     - 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 (largest focus 0 8 cm) arising in association with intraductal papillary mucinous neoplasm (IPMN) with high-grade dysplasia  - Surgical margin positive for colloid carcinoma and IPMN with high-grade dysplasia       - Fifteen (15) lymph nodes, negative for carcinomaRestore      IPMN (intraductal papillary mucinous neoplasm)   2/15/2022 Surgery     Pancreas (subtotal pancreatectomy):     - 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 (largest focus 0 8 cm) arising in association with intraductal papillary mucinous neoplasm (IPMN) with high-grade dysplasia       - Surgical margin positive for colloid carcinoma and IPMN with high-grade dysplasia      - Fifteen (15) lymph nodes, negative for carcinoma     Screening Hx:     Colon:  Colonoscopy: Most recent 4/9/21; history of 4+ colon polyps     Skin:  Skin cancer screening: Not assessed     Prostate:  Prostate screening: Not assessed     Medical and Surgical History  Pertinent surgical history:   Past Surgical History:   Procedure Laterality Date  BOTOX INJECTION N/A 11/12/2021    Procedure: Unknown Bigness;  Surgeon: Laurel Cisneros MD;  Location: [facility] MAIN OR;  Service: Urology    CATARACT EXTRACTION Bilateral     COLONOSCOPY  01/17/2013    COLONOSCOPY  01/08/2018    COLONOSCOPY  04/2021    CYSTOSCOPY      INJECT WITH BOTOX    DISTAL PANCREATECTOMY N/A 2/15/2022    Procedure: PANCREATECTOMY SUB-TOTALL-OPEN;  Surgeon: David Madsen MD;  [facility]: [facility] MAIN [facility];  Service: Surgical Oncology    EGD  06/05/2020    EGD AND COLONOSCOPY  02/06/2014, 2/8/2017    FL GUIDED CENTRAL VENOUS ACCESS DEVICE INSERTION  4/23/2019    FLEXIBLE SIGMOIDOSCOPY  06/11/2019    FOOT SURGERY      Due to plantar fascitis    JOINT REPLACEMENT Left     LTK    LINEAR ENDOSCOPIC U/S      PANCREATECTOMY LAPAROSCOPIC N/A 3/12/2019    Procedure: DISTAL PANCREATECTOMY LAPAROSCOPIC/POSSIBLE OPEN;  Surgeon: David Madsen MD;  [facility];  Service: Surgical Oncology    ME EDG US EXAM SURGICAL ALTER STOM DUODENUM/JEJUNUM N/A 1/24/2019    Procedure: LINEAR ENDOSCOPIC U/S;  Surgeon: Luigi Miner MD;  Location: [facility]; Service: Gastroenterology    REPLACEMENT TOTAL KNEE Left     TUNNELED VENOUS PORT PLACEMENT Left 4/23/2019    Procedure: INSERTION VENOUS PORT (PORT-A-CATH);   Surgeon: David Madsen MD;  [facility];  Service: Surgical Oncology- 11/8/21 Pt reports PAC removed     UMBILICAL HERNIA REPAIR        Pertinent medical history:  Past Medical History:   Diagnosis Date    A-fib ([address] )     Abdominal wall strain     last assessed: 10/21/14    Allergic rhinitis     last assessed: 05/03/16    Arthritis     Cancer ([address] )     PACNCREATIC    COVID-19 virus infection 3/3/2021    CPAP (continuous positive airway pressure) dependence     Diabetes mellitus ([address] )     Erectile dysfunction of non-organic origin     last assessed: 03/17/14    Irregular heart beat     Pt with A fib     Lumbar strain     last assessed: 10/21/14    Multiple acquired skin tags     last assessed: 2/18/16    Palpitations     last assessed: 03/17/14    Pancreas cyst     Plantar fasciitis     Skin disorder     last assessed: 05/28/13    Sleep apnea     CPAP BROKE IN OCTOBER HAS BEEN TRYING TO GET NEW ONE BUT UNABLE AS OF YET    Thrombocytopenia ([address] )        Other History:  Height:   Ht Readings from Last 1 Encounters:   03/08/22 6' (1 829 m)     Weight:   Wt Readings from Last 1 Encounters:   03/08/22 127 kg (279 lb 12 2 oz)     Relevant Family History   Patient reports no Ashkenazi Bahai ancestry  - Mother (d age [de-identified]) with breast cancer  Maternal Aunt (d age [de-identified]) with Cancer NOS  - Father (d age 80) with no history of cancer  Paternal [de-identified] (d age [de-identified]) with abdominal cancer     Please refer to the scanned pedigree in the Media Tab for a complete family history     *All history is reported as provided by the patient; records are not available for review, except where indicated  Assessment:  We discussed sporadic, familial and hereditary cancer  We also discussed the many factors that influence our risk for cancer such as age, environmental exposures, lifestyle choices and family history  We reviewed the indications suggestive of a hereditary predisposition to cancer  Genetic testing is indicated for Malinda Greene based on the following criteria: Meets NCCN B1 0395 Testing Criteria for Pancreatic Cancer Susceptibility Genes: Personal history of pancreatic cancer      The risks, benefits, and limitations of genetic testing were reviewed with the patient, as well as genetic discrimination laws, and possible test results (positive, negative, variants of uncertain significance) and their clinical implications  If positive for a mutation, options for managing cancer risk including increased surveillance, chemoprevention, and in some cases prophylactic surgery were discussed   Malinda Greene was informed that if a hereditary cancer syndrome was identified in him, first degree relatives (parents, siblings, and children) have a chance of also inheriting the condition  Genetic testing would allow for predictive genetic testing in other relatives, who may also be at risk depending on their degree of relation  Plan: Patient decided to proceed with testing and provided consent  Summary:     Sample Collection:  Saliva was collected in the office on 3/9/2022  Originally we planned to order [address] however Adam Fonseca is taking blood thinners and we were unable to obtain a blood sample in the office today  We provided Adam Fonseca with the options of attempting saliva and/or taking a kit with him the next time he has blood drawn at a [facility] lab  Adam Fonseca opted for a saliva sample which was obtained in the office today  Genetic Testing Preformed: [employer] (36 genes): APC, SIGRID, AXIN2 BARD1, BRCA1, BRCA2, BRIP1, BMPR1A, CDH1, CDK4, CDKN2A, CHEK2, DICER1, EPCAM, GREM1, HOXB13, MLH1, MSH2, MSH3, MSH6, MUTYH, NBN, NF1, NTHL1, PALB2, PMS2, POLD1, POLE, PTEN, RAD51C, RAD51D, RECQL SMAD4, SMARCA4, STK11, TP53    Results take approximately 2-3 weeks to complete once test is started  We will contact Agustin Ashraf once results are available  Additional recommendations for surveillance/medical management will be made pending genetic test results

## 2022-03-10 NOTE — PROGRESS NOTES
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, high-grade dysplasia, no evidence of lymphovascular invasion, no evidence of perineural invasion, 1 regional lymph node negative for carcinoma and the margins were negative (pT1b, pN0, grade 1) status post adjuvant chemotherapy with FOLFIRINOX for 4 cycles finished in June, 2019  He was watched with close surveillance MRI abdomen scans and required a subtotal pancreatectomy on February 15, 2022 by Dr Tamanna Ruby which revealed 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 associated with IPMN of high-grade dysplasia  0/15 LN involved  + Margin with HG-dysplasia and invasive carcinoma  Clips left at pancreatic margin and further surgery would require completion pancreatectomy  The two patterns of pathogenesis for pancreatic colloid carcinoma reported include progression from regular ductal adenocarcinoma, a subtype of invasive pancreatic ductal carcinoma or progression from papillary adenocarcinoma derived from intraductal papillary mucinous neoplasm (IPMN) or mucinous cystic neoplasm (MCN) (Two cases of pancreatic colloid carcinoma with different pathogenesis: case report and review of the literature - PubMed (nih gov))  I reviewed Arash's diagnosis of colloid carcinoma, which is a more uncommon histology of pancreatic cancer without RCT/NCCN guidelines to guide treatment management  Depending on 3/17 tumor board recs, can consider [place] Abraxane versus observation in addition to RT (chemo appropriateness/timing in relation to RT to be discussed)           2  Malabsorption and greasy diarrhea, he is on Creon    3  Visual disturbance: has been seeing an optometrist    4  Thrombocytopenia: present since 3/14/2019, unclear etiology but Vit B12 noted to be borderline low at 253 on 9/7/2017, although he is on Vit B12          5  Macrocytic anemia: may be 2/2 nutritional deficiency vs liver disease vs BMB infiltrative disease   He is on Vit B12    on 2/21/2022      HPI:  Lena Ly is a 66y o  year-old  male seen initially 4/11/2019 regarding Stage I adenocarcinoma of the body and the distal pancreas status post resection referred by surgical oncology  Jean-Pierre Mcwilliams was experiencing several months of vague abdominal pain   CT scan 12/5/2018  revealed a slightly dilated pancreatic duct as well as multiple pancreatic cysts        MRI abdomen 1/2/2019:  Innumerable pancreatic cysts, mostly in the tail the pancreas, measuring up to 2 cm in diameter   Dilatation of the main pancreatic duct, up to 1 cm in diameter   Several enlarged peripancreatic and portacaval lymph nodes      EUS  January 2019 with numerous cyst in the tail of the pancreas the largest 1 measuring 3 cm with dilatation of the main pancreatic duct to 10 mm at the body of the pancreas, the needle was used to puncture the cysts at the tail of the pancreas with for past this performed, cytology showed atypical changes category 3 with cluster of atypical mucinous epithelial cells suggestive of neoplastic process     Status post distal pancreatectomy on 03/12/2019, final pathology 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), well-differentiated measuring between 0 5 cm to 1 cm with several foci of invasive carcinoma  present the largest 0 6 cm   The tumor is confined to the pancreas with IPMN with high-grade dysplasia  present at the margin of the resection, no evidence of lymphovascular invasion, no evidence of perineural invasion, 1 lymph node negative for carcinoma with background of extensive chronic pancreatitis at least stage IA (pT1b, pN0, grade 1)  Treated with FOLFIRINOX for 4 cycles in June 2019    CT scan in October 2019 showed no evidence of disease, he has upper abdomen lymphadenopathy stable from 2019 measuring 2 cm      Interval History:    He has moderate fatigue since his surgery last month  He has regular BM  He has a fair appetite  He ambulates well without issue  Previous Treatment:    FOLFIRINOX x 4 cycles     Test Results:    Imaging: No results found  PMHx: A-fib on Atenolol (not on OAC)   DMII: on Metformin  Denies HTN, HLP, CHF      Labs:   Lab Results   Component Value Date    WBC 4 86 02/21/2022    HGB 10 8 (L) 02/21/2022    HCT 32 4 (L) 02/21/2022     (H) 02/21/2022     (L) 02/21/2022     Lab Results   Component Value Date     08/22/2017    K 4 2 02/21/2022     02/21/2022    CO2 29 02/21/2022    BUN 8 03/08/2022    CREATININE 0 73 03/08/2022    GLUCOSE 158 (H) 02/15/2022    GLUF 184 (H) 01/18/2022    CALCIUM 9 1 02/21/2022    CORRECTEDCA 10 2 (H) 02/21/2022    AST 25 02/21/2022    ALT 28 02/21/2022    ALKPHOS 143 (H) 02/21/2022    PROT 6 6 08/22/2017    BILITOT 0 4 08/22/2017    EGFR 92 03/08/2022       No results found for: IRON, TIBC, FERRITIN    Lab Results   Component Value Date    WTFCDNZI60 253 09/07/2017         ROS: Review of Systems   Constitutional: Negative  Negative for appetite change, chills, diaphoresis, fatigue, fever and unexpected weight change  HENT:   Negative for hearing loss, lump/mass, mouth sores, nosebleeds, sore throat, trouble swallowing and voice change  Eyes: Positive for eye problems ( which will problem bilaterally not able to see the fine print)  Negative for icterus  Respiratory: Negative  Negative for chest tightness, cough, hemoptysis and shortness of breath  Cardiovascular: Negative for chest pain and leg swelling  Gastrointestinal: Positive for diarrhea (Greasy most in the morning)   Negative for abdominal distention, abdominal pain, blood in stool, constipation and nausea  Endocrine: Negative  Genitourinary: Negative for dysuria, frequency, hematuria and pelvic pain  Musculoskeletal: Negative  Negative for arthralgias, back pain, flank pain, gait problem, myalgias and neck stiffness  Skin: Negative for itching and rash  Neurological: Negative for dizziness, gait problem, headaches, light-headedness, numbness and speech difficulty  Hematological: Negative for adenopathy  Does not bruise/bleed easily  Psychiatric/Behavioral: Negative for confusion, decreased concentration, depression and sleep disturbance  The patient is not nervous/anxious  Current Medications: Reviewed, on Atenolol, Creon, Metformin  Allergies: Reviewed  PMH/FH/SH:  Reviewed      Physical Exam:    There is no height or weight on file to calculate BSA  Wt Readings from Last 3 Encounters:   03/08/22 127 kg (279 lb 12 2 oz)   03/07/22 126 kg (278 lb)   03/04/22 134 kg (295 lb)        Temp Readings from Last 3 Encounters:   03/08/22 (!) 96 6 °F (35 9 °C)   03/07/22 97 6 °F (36 4 °C) (Tympanic)   03/04/22 97 9 °F (36 6 °C) (Temporal)        BP Readings from Last 3 Encounters:   03/08/22 126/86   03/07/22 124/80   03/04/22 150/88         Pulse Readings from Last 3 Encounters:   03/08/22 81   03/07/22 92   03/04/22 89        Physical Exam  Vitals reviewed  Constitutional:       General: He is not in acute distress  Appearance: He is well-developed  He is not diaphoretic  HENT:      Head: Normocephalic and atraumatic  Eyes:      Conjunctiva/sclera: Conjunctivae normal    Neck:      Trachea: No tracheal deviation  Cardiovascular:      Rate and Rhythm: Normal rate and regular rhythm  Heart sounds: No murmur heard  No friction rub  No gallop  Pulmonary:      Effort: Pulmonary effort is normal  No respiratory distress  Breath sounds: Normal breath sounds  No wheezing or rales     Chest:      Chest wall: No tenderness  Abdominal:      General: There is no distension  Palpations: Abdomen is soft  Tenderness: There is no abdominal tenderness  Comments: Obese   Musculoskeletal:      Cervical back: Normal range of motion and neck supple  Lymphadenopathy:      Cervical: No cervical adenopathy  Skin:     General: Skin is warm and dry  Coloration: Skin is not pale  Findings: No erythema  Neurological:      Mental Status: He is alert and oriented to person, place, and time  Psychiatric:         Behavior: Behavior normal          Thought Content: Thought content normal          Judgment: Judgment normal        12/10/2021 MRI Abdomen w/wo contrast:  Enlarging cystic lesion within the most distal residual pancreas with associated interval enlargement of the main pancreatic duct  Findings are suggestive of enlarging IPMN  with main duct involvement versus primary main duct IPMN  Somewhat nodular hepatic contour again seen potentially reflecting underlying hepatocellular disease  Hepatic steatosis  Stable peripancreatic and aortocaval lymph nodes  1/3/22 EGD/EUS  IMPRESSION:  EGD:   1  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  2  Small hiatus hernia  3  Normal duodenum  EUS:   3 cm heterogenous cyst with solid component/debris in the distal pancreas at the margin of resection with communication with the main duct  This was biopsied with FNB needle and thick mucinous     2/15/22 Pathology from subtotal pancreatectomy:  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 (largest focus 0 8 cm) arising in association with intraductal papillary mucinous neoplasm (IPMN) with high-grade dysplasia  - Surgical margin positive for colloid carcinoma and IPMN with high-grade dysplasia  - Fifteen (15) lymph nodes, negative for carcinoma  - Therapy effect noted in background pancreas (fibrosis and inflammation)         ECO    Goals and Barriers:  Current Goal: Minimize effects of disease  Barriers: None  Patient's Capacity to Self Care:  Patient is able to self care      Code Status: not addressed today

## 2022-03-11 ENCOUNTER — TELEPHONE (OUTPATIENT)
Dept: HEMATOLOGY ONCOLOGY | Facility: CLINIC | Age: 72
End: 2022-03-11

## 2022-03-11 ENCOUNTER — CONSULT (OUTPATIENT)
Dept: HEMATOLOGY ONCOLOGY | Facility: CLINIC | Age: 72
End: 2022-03-11
Payer: MEDICARE

## 2022-03-11 ENCOUNTER — TELEPHONE (OUTPATIENT)
Dept: UROLOGY | Facility: CLINIC | Age: 72
End: 2022-03-11

## 2022-03-11 ENCOUNTER — APPOINTMENT (OUTPATIENT)
Dept: LAB | Facility: MEDICAL CENTER | Age: 72
End: 2022-03-11
Payer: MEDICARE

## 2022-03-11 VITALS
HEART RATE: 86 BPM | BODY MASS INDEX: 37.94 KG/M2 | WEIGHT: 271 LBS | SYSTOLIC BLOOD PRESSURE: 126 MMHG | TEMPERATURE: 96.3 F | DIASTOLIC BLOOD PRESSURE: 82 MMHG | RESPIRATION RATE: 18 BRPM | OXYGEN SATURATION: 97 % | HEIGHT: 71 IN

## 2022-03-11 DIAGNOSIS — D53.9 MACROCYTIC ANEMIA: ICD-10-CM

## 2022-03-11 DIAGNOSIS — C25.8 OVERLAPPING MALIGNANT NEOPLASM OF PANCREAS (HCC): Primary | ICD-10-CM

## 2022-03-11 DIAGNOSIS — D69.6 THROMBOCYTOPENIA (HCC): ICD-10-CM

## 2022-03-11 LAB
ANISOCYTOSIS BLD QL SMEAR: PRESENT
BASOPHILS NFR BLD MANUAL: 2 % (ref 0–1)
ERYTHROCYTE [DISTWIDTH] IN BLOOD BY AUTOMATED COUNT: 12.9 % (ref 11.6–15.1)
FOLATE SERPL-MCNC: 12.2 NG/ML (ref 3.1–17.5)
HCT VFR BLD AUTO: 36 % (ref 36.5–49.3)
HGB BLD-MCNC: 12.9 G/DL (ref 12–17)
IMM EOSINOPHIL NFR BLD MANUAL: 3 % (ref 0–6)
LYMPHOCYTES NFR BLD: 33 % (ref 14–44)
MCH RBC QN AUTO: 35.8 PG (ref 26.8–34.3)
MCHC RBC AUTO-ENTMCNC: 35.8 G/DL (ref 31.4–37.4)
MCV RBC AUTO: 100 FL (ref 82–98)
MONOCYTES NFR BLD AUTO: 6 % (ref 4–12)
NEUTS SEG NFR BLD AUTO: 56 % (ref 45–77)
NRBC BLD AUTO-RTO: 0 /100 WBCS
PATHOLOGY REVIEW: YES
PLATELET # BLD AUTO: 142 THOUSANDS/UL (ref 149–390)
PLATELET BLD QL SMEAR: ADEQUATE
PMV BLD AUTO: 12.9 FL (ref 8.9–12.7)
POIKILOCYTOSIS BLD QL SMEAR: PRESENT
RBC # BLD AUTO: 3.6 MILLION/UL (ref 3.88–5.62)
TOTAL CELLS COUNTED SPEC: 100
VIT B12 SERPL-MCNC: 2772 PG/ML (ref 100–900)
WBC # BLD AUTO: 4.36 THOUSAND/UL (ref 4.31–10.16)

## 2022-03-11 PROCEDURE — 85027 COMPLETE CBC AUTOMATED: CPT

## 2022-03-11 PROCEDURE — 83918 ORGANIC ACIDS TOTAL QUANT: CPT

## 2022-03-11 PROCEDURE — 99204 OFFICE O/P NEW MOD 45 MIN: CPT | Performed by: INTERNAL MEDICINE

## 2022-03-11 PROCEDURE — 82746 ASSAY OF FOLIC ACID SERUM: CPT

## 2022-03-11 PROCEDURE — 36415 COLL VENOUS BLD VENIPUNCTURE: CPT

## 2022-03-11 PROCEDURE — 82607 VITAMIN B-12: CPT

## 2022-03-11 PROCEDURE — 85007 BL SMEAR W/DIFF WBC COUNT: CPT

## 2022-03-11 NOTE — TELEPHONE ENCOUNTER
Patient was rescheduled from 3/18 3:00pm to 3/18 10:40am per Maury Cranker  Left a detailed message on voicemail with new time

## 2022-03-14 DIAGNOSIS — C25.2 MALIGNANT NEOPLASM OF TAIL OF PANCREAS (HCC): ICD-10-CM

## 2022-03-14 NOTE — TELEPHONE ENCOUNTER
Spoke to patient and let him know that creon refill was sent to pharmacy  Patient verbalized understanding and thanks

## 2022-03-15 NOTE — PATIENT INSTRUCTIONS
PRESCRIPTION REFILL REMINDER:  All medication refills should be requested prior to [facility] on Friday  Any refill requests after noon on Friday would be addressed the following Monday  Please protect yourself from COVID-19 and the delta and omicron variants! Even though we do not have good antiviral drugs for this infection, the following tools can help you stay healthy:    = Wash your hands! Soap and water, or hand  with at least 60% alcohol, are both effective at killing the virus  = Wear a mask! This will help protect others from any virus particles you might spread  Your mouth and nose BOTH need to be covered  = Keep the distance! Keep 6 feet of distance from other people, even if they seem healthy  Keeping distance protects you from the other person's virus spread     = Get a vaccine! Three vaccines are approved for use in the [place] for all adults  These vaccines do provide protection against the delta variant, and seem to reduce severity of omicron infection  + Pfizer is FDA approved for all persons age 11 and older   + Teddy [place] may be given to all person age 25 and older   + [place]un may be given to all person age 25 and older  (Serious blood clot side effects have only been reported in reproductive-age women, and these are about 1-in-a-million  We do NOT recommend J&J for people who have had Guillan-[place] syndrome )  = You may get a shot from many other locations outside [facility]: [facility], [employer], [employer], [employer], and certain [facility] locations  + As of 11/29/21, the CDC recommends booster shots on ALL vaccines for ALL adults  https://Hav[employer] Delikatesser com/      We are recommending that ALL our patients get a complete series of one of the three vaccines, and boost it ASAP  Call 9-452-DWBIZAZ (Choose Option 7 for faster service!) to get scheduled for your shot  Informacion en espanol sobre vacunas, de nos companeros [facility] --  [employer]      Numbers of coronavirus cases (and COVID deaths) are worsening in many [place] States, among unvaccinated people, we think this is because vaccines are working, but only if you get one! As of 12/1/21, we do NOT advise travel OUTSIDE the [address], and we encourage you to be very careful when planning travel INSIDE the [address]  Check out [employer] for Garcia data that are updated daily:    http://www 3DLT.com/     Global Epidemics  Org, from [facility] (OUTPATIENT CAMPUS), will give you Eekymr-bj-Phbghg information on virus cases and vaccination rates:    Https://globalepidemics  org/    Frequently Asked Questions about COVID, answered by [facility]    [employer]

## 2022-03-16 ENCOUNTER — OFFICE VISIT (OUTPATIENT)
Dept: PALLIATIVE MEDICINE | Facility: CLINIC | Age: 72
End: 2022-03-16
Payer: MEDICARE

## 2022-03-16 ENCOUNTER — SOCIAL WORK (OUTPATIENT)
Dept: PALLIATIVE MEDICINE | Facility: CLINIC | Age: 72
End: 2022-03-16

## 2022-03-16 VITALS
TEMPERATURE: 97.6 F | OXYGEN SATURATION: 98 % | RESPIRATION RATE: 16 BRPM | HEART RATE: 83 BPM | WEIGHT: 280 LBS | DIASTOLIC BLOOD PRESSURE: 86 MMHG | BODY MASS INDEX: 39.05 KG/M2 | SYSTOLIC BLOOD PRESSURE: 134 MMHG

## 2022-03-16 DIAGNOSIS — C25.8 OVERLAPPING MALIGNANT NEOPLASM OF PANCREAS (HCC): ICD-10-CM

## 2022-03-16 DIAGNOSIS — D49.0 IPMN (INTRADUCTAL PAPILLARY MUCINOUS NEOPLASM): ICD-10-CM

## 2022-03-16 DIAGNOSIS — Z71.89 COUNSELING REGARDING ADVANCED CARE PLANNING AND GOALS OF CARE: Primary | ICD-10-CM

## 2022-03-16 DIAGNOSIS — Z71.89 COUNSELING AND COORDINATION OF CARE: Primary | ICD-10-CM

## 2022-03-16 LAB — METHYLMALONATE SERPL-SCNC: 116 NMOL/L (ref 0–378)

## 2022-03-16 PROCEDURE — 99214 OFFICE O/P EST MOD 30 MIN: CPT | Performed by: INTERNAL MEDICINE

## 2022-03-16 PROCEDURE — NC001 PR NO CHARGE

## 2022-03-16 NOTE — PROGRESS NOTES
Palliative and Supportive Care   Joe Roger 66 y o  male [id number]    Assessment/Plan:  1  Counseling regarding advanced care planning and goals of care    2  IPMN (intraductal papillary mucinous neoplasm)    3  Overlapping malignant neoplasm of pancreas ([address] )      · No palliative care needs at this time  · May consider oxycodone IR 5mg PO q4H prn if he starts to develop CRP  Of note, was on dilaudid 2mg PO q4H prn in 2019 but it may be reasonable to trail oxycodone as pain may be different from 2019  · He is encouraged to bring his living will on his next visit so we can review and scan in chart  · His goal is to receive treatments to prolong life for as long as possible  · RTO in 1 month, call sooner if needed    Requested Prescriptions      No prescriptions requested or ordered in this encounter     There are no discontinued medications  Representatives have queried the patient's controlled substance dispensing history in the Prescription Drug Monitoring Program in compliance with regulations before I have prescribed any controlled substances  The prescription history is consistent with prescribed therapy and our practice policies  30 minutes were spent face to face with Joe Roger with greater than 50% of the time spent in counseling or coordination of care including discussions of etiology of diagnosis, risks and benefits of treatment, instructions for disease self management, treatment instructions, follow up requirements, risk factors and risk reduction of disease, patient and family counseling/involvement in care and compliance with treatment regimen   All of the patient's questions were answered during this discussion  No follow-ups on file  Subjective:   Chief Complaint  Follow up visit for:  symptom management, pain, neoplasm related, assessment of goals of care, disease process education and discussion of prognosis, advance care planning  HPI     Joe Roger is a 66 y o  male with 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 status post distal partial pancreatectomy on 3/12/2019  He then finished chemo (FOLFIRINOX) in 7/2019  He was on close surveillance with MRI scans thereafter  On 12/10/21, scan showed enlarging cystic lesion within the most distal residual pancreas with interval enlargement of the main pancreatic duct, concerning for IPMN  He then underwent subtotal pancreatectomy in 2/15/22 which revealed 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 associated with IPMN of high-grade dysplasia  He closely followed by surgical oncology and medical oncology, and awaiting tumor board consensus for treatment options  He returns to the [facility] for supportive cares  He denies any pain at the moment  He had more pain after the surgery, but is slowly improving and tolerating well without having to take any pain pills  We did talk about taking opioids in the future if needed  He mentioned being on dilaudid before after his surgery  To his knowledge, he has not tried other opioids in the past  His appetite is good, his oral intake is good  He denies any palliative care needs at this time  He is supported by his wife, whom he lives with  He has 2 daughters and 4 grandchildren  And a dog named Pablo  Oncology History   Overlapping malignant neoplasm of pancreas ([address] )   3/12/2019 Surgery    Distal pancreatectomy  Several foci of invasive colloid cancer  Grade 1  IPMN with high grade dysplasia at margin  T1b, NO MX Stage IA     4/11/2019 - 6/21/2019 Chemotherapy    Saw Dr Parag Ratliff  Will be starting Folfirinox 4/24   Ended 6/21/2019 4/24/2019 - 7/2/2019 Chemotherapy    fluorouracil (ADRUCIL), 400 mg/m2 = 1,010 mg, Intravenous, Once, 2 of 2 cycles  pegfilgrastim ([place]), 6 mg, Subcutaneous, Once, 2 of 2 cycles  Administration: 6 mg (6/21/2019), 6 mg (6/7/2019)  irinotecan (CAMPTOSAR) chemo infusion, 180 mg/m2 = 455 mg, Intravenous, Once, 4 of 4 cycles  Dose modification: 140 mg/m2 (original dose 180 mg/m2, Cycle 3, Reason: Other (Must fill in a comment)), 125 mg/m2 (original dose 180 mg/m2, Cycle 3, Reason: Dose Not Tolerated)  Administration: 316 mg (6/5/2019), 300 mg (6/19/2019)  leucovorin calcium IVPB, 400 mg/m2 = 1,012 mg, Intravenous, Once, 2 of 2 cycles  oxaliplatin (ELOXATIN) chemo infusion, 85 mg/m2 = 215 05 mg, Intravenous, Once, 4 of 4 cycles  Dose modification: 65 mg/m2 (original dose 85 mg/m2, Cycle 3, Reason: Other (Must fill in a comment)), 60 mg/m2 (original dose 85 mg/m2, Cycle 3, Reason: Dose Not Tolerated)  Administration: 151 8 mg (6/5/2019), 151 8 mg (6/19/2019)  fluorouracil (ADRUCIL) ambulatory infusion Soln, 1,200 mg/m2/day = 6,070 mg, Intravenous, Over 46 hours, 4 of 4 cycles     2/15/2022 Surgery    Pancreas (subtotal pancreatectomy):     - 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 (largest focus 0 8 cm) arising in association with intraductal papillary mucinous neoplasm (IPMN) with high-grade dysplasia  - Surgical margin positive for colloid carcinoma and IPMN with high-grade dysplasia  - Fifteen (15) lymph nodes, negative for carcinomaRestore     2/15/2022 -  Cancer Staged    Staging form: Pancreas, AJCC 8th Edition  - Pathologic stage from 2/15/2022: Stage IA (ypT1b(m), pN0, cM0) - Signed by Ginny Zimmerman MD on 3/8/2022  Stage prefix: Post-therapy  Total positive nodes: 0  Multiple tumors: Yes       IPMN (intraductal papillary mucinous neoplasm)   2/15/2022 Surgery    Pancreas (subtotal pancreatectomy):     - 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 (largest focus 0 8 cm) arising in association with intraductal papillary mucinous neoplasm (IPMN) with high-grade dysplasia  - Surgical margin positive for colloid carcinoma and IPMN with high-grade dysplasia       - Fifteen (15) lymph nodes, negative for carcinoma         The following portions of the medical history were reviewed: past medical history, problem list, medication list, and social history  Current Outpatient Medications:     acetaminophen (TYLENOL) 325 mg tablet, Take 2 tablets (650 mg total) by mouth every 6 (six) hours as needed for mild pain, Disp: 30 tablet, Rfl: 0    ascorbic acid (VITAMIN C) 1000 MG tablet, Take 2,000 mg by mouth daily 11/8/21 Instructed pt to HOLD starting 11/9/21 up to and including DOS  , Disp: , Rfl:     aspirin (ECOTRIN) 325 mg EC tablet, Take 325 mg by mouth daily 11/8/21 Pt instructed as per urology instruction sheet - pt reports stopped on 11/7/21  Instructed pt to verify with cardiology regarding stopping of ASA and not starting Eliquis at this time prior to surgery  , Disp: , Rfl:     atenolol (TENORMIN) 25 mg tablet, TAKE 1 TABLET BY MOUTH EVERY DAY, Disp: 90 tablet, Rfl: 3    Cholecalciferol (VITAMIN D) 2000 units CAPS, Take 1,000 Units by mouth daily 11/8/21 Pt takes three tabs of Vitamin D daily  Instructed to start HOLD 11/9/21 up to and including DOS  , Disp: , Rfl:     Cyanocobalamin (VITAMIN B 12 PO), Take by mouth daily 11/8/21 Pt reports takes two tabs daily   , Disp: , Rfl:     metFORMIN (GLUCOPHAGE) 1000 MG tablet, TAKE 1 TABLET BY MOUTH TWICE A DAY WITH MEALS, Disp: 180 tablet, Rfl: 1    pancrelipase, Lip-Prot-Amyl, (CREON) 6,000 units delayed release capsule, Take 1 capsule (6,000 Units total) by mouth 3 (three) times a day with meals, Disp: 90 capsule, Rfl: 0    enoxaparin (LOVENOX) 40 mg/0 4 mL, Inject 0 4 mL (40 mg total) under the skin in the morning for 21 days, Disp: 8 4 mL, Rfl: 0    Na Sulfate-K Sulfate-Mg Sulf (Suprep Bowel Prep Kit) 17 5-3 13-1 6 GM/177ML SOLN, Follow office instructions (Patient not taking: Reported on 3/4/2022 ), Disp: 1 Bottle, Rfl: 0    tadalafil (CIALIS) 20 MG tablet, TAKE 1 TABLET BY MOUTH DAILY AS NEEDED FOR ERECTILE DYSFUNCTION (Patient not taking: Reported on 3/8/2022), Disp: 10 tablet, Rfl: 0  Review of Systems   Constitutional: Negative for activity change, appetite change and fatigue  HENT: Negative for trouble swallowing  Respiratory: Negative for shortness of breath  Cardiovascular: Negative for chest pain  Gastrointestinal: Positive for abdominal pain  Negative for constipation, diarrhea, nausea and vomiting  Neurological: Negative for weakness  Psychiatric/Behavioral: Negative for sleep disturbance  The patient is not nervous/anxious  All other systems reviewed and are negative  All other systems negative    Objective:  Vital Signs  /86 (BP Location: Left arm, Patient Position: Sitting, Cuff Size: Standard)   Pulse 83   Temp 97 6 °F (36 4 °C) (Temporal)   Resp 16   Wt 127 kg (280 lb)   SpO2 98%   BMI 39 05 kg/m²    Physical Exam    Constitutional: Appears well-developed and well-nourished  Does not appear sick  Pleasant, jovial  In no acute physical or emotional distress  Head: Normocephalic and atraumatic  Eyes: EOM are normal  No ocular discharge  No scleral icterus  Neck: No visible adenopathy or masses  Respiratory: Effort normal  No stridor  No respiratory distress  Gastrointestinal: No abdominal distension  Musculoskeletal: No edema  Neurological: Alert, oriented and appropriately conversant  Skin: No diaphoresis, no rashes seen on exposed areas of skin  Psychiatric: Displays a normal mood and affect   Behavior, judgement and thought content appear normal      Kaz Ghotra MD  Palliative Medicine & Supportive Care  Internal Medicine  Available via [facility] Text  Office: [phone]  Fax: [phone]

## 2022-03-16 NOTE — PROGRESS NOTES
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  Disability:  N/A  Spirituality/ Hoahaoism:    Patient's strengths, social supports, and resources: Pt has strong support from family  Cultural information:   Mental Health current or previous:Reports mood is ok  Substance use or history:   Sleep: Sleeps most nights  Exercise: N/A  Diet/nutrition: reports appetite is good  Durable Medical Equipment needs: None at this time  Transportation: Self  Financial concerns: None  Advanced Directive: States he has one, we encouraged him to bring in a copy  Other medical or social work providers involved: None  Patient/caregiver current level of coping:  Pt is coping well at this time  He is waiting for treatment recommendations and is eager to hear in the next day or so  Patient/family concerns and areas of need: Pt did not have any concerns today  He states that his goal is to survive  Patient's Interests: pt enjoys restoring and working on cars and motorcycles    *All questions may not be answered due to constraints    Follow-up discussions may need to occur
Abdominal Pain, N/V/D

## 2022-03-17 ENCOUNTER — TELEPHONE (OUTPATIENT)
Dept: HEMATOLOGY ONCOLOGY | Facility: CLINIC | Age: 72
End: 2022-03-17

## 2022-03-17 NOTE — TELEPHONE ENCOUNTER
Appointment Confirmation (to confirm pre existing appointments - ONLY)     Appointment with  Dr Harriett Miranda   Appointment date & time 3/18 @ 10:40AM   Location [place]   Patient verbilized Understanding  Yes

## 2022-03-18 ENCOUNTER — PATIENT OUTREACH (OUTPATIENT)
Dept: HEMATOLOGY ONCOLOGY | Facility: CLINIC | Age: 72
End: 2022-03-18

## 2022-03-18 ENCOUNTER — TELEPHONE (OUTPATIENT)
Dept: HEMATOLOGY ONCOLOGY | Facility: CLINIC | Age: 72
End: 2022-03-18

## 2022-03-18 ENCOUNTER — DOCUMENTATION (OUTPATIENT)
Dept: HEMATOLOGY ONCOLOGY | Facility: CLINIC | Age: 72
End: 2022-03-18

## 2022-03-18 ENCOUNTER — OFFICE VISIT (OUTPATIENT)
Dept: HEMATOLOGY ONCOLOGY | Facility: CLINIC | Age: 72
End: 2022-03-18
Payer: MEDICARE

## 2022-03-18 VITALS
SYSTOLIC BLOOD PRESSURE: 128 MMHG | HEIGHT: 71 IN | DIASTOLIC BLOOD PRESSURE: 80 MMHG | HEART RATE: 84 BPM | RESPIRATION RATE: 18 BRPM | TEMPERATURE: 96.6 F | WEIGHT: 283 LBS | OXYGEN SATURATION: 98 % | BODY MASS INDEX: 39.62 KG/M2

## 2022-03-18 DIAGNOSIS — C25.8 OVERLAPPING MALIGNANT NEOPLASM OF PANCREAS (HCC): ICD-10-CM

## 2022-03-18 DIAGNOSIS — D70.1 CHEMOTHERAPY INDUCED NEUTROPENIA (HCC): ICD-10-CM

## 2022-03-18 DIAGNOSIS — D53.9 MACROCYTIC ANEMIA: Primary | ICD-10-CM

## 2022-03-18 DIAGNOSIS — T45.1X5A CHEMOTHERAPY INDUCED NEUTROPENIA (HCC): ICD-10-CM

## 2022-03-18 PROCEDURE — 99214 OFFICE O/P EST MOD 30 MIN: CPT | Performed by: INTERNAL MEDICINE

## 2022-03-18 NOTE — PROGRESS NOTES
ONCOLOGY CHECKLIST     ONCOLOGY TREATMENT     Task Priority Due Responsible Completed Completed By Step Outcome    Discussed role of Hopeline    3/18/2022 Bambi Delgadillo RN          Orders placed for pre-treatment labs    3/18/2022 Bambi Delgadillo RN          Review schedule / calendar    3/18/2022 Bambi Delgadillo RN          Reviewed common side-effects    3/18/2022 Bambi Delgadillo RN          Reviewed medical oncology nurse contact information    3/18/2022 Bambi Delgadillo RN          Reviewed regimen specific education sheets    3/18/2022 Bambi Delgadillo RN          Reviewed use of supportive medication    3/18/2022 Bambi Delgadillo RN          Reviewed what to expect the first day of treatment    3/18/2022 Bambi Delgadillo RN          Reviewed when blood work needs to be completed (initial and subsequent draws)    3/18/2022 Bambi Delgadillo RN          Reviewed when to call (temp greater than 100 4, uncontrolled vomiting, diarrhea, etc )    3/18/2022 Bambi Delgadillo RN          Scripts for support medication sent to pharmacy    3/18/2022 Bambi Delgadillo RN

## 2022-03-18 NOTE — TELEPHONE ENCOUNTER
This is a new start  Please schedule patient for treatment  He prefers Tuesday or Wednesday morning  Please schedule labs before each treatment  Thank you!

## 2022-03-18 NOTE — PROGRESS NOTES
Notified by Dr Shamir Carter, he is seeing the pt this morning, wanted me to help w setting up the pt for a 2nd opinion w [facility]  I contacted the facility and spoke to [facility]  He gave me the instructions on what they would need from us in order to get the pt set up

## 2022-03-18 NOTE — PROGRESS NOTES
Printed and faxed cover letter, demographics, insurance cards, recent office note (which has labs & recent imaging included), and pathology report  A total of 18 pages including cover  "2nd opinion requested to determine whether patient has definitive surgical options as well as the proposed adjuvant chemotherapy and radiation plan for this very rare pancreatic cancer" Confirmation of successful transmission received

## 2022-03-18 NOTE — TELEPHONE ENCOUNTER
Left message for patient to call me back so that I can go over his treatment schedule with him  Please transfer patient to me when he calls back

## 2022-03-18 NOTE — PROGRESS NOTES
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, high-grade dysplasia, no evidence of lymphovascular invasion, no evidence of perineural invasion, 1 regional lymph node negative for carcinoma and the margins were negative (pT1b, pN0, grade 1) status post adjuvant chemotherapy with FOLFIRINOX for 4 cycles finished in June, 2019  He was watched with close surveillance MRI abdomen scans and required a subtotal pancreatectomy on February 15, 2022 by Dr Gianni Villasenor which revealed 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 associated with IPMN of high-grade dysplasia  0/15 LN involved  + Margin with HG-dysplasia and invasive carcinoma  He is now pathologic Stage IA; ypT1b(m) pN0 cM0  Clips left at pancreatic margin and further surgery would require completion pancreatectomy  The two patterns of pathogenesis for pancreatic colloid carcinoma reported include progression from regular ductal adenocarcinoma, a subtype of invasive pancreatic ductal carcinoma or progression from papillary adenocarcinoma derived from intraductal papillary mucinous neoplasm (IPMN) or mucinous cystic neoplasm (MCN) (Two cases of pancreatic colloid carcinoma with different pathogenesis: case report and review of the literature - PubMed (nih gov))  Remnant total pancreatectomy is an option in some of these cases when fit patients are able to tolerate it  I reviewed Arash's diagnosis of colloid carcinoma, which is a more uncommon histology of pancreatic cancer without RCT/NCCN guidelines to guide treatment management       Per the 3/17 GI tumor board recs, plan is for 4 months FOLFORINOX and then 5-FU + RT thereafter  I discussed with the patient the benefits of getting a 2nd opinion to determine whether he has definitive surgical options as well as the proposed adjuvant chemotherapy and radiation plan for this very rare pancreatic cancer  He is in agreement to this       -IR referral for port (to be done after he sees [facility])  -[facility] referral for second opinion  -[facility] visits to be scheduled along with need for preceding lab-work should treatment be pursued after his second opinion  -F/u with me in 3 weeks    2  Malabsorption and greasy diarrhea, he is on Creon    3  Visual disturbance: has been seeing an optometrist    4  Thrombocytopenia: present since 3/14/2019, unclear etiology but Vit B12 noted to be borderline low at 253 on 9/7/2017, although he is on Vit B12  Plt improved to 142k on 3/11/22, Hgb up to 12 9,   3/11/22 Smear: No immature left shift or circulating blasts  Low normal hemoglobin with no significant anisopoikilocytosis  Rare schistocyte seen on scan  Mild thrombocytopenia with occasional large platelet  Folate and B12 no deficient  3/11/2022: Plt improved to 142k, Hgb up to 12 9,         5  Macrocytic anemia: may be 2/2 nutritional deficiency vs liver disease vs BMB infiltrative disease   He is on Vit B12    on 2/21/2022      HPI:  Billy Rivas is a 66y o  year-old  male seen initially 4/11/2019 regarding Stage I adenocarcinoma of the body and the distal pancreas status post resection referred by surgical oncology  Rocio Jeong was experiencing several months of vague abdominal pain   CT scan 12/5/2018  revealed a slightly dilated pancreatic duct as well as multiple pancreatic cysts        MRI abdomen 1/2/2019:  Innumerable pancreatic cysts, mostly in the tail the pancreas, measuring up to 2 cm in diameter   Dilatation of the main pancreatic duct, up to 1 cm in diameter   Several enlarged peripancreatic and portacaval lymph nodes      EUS  January 2019 with numerous cyst in the tail of the pancreas the largest 1 measuring 3 cm with dilatation of the main pancreatic duct to 10 mm at the body of the pancreas, the needle was used to puncture the cysts at the tail of the pancreas with for past this performed, cytology showed atypical changes category 3 with cluster of atypical mucinous epithelial cells suggestive of neoplastic process     Status post distal pancreatectomy on 03/12/2019, final pathology 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), well-differentiated measuring between 0 5 cm to 1 cm with several foci of invasive carcinoma  present the largest 0 6 cm   The tumor is confined to the pancreas with IPMN with high-grade dysplasia  present at the margin of the resection, no evidence of lymphovascular invasion, no evidence of perineural invasion, 1 lymph node negative for carcinoma with background of extensive chronic pancreatitis at least stage IA (pT1b, pN0, grade 1)  Treated with FOLFIRINOX for 4 cycles in June 2019    CT scan in October 2019 showed no evidence of disease, he has upper abdomen lymphadenopathy stable from 2019 measuring 2 cm      Interval History:    He has moderate fatigue since his surgery earlier this year  He has regular BM  He has a fair appetite  He ambulates well without issue  No other new acute issues  Previous Treatment in 2019:    FOLFIRINOX x 4 cycles (dose reduced to Irinotecan 300mg, Oxalip 60mg/m2 due to tolerance issues)     Test Results:    Imaging: No results found      PMHx: A-fib on Atenolol (not on OAC)   DMII: on Metformin  Denies HTN, HLP, CHF      Labs:   Lab Results   Component Value Date    WBC 4 36 03/11/2022    HGB 12 9 03/11/2022    HCT 36 0 (L) 03/11/2022     (H) 03/11/2022     (L) 03/11/2022     Lab Results   Component Value Date     08/22/2017    K 4 2 02/21/2022     02/21/2022    CO2 29 02/21/2022    BUN 8 03/08/2022    CREATININE 0 73 03/08/2022    GLUCOSE 158 (H) 02/15/2022    GLUF 184 (H) 01/18/2022    CALCIUM 9 1 02/21/2022    CORRECTEDCA 10 2 (H) 02/21/2022    AST 25 02/21/2022    ALT 28 02/21/2022    ALKPHOS 143 (H) 02/21/2022    PROT 6 6 08/22/2017    BILITOT 0 4 08/22/2017    EGFR 92 03/08/2022       No results found for: IRON, TIBC, FERRITIN    Lab Results   Component Value Date    CLDYICWD64 2,772 (H) 03/11/2022         ROS: Review of Systems   Constitutional: Negative  Negative for appetite change, chills, diaphoresis, fatigue, fever and unexpected weight change  HENT:   Negative for hearing loss, lump/mass, mouth sores, nosebleeds, sore throat, trouble swallowing and voice change  Eyes: Positive for eye problems ( which will problem bilaterally not able to see the fine print)  Negative for icterus  Respiratory: Negative  Negative for chest tightness, cough, hemoptysis and shortness of breath  Cardiovascular: Negative for chest pain and leg swelling  Gastrointestinal: Positive for diarrhea (Greasy most in the morning)  Negative for abdominal distention, abdominal pain, blood in stool, constipation and nausea  Endocrine: Negative  Genitourinary: Negative for dysuria, frequency, hematuria and pelvic pain  Musculoskeletal: Negative  Negative for arthralgias, back pain, flank pain, gait problem, myalgias and neck stiffness  Skin: Negative for itching and rash  Neurological: Negative for dizziness, gait problem, headaches, light-headedness, numbness and speech difficulty  Hematological: Negative for adenopathy  Does not bruise/bleed easily  Psychiatric/Behavioral: Negative for confusion, decreased concentration, depression and sleep disturbance  The patient is not nervous/anxious             Current Medications: Reviewed, on Atenolol, Creon, Metformin  Allergies: Reviewed  PMH/FH/SH:  Reviewed      Physical Exam:    There is no height or weight on file to calculate BSA  Wt Readings from Last 3 Encounters:   03/16/22 127 kg (280 lb)   03/11/22 123 kg (271 lb)   03/08/22 127 kg (279 lb 12 2 oz)        Temp Readings from Last 3 Encounters:   03/16/22 97 6 °F (36 4 °C) (Temporal)   03/11/22 (!) 96 3 °F (35 7 °C) (Tympanic Core)   03/08/22 (!) 96 6 °F (35 9 °C)        BP Readings from Last 3 Encounters:   03/16/22 134/86   03/11/22 126/82   03/08/22 126/86         Pulse Readings from Last 3 Encounters:   03/16/22 83   03/11/22 86   03/08/22 81        Physical Exam  Vitals reviewed  Constitutional:       General: He is not in acute distress  Appearance: He is well-developed  He is not diaphoretic  HENT:      Head: Normocephalic and atraumatic  Eyes:      Conjunctiva/sclera: Conjunctivae normal    Neck:      Trachea: No tracheal deviation  Cardiovascular:      Rate and Rhythm: Normal rate and regular rhythm  Heart sounds: No murmur heard  No friction rub  No gallop  Pulmonary:      Effort: Pulmonary effort is normal  No respiratory distress  Breath sounds: Normal breath sounds  No wheezing or rales  Chest:      Chest wall: No tenderness  Abdominal:      General: There is no distension  Palpations: Abdomen is soft  Tenderness: There is no abdominal tenderness  Comments: Obese   Musculoskeletal:      Cervical back: Normal range of motion and neck supple  Lymphadenopathy:      Cervical: No cervical adenopathy  Skin:     General: Skin is warm and dry  Coloration: Skin is not pale  Findings: No erythema  Neurological:      Mental Status: He is alert and oriented to person, place, and time  Psychiatric:         Behavior: Behavior normal          Thought Content: Thought content normal          Judgment: Judgment normal        12/10/2021 MRI Abdomen w/wo contrast:  Enlarging cystic lesion within the most distal residual pancreas with associated interval enlargement of the main pancreatic duct    Findings are suggestive of enlarging IPMN  with main duct involvement versus primary main duct IPMN  Somewhat nodular hepatic contour again seen potentially reflecting underlying hepatocellular disease  Hepatic steatosis  Stable peripancreatic and aortocaval lymph nodes  1/3/22 EGD/EUS  IMPRESSION:  EGD:   1  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  2  Small hiatus hernia  3  Normal duodenum  EUS:   3 cm heterogenous cyst with solid component/debris in the distal pancreas at the margin of resection with communication with the main duct  This was biopsied with FNB needle and thick mucinous     2/15/22 Pathology from subtotal pancreatectomy:  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 (largest focus 0 8 cm) arising in association with intraductal papillary mucinous neoplasm (IPMN) with high-grade dysplasia  - Surgical margin positive for colloid carcinoma and IPMN with high-grade dysplasia  - Fifteen (15) lymph nodes, negative for carcinoma  - Therapy effect noted in background pancreas (fibrosis and inflammation)  ECO    Goals and Barriers:  Current Goal: Minimize effects of disease  Barriers: None  Patient's Capacity to Self Care:  Patient is able to self care      Code Status: not addressed today      Lennox Hackney, MD

## 2022-03-18 NOTE — TELEPHONE ENCOUNTER
While we try to accommodate patient requests, our priority is to schedule treatment according to Doctor's orders and site availability  1  Does the Provider use the intake sheet or checkout note? Intake Sheet  2  What would be a preferred day of the week that would work best for your infusion appointment? Tuesday or Wednesday  3  Do you prefer mornings or afternoons for your appointments? Morning  4  We are going to try our best to schedule you at the infusion center closest to your home  In the event that we are unable to what would be your next preferred infusion site or sites? 1  [place]  2  [place]  3      5  Do you have transportation to take you to all of your appointments? Yes  6   Would you like the infusion center to draw labs from your port? (disregard if patient doesn't have a port or need labs for infusion appointment) Yes

## 2022-03-19 ENCOUNTER — DOCUMENTATION (OUTPATIENT)
Dept: HEMATOLOGY ONCOLOGY | Facility: CLINIC | Age: 72
End: 2022-03-19

## 2022-03-19 NOTE — PROGRESS NOTES
RECTAL/GI MULTIDISCIPLINARY CASE REVIEW    DATE: 3/17/2022      PRESENTING DOCTOR: Dr Kiara Quiles      DIAGNOSIS: Pancreatic cancer      Isa Abdul was presented at the Rectal/GI Multidisciplinary Conference today  PHYSICIAN RECOMMENDED PLAN:    -Recommend 'sandwich therapy'   FOLFIRINOX X2 months  Chemo (Xeloda)/RT  FOLFIRINOX X2 months          Versus  FOLFIRINOX X4 months  Chemtherapy  Chemo/RT    -Patient is scheduled to see medical oncology with Dr Isaac Dominguez on 3/18/2022  Gordo Michelle is scheduled for 3/30/2022      Team agreed to plan  The final treatment plan will be left to the discretion of the patient and the treating physician  DISCLAIMERS:  TO THE TREATING PHYSICIAN:  This conference is a meeting of clinicians from various specialty areas who evaluate and discuss patients for whom a multidisciplinary treatment approach is being considered  Please note that the above opinion was a consensus of the conference attendees and is intended only to assist in quality care of the discussed patient  The responsibility for follow up on the input given during the conference, along with any final decisions regarding plan of care, is that of the patient and the patient's provider  Accordingly, appointments have only been recommended based on this information and have NOT been scheduled unless otherwise noted  TO THE PATIENT:  This summary is a brief record of major aspects of your cancer treatment  You may choose to share a copy with any of your doctors or nurses  However, this is not a detailed or comprehensive record of your care        NCCN guidelines were readily available for review at this discussion

## 2022-03-23 ENCOUNTER — CONSULT (OUTPATIENT)
Dept: ENDOCRINOLOGY | Facility: CLINIC | Age: 72
End: 2022-03-23
Payer: MEDICARE

## 2022-03-23 ENCOUNTER — PATIENT OUTREACH (OUTPATIENT)
Dept: HEMATOLOGY ONCOLOGY | Facility: CLINIC | Age: 72
End: 2022-03-23

## 2022-03-23 VITALS
BODY MASS INDEX: 40.35 KG/M2 | DIASTOLIC BLOOD PRESSURE: 72 MMHG | SYSTOLIC BLOOD PRESSURE: 110 MMHG | HEIGHT: 71 IN | HEART RATE: 100 BPM | WEIGHT: 288.25 LBS

## 2022-03-23 DIAGNOSIS — C25.8 OVERLAPPING MALIGNANT NEOPLASM OF PANCREAS (HCC): ICD-10-CM

## 2022-03-23 DIAGNOSIS — E66.01 MORBID OBESITY WITH BMI OF 40.0-44.9, ADULT (HCC): ICD-10-CM

## 2022-03-23 DIAGNOSIS — E11.9 TYPE 2 DIABETES MELLITUS WITHOUT COMPLICATION, WITHOUT LONG-TERM CURRENT USE OF INSULIN (HCC): Primary | ICD-10-CM

## 2022-03-23 PROCEDURE — 99204 OFFICE O/P NEW MOD 45 MIN: CPT | Performed by: INTERNAL MEDICINE

## 2022-03-23 NOTE — PROGRESS NOTES
Koffi Schroeder 66 y o  male MRN: [id number]    Encounter: [id number]      Assessment/Plan     Problem List Items Addressed This Visit        Digestive    Overlapping malignant neoplasm of pancreas [facility])    Relevant Orders    Comprehensive metabolic panel Lab Collect    HEMOGLOBIN A1C W/ EAG ESTIMATION Lab Collect       Endocrine    Type 2 diabetes mellitus without complication, without long-term current use of insulin ([facility] [address] ) - Primary       Lab Results   Component Value Date    HGBA1C 6 2 (H) 03/08/2022   Status post subtotal pancreatectomy for pancreatic adeno and colloid carcinoma  For now sugars appear to controlled with metformin however I do anticipate him requiring insulin over time  Relevant Orders    Comprehensive metabolic panel Lab Collect    HEMOGLOBIN A1C W/ EAG ESTIMATION Lab Collect       Other    Morbid obesity with BMI of 40 0-44 9, adult (HCC)        CC: Diabetes    History of Present Illness     HPI:  78-year-old male with stage I A adeno carcinoma distal pancreas status post distal partial pancreatectomy in March 2019 and June and chemotherapy after that  He underwent subtotal pancreatectomy in February 2022-and was found to have pancreatic choroid carcinoma as well  For diabetes is currently on METFORMIN 1 tab twice daily with meals -sugars were pre diabetes range in 2018 prior to his surgery  Weight loss -25 lbs after  2nd surgery     Takes creon -was having loose stools - the movements have improved since starting creon    Eye exam - 2 years back        Port and starts chemo every other week     Review of Systems    Historical Information   Past Medical History:   Diagnosis Date    A-fib [facility])     Abdominal wall strain     last assessed: 10/21/14    Allergic rhinitis     last assessed: 05/03/16    Arthritis     Cancer ([facility] [address] )     PACNCREATIC    COVID-19 virus infection 3/3/2021    CPAP (continuous positive airway pressure) dependence     Diabetes mellitus (HCC)     Erectile dysfunction of non-organic origin     last assessed: 03/17/14    Irregular heart beat     Pt with A fib     Lumbar strain     last assessed: 10/21/14    Multiple acquired skin tags     last assessed: 2/18/16    Palpitations     last assessed: 03/17/14    Pancreas cyst     Plantar fasciitis     Skin disorder     last assessed: 05/28/13    Sleep apnea     CPAP BROKE IN OCTOBER HAS BEEN TRYING TO GET NEW ONE BUT UNABLE AS OF YET    Thrombocytopenia ([facility] [address] )      Past Surgical History:   Procedure Laterality Date    BOTOX INJECTION N/A 11/12/2021    Procedure: Jose Daniel Nieves;  Surgeon: Ivonne Mccarty MD;  Location: [address] [place] [address] MAIN OR;  Service: Urology    CATARACT EXTRACTION Bilateral     COLONOSCOPY  01/17/2013    COLONOSCOPY  01/08/2018    COLONOSCOPY  04/2021    CYSTOSCOPY      INJECT WITH BOTOX    DISTAL PANCREATECTOMY N/A 2/15/2022    Procedure: PANCREATECTOMY SUB-TOTALL-OPEN;  Surgeon: Cesar Mills MD;  [facility]: [facility] MAIN OR;  Service: Surgical Oncology    EGD  06/05/2020    EGD AND COLONOSCOPY  02/06/2014, 2/8/2017    FL GUIDED CENTRAL VENOUS ACCESS DEVICE INSERTION  4/23/2019    FLEXIBLE SIGMOIDOSCOPY  06/11/2019    FOOT SURGERY      Due to plantar fascitis    IR PORT PLACEMENT  3/30/2022    JOINT REPLACEMENT Left     LTK    LINEAR ENDOSCOPIC U/S      PANCREATECTOMY LAPAROSCOPIC N/A 3/12/2019    Procedure: DISTAL PANCREATECTOMY LAPAROSCOPIC/POSSIBLE OPEN;  Surgeon: Cesar Mills MD;  Location: [facility] MAIN OR;  Service: Surgical Oncology    CT EDG US EXAM SURGICAL ALTER STOM DUODENUM/JEJUNUM N/A 1/24/2019    Procedure: LINEAR ENDOSCOPIC U/S;  Surgeon: Isis Calixto MD;  Location: [facility]; Service: Gastroenterology    REPLACEMENT TOTAL KNEE Left     TUNNELED VENOUS PORT PLACEMENT Left 4/23/2019    Procedure: INSERTION VENOUS PORT (PORT-A-CATH);   Surgeon: Cesar Mills MD;  Location: [facility] MA[facility];  Service: Surgical Oncology- 11/8/21 Pt reports PAC removed     UMBILICAL HERNIA REPAIR Social History   Social History     Substance and Sexual Activity   Alcohol Use Yes    Alcohol/week: 2 0 standard drinks    Types: 2 Cans of beer per week    Comment: couple times per week; Social History     Substance and Sexual Activity   Drug Use No    Comment: Denies      Social History     Tobacco Use   Smoking Status Never Smoker   Smokeless Tobacco Never Used     Family History:   Family History   Problem Relation Age of Onset    Breast cancer Mother     Cervical cancer Paternal Aunt     Liver cancer Other     No Known Problems Father        Meds/Allergies   Current Outpatient Medications   Medication Sig Dispense Refill    acetaminophen (TYLENOL) 325 mg tablet Take 2 tablets (650 mg total) by mouth every 6 (six) hours as needed for mild pain 30 tablet 0    ascorbic acid (VITAMIN C) 1000 MG tablet Take 2,000 mg by mouth daily 11/8/21 Instructed pt to HOLD starting 11/9/21 up to and including DOS   aspirin (ECOTRIN) 325 mg EC tablet Take 325 mg by mouth daily 11/8/21 Pt instructed as per urology instruction sheet - pt reports stopped on 11/7/21  Instructed pt to verify with cardiology regarding stopping of ASA and not starting Eliquis at this time prior to surgery   atenolol (TENORMIN) 25 mg tablet TAKE 1 TABLET BY MOUTH EVERY DAY 90 tablet 3    Cholecalciferol (VITAMIN D) 2000 units CAPS Take 1,000 Units by mouth daily 11/8/21 Pt takes three tabs of Vitamin D daily  Instructed to start HOLD 11/9/21 up to and including DOS   Cyanocobalamin (VITAMIN B 12 PO) Take by mouth daily 11/8/21 Pt reports takes two tabs daily         metFORMIN (GLUCOPHAGE) 1000 MG tablet TAKE 1 TABLET BY MOUTH TWICE A DAY WITH MEALS 180 tablet 1    enoxaparin (LOVENOX) 40 mg/0 4 mL Inject 0 4 mL (40 mg total) under the skin in the morning for 21 days (Patient not taking: Reported on 3/23/2022 ) 8 4 mL 0    [START ON 4/26/2022] fluorouracil 5,855 mg in CADD infusion pump Infuse 5,855 mg (1,200 mg/m2/day x 2 44 m2) into a venous catheter over 46 hours for 2 days  Do not start before April 26, 2022  1 each 0    Na Sulfate-K Sulfate-Mg Sulf (Suprep Bowel Prep Kit) 17 5-3 13-1 6 GM/177ML SOLN Follow office instructions (Patient not taking: Reported on 3/23/2022 ) 1 Bottle 0    pancrelipase, Lip-Prot-Amyl, (CREON) 6,000 units delayed release capsule Take 2 pills, 3 times a day with meals 180 capsule 3    tadalafil (CIALIS) 20 MG tablet TAKE 1 TABLET BY MOUTH DAILY AS NEEDED FOR ERECTILE DYSFUNCTION (Patient not taking: Reported on 3/23/2022) 10 tablet 0     No current facility-administered medications for this visit  No Known Allergies    Objective   Vitals: Blood pressure 110/72, pulse 100, height 5' 11" (1 803 m), weight 131 kg (288 lb 4 oz)  Physical Exam  Vitals reviewed  Constitutional:       Appearance: Normal appearance  He is not ill-appearing or diaphoretic  HENT:      Head: Normocephalic and atraumatic  Eyes:      General: No scleral icterus  Extraocular Movements: Extraocular movements intact  Cardiovascular:      Rate and Rhythm: Normal rate and regular rhythm  Heart sounds: Normal heart sounds  No murmur heard  Pulmonary:      Effort: Pulmonary effort is normal  No respiratory distress  Breath sounds: Normal breath sounds  No wheezing  Abdominal:      General: There is no distension  Palpations: Abdomen is soft  Tenderness: There is no abdominal tenderness  Musculoskeletal:      Cervical back: Neck supple  Right lower leg: No edema  Left lower leg: No edema  Lymphadenopathy:      Cervical: No cervical adenopathy  Skin:     General: Skin is warm and dry  Neurological:      General: No focal deficit present  Mental Status: He is alert and oriented to person, place, and time  Psychiatric:         Mood and Affect: Mood normal          Behavior: Behavior normal          Thought Content:  Thought content normal          Judgment: Judgment normal          The history was obtained from the review of the chart, patient  Lab Results:   Component Ref Range & Units 3/8/22  9:18 AM 1/18/22 10:57 AM 7/9/21  9:55 AM 4/16/21  9:22 AM 1/15/21  9:05 AM 10/14/20 10:36 AM 2/28/20  7:51 AM   Hemoglobin A1C Normal 3 8-5 6%; PreDiabetic 5 7-6 4%;  Diabetic >=6 5%; Glycemic control for adults with diabetes <7 0% % 6 2 High   6 9 High   7 6 High   7 8 Abnormal  R  9 0 Abnormal  R  9 9 Abnormal  R  6 6 High           Lab Results   Component Value Date/Time    Hemoglobin A1C 6 2 (H) 03/08/2022 09:18 AM    Hemoglobin A1C 6 9 (H) 01/18/2022 10:57 AM    Hemoglobin A1C 7 6 (H) 07/09/2021 09:55 AM    WBC 3 52 (L) 04/11/2022 11:59 AM    WBC 4 36 03/11/2022 10:43 AM    WBC 4 86 02/21/2022 04:49 AM    Hemoglobin 12 6 04/11/2022 11:59 AM    Hemoglobin 12 9 03/11/2022 10:43 AM    Hemoglobin 10 8 (L) 02/21/2022 04:49 AM    Hematocrit 36 4 (L) 04/11/2022 11:59 AM    Hematocrit 36 0 (L) 03/11/2022 10:43 AM    Hematocrit 32 4 (L) 02/21/2022 04:49 AM     (H) 04/11/2022 11:59 AM     (H) 03/11/2022 10:43 AM     (H) 02/21/2022 04:49 AM    Platelets 96 (L) 67/71/8724 11:59 AM    Platelets 975 (L) 05/77/4640 10:43 AM    Platelets 227 (L) 40/91/7060 04:49 AM    BUN 10 04/11/2022 11:59 AM    BUN 8 03/08/2022 09:18 AM    BUN 12 02/21/2022 04:49 AM    Potassium 4 1 04/11/2022 11:59 AM    Potassium 4 2 02/21/2022 04:49 AM    Potassium 4 1 02/20/2022 04:33 AM    Chloride 100 04/11/2022 11:59 AM    Chloride 101 02/21/2022 04:49 AM    Chloride 106 02/20/2022 04:33 AM    CO2 25 04/11/2022 11:59 AM    CO2 29 02/21/2022 04:49 AM    CO2 28 02/20/2022 04:33 AM    CO2, i-STAT 26 02/15/2022 06:11 PM    CO2, i-STAT 25 02/15/2022 03:39 PM    CO2, i-STAT 26 02/15/2022 01:24 PM    Creatinine 0 80 04/11/2022 11:59 AM    Creatinine 0 73 03/08/2022 09:18 AM    Creatinine 0 64 02/21/2022 04:49 AM    AST 25 04/11/2022 11:59 AM    AST 25 02/21/2022 04:49 AM    AST 32 01/18/2022 10:57 AM    ALT 29 04/11/2022 11:59 AM    ALT 28 02/21/2022 04:49 AM    ALT 28 01/18/2022 10:57 AM    Albumin 3 1 (L) 04/11/2022 11:59 AM    Albumin 2 6 (L) 02/21/2022 04:49 AM    Albumin 3 3 (L) 01/18/2022 10:57 AM    HDL, Direct 45 07/09/2021 09:55 AM    Triglycerides 55 07/09/2021 09:55 AM           Portions of the record may have been created with voice recognition software  Occasional wrong word or "sound a like" substitutions may have occurred due to the inherent limitations of voice recognition software  Read the chart carefully and recognize, using context, where substitutions have occurred

## 2022-03-23 NOTE — PROGRESS NOTES
Phone outreach to the pt, called to introduce myself and my role  I began to go over the general assessment w the pt but our connection kept going out when speaking over the phone  I will try the pt again at a later time to complete w him  Pt asked me what time should he go for his bloodwork  I told him that there was no specific time, that he can go whenever but that if he was going to a [facility] lab the blood should be drawn within a couple of days before his chemo infusion appt  The pt understood  I provided my contact info for him to keep in case he needed anything or had any questions  He knows to reach out to me

## 2022-03-30 ENCOUNTER — HOSPITAL ENCOUNTER (OUTPATIENT)
Dept: INTERVENTIONAL RADIOLOGY/VASCULAR | Facility: HOSPITAL | Age: 72
Discharge: HOME | End: 2022-03-30
Attending: INTERNAL MEDICINE | Admitting: RADIOLOGY
Payer: MEDICARE

## 2022-03-30 VITALS
BODY MASS INDEX: 39.2 KG/M2 | SYSTOLIC BLOOD PRESSURE: 145 MMHG | WEIGHT: 280 LBS | RESPIRATION RATE: 18 BRPM | TEMPERATURE: 96.9 F | HEART RATE: 75 BPM | OXYGEN SATURATION: 96 % | DIASTOLIC BLOOD PRESSURE: 86 MMHG | HEIGHT: 71 IN

## 2022-03-30 DIAGNOSIS — C25.8 OVERLAPPING MALIGNANT NEOPLASM OF PANCREAS (HCC): ICD-10-CM

## 2022-03-30 PROCEDURE — 36561 INSERT TUNNELED CV CATH: CPT

## 2022-03-30 PROCEDURE — 99152 MOD SED SAME PHYS/QHP 5/>YRS: CPT | Performed by: RADIOLOGY

## 2022-03-30 PROCEDURE — 77001 FLUOROGUIDE FOR VEIN DEVICE: CPT | Performed by: RADIOLOGY

## 2022-03-30 PROCEDURE — C1788 PORT, INDWELLING, IMP: HCPCS

## 2022-03-30 PROCEDURE — 76937 US GUIDE VASCULAR ACCESS: CPT | Performed by: RADIOLOGY

## 2022-03-30 PROCEDURE — 36561 INSERT TUNNELED CV CATH: CPT | Performed by: RADIOLOGY

## 2022-03-30 PROCEDURE — 99152 MOD SED SAME PHYS/QHP 5/>YRS: CPT

## 2022-03-30 PROCEDURE — C1894 INTRO/SHEATH, NON-LASER: HCPCS

## 2022-03-30 PROCEDURE — 99153 MOD SED SAME PHYS/QHP EA: CPT

## 2022-03-30 PROCEDURE — 76937 US GUIDE VASCULAR ACCESS: CPT

## 2022-03-30 RX ORDER — ACETAMINOPHEN 325 MG/1
650 TABLET ORAL EVERY 4 HOURS PRN
Status: DISCONTINUED | OUTPATIENT
Start: 2022-03-30 | End: 2022-03-31 | Stop reason: HOSPADM

## 2022-03-30 RX ORDER — MIDAZOLAM HYDROCHLORIDE 2 MG/2ML
INJECTION, SOLUTION INTRAMUSCULAR; INTRAVENOUS CODE/TRAUMA/SEDATION MEDICATION
Status: COMPLETED | OUTPATIENT
Start: 2022-03-30 | End: 2022-03-30

## 2022-03-30 RX ORDER — FENTANYL CITRATE 50 UG/ML
INJECTION, SOLUTION INTRAMUSCULAR; INTRAVENOUS CODE/TRAUMA/SEDATION MEDICATION
Status: COMPLETED | OUTPATIENT
Start: 2022-03-30 | End: 2022-03-30

## 2022-03-30 RX ORDER — SODIUM CHLORIDE 9 MG/ML
75 INJECTION, SOLUTION INTRAVENOUS CONTINUOUS
Status: DISCONTINUED | OUTPATIENT
Start: 2022-03-30 | End: 2022-03-31 | Stop reason: HOSPADM

## 2022-03-30 RX ADMIN — CEFAZOLIN 3000 MG: 1 INJECTION, POWDER, FOR SOLUTION INTRAMUSCULAR; INTRAVENOUS; PARENTERAL at 14:47

## 2022-03-30 RX ADMIN — MIDAZOLAM 1 MG: 1 INJECTION INTRAMUSCULAR; INTRAVENOUS at 15:28

## 2022-03-30 RX ADMIN — FENTANYL CITRATE 50 MCG: 50 INJECTION, SOLUTION INTRAMUSCULAR; INTRAVENOUS at 15:28

## 2022-03-30 RX ADMIN — SODIUM CHLORIDE 75 ML/HR: 0.9 INJECTION, SOLUTION INTRAVENOUS at 13:54

## 2022-03-30 RX ADMIN — Medication 15 ML: at 15:41

## 2022-03-30 RX ADMIN — Medication 5 ML: at 15:40

## 2022-03-30 RX ADMIN — FENTANYL CITRATE 50 MCG: 50 INJECTION, SOLUTION INTRAMUSCULAR; INTRAVENOUS at 15:33

## 2022-03-30 RX ADMIN — MIDAZOLAM 1 MG: 1 INJECTION INTRAMUSCULAR; INTRAVENOUS at 15:33

## 2022-03-30 NOTE — DISCHARGE INSTRUCTIONS
Implanted Venous Access Port     WHAT YOU NEED TO KNOW:   An implanted venous access port is a device used to give treatments and take blood  It may also be called a central venous access device (CVAD)  The port is a small container that is placed under your skin, usually in your upper chest  The port is attached to a catheter that enters a large vein  DISCHARGE INSTRUCTIONS:   Resume your normal diet  Small sips of flat soda will help with mild nausea  Prevent an infection:   · Wash your hands often  Use soap and water  Clean your hands before and after you care for your port  Remind everyone who cares for your port to wash their hands  · Check your skin for infection every day  Look for redness, swelling, or fluid oozing from the port site  Care for your port:   1  You may shower beginning 48 hours after procedure  2   Leave glue in place  3  It is normal for some bruising to occur  4  Use Tylenol for pain  5  Limit use of arm on the side that your port was placed  Lift nothing heavier than 5 pounds for 1 week, and then gradually increase activity as tolerated  6  DO NOT apply ointment, lotion or cream to port site until incision is healed  Allow glue to fall off  DO NOT attempt to peel glue from skin even it it begins to flake  7  After the port incision is healed you may swim, bathe  Notify the Interventional Radiologist if you have any of the followin  Fever above 101 F    2  Increased redness or swelling after 1st day  3  Increased pain after 1st day  4  Any sign of infection (drainage from port site, skin separation, hot to touch)  5  Persistent nausea or vomiting  Contact Interventional Radiology at [phone] [facility] PATIENTS: Contact Interventional Radiology at [phone]) ([address]: Contact Interventional Radiology at [phone])  Procedural Sedation   WHAT YOU NEED TO KNOW:   Procedural sedation is medicine used during procedures to help you feel relaxed and calm  You will remember little to none of the procedure  After sedation you may feel tired, weak, or unsteady on your feet  You may also have trouble concentrating or short-term memory loss  These symptoms should go away in 24 hours or less  DISCHARGE INSTRUCTIONS:     Call 911 or have someone else call for any of the following:   · You have sudden trouble breathing      · You cannot be woken  Contact Interventional Radiology at [phone]   [facility] PATIENTS: Contact Interventional Radiology at [phone]) [place] PATIENTS: Contact Interventional Radiology at [phone]) if any of the following occur:     · You have a severe headache or dizziness      · Your heart is beating faster than usual     · You have a fever or chills      · Your skin is itchy, swollen, or you have a rash      · You have nausea or are vomiting for more than 8 hours after the procedure       · You have questions or concerns about your condition or care  Self-care:   · Have someone stay with you for 24 hours  This person can drive you to errands and help you do things around the house  This person can also watch for problems       · Rest and do quiet activities for 24 hours  Do not exercise, ride a bike, or play sports  Stand up slowly to prevent dizziness and falls  Take short walks around the house with another person  Slowly return to your usual activities the next day       · Do not drive or use dangerous machines or tools for 24 hours  You may injure yourself or others  Examples include a lawnmower, saw, or drill  Do not return to work for 24 hours if you use dangerous machines or tools for work       · Do not make important decisions for 24 hours  For example, do not sign important papers or invest money       · Drink liquids as directed  Liquids help flush the sedation medicine out of your body   Ask how much liquid to drink each day and which liquids are best for you       · Eat small, frequent meals to prevent nausea and vomiting  Start with clear liquids such as juice or broth  If you do not vomit after clear liquids, you can eat your usual foods       · Do not drink alcohol or take medicines that make you drowsy  This includes medicines that help you sleep and anxiety medicines  Ask your healthcare provider if it is safe for you to take pain medicine  Follow up with your healthcare provider as directed: Write down your questions so you remember to ask them during your visits  Procedural Sedation   WHAT YOU NEED TO KNOW:   Procedural sedation is medicine used during procedures to help you feel relaxed and calm  You will remember little to none of the procedure  After sedation you may feel tired, weak, or unsteady on your feet  You may also have trouble concentrating or short-term memory loss  These symptoms should go away in 24 hours or less  DISCHARGE INSTRUCTIONS:     Call 911 or have someone else call for any of the following:   · You have sudden trouble breathing      · You cannot be woken  Contact Interventional Radiology at [phone]   [place] PATIENTS: Contact Interventional Radiology at [phone]) [facility] PATIENTS: Contact Interventional Radiology at [phone]) if any of the following occur:     · You have a severe headache or dizziness      · Your heart is beating faster than usual     · You have a fever or chills      · Your skin is itchy, swollen, or you have a rash      · You have nausea or are vomiting for more than 8 hours after the procedure       · You have questions or concerns about your condition or care  Self-care:   · Have someone stay with you for 24 hours  This person can drive you to errands and help you do things around the house  This person can also watch for problems       · Rest and do quiet activities for 24 hours  Do not exercise, ride a bike, or play sports  Stand up slowly to prevent dizziness and falls   Take short walks around the house with another person  Slowly return to your usual activities the next day       · Do not drive or use dangerous machines or tools for 24 hours  You may injure yourself or others  Examples include a lawnmower, saw, or drill  Do not return to work for 24 hours if you use dangerous machines or tools for work       · Do not make important decisions for 24 hours  For example, do not sign important papers or invest money       · Drink liquids as directed  Liquids help flush the sedation medicine out of your body  Ask how much liquid to drink each day and which liquids are best for you       · Eat small, frequent meals to prevent nausea and vomiting  Start with clear liquids such as juice or broth  If you do not vomit after clear liquids, you can eat your usual foods       · Do not drink alcohol or take medicines that make you drowsy  This includes medicines that help you sleep and anxiety medicines  Ask your healthcare provider if it is safe for you to take pain medicine  Follow up with your healthcare provider as directed: Write down your questions so you remember to ask them during your visits

## 2022-03-30 NOTE — BRIEF OP NOTE (RAD/CATH)
INTERVENTIONAL RADIOLOGY PROCEDURE NOTE    Date: 3/30/2022    Procedure: IR PORT PLACEMENT    Preoperative diagnosis:   1  Overlapping malignant neoplasm of pancreas (HCC)         Postoperative diagnosis: Same  Surgeon: Stacia Santiago MD     Assistant: None  No qualified resident was available  Blood loss: minimal    Specimens: none     Findings: right chest port placed with image guidance     Complications: None immediate      Anesthesia: conscious sedation

## 2022-04-04 DIAGNOSIS — C25.8 OVERLAPPING MALIGNANT NEOPLASM OF PANCREAS (HCC): Primary | ICD-10-CM

## 2022-04-06 ENCOUNTER — TELEPHONE (OUTPATIENT)
Dept: GENETICS | Facility: CLINIC | Age: 72
End: 2022-04-06

## 2022-04-06 NOTE — TELEPHONE ENCOUNTER
Called pt home to review genetics results  His wife answered and asked that I call his cell phone  Cell phone went to VM   Will call back tomorrow

## 2022-04-07 ENCOUNTER — TELEPHONE (OUTPATIENT)
Dept: GENETICS | Facility: CLINIC | Age: 72
End: 2022-04-07

## 2022-04-07 PROBLEM — Z95.828 PORT-A-CATH IN PLACE: Status: ACTIVE | Noted: 2022-04-07

## 2022-04-07 PROCEDURE — NC001 PR NO CHARGE: Performed by: GENETIC COUNSELOR, MS

## 2022-04-07 NOTE — TELEPHONE ENCOUNTER
Post-Test Genetic Counseling Consult Note     Today I spoke with Ashleigh South over the phone to review the results of his genetic test for hereditary cancer  he met previously with Kirby Hernandez on 3/9 for pre-test counseling  A copy of this consult note and genetic test result will be shared with the patient  SUMMARY:    Test(s): [employer] (36 genes): APC, SIGRID, AXIN2 BARD1, BRCA1, BRCA2, BRIP1, BMPR1A, CDH1, CDK4, CDKN2A, CHEK2, DICER1, EPCAM, GREM1, HOXB13, MLH1, MSH2, MSH3, MSH6, MUTYH, NBN, NF1, NTHL1, PALB2, PMS2, POLD1, POLE, PTEN, RAD51C, RAD51D, RECQL SMAD4, SMARCA4, STK11, TP53      Result: 2 Variants of uncertain significance     Variant 1  APC (c 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>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); heterozygous; uncertain significance     Variant 2  PMS2 EX8_3'UTRdup; heterozygous; uncertain significance     Assessment: A variant of uncertain significance (VUS) means that a change was identified in a specific gene but it cannot be determined whether the variant is associated with an increased risk of cancer or is a harmless genetic change  It is possible that the variant was seen in only a handful of individuals, or there may be conflicting or incomplete information in the medical literature about the variant and its association with hereditary cancer  The significance of the APC and PMS2 variants is currently not known and therefore this test result cannot be used to help determine [facility] cancer risks  Risks and Testing for Family Members:    Genetic testing for these variants is not recommended for relatives who wish to determine their cancer risks for purposes of determining medical management  The presence or absence of these variants in a relative is not clinically meaningful unless the variant is reclassified in the future       The laboratory will continue to accumulate information on these variants and will reclassify them as either a positive or negative genetic test result when they are confident that they have adequate information  As updated information is obtained, we will notify Rigo Meléndez  It is important to note that the majority of variants of uncertain significance are reclassified as likely benign or benign as additional information about the variant becomes available  Despite this result, Shaheen's first-degree relatives may be at increased risk for the cancers based on the family history  We recommend they discuss screening and management recommendations with their healthcare providers  If Rigo Meléndez has any affected family members with a cancer diagnosis, especially at a young age, they may still consider genetic testing  Relatives who wish to pursue genetic testing can reach out to the [address] [place] [address] Road ([id number]) to schedule an appointment or visit www [facility] org to identify a local genetic counselor  Plan:   There are no additional recommendations based on Shaheen's negative result  he should continue cancer screening and medical management as clinically indicated and as determined appropriate by his healthcare providers  VUS Result: Rigo Meléndez was strongly encouraged to contact us regarding any changes in his personal or family history of cancer as these changes could alter our recommendation regarding genetic testing and/or cancer screening  Rigo Meléndez was also encouraged to follow up with us on an annual basis as variant classifications are subject to change

## 2022-04-11 ENCOUNTER — TELEPHONE (OUTPATIENT)
Dept: HEMATOLOGY ONCOLOGY | Facility: CLINIC | Age: 72
End: 2022-04-11

## 2022-04-11 ENCOUNTER — HOSPITAL ENCOUNTER (OUTPATIENT)
Dept: INFUSION CENTER | Facility: CLINIC | Age: 72
Discharge: HOME | End: 2022-04-11
Payer: MEDICARE

## 2022-04-11 ENCOUNTER — TELEPHONE (OUTPATIENT)
Dept: GENETICS | Facility: CLINIC | Age: 72
End: 2022-04-11

## 2022-04-11 DIAGNOSIS — C25.8 OVERLAPPING MALIGNANT NEOPLASM OF PANCREAS (HCC): ICD-10-CM

## 2022-04-11 DIAGNOSIS — C25.2 MALIGNANT NEOPLASM OF TAIL OF PANCREAS (HCC): ICD-10-CM

## 2022-04-11 DIAGNOSIS — Z95.828 PORT-A-CATH IN PLACE: Primary | ICD-10-CM

## 2022-04-11 PROBLEM — T45.1X5A CHEMOTHERAPY INDUCED NEUTROPENIA (HCC): Chronic | Status: ACTIVE | Noted: 2022-01-01

## 2022-04-11 PROBLEM — D70.1 CHEMOTHERAPY INDUCED NEUTROPENIA (HCC): Chronic | Status: ACTIVE | Noted: 2022-01-01

## 2022-04-11 LAB
ALBUMIN SERPL BCP-MCNC: 3.1 G/DL (ref 3.5–5)
ALP SERPL-CCNC: 96 U/L (ref 46–116)
ALT SERPL W P-5'-P-CCNC: 29 U/L (ref 12–78)
ANION GAP SERPL CALCULATED.3IONS-SCNC: 10 MMOL/L (ref 4–13)
AST SERPL W P-5'-P-CCNC: 25 U/L (ref 5–45)
BASOPHILS # BLD AUTO: 0.03 THOUSANDS/ΜL (ref 0–0.1)
BASOPHILS NFR BLD AUTO: 1 % (ref 0–1)
BILIRUB SERPL-MCNC: 0.64 MG/DL (ref 0.2–1)
BUN SERPL-MCNC: 10 MG/DL (ref 5–25)
CALCIUM ALBUM COR SERPL-MCNC: 9.4 MG/DL (ref 8.3–10.1)
CALCIUM SERPL-MCNC: 8.7 MG/DL (ref 8.3–10.1)
CHLORIDE SERPL-SCNC: 100 MMOL/L (ref 100–108)
CO2 SERPL-SCNC: 25 MMOL/L (ref 21–32)
CREAT SERPL-MCNC: 0.8 MG/DL (ref 0.6–1.3)
EOSINOPHIL # BLD AUTO: 0.05 THOUSAND/ΜL (ref 0–0.61)
EOSINOPHIL NFR BLD AUTO: 1 % (ref 0–6)
ERYTHROCYTE [DISTWIDTH] IN BLOOD BY AUTOMATED COUNT: 13.2 % (ref 11.6–15.1)
GFR SERPL CREATININE-BSD FRML MDRD: 89 ML/MIN/1.73SQ M
GLUCOSE SERPL-MCNC: 304 MG/DL (ref 65–140)
HCT VFR BLD AUTO: 36.4 % (ref 36.5–49.3)
HGB BLD-MCNC: 12.6 G/DL (ref 12–17)
IMM GRANULOCYTES # BLD AUTO: 0.01 THOUSAND/UL (ref 0–0.2)
IMM GRANULOCYTES NFR BLD AUTO: 0 % (ref 0–2)
LYMPHOCYTES # BLD AUTO: 0.84 THOUSANDS/ΜL (ref 0.6–4.47)
LYMPHOCYTES NFR BLD AUTO: 24 % (ref 14–44)
MCH RBC QN AUTO: 36.6 PG (ref 26.8–34.3)
MCHC RBC AUTO-ENTMCNC: 34.6 G/DL (ref 31.4–37.4)
MCV RBC AUTO: 106 FL (ref 82–98)
MONOCYTES # BLD AUTO: 0.39 THOUSAND/ΜL (ref 0.17–1.22)
MONOCYTES NFR BLD AUTO: 11 % (ref 4–12)
NEUTROPHILS # BLD AUTO: 2.2 THOUSANDS/ΜL (ref 1.85–7.62)
NEUTS SEG NFR BLD AUTO: 63 % (ref 43–75)
NRBC BLD AUTO-RTO: 0 /100 WBCS
PLATELET # BLD AUTO: 96 THOUSANDS/UL (ref 149–390)
PMV BLD AUTO: 12.9 FL (ref 8.9–12.7)
POTASSIUM SERPL-SCNC: 4.1 MMOL/L (ref 3.5–5.3)
PROT SERPL-MCNC: 7.4 G/DL (ref 6.4–8.2)
RBC # BLD AUTO: 3.44 MILLION/UL (ref 3.88–5.62)
SODIUM SERPL-SCNC: 135 MMOL/L (ref 136–145)
WBC # BLD AUTO: 3.52 THOUSAND/UL (ref 4.31–10.16)

## 2022-04-11 PROCEDURE — 80053 COMPREHEN METABOLIC PANEL: CPT | Performed by: INTERNAL MEDICINE

## 2022-04-11 PROCEDURE — 85025 COMPLETE CBC W/AUTO DIFF WBC: CPT | Performed by: INTERNAL MEDICINE

## 2022-04-11 NOTE — TELEPHONE ENCOUNTER
----- Message from Marjorie Apodaca sent at 4/7/2022 12:16 PM EDT -----  Regarding: complete chart  GC Completed Chart     Result Type: VUS    Result Delivery: [employer] Message    Monthly Review: Does not need monthly review- COMPLETE

## 2022-04-11 NOTE — PROGRESS NOTES
Labs collected via port a cath  Port flushed per protocol  Pt is aware to return tomorrow for chemo   Declined AVS

## 2022-04-11 NOTE — TELEPHONE ENCOUNTER
Medication Refill     Who is Calling  Patient   Medication pancrelipase, Lip-Prot-Amyl, (CREON) 6,000 units delayed release capsule     How many pills left 8   Preferred Pharmacy / Address [facility]     Who is your Physician?  Dr Raquel Pratt    Call back number [phone]   Relevant Information  Patient request that his prescription reflect 2 pills 3x a day     Patient also states he has no refills left

## 2022-04-12 ENCOUNTER — HOSPITAL ENCOUNTER (OUTPATIENT)
Dept: INFUSION CENTER | Facility: CLINIC | Age: 72
Discharge: HOME | End: 2022-04-12
Payer: MEDICARE

## 2022-04-12 VITALS
DIASTOLIC BLOOD PRESSURE: 81 MMHG | HEIGHT: 70 IN | HEART RATE: 86 BPM | WEIGHT: 287.7 LBS | RESPIRATION RATE: 18 BRPM | TEMPERATURE: 97.5 F | BODY MASS INDEX: 41.19 KG/M2 | SYSTOLIC BLOOD PRESSURE: 127 MMHG

## 2022-04-12 DIAGNOSIS — C25.8 OVERLAPPING MALIGNANT NEOPLASM OF PANCREAS (HCC): ICD-10-CM

## 2022-04-12 DIAGNOSIS — D70.1 CHEMOTHERAPY INDUCED NEUTROPENIA (HCC): Primary | ICD-10-CM

## 2022-04-12 DIAGNOSIS — T45.1X5A CHEMOTHERAPY INDUCED NEUTROPENIA (HCC): Primary | ICD-10-CM

## 2022-04-12 PROCEDURE — 96413 CHEMO IV INFUSION 1 HR: CPT

## 2022-04-12 PROCEDURE — 96415 CHEMO IV INFUSION ADDL HR: CPT

## 2022-04-12 PROCEDURE — 96417 CHEMO IV INFUS EACH ADDL SEQ: CPT

## 2022-04-12 PROCEDURE — 96376 TX/PRO/DX INJ SAME DRUG ADON: CPT

## 2022-04-12 PROCEDURE — G0498 CHEMO EXTEND IV INFUS W/PUMP: HCPCS

## 2022-04-12 PROCEDURE — 96375 TX/PRO/DX INJ NEW DRUG ADDON: CPT

## 2022-04-12 PROCEDURE — 96367 TX/PROPH/DG ADDL SEQ IV INF: CPT

## 2022-04-12 RX ORDER — ATROPINE SULFATE 1 MG/ML
0.25 INJECTION, SOLUTION INTRAMUSCULAR; INTRAVENOUS; SUBCUTANEOUS ONCE AS NEEDED
Status: DISCONTINUED | OUTPATIENT
Start: 2022-04-12 | End: 2022-04-15 | Stop reason: HOSPADM

## 2022-04-12 RX ORDER — SODIUM CHLORIDE 9 MG/ML
20 INJECTION, SOLUTION INTRAVENOUS ONCE AS NEEDED
Status: DISCONTINUED | OUTPATIENT
Start: 2022-04-12 | End: 2022-04-15 | Stop reason: HOSPADM

## 2022-04-12 RX ORDER — ATROPINE SULFATE 1 MG/ML
0.25 INJECTION, SOLUTION INTRAMUSCULAR; INTRAVENOUS; SUBCUTANEOUS ONCE
Status: COMPLETED | OUTPATIENT
Start: 2022-04-12 | End: 2022-04-12

## 2022-04-12 RX ORDER — DEXTROSE MONOHYDRATE 50 MG/ML
20 INJECTION, SOLUTION INTRAVENOUS ONCE
Status: COMPLETED | OUTPATIENT
Start: 2022-04-12 | End: 2022-04-12

## 2022-04-12 RX ADMIN — OXALIPLATIN 150 MG: 5 INJECTION, SOLUTION INTRAVENOUS at 09:46

## 2022-04-12 RX ADMIN — IRINOTECAN HYDROCHLORIDE 300 MG: 20 INJECTION, SOLUTION INTRAVENOUS at 11:52

## 2022-04-12 RX ADMIN — ATROPINE SULFATE 0.25 MG: 1 INJECTION, SOLUTION INTRAMUSCULAR; INTRAVENOUS; SUBCUTANEOUS at 12:56

## 2022-04-12 RX ADMIN — ATROPINE SULFATE 0.25 MG: 1 INJECTION, SOLUTION INTRAMUSCULAR; INTRAVENOUS; SUBCUTANEOUS at 11:52

## 2022-04-12 RX ADMIN — DEXTROSE 20 ML/HR: 5 SOLUTION INTRAVENOUS at 09:04

## 2022-04-12 RX ADMIN — DEXAMETHASONE SODIUM PHOSPHATE: 10 INJECTION, SOLUTION INTRAMUSCULAR; INTRAVENOUS at 09:05

## 2022-04-12 NOTE — PROGRESS NOTES
Patient arrived to unit without complaint  Patient tolerated chemotherapy and did have 1 incident of diarrhea while receiving the irinotecan  No further issues after receiving PRN atropine  CADD pump connected and running  Verified with Herman Pratt RN  Patient aware to return in 46 hours for CADD disconnect  CADD education provided to patient and spouse and all questions answered  AVS provided and patient left in stable condition

## 2022-04-14 ENCOUNTER — HOSPITAL ENCOUNTER (OUTPATIENT)
Dept: INFUSION CENTER | Facility: CLINIC | Age: 72
Discharge: HOME | End: 2022-04-14
Payer: MEDICARE

## 2022-04-14 VITALS — TEMPERATURE: 99.3 F

## 2022-04-14 DIAGNOSIS — D70.1 CHEMOTHERAPY INDUCED NEUTROPENIA (HCC): Primary | ICD-10-CM

## 2022-04-14 DIAGNOSIS — C25.8 OVERLAPPING MALIGNANT NEOPLASM OF PANCREAS (HCC): ICD-10-CM

## 2022-04-14 DIAGNOSIS — T45.1X5A CHEMOTHERAPY INDUCED NEUTROPENIA (HCC): Primary | ICD-10-CM

## 2022-04-14 PROCEDURE — 96372 THER/PROPH/DIAG INJ SC/IM: CPT

## 2022-04-14 RX ADMIN — PEGFILGRASTIM 6 MG: KIT SUBCUTANEOUS at 11:57

## 2022-04-14 NOTE — PROGRESS NOTES
Pt here for CADD d/c   CADD pump removed and port flushed per protocol  Neulasta onpro applied and instructions given on same  Pt declined AVS but is aware of his next appt

## 2022-04-18 ENCOUNTER — TELEPHONE (OUTPATIENT)
Dept: HEMATOLOGY ONCOLOGY | Facility: CLINIC | Age: 72
End: 2022-04-18

## 2022-04-18 DIAGNOSIS — D70.1 CHEMOTHERAPY INDUCED NEUTROPENIA (HCC): Primary | ICD-10-CM

## 2022-04-18 DIAGNOSIS — C25.8 OVERLAPPING MALIGNANT NEOPLASM OF PANCREAS (HCC): ICD-10-CM

## 2022-04-18 DIAGNOSIS — T45.1X5A CHEMOTHERAPY INDUCED NEUTROPENIA (HCC): Primary | ICD-10-CM

## 2022-04-18 NOTE — TELEPHONE ENCOUNTER
Appointment Cancellation Or Reschedule     Person calling in Patient    Provider Dr Sebas Lai   Office Visit Date and Time  4/18/22 10:40 am    Office Visit Location [facility]   Did patient want to reschedule their office appointment? If so, when was it scheduled to? Yes  4/25/22 1:40 pm   Is this patient calling to reschedule an infusion appointment? no   When is their next infusion appointment? n/a   Is this patient a Chemo patient? yes   Reason for Cancellation or Reschedule Patient stating not feeling well  If the patient is a treatment patient, please route this to the office nurse  If the patient is not on treatment, please route to the office MA

## 2022-04-19 DIAGNOSIS — E11.9 TYPE 2 DIABETES MELLITUS WITHOUT COMPLICATION, WITHOUT LONG-TERM CURRENT USE OF INSULIN (HCC): Primary | ICD-10-CM

## 2022-04-19 DIAGNOSIS — E11.65 TYPE 2 DIABETES MELLITUS WITH HYPERGLYCEMIA, WITHOUT LONG-TERM CURRENT USE OF INSULIN (HCC): Primary | ICD-10-CM

## 2022-04-19 RX ORDER — BLOOD SUGAR DIAGNOSTIC
STRIP MISCELLANEOUS
Qty: 100 EACH | Refills: 1 | Status: SHIPPED | OUTPATIENT
Start: 2022-04-19 | End: 2022-06-16 | Stop reason: SDUPTHER

## 2022-04-19 RX ORDER — LANCETS 33 GAUGE
EACH MISCELLANEOUS
Qty: 100 EACH | Refills: 1 | Status: SHIPPED | OUTPATIENT
Start: 2022-04-19 | End: 2022-07-19 | Stop reason: SDUPTHER

## 2022-04-19 RX ORDER — BLOOD-GLUCOSE METER
EACH MISCELLANEOUS
Qty: 1 KIT | Refills: 0 | Status: ON HOLD | OUTPATIENT
Start: 2022-04-19

## 2022-04-19 RX ORDER — BLOOD-GLUCOSE CONTROL, NORMAL
EACH MISCELLANEOUS
Qty: 1 EACH | Refills: 0 | Status: ON HOLD | OUTPATIENT
Start: 2022-04-19

## 2022-04-19 NOTE — TELEPHONE ENCOUNTER
Spoke to pt  Notified him of your message  He said he has excessive thirst and dry mouth  He uses a Cpap at night and thinks that's why he had dry mouth  I mentioned to him about testing BG levels  Per insurance since he's not on insulin he can only test BG 1x/day  I mentioned to him about changing times he test so we can determine what his bloods sugars are doing  Mentioned that he must test before meals or a bed  He will send in BG logs for review if BG levels are consistently elevated  Please authorize attached Rx's

## 2022-04-19 NOTE — TELEPHONE ENCOUNTER
----- Message from Kimber Rose MD sent at 4/19/2022 12:39 PM EDT -----  Blood glucose was above 300 on the labs on April 11th  Does he have a glucometer at home, is he checking his blood sugars    Any symptoms of excessive thirst, urination etc ?

## 2022-04-19 NOTE — TELEPHONE ENCOUNTER
Spoke to pt  He does not have a glucometer and is not checking any blood sugars  He did mention he's always thirsty  He didn't know 300 was a bad blood sugar level

## 2022-04-19 NOTE — TELEPHONE ENCOUNTER
Yes 300 is high - when I had seen him last month - sugar was lower   As we discussed on visit - after pancreatic surgery some patients may require insulin therapy if sugars are too high   Some symptoms of high sugars are excessive thirst , excessive urination and dry mouth and unintentional weight loss  He was not having these symptoms when I had seen him - so is the excessive thirst new since last visit ? Anyways we need to figure out if the 300 on blood test was one time thing or sugars are consistently high     Please send in script for meter/strips   I will put in referral for diabetes educator and nutrition eval   Continue metformin for now however if sugars are consistently high will need insulin therapy

## 2022-04-19 NOTE — ASSESSMENT & PLAN NOTE
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

## 2022-04-21 ENCOUNTER — TELEPHONE (OUTPATIENT)
Dept: ENDOCRINOLOGY | Facility: CLINIC | Age: 72
End: 2022-04-21

## 2022-04-21 NOTE — TELEPHONE ENCOUNTER
Pt called and stated he was unable to obtain testing supplies and the pharmacy gave him some papers to fill out  Also, he does not know how to use a glucometer  advised I will have education dept call him to set up an appt for teaching  Jermain, spoke to pharmacist  She advised that she had to order all the supplies and they will be in today and patient is to complete the paperwork per medicare  Called pt back and provided above info

## 2022-04-21 NOTE — ADDENDUM NOTE
Encounter addended by: Denis Isbell, Pharmacist on: 4/21/2022 7:25 AM   Actions taken: [employer]ent created or edited

## 2022-04-25 ENCOUNTER — HOSPITAL ENCOUNTER (OUTPATIENT)
Dept: INFUSION CENTER | Facility: CLINIC | Age: 72
Discharge: HOME | End: 2022-04-25
Payer: MEDICARE

## 2022-04-25 ENCOUNTER — OFFICE VISIT (OUTPATIENT)
Dept: HEMATOLOGY ONCOLOGY | Facility: CLINIC | Age: 72
End: 2022-04-25
Payer: MEDICARE

## 2022-04-25 VITALS
WEIGHT: 283 LBS | BODY MASS INDEX: 40.52 KG/M2 | TEMPERATURE: 97.8 F | HEIGHT: 70 IN | SYSTOLIC BLOOD PRESSURE: 118 MMHG | HEART RATE: 90 BPM | RESPIRATION RATE: 16 BRPM | DIASTOLIC BLOOD PRESSURE: 80 MMHG

## 2022-04-25 DIAGNOSIS — C25.8 OVERLAPPING MALIGNANT NEOPLASM OF PANCREAS (HCC): ICD-10-CM

## 2022-04-25 DIAGNOSIS — T45.1X5A CHEMOTHERAPY INDUCED NEUTROPENIA (HCC): Primary | ICD-10-CM

## 2022-04-25 DIAGNOSIS — Z95.828 PORT-A-CATH IN PLACE: ICD-10-CM

## 2022-04-25 DIAGNOSIS — D70.1 CHEMOTHERAPY INDUCED NEUTROPENIA (HCC): Primary | ICD-10-CM

## 2022-04-25 LAB
ALBUMIN SERPL BCP-MCNC: 3.3 G/DL (ref 3.5–5)
ALP SERPL-CCNC: 104 U/L (ref 46–116)
ALT SERPL W P-5'-P-CCNC: 42 U/L (ref 12–78)
ANION GAP SERPL CALCULATED.3IONS-SCNC: 9 MMOL/L (ref 4–13)
AST SERPL W P-5'-P-CCNC: 31 U/L (ref 5–45)
BASOPHILS # BLD AUTO: 0.02 THOUSANDS/ΜL (ref 0–0.1)
BASOPHILS NFR BLD AUTO: 1 % (ref 0–1)
BILIRUB SERPL-MCNC: 0.64 MG/DL (ref 0.2–1)
BUN SERPL-MCNC: 12 MG/DL (ref 5–25)
CALCIUM ALBUM COR SERPL-MCNC: 9.6 MG/DL (ref 8.3–10.1)
CALCIUM SERPL-MCNC: 9 MG/DL (ref 8.3–10.1)
CHLORIDE SERPL-SCNC: 100 MMOL/L (ref 100–108)
CO2 SERPL-SCNC: 25 MMOL/L (ref 21–32)
CREAT SERPL-MCNC: 0.76 MG/DL (ref 0.6–1.3)
EOSINOPHIL # BLD AUTO: 0.06 THOUSAND/ΜL (ref 0–0.61)
EOSINOPHIL NFR BLD AUTO: 2 % (ref 0–6)
ERYTHROCYTE [DISTWIDTH] IN BLOOD BY AUTOMATED COUNT: 13.1 % (ref 11.6–15.1)
GFR SERPL CREATININE-BSD FRML MDRD: 91 ML/MIN/1.73SQ M
GLUCOSE SERPL-MCNC: 210 MG/DL (ref 65–140)
HCT VFR BLD AUTO: 36.5 % (ref 36.5–49.3)
HGB BLD-MCNC: 12.9 G/DL (ref 12–17)
IMM GRANULOCYTES # BLD AUTO: 0.01 THOUSAND/UL (ref 0–0.2)
IMM GRANULOCYTES NFR BLD AUTO: 0 % (ref 0–2)
LYMPHOCYTES # BLD AUTO: 0.96 THOUSANDS/ΜL (ref 0.6–4.47)
LYMPHOCYTES NFR BLD AUTO: 26 % (ref 14–44)
MCH RBC QN AUTO: 36.5 PG (ref 26.8–34.3)
MCHC RBC AUTO-ENTMCNC: 35.3 G/DL (ref 31.4–37.4)
MCV RBC AUTO: 103 FL (ref 82–98)
MONOCYTES # BLD AUTO: 0.45 THOUSAND/ΜL (ref 0.17–1.22)
MONOCYTES NFR BLD AUTO: 12 % (ref 4–12)
NEUTROPHILS # BLD AUTO: 2.17 THOUSANDS/ΜL (ref 1.85–7.62)
NEUTS SEG NFR BLD AUTO: 59 % (ref 43–75)
NRBC BLD AUTO-RTO: 0 /100 WBCS
PLATELET # BLD AUTO: 90 THOUSANDS/UL (ref 149–390)
PMV BLD AUTO: 12.8 FL (ref 8.9–12.7)
POTASSIUM SERPL-SCNC: 4.1 MMOL/L (ref 3.5–5.3)
PROT SERPL-MCNC: 7.5 G/DL (ref 6.4–8.2)
RBC # BLD AUTO: 3.53 MILLION/UL (ref 3.88–5.62)
SODIUM SERPL-SCNC: 134 MMOL/L (ref 136–145)
WBC # BLD AUTO: 3.67 THOUSAND/UL (ref 4.31–10.16)

## 2022-04-25 PROCEDURE — 80053 COMPREHEN METABOLIC PANEL: CPT | Performed by: INTERNAL MEDICINE

## 2022-04-25 PROCEDURE — 85025 COMPLETE CBC W/AUTO DIFF WBC: CPT | Performed by: INTERNAL MEDICINE

## 2022-04-25 PROCEDURE — 99214 OFFICE O/P EST MOD 30 MIN: CPT | Performed by: INTERNAL MEDICINE

## 2022-04-25 NOTE — PROGRESS NOTES
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, high-grade dysplasia, no evidence of lymphovascular invasion, no evidence of perineural invasion, 1 regional lymph node negative for carcinoma and the margins were negative (pT1b, pN0, grade 1) status post adjuvant chemotherapy with FOLFIRINOX for 4 cycles finished in June, 2019  He was watched with close surveillance MRI abdomen scans and required a subtotal pancreatectomy on February 15, 2022 by Dr Niurka Zarco which revealed 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 associated with IPMN of high-grade dysplasia  0/15 LN involved  + Margin with HG-dysplasia and invasive carcinoma  He is now pathologic Stage IA; ypT1b(m) pN0 cM0  Clips left at pancreatic margin and further surgery would require completion pancreatectomy  The two patterns of pathogenesis for pancreatic colloid carcinoma reported include progression from regular ductal adenocarcinoma, a subtype of invasive pancreatic ductal carcinoma or progression from papillary adenocarcinoma derived from intraductal papillary mucinous neoplasm (IPMN) or mucinous cystic neoplasm (MCN) (Two cases of pancreatic colloid carcinoma with different pathogenesis: case report and review of the literature - PubMed (nih gov))  Remnant total pancreatectomy is an option in some of these cases when fit patients are able to tolerate it  I reviewed Arash's diagnosis of colloid carcinoma, which is a more uncommon histology of pancreatic cancer without RCT/NCCN guidelines to guide treatment management       Per the 3/17 GI tumor board recs, plan is for 4 months FOLFORINOX and then 5-FU + RT thereafter  I discussed with the patient the benefits of getting a 2nd opinion to determine whether he has definitive surgical options as well as the proposed adjuvant chemotherapy and radiation plan for this very rare pancreatic cancer  He is in agreement to this but has not been able to make it out to [facility] but is scheduled to see them in a few weeks  He has an indwelling port  We will proceed with systemic treatment, C2 coming up   [facility] appt with Dr Klaudia Joseph scheduled 6/28 to plan RT component of concurrent CRT after induction chemotherapy completed  -[facility] visits scheduled along with need for preceding lab-work  -F/u with me q 4 weeks while on systemic treatment    2  Malabsorption and greasy diarrhea, he is on Creon    3  Visual disturbance: has been seeing an optometrist    4  Thrombocytopenia: present since 3/14/2019, unclear etiology but Vit B12 noted to be borderline low at 253 on 9/7/2017, although he is on Vit B12  Plt improved to 142k on 3/11/22, Hgb up to 12 9,   3/11/22 Smear: No immature left shift or circulating blasts  Low normal hemoglobin with no significant anisopoikilocytosis  Rare schistocyte seen on scan  Mild thrombocytopenia with occasional large platelet  Folate and B12 no deficient  3/11/2022: Plt improved to 142k, Hgb up to 12 9,         5  Macrocytic anemia: may be 2/2 nutritional deficiency vs liver disease vs BMB infiltrative disease   He is on Vit B12    on 2/21/2022      HPI:  Driss Coppola is a 66y o  year-old  male seen initially 4/11/2019 regarding Stage I adenocarcinoma of the body and the distal pancreas status post resection referred by surgical oncology  Sánchez Molinamoises was experiencing several months of vague abdominal pain   CT scan 12/5/2018  revealed a slightly dilated pancreatic duct as well as multiple pancreatic cysts        MRI abdomen 1/2/2019:  Innumerable pancreatic cysts, mostly in the tail the pancreas, measuring up to 2 cm in diameter   Dilatation of the main pancreatic duct, up to 1 cm in diameter   Several enlarged peripancreatic and portacaval lymph nodes      EUS  January 2019 with numerous cyst in the tail of the pancreas the largest 1 measuring 3 cm with dilatation of the main pancreatic duct to 10 mm at the body of the pancreas, the needle was used to puncture the cysts at the tail of the pancreas with for past this performed, cytology showed atypical changes category 3 with cluster of atypical mucinous epithelial cells suggestive of neoplastic process     Status post distal pancreatectomy on 03/12/2019, final pathology 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), well-differentiated measuring between 0 5 cm to 1 cm with several foci of invasive carcinoma  present the largest 0 6 cm   The tumor is confined to the pancreas with IPMN with high-grade dysplasia  present at the margin of the resection, no evidence of lymphovascular invasion, no evidence of perineural invasion, 1 lymph node negative for carcinoma with background of extensive chronic pancreatitis at least stage IA (pT1b, pN0, grade 1)  Treated with FOLFIRINOX for 4 cycles in June 2019    CT scan in October 2019 showed no evidence of disease, he has upper abdomen lymphadenopathy stable from 2019 measuring 2 cm      Interval History:    He has moderate fatigue since his surgery in early 2022 as well as compounded over the years  He has regular BM  He has a fair appetite  He ambulates well without issue  No trouble swallowing or new neuropathy  No other new acute issues at this time  Previous Treatment in 2019:    FOLFIRINOX x 4 cycles (dose reduced to Irinotecan 300mg, Oxalip 60mg/m2 due to tolerance issues)    Test Results:    Imaging: No results found      PMHx: A-fib on Atenolol (not on OAC)   DMII: on Metformin  Denies HTN, HLP, CHF      Labs:   Lab Results   Component Value Date    WBC 3 52 (L) 04/11/2022    HGB 12 6 04/11/2022    HCT 36 4 (L) 04/11/2022     (H) 04/11/2022    PLT 96 (L) 04/11/2022     Lab Results   Component Value Date     08/22/2017    K 4 1 04/11/2022     04/11/2022    CO2 25 04/11/2022    BUN 10 04/11/2022    CREATININE 0 80 04/11/2022    GLUCOSE 158 (H) 02/15/2022    GLUF 184 (H) 01/18/2022    CALCIUM 8 7 04/11/2022    CORRECTEDCA 9 4 04/11/2022    AST 25 04/11/2022    ALT 29 04/11/2022    ALKPHOS 96 04/11/2022    PROT 6 6 08/22/2017    BILITOT 0 4 08/22/2017    EGFR 89 04/11/2022       No results found for: IRON, TIBC, FERRITIN    Lab Results   Component Value Date    VREKRRCF18 2,772 (H) 03/11/2022         ROS: Review of Systems   Constitutional: Negative  Negative for appetite change, chills, diaphoresis, fatigue, fever and unexpected weight change  HENT:   Negative for hearing loss, lump/mass, mouth sores, nosebleeds, sore throat, trouble swallowing and voice change  Eyes: Positive for eye problems ( which will problem bilaterally not able to see the fine print)  Negative for icterus  Respiratory: Negative  Negative for chest tightness, cough, hemoptysis and shortness of breath  Cardiovascular: Negative for chest pain and leg swelling  Gastrointestinal: Positive for soft BM but not watery  Negative for abdominal distention, abdominal pain, blood in stool, constipation and nausea  Endocrine: Negative  Genitourinary: Negative for dysuria, frequency, hematuria and pelvic pain  Musculoskeletal: Negative  Negative for arthralgias, back pain, flank pain, gait problem, myalgias and neck stiffness  Skin: Negative for itching and rash  Neurological: Negative for dizziness, gait problem, headaches, light-headedness, numbness and speech difficulty, peripheral extremity numbness/tingly  Hematological: Negative for adenopathy  Does not bruise/bleed easily  Psychiatric/Behavioral: Negative for confusion, decreased concentration, depression and sleep disturbance  The patient is not nervous/anxious  10 point ROS reviewed and negative unless stated otherwise above  Current Medications: Reviewed, on Atenolol, Creon, Metformin  Allergies: Reviewed  PMH/FH/SH:  Reviewed      Physical Exam:    Body surface area is 2 42 meters squared  Wt Readings from Last 3 Encounters:   04/25/22 128 kg (283 lb)   04/12/22 130 kg (287 lb 11 2 oz)   03/30/22 127 kg (280 lb)        Temp Readings from Last 3 Encounters:   04/25/22 97 8 °F (36 6 °C) (Temporal)   04/14/22 99 3 °F (37 4 °C) (Tympanic)   04/12/22 97 5 °F (36 4 °C) (Temporal)        BP Readings from Last 3 Encounters:   04/25/22 118/80   04/12/22 127/81   03/30/22 145/86         Pulse Readings from Last 3 Encounters:   04/25/22 90   04/12/22 86   03/30/22 75        Physical Exam  Vitals reviewed  Constitutional:       General: He is not in acute distress  Appearance: He is well-developed  He is not diaphoretic  HENT:      Head: Normocephalic and atraumatic  Eyes:      Conjunctiva/sclera: Conjunctivae normal    Neck:      Trachea: No tracheal deviation  Cardiovascular:      Rate and Rhythm: Normal rate and regular rhythm  Heart sounds: No murmur heard  No friction rub  No gallop  Pulmonary:      Effort: Pulmonary effort is normal  No respiratory distress  Breath sounds: Normal breath sounds  No wheezing or rales  Chest:      Chest wall: No tenderness  Abdominal:      General: There is no distension  Palpations: Abdomen is soft  Tenderness: There is no abdominal tenderness  Comments: Obese   Musculoskeletal:      Cervical back: Normal range of motion and neck supple  Lymphadenopathy:      Cervical: No cervical adenopathy  Skin:     General: Skin is warm and dry  Coloration: Skin is not pale  Findings: No erythema  Neurological:      Mental Status: He is alert and oriented to person, place, and time     Psychiatric:         Behavior: Behavior normal          Thought Content: Thought content normal          Judgment: Judgment normal        12/10/2021 MRI Abdomen w/wo contrast:  Enlarging cystic lesion within the most distal residual pancreas with associated interval enlargement of the main pancreatic duct  Findings are suggestive of enlarging IPMN  with main duct involvement versus primary main duct IPMN  Somewhat nodular hepatic contour again seen potentially reflecting underlying hepatocellular disease  Hepatic steatosis  Stable peripancreatic and aortocaval lymph nodes  1/3/22 EGD/EUS  IMPRESSION:  EGD:   1  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  2  Small hiatus hernia  3  Normal duodenum  EUS:   3 cm heterogenous cyst with solid component/debris in the distal pancreas at the margin of resection with communication with the main duct  This was biopsied with FNB needle and thick mucinous     2/15/22 Pathology from subtotal pancreatectomy:  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 (largest focus 0 8 cm) arising in association with intraductal papillary mucinous neoplasm (IPMN) with high-grade dysplasia  - Surgical margin positive for colloid carcinoma and IPMN with high-grade dysplasia  - Fifteen (15) lymph nodes, negative for carcinoma  - Therapy effect noted in background pancreas (fibrosis and inflammation)  ECO    Goals and Barriers:  Current Goal: Minimize effects of disease  Barriers: None  Patient's Capacity to Self Care:  Patient is able to self care      Code Status: not addressed today      Norm Santana MD

## 2022-04-25 NOTE — PROGRESS NOTES
Type 2 Diabetes Class Assessment    HPI: Met with [facility] Call for DSME Initial visit  Dejuan Castellanos has Type 2 Diabetes  Pt had subtotal pancreatectomy in february    Diabetes Assessment  Visit Type: Initial visit  Present at Session: [facility] Diagnosis/ICD 10: E11 9  Special Learning Needs: No  Barriers to Learning: no barriers    How do you feel about making lifestyle changes at this time? Very good  How would you rate your current knowledge of diabetes? poor  How confident are you that will be able to take better control of your diabetes?: very good    How long have you had diabetes? Had subtotal pancreatectomy   Have you had diabetes education in the past?: No  Do you have any family members with diabetes?: No  Do you monitor your blood sugar? Yes once daily  Type of blood sugar monitor:one touch verio  How old is your meter?: brand new  How often do you test your blood sugars?:daily at various times of the day  Educated on testing fasting, before meals or 2 hours after meals  Do you keep a written record of your blood sugars? Yes , brought with him today  Blood sugar log with patient today and reviewed by educator?: Yes   Blood Sugar ranges:    Fastin-385   Before meals:280-344   approx 2 hours after meals:440  Any financial concerns pertaining to your diabetes supplies, medication or care?: No  Have you ever experienced hypoglycemia?:  No  Unaware as he previously wasn't testing and sometimes feels same s/s post chemo treatment  Have you ever been hospitalized or gone to the ER due to your blood sugars?: No  How do you treat low blood sugars?: educated today  How do you treat high blood sugars? Educated today  Do you wear a Diabetes I D ?: no  Recommended today  Where do you dispose of your sharps (needles,lancetes)?: educated today    Ht Readings from Last 1 Encounters:   22 5' 10 35" (1 787 m)     Wt Readings from Last 1 Encounters:   22 130 kg (287 lb 11 2 oz)       bmi   Weight Change: No    Diet Assessment    Do you follow any special diet presently?: No  Who cooks at home?:  spouse  Who does the grocery shopping?: spouse   How frequently do you eat out?: 4 times a week    Activity Assessment    Exercise: none, post surgery and receiving chemo currently    Lifestyle/Social Assessment    Racial/ethnic group:                                       Primary Language: English  Marital Status:   Education Level: Some College (No Degree)  Work status: Part Time  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  Who lives in your household?: spouse and pets  Who is you primary support person(s): spouse   Describe your quality of life currently?: good  Any concerns for your safety?: No  Any Mandaen or cultural practices that may affect your diabetes care: No  Do you have a decrease or loss of hearing?: No  Do you have a decrease or loss of vision?: Yes  Wears glasses with bifocals  When was the last time you had an ophthalmology exam?: 2021 educated on yearly dilated eye exams  When was the last time you had dental exam?: 5 years ago  Do you check your feet for cracks, sores, debris?: Yes  Proper education and pamphlet given toda  When was the last time you had podiatry or foot exam?: is due for one soon  Usually goes yearly (6/23/21)  Last flu shot?:  Fall 2021  Pneumonia shot?: Never      Lab Results   Component Value Date    HGBA1C 6 2 (H) 03/08/2022     Lab Results   Component Value Date    CHOL 208 (H) 05/07/2015    HDL 45 07/09/2021    LDLCALC 99 07/09/2021    TRIG 55 07/09/2021     No results found for: Mahesh Agnes     4/25/22  A1c was 9 2    The patient's history was reviewed and updated as appropriate: allergies, current medications  Intervention    Diabetes Overview :   Alejandro Bradford was instructed on basic concepts of diabetes, including identifying role of diabetes self management, basic pathophysiology and types of diabetes, A1c and blood sugar targets   Alejandro Bradford has good understanding of material covered  Taking Medications: Instructed patient on action, side effects, efficacy, prescribed dosage and appropriate timing and frequency of administration of his diabetes medication  Diana Bence has good understanding of material covered  Currently taking Metformin    Monitoring Blood Sugars   Patient has good understanding and has been testing at home        Testing frequency: Encouraged pair testing  Test sugars before a meal and 2hr after the same meal, rotating between breakfast, lunch, and dinner  Test sugars twice a day (3 days a week, 7 days a week)  - educated    Goal Blood Sugars:   Premeal , even better <110  2hr after a meal <180, even better <140  A1C <7%, even better <6 5%  Hypoglycemia: Instructed patient on definition/risk of hypoglycemia, treatment, causes/symptoms, when to notify provider of lows, prevention of hypoglycemia and exercise precautions  Comments: Diana Bence verbalizes understanding of hypoglycemia concepts      Physical Activity: Discussed benefits of physical activity to optimize blood glucose control, encouraged activity at patient is physically able  Always consult a physician prior to starting an exercise program   Comments: Diana Bence verbalizes understanding of hypoglycemia concepts        Diabetes Education Record  Diana Bence received the following handouts: LWD Class Assessment handouts folder, class schedule, goals setting form      Patient response to instruction    Comprehensionexcellent  Motivationexcellent  Expected Complianceexcellent  Response to Teachback: 100%, demonstrated understanding    Begin Time: [facility]  End Time:4845  Referring Provider: Carrie    Thank you for referring your patient to [facility], it was a pleasure working with them today  Please feel free to call with any questions or concerns      Sergio Luis Enrique, RN  [address]  [address] [facility] [id number]

## 2022-04-25 NOTE — PROGRESS NOTES
Pt  Denies new symptoms or concerns today  Labs obtained as ordered including labs for UAP  Labs obtained vfa port a cath  Excellent blood return noted  Pt  Will return tomorrow as scheduled  AVS declined

## 2022-04-26 ENCOUNTER — HOSPITAL ENCOUNTER (OUTPATIENT)
Dept: INFUSION CENTER | Facility: CLINIC | Age: 72
Discharge: HOME | End: 2022-04-26
Payer: MEDICARE

## 2022-04-26 VITALS
SYSTOLIC BLOOD PRESSURE: 127 MMHG | DIASTOLIC BLOOD PRESSURE: 87 MMHG | WEIGHT: 282.74 LBS | BODY MASS INDEX: 40.48 KG/M2 | RESPIRATION RATE: 18 BRPM | HEART RATE: 80 BPM | TEMPERATURE: 96.4 F | HEIGHT: 70 IN

## 2022-04-26 DIAGNOSIS — C25.8 OVERLAPPING MALIGNANT NEOPLASM OF PANCREAS (HCC): ICD-10-CM

## 2022-04-26 DIAGNOSIS — D70.1 CHEMOTHERAPY INDUCED NEUTROPENIA (HCC): Primary | ICD-10-CM

## 2022-04-26 DIAGNOSIS — T45.1X5A CHEMOTHERAPY INDUCED NEUTROPENIA (HCC): Primary | ICD-10-CM

## 2022-04-26 PROCEDURE — 96375 TX/PRO/DX INJ NEW DRUG ADDON: CPT

## 2022-04-26 PROCEDURE — G0498 CHEMO EXTEND IV INFUS W/PUMP: HCPCS

## 2022-04-26 PROCEDURE — 96413 CHEMO IV INFUSION 1 HR: CPT

## 2022-04-26 PROCEDURE — 96367 TX/PROPH/DG ADDL SEQ IV INF: CPT

## 2022-04-26 PROCEDURE — 96417 CHEMO IV INFUS EACH ADDL SEQ: CPT

## 2022-04-26 PROCEDURE — 96415 CHEMO IV INFUSION ADDL HR: CPT

## 2022-04-26 RX ORDER — ATROPINE SULFATE 1 MG/ML
0.25 INJECTION, SOLUTION INTRAMUSCULAR; INTRAVENOUS; SUBCUTANEOUS ONCE AS NEEDED
Status: DISCONTINUED | OUTPATIENT
Start: 2022-04-26 | End: 2022-04-29 | Stop reason: HOSPADM

## 2022-04-26 RX ORDER — ATROPINE SULFATE 1 MG/ML
0.25 INJECTION, SOLUTION INTRAMUSCULAR; INTRAVENOUS; SUBCUTANEOUS ONCE
Status: COMPLETED | OUTPATIENT
Start: 2022-04-26 | End: 2022-04-26

## 2022-04-26 RX ORDER — SODIUM CHLORIDE 9 MG/ML
20 INJECTION, SOLUTION INTRAVENOUS ONCE AS NEEDED
Status: DISCONTINUED | OUTPATIENT
Start: 2022-04-26 | End: 2022-04-29 | Stop reason: HOSPADM

## 2022-04-26 RX ORDER — DEXTROSE MONOHYDRATE 50 MG/ML
20 INJECTION, SOLUTION INTRAVENOUS ONCE
Status: COMPLETED | OUTPATIENT
Start: 2022-04-26 | End: 2022-04-26

## 2022-04-26 RX ADMIN — OXALIPLATIN 150 MG: 5 INJECTION, SOLUTION INTRAVENOUS at 09:43

## 2022-04-26 RX ADMIN — ONDANSETRON: 2 INJECTION INTRAMUSCULAR; INTRAVENOUS at 09:03

## 2022-04-26 RX ADMIN — ATROPINE SULFATE 0.25 MG: 1 INJECTION, SOLUTION INTRAMUSCULAR; INTRAVENOUS; SUBCUTANEOUS at 11:40

## 2022-04-26 RX ADMIN — DEXTROSE 20 ML/HR: 5 SOLUTION INTRAVENOUS at 09:02

## 2022-04-26 RX ADMIN — IRINOTECAN HYDROCHLORIDE 300 MG: 20 INJECTION, SOLUTION INTRAVENOUS at 11:49

## 2022-04-26 NOTE — PROGRESS NOTES
Pt presents for oxaliplatin and irinotecan infusions, 5FU CADD connect  No new meds or concerns  Pt's diarrhea is controlled with imodium  Pt tolerated infusion without adverse reaction  CADD pump connected, pt is aware of trouble shooting and when to return for disconnect  Future visits discussed  AVS given

## 2022-04-28 ENCOUNTER — HOSPITAL ENCOUNTER (OUTPATIENT)
Dept: INFUSION CENTER | Facility: CLINIC | Age: 72
Discharge: HOME | End: 2022-04-28
Payer: MEDICARE

## 2022-04-28 ENCOUNTER — OFFICE VISIT (OUTPATIENT)
Dept: DIABETES SERVICES | Facility: CLINIC | Age: 72
End: 2022-04-28
Payer: MEDICARE

## 2022-04-28 VITALS
TEMPERATURE: 97.2 F | DIASTOLIC BLOOD PRESSURE: 90 MMHG | SYSTOLIC BLOOD PRESSURE: 147 MMHG | HEART RATE: 108 BPM | RESPIRATION RATE: 18 BRPM

## 2022-04-28 DIAGNOSIS — T45.1X5A CHEMOTHERAPY INDUCED NEUTROPENIA (HCC): Primary | ICD-10-CM

## 2022-04-28 DIAGNOSIS — E11.9 TYPE 2 DIABETES MELLITUS WITHOUT COMPLICATION, WITHOUT LONG-TERM CURRENT USE OF INSULIN (HCC): Primary | ICD-10-CM

## 2022-04-28 DIAGNOSIS — E11.65 TYPE 2 DIABETES MELLITUS WITH HYPERGLYCEMIA, WITHOUT LONG-TERM CURRENT USE OF INSULIN (HCC): ICD-10-CM

## 2022-04-28 DIAGNOSIS — D70.1 CHEMOTHERAPY INDUCED NEUTROPENIA (HCC): Primary | ICD-10-CM

## 2022-04-28 DIAGNOSIS — C25.8 OVERLAPPING MALIGNANT NEOPLASM OF PANCREAS (HCC): ICD-10-CM

## 2022-04-28 PROCEDURE — G0109 DIAB MANAGE TRN IND/GROUP: HCPCS

## 2022-04-28 PROCEDURE — 96372 THER/PROPH/DIAG INJ SC/IM: CPT

## 2022-04-28 RX ADMIN — PEGFILGRASTIM 6 MG: KIT SUBCUTANEOUS at 11:00

## 2022-04-28 NOTE — PROGRESS NOTES
Pt arrived to unit without complaint  Pt here for CADD pump d/c and Neulasta Onpro  Neulasta Onpro applied  Pt aware of time neulasta onpro will infused medication  AVS provided  Pt left unit in stable condition

## 2022-04-28 NOTE — PATIENT INSTRUCTIONS
Class Assessment AVS    You are scheduled to attend Living Well with Diabetes Classes starting: June 2022 evening class  Watch your email for class invite the day or 2 before class    Testing frequency: 1-2 times per day    Goal Blood Sugars:  Premeal , even better <110  2hr after a meal <180, even better <140  A1C <7%, even better <6 5%  Thank you for coming to the [facility] for education today  Please feel free to call with any  questions or concerns      [address] [facility]

## 2022-05-03 DIAGNOSIS — C25.8 OVERLAPPING MALIGNANT NEOPLASM OF PANCREAS (HCC): ICD-10-CM

## 2022-05-03 DIAGNOSIS — T45.1X5A CHEMOTHERAPY INDUCED NEUTROPENIA (HCC): Primary | ICD-10-CM

## 2022-05-03 DIAGNOSIS — D70.1 CHEMOTHERAPY INDUCED NEUTROPENIA (HCC): Primary | ICD-10-CM

## 2022-05-09 ENCOUNTER — HOSPITAL ENCOUNTER (OUTPATIENT)
Dept: INFUSION CENTER | Facility: CLINIC | Age: 72
Discharge: HOME | End: 2022-05-09
Payer: MEDICARE

## 2022-05-09 ENCOUNTER — TELEPHONE (OUTPATIENT)
Dept: HEMATOLOGY ONCOLOGY | Facility: CLINIC | Age: 72
End: 2022-05-09

## 2022-05-09 DIAGNOSIS — D70.1 CHEMOTHERAPY INDUCED NEUTROPENIA (HCC): Primary | ICD-10-CM

## 2022-05-09 DIAGNOSIS — C25.8 OVERLAPPING MALIGNANT NEOPLASM OF PANCREAS (HCC): ICD-10-CM

## 2022-05-09 DIAGNOSIS — T45.1X5A CHEMOTHERAPY INDUCED NEUTROPENIA (HCC): Primary | ICD-10-CM

## 2022-05-09 DIAGNOSIS — Z95.828 PORT-A-CATH IN PLACE: ICD-10-CM

## 2022-05-09 LAB
ALBUMIN SERPL BCP-MCNC: 3 G/DL (ref 3.5–5)
ALP SERPL-CCNC: 159 U/L (ref 46–116)
ALT SERPL W P-5'-P-CCNC: 35 U/L (ref 12–78)
ANION GAP SERPL CALCULATED.3IONS-SCNC: 11 MMOL/L (ref 4–13)
AST SERPL W P-5'-P-CCNC: 35 U/L (ref 5–45)
BASOPHILS # BLD AUTO: 0.06 THOUSANDS/ΜL (ref 0–0.1)
BASOPHILS NFR BLD AUTO: 1 % (ref 0–1)
BILIRUB SERPL-MCNC: 0.42 MG/DL (ref 0.2–1)
BUN SERPL-MCNC: 9 MG/DL (ref 5–25)
CALCIUM ALBUM COR SERPL-MCNC: 9.9 MG/DL (ref 8.3–10.1)
CALCIUM SERPL-MCNC: 9.1 MG/DL (ref 8.3–10.1)
CHLORIDE SERPL-SCNC: 99 MMOL/L (ref 100–108)
CO2 SERPL-SCNC: 24 MMOL/L (ref 21–32)
CREAT SERPL-MCNC: 0.86 MG/DL (ref 0.6–1.3)
EOSINOPHIL # BLD AUTO: 0.06 THOUSAND/ΜL (ref 0–0.61)
EOSINOPHIL NFR BLD AUTO: 1 % (ref 0–6)
ERYTHROCYTE [DISTWIDTH] IN BLOOD BY AUTOMATED COUNT: 14.4 % (ref 11.6–15.1)
GFR SERPL CREATININE-BSD FRML MDRD: 86 ML/MIN/1.73SQ M
GLUCOSE SERPL-MCNC: 369 MG/DL (ref 65–140)
HCT VFR BLD AUTO: 35 % (ref 36.5–49.3)
HGB BLD-MCNC: 12.2 G/DL (ref 12–17)
IMM GRANULOCYTES # BLD AUTO: 0.07 THOUSAND/UL (ref 0–0.2)
IMM GRANULOCYTES NFR BLD AUTO: 1 % (ref 0–2)
LYMPHOCYTES # BLD AUTO: 0.82 THOUSANDS/ΜL (ref 0.6–4.47)
LYMPHOCYTES NFR BLD AUTO: 16 % (ref 14–44)
MCH RBC QN AUTO: 36.5 PG (ref 26.8–34.3)
MCHC RBC AUTO-ENTMCNC: 34.9 G/DL (ref 31.4–37.4)
MCV RBC AUTO: 105 FL (ref 82–98)
MONOCYTES # BLD AUTO: 0.56 THOUSAND/ΜL (ref 0.17–1.22)
MONOCYTES NFR BLD AUTO: 11 % (ref 4–12)
NEUTROPHILS # BLD AUTO: 3.52 THOUSANDS/ΜL (ref 1.85–7.62)
NEUTS SEG NFR BLD AUTO: 70 % (ref 43–75)
NRBC BLD AUTO-RTO: 0 /100 WBCS
PLATELET # BLD AUTO: 73 THOUSANDS/UL (ref 149–390)
PMV BLD AUTO: 13.5 FL (ref 8.9–12.7)
POTASSIUM SERPL-SCNC: 3.8 MMOL/L (ref 3.5–5.3)
PROT SERPL-MCNC: 6.9 G/DL (ref 6.4–8.2)
RBC # BLD AUTO: 3.34 MILLION/UL (ref 3.88–5.62)
SODIUM SERPL-SCNC: 134 MMOL/L (ref 136–145)
WBC # BLD AUTO: 5.09 THOUSAND/UL (ref 4.31–10.16)

## 2022-05-09 PROCEDURE — 80053 COMPREHEN METABOLIC PANEL: CPT | Performed by: INTERNAL MEDICINE

## 2022-05-09 PROCEDURE — 85025 COMPLETE CBC W/AUTO DIFF WBC: CPT | Performed by: INTERNAL MEDICINE

## 2022-05-09 NOTE — PROGRESS NOTES
Patient arrived to unit without complaint  Patient had central labs drawn and port flushed per [facility] protocol without incident  AVS declined and patient aware of next appointment  Patient left in stable condition

## 2022-05-09 NOTE — PROGRESS NOTES
Per Dr Adalberto James cycle 3 to be canceled due to platelets of 02,459 and resume treatment on 5/24/22

## 2022-05-09 NOTE — TELEPHONE ENCOUNTER
Called patient to inform him that his platelet count is low and Dr Deni Eubanks would like to hold off on treatment this cycle and resume treatment at next scheduled cycle on 5/24  Patient verbalized understanding

## 2022-05-10 ENCOUNTER — HOSPITAL ENCOUNTER (OUTPATIENT)
Dept: INFUSION CENTER | Facility: CLINIC | Age: 72
Discharge: HOME | End: 2022-05-10

## 2022-05-10 ENCOUNTER — TELEPHONE (OUTPATIENT)
Dept: SURGICAL ONCOLOGY | Facility: CLINIC | Age: 72
End: 2022-05-10

## 2022-05-10 ENCOUNTER — TELEPHONE (OUTPATIENT)
Dept: HEMATOLOGY ONCOLOGY | Facility: CLINIC | Age: 72
End: 2022-05-10

## 2022-05-10 NOTE — TELEPHONE ENCOUNTER
Received voicemail from patient regarding concerns with his cycle being held and resuming with his next cycle in 2 weeks  He has questions regarding why previously he was given treatment to improve his blood cells but not this time

## 2022-05-10 NOTE — TELEPHONE ENCOUNTER
Pt would like to cancel his 5/16/22 appt since we are deferring chemo  All questions answered      Mic Franco MD

## 2022-05-11 ENCOUNTER — OFFICE VISIT (OUTPATIENT)
Dept: PODIATRY | Facility: CLINIC | Age: 72
End: 2022-05-11
Payer: MEDICARE

## 2022-05-11 ENCOUNTER — TELEPHONE (OUTPATIENT)
Dept: HEMATOLOGY ONCOLOGY | Facility: CLINIC | Age: 72
End: 2022-05-11

## 2022-05-11 VITALS
DIASTOLIC BLOOD PRESSURE: 68 MMHG | HEIGHT: 70 IN | WEIGHT: 284 LBS | BODY MASS INDEX: 40.66 KG/M2 | SYSTOLIC BLOOD PRESSURE: 120 MMHG

## 2022-05-11 DIAGNOSIS — E11.9 TYPE 2 DIABETES MELLITUS WITHOUT COMPLICATION, WITHOUT LONG-TERM CURRENT USE OF INSULIN (HCC): Primary | ICD-10-CM

## 2022-05-11 PROCEDURE — 99213 OFFICE O/P EST LOW 20 MIN: CPT | Performed by: PODIATRIST

## 2022-05-11 NOTE — TELEPHONE ENCOUNTER
CALL RETURN FORM   Reason for patient call? Candace Lanza calling from [address] [place] [address] David requesting to speak with Dr Sebas Hardwick would like him to call her regarding patient   Patient's primary oncologist? Dr Sebas Lai   Name of person the patient was calling for? Dr Sebas Lai   Any additional information to add, if applicable? Lorna Hardwick cell [phone]   Informed patient that the message will be forwarded to the team and someone will get back to them as soon as possible    Did you relay this information to the patient?  yes

## 2022-05-11 NOTE — PROGRESS NOTES
Assessment/Plan:    Discussed principles of diabetic foot care  Vascular status is within normal limits and sensorium is intact  Blood sugar elevated due to recent pancreatic surgery  All elongated toenails were trimmed  Patient urged to refrain from walking barefoot  No problem-specific Assessment & Plan notes found for this encounter  Diagnoses and all orders for this visit:    Type 2 diabetes mellitus without complication, without long-term current use of insulin ([facility])          Subjective:      Patient ID: Teagan Wagner is a 66 y o  male  HPI     Patient, a 25-year-old type 2 diabetic male presents for yearly exam and care  Patient states that in February he had majority of his pancreas removed due to cancer  Accordingly, his blood sugar is nail significantly elevated as to where it had been  He denies numbness or tingling in his feet  He is primarily here for nail care  I personally reviewed A1c dated 04/25/2022  It was 9 2  I personally reviewed A1c dated 03/08/2022  It was 6 2    The following portions of the patient's history were reviewed and updated as appropriate: allergies, current medications, past family history, past medical history, past social history, past surgical history and problem list     Review of Systems   Cardiovascular:        Atrial fibrillation   Genitourinary:        Pancreatic cancer   Neurological: Negative for numbness  Psychiatric/Behavioral: Negative  Objective:      /68   Ht 5' 10 35" (1 787 m)   Wt 129 kg (284 lb)   BMI 40 35 kg/m²          Physical Exam  Cardiovascular:      Pulses: no weak pulses          Dorsalis pedis pulses are 2+ on the right side and 2+ on the left side  Posterior tibial pulses are 2+ on the right side and 2+ on the left side  Feet:      Right foot:      Skin integrity: No ulcer, skin breakdown, erythema, warmth, callus or dry skin        Left foot:      Skin integrity: No ulcer, skin breakdown, erythema, warmth, callus or dry skin  Diabetic Foot Exam    Patient's shoes and socks removed  Right Foot/Ankle   Right Foot Inspection  Skin Exam: skin normal and skin intact  No dry skin, no warmth, no callus, no erythema, no maceration, no abnormal color, no pre-ulcer, no ulcer and no callus  Toe Exam: ROM and strength within normal limits  Sensory   Vibration: intact  Proprioception: intact  Monofilament testing: intact    Vascular  Capillary refills: < 3 seconds  The right DP pulse is 2+  The right PT pulse is 2+  Right Toe  - Comprehensive Exam  Ecchymosis: none  Arch: normal  Hammertoes: absent  Claw Toes: absent  Swelling: none   Tenderness: none         Left Foot/Ankle  Left Foot Inspection  Skin Exam: skin normal and skin intact  No dry skin, no warmth, no erythema, no maceration, normal color, no pre-ulcer, no ulcer and no callus  Toe Exam: ROM and strength within normal limits  Sensory   Vibration: intact  Proprioception: intact  Monofilament testing: intact    Vascular  Capillary refills: < 3 seconds  The left DP pulse is 2+  The left PT pulse is 2+       Left Toe  - Comprehensive Exam  Ecchymosis: none  Arch: normal  Hammertoes: absent  Claw toes: absent  Swelling: none   Tenderness: none           Assign Risk Category  No deformity present  No loss of protective sensation  No weak pulses  Risk: 0

## 2022-05-12 ENCOUNTER — HOSPITAL ENCOUNTER (OUTPATIENT)
Dept: INFUSION CENTER | Facility: CLINIC | Age: 72
End: 2022-05-12

## 2022-05-13 NOTE — TELEPHONE ENCOUNTER
Cancelled the CT for 6/4, and reached out to patient  He had his CT at [facility] in [place]  Will reach out to them

## 2022-05-17 DIAGNOSIS — C25.8 OVERLAPPING MALIGNANT NEOPLASM OF PANCREAS (HCC): ICD-10-CM

## 2022-05-17 DIAGNOSIS — D70.1 CHEMOTHERAPY INDUCED NEUTROPENIA (HCC): Primary | ICD-10-CM

## 2022-05-17 DIAGNOSIS — T45.1X5A CHEMOTHERAPY INDUCED NEUTROPENIA (HCC): Primary | ICD-10-CM

## 2022-05-23 ENCOUNTER — HOSPITAL ENCOUNTER (OUTPATIENT)
Dept: INFUSION CENTER | Facility: CLINIC | Age: 72
Discharge: HOME | End: 2022-05-23
Payer: MEDICARE

## 2022-05-23 DIAGNOSIS — T45.1X5A CHEMOTHERAPY INDUCED NEUTROPENIA (HCC): Primary | ICD-10-CM

## 2022-05-23 DIAGNOSIS — Z95.828 PORT-A-CATH IN PLACE: ICD-10-CM

## 2022-05-23 DIAGNOSIS — C25.8 OVERLAPPING MALIGNANT NEOPLASM OF PANCREAS (HCC): ICD-10-CM

## 2022-05-23 DIAGNOSIS — D70.1 CHEMOTHERAPY INDUCED NEUTROPENIA (HCC): Primary | ICD-10-CM

## 2022-05-23 LAB
ALBUMIN SERPL BCP-MCNC: 3.1 G/DL (ref 3.5–5)
ALP SERPL-CCNC: 159 U/L (ref 46–116)
ALT SERPL W P-5'-P-CCNC: 41 U/L (ref 12–78)
ANION GAP SERPL CALCULATED.3IONS-SCNC: 8 MMOL/L (ref 4–13)
AST SERPL W P-5'-P-CCNC: 40 U/L (ref 5–45)
BASOPHILS # BLD AUTO: 0.04 THOUSANDS/ΜL (ref 0–0.1)
BASOPHILS NFR BLD AUTO: 1 % (ref 0–1)
BILIRUB SERPL-MCNC: 0.86 MG/DL (ref 0.2–1)
BUN SERPL-MCNC: 10 MG/DL (ref 5–25)
CALCIUM ALBUM COR SERPL-MCNC: 9.8 MG/DL (ref 8.3–10.1)
CALCIUM SERPL-MCNC: 9.1 MG/DL (ref 8.3–10.1)
CHLORIDE SERPL-SCNC: 100 MMOL/L (ref 100–108)
CO2 SERPL-SCNC: 26 MMOL/L (ref 21–32)
CREAT SERPL-MCNC: 0.79 MG/DL (ref 0.6–1.3)
EOSINOPHIL # BLD AUTO: 0.05 THOUSAND/ΜL (ref 0–0.61)
EOSINOPHIL NFR BLD AUTO: 1 % (ref 0–6)
ERYTHROCYTE [DISTWIDTH] IN BLOOD BY AUTOMATED COUNT: 15.9 % (ref 11.6–15.1)
GFR SERPL CREATININE-BSD FRML MDRD: 89 ML/MIN/1.73SQ M
GLUCOSE SERPL-MCNC: 212 MG/DL (ref 65–140)
HCT VFR BLD AUTO: 37.2 % (ref 36.5–49.3)
HGB BLD-MCNC: 12.6 G/DL (ref 12–17)
IMM GRANULOCYTES # BLD AUTO: 0.02 THOUSAND/UL (ref 0–0.2)
IMM GRANULOCYTES NFR BLD AUTO: 1 % (ref 0–2)
LYMPHOCYTES # BLD AUTO: 0.73 THOUSANDS/ΜL (ref 0.6–4.47)
LYMPHOCYTES NFR BLD AUTO: 18 % (ref 14–44)
MCH RBC QN AUTO: 36.7 PG (ref 26.8–34.3)
MCHC RBC AUTO-ENTMCNC: 33.9 G/DL (ref 31.4–37.4)
MCV RBC AUTO: 109 FL (ref 82–98)
MONOCYTES # BLD AUTO: 0.74 THOUSAND/ΜL (ref 0.17–1.22)
MONOCYTES NFR BLD AUTO: 19 % (ref 4–12)
NEUTROPHILS # BLD AUTO: 2.39 THOUSANDS/ΜL (ref 1.85–7.62)
NEUTS SEG NFR BLD AUTO: 60 % (ref 43–75)
NRBC BLD AUTO-RTO: 0 /100 WBCS
PLATELET # BLD AUTO: 109 THOUSANDS/UL (ref 149–390)
PMV BLD AUTO: 13.2 FL (ref 8.9–12.7)
POTASSIUM SERPL-SCNC: 4.4 MMOL/L (ref 3.5–5.3)
PROT SERPL-MCNC: 7.3 G/DL (ref 6.4–8.2)
RBC # BLD AUTO: 3.43 MILLION/UL (ref 3.88–5.62)
SODIUM SERPL-SCNC: 134 MMOL/L (ref 136–145)
WBC # BLD AUTO: 3.97 THOUSAND/UL (ref 4.31–10.16)

## 2022-05-23 PROCEDURE — 80053 COMPREHEN METABOLIC PANEL: CPT | Performed by: INTERNAL MEDICINE

## 2022-05-23 PROCEDURE — 85025 COMPLETE CBC W/AUTO DIFF WBC: CPT | Performed by: INTERNAL MEDICINE

## 2022-05-23 NOTE — PROGRESS NOTES
Pt presents for central labs only  Port accessed with excellent blood return  Labs obtained per protocol  Future visits discussed  AVS declined

## 2022-05-24 ENCOUNTER — HOSPITAL ENCOUNTER (OUTPATIENT)
Dept: INFUSION CENTER | Facility: CLINIC | Age: 72
Discharge: HOME | End: 2022-05-24
Payer: MEDICARE

## 2022-05-24 VITALS
SYSTOLIC BLOOD PRESSURE: 129 MMHG | HEART RATE: 81 BPM | BODY MASS INDEX: 41.22 KG/M2 | WEIGHT: 287.92 LBS | DIASTOLIC BLOOD PRESSURE: 79 MMHG | TEMPERATURE: 96.9 F | HEIGHT: 70 IN | RESPIRATION RATE: 18 BRPM

## 2022-05-24 DIAGNOSIS — T45.1X5A CHEMOTHERAPY INDUCED NEUTROPENIA (HCC): Primary | ICD-10-CM

## 2022-05-24 DIAGNOSIS — C25.8 OVERLAPPING MALIGNANT NEOPLASM OF PANCREAS (HCC): ICD-10-CM

## 2022-05-24 DIAGNOSIS — D70.1 CHEMOTHERAPY INDUCED NEUTROPENIA (HCC): Primary | ICD-10-CM

## 2022-05-24 PROCEDURE — 96415 CHEMO IV INFUSION ADDL HR: CPT

## 2022-05-24 PROCEDURE — G0498 CHEMO EXTEND IV INFUS W/PUMP: HCPCS

## 2022-05-24 PROCEDURE — 96367 TX/PROPH/DG ADDL SEQ IV INF: CPT

## 2022-05-24 PROCEDURE — 96375 TX/PRO/DX INJ NEW DRUG ADDON: CPT

## 2022-05-24 PROCEDURE — 96417 CHEMO IV INFUS EACH ADDL SEQ: CPT

## 2022-05-24 PROCEDURE — 96413 CHEMO IV INFUSION 1 HR: CPT

## 2022-05-24 RX ORDER — ATROPINE SULFATE 1 MG/ML
0.25 INJECTION, SOLUTION INTRAVENOUS ONCE
Status: COMPLETED | OUTPATIENT
Start: 2022-05-24 | End: 2022-05-24

## 2022-05-24 RX ORDER — DEXTROSE MONOHYDRATE 50 MG/ML
20 INJECTION, SOLUTION INTRAVENOUS ONCE
Status: COMPLETED | OUTPATIENT
Start: 2022-05-24 | End: 2022-05-24

## 2022-05-24 RX ORDER — SODIUM CHLORIDE 9 MG/ML
20 INJECTION, SOLUTION INTRAVENOUS ONCE AS NEEDED
Status: DISCONTINUED | OUTPATIENT
Start: 2022-05-24 | End: 2022-05-27 | Stop reason: HOSPADM

## 2022-05-24 RX ADMIN — OXALIPLATIN 150 MG: 5 INJECTION, SOLUTION INTRAVENOUS at 09:35

## 2022-05-24 RX ADMIN — IRINOTECAN HYDROCHLORIDE 300 MG: 20 INJECTION, SOLUTION INTRAVENOUS at 11:39

## 2022-05-24 RX ADMIN — DEXTROSE 20 ML/HR: 5 SOLUTION INTRAVENOUS at 09:23

## 2022-05-24 RX ADMIN — DEXAMETHASONE SODIUM PHOSPHATE: 10 INJECTION, SOLUTION INTRAMUSCULAR; INTRAVENOUS at 09:00

## 2022-05-24 RX ADMIN — ATROPINE SULFATE 0.25 MG: 1 INJECTION, SOLUTION INTRAMUSCULAR; INTRAVENOUS; SUBCUTANEOUS at 11:37

## 2022-05-24 RX ADMIN — SODIUM CHLORIDE 20 ML/HR: 0.9 INJECTION, SOLUTION INTRAVENOUS at 09:00

## 2022-05-24 NOTE — PROGRESS NOTES
Pt arrived to unit without complaint,  tolerated treatment well  Pt given written information packet re: new elastomeric pump  Pt also watched [employer] video  Elastomeric pump infusing via gravity to infuse 5FU over 46hr as ordered  Pt knowledgeable re: care of accessed PAC and monitoring of elastomeric pump at home  Pt aware of need to return in 46hr for  pump d/c Pt without question or concern

## 2022-05-26 ENCOUNTER — HOSPITAL ENCOUNTER (OUTPATIENT)
Dept: INFUSION CENTER | Facility: CLINIC | Age: 72
Discharge: HOME | End: 2022-05-26
Payer: MEDICARE

## 2022-05-26 VITALS
SYSTOLIC BLOOD PRESSURE: 136 MMHG | RESPIRATION RATE: 18 BRPM | HEART RATE: 79 BPM | DIASTOLIC BLOOD PRESSURE: 89 MMHG | TEMPERATURE: 96.9 F

## 2022-05-26 DIAGNOSIS — C25.8 OVERLAPPING MALIGNANT NEOPLASM OF PANCREAS (HCC): ICD-10-CM

## 2022-05-26 DIAGNOSIS — T45.1X5A CHEMOTHERAPY INDUCED NEUTROPENIA (HCC): Primary | ICD-10-CM

## 2022-05-26 DIAGNOSIS — D70.1 CHEMOTHERAPY INDUCED NEUTROPENIA (HCC): Primary | ICD-10-CM

## 2022-05-26 PROCEDURE — 96372 THER/PROPH/DIAG INJ SC/IM: CPT

## 2022-05-26 RX ADMIN — PEGFILGRASTIM 6 MG: KIT SUBCUTANEOUS at 11:29

## 2022-05-26 NOTE — PROGRESS NOTES
Pt  Denies new symptoms or concerns today  5FU via Elastomeric Pump completed in 46 hours as ordered without adverse event  Neulasta Onpro placed on YENIFER  Pt  Understands process and will independently remove device as instructed  AVS declined

## 2022-05-31 DIAGNOSIS — C25.8 OVERLAPPING MALIGNANT NEOPLASM OF PANCREAS (HCC): ICD-10-CM

## 2022-05-31 DIAGNOSIS — D70.1 CHEMOTHERAPY INDUCED NEUTROPENIA (HCC): Primary | ICD-10-CM

## 2022-05-31 DIAGNOSIS — T45.1X5A CHEMOTHERAPY INDUCED NEUTROPENIA (HCC): Primary | ICD-10-CM

## 2022-06-01 ENCOUNTER — TELEPHONE (OUTPATIENT)
Dept: SURGICAL ONCOLOGY | Facility: CLINIC | Age: 72
End: 2022-06-01

## 2022-06-02 ENCOUNTER — DOCUMENTATION (OUTPATIENT)
Dept: HEMATOLOGY ONCOLOGY | Facility: CLINIC | Age: 72
End: 2022-06-02

## 2022-06-02 NOTE — ADDENDUM NOTE
Encounter addended by: Ez Hernandez, Pharmacist on: 6/2/2022 9:24 AM   Actions taken: [employer] created or edited

## 2022-06-02 NOTE — PROGRESS NOTES
RECTAL/GI MULTIDISCIPLINARY CASE REVIEW    DATE: 6/2/2022      PRESENTING DOCTOR: Dr Devorah Ibarra      DIAGNOSIS: Pancreatic cancer      Tk Edwards was presented at the Rectal/GI Multidisciplinary Conference today  PHYSICIAN RECOMMENDED PLAN:    -Evaluate patient's surgical risk  If patient is high risk for surgery then recommend concurrent chemo/RT with radiosensitizer    -Patient is scheduled with medical oncology on 6/13/2022   -Patient is scheduled with surgical oncology on 6/15/2022   -Patient is scheduled with radiation oncology on 6/28/2022  Team agreed to plan  The final treatment plan will be left to the discretion of the patient and the treating physician  DISCLAIMERS:  TO THE TREATING PHYSICIAN:  This conference is a meeting of clinicians from various specialty areas who evaluate and discuss patients for whom a multidisciplinary treatment approach is being considered  Please note that the above opinion was a consensus of the conference attendees and is intended only to assist in quality care of the discussed patient  The responsibility for follow up on the input given during the conference, along with any final decisions regarding plan of care, is that of the patient and the patient's provider  Accordingly, appointments have only been recommended based on this information and have NOT been scheduled unless otherwise noted  TO THE PATIENT:  This summary is a brief record of major aspects of your cancer treatment  You may choose to share a copy with any of your doctors or nurses  However, this is not a detailed or comprehensive record of your care        NCCN guidelines were readily available for review at this discussion

## 2022-06-03 NOTE — PROGRESS NOTES
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, high-grade dysplasia, no evidence of lymphovascular invasion, no evidence of perineural invasion, 1 regional lymph node negative for carcinoma and the margins were negative (pT1b, pN0, grade 1) status post adjuvant chemotherapy with FOLFIRINOX for 4 cycles finished in June, 2019  He was watched with close surveillance MRI abdomen scans and required a subtotal pancreatectomy on February 15, 2022 by Dr Reece Bean which revealed 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 associated with IPMN of high-grade dysplasia  0/15 LN involved  + Margin with HG-dysplasia and invasive carcinoma  He is now pathologic Stage IA; ypT1b(m) pN0 cM0  Clips left at pancreatic margin and further surgery would require completion pancreatectomy  The two patterns of pathogenesis for pancreatic colloid carcinoma reported include progression from regular ductal adenocarcinoma, a subtype of invasive pancreatic ductal carcinoma or progression from papillary adenocarcinoma derived from intraductal papillary mucinous neoplasm (IPMN) or mucinous cystic neoplasm (MCN) (Two cases of pancreatic colloid carcinoma with different pathogenesis: case report and review of the literature - PubMed (nih gov))  Remnant total pancreatectomy is an option in some of these cases when fit patients are able to tolerate it  I reviewed Arash's diagnosis of colloid carcinoma, which is a more uncommon histology of pancreatic cancer without RCT/NCCN guidelines to guide treatment management       Per the 3/17 GI tumor board recs, plan is for 4 months FOLFORINOX and then 5-FU + RT thereafter  I previously discussed with the patient the benefits of getting a 2nd opinion to determine whether he has definitive surgical options as well as the proposed adjuvant chemotherapy and radiation plan for this very rare pancreatic cancer  He had a second opinion at [facility] and surgery was offered Whipple's despite its risks  GI tumor board 6/2/22 reiterated the high risk of surgery for him  Removed bolus 5-FU due to persistently decreased platelet count  He has an indwelling port  We will proceed with systemic treatment, C6 (actually C5 due to C3 not being given) coming up  Infusion chair 7/19 cancelled due to surgery planned 8/2/22      Plan  -Rad Onc appt with Dr Obdulia Tabor scheduled 6/28 (should he go ahead with a Whipple's, he would not need to get simulated for T)  Should surgery be cancelled, 8 total cycles would be planned of mFOLFOX followed by CRT  -[facility] visits scheduled along with need for preceding lab-work  -F/u with me q 4 weeks while on systemic treatment     F/u [place] (tentative plan for Whipple's 8/2/22) and Surg onc here with Dr Lyla Hopson (6/15)    2  Malabsorption and greasy diarrhea, he is on Creon    3  Visual disturbance: has been seeing an optometrist    4  Thrombocytopenia: present since 3/14/2019, unclear etiology but Vit B12 noted to be borderline low at 253 on 9/7/2017, although he is on Vit B12  Plt improved to 142k on 3/11/22, Hgb up to 12 9,   3/11/22 Smear: No immature left shift or circulating blasts  Low normal hemoglobin with no significant anisopoikilocytosis  Rare schistocyte seen on scan  Mild thrombocytopenia with occasional large platelet  Folate and B12 no deficient  3/11/2022: Plt improved to 142k, Hgb up to 12 9,   5/23/2022: 109k        5  Macrocytic anemia: may be 2/2 nutritional deficiency vs liver disease vs BMB infiltrative disease   He is on Vit B12    on 2/21/2022      HPI:  Lavena Levels Akosua Shah is a 66y o  year-old  male seen initially 4/11/2019 regarding Stage I adenocarcinoma of the body and the distal pancreas status post resection referred by surgical oncology  Bo Situ was experiencing several months of vague abdominal pain   CT scan 12/5/2018  revealed a slightly dilated pancreatic duct as well as multiple pancreatic cysts        MRI abdomen 1/2/2019:  Innumerable pancreatic cysts, mostly in the tail the pancreas, measuring up to 2 cm in diameter   Dilatation of the main pancreatic duct, up to 1 cm in diameter   Several enlarged peripancreatic and portacaval lymph nodes      EUS  January 2019 with numerous cyst in the tail of the pancreas the largest 1 measuring 3 cm with dilatation of the main pancreatic duct to 10 mm at the body of the pancreas, the needle was used to puncture the cysts at the tail of the pancreas with for past this performed, cytology showed atypical changes category 3 with cluster of atypical mucinous epithelial cells suggestive of neoplastic process     Status post distal pancreatectomy on 03/12/2019, final pathology 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), well-differentiated measuring between 0 5 cm to 1 cm with several foci of invasive carcinoma  present the largest 0 6 cm   The tumor is confined to the pancreas with IPMN with high-grade dysplasia  present at the margin of the resection, no evidence of lymphovascular invasion, no evidence of perineural invasion, 1 lymph node negative for carcinoma with background of extensive chronic pancreatitis at least stage IA (pT1b, pN0, grade 1)  Treated with FOLFIRINOX for 4 cycles in June 2019    CT scan in October 2019 showed no evidence of disease, he has upper abdomen lymphadenopathy stable from 2019 measuring 2 cm      Interval History:    He has persisting fatigue, intermittent LH, and interval weakness  He has moderate fatigue since his surgery in early 2022 as well as compounded over the years   He has regular BM  He has a fair appetite  He ambulates well without issue  No trouble swallowing or new neuropathy  No other new acute issues at this time and is leaning towards getting surgery done  Previous Treatment in 2019:    FOLFIRINOX x 4 cycles (dose reduced to Irinotecan 300mg, Oxalip 60mg/m2 due to tolerance issues)    Treatment in 2022:   FOLFIRINOX 4 cycles given up until 6/13/22  C3 cancelled due to plt count 73k: 2 additional cycles planned without bolus-5FU    Test Results:    Imaging: No results found  PMHx: A-fib on Atenolol (not on OAC)   DMII: on Metformin  Denies HTN, HLP, CHF      Labs:   Lab Results   Component Value Date    WBC 6 96 06/06/2022    HGB 12 6 06/06/2022    HCT 37 0 06/06/2022     (H) 06/06/2022    PLT 85 (L) 06/06/2022     Lab Results   Component Value Date     08/22/2017    K 3 9 06/06/2022    CL 99 (L) 06/06/2022    CO2 26 06/06/2022    BUN 10 06/06/2022    CREATININE 0 96 06/06/2022    GLUCOSE 158 (H) 02/15/2022    GLUF 184 (H) 01/18/2022    CALCIUM 8 8 06/06/2022    CORRECTEDCA 9 5 06/06/2022    AST 39 06/06/2022    ALT 43 06/06/2022    ALKPHOS 212 (H) 06/06/2022    PROT 6 6 08/22/2017    BILITOT 0 4 08/22/2017    EGFR 78 06/06/2022       No results found for: IRON, TIBC, FERRITIN    Lab Results   Component Value Date    PPMCYDEO94 2,772 (H) 03/11/2022         ROS: Review of Systems   Constitutional: Negative  Negative for appetite change, chills, diaphoresis, fatigue, fever and unexpected weight change  HENT:   Negative for hearing loss, lump/mass, mouth sores, nosebleeds, sore throat, trouble swallowing and voice change  Eyes: Positive for eye problems ( which will problem bilaterally not able to see the fine print)  Negative for icterus  Respiratory: Negative  Negative for chest tightness, cough, hemoptysis and shortness of breath  Cardiovascular: Negative for chest pain and leg swelling  Gastrointestinal: Positive for soft BM but not watery  Negative for abdominal distention, abdominal pain, blood in stool, constipation and nausea  Endocrine: Negative  Genitourinary: Negative for dysuria, frequency, hematuria and pelvic pain  Musculoskeletal: Negative  Negative for arthralgias, back pain, flank pain, gait problem, myalgias and neck stiffness  Skin: Negative for itching and rash  Neurological: Negative for dizziness, gait problem, headaches, light-headedness, numbness and speech difficulty, peripheral extremity numbness/tingly  Hematological: Negative for adenopathy  Does not bruise/bleed easily  Psychiatric/Behavioral: Negative for confusion, decreased concentration, depression and sleep disturbance  The patient is not nervous/anxious  10 point ROS reviewed and negative unless stated otherwise above  Current Medications: Reviewed, on Atenolol, Creon, Metformin  Allergies: Reviewed  PMH/FH/SH:  Reviewed      Physical Exam:    Body surface area is 2 42 meters squared  Wt Readings from Last 3 Encounters:   06/13/22 127 kg (279 lb)   06/07/22 127 kg (279 lb 6 9 oz)   05/24/22 131 kg (287 lb 14 7 oz)        Temp Readings from Last 3 Encounters:   06/13/22 98 5 °F (36 9 °C) (Temporal)   06/09/22 (!) 97 3 °F (36 3 °C) (Temporal)   06/07/22 (!) 97 3 °F (36 3 °C) (Temporal)        BP Readings from Last 3 Encounters:   06/13/22 126/70   06/09/22 129/75   06/07/22 129/86         Pulse Readings from Last 3 Encounters:   06/13/22 81   06/09/22 73   06/07/22 79        Physical Exam  Vitals reviewed  Constitutional:       General: He is not in acute distress  Appearance: He is well-developed  He is not diaphoretic  HENT:      Head: Normocephalic and atraumatic  Eyes:      Conjunctiva/sclera: Conjunctivae normal    Neck:      Trachea: No tracheal deviation  Cardiovascular:      Rate and Rhythm: Normal rate and regular rhythm  Heart sounds: No murmur heard  No friction rub  No gallop      Pulmonary:      Effort: Pulmonary effort is normal  No respiratory distress  Breath sounds: Normal breath sounds  No wheezing or rales  Chest:      Chest wall: No tenderness  Abdominal:      General: There is no distension  Palpations: Abdomen is soft  Tenderness: There is no abdominal tenderness  Comments: Obese   Musculoskeletal:      Cervical back: Normal range of motion and neck supple  Lymphadenopathy:      Cervical: No cervical adenopathy  Skin:     General: Skin is warm and dry  Coloration: Skin is not pale  Findings: No erythema  Neurological:      Mental Status: He is alert and oriented to person, place, and time  Psychiatric:         Behavior: Behavior normal          Thought Content: Thought content normal          Judgment: Judgment normal        12/10/2021 MRI Abdomen w/wo contrast:  Enlarging cystic lesion within the most distal residual pancreas with associated interval enlargement of the main pancreatic duct  Findings are suggestive of enlarging IPMN  with main duct involvement versus primary main duct IPMN  Somewhat nodular hepatic contour again seen potentially reflecting underlying hepatocellular disease  Hepatic steatosis  Stable peripancreatic and aortocaval lymph nodes  1/3/22 EGD/EUS  IMPRESSION:  EGD:   1  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  2  Small hiatus hernia  3  Normal duodenum  EUS:   3 cm heterogenous cyst with solid component/debris in the distal pancreas at the margin of resection with communication with the main duct  This was biopsied with FNB needle and thick mucinous     2/15/22 Pathology from subtotal pancreatectomy:  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 (largest focus 0 8 cm) arising in association with intraductal papillary mucinous neoplasm (IPMN) with high-grade dysplasia  - Surgical margin positive for colloid carcinoma and IPMN with high-grade dysplasia       - Fifteen (15) lymph nodes, negative for carcinoma  - Therapy effect noted in background pancreas (fibrosis and inflammation)  ECO    Goals and Barriers:  Current Goal: Minimize effects of disease  Barriers: None  Patient's Capacity to Self Care:  Patient is able to self care      Code Status: not addressed today      Jevon Ace MD

## 2022-06-06 ENCOUNTER — HOSPITAL ENCOUNTER (OUTPATIENT)
Dept: INFUSION CENTER | Facility: CLINIC | Age: 72
Discharge: HOME | End: 2022-06-06
Payer: MEDICARE

## 2022-06-06 DIAGNOSIS — C25.8 OVERLAPPING MALIGNANT NEOPLASM OF PANCREAS (HCC): ICD-10-CM

## 2022-06-06 DIAGNOSIS — T45.1X5A CHEMOTHERAPY INDUCED NEUTROPENIA (HCC): Primary | ICD-10-CM

## 2022-06-06 DIAGNOSIS — D70.1 CHEMOTHERAPY INDUCED NEUTROPENIA (HCC): Primary | ICD-10-CM

## 2022-06-06 DIAGNOSIS — Z95.828 PORT-A-CATH IN PLACE: ICD-10-CM

## 2022-06-06 LAB
ALBUMIN SERPL BCP-MCNC: 3.1 G/DL (ref 3.5–5)
ALP SERPL-CCNC: 212 U/L (ref 46–116)
ALT SERPL W P-5'-P-CCNC: 43 U/L (ref 12–78)
ANION GAP SERPL CALCULATED.3IONS-SCNC: 10 MMOL/L (ref 4–13)
AST SERPL W P-5'-P-CCNC: 39 U/L (ref 5–45)
BASOPHILS # BLD AUTO: 0.05 THOUSANDS/ΜL (ref 0–0.1)
BASOPHILS NFR BLD AUTO: 1 % (ref 0–1)
BILIRUB SERPL-MCNC: 0.8 MG/DL (ref 0.2–1)
BUN SERPL-MCNC: 10 MG/DL (ref 5–25)
CALCIUM ALBUM COR SERPL-MCNC: 9.5 MG/DL (ref 8.3–10.1)
CALCIUM SERPL-MCNC: 8.8 MG/DL (ref 8.3–10.1)
CHLORIDE SERPL-SCNC: 99 MMOL/L (ref 100–108)
CO2 SERPL-SCNC: 26 MMOL/L (ref 21–32)
CREAT SERPL-MCNC: 0.96 MG/DL (ref 0.6–1.3)
EOSINOPHIL # BLD AUTO: 0.09 THOUSAND/ΜL (ref 0–0.61)
EOSINOPHIL NFR BLD AUTO: 1 % (ref 0–6)
ERYTHROCYTE [DISTWIDTH] IN BLOOD BY AUTOMATED COUNT: 15.8 % (ref 11.6–15.1)
GFR SERPL CREATININE-BSD FRML MDRD: 78 ML/MIN/1.73SQ M
GLUCOSE SERPL-MCNC: 300 MG/DL (ref 65–140)
HCT VFR BLD AUTO: 37 % (ref 36.5–49.3)
HGB BLD-MCNC: 12.6 G/DL (ref 12–17)
IMM GRANULOCYTES # BLD AUTO: 0.04 THOUSAND/UL (ref 0–0.2)
IMM GRANULOCYTES NFR BLD AUTO: 1 % (ref 0–2)
LYMPHOCYTES # BLD AUTO: 0.74 THOUSANDS/ΜL (ref 0.6–4.47)
LYMPHOCYTES NFR BLD AUTO: 11 % (ref 14–44)
MCH RBC QN AUTO: 36.6 PG (ref 26.8–34.3)
MCHC RBC AUTO-ENTMCNC: 34.1 G/DL (ref 31.4–37.4)
MCV RBC AUTO: 108 FL (ref 82–98)
MONOCYTES # BLD AUTO: 0.59 THOUSAND/ΜL (ref 0.17–1.22)
MONOCYTES NFR BLD AUTO: 9 % (ref 4–12)
NEUTROPHILS # BLD AUTO: 5.45 THOUSANDS/ΜL (ref 1.85–7.62)
NEUTS SEG NFR BLD AUTO: 77 % (ref 43–75)
NRBC BLD AUTO-RTO: 0 /100 WBCS
PLATELET # BLD AUTO: 85 THOUSANDS/UL (ref 149–390)
PMV BLD AUTO: 13.6 FL (ref 8.9–12.7)
POTASSIUM SERPL-SCNC: 3.9 MMOL/L (ref 3.5–5.3)
PROT SERPL-MCNC: 7.2 G/DL (ref 6.4–8.2)
RBC # BLD AUTO: 3.44 MILLION/UL (ref 3.88–5.62)
SODIUM SERPL-SCNC: 135 MMOL/L (ref 136–145)
WBC # BLD AUTO: 6.96 THOUSAND/UL (ref 4.31–10.16)

## 2022-06-06 PROCEDURE — 85025 COMPLETE CBC W/AUTO DIFF WBC: CPT | Performed by: INTERNAL MEDICINE

## 2022-06-06 PROCEDURE — 80053 COMPREHEN METABOLIC PANEL: CPT | Performed by: INTERNAL MEDICINE

## 2022-06-06 NOTE — PROGRESS NOTES
Pt presents for central labs  NO new meds or concerns  Labs obtained per protocol, excellent blood return  Future visits discussed  AVS declined

## 2022-06-07 ENCOUNTER — HOSPITAL ENCOUNTER (OUTPATIENT)
Dept: INFUSION CENTER | Facility: CLINIC | Age: 72
Discharge: HOME | End: 2022-06-07
Payer: MEDICARE

## 2022-06-07 VITALS
BODY MASS INDEX: 40 KG/M2 | DIASTOLIC BLOOD PRESSURE: 86 MMHG | HEIGHT: 70 IN | TEMPERATURE: 97.3 F | SYSTOLIC BLOOD PRESSURE: 129 MMHG | HEART RATE: 79 BPM | WEIGHT: 279.43 LBS | RESPIRATION RATE: 18 BRPM

## 2022-06-07 DIAGNOSIS — C25.8 OVERLAPPING MALIGNANT NEOPLASM OF PANCREAS (HCC): ICD-10-CM

## 2022-06-07 DIAGNOSIS — T45.1X5A CHEMOTHERAPY INDUCED NEUTROPENIA (HCC): Primary | ICD-10-CM

## 2022-06-07 DIAGNOSIS — D70.1 CHEMOTHERAPY INDUCED NEUTROPENIA (HCC): Primary | ICD-10-CM

## 2022-06-07 PROCEDURE — 96415 CHEMO IV INFUSION ADDL HR: CPT

## 2022-06-07 PROCEDURE — 96417 CHEMO IV INFUS EACH ADDL SEQ: CPT

## 2022-06-07 PROCEDURE — 96413 CHEMO IV INFUSION 1 HR: CPT

## 2022-06-07 PROCEDURE — G0498 CHEMO EXTEND IV INFUS W/PUMP: HCPCS

## 2022-06-07 PROCEDURE — 96367 TX/PROPH/DG ADDL SEQ IV INF: CPT

## 2022-06-07 PROCEDURE — 96375 TX/PRO/DX INJ NEW DRUG ADDON: CPT

## 2022-06-07 RX ORDER — DEXTROSE MONOHYDRATE 50 MG/ML
20 INJECTION, SOLUTION INTRAVENOUS ONCE
Status: COMPLETED | OUTPATIENT
Start: 2022-06-07 | End: 2022-06-07

## 2022-06-07 RX ORDER — SODIUM CHLORIDE 9 MG/ML
20 INJECTION, SOLUTION INTRAVENOUS ONCE AS NEEDED
Status: DISCONTINUED | OUTPATIENT
Start: 2022-06-07 | End: 2022-06-10 | Stop reason: HOSPADM

## 2022-06-07 RX ORDER — ATROPINE SULFATE 1 MG/ML
0.25 INJECTION, SOLUTION INTRAVENOUS ONCE
Status: COMPLETED | OUTPATIENT
Start: 2022-06-07 | End: 2022-06-07

## 2022-06-07 RX ORDER — ATROPINE SULFATE 1 MG/ML
0.25 INJECTION, SOLUTION INTRAVENOUS ONCE AS NEEDED
Status: DISCONTINUED | OUTPATIENT
Start: 2022-06-07 | End: 2022-06-10 | Stop reason: HOSPADM

## 2022-06-07 RX ADMIN — DEXTROSE 20 ML/HR: 5 SOLUTION INTRAVENOUS at 09:30

## 2022-06-07 RX ADMIN — DEXAMETHASONE SODIUM PHOSPHATE: 100 INJECTION INTRAMUSCULAR; INTRAVENOUS at 09:33

## 2022-06-07 RX ADMIN — IRINOTECAN HYDROCHLORIDE 300 MG: 20 INJECTION, SOLUTION INTRAVENOUS at 12:49

## 2022-06-07 RX ADMIN — OXALIPLATIN 150 MG: 5 INJECTION, SOLUTION INTRAVENOUS at 10:48

## 2022-06-07 RX ADMIN — ATROPINE SULFATE 0.25 MG: 1 INJECTION, SOLUTION INTRAMUSCULAR; INTRAVENOUS; SUBCUTANEOUS at 12:48

## 2022-06-07 NOTE — PROGRESS NOTES
Patient arrived to unit without complaint  Patient tolerated chemotherapy without incident  Elastomeric Infusion Device connected and independently checked by Marya Holland RN  Clamp opened at 14:36  Patient aware to return in 46 hours for GERALD disconnect  Patient left in stable condition

## 2022-06-09 ENCOUNTER — HOSPITAL ENCOUNTER (OUTPATIENT)
Dept: INFUSION CENTER | Facility: CLINIC | Age: 72
Discharge: HOME | End: 2022-06-09
Payer: MEDICARE

## 2022-06-09 VITALS
SYSTOLIC BLOOD PRESSURE: 129 MMHG | DIASTOLIC BLOOD PRESSURE: 75 MMHG | TEMPERATURE: 97.3 F | HEART RATE: 73 BPM | RESPIRATION RATE: 18 BRPM

## 2022-06-09 DIAGNOSIS — T45.1X5A CHEMOTHERAPY INDUCED NEUTROPENIA (HCC): Primary | ICD-10-CM

## 2022-06-09 DIAGNOSIS — D70.1 CHEMOTHERAPY INDUCED NEUTROPENIA (HCC): Primary | ICD-10-CM

## 2022-06-09 DIAGNOSIS — C25.8 OVERLAPPING MALIGNANT NEOPLASM OF PANCREAS (HCC): ICD-10-CM

## 2022-06-09 PROCEDURE — 96372 THER/PROPH/DIAG INJ SC/IM: CPT

## 2022-06-09 RX ADMIN — PEGFILGRASTIM 6 MG: KIT SUBCUTANEOUS at 12:30

## 2022-06-13 ENCOUNTER — OFFICE VISIT (OUTPATIENT)
Dept: HEMATOLOGY ONCOLOGY | Facility: CLINIC | Age: 72
End: 2022-06-13
Payer: MEDICARE

## 2022-06-13 VITALS
OXYGEN SATURATION: 95 % | SYSTOLIC BLOOD PRESSURE: 126 MMHG | TEMPERATURE: 98.5 F | RESPIRATION RATE: 18 BRPM | HEART RATE: 81 BPM | DIASTOLIC BLOOD PRESSURE: 70 MMHG | HEIGHT: 70 IN | WEIGHT: 279 LBS | BODY MASS INDEX: 39.94 KG/M2

## 2022-06-13 DIAGNOSIS — C25.8 OVERLAPPING MALIGNANT NEOPLASM OF PANCREAS (HCC): Primary | ICD-10-CM

## 2022-06-13 PROCEDURE — 99214 OFFICE O/P EST MOD 30 MIN: CPT | Performed by: INTERNAL MEDICINE

## 2022-06-14 DIAGNOSIS — D70.1 CHEMOTHERAPY INDUCED NEUTROPENIA (HCC): Primary | ICD-10-CM

## 2022-06-14 DIAGNOSIS — T45.1X5A CHEMOTHERAPY INDUCED NEUTROPENIA (HCC): Primary | ICD-10-CM

## 2022-06-14 DIAGNOSIS — C25.8 OVERLAPPING MALIGNANT NEOPLASM OF PANCREAS (HCC): ICD-10-CM

## 2022-06-15 ENCOUNTER — OFFICE VISIT (OUTPATIENT)
Dept: SURGICAL ONCOLOGY | Facility: CLINIC | Age: 72
End: 2022-06-15
Payer: MEDICARE

## 2022-06-15 VITALS
OXYGEN SATURATION: 98 % | HEIGHT: 70 IN | SYSTOLIC BLOOD PRESSURE: 132 MMHG | BODY MASS INDEX: 37.94 KG/M2 | TEMPERATURE: 97.8 F | HEART RATE: 95 BPM | DIASTOLIC BLOOD PRESSURE: 82 MMHG | WEIGHT: 265 LBS | RESPIRATION RATE: 18 BRPM

## 2022-06-15 DIAGNOSIS — C25.8 OVERLAPPING MALIGNANT NEOPLASM OF PANCREAS (HCC): Primary | ICD-10-CM

## 2022-06-15 DIAGNOSIS — E08.65 DIABETES MELLITUS DUE TO UNDERLYING CONDITION WITH HYPERGLYCEMIA, WITHOUT LONG-TERM CURRENT USE OF INSULIN (HCC): ICD-10-CM

## 2022-06-15 DIAGNOSIS — D49.0 IPMN (INTRADUCTAL PAPILLARY MUCINOUS NEOPLASM): ICD-10-CM

## 2022-06-15 PROCEDURE — 99215 OFFICE O/P EST HI 40 MIN: CPT | Performed by: SURGERY

## 2022-06-15 NOTE — PROGRESS NOTES
Surgical Oncology Follow Up       [facility] SURGICAL ONCOLOGY [place]  [facility] [address]    Sinda Call  1950  [id number]  [facility] SURGICAL ONCOLOGY [place]  [place] [address]    Chief Complaint   Patient presents with    Follow-up       Assessment/Plan:    No problem-specific Assessment & Plan notes found for this encounter  Diagnoses and all orders for this visit:    Overlapping malignant neoplasm of pancreas [facility])  -     Case request operating room: [address]; Standing  -     Ambulatory referral to Cardiology; Future  -     Type and screen; Future  -     Prepare Leukoreduced RBC: 2 Units, Irradiated, Leukoreduced; Future  -     Comprehensive metabolic panel; Future  -     CBC and differential; Future  -     APTT; Future  -     Protime-INR; Future  -     HEMOGLOBIN A1C W/ EAG ESTIMATION; Future  -     EKG 12 lead; Future  -     XR chest pa & lateral; Future  -     Ambulatory referral to surgical optimization; Future  -     Case request operating room: WHIPPLE PROCEDURE/[id number] PANCREATECTOMY  -     CT abdomen pelvis w contrast; Future    IPMN (intraductal papillary mucinous neoplasm)  -     Case request operating room: [address]; Standing  -     Case request operating room: WHIPPLE PROCEDURE/[id number] PANCREATECTOMY  -     CT abdomen pelvis w contrast; Future    Diabetes mellitus due to underlying condition with hyperglycemia, without long-term current use of insulin (HCC)   -     APTT; Future  -     HEMOGLOBIN A1C W/ EAG ESTIMATION;  Future    Other orders  -     Incentive spirometry; Standing  -     Insert and maintain IV line; Standing  -     Void On-Call to O R ; Standing  -     Place sequential compression device; Standing  -     ceFAZolin (ANCEF) 3,000 mg in dextrose 5 % 100 mL IVPB        Advance Care Planning/Advance Directives:  Discussed disease status, cancer treatment plans and/or cancer treatment goals with the patient  Oncology History   Overlapping malignant neoplasm of pancreas ([facility] [address] )   3/12/2019 Surgery    Distal pancreatectomy  Several foci of invasive colloid cancer  Grade 1  IPMN with high grade dysplasia at margin  T1b, NO MX Stage IA     4/11/2019 - 6/21/2019 Chemotherapy    Saw Dr Bassam Bhandari  Will be starting Folfirinox 4/24  Ended 6/21/2019 4/24/2019 - 7/2/2019 Chemotherapy    fluorouracil (ADRUCIL), 400 mg/m2 = 1,010 mg, Intravenous, Once, 2 of 2 cycles  pegfilgrastim (Liliya Leon), 6 mg, Subcutaneous, Once, 2 of 2 cycles  Administration: 6 mg (6/21/2019), 6 mg (6/7/2019)  irinotecan (CAMPTOSAR) chemo infusion, 180 mg/m2 = 455 mg, Intravenous, Once, 4 of 4 cycles  Dose modification: 140 mg/m2 (original dose 180 mg/m2, Cycle 3, Reason: Other (Must fill in a comment)), 125 mg/m2 (original dose 180 mg/m2, Cycle 3, Reason: Dose Not Tolerated)  Administration: 316 mg (6/5/2019), 300 mg (6/19/2019)  leucovorin calcium IVPB, 400 mg/m2 = 1,012 mg, Intravenous, Once, 2 of 2 cycles  oxaliplatin (ELOXATIN) chemo infusion, 85 mg/m2 = 215 05 mg, Intravenous, Once, 4 of 4 cycles  Dose modification: 65 mg/m2 (original dose 85 mg/m2, Cycle 3, Reason: Other (Must fill in a comment)), 60 mg/m2 (original dose 85 mg/m2, Cycle 3, Reason: Dose Not Tolerated)  Administration: 151 8 mg (6/5/2019), 151 8 mg (6/19/2019)  fluorouracil (ADRUCIL) ambulatory infusion Soln, 1,200 mg/m2/day = 6,070 mg, Intravenous, Over 46 hours, 4 of 4 cycles     2/15/2022 Surgery    Pancreas (subtotal pancreatectomy):     - 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 (largest focus 0 8 cm) arising in association with intraductal papillary mucinous neoplasm (IPMN) with high-grade dysplasia        - Surgical margin positive for colloid carcinoma and IPMN with high-grade dysplasia  - Fifteen (15) lymph nodes, negative for carcinomaRestore     2/15/2022 -  Cancer Staged    Staging form: Pancreas, AJCC 8th Edition  - Pathologic stage from 2/15/2022: Stage IA (ypT1b(m), pN0, cM0) - Signed by Esmer Gunderson MD on 3/8/2022  Stage prefix: Post-therapy  Total positive nodes: 0  Multiple tumors: Yes       4/12/2022 -  Chemotherapy    pegfilgrastim (Hobert Damme), 6 mg, Subcutaneous, Once, 4 of 6 cycles  Administration: 6 mg (4/14/2022), 6 mg (4/28/2022), 6 mg (5/26/2022), 6 mg (6/9/2022)  irinotecan (CAMPTOSAR) chemo infusion, 305 mg (69 4 % of original dose 180 mg/m2), Intravenous, Once, 4 of 6 cycles  Dose modification: 125 mg/m2 (original dose 180 mg/m2, Cycle 1, Reason: Anticipated Tolerance, Comment: Same dose received last time in 2019)  Administration: 300 mg (4/12/2022), 300 mg (4/26/2022), 300 mg (5/24/2022), 300 mg (6/7/2022)  oxaliplatin (ELOXATIN) chemo infusion, 146 4 mg (70 6 % of original dose 85 mg/m2), Intravenous, Once, 4 of 6 cycles  Dose modification: 60 mg/m2 (original dose 85 mg/m2, Cycle 1, Reason: Anticipated Tolerance, Comment: Same dose he received last time)  Administration: 150 mg (4/12/2022), 150 mg (4/26/2022), 150 mg (5/24/2022), 150 mg (6/7/2022)  fluorouracil (ADRUCIL) ambulatory infusion Soln, 1,200 mg/m2/day = 5,855 mg, Intravenous, Over 46 hours, 4 of 6 cycles     IPMN (intraductal papillary mucinous neoplasm)   2/15/2022 Surgery    Pancreas (subtotal pancreatectomy):     - 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 (largest focus 0 8 cm) arising in association with intraductal papillary mucinous neoplasm (IPMN) with high-grade dysplasia  - Surgical margin positive for colloid carcinoma and IPMN with high-grade dysplasia       - Fifteen (15) lymph nodes, negative for carcinoma         History of Present Illness:  Patient is having 2year-old man with history of prior subtotal pancreatectomy found to have multiple foci of colloid carcinoma within the setting of IPMN with high-grade dysplasia  Surgical margin was positive for colon carcinoma an IPMN with high-grade dysplasia  -Interval History:  He is here to discuss treatment options  He had sought a 2nd opinion for surgery is recommended  I presented his case at tumor board and presented different options including surgery/completion pancreatectomy versus radiation therapy given his pancreatic disease  He was considered high risk and therefore sought a 2nd opinion where additional surgery was recommended  He is presently on chemotherapy, [facility] to discuss potential radiation to the pancreatic remnant  Review of Systems:  Review of Systems   Constitutional: Negative  HENT: Negative  Eyes: Negative  Respiratory: Negative  Cardiovascular: Negative  Gastrointestinal: Negative  Endocrine: Negative  Genitourinary: Negative  Musculoskeletal: Negative  Skin: Negative  Allergic/Immunologic: Negative  Neurological: Negative  Hematological: Negative  Psychiatric/Behavioral: Negative  All other systems reviewed and are negative        Patient Active Problem List   Diagnosis    Obstructive sleep apnea syndrome    Hypersomnia    Permanent atrial fibrillation (HCC)    Morbid obesity with BMI of 40 0-44 9, adult ([facility] [address] )    Lower extremity edema    Pancreatic duct dilated    Overlapping malignant neoplasm of pancreas (HCC)    Type 2 diabetes mellitus without complication, without long-term current use of insulin (HCC)    IPMN (intraductal papillary mucinous neoplasm)    Thrombocytopenia (HCC)    Urgency incontinence    Balanitis    OAB (overactive bladder)    BPH without obstruction/lower urinary tract symptoms    Encounter for follow-up surveillance of pancreatic cancer    Encounter for geriatric assessment    Counseling regarding advanced care planning and goals of care    Port-A-Cath in place    Chemotherapy induced neutropenia ([facility] [address] )     Past Medical History:   Diagnosis Date    [facility]     Abdominal wall strain     last assessed: 10/21/14    Allergic rhinitis     last assessed: 05/03/16    Arthritis     Cancer [facility]     PACNCREATIC    COVID-19 virus infection 3/3/2021    CPAP (continuous positive airway pressure) dependence     Diabetes mellitus ([facility] [address] )     Erectile dysfunction of non-organic origin     last assessed: 03/17/14    Irregular heart beat     Pt with A fib     Lumbar strain     last assessed: 10/21/14    Multiple acquired skin tags     last assessed: 2/18/16    Palpitations     last assessed: 03/17/14    Pancreas cyst     Plantar fasciitis     Skin disorder     last assessed: 05/28/13    Sleep apnea     CPAP BROKE IN OCTOBER HAS BEEN TRYING TO GET NEW ONE BUT UNABLE AS OF YET    Thrombocytopenia ([facility] )      Past Surgical History:   Procedure Laterality Date    BOTOX INJECTION N/A 11/12/2021    Procedure: Syble Ammy;  Surgeon: Ailin Casey MD;  Location: [facility] MAIN OR;  Service: Urology    CATARACT EXTRACTION Bilateral     COLONOSCOPY  01/17/2013    COLONOSCOPY  01/08/2018    COLONOSCOPY  04/2021    CYSTOSCOPY      INJECT WITH BOTOX    DISTAL PANCREATECTOMY N/A 2/15/2022    Procedure: PANCREATECTOMY SUB-TOTALL-OPEN;  Surgeon: José Antonio Kurtz MD;  [facility]: [facility] MAIN OR;  Service: Surgical Oncology    EGD  06/05/2020    EGD AND COLONOSCOPY  02/06/2014, 2/8/2017    FL GUIDED CENTRAL VENOUS ACCESS DEVICE INSERTION  4/23/2019    FLEXIBLE SIGMOIDOSCOPY  06/11/2019    FOOT SURGERY      Due to plantar fascitis    IR PORT PLACEMENT  3/30/2022    JOINT REPLACEMENT Left     LTK    LINEAR ENDOSCOPIC U/S      PANCREATECTOMY LAPAROSCOPIC N/A 3/12/2019    Procedure: DISTAL PANCREATECTOMY LAPAROSCOPIC/POSSIBLE OPEN;  Surgeon: José Antonio Kurtz MD;  Location: [facility] MAIN [facility];  Service: Surgical Oncology    ID EDG US EXAM SURGICAL ALTER STOM DUODENUM/JEJUNUM N/A 1/24/2019    Procedure: LINEAR ENDOSCOPIC U/S;  Surgeon: Ce Moreau MD;  Location: [facility]; Service: Gastroenterology    REPLACEMENT TOTAL KNEE Left     TUNNELED VENOUS PORT PLACEMENT Left 4/23/2019    Procedure: INSERTION VENOUS PORT (PORT-A-CATH); Surgeon: Jason Owens MD;  Location: [facility] MA[facility];  Service: Surgical Oncology- 11/8/21 Pt reports PAC removed     UMBILICAL HERNIA REPAIR       Family History   Problem Relation Age of Onset    Breast cancer Mother     Cervical cancer Paternal Aunt     Liver cancer Other     No Known Problems Father      Social History     Socioeconomic History    Marital status: /Civil Union     Spouse name: Not on file    Number of children: Not on file    Years of education: Not on file    Highest education level: Not on file   Occupational History    Not on file   Tobacco Use    Smoking status: Never Smoker    Smokeless tobacco: Never Used   Vaping Use    Vaping Use: Never used   Substance and Sexual Activity    Alcohol use: Yes     Alcohol/week: 2 0 standard drinks     Types: 2 Cans of beer per week     Comment: couple times per week;     Drug use: No     Comment: Denies     Sexual activity: Yes     Comment: Denies any chest pain or shortness of breath with activity   Other Topics Concern    Not on file   Social History Narrative    Not on file     Social Determinants of Health     Financial Resource Strain: Not on file   Food Insecurity: No Food Insecurity    Worried About Running Out of Food in the Last Year: Never true    Tisha of Food in the Last Year: Never true   Transportation Needs: No Transportation Needs    Lack of Transportation (Medical): No    Lack of Transportation (Non-Medical):  No   Physical Activity: Not on file   Stress: Not on file   Social Connections: Not on file   Intimate Partner Violence: Not on file   Housing Stability: Unknown    Unable to Pay for Housing in the Last Year: No    Number of Places Lived in the Last Year: Not on file    Unstable Housing in the Last Year: No       Current Outpatient Medications:     acetaminophen (TYLENOL) 325 mg tablet, Take 2 tablets (650 mg total) by mouth every 6 (six) hours as needed for mild pain, Disp: 30 tablet, Rfl: 0    ascorbic acid (VITAMIN C) 1000 MG tablet, Take 2,000 mg by mouth in the morning , Disp: , Rfl:     aspirin (ECOTRIN) 325 mg EC tablet, Take 325 mg by mouth in the morning , Disp: , Rfl:     atenolol (TENORMIN) 25 mg tablet, TAKE 1 TABLET BY MOUTH EVERY DAY, Disp: 90 tablet, Rfl: 3    Blood Glucose Calibration (OneTouch Verio) SOLN, Use as needed to calibrate test strips, Disp: 1 each, Rfl: 0    Blood Glucose Monitoring Suppl (OneTouch Verio) w/Device KIT, Test BG up to 1x daily as directed, Disp: 1 kit, Rfl: 0    Cholecalciferol (VITAMIN D) 2000 units CAPS, Take 1,000 Units by mouth in the morning  11/8/21 Pt takes three tabs of Vitamin D daily   , Disp: , Rfl:     Cyanocobalamin (VITAMIN B 12 PO), Take by mouth daily 11/8/21 Pt reports takes three tabs daily  , Disp: , Rfl:     [START ON 6/21/2022] fluorouracil 5,855 mg in CADD/Elastomeric Infusion Device, Infuse 5,855 mg (1,200 mg/m2/day x 2 44 m2) into a venous catheter over 46 hours for 2 days  Do not start before June 21, 2022 , Disp: 1 each, Rfl: 0    glucose blood (OneTouch Verio) test strip, Test BG up to 1x daily as directed, Disp: 100 each, Rfl: 1    Lancets (OneTouch Delica Plus [id number]) MISC, Test BG up to 1x daily as directed, Disp: 100 each, Rfl: 1    metFORMIN (GLUCOPHAGE) 1000 MG tablet, TAKE 1 TABLET BY MOUTH TWICE A DAY WITH MEALS, Disp: 180 tablet, Rfl: 1    Na Sulfate-K Sulfate-Mg Sulf (Suprep Bowel Prep Kit) 17 5-3 13-1 6 GM/177ML SOLN, Follow office instructions, Disp: 1 Bottle, Rfl: 0    pancrelipase, Lip-Prot-Amyl, (CREON) 6,000 units delayed release capsule, Take 2 pills, 3 times a day with meals, Disp: 180 capsule, Rfl: 3    tadalafil (CIALIS) 20 MG tablet, TAKE 1 TABLET BY MOUTH DAILY AS NEEDED FOR ERECTILE DYSFUNCTION, Disp: 10 tablet, Rfl: 0  No Known Allergies  Vitals:    06/15/22 1532   BP: 132/82   Pulse: 95   Resp: 18   Temp: 97 8 °F (36 6 °C)   SpO2: 98%       Physical Exam  Vitals reviewed  Constitutional:       Appearance: Normal appearance  HENT:      Head: Normocephalic and atraumatic  Right Ear: External ear normal       Left Ear: External ear normal       Nose: Nose normal    Eyes:      Extraocular Movements: Extraocular movements intact  Pupils: Pupils are equal, round, and reactive to light  Cardiovascular:      Rate and Rhythm: Normal rate and regular rhythm  Heart sounds: Normal heart sounds  Pulmonary:      Effort: Pulmonary effort is normal       Breath sounds: Normal breath sounds  Abdominal:      General: Abdomen is flat  Bowel sounds are normal  There is no distension  Palpations: Abdomen is soft  There is no mass  Tenderness: There is no abdominal tenderness  There is no guarding or rebound  Hernia: No hernia is present  Musculoskeletal:         General: No swelling, tenderness, deformity or signs of injury  Normal range of motion  Cervical back: Normal range of motion and neck supple  No rigidity or tenderness  Skin:     General: Skin is warm and dry  Coloration: Skin is not jaundiced  Neurological:      General: No focal deficit present  Mental Status: He is alert and oriented to person, place, and time  Psychiatric:         Mood and Affect: Mood normal          Behavior: Behavior normal          Thought Content:  Thought content normal          Judgment: Judgment normal            Results:  Labs:  Case Report   Surgical Pathology Report                         Case: [id number]                                    Authorizing Provider: Anisha Pennington MD        Collected:           02/15/2022 [id number]               Ordering Location:     [facility]      Received:            02/16/2022 0738                                      Hospital Operating Room                                                       Pathologist:           Josef Silverio MD                                                                 Specimen:    Pancreas, Subtotal pancreatectomy                                                          Final Diagnosis   A  Pancreas (subtotal pancreatectomy):     - 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 (largest focus 0 8 cm) arising in association with intraductal papillary mucinous neoplasm (IPMN) with high-grade dysplasia  - Surgical margin positive for colloid carcinoma and IPMN with high-grade dysplasia  - Fifteen (15) lymph nodes, negative for carcinoma  - Therapy effect noted in background pancreas (fibrosis and inflammation)       Comment:  The tumor appears largely viable  Few collections of acellular mucin are noted  Electronically signed by Josef Silverio MD on 2/23/2022 at  2:13 PM   Comments: This is an appended report  These results have been appended to a previously preliminary verified report  Preliminary Diagnosis   Intradepartmental review pending     A  Pancreas (subtotal pancreatectomy):     - Mucinous neoplasm, likely IPMN, with at least high-grade 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  Preliminary result electronically signed by Josef Silverio MD on 2/21/2022 at 10:49 AM   Comments: This is an appended report  These results have been appended to a previously preliminary verified report  Imaging  No results found  I reviewed the above laboratory and imaging data  Intraoperatively    Discussion/Summary:  76-year-old man, IPMN, colloid carcinoma, field-effect throughout pancreas status post subtotal pancreatectomy  Positive margins at last operation  We discussed treatment options for the pancreatic remnant    Ideally, resection of all remaining pancreas tissue would maximize chance of cure  The alternative would be radiation therapy which would provide theoretical benefit  Surgically, he is somewhat high risk given his medical comorbidities, and would become an insulin-dependent diabetic  He would have pancreatic insufficiency as well which would have to be addressed with supplements  He is also high risk given prior history of surgery, as well as clinical evidence of pancreatitis seen intraoperatively  He is aware that resection would involve removal of all remaining pancreas tissue, and would necessitate biliary as well as enteric reconstruction, essentially 2/3 of a Whipple procedure  He is aware that this is high risk given his comorbidities, but would like optimize chance at cure  He is set for surgery at an outside institution in August, but would prefer to have surgery here if it can be done closer to home  We gave him the choice of going to either institution for his operation, but he is opting to stay here  We will therefore plan for completion pancreatectomy  Rationale for this with risks and benefits of surgery including infection, bleeding, biliary or anastomotic leak, discussed  We also discussed other risks including heart trouble, lung trouble, kidney trouble, stroke, or even death, given his comorbidities  We discussed postoperative need for cholesterol control, possible use of insulin pump, as well as need for pancreatic supplementation moving forward  All questions answered consent site of this visit  We will hold off on chemotherapy given anticipated surgical date  Will obtain preop CT for planning prior to surgery

## 2022-06-15 NOTE — LETTER
Dianelys 15, 2022     Angela Renee DO  [address]  [address]    Patient: Antwan Argueta   YOB: 1950   Date of Visit: 6/15/2022       Dear Dr Reina King: Thank you for referring Veronica Mayo to me for evaluation  Below are my notes for this consultation  If you have questions, please do not hesitate to call me  I look forward to following your patient along with you  Sincerely,        Johnson Foster MD        CC: MD Isaac Woods MD Oma Bowman, RN Alinda Gaucher, MD Kin Heady, MD  6/15/2022  5:40 PM  Sign when Signing Visit     Surgical Oncology Follow Up       [address] [id number]    Antwan Argueta  1950  [id number]  [facility]  [facility]  [address] [phone]    Chief Complaint   Patient presents with    Follow-up       Assessment/Plan:    No problem-specific Assessment & Plan notes found for this encounter  Diagnoses and all orders for this visit:    Overlapping malignant neoplasm of pancreas [facility])  -     Case request operating room: [address] [place] [address]; Standing  -     Ambulatory referral to Cardiology; Future  -     Type and screen; Future  -     Prepare Leukoreduced RBC: 2 Units, Irradiated, Leukoreduced; Future  -     Comprehensive metabolic panel; Future  -     CBC and differential; Future  -     APTT; Future  -     Protime-INR; Future  -     HEMOGLOBIN A1C W/ EAG ESTIMATION; Future  -     EKG 12 lead; Future  -     XR chest pa & lateral; Future  -     Ambulatory referral to surgical optimization;  Future  -     Case request operating room: WHIPPLE PROCEDURE/[id number] PANCREATECTOMY  -     CT abdomen pelvis w contrast; Future    IPMN (intraductal papillary mucinous neoplasm)  - Case request operating room: WHIPPLE PROCEDURE/[id number] PANCREATECTOMY; Standing  -     Case request operating room: WHIPPLE PROCEDURE/[id number] PANCREATECTOMY  -     CT abdomen pelvis w contrast; Future    Diabetes mellitus due to underlying condition with hyperglycemia, without long-term current use of insulin (HCC)   -     APTT; Future  -     HEMOGLOBIN A1C W/ EAG ESTIMATION; Future    Other orders  -     Incentive spirometry; Standing  -     Insert and maintain IV line; Standing  -     Void On-Call to O R ; Standing  -     Place sequential compression device; Standing  -     ceFAZolin (ANCEF) 3,000 mg in dextrose 5 % 100 mL IVPB        Advance Care Planning/Advance Directives:  Discussed disease status, cancer treatment plans and/or cancer treatment goals with the patient  Oncology History   Overlapping malignant neoplasm of pancreas ([facility] [address] )   3/12/2019 Surgery    Distal pancreatectomy  Several foci of invasive colloid cancer  Grade 1  IPMN with high grade dysplasia at margin  T1b, NO MX Stage IA     4/11/2019 - 6/21/2019 Chemotherapy    Saw Dr George Ojeda  Will be starting Folfirinox 4/24   Ended 6/21/2019 4/24/2019 - 7/2/2019 Chemotherapy    fluorouracil (ADRUCIL), 400 mg/m2 = 1,010 mg, Intravenous, Once, 2 of 2 cycles  pegfilgrastim (Brittanie Souris), 6 mg, Subcutaneous, Once, 2 of 2 cycles  Administration: 6 mg (6/21/2019), 6 mg (6/7/2019)  irinotecan (CAMPTOSAR) chemo infusion, 180 mg/m2 = 455 mg, Intravenous, Once, 4 of 4 cycles  Dose modification: 140 mg/m2 (original dose 180 mg/m2, Cycle 3, Reason: Other (Must fill in a comment)), 125 mg/m2 (original dose 180 mg/m2, Cycle 3, Reason: Dose Not Tolerated)  Administration: 316 mg (6/5/2019), 300 mg (6/19/2019)  leucovorin calcium IVPB, 400 mg/m2 = 1,012 mg, Intravenous, Once, 2 of 2 cycles  oxaliplatin (ELOXATIN) chemo infusion, 85 mg/m2 = 215 05 mg, Intravenous, Once, 4 of 4 cycles  Dose modification: 65 mg/m2 (original dose 85 mg/m2, Cycle 3, Reason: Other (Must fill in a comment)), 60 mg/m2 (original dose 85 mg/m2, Cycle 3, Reason: Dose Not Tolerated)  Administration: 151 8 mg (6/5/2019), 151 8 mg (6/19/2019)  fluorouracil (ADRUCIL) ambulatory infusion Soln, 1,200 mg/m2/day = 6,070 mg, Intravenous, Over 46 hours, 4 of 4 cycles     2/15/2022 Surgery    Pancreas (subtotal pancreatectomy):     - 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 (largest focus 0 8 cm) arising in association with intraductal papillary mucinous neoplasm (IPMN) with high-grade dysplasia  - Surgical margin positive for colloid carcinoma and IPMN with high-grade dysplasia       - Fifteen (15) lymph nodes, negative for carcinomaRestore     2/15/2022 -  Cancer Staged    Staging form: Pancreas, AJCC 8th Edition  - Pathologic stage from 2/15/2022: Stage IA (ypT1b(m), pN0, cM0) - Signed by Rudy Perry MD on 3/8/2022  Stage prefix: Post-therapy  Total positive nodes: 0  Multiple tumors: Yes       4/12/2022 -  Chemotherapy    pegfilgrastim (Liliya Leon), 6 mg, Subcutaneous, Once, 4 of 6 cycles  Administration: 6 mg (4/14/2022), 6 mg (4/28/2022), 6 mg (5/26/2022), 6 mg (6/9/2022)  irinotecan (CAMPTOSAR) chemo infusion, 305 mg (69 4 % of original dose 180 mg/m2), Intravenous, Once, 4 of 6 cycles  Dose modification: 125 mg/m2 (original dose 180 mg/m2, Cycle 1, Reason: Anticipated Tolerance, Comment: Same dose received last time in 2019)  Administration: 300 mg (4/12/2022), 300 mg (4/26/2022), 300 mg (5/24/2022), 300 mg (6/7/2022)  oxaliplatin (ELOXATIN) chemo infusion, 146 4 mg (70 6 % of original dose 85 mg/m2), Intravenous, Once, 4 of 6 cycles  Dose modification: 60 mg/m2 (original dose 85 mg/m2, Cycle 1, Reason: Anticipated Tolerance, Comment: Same dose he received last time)  Administration: 150 mg (4/12/2022), 150 mg (4/26/2022), 150 mg (5/24/2022), 150 mg (6/7/2022)  fluorouracil (ADRUCIL) ambulatory infusion Soln, 1,200 mg/m2/day = 5,855 mg, Intravenous, Over 46 hours, 4 of 6 cycles     IPMN (intraductal papillary mucinous neoplasm)   2/15/2022 Surgery    Pancreas (subtotal pancreatectomy):     - 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 (largest focus 0 8 cm) arising in association with intraductal papillary mucinous neoplasm (IPMN) with high-grade dysplasia  - Surgical margin positive for colloid carcinoma and IPMN with high-grade dysplasia  - [de-identified] (15) lymph nodes, negative for carcinoma         History of Present Illness:  Patient is having 2year-old man with history of prior subtotal pancreatectomy found to have multiple foci of colloid carcinoma within the setting of IPMN with high-grade dysplasia  Surgical margin was positive for colon carcinoma an IPMN with high-grade dysplasia  -Interval History:  He is here to discuss treatment options  He had sought a 2nd opinion for surgery is recommended  I presented his case at tumor board and presented different options including surgery/completion pancreatectomy versus radiation therapy given his pancreatic disease  He was considered high risk and therefore sought a 2nd opinion where additional surgery was recommended  He is presently on chemotherapy, [facility] to discuss potential radiation to the pancreatic remnant  Review of Systems:  Review of Systems   Constitutional: Negative  HENT: Negative  Eyes: Negative  Respiratory: Negative  Cardiovascular: Negative  Gastrointestinal: Negative  Endocrine: Negative  Genitourinary: Negative  Musculoskeletal: Negative  Skin: Negative  Allergic/Immunologic: Negative  Neurological: Negative  Hematological: Negative  Psychiatric/Behavioral: Negative  All other systems reviewed and are negative        Patient Active Problem List   Diagnosis    Obstructive sleep apnea syndrome    Hypersomnia    Permanent atrial fibrillation ([place] [address] )    Morbid obesity with BMI of 40 0-44 9, adult ([facility] )    Lower extremity edema    Pancreatic duct dilated    Overlapping malignant neoplasm of pancreas (HCC)    Type 2 diabetes mellitus without complication, without long-term current use of insulin (HCC)    IPMN (intraductal papillary mucinous neoplasm)    Thrombocytopenia (HCC)    Urgency incontinence    Balanitis    OAB (overactive bladder)    BPH without obstruction/lower urinary tract symptoms    Encounter for follow-up surveillance of pancreatic cancer    Encounter for geriatric assessment    Counseling regarding advanced care planning and goals of care    Port-A-Cath in place    Chemotherapy induced neutropenia ([place] [address] )     Past Medical History:   Diagnosis Date    A-fib ([place] [address] )     Abdominal wall strain     last assessed: 10/21/14    Allergic rhinitis     last assessed: 05/03/16    Arthritis     Cancer ([place] [address] )     PACNCREATIC    COVID-19 virus infection 3/3/2021    CPAP (continuous positive airway pressure) dependence     Diabetes mellitus ([place] [address] )     Erectile dysfunction of non-organic origin     last assessed: 03/17/14    Irregular heart beat     Pt with A fib     Lumbar strain     last assessed: 10/21/14    Multiple acquired skin tags     last assessed: 2/18/16    Palpitations     last assessed: 03/17/14    Pancreas cyst     Plantar fasciitis     Skin disorder     last assessed: 05/28/13    Sleep apnea     CPAP BROKE IN OCTOBER HAS BEEN TRYING TO GET NEW ONE BUT UNABLE AS OF YET    Thrombocytopenia ([place] [address] )      Past Surgical History:   Procedure Laterality Date    BOTOX INJECTION N/A 11/12/2021    Procedure: Fabio Florez;  Surgeon: Joe Mccord MD;  Location:  MAIN OR;  Service: Urology    CATARACT EXTRACTION Bilateral     COLONOSCOPY  01/17/2013    COLONOSCOPY  01/08/2018    COLONOSCOPY  04/2021    CYSTOSCOPY      INJECT WITH BOTOX    DISTAL PANCREATECTOMY N/A 2/15/2022    Procedure: PANCREATECTOMY SUB-TOTALL-OPEN;  Surgeon: Dale Case MD;  Location: [facility];  Service: Surgical Oncology    EGD  06/05/2020    EGD AND COLONOSCOPY  02/06/2014, 2/8/2017    FL GUIDED CENTRAL VENOUS ACCESS DEVICE INSERTION  4/23/2019    FLEXIBLE SIGMOIDOSCOPY  06/11/2019    FOOT SURGERY      Due to plantar fascitis    IR PORT PLACEMENT  3/30/2022    JOINT REPLACEMENT Left     LTK    LINEAR ENDOSCOPIC U/S      PANCREATECTOMY LAPAROSCOPIC N/A 3/12/2019    Procedure: DISTAL PANCREATECTOMY LAPAROSCOPIC/POSSIBLE OPEN;  Surgeon: Dale Case MD;  [facility];  Service: Surgical Oncology    MD EDG US EXAM SURGICAL ALTER STOM DUODENUM/JEJUNUM N/A 1/24/2019    Procedure: LINEAR ENDOSCOPIC U/S;  Surgeon: Shelli Live MD;  Location: [facility]; Service: Gastroenterology    REPLACEMENT TOTAL KNEE Left     TUNNELED VENOUS PORT PLACEMENT Left 4/23/2019    Procedure: INSERTION VENOUS PORT (PORT-A-CATH); Surgeon: Dale Case MD;  [facility];  Service: Surgical Oncology- 11/8/21 Pt reports PAC removed     UMBILICAL HERNIA REPAIR       Family History   Problem Relation Age of Onset    Breast cancer Mother     Cervical cancer Paternal Aunt     Liver cancer Other     No Known Problems Father      Social History     Socioeconomic History    Marital status: /Civil Union     Spouse name: Not on file    Number of children: Not on file    Years of education: Not on file    Highest education level: Not on file   Occupational History    Not on file   Tobacco Use    Smoking status: Never Smoker    Smokeless tobacco: Never Used   Vaping Use    Vaping Use: Never used   Substance and Sexual Activity    Alcohol use:  Yes     Alcohol/week: 2 0 standard drinks     Types: 2 Cans of beer per week     Comment: couple times per week;     Drug use: No     Comment: Denies     Sexual activity: Yes     Comment: Denies any chest pain or shortness of breath with activity   Other Topics Concern    Not on file   Social History Narrative    Not on file     Social Determinants of Health     Financial Resource Strain: Not on file   Food Insecurity: No Food Insecurity    Worried About Running Out of Food in the Last Year: Never true    Tisha of Food in the Last Year: Never true   Transportation Needs: No Transportation Needs    Lack of Transportation (Medical): No    Lack of Transportation (Non-Medical): No   Physical Activity: Not on file   Stress: Not on file   Social Connections: Not on file   Intimate Partner Violence: Not on file   Housing Stability: Unknown    Unable to Pay for Housing in the Last Year: No    Number of Places Lived in the Last Year: Not on file    Unstable Housing in the Last Year: No       Current Outpatient Medications:     acetaminophen (TYLENOL) 325 mg tablet, Take 2 tablets (650 mg total) by mouth every 6 (six) hours as needed for mild pain, Disp: 30 tablet, Rfl: 0    ascorbic acid (VITAMIN C) 1000 MG tablet, Take 2,000 mg by mouth in the morning , Disp: , Rfl:     aspirin (ECOTRIN) 325 mg EC tablet, Take 325 mg by mouth in the morning , Disp: , Rfl:     atenolol (TENORMIN) 25 mg tablet, TAKE 1 TABLET BY MOUTH EVERY DAY, Disp: 90 tablet, Rfl: 3    Blood Glucose Calibration (OneTouch Verio) SOLN, Use as needed to calibrate test strips, Disp: 1 each, Rfl: 0    Blood Glucose Monitoring Suppl (OneTouch Verio) w/Device KIT, Test BG up to 1x daily as directed, Disp: 1 kit, Rfl: 0    Cholecalciferol (VITAMIN D) 2000 units CAPS, Take 1,000 Units by mouth in the morning  11/8/21 Pt takes three tabs of Vitamin D daily   , Disp: , Rfl:     Cyanocobalamin (VITAMIN B 12 PO), Take by mouth daily 11/8/21 Pt reports takes three tabs daily  , Disp: , Rfl:     [START ON 6/21/2022] fluorouracil 5,855 mg in CADD/Elastomeric Infusion Device, Infuse 5,855 mg (1,200 mg/m2/day x 2 44 m2) into a venous catheter over 46 hours for 2 days  Do not start before June 21, 2022 , Disp: 1 each, Rfl: 0    glucose blood (OneTouch Verio) test strip, Test BG up to 1x daily as directed, Disp: 100 each, Rfl: 1    Lancets (OneTouch Delica Plus [id number]) MISC, Test BG up to 1x daily as directed, Disp: 100 each, Rfl: 1    metFORMIN (GLUCOPHAGE) 1000 MG tablet, TAKE 1 TABLET BY MOUTH TWICE A DAY WITH MEALS, Disp: 180 tablet, Rfl: 1    Na Sulfate-K Sulfate-Mg Sulf (Suprep Bowel Prep Kit) 17 5-3 13-1 6 GM/177ML SOLN, Follow office instructions, Disp: 1 Bottle, Rfl: 0    pancrelipase, Lip-Prot-Amyl, (CREON) 6,000 units delayed release capsule, Take 2 pills, 3 times a day with meals, Disp: 180 capsule, Rfl: 3    tadalafil (CIALIS) 20 MG tablet, TAKE 1 TABLET BY MOUTH DAILY AS NEEDED FOR ERECTILE DYSFUNCTION, Disp: 10 tablet, Rfl: 0  No Known Allergies  Vitals:    06/15/22 1532   BP: 132/82   Pulse: 95   Resp: 18   Temp: 97 8 °F (36 6 °C)   SpO2: 98%       Physical Exam  Vitals reviewed  Constitutional:       Appearance: Normal appearance  HENT:      Head: Normocephalic and atraumatic  Right Ear: External ear normal       Left Ear: External ear normal       Nose: Nose normal    Eyes:      Extraocular Movements: Extraocular movements intact  Pupils: Pupils are equal, round, and reactive to light  Cardiovascular:      Rate and Rhythm: Normal rate and regular rhythm  Heart sounds: Normal heart sounds  Pulmonary:      Effort: Pulmonary effort is normal       Breath sounds: Normal breath sounds  Abdominal:      General: Abdomen is flat  Bowel sounds are normal  There is no distension  Palpations: Abdomen is soft  There is no mass  Tenderness: There is no abdominal tenderness  There is no guarding or rebound  Hernia: No hernia is present  Musculoskeletal:         General: No swelling, tenderness, deformity or signs of injury  Normal range of motion  Cervical back: Normal range of motion and neck supple  No rigidity or tenderness     Skin:     General: Skin is warm and dry       Coloration: Skin is not jaundiced  Neurological:      General: No focal deficit present  Mental Status: He is alert and oriented to person, place, and time  Psychiatric:         Mood and Affect: Mood normal          Behavior: Behavior normal          Thought Content: Thought content normal          Judgment: Judgment normal            Results:  Labs:  Case Report   Surgical Pathology Report                         Case: [id number]                                    Authorizing Provider: Newton Catherine MD        Collected:           02/15/2022 1713               Ordering Location:     [address]      Received:            02/16/2022 [facility]                                      Hospital Operating Room                                                       Pathologist:           Sonja Gutiérrez MD                                                                 Specimen:    Pancreas, Subtotal pancreatectomy                                                          Final Diagnosis   A  Pancreas (subtotal pancreatectomy):     - 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 (largest focus 0 8 cm) arising in association with intraductal papillary mucinous neoplasm (IPMN) with high-grade dysplasia  - Surgical margin positive for colloid carcinoma and IPMN with high-grade dysplasia  - Fifteen (15) lymph nodes, negative for carcinoma  - Therapy effect noted in background pancreas (fibrosis and inflammation)       Comment:  The tumor appears largely viable  Few collections of acellular mucin are noted  Electronically signed by Sonja Gutiérrez MD on 2/23/2022 at  2:13 PM   Comments: This is an appended report  These results have been appended to a previously preliminary verified report  Preliminary Diagnosis   Intradepartmental review pending     A   Pancreas (subtotal pancreatectomy):     - Mucinous neoplasm, likely IPMN, with at least high-grade 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  Preliminary result electronically signed by Bambi Pickering MD on 2/21/2022 at 10:49 AM   Comments: This is an appended report  These results have been appended to a previously preliminary verified report  Imaging  No results found  I reviewed the above laboratory and imaging data  Intraoperatively    Discussion/Summary:  69-year-old man, IPMN, colloid carcinoma, field-effect throughout pancreas status post subtotal pancreatectomy  Positive margins at last operation  We discussed treatment options for the pancreatic remnant  Ideally, resection of all remaining pancreas tissue would maximize chance of cure  The alternative would be radiation therapy which would provide theoretical benefit  Surgically, he is somewhat high risk given his medical comorbidities, and would become an insulin-dependent diabetic  He would have pancreatic insufficiency as well which would have to be addressed with supplements  He is also high risk given prior history of surgery, as well as clinical evidence of pancreatitis seen intraoperatively  He is aware that resection would involve removal of all remaining pancreas tissue, and would necessitate biliary as well as enteric reconstruction, essentially 2/3 of a Whipple procedure  He is aware that this is high risk given his comorbidities, but would like optimize chance at cure  He is set for surgery at an outside institution in August, but would prefer to have surgery here if it can be done closer to home  We gave him the choice of going to either institution for his operation, but he is opting to stay here  We will therefore plan for completion pancreatectomy  Rationale for this with risks and benefits of surgery including infection, bleeding, biliary or anastomotic leak, discussed  We also discussed other risks including heart trouble, lung trouble, kidney trouble, stroke, or even death, given his comorbidities  We discussed postoperative need for cholesterol control, possible use of insulin pump, as well as need for pancreatic supplementation moving forward  All questions answered consent site of this visit  We will hold off on chemotherapy given anticipated surgical date  Will obtain preop CT for planning prior to surgery

## 2022-06-16 ENCOUNTER — TELEPHONE (OUTPATIENT)
Dept: HEMATOLOGY ONCOLOGY | Facility: CLINIC | Age: 72
End: 2022-06-16

## 2022-06-16 ENCOUNTER — OFFICE VISIT (OUTPATIENT)
Dept: DIABETES SERVICES | Facility: CLINIC | Age: 72
End: 2022-06-16
Payer: MEDICARE

## 2022-06-16 ENCOUNTER — OFFICE VISIT (OUTPATIENT)
Dept: ENDOCRINOLOGY | Facility: CLINIC | Age: 72
End: 2022-06-16
Payer: MEDICARE

## 2022-06-16 VITALS
HEART RATE: 82 BPM | HEIGHT: 70 IN | BODY MASS INDEX: 39.14 KG/M2 | SYSTOLIC BLOOD PRESSURE: 130 MMHG | WEIGHT: 273.38 LBS | DIASTOLIC BLOOD PRESSURE: 88 MMHG

## 2022-06-16 DIAGNOSIS — E11.65 TYPE 2 DIABETES MELLITUS WITH HYPERGLYCEMIA, WITHOUT LONG-TERM CURRENT USE OF INSULIN (HCC): Primary | ICD-10-CM

## 2022-06-16 DIAGNOSIS — E11.65 TYPE 2 DIABETES MELLITUS WITH HYPERGLYCEMIA, WITHOUT LONG-TERM CURRENT USE OF INSULIN (HCC): ICD-10-CM

## 2022-06-16 DIAGNOSIS — E11.9 TYPE 2 DIABETES MELLITUS WITHOUT COMPLICATION, WITHOUT LONG-TERM CURRENT USE OF INSULIN (HCC): ICD-10-CM

## 2022-06-16 DIAGNOSIS — E11.9 TYPE 2 DIABETES MELLITUS WITHOUT COMPLICATION, WITHOUT LONG-TERM CURRENT USE OF INSULIN (HCC): Primary | ICD-10-CM

## 2022-06-16 DIAGNOSIS — Z85.07 ENCOUNTER FOR FOLLOW-UP SURVEILLANCE OF PANCREATIC CANCER: ICD-10-CM

## 2022-06-16 DIAGNOSIS — Z08 ENCOUNTER FOR FOLLOW-UP SURVEILLANCE OF PANCREATIC CANCER: ICD-10-CM

## 2022-06-16 PROCEDURE — 99215 OFFICE O/P EST HI 40 MIN: CPT | Performed by: PHYSICIAN ASSISTANT

## 2022-06-16 PROCEDURE — G0108 DIAB MANAGE TRN  PER INDIV: HCPCS

## 2022-06-16 RX ORDER — INSULIN ASPART INJECTION 100 [IU]/ML
INJECTION, SOLUTION SUBCUTANEOUS
Qty: 15 ML | Refills: 1 | Status: ON HOLD | OUTPATIENT
Start: 2022-06-16

## 2022-06-16 RX ORDER — PEN NEEDLE, DIABETIC 32GX 5/32"
NEEDLE, DISPOSABLE MISCELLANEOUS
Qty: 100 EACH | Refills: 3 | Status: ON HOLD | OUTPATIENT
Start: 2022-06-16

## 2022-06-16 RX ORDER — INSULIN GLARGINE 100 [IU]/ML
20 INJECTION, SOLUTION SUBCUTANEOUS DAILY
Qty: 15 ML | Refills: 1 | Status: SHIPPED | OUTPATIENT
Start: 2022-06-16 | End: 2022-06-16 | Stop reason: CLARIF

## 2022-06-16 RX ORDER — INSULIN GLARGINE 100 [IU]/ML
20 INJECTION, SOLUTION SUBCUTANEOUS DAILY
Qty: 18 ML | Refills: 1 | Status: SHIPPED | OUTPATIENT
Start: 2022-06-16 | End: 2022-07-19

## 2022-06-16 RX ORDER — BLOOD SUGAR DIAGNOSTIC
STRIP MISCELLANEOUS
Qty: 300 EACH | Refills: 1 | Status: SHIPPED | OUTPATIENT
Start: 2022-06-16 | End: 2022-07-18 | Stop reason: ALTCHOICE

## 2022-06-16 NOTE — PATIENT INSTRUCTIONS
Take Basaglar 20 units daily at bedtime  Take Fiasp with each meal  according to sliding scale for high blood sugars if needed  150-200: 2 units  201-250: 4 units  251-300: 6 units  301-350: 8 units  Over 350: 10 units     Check Blood sugars 3-4x per day and send log in 1 week for review or ASAP if problems or low blood sugars less than 80  Hypoglycemia instructions   Onofre Connelly  6/16/2022  [id number]    Low Blood Sugar    Steps to treat low blood sugar  1  Test blood sugar if you have symptoms of low blood sugar:   Low Blood Sugar Symptoms:  o Sweaty  o Dizzy  o Rapid heartbeat  o Shaky    o Bad mood  o Hungry      2  Treat blood sugar less than 70 with 15 grams of fast-acting carbohydrate:   Examples of 15 grams Fast-Acting Carbohydrate:  o 4 oz juice  o 4 oz regular soda  o 3-4 glucose tablets (chew)  o 3-4 hard candies (chew)              3    Wait 15 minutes and test your blood sugar again           4   If blood sugar is less than 100, repeat steps 2-3       5  When your blood sugar is 100 or more, eat a snack if it will be longer than one hour until your next meal  The snack should be 15 grams of carbohydrate and a protein:   Examples of snacks:  o ½ sandwich  o 6 crackers with cheese  o Piece of fruit with cheese or peanut butter  o 6 crackers with peanut butter

## 2022-06-16 NOTE — PATIENT INSTRUCTIONS
Take Basaglar 20 units daily at bedtime    Take Fiasp with each meal  according to sliding scale for high blood sugars if needed  150-200: 2 units  201-250: 4 units  251-300: 6 units  301-350: 8 units  Over 350: 10 units     Call the ENDO office with concerns or if having low blood sugars <80

## 2022-06-16 NOTE — TELEPHONE ENCOUNTER
Per Dr Shannon cancel infusion chairs due to patient having surgery  Fgiueroa Desouza will be calling patient to notify him of the changes

## 2022-06-16 NOTE — TELEPHONE ENCOUNTER
CALL TRANSFER   Reason for patient call? Patient's wife is calling to discuss some of the dates for the patients upcoming treatments   Patient's primary physician? Dr Reddy Maria   RN call was transferred to and time it was transferred? Maren Figueroa @ 23-14-20-09 am   Informed patient that the message will be forwarded to the team and someone will get back to them as soon as possible    Did you relay this information to the patient?  yes

## 2022-06-16 NOTE — TELEPHONE ENCOUNTER
----- Message from Rishi Ragsdale MD sent at 6/16/2022  8:11 AM EDT -----  Regarding: RE: Surgery/hold chemo  Hi all,    Let's hold treatment from now until surgery  Plan is now on hold  I've cc'd Aneysha so she's aware chairs are to be cancelled  Erin Collins, will you be speaking to him at all today to be able to tell him? [facility]    ----- Message -----  From: Tushar Leonard RN  Sent: 6/16/2022   8:10 AM EDT  To: Em Esqueda MD, David Jimenez RN, #  Subject: Surgery/hold chemo                               Porsha,    Dr Reece Bean consented patient for whipple to be done July 26  He would like chemo held a month beforehand  Please advise if you want patient to have scheduled infusion on June 21 or if he should be held now until surgery       Thanks,   Erin Bean' nurse

## 2022-06-16 NOTE — TELEPHONE ENCOUNTER
OK to switch to Lantus at same dose, But he can still use the Basgalar sample given today to start treatment

## 2022-06-16 NOTE — TELEPHONE ENCOUNTER
Good morning please cancel all infusion chairs for patient, plan is currently on hold due to patient having surgery  Thank you!

## 2022-06-16 NOTE — ASSESSMENT & PLAN NOTE
Diabetes control worsened with fasting blood sugars in the 200-300 range  He is schedule for whipple procedure 7/25/2022 so will need better control of Diabetes ASAP and will start basal bolus regimen due to history of pancreatic surgery x 2 and whipple in the near future  For now, will start Basaglar 20 units daily at bedtime  He will likely need fixed dose insulin but since I only have fasting glucose readings to review will start sliding scale fiasp with meals and adjust regimen when glucose log reviewed  150-200: 2 units  201-250: 4 units  251-300: 6 units  301-350: 8 units  Over 350: 10 units  Increase glucose monitoring to 3-4x per day and send BG readings weekly for review and med adjustment to improve control of Diabetes ASAP prior to surgery  He has self increased metformin, advised him to return to 1000mg twice per day  Samples provided he will meet with Diabetes educator after visit for insulin training  If he develops hypoglycemia, he will let us know right away  If these insulins are not covered by insurance he will let us know and we can change brand  Discussed eventual option of insulin pump/CGM when recovered/stable after surgery       Lab Results   Component Value Date    HGBA1C 9 2 (H) 04/25/2022

## 2022-06-16 NOTE — PROGRESS NOTES
Established Patient Progress Note      Chief Complaint   Patient presents with    Diabetes Type 2        History of Present Illness:   Tierney Leyva is a 66 y o  male with a history of type 2 diabetes with long term use of insulin since 2019  Home glucose monitoring: are performed regularly 1x per day in the morning, bloods ugars have been over 200 and today 291  Reports polyuria, polydipsia 1 month ago  Last chemo about 1 week, this is being stopped for upcoming surgery  Did get steroids with the chemo  Had had pancreatic surgery x 2 (3/2019 distal partial pancreatectomy and 2/15/2022 subtotal pancreatectomy)  He will be having a completion pancreatectomy procedure end of July by Dr Alex Velasquez   (originally planned at [facility], but decided to stick with Dr Alex Velasquez after recent appointment)  Has never used insulin before, but aware this will be necessary  Self increased metformin to 2000mg twice per day a few days ago, had diarrhea    Current regimen:   Metformin 1000mg twice per day   Self increased to a few times per day to 4000mg todal    Last Eye Exam: 2020  Last Foot Exam: UTD    Has hypertension: Taking atenolol  Has hyperlipidemia: Taking no meds        Patient Active Problem List   Diagnosis    Obstructive sleep apnea syndrome    Hypersomnia    Permanent atrial fibrillation ([facility]    Morbid obesity with BMI of 40 0-44 9, adult ([address] )    Lower extremity edema    Pancreatic duct dilated    Overlapping malignant neoplasm of pancreas ([address] )    Type 2 diabetes mellitus without complication, without long-term current use of insulin ([address] )    IPMN (intraductal papillary mucinous neoplasm)    Thrombocytopenia ([address] )    Urgency incontinence    Balanitis    OAB (overactive bladder)    BPH without obstruction/lower urinary tract symptoms    Encounter for follow-up surveillance of pancreatic cancer    Encounter for geriatric assessment    Counseling regarding advanced care planning and goals of care    Port-A-Cath in place    Chemotherapy induced neutropenia ([facility] )      Past Medical History:   Diagnosis Date    A-fib ([facility] )     Abdominal wall strain     last assessed: 10/21/14    Allergic rhinitis     last assessed: 05/03/16    Arthritis     Cancer ([facility]     PACNCREATIC    COVID-19 virus infection 3/3/2021    CPAP (continuous positive airway pressure) dependence     Diabetes mellitus ([facility]     Erectile dysfunction of non-organic origin     last assessed: 03/17/14    Irregular heart beat     Pt with A fib     Lumbar strain     last assessed: 10/21/14    Multiple acquired skin tags     last assessed: 2/18/16    Palpitations     last assessed: 03/17/14    Pancreas cyst     Plantar fasciitis     Skin disorder     last assessed: 05/28/13    Sleep apnea     CPAP BROKE IN OCTOBER HAS BEEN TRYING TO GET NEW ONE BUT UNABLE AS OF YET    Thrombocytopenia ([facility] )       Past Surgical History:   Procedure Laterality Date    BOTOX INJECTION N/A 11/12/2021    Procedure: Jose Daniel Nieves;  Surgeon: Ivonne Mccarty MD;  Location: [address] [place] [address] MAIN OR;  Service: Urology    CATARACT EXTRACTION Bilateral     COLONOSCOPY  01/17/2013    COLONOSCOPY  01/08/2018    COLONOSCOPY  04/2021    CYSTOSCOPY      INJECT WITH BOTOX    DISTAL PANCREATECTOMY N/A 2/15/2022    Procedure: PANCREATECTOMY SUB-TOTALL-OPEN;  Surgeon: Cesar Mills MD;  Location: [facility] MAIN OR;  Service: Surgical Oncology    EGD  06/05/2020    EGD AND COLONOSCOPY  02/06/2014, 2/8/2017    FL GUIDED CENTRAL VENOUS ACCESS DEVICE INSERTION  4/23/2019    FLEXIBLE SIGMOIDOSCOPY  06/11/2019    FOOT SURGERY      Due to plantar fascitis    IR PORT PLACEMENT  3/30/2022    JOINT REPLACEMENT Left     LTK    LINEAR ENDOSCOPIC U/S      PANCREATECTOMY LAPAROSCOPIC N/A 3/12/2019    Procedure: DISTAL PANCREATECTOMY LAPAROSCOPIC/POSSIBLE OPEN;  Surgeon: Cesar Mills MD;  Location: [facility] MAIN [facility];  Service: Surgical Oncology    [place]  [address] [place] [address] STOM DUODENUM/JEJUNUM N/A 1/24/2019    Procedure: LINEAR ENDOSCOPIC U/S;  Surgeon: Shelli Live MD;  Location: [facility]; Service: Gastroenterology    REPLACEMENT TOTAL KNEE Left     TUNNELED VENOUS PORT PLACEMENT Left 4/23/2019    Procedure: INSERTION VENOUS PORT (PORT-A-CATH); Surgeon: Dale Case MD;  Location: [facility] MAIN [facility];  Service: Surgical Oncology- 11/8/21 Pt reports PAC removed     UMBILICAL HERNIA REPAIR        Family History   Problem Relation Age of Onset    Breast cancer Mother     Cervical cancer Paternal Aunt     Liver cancer Other     No Known Problems Father      Social History     Tobacco Use    Smoking status: Never Smoker    Smokeless tobacco: Never Used   Substance Use Topics    Alcohol use: Yes     Alcohol/week: 2 0 standard drinks     Types: 2 Cans of beer per week     Comment: couple times per week; No Known Allergies      Current Outpatient Medications:     acetaminophen (TYLENOL) 325 mg tablet, Take 2 tablets (650 mg total) by mouth every 6 (six) hours as needed for mild pain, Disp: 30 tablet, Rfl: 0    ascorbic acid (VITAMIN C) 1000 MG tablet, Take 2,000 mg by mouth in the morning , Disp: , Rfl:     aspirin (ECOTRIN) 325 mg EC tablet, Take 325 mg by mouth in the morning , Disp: , Rfl:     atenolol (TENORMIN) 25 mg tablet, TAKE 1 TABLET BY MOUTH EVERY DAY, Disp: 90 tablet, Rfl: 3    Blood Glucose Calibration (OneTouch Verio) SOLN, Use as needed to calibrate test strips, Disp: 1 each, Rfl: 0    Blood Glucose Monitoring Suppl (OneTouch Verio) w/Device KIT, Test BG up to 1x daily as directed, Disp: 1 kit, Rfl: 0    Cholecalciferol (VITAMIN D) 2000 units CAPS, Take 1,000 Units by mouth in the morning  11/8/21 Pt takes three tabs of Vitamin D daily   , Disp: , Rfl:     Cyanocobalamin (VITAMIN B 12 PO), Take by mouth daily 11/8/21 Pt reports takes three tabs daily  , Disp: , Rfl:     glucose blood (OneTouch Verio) test strip, Check BG 3x per day DX E11 9 insulin treated, Disp: 300 each, Rfl: 1    insulin aspart, w/niacinamide, (Fiasp FlexTouch) 100 Units/mL injection pen, Take before meals according to sliding scale up to 30 units per day 150-200: 2 units 201-250: 4 units 251-300: 6 units 301-350: 8 units Over 350: 10 units, Disp: 15 mL, Rfl: 1    insulin glargine (Basaglar KwikPen) 100 units/mL injection pen, Inject 20 Units under the skin daily, Disp: 15 mL, Rfl: 1    Insulin Pen Needle (BD Pen Needle Tia U/F) 32G X 4 MM MISC, Use 4 per day, Disp: 100 each, Rfl: 3    Lancets (OneTouch Delica Plus SYUTLA45Y) MISC, Test BG up to 1x daily as directed, Disp: 100 each, Rfl: 1    metFORMIN (GLUCOPHAGE) 1000 MG tablet, TAKE 1 TABLET BY MOUTH TWICE A DAY WITH MEALS, Disp: 180 tablet, Rfl: 1    pancrelipase, Lip-Prot-Amyl, (CREON) 6,000 units delayed release capsule, Take 2 pills, 3 times a day with meals, Disp: 180 capsule, Rfl: 3    [START ON 6/21/2022] fluorouracil 5,855 mg in CADD/Elastomeric Infusion Device, Infuse 5,855 mg (1,200 mg/m2/day x 2 44 m2) into a venous catheter over 46 hours for 2 days  Do not start before June 21, 2022  (Patient not taking: Reported on 6/16/2022  Do not start before June 21, 2022 ), Disp: 1 each, Rfl: 0    Na Sulfate-K Sulfate-Mg Sulf (Suprep Bowel Prep Kit) 17 5-3 13-1 6 GM/177ML SOLN, Follow office instructions (Patient not taking: Reported on 6/16/2022), Disp: 1 Bottle, Rfl: 0    Review of Systems   Constitutional: Negative for activity change, appetite change and fatigue  HENT: Negative for sore throat, trouble swallowing and voice change  Eyes: Negative for visual disturbance  Respiratory: Negative for choking, chest tightness and shortness of breath  Cardiovascular: Negative for chest pain, palpitations and leg swelling  Gastrointestinal: Positive for diarrhea  Negative for abdominal pain and constipation  Endocrine: Positive for polydipsia and polyuria  Negative for cold intolerance, heat intolerance and polyphagia  Genitourinary: Negative for frequency  Musculoskeletal: Negative for arthralgias and myalgias  Skin: Negative for rash  Neurological: Negative for dizziness and syncope  Hematological: Negative for adenopathy  Psychiatric/Behavioral: Negative for sleep disturbance  All other systems reviewed and are negative  Physical Exam:  Body mass index is 38 84 kg/m²  /88   Pulse 82   Ht 5' 10 35" (1 787 m)   Wt 124 kg (273 lb 6 oz)   BMI 38 84 kg/m²    Wt Readings from Last 3 Encounters:   06/16/22 124 kg (273 lb 6 oz)   06/15/22 120 kg (265 lb)   06/13/22 127 kg (279 lb)       Physical Exam  Vitals reviewed  Constitutional:       General: He is not in acute distress  Appearance: He is well-developed  HENT:      Head: Normocephalic and atraumatic  Eyes:      Conjunctiva/sclera: Conjunctivae normal       Pupils: Pupils are equal, round, and reactive to light  Neck:      Thyroid: No thyromegaly  Cardiovascular:      Rate and Rhythm: Normal rate and regular rhythm  Heart sounds: Normal heart sounds  No murmur heard  Pulmonary:      Effort: Pulmonary effort is normal  No respiratory distress  Breath sounds: Normal breath sounds  No wheezing or rales  Abdominal:      General: Bowel sounds are normal  There is no distension  Palpations: Abdomen is soft  Tenderness: There is no abdominal tenderness  Musculoskeletal:         General: Normal range of motion  Cervical back: Normal range of motion and neck supple  Lymphadenopathy:      Cervical: No cervical adenopathy  Skin:     General: Skin is warm and dry  Neurological:      Mental Status: He is alert and oriented to person, place, and time           Labs:   Lab Results   Component Value Date    HGBA1C 9 2 (H) 04/25/2022    HGBA1C 6 2 (H) 03/08/2022    HGBA1C 6 9 (H) 01/18/2022     Lab Results   Component Value Date    CREATININE 0 96 06/06/2022    CREATININE 0 79 05/23/2022    CREATININE 0 86 05/09/2022 BUN 10 06/06/2022     08/22/2017    K 3 9 06/06/2022    CL 99 (L) 06/06/2022    CO2 26 06/06/2022     eGFR   Date Value Ref Range Status   06/06/2022 78 ml/min/1 73sq m Final     Lab Results   Component Value Date    CHOL 208 (H) 05/07/2015    HDL 45 07/09/2021    TRIG 55 07/09/2021     Lab Results   Component Value Date    ALT 43 06/06/2022    AST 39 06/06/2022    ALKPHOS 212 (H) 06/06/2022    BILITOT 0 4 08/22/2017     Lab Results   Component Value Date    GRH4PKGGUOPF 3 430 07/09/2021    DMT8DAAGVZVW 3 580 12/09/2020    HVV5CZYTOTPM 3 500 10/25/2019     No results found for: Ana Turcios    Impression & Plan:    Problem List Items Addressed This Visit        Digestive    Encounter for follow-up surveillance of pancreatic cancer     Reviewed Hematology/Surgical oncology notes  Endocrine    Type 2 diabetes mellitus without complication, without long-term current use of insulin (HCC)     Diabetes control worsened with fasting blood sugars in the 200-300 range  He is schedule for whipple procedure 7/25/2022 so will need better control of Diabetes ASAP and will start basal bolus regimen due to history of pancreatic surgery x 2 and whipple in the near future  For now, will start Basaglar 20 units daily at bedtime  He will likely need fixed dose insulin but since I only have fasting glucose readings to review will start sliding scale fiasp with meals and adjust regimen when glucose log reviewed  150-200: 2 units  201-250: 4 units  251-300: 6 units  301-350: 8 units  Over 350: 10 units  Increase glucose monitoring to 3-4x per day and send BG readings weekly for review and med adjustment to improve control of Diabetes ASAP prior to surgery  He has self increased metformin, advised him to return to 1000mg twice per day  Samples provided he will meet with Diabetes educator after visit for insulin training  If he develops hypoglycemia, he will let us know right away    If these insulins are not covered by insurance he will let us know and we can change brand  Discussed eventual option of insulin pump/CGM when recovered/stable after surgery       Lab Results   Component Value Date    HGBA1C 9 2 (H) 04/25/2022              Relevant Medications    glucose blood (OneTouch Verio) test strip    insulin glargine (Basaglar KwikPen) 100 units/mL injection pen    insulin aspart, w/niacinamide, (Fiasp FlexTouch) 100 Units/mL injection pen      Other Visit Diagnoses     Type 2 diabetes mellitus with hyperglycemia, without long-term current use of insulin ([facility])    -  Primary    Relevant Medications    insulin glargine (Basaglar KwikPen) 100 units/mL injection pen    insulin aspart, w/niacinamide, (Fiasp FlexTouch) 100 Units/mL injection pen    Insulin Pen Needle (BD Pen Needle Tia U/F) 32G X 4 MM MISC    Other Relevant Orders    Ambulatory Referral to Ophthalmology    Ambulatory referral to Diabetic Education          Orders Placed This Encounter   Procedures    Ambulatory Referral to Ophthalmology     Standing Status:   Future     Standing Expiration Date:   6/16/2023     Referral Priority:   Routine     Referral Type:   Consult - AMB     Referral Reason:   Specialty Services Required     [facility] Provider[facility]   [facility]     Requested Specialty:   Ophthalmology     Number of Visits Requested:   1     Expiration Date:   6/17/2023    Ambulatory referral to Diabetic Education     Standing Status:   Future     Number of Occurrences:   1     Standing Expiration Date:   6/16/2023     Referral Priority:   Routine     Referral Type:   Consult - AMB     Referral Reason:   Specialty Services Required     Requested Specialty:   Diabetes Services     Number of Visits Requested:   1     Expiration Date:   6/16/2023       Patient Instructions   Take Basaglar 20 units daily at bedtime  Take Fiasp with each meal  according to sliding scale for high blood sugars if needed  150-200: 2 units  201-250: 4 units  251-300: 6 units  301-350: 8 units  Over 350: 10 units     Check Blood sugars 3-4x per day and send log in 1 week for review or ASAP if problems or low blood sugars less than 80  Hypoglycemia instructions   Elvin Sabillon  6/16/2022  [id number]    Low Blood Sugar    Steps to treat low blood sugar  1  Test blood sugar if you have symptoms of low blood sugar:   Low Blood Sugar Symptoms:  o Sweaty  o Dizzy  o Rapid heartbeat  o Shaky    o Bad mood  o Hungry      2  Treat blood sugar less than 70 with 15 grams of fast-acting carbohydrate:   Examples of 15 grams Fast-Acting Carbohydrate:  o 4 oz juice  o 4 oz regular soda  o 3-4 glucose tablets (chew)  o 3-4 hard candies (chew)              3    Wait 15 minutes and test your blood sugar again           4  If blood sugar is less than 100, repeat steps 2-3       5  When your blood sugar is 100 or more, eat a snack if it will be longer than one hour until your next meal  The snack should be 15 grams of carbohydrate and a protein:   Examples of snacks:  o ½ sandwich  o 6 crackers with cheese  o Piece of fruit with cheese or peanut butter  o 6 crackers with peanut butter             Discussed with the patient and all questioned fully answered  He will call me if any problems arise  Follow-up appointment in 1 month       Counseled patient on diagnostic results, prognosis, risk and benefit of treatment options, instruction for management, importance of treatment compliance, Risk  factor reduction and impressions    Marie Duran PA-C

## 2022-06-17 ENCOUNTER — TELEPHONE (OUTPATIENT)
Dept: ANESTHESIOLOGY | Facility: CLINIC | Age: 72
End: 2022-06-17

## 2022-06-20 RX ORDER — INSULIN GLARGINE 100 [IU]/ML
INJECTION, SOLUTION SUBCUTANEOUS
Qty: 3 ML | Refills: 0 | Status: ON HOLD | COMMUNITY
Start: 2022-06-20

## 2022-06-20 RX ORDER — INSULIN ASPART INJECTION 100 [IU]/ML
INJECTION, SOLUTION SUBCUTANEOUS
Qty: 3 ML | Refills: 0 | Status: ON HOLD | COMMUNITY
Start: 2022-06-20

## 2022-06-21 ENCOUNTER — HOSPITAL ENCOUNTER (OUTPATIENT)
Dept: INFUSION CENTER | Facility: CLINIC | Age: 72
End: 2022-06-21

## 2022-06-23 ENCOUNTER — HOSPITAL ENCOUNTER (OUTPATIENT)
Dept: RADIOLOGY | Facility: HOSPITAL | Age: 72
Discharge: HOME | End: 2022-06-23
Attending: SURGERY
Payer: MEDICARE

## 2022-06-23 DIAGNOSIS — C25.8 OVERLAPPING MALIGNANT NEOPLASM OF PANCREAS (HCC): ICD-10-CM

## 2022-06-23 DIAGNOSIS — D49.0 IPMN (INTRADUCTAL PAPILLARY MUCINOUS NEOPLASM): ICD-10-CM

## 2022-06-23 PROCEDURE — 74177 CT ABD & PELVIS W/CONTRAST: CPT

## 2022-06-23 PROCEDURE — G1004 CDSM NDSC: HCPCS

## 2022-06-23 RX ADMIN — IOHEXOL 100 ML: 350 INJECTION, SOLUTION INTRAVENOUS at 11:33

## 2022-06-27 ENCOUNTER — OFFICE VISIT (OUTPATIENT)
Dept: FAMILY MEDICINE CLINIC | Facility: CLINIC | Age: 72
End: 2022-06-27
Payer: MEDICARE

## 2022-06-27 ENCOUNTER — TELEPHONE (OUTPATIENT)
Dept: NON INVASIVE DIAGNOSTICS | Facility: HOSPITAL | Age: 72
End: 2022-06-27

## 2022-06-27 ENCOUNTER — TELEPHONE (OUTPATIENT)
Dept: ENDOCRINOLOGY | Facility: CLINIC | Age: 72
End: 2022-06-27

## 2022-06-27 VITALS
OXYGEN SATURATION: 95 % | SYSTOLIC BLOOD PRESSURE: 124 MMHG | DIASTOLIC BLOOD PRESSURE: 88 MMHG | HEART RATE: 96 BPM | TEMPERATURE: 97.3 F | BODY MASS INDEX: 41.83 KG/M2 | HEIGHT: 70 IN | WEIGHT: 292.2 LBS

## 2022-06-27 DIAGNOSIS — D69.6 THROMBOCYTOPENIA (HCC): ICD-10-CM

## 2022-06-27 DIAGNOSIS — Z01.818 PRE-OP EXAMINATION: Primary | ICD-10-CM

## 2022-06-27 DIAGNOSIS — C25.8 OVERLAPPING MALIGNANT NEOPLASM OF PANCREAS (HCC): ICD-10-CM

## 2022-06-27 DIAGNOSIS — I48.21 PERMANENT ATRIAL FIBRILLATION (HCC): ICD-10-CM

## 2022-06-27 DIAGNOSIS — G47.33 OBSTRUCTIVE SLEEP APNEA SYNDROME: ICD-10-CM

## 2022-06-27 DIAGNOSIS — E11.9 TYPE 2 DIABETES MELLITUS WITHOUT COMPLICATION, WITHOUT LONG-TERM CURRENT USE OF INSULIN (HCC): ICD-10-CM

## 2022-06-27 PROCEDURE — 99213 OFFICE O/P EST LOW 20 MIN: CPT | Performed by: FAMILY MEDICINE

## 2022-06-27 NOTE — PROGRESS NOTES
[facility]      NAME: Onofre Connelly  AGE: 66 y o  SEX: male  : 1950   MRN: [id number]    DATE: 2022  TIME: 10:45 AM    Assessment and Plan     Patient was seen for preop clearance for upcoming Whipple procedure/completion pancreatectomy for his pancreatic cancer  We reviewed all of his chronic medical problems and updated his medication list   His diabetes is currently poorly controlled and he will be in contact with endocrinology within the next month to keep adjusting his insulin  I have also ordered cardiology consult for preop risk stratification and recommendations  I reviewed his other most recent blood work  He does have a known history of thrombocytopenia with platelet counts below 100 K  I recommended he hold his aspirin 1 week prior to surgery  He is clinically stable today  I believe he is moderate risk for the proposed surgery due to his comorbidities  He is medically cleared to proceed  Problem List Items Addressed This Visit     Type 2 diabetes mellitus without complication, without long-term current use of insulin (HCC)    Thrombocytopenia (HCC)    Permanent atrial fibrillation ([facility] [address] )    Relevant Orders    Ambulatory Referral to Cardiology    Overlapping malignant neoplasm of pancreas ([facility] [address] )    Obstructive sleep apnea syndrome      Other Visit Diagnoses     Pre-op examination    -  Primary    Relevant Orders    Ambulatory Referral to Cardiology              Return to office in:  P r n      Chief Complaint     Chief Complaint   Patient presents with    Pre-op Exam       History of Present Illness     Onofre Connelly  66 y o   male    SURGEON:Marlee    SURGERY/PROCEDURE:  Completion pancreatectomy    DATE OF SURGERY:      PRIOR ANESTHESIA:yes    COMPLICATION: no    BLEEDING PROBLEM: no    PERTINENT PMH: yes, type 2 diabetes, uncontrolled, history permanent atrial fibrillation, chemotherapy-induced neutropenia with thrombocytopenia, obstructive sleep apnea    EXERCISE CAPACITY:   CAN WALK 4 BLOCKS AND OR CLIMB 2 FLIGHTS: Yes    HOME LIVING SITUATION SAFE AND SECURE: Yes      TOBACCO: no     ETOH: no     ILLEGAL DRUGS: no    Patient presents today for preop clearance for upcoming Whipple procedure/completion pancreatectomy for 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 which concurred with the recommendation of surgery  Patient has diabetes type 2 which is poorly controlled presently  He was recently started on insulin  His most recent hemoglobin A1c in April was greater than 9  He also has a history of permanent atrial fibrillation for which she has followed with cardiology  Current medications include atenolol, basal and bolus insulin, metformin, pancreatic enzymes, aspirin 325  The following portions of the patient's history were reviewed and updated as appropriate: allergies, current medications, past family history, past medical history, past social history, past surgical history and problem list     Review of Systems   Review of Systems   Constitutional: Negative  Respiratory: Negative  Cardiovascular: Positive for leg swelling  Gastrointestinal: Negative  Genitourinary: Negative  Musculoskeletal: Negative  Psychiatric/Behavioral: Negative          Active Problem List     Patient Active Problem List   Diagnosis    Obstructive sleep apnea syndrome    Hypersomnia    Permanent atrial fibrillation (HCC)    Morbid obesity with BMI of 40 0-44 9, adult ([facility] [address] )    Lower extremity edema    Pancreatic duct dilated    Overlapping malignant neoplasm of pancreas ([facility] [address] )    Type 2 diabetes mellitus without complication, without long-term current use of insulin ([facility] [address] )  IPMN (intraductal papillary mucinous neoplasm)    Thrombocytopenia (HCC)    Urgency incontinence    Balanitis    OAB (overactive bladder)    BPH without obstruction/lower urinary tract symptoms    Encounter for follow-up surveillance of pancreatic cancer    Encounter for geriatric assessment    Counseling regarding advanced care planning and goals of care    Port-A-Cath in place    Chemotherapy induced neutropenia (HCC)       Objective   /88 (BP Location: Right arm, Patient Position: Sitting, Cuff Size: Large)   Pulse 96   Temp (!) 97 3 °F (36 3 °C) (Tympanic)   Ht 5' 10 35" (1 787 m)   Wt 133 kg (292 lb 3 2 oz)   SpO2 95%   BMI 41 51 kg/m²     Physical Exam  Vitals and nursing note reviewed  Constitutional:       General: He is not in acute distress  Appearance: He is well-developed  He is not diaphoretic  HENT:      Head: Normocephalic and atraumatic  Eyes:      General:         Right eye: No discharge  Conjunctiva/sclera: Conjunctivae normal       Pupils: Pupils are equal, round, and reactive to light  Neck:      Thyroid: No thyromegaly  Cardiovascular:      Rate and Rhythm: Normal rate and regular rhythm  Comments: Bilateral pretibial edema right greater than left  Pulmonary:      Effort: Pulmonary effort is normal  No respiratory distress  Breath sounds: Normal breath sounds  Musculoskeletal:      Cervical back: Normal range of motion  Lymphadenopathy:      Cervical: No cervical adenopathy  Skin:     General: Skin is warm and dry  Neurological:      Mental Status: He is alert and oriented to person, place, and time  Psychiatric:         Behavior: Behavior normal          Thought Content:  Thought content normal          Judgment: Judgment normal            Current Medications     Current Outpatient Medications:     acetaminophen (TYLENOL) 325 mg tablet, Take 2 tablets (650 mg total) by mouth every 6 (six) hours as needed for mild pain, Disp: 30 tablet, Rfl: 0    ascorbic acid (VITAMIN C) 1000 MG tablet, Take 2,000 mg by mouth in the morning , Disp: , Rfl:     aspirin (ECOTRIN) 325 mg EC tablet, Take 325 mg by mouth in the morning , Disp: , Rfl:     atenolol (TENORMIN) 25 mg tablet, TAKE 1 TABLET BY MOUTH EVERY DAY, Disp: 90 tablet, Rfl: 3    Blood Glucose Calibration (OneTouch Verio) SOLN, Use as needed to calibrate test strips, Disp: 1 each, Rfl: 0    Blood Glucose Monitoring Suppl (OneTouch Verio) w/Device KIT, Test BG up to 1x daily as directed, Disp: 1 kit, Rfl: 0    Cholecalciferol (VITAMIN D) 2000 units CAPS, Take 1,000 Units by mouth in the morning  11/8/21 Pt takes three tabs of Vitamin D daily   , Disp: , Rfl:     Cyanocobalamin (VITAMIN B 12 PO), Take by mouth daily 11/8/21 Pt reports takes three tabs daily  , Disp: , Rfl:     glucose blood (OneTouch Verio) test strip, Check BG 3x per day DX E11 9 insulin treated, Disp: 300 each, Rfl: 1    insulin aspart, w/niacinamide, (Fiasp FlexTouch) 100 Units/mL injection pen, Take before meals according to sliding scale up to 30 units per day 150-200: 2 units 201-250: 4 units 251-300: 6 units 301-350: 8 units Over 350: 10 units, Disp: 15 mL, Rfl: 1    insulin aspart, w/niacinamide, (Fiasp FlexTouch) 100 Units/mL injection pen, Take before meals according to sliding scale up to 30 units per day 150-200: 2 units 201-250: 4 units 251-300: 6 units 301-350: 8 units Over 350: 10 units  , Disp: 3 mL, Rfl: 0    insulin glargine (Basaglar KwikPen) 100 units/mL injection pen, 20 units daily, Disp: 3 mL, Rfl: 0    insulin glargine (Lantus SoloStar) 100 units/mL injection pen, Inject 20 Units under the skin daily, Disp: 18 mL, Rfl: 1    Insulin Pen Needle (BD Pen Needle Tia U/F) 32G X 4 MM MISC, Use 4 per day, Disp: 100 each, Rfl: 3    Lancets (OneTouch Delica Plus BGONFK36E) MISC, Test BG up to 1x daily as directed, Disp: 100 each, Rfl: 1    metFORMIN (GLUCOPHAGE) 1000 MG tablet, TAKE 1 TABLET BY MOUTH TWICE A DAY WITH MEALS, Disp: 180 tablet, Rfl: 1    pancrelipase, Lip-Prot-Amyl, (CREON) 6,000 units delayed release capsule, Take 2 pills, 3 times a day with meals, Disp: 180 capsule, Rfl: 3    Na Sulfate-K Sulfate-Mg Sulf (Suprep Bowel Prep Kit) 17 5-3 13-1 6 GM/177ML SOLN, Follow office instructions (Patient not taking: No sig reported), Disp: 1 Bottle, Rfl: 0    Health Maintenance     Health Maintenance   Topic Date Due    COVID-19 Vaccine (1) Never done    DTaP,Tdap,and Td Vaccines (1 - Tdap) Never done    URINE MICROALBUMIN  12/09/2021    DM Eye Exam  01/27/2022    BMI: Followup Plan  10/25/2022    Medicare Annual Wellness Visit (AWV)  10/25/2022    HEMOGLOBIN A1C  10/25/2022    Fall Risk  01/19/2023    Depression Screening  03/08/2023    Diabetic Foot Exam  05/11/2023    BMI: Adult  06/27/2023    Colorectal Cancer Screening  04/09/2024    Hepatitis C Screening  Completed    Pneumococcal Vaccine: 65+ Years  Completed    Influenza Vaccine  Completed    HIB Vaccine  Aged Out    Hepatitis B Vaccine  Aged Out    IPV Vaccine  Aged Out    Hepatitis A Vaccine  Aged Out    Meningococcal ACWY Vaccine  Aged Out    HPV Vaccine  Aged Out     Immunization History   Administered Date(s) Administered    Influenza Split High Dose Preservative Free IM 10/21/2014    Influenza, high dose seasonal 0 7 mL 10/14/2020, 10/25/2021    Influenza, seasonal, injectable 01/16/2013, 01/16/2013    Pneumococcal Conjugate 13-Valent 06/07/2018    Pneumococcal Polysaccharide PPV23 10/14/2020       Rogerio Showalissa,   [facility]

## 2022-06-27 NOTE — TELEPHONE ENCOUNTER
Received cardiac clearance from Dr Urbano Corado for Whipple procedure    Called patient and reviewed his history (last seen in October 2021)  No AGARWAL, Edema about the same  Koffi Santillan lost 14 lbs    no chest pain, palpitations or dizziness  Koffi Santillan He did NOT go on Eliquis last year as advised due to preference  Koffisaira Santillan He continues with ASA and Atenolol    Discussed risks of procedure with patient    Increased risk due to age and diabetes  Also has AFIB but rate well controlled and that should not add any risk  Koffi Santillan His history suggests he is in a stable state      I have cleared him for the surgery realizing there is increased risk  Koffi Santillan He has tolerated general anesthesia not too long ago (February of this year-several hours)    Did tell him this will likely be more stressful and longer procedure  That said would NOT delay his treatment with testing that will not change our recommendations  Koffi Santillan He should stop ASA one week prior and continue beta blocker perioperatively    Will set up appointment with him with associate in a few months for ongoing FU of AFIB  Koffi Santillan  note does have plts of 85K and may be at risk for bleeding if he does consent to systemic [facility]        Juan Alberto Fernandez MD

## 2022-06-27 NOTE — TELEPHONE ENCOUNTER
Patient left message stating blood sugar are all over the place and is having surgery on 7/26 and wanted to make sure blood were under control before them  I called him back to get blood sugar readings but he want not home and could not give current regimen or readings  He will call back with info once he is home

## 2022-06-28 NOTE — TELEPHONE ENCOUNTER
He is only checking fingersticks in the morning and fasting glucose can range anywhere between 130s to 230s  He is supposed to take  fiasp  if his fingerstick is above 150 I believe- if he is only checking once that means  only taking that maybe once a day  He needs to check his fingersticks at least 3 times a day, fasting, before lunch or dinner and bedtime    Send over log in 1 week so insulin dose can be adjusted

## 2022-07-05 NOTE — TELEPHONE ENCOUNTER
Left voicemail for patient about rescheduled appointment  Provided date and time for patient, as well as [facility] number in case new appointment does not work for patient

## 2022-07-05 NOTE — TELEPHONE ENCOUNTER
Lab Result: Wbc 3 68   Date/Time Drawn: 7/5   Ordering Provider:    Lab Personnel's Name: Eleonora Hamilton       The following critical/stat result was read back to the lab as stated above and [employer] to the on-call provider   Dr Anne Veras

## 2022-07-05 NOTE — PROGRESS NOTES
[facility]   Consult: Geriatric Surgery     Assessment/Plan:  · 79-year-old male seen today for surgical optimization & geriatric screening  · He is scheduled on 07/26/2022  · Consult concerns: Advanced age, a-fib, diabetes  · Last chemo 3 weeks   No problem-specific Assessment & Plan notes found for this encounter  Case: [id number] Date/Time: 07/26/22 0800   Procedure: COMPLETION PANCREATECTOMY (N/A Abdomen)   Anesthesia type: General   Diagnosis:        Overlapping malignant neoplasm of pancreas (HCC) [C25 8]       IPMN (intraductal papillary mucinous neoplasm) [D49 0]   Pre-op diagnosis:        Overlapping malignant neoplasm of pancreas (HCC) [C25 8]       IPMN (intraductal papillary mucinous neoplasm) [D49 0]   Location:  OR ROOM 05 / BE MAIN OR   Surgeons: Iglesia Rodarte MD        Problem List Items Addressed This Visit        Digestive    Overlapping malignant neoplasm of pancreas ([facility] [address] )  · Seen today for surgical optimization and geriatric screening  · Received cardiac clearance- done   · Received medical clearance- done   · EKG PENDING  · XRAY PENDING   · PATS PENDING - await results for further needs  CMP   CBC   HBA1C    At risk for post-op TERESE   At risk for post-op SSI    BEST   Breathing- instructed to exercise lungs prior to surgery via IS  Eat- discussed increasing protein intake prior to surgery   Sleep/stress- encouraged 8-10 sleep @ night, stress reduction, avoid sick contacts and handwashing   · Train- encouraged to remain active        Endocrine    Type 2 diabetes mellitus without complication, without long-term current use of insulin ([facility]) - Primary  · High Risk SSI 2 2 last HB A1c 9 2  · Patient has seen endocrinology, insulin has been started in June  · Expect improvement  · Daily glucose levels have improved per patient    levels 92, 142, improved per patient   · Await PAT results- done today       Respiratory    Obstructive sleep apnea syndrome  · Stable, no complaints  · Does your sleep apnea affect her everyday life- NO   · Wear CPAP faithfully        Cardiovascular and Mediastinum    Permanent atrial fibrillation (HCC)  · Stable  · Has received cardiac clearance  · Mets 9 8  · Active, denies chest pain shortness of breath       Other    Thrombocytopenia (HCC)  · Chronic low platelets  · Await results from PATs- done today        Encounter for geriatric assessment  See Geriatric Assessment below    No geriatric concerns identified today  Energy level ( 1/5)- with chemo , last chemo 3 weeks ago  Energy level ( 5/5)- after chemo, now  There are no geriatric concerns identified today   Cognitive Assessment: 3   TUG <15 sec:yes    Falls (last 6 months): no    Hand  score:40  -Attempt 1:38  -Attempt 2:42  -Attempt 3:40   Jose Total Score: 22   PHQ- 9 Depression Scale:3   Nutrition Assessment Score:14   Albumin 3 1   METS: 9 89   Health goals:  -What are your overall health goals? (quit smoking, wt  loss, rest, decrease stress)  Be healthy  -What brings you strength? (family, friends, Yazidism, health)  Family, friends, face  -What activities are important to you? (exercise, reading, travel, work)  Restoring old cars    High risk for post- op pneumonia RT age, inpatient surgery  · Incentive spirometer taught to the patient and given  · Instructed to start lung exercises tonight  · Non smoker   · Fall precautions on admit  · Standard PT eval after surgery for safe discharge  · Today lower extremity exercises were taught to patient for muscle strengthening and balance  · Exercise routine developed, patient instructed to start tonight, all to be done sitting down only  · Nutritional supplements- increase protein prior to surgery  This can be done through diet  Different examples of protein were provided to the patient today  We decided to stay away from protein shakes due to his BMI and elevated blood sugars      Be your BEST pathway   Breath, eat, sleep/stress relief, and tracking exercise               Subjective:      Patient ID: Herminia Harrell is a 67 y o  male who was referred to [facility] for pre-surgery geriatric screening secondary to advanced age  Consult concerns for atrial fibrillation, diabetes, and advanced age  Patient has a history of prior subtotal pancreaticectomy 2 years ago  Recently found that the cancer has returned  He is planning to undergo a Whipple in the near future  [facility] will be seeing him today for his advanced age of 67  We are also seeing him for multiple comorbidities such as AFib, diabetes, and sleep apnea  He has received medical clearance  He has received cardiac clearance  Today during our appointment he completed his PAT's, EKG, and chest x-ray  We will await those results for any further needs  I anticipate an improvement in his hemoglobin A1c  As he was recently started on insulin in June and his daily glucose levels have improved per patient  He remains a high risk for surgical site infection postop  If need be, we can have endocrinology see him after surgery at the bedside  I met patient in the [facility] today  Patient arrived by himself  Drove to today's appointment  Walks independently  No walker or cane use  Tolerates well,  he is active  His Mets is 9 89  He is self-care with all ADLs  He is   Lives with his wife  Patient is indepenent  Still works  He works for himself  Still drives  No geriatric concerns identified today     As always we discussed having your BEST surgery, and BEST recovery  Surgery goals reviewed today  Breathing exercises   Patient was encouraged to begin lung exercises today  This could be accomplished through deep breathing and cough exercises  Patient was taught how to use an incentive spirometer  Return demonstration provided  Eating/nutrition   Encouraged patient to increase oral protein intake prior to surgery  Based on current weight of   , patient was instructed to consume    Grams of protein a day  This can be accomplished by consuming chicken, fish, tuna fish, cottage cheese, cheese, eggs, [place] yogurt, and protein shakes as needed  I encouraged use of protein shakes such ENLIVE  I also recommended making your own protein shakes with protein powder  Sleep/Stress management  Patient was encouraged to rest their body prior to surgery  Encouraged attempting to get 8 hours of sleep at night  Avoid stress  Avoid sick contacts  Encouraged to find a relaxing hobby such as reading, meditation, listening to music  Training exercises  Patient was encouraged to remain active as possible  Today bilateral lower extremity generic exercises were taught for muscle strengthening and balance  All exercises to be done sitting down  HPI    The following portions of the patient's history were reviewed and updated as appropriate: current medications, past family history, past medical history, past social history, past surgical history and problem list     Review of Systems   Constitutional: Negative for chills and fever  HENT: Negative for hearing loss, mouth sores, postnasal drip and sinus pressure  Respiratory: Negative for cough and shortness of breath  Cardiovascular: Negative for chest pain, palpitations and leg swelling  Gastrointestinal: Negative for diarrhea, nausea, rectal pain and vomiting  Genitourinary: Negative for difficulty urinating  Skin: Negative for color change, pallor, rash and wound  Neurological: Negative for dizziness, facial asymmetry, light-headedness, numbness and headaches  Psychiatric/Behavioral: Negative for confusion and hallucinations           Objective:      /73 (BP Location: Right arm, Patient Position: Sitting, Cuff Size: Standard)   Pulse 78   Temp (!) 96 °F (35 6 °C) (Tympanic)   Resp 14   Wt 133 kg (293 lb 12 8 oz)   SpO2 97%   BMI 41 74 kg/m²          Physical Exam  Constitutional: Appearance: Normal appearance  HENT:      Head: Normocephalic  Mouth/Throat:      Mouth: Mucous membranes are moist    Eyes:      Pupils: Pupils are equal, round, and reactive to light  Cardiovascular:      Rate and Rhythm: Normal rate and regular rhythm  Pulses: Normal pulses  Heart sounds: Normal heart sounds  Pulmonary:      Effort: Pulmonary effort is normal       Breath sounds: Normal breath sounds  Abdominal:      Palpations: Abdomen is soft  Musculoskeletal:         General: Normal range of motion  Cervical back: Normal range of motion  Skin:     General: Skin is warm and dry  Neurological:      General: No focal deficit present  Mental Status: He is alert and oriented to person, place, and time  Mental status is at baseline  Psychiatric:         Mood and Affect: Mood normal          Behavior: Behavior normal          Thought Content:  Thought content normal          Judgment: Judgment normal

## 2022-07-11 NOTE — TELEPHONE ENCOUNTER
Prescription for hydrochlorothiazide, water pill was sent to pharmacy  One pill daily  Let me know in 1 week how he is doing

## 2022-07-11 NOTE — TELEPHONE ENCOUNTER
Pt called and LMOM  Pt stated he saw PCP a few weeks ago and at that time he had some leg/ankle swelling  Pt stated the swelling is currently worst and wants to know what he can do or which medication can be rx'd to help him  Pt thinking maybe a "water pill" can help him

## 2022-07-18 NOTE — TELEPHONE ENCOUNTER
Pt left VM regarding worsening leg and ankle swelling  He stated the Hydrochlorothiazide that was prescribe hasn't been helping

## 2022-07-18 NOTE — TELEPHONE ENCOUNTER
Prescription for stronger water pill sent to the pharmacy  This will replace the 1 he is currently taking  If he does not seen improvement in a week he should let me know

## 2022-07-19 NOTE — PATIENT INSTRUCTIONS
Continue basaglar 20 units at 10 -11 pm  Breakfast - when having cereal or bread - take 6 units of fiasp with that   Lunch- burger - take 6 units of fiasp  Dinner- potatoes/fruits/rice - take 6 units of fiasp    Coverage scale - in addition to 6 units before meals   180-220- 1 unit   221-260-2 units   261-300- 4 units

## 2022-07-19 NOTE — PROGRESS NOTES
Joel Carlos 67 y o  male MRN: [id number]    Encounter: [id number]      Assessment/Plan     Problem List Items Addressed This Visit        Endocrine    Type 2 diabetes mellitus with hyperglycemia, with long-term current use of insulin ([facility] [address] ) - Primary       Lab Results   Component Value Date    HGBA1C 8 7 (H) 07/05/2022   Improving-given written instruction to Continue basaglar 20 units at 10 -11 pm  Breakfast - when having cereal or bread - take 6 units of fiasp with that   Lunch- burger - take 6 units of fiasp  Dinner- potatoes/fruits/rice - take 6 units of fiasp    Coverage scale - in addition to 6 units before meals   180-220- 1 unit   221-260-2 units   261-300- 4 units          Relevant Medications    Lancets (OneTouch Delica Plus [id number]) MISC        CC: Diabetes    History of Present Illness     HPI:  69-year-old male with type 2 diabetes on insulin therapy seen in [id number] has history of pancreatic surgery,( 2019 distal partial pancreatectomy and 2022 subtotal pancreatectomy and he will be having completion pancreatectomy next week)  Current regimen  BASAGLAR 20 units at 10 pm  Fiasp - 2 units for 50 above 150  Metformin     Checks 3/day -sugars overall have improved  Fasting 170-250s   Rest of the days 160-200s  No polyuria ,c/o dry mouth , no hypoglycemia , no numbness and tingling in feet   Weight gain - leg swelling/20 lbs     No GI SE     Surgery next Tuesday     Breakfast 7-8 am -cereal or eggs     Lunch- 1 pm- burger or salad  Dinner- chicken /potatoes/fruits            Review of Systems    Historical Information   Past Medical History:   Diagnosis Date    A-fib ([address] )     Abdominal wall strain     last assessed: 10/21/14    Allergic rhinitis     last assessed: 05/03/16    Arthritis     Cancer ([facility] [address] )     PACNCREATIC    COVID-19 virus infection 3/3/2021    CPAP (continuous positive airway pressure) dependence     Diabetes mellitus ([facility] [address] )     Erectile dysfunction of non-organic origin last assessed: 03/17/14    Irregular heart beat     Pt with A fib     Lumbar strain     last assessed: 10/21/14    Multiple acquired skin tags     last assessed: 2/18/16    Palpitations     last assessed: 03/17/14    Pancreas cyst     Plantar fasciitis     Skin disorder     last assessed: 05/28/13    Sleep apnea     CPAP BROKE IN OCTOBER HAS BEEN TRYING TO GET NEW ONE BUT UNABLE AS OF YET    Thrombocytopenia ([facility] [address] )      Past Surgical History:   Procedure Laterality Date    BOTOX INJECTION N/A 11/12/2021    Procedure: Damaso Ontiveros;  Surgeon: Josefina Painting MD;  Location[facility] MAIN OR;  Service: Urology    CATARACT EXTRACTION Bilateral     COLONOSCOPY  01/17/2013    COLONOSCOPY  01/08/2018    COLONOSCOPY  04/2021    CYSTOSCOPY      INJECT WITH BOTOX    DISTAL PANCREATECTOMY N/A 2/15/2022    Procedure: PANCREATECTOMY SUB-TOTALL-OPEN;  Surgeon: Christ Fu MD;  Location: [facility] MAIN OR;  Service: Surgical Oncology    EGD  06/05/2020    EGD AND COLONOSCOPY  02/06/2014, 2/8/2017    FL GUIDED CENTRAL VENOUS ACCESS DEVICE INSERTION  4/23/2019    FLEXIBLE SIGMOIDOSCOPY  06/11/2019    FOOT SURGERY      Due to plantar fascitis    IR PORT PLACEMENT  3/30/2022    JOINT REPLACEMENT Left     LTK    LINEAR ENDOSCOPIC U/S      PANCREATECTOMY LAPAROSCOPIC N/A 3/12/2019    Procedure: DISTAL PANCREATECTOMY LAPAROSCOPIC/POSSIBLE OPEN;  Surgeon: Christ Fu MD;  Location: [facility] MAIN OR;  Service: Surgical Oncology    RI EDG US EXAM SURGICAL ALTER STOM DUODENUM/JEJUNUM N/A 1/24/2019    Procedure: LINEAR ENDOSCOPIC U/S;  Surgeon: Jeff Yo MD;  Location: [facility]; Service: Gastroenterology    REPLACEMENT TOTAL KNEE Left     TUNNELED VENOUS PORT PLACEMENT Left 4/23/2019    Procedure: INSERTION VENOUS PORT (PORT-A-CATH);   Surgeon: Christ Fu MD;  Location: [facility] MAIN OR;  Service: Surgical Oncology- 11/8/21 Pt reports PAC removed     UMBILICAL HERNIA REPAIR       Social History   Social History Substance and Sexual Activity   Alcohol Use Yes    Alcohol/week: 2 0 standard drinks    Types: 2 Cans of beer per week    Comment: couple times per week; Social History     Substance and Sexual Activity   Drug Use Never    Comment: Denies      Social History     Tobacco Use   Smoking Status Never Smoker   Smokeless Tobacco Never Used     Family History:   Family History   Problem Relation Age of Onset    Breast cancer Mother     Cervical cancer Paternal Aunt     Liver cancer Other     No Known Problems Father        Meds/Allergies   Current Outpatient Medications   Medication Sig Dispense Refill    acetaminophen (TYLENOL) 325 mg tablet Take 2 tablets (650 mg total) by mouth every 6 (six) hours as needed for mild pain 30 tablet 0    ascorbic acid (VITAMIN C) 1000 MG tablet Take 2,000 mg by mouth in the morning   aspirin (ECOTRIN) 325 mg EC tablet Take 325 mg by mouth in the morning   atenolol (TENORMIN) 25 mg tablet TAKE 1 TABLET BY MOUTH EVERY DAY 90 tablet 3    Blood Glucose Calibration (OneTouch Verio) SOLN Use as needed to calibrate test strips 1 each 0    Blood Glucose Monitoring Suppl (OneTouch Verio) w/Device KIT Test BG up to 1x daily as directed 1 kit 0    Cholecalciferol (VITAMIN D) 2000 units CAPS Take 1,000 Units by mouth in the morning  11/8/21 Pt takes three tabs of Vitamin D daily  Machellesebastián Fernandese Cyanocobalamin (VITAMIN B 12 PO) Take by mouth daily 11/8/21 Pt reports takes three tabs daily        furosemide (LASIX) 20 mg tablet Take 1 tablet (20 mg total) by mouth 2 (two) times a day 30 tablet 0    hydrochlorothiazide (HYDRODIURIL) 25 mg tablet Take 1 tablet (25 mg total) by mouth daily 30 tablet 0    insulin aspart, w/niacinamide, (Fiasp FlexTouch) 100 Units/mL injection pen Take before meals according to sliding scale up to 30 units per day 150-200: 2 units 201-250: 4 units 251-300: 6 units 301-350: 8 units Over 350: 10 units 15 mL 1    insulin glargine (Basaglar KwikPen) 100 units/mL injection pen 20 units daily 3 mL 0    Insulin Pen Needle (BD Pen Needle Tia U/F) 32G X 4 MM MISC Use 4 per day 100 each 3    Lancets (OneTouch Delica Plus [id number]) MISC Test BG up to 3x daily as directed 100 each 1    metFORMIN (GLUCOPHAGE) 1000 MG tablet TAKE 1 TABLET BY MOUTH TWICE A DAY WITH MEALS 180 tablet 1    insulin aspart, w/niacinamide, (Fiasp FlexTouch) 100 Units/mL injection pen Take before meals according to sliding scale up to 30 units per day 150-200: 2 units 201-250: 4 units 251-300: 6 units 301-350: 8 units Over 350: 10 units    3 mL 0    pancrelipase, Lip-Prot-Amyl, (CREON) 6,000 units delayed release capsule Take 2 pills, 3 times a day with meals 180 capsule 3     No current facility-administered medications for this visit  No Known Allergies    Objective   Vitals: Blood pressure 126/80, pulse 76, height 5' 10 35" (1 787 m), weight 130 kg (286 lb 3 2 oz)  Physical Exam  Vitals reviewed  Constitutional:       Appearance: Normal appearance  He is obese  He is not ill-appearing or diaphoretic  HENT:      Head: Normocephalic and atraumatic  Eyes:      General: No scleral icterus  Extraocular Movements: Extraocular movements intact  Cardiovascular:      Rate and Rhythm: Normal rate and regular rhythm  Heart sounds: Normal heart sounds  No murmur heard  Pulmonary:      Effort: Pulmonary effort is normal  No respiratory distress  Breath sounds: Normal breath sounds  No wheezing  Abdominal:      General: There is no distension  Palpations: Abdomen is soft  Tenderness: There is no abdominal tenderness  There is no guarding  Musculoskeletal:      Cervical back: Neck supple  Right lower leg: No edema  Left lower leg: No edema  Lymphadenopathy:      Cervical: No cervical adenopathy  Skin:     General: Skin is warm and dry  Neurological:      General: No focal deficit present        Mental Status: He is alert and oriented to person, place, and time  Psychiatric:         Mood and Affect: Mood normal          Behavior: Behavior normal          Thought Content: Thought content normal          Judgment: Judgment normal          The history was obtained from the review of the chart, patient      Lab Results:   Lab Results   Component Value Date/Time    Hemoglobin A1C 8 7 (H) 07/05/2022 10:32 AM    Hemoglobin A1C 9 2 (H) 04/25/2022 11:51 AM    Hemoglobin A1C 6 2 (H) 03/08/2022 09:18 AM    WBC 3 68 (L) 07/05/2022 10:32 AM    WBC 6 96 06/06/2022 11:11 AM    WBC 3 97 (L) 05/23/2022 11:11 AM    Hemoglobin 11 4 (L) 07/05/2022 10:32 AM    Hemoglobin 12 6 06/06/2022 11:11 AM    Hemoglobin 12 6 05/23/2022 11:11 AM    Hematocrit 33 9 (L) 07/05/2022 10:32 AM    Hematocrit 37 0 06/06/2022 11:11 AM    Hematocrit 37 2 05/23/2022 11:11 AM     (H) 07/05/2022 10:32 AM     (H) 06/06/2022 11:11 AM     (H) 05/23/2022 11:11 AM    Platelets 133 (L) 78/84/0633 10:32 AM    Platelets 85 (L) 76/34/3427 11:11 AM    Platelets 042 (L) 42/91/0870 11:11 AM    BUN 9 07/05/2022 10:32 AM    BUN 10 06/06/2022 11:11 AM    BUN 10 05/23/2022 11:11 AM    Potassium 4 6 07/05/2022 10:32 AM    Potassium 3 9 06/06/2022 11:11 AM    Potassium 4 4 05/23/2022 11:11 AM    Chloride 102 07/05/2022 10:32 AM    Chloride 99 (L) 06/06/2022 11:11 AM    Chloride 100 05/23/2022 11:11 AM    CO2 28 07/05/2022 10:32 AM    CO2 26 06/06/2022 11:11 AM    CO2 26 05/23/2022 11:11 AM    CO2, i-STAT 26 02/15/2022 06:11 PM    CO2, i-STAT 25 02/15/2022 03:39 PM    CO2, i-STAT 26 02/15/2022 01:24 PM    Creatinine 0 70 07/05/2022 10:32 AM    Creatinine 0 96 06/06/2022 11:11 AM    Creatinine 0 79 05/23/2022 11:11 AM    AST 35 07/05/2022 10:32 AM    AST 39 06/06/2022 11:11 AM    AST 40 05/23/2022 11:11 AM    ALT 32 07/05/2022 10:32 AM    ALT 43 06/06/2022 11:11 AM    ALT 41 05/23/2022 11:11 AM    Albumin 2 9 (L) 07/05/2022 10:32 AM    Albumin 3 1 (L) 06/06/2022 11:11 AM    Albumin 3 1 (L) 05/23/2022 11:11 AM             Portions of the record may have been created with voice recognition software  Occasional wrong word or "sound a like" substitutions may have occurred due to the inherent limitations of voice recognition software  Read the chart carefully and recognize, using context, where substitutions have occurred

## 2022-07-20 PROBLEM — Z85.07 ENCOUNTER FOR FOLLOW-UP SURVEILLANCE OF PANCREATIC CANCER: Status: RESOLVED | Noted: 2021-08-03 | Resolved: 2022-01-01

## 2022-07-20 PROBLEM — E11.65 TYPE 2 DIABETES MELLITUS WITH HYPERGLYCEMIA, WITH LONG-TERM CURRENT USE OF INSULIN (HCC): Status: ACTIVE | Noted: 2019-11-25

## 2022-07-20 PROBLEM — Z79.4 TYPE 2 DIABETES MELLITUS WITH HYPERGLYCEMIA, WITH LONG-TERM CURRENT USE OF INSULIN (HCC): Status: ACTIVE | Noted: 2019-11-25

## 2022-07-20 PROBLEM — Z08 ENCOUNTER FOR FOLLOW-UP SURVEILLANCE OF PANCREATIC CANCER: Status: RESOLVED | Noted: 2021-08-03 | Resolved: 2022-01-01

## 2022-07-20 NOTE — PROGRESS NOTES
Pt with undetectable ANC today  Spoke to Dr Sanjana Aparicio about this as he had called me and then now with the patient  Will repeat CBC tomorrow, if still low, he is amenable to BMbx and aware that he will not get surgery next week if this is still low      Louis Roman MD

## 2022-07-20 NOTE — PROGRESS NOTES
Surgical Oncology Follow Up       [facility] [place]  [address]  [facility] [address]  [phone]    Jemal Ramos  1950  [id number]  [address] [place] [address] [facility] [address]  [facility] [id number] [phone]    Diagnoses and all orders for this visit:    Pre-op examination    Overlapping malignant neoplasm of pancreas ([address] )  -     CBC and differential; Future        Chief Complaint   Patient presents with    Follow-up           Oncology History   Overlapping malignant neoplasm of pancreas ([facility] )   3/12/2019 Surgery    Distal pancreatectomy  Several foci of invasive colloid cancer  Grade 1  IPMN with high grade dysplasia at margin  T1b, NO MX Stage IA     4/11/2019 - 6/21/2019 Chemotherapy    Saw Dr Benjie Hernandez  Will be starting Folfirinox 4/24   Ended 6/21/2019 4/24/2019 - 7/2/2019 Chemotherapy    fluorouracil (ADRUCIL), 400 mg/m2 = 1,010 mg, Intravenous, Once, 2 of 2 cycles  pegfilgrastim (Mignon Tunde), 6 mg, Subcutaneous, Once, 2 of 2 cycles  Administration: 6 mg (6/21/2019), 6 mg (6/7/2019)  irinotecan (CAMPTOSAR) chemo infusion, 180 mg/m2 = 455 mg, Intravenous, Once, 4 of 4 cycles  Dose modification: 140 mg/m2 (original dose 180 mg/m2, Cycle 3, Reason: Other (Must fill in a comment)), 125 mg/m2 (original dose 180 mg/m2, Cycle 3, Reason: Dose Not Tolerated)  Administration: 316 mg (6/5/2019), 300 mg (6/19/2019)  leucovorin calcium IVPB, 400 mg/m2 = 1,012 mg, Intravenous, Once, 2 of 2 cycles  oxaliplatin (ELOXATIN) chemo infusion, 85 mg/m2 = 215 05 mg, Intravenous, Once, 4 of 4 cycles  Dose modification: 65 mg/m2 (original dose 85 mg/m2, Cycle 3, Reason: Other (Must fill in a comment)), 60 mg/m2 (original dose 85 mg/m2, Cycle 3, Reason: Dose Not Tolerated)  Administration: 151 8 mg (6/5/2019), 151 8 mg (6/19/2019)  fluorouracil (ADRUCIL) ambulatory infusion Soln, 1,200 mg/m2/day = 6,070 mg, Intravenous, Over 46 hours, 4 of 4 cycles     2/15/2022 Surgery    Pancreas (subtotal pancreatectomy):     - 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 (largest focus 0 8 cm) arising in association with intraductal papillary mucinous neoplasm (IPMN) with high-grade dysplasia  - Surgical margin positive for colloid carcinoma and IPMN with high-grade dysplasia       - Fifteen (15) lymph nodes, negative for carcinomaRestore     2/15/2022 -  Cancer Staged    Staging form: Pancreas, AJCC 8th Edition  - Pathologic stage from 2/15/2022: Stage IA (ypT1b(m), pN0, cM0) - Signed by Jase Aguiar MD on 3/8/2022  Stage prefix: Post-therapy  Total positive nodes: 0  Multiple tumors: Yes       4/12/2022 -  Chemotherapy    pegfilgrastim (Genene Drummer), 6 mg, Subcutaneous, Once, 4 of 6 cycles  Administration: 6 mg (4/14/2022), 6 mg (4/28/2022), 6 mg (5/26/2022), 6 mg (6/9/2022)  irinotecan (CAMPTOSAR) chemo infusion, 305 mg (69 4 % of original dose 180 mg/m2), Intravenous, Once, 4 of 6 cycles  Dose modification: 125 mg/m2 (original dose 180 mg/m2, Cycle 1, Reason: Anticipated Tolerance, Comment: Same dose received last time in 2019)  Administration: 300 mg (4/12/2022), 300 mg (4/26/2022), 300 mg (5/24/2022), 300 mg (6/7/2022)  oxaliplatin (ELOXATIN) chemo infusion, 146 4 mg (70 6 % of original dose 85 mg/m2), Intravenous, Once, 4 of 6 cycles  Dose modification: 60 mg/m2 (original dose 85 mg/m2, Cycle 1, Reason: Anticipated Tolerance, Comment: Same dose he received last time)  Administration: 150 mg (4/12/2022), 150 mg (4/26/2022), 150 mg (5/24/2022), 150 mg (6/7/2022)  fluorouracil (ADRUCIL) ambulatory infusion Soln, 1,200 mg/m2/day = 5,855 mg, Intravenous, Over 46 hours, 4 of 6 cycles     IPMN (intraductal papillary mucinous neoplasm)   2/15/2022 Surgery    Pancreas (subtotal pancreatectomy):     - 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 (largest focus 0 8 cm) arising in association with intraductal papillary mucinous neoplasm (IPMN) with high-grade dysplasia  - Surgical margin positive for colloid carcinoma and IPMN with high-grade dysplasia  - Fifteen (15) lymph nodes, negative for carcinoma         History of Present Illness:  Patient returns to the office today for pre-operative history and physical examination  He is scheduled to undergo completion pancreatectomy with Dr Barbara Flores in 6 days  The patient denies any changes in his health since his last visit  He denies any SOB or cough  He has received his Covid vaccines, and has completed his pre-admission testing  In addition, we have requested medical and cardiac clearances, as well as instructions for dawson-operative blood sugar management by his endocrinologist  These have all been completed  He has discontinued all OTC medications, as well as his aspirin, as of yesterday  Review of Systems   Constitutional: Negative for activity change, appetite change, fatigue and unexpected weight change  HENT: Negative  Eyes: Negative  Respiratory: Negative for cough and shortness of breath  Cardiovascular: Positive for leg swelling (chronic)  Negative for chest pain  Gastrointestinal: Negative  Endocrine: Negative  Genitourinary: Negative  Musculoskeletal: Negative  Skin: Negative  Allergic/Immunologic: Negative  Neurological: Negative  Hematological: Negative  Psychiatric/Behavioral: Negative              Patient Active Problem List   Diagnosis    Obstructive sleep apnea syndrome    Hypersomnia    Permanent atrial fibrillation (HCC)    Morbid obesity with BMI of 40 0-44 9, adult ([address] )    Lower extremity edema    Pancreatic duct dilated    Overlapping malignant neoplasm of pancreas ([address] )    Type 2 diabetes mellitus with hyperglycemia, with long-term current use of insulin ([address] )    IPMN (intraductal papillary mucinous neoplasm)    Thrombocytopenia (HCC)    Urgency incontinence    Balanitis    OAB (overactive bladder)    BPH without obstruction/lower urinary tract symptoms    Encounter for geriatric assessment    Counseling regarding advanced care planning and goals of care    Port-A-Cath in place    Chemotherapy induced neutropenia ([facility] )     Past Medical History:   Diagnosis Date    A-fib ([facility] )     Abdominal wall strain     last assessed: 10/21/14    Allergic rhinitis     last assessed: 05/03/16    Arthritis     Cancer ([facility] )     PACNCREATIC    COVID-19 virus infection 3/3/2021    CPAP (continuous positive airway pressure) dependence     Diabetes mellitus ([facility]     Erectile dysfunction of non-organic origin     last assessed: 03/17/14    Irregular heart beat     Pt with A fib     Lumbar strain     last assessed: 10/21/14    Multiple acquired skin tags     last assessed: 2/18/16    Palpitations     last assessed: 03/17/14    Pancreas cyst     Plantar fasciitis     Skin disorder     last assessed: 05/28/13    Sleep apnea     CPAP BROKE IN OCTOBER HAS BEEN TRYING TO GET NEW ONE BUT UNABLE AS OF YET    Thrombocytopenia ([facility] )      Past Surgical History:   Procedure Laterality Date    BOTOX INJECTION N/A 11/12/2021    Procedure: Cristopher Adams;  Surgeon: Nadeem Parisi MD;  Location: [address] [place] [address] MAIN OR;  Service: Urology    CATARACT EXTRACTION Bilateral     COLONOSCOPY  01/17/2013    COLONOSCOPY  01/08/2018    COLONOSCOPY  04/2021    CYSTOSCOPY      INJECT WITH BOTOX    DISTAL PANCREATECTOMY N/A 2/15/2022    Procedure: PANCREATECTOMY SUB-TOTALL-OPEN;  Surgeon: Bc Abdul MD;  Location:  MAIN OR;  Service: Surgical Oncology    EGD  06/05/2020    EGD AND COLONOSCOPY  02/06/2014, 2/8/2017    FL GUIDED CENTRAL VENOUS ACCESS DEVICE INSERTION  4/23/2019    FLEXIBLE SIGMOIDOSCOPY  06/11/2019    FOOT SURGERY      Due to plantar fascitis    IR PORT PLACEMENT  3/30/2022    JOINT REPLACEMENT Left     LTK    LINEAR ENDOSCOPIC U/S      PANCREATECTOMY LAPAROSCOPIC N/A 3/12/2019    Procedure: DISTAL PANCREATECTOMY LAPAROSCOPIC/POSSIBLE OPEN;  Surgeon: Stephanie Samaniego MD;  Location: [facility] MAIN OR;  Service: Surgical Oncology    TX EDG US EXAM SURGICAL ALTER STOM DUODENUM/JEJUNUM N/A 1/24/2019    Procedure: LINEAR ENDOSCOPIC U/S;  Surgeon: Pedro Wolf MD;  Location: [facility] GI LAB; Service: Gastroenterology    REPLACEMENT TOTAL KNEE Left     TUNNELED VENOUS PORT PLACEMENT Left 4/23/2019    Procedure: INSERTION VENOUS PORT (PORT-A-CATH); Surgeon: Stephanie Samaniego MD;  Location: [facility] MAIN OR;  Service: Surgical Oncology- 11/8/21 Pt reports PAC removed     UMBILICAL HERNIA REPAIR       Family History   Problem Relation Age of Onset    Breast cancer Mother     Cervical cancer Paternal Aunt     Liver cancer Other     No Known Problems Father      Social History     Socioeconomic History    Marital status: /Civil Union     Spouse name: Not on file    Number of children: Not on file    Years of education: Not on file    Highest education level: Not on file   Occupational History    Not on file   Tobacco Use    Smoking status: Never Smoker    Smokeless tobacco: Never Used   Vaping Use    Vaping Use: Never used   Substance and Sexual Activity    Alcohol use:  Yes     Alcohol/week: 2 0 standard drinks     Types: 2 Cans of beer per week     Comment: couple times per week;     Drug use: Never     Comment: Denies     Sexual activity: Yes     Comment: Denies any chest pain or shortness of breath with activity   Other Topics Concern    Not on file   Social History Narrative    Not on file     Social Determinants of Health     Financial Resource Strain: Not on file   Food Insecurity: No Food Insecurity    Worried About Running Out of Food in the Last Year: Never true    Tisha of Food in the Last Year: Never true   Transportation Needs: No Transportation Needs    Lack of Transportation (Medical): No    Lack of Transportation (Non-Medical): No   Physical Activity: Not on file   Stress: Not on file   Social Connections: Not on file   Intimate Partner Violence: Not on file   Housing Stability: Unknown    Unable to Pay for Housing in the Last Year: No    Number of Places Lived in the Last Year: Not on file    Unstable Housing in the Last Year: No       Current Outpatient Medications:     acetaminophen (TYLENOL) 325 mg tablet, Take 2 tablets (650 mg total) by mouth every 6 (six) hours as needed for mild pain, Disp: 30 tablet, Rfl: 0    ascorbic acid (VITAMIN C) 1000 MG tablet, Take 2,000 mg by mouth in the morning , Disp: , Rfl:     aspirin (ECOTRIN) 325 mg EC tablet, Take 325 mg by mouth in the morning , Disp: , Rfl:     atenolol (TENORMIN) 25 mg tablet, TAKE 1 TABLET BY MOUTH EVERY DAY, Disp: 90 tablet, Rfl: 3    Blood Glucose Calibration (OneTouch Verio) SOLN, Use as needed to calibrate test strips, Disp: 1 each, Rfl: 0    Blood Glucose Monitoring Suppl (OneTouch Verio) w/Device KIT, Test BG up to 1x daily as directed, Disp: 1 kit, Rfl: 0    Cholecalciferol (VITAMIN D) 2000 units CAPS, Take 1,000 Units by mouth in the morning  11/8/21 Pt takes three tabs of Vitamin D daily   , Disp: , Rfl:     Cyanocobalamin (VITAMIN B 12 PO), Take by mouth daily 11/8/21 Pt reports takes three tabs daily  , Disp: , Rfl:     furosemide (LASIX) 20 mg tablet, Take 1 tablet (20 mg total) by mouth 2 (two) times a day, Disp: 30 tablet, Rfl: 0    hydrochlorothiazide (HYDRODIURIL) 25 mg tablet, Take 1 tablet (25 mg total) by mouth daily, Disp: 30 tablet, Rfl: 0    insulin aspart, w/niacinamide, (Fiasp FlexTouch) 100 Units/mL injection pen, Take before meals according to sliding scale up to 30 units per day 150-200: 2 units 201-250: 4 units 251-300: 6 units 301-350: 8 units Over 350: 10 units, Disp: 15 mL, Rfl: 1    insulin aspart, w/niacinamide, (Fiasp FlexTouch) 100 Units/mL injection pen, Take before meals according to sliding scale up to 30 units per day 150-200: 2 units 201-250: 4 units 251-300: 6 units 301-350: 8 units Over 350: 10 units  , Disp: 3 mL, Rfl: 0    insulin glargine (Basaglar KwikPen) 100 units/mL injection pen, 20 units daily, Disp: 3 mL, Rfl: 0    Insulin Pen Needle (BD Pen Needle Tia U/F) 32G X 4 MM MISC, Use 4 per day, Disp: 100 each, Rfl: 3    Lancets (OneTouch Delica Plus [id number]) MISC, Test BG up to 3x daily as directed, Disp: 100 each, Rfl: 1    metFORMIN (GLUCOPHAGE) 1000 MG tablet, TAKE 1 TABLET BY MOUTH TWICE A DAY WITH MEALS, Disp: 180 tablet, Rfl: 1    pancrelipase, Lip-Prot-Amyl, (CREON) 6,000 units delayed release capsule, Take 2 pills, 3 times a day with meals, Disp: 180 capsule, Rfl: 3  No Known Allergies  Vitals:    07/20/22 1034   BP: 126/86   Pulse: 73   Resp: 16   Temp: 97 8 °F (36 6 °C)   SpO2: 92%       Physical Exam  Vitals reviewed  Constitutional:       General: He is not in acute distress  Appearance: Normal appearance  He is not ill-appearing or toxic-appearing  HENT:      Head: Normocephalic and atraumatic  Nose: Nose normal       Mouth/Throat:      Mouth: Mucous membranes are moist    Eyes:      General: No scleral icterus  Extraocular Movements: Extraocular movements intact  Conjunctiva/sclera: Conjunctivae normal       Pupils: Pupils are equal, round, and reactive to light  Neck:      Vascular: No carotid bruit  Cardiovascular:      Rate and Rhythm: Normal rate  Pulmonary:      Effort: Pulmonary effort is normal       Breath sounds: Normal breath sounds  Musculoskeletal:         General: Normal range of motion  Cervical back: Normal range of motion and neck supple  Right lower leg: Edema present  Left lower leg: Edema present  Skin:     General: Skin is warm and dry  Coloration: Skin is not jaundiced  Neurological:      General: No focal deficit present        Mental Status: He is alert and oriented to person, place, and time    Psychiatric:         Mood and Affect: Mood normal          Behavior: Behavior normal          Thought Content: Thought content normal          Judgment: Judgment normal            Labs:  07/05/2022 1032 07/05/2022 1142 CBC and differential [[id number]]   (Abnormal)   Blood from Arm, Right    Component Value Units   WBC 3 68 Low   Thousand/uL   RBC 3 07 Low  Million/uL   Hemoglobin 11 4 Low  g/dL   Hematocrit 33 9 Low  %    High  fL   MCH 37 1 High  pg   MCHC 33 6 g/dL   RDW 17 0 High  %   MPV 11 3 fL   Platelets 839 DEREK  Thousands/uL   nRBC 0 /100 WBCs   Neutrophils Relative 61 %   Immat GRANS % 1 %   Lymphocytes Relative 16 %   Monocytes Relative 19 High  %   Eosinophils Relative 2 %   Basophils Relative 1 %   Neutrophils Absolute 2 28 Thousands/µL   Immature Grans Absolute 0 02 Thousand/uL   Lymphocytes Absolute 0 59 Low  Thousands/µL   Monocytes Absolute 0 69 Thousand/µL   Eosinophils Absolute 0 07 Thousand/µL   Basophils Absolute 0 03 Thousands/µL          07/05/2022 1032 07/05/2022 1146 Comprehensive metabolic panel [[id number]]    (Abnormal)   Blood from Arm, Right    Component Value Units   Sodium 134 Low  mmol/L   Potassium 4 6 mmol/L   Chloride 102 mmol/L   CO2 28 mmol/L   ANION GAP 4 mmol/L   BUN 9 mg/dL   Creatinine 0 70  mg/dL   Glucose 261 High   mg/dL   Calcium 9 0 mg/dL   Corrected Calcium 9 9 mg/dL   AST 35  U/L   ALT 32  U/L   Alkaline Phosphatase 154 High  U/L   Total Protein 7 2 g/dL   Albumin 2 9 Low  g/dL   Total Bilirubin 0 85  mg/dL   eGFR 93 ml/min/1 73sq m            Imaging  XR chest pa & lateral    Result Date: 7/12/2022  Narrative: CHEST INDICATION:   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  COMPARISON:  Chest x-ray January 2022, CT abdomen and pelvis June 2023 EXAM PERFORMED/VIEWS:  XR CHEST PA & LATERAL  The frontal view was performed utilizing dual energy radiographic technique   FINDINGS: Cardiac enlargement is seen and right portacatheter terminating in the distal superior vena cava/right atrium  Bilateral basilar atelectasis is seen with minimal blunting of the costophrenic angles which could be secondary to pleural thickening  Osseous structures appear within normal limits for patient age  Impression: Basilar subsegmental atelectasis with no active disease Workstation performed: [id number]       CT abdomen pelvis w contrast    Result Date: 6/28/2022  Narrative: CT ABDOMEN AND PELVIS WITH IV CONTRAST INDICATION:   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 D49 0: Neoplasm of unspecified behavior of digestive system  Distal pancreatectomy and March 2019  Subtotal pancreatectomy in February 2022  Patient has been treated with chemotherapy  COMPARISON:  Most recent MRI performed December 10, 2021  Most recent CT December 19, 2020 TECHNIQUE:  CT examination of the abdomen and pelvis was performed  Scanning through the abdomen was performed in arterial, venous and delayed phases according a protocol spefically designed to evaluate upper abdominal viscera  Axial, sagittal, and coronal 2D reformatted images were created from the source data and submitted for interpretation  Radiation dose length product (DLP) for this visit:  3028 01 mGy-cm   This examination, like all CT scans performed in the [facility], was performed utilizing techniques to minimize radiation dose exposure, including the use of iterative reconstruction and automated exposure control  IV Contrast:  100 mL of iohexol (OMNIPAQUE) Enteric Contrast:  Enteric contrast was administered  FINDINGS: ABDOMEN LOWER CHEST:  Cardiomegaly  Bibasilar atelectasis  LIVER/BILIARY TREE:  Subtly lobulated hepatic contours suggestive of hepatic parenchymal disease and possibly cirrhosis  No suspicious hepatic mass  Patent portal and hepatic veins  No biliary dilatation  GALLBLADDER:  Nonspecific gallbladder wall thickening  No calcified gallstones  SPLEEN:  Unremarkable  PANCREAS:  Surgical changes of subtotal pancreatectomy with remnant pancreatic parenchymal head tissue identified  Questionable mucinous or cystic tissue in remnant pancreatic parenchymal head on image 40 of series 6 estimated at 19 x 9 mm  No definitive mass can be confidently confirmed in the mid pancreatic tissue by CT but there was some cystic change extending into the pancreatic head on MRI of December 10, 2021  Mildly enlarged portacaval node, 21 x 15 mm on image 37 of series 6  ADRENAL GLANDS:  Unremarkable  KIDNEYS/URETERS:  Unremarkable  No hydronephrosis  STOMACH AND BOWEL:  No bowel obstruction  Loops of small bowel appear to have collapsed into the pancreatectomy space, similar in configuration from prior exams APPENDIX:  Fluid-filled and thickened to 13 mm  ABDOMINOPELVIC CAVITY:  Small volume of abdominopelvic ascites  Mild mesenteric and omental edema  Enlarged portacaval node as described above, nonspecific but similar from prior exam   Otherwise no significant lymphadenopathy  VESSELS:  Atherosclerotic vascular calcification  Infrarenal abdominal aortic aneurysm measuring up to 3 cm, unchanged  Small left gastric, paraesophageal, perisplenic, omental, and retroperitoneal varices suggesting portal hypertension  PELVIS REPRODUCTIVE ORGANS:  Unremarkable for patient's age  URINARY BLADDER:  Unremarkable  ABDOMINAL WALL/INGUINAL REGIONS:  Umbilical hernia repair without residual or recurrent hernia  OSSEOUS STRUCTURES:  No acute fracture or destructive osseous lesion  Impression: Questionable mucinous nodule within the remnant pancreatic head tissue with no definitive tumor confirmed  Findings suggestive of hepatic parenchymal disease and likely cirrhosis including a lobulated hepatic contours, varices, as well as mesenteric edema and a small volume of ascites  Nonspecific enlarged portacaval node, similar from previous examination  Otherwise no significant lymphadenopathy    Otherwise no CT findings of metastatic disease in the abdomen/pelvis  Postsurgical changes similar from previous examination  Atherosclerotic vascular calcifications including unchanged 3 cm infrarenal abdominal aortic aneurysm  Workstation performed: [id number]     I reviewed the above laboratory and imaging data  Discussion/Summary: This is a very pleasant 68 y/o male who presents today for presurgical H&P visit  There are no new or worrisome findings on examination today  Some of his pre-admission labs were abnormal, particularly A1c, WBCs and platelets  I have ordered a repeat CBC to be completed today  I have also stressed the importance of tight blood sugar control and strict diet ahead of surgery, as uncontrolled bs on day of surgery could lead to delay or cancellation of surgery  He stated he would like to have an insulin pump set up after surgery  I explained that Dr Hosea Yeboah will see him in the hospital after surgery, and that they can discuss blood sugar management going forward  I have also advised him on the importance of incentive spirometry perioperatively, as his chest xray does show some atelectasis  Patient is agreeable to proceed with surgery, all questions were answered today

## 2022-07-20 NOTE — ASSESSMENT & PLAN NOTE
Lab Results   Component Value Date    HGBA1C 8 7 (H) 07/05/2022   Improving-given written instruction to Continue basaglar 20 units at 10 -11 pm  Breakfast - when having cereal or bread - take 6 units of fiasp with that   Lunch- burger - take 6 units of fiasp  Dinner- potatoes/fruits/rice - take 6 units of fiasp    Coverage scale - in addition to 6 units before meals   180-220- 1 unit   221-260-2 units   261-300- 4 units

## 2022-07-20 NOTE — TELEPHONE ENCOUNTER
Received notification that patient WBC 1 96    Sent to provider to review   Patient has an appiontment with Dr Harriet Soelr on 8/26

## 2022-07-20 NOTE — TELEPHONE ENCOUNTER
CALL TRANSFER   Reason for patient call? Gina calling in from [facility] with critical labs for the patient  Patient's primary physician? Dr Isaac Dominguez     RN call was transferred to and time it was transferred? Chapis Snell, 1:47pm   Informed patient that the message will be forwarded to the team and someone will get back to them as soon as possible    Did you relay this information to the patient?   Yes

## 2022-07-20 NOTE — PROGRESS NOTES
Established patient with class findings presents for nail care  Patient is scheduled for a Whipple procedure next week due to pancreatic cancer  Both feet and legs are extremely edematous  Vascular exam:  DP  0/4 bilateral; PT  0 4 bilateral   Dermatological exam:  Each toenail is thick and  dystrophic  Diagnosis:  Diabetes mellitus  Treatment: Trimmed multiple dystrophic toenails      Nail removal    Date/Time: 7/20/2022 8:36 AM  Performed by: Yoana Segundo DPM  Authorized by: Yoana Segundo DPM     Nails trimmed:     Number of nails trimmed:  10

## 2022-07-21 NOTE — TELEPHONE ENCOUNTER
Patient called in to ask if his surgery was still on for tomorrow  I let patient know that Dr Valeria Flynn was in surgery, but that I would reach out to him and call him back as soon as I knew anything  Tiger text sent to Dr Valeria Flynn  Patient states that he is ok if it is cancelled, but just wants to know for sure either way

## 2022-07-21 NOTE — TELEPHONE ENCOUNTER
CALL RETURN FORM   Reason for patient call? Patient calling request to speak with Dr Merary Borges regarding his surgery scheduled with Dr Merary Borges on 7/26/22  Patient stating he would like to know if the surgery has been postpone  Patient's primary oncologist? Dr Merary Borges     Name of person the patient was calling for? Dr Merary Borges     Any additional information to add, if applicable? Best call back number [phone]   Informed patient that the message will be forwarded to the team and someone will get back to them as soon as possible    Did you relay this information to the patient?   yes

## 2022-07-22 RX ORDER — CEFAZOLIN SODIUM 2 G/50ML
2000 SOLUTION INTRAVENOUS ONCE
Status: CANCELLED | OUTPATIENT
Start: 2022-07-22 | End: 2022-07-22

## 2022-07-22 RX ORDER — METRONIDAZOLE 500 MG/100ML
500 INJECTION, SOLUTION INTRAVENOUS ONCE
Status: CANCELLED | OUTPATIENT
Start: 2022-07-22 | End: 2022-07-22

## 2022-07-22 NOTE — TELEPHONE ENCOUNTER
Received a fax from [address] [place] [address] requesting Medical clearance , Pt is having completion Pancreatectomy on 07/26/2022 , Per  Dr Mayte Murdock Pt needs to get this clearance done by his PCP , call [facility] @ [phone]  Spoke with clerical and they will sent this message to Melba villa

## 2022-07-22 NOTE — TELEPHONE ENCOUNTER
Spoke to patient to let him know that his surgery is back on  Patient verbalized understanding and thanks

## 2022-07-22 NOTE — TELEPHONE ENCOUNTER
CALL RETURN FORM   Reason for patient call? Ayleen calling to inform Karli Cunningham that the clearance for surgery needs to be done by PCP per Dr Ching Matos  [facility] number was given by Claudia Serrano for this per Laureen Spatz and no last name for Claudia Serrano was listed  Laureen Spatz did not know who to forward this info to  Patient's primary oncologist? Dr Sulaiman Mendez   Name of person the patient was calling for? Karli Cunningham   Any additional information to add, if applicable? N/A   Informed patient that the message will be forwarded to the team and someone will get back to them as soon as possible    Did you relay this information to the patient?  Yes

## 2022-07-22 NOTE — TELEPHONE ENCOUNTER
His sugars are improving and insulin dose is being adjusted  to optimize for upcoming surgery -suggest Endocrine consultation postoperatively

## 2022-07-25 NOTE — ANESTHESIA PREPROCEDURE EVALUATION
Procedure:  COMPLETION PANCREATECTOMY (N/A Abdomen)    Relevant Problems   CARDIO   (+) Permanent atrial fibrillation (HCC)      ENDO   (+) Type 2 diabetes mellitus with hyperglycemia, with long-term current use of insulin (HCC)      GI/HEPATIC   (+) IPMN (intraductal papillary mucinous neoplasm)   (+) Overlapping malignant neoplasm of pancreas (HCC)   (+) Pancreatic duct dilated      /RENAL   (+) BPH without obstruction/lower urinary tract symptoms      HEMATOLOGY   (+) Thrombocytopenia (HCC)      PULMONARY   (+) Obstructive sleep apnea syndrome      Other   (+) Morbid obesity with BMI of 40 0-44 9, adult (HCC)   CPAP (continuous positive airway pressure) dependence   Plt    101 Low         2016: STRESS RESULTS: Duration of pharmacologic stress was 9 min  The patient  finished protocol stage 3  Maximal heart rate during stress was 139 bpm ( 90 %  of maximal predicted heart rate)  Target heart rate was achieved  The heart  rate response to stress was normal  Maximal systolic blood pressure during  stress was 148 mmHg  There was normal resting blood pressure with a blunted  response to stress  The rate-pressure product for the peak heart rate and blood  pressure was 37307  There was no chest pain during stress  The stress test was  terminated due to achievement of target heart rate      ECG CONCLUSIONS: The stress ECG was negative for ischemia  Arrhythmia during  stress: isolated premature ventricular beats      STRESS 2D ECHO RESULTS:     BASELINE: Left ventricular size was normal  Top normal wall thickness  RV size  normal Biatrial enlargement  Mild MR  Overall left ventricular systolic  function was normal  Estimated left ventricular ejection fraction was 60 %       LOW STRESS: There was an appropriate reduction in left ventricular size  There  was an appropriate augmentation in LV function      PEAK STRESS: There was an appropriate reduction in left ventricular size   There  was an appropriate augmentation in LV function      Prepared and electronically signed by  Physical Exam    Airway    Mallampati score: II  TM Distance: >3 FB  Neck ROM: full     Dental       Cardiovascular      Pulmonary      Other Findings        Anesthesia Plan  ASA Score- 3     Anesthesia Type- general with ASA Monitors  Additional Monitors: arterial line  Airway Plan: ETT  Comment: Thoracic Epidural for post op pain per surgeon request  Thorough discussion about epidural and platelet level  This morning it is 97 and still in acceptable range for an epidural, Patient aware and agreeable and desires epidural          Plan Factors-    Chart reviewed  Induction- intravenous  Postoperative Plan-     Informed Consent- Anesthetic plan and risks discussed with patient  I personally reviewed this patient with the CRNA  Discussed and agreed on the Anesthesia Plan with the CRNA  Noemi Fields

## 2022-07-25 NOTE — PROGRESS NOTES
I forwarded this message to Dr Cinthia Al and Manasa Rothman to address  Kathe's note is listed below  This has been taken care of  No need for patient to be seen ,  His surgery is still scheduled for 7/26                 Message  Received: 3 days ago  Manasa Rothman RN sent to Omari Griffin; Juli Humphries MD    This was taken care of I believe by Dr Cinthia Al  The patient had repeat labs and wbc was wnl yesterday             Previous Messages       ----- Message -----   From: Omari Griffin   Sent: 7/22/2022   3:05 PM EDT   To: Manasa Rothman RN, Juli Humphries MD       ----- Message -----   From: Darlene Rojas RN   Sent: 7/22/2022   3:00 PM EDT   To: Omari Griffin       ----- Message -----   From: Brisa Orozco MD   Sent: 7/21/2022   7:54 AM EDT   To: Darlene Rojas RN     Crystal please make sure parth is evaluated by med onc asap today, was scheduled for surgery next week but severely neutropenic post chemo       Thx     rq

## 2022-07-26 NOTE — ANESTHESIA PROCEDURE NOTES
Central Line Insertion  Performed by: Armin Olivares MD  Authorized by: Armin Olivares MD     Date/Time: 7/26/2022 8:07 AM  Catheter Type:  triple lumen  Consent: Verbal consent obtained  Written consent obtained  Risks and benefits: risks, benefits and alternatives were discussed  Consent given by: patient  Patient understanding: patient states understanding of the procedure being performed  Patient consent: the patient's understanding of the procedure matches consent given  Procedure consent: procedure consent matches procedure scheduled  Relevant documents: relevant documents present and verified  Test results: test results available and properly labeled  Site marked: the operative site was marked  Required items: required blood products, implants, devices, and special equipment available  Patient identity confirmed: verbally with patient, arm band, provided demographic data and hospital-assigned identification number  Time out: Immediately prior to procedure a "time out" was called to verify the correct patient, procedure, equipment, support staff and site/side marked as required  Location details: right internal jugular  Patient position: Trendelenburg    Sedation:  Patient sedated: yes (Central Line started after Induction and prior to Start of Surgery )  Vitals: Vital signs were monitored during sedation      Assessment: blood return through all ports  Preparation: skin prepped with ChloraPrep  Skin prep agent dried: skin prep agent completely dried prior to procedure  Sterile barriers: all five maximum sterile barriers used - cap, mask, sterile gown, sterile gloves, and large sterile sheet  Hand hygiene: hand hygiene performed prior to central venous catheter insertion  Ultrasound guidance: yes  sterile gel and probe cover used in ultrasound-guided central venous catheter insertionPre-procedure: landmarks identified  Number of attempts: 1  Successful placement: yes  Post-procedure: chlorhexidine patch applied, line sutured and dressing applied  Patient tolerance: patient tolerated the procedure well with no immediate complications  Comments: RIJ Triple Lumen Catheter placed without incident

## 2022-07-26 NOTE — CONSULTS
Consultation - Joe Roger 67 y o  male MRN: [id number]    Unit/Bed#: [facility] 516-01 Encounter: [id number]      Assessment/Plan     Assessment: This is a 67y o -year-old male with diabetes with hyperglycemia with a PMHx of a partial pancreatectomy (2019) and subtotal pancreatectomy (2022) s/p completion pancreatectomy, SARAH, sims anastomosis, cholecystectomy, and reduction of internal hernia  Plan:     T2DM with hyperglycemia:  -Most recent A1c: 8 7 (7/5/22); 9 2 (4/25/22)  -Home regimen: Metformin 1000 mg BID, Glargine (Basaglar) 20 units around 10-11 PM, Insulin aspart w/ niacinamide (Fiasp) 6 units w/ meals, correctional scale w/ meals (180-220 1 unit; 221-260 2 units; 261-300 4 units)  -Received 6/10 units of insulin regular preoperatively, and 10 units insulin regular while in OR  -Current inpatient regimen: Correctional Humalog q6h algorithm 4  -Will recommend 15 units of Lantus at bedtime starting tonight with correctional alg 2 q6h starting midnight      CC: Diabetes Consult    History of Present Illness     HPI: Joe Roger is a 67y o  year old male with type 2 diabetes since 5484 without complication with a PMHx of atrial fibrillation, a partial pancreatectomy (2019) and subtotal pancreatectomy (2022) admitted for a completion pancreatectomy  He POD1 s/p completion pancreatectomy, SARAH, sims anastomosis, cholecystectomy, and reduction of internal hernia  Endocrinology was consulted for management of T2DM  His most recent A1c was 8 7 in July 2022  He is on oral agents and insulin at home with a regimen of metformin 1000 mg BID, glargine (Basaglar) 20 units around 10-11 PM, insulin aspart w/ niacinamide (Fiasp) 6 units w/ meals, and a correctional scale w/ meals  His T2DM is managed in outpatient endocrinology and was last seen on 7/19/2022  HPI limited due to intubation and sedation  Inpatient consult to Endocrinology  Consult performed by:  Carmen Hollis MD  Consult ordered by: Edimla Velazquez MD         Review of Systems   Unable to perform ROS: Intubated       Historical Information   Past Medical History:   Diagnosis Date    A-fib [facility])     Abdominal wall strain     last assessed: 10/21/14    Allergic rhinitis     last assessed: 05/03/16    Arthritis     Cancer ([facility] [address] )     PACNCREATIC    COVID-19 virus infection 3/3/2021    CPAP (continuous positive airway pressure) dependence     Diabetes mellitus ([facility] )     Erectile dysfunction of non-organic origin     last assessed: 03/17/14    Irregular heart beat     Pt with A fib     Lumbar strain     last assessed: 10/21/14    Multiple acquired skin tags     last assessed: 2/18/16    Palpitations     last assessed: 03/17/14    Pancreas cyst     Plantar fasciitis     Skin disorder     last assessed: 05/28/13    Sleep apnea     CPAP BROKE IN OCTOBER HAS BEEN TRYING TO GET NEW ONE BUT UNABLE AS OF YET    Thrombocytopenia ([facility] )      Past Surgical History:   Procedure Laterality Date    BOTOX INJECTION N/A 11/12/2021    Procedure: Shoaib Garces;  Surgeon: Amber Benoit MD;  Location: [address] [place] [address] MAIN OR;  Service: Urology    CATARACT EXTRACTION Bilateral     CHOLECYSTECTOMY N/A 7/26/2022    Procedure: CHOLECYSTECTOMY;  Surgeon: Yaakov Colon MD;  [facility] MAIN [facility];  Service: Surgical Oncology    COLONOSCOPY  01/17/2013    COLONOSCOPY  01/08/2018    COLONOSCOPY  04/2021    CYSTOSCOPY      INJECT WITH BOTOX    DISTAL PANCREATECTOMY N/A 2/15/2022    Procedure: PANCREATECTOMY SUB-TOTALL-OPEN;  Surgeon: Yaakov Colon MD;  [facility] MAIN [facility];  Service: Surgical Oncology    EGD  06/05/2020    EGD AND COLONOSCOPY  02/06/2014, 2/8/2017    FL GUIDED CENTRAL VENOUS ACCESS DEVICE INSERTION  4/23/2019    FLEXIBLE SIGMOIDOSCOPY  06/11/2019    FOOT SURGERY      Due to plantar fascitis    IR PORT PLACEMENT  3/30/2022    JOINT REPLACEMENT Left     LTK    LINEAR ENDOSCOPIC U/S      PANCREATECTOMY N/A 7/26/2022    Procedure: COMPLETION OF PANCREATECTOMY W/MUHAMMAD ANASTOMOSIS, EXTENSIVE LYSIS OF ADHESIONS, REDUCTION OF INTERNAL HERNIA;  Surgeon: Rolf Galvan MD;  [facility] MA[facility];  Service: Surgical Oncology    PANCREATECTOMY LAPAROSCOPIC N/A 3/12/2019    Procedure: DISTAL PANCREATECTOMY LAPAROSCOPIC/POSSIBLE OPEN;  Surgeon: Rolf Galvan MD;  [facility] MA[facility];  Service: Surgical Oncology    VT EDG US EXAM SURGICAL ALTER STOM DUODENUM/JEJUNUM N/A 1/24/2019    Procedure: LINEAR ENDOSCOPIC U/S;  Surgeon: Emmanuel Menon MD;  Location: [facility]; Service: Gastroenterology    REPLACEMENT TOTAL KNEE Left     TUNNELED VENOUS PORT PLACEMENT Left 4/23/2019    Procedure: INSERTION VENOUS PORT (PORT-A-CATH); Surgeon: Rolf Galvan MD;  [facility] MA[facility];  Service: Surgical Oncology- 11/8/21 Pt reports PAC removed     UMBILICAL HERNIA REPAIR       Social History   Social History     Substance and Sexual Activity   Alcohol Use Yes    Alcohol/week: 2 0 standard drinks    Types: 2 Cans of beer per week    Comment: couple times per week;       Social History     Substance and Sexual Activity   Drug Use Never    Comment: Denies      Social History     Tobacco Use   Smoking Status Never Smoker   Smokeless Tobacco Never Used     Family History:   Family History   Problem Relation Age of Onset    Breast cancer Mother     Cervical cancer Paternal Aunt     Liver cancer Other     No Known Problems Father        Meds/Allergies   Current Facility-Administered Medications   Medication Dose Route Frequency Provider Last Rate Last Admin    chlorhexidine (PERIDEX) 0 12 % oral rinse 15 mL  15 mL Mouth/Throat Q12H 300 Frantz Chamberlain MD   15 mL at 07/27/22 0920    dexmedeTOMIDine (Precedex) 400 mcg in sodium chloride 0 9% 100 mL  0 1-0 7 mcg/kg/hr Intravenous Titrated Gabriel Harrell PA-C 23 6 mL/hr at 07/27/22 1537 0 7 mcg/kg/hr at 07/27/22 1537    fentaNYL 1000 mcg in sodium chloride 0 9% 100mL infusion  50 mcg/hr Intravenous Continuous Pierce Trivedi MD 5 mL/hr at 07/27/22 0326 50 mcg/hr at 07/27/22 0326    fentanyl citrate (PF) 100 MCG/2ML 50 mcg  50 mcg Intravenous Q1H PRN Pierce Trivedi MD   50 mcg at 07/27/22 1215    heparin (porcine) subcutaneous injection 5,000 Units  5,000 Units Subcutaneous [facility] Anne Marie Bolden MD   5,000 Units at 07/27/22 1433    insulin glargine (LANTUS) subcutaneous injection 15 Units 0 15 mL  15 Units Subcutaneous HS Merlin Labrador, MD        [START ON 7/28/2022] insulin lispro (HumaLOG) 100 units/mL subcutaneous injection 1-5 Units  1-5 Units Subcutaneous Q6H [address] Merlin Labrador, MD        insulin lispro (HumaLOG) 100 units/mL subcutaneous injection 2-12 Units  2-12 Units Subcutaneous Q6H [address] Merlin Labrador, MD        metoprolol (LOPRESSOR) injection 5 mg  5 mg Intravenous Q6H PRN Pierce Trivedi MD        multi-electrolyte (PLASMALYTE-A/ISOLYTE-S PH 7 4) IV solution  125 mL/hr Intravenous Continuous Anne Marie Bolden  mL/hr at 07/27/22 0652 125 mL/hr at 07/27/22 0652    norepinephrine (LEVOPHED) 4 mg (STANDARD CONCENTRATION) IV in sodium chloride 0 9% 250 mL  1-30 mcg/min Intravenous Titrated Pierce Trivedi MD 15 mL/hr at 07/27/22 1437 4 mcg/min at 07/27/22 1437    pantoprazole (PROTONIX) injection 40 mg  40 mg Intravenous Q24H [address] Pierce Trivedi MD   40 mg at 07/27/22 0920    perflutren lipid microsphere (DEFINITY) injection  0 8 mL/min Intravenous Once in imaging Pierce Trivedi MD        propofol (DIPRIVAN) 1000 mg in 100 mL infusion (premix)  5-50 mcg/kg/min Intravenous Titrated Pierce Trivedi MD   Stopped at 07/27/22 1056    ropivacaine 0 1% and fentaNYL 2 mcg/mL PCEA   Epidural Continuous Nayeli Carroll MD   New Bag at 07/27/22 0322    vasopressin (PITRESSIN) 20 Units in sodium chloride 0 9 % 100 mL infusion  0 04 Units/min Intravenous Continuous Pierce Trivedi MD   Held at 07/27/22 0973     No Known Allergies    Objective   Vitals: Blood pressure 97/54, pulse 80, temperature 98 6 °F (37 °C), resp  rate 14, height 6' (1 829 m), weight 135 kg (297 lb), SpO2 99 %  Intake/Output Summary (Last 24 hours) at 7/27/2022 1546  Last data filed at 7/27/2022 1359  Gross per 24 hour   Intake 03827 49 ml   Output 4530 ml   Net 6105 49 ml     Invasive Devices  Report    Central Venous Catheter Line  Duration           Port A Cath 03/30/22 Right Chest 119 days    CVC Central Lines 07/26/22 Triple 1 day          Peripheral Intravenous Line  Duration           Peripheral IV 07/26/22 Proximal;Right;Ventral (anterior) Forearm <1 day          Epidural Line  Duration           Epidural Catheter 07/26/22 1 day          Arterial Line  Duration           Arterial Line 07/26/22 Right Radial 1 day          Drain  Duration           Closed/Suction Drain Left Abdomen Bulb 19 Fr  162 days    Urethral Catheter Latex 16 Fr  1 day    Closed/Suction Drain Right RLQ Bulb 19 Fr  <1 day    NG/OG/Enteral Tube Nasogastric Left nare <1 day          Airway  Duration           ETT  Hi-Lo; Cuffed;Oral 8 mm 1 day                Physical Exam  Constitutional:       General: He is not in acute distress  Appearance: He is obese  He is ill-appearing  HENT:      Head: Normocephalic and atraumatic  Nose:      Comments: NG tube  Eyes:      Extraocular Movements: Extraocular movements intact  Conjunctiva/sclera: Conjunctivae normal       Pupils: Pupils are equal, round, and reactive to light  Cardiovascular:      Pulses: Normal pulses  Comments: R  AV access  Pulmonary:      Breath sounds: Normal breath sounds  Comments: Intubated  Genitourinary:     Comments: Has hanna  Musculoskeletal:      Right lower leg: Edema present  Left lower leg: Edema present  Neurological:      Comments: Intubated/sedated   Psychiatric:      Comments: Intubated/sedated         The history was obtained from the review of the chart      Lab Results:       Lab Results   Component Value Date    WBC 8 03 07/27/2022    HGB 7 6 (L) 07/27/2022    HCT 27 1 (L) 07/27/2022     (H) 07/27/2022     (L) 07/27/2022     Lab Results   Component Value Date/Time    BUN 8 07/27/2022 01:56 PM    BUN 13 08/22/2017 08:35 AM     08/22/2017 08:35 AM    K 4 4 07/27/2022 01:56 PM    K 4 2 08/22/2017 08:35 AM     (H) 07/27/2022 01:56 PM     08/22/2017 08:35 AM    CO2 23 07/27/2022 01:56 PM    CO2 22 07/26/2022 07:57 PM    CREATININE 0 77 07/27/2022 01:56 PM    CREATININE 0 75 08/22/2017 08:35 AM    AST 98 (H) 07/27/2022 04:36 AM    AST 31 08/22/2017 08:35 AM    ALT 68 07/27/2022 04:36 AM    ALT 27 08/22/2017 08:35 AM    ALB 2 9 (L) 07/27/2022 04:36 AM    ALB 3 7 08/22/2017 08:35 AM     No results for input(s): CHOL, HDL, LDL, TRIG, VLDL in the last 72 hours  No results found for: Patrick Shelton  POC Glucose (mg/dl)   Date Value   07/27/2022 197 (H)   07/27/2022 206 (H)   07/27/2022 162 (H)   07/26/2022 154 (H)   07/26/2022 136   07/26/2022 153 (H)   07/26/2022 198 (H)   07/26/2022 317 (H)   02/22/2022 173 (H)   02/22/2022 132       Portions of the record may have been created with voice recognition software

## 2022-07-26 NOTE — ANESTHESIA PROCEDURE NOTES
Epidural Block    Patient location during procedure: holding area  Start time: 7/26/2022 7:50 AM  Reason for block: procedure for pain and at surgeon's request  Staffing  Anesthesiologist: Mona Kendall MD  Preanesthetic Checklist  Completed: patient identified, IV checked, site marked, risks and benefits discussed, surgical consent, monitors and equipment checked, pre-op evaluation and timeout performed  Epidural  Patient position: sitting  Prep: ChloraPrep  Patient monitoring: cardiac monitor and frequent blood pressure checks  Approach: midline  Location: thoracic  Injection technique: SHREYAS saline  Needle  Needle type: Tuohy   Needle gauge: 18 G  Catheter type: side hole  Catheter size: 20 G  Catheter at skin depth: 11 cm  Catheter securement method: stabilization device  Test dose: negative  Assessment  Number of attempts: 1negative aspiration for CSF, negative aspiration for heme and no paresthesia on injection  patient tolerated the procedure well with no immediate complications  Additional Notes  One attempt, easy placement

## 2022-07-26 NOTE — ANESTHESIA PROCEDURE NOTES
Arterial Line Insertion  Performed by: Uriel Lake CRNA  Authorized by: Angus Chavez MD   Consent: Verbal consent obtained  Risks and benefits: risks, benefits and alternatives were discussed  Consent given by: patient  Patient understanding: patient states understanding of the procedure being performed  Patient consent: the patient's understanding of the procedure matches consent given  Procedure consent: procedure consent matches procedure scheduled  Relevant documents: relevant documents present and verified  Test results: test results available and properly labeled  Site marked: the operative site was not marked  Patient identity confirmed: verbally with patient, arm band and provided demographic data  Preparation: Patient was prepped and draped in the usual sterile fashion  Indications: hemodynamic monitoring  Orientation:  Right  Location: radial artery  Sedation:  Patient sedated: yes (A-line placed after Induction, and prior to start of Surgery )    Procedure Details:  Needle gauge: 20  Seldinger technique: Seldinger technique used  Number of attempts: 1    Post-procedure:  Post-procedure: dressing applied  Waveform: good waveform  Patient tolerance: patient tolerated the procedure well with no immediate complications  Comments: A-line placed without incident, x1 attempt

## 2022-07-27 NOTE — OP NOTE
OPERATIVE REPORT  PATIENT NAME: Cheryl Dominguez    :  1950  MRN: [id number]  Pt Location: [facility] OR ROOM 05    SURGERY DATE: 2022    Surgeon(s) and Role:     * Denis Coe MD - Primary     * Cristina Tadeo MD - Assisting    Preop Diagnosis:  Overlapping malignant neoplasm of pancreas ([address] ) [C25 8]    Post-Op Diagnosis Codes:     * Overlapping malignant neoplasm of pancreas ([address] ) [C25 8]    Procedure(s) (LRB):  COMPLETION PANCREATECTOMY W/MUHAMMAD ANASTOMOSIS, EXTENSIVE LYSIS OF ADHESIONS, REDUCTION OF INTERNAL HERNIA (N/A)  CHOLECYSTECTOMY (N/A)    Specimen(s):  ID Type Source Tests Collected by Time Destination   1 : Gallbladder Tissue Gallbladder TISSUE EXAM Denis Coe MD 2022 1046    2 : Completion of pancreatectomy and contents of hernia Tissue Pancreas TISSUE EXAM Denis Coe MD 2022 1721    3 : Distal Antrum Tissue Stomach TISSUE EXAM Denis Coe MD 2022 1903        Estimated Blood Loss:   1000 mL    Drains:  Closed/Suction Drain Left Abdomen Bulb 19 Fr  (Active)   Number of days: 161       Closed/Suction Drain Right RLQ Bulb 19 Fr  (Active)   Dressing Status Clean;Dry; Intact 22   Number of days: 0       Urethral Catheter Latex 16 Fr  (Active)   Number of days: 0       Anesthesia Type:   General    Operative Indications:  Overlapping malignant neoplasm of pancreas ([facility] [address] ) [C25 8]      Operative Findings:  Internal hernia, 12" of jejunum trapped in former pancreatic bed, passing as hairpin loop behind mesenteric pedicle, non obstructing, but densely adherent to retroperitoneum and posterior SMV/portal vein    Complications:   None    Procedure and Technique:    The patient is a 48-year-old man with a past history of pancreas cancer status post distal pancreatectomy, then most recently, resection of pancreatic body  Margins were positive, with malignancy showing as diffusely scattered disease in the setting of IPMN    He therefore comes in for completion pancreatectomy  The patient was placed supine on the operating table after adequate IV access and IV sedation, central venous access, and Dockery catheterization were obtained by Anesthesiology and nursing staff respectively  The abdomen was prepped and draped in the usual fashion  An upper midline incision was created with a 10 blade scalpel and carried down through the peritoneum with electrocautery  Falciform ligament was taken between clamps and ligated with 2-0 silk ligatures  Entry to the abdomen was quite difficult given patient's prior history of laparotomy and pancreatectomy  There was a fair amount of adhesions that had to be taken down between the colon, omentum, stomach, liver, and small bowel  Adhesiolysis took at least 2 hours to complete just to be able to get down to the target area including the gallbladder, biliary tree, and pancreatic head  We perform cholecystectomy by taking the gallbladder in dome down fashion  Cystic duct was visualized and suture ligated with a 2-0 Vicryl suture  The cystic artery was also tied off with a silk suture  The duodenum and pancreatic head were then dissected of the retroperitoneum via the Kocher maneuver using the energy device to take down small lymphatic and vascular attachments    We then turned our attention to the portal triad  The common bile duct and cystic duct remnant were circumferentially dissected and looped  The common bile duct was transected  The pancreatic duct portion was oversewn with 2-0 Vicryl suture  A soft bulldog clamp was temporarily applied to the proximal portion to minimize biliary spillage into the field  The gastroduodenal artery was the identified, and suture ligated with a 3-0 prolene suture  The antrum of the stomach was then transected just distal the pylorus with a AKASH 75 blue load stapler  The stomach was retracted out of the surgical field to allow exposure of the pancreas   The portal vein was visualized at this point     The lesser sac was entered  The head of the pancreas was palpated  The previous transection plane of the pancreas visualized as evidence by the look growth clips that had been placed at the last operation to tommy the margin  We initially could not see the SMV, though we did see the portal vein  We therefore opted to mobilize the jejunum with the hopes that we would gain exposure to the SMV in the process  There was a fair amount of adhesions in this area from the prior pancreatectomy, as might be expected  We adjusted retractors to allow exposure of the ligament of Treitz and the proximal jejunum  The first portion of the jejunum was now identified at the ligament of Treitz  The bowel was divided just distal to the ligament using a blue load stapler  The mesentery of the distal portion of the duodenum was taken using the Enseal device, and the distal duodenum and proximal jejunum then was slipped underneath the mesenteric tunnel back to the right side of the abdomen  At this point, we could not mobilize the duodenum completely  It appeared to be tethered underneath the mesenteric total   We carefully attempted to dissect this out, and ultimately realized that within the pancreatic bed, a loop of this jejunum had herniated behind the mesenteric tunnel and had become adhesed into the previous pancreatic bed  There is about 12 in of bowel that was located in this area  We therefore had to mobilize this entire loop in order to resect the specimen  This adhesed loop was taken down gradually and tedious Rosita given its location in the prior pancreatectomy bed, as well as its adherence to the posterior aspect of the mesentery including the portal vein  This dissection took at least 2 hours in order to mobilize the limb to facilitate its delivery back underneath the mesenteric pedicle into the right upper quadrant with the remainder of the specimen      The pancreatic head was now dissected off of the portal vein  Venous branches were clipped or ligated as needed  The uncinate pancreas was now dissected from underneath the portal vein removing all uncinate and head of the pancreas tissues with the specimen en bloc, up to the superior mesenteric artery medially  The final retroperitoneal attachments were taken with a the EnSeal device  The specimen was sent to pathology for frozen evaluation of biliary margin  Once margins were cleared, we proceeded with reconstruction  The retroperitoneum was evaluated and found to be hemostatic  The proximal portion of the jejunum was now mobilized the mesentery so that the limb could be brought up into the hilum of the liver  A tunnel was made through the mesentery of the colon to the right of the middle mesenteric vessels  The choledochojejunostomy was performed by creating an enterotomy on the antimesenteric surface then using 4-0 PDS in interrupted fashion to complete the choledochojejunostomy  We then placed tacking sutures between the afferent limb and the opening in the mesocolon to prevent internal herniation  The jejunum was now run 50 cm from the choledochojejunostomy, was brought in antecolic fashion  up to the stomach staple line  The initial plan was to do a pylorus sparing procedure  However, this point, after all the dissection, the pylorus seems somewhat dusky  We therefore opted to resect it along with a portion of the antrum to good healthy-appearing tissue  A gastrostomy was created proximal to the staple line at the dependent most portion of the stomach  An enterostomy was made in the jejunal limb, and a stapled side-to-side gastrojejunostomy was performed  3-0 silk sutures were used to position the limbs in preparation for the stapled anastomosis  The enterostomies from the stapler were then Closed with a TA 60 stapler  The staple line was then imbricated by Zora 3-0 silk ligatures      A Howe anastomosis was then performed by taking a portion of the afferent limb and tacking it to a portion of the current limb approximately 15 cm from the gastrojejunostomy  Side-to-side anastomosis was then created by creating enterotomies and firing with a 45 mm stapler  The enterotomies were then closed with a TA stapler  The abdomen was irrigated with copious amounts of sterile saline and aspirated dry  All dissection planes were evaluated for hemostasis and found to be hemostatic  The anastomoses were all evaluated and found to be intact and with good vascularity  A 19 Burkinan round STU drain was brought through a separate stab incision in the RUQ and placed behind the biliary anastomosis to bulb suction  The retracting system was removed  The abdomen was closed in a one-layer fashion with non-looped 1-0 PDS suture  The subcutaneous tissues were irrigated with sterile saline and the skin was reapproximated with 3-0 vicryl and sterile staples  The patient was hemodynamically stable, tolerated the procedure very well, and was taken to the recovery room intubated due to the length of the operation, in stable condition      I was present for the entire procedure    Patient Disposition:  Critical Care Unit, stable, intubated      SIGNATURE: Bc Abdul MD  DATE: July 26, 2022  TIME: 8:55 PM

## 2022-07-27 NOTE — PROGRESS NOTES
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), subtotal pancreatectomy (2/15/2022), chemotherapy, NST radiation oncology who presents to ICU following completion of pancreatectomy with sims anastomosis, SARAH, reduction of internal hernia and cholecystectomy  Following his surgery in February 2022, surgical margins were found to be positive, prompting further surgical evaluation and completion of the above procedure  He arrived to the unit post operatively intubated with NGT, on levo, sandip and propofol  Intra operatively, he received 6 7 L crystalloid, 1 7 L colloid, 1u pRBC with UOP 1 4 L and EBL 1L       24hr events:   Patient intubated and sedated overnight  Continued lactic acidosis with some improvement (5 9 from 6 9 from 7 1)  received 3L Isolyte boluses, 500cc albumin and DDAVP  CMV 14/550/50/8  Fent 50, Prop 25  Levo 6, vaso 0 04  PCEA running at 1mL/hr to prevent clotting (per anesthesia)    UOP: 2140 cc (net positive 8 5 L)  STU: 1340 cc serosang    ABG: pH 7 369, pCO2 32 7, pO2 135 2, HCO3 18 4 BE -6 1   LA: 5 9 (6 9, 7 1)  WBC: 8 03 (8 48)  Hgb: 9 0 (9 9)  K: 5 1 (4 7)  BUN/ Cr: 9/0 79 (8/0 83)  T  Bili 2 01 (3 11)    ---------------------------------------------------------------------------------------  SUBJECTIVE  Intubated/ sedated    Review of Systems   Unable to perform ROS: Intubated     ---------------------------------------------------------------------------------------  Assessment and Plan:    Neuro:   · Diagnosis: No active issues  ? Analgesia: Epidural (held), fentanyl 50 gtt, fentanyl 50 mcg IV Q1h PRN  ?  Sedation: Propofol gtt - titrate to RASS -1  ? Delirium precautions: CAM ICU BID, Regulate sleep/wake cycle        CV:   · Diagnosis: Post Operative Hypotension  § Plan:  § Received 6 7 L crystalloid, 1 7 L colloid, 1u pRBC intraoperatively, EBL 1L  § Received 3L Isolyte, 500 cc albumin overnight  § Currently on levo @ 7, vaso 0 04  § Wean as able to maintain MAP>65  · Diagnosis: History of Atrial Fibrillation  · Last ECHO 2016 with EF 60%  ? Plan:   § On Atenolol and ASA at home  § Previously recommended Eliquis but patient opted not to take  § Has been rate and rythym controlled outpatient  § Lopressor 5mg IV Q6H prn for HR > 130  · Diagnosis: Lower Extremity Edema  ? Plan:   § On HCTZ and Lasix at home  § Appears Lasix was recently added to HCTZ  § Held at this time while intubated/ NPO  § Consider rechecking an ECHO given increasing edema and last echo being in 2016        Pulm:  · Diagnosis: MORALES  · Previously on CPAP at home but appears it broke and unable to get a new one   ? Plan:   § Intubated at this time   § Monitor O2 sat after extubation, wean O2 as able  § Consider initiating CPAP QHS when extubated  · Diagnosis: Intubated Post Operatively  ? Plan:   § On CMV 15/550/50/8  § Wean vent as able  § SBT daily        GI:   · Diagnosis: History of pancreatic cancer, now s/p completion of pancreatectomy with sims anastomosis, SARAH, cholecystectomy on 7/26/22  ? Plan:   § Maintain NPO with NGT at this time  § Monitor abdominal exam  § Monitor RUQ STU drain  § Post op output 1340 cc serosang  § Drain management per white surgery  · Diagnosis: History of Pancreatic Insufficiency  ? Plan:   § Takes Creon at home  § Will resume when tolerating diet  · GI PPx: Not indicated  · Bowel Regimen: To initiate with diet        :   · Diagnosis: No active issues  · Intraoperative UOP: 2 1 L  ?  Plan:   § Maintain hanna  § Monitor I/Os, renal function        F/E/N:   · F: Isolyte @ 125  · E: Replete for K>4, Mag>2, Phos>3  · Lactic Acidosis  · 5 9 (6 9, 7 1)  · Continue to trend  · N: NPO with NGT        Heme/Onc:   · Diagnosis: History of pancreatitic cancer  · Laparoscopic distal pancreatectomy (3/12/2019) with adjuvant chemotherapy (4/2019 - 7/2019)  · Subtotal pancreatectomy, SARAH (2/15/2022) with adjuvant chemotherapy (4/12/22-6/7/22)   ? 4 of 6 cycles completed, remainder held d/t scheduled surgery  · Most recently completion of pancreatectomy with sims anastomosis, SARAH, cholecystectomy (7/26/22)  ? Plan:   § Radiation Oncology/ Hematology following patient   § Heme/ Rad Onc follow up per surgical oncology  § Monitor for neutropenia  · Diagnosis: Acute Blood Loss Anemia  · Post operative Hgb 9 0 (12 2)  · Plan:  · Monitor Hgb  · Transfuse as needed to maintain Hgb >7  · Diagnosis: History of thrombocytopenia  · Pre operatively platelets: 97 (664 this AM)  ? Plan:   § Monitor CBC, platelets  § Given DDVAP post operatively        Endo:   · Diagnosis: Type 2 Diabetes Mellitus  ? Plan:   § On insulin and metformin at home  § SSI algorithm 3   § Q6H glucose checks        ID:   · Diagnosis: No active issues  ? Plan:   § Monitor WBC/ fever curve  § Monitor incisions for signs of infection        MSK/Skin:   · Diagnosis: At risk for skin breakdown  ? Plan:   § Frequent turning/ repositioning while intubated  § PT/OT when appropriate     LDA:  · ETT  · NGT  · Arterial Line  · Central Line  · RUQ STU  · Dockery  · PCEA  · 1 pIV    Disposition: Continue Critical Care   Code Status: Level 1 - Full Code  ---------------------------------------------------------------------------------------  ICU CORE MEASURES    Prophylaxis   VTE Pharmacologic Prophylaxis: Heparin  VTE Mechanical Prophylaxis: sequential compression device  Stress Ulcer Prophylaxis: Prophylaxis Not Indicated     ABCDE Protocol (if indicated)  Plan to perform spontaneous awakening trial today? Yes  Plan to perform spontaneous breathing trial today?  Yes    Invasive Devices Review  Invasive Devices  Report Central Venous Catheter Line  Duration           Port A Cath 22 Right Chest 119 days    CVC Central Lines 22 Triple <1 day          Peripheral Intravenous Line  Duration           Peripheral IV 22 Proximal;Right;Ventral (anterior) Forearm <1 day          Epidural Line  Duration           Epidural Catheter 22 <1 day          Arterial Line  Duration           Arterial Line 22 Right Radial <1 day          Drain  Duration           Closed/Suction Drain Left Abdomen Bulb 19 Fr  162 days    Closed/Suction Drain Right RLQ Bulb 19 Fr  <1 day    Urethral Catheter Latex 16 Fr  <1 day          Airway  Duration           ETT  Hi-Lo; Cuffed;Oral 8 mm <1 day              Can any invasive devices be discontinued today? Yes  ---------------------------------------------------------------------------------------  OBJECTIVE    Physical Exam  Vitals and nursing note reviewed  Constitutional:       General: He is not in acute distress  Appearance: He is obese  He is ill-appearing  Interventions: He is sedated and intubated  HENT:      Head: Normocephalic and atraumatic  Nose: Nose normal       Comments: NGT     Mouth/Throat:      Comments: ETT  Eyes:      Conjunctiva/sclera: Conjunctivae normal       Pupils: Pupils are equal, round, and reactive to light  Cardiovascular:      Rate and Rhythm: Normal rate and regular rhythm  Pulses: Normal pulses  Arteriovenous access: right arteriovenous access is present  Pulmonary:      Effort: He is intubated  Breath sounds: Normal breath sounds  Abdominal:      General: Abdomen is protuberant  Palpations: Abdomen is soft  Tenderness: There is no guarding or rebound  Comments: Midline incision dressed c/d/i  RUQ STU drain with serosanguinous output   Genitourinary:     Comments: Nahed  Musculoskeletal:      Cervical back: Neck supple  Right lower le+ Pitting Edema present        Left lower le+ Pitting Edema present  Skin:     General: Skin is warm and dry  Coloration: Skin is not jaundiced  Findings: No bruising  Neurological:      Mental Status: He is alert  Comments: Intubated/sedated   Psychiatric:      Comments: Intubated/sedated         Vitals   Vitals:    22 0545 22 0600 22 0612 22 0616   BP: 115/56 123/60  123/60   Pulse: 93 88 89 92   Resp: 15 14 14 14   Temp: 98 78 °F (37 1 °C) 98 6 °F (37 °C) 98 6 °F (37 °C) 98 6 °F (37 °C)   TempSrc: Rectal      SpO2: 98% 98% 98% 98%   Weight:       Height:         Temp (24hrs), Av 6 °F (37 °C), Min:97 9 °F (36 6 °C), Max:99 32 °F (37 4 °C)  Current: Temperature: 99 32 °F (37 4 °C)      Invasive/non-invasive ventilation settings   Respiratory  Report   Lab Data (Last 4 hours)    None         O2/Vent Data (Last 4 hours)    None                Height and Weights   Height: 6' (182 9 cm)  IBW (Ideal Body Weight): 77 6 kg  Body mass index is 37 97 kg/m²  Weight (last 2 days)     Date/Time Weight    22 0618 127 (280)            Intake and Output  I/O        0701   0700  0701   0700    I V  (mL/kg)  6700 (52 8)    Blood  350    IV Piggyback  2150    Total Intake(mL/kg)  9200 (72 4)    Urine (mL/kg/hr)  1860 (0 6)    Drains  1040    Blood  1000    Total Output  3900    Net  +5300              UOP: 89 ml/hr     Nutrition       Diet Orders   (From admission, onward)             Start     Ordered    22  Diet NPO  Diet effective now        References:    Nutrtion Support Algorithm Enteral vs  Parenteral   Question Answer Comment   Diet Type NPO    RD to adjust diet per protocol?  No        22              Laboratory and Diagnostics:  Results from last 7 days   Lab Units 22  0436 22  2252 22  1957 22  1817 22  1801 22  1659 22  1500 22  1042 22  0626 22  0925 22  1130   WBC Thousand/uL 8 03 8 48  --   --   --   --   --   --   4  74 1 96*   HEMOGLOBIN g/dL 9 0* 9 9*  --  9 4*  --   --   --   --  12 2 12 3 13 0   I STAT HEMOGLOBIN g/dl  --   --  10 2*  --  9 9* 9 5* 9 5*   < >  --   --   --    HEMATOCRIT % 27 1* 29 3*  --  27 6*  --   --   --   --  35 5* 36 5 38 2   HEMATOCRIT, ISTAT %  --   --  30*  --  29* 28* 28*   < >  --   --   --    PLATELETS Thousands/uL 107* 130*  --   --   --   --   --   --  97* 101* 72*   NEUTROS PCT % 81*  --   --   --   --   --   --   --   --  62  --    BANDS PCT %  --   --   --   --   --   --   --   --   --   --  2   MONOS PCT % 11  --   --   --   --   --   --   --   --  16*  --    MONO PCT %  --   --   --   --   --   --   --   --   --   --  13*    < > = values in this interval not displayed  Results from last 7 days   Lab Units 07/27/22  0436 07/26/22  2137 07/26/22  1957 07/26/22  1801 07/26/22  1659 07/26/22  1500 07/26/22  1308 07/26/22  1042 07/21/22  0925   SODIUM mmol/L 140 141  --   --   --   --   --   --  138   POTASSIUM mmol/L 5 1 4 7  --   --   --   --   --   --  4 7   CHLORIDE mmol/L 111* 111*  --   --   --   --   --   --  103   CO2 mmol/L 21 18*  --   --   --   --   --   --  29   CO2, I-STAT mmol/L  --   --  22 26 25 26 25   < >  --    ANION GAP mmol/L 8 12  --   --   --   --   --   --  6   BUN mg/dL 9 8  --   --   --   --   --   --  14   CREATININE mg/dL 0 79 0 83  --   --   --   --   --   --  0 80   CALCIUM mg/dL 8 3 8 9  --   --   --   --   --   --  8 8   GLUCOSE RANDOM mg/dL 206* 171*  --   --   --   --   --   --   --    ALT U/L 68 67  --   --   --   --   --   --  32   AST U/L 98* 97*  --   --   --   --   --   --  37   ALK PHOS U/L 59 69  --   --   --   --   --   --  140*   ALBUMIN g/dL 2 9* 2 4*  --   --   --   --   --   --  3 0*   TOTAL BILIRUBIN mg/dL 2 01* 3 11*  --   --   --   --   --   --  1 46*    < > = values in this interval not displayed       Results from last 7 days   Lab Units 07/27/22  0436 07/26/22  2137   MAGNESIUM mg/dL 2 0 1 2*   PHOSPHORUS mg/dL  --  3 9      Results from last 7 days   Lab Units 07/26/22 2137   INR  1 63*          Results from last 7 days   Lab Units 07/27/22  0436 07/27/22  0022 07/26/22 2137   LACTIC ACID mmol/L 5 9* 6 9* 7 1*     ABG:  Results from last 7 days   Lab Units 07/27/22 0436   PH ART  7 369   PCO2 ART mm Hg 32 7*   PO2 ART mm Hg 135 2*   HCO3 ART mmol/L 18 4*   BASE EXC ART mmol/L -6 1   ABG SOURCE  Line, Arterial     VBG:  Results from last 7 days   Lab Units 07/27/22 0436   ABG SOURCE  Line, Arterial           Micro        Imaging: I have personally reviewed pertinent reports        Active Medications  Scheduled Meds:  Current Facility-Administered Medications   Medication Dose Route Frequency Provider Last Rate    albumin human           chlorhexidine  15 mL Mouth/Throat Q12H 300 Frantz Chamberlain MD      desmopressin  40 mcg Intravenous Once Dina Clark MD      fentaNYL  50 mcg/hr Intravenous Continuous Dina Clark MD 25 mcg/hr (07/26/22 2301)    fentanyl citrate (PF)  50 mcg Intravenous Q2H PRN Dina Clark MD      heparin (porcine)  5,000 Units Subcutaneous [facility] Jesus Mcrae MD      insulin lispro  2-12 Units Subcutaneous Q6H [address] Dina Clark MD      metoprolol  5 mg Intravenous Q6H PRN Dina Clark MD      multi-electrolyte  125 mL/hr Intravenous Continuous Jesus Mcrae  mL/hr (07/27/22 0059)    norepinephrine  1-30 mcg/min Intravenous Titrated Dina Clark MD 6 mcg/min (07/27/22 0047)    propofol  5-50 mcg/kg/min Intravenous Titrated Dina Clark MD 25 mcg/kg/min (07/27/22 0139)    ropivacaine 0 1% and fentaNYL 2 mcg/mL   Epidural Continuous Bebe Brown MD      vasopressin (PITRESSIN) in 0 9 % sodium chloride 100 mL  0 04 Units/min Intravenous Continuous Dina Clark MD 0 04 Units/min (07/26/22 2342)     Continuous Infusions:  fentaNYL, 50 mcg/hr, Last Rate: 25 mcg/hr (07/26/22 2301)  multi-electrolyte, 125 mL/hr, Last Rate: 125 mL/hr (07/27/22 0059)  norepinephrine, 1-30 mcg/min, Last Rate: 6 mcg/min (07/27/22 0047)  propofol, 5-50 mcg/kg/min, Last Rate: 25 mcg/kg/min (07/27/22 0139)  ropivacaine 0 1% and fentaNYL 2 mcg/mL,   vasopressin (PITRESSIN) in 0 9 % sodium chloride 100 mL, 0 04 Units/min, Last Rate: 0 04 Units/min (07/26/22 6812)      PRN Meds:   fentanyl citrate (PF), 50 mcg, Q2H PRN  metoprolol, 5 mg, Q6H PRN        Allergies   No Known Allergies    Advance Directive and Living Will:      Power of :    POLST:      Sheila Rosales MD        Portions of the record may have been created with voice recognition software  Occasional wrong word or "sound a like" substitutions may have occurred due to the inherent limitations of voice recognition software    Read the chart carefully and recognize, using context, where substitutions have occurred

## 2022-07-27 NOTE — PROGRESS NOTES
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 now POD 1 s/p SARAH, Howe anastomosis, cholecystectomy, completion pancreatectomy, and reduction of internal jejunal hernia on 07/26  He remains intubated and sedated this morning with ongoing vasopressor requirements  A thoracic epidural was placed preoperatively and was continued in the perioperative period at 1 mL/hr continuous in order to maintain epidural catheter patency  We will resume normal epidural volumes and use upon improvement in hemodynamics and anticipated extubation  Plan:  Analgesia:  - Continue Thoracic epidural infusion of Ropivacaine 0 1% with fentanyl 2 mcg/mL at 1 mL/hr continuous  - Sedation and analgesics at the discretion of the intensive care unit team  - We will continue to follow to determine the most appropriate time for resumption of epidural infusion to facilitate pain control and liberation from ventilator  Please contact APS when planning for extubation to allow for timely epidural initiation    Bowel Regimen:  - Bowel regimen when appropriate from surgical perspective    APS will continue to follow  Please contact Acute Pain Service - [facility] via [employer] from [phone] with additional questions or concerns  See Alan or Bernardino for additional contacts and after hours information  Pain History  Unable to elicit meaningful history given patient is intubated and sedated status  24 hour history:  Arrived in the intensive care unit intubated and sedated  Lactate levels largely unchanged over the course of the morning  Continues to have ongoing vasopressor requirements    No attempted extubation    PCEA use:  N/A  Opioid requirement previous 24 hours:  Fentanyl infusion at 50 micrograms/hour with additional p r n  boluses of 50 mcg x 4    Meds/Allergies   all current active meds have been reviewed    No Known Allergies    Objective     Temp:  [98 1 °F (36 7 °C)-99 32 °F (37 4 °C)] 98 42 °F (36 9 °C)  HR:  [] 88  Resp:  [8-39] 14  BP: ()/(45-75) 81/51  Arterial Line BP: ()/(34-84) 94/53    Physical Exam  Vitals and nursing note reviewed  Constitutional:       Appearance: He is ill-appearing  He is not toxic-appearing  Comments: Sedated, arousable to voice and light touch   HENT:      Head: Normocephalic  Nose:      Comments: NG tube in place     Mouth/Throat:      Mouth: Mucous membranes are dry  Comments: ET tube in place  Cardiovascular:      Rate and Rhythm: Normal rate  Pulmonary:      Comments: Mechanically ventilated  Abdominal:      Comments: Obese, soft, drain in place, incision line with clean dry dressing, STU serosanguineous   Musculoskeletal:      Comments: Unable to assess lower extremity motor   Skin:     General: Skin is warm and dry     Neurological:      Comments: Agitated and coughing with repositioning, no meaningful interaction     Epidural:  Evaluated with the assistance of nursing staff, Site clean/dry/intact, no surrounding erythema/edema/induration, infusion functioning appropriately, midthoracic lie    Lab Results:   Results from last 7 days   Lab Units 07/27/22  0436   WBC Thousand/uL 8 03   HEMOGLOBIN g/dL 9 0*   HEMATOCRIT % 27 1*   PLATELETS Thousands/uL 107*      Results from last 7 days   Lab Units 07/27/22  0436 07/26/22  2137 07/26/22  1957   POTASSIUM mmol/L 5 1   < >  --    CHLORIDE mmol/L 111*   < >  --    CO2 mmol/L 21   < >  --    CO2, I-STAT mmol/L  --   --  22   BUN mg/dL 9   < >  --    CREATININE mg/dL 0 79   < >  --    CALCIUM mg/dL 8 3   < >  --    ALK PHOS U/L 59   < >  --    ALT U/L 68   < >  --    AST U/L 98*   < >  --    GLUCOSE, ISTAT mg/dl  --   -- 163*    < > = values in this interval not displayed  Results from last 7 days   Lab Units 07/26/22  2137   INR  1 63*       Imaging Studies: I have personally reviewed pertinent reports  EKG, Pathology, and Other Studies: I have personally reviewed pertinent reports  Please note that the APS provides consultative services regarding pain management only  With the exception of ketamine, peripheral nerve catheters, and epidural infusions (and except when indicated), final decisions regarding starting or changing doses of analgesic medications are at the discretion of the consulting service  Off hours consultation and/or medication management is generally not available      Daksha Alvarez MD  Acute Pain Service

## 2022-07-27 NOTE — OCCUPATIONAL THERAPY NOTE
Occupational Therapy cx        Patient Name: Jelena KHAN Date: 7/27/2022 07/27/22 0740   OT Last Visit   OT Visit Date 07/27/22   Note Type   Note type Evaluation   Cancel Reasons Intubated/sedated   Additional Comments OT orders received and chart reviewed  Pt intubated/sedated at this time  Will hold and address as clinical course allows         BARBARA Yan, OTR/L

## 2022-07-27 NOTE — RESPIRATORY THERAPY NOTE
RT Ventilator Management Note  Maura De Jesus 67 y o  male MRN: [id number]  Unit/Bed#: [facility] 394-45 Encounter: [id number]      Daily Screen    No data found in the last 10 encounters  Physical Exam:   Assessment Type: Assess only  General Appearance: Sedated  Respiratory Pattern: Normal  Chest Assessment: Chest expansion symmetrical  Bilateral Breath Sounds: Clear  Cough: Unable to assess  Suction: (P) ET Tube  O2 Device: (P) Vent      Resp Comments: (P) Pt doing well and resting on current vent mode, No other changes made at this time, will continue to monitor

## 2022-07-27 NOTE — QUICK NOTE
Post op check - Surgical Oncology  Jacy Pennington 67 y o  male MRN: [id number]  Unit/Bed#: [facility] 543-59 Encounter: [id number]    Assessment:  67 y o  male now Day of Surgery s/p completion pancreatectomy and reduction of internal jejunal hernia with Dr Tamanna Ruby on 7/26  He intubated and sedated and is making progress towards weaning off pressor suppor,t and his lactate level is slowly resolving towards normal  Requires continued close monitoring in the CCU  Plan:  - Diet NPO  - fentanyl gtt  - aggressive fluid resuscitation  - monitor labs, trend lactate and Hgb  - work to wean off pressors  - continue hanna catheter    Subjective/Objective     Subjective: Went to evaluate the patient after arrival to the floor  He is intubated and sedated with propofol and fentanyl gtts  Not yet responsive to stimulation  Hanna catheter draining kodak colored urine  Objective:   Vitals: Blood pressure 118/59, pulse (!) 111, temperature 97 9 °F (36 6 °C), temperature source Tympanic, resp  rate 19, height 6' (1 829 m), weight 127 kg (280 lb), SpO2 98 %  ,Body mass index is 37 97 kg/m²  I/O       07/25 0701  07/26 0700 07/26 0701  07/27 0700    I V  (mL/kg)  6700 (52 8)    Blood  350    IV Piggyback  2150    Total Intake(mL/kg)  9200 (72 4)    Urine (mL/kg/hr)  1410 (0 8)    Blood  1000    Total Output  2410    Net  +6790                Physical Exam:  Gen: NAD, Aox3, Comfortable in bed  Chest: Normal work of breathing, no respiratory distress  Abd: Soft, ND, NT, midline incision c/d/i, STU drain with SS output  Ext: No Edema  Skin: Warm, Dry, Intact    Lab, Imaging and other studies: I have personally reviewed pertinent reports      VTE Pharmacologic Prophylaxis: Heparin  VTE Mechanical Prophylaxis: sequential compression device    Lliian Merritt MD  PGY-1, General Surgery

## 2022-07-27 NOTE — RESPIRATORY THERAPY NOTE
RT Ventilator Management Note  Evie Pardo 67 y o  male MRN: [id number]  Unit/Bed#: [facility] 687-58 Encounter: [id number]      Daily Screen         7/27/2022  0818             Patient safety screen outcome[de-identified] Failed (P)     Not Ready for Weaning due to[de-identified] Underline problem not resolved (P)               Physical Exam:   Assessment Type: (P) Assess only  General Appearance: (P) Sedated  Respiratory Pattern: (P) Assisted  Chest Assessment: (P) Chest expansion symmetrical  Bilateral Breath Sounds: (P) Clear  Cough: Unable to assess  Suction: ET Tube  O2 Device: (P) Vent      Resp Comments: (P) Pt resting comfortably on current settings, Pt sats are stable, no labored breathing  NO SBT due to patient being sedated after extensive surgery  Will continue to monitor

## 2022-07-27 NOTE — H&P
Surgical Oncology Follow Up                                        [facility] SURGICAL ONCOLOGY ANA  inocenteyanick  [facility] [address]     Maura De Jesus  1950  [id number]  [facility] SURGICAL ONCOLOGY [place]  [place] [address]         Chief Complaint   Patient presents with    Follow-up         Assessment/Plan:     No problem-specific Assessment & Plan notes found for this encounter          Diagnoses and all orders for this visit:     Overlapping malignant neoplasm of pancreas ([facility] [address] )  -     Case request operating room: [address]; Standing  -     Ambulatory referral to Cardiology; Future  -     Type and screen; Future  -     Prepare Leukoreduced RBC: 2 Units, Irradiated, Leukoreduced; Future  -     Comprehensive metabolic panel; Future  -     CBC and differential; Future  -     APTT; Future  -     Protime-INR; Future  -     HEMOGLOBIN A1C W/ EAG ESTIMATION; Future  -     EKG 12 lead; Future  -     XR chest pa & lateral; Future  -     Ambulatory referral to surgical optimization; Future  -     Case request operating room: WHIPPLE PROCEDURE/[id number] PANCREATECTOMY  -     CT abdomen pelvis w contrast; Future     IPMN (intraductal papillary mucinous neoplasm)  -     Case request operating room: [address]; Standing  -     Case request operating room: WHIPPLE PROCEDURE/[id number] PANCREATECTOMY  -     CT abdomen pelvis w contrast; Future     Diabetes mellitus due to underlying condition with hyperglycemia, without long-term current use of insulin (HCC)   -     APTT; Future  -     HEMOGLOBIN A1C W/ EAG ESTIMATION;  Future     Other orders  -     Incentive spirometry; Standing  -     Insert and maintain IV line; Standing  -     Void On-Call to O R ; Standing  -     Place sequential compression device; Standing  -     ceFAZolin (ANCEF) 3,000 mg in dextrose 5 % 100 mL IVPB         Advance Care Planning/Advance Directives:  Discussed disease status, cancer treatment plans and/or cancer treatment goals with the patient           Oncology History   Overlapping malignant neoplasm of pancreas ([facility] [address] )   3/12/2019 Surgery     Distal pancreatectomy  Several foci of invasive colloid cancer  Grade 1  IPMN with high grade dysplasia at margin  T1b, NO MX Stage IA      4/11/2019 - 6/21/2019 Chemotherapy     Saw Dr Hawa Beckwith  Will be starting Folfirinox 4/24   Ended 6/21/2019 4/24/2019 - 7/2/2019 Chemotherapy     fluorouracil (ADRUCIL), 400 mg/m2 = 1,010 mg, Intravenous, Once, 2 of 2 cycles  pegfilgrastim (Alan Ashtyn), 6 mg, Subcutaneous, Once, 2 of 2 cycles  Administration: 6 mg (6/21/2019), 6 mg (6/7/2019)  irinotecan (CAMPTOSAR) chemo infusion, 180 mg/m2 = 455 mg, Intravenous, Once, 4 of 4 cycles  Dose modification: 140 mg/m2 (original dose 180 mg/m2, Cycle 3, Reason: Other (Must fill in a comment)), 125 mg/m2 (original dose 180 mg/m2, Cycle 3, Reason: Dose Not Tolerated)  Administration: 316 mg (6/5/2019), 300 mg (6/19/2019)  leucovorin calcium IVPB, 400 mg/m2 = 1,012 mg, Intravenous, Once, 2 of 2 cycles  oxaliplatin (ELOXATIN) chemo infusion, 85 mg/m2 = 215 05 mg, Intravenous, Once, 4 of 4 cycles  Dose modification: 65 mg/m2 (original dose 85 mg/m2, Cycle 3, Reason: Other (Must fill in a comment)), 60 mg/m2 (original dose 85 mg/m2, Cycle 3, Reason: Dose Not Tolerated)  Administration: 151 8 mg (6/5/2019), 151 8 mg (6/19/2019)  fluorouracil (ADRUCIL) ambulatory infusion Soln, 1,200 mg/m2/day = 6,070 mg, Intravenous, Over 46 hours, 4 of 4 cycles      2/15/2022 Surgery     Pancreas (subtotal pancreatectomy):     - 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 (largest focus 0 8 cm) arising in association with intraductal papillary mucinous neoplasm (IPMN) with high-grade dysplasia  - Surgical margin positive for colloid carcinoma and IPMN with high-grade dysplasia  - Fifteen (15) lymph nodes, negative for carcinomaRestore      2/15/2022 -  Cancer Staged     Staging form: Pancreas, AJCC 8th Edition  - Pathologic stage from 2/15/2022: Stage IA (ypT1b(m), pN0, cM0) - Signed by Demetria Estes MD on 3/8/2022  Stage prefix: Post-therapy  Total positive nodes: 0  Multiple tumors:  Yes         4/12/2022 -  Chemotherapy     pegfilgrastim (Erman See), 6 mg, Subcutaneous, Once, 4 of 6 cycles  Administration: 6 mg (4/14/2022), 6 mg (4/28/2022), 6 mg (5/26/2022), 6 mg (6/9/2022)  irinotecan (CAMPTOSAR) chemo infusion, 305 mg (69 4 % of original dose 180 mg/m2), Intravenous, Once, 4 of 6 cycles  Dose modification: 125 mg/m2 (original dose 180 mg/m2, Cycle 1, Reason: Anticipated Tolerance, Comment: Same dose received last time in 2019)  Administration: 300 mg (4/12/2022), 300 mg (4/26/2022), 300 mg (5/24/2022), 300 mg (6/7/2022)  oxaliplatin (ELOXATIN) chemo infusion, 146 4 mg (70 6 % of original dose 85 mg/m2), Intravenous, Once, 4 of 6 cycles  Dose modification: 60 mg/m2 (original dose 85 mg/m2, Cycle 1, Reason: Anticipated Tolerance, Comment: Same dose he received last time)  Administration: 150 mg (4/12/2022), 150 mg (4/26/2022), 150 mg (5/24/2022), 150 mg (6/7/2022)  fluorouracil (ADRUCIL) ambulatory infusion Soln, 1,200 mg/m2/day = 5,855 mg, Intravenous, Over 46 hours, 4 of 6 cycles      IPMN (intraductal papillary mucinous neoplasm)   2/15/2022 Surgery     Pancreas (subtotal pancreatectomy):     - 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 (largest focus 0 8 cm) arising in association with intraductal papillary mucinous neoplasm (IPMN) with high-grade dysplasia       - Surgical margin positive for colloid carcinoma and IPMN with high-grade dysplasia      - Fifteen (15) lymph nodes, negative for carcinoma            History of Present Illness:  Patient is having 2year-old man with history of prior subtotal pancreatectomy found to have multiple foci of colloid carcinoma within the setting of IPMN with high-grade dysplasia  Surgical margin was positive for colon carcinoma an IPMN with high-grade dysplasia  -Interval History:  He is here to discuss treatment options  He had sought a 2nd opinion for surgery is recommended  I presented his case at tumor board and presented different options including surgery/completion pancreatectomy versus radiation therapy given his pancreatic disease  He was considered high risk and therefore sought a 2nd opinion where additional surgery was recommended  He is presently on chemotherapy, [facility] to discuss potential radiation to the pancreatic remnant      Review of Systems:  Review of Systems   Constitutional: Negative  HENT: Negative  Eyes: Negative  Respiratory: Negative  Cardiovascular: Negative  Gastrointestinal: Negative  Endocrine: Negative  Genitourinary: Negative  Musculoskeletal: Negative  Skin: Negative  Allergic/Immunologic: Negative  Neurological: Negative  Hematological: Negative  Psychiatric/Behavioral: Negative      All other systems reviewed and are negative         Patient Active Problem List   Diagnosis    Obstructive sleep apnea syndrome    Hypersomnia    Permanent atrial fibrillation (HCC)    Morbid obesity with BMI of 40 0-44 9, adult (HCC)    Lower extremity edema    Pancreatic duct dilated    Overlapping malignant neoplasm of pancreas (HCC)    Type 2 diabetes mellitus without complication, without long-term current use of insulin (HCC)    IPMN (intraductal papillary mucinous neoplasm)    Thrombocytopenia (HCC)    Urgency incontinence    Balanitis    OAB (overactive bladder)    BPH without obstruction/lower urinary tract symptoms    Encounter for follow-up surveillance of pancreatic cancer    Encounter for geriatric assessment    Counseling regarding advanced care planning and goals of care    Port-A-Cath in place    Chemotherapy induced neutropenia [facility])      Medical History        Past Medical History:   Diagnosis Date    A-fib [facility])      Abdominal wall strain       last assessed: 10/21/14    Allergic rhinitis       last assessed: 05/03/16    Arthritis      Cancer [facility])       PACNCREATIC    COVID-19 virus infection 3/3/2021    CPAP (continuous positive airway pressure) dependence      Diabetes mellitus ([facility] [address] )      Erectile dysfunction of non-organic origin       last assessed: 03/17/14    Irregular heart beat       Pt with A fib     Lumbar strain       last assessed: 10/21/14    Multiple acquired skin tags       last assessed: 2/18/16    Palpitations       last assessed: 03/17/14    Pancreas cyst      Plantar fasciitis      Skin disorder       last assessed: 05/28/13    Sleep apnea       CPAP BROKE IN OCTOBER HAS BEEN TRYING TO GET NEW ONE BUT UNABLE AS OF YET    Thrombocytopenia ([facility] [address] )           Surgical History         Past Surgical History:   Procedure Laterality Date    BOTOX INJECTION N/A 11/12/2021     Procedure: Mikki Shane;  Surgeon: Bradly Caro MD;  Location: [facility] MAIN OR;  Service: Urology    CATARACT EXTRACTION Bilateral      COLONOSCOPY   01/17/2013    COLONOSCOPY   01/08/2018    COLONOSCOPY   04/2021    CYSTOSCOPY         INJECT WITH BOTOX    DISTAL PANCREATECTOMY N/A 2/15/2022     Procedure: PANCREATECTOMY SUB-TOTALL-OPEN;  Surgeon: Aida Briseno MD;  Location:  MAIN OR;  Service: Surgical Oncology    EGD   06/05/2020    EGD AND COLONOSCOPY   02/06/2014, 2/8/2017    FL GUIDED CENTRAL VENOUS ACCESS DEVICE INSERTION   4/23/2019    FLEXIBLE SIGMOIDOSCOPY   06/11/2019    FOOT SURGERY         Due to plantar fascitis    IR PORT PLACEMENT   3/30/2022    JOINT REPLACEMENT Left       LTK    LINEAR ENDOSCOPIC U/S        PANCREATECTOMY LAPAROSCOPIC N/A 3/12/2019     Procedure: DISTAL PANCREATECTOMY LAPAROSCOPIC/POSSIBLE OPEN;  Surgeon: Rolf Galvan MD;  [facility] MAIN [facility];  Service: Surgical Oncology    NV EDG US EXAM SURGICAL ALTER STOM DUODENUM/JEJUNUM N/A 1/24/2019     Procedure: LINEAR ENDOSCOPIC U/S;  Surgeon: Emmanuel Menon MD;  Location: [facility] GI LAB; Service: Gastroenterology    REPLACEMENT TOTAL KNEE Left      TUNNELED VENOUS PORT PLACEMENT Left 4/23/2019     Procedure: INSERTION VENOUS PORT (PORT-A-CATH); Surgeon: Rolf Galvan MD;  [facility] MAIN [facility];  Service: Surgical Oncology- 11/8/21 Pt reports PAC removed     UMBILICAL HERNIA REPAIR                   Family History   Problem Relation Age of Onset    Breast cancer Mother      Cervical cancer Paternal Aunt      Liver cancer Other      No Known Problems Father        Social History               Socioeconomic History    Marital status: /Civil Union       Spouse name: Not on file    Number of children: Not on file    Years of education: Not on file    Highest education level: Not on file   Occupational History    Not on file   Tobacco Use    Smoking status: Never Smoker    Smokeless tobacco: Never Used   Vaping Use    Vaping Use: Never used   Substance and Sexual Activity    Alcohol use: Yes       Alcohol/week: 2 0 standard drinks       Types: 2 Cans of beer per week       Comment: couple times per week;     Drug use:  No       Comment: Denies     Sexual activity: Yes       Comment: Denies any chest pain or shortness of breath with activity   Other Topics Concern    Not on file   Social History Narrative    Not on file      Social Determinants of Health          Financial Resource Strain: Not on file   Food Insecurity: No Food Insecurity    Worried About Running Out of Food in the Last Year: Never true    Tisha of Food in the Last Year: Never true   Transportation Needs: No Transportation Needs    Lack of Transportation (Medical): No    Lack of Transportation (Non-Medical): No   Physical Activity: Not on file   Stress: Not on file   Social Connections: Not on file   Intimate Partner Violence: Not on file   Housing Stability: Unknown    Unable to Pay for Housing in the Last Year: No    Number of Places Lived in the Last Year: Not on file    Unstable Housing in the Last Year: No            Current Outpatient Medications:     acetaminophen (TYLENOL) 325 mg tablet, Take 2 tablets (650 mg total) by mouth every 6 (six) hours as needed for mild pain, Disp: 30 tablet, Rfl: 0    ascorbic acid (VITAMIN C) 1000 MG tablet, Take 2,000 mg by mouth in the morning , Disp: , Rfl:     aspirin (ECOTRIN) 325 mg EC tablet, Take 325 mg by mouth in the morning , Disp: , Rfl:     atenolol (TENORMIN) 25 mg tablet, TAKE 1 TABLET BY MOUTH EVERY DAY, Disp: 90 tablet, Rfl: 3    Blood Glucose Calibration (OneTouch Verio) SOLN, Use as needed to calibrate test strips, Disp: 1 each, Rfl: 0    Blood Glucose Monitoring Suppl (OneTouch Verio) w/Device KIT, Test BG up to 1x daily as directed, Disp: 1 kit, Rfl: 0    Cholecalciferol (VITAMIN D) 2000 units CAPS, Take 1,000 Units by mouth in the morning  11/8/21 Pt takes three tabs of Vitamin D daily   , Disp: , Rfl:     Cyanocobalamin (VITAMIN B 12 PO), Take by mouth daily 11/8/21 Pt reports takes three tabs daily  , Disp: , Rfl:     [START ON 6/21/2022] fluorouracil 5,855 mg in CADD/Elastomeric Infusion Device, Infuse 5,855 mg (1,200 mg/m2/day x 2 44 m2) into a venous catheter over 46 hours for 2 days  Do not start before June 21, 2022 , Disp: 1 each, Rfl: 0    glucose blood (OneTouch Verio) test strip, Test BG up to 1x daily as directed, Disp: 100 each, Rfl: 1    Lancets (OneTouch Delica Plus [id number]) MISC, Test BG up to 1x daily as directed, Disp: 100 each, Rfl: 1    metFORMIN (GLUCOPHAGE) 1000 MG tablet, TAKE 1 TABLET BY MOUTH TWICE A DAY WITH MEALS, Disp: 180 tablet, Rfl: 1    Na Sulfate-K Sulfate-Mg Sulf (Suprep Bowel Prep Kit) 17 5-3 13-1 6 GM/177ML SOLN, Follow office instructions, Disp: 1 Bottle, Rfl: 0    pancrelipase, Lip-Prot-Amyl, (CREON) 6,000 units delayed release capsule, Take 2 pills, 3 times a day with meals, Disp: 180 capsule, Rfl: 3    tadalafil (CIALIS) 20 MG tablet, TAKE 1 TABLET BY MOUTH DAILY AS NEEDED FOR ERECTILE DYSFUNCTION, Disp: 10 tablet, Rfl: 0  No Known Allergies      Vitals:     06/15/22 1532   BP: 132/82   Pulse: 95   Resp: 18   Temp: 97 8 °F (36 6 °C)   SpO2: 98%         Physical Exam  Vitals reviewed  Constitutional:       Appearance: Normal appearance  HENT:      Head: Normocephalic and atraumatic  Right Ear: External ear normal       Left Ear: External ear normal       Nose: Nose normal    Eyes:      Extraocular Movements: Extraocular movements intact  Pupils: Pupils are equal, round, and reactive to light  Cardiovascular:      Rate and Rhythm: Normal rate and regular rhythm  Heart sounds: Normal heart sounds  Pulmonary:      Effort: Pulmonary effort is normal       Breath sounds: Normal breath sounds  Abdominal:      General: Abdomen is flat  Bowel sounds are normal  There is no distension  Palpations: Abdomen is soft  There is no mass  Tenderness: There is no abdominal tenderness  There is no guarding or rebound  Hernia: No hernia is present  Musculoskeletal:         General: No swelling, tenderness, deformity or signs of injury  Normal range of motion  Cervical back: Normal range of motion and neck supple  No rigidity or tenderness  Skin:     General: Skin is warm and dry  Coloration: Skin is not jaundiced  Neurological:      General: No focal deficit present  Mental Status: He is alert and oriented to person, place, and time  Psychiatric:         Mood and Affect: Mood normal          Behavior: Behavior normal          Thought Content:  Thought content normal          Judgment: Judgment normal              Results:  Labs:  Case Report   Surgical Pathology Report                         Case: [id number]                                    Authorizing Provider: Dieter Baez MD        Collected:           02/15/2022 1713               Ordering Location:     [address]      Received:            02/16/2022 [facility]                                      Hospital Operating Room                                                       Pathologist:           Domo Wills MD                                                                 Specimen:    Pancreas, Subtotal pancreatectomy                                                           Final Diagnosis   A  Pancreas (subtotal pancreatectomy):     - 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 (largest focus 0 8 cm) arising in association with intraductal papillary mucinous neoplasm (IPMN) with high-grade dysplasia       - Surgical margin positive for colloid carcinoma and IPMN with high-grade dysplasia      - Fifteen (15) lymph nodes, negative for carcinoma       - Therapy effect noted in background pancreas (fibrosis and inflammation)       Comment:  The tumor appears largely viable  Few collections of acellular mucin are noted  Electronically signed by Domo Wills MD on 2/23/2022 at  2:13 PM   Comments: This is an appended report  These results have been appended to a previously preliminary verified report  Preliminary Diagnosis   Intradepartmental review pending     A  Pancreas (subtotal pancreatectomy):     - Mucinous neoplasm, likely IPMN, with at least high-grade 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  Preliminary result electronically signed by Domo Wills MD on 2/21/2022 at 10:49 AM   Comments: This is an appended report  These results have been appended to a previously preliminary verified report                Imaging  No results found    I reviewed the above laboratory and imaging data  Intraoperatively     Discussion/Summary:  76-year-old man, IPMN, colloid carcinoma, field-effect throughout pancreas status post subtotal pancreatectomy  Positive margins at last operation  We discussed treatment options for the pancreatic remnant  Ideally, resection of all remaining pancreas tissue would maximize chance of cure  The alternative would be radiation therapy which would provide theoretical benefit  Surgically, he is somewhat high risk given his medical comorbidities, and would become an insulin-dependent diabetic  He would have pancreatic insufficiency as well which would have to be addressed with supplements  He is also high risk given prior history of surgery, as well as clinical evidence of pancreatitis seen intraoperatively  He is aware that resection would involve removal of all remaining pancreas tissue, and would necessitate biliary as well as enteric reconstruction, essentially 2/3 of a Whipple procedure  He is aware that this is high risk given his comorbidities, but would like optimize chance at cure  He is set for surgery at an outside institution in August, but would prefer to have surgery here if it can be done closer to home  We gave him the choice of going to either institution for his operation, but he is opting to stay here  We will therefore plan for completion pancreatectomy  Rationale for this with risks and benefits of surgery including infection, bleeding, biliary or anastomotic leak, discussed  We also discussed other risks including heart trouble, lung trouble, kidney trouble, stroke, or even death, given his comorbidities  We discussed postoperative need for cholesterol control, possible use of insulin pump, as well as need for pancreatic supplementation moving forward  All questions answered consent site of this visit  We will hold off on chemotherapy given anticipated surgical date  Will obtain preop CT for planning prior to surgery

## 2022-07-27 NOTE — PROGRESS NOTES
Progress Note - Surgical Oncology   Jenelle Hurtado 67 y o  male MRN: [id number]  Unit/Bed#: [facility] 516-01 Encounter: [id number]    Assessment:  66yoM w PMH of MD-IPMN now POD1 s/p completion pancreatectomy with sims anastomosis, extensive SARAH, reduction of internal hernia, cholecystectomy on 07/26    Has gone multiple boluses of isolyte and albumin since being of the OR systolics responsive    Vital stable, afebrile, on S CMV 14/550/8/50  Levo at 6, as high as 9, vaso on hold  Lactate 5 9 from 6 9 from 7 1  4:30 a m  ABG 7 37/32 7/135 2/18 4 base excess -6 1  WBC 8 from 8 5, hemoglobin 9 from 9 9  Creatinine 0 79    RLQ STU 1340 serosanguineous  UOP 2015  EBL 1000 in OR yesterday    Plan:  - NPO/NGT  - wean pressors as tolerated  - wean vent as tolerated  - trend lactate  - Dockery for accurate UOP  - arterial line for BP monitoring  - PCEA for pain control  - blood glucose control  - SQH  - appreciate ICU care    Subjective/Objective   Subjective:   Patient remains intubated and sedated, overnight events as listed above, response to commands but is sensitive to fentanyl  Objective:     Blood pressure 123/60, pulse 92, temperature 98 6 °F (37 °C), resp  rate 14, height 6' (1 829 m), weight 127 kg (280 lb), SpO2 98 %  ,Body mass index is 37 97 kg/m²        Intake/Output Summary (Last 24 hours) at 7/27/2022 9921  Last data filed at 7/27/2022 0554  Gross per 24 hour   Intake 74445 21 ml   Output 4355 ml   Net 8491 21 ml       Invasive Devices  Report    Central Venous Catheter Line  Duration           Port A Cath 03/30/22 Right Chest 119 days    CVC Central Lines 07/26/22 Triple <1 day          Peripheral Intravenous Line  Duration           Peripheral IV 07/26/22 Proximal;Right;Ventral (anterior) Forearm <1 day          Epidural Line  Duration           Epidural Catheter 07/26/22 <1 day          Arterial Line  Duration           Arterial Line 07/26/22 Right Radial <1 day          Drain  Duration           Closed/Suction Drain Left Abdomen Bulb 19 Fr  162 days    Closed/Suction Drain Right RLQ Bulb 19 Fr  <1 day    Urethral Catheter Latex 16 Fr  <1 day          Airway  Duration           ETT  Hi-Lo; Cuffed;Oral 8 mm <1 day                Physical Exam:  General:  Intubated and sedated  Skin: Warm, dry, anicteric  HEENT: Normocephalic, atraumatic  CV: RRR, no m/r/g, vent settings as above  Pulm: CTA b/l, no inc WOB  Abd: Soft, ND/NT, RLQ STU as above, incisions clean dry intact  MSK: Symmetric, no edema, no tenderness, no deformity    Lab, Imaging and other studies:  I have personally reviewed pertinent lab results    , CBC:   Lab Results   Component Value Date    WBC 8 03 07/27/2022    HGB 9 0 (L) 07/27/2022    HCT 27 1 (L) 07/27/2022     (H) 07/27/2022     (L) 07/27/2022    MCH 36 7 (H) 07/27/2022    MCHC 33 2 07/27/2022    RDW 17 5 (H) 07/27/2022    MPV 12 3 07/27/2022    NRBC 0 07/27/2022   , CMP:   Lab Results   Component Value Date    SODIUM 140 07/27/2022    K 5 1 07/27/2022     (H) 07/27/2022    CO2 21 07/27/2022    CO2 22 07/26/2022    BUN 9 07/27/2022    CREATININE 0 79 07/27/2022    GLUCOSE 163 (H) 07/26/2022    CALCIUM 8 3 07/27/2022    AST 98 (H) 07/27/2022    ALT 68 07/27/2022    ALKPHOS 59 07/27/2022    EGFR 89 07/27/2022     VTE Pharmacologic Prophylaxis: Sequential compression device (Venodyne)  and Heparin  VTE Mechanical Prophylaxis: sequential compression device

## 2022-07-27 NOTE — PHYSICAL THERAPY NOTE
Physical Therapy Cancellation Note       07/27/22 0750   PT Last Visit   PT Visit Date 07/27/22   Note Type   Note type Cancelled Session   Cancel Reasons Intubated/sedated   Additional Comments PT orders received, chart reviewed  Pt currently intubated and sedated  PT to continue to follow and see pt as appropriate and able       Svetlana Arciniega, PT, DPT

## 2022-07-27 NOTE — ANESTHESIA POSTPROCEDURE EVALUATION
Post-Op Assessment Note    CV Status:  Stable  Pain Score: 0    Pain management: adequate     Mental Status:  Unresponsive (sedated on propofol gtt)   Hydration Status:  Euvolemic   PONV Controlled:  Controlled   Airway Patency:  Patent      Post Op Vitals Reviewed: Yes      Staff: CRNA   Comments: Pt remains intubated, transported to ICU fully monitored on propofol, phenylephrine and norepi gtt's as documented , VSS, report to recovery RN    Post-op block assessment: no complications      No complications documented      BP   135/81   Temp   97   Pulse  130   Resp   16   SpO2   99%

## 2022-07-27 NOTE — RESPIRATORY THERAPY NOTE
RT Ventilator Management Note  Aspen Foot 67 y o  male MRN: [id number]  Unit/Bed#: [facility] 749-24 Encounter: [id number]      Daily Screen    No data found in the last 10 encounters  Physical Exam:   Assessment Type: (P) Assess only  General Appearance: (P) Sedated  Respiratory Pattern: (P) Normal  Chest Assessment: (P) Chest expansion symmetrical  Bilateral Breath Sounds: (P) Clear  Cough: (P) Unable to assess  Suction: (P) ET Tube  O2 Device: (P) Vent      Resp Comments: (P) Pt recieved from OR without complications, Placed commercial lock on tube for Pt, Pt placed on G5 vent, sedated doing well on current vent settings, No other changes made at this time, will continue to monitor

## 2022-07-27 NOTE — RESPIRATORY THERAPY NOTE
RT Protocol Note  Qing Burgess 67 y o  male MRN: [id number]  Unit/Bed#: [facility] 516-01 Encounter: [id number]    Assessment    Principal Problem:    IPMN (intraductal papillary mucinous neoplasm)      Home Pulmonary Medications:  N/A       Past Medical History:   Diagnosis Date    A-fib ([facility] [address] )     Abdominal wall strain     last assessed: 10/21/14    Allergic rhinitis     last assessed: 05/03/16    Arthritis     Cancer ([facility] )     PACNCREATIC    COVID-19 virus infection 3/3/2021    CPAP (continuous positive airway pressure) dependence     Diabetes mellitus (HCC)     Erectile dysfunction of non-organic origin     last assessed: 03/17/14    Irregular heart beat     Pt with A fib     Lumbar strain     last assessed: 10/21/14    Multiple acquired skin tags     last assessed: 2/18/16    Palpitations     last assessed: 03/17/14    Pancreas cyst     Plantar fasciitis     Skin disorder     last assessed: 05/28/13    Sleep apnea     CPAP BROKE IN OCTOBER HAS BEEN TRYING TO GET NEW ONE BUT UNABLE AS OF YET    Thrombocytopenia ([facility] )      Social History     Socioeconomic History    Marital status: /Civil Union     Spouse name: Not on file    Number of children: Not on file    Years of education: Not on file    Highest education level: Not on file   Occupational History    Not on file   Tobacco Use    Smoking status: Never Smoker    Smokeless tobacco: Never Used   Vaping Use    Vaping Use: Never used   Substance and Sexual Activity    Alcohol use:  Yes     Alcohol/week: 2 0 standard drinks     Types: 2 Cans of beer per week     Comment: couple times per week;     Drug use: Never     Comment: Denies     Sexual activity: Yes     Comment: Denies any chest pain or shortness of breath with activity   Other Topics Concern    Not on file   Social History Narrative    Not on file     Social Determinants of Health     Financial Resource Strain: Not on file   Food Insecurity: No Food Insecurity    Worried About [facility] in the Last Year: Never true    Ran Out of Food in the Last Year: Never true   Transportation Needs: No Transportation Needs    Lack of Transportation (Medical): No    Lack of Transportation (Non-Medical): No   Physical Activity: Not on file   Stress: Not on file   Social Connections: Not on file   Intimate Partner Violence: Not on file   Housing Stability: Unknown    Unable to Pay for Housing in the Last Year: No    Number of Places Lived in the Last Year: Not on file    Unstable Housing in the Last Year: No       Subjective         Objective    Physical Exam:   Assessment Type: Assess only  General Appearance: Sedated  Respiratory Pattern: Normal  Chest Assessment: Chest expansion symmetrical  Bilateral Breath Sounds: Clear  Cough: Unable to assess  Suction: ET Tube  O2 Device: Vent    Vitals:  Blood pressure 143/82, pulse 68, temperature 97 9 °F (36 6 °C), temperature source Tympanic, resp  rate 18, height 6' (1 829 m), weight 127 kg (280 lb), SpO2 98 %  Imaging and other studies: I have personally reviewed pertinent reports  O2 Device: Vent     Plan    Respiratory Plan: (P) Vent/NIV/HFNC        Resp Comments: Pt recieved from OR without complications, Placed commercial lock on tube for Pt, Pt placed on G5 vent, sedated doing well on current vent settings, No other changes made at this time, will continue to monitor

## 2022-07-27 NOTE — CONSULTS
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 (2019), subtotal pancreatectomy (2/15/2022), chemotherapy, NST radiation oncology who presents to ICU following completion of pancreatectomy with sims anastomosis, SARAH, reduction of internal hernia and cholecystectomy  Following his surgery in February 2022, surgical margins were found to be positive, prompting further surgical evaluation and completion of the above procedure  He arrived to the unit post operatively intubated with NGT, on levo, sandip and propofol  Intra operatively, he received 6 7 L crystalloid, 1 7 L colloid, 1u pRBC with UOP 1 4 L and EBL 1L        History obtained from chart review/ surgical team   -------------------------------------------------------------------------------------------------------------  Assessment and Plan:    Neuro:    Diagnosis: No active issues  o Analgesia: Epidural (held), fentanyl 25 gtt, dilaudid 0 6 mg IV Q1h PRN  o Sedation: Propofol gtt - titrate to RASS -1  o Delirium precautions: CAM ICU BID, Regulate sleep/wake cycle      CV:    Diagnosis: Post Operative Hypotension  - Plan:   Received 6 7 L crystalloid, 1 7 L colloid, 1u pRBC intraoperatively   EBL 1L   Currently on levo @ 3, sandip @ 79   Will switch sandip to vaso   Wean as able to maintain MAP>65   Diagnosis: History of Atrial Fibrillation   Last ECHO 2016 with EF 60%  o Plan:   - On Atenolol and ASA at home  - Previously recommended Eliquis but patient opted not to take  - Has been rate and rythym controlled outpatient  - Lopressor 5mg IV Q6H prn for HR > 130   Diagnosis: Lower Extremity Edema  o Plan:   - On HCTZ and Lasix at home   Appears Lasix was recently added to HCTZ  - Held at this time while intubated/ NPO  - Consider rechecking an ECHO given increasing edema and last echo being in 2016      Pulm:   Diagnosis: MORALES   Previously on CPAP at home but appears it broke and unable to get a new one   o Plan:   - Intubated at this time   - Monitor O2 sat after extubation, wean O2 as able  - Consider initiating CPAP QHS when extubated   Diagnosis: Intubated Post Operatively  o Plan:   - On CMV 15/550/50/8  - Wean vent as able  - SBT daily      GI:    Diagnosis: History of pancreatic cancer, now s/p completion of pancreatectomy with sims anastomosis, SAARH, cholecystectomy  o Plan:   - Maintain NPO with NGT at this time  - Monitor abdominal exam   Diagnosis: History of Pancreatic Insufficiency  o Plan:   - Takes Creon at home  - Will resume when tolerating diet  · GI PPx: Not indicated  · Bowel Regimen:  To initiate with diet      :    Diagnosis: No active issues   Intraoperative UOP: 1 4L  o Plan:   - Maintain hanna  - Monitor I/Os, renal function      F/E/N:    F: Isolyte @ 125   E: Replete for K>4, Mag>2, Phos>3   N: NPO with NGT      Heme/Onc:    Diagnosis: History of pancreatitic cancer   Laparoscopic distal pancreatectomy (3/12/2019) with adjuvant chemotherapy (4/2019 - 7/2019)   Subtotal pancreatectomy, SARAH (2/15/2022) with adjuvant chemotherapy (4/12/22-6/7/22)   o 4 of 6 cycles completed, remainder held d/t scheduled surgery   Most recently completion of pancreatectomy with sims anastomosis, SARAH, cholecystectomy (7/26/22)  o Plan:   - Radiation Oncology/ Hematology following patient   - Heme/ Rad Onc follow up per surgical oncology   Will f/u whether this is to be done while inpatient   Monitor for neutropenia   Diagnosis: History of thrombycytopenia   Pre operatively platelets: 97  o Plan:   - Monitor CBC, platelets  - Obtain TEG      Endo:    Diagnosis: Type 2 Diabetes Mellitus  o Plan:   - On insulin and metformin at home  - SSI algorithm 3   - Q6H glucose checks      ID:    Diagnosis: No active issues  o Plan:   - Monitor WBC/ fever curve  - Monitor incisions for signs of infection      MSK/Skin:    Diagnosis: At risk for skin breakdown  o Plan:   - Frequent turning/ repositioning while intubated  - PT/OT when appropriate       Disposition: Admit to Critical Care   Code Status: Level 1 - Full Code  --------------------------------------------------------------------------------------------------------------  Review of Systems    Review of systems was unable to be performed secondary to Intubated/sedated    Physical Exam  Vitals and nursing note reviewed  Constitutional:       Appearance: He is obese  He is ill-appearing  He is not toxic-appearing  Interventions: He is sedated and intubated  HENT:      Head: Normocephalic and atraumatic  Nose: Nose normal       Comments: NGT     Mouth/Throat:      Comments: ETT  Eyes:      Conjunctiva/sclera: Conjunctivae normal       Pupils: Pupils are equal, round, and reactive to light  Cardiovascular:      Rate and Rhythm: Normal rate and regular rhythm  Pulses: Normal pulses  Pulmonary:      Effort: He is intubated  Breath sounds: Normal breath sounds  Abdominal:      General: Abdomen is protuberant  Palpations: Abdomen is soft  Comments: Midline incision dressed, c/d/i  RUQ STU drain with serosanguinous output   Genitourinary:     Comments: Nahed  Musculoskeletal:      Cervical back: Neck supple  Right lower le+ Pitting Edema present  Left lower le+ Pitting Edema present  Skin:     General: Skin is warm and dry  Coloration: Skin is not jaundiced     Neurological:      Comments: Sedated   Psychiatric:      Comments: Sedated --------------------------------------------------------------------------------------------------------------  Vitals:   Vitals:    07/26/22 0618   BP: 143/82   Pulse: 68   Resp: 18   Temp: 97 9 °F (36 6 °C)   TempSrc: Tympanic   SpO2: 96%   Weight: 127 kg (280 lb)   Height: 6' (1 829 m)     Temp  Min: 97 9 °F (36 6 °C)  Max: 97 9 °F (36 6 °C)  IBW (Ideal Body Weight): 77 6 kg  Height: 6' (182 9 cm)  Body mass index is 37 97 kg/m²      Laboratory and Diagnostics:  Results from last 7 days   Lab Units 07/26/22  1957 07/26/22  1817 07/26/22  1801 07/26/22  1659 07/26/22  1500 07/26/22  1308 07/26/22  1042 07/26/22  0626 07/21/22  0925 07/20/22  1130   WBC Thousand/uL  --   --   --   --   --   --   --  5 20 4 74 1 96*   HEMOGLOBIN g/dL  --  9 4*  --   --   --   --   --  12 2 12 3 13 0   I STAT HEMOGLOBIN g/dl 10 2*  --  9 9* 9 5* 9 5* 8 8* 10 5*  --   --   --    HEMATOCRIT %  --  27 6*  --   --   --   --   --  35 5* 36 5 38 2   HEMATOCRIT, ISTAT % 30*  --  29* 28* 28* 26* 31*  --   --   --    PLATELETS Thousands/uL  --   --   --   --   --   --   --  97* 101* 72*   NEUTROS PCT %  --   --   --   --   --   --   --   --  62  --    BANDS PCT %  --   --   --   --   --   --   --   --   --  2   MONOS PCT %  --   --   --   --   --   --   --   --  16*  --    MONO PCT %  --   --   --   --   --   --   --   --   --  13*     Results from last 7 days   Lab Units 07/26/22 2137 07/26/22 1957 07/26/22  1801 07/26/22  1659 07/26/22  1500 07/26/22  1308 07/26/22  1042 07/21/22  0925   SODIUM mmol/L 141  --   --   --   --   --   --  138   POTASSIUM mmol/L 4 7  --   --   --   --   --   --  4 7   CHLORIDE mmol/L 111*  --   --   --   --   --   --  103   CO2 mmol/L 18*  --   --   --   --   --   --  29   CO2, I-STAT mmol/L  --  22 26 25 26 25 23  --    ANION GAP mmol/L 12  --   --   --   --   --   --  6   BUN mg/dL 8  --   --   --   --   --   --  14   CREATININE mg/dL 0 83  --   --   --   --   --   --  0 80   CALCIUM mg/dL 8 9  --   -- --   --   --   --  8 8   GLUCOSE RANDOM mg/dL 171*  --   --   --   --   --   --   --    ALT U/L 67  --   --   --   --   --   --  32   AST U/L 97*  --   --   --   --   --   --  37   ALK PHOS U/L 69  --   --   --   --   --   --  140*   ALBUMIN g/dL 2 4*  --   --   --   --   --   --  3 0*   TOTAL BILIRUBIN mg/dL 3 11*  --   --   --   --   --   --  1 46*                       ABG:    VBG:          Micro:      Imaging: I have personally reviewed pertinent reports        Historical Information   Past Medical History:   Diagnosis Date    A-fib ([facility] )     Abdominal wall strain     last assessed: 10/21/14    Allergic rhinitis     last assessed: 05/03/16    Arthritis     Cancer ([facility] )     PACNCREATIC    COVID-19 virus infection 3/3/2021    CPAP (continuous positive airway pressure) dependence     [facility]     Erectile dysfunction of non-organic origin     last assessed: 03/17/14    Irregular heart beat     Pt with A fib     Lumbar strain     last assessed: 10/21/14    Multiple acquired skin tags     last assessed: 2/18/16    Palpitations     last assessed: 03/17/14    Pancreas cyst     Plantar fasciitis     Skin disorder     last assessed: 05/28/13    Sleep apnea     CPAP BROKE IN OCTOBER HAS BEEN TRYING TO GET NEW ONE BUT UNABLE AS OF YET    Thrombocytopenia ([facility] [address] )      Past Surgical History:   Procedure Laterality Date    BOTOX INJECTION N/A 11/12/2021    Procedure: Aryan Christian;  Surgeon: Samra Warren MD;  Location: [address] [place] [address] MAIN OR;  Service: Urology    CATARACT EXTRACTION Bilateral     COLONOSCOPY  01/17/2013    COLONOSCOPY  01/08/2018    COLONOSCOPY  04/2021    CYSTOSCOPY      INJECT WITH BOTOX    DISTAL PANCREATECTOMY N/A 2/15/2022    Procedure: PANCREATECTOMY SUB-TOTALL-OPEN;  Surgeon: Dieter Baez MD;  Location:  MAIN OR;  Service: Surgical Oncology    EGD  06/05/2020    EGD AND COLONOSCOPY  02/06/2014, 2/8/2017    FL GUIDED CENTRAL VENOUS ACCESS DEVICE INSERTION 4/23/2019    FLEXIBLE SIGMOIDOSCOPY  06/11/2019    FOOT SURGERY      Due to plantar fascitis    IR PORT PLACEMENT  3/30/2022    JOINT REPLACEMENT Left     LTK    LINEAR ENDOSCOPIC U/S      PANCREATECTOMY LAPAROSCOPIC N/A 3/12/2019    Procedure: DISTAL PANCREATECTOMY LAPAROSCOPIC/POSSIBLE OPEN;  Surgeon: Rosi Peña MD;  Location: [facility] MAIN OR;  Service: Surgical Oncology    WA EDG US EXAM SURGICAL ALTER STOM DUODENUM/JEJUNUM N/A 1/24/2019    Procedure: LINEAR ENDOSCOPIC U/S;  Surgeon: Lacy Don MD;  Location: [facility] GI LAB; Service: Gastroenterology    REPLACEMENT TOTAL KNEE Left     TUNNELED VENOUS PORT PLACEMENT Left 4/23/2019    Procedure: INSERTION VENOUS PORT (PORT-A-CATH); Surgeon: Rosi Peña MD;  Location: [facility] MAIN OR;  Service: Surgical Oncology- 11/8/21 Pt reports PAC removed     UMBILICAL HERNIA REPAIR       Social History   Social History     Substance and Sexual Activity   Alcohol Use Yes    Alcohol/week: 2 0 standard drinks    Types: 2 Cans of beer per week    Comment: couple times per week; Social History     Substance and Sexual Activity   Drug Use Never    Comment: Denies      Social History     Tobacco Use   Smoking Status Never Smoker   Smokeless Tobacco Never Used     Family History:   Family History   Problem Relation Age of Onset    Breast cancer Mother     Cervical cancer Paternal Aunt     Liver cancer Other     No Known Problems Father      Family history unknown      Medications:  Current Facility-Administered Medications   Medication Dose Route Frequency    [START ON 7/27/2022] heparin (porcine) subcutaneous injection 5,000 Units  5,000 Units Subcutaneous Q8H [address]    multi-electrolyte (ISOLYTE-S PH 7 4) bolus 1,000 mL  1,000 mL Intravenous Once    multi-electrolyte (PLASMALYTE-A/ISOLYTE-S PH 7 4) IV solution  125 mL/hr Intravenous Continuous     Home medications:  Prior to Admission Medications   Prescriptions Last Dose Informant Patient Reported? Taking? Blood Glucose Calibration (OneTouch Verio) SOLN 7/25/2022 at Unknown time Self No Yes   Sig: Use as needed to calibrate test strips   Blood Glucose Monitoring Suppl (OneTouch Verio) w/Device KIT 7/25/2022 at Unknown time Self No Yes   Sig: Test BG up to 1x daily as directed   Cholecalciferol (VITAMIN D) 2000 units CAPS 7/26/2022 Self Yes Yes   Sig: Take 1,000 Units by mouth in the morning  11/8/21 Pt takes three tabs of Vitamin D daily      Cyanocobalamin (VITAMIN B 12 PO) 7/19/2022 Self Yes No   Sig: Take by mouth daily 11/8/21 Pt reports takes three tabs daily  Insulin Pen Needle (BD Pen Needle Tia U/F) 32G X 4 MM MISC 7/25/2022 at Unknown time  No Yes   Sig: Use 4 per day   Lancets (OneTouch Delica Plus [id number]) MISC 7/25/2022 at Unknown time  No Yes   Sig: Test BG up to 3x daily as directed   acetaminophen (TYLENOL) 325 mg tablet  Self No No   Sig: Take 2 tablets (650 mg total) by mouth every 6 (six) hours as needed for mild pain   ascorbic acid (VITAMIN C) 1000 MG tablet 7/19/2022 Self Yes No   Sig: Take 2,000 mg by mouth in the morning  aspirin (ECOTRIN) 325 mg EC tablet 7/19/2022 Self Yes No   Sig: Take 325 mg by mouth in the morning     atenolol (TENORMIN) 25 mg tablet 7/26/2022 at 0430 Self No Yes   Sig: TAKE 1 TABLET BY MOUTH EVERY DAY   furosemide (LASIX) 20 mg tablet 7/19/2022  No No   Sig: Take 1 tablet (20 mg total) by mouth 2 (two) times a day   hydrochlorothiazide (HYDRODIURIL) 25 mg tablet Past Month at Unknown time  No Yes   Sig: Take 1 tablet (25 mg total) by mouth daily   insulin aspart, w/niacinamide, (Fiasp FlexTouch) 100 Units/mL injection pen   No No   Sig: Take before meals according to sliding scale up to 30 units per day 150-200: 2 units 201-250: 4 units 251-300: 6 units 301-350: 8 units Over 350: 10 units   insulin aspart, w/niacinamide, (Fiasp FlexTouch) 100 Units/mL injection pen 7/25/2022 at Unknown time  No Yes   Sig: Take before meals according to sliding scale up to 30 units per day 150-200: 2 units 201-250: 4 units 251-300: 6 units 301-350: 8 units Over 350: 10 units      insulin glargine (Basaglar KwikPen) 100 units/mL injection pen 2022 at Unknown time  No Yes   Si units daily   metFORMIN (GLUCOPHAGE) 1000 MG tablet 2022 at Unknown time Self No Yes   Sig: TAKE 1 TABLET BY MOUTH TWICE A DAY WITH MEALS   pancrelipase, Lip-Prot-Amyl, (CREON) 6,000 units delayed release capsule 2022 at Unknown time Self No Yes   Sig: Take 2 pills, 3 times a day with meals      Facility-Administered Medications: None     Allergies:  No Known Allergies  ------------------------------------------------------------------------------------------------------------  Advance Directive and Living Will:      Power of :    POLST:    ------------------------------------------------------------------------------------------------------------  Anticipated Length of Stay is > 2 Nila Meredith MD        Portions of the record may have been created with voice recognition software  Occasional wrong word or "sound a like" substitutions may have occurred due to the inherent limitations of voice recognition software    Read the chart carefully and recognize, using context, where substitutions have occurred

## 2022-07-28 NOTE — QUICK NOTE
SCC -- Quick Note    Informed by nursing that NG tube now appeared coiled in posterior oropharynx  KUB shows tip of nasogastric tube still within stomach  In order to avoid disrupting the patient's recent gastrojejunostomy, the nasogastric tube was marked at the nare at its current depth (as it had been placed intraoperatively and its position confirmed by palpation), so that it could be repositioned back to its current depth  NG tube was slowly pulled back to remove the intraoral coil, and then re-advanced to its prior position         Enio Haywood MD   PGY4, General Surgery

## 2022-07-28 NOTE — PROGRESS NOTES
Daily Progress Note - Critical Care   Kristen Clement 67 y o  male MRN: [id number]  Unit/Bed#: [facility] 484-59 Encounter: [id number]        ----------------------------------------------------------------------------------------  HPI/24hr events: Patient remains intubated and sedated (dexmedetomidine 1 0mcg/kg/hr, fentanyl 50mcg/hr)  PEEP weaned from 8 0 to 6 0 this AM   Overnight, NGT noted to be coiled in posterior oropharynx- slowly pulled back and re-advanced to prior positioning  Marginal UOP overnight and given 500cc albumin with modest response  Vasopressors weaned (norepinephrine 6mcg/min--> 1mcg/min; vasopressin 0 04u/min--> off)  Improved resuscitative endpoints with lactic acid 6 1--> 2 2       RLQ STU: 450cc/8hrs and 1515cc/24hrs  NGT: 0cc/8hrs and 5cc/24hrs    ---------------------------------------------------------------------------------------  SUBJECTIVE    Review of Systems  Review of systems was unable to be performed secondary to intubated and sedated  ---------------------------------------------------------------------------------------  Assessment and Plan:    Neuro:    Diagnosis: PAD  o Plan:   - Sedation: dexmedetomidine 1 0mcg/kg/min   Wean for RASS 0 to -1  - Analgesia: fentanyl 50mcg/hr gtt, fentanyl 50mcg q1hr PRN (5 doses/24hrs)   Plan to initiate PCEA with extubation, coordinate with APS  - Delirium precautions, CAM-ICU per protocol    CV:    Diagnosis: Post-operative hypotension  o Plan:   - Resolving  - Now requiring norepinephrine 1mcg/min-- suspect this is now sedation related and should come off with extubation   Diagnosis: Permanent A-fib  o Plan:   - Outpatient: atenolol and ASA (previously recommeded [facility] but pt declined)  - Currently rate-controlled a-fib  - PRN lopressor 5mg IV q6hr for HR >130 (0 doses/24hrs)   Diagnosis: New cardiomyopathy with reduced EF  o Plan:  - Outpatient: hctz and lasix   Currently held  - Prior stress echo in 2016 with LVEF 60%  - 7/27/22 TTE: LVEF 45% with mild global hypokinesis; RV mildly dilated with mildly reduced function, bi-atrial dilation, no significant valvular disease  - May be myocardial stunning from shock state, however will required follow-up with cardiology  - With resolution of resuscitative endpoints, may need to consider gentle diuresis in next several days (holding for now)      Pulm:   Diagnosis: MORALES  o Plan:   - Outpatient: was on home CPAP however it broke a new machine not obtained  - After extubation, would continue CPAP QHS   Diagnosis: Post-operative respiratory insufficiency  o Plan:   - Current vent settings: AC/VC 14/550/40%/6 0  - Daily SAT/SBT-- hopeful for extubation today  - VAP precautions      GI:    Diagnosis: Hx pancreatic ca now POD #2 s/p completion of pancreatectomy with sims anastomosis, SARAH, cholecystectomy  o Plan:  - Continued mgmt per primary team (surg-onc)  - Maintain NPO with NGT  - Monitor abdominal exam  - Monitor RLQ drain output   450cc/8hrs and 1515cc/24hrs-- serosang   Diagnosis: Hx of pancreatic insufficiency  o Plan:   - Outpatient: creon  - Resume when tolerating diet   GI ppx: protonix 40mg q24hr IV      :    Diagnosis: No acute issues  o Plan:   - Cr 0 6 form baseline 0 7  - Maintain hanna  - Strict I/Os  - Trend BUN/Cr      F/E/N:    Plan:   o F: isolyte 125cc/hr-- would decrease rate today /consider holding given edema and reduced EF  o E: Replete for K>4 0, Mg>2 0, Phos>3 0  - Phos repleted this AM  - Calcium repleted this AM  o N: NPO with NGT  o Lactic acidosis-- clearing, now 2 2      Heme/Onc:    Diagnosis: Hx of pancreatic cancer  o Plan:   - Hx   Laparoscopic distal pancreatectomy (3/12/2019) with adjuvant chemotherapy (4/2019 - 7/2019)   Subtotal pancreatectomy, SARAH (2/15/2022) with adjuvant chemotherapy (4/12/22-6/7/22)   4 of 6 cycles completed, remainder held d/t scheduled surgery  - Now POD #2 s/p completion of pancreatectomy with sims anastomosis, ASRAH, cholecystectomy (7/26/22)  - Heme/rad onc follow-up per surgical oncology   Diagnosis: ABLA  o Plan:   - Hgb 7 1 (7 3)   Baseline 12-13  - Daily CBC  - Transfuse for goal Hgb >7 0   Diagnosis: Hx thrombocytopenia  o Plan:  - Plts 50 (107)  - Initially given DDAVP post-operatively  - Monitor daily CBC   DVT ppx: SubQ heparin and SCDs      Endo:    Diagnosis: DM2  o Plan:   - Outpatient: metformin, basaglar 20u QD, and SSI  - Endocrine consulted to assist with mgmt, appreciate recommendations  - Goal -180  - Current regimen: SSI algorithm 2 q6hr, lantus 15u QHS      ID:    Diagnosis: No acute issues  o Plan:   - Monitor WBC, fever curve    MSK/Skin:    Frequent turning, repositioning, and pressure offloading   Early mobility as able, PT/OT when appropriate      Patient appropriate for transfer out of the ICU today?: No  Disposition: Continue Critical Care   Code Status: Level 1 - Full Code  ---------------------------------------------------------------------------------------  ICU CORE MEASURES    Prophylaxis   VTE Pharmacologic Prophylaxis: Heparin  VTE Mechanical Prophylaxis: sequential compression device  Stress Ulcer Prophylaxis: Pantoprazole IV     ABCDE Protocol (if indicated)  Plan to perform spontaneous awakening trial today? Yes  Plan to perform spontaneous breathing trial today? Yes  Obvious barriers to extubation?  No  CAM-ICU: ZAHRA    Invasive Devices Review  Invasive Devices  Report    Central Venous Catheter Line  Duration           Port A Cath 03/30/22 Right Chest 120 days    CVC Central Lines 07/26/22 Triple 1 day          Peripheral Intravenous Line  Duration           Peripheral IV 07/26/22 Proximal;Right;Ventral (anterior) Forearm 1 day          Epidural Line  Duration           Epidural Catheter 07/26/22 1 day          Arterial Line  Duration           Arterial Line 07/26/22 Right Radial 1 day          Drain  Duration           Closed/Suction Drain Left Abdomen Bulb 19 Fr  163 days Closed/Suction Drain Right RLQ Bulb 19 Fr  1 day    NG/OG/Enteral Tube Nasogastric Left nare 1 day    Urethral Catheter Latex 16 Fr  1 day          Airway  Duration           ETT  Hi-Lo; Cuffed;Oral 8 mm 1 day              Can any invasive devices be discontinued today? If pressors weaned off, can consider D/c a-line  ---------------------------------------------------------------------------------------  OBJECTIVE    Vitals   Vitals:    22 0415 22 0430 22 0521 22 0533   BP:       Pulse: 73 74 73    Resp: 14 14 22    Temp: 98 6 °F (37 °C) 98 6 °F (37 °C) 98 24 °F (36 8 °C)    TempSrc:       SpO2:   96%    Weight:    (!) 139 kg (306 lb 14 1 oz)   Height:         Temp (24hrs), Av 9 °F (37 2 °C), Min:98 06 °F (36 7 °C), Max:99 32 °F (37 4 °C)  Current: Temperature: 98 24 °F (36 8 °C)    Respiratory:  SpO2: SpO2: 99 %, SpO2 Activity: SpO2 Activity: At Rest, SpO2 Device: O2 Device: Ventilator       Invasive/non-invasive ventilation settings   Respiratory  Report   Lab Data (Last 4 hours)    None         O2/Vent Data (Last 4 hours)    None                Physical Exam  Vitals reviewed  Constitutional:       General: He is not in acute distress  Appearance: He is obese  He is not diaphoretic  HENT:      Head: Normocephalic and atraumatic  Mouth/Throat:      Comments: ETT present  Eyes:      Pupils: Pupils are equal, round, and reactive to light  Cardiovascular:      Rate and Rhythm: Normal rate  Rhythm irregular  Heart sounds: Normal heart sounds  No murmur heard  No friction rub  Pulmonary:      Breath sounds: Normal breath sounds  No wheezing or rales  Comments: Intubated and mechanically ventilated  Abdominal:      General: Bowel sounds are normal  There is no distension  Palpations: Abdomen is soft  Tenderness: There is no abdominal tenderness  There is no guarding  Comments: RLQ STU drain with serous/sero-sang output   Incision with dressing C/D/I Musculoskeletal:      Cervical back: Neck supple  Right lower leg: Edema (1-2+) present  Left lower leg: Edema (1-2+) present  Comments: BL UE edema   Skin:     General: Skin is warm and dry  Capillary Refill: Capillary refill takes less than 2 seconds  Neurological:      Comments: Intubated and sedated (re-directable, but agitated on arousal per RN)             Laboratory and Diagnostics:  Results from last 7 days   Lab Units 07/27/22 2033 07/27/22 1356 07/27/22 0436 07/26/22 2252 07/26/22 1957 07/26/22 1817 07/26/22 1801 07/26/22  1659 07/26/22  1042 07/26/22  0626 07/21/22  0925   WBC Thousand/uL  --   --  8 03 8 48  --   --   --   --   --  5 20 4 74   HEMOGLOBIN g/dL 7 3* 7 6* 9 0* 9 9*  --  9 4*  --   --   --  12 2 12 3   I STAT HEMOGLOBIN g/dl  --   --   --   --  10 2*  --  9 9* 9 5*   < >  --   --    HEMATOCRIT % 22 3*  --  27 1* 29 3*  --  27 6*  --   --   --  35 5* 36 5   HEMATOCRIT, ISTAT %  --   --   --   --  30*  --  29* 28*   < >  --   --    PLATELETS Thousands/uL  --   --  107* 130*  --   --   --   --   --  97* 101*   NEUTROS PCT %  --   --  81*  --   --   --   --   --   --   --  62   MONOS PCT %  --   --  11  --   --   --   --   --   --   --  16*    < > = values in this interval not displayed       Results from last 7 days   Lab Units 07/27/22 1356 07/27/22 0436 07/26/22 2137 07/26/22 1957 07/26/22 1801 07/26/22 1659 07/26/22  1500 07/26/22  1042 07/21/22  0925   SODIUM mmol/L 140 140 141  --   --   --   --   --  138   POTASSIUM mmol/L 4 4 5 1 4 7  --   --   --   --   --  4 7   CHLORIDE mmol/L 110* 111* 111*  --   --   --   --   --  103   CO2 mmol/L 23 21 18*  --   --   --   --   --  29   CO2, I-STAT mmol/L  --   --   --  22 26 25 26   < >  --    ANION GAP mmol/L 7 8 12  --   --   --   --   --  6   BUN mg/dL 8 9 8  --   --   --   --   --  14   CREATININE mg/dL 0 77 0 79 0 83  --   --   --   --   --  0 80   CALCIUM mg/dL 7 7* 8 3 8 9  --   --   --   --   --  8 8 GLUCOSE RANDOM mg/dL 200* 206* 171*  --   --   --   --   --   --    ALT U/L  --  68 67  --   --   --   --   --  32   AST U/L  --  98* 97*  --   --   --   --   --  37   ALK PHOS U/L  --  59 69  --   --   --   --   --  140*   ALBUMIN g/dL  --  2 9* 2 4*  --   --   --   --   --  3 0*   TOTAL BILIRUBIN mg/dL  --  2 01* 3 11*  --   --   --   --   --  1 46*    < > = values in this interval not displayed  Results from last 7 days   Lab Units 07/27/22  0436 07/26/22 2137   MAGNESIUM mg/dL 2 0 1 2*   PHOSPHORUS mg/dL  --  3 9      Results from last 7 days   Lab Units 07/26/22 2137   INR  1 63*          Results from last 7 days   Lab Units 07/28/22  0057 07/27/22  2030 07/27/22  1746 07/27/22  1356 07/27/22  1055 07/27/22  0730 07/27/22  0436   LACTIC ACID mmol/L 2 4* 2 6* 3 0* 3 9* 5 3* 6 1* 5 9*     ABG:  Results from last 7 days   Lab Units 07/27/22 2033   PH ART  7 428   PCO2 ART mm Hg 34 7*   PO2 ART mm Hg 164 9*   HCO3 ART mmol/L 22 4   BASE EXC ART mmol/L -1 5   ABG SOURCE  Line, Arterial     VBG:  Results from last 7 days   Lab Units 07/27/22 2033   ABG SOURCE  Line, Arterial           Micro          Intake and Output  I/O       07/26 0701 07/27 0700 07/27 0701 07/28 0700    P  O   0    I V  (mL/kg) 9896 2 (77 9) 5003 3 (36)    Blood 350     NG/GT  0    IV Piggyback 2750 500    Total Intake(mL/kg) 91240 2 (102 3) 5503 3 (39 6)    Urine (mL/kg/hr) 2140 (0 7) 1170 (0 4)    Emesis/NG output  5    Drains 1340 1515    Blood 1000     Total Output 4480 2690    Net +8516 2 +2813 3              UOP: 52 ml/hr (0 4cc/kg/hr)    Height and Weights   Height: 6' (182 9 cm)  IBW (Ideal Body Weight): 77 6 kg  Body mass index is 41 62 kg/m²    Weight (last 2 days)     Date/Time Weight    07/28/22 0533 139 (306 88)    07/27/22 1400 135 (297)    07/27/22 0726 135 (297 84)    07/26/22 0618 127 (280)            Nutrition       Diet Orders   (From admission, onward)             Start     Ordered    07/26/22 2040  Diet NPO  Diet effective now        References:    Nutrtion Support Algorithm Enteral vs  Parenteral   Question Answer Comment   Diet Type NPO    RD to adjust diet per protocol?  No        07/26/22 2041                Active Medications  Scheduled Meds:  Current Facility-Administered Medications   Medication Dose Route Frequency Provider Last Rate    albumin human           chlorhexidine  15 mL Mouth/Throat Q12H 300 Frantz Chamberlain MD      dexmedetomidine  0 1-1 mcg/kg/hr Intravenous Titrated Selina Olivarez MD 0 9 mcg/kg/hr (07/28/22 0521)    fentaNYL  50 mcg/hr Intravenous Continuous Dina Clark MD 50 mcg/hr (07/27/22 1741)    fentanyl citrate (PF)  50 mcg Intravenous Q1H PRN Dina Clark MD      heparin (porcine)  5,000 Units Subcutaneous [facility] Jesus Mcrae MD      insulin glargine  15 Units Subcutaneous HS Nurys Gusman MD      insulin lispro  1-5 Units Subcutaneous Q6H [address] Nurys Gusman MD      metoprolol  5 mg Intravenous Q6H PRN Dina Clark MD      multi-electrolyte  125 mL/hr Intravenous Continuous Jesus Mcrae  mL/hr (07/27/22 2050)    norepinephrine  1-30 mcg/min Intravenous Titrated Dina Clark MD 1 mcg/min (07/28/22 0521)    pantoprazole  40 mg Intravenous Q24H 300 Frantz Chamberlain MD      perflutren lipid microsphere  0 8 mL/min Intravenous Once in imaging Dina Clark MD      ropivacaine 0 1% and fentaNYL 2 mcg/mL   Epidural Continuous Bebe Brown MD      vasopressin (PITRESSIN) in 0 9 % sodium chloride 100 mL  0 04 Units/min Intravenous Continuous Dina Clark MD Stopped (07/27/22 6867)     Continuous Infusions:  dexmedetomidine, 0 1-1 mcg/kg/hr, Last Rate: 0 9 mcg/kg/hr (07/28/22 0521)  fentaNYL, 50 mcg/hr, Last Rate: 50 mcg/hr (07/27/22 1741)  multi-electrolyte, 125 mL/hr, Last Rate: 125 mL/hr (07/27/22 2050)  norepinephrine, 1-30 mcg/min, Last Rate: 1 mcg/min (07/28/22 0521)  ropivacaine 0 1% and fentaNYL 2 mcg/mL, vasopressin (PITRESSIN) in 0 9 % sodium chloride 100 mL, 0 04 Units/min, Last Rate: Stopped (07/27/22 0616)      PRN Meds:   fentanyl citrate (PF), 50 mcg, Q1H PRN  metoprolol, 5 mg, Q6H PRN  perflutren lipid microsphere, 0 8 mL/min, Once in imaging        Allergies   No Known Allergies  ---------------------------------------------------------------------------------------  Advance Directive and Living Will:      Power of :    POLST:    ---------------------------------------------------------------------------------------  Care Time Delivered:   Upon my evaluation, this patient had a high probability of imminent or life-threatening deterioration due to post-op respiratory insufficiency requriring mechanical ventilation, hypotension requiring vasopressors, which required my direct attention, intervention, and personal management  I have personally provided 22 minutes (06:15 to 06:40) of critical care time, exclusive of procedures, teaching, family meetings, and any prior time recorded by providers other than myself  Kelli Lau PA-C      Portions of the record may have been created with voice recognition software  Occasional wrong word or "sound a like" substitutions may have occurred due to the inherent limitations of voice recognition software    Read the chart carefully and recognize, using context, where substitutions have occurred

## 2022-07-28 NOTE — RESPIRATORY THERAPY NOTE
RT Ventilator Management Note  Jacy Pennington 67 y o  male MRN: [id number]  Unit/Bed#: [facility] 013-65 Encounter: [id number]      Daily Screen         7/27/2022  1412 7/28/2022  0725          Patient safety screen outcome[de-identified] Failed Failed (P)       Not Ready for Weaning due to[de-identified] Underline problem not resolved Underline problem not resolved (P)                 Physical Exam:   Assessment Type: (P) Assess only  General Appearance: (P) Sedated  Respiratory Pattern: (P) Assisted  Chest Assessment: (P) Chest expansion symmetrical  Bilateral Breath Sounds: (P) Clear      Resp Comments: (P) pt atttempted on spontaneous mode, pt still sedated palced back on cmv will attempt spontaneous mode once less sedated  will continue to monitor

## 2022-07-28 NOTE — MALNUTRITION/BMI
This medical record reflects one or more clinical indicators suggestive of morbid obesity  Malnutrition Findings:           360 Statement: BMI 41 62  Treat with: EN vs PO diet    BMI Findings:  Adult BMI Classifications: Morbid Obesity 40-44 9        Body mass index is 41 62 kg/m²  See Nutrition note dated 7/28 for additional details  Completed nutrition assessment is viewable in the nutrition documentation

## 2022-07-28 NOTE — PROGRESS NOTES
Epidural Follow-up Note - Acute Pain Service    Qing Burgess 67 y o  male MRN: [id number]  Unit/Bed#: [facility] 516-01 Encounter: [id number]      Assessment:   Principal Problem:    IPMN (intraductal papillary mucinous neoplasm)  Active Problems:    Permanent atrial fibrillation ([facility] [address] )    Lower extremity edema    Overlapping malignant neoplasm of pancreas ([facility] [address] )    Type 2 diabetes mellitus with hyperglycemia, with long-term current use of insulin (HCC)    Thrombocytopenia ([facility] [address] )      Qing Burgess is a 67y o  year old male POD 2  s/p SARAH, cholecystectomy, completion pancreatectomy, and reduction of internal jejunal hernia  Pt with T epidural for postop pain control  Epidural currently running at 1 cc/hr to maintain its patency  This afternoon, he was successfully extubated  Pressors weaned,  lactate levels trending down, sedation weaned off  Overnight, readjustment of his NGT by surgical services  Pt seen in his room  He is awake, alert, and whispers to try to talk  It is noted that the NGT is coiled in his oropharynx (wrapped around most of his tongue)  Pt admits this is the focus of his pain and discomfort  He denies pain along the surgical incision site  His BP are soft (tasha and BP cuff correlate well = 35Y systolic, MAPS 93H)  Plan:  - given patient denies pain at this time around surgical incision site, and BPs are soft, spoke to critical care team to continue epidural at 1 cc/hr  Should patient start to complain of pain at surgical incision area and BPs are more higher secondary to pain, will titrate epidural rate up    - order dilaudid 0 4 mg IV Q 1 hrs PRN breakthrough pain  Bowel Regimen:  - Bowel regimen when appropriate from surgical perspective    APS will continue to follow  Please contact Acute Pain Service - [facility] via [email or web address] from [phone] with additional questions or concerns  See [employer] or [place] for additional contacts and after hours information      Pain History  Current pain location(s): back of throat (extubated, but NGT is bothering him)  Pain Scale:   Minimal pain from surgical incision site  24 hour history: extubated early afternoon  Resting comfortably now, reclined up in bed       PCEA use: none  Opioid requirement previous 24 hours: fentanyl gtt for sedation    Meds/Allergies   all current active meds have been reviewed, current meds:   Current Facility-Administered Medications   Medication Dose Route Frequency    albumin human (FLEXBUMIN) 5 % injection **ADS Override Pull**        calcium gluconate 2 g in sodium chloride 0 9% 100 mL (premix)  2 g Intravenous Once    chlorhexidine (PERIDEX) 0 12 % oral rinse 15 mL  15 mL Mouth/Throat Q12H [facility]    dexmedeTOMIDine (Precedex) 400 mcg in sodium chloride 0 9% 100 mL  0 1-1 mcg/kg/hr Intravenous Titrated    fentaNYL 1000 mcg in sodium chloride 0 9% 100mL infusion  50 mcg/hr Intravenous Continuous    fentanyl citrate (PF) 100 MCG/2ML 50 mcg  50 mcg Intravenous Q1H PRN    heparin (porcine) subcutaneous injection 5,000 Units  5,000 Units Subcutaneous Q8H [address]    insulin glargine (LANTUS) subcutaneous injection 15 Units 0 15 mL  15 Units Subcutaneous HS    insulin lispro (HumaLOG) 100 units/mL subcutaneous injection 1-5 Units  1-5 Units Subcutaneous Q6H [address]    metoprolol (LOPRESSOR) injection 5 mg  5 mg Intravenous Q6H PRN    multi-electrolyte (PLASMALYTE-A/ISOLYTE-S PH 7 4) IV solution  125 mL/hr Intravenous Continuous    norepinephrine (LEVOPHED) 4 mg (STANDARD CONCENTRATION) IV in sodium chloride 0 9% 250 mL  1-30 mcg/min Intravenous Titrated    pantoprazole (PROTONIX) injection 40 mg  40 mg Intravenous Q24H [facility]    perflutren lipid microsphere (DEFINITY) injection  0 8 mL/min Intravenous Once in imaging    potassium phosphates 21 mmol in sodium chloride 0 9 % 250 mL infusion  21 mmol Intravenous Once    ropivacaine 0 1% and fentaNYL 2 mcg/mL PCEA   Epidural Continuous    and PTA meds:   Prior to Admission Medications Prescriptions Last Dose Informant Patient Reported? Taking? Blood Glucose Calibration (OneTouch Verio) SOLN 7/25/2022 at Unknown time Self No Yes   Sig: Use as needed to calibrate test strips   Blood Glucose Monitoring Suppl (OneTouch Verio) w/Device KIT 7/25/2022 at Unknown time Self No Yes   Sig: Test BG up to 1x daily as directed   Cholecalciferol (VITAMIN D) 2000 units CAPS 7/26/2022 Self Yes Yes   Sig: Take 1,000 Units by mouth in the morning  11/8/21 Pt takes three tabs of Vitamin D daily      Cyanocobalamin (VITAMIN B 12 PO) 7/19/2022 Self Yes No   Sig: Take by mouth daily 11/8/21 Pt reports takes three tabs daily  Insulin Pen Needle (BD Pen Needle Tia U/F) 32G X 4 MM MISC 7/25/2022 at Unknown time  No Yes   Sig: Use 4 per day   Lancets (OneTouch Delica Plus [id number]) MISC 7/25/2022 at Unknown time  No Yes   Sig: Test BG up to 3x daily as directed   acetaminophen (TYLENOL) 325 mg tablet  Self No No   Sig: Take 2 tablets (650 mg total) by mouth every 6 (six) hours as needed for mild pain   ascorbic acid (VITAMIN C) 1000 MG tablet 7/19/2022 Self Yes No   Sig: Take 2,000 mg by mouth in the morning  aspirin (ECOTRIN) 325 mg EC tablet 7/19/2022 Self Yes No   Sig: Take 325 mg by mouth in the morning     atenolol (TENORMIN) 25 mg tablet 7/26/2022 at 0430 Self No Yes   Sig: TAKE 1 TABLET BY MOUTH EVERY DAY   furosemide (LASIX) 20 mg tablet 7/19/2022  No No   Sig: Take 1 tablet (20 mg total) by mouth 2 (two) times a day   hydrochlorothiazide (HYDRODIURIL) 25 mg tablet Past Month at Unknown time  No Yes   Sig: Take 1 tablet (25 mg total) by mouth daily   insulin aspart, w/niacinamide, (Fiasp FlexTouch) 100 Units/mL injection pen   No No   Sig: Take before meals according to sliding scale up to 30 units per day 150-200: 2 units 201-250: 4 units 251-300: 6 units 301-350: 8 units Over 350: 10 units   insulin aspart, w/niacinamide, (Fiasp FlexTouch) 100 Units/mL injection pen 7/25/2022 at Unknown time  No Yes Sig: Take before meals according to sliding scale up to 30 units per day 150-200: 2 units 201-250: 4 units 251-300: 6 units 301-350: 8 units Over 350: 10 units      insulin glargine (Basaglar KwikPen) 100 units/mL injection pen 2022 at Unknown time  No Yes   Si units daily   metFORMIN (GLUCOPHAGE) 1000 MG tablet 2022 at Unknown time Self No Yes   Sig: TAKE 1 TABLET BY MOUTH TWICE A DAY WITH MEALS   pancrelipase, Lip-Prot-Amyl, (CREON) 6,000 units delayed release capsule 2022 at Unknown time Self No Yes   Sig: Take 2 pills, 3 times a day with meals      Facility-Administered Medications: None       No Known Allergies    Objective     Temp:  [98 06 °F (36 7 °C)-99 32 °F (37 4 °C)] 98 24 °F (36 8 °C)  HR:  [] 75  Resp:  [3-32] 14  BP: ()/(43-59) 116/58  Arterial Line BP: ()/(42-72) 100/50  FiO2 (%):  [50] 50    Physical Exam  Vitals reviewed  HENT:      Head: Normocephalic and atraumatic  Nose:      Comments: NGT in situ     Mouth/Throat:      Mouth: Mucous membranes are dry  Comments: ETT in place, anticipating extubation soon  Eyes:      Extraocular Movements: Extraocular movements intact  Pupils: Pupils are equal, round, and reactive to light  Cardiovascular:      Rate and Rhythm: Normal rate  Pulses: Normal pulses  Pulmonary:      Effort: Pulmonary effort is normal       Comments: Breathing well, on NC O2 after extubation    Abdominal:      Comments: Denies pain at surgical incision at this time   Musculoskeletal:         General: Swelling present  Cervical back: Normal range of motion  Skin:     General: Skin is warm  Neurological:      General: No focal deficit present  Mental Status: He is alert and oriented to person, place, and time  Mental status is at baseline  Psychiatric:         Mood and Affect: Mood normal          Behavior: Behavior normal          Thought Content:  Thought content normal          Judgment: Judgment normal  Epidural: Site clean/dry/intact, no surrounding erythema/edema/induration, infusion functioning appropriately    Lab Results:   Results from last 7 days   Lab Units 07/27/22 2033 07/27/22 1356 07/27/22 0436   WBC Thousand/uL  --   --  8 03   HEMOGLOBIN g/dL 7 3*   < > 9 0*   HEMATOCRIT % 22 3*  --  27 1*   PLATELETS Thousands/uL  --   --  107*    < > = values in this interval not displayed  Results from last 7 days   Lab Units 07/28/22 0525 07/27/22  1356 07/27/22  0436 07/26/22 2137 07/26/22 1957   POTASSIUM mmol/L 4 0   < > 5 1   < >  --    CHLORIDE mmol/L 112*   < > 111*   < >  --    CO2 mmol/L 26   < > 21   < >  --    CO2, I-STAT mmol/L  --   --   --   --  22   BUN mg/dL 12   < > 9   < >  --    CREATININE mg/dL 0 61   < > 0 79   < >  --    CALCIUM mg/dL 7 7*   < > 8 3   < >  --    ALK PHOS U/L  --   --  59   < >  --    ALT U/L  --   --  68   < >  --    AST U/L  --   --  98*   < >  --    GLUCOSE, ISTAT mg/dl  --   --   --   --  163*    < > = values in this interval not displayed  Results from last 7 days   Lab Units 07/26/22 2137   INR  1 63*       Imaging Studies: I have personally reviewed pertinent reports  EKG, Pathology, and Other Studies: I have personally reviewed pertinent reports  Counseling / Coordination of Care  Total floor / unit time spent today 20 minutes  Greater than 50% of total time was spent with the patient and / or family counseling and / or coordination of care  A description of the counseling / coordination of care: spoke with patient and wife regarding plan for epidural use after extubation  Notified critical care and surg onc regarding plan  Please note that the APS provides consultative services regarding pain management only    With the exception of ketamine, peripheral nerve catheters, and epidural infusions (and except when indicated), final decisions regarding starting or changing doses of analgesic medications are at the discretion of the consulting service  Off hours consultation and/or medication management is generally not available      Shaun De León MD  Acute Pain Service

## 2022-07-28 NOTE — PROGRESS NOTES
Progress Note - Surgical Oncology   Jenelle Hurtado 67 y o  male MRN: [id number]  Unit/Bed#: [facility] 516-01 Encounter: [id number]    Assessment:  66yoM w PMH of MD-IPMN now POD2 s/p completion pancreatectomy with sims anastomosis, extensive SARAH, reduction of internal hernia, cholecystectomy on 07/26     Vital stable, afebrile, MAPs high 80s while in room, on S CMV 14/550/6/50  On dex/fent  Levo at 1  Lactate 2 2 from 2 4 from 2 6  CBC pending  Creatinine 0 61     RLQ STU 1515 serosanguineous  UOP 1170  NGT 5    Plan:  - NPO/NGT  - wean pressors as tolerated  - wean vent as tolerated  - trend lactate  - Dockery for accurate UOP  - arterial line for BP monitoring  - PCEA for pain control, APS on board  - blood glucose control, endocrinology on board  - SQH  - appreciate ICU care    Subjective/Objective   Subjective:   No acute events, resuscitation per ICU, remains intubated and sedated    Objective:     Blood pressure 108/55, pulse 73, temperature 98 24 °F (36 8 °C), resp  rate 22, height 6' (1 829 m), weight (!) 139 kg (306 lb 14 1 oz), SpO2 96 %  ,Body mass index is 41 62 kg/m²        Intake/Output Summary (Last 24 hours) at 7/28/2022 7879  Last data filed at 7/28/2022 0431  Gross per 24 hour   Intake 5653 31 ml   Output 2815 ml   Net 2838 31 ml       Invasive Devices  Report    Central Venous Catheter Line  Duration           Port A Cath 03/30/22 Right Chest 120 days    CVC Central Lines 07/26/22 Triple 1 day          Peripheral Intravenous Line  Duration           Peripheral IV 07/26/22 Proximal;Right;Ventral (anterior) Forearm 1 day          Epidural Line  Duration           Epidural Catheter 07/26/22 1 day          Arterial Line  Duration           Arterial Line 07/26/22 Right Radial 1 day          Drain  Duration           Closed/Suction Drain Left Abdomen Bulb 19 Fr  163 days    Closed/Suction Drain Right RLQ Bulb 19 Fr  1 day    NG/OG/Enteral Tube Nasogastric Left nare 1 day    Urethral Catheter Latex 16 Fr  1 day Airway  Duration           ETT  Hi-Lo; Cuffed;Oral 8 mm 1 day                Physical Exam:   General: intubated, sedated  Skin: Warm, dry, anicteric  HEENT: Normocephalic, atraumatic  CV: RRR, no m/r/g  Pulm: CTA b/l, no inc WOB, vent as above  Abd: Soft, ND/NT, RLQ STU serosang, incisions c/d/i  MSK: Symmetric, no edema, no tenderness, no deformit    Lab, Imaging and other studies:  I have personally reviewed pertinent lab results    , CBC:   Lab Results   Component Value Date    HGB 7 3 (L) 07/27/2022    HCT 22 3 (L) 07/27/2022   , CMP:   Lab Results   Component Value Date    SODIUM 142 07/28/2022    K 4 0 07/28/2022     (H) 07/28/2022    CO2 26 07/28/2022    BUN 12 07/28/2022    CREATININE 0 61 07/28/2022    CALCIUM 7 7 (L) 07/28/2022    EGFR 99 07/28/2022     VTE Pharmacologic Prophylaxis: Sequential compression device (Venodyne)  and Heparin  VTE Mechanical Prophylaxis: sequential compression device

## 2022-07-28 NOTE — NUTRITION
07/28/22 1242   Recommendations/Interventions   Interventions/Recommendations Initiate EN;Monitor I & O's;Tube feeding recs provided   Intervention Comments if medically appropriate   Recommendations to Provider 1  If pt to remain intubated, recommend starting EN if cleared by surgery  Recommend Vital High Protein at 75 mL/hr  Start at 10 mL and advance by 10 mL q 4 hrs to goal rate of 75 mL/hr  At goal provides: 1800 mL TV, 1800 kcal, 157g protein, 41g fat, 200g CHO,  1505 mL free water  2  pt may require PERT with EN / meals (if diet advanced)  3   Goal diet of low fat, CCD-2, low fiber

## 2022-07-28 NOTE — OCCUPATIONAL THERAPY NOTE
Occupational Therapy cx        Patient Name: Jemal Ramos  [id number] Date: 7/28/2022 07/28/22 0752   OT Last Visit   OT Visit Date 07/28/22   Note Type   Note type Cancelled Session   Cancel Reasons Intubated/sedated   Additional Comments Pt remains intubated/sedated at this time  Will hold and address as clinical course allows         Sahara Epstein, BARBARA, OTR/L

## 2022-07-29 NOTE — RESTORATIVE TECHNICIAN NOTE
Restorative Technician Note      Patient Name: Cheryl Dominguez     Note Type: Mobility  Patient Position Upon Consult: Bedside chair  Activity Performed: Transferred; Stood  Assistive Device: Roller walker  Patient Position at [employer] of Consult: Supine;  All needs within reach

## 2022-07-29 NOTE — PROGRESS NOTES
Progress Note - Surgical Oncology  Jemal Ramos 67 y o  male MRN: [id number]  Unit/Bed#: [facility] 271-35 Encounter: [id number]    Assessment:  Patient is a 67 y o  male who presented with MD-IPMN s/p  IPMN, s/p Lap distal pancreatectomy (03/2019-Quiros), open subtotal pancreatectomy (02/2022-Quiros), now status post completion pancreatectomy with sims anastomosis, extensive SARAH, reduction of internal hernia and cholecystectomy on 7/26/POD4  Afebrile,VSS except tachycardic required prn lopressor, tachy to low 100's now on 2L NC    STU: 1 9L , serous   UOP: 995 cc  Stool x1    Plan:  Advance diet as tolerated  Maintain Stu to bulb suction, monitor output  PCEA per APS  Appreciate endocrine recs for blood glucose control  Can transition to some oral medications  DVT px  Encourage out of bed and ambulation  PT/OT recommends rehab  Consider diuresis    Subjective/Objective     Subjective:   No acute events overnight  Pain well controlled  Patient took a few steps yesterday  He denies chest pain, shortness of breath, palpitation  Patient in AFib with in the 130s requiring dose of Lopressor which brought rate down into the low 100s  Restarted atenolol for better rate control  Objective:    Blood pressure 147/68, pulse (!) 116, temperature 99 86 °F (37 7 °C), resp  rate 16, height 6' (1 829 m), weight (!) 139 kg (306 lb), SpO2 95 %  ,Body mass index is 41 5 kg/m²        Intake/Output Summary (Last 24 hours) at 7/29/2022 1626  Last data filed at 7/29/2022 1535  Gross per 24 hour   Intake 2325 48 ml   Output 3125 ml   Net -799 52 ml       Invasive Devices  Report    Central Venous Catheter Line  Duration           Port A Cath 03/30/22 Right Chest 121 days    CVC Central Lines 07/26/22 Triple 3 days          Peripheral Intravenous Line  Duration           Peripheral IV 07/26/22 Proximal;Right;Ventral (anterior) Forearm 2 days          Epidural Line  Duration           Epidural Catheter 07/26/22 3 days          Drain Duration           Closed/Suction Drain Left Abdomen Bulb 19 Fr  164 days    Closed/Suction Drain Right RLQ Bulb 19 Fr  2 days                Physical Exam  Vitals reviewed  Constitutional:       General: He is not in acute distress  Appearance: He is not ill-appearing, toxic-appearing or diaphoretic  HENT:      Head: Normocephalic and atraumatic  Eyes:      Extraocular Movements: Extraocular movements intact  Cardiovascular:      Rate and Rhythm: Normal rate  Pulmonary:      Effort: Pulmonary effort is normal  No respiratory distress  Comments: On NC  Abdominal:      General: There is distension  Palpations: Abdomen is soft  Tenderness: There is abdominal tenderness  There is no guarding or rebound  Comments: Incisions clean, dry and intact  STU drain in place   Musculoskeletal:      Right lower leg: Edema present  Left lower leg: Edema present  Comments: Upper ext edematous   Skin:     General: Skin is warm and dry  Neurological:      Mental Status: He is alert and oriented to person, place, and time  Psychiatric:         Mood and Affect: Mood normal          Behavior: Behavior normal              Results from last 7 days   Lab Units 07/29/22  0406 07/28/22  1536 07/28/22  0525   WBC Thousand/uL 6 90 5 43 4 18*   HEMOGLOBIN g/dL 7 4* 7 2* 7 1*   HEMATOCRIT % 22 8* 21 7* 21 8*   PLATELETS Thousands/uL 73* 70* 50*     Results from last 7 days   Lab Units 07/29/22  0340 07/28/22  0525 07/27/22  1356 07/26/22 2137 07/26/22 1957   POTASSIUM mmol/L 3 9 4 0 4 4   < >  --    CHLORIDE mmol/L 113* 112* 110*   < >  --    CO2 mmol/L 28 26 23   < >  --    CO2, I-STAT mmol/L  --   --   --   --  22   BUN mg/dL 10 12 8   < >  --    CREATININE mg/dL 0 47* 0 61 0 77   < >  --    GLUCOSE, ISTAT mg/dl  --   --   --   --  163*   CALCIUM mg/dL 8 0* 7 7* 7 7*   < >  --     < > = values in this interval not displayed       Results from last 7 days   Lab Units 07/26/22 2137   INR  1 63*

## 2022-07-29 NOTE — PLAN OF CARE
Problem: OCCUPATIONAL THERAPY ADULT  Goal: Performs self-care activities at highest level of function for planned discharge setting  See evaluation for individualized goals  Description: Treatment Interventions: ADL retraining, Functional transfer training, Endurance training, Patient/family training, Equipment evaluation/education, Compensatory technique education, Activityengagement, Energy conservation          See flowsheet documentation for full assessment, interventions and recommendations  Note: Limitation: Decreased ADL status, Decreased self-care trans, Decreased high-level ADLs, Decreased endurance  Prognosis: Good  Assessment: Pt is a 67 y o  male admitted 7/26/22 with intraductal papillary mucinous neoplasm (IPMN)  Pt underwent completion of pancreatectomy w sims anastomosis, extensive SARAH, reduction of internal hernia, cholecystectomy 7/26/22; pod #3, post extubation 7/28/22  Pt w active OT eval and treat orders at this time  PMH includes  has a past medical history of A-fib ([facility] [address] ), Abdominal wall strain, Allergic rhinitis, Arthritis, Cancer ([facility] [address] ), COVID-19 virus infection, CPAP (continuous positive airway pressure) dependence, Diabetes mellitus ([address] ), Erectile dysfunction of non-organic origin, Irregular heart beat, Lumbar strain, Multiple acquired skin tags, Palpitations, Pancreas cyst, Plantar fasciitis, Skin disorder, Sleep apnea, and Thrombocytopenia ([address] )  Pt lives w spouse in a 2 SH with bed/bath upstairs, reports walk in shower with grab bars, raised toilet  Pta, pt was independent w/ ADL/IADL and functional mobility, was driving and was not using any DME at baseline  Currently, pt is mod a for UB ADL, max A for LB ADL and completed transfers/FM with mod Ax2, w RW for support  Pt is limited at this time 2* decreased endurance/activtiy tolerance, decreased ADL/High-level ADL status, decreased self-care trans, decreased safety awareness, and is a fall risk   This impacts pt's ability to complete UB and LB dressing and bathing, toileting, transfers, functional mobility, community mobility, home and health maintenance, and safe engagement in typical daily routine  The patient's raw score on the AM-PAC Daily Activity inpatient short form is 16, standardized score is 35 96, less than 39 4  Patients at this level are likely to benefit from discharge to post-acute rehabilitation services  Please refer to the recommendation of the Occupational Therapist for safe discharge planning  From OT standpoint, pt should D/C to STR when medically stable  Pt will benefit from continued acute OT services 3-5x/wk for 10-14 days to meet goals       OT Discharge Recommendation: Post acute rehabilitation services

## 2022-07-29 NOTE — PROGRESS NOTES
Daily Progress Note - Critical Care   Evelyne De Paz 67 y o  male MRN: [id number]  Unit/Bed#: [facility] 516-01 Encounter: [id number]        ----------------------------------------------------------------------------------------  HPI/24hr events: Patient extubated yesterday, nasogastric tube discontinued    ---------------------------------------------------------------------------------------  SUBJECTIVE  "I'm doing alright, all things considered"    Review of Systems   Constitutional: Positive for fatigue  Negative for fever  HENT: Positive for sore throat  Respiratory: Negative for shortness of breath  Cardiovascular: Negative for chest pain  Gastrointestinal: Positive for abdominal pain  Negative for nausea and vomiting  Musculoskeletal: Negative for back pain     Neurological: Negative for light-headedness and headaches        ---------------------------------------------------------------------------------------  Assessment and Plan:    Neuro:    Diagnosis: Acute pain  o Plan: Discuss with acute pain service increasing epidural rate versus transition to multimodal regimen if appropriate to take oral medications, neurologic monitoring as needed, delirium precautions    CV:    Diagnosis: Newly decreased ejection fraction, atrial fibrillation, chronic lower extremity edema, history of hypertension  o Plan: Consider beginning low dose beta-blocker if patient appropriate for oral, at home only on aspirin for anticoagulation will discuss resumption with surgical service, hold home Lasix for another day, consider cardiology consult for newly decreased ejection fraction versus close outpatient follow-up, continue telemetry, hold other home antihypertensives      Pulm:   Diagnosis: Respiratory insufficiency, history of obstructive sleep apnea  o Plan: Wean oxygen for SpO2>88%, respiratory protocol, encourage good pulmonary hygiene, encourage compliance with non-invasive positive pressure at night      GI:  Diagnosis: POD #3 from completion of pancreatectomy with sims anastamosis, lysis of adhesion, and cholecystectomy, hyperbilirubinemia, pancreatic insufficiency, hyperbilirubinemia  o Plan: Postoperative management per the surgical team, bilirubin improving, resume Creon when able to take oral, continue Protonix until oral intake begins, begin bowel regimen when appropriate      :   o Plan: Consider discontinuation of hanna catheter unless required for postoperative reason, close intake and output, daily weights, trend serum creatinine      F/E/N:    Plan: Advancement of diet per surgical team, aggressive replacement of electrolytes, consider rechecking phos later today, discontinue IVF when able to take oral      Heme/Onc:    Diagnosis: Acute blood loss anemia, thrombocytopenia, history of pancreatic cancer  o Plan: Trend CBC daily, no signs of ongoing blood loss, platelets stable and status post DDAVP, discuss resuming aspirin with surgical service, continue heparin for DVT prophylaxis      Endo:    Diagnosis: Diabetes post pancreatectomy  o Plan: Continue Lantus with sliding scale, re-engage endocrinology when diet able to be advanced      ID:   o Plan: Follow temperature and white count      MSK/Skin:   o Plan: Mobilization as able given epidural    Patient appropriate for transfer out of the ICU today?: No  Disposition: Transfer to Stepdown Level 1   Code Status: Level 1 - Full Code  ---------------------------------------------------------------------------------------  ICU CORE MEASURES    Prophylaxis   VTE Pharmacologic Prophylaxis: Heparin  VTE Mechanical Prophylaxis: sequential compression device  Stress Ulcer Prophylaxis: Pantoprazole IV     ABCDE Protocol (if indicated)  Plan to perform spontaneous awakening trial today? Not applicable  Plan to perform spontaneous breathing trial today? Not applicable  Obvious barriers to extubation?  Not applicable  CAM-ICU: Negative    Invasive Devices Review  Invasive Devices  Report    Central Venous Catheter Line  Duration           Port A Cath 22 Right Chest 121 days    CVC Central Lines 22 Triple 2 days          Peripheral Intravenous Line  Duration           Peripheral IV 22 Proximal;Right;Ventral (anterior) Forearm 2 days          Epidural Line  Duration           Epidural Catheter 22 2 days          Arterial Line  Duration           Arterial Line 22 Right Radial 2 days          Drain  Duration           Closed/Suction Drain Left Abdomen Bulb 19 Fr  164 days    Closed/Suction Drain Right RLQ Bulb 19 Fr  2 days    Urethral Catheter Latex 16 Fr  2 days              Can any invasive devices be discontinued today? Yes  ---------------------------------------------------------------------------------------  OBJECTIVE    Vitals   Vitals:    22 0300 22 0400 22 0500 22 0600   BP: 126/61 127/68 158/70    Pulse: 97 95 103    Resp: (!) 8 12 (!) 11    Temp: 98 42 °F (36 9 °C) 98 4 °F (36 9 °C) 98 4 °F (36 9 °C)    TempSrc:  Rectal     SpO2: 100% 99% 99%    Weight:    (!) 139 kg (306 lb)   Height:         Temp (24hrs), Av 5 °F (36 9 °C), Min:97 88 °F (36 6 °C), Max:99 °F (37 2 °C)  Current: Temperature: 98 4 °F (36 9 °C)  HR: 124  BP: 155/64  RR: 18  SpO2: 94% on 4L NC    Respiratory:  SpO2: SpO2: 96 %, SpO2 Activity: SpO2 Activity: At Rest, SpO2 Device: O2 Device: Nasal cannula  Nasal Cannula O2 Flow Rate (L/min): 4 L/min    Invasive/non-invasive ventilation settings   Respiratory  Report   Lab Data (Last 4 hours)    None         O2/Vent Data (Last 4 hours)    None                Physical Exam  Constitutional:       General: He is not in acute distress  Appearance: He is ill-appearing  Interventions: Nasal cannula in place  HENT:      Head: Normocephalic and atraumatic  Mouth/Throat:      Mouth: Mucous membranes are dry  Eyes:      Pupils: Pupils are equal, round, and reactive to light  Cardiovascular:      Rate and Rhythm: Tachycardia present  Rhythm irregular  Pulses: Normal pulses  Pulmonary:      Effort: Pulmonary effort is normal  No respiratory distress  Breath sounds: Normal breath sounds  Abdominal:      General: There is distension  Palpations: Abdomen is soft  Tenderness: There is abdominal tenderness  Comments: Midline incision well-approximated with no erythema or discharge  RLQ STU drain with moderate amount of serosanguinous drainage present   Genitourinary:     Comments: Dockery in place with yellow urine  Musculoskeletal:         General: No tenderness or signs of injury  Right lower leg: Edema present  Left lower leg: Edema present  Skin:     General: Skin is warm and dry  Neurological:      Mental Status: He is alert  GCS: GCS eye subscore is 4  GCS verbal subscore is 5  GCS motor subscore is 6  Laboratory and Diagnostics:  Results from last 7 days   Lab Units 07/29/22  0406 07/28/22  1536 07/28/22  0525 07/27/22 2033 07/27/22  1356 07/27/22  0436 07/26/22  2252 07/26/22 1957 07/26/22  1042 07/26/22  0626   WBC Thousand/uL 6 90 5 43 4 18*  --   --  8 03 8 48  --   --  5 20   HEMOGLOBIN g/dL 7 4* 7 2* 7 1* 7 3* 7 6* 9 0* 9 9*  --    < > 12 2   I STAT HEMOGLOBIN g/dl  --   --   --   --   --   --   --  10 2*   < >  --    HEMATOCRIT % 22 8* 21 7* 21 8* 22 3*  --  27 1* 29 3*  --    < > 35 5*   HEMATOCRIT, ISTAT %  --   --   --   --   --   --   --  30*   < >  --    PLATELETS Thousands/uL 73* 70* 50*  --   --  107* 130*  --   --  97*   NEUTROS PCT % 73  --  68  --   --  81*  --   --   --   --    MONOS PCT % 11  --  13*  --   --  11  --   --   --   --     < > = values in this interval not displayed       Results from last 7 days   Lab Units 07/29/22  0340 07/28/22  0525 07/27/22  1356 07/27/22  0436 07/26/22 2137 07/26/22 1957 07/26/22  1801   SODIUM mmol/L 143 142 140 140 141  --   --    POTASSIUM mmol/L 3 9 4 0 4 4 5 1 4 7  -- --    CHLORIDE mmol/L 113* 112* 110* 111* 111*  --   --    CO2 mmol/L 28 26 23 21 18*  --   --    CO2, I-STAT mmol/L  --   --   --   --   --  22 26   ANION GAP mmol/L 2* 4 7 8 12  --   --    BUN mg/dL 10 12 8 9 8  --   --    CREATININE mg/dL 0 47* 0 61 0 77 0 79 0 83  --   --    CALCIUM mg/dL 8 0* 7 7* 7 7* 8 3 8 9  --   --    GLUCOSE RANDOM mg/dL 149* 170* 200* 206* 171*  --   --    ALT U/L 68  --   --  68 67  --   --    AST U/L 69*  --   --  98* 97*  --   --    ALK PHOS U/L 73  --   --  59 69  --   --    ALBUMIN g/dL 2 5*  --   --  2 9* 2 4*  --   --    TOTAL BILIRUBIN mg/dL 1 14*  --   --  2 01* 3 11*  --   --      Results from last 7 days   Lab Units 07/29/22  0340 07/28/22  0525 07/27/22  0436 07/26/22  2137   MAGNESIUM mg/dL 1 9 2 1 2 0 1 2*   PHOSPHORUS mg/dL 1 9* 1 7*  --  3 9      Results from last 7 days   Lab Units 07/26/22  2137   INR  1 63*          Results from last 7 days   Lab Units 07/28/22  0827 07/28/22  0525 07/28/22  0057 07/27/22  2030 07/27/22  1746 07/27/22  1356 07/27/22  1055   LACTIC ACID mmol/L 1 9 2 2* 2 4* 2 6* 3 0* 3 9* 5 3*     ABG:  Results from last 7 days   Lab Units 07/27/22 2033   PH ART  7 428   PCO2 ART mm Hg 34 7*   PO2 ART mm Hg 164 9*   HCO3 ART mmol/L 22 4   BASE EXC ART mmol/L -1 5   ABG SOURCE  Line, Arterial     VBG:  Results from last 7 days   Lab Units 07/27/22 2033   ABG SOURCE  Line, Arterial           Micro        EKG: Review of telemetry demonstrates atrial fibrillation with rapid ventricular response  Imaging:  I have personally reviewed pertinent reports  and I have personally reviewed pertinent films in PACS    Intake and Output  I/O       07/27 0701 07/28 0700 07/28 0701 07/29 0700    P  O  0     I V  (mL/kg) 5821 5 (41 9) 2097 (15 1)    NG/GT 0     IV Piggyback 1000 350    Epidural  13 8    Total Intake(mL/kg) 6821 5 (49 1) 2460 8 (17 7)    Urine (mL/kg/hr) 1270 (0 4) 1145 (0 3)    Emesis/NG output 5 0    Drains 1865 1365    Total Output 3140 2510    Net +3681 5 -49 2              UOP: 50 ml/hr     Height and Weights   Height: 6' (182 9 cm)  IBW (Ideal Body Weight): 77 6 kg  Body mass index is 41 5 kg/m²  Weight (last 2 days)     Date/Time Weight    07/29/22 0600 139 (306)    07/28/22 0533 139 (306 88)    07/27/22 1400 135 (297)    07/27/22 0726 135 (297 84)            Nutrition       Diet Orders   (From admission, onward)             Start     Ordered    07/26/22 2040  Diet NPO  Diet effective now        References:    Nutrtion Support Algorithm Enteral vs  Parenteral   Question Answer Comment   Diet Type NPO    RD to adjust diet per protocol?  No        07/26/22 2041              Active Medications  Scheduled Meds:  Current Facility-Administered Medications   Medication Dose Route Frequency Provider Last Rate    calcium gluconate  2 g Intravenous Once Pool Ovalle MD 2 g (07/29/22 0543)    heparin (porcine)  5,000 Units Subcutaneous [facility] Shellie Watts MD      HYDROmorphone  0 4 mg Intravenous Q1H PRN Pool Ovalle MD      insulin glargine  15 Units Subcutaneous HS Almaz Stevens MD      insulin lispro  1-5 Units Subcutaneous Q6H [facility] Almaz Stevens MD      metoprolol  5 mg Intravenous Q6H PRN Pool Ovalle MD      multi-electrolyte  75 mL/hr Intravenous Continuous Harpreet Easley PA-C 75 mL/hr (07/29/22 6302)    pantoprazole  40 mg Intravenous Q24H Eriberto Hale MD      perflutren lipid microsphere  0 8 mL/min Intravenous Once in imaging Pool Ovalle MD      phenol  1 spray Mouth/Throat Q2H PRN Reina Saunders MD      potassium phosphate  21 mmol Intravenous Once Pool Ovalle MD      ropivacaine 0 1% and fentaNYL 2 mcg/mL 250 mL  250 mL Epidural Continuous Alix Cornelius MD 1 mL/hr at 07/28/22 1415     Continuous Infusions:  multi-electrolyte, 75 mL/hr, Last Rate: 75 mL/hr (07/29/22 0605)  ropivacaine 0 1% and fentaNYL 2 mcg/mL 250 mL, 250 mL, Last Rate: 1 mL/hr at 07/28/22 1415      PRN Meds: HYDROmorphone, 0 4 mg, Q1H PRN  metoprolol, 5 mg, Q6H PRN  perflutren lipid microsphere, 0 8 mL/min, Once in imaging  phenol, 1 spray, Q2H PRN        Allergies   No Known Allergies  ---------------------------------------------------------------------------------------  Advance Directive and Living Will:      Power of :    POLST:    ---------------------------------------------------------------------------------------  Care Time Delivered:   No Critical Care time spent     [facility]      Portions of the record may have been created with voice recognition software  Occasional wrong word or "sound a like" substitutions may have occurred due to the inherent limitations of voice recognition software    Read the chart carefully and recognize, using context, where substitutions have occurred

## 2022-07-29 NOTE — PHYSICAL THERAPY NOTE
Physical Therapy Evaluation     Patient's Name: Jemal Ramos    Admitting Diagnosis  Overlapping malignant neoplasm of pancreas ([place] [address] ) [C25 8]    Problem List  Patient Active Problem List   Diagnosis    Obstructive sleep apnea syndrome    Hypersomnia    Permanent atrial fibrillation ([place] [address] )    Morbid obesity with BMI of 40 0-44 9, adult ([place] [address] )    Lower extremity edema    Pancreatic duct dilated    Overlapping malignant neoplasm of pancreas ([place] [address] )    Type 2 diabetes mellitus with hyperglycemia, with long-term current use of insulin (HCC)    IPMN (intraductal papillary mucinous neoplasm)    Thrombocytopenia (HCC)    Urgency incontinence    Balanitis    OAB (overactive bladder)    BPH without obstruction/lower urinary tract symptoms    Encounter for geriatric assessment    Counseling regarding advanced care planning and goals of care    Port-A-Cath in place    Chemotherapy induced neutropenia ([place] [address] )       Past Medical History  Past Medical History:   Diagnosis Date    A-fib ([place] [address] )     Abdominal wall strain     last assessed: 10/21/14    Allergic rhinitis     last assessed: 05/03/16    Arthritis     Cancer ([place] [address] )     PACNCREATIC    COVID-19 virus infection 3/3/2021    CPAP (continuous positive airway pressure) dependence     Diabetes mellitus (HCC)     Erectile dysfunction of non-organic origin     last assessed: 03/17/14    Irregular heart beat     Pt with A fib     Lumbar strain     last assessed: 10/21/14    Multiple acquired skin tags     last assessed: 2/18/16    Palpitations     last assessed: 03/17/14    Pancreas cyst     Plantar fasciitis     Skin disorder     last assessed: 05/28/13    Sleep apnea     CPAP BROKE IN OCTOBER HAS BEEN TRYING TO GET NEW ONE BUT UNABLE AS OF YET    Thrombocytopenia ([place] [address] )        Past Surgical History  Past Surgical History:   Procedure Laterality Date    BOTOX INJECTION N/A 11/12/2021    Procedure: Mendoza Nagy;  Surgeon: Catarina Skelton MD;  Location:  MAIN OR;  Service: Urology    CATARACT EXTRACTION Bilateral     CHOLECYSTECTOMY N/A 7/26/2022    Procedure: CHOLECYSTECTOMY;  Surgeon: Nate Orta MD;  [facility];  Service: Surgical Oncology    COLONOSCOPY  01/17/2013    COLONOSCOPY  01/08/2018    COLONOSCOPY  04/2021    CYSTOSCOPY      INJECT WITH BOTOX    DISTAL PANCREATECTOMY N/A 2/15/2022    Procedure: PANCREATECTOMY SUB-TOTALL-OPEN;  Surgeon: Nate Orta MD;  [facility];  Service: Surgical Oncology    EGD  06/05/2020    EGD AND COLONOSCOPY  02/06/2014, 2/8/2017    FL GUIDED CENTRAL VENOUS ACCESS DEVICE INSERTION  4/23/2019    FLEXIBLE SIGMOIDOSCOPY  06/11/2019    FOOT SURGERY      Due to plantar fascitis    IR PORT PLACEMENT  3/30/2022    JOINT REPLACEMENT Left     LTK    LINEAR ENDOSCOPIC U/S      PANCREATECTOMY N/A 7/26/2022    Procedure: COMPLETION OF PANCREATECTOMY W/MUHAMMAD ANASTOMOSIS, EXTENSIVE LYSIS OF ADHESIONS, REDUCTION OF INTERNAL HERNIA;  Surgeon: Nate Orta MD;  [facility];  Service: Surgical Oncology    PANCREATECTOMY LAPAROSCOPIC N/A 3/12/2019    Procedure: DISTAL PANCREATECTOMY LAPAROSCOPIC/POSSIBLE OPEN;  Surgeon: Nate Orta MD;  [facility];  Service: Surgical Oncology    GA EDG US EXAM SURGICAL ALTER STOM DUODENUM/JEJUNUM N/A 1/24/2019    Procedure: LINEAR ENDOSCOPIC U/S;  Surgeon: Sanam Major MD;  Location: [facility]; Service: Gastroenterology    REPLACEMENT TOTAL KNEE Left     TUNNELED VENOUS PORT PLACEMENT Left 4/23/2019    Procedure: INSERTION VENOUS PORT (PORT-A-CATH); Surgeon: Nate Orta MD;  [facility];  Service: Surgical Oncology- 11/8/21 Pt reports PAC removed     UMBILICAL HERNIA REPAIR            07/29/22 0936   PT Last Visit   PT Visit Date 07/29/22   Note Type   Note type Evaluation   Pain Assessment   Pain Assessment Tool 0-10   Pain Score 9   Pain Location/Orientation Orientation: Left; Location: Shoulder; Location: Abdomen   Restrictions/Precautions   Weight Bearing Precautions Per Order No   Other Precautions Multiple lines;Telemetry; Fall Risk;Pain;O2  (thoracic epidural)   Home Living   Type of [address] Two level; Able to live on main level with bedroom/bathroom  (2STE)   Bathroom Shower/Tub Walk-in shower  (and tub shower)   Bathroom Toilet Standard   Bathroom Equipment Grab bars in shower; Toilet raiser   Home Equipment Walker;Cane  (denies ue PTA)   Prior Function   Level of [place] Independent with ADLs and functional mobility   Lives With Spouse   ADL Assistance Independent   IADLs Independent   Falls in the last 6 months 0   Vocational Full time employment  (iron work)   Comments supportive and able spouse  likes hot rods and motorcycles  General   Family/Caregiver Present No   Cognition   Overall Cognitive Status WFL   Arousal/Participation Alert   Orientation Level Oriented X4   Memory Within functional limits   Following Commands Follows all commands and directions without difficulty   Subjective   Subjective Pleasant and agreeable to participate in therapy session   RLE Assessment   RLE Assessment   (functionally 3-/5)   LLE Assessment   LLE Assessment   (functionally 3-/5)   Bed Mobility   Supine to Sit 3  Moderate assistance   Additional items Assist x 2;HOB elevated; Increased time required;Verbal cues;LE management   Additional Comments Supine in bed upon PT arrival   Pt left upright in bedside chair with all needs in reach  Transfers   Sit to Stand 3  Moderate assistance   Additional items Assist x 2; Increased time required;Verbal cues   Stand to Sit 3  Moderate assistance   Additional items Assist x 2; Increased time required;Verbal cues   Additional Comments Transfers with RW   VC for hand placement and safety  Ambulation/Elevation   Gait pattern Excessively slow; Step to;Short stride; Foward flexed; Antalgic;Decreased foot clearance  (guarded 2/2 pain)   Gait Assistance 3  Moderate assist   Additional items Assist x 2;Verbal cues; Tactile cues   Assistive Device Rolling walker   Distance 3 ft from bed to chair  (limited by pain)   Balance   Static Sitting Fair   Dynamic Sitting Fair -   Static Standing Poor   Dynamic Standing Poor   Ambulatory Poor -   Endurance Deficit   Endurance Deficit Yes   Endurance Deficit Description pain, fatigue, weakness   Activity Tolerance   Activity Tolerance Patient limited by pain; Patient limited by fatigue   Medical Staff Made Aware GRISELDA Edward   Nurse Made Aware RN cleared pt to be seen by PT   Assessment   Prognosis Good   Problem List Decreased strength;Decreased endurance; Impaired balance;Decreased mobility; Decreased safety awareness;Pain   Assessment Pt seen for high complexity PT evaluation due to decrease in functional mobility status compared to baseline  Pt with active PT eval/treat orders at this time  Pt is a 67 y o  M who presented to [facility] with IPMN on 7/26/22  Pt is s/p "completion pancreatectomy with sims anastomosis, extensive SARAH, reduction of internal hernia, cholecystectomy on 07/26"  Pt extubated yesterday  Pt  has a past medical history of A-fib ([facility] [address] ), Abdominal wall strain, Allergic rhinitis, Arthritis, Cancer ([facility] [address] ), COVID-19 virus infection (3/3/2021), CPAP (continuous positive airway pressure) dependence, Diabetes mellitus ([address] ), Erectile dysfunction of non-organic origin, Irregular heart beat, Lumbar strain, Multiple acquired skin tags, Palpitations, Pancreas cyst, Plantar fasciitis, Skin disorder, Sleep apnea, and Thrombocytopenia ([facility] [address] )  Pt resides spouse in [facility] with 2STE  Pt presents with decreased strength, balance, endurance, pain that contribute to limitations in bed mobility, functional transfers, functional mobility  Pt requires Mod A x 2 at this time  Pt left upright in bedside chair with all needs in reach  Pt will benefit from skilled therapy in order to address current impairments and functional limitations   PT to follow pt and recommending rehab once medically cleared  The patient's AM-PAC Basic Mobility Inpatient Short Form Raw Score is 7  A Raw score of less than or equal to 16 suggests the patient may benefit from discharge to post-acute rehabilitation services  Please also refer to the recommendation of the Physical Therapist for safe discharge planning  Barriers to Discharge Inaccessible home environment;Decreased caregiver support   Goals   Patient Goals to have less pain   STG Expiration Date 08/12/22   Short Term Goal #1 1  Pt will demonstrate ability to perform all aspects of bed mobility with I in order to increase independence and decrease burden on caregivers  2  Pt will demonstrate ability to perform functional transfers with Mod I in order to increase independence and decrease burden on caregivers  3  Pt will demonstrate ability to ambulate 200 ft with least restrictive AD with Mod I in order to return to mobility safely  4  Pt will demonstrate ability to negotiate full flight steps with/without HR and Mod I in order to return to household/community mobility safely  5  Pt will demonstrate improved balance by one grade order to decrease risk of falls  6  Pt will increase b/l LE strength by 1 grade in order to increase ease of functional mobility and transfers  Plan   Treatment/Interventions LE strengthening/ROM; Functional transfer training; Therapeutic exercise;Elevations; Endurance training;Patient/family training;Equipment eval/education; Bed mobility;Gait training;Spoke to nursing   PT Frequency 3-5x/wk   Recommendation   PT Discharge Recommendation Post acute rehabilitation services   AM-PAC Basic Mobility Inpatient   Turning in Bed Without Bedrails 2   Lying on Back to Sitting on Edge of Flat Bed 1   Moving Bed to Chair 1   Standing Up From Chair 1   Walk in Room 1   Climb 3-5 Stairs 1   Basic Mobility Inpatient Raw Score 7   Turning Head Towards Sound 4   Follow Simple Instructions 4   Low Function Basic Mobility Raw Score 15   Low Function Basic Mobility Standardized Score 23 9   Highest Level Of Mobility   -[facility] Goal 2: Bed activities/Dependent transfer   -[facility] Achieved 4: Move to chair/commode   Modified [place] Scale   Modified [place] Scale 4         Priyanka Licea, PT, DPT

## 2022-07-29 NOTE — PROGRESS NOTES
Ropiv/fentanyl epidural bag changed  Was unable to waste med in accudose due to the fact the old order in which the current bag was hung had been discontinued therefore the accudose did not recognize that there was a waste to be done  Called pharmacy who instructed to waste with RN and cosign through a note in patients chart  80ML wasted in [address] with Denver Narvaez, P5 RN

## 2022-07-29 NOTE — PLAN OF CARE
Problem: PHYSICAL THERAPY ADULT  Goal: Performs mobility at highest level of function for planned discharge setting  See evaluation for individualized goals  Description: Treatment/Interventions: LE strengthening/ROM, Functional transfer training, Therapeutic exercise, Elevations, Endurance training, Patient/family training, Equipment eval/education, Bed mobility, Gait training, Spoke to nursing          See flowsheet documentation for full assessment, interventions and recommendations  Note: Prognosis: Good  Problem List: Decreased strength, Decreased endurance, Impaired balance, Decreased mobility, Decreased safety awareness, Pain  Assessment: Pt seen for high complexity PT evaluation due to decrease in functional mobility status compared to baseline  Pt with active PT eval/treat orders at this time  Pt is a 67 y o  M who presented to [facility] with IPMN on 7/26/22  Pt is s/p "completion pancreatectomy with sims anastomosis, extensive SARAH, reduction of internal hernia, cholecystectomy on 07/26"  Pt extubated yesterday  Pt  has a past medical history of A-fib ([facility] [address] ), Abdominal wall strain, Allergic rhinitis, Arthritis, Cancer ([facility] [address] ), COVID-19 virus infection (3/3/2021), CPAP (continuous positive airway pressure) dependence, Diabetes mellitus ([address] ), Erectile dysfunction of non-organic origin, Irregular heart beat, Lumbar strain, Multiple acquired skin tags, Palpitations, Pancreas cyst, Plantar fasciitis, Skin disorder, Sleep apnea, and Thrombocytopenia ([facility] [address] )  Pt resides spouse in [facility] with 2STE  Pt presents with decreased strength, balance, endurance, pain that contribute to limitations in bed mobility, functional transfers, functional mobility  Pt requires Mod A x 2 at this time  Pt left upright in bedside chair with all needs in reach  Pt will benefit from skilled therapy in order to address current impairments and functional limitations   PT to follow pt and recommending rehab once medically cleared  The patient's AM-PAC Basic Mobility Inpatient Short Form Raw Score is 7  A Raw score of less than or equal to 16 suggests the patient may benefit from discharge to post-acute rehabilitation services  Please also refer to the recommendation of the Physical Therapist for safe discharge planning  Barriers to Discharge: Inaccessible home environment, Decreased caregiver support     PT Discharge Recommendation: Post acute rehabilitation services    See flowsheet documentation for full assessment

## 2022-07-29 NOTE — PROGRESS NOTES
Epidural Follow-up Note - Acute Pain Service    Qing Burgess 67 y o  male MRN: [id number]  Unit/Bed#: [facility] 516-01 Encounter: [id number]      Assessment:   Principal Problem:    IPMN (intraductal papillary mucinous neoplasm)  Active Problems:    Permanent atrial fibrillation ([facility] [address] )    Lower extremity edema    Overlapping malignant neoplasm of pancreas ([facility] [address] )    Type 2 diabetes mellitus with hyperglycemia, with long-term current use of insulin (HCC)    Thrombocytopenia ([facility] [address] )      Qing Burgess is a 67y o  year old male with PMHx of A-fib and T2DM who originally prsented with intraductal papillary mucinous neoplasm s/p ex-lap/jasen/pancreatectomy/reduction of hernia on 7/26  A pre-operative thoracic epidural was placed for post-operative analgesia  The patient was intubated post-operatively so the thoracic epidural was set to continuous infusion @1cc/hr  He was subsequently extubated on 7/28  APS consulted for epidural/post-op pain management  Upon bedside evaluation, Brenton Andrea was sitting in the chair watching TV  He was complaining of severe abdominal pain  Pain is achy and diffuse along with sharp pain along the incision line  Able to move LE bilaterally  Epidural site c/d/i  Denies opioid-induced side effects including nausea/vomiting/itching/constipation  Plan:   - Will uptitrate continuous epidural infusion to epidural PCEA with 0 1% ropivacaine/2mcg/ml fentanyl @8/5/10/3; bolused 5ml 0 2% ropivacaine to achieve better analgesia  - Continue Dilaudid 0 5 mg IV q2hr PRN for breakthrough pain  - Anticipate epidural removal and transition to primarily PO regimen 2-3 days  - Diet advanced to CLD today  Once able to PO meds, recommend PO tylenol 975mg q6, PO robaxin 750mg q6 scheduled    Bowel Regimen:  - Bowel regimen when appropriate from surgical perspective    APS will continue to follow  Please contact Acute Pain Service - [facility] via [employer] from [phone] with additional questions or concerns   See Alan or Bernardino for additional contacts and after hours information  Pain History  Current pain location(s): Abdomen  Pain Scale:  10/10  Quality: Sharp, diffuse  24 hour history: See above    PCEA use: N/A, was on continuous infusion, total volume infused @30ml   Opioid requirement previous 24 hours: IV dilaudid 2mg    Meds/Allergies   all current active meds have been reviewed    No Known Allergies    Objective     Temp:  [98 4 °F (36 9 °C)-99 86 °F (37 7 °C)] 99 86 °F (37 7 °C)  HR:  [] 121  Resp:  [8-30] 24  BP: ()/(50-86) 156/84  Arterial Line BP: ()/(42-98) 142/84    Physical Exam  Vitals reviewed  Constitutional:       Appearance: He is obese  HENT:      Head: Normocephalic  Mouth/Throat:      Mouth: Mucous membranes are dry  Eyes:      Extraocular Movements: Extraocular movements intact  Cardiovascular:      Rate and Rhythm: Normal rate  Pulses: Normal pulses  Pulmonary:      Effort: Pulmonary effort is normal    Abdominal:      General: Abdomen is flat  Tenderness: There is abdominal tenderness  Comments: Abdominal incision c/d/i   Musculoskeletal:         General: Normal range of motion  Cervical back: Normal range of motion  Skin:     General: Skin is dry  Neurological:      General: No focal deficit present  Mental Status: He is alert and oriented to person, place, and time     Psychiatric:         Mood and Affect: Mood normal        Epidural: Site clean/dry/intact, no surrounding erythema/edema/induration, infusion functioning appropriately    Lab Results:   Results from last 7 days   Lab Units 07/29/22  0406   WBC Thousand/uL 6 90   HEMOGLOBIN g/dL 7 4*   HEMATOCRIT % 22 8*   PLATELETS Thousands/uL 73*      Results from last 7 days   Lab Units 07/29/22  0340 07/26/22  2137 07/26/22  1957   POTASSIUM mmol/L 3 9   < >  --    CHLORIDE mmol/L 113*   < >  --    CO2 mmol/L 28   < >  --    CO2, I-STAT mmol/L  --   --  22   BUN mg/dL 10   < > --    CREATININE mg/dL 0 47*   < >  --    CALCIUM mg/dL 8 0*   < >  --    ALK PHOS U/L 73   < >  --    ALT U/L 68   < >  --    AST U/L 69*   < >  --    GLUCOSE, ISTAT mg/dl  --   --  163*    < > = values in this interval not displayed  Results from last 7 days   Lab Units 07/26/22  2137   INR  1 63*       Imaging Studies: I have personally reviewed pertinent reports  EKG, Pathology, and Other Studies: I have personally reviewed pertinent reports  Counseling / Coordination of Care  Total floor / unit time spent today 20 minutes  Greater than 50% of total time was spent with the patient and / or family counseling and / or coordination of care  Please note that the APS provides consultative services regarding pain management only  With the exception of ketamine, peripheral nerve catheters, and epidural infusions (and except when indicated), final decisions regarding starting or changing doses of analgesic medications are at the discretion of the consulting service  Off hours consultation and/or medication management is generally not available      Agutsin Massey MD  Acute Pain Service

## 2022-07-29 NOTE — CASE MANAGEMENT
Case Management Assessment    Patient name Lorren Homans  Location [address] 516/PPHP 203-79 MRN [id number]  : 1950 Date 2022       Current Admission Date: 2022  Current Admission Diagnosis:IPMN (intraductal papillary mucinous neoplasm)   Patient Active Problem List    Diagnosis Date Noted    Chemotherapy induced neutropenia ([facility] [address] ) 2022    Port-A-Cath in place 2022    Counseling regarding advanced care planning and goals of care 2022    Encounter for geriatric assessment 2022    OAB (overactive bladder) 12/15/2020    BPH without obstruction/lower urinary tract symptoms 12/15/2020    Urgency incontinence 2020    Balanitis 2020    Thrombocytopenia ([facility] [address] ) 2020    IPMN (intraductal papillary mucinous neoplasm) 2020    Type 2 diabetes mellitus with hyperglycemia, with long-term current use of insulin ([facility] [address] ) 2019    Overlapping malignant neoplasm of pancreas ([facility] [address] ) 2019    Pancreatic duct dilated 2018    Lower extremity edema 2018    Obstructive sleep apnea syndrome 2018    Hypersomnia 2018    Permanent atrial fibrillation ([facility] [address] ) 2018    Morbid obesity with BMI of 40 0-44 9, adult ([facility] [address] ) 2018      LOS (days): 3  Geometric Mean LOS (GMLOS) (days): 5 10  Days to GMLOS:1 7     OBJECTIVE:    Risk of Unplanned Readmission Score: 21 03         Current admission status: Inpatient       Preferred Pharmacy:   [facility] [address] [place] [address]  Phone: [phone] Fax: [phone] [facility] [address] [facility] [address] [facility] [address] [facility]  Phone: [phone] Fax: [phone]    Primary Care Provider: Mackenzie Jenkins DO    Primary Insurance: MEDICARE  Secondary Insurance: [place] [employer]    ASSESSMENT:  [address] Amador - Spouse   Primary Phone: [phone] (Mobile)  Home Phone: [phone]               Advance Directives  Does patient have a [address]?: Yes (requested copy)  Does patient have Advance Directives?: Yes  Advance Directives: Living will, Power of  for health care (requested copy)  Primary Contact: Jo-wife    Readmission Root Cause  30 Day Readmission: No    Patient Information  Admitted from[de-identified] Home  Mental Status: Alert  During Assessment patient was accompanied by: Daughter (son-in-law, grandson)  Assessment information provided by[de-identified] Patient, Daughter  Primary Caregiver: Self  Support Systems: Spouse/significant other, Daughter, Family members  [place] of Residence: [address] do you live in?: [address] entry access options   Select all that apply : Stairs  Number of steps to enter home : 1  Type of Current Residence: [address] home  Upon entering residence, is there a bedroom on the main floor (no further steps)?: No  A bedroom is located on the following floor levels of residence (select all that apply):: 2nd Floor  Upon entering residence, is there a bathroom on the main floor (no further steps)?: Yes  Number of steps to 2nd floor from main floor: One Flight  In the last 12 months, was there a time when you were not able to pay the mortgage or rent on time?: No  In the last 12 months, how many places have you lived?: 1  In the last 12 months, was there a time when you did not have a steady place to sleep or slept in a shelter (including now)?: No  Homeless/housing insecurity resource given?: N/A  Living Arrangements: Lives w/ Spouse/significant other  Is patient a ?: Yes ([employer])  Is patient active with [facility])?: No    Activities of Daily Living Prior to Admission  Functional Status: Independent  Completes ADLs independently?: Yes  Ambulates independently?: Yes  Does patient use assisted devices?: No  Does patient currently own DME?: Yes  What DME does the patient currently own?: Maricela Harada, Other (Comment) (lift recliner)  Does patient have a history of Outpatient Therapy (PT/OT)?: Yes  Does the patient have a history of Short-Term Rehab?: No  Does patient have a history of HHC?: No  Does patient currently have [facility]?: No    Patient Information Continued  Income Source: Employed  Does patient have prescription coverage?: Yes  Within the past 12 months, you worried that your food would run out before you got the money to buy more : Never true  Within the past 12 months, the food you bought just didn't last and you didn't have money to get more : Never true  Food insecurity resource given?: N/A  Does patient receive dialysis treatments?: No  Does patient have a history of substance abuse?: No  Does patient have a history of Mental Health Diagnosis?: No    Means of Transportation  Means of Transport to Appts[de-identified] Drives Self  In the past 12 months, has lack of transportation kept you from medical appointments or from getting medications?: No  In the past 12 months, has lack of transportation kept you from meetings, work, or from getting things needed for daily living?: No  Was application for public transport provided?: N/A

## 2022-07-29 NOTE — OCCUPATIONAL THERAPY NOTE
Occupational Therapy Evaluation     Patient Name: Carson REYNOLDSU Date: 7/29/2022  Problem List  Principal Problem:    IPMN (intraductal papillary mucinous neoplasm)  Active Problems:    Permanent atrial fibrillation ([facility] [address] )    Lower extremity edema    Overlapping malignant neoplasm of pancreas ([facility] [address] )    Type 2 diabetes mellitus with hyperglycemia, with long-term current use of insulin ([facility] [address] )    Thrombocytopenia ([facility] [address] )    Past Medical History  Past Medical History:   Diagnosis Date    A-fib ([facility] [address] )     Abdominal wall strain     last assessed: 10/21/14    Allergic rhinitis     last assessed: 05/03/16    Arthritis     Cancer ([facility] [address] )     PACNCREATIC    COVID-19 virus infection 3/3/2021    CPAP (continuous positive airway pressure) dependence     Diabetes mellitus (HCC)     Erectile dysfunction of non-organic origin     last assessed: 03/17/14    Irregular heart beat     Pt with A fib     Lumbar strain     last assessed: 10/21/14    Multiple acquired skin tags     last assessed: 2/18/16    Palpitations     last assessed: 03/17/14    Pancreas cyst     Plantar fasciitis     Skin disorder     last assessed: 05/28/13    Sleep apnea     CPAP BROKE IN OCTOBER HAS BEEN TRYING TO GET NEW ONE BUT UNABLE AS OF YET    Thrombocytopenia ([facility] [address] )      Past Surgical History  Past Surgical History:   Procedure Laterality Date    BOTOX INJECTION N/A 11/12/2021    Procedure: Maren Sides;  Surgeon: Betty Cummings MD;  Location: [address] [place] [address] MAIN OR;  Service: Urology    CATARACT EXTRACTION Bilateral     CHOLECYSTECTOMY N/A 7/26/2022    Procedure: CHOLECYSTECTOMY;  Surgeon: Angeles Patel MD;  Location: [facility] MAIN OR;  Service: Surgical Oncology    COLONOSCOPY  01/17/2013    COLONOSCOPY  01/08/2018    COLONOSCOPY  04/2021    CYSTOSCOPY      INJECT WITH BOTOX    DISTAL PANCREATECTOMY N/A 2/15/2022    Procedure: PANCREATECTOMY SUB-TOTALL-OPEN;  Surgeon: Angelse Patel MD;  Location: [facility] MAIN OR;  Service: Surgical Oncology    EGD 06/05/2020    EGD AND COLONOSCOPY  02/06/2014, 2/8/2017    FL GUIDED CENTRAL VENOUS ACCESS DEVICE INSERTION  4/23/2019    FLEXIBLE SIGMOIDOSCOPY  06/11/2019    FOOT SURGERY      Due to plantar fascitis    IR PORT PLACEMENT  3/30/2022    JOINT REPLACEMENT Left     LTK    LINEAR ENDOSCOPIC U/S      PANCREATECTOMY N/A 7/26/2022    Procedure: COMPLETION OF PANCREATECTOMY W/MUHAMMAD ANASTOMOSIS, EXTENSIVE LYSIS OF ADHESIONS, REDUCTION OF INTERNAL HERNIA;  Surgeon: Iglesia Rodarte MD;  Location: [facility] MAIN OR;  Service: Surgical Oncology    PANCREATECTOMY LAPAROSCOPIC N/A 3/12/2019    Procedure: DISTAL PANCREATECTOMY LAPAROSCOPIC/POSSIBLE OPEN;  Surgeon: Iglesia Rodarte MD;  Location: [facility] MAIN OR;  Service: Surgical Oncology    NY EDG US EXAM SURGICAL ALTER STOM DUODENUM/JEJUNUM N/A 1/24/2019    Procedure: LINEAR ENDOSCOPIC U/S;  Surgeon: Morenita Mary MD;  Location: [facility] GI [facility]; Service: Gastroenterology    REPLACEMENT TOTAL KNEE Left     TUNNELED VENOUS PORT PLACEMENT Left 4/23/2019    Procedure: INSERTION VENOUS PORT (PORT-A-CATH); Surgeon: Iglesia Rodarte MD;  Location: [facility] MAIN OR;  Service: Surgical Oncology- 11/8/21 Pt reports PAC removed     UMBILICAL HERNIA REPAIR           07/29/22 1002   OT Last Visit   OT Visit Date 07/29/22   Note Type   Note type Evaluation   Restrictions/Precautions   Weight Bearing Precautions Per Order No   Other Precautions Multiple lines;O2;Fall Risk;Pain;Telemetry   Pain Assessment   Pain Assessment Tool 0-10   Pain Score 9   Pain Location/Orientation Orientation: Left; Location: Abdomen; Location: Shoulder   Patient's Stated Pain Goal No pain   Hospital Pain Intervention(s) Emotional support; Ambulation/increased activity   Home Living   Type of [address] [place] [address] Two level; Able to live on main level with bedroom/bathroom  (2 echo)   Bathroom Shower/Tub Walk-in shower  (also has tub shower)   Bathroom Toilet Raised   Bathroom Equipment Grab bars in shower; Toilet raiser   Bathroom Accessibility Accessible   Home Equipment Walker;Cane  (not using pta)   Prior Function   Level of [place] Independent with ADLs and functional mobility   Lives With Spouse   Receives Help From Family   ADL Assistance Independent   IADLs Independent   Falls in the last 6 months 0   Vocational Full time employment  (iron work)   Lifestyle   Autonomy pta, pt was I w ADL/IADL, no AD mobility, +    Reciprocal Relationships supportive spouse who can A prn   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   Psychosocial   Psychosocial (WDL) WDL   Subjective   Subjective "I'm afraid to move'   ADL   Where Assessed Edge of bed   Eating Assistance 5  Supervision/Setup   Grooming Assistance 5  Supervision/Setup   UB Bathing Assistance 3  Moderate Assistance   LB [address] 2  Maximal Assistance   [address] 3  Moderate Assistance   LB Dressing Assistance 2  Maximal [address]  2  Maximal Assistance   Functional Assistance 2  Maximal Assistance   Bed Mobility   Supine to Sit 3  Moderate assistance   Additional items Assist x 2;HOB elevated; Increased time required;Verbal cues;LE management   Additional Comments pt found in bed, left in chair w all needs in reach and alarm on   Transfers   Sit to Stand 3  Moderate assistance   Additional items Assist x 2; Increased time required;Verbal cues   Stand to Sit 3  Moderate assistance   Additional items Assist x 2; Increased time required;Verbal cues   Additional Comments c rw, VC for hand placement, inc overall safety awareness  Functional Mobility   Functional Mobility 3  Moderate assistance   Additional Comments ax2, steps from EOB to chair   Additional items Rolling walker   Balance   Static Sitting Fair   Dynamic Sitting Fair -   Static Standing Poor   Dynamic Standing Poor   Ambulatory Poor -   Activity Tolerance   Activity Tolerance Patient limited by pain; Patient limited by fatigue Medical Staff Made Aware DPT Chelsy Wick, SPT Joyce Varghese 2' pts med complexity, comorbidities and regression from baseline   Nurse Made Aware ok per RN   RUE Assessment   RUE Assessment WFL   LUE Assessment   LUE Assessment WFL   Hand Function   Gross Motor Coordination Functional   Fine Motor Coordination Functional   Cognition   Overall Cognitive Status WFL   Arousal/Participation Responsive; Alert; Cooperative   Attention Within functional limits   Orientation Level Oriented X4   Memory Within functional limits   Following Commands Follows all commands and directions without difficulty   Comments pt pleasant and cooperative, G safety awareness and insight to condition  Assessment   Limitation Decreased ADL status; Decreased self-care trans;Decreased high-level ADLs; Decreased endurance   Prognosis Good   Assessment Pt is a 67 y o  male admitted 7/26/22 with intraductal papillary mucinous neoplasm (IPMN)  Pt underwent completion of pancreatectomy w sims anastomosis, extensive SARAH, reduction of internal hernia, cholecystectomy 7/26/22; pod #3, post extubation 7/28/22  Pt w active OT eval and treat orders at this time  PMH includes  has a past medical history of A-fib ([address] ), Abdominal wall strain, Allergic rhinitis, Arthritis, Cancer ([address] ), COVID-19 virus infection, CPAP (continuous positive airway pressure) dependence, Diabetes mellitus ([address] ), Erectile dysfunction of non-organic origin, Irregular heart beat, Lumbar strain, Multiple acquired skin tags, Palpitations, Pancreas cyst, Plantar fasciitis, Skin disorder, Sleep apnea, and Thrombocytopenia ([address] )  Pt lives w spouse in a 2 SH with bed/bath upstairs, reports walk in shower with grab bars, raised toilet  Pta, pt was independent w/ ADL/IADL and functional mobility, was driving and was not using any DME at baseline  Currently, pt is mod a for UB ADL, max A for LB ADL and completed transfers/FM with mod Ax2, w RW for support   Pt is limited at this time 2* decreased endurance/activtiy tolerance, decreased ADL/High-level ADL status, decreased self-care trans, decreased safety awareness, and is a fall risk  This impacts pt's ability to complete UB and LB dressing and bathing, toileting, transfers, functional mobility, community mobility, home and health maintenance, and safe engagement in typical daily routine  The patient's raw score on the AM-PAC Daily Activity inpatient short form is 16, standardized score is 35 96, less than 39 4  Patients at this level are likely to benefit from discharge to post-acute rehabilitation services  Please refer to the recommendation of the Occupational Therapist for safe discharge planning  From OT standpoint, pt should D/C to STR when medically stable  Pt will benefit from continued acute OT services 3-5x/wk for 10-14 days to meet goals  Goals   Patient Goals to rest   LTG Time Frame 10-14   Long Term Goal #1 see below   Plan   Treatment Interventions ADL retraining;Functional transfer training; Endurance training;Patient/family training;Equipment evaluation/education; Compensatory technique education; Activityengagement; Energy conservation   Goal Expiration Date 08/12/22   OT Frequency 3-5x/wk   Recommendation   OT Discharge Recommendation Post acute rehabilitation services   AM-[facility] Daily Activity Inpatient   Lower Body Dressing 2   Bathing 2   Toileting 2   Upper Body Dressing 3   Grooming 3   Eating 4   Daily Activity Raw Score 16   Daily Activity Standardized Score (Calc for Raw Score >=11) 35 96   AM-PAC Applied Cognition Inpatient   Following a Speech/Presentation 4   Understanding Ordinary Conversation 4   Taking Medications 4   Remembering Where Things Are Placed or Put Away 4   Remembering List of 4-5 Errands 4   Taking Care of Complicated Tasks 3   Applied Cognition Raw Score 23   Applied Cognition Standardized Score 53 08     Pt will complete functional mobility with Mod I using appropriate DME as needed       Pt will complete UB dressing and bathing with Mod I using appropriate DME as needed  Pt will complete LB dressing and bathing with Mod I using appropriate DME as needed  Pt will complete transfers with Mod I using appropriate DME as needed  Pt will complete toileting with Mod I using appropriate DME as needed  Pt will complete home maintenance task with Mod I using appropriate DME as needed  Pt will utilize energy conservation techniques throughout functional activity/ADL s/p skilled education  Pt will demonstrate increased safety awareness during functional tasks/ADL's s/p skilled education  Pt will increase activity tolerance to 30 minutes in order to complete ADL's/ functional tasks, using appropriate DME as needed         Kasi Score, MOT, OTR/L

## 2022-07-29 NOTE — PROGRESS NOTES
Progress Note - Surgical Oncology  Pine Ridgeradha Mayo 67 y o  male MRN: [id number]  Unit/Bed#: [facility] 516-01 Encounter: [id number]    Assessment:  66yoM [address] of [place]-IPMN now POD3 s/p completion pancreatectomy with sims anastomosis, extensive SARAH, reduction of internal hernia, cholecystectomy on 07/26    Extubated yesterday 7/28     Vital stable, afebrile, on 6L NC  WBC 6 9 from 5 4, hemoglobin 7 4 from 7 2, platelets 73 from 70  CMP pending     RLQ STU 1365 serosanguineous  UOP 1145    Plan:  - NPO, advance diet as tolerated, nutrition on board  - trend lactate  - Dockery for accurate UOP  - arterial line for BP monitoring  - PCEA for pain control, APS on board  - blood glucose control, endocrinology on board  -[address] [facility]  - appreciate ICU care    Subjective/Objective   Subjective:   Patient doing well this morning, does report some abdominal pain, also reports some throat discomfort presumably secondary to NGT and ETT, denies any difficulty swallowing, denies any nausea or emesis, remains NPO, not yet passing flatus or having bowel movements, denies any shortness of breath, is using incentive spirometry, voiding with Dockery in place  Objective:     Blood pressure 158/70, pulse 103, temperature 98 4 °F (36 9 °C), resp  rate (!) 11, height 6' (1 829 m), weight (!) 139 kg (306 lb 14 1 oz), SpO2 99 %  ,Body mass index is 41 62 kg/m²        Intake/Output Summary (Last 24 hours) at 7/29/2022 0549  Last data filed at 7/29/2022 0400  Gross per 24 hour   Intake 3778 93 ml   Output 2960 ml   Net 818 93 ml       Invasive Devices  Report    Central Venous Catheter Line  Duration           Port A Cath 03/30/22 Right Chest 121 days    CVC Central Lines 07/26/22 Triple 2 days          Peripheral Intravenous Line  Duration           Peripheral IV 07/26/22 Proximal;Right;Ventral (anterior) Forearm 2 days          Epidural Line  Duration           Epidural Catheter 07/26/22 2 days          Arterial Line  Duration           Arterial Line 07/26/22 Right Radial 2 days          Drain  Duration           Closed/Suction Drain Left Abdomen Bulb 19 Fr  164 days    Closed/Suction Drain Right RLQ Bulb 19 Fr  2 days    Urethral Catheter Latex 16 Fr  2 days                Physical Exam:  General: NAD  Skin: Warm, dry, anicteric  HEENT: Normocephalic, atraumatic  CV: RRR, no m/r/g  Pulm: CTA b/l, no inc WOB, 6 L NC  Abd: Soft, ND, tender to palpation mostly around incisions, RLQ STU serosanguineous  MSK: Symmetric, no edema, no tenderness, no deformity  Neuro: AOx3, GCS 15    Lab, Imaging and other studies:  I have personally reviewed pertinent lab results    , CBC:   Lab Results   Component Value Date    WBC 6 90 07/29/2022    HGB 7 4 (L) 07/29/2022    HCT 22 8 (L) 07/29/2022     (H) 07/29/2022    PLT 73 (L) 07/29/2022    MCH 37 2 (H) 07/29/2022    MCHC 32 5 07/29/2022    RDW 16 6 (H) 07/29/2022    MPV 12 0 07/29/2022    NRBC 0 07/29/2022   , CMP: No results found for: SODIUM, K, CL, CO2, ANIONGAP, BUN, CREATININE, GLUCOSE, CALCIUM, AST, ALT, ALKPHOS, PROT, BILITOT, EGFR  VTE Pharmacologic Prophylaxis: Sequential compression device (Venodyne)  and Heparin  VTE Mechanical Prophylaxis: sequential compression device

## 2022-07-29 NOTE — PLAN OF CARE
Problem: MOBILITY - ADULT  Goal: Maintain or return to baseline ADL function  Description: INTERVENTIONS:  -  Assess patient's ability to carry out ADLs; assess patient's baseline for ADL function and identify physical deficits which impact ability to perform ADLs (bathing, care of mouth/teeth, toileting, grooming, dressing, etc )  - Assess/evaluate cause of self-care deficits   - Assess range of motion  - Assess patient's mobility; develop plan if impaired  - Assess patient's need for assistive devices and provide as appropriate  - Encourage maximum independence but intervene and supervise when necessary  - Involve family in performance of ADLs  - Assess for home care needs following discharge   - Consider OT consult to assist with ADL evaluation and planning for discharge  - Provide patient education as appropriate  Outcome: Progressing  Goal: Maintains/Returns to pre admission functional level  Description: INTERVENTIONS:  - Perform BMAT or MOVE assessment daily    - Set and communicate daily mobility goal to care team and patient/family/caregiver  - Collaborate with rehabilitation services on mobility goals if consulted  - Perform Range of Motion 3 times a day  - Reposition patient every 2 hours    - Dangle patient 3 times a day  - Stand patient 3 times a day  - Ambulate patient 3 times a day  - Out of bed to chair 3 times a day   - Out of bed for meals 3 times a day  - Out of bed for toileting  - Record patient progress and toleration of activity level   Outcome: Progressing     Problem: Prexisting or High Potential for Compromised Skin Integrity  Goal: Skin integrity is maintained or improved  Description: INTERVENTIONS:  - Identify patients at risk for skin breakdown  - Assess and monitor skin integrity  - Assess and monitor nutrition and hydration status  - Monitor labs   - Assess for incontinence   - Turn and reposition patient  - Assist with mobility/ambulation  - Relieve pressure over bony prominences  - Avoid friction and shearing  - Provide appropriate hygiene as needed including keeping skin clean and dry  - Evaluate need for skin moisturizer/barrier cream  - Collaborate with interdisciplinary team   - Patient/family teaching  - Consider wound care consult   Outcome: Progressing     Problem: Nutrition/Hydration-ADULT  Goal: Nutrient/Hydration intake appropriate for improving, restoring or maintaining nutritional needs  Description: Monitor and assess patient's nutrition/hydration status for malnutrition  Collaborate with interdisciplinary team and initiate plan and interventions as ordered  Monitor patient's weight and dietary intake as ordered or per policy  Utilize nutrition screening tool and intervene as necessary  Determine patient's food preferences and provide high-protein, high-caloric foods as appropriate       INTERVENTIONS:  - Monitor oral intake, urinary output, labs, and treatment plans  - Assess nutrition and hydration status and recommend course of action  - Evaluate amount of meals eaten  - Assist patient with eating if necessary   - Allow adequate time for meals  - Recommend/ encourage appropriate diets, oral nutritional supplements, and vitamin/mineral supplements  - Order, calculate, and assess calorie counts as needed  - Recommend, monitor, and adjust tube feedings and TPN/PPN based on assessed needs  - Assess need for intravenous fluids  - Provide specific nutrition/hydration education as appropriate  - Include patient/family/caregiver in decisions related to nutrition  Outcome: Progressing     Problem: Potential for Falls  Goal: Patient will remain free of falls  Description: INTERVENTIONS:  - Educate patient/family on patient safety including physical limitations  - Instruct patient to call for assistance with activity   - Consult OT/PT to assist with strengthening/mobility   - Keep Call bell within reach  - Keep bed low and locked with side rails adjusted as appropriate  - Keep care items and personal belongings within reach  - Initiate and maintain comfort rounds  - Make Fall Risk Sign visible to staff  - Offer Toileting every 2 Hours, in advance of need  - Initiate/Maintain bed alarm  - Obtain necessary fall risk management equipment:   - Apply yellow socks and bracelet for high fall risk patients  - Consider moving patient to room near nurses station  Outcome: Progressing

## 2022-07-29 NOTE — SPEECH THERAPY NOTE
Speech-Language Pathology Bedside Swallow Evaluation      Patient Name: Eloisa Elizabeth    EJBFD'T Date: 7/29/2022     Problem List  Principal Problem:    IPMN (intraductal papillary mucinous neoplasm)  Active Problems:    Permanent atrial fibrillation ([address] )    Lower extremity edema    Overlapping malignant neoplasm of pancreas ([facility] )    Type 2 diabetes mellitus with hyperglycemia, with long-term current use of insulin ([facility] )    Thrombocytopenia ([facility] )      Past Medical History  Past Medical History:   Diagnosis Date    A-fib ([facility] )     Abdominal wall strain     last assessed: 10/21/14    Allergic rhinitis     last assessed: 05/03/16    Arthritis     Cancer ([facility] )     PACNCREATIC    COVID-19 virus infection 3/3/2021    CPAP (continuous positive airway pressure) dependence     Diabetes mellitus ([facility] )     Erectile dysfunction of non-organic origin     last assessed: 03/17/14    Irregular heart beat     Pt with A fib     Lumbar strain     last assessed: 10/21/14    Multiple acquired skin tags     last assessed: 2/18/16    Palpitations     last assessed: 03/17/14    Pancreas cyst     Plantar fasciitis     Skin disorder     last assessed: 05/28/13    Sleep apnea     CPAP BROKE IN OCTOBER HAS BEEN TRYING TO GET NEW ONE BUT UNABLE AS OF YET    Thrombocytopenia ([facility] )        Past Surgical History  Past Surgical History:   Procedure Laterality Date    BOTOX INJECTION N/A 11/12/2021    Procedure: Mahin Enriquez;  Surgeon: Noe Jain MD;  Location: [facility] MAIN OR;  Service: Urology    CATARACT EXTRACTION Bilateral     CHOLECYSTECTOMY N/A 7/26/2022    Procedure: CHOLECYSTECTOMY;  Surgeon: Stephanie Samaniego MD;  [facility]: [facility] MA[facility];  Service: Surgical Oncology    COLONOSCOPY  01/17/2013    COLONOSCOPY  01/08/2018    COLONOSCOPY  04/2021    CYSTOSCOPY      INJECT WITH BOTOX    DISTAL PANCREATECTOMY N/A 2/15/2022    Procedure: PANCREATECTOMY SUB-TOTALL-OPEN;  Surgeon: Stephanie Samaniego MD;  Location: [facility];  Service: Surgical Oncology    EGD  06/05/2020    EGD AND COLONOSCOPY  02/06/2014, 2/8/2017    FL GUIDED CENTRAL VENOUS ACCESS DEVICE INSERTION  4/23/2019    FLEXIBLE SIGMOIDOSCOPY  06/11/2019    FOOT SURGERY      Due to plantar fascitis    IR PORT PLACEMENT  3/30/2022    JOINT REPLACEMENT Left     LTK    LINEAR ENDOSCOPIC U/S      PANCREATECTOMY N/A 7/26/2022    Procedure: COMPLETION OF PANCREATECTOMY W/MUHAMMAD ANASTOMOSIS, EXTENSIVE LYSIS OF ADHESIONS, REDUCTION OF INTERNAL HERNIA;  Surgeon: Juan Carlos Philip MD;  [facility];  Service: Surgical Oncology    PANCREATECTOMY LAPAROSCOPIC N/A 3/12/2019    Procedure: DISTAL PANCREATECTOMY LAPAROSCOPIC/POSSIBLE OPEN;  Surgeon: Juan Carlos Philip MD;  [facility];  Service: Surgical Oncology    FL EDG US EXAM SURGICAL ALTER STOM DUODENUM/JEJUNUM N/A 1/24/2019    Procedure: LINEAR ENDOSCOPIC U/S;  Surgeon: Mildred Avitia MD;  Location: [facility]; Service: Gastroenterology    REPLACEMENT TOTAL KNEE Left     TUNNELED VENOUS PORT PLACEMENT Left 4/23/2019    Procedure: INSERTION VENOUS PORT (PORT-A-CATH); Surgeon: Juan Carlos Philip MD;  [facility];  Service: Surgical Oncology- 11/8/21 Pt reports PAC removed     UMBILICAL HERNIA REPAIR         Summary   Pt presented with functional appearing oral and pharyngeal stage swallowing skills with materials administered today  He is assessed with applesauce and toast with thin liquids  Mastication is timely and efficient  Laryngeal rise appears adequate  Throat clear x2 observed, but no additional s/s aspiration  Patient is POD #3  He was intubated from 7/26/-7/28  The patient is now on 2L NC  He reports some throat pain      Risk/s for Aspiration: low     Recommended Diet: regular diet and thin liquids (patient will choose softer items to start)  Recommended Form of Meds: whole with liquid   Aspiration precautions and swallowing strategies: upright posture and small bites/sips  Other Recommendations: Continue frequent oral care    Current Medical Status  Assessment:  66yoM [address] of [place]-SHANIKA now POD3 s/p completion pancreatectomy with sims anastomosis, extensive SARAH, reduction of internal hernia, cholecystectomy on 07/26   Extubated yesterday 7/28     Current Precautions:  Fall  Aspiration    Allergies:  No known food allergies  Past medical history:  Please see H&P for details    Special Studies:  Chest xray 7/26/2022:   Lines and tubes as described  No pneumothorax  Social/Education/Vocational Hx:  Pt lives with family    Swallow Information   Current Risks for Dysphagia & Aspiration: recent surgery and recent intubation  Current Symptoms/Concerns: coughing with po  Current Diet: NPO   Baseline Diet: regular diet and thin liquids    Baseline Assessment   Behavior/Cognition: alert  Speech/Language Status: able to participate in conversation and able to follow commands  Patient Positioning: upright in chair  Pain Status/Interventions/Response to Interventions:  Patient reports throat discomfort s/p intubation      Swallow Mechanism Exam  Facial: symmetrical  Labial: WFL  Lingual: WFL  Velum: symmetrical  Mandible: adequate ROM  Dentition: adequate  Vocal quality:weak   Volitional Cough: weak   Respiratory Status: on 2 L O2      Consistencies Assessed and Performance   Consistencies Administered: thin liquids, puree and hard solids  Materials administered included applesauce and toast with thin liquids     Oral Stage: WFL  Mastication was adequate with the materials administered today  Bolus formation and transfer were functional with no significant oral residue noted  No overt s/s reduced oral control  Pharyngeal Stage: WFL  Swallow Mechanics:  Swallowing initiation appeared prompt  Laryngeal rise was palpated and judged to be within functional limits  No coughing, throat clearing, change in vocal quality or respiratory status noted today       Esophageal Concerns: none reported    Summary and Recommendations (see above)    Results Reviewed with: patient and RN     Treatment Recommended: dysphagia therapy      Frequency of treatment: 1-2 f/u sessions     Patient Stated Goal: "water"     Dysphagia LTG  -Patient will demonstrate safe and effective oral intake (without overt s/s significant oral/pharyngeal dysphagia including s/s penetration or aspiration) for the highest appropriate diet level       Short Term Goals:  -Pt will tolerate regular solids and thin liquid with no significant s/s oral or pharyngeal dysphagia across 1-3 diagnostic session/s    Speech Therapy Prognosis   Prognosis: good    Prognosis Considerations: medical status

## 2022-07-30 NOTE — PROGRESS NOTES
Epidural Follow-up Note - Acute Pain Service    Herminia Harrell 67 y o  male MRN: [id number]  Unit/Bed#: [facility] 904-01 Encounter: [id number]      Assessment:   Principal Problem:    IPMN (intraductal papillary mucinous neoplasm)  Active Problems:    Permanent atrial fibrillation (HCC)    Lower extremity edema    Overlapping malignant neoplasm of pancreas (HCC)    Type 2 diabetes mellitus with hyperglycemia, with long-term current use of insulin (HCC)    Thrombocytopenia ([address] )      Herminia Harrell is a 67y o  year old male with PMHx of A-fib and T2DM who originally prsented with intraductal papillary mucinous neoplasm s/p ex-lap/jasen/pancreatectomy/reduction of hernia on 7/26  A pre-operative thoracic epidural was placed for post-operative analgesia  The patient was intubated post-operatively so the thoracic epidural was set to continuous infusion @1cc/hr  He was subsequently extubated on 7/28  APS consulted for epidural/post-op pain management  After PCEA settings were adjusted yesterday  Israel Mccarthy reports better pain control on epidural PCEA  Able to get OOB into chair  No evidence of excessive sympathectomy-induced hypotension/pressor requirements/abnormal sensorimotor deficits  Denies opioid-induced side effects including nausea/vomiting/itching/constipation  Able to move LE bilaterally  Epidural site c/d/i  Plan:  Analgesia:  - Continue Thoracic epidural infusion of Ropivacaine 0 1% with fentanyl 2 mcg/mL at 8/5/10/3; today is day 4  Expect to remove epidural by 8/3 as indwelling epidural catheters >7 days increases risk of infection  - Continue Dilaudid 0 5 mg IV q2hr PRN for breakthrough pain  - Anticipate epidural removal and transition to primarily PO regimen 1-2 days    Bowel Regimen:  - Bowel regimen when appropriate from surgical perspective    APS will continue to follow  Please contact Acute Pain Service - [facility] via [employer] from [phone] with additional questions or concerns   See [employer] or [place] for additional contacts and after hours information  Pain History  Current pain location(s): Abdomen  Pain Scale:    0-2/10  Quality: Achy  24 hour history: See above    PCEA use: 4 requests, 4 doses given  Opioid requirement previous 24 hours: IV dilaudid 0 8mg    Meds/Allergies   all current active meds have been reviewed    No Known Allergies    Objective     Temp:  [98 78 °F (37 1 °C)-100 04 °F (37 8 °C)] 98 78 °F (37 1 °C)  HR:  [] 111  Resp:  [8-24] 12  BP: (115-158)/(57-84) 157/64  Arterial Line BP: (142)/(84) 142/84    Physical Exam  Vitals reviewed  Constitutional:       Appearance: He is obese  HENT:      Head: Normocephalic  Mouth/Throat:      Mouth: Mucous membranes are dry  Eyes:      Extraocular Movements: Extraocular movements intact  Cardiovascular:      Rate and Rhythm: Normal rate  Pulses: Normal pulses  Pulmonary:      Effort: Pulmonary effort is normal    Abdominal:      General: Abdomen is flat  There is distension  Tenderness: There is abdominal tenderness  Comments: Abdominal incision c/d/i   Musculoskeletal:         General: Normal range of motion  Cervical back: Normal range of motion  Skin:     General: Skin is dry  Neurological:      General: No focal deficit present  Mental Status: He is alert and oriented to person, place, and time     Psychiatric:         Mood and Affect: Mood normal        Epidural: Site clean/dry/intact, no surrounding erythema/edema/induration, infusion functioning appropriately    Lab Results:   Results from last 7 days   Lab Units 07/30/22  0542   WBC Thousand/uL 10 37*   HEMOGLOBIN g/dL 7 8*   HEMATOCRIT % 24 4*   PLATELETS Thousands/uL 97*      Results from last 7 days   Lab Units 07/30/22  0542 07/26/22 2137 07/26/22 1957   POTASSIUM mmol/L 3 8   < >  --    CHLORIDE mmol/L 111*   < >  --    CO2 mmol/L 29   < >  --    CO2, I-STAT mmol/L  --   --  22   BUN mg/dL 8   < >  --    CREATININE mg/dL 0 54*   < > --    CALCIUM mg/dL 8 0*   < >  --    ALK PHOS U/L 112   < >  --    ALT U/L 57   < >  --    AST U/L 45   < >  --    GLUCOSE, ISTAT mg/dl  --   --  163*    < > = values in this interval not displayed  Results from last 7 days   Lab Units 07/26/22  2137   INR  1 63*       Imaging Studies: I have personally reviewed pertinent reports  EKG, Pathology, and Other Studies: I have personally reviewed pertinent reports  Counseling / Coordination of Care  Total floor / unit time spent today 20 minutes  Greater than 50% of total time was spent with the patient and / or family counseling and / or coordination of care  Please note that the APS provides consultative services regarding pain management only  With the exception of ketamine, peripheral nerve catheters, and epidural infusions (and except when indicated), final decisions regarding starting or changing doses of analgesic medications are at the discretion of the consulting service  Off hours consultation and/or medication management is generally not available      Agustin Massey MD  Acute Pain Service

## 2022-07-30 NOTE — PROGRESS NOTES
Daily Progress Note - Critical Care   Qing Burgess 67 y o  male MRN: [id number]  Unit/Bed#: [facility] 516-01 Encounter: [id number]        ----------------------------------------------------------------------------------------    24hr events:   Patient remained extubated with NGT removed and advanced to clear liquid diet yesterday per white surgery  Arterial line and hanna discontinued  Pain remains well controlled with epidural  Persistent tachycardia overnight, given PRN metoprolol with decrease in heart rate to <100  UOP: 1L  RLQ STU Drain: 1935 cc light serosanguinous   1 recorded bowel movement    ---------------------------------------------------------------------------------------  SUBJECTIVE   Patient seen and examined at bedside, in no acute distress  No acute events overnight  Patient's pain is well controlled with PCEA  He reports flatus and small bowel movement yesterday  Tolerated clears without nausea or vomiting  states he is having a difficult time sleeping 2/2 noise  Review of Systems   Constitutional: Positive for activity change and fatigue  Negative for appetite change, chills and fever  HENT: Positive for sore throat  Respiratory: Negative for cough and shortness of breath  Cardiovascular: Negative for chest pain and palpitations  Gastrointestinal: Positive for abdominal pain (dawson-incisional tenderness)  Negative for nausea and vomiting  Skin: Positive for wound (midline abdominal incision)  Psychiatric/Behavioral: Positive for sleep disturbance  All other systems reviewed and are negative     ---------------------------------------------------------------------------------------  Assessment and Plan:    Neuro:   · Diagnosis: No active issues  ? Analgesia: Epidural, Dilaudid 0 5 mg IV Q2H PRN, Tylenol 975 mg Q6H Hanna  ? APS following  ? Delirium precautions: Regulate sleep/wake cycle  ?  6 mg Melatonin QHS        CV:   · Diagnosis: History of Atrial Fibrillation with newly decreased ejection fraction  · ECHO 2016 with EF 60%  · ECHO obtained post operatively: EF 45%  ? Plan:   § On Atenolol and ASA at home  § Atenolol 25 mg daily resumed  § Lopressor 5mg IV Q6H prn for HR > 130  · Diagnosis: Lower Extremity Edema  ? Plan:   § On HCTZ and Lasix at home  § Held at this time   § Consider diuresis vs resuming Lasix today        Pulm:  · Diagnosis: MORALES  · Previously on CPAP at home but appears it broke and unable to get a new one   ? Plan:   § SpO2 goal >88%  § Respiratory protocol, good pulmonary hygiene        GI:   · Diagnosis: History of pancreatic cancer, now s/p completion of pancreatectomy with sims anastomosis, SARAH, cholecystectomy on 7/26/22  ? Plan:   § Advanced to CLD per surgical oncology team  § Monitor abdominal exam  § Monitor RUQ STU drain  § Drain management per white surgery  · Diagnosis: History of Pancreatic Insufficiency  ? Plan:   § Takes Creon at home  § Resume now that diet is advanced  · Diagnosis: Hyperbilirubinemia (present on admission)  · Plan:  · Trend LFTs  · T  Bili today 1 49 (1 14)  · AST/ALT WNL  · GI PPx: Protonix 40 mg IV daily - switch to PO today  · Bowel Regimen: per surgical team        :   · Diagnosis: No active issues  ? Plan:   § Monitor I/Os, renal function        F/E/N:   · F: None  · E: Replete for K>4, Mag>2, Phos>3  · KPhos, Mag given this AM  · N: Clear liquid diet        Heme/Onc:   · Diagnosis: History of pancreatitic cancer  · Laparoscopic distal pancreatectomy (3/12/2019) with adjuvant chemotherapy (4/2019 - 7/2019)  · Subtotal pancreatectomy, SARAH (2/15/2022) with adjuvant chemotherapy (4/12/22-6/7/22)  4 of 6 cycles completed, remainder held d/t scheduled surgery  · Most recently completion of pancreatectomy with sims anastomosis, SARAH, cholecystectomy (7/26/22)  ?  Plan:   § Radiation Oncology/ Hematology following outpatient   § Heme/ Rad Onc follow up per surgical oncology  § Monitor for neutropenia  · Diagnosis: Acute Blood Loss Anemia  · No signs of ongoing blood loss  · Plan:  · Monitor Hgb  · AM CBC pending at this time  · Transfuse as needed to maintain Hgb >7  · Diagnosis: History of thrombocytopenia  · Given DDAVP post operatively  ? Plan:   § Monitor CBC, platelets  § AM CBC pending at this time  § Aspirin resumption per white surgery  ·  DVT ppx: SQH Heparin       Endo:   · Diagnosis: Type 2 Diabetes Mellitus, post pancreatectomy  ? Plan:   § On insulin and metformin at home  § SSI algorithm 2  § Endocrine following, appreciate recommendations        ID:   · Diagnosis: No active issues  ? Plan:   § Monitor WBC/ fever curve  § Monitor incisions for signs of infection        MSK/Skin:   · Diagnosis: At risk for skin breakdown  ? Plan:   § Frequent turning/ repositioning while intubated  § PT/OT when appropriate   § OOB to chair/ ambulating as tolerated      LDA:  · Central Line  · RUQ STU  · PCEA  · 1 pIV    Disposition: Transfer to Stepdown Level 2  Code Status: Level 1 - Full Code  ---------------------------------------------------------------------------------------  ICU CORE MEASURES    Prophylaxis   VTE Pharmacologic Prophylaxis: Heparin  VTE Mechanical Prophylaxis: sequential compression device  Stress Ulcer Prophylaxis: Pantoprazole IV     ABCDE Protocol (if indicated)  Plan to perform spontaneous awakening trial today? NA  Plan to perform spontaneous breathing trial today? NA    Invasive Devices Review  Invasive Devices  Report    Central Venous Catheter Line  Duration           Port A Cath 03/30/22 Right Chest 122 days          Epidural Line  Duration           Epidural Catheter 07/26/22 3 days          Drain  Duration           Closed/Suction Drain Left Abdomen Bulb 19 Fr  165 days    Closed/Suction Drain Right RLQ Bulb 19 Fr  3 days              Can any invasive devices be discontinued today?  Yes  ---------------------------------------------------------------------------------------  OBJECTIVE    Physical Exam  Vitals and nursing note reviewed  Constitutional:       General: He is not in acute distress  Appearance: He is obese  He is not ill-appearing  Interventions: He is sedated and intubated  HENT:      Head: Normocephalic and atraumatic  Nose: Nose normal       Mouth/Throat:      Mouth: Mucous membranes are moist       Pharynx: Oropharynx is clear  Eyes:      Conjunctiva/sclera: Conjunctivae normal       Pupils: Pupils are equal, round, and reactive to light  Cardiovascular:      Rate and Rhythm: Tachycardia present  Rhythm irregular  Pulses: Normal pulses  Arteriovenous access: right arteriovenous access is present  Pulmonary:      Effort: Pulmonary effort is normal  No respiratory distress  He is intubated  Breath sounds: Normal breath sounds  Comments: 2L NC  Abdominal:      General: Abdomen is protuberant  Palpations: Abdomen is soft  Tenderness: There is no guarding or rebound  Comments: Midline incision with staples c/d/i, no drainage or erythema  RUQ STU drain with serosanguinous output   Musculoskeletal:      Cervical back: Neck supple  Right lower le+ Pitting Edema present  Left lower le+ Pitting Edema present  Skin:     General: Skin is warm and dry  Coloration: Skin is not jaundiced  Neurological:      General: No focal deficit present  Mental Status: He is alert and oriented to person, place, and time  Mental status is at baseline  Psychiatric:         Mood and Affect: Mood normal          Behavior: Behavior normal          Thought Content:  Thought content normal          Judgment: Judgment normal          Vitals   Vitals:    22 0400 22 0500 22 0544 22 0600   BP: 133/66 137/75  127/73   BP Location: Left arm      Pulse: 105 (!) 106  (!) 108   Resp: 12 21  12   Temp: 99 3 °F (37 4 °C) 99 °F (37 2 °C)  99 5 °F (37 5 °C)   TempSrc: Rectal      SpO2: 98% 98%  98%   Weight:   (!) 138 kg (303 lb 5 7 oz) Height:         Temp (24hrs), Av 5 °F (37 5 °C), Min:98 78 °F (37 1 °C), Max:100 04 °F (37 8 °C)  Current: Temperature: 99 °F (37 2 °C)      Invasive/non-invasive ventilation settings   Respiratory  Report   Lab Data (Last 4 hours)    None         O2/Vent Data (Last 4 hours)    None                Height and Weights   Height: 6' (182 9 cm)  IBW (Ideal Body Weight): 77 6 kg  Body mass index is 41 14 kg/m²  Weight (last 2 days)     Date/Time Weight    22 0544 138 (303 35)    22 0600 139 (306)    22 0533 139 (306 88)            Intake and Output  I/O        07 0700  0701   0700    I V  (mL/kg)  6700 (52 8)    Blood  350    IV Piggyback  2150    Total Intake(mL/kg)  9200 (72 4)    Urine (mL/kg/hr)  1860 (0 6)    Drains  1040    Blood  1000    Total Output  3900    Net  +5300              Nutrition       Diet Orders   (From admission, onward)             Start     Ordered    22 0720  Diet Surgical; Full Liquid; No Carbonation  Diet effective now        References:    Nutrtion Support Algorithm Enteral vs  Parenteral   Question Answer Comment   Diet Type Surgical    Surgical Full Liquid    Other Restriction(s): No Carbonation    RD to adjust diet per protocol?  No        22 0719              Laboratory and Diagnostics:  Results from last 7 days   Lab Units 22  0406 22  1536 22  0525 22  2033 22  1356 22  0436 22  2252 22  1957 22  1042 22  0626   WBC Thousand/uL 6 90 5 43 4 18*  --   --  8 03 8 48  --   --  5 20   HEMOGLOBIN g/dL 7 4* 7 2* 7 1* 7 3* 7 6* 9 0* 9 9*  --    < > 12 2   I STAT HEMOGLOBIN g/dl  --   --   --   --   --   --   --  10 2*   < >  --    HEMATOCRIT % 22 8* 21 7* 21 8* 22 3*  --  27 1* 29 3*  --    < > 35 5*   HEMATOCRIT, ISTAT %  --   --   --   --   --   --   --  30*   < >  --    PLATELETS Thousands/uL 73* 70* 50*  --   --  107* 130*  --   --  97*   NEUTROS PCT % 73  --  68  --   -- 81*  --   --   --   --    MONOS PCT % 11  --  13*  --   --  11  --   --   --   --     < > = values in this interval not displayed  Results from last 7 days   Lab Units 07/30/22  0542 07/29/22  0340 07/28/22  0525 07/27/22  1356 07/27/22  0436 07/26/22  2137 07/26/22  1957   SODIUM mmol/L 143 143 142 140 140 141  --    POTASSIUM mmol/L 3 8 3 9 4 0 4 4 5 1 4 7  --    CHLORIDE mmol/L 111* 113* 112* 110* 111* 111*  --    CO2 mmol/L 29 28 26 23 21 18*  --    CO2, I-STAT mmol/L  --   --   --   --   --   --  22   ANION GAP mmol/L 3* 2* 4 7 8 12  --    BUN mg/dL 8 10 12 8 9 8  --    CREATININE mg/dL 0 54* 0 47* 0 61 0 77 0 79 0 83  --    CALCIUM mg/dL 8 0* 8 0* 7 7* 7 7* 8 3 8 9  --    GLUCOSE RANDOM mg/dL 156* 149* 170* 200* 206* 171*  --    ALT U/L 57 68  --   --  68 67  --    AST U/L 45 69*  --   --  98* 97*  --    ALK PHOS U/L 112 73  --   --  59 69  --    ALBUMIN g/dL 2 6* 2 5*  --   --  2 9* 2 4*  --    TOTAL BILIRUBIN mg/dL 1 49* 1 14*  --   --  2 01* 3 11*  --      Results from last 7 days   Lab Units 07/30/22  0542 07/29/22 0340 07/28/22  0525 07/27/22  0436 07/26/22  2137   MAGNESIUM mg/dL 1 9 1 9 2 1 2 0 1 2*   PHOSPHORUS mg/dL 1 8* 1 9* 1 7*  --  3 9      Results from last 7 days   Lab Units 07/26/22  2137   INR  1 63*          Results from last 7 days   Lab Units 07/28/22  0827 07/28/22  0525 07/28/22  0057 07/27/22  2030 07/27/22  1746 07/27/22  1356 07/27/22  1055   LACTIC ACID mmol/L 1 9 2 2* 2 4* 2 6* 3 0* 3 9* 5 3*     ABG:  Results from last 7 days   Lab Units 07/27/22 2033   PH ART  7 428   PCO2 ART mm Hg 34 7*   PO2 ART mm Hg 164 9*   HCO3 ART mmol/L 22 4   BASE EXC ART mmol/L -1 5   ABG SOURCE  Line, Arterial     VBG:  Results from last 7 days   Lab Units 07/27/22 2033   ABG SOURCE  Line, Arterial           Micro        Imaging: I have personally reviewed pertinent reports        Active Medications  Scheduled Meds:  Current Facility-Administered Medications   Medication Dose Route Frequency Provider Last Rate    acetaminophen  975 mg Oral Q6H [facility] Estephania Deed, CRNP      atenolol  25 mg Oral Daily Bri Nielsen MD      heparin (porcine)  5,000 Units Subcutaneous [facility] Willis Rojas MD      HYDROmorphone  0 5 mg Intravenous Q2H PRN Poncho Mccormick MD      insulin glargine  15 Units Subcutaneous HS [employer] Deed, CRNP      insulin lispro  1-5 Units Subcutaneous TID AC [employer] Deed, CRNP      insulin lispro  1-5 Units Subcutaneous HS [employer] Deed, CRNP      insulin lispro  5 Units Subcutaneous TID With Meals [employer] Deed, CRNP      melatonin  3 mg Oral HS Charla Ceballos MD      metoprolol  5 mg Intravenous Q6H PRN Nila Perez MD      pantoprazole  40 mg Intravenous Q24H Candice Lloyd MD      phenol  1 spray Mouth/Throat Q2H PRN Bri Nielsen MD      ropivacaine 0 1% and fentaNYL 2 mcg/mL   Epidural Continuous Bright Mariama Holliday MD       Continuous Infusions:  ropivacaine 0 1% and fentaNYL 2 mcg/mL,       PRN Meds:   HYDROmorphone, 0 5 mg, Q2H PRN  metoprolol, 5 mg, Q6H PRN  phenol, 1 spray, Q2H PRN        Allergies   No Known Allergies    Advance Directive and Living Will:      Power of :    POLST:      Nila Perez MD  General Surgery PGY-2    Portions of the record may have been created with voice recognition software  Occasional wrong word or "sound a like" substitutions may have occurred due to the inherent limitations of voice recognition software    Read the chart carefully and recognize, using context, where substitutions have occurred

## 2022-07-31 NOTE — PROGRESS NOTES
Surgical Oncology  Progress Note   Eloisa Elizabeth 67 y o  male MRN: [id number]  Unit/Bed#: [facility] 904-01 Encounter: [id number]    Assessment:  Patient is a 67 y o  male who presented with MD-IPMN s/p  IPMN, s/p Lap distal pancreatectomy (2019-), open subtotal pancreatectomy (2022-), now status post completion pancreatectomy with sims anastomosis, extensive SARAH, reduction of internal hernia and cholecystectomy on       Afebrile,VSS , on RA     AM Labs  Hgb:8 from 7 8  WBC: 14 5 from 10 3    STU: 2 1L , serous   UOP: 500 cc plus multiple unmeasured voids      Plan:  -Advance diet as tolerated;full liquids  -Maintain STU drain to bulb suction; monitor output and character  -D/c PCEA; pain control per APS  -Continue home lasix  -Appreciate Endo recs  -DVT ppx  -Encourage ambulation  -PT/OT: rehab    Subjective/Objective     Subjective: Patient seen and examined at bedside, in no acute distress  No acute events overnight  Patient's pain is well controlled  He denies nausea or vomiting  Passing flatus and having bowel movements  Unable to ambulate  Objective:     Vitals:Blood pressure 110/71, pulse (!) 109, temperature 97 6 °F (36 4 °C), resp  rate 18, height 6' (1 829 m), weight (!) 138 kg (303 lb 5 7 oz), SpO2 92 %  ,Body mass index is 41 14 kg/m²       Temp (24hrs), Av 1 °F (37 3 °C), Min:97 6 °F (36 4 °C), Max:99 86 °F (37 7 °C)  Current: Temperature: 97 6 °F (36 4 °C)      Intake/Output Summary (Last 24 hours) at 20228  Last data filed at 2022 1930  Gross per 24 hour   Intake 924 7 ml   Output 2960 ml   Net -203 3 ml       Invasive Devices  Report    Central Venous Catheter Line  Duration           Port A Cath 22 Right Chest 122 days          Epidural Line  Duration           Epidural Catheter 22 4 days          Drain  Duration           Closed/Suction Drain Left Abdomen Bulb 19 Fr  165 days    Closed/Suction Drain Right RLQ Bulb 19 Fr  4 days Physical Exam:  General: No acute distress, alert and oriented  CV: Well perfused, rate controlled  Lungs: Normal work of breathing, no increased respiratory effort on RA  Abdomen: Soft, appropriately tender, moderatly distended  Incision clean, dry and intact   STU drain in place as above  Extremities: B/L upper and lower extremity edema; improving  Skin: Warm, dry    Lab Results: Results: I have personally reviewed all pertinent laboratory/tests results  VTE Prophylaxis: Sequential compression device (Venodyne) , Heparin    Estephania Chen MD  7/30/2022

## 2022-07-31 NOTE — PROGRESS NOTES
Epidural Follow-up Note - Acute Pain Service    Min Chirinos 67 y o  male MRN: [id number]  Unit/Bed#: [facility] 904-01 Encounter: [id number]      Assessment:   Principal Problem:    IPMN (intraductal papillary mucinous neoplasm)  Active Problems:    Permanent atrial fibrillation (HCC)    Lower extremity edema    Overlapping malignant neoplasm of pancreas (HCC)    Type 2 diabetes mellitus with hyperglycemia, with long-term current use of insulin (HCC)    Thrombocytopenia ([address] )      Min Chirinos is a 67y o  year old male with PMHx of A-fib and T2DM who originally prsented with intraductal papillary mucinous neoplasm s/p ex-lap/jasen/pancreatectomy/reduction of hernia on 7/26  A pre-operative thoracic epidural was placed for post-operative analgesia  The patient was intubated post-operatively so the thoracic epidural was set to continuous infusion @1cc/hr  He was subsequently extubated on 7/28  APS consulted for epidural/post-op pain management  Upon bedside evaluation today, Lorena Pinzon is doing ok overall  He gets easily fatigued while working with PT and walking  Able to get OOB into chair and walk short distances  Having bowel movements and voiding  PCEA doses assisting with pain  No evidence of excessive sympathectomy-induced hypotension/pressor requirements/abnormal sensorimotor deficits  Denies opioid-induced side effects including nausea/vomiting/itching/constipation  Able to move LE bilaterally  Epidural site c/d/i  Plan:  Analgesia:  - Continue Thoracic epidural infusion of Ropivacaine 0 1% with fentanyl 2 mcg/mL at 8/5/10/3; today is day 5   - Continue Dilaudid 0 5 mg IV q2hr PRN for breakthrough pain  - Anticipate epidural removal and transition to primarily PO regimen 1-2 days    Bowel Regimen:  - Bowel regimen when appropriate from surgical perspective    APS will continue to follow  Please contact Acute Pain Service - [facility] via [employer] from [phone] with additional questions or concerns   See Alan or Bernardino for additional contacts and after hours information  Pain History  Current pain location(s): Abdomen  Pain Scale:   4-7/10  Quality: Diffuse, achy  24 hour history: See above    PCEA use: 9 attempts, 9 doses given  Opioid requirement previous 24 hours: IV dilaudid 0 5mg    Meds/Allergies   all current active meds have been reviewed    No Known Allergies    Objective     Temp:  [97 6 °F (36 4 °C)-98 7 °F (37 1 °C)] 97 9 °F (36 6 °C)  HR:  [] 95  Resp:  [18-20] 20  BP: (109-136)/(69-72) 117/71    Physical Exam  Vitals reviewed  Constitutional:       Appearance: He is obese  HENT:      Head: Normocephalic  Mouth/Throat:      Mouth: Mucous membranes are dry  Eyes:      Extraocular Movements: Extraocular movements intact  Cardiovascular:      Rate and Rhythm: Normal rate  Pulses: Normal pulses  Pulmonary:      Effort: Pulmonary effort is normal    Abdominal:      General: Abdomen is flat  There is no distension  Tenderness: There is abdominal tenderness  Musculoskeletal:         General: Normal range of motion  Cervical back: Normal range of motion  Skin:     General: Skin is dry  Neurological:      General: No focal deficit present  Mental Status: He is alert and oriented to person, place, and time     Psychiatric:         Mood and Affect: Mood normal        Epidural: Site clean/dry/intact, no surrounding erythema/edema/induration, infusion functioning appropriately    Lab Results:   Results from last 7 days   Lab Units 07/31/22  0543   WBC Thousand/uL 14 55*   HEMOGLOBIN g/dL 8 0*   HEMATOCRIT % 24 8*   PLATELETS Thousands/uL 92*      Results from last 7 days   Lab Units 07/31/22  0543 07/30/22  0542 07/26/22  2137 07/26/22 1957   POTASSIUM mmol/L 3 8 3 8   < >  --    CHLORIDE mmol/L 107 111*   < >  --    CO2 mmol/L 28 29   < >  --    CO2, I-STAT mmol/L  --   --   --  22   BUN mg/dL 10 8   < >  --    CREATININE mg/dL 0 76 0 54*   < >  --    CALCIUM mg/dL 8 0* 8 0*   < >  --    ALK PHOS U/L  --  112   < >  --    ALT U/L  --  57   < >  --    AST U/L  --  45   < >  --    GLUCOSE, ISTAT mg/dl  --   --   --  163*    < > = values in this interval not displayed  Results from last 7 days   Lab Units 07/26/22  2137   INR  1 63*       Imaging Studies: I have personally reviewed pertinent reports  EKG, Pathology, and Other Studies: I have personally reviewed pertinent reports  Counseling / Coordination of Care  Total floor / unit time spent today 20 minutes  Greater than 50% of total time was spent with the patient and / or family counseling and / or coordination of care  Please note that the APS provides consultative services regarding pain management only  With the exception of ketamine, peripheral nerve catheters, and epidural infusions (and except when indicated), final decisions regarding starting or changing doses of analgesic medications are at the discretion of the consulting service  Off hours consultation and/or medication management is generally not available      Carlos Robb MD  Acute Pain Service

## 2022-07-31 NOTE — PLAN OF CARE
Problem: MOBILITY - ADULT  Goal: Maintain or return to baseline ADL function  Description: INTERVENTIONS:  -  Assess patient's ability to carry out ADLs; assess patient's baseline for ADL function and identify physical deficits which impact ability to perform ADLs (bathing, care of mouth/teeth, toileting, grooming, dressing, etc )  - Assess/evaluate cause of self-care deficits   - Assess range of motion  - Assess patient's mobility; develop plan if impaired  - Assess patient's need for assistive devices and provide as appropriate  - Encourage maximum independence but intervene and supervise when necessary  - Involve family in performance of ADLs  - Assess for home care needs following discharge   - Consider OT consult to assist with ADL evaluation and planning for discharge  - Provide patient education as appropriate  Outcome: Progressing  Goal: Maintains/Returns to pre admission functional level  Description: INTERVENTIONS:  - Perform BMAT or MOVE assessment daily    - Set and communicate daily mobility goal to care team and patient/family/caregiver  - Collaborate with rehabilitation services on mobility goals if consulted  - Perform Range of Motion 3 times a day  - Reposition patient every 3 hours    - Dangle patient 3 times a day  - Stand patient 3 times a day  - Ambulate patient 3 times a day  - Out of bed to chair 3 times a day   - Out of bed for meals 3 times a day  - Out of bed for toileting  - Record patient progress and toleration of activity level   Outcome: Progressing     Problem: Prexisting or High Potential for Compromised Skin Integrity  Goal: Skin integrity is maintained or improved  Description: INTERVENTIONS:  - Identify patients at risk for skin breakdown  - Assess and monitor skin integrity  - Assess and monitor nutrition and hydration status  - Monitor labs   - Assess for incontinence   - Turn and reposition patient  - Assist with mobility/ambulation  - Relieve pressure over bony prominences  - Avoid friction and shearing  - Provide appropriate hygiene as needed including keeping skin clean and dry  - Evaluate need for skin moisturizer/barrier cream  - Collaborate with interdisciplinary team   - Patient/family teaching  - Consider wound care consult   Outcome: Progressing     Problem: Nutrition/Hydration-ADULT  Goal: Nutrient/Hydration intake appropriate for improving, restoring or maintaining nutritional needs  Description: Monitor and assess patient's nutrition/hydration status for malnutrition  Collaborate with interdisciplinary team and initiate plan and interventions as ordered  Monitor patient's weight and dietary intake as ordered or per policy  Utilize nutrition screening tool and intervene as necessary  Determine patient's food preferences and provide high-protein, high-caloric foods as appropriate       INTERVENTIONS:  - Monitor oral intake, urinary output, labs, and treatment plans  - Assess nutrition and hydration status and recommend course of action  - Evaluate amount of meals eaten  - Assist patient with eating if necessary   - Allow adequate time for meals  - Recommend/ encourage appropriate diets, oral nutritional supplements, and vitamin/mineral supplements  - Order, calculate, and assess calorie counts as needed  - Recommend, monitor, and adjust tube feedings and TPN/PPN based on assessed needs  - Assess need for intravenous fluids  - Provide specific nutrition/hydration education as appropriate  - Include patient/family/caregiver in decisions related to nutrition  Outcome: Progressing     Problem: Potential for Falls  Goal: Patient will remain free of falls  Description: INTERVENTIONS:  - Educate patient/family on patient safety including physical limitations  - Instruct patient to call for assistance with activity   - Consult OT/PT to assist with strengthening/mobility   - Keep Call bell within reach  - Keep bed low and locked with side rails adjusted as appropriate  - Keep care items and personal belongings within reach  - Initiate and maintain comfort rounds  - Make Fall Risk Sign visible to staff  - Offer Toileting every 3 Hours, in advance of need  - Initiate/Maintain 3alarm  - Obtain necessary fall risk management equipment: 3  - Apply yellow socks and bracelet for high fall risk patients  - Consider moving patient to room near nurses station  Outcome: Progressing

## 2022-07-31 NOTE — PROGRESS NOTES
Patient refused to get up into the chair stating, he's " warn out"  Patient was educated on the importance of getting up out of bed

## 2022-08-01 NOTE — PROGRESS NOTES
Progress Note - Surgical Oncology  Joel Carlos 67 y o  male MRN: [id number]  Unit/Bed#: [facility] 904-01 Encounter: [id number]    Assessment:  Patient is a 67 y o  male who presented with MD-IPMN s/p  IPMN, s/p Lap distal pancreatectomy (03/2019-Marlee), open subtotal pancreatectomy (02/2022-Marlee), now status post completion pancreatectomy with sims anastomosis, extensive SARAH, reduction of internal hernia and cholecystectomy on 7/26/POD4  Tmax: 101 1 at 2300, tachycardic to 120's  Hypotensive with SBP in the 70s requiring pressors [email or web address] on eval    STU: 2 7 L , murky serous   UOP:750 cc plus 1x  Stools x2    Plan:  NPO  Isoloyte @ 125  Subjective/Objective     Subjective:   Febrile to 101 7; fever workup underwent  Pt lethargic, not opening eyes  VBG obtained at that time as well, which was unremarkeable  Rapid response called for worsening hypotension  Has received about 2 5 L total of fluids  Lactate was 4 7, recheck this am  Give 1/2 amp of dextrose for glucose of 56  Pt having abdominal pain, denies any other complaints    Objective:    Blood pressure 90/60, pulse (!) 125, temperature (!) 101 1 °F (38 4 °C), temperature source Rectal, resp  rate 22, height 6' (1 829 m), weight (!) 138 kg (304 lb), SpO2 94 %  ,Body mass index is 41 23 kg/m²  Intake/Output Summary (Last 24 hours) at 8/1/2022 0003  Last data filed at 7/31/2022 2313  Gross per 24 hour   Intake 394 04 ml   Output 3305 ml   Net -2910 96 ml       Invasive Devices  Report    Central Venous Catheter Line  Duration           Port A Cath 03/30/22 Right Chest 124 days          Epidural Line  Duration           Epidural Catheter 07/26/22 5 days          Drain  Duration           Closed/Suction Drain Left Abdomen Bulb 19 Fr  166 days    Closed/Suction Drain Right RLQ Bulb 19 Fr  5 days                Physical Exam  Vitals reviewed  Constitutional:       General: He is not in acute distress       Appearance: He is not ill-appearing, toxic-appearing or diaphoretic  HENT:      Head: Normocephalic and atraumatic  Eyes:      Extraocular Movements: Extraocular movements intact  Cardiovascular:      Rate and Rhythm: Normal rate  Pulmonary:      Effort: Pulmonary effort is normal  No respiratory distress  Comments: On NC  Abdominal:      General: There is distension  Palpations: Abdomen is soft  Tenderness: There is abdominal tenderness  There is no guarding or rebound  Comments: Incisions clean, dry and intact  STU drain in place   Musculoskeletal:         General: Swelling present  Right lower leg: Edema present  Left lower leg: Edema present  Skin:     General: Skin is warm and dry  Comments: pale   Neurological:      Mental Status: He is alert and oriented to person, place, and time  Psychiatric:         Mood and Affect: Mood normal          Behavior: Behavior normal              Results from last 7 days   Lab Units 07/31/22  0543 07/30/22 0542 07/29/22  0406   WBC Thousand/uL 14 55* 10 37* 6 90   HEMOGLOBIN g/dL 8 0* 7 8* 7 4*   HEMATOCRIT % 24 8* 24 4* 22 8*   PLATELETS Thousands/uL 92* 97* 73*     Results from last 7 days   Lab Units 07/31/22 0543 07/30/22 0542 07/29/22  0340 07/26/22 2137 07/26/22 1957   POTASSIUM mmol/L 3 8 3 8 3 9   < >  --    CHLORIDE mmol/L 107 111* 113*   < >  --    CO2 mmol/L 28 29 28   < >  --    CO2, I-STAT mmol/L  --   --   --   --  22   BUN mg/dL 10 8 10   < >  --    CREATININE mg/dL 0 76 0 54* 0 47*   < >  --    GLUCOSE, ISTAT mg/dl  --   --   --   --  163*   CALCIUM mg/dL 8 0* 8 0* 8 0*   < >  --     < > = values in this interval not displayed       Results from last 7 days   Lab Units 07/26/22 2137   INR  1 63*

## 2022-08-01 NOTE — SPEECH THERAPY NOTE
Speech Language/Pathology    Speech/Language Pathology Progress Note    Patient Name: Evelyne De Paz  TTBDQ'U Date: 8/1/2022     Problem List  Principal Problem:    IPMN (intraductal papillary mucinous neoplasm)  Active Problems:    Permanent atrial fibrillation ([facility] )    Lower extremity edema    Overlapping malignant neoplasm of pancreas ([facility] )    Type 2 diabetes mellitus with hyperglycemia, with long-term current use of insulin ([facility] )    Thrombocytopenia ([facility] )       Past Medical History  Past Medical History:   Diagnosis Date    A-fib ([facility] )     Abdominal wall strain     last assessed: 10/21/14    Allergic rhinitis     last assessed: 05/03/16    Arthritis     Cancer ([facility] )     PACNCREATIC    COVID-19 virus infection 3/3/2021    CPAP (continuous positive airway pressure) dependence     Diabetes mellitus ([facility] )     Erectile dysfunction of non-organic origin     last assessed: 03/17/14    Irregular heart beat     Pt with A fib     Lumbar strain     last assessed: 10/21/14    Multiple acquired skin tags     last assessed: 2/18/16    Palpitations     last assessed: 03/17/14    Pancreas cyst     Plantar fasciitis     Skin disorder     last assessed: 05/28/13    Sleep apnea     CPAP BROKE IN OCTOBER HAS BEEN TRYING TO GET NEW ONE BUT UNABLE AS OF YET    Thrombocytopenia ([facility] )         Past Surgical History  Past Surgical History:   Procedure Laterality Date    BOTOX INJECTION N/A 11/12/2021    Procedure: Olayinka Choe;  Surgeon: Marii Laura MD;  Location: [address] [place] [address] MAIN OR;  Service: Urology    CATARACT EXTRACTION Bilateral     CHOLECYSTECTOMY N/A 7/26/2022    Procedure: CHOLECYSTECTOMY;  Surgeon: Aicha Coles MD;  Location:  MAIN OR;  Service: Surgical Oncology    COLONOSCOPY  01/17/2013    COLONOSCOPY  01/08/2018    COLONOSCOPY  04/2021    CYSTOSCOPY      INJECT WITH BOTOX    DISTAL PANCREATECTOMY N/A 2/15/2022    Procedure: PANCREATECTOMY SUB-TOTALL-OPEN;  Surgeon: Aicha Coles MD; [facility]: [facility] MAIN [facility];  Service: Surgical Oncology    EGD  06/05/2020    EGD AND COLONOSCOPY  02/06/2014, 2/8/2017    FL GUIDED CENTRAL VENOUS ACCESS DEVICE INSERTION  4/23/2019    FLEXIBLE SIGMOIDOSCOPY  06/11/2019    FOOT SURGERY      Due to plantar fascitis    IR PORT PLACEMENT  3/30/2022    JOINT REPLACEMENT Left     LTK    LINEAR ENDOSCOPIC U/S      PANCREATECTOMY N/A 7/26/2022    Procedure: COMPLETION OF PANCREATECTOMY W/MUHAMMAD ANASTOMOSIS, EXTENSIVE LYSIS OF ADHESIONS, REDUCTION OF INTERNAL HERNIA;  Surgeon: Johanny Huntley MD;  [facility];  Service: Surgical Oncology    PANCREATECTOMY LAPAROSCOPIC N/A 3/12/2019    Procedure: DISTAL PANCREATECTOMY LAPAROSCOPIC/POSSIBLE OPEN;  Surgeon: Johanny Huntley MD;  [facility];  Service: Surgical Oncology    NJ EDG US EXAM SURGICAL ALTER STOM DUODENUM/JEJUNUM N/A 1/24/2019    Procedure: LINEAR ENDOSCOPIC U/S;  Surgeon: Torie Swan MD;  Location: [facility]; Service: Gastroenterology    REPLACEMENT TOTAL KNEE Left     TUNNELED VENOUS PORT PLACEMENT Left 4/23/2019    Procedure: INSERTION VENOUS PORT (PORT-A-CATH); Surgeon: Johanny Huntley MD;  [facility];  Service: Surgical Oncology- 11/8/21 Pt reports PAC removed     UMBILICAL HERNIA REPAIR           Subjective:  "I was lethargic" Patient is lethargic, but wakes briefly for SLP  Objective:  RRT called this am for patient due to hypotension  Patient then placed on clear liquid diet  Unable to see this am as patient was too lethargic  RN reports difficulty taking medications whole with thin liquids this pm and asked for re-assessment  The patient is trialed with honey thick liquids via tsp, nectar thick via tsp and straw and thin liquids via straw  Oral closure is adequate  Strength to draw from straw is adequate  The patient appears to have timely transfer time and timely swallow initiation time  No overt s/s aspiration observed   Unable to assess with solids due to clear liquid diet order  Assessment:  Patient tolerated sips of honey, nectar and thin liquids well  Plan/Recommendations:  Consider giving medications whole in applesauce  Continue diet per medical team  Graham Tinajero will continue to assess with upgraded diet when cleared

## 2022-08-01 NOTE — CASE MANAGEMENT
Case Management Discharge Planning Note    Patient name Agustin Mitchell  Location [address]/[facility] 466-00 MRN [id number]  : 1950 Date 2022       Current Admission Date: 2022  Current Admission Diagnosis:IPMN (intraductal papillary mucinous neoplasm)   Patient Active Problem List    Diagnosis Date Noted    Chemotherapy induced neutropenia ([facility] ) 2022    Port-A-Cath in place 2022    Counseling regarding advanced care planning and goals of care 2022    Encounter for geriatric assessment 2022    OAB (overactive bladder) 12/15/2020    BPH without obstruction/lower urinary tract symptoms 12/15/2020    Urgency incontinence 2020    Balanitis 2020    Thrombocytopenia ([facility] ) 2020    IPMN (intraductal papillary mucinous neoplasm) 2020    Type 2 diabetes mellitus with hyperglycemia, with long-term current use of insulin ([facility] ) 2019    Overlapping malignant neoplasm of pancreas ([facility] ) 2019    Pancreatic duct dilated 2018    Lower extremity edema 2018    Obstructive sleep apnea syndrome 2018    Hypersomnia 2018    Permanent atrial fibrillation ([facility] ) 2018    Morbid obesity with BMI of 40 0-44 9, adult ([facility] ) 2018      LOS (days): 6  Geometric Mean LOS (GMLOS) (days): 5 10  Days to GMLOS:-1 3     OBJECTIVE:  Risk of Unplanned Readmission Score: 22 79         Current admission status: Inpatient   Preferred Pharmacy:   [facility] [id number] [place] [address]  Phone: [phone] Fax: [facility] [place] [facility] [address] [facility]  Phone: [phone] Fax: [phone]    [facility]/pharmacy [address]  [address] [address]  Phone: [phone] Fax: [phone]    Primary Care Provider: Armando Rust, DO    Primary Insurance: MEDICARE  Secondary Insurance: [employer]    DISCHARGE DETAILS:    Discharge planning discussed with[de-identified] wife and pt  Freedom of Choice: Yes  Comments - Freedom of Choice: cm discussed str rec  CM contacted family/caregiver?: No- see comments  Were Treatment Team discharge recommendations reviewed with patient/caregiver?: Yes  Did patient/caregiver verbalize understanding of patient care needs?: Yes  Were patient/caregiver advised of the risks associated with not following Treatment Team discharge recommendations?: Yes    Contacts  Patient Contacts: Buzz Ray  Relationship to Patient[de-identified] Family  Contact Method: In Person  Reason/Outcome: Discharge Planning    Treatment Team Recommendation: Short Term Rehab  Discharge Destination Plan[de-identified] Short Term Rehab    CM met with pt and wife at bedside, they are agreeable to STR rec  CM placed referrals within 20 miles from home zip code

## 2022-08-01 NOTE — PROGRESS NOTES
Progress Note - Surgical Critical Care   Gearline Candis 67 y o  male MRN: [id number]  Unit/Bed#: [facility] 904-01 Encounter: [id number]        ----------------------------------------------------------------------------------------  24hr events: Patient is POD#5 s/p completion pancreatectomy with sims anastomosis, extensive SARAH, reduction of internal hernia, cholecystectomy  Noted to be hypotensive and febrile on floor  Fever workup ordered  Upgraded to ICU level of care    ---------------------------------------------------------------------------------------  SUBJECTIVE  Patient lethargic, but able to answer questions appropriately with some prompting       Review of Systems  Review of systems was unable to be performed secondary to emergent situation  ---------------------------------------------------------------------------------------  Assessment and Plan:    Neuro:    No active issues    Analgesia: PCEA stopped d/t hypotension, APS following   Delirium Precautions: CAM ICU, regulate sleep-wake cycle, avoid deliriogenic medications      CV:    Diagnosis: shock, atrial fibrillation, newly diagnosed reduced EF  o C/f septic shock given new onset fevers  o EF 45% on most recent echo (from 60% previously)  o Plan:   - CT C/A/P with IV contrast  - Check full set of labs now  - Start levophed -- titrate for MAP >65  - Place arterial line  - Telemetry monitoring  - Volume overloaded on exam -- holding home diuretics for now i/s/o shock  - Holding home beta blocker i/s/o shock      Pulm:   Diagnosis: respiratory insufficiency, MORALES  o Reportedly previously on CPAP on home, but machine broke and unable to get new one  o Currently on 2L NC  o Plan:   - Continuous pulse oximetry -- maintain SpO2 >90%  - Check ABG  - Respiratory protocol      GI/HPB:    Diagnosis: h/o pancreatic cancer, now s/p completion pancreatectomy  o Plan:   - NPO  - Check STU amylase  - Holding home creon while NPO  - STU per surg onc   GI PPx: protonix      :    Diagnosis: urinary retention  o Plan:   - Urinary retention protocol  - Strict I/O      F/E/N:    F: bolus 2 5 L crystalloid now, start [email or web address] once bolus is completer   E: monitor and replete electrolytes PRN    N: NPO      Heme/Onc:    No active issues    DVT PPx: SQH, SCDs  o Hold AM dose of SQH today for possible epidural removal      Endo:    Diagnosis: T2DM  o Plan:   - Endocrine following  - Holding home metformin  - Continue SSI  - Q6hr FSG while NPO  - Goal -180      ID:    Diagnosis: c/f intra-abdominal infection  o Plan:   - Start zosyn  - F/u CT C/A/P  - Trend fever curve, WBC count      MSK/Skin:    No active issues       Disposition: Transfer to Critical Care   Code Status: Level 1 - Full Code  ---------------------------------------------------------------------------------------  ICU CORE MEASURES    Prophylaxis   VTE Pharmacologic Prophylaxis: Heparin  VTE Mechanical Prophylaxis: sequential compression device  Stress Ulcer Prophylaxis: Pantoprazole PO      Invasive Devices Review  Invasive Devices  Report    Central Venous Catheter Line  Duration           Port A Cath 22 Right Chest 124 days          Epidural Line  Duration           Epidural Catheter 22 5 days          Drain  Duration           Closed/Suction Drain Left Abdomen Bulb 19 Fr  166 days    Closed/Suction Drain Right RLQ Bulb 19 Fr  5 days              Can any invasive devices be discontinued today?  No  ---------------------------------------------------------------------------------------  OBJECTIVE    Vitals   Vitals:    22 2342 22 2359 22 0012 22 0025   BP: 90/60  (!) 88/60 (!) 77/51   Pulse: (!) 125  (!) 122 95   Resp:       Temp: (!) 100 8 °F (38 2 °C) (!) 101 1 °F (38 4 °C)     TempSrc: Axillary Rectal     SpO2: 94%  97% 96%   Weight:       Height:         Temp (24hrs), Av °F (37 2 °C), Min:97 7 °F (36 5 °C), Max:101 1 °F (38 4 °C)  Current: Temperature: (!) 101 1 °F (38 4 °C)      Physical Exam  Gen:  ill-appearing  HENT: NC/AT, MMM  CV:  tachycardic, irregularly irregular  Lungs: normal WOB on 2L  Abd:  soft,distended, incision C/D/I    mild dawson-incisional tenderness   TSU with tan colored drainage  : no abnormalities  Ext:  no C/C/E  Skin:  warm/dry, no rashes  Neuro: lethargic but arousable   answers questions appropriately     Respiratory:  SpO2: SpO2: 97 %, SpO2 Device: O2 Device: Nasal cannula       Invasive/non-invasive ventilation settings   Respiratory  Report   Lab Data (Last 4 hours)    None         O2/Vent Data (Last 4 hours)    None                Laboratory and Diagnostics:  Results from last 7 days   Lab Units 07/31/22  0543 07/30/22  0542 07/29/22  0406 07/28/22  1536 07/28/22  0525 07/27/22  2033 07/27/22  1356 07/27/22  0436 07/26/22  2252   WBC Thousand/uL 14 55* 10 37* 6 90 5 43 4 18*  --   --  8 03 8 48   HEMOGLOBIN g/dL 8 0* 7 8* 7 4* 7 2* 7 1* 7 3* 7 6* 9 0* 9 9*   HEMATOCRIT % 24 8* 24 4* 22 8* 21 7* 21 8* 22 3*  --  27 1* 29 3*   PLATELETS Thousands/uL 92* 97* 73* 70* 50*  --   --  107* 130*   NEUTROS PCT %  --   --  73  --  68  --   --  81*  --    MONOS PCT %  --   --  11  --  13*  --   --  11  --      Results from last 7 days   Lab Units 07/31/22  0543 07/30/22  0542 07/29/22  0340 07/28/22  0525 07/27/22  1356 07/27/22  0436 07/26/22  2137   SODIUM mmol/L 140 143 143 142 140 140 141   POTASSIUM mmol/L 3 8 3 8 3 9 4 0 4 4 5 1 4 7   CHLORIDE mmol/L 107 111* 113* 112* 110* 111* 111*   CO2 mmol/L 28 29 28 26 23 21 18*   ANION GAP mmol/L 5 3* 2* 4 7 8 12   BUN mg/dL 10 8 10 12 8 9 8   CREATININE mg/dL 0 76 0 54* 0 47* 0 61 0 77 0 79 0 83   CALCIUM mg/dL 8 0* 8 0* 8 0* 7 7* 7 7* 8 3 8 9   GLUCOSE RANDOM mg/dL 157* 156* 149* 170* 200* 206* 171*   ALT U/L  --  57 68  --   --  68 67   AST U/L  --  45 69*  --   --  98* 97*   ALK PHOS U/L  --  112 73  --   --  59 69   ALBUMIN g/dL  --  2 6* 2 5*  --   --  2 9* 2 4*   TOTAL BILIRUBIN mg/dL  --  1 49* 1 14*  --   --  2 01* 3 11*     Results from last 7 days   Lab Units 07/31/22  0543 07/30/22  0542 07/29/22  0340 07/28/22  0525 07/27/22  0436 07/26/22  2137   MAGNESIUM mg/dL 2 0 1 9 1 9 2 1 2 0 1 2*   PHOSPHORUS mg/dL 2 4 1 8* 1 9* 1 7*  --  3 9      Results from last 7 days   Lab Units 07/26/22  2137   INR  1 63*          Results from last 7 days   Lab Units 07/28/22  0827 07/28/22  0525 07/28/22  0057 07/27/22  2030 07/27/22  1746 07/27/22  1356 07/27/22  1055   LACTIC ACID mmol/L 1 9 2 2* 2 4* 2 6* 3 0* 3 9* 5 3*     ABG:  Results from last 7 days   Lab Units 07/27/22 2033   PH ART  7 428   PCO2 ART mm Hg 34 7*   PO2 ART mm Hg 164 9*   HCO3 ART mmol/L 22 4   BASE EXC ART mmol/L -1 5   ABG SOURCE  Line, Arterial     VBG:  Results from last 7 days   Lab Units 08/01/22  0011 07/27/22 2033   PH ELIU  7 403*  --    PCO2 ELIU mm Hg 31 6*  --    PO2 ELIU mm Hg 50 9*  --    HCO3 ELIU mmol/L 19 3*  --    BASE EXC ELIU mmol/L -4 6  --    ABG SOURCE   --  Line, Arterial           Micro        EKG: I have personally reviewed pertinent reports  Imaging: I have personally reviewed pertinent reports  Intake and Output  I/O       07/30 0701 07/31 0700 07/31 0701 08/01 0700    I V  (mL/kg) 414 8 (3) 199 9 (1 4)    IV Piggyback 400 100    Total Intake(mL/kg) 814 8 (5 9) 299 9 (2 2)    Urine (mL/kg/hr) 510 (0 2) 750 (0 2)    Drains 2100 2280    Stool 0 0    Total Output 2610 3030    Net -1795 3 -2730 1          Unmeasured Urine Occurrence 1 x 1 x    Unmeasured Stool Occurrence 3 x 2 x          Height and Weights   Height: 6' (182 9 cm)  IBW (Ideal Body Weight): 77 6 kg  Body mass index is 41 23 kg/m²  Weight (last 2 days)     Date/Time Weight    07/31/22 0600 138 (304)    07/30/22 0544 138 (303 35)            Nutrition       Diet Orders   (From admission, onward)             Start     Ordered    07/31/22 0718  Diet Surgical; Post Gastrectomy;  No Carbonation  Diet effective now        References: Nutrtion Support Algorithm Enteral vs  Parenteral   Question Answer Comment   Diet Type Surgical    Surgical Post Gastrectomy    Other Restriction(s): No Carbonation    RD to adjust diet per protocol?  No        07/31/22 0717    07/30/22 1111  Dietary nutrition supplements  Once        Question Answer Comment   Select Supplement: Glucerna-Strawberry    Frequency Breakfast, Lunch, Dinner        07/30/22 1111                  Active Medications  Scheduled Meds:  Current Facility-Administered Medications   Medication Dose Route Frequency Provider Last Rate    acetaminophen  975 mg Oral Q6H [facility] Nipomo Lucho LillianaMIRNA gentile      atenolol  25 mg Oral Daily Andre Thomas MD      furosemide  20 mg Oral BID Zion Norris MD      heparin (porcine)  5,000 Units Subcutaneous [facility] Zion Norris MD      HYDROmorphone  0 5 mg Intravenous Q2H PRN Poncho Lindsay MD      insulin glargine  15 Units Subcutaneous HS Lucho Lilliana, CRNP      insulin lispro  1-5 Units Subcutaneous TID AC Lucho Lilliana, CRNP      insulin lispro  1-5 Units Subcutaneous HS Lucho Lilliana, CRNP      insulin lispro  5 Units Subcutaneous TID With Meals Lucho LillianaMIRNA      lactated ringers  500 mL Intravenous Once Andre Thomas MD      melatonin  6 mg Oral HS Andrew Willingham MD      metoprolol  5 mg Intravenous Q6H PRN Andre Thomas MD      pantoprazole  40 mg Oral Early Morning Dilma Frances MD      phenol  1 spray Mouth/Throat Q2H PRN Andre Thomas MD      ropivacaine 0 1% and fentaNYL 2 mcg/mL   Epidural Continuous Bright Mariama Trevino MD       Continuous Infusions:  ropivacaine 0 1% and fentaNYL 2 mcg/mL,       PRN Meds:   HYDROmorphone, 0 5 mg, Q2H PRN  metoprolol, 5 mg, Q6H PRN  phenol, 1 spray, Q2H PRN        Allergies   No Known Allergies  ---------------------------------------------------------------------------------------  Advance Directive and Living Will:      Power of :    POLST: ---------------------------------------------------------------------------------------      Prakash Marroquin MD      Portions of the record may have been created with voice recognition software  Occasional wrong word or "sound a like" substitutions may have occurred due to the inherent limitations of voice recognition software    Read the chart carefully and recognize, using context, where substitutions have occurred

## 2022-08-01 NOTE — RAPID RESPONSE
Rapid Response Note  Gavi Khan 67 y o  male MRN: [id number]  [facility] Encounter: [id number]    Rapid Response Notification(s):   Response called date/time:  8/1/2022 12:27 AM  Response team arrival date/time:  8/1/2022 12:28 AM  Response end date/time:  8/1/2022 1:01 AM  Level of care:  [facility]  Rapid response location:  [facility] ([facility]  Primary reason for rapid response call:  Acute change in BP    Rapid Response Intervention(s):   Airway:  None  Breathing:  None  Circulation:  None  Fluids administered:  Lactated Ringer's  Medications administered: Other (comment) (levophed)       Assessment:   · Septic shock    Plan:   · Bolus IVF  · Start levophed  · Broad spectrum abx with Zosyn   · CT c/a/p  · Transfer to ICU, please see ICU acceptance note for further details      Rapid Response Outcome:   Transfer:  Transfer to ICU  Primary service notified of transfer: Yes    Code Status: Level 1 (Full Code)      Family notified of transfer: yes  Family member contacted: wife by primary team      Background/Situation:   Gavi Khan is a 67 y o  male POD #5 completion pancreatectomy who now presents with hypotension, fever, altered mental status  Patient noted to be febrile by nurse, hypotensive  Labs and fluid bolus ordered, in the interim patient became more altered and hypotensive prompting rapid response activation     Review of Systems   Unable to perform ROS: Mental status change       Objective:   Vitals:    08/01/22 0031 08/01/22 0034 08/01/22 0037 08/01/22 0038   BP: (!) 76/55 (!) 78/56  (!) 79/56   Pulse: (!) 124 (!) 128 (!) 114 (!) 125   Resp:       Temp:       TempSrc:       SpO2: 98% 97%  97%   Weight:       Height:         Physical Exam  Constitutional:       Appearance: He is obese  He is ill-appearing  HENT:      Head: Normocephalic  Mouth/Throat:      Mouth: Mucous membranes are moist    Eyes:      Pupils: Pupils are equal, round, and reactive to light     Cardiovascular: Rate and Rhythm: Tachycardia present  Rhythm irregular  Abdominal:      General: There is distension  Tenderness: There is abdominal tenderness  Comments: STU purulent     Musculoskeletal:      Cervical back: Normal range of motion  Right lower leg: Edema present  Left lower leg: Edema present  Comments: Anasarca     Skin:     General: Skin is warm  Capillary Refill: Capillary refill takes less than 2 seconds  Neurological:      General: No focal deficit present  Mental Status: He is disoriented  Portions of the record may have been created with voice recognition software  Occasional wrong word or "sound a like" substitutions may have occurred due to the inherent limitations of voice recognition software  Read the chart carefully and recognize, using context, where substitutions have occurred      Juan Tomlinson PA-C

## 2022-08-01 NOTE — PROGRESS NOTES
Progress Note - Marii Merida 67 y o  male MRN: [id number]    Unit/Bed#: [facility] 000-47 Encounter: [id number]      CC: diabetes f/u    Subjective:   Marii Merida is a 67y o  year old male with type 2  diabetes  S/p pancreatectomy  , poor appetite, on pressors, episode of hypoglycemia in the AM    Objective:     Vitals: Blood pressure 149/69, pulse (!) 116, temperature 98 24 °F (36 8 °C), resp  rate 15, height 6' (1 829 m), weight (!) 138 kg (305 lb), SpO2 96 %  ,Body mass index is 41 37 kg/m²  Intake/Output Summary (Last 24 hours) at 8/1/2022 1559  Last data filed at 8/1/2022 1444  Gross per 24 hour   Intake 5961 64 ml   Output 3930 ml   Net 2031 64 ml       Physical Exam:  General Appearance: awake, appears stated age and cooperative  Head: Normocephalic, without obvious abnormality, atraumatic  Extremities: moves all extremities  Skin: Skin color and temperature normal    Pulm: no labored breathing      POC Glucose (mg/dl)   Date Value   08/01/2022 89   08/01/2022 91   08/01/2022 56 (L)   07/31/2022 92   07/31/2022 93   07/31/2022 133   07/31/2022 145 (H)   07/31/2022 161 (H)   07/31/2022 173 (H)   07/30/2022 248 (H)       Assessment:  66 yo M with pmhx of DM type 2, and pancreatic mass that was admitted to the Hospital for a total pancreatectomy   Home regimen: metformin 1000 mg bid, basaglar 20 units bedtime and fiasp 6 units tid  Plan:  Pt had an episode of hypoglycemia in the AM of 53  Currently patient is on lantus 15 units and lispro 5 units tid with meals  Pt had a Rapid response yesterday for hypotension, concern for septic shock, currently on  pressors and not eating  Recommend to discontinue lispro with meals and to decrease Lantus dose from 15 units to 8 units bedtime  Continue with correctional scale      Portions of the record may have been created with voice recognition software

## 2022-08-01 NOTE — PLAN OF CARE
Problem: Prexisting or High Potential for Compromised Skin Integrity  Goal: Skin integrity is maintained or improved  Description: INTERVENTIONS:  - Identify patients at risk for skin breakdown  - Assess and monitor skin integrity  - Assess and monitor nutrition and hydration status  - Monitor labs   - Assess for incontinence   - Turn and reposition patient  - Assist with mobility/ambulation  - Relieve pressure over bony prominences  - Avoid friction and shearing  - Provide appropriate hygiene as needed including keeping skin clean and dry  - Evaluate need for skin moisturizer/barrier cream  - Collaborate with interdisciplinary team   - Patient/family teaching  - Consider wound care consult   Outcome: Progressing     Problem: Potential for Falls  Goal: Patient will remain free of falls  Description: INTERVENTIONS:  - Educate patient/family on patient safety including physical limitations  - Instruct patient to call for assistance with activity   - Consult OT/PT to assist with strengthening/mobility   - Keep Call bell within reach  - Keep bed low and locked with side rails adjusted as appropriate  - Keep care items and personal belongings within reach  - Initiate and maintain comfort rounds  - Make Fall Risk Sign visible to staff  - Offer Toileting every 2 Hours, in advance of need  - Initiate/Maintain bed alarm  - Obtain necessary fall risk management equipment  - Apply yellow socks and bracelet for high fall risk patients  - Consider moving patient to room near nurses station  Outcome: Progressing     Problem: INFECTION - ADULT  Goal: Absence of fever/infection during neutropenic period  Description: INTERVENTIONS:  - Monitor WBC    Outcome: Progressing     Problem: SAFETY ADULT  Goal: Patient will remain free of falls  Description: INTERVENTIONS:  - Educate patient/family on patient safety including physical limitations  - Instruct patient to call for assistance with activity   - Consult OT/PT to assist with strengthening/mobility   - Keep Call bell within reach  - Keep bed low and locked with side rails adjusted as appropriate  - Keep care items and personal belongings within reach  - Initiate and maintain comfort rounds  - Make Fall Risk Sign visible to staff  - Offer Toileting every 2 Hours, in advance of need  - Initiate/Maintain bed alarm  - Obtain necessary fall risk management equipment  - Apply yellow socks and bracelet for high fall risk patients  - Consider moving patient to room near nurses station  Outcome: Progressing     Problem: DISCHARGE PLANNING  Goal: Discharge to home or other facility with appropriate resources  Description: INTERVENTIONS:  - Identify barriers to discharge w/patient and caregiver  - Arrange for needed discharge resources and transportation as appropriate  - Identify discharge learning needs (meds, wound care, etc )  - Arrange for interpretive services to assist at discharge as needed  - Refer to Case Management Department for coordinating discharge planning if the patient needs post-hospital services based on physician/advanced practitioner order or complex needs related to functional status, cognitive ability, or social support system  Outcome: Progressing     Problem: Knowledge Deficit  Goal: Patient/family/caregiver demonstrates understanding of disease process, treatment plan, medications, and discharge instructions  Description: Complete learning assessment and assess knowledge base    Interventions:  - Provide teaching at level of understanding  - Provide teaching via preferred learning methods  Outcome: Progressing     Problem: MOBILITY - ADULT  Goal: Maintain or return to baseline ADL function  Description: INTERVENTIONS:  -  Assess patient's ability to carry out ADLs; assess patient's baseline for ADL function and identify physical deficits which impact ability to perform ADLs (bathing, care of mouth/teeth, toileting, grooming, dressing, etc )  - Assess/evaluate cause of self-care deficits   - Assess range of motion  - Assess patient's mobility; develop plan if impaired  - Assess patient's need for assistive devices and provide as appropriate  - Encourage maximum independence but intervene and supervise when necessary  - Involve family in performance of ADLs  - Assess for home care needs following discharge   - Consider OT consult to assist with ADL evaluation and planning for discharge  - Provide patient education as appropriate  Outcome: Not Progressing  Goal: Maintains/Returns to pre admission functional level  Description: INTERVENTIONS:  - Perform BMAT or MOVE assessment daily    - Set and communicate daily mobility goal to care team and patient/family/caregiver  - Collaborate with rehabilitation services on mobility goals if consulted  - Perform Range of Motion 3 times a day  - Reposition patient every 2 hours  - Dangle patient 3 times a day  - Stand patient 3 times a day  - Ambulate patient 3 times a day  - Out of bed to chair 3 times a day   - Out of bed for meals 3 times a day  - Out of bed for toileting  - Record patient progress and toleration of activity level   Outcome: Not Progressing     Problem: Nutrition/Hydration-ADULT  Goal: Nutrient/Hydration intake appropriate for improving, restoring or maintaining nutritional needs  Description: Monitor and assess patient's nutrition/hydration status for malnutrition  Collaborate with interdisciplinary team and initiate plan and interventions as ordered  Monitor patient's weight and dietary intake as ordered or per policy  Utilize nutrition screening tool and intervene as necessary  Determine patient's food preferences and provide high-protein, high-caloric foods as appropriate       INTERVENTIONS:  - Monitor oral intake, urinary output, labs, and treatment plans  - Assess nutrition and hydration status and recommend course of action  - Evaluate amount of meals eaten  - Assist patient with eating if necessary   - Allow adequate time for meals  - Recommend/ encourage appropriate diets, oral nutritional supplements, and vitamin/mineral supplements  - Order, calculate, and assess calorie counts as needed  - Recommend, monitor, and adjust tube feedings and TPN/PPN based on assessed needs  - Assess need for intravenous fluids  - Provide specific nutrition/hydration education as appropriate  - Include patient/family/caregiver in decisions related to nutrition  Outcome: Not Progressing     Problem: PAIN - ADULT  Goal: Verbalizes/displays adequate comfort level or baseline comfort level  Description: Interventions:  - Encourage patient to monitor pain and request assistance  - Assess pain using appropriate pain scale  - Administer analgesics based on type and severity of pain and evaluate response  - Implement non-pharmacological measures as appropriate and evaluate response  - Consider cultural and social influences on pain and pain management  - Notify physician/advanced practitioner if interventions unsuccessful or patient reports new pain  Outcome: Not Progressing     Problem: INFECTION - ADULT  Goal: Absence or prevention of progression during hospitalization  Description: INTERVENTIONS:  - Assess and monitor for signs and symptoms of infection  - Monitor lab/diagnostic results  - Monitor all insertion sites, i e  indwelling lines, tubes, and drains  - Monitor endotracheal if appropriate and nasal secretions for changes in amount and color  - [place] appropriate cooling/warming therapies per order  - Administer medications as ordered  - Instruct and encourage patient and family to use good hand hygiene technique  - Identify and instruct in appropriate isolation precautions for identified infection/condition  Outcome: Not Progressing     Problem: SAFETY ADULT  Goal: Maintain or return to baseline ADL function  Description: INTERVENTIONS:  -  Assess patient's ability to carry out ADLs; assess patient's baseline for ADL function and identify physical deficits which impact ability to perform ADLs (bathing, care of mouth/teeth, toileting, grooming, dressing, etc )  - Assess/evaluate cause of self-care deficits   - Assess range of motion  - Assess patient's mobility; develop plan if impaired  - Assess patient's need for assistive devices and provide as appropriate  - Encourage maximum independence but intervene and supervise when necessary  - Involve family in performance of ADLs  - Assess for home care needs following discharge   - Consider OT consult to assist with ADL evaluation and planning for discharge  - Provide patient education as appropriate  Outcome: Not Progressing  Goal: Maintains/Returns to pre admission functional level  Description: INTERVENTIONS:  - Perform BMAT or MOVE assessment daily    - Set and communicate daily mobility goal to care team and patient/family/caregiver  - Collaborate with rehabilitation services on mobility goals if consulted  - Perform Range of Motion 3 times a day  - Reposition patient every 2 hours    - Dangle patient 3 times a day  - Stand patient 3 times a day  - Ambulate patient 3 times a day  - Out of bed to chair 3 times a day   - Out of bed for meals 3 times a day  - Out of bed for toileting  - Record patient progress and toleration of activity level   Outcome: Not Progressing

## 2022-08-01 NOTE — SEPSIS NOTE
Sepsis Note   Jacy Pennington 67 y o  male MRN: [id number]  Unit/Bed#: [facility] 904-01 Encounter: [id number]       qSOFA     [address] Name 08/01/22 00:38:11 08/01/22 00:34:01 08/01/22 00:31:19 08/01/22 00:25:55 08/01/22 00:12:49    Altered mental status GCS < 15 -- -- -- -- --    Respiratory Rate > / =22 -- -- -- -- --    Systolic BP < / =933 1 1 1 1 1    Q Sofa Score 2 2 2 2 2    Row Name 07/31/22 23:42:30 07/31/22 23:28:16 07/31/22 23:17:09 07/31/22 22:46:18 07/31/22 22:38:50    Altered mental status GCS < 15 -- -- -- -- --    Respiratory Rate > / =22 -- -- -- 1 --    Systolic BP < / =118 1 1 1 1 1    Q Sofa Score 2 2 2 2 1    Row Name 07/31/22 22:12:22 07/31/22 22:11:19 07/31/22 21:46:58 07/31/22 21:43:57 07/31/22 19:54:24    Altered mental status GCS < 15 -- -- -- -- --    Respiratory Rate > / =22 -- -- -- -- 0    Systolic BP < / =882 1 1 0 1 0    Q Sofa Score 1 1 0 1 0    Row Name 07/31/22 15:34:37 07/31/22 10:52:01 07/31/22 07:38:40 07/31/22 0330 07/31/22 02:19:47    Altered mental status GCS < 15 -- -- -- 0 --    Respiratory Rate > / =22 0 0 0 -- 0    Systolic BP < / =501 0 0 0 -- 0    Q Sofa Score 0 0 0 0 0    Row Name 07/30/22 23:28:44 07/30/22 1920 07/30/22 18:44:05 07/30/22 16:47:24 07/30/22 1500    Altered mental status GCS < 15 -- 0 -- -- 0    Respiratory Rate > / =22 0 -- 0 0 --    Systolic BP < / =690 0 -- 0 0 --    Q Sofa Score 0 0 0 0 --    Row Name 07/30/22 0900 07/30/22 0800 07/30/22 0600 07/30/22 0500 07/30/22 0400    Altered mental status GCS < 15 -- 0 -- -- 0    Respiratory Rate > / =22 0 1 0 0 0    Systolic BP < / =449 0 0 0 0 0    Q Sofa Score 0 1 0 0 0    Row Name 07/30/22 0300 07/30/22 0200 07/30/22 0100          Altered mental status GCS < 15 -- -- --      Respiratory Rate > / =22 0 0 0      Systolic BP < / =969 0 0 0      Q Sofa Score 0 0 0                 Initial Sepsis Screening     Row Name 08/01/22 0057                Is the patient's history suggestive of a new or worsening infection?  Yes (Proceed)  -RR        Suspected source of infection acute abdominal infection  -RR        Are two or more of the following signs & symptoms of infection both present and new to the patient? Yes (Proceed)  -RR        Indicate SIRS criteria Hyperthemia > 38 3C (100 9F); Tachycardia > 90 bpm  -RR        If the answer is yes to both questions, suspicion of sepsis is present --        If severe sepsis is present AND tissue hypoperfusion perists in the hour after fluid resuscitation or lactate > 4, the patient meets criteria for SEPTIC SHOCK --        Are any of the following organ dysfunction criteria present within 6 hours of suspected infection and SIRS criteria that are NOT considered to be chronic conditions? Yes  -RR        Organ dysfunction MAP < 65 mmHg;SBP < 90 mmHg  -RR        Date of presentation of severe sepsis 08/01/22  -RR        Time of presentation of severe sepsis 0045  -RR        Tissue hypoperfusion persists in the hour after crystalloid fluid administration, evidenced, by either: SBP < 90 mm/Hg ( ___ mm/Hg in comment field)  -RR        Was hypotension present within one hour of the conclusion of crystalloid fluid administration?  Yes  -RR        Date of presentation of septic shock 08/01/22  -RR        Time of presentation of septic shock 0058  -RR              User Key  (r) = Recorded By, (t) = Taken By, (c) = Cosigned By    [address] Name Provider Type    RR Cecy Cornelius PA-C Physician Assistant                  Patient to be given 2500cc IVF (30cc/kg IBW)

## 2022-08-01 NOTE — QUICK NOTE
QUICK NOTE - Deterioration Index  Jenni Luna 67 y o  male MRN: [id number]  Unit/Bed#: [facility] 904-01 Encounter: [id number]    Date Paged: 22  Time Paged: [phone]  Room #: 917  Arrival Time: 0022  Deterioration index score at time of page: 67 79  %  Spoke with Dr Haley Rodriguez from primary team  Need to escalate level of care: yes     PROBLEMS resulting in high DI score: Factors Contributing to Score    Contribution Factor Value   16 Age 67years old   16% Supplemental oxygen Nasal cannula   13% Neurological exam Lethargic   52% Systolic 88   76% Respiratory rate 22   11% Pulse 122   6% Cardiac rhythm Atrial fibrillation     Contribution Factor Value   5% WBC count abnormal (14 55 Thousand/uL)   4% Hematocrit abnormal (24 8 %)   2% Temperature 101 1 °F (38 4 °C)   1% Platelet count abnormal (92 Thousands/uL)   <1% Potassium 3 8 mmol/L   <1% Sodium 140 mmol/L   <1% Pulse oximetry 97                    PLAN:     DI at same time as rapid response   Being upgraded to critical care    Please contact critical care via [employer] with any questions or concerns       Vitals:   Vitals:    22 2328 22 2342 22 2359 22 0012   BP: 92/60 90/60  (!) 88/60   Pulse: (!) 129 (!) 125  (!) 122   Resp:       Temp:  (!) 100 8 °F (38 2 °C) (!) 101 1 °F (38 4 °C)    TempSrc:  Axillary Rectal    SpO2: 95% 94%  97%   Weight:       Height:           Respiratory:  SpO2: SpO2: 97 %, SpO2 Activity: SpO2 Activity: At Rest, SpO2 Device: O2 Device: Nasal cannula  Nasal Cannula O2 Flow Rate (L/min): 2 L/min    Temperature: Temp (24hrs), Av °F (37 2 °C), Min:97 7 °F (36 5 °C), Max:101 1 °F (38 4 °C)  Current: Temperature: (!) 101 1 °F (38 4 °C)    Labs:   Results from last 7 days   Lab Units 22  0543 22  0542 22  0406 22  1536 22  0525 22  1356 22  0436   WBC Thousand/uL 14 55* 10 37* 6 90   < > 4 18*  --  8 03   HEMOGLOBIN g/dL 8 0* 7 8* 7 4*   < > 7 1*   < > 9 0* HEMATOCRIT % 24 8* 24 4* 22 8*   < > 21 8*   < > 27 1*   PLATELETS Thousands/uL 92* 97* 73*   < > 50*  --  107*   NEUTROS PCT %  --   --  73  --  68  --  81*   MONOS PCT %  --   --  11  --  13*  --  11    < > = values in this interval not displayed  Results from last 7 days   Lab Units 07/31/22  0543 07/30/22  0542 07/29/22  0340 07/27/22  1356 07/27/22  0436 07/26/22  2137 07/26/22  1957 07/26/22  1801 07/26/22  1659   SODIUM mmol/L 140 143 143   < > 140   < >  --   --   --    POTASSIUM mmol/L 3 8 3 8 3 9   < > 5 1   < >  --   --   --    CHLORIDE mmol/L 107 111* 113*   < > 111*   < >  --   --   --    CO2 mmol/L 28 29 28   < > 21   < >  --   --   --    CO2, I-STAT mmol/L  --   --   --   --   --   --  22 26 25   BUN mg/dL 10 8 10   < > 9   < >  --   --   --    CREATININE mg/dL 0 76 0 54* 0 47*   < > 0 79   < >  --   --   --    CALCIUM mg/dL 8 0* 8 0* 8 0*   < > 8 3   < >  --   --   --    ALK PHOS U/L  --  112 73  --  59   < >  --   --   --    ALT U/L  --  57 68  --  68   < >  --   --   --    AST U/L  --  45 69*  --  98*   < >  --   --   --    GLUCOSE, ISTAT mg/dl  --   --   --   --   --   --  163* 166* 159*    < > = values in this interval not displayed       Results from last 7 days   Lab Units 07/31/22  0543 07/30/22  0542 07/29/22  0340   MAGNESIUM mg/dL 2 0 1 9 1 9     Results from last 7 days   Lab Units 07/28/22  0827 07/28/22  0525 07/28/22  0057 07/27/22  2030 07/27/22  1746   LACTIC ACID mmol/L 1 9 2 2* 2 4* 2 6* 3 0*               Code Status: Level 1 - Full Code

## 2022-08-01 NOTE — PROGRESS NOTES
Epidural Follow-up Note - Acute Pain Service    Herberth Kiser 67 y o  male MRN: [id number]  Unit/Bed#: [facility] 515-01 Encounter: [id number]      Assessment:   Principal Problem:    IPMN (intraductal papillary mucinous neoplasm)  Active Problems:    Permanent atrial fibrillation (HCC)    Lower extremity edema    Overlapping malignant neoplasm of pancreas ([facility] [address] )    Type 2 diabetes mellitus with hyperglycemia, with long-term current use of insulin (HCC)    [facility]      Herberth Kiser is a 67y o  year old male with PMHx of A-fib and T2DM who originally prsented with intraductal papillary mucinous neoplasm s/p ex-lap/jasen/pancreatectomy/reduction of hernia on 7/26  A pre-operative thoracic epidural was placed for post-operative analgesia  The patient was intubated post-operatively so the thoracic epidural was set to continuous infusion @1cc/hr  He was subsequently extubated on 7/28  APS consulted for epidural/post-op pain management  Pt seen and examined today, transferred to ICU care overnight after rapid response called for acute hypotension and hypoglycemia  Appreciate ICU care, ongoing resuscitation with pressor requirement at this time  Epidural paused during event and remains off  Patient had been making good progress and was likely not requiring the epidural for much longer, however, given current conditions and length of time epidural has been in place, will opt for removal of epidural when safe to do so  Pain moderately controlled with IV dilaudid bolus', encouraged patient to use however when able to take PO's would encourage their use prior to IV breakthrough's  Denies n/v/pruritus/LE weakness  Maintains on [facility] with ok cough clearing 2/2 pain with movement        Plan:  Analgesia:  - Keep epidural in place until coagulopathy is corrected, plan for removal at earliest able   - INR 1 78 today, increased from last lab draw   - Lactate peaked and downtrending, anticipate similar peak and downtrend in INR as patient recovers from overnight events   - Repeat PT/PTT/INR/CBC with AM labs   - if continued coagulopathy, will likely need FFP and/or VitK to help correct in order to pull catheter    - Continue Oxycodone 5-10mg q4hrs PRN mod-severe pain  - Continue IV Dilaudid 0 5mg q4hrs PRN breakthrough pain  - Tylenol, robaxin as ordered   - once stabilized, can schedule robaxin  - Encouraged IS, PT as able to safely    Bowel Regimen:  - Bowel regimen when appropriate from surgical perspective    APS will continue to follow  Please contact Acute Pain Service - [facility] via [employer] from [phone] with additional questions or concerns  See [employer] or Amion for additional contacts and after hours information  Pain History  Current pain location(s): incision  Pain Scale:   8-10  Quality: sharp  24 hour history: as above    PCEA use: n/a, epidural paused     Meds/Allergies   all current active meds have been reviewed    No Known Allergies    Objective     Temp:  [97 34 °F (36 3 °C)-101 1 °F (38 4 °C)] 98 06 °F (36 7 °C)  HR:  [] 116  Resp:  [13-30] 21  BP: ()/(51-94) 132/64  Arterial Line BP: ()/(52-86) 120/62    Physical Exam  Vitals and nursing note reviewed  Constitutional:       Appearance: Normal appearance  He is ill-appearing  HENT:      Head: Normocephalic and atraumatic  Mouth/Throat:      Mouth: Mucous membranes are dry  Cardiovascular:      Rate and Rhythm: Tachycardia present  Pulmonary:      Effort: Pulmonary effort is normal  No respiratory distress  Chest:      Chest wall: No tenderness  Abdominal:      General: There is no distension  Palpations: Abdomen is soft  Tenderness: There is abdominal tenderness  Musculoskeletal:         General: No swelling  Skin:     General: Skin is warm and dry  Neurological:      Mental Status: He is alert  Mental status is at baseline     Psychiatric:         Mood and Affect: Mood normal          Behavior: Behavior normal      Epidural: Site clean/dry/intact, no surrounding erythema/edema/induration, infusion paused currently for ongoing ICU hemodynamic management    Lab Results:   Results from last 7 days   Lab Units 08/01/22  0528   WBC Thousand/uL 11 89*   HEMOGLOBIN g/dL 9 9*   HEMATOCRIT % 29 5*   PLATELETS Thousands/uL 147*      Results from last 7 days   Lab Units 08/01/22  0528 08/01/22  0039   POTASSIUM mmol/L 3 9  3 9  --    CHLORIDE mmol/L 106  106  --    CO2 mmol/L 23  23  --    CO2, I-STAT mmol/L  --  24   BUN mg/dL 15  15  --    CREATININE mg/dL 1 03  1 03  --    CALCIUM mg/dL 7 6*  7 6*  --    ALK PHOS U/L 99  --    ALT U/L 28  --    AST U/L 22  --    GLUCOSE, ISTAT mg/dl  --  71      Results from last 7 days   Lab Units 08/01/22  1152   INR  1 78*       Imaging Studies: I have personally reviewed pertinent reports  EKG, Pathology, and Other Studies: I have personally reviewed pertinent reports  Counseling / Coordination of Care  Total floor / unit time spent today 20 minutes  Greater than 50% of total time was spent with the patient and / or family counseling and / or coordination of care  A description of the counseling / coordination of care: chart review, post op pain and regional/neuraxial pain management, discussion/planning with nursing/medical/surgical teams    Please note that the APS provides consultative services regarding pain management only  With the exception of ketamine, peripheral nerve catheters, and epidural infusions (and except when indicated), final decisions regarding starting or changing doses of analgesic medications are at the discretion of the consulting service  Off hours consultation and/or medication management is generally not available      Carson Miramontes MD  Acute Pain Service

## 2022-08-01 NOTE — QUICK NOTE
Called to bedside due to pt having persistent tachycardia, lethargy with soft pressures with SBP in the 90's  Nurse also noted change in character of Frandy drain which continues to be productive of over a L of fluid  On arrival, pt noted to be febrile to 101  1  Placed pt on O2, obtained labs-VBG, bmp, cbc, mg, phos, cxr and UA and blood cultures  Shortly thereafter rapid response called for worsening hypotension  Pt given 1L of fluids and started on levo and epidural placed on hold  Decision made to obtain CTCAP as well and upgrade pt to ICU level of care      ABG obtained at rapid: 7 4/32 3/95/22 7  Lactate: 4 7  M 5;   WBC: 3 88  Hb 9    Plan to follow up CTCAP read; continue ICU level of care and resuscitation      Jennie Lake MD  1:29 AM  22

## 2022-08-01 NOTE — PROCEDURES
Arterial Line Insertion    Date/Time: 8/1/2022 1:30 AM  Performed by: Cammie Philip PA-C  Authorized by: Cammie Philip PA-C     Patient location:  ICU  Consent:     Consent obtained:  Emergent situation  Indications:     Indications: hemodynamic monitoring and continuous blood pressure monitoring    Pre-procedure details:     Skin preparation:  Chlorhexidine    Preparation: Patient was prepped and draped in sterile fashion    Anesthesia (see MAR for exact dosages): Anesthesia method:  Local infiltration    Local anesthetic:  Lidocaine 1% w/o epi  Procedure details:     Location / Tip of Catheter:  Radial    Laterality:  Right    Parth's test performed: yes      Parth's test abnormal: no      Needle gauge:  20 G    Placement technique:  Percutaneous and ultrasound guided    Ultrasound image availability:  Not obtained due to urgency    Sterile ultrasound techniques: Sterile gel and sterile probe covers were used      Number of attempts:  1    Successful placement: yes      Transducer: waveform confirmed    Post-procedure details:     Post-procedure:  Sterile dressing applied and sutured    CMS:  Normal    Patient tolerance of procedure:   Tolerated well, no immediate complications

## 2022-08-01 NOTE — CASE MANAGEMENT
Case Management Discharge Planning Note    Patient name Connor Pennington  [facility] [address] [facility] [id number]  : 1950 Date 2022       Current Admission Date: 2022  Current Admission Diagnosis:IPMN (intraductal papillary mucinous neoplasm)   Patient Active Problem List    Diagnosis Date Noted    [facility] 2022    Port-A-Cath in place 2022    Counseling regarding advanced care planning and goals of care 2022    Encounter for geriatric assessment 2022    OAB (overactive bladder) 12/15/2020    BPH without obstruction/lower urinary tract symptoms 12/15/2020    Urgency incontinence 2020    Balanitis 2020    Thrombocytopenia ([facility] ) 2020    IPMN (intraductal papillary mucinous neoplasm) 2020    Type 2 diabetes mellitus with hyperglycemia, with long-term current use of insulin ([facility] ) 2019    Overlapping malignant neoplasm of pancreas [facility] ) 2019    Pancreatic duct dilated 2018    Lower extremity edema 2018    Obstructive sleep apnea syndrome 2018    Hypersomnia 2018    Permanent atrial fibrillation ([facility] ) 2018    Morbid obesity with BMI of 40 0-44 9, adult ([facility] ) 2018      LOS (days): 6  Geometric Mean LOS (GMLOS) (days): 5 10  Days to GMLOS:-1 1     OBJECTIVE:  Risk of Unplanned Readmission Score: 20 25         Current admission status: Inpatient   Preferred Pharmacy:   [facility] [address]  Phone: [phone] Fax: [phone] [address] [facility] [address] [facility]  Phone: [phone] Fax: [phone]    [facility] [address]  [address] [address]  Phone: [phone] Fax: [phone]    Primary Care Provider: Payton Vidal DO    Primary Insurance: MEDICARE  Secondary Insurance: [employer]    DISCHARGE DETAILS:    CM entered pt room to discuss STR rec, pt was asleep

## 2022-08-02 NOTE — PROGRESS NOTES
Progress Note - Surgical Oncology  Jenni Luna 67 y o  male MRN: [id number]  Unit/Bed#: [facility] 515-01 Encounter: [id number]    Assessment:  67 y o  male who presented with MD-IPMN s/p  IPMN, s/p Lap distal pancreatectomy (03/2019-Quiros), open subtotal pancreatectomy (02/2022-Quiros), now status post completion pancreatectomy with sims anastomosis, extensive SARAH, reduction of internal hernia and cholecystectomy on 7/26    Tachycardic, OVSS, afebrile  STU:  1 6 L serous  UOP:  1 2 L    Labs  Lactic 4 6->4 1->3  9  Blood cx:Pending  WBC 13 (11)  Hgb 9 4 (9 9)  BMP pending    Plan:  -Continue clears, consider TPN given poor nutrition status  -No current surgical intervention  -Continue hanna  -Consider diuresis  -Continue IV abx, monitor fever/wbc curv  -Closely monitor abd exam  -Continue to monitor endpoints  -Follow up blood cx  -Maintain STU to bulb sxn, monitor output    Subjective/Objective     Subjective:   No acute events overnight  Patient still reports abdominal pain, however it is slightly improved  He denies any fevers or chills  No chest pain or shortness of breath  Objective:    Blood pressure 124/81, pulse (!) 122, temperature 97 9 °F (36 6 °C), temperature source Bladder, resp  rate 13, height 6' (1 829 m), weight (!) 138 kg (305 lb), SpO2 92 %  ,Body mass index is 41 37 kg/m²        Intake/Output Summary (Last 24 hours) at 8/2/2022 0544  Last data filed at 8/2/2022 0520  Gross per 24 hour   Intake 4987 55 ml   Output 3020 ml   Net 1967 55 ml       Invasive Devices  Report    Central Venous Catheter Line  Duration           Port A Cath 03/30/22 Right Chest 125 days          Peripheral Intravenous Line  Duration           Peripheral IV 08/01/22 Distal;Left;Upper;Ventral (anterior) Arm 1 day    Peripheral IV 08/01/22 Right;Upper;Ventral (anterior) Arm 1 day          Epidural Line  Duration           Epidural Catheter 07/26/22 6 days          Arterial Line  Duration           Arterial Line 08/01/22 Radial 1 day          Drain  Duration           Closed/Suction Drain Right RLQ Bulb 19 Fr  6 days    Urethral Catheter Temperature probe 16 Fr  <1 day              Physical exam  General:  Ill-appearing  HENT: NCAT MMM  Neck: supple, no JVD  CV:  Tachycardic  Lungs: nl wob   No resp distress  ABD: Soft, tender to palpation in epigastric region lower abdominal discomfort much improved, distended, incision clean dry intact STU in place with serous output  Extrem:  Diffuse anasarca  Neuro: AAOx3        Results from last 7 days   Lab Units 08/02/22  0512 08/01/22  0528 08/01/22  0039 08/01/22  0011   WBC Thousand/uL 13 31* 11 89*  --  3 88*   HEMOGLOBIN g/dL 9 4* 9 9*  --  9 9*   I STAT HEMOGLOBIN g/dl  --   --  9 5*  --    HEMATOCRIT % 29 6* 29 5*  --  31 2*   HEMATOCRIT, ISTAT %  --   --  28*  --    PLATELETS Thousands/uL 126* 147*  --  126*     Results from last 7 days   Lab Units 08/01/22  0528 08/01/22  0039 08/01/22  0011 07/31/22  0543   POTASSIUM mmol/L 3 9  3 9  --  3 5 3 8   CHLORIDE mmol/L 106  106  --  105 107   CO2 mmol/L 23  23  --  23 28   CO2, I-STAT mmol/L  --  24  --   --    BUN mg/dL 15  15  --  16 10   CREATININE mg/dL 1 03  1 03  --  1 09 0 76   GLUCOSE, ISTAT mg/dl  --  71  --   --    CALCIUM mg/dL 7 6*  7 6*  --  7 9* 8 0*     Results from last 7 days   Lab Units 08/01/22  1152 07/26/22  2137   INR  1 78* 1 63*

## 2022-08-02 NOTE — PROGRESS NOTES
Epidural Follow-up Note - Acute Pain Service    Marii Merida 67 y o  male MRN: [id number]  Unit/Bed#: [facility] 515-01 Encounter: [id number]      Assessment:   Principal Problem:    IPMN (intraductal papillary mucinous neoplasm)  Active Problems:    Permanent atrial fibrillation (HCC)    Lower extremity edema    Overlapping malignant neoplasm of pancreas ([facility] )    Type 2 diabetes mellitus with hyperglycemia, with long-term current use of insulin (HCC)    Thrombocytopenia ([facility] )      Marii Merida is a 67y o  year old male PMH A-fib, T2DM, intraductal papillary mucinous neoplasm s/p distal pancreatectomy (3/2019), open subtotal pancreatectomy (02/2022) and now admitted s/p completion pancreatectomy with sims anastomosis, extensive SARAH, reduction of internal hernia and cholecystectomy on 7/26, currently POD #7  Decompensated on the floor 7/31-8/1 with sepsis and transferred back to the ICU  Patient's PCEA has been turned off since his rapid response called for acute hypotension and hypoglycemia  The intent is to pull the epidural however he continues to be coagulopathic with most recent INR of 1 78  There is a concern for anastomotic leak and he will be scanned this afternoon  In the interim his pain as been well controlled with intermittent boluses of IV dilaudid  Plan:  Analgesia:  - Continue trending coags, once coagulopathy resolved epidural to be removed  - Epidural not running at this time, consider capping end of catheter and removing PCEA infusion pump    Pain History  Current pain location(s): 5/10  Pain Scale:   Abdomen  Quality: Sharp  24 hour history: See above    Meds/Allergies     No Known Allergies    Objective     Temp:  [97 7 °F (36 5 °C)-98 24 °F (36 8 °C)] 98 24 °F (36 8 °C)  HR:  [110-127] 126  Resp:  [7-14] 10  BP: (122-174)/(57-86) 147/68  Arterial Line BP: ()/(51-82) 129/65    Physical Exam  Vitals and nursing note reviewed  Constitutional:       Appearance: He is normal weight  HENT:      Head: Normocephalic and atraumatic  Right Ear: External ear normal       Left Ear: External ear normal       Nose: Nose normal       Mouth/Throat:      Mouth: Mucous membranes are moist       Pharynx: Oropharynx is clear  Eyes:      Conjunctiva/sclera: Conjunctivae normal    Cardiovascular:      Rate and Rhythm: Normal rate and regular rhythm  Pulses: Normal pulses  Heart sounds: Normal heart sounds  Pulmonary:      Effort: Pulmonary effort is normal       Breath sounds: Normal breath sounds  Abdominal:      General: Abdomen is flat  Tenderness: There is abdominal tenderness  Musculoskeletal:         General: Normal range of motion  Cervical back: Normal range of motion  Skin:     General: Skin is warm and dry  Neurological:      General: No focal deficit present  Mental Status: He is alert and oriented to person, place, and time  Mental status is at baseline  Psychiatric:         Mood and Affect: Mood normal        Epidural: Catheter in good position, site clean, not tender to palpation    Lab Results:   Results from last 7 days   Lab Units 08/02/22  0512   WBC Thousand/uL 13 31*   HEMOGLOBIN g/dL 9 4*   HEMATOCRIT % 29 6*   PLATELETS Thousands/uL 126*      Results from last 7 days   Lab Units 08/02/22 0512 08/01/22  0528 08/01/22  0039   POTASSIUM mmol/L 4 1 3 9  3 9  --    CHLORIDE mmol/L 106 106  106  --    CO2 mmol/L 24 23  23  --    CO2, I-STAT mmol/L  --   --  24   BUN mg/dL 19 15  15  --    CREATININE mg/dL 1 00 1 03  1 03  --    CALCIUM mg/dL 7 5* 7 6*  7 6*  --    ALK PHOS U/L  --  99  --    ALT U/L  --  28  --    AST U/L  --  22  --    GLUCOSE, ISTAT mg/dl  --   --  71      Results from last 7 days   Lab Units 08/02/22  0512   INR  1 78*       Counseling / Coordination of Care  Total floor / unit time spent today 15 minutes  Greater than 50% of total time was spent with the patient and / or family counseling and / or coordination of care  Please note that the APS provides consultative services regarding pain management only  With the exception of ketamine, peripheral nerve catheters, and epidural infusions (and except when indicated), final decisions regarding starting or changing doses of analgesic medications are at the discretion of the consulting service  Off hours consultation and/or medication management is generally not available      Michele Eisenberg MD  Acute Pain Service

## 2022-08-02 NOTE — PROGRESS NOTES
Daily Progress Note - Critical Care   Teto Ferraro 67 y o  male MRN: [id number]  Unit/Bed#: [facility] 515-01 Encounter: [id number]    ----------------------------------------------------------------------------------------  HPI: 68 yo PMH A-fib, T2DM, intraductal papillary mucinous neoplasm s/p distal pancreatectomy (3/2019), open subtotal pancreatectomy (02/2022) and now admitted s/p completion pancreatectomy with sims anastomosis, extensive SARAH, reduction of internal hernia and cholecystectomy on 7/26, currently POD #7  Decompensated on the floor 7/31-8/1 with sepsis and transferred back to the ICU  24 hr events:  · CT scan - new pneumoperitoneum (recent surgery vs cannot exclude staple line dehiscence), new partially loculated fluid collection in post-pancreatectomy bed, small volume ascites, anasarca  ·  Levophed off since mid-day yesterday   · Received IV metoprolol 5 mg x 2 for sustained HR >130 with improvement     ---------------------------------------------------------------------------------------  SUBJECTIVE  Mumbles but offers no specific complaints     Review of Systems  Review of systems was unable to be performed secondary to encephalopathy  ---------------------------------------------------------------------------------------  Assessment and Plan:    Neuro:    Diagnosis: Acute post-operative pain   o Analgesia: APS following, recs appreciated   o Epidural in place with plan to remove when coagulopathy is corrected   - INR 1 78 this AM - ? FFP to facilitate removal   - SQH on hold   o [address] tylenol, oxycodone 5-10 mg PRN mod-severe, dilaudid 0 5 mg PRN breakthrough   o Robaxin PRN   · Delirium precautions  · CAM-ICU daily  · Regulate sleep/wake cycle       CV:    Diagnosis: Shock, likely septic    o With new onset fevers, possible intra-abdominal abscess   o Levophed weaned to off, MAP goal >65   o Trend lactic until clearance   o Continue Zosyn   o F/u cultures    Diagnosis: Atrial fibrillation o Metoprolol 5 mg IV PRN sustained HR >130   - 2 doses/24 hrs   o Holding home atenolol with shock   o Follow rhythm on telemetry  o  Echo EF 60% improved from 45% on , no RV dysfunction   o Holding home lasix in setting of shock       Pulm:   Diagnosis: Bilateral pleural effusions   o Small BL pleural effusions on CT chest   o No role for intervention at this time, continue to monitor   o On RA   o Pulmonary toileting     GI:    Diagnosis: IPMN now s/p completion pancreatectomy, reduction of internal hernia and cholecystectomy on   o  CT - new partially loculated fluid collection in post-pancreatectomy bed  o No surgical intervention at this time per white surgery   o Trend end-points and abdominal exam   o IV abx as below   o Clear liquid diet, consider TPN with poor nutritional status, limited diet and poor PO intake    Stress ulcer PPX: Pantoprazole PO  · Bowel regimen: None currently  · Last BM:       :    Diagnosis: Urinary retention   o Baseline Cr 0 8  o Current Cr 1 0  o Trend BUN/CR, Strict I/O   o Hanna: Yes  - Urinary catheter still needed for Strict I and O in a critically ill patient    o Urinary retention protocol when hanna removed       F/E/N:   · Fluid/Diuretic plan: Isolyte 125 mL/hr   · Decrease to 75 mL/hr    E: Replete for goal K >4, Phos >3, Mg >2    N: Clear Liquids , consider starting TPN       Heme/Onc:    Diagnosis: Anemia  o In setting of critical illness, acute blood loss   o Hgb stable 9 4  o VTE PPX: Heparin on hold for epidural removal, start after removed and SCDs      Endo:    Diagnosis: T2DM  o Lantus 8 u QHS + ISS Algorithm 2   o Close BG monitoring with poor PO intake and clear liquid diet   o BG goal 140-180       ID:    Diagnosis: Sepsis, possible intra-abdominal abscess   o Zosyn day 3  o Blood cx - 1/2 gram positive cocci in chains   o Body fluid cs - no bacteria seen   o Trend fever curve and WBC count   Temp (24hrs), Av °F (36 7 °C), Min:97 34 °F (36 3 °C), Max:98 6 °F (37 °C)  - Current: Temperature: 98 2 °F (36 8 °C)      MSK/Skin:    Frequent turning and repositioning    PT/OT as appropriate       Patient appropriate for transfer out of the ICU today?: No  Disposition: Continue Critical Care   Code Status: Level 1 - Full Code  ---------------------------------------------------------------------------------------  ICU CORE MEASURES    Prophylaxis   VTE Pharmacologic Prophylaxis: On hold for epidural removal  VTE Mechanical Prophylaxis: sequential compression device  Stress Ulcer Prophylaxis: Pantoprazole PO    ABCDE Protocol (if indicated)  CAM-ICU: Negative    Invasive Devices Review  Invasive Devices  Report    Central Venous Catheter Line  Duration           Port A Cath 22 Right Chest 125 days          Peripheral Intravenous Line  Duration           Peripheral IV 22 Left;Ventral (anterior) Forearm <1 day    Peripheral IV 22 Right;Upper;Ventral (anterior) Arm <1 day          Epidural Line  Duration           Epidural Catheter 22 6 days          Arterial Line  Duration           Arterial Line 22 Radial 1 day          Drain  Duration           Closed/Suction Drain Right RLQ Bulb 19 Fr  6 days    Urethral Catheter Temperature probe 16 Fr  <1 day              Can any invasive devices be discontinued today?  No  ---------------------------------------------------------------------------------------  OBJECTIVE    Vitals   Vitals:    22 0200 22 0400 22 0600 22 0700   BP: 125/62 124/81  124/57   BP Location: Right arm Right arm     Pulse: (!) 110 (!) 122  (!) 111   Resp: (!) 10 13  (!) 7   Temp: 97 9 °F (36 6 °C) 97 9 °F (36 6 °C)  98 2 °F (36 8 °C)   TempSrc: Bladder Bladder     SpO2: 95% 92%  94%   Weight:   (!) 143 kg (315 lb 0 6 oz)    Height:         Temp (24hrs), Av °F (36 7 °C), Min:97 34 °F (36 3 °C), Max:98 6 °F (37 °C)  Current: Temperature: 98 2 °F (36 8 °C)    Respiratory:  SpO2: SpO2: 94 %, SpO2 Device: O2 Device: None (Room air)  Nasal Cannula O2 Flow Rate (L/min): 2 L/min    Invasive/non-invasive ventilation settings   Respiratory  Report   Lab Data (Last 4 hours)       0707            pH, Arterial       7 460             pCO2, Arterial       33 8             pO2, Arterial       81 7             HCO3, Arterial       23 5             Base Excess, Arterial       0 0                  O2/Vent Data     None                Physical Exam  Vitals reviewed  Constitutional:       General: He is not in acute distress  Appearance: He is obese  He is ill-appearing  He is not toxic-appearing or diaphoretic  Interventions: Nasal cannula in place  HENT:      Head: Normocephalic and atraumatic  Eyes:      Pupils: Pupils are equal, round, and reactive to light  Cardiovascular:      Rate and Rhythm: Tachycardia present  Rhythm irregularly irregular  Heart sounds: Normal heart sounds  Pulmonary:      Effort: Pulmonary effort is normal       Breath sounds: Decreased breath sounds present  Abdominal:      General: There is distension  Palpations: Abdomen is soft  Tenderness: There is generalized abdominal tenderness  Comments: STU drain with serous output    Musculoskeletal:      Right lower le+ Edema present  Left lower le+ Edema present  Skin:     General: Skin is warm and dry  Neurological:      Mental Status: He is lethargic        Comments: Oriented to place and self              Laboratory and Diagnostics:  Results from last 7 days   Lab Units 22  0512 22  0528 22  0039 22  0011 22  0543 22  0542 22  0406 22  1536 22  0525 22  1356 22  0436   WBC Thousand/uL 13 31* 11 89*  --  3 88* 14 55* 10 37* 6 90 5 43 4 18*  --  8 03   HEMOGLOBIN g/dL 9 4* 9 9*  --  9 9* 8 0* 7 8* 7 4* 7 2* 7 1*   < > 9 0*   I STAT HEMOGLOBIN g/dl  --   --  9 5*  --   --   --   -- --   --   --   --    HEMATOCRIT % 29 6* 29 5*  --  31 2* 24 8* 24 4* 22 8* 21 7* 21 8*   < > 27 1*   HEMATOCRIT, ISTAT %  --   --  28*  --   --   --   --   --   --   --   --    PLATELETS Thousands/uL 126* 147*  --  126* 92* 97* 73* 70* 50*  --  107*   NEUTROS PCT %  --   --   --   --   --   --  73  --  68  --  81*   MONOS PCT %  --   --   --   --   --   --  11  --  13*  --  11    < > = values in this interval not displayed  Results from last 7 days   Lab Units 08/02/22  0512 08/01/22  0528 08/01/22  0039 08/01/22  0011 07/31/22  0543 07/30/22  0542 07/29/22  0340 07/28/22  0525 07/27/22  1356 07/27/22  0436 07/26/22  2137   SODIUM mmol/L 137 137  137  --  138 140 143 143 142   < > 140 141   POTASSIUM mmol/L 4 1 3 9  3 9  --  3 5 3 8 3 8 3 9 4 0   < > 5 1 4 7   CHLORIDE mmol/L 106 106  106  --  105 107 111* 113* 112*   < > 111* 111*   CO2 mmol/L 24 23  23  --  23 28 29 28 26   < > 21 18*   CO2, I-STAT mmol/L  --   --  24  --   --   --   --   --   --   --   --    ANION GAP mmol/L 7 8  8  --  10 5 3* 2* 4   < > 8 12   BUN mg/dL 19 15  15  --  16 10 8 10 12   < > 9 8   CREATININE mg/dL 1 00 1 03  1 03  --  1 09 0 76 0 54* 0 47* 0 61   < > 0 79 0 83   CALCIUM mg/dL 7 5* 7 6*  7 6*  --  7 9* 8 0* 8 0* 8 0* 7 7*   < > 8 3 8 9   GLUCOSE RANDOM mg/dL 111 62*  62*  --  76 157* 156* 149* 170*   < > 206* 171*   ALT U/L  --  28  --   --   --  57 68  --   --  68 67   AST U/L  --  22  --   --   --  45 69*  --   --  98* 97*   ALK PHOS U/L  --  99  --   --   --  112 73  --   --  59 69   ALBUMIN g/dL  --  1 6*  --   --   --  2 6* 2 5*  --   --  2 9* 2 4*   TOTAL BILIRUBIN mg/dL  --  1 72*  --   --   --  1 49* 1 14*  --   --  2 01* 3 11*    < > = values in this interval not displayed       Results from last 7 days   Lab Units 08/02/22  0512 08/01/22  0528 08/01/22  0011 07/31/22  0543 07/30/22  0542 07/29/22  0340 07/28/22  0525   MAGNESIUM mg/dL 2 4 1 6 1 5* 2 0 1 9 1 9 2 1   PHOSPHORUS mg/dL 4 8* 4 2* 4 4* 2 4 1 8* 1 9* 1 7*      Results from last 7 days   Lab Units 08/02/22  0512 08/01/22  1152 07/26/22  2137   INR  1 78* 1 78* 1 63*          Results from last 7 days   Lab Units 08/02/22  0512 08/02/22  0027 08/01/22  1954 08/01/22  1256 08/01/22  0935 08/01/22  0528 08/01/22  0011   LACTIC ACID mmol/L 4 0* 3 9* 4 1* 4 6* 4 1* 4 9* 4 7*     ABG:  Results from last 7 days   Lab Units 08/02/22  0707   PH ART  7 460*   PCO2 ART mm Hg 33 8*   PO2 ART mm Hg 81 7   HCO3 ART mmol/L 23 5   BASE EXC ART mmol/L 0 0   ABG SOURCE  Line, Arterial     VBG:  Results from last 7 days   Lab Units 08/02/22  0707 08/01/22  0815 08/01/22  0011   PH ELIU   --   --  7 403*   PCO2 ELIU mm Hg  --   --  31 6*   PO2 ELIU mm Hg  --   --  50 9*   HCO3 ELIU mmol/L  --   --  19 3*   BASE EXC ELIU mmol/L  --   --  -4 6   ABG SOURCE  Line, Arterial   < >  --     < > = values in this interval not displayed  Micro  Results from last 7 days   Lab Units 08/01/22  0200 08/01/22  0036   BLOOD CULTURE  No Growth at 24 hrs   --    GRAM STAIN RESULT  3+ Polys  No bacteria seen Gram positive cocci in chains*       EKG: Atrial fibrillation rate 110  Imaging:  I have personally reviewed pertinent reports  Intake and Output  I/O       07/31 0701 08/01 0700 08/01 0701 08/02 0700    P  O   0    I V  (mL/kg) 828 7 (6) 4445 6 (32 2)    IV Piggyback 2700 250    Total Intake(mL/kg) 3528 7 (25 6) 4695 6 (34)    Urine (mL/kg/hr) 750 (0 2) 1215 (0 4)    Drains 2840 1605    Stool 0 0    Total Output 3590 2820    Net -61 3 +1875 6          Unmeasured Urine Occurrence 1 x     Unmeasured Stool Occurrence 2 x 0 x          Height and Weights   Height: 6' (182 9 cm)  IBW (Ideal Body Weight): 77 6 kg  Body mass index is 42 73 kg/m²    Weight (last 2 days)     Date/Time Weight    08/02/22 0600 143 (315 04)    08/01/22 0800 138 (305)    08/01/22 0546 138 (305 12)    07/31/22 0600 138 (304)            Nutrition       Diet Orders   (From admission, onward)             Start Ordered    08/01/22 0925  Diet Clear Liquid  Diet effective now        References:    Nutrtion Support Algorithm Enteral vs  Parenteral   Question Answer Comment   Diet Type Clear Liquid    RD to adjust diet per protocol?  No        08/01/22 0924    07/30/22 1111  Dietary nutrition supplements  Once        Question Answer Comment   Select Supplement: Glucerna-Strawberry    Frequency Breakfast, Lunch, Dinner        07/30/22 1111                Active Medications  Scheduled Meds:  Current Facility-Administered Medications   Medication Dose Route Frequency Provider Last Rate    acetaminophen  975 mg Oral Q6H [facility] Lenora Isaacs PA-C      calcium gluconate  3 g Intravenous Once Scout Vick PA-C      HYDROmorphone  0 5 mg Intravenous Q4H  [facility] [place]      insulin glargine  8 Units Subcutaneous HS Wang Farah MD      insulin lispro  1-5 Units Subcutaneous Q6H Chanteninfa Isaacs PA-C      melatonin  6 mg Oral HS Ariel Tamez PA-C      methocarbamol  500 mg Oral Q6H PRN Vesta Hopping, CRNP      metoprolol  5 mg Intravenous Q6H PRN Scout Vick PA-C      multi-electrolyte  125 mL/hr Intravenous Continuous Houston Copperblaireith, PA-C 125 mL/hr (08/01/22 1837)    oxyCODONE  5 mg Oral Q4H PRN Vesta Hopping, CRNP      Or    oxyCODONE  10 mg Oral Q4H PRN Vesta Hopping, CRNP      pantoprazole  40 mg Oral Early Morning Ariel Yeseniaith PA-ANA      phenol  1 spray Mouth/Throat Q2H PRN Ariel Tamez PA-ANA      piperacillin-tazobactam  4 5 g Intravenous Q8H Angeles Patel MD 4 5 g (08/02/22 7120)     Continuous Infusions:  multi-electrolyte, 125 mL/hr, Last Rate: 125 mL/hr (08/01/22 1837)      PRN Meds:   HYDROmorphone, 0 5 mg, Q4H PRN  methocarbamol, 500 mg, Q6H PRN  metoprolol, 5 mg, Q6H PRN  oxyCODONE, 5 mg, Q4H PRN   Or  oxyCODONE, 10 mg, Q4H PRN  phenol, 1 spray, Q2H PRN        Allergies   No Known Allergies  ---------------------------------------------------------------------------------------  Advance Directive and Living Will:      Power of :    POLST:    ---------------------------------------------------------------------------------------  Care Time Delivered:   No Critical Care time spent     Jt Downs PA-C      Portions of the record may have been created with voice recognition software  Occasional wrong word or "sound a like" substitutions may have occurred due to the inherent limitations of voice recognition software    Read the chart carefully and recognize, using context, where substitutions have occurred

## 2022-08-03 PROBLEM — R18.8 INTRA-ABDOMINAL FLUID COLLECTION: Status: ACTIVE | Noted: 2022-08-03

## 2022-08-03 PROBLEM — R74.01 TRANSAMINITIS: Status: ACTIVE | Noted: 2022-01-01

## 2022-08-03 PROBLEM — N17.9 AKI (ACUTE KIDNEY INJURY) (HCC): Status: ACTIVE | Noted: 2022-01-01

## 2022-08-03 NOTE — NUTRITION
08/03/22 1049   Recommendations/Interventions   Summary NPO due to mental status  NGT placed with high output (1280 mL)  PICC line being placed and TPN to be initiated  Pt is approximately 7 days this admission with no significant nutrition  Wts varying this admission volume related and likely also due to loss of muscle mass/body fat given lengthy npo status  No overt signs of wasting observed at time of visit  Interventions/Recommendations Initiate PN   Recommendations to Provider 1  Start standard TPN regimen : AA15% in 333mL, D30% in 500 mL, fytrpi41% in 100mL for a total of 933 mL, 910 kcal and 50g protein    2  Will follow up with goal TPN recs once appropriate   Consult appreciated We need to set up OV ASAP to teach her how to administer  Call patient please  If we know tentative date of delivery perhaps we can arrange

## 2022-08-03 NOTE — QUICK NOTE
Jemal Ramos is a 67y o  year old male PMH A-fib, T2DM, intraductal papillary mucinous neoplasm s/p distal pancreatectomy (3/2019), open subtotal pancreatectomy (02/2022) and now admitted s/p completion pancreatectomy with sims anastomosis, extensive SARAH, reduction of internal hernia and cholecystectomy on 7/26, currently POD #7  Decompensated on the floor 7/31-8/1 with sepsis and transferred back to the ICU  Due to worsening coagulopathy requiring Plts and FFP, removed epidural with tip intact around 5PM today  Plts 126, INR 1 6  Please neuromonitoring [id number] for next 24 hours to monitor for potential epidural hematoma given catheter removal in the setting of coagulopathy      Lab Results   Component Value Date    INR 1 61 (H) 08/03/2022    INR 1 96 (H) 08/03/2022    INR 1 78 (H) 08/02/2022    PROTIME 19 4 (H) 08/03/2022    PROTIME 22 6 (H) 08/03/2022    PROTIME 21 0 (H) 08/02/2022     Lab Results   Component Value Date    WBC 15 19 (H) 08/03/2022    HGB 7 9 (L) 08/03/2022    HCT 24 9 (L) 08/03/2022     (H) 08/03/2022     (L) 08/03/2022     Rashida Shah MD  Anesthesiology

## 2022-08-03 NOTE — SPEECH THERAPY NOTE
Patient now NPO with TPN  Will discontinue ST orders at this time  Please re-consult when medically appropriate and cleared to resume PO diet

## 2022-08-03 NOTE — CASE MANAGEMENT
Case Management Discharge Planning Note    Patient name Carson Keith  Location [address] [place] [address] [address] 285-20 MRN [id number]  : 1950 Date 8/3/2022       Current Admission Date: 2022  Current Admission Diagnosis:IPMN (intraductal papillary mucinous neoplasm)   Patient Active Problem List    Diagnosis Date Noted    Intra-abdominal fluid collection 2022    Transaminitis 2022    TERESE (acute kidney injury) ([facility] ) 2022    Chemotherapy induced neutropenia ([place] [address] ) 2022    Port-A-Cath in place 2022    Counseling regarding advanced care planning and goals of care 2022    Encounter for geriatric assessment 2022    OAB (overactive bladder) 12/15/2020    BPH without obstruction/lower urinary tract symptoms 12/15/2020    Urgency incontinence 2020    Balanitis 2020    Thrombocytopenia ([facility] ) 2020    IPMN (intraductal papillary mucinous neoplasm) 2020    Type 2 diabetes mellitus with hyperglycemia, with long-term current use of insulin ([facility] ) 2019    Overlapping malignant neoplasm of pancreas ([facility] ) 2019    Pancreatic duct dilated 2018    Lower extremity edema 2018    Obstructive sleep apnea syndrome 2018    Hypersomnia 2018    Permanent atrial fibrillation ([facility] ) 2018    Morbid obesity with BMI of 40 0-44 9, adult ([facility] ) 2018      LOS (days): 8  Geometric Mean LOS (GMLOS) (days): 9 50  Days to GMLOS:1 3     OBJECTIVE:  Risk of Unplanned Readmission Score: 23 37         Current admission status: Inpatient   Preferred Pharmacy:   [id number] Aria Pfeiffer [address] [place] [address]  Phone: [phone] Fax: [phone] [facility] [place] [address] [place] [address] [facility] [address] [facility]  Phone: [phone] Fax: [phone]    [facility]/pharmacy #[id number] [id number] [address] [facility] [address]  Phone: [phone] Fax: [phone]    Primary Care Provider: Rea Coto DO    Primary Insurance: MEDICARE  Secondary Insurance: [employer]    DISCHARGE DETAILS:    Discharge planning discussed with[de-identified] Spouse and daughter at bedside     Other Referral/Resources/Interventions Provided:  Interventions: Short Term Rehab  Referral Comments: CM received a STR acceptance from [facility] in [place], [place] -- CM spoke with spouse and daughter at bedside to discuss same  Spouse states [facility] is "way too far" and would like to consider alternative placements  Spouse also believes it may be "too early" to accept a STR bed as she is hopeful pt may go home with [facility] instead  CM reviewed the therapy differences between [facility] -- Spouse and daughter confirmed understanding  CM advised the team will continue to follow and place referrals to nearby facilities  CM will follow up as pt approaches medical stability      Treatment Team Recommendation: Short Term Rehab  Discharge Destination Plan[de-identified] Short Term Rehab

## 2022-08-03 NOTE — OCCUPATIONAL THERAPY NOTE
Occupational Therapy CX        Patient Name: Jelena Mckinney  LSICL'Z Date: 8/3/2022       08/03/22 1330   OT Last Visit   OT Visit Date 08/03/22   Note Type   Note Type Cancelled Session   Cancel Reasons Other  (RN working w/ pt, plans to go down to [place] soon   OT will cont to follow to ee as able )     Diamond Rowe MS, OTR/L

## 2022-08-03 NOTE — PROGRESS NOTES
Progress Note - Surgical Oncology  Teto Ferraro 67 y o  male MRN: [id number]  Unit/Bed#: [facility] 515-01 Encounter: [id number]    Assessment:  Patient is a 67 y o  male who presented with MD-IPMN s/p  IPMN, s/p Lap distal pancreatectomy (03/2019-Quiros), open subtotal pancreatectomy (02/2022-Quiros), now status post completion pancreatectomy with sims anastomosis, extensive SARAH, reduction of internal hernia and cholecystectomy on 7/26    Afebrile, tachycardic into the 120s to 130s on 6 L nasal cannula saturations in the high 90s  Maps in this 59- 80s currently off pressors      STU:1 2 L , murky serous   UOP:790 cc just picked up overnight to about 100 cc/hour  No stools recorded    Plan:  NPO/NGT  Isoloyte @ 125  Epidural on hold but in place  Kaela  Maintain hanna for accurate I/O's  Maintain STU  Continue abx  Appreciate icu level of care  Patient will need nutrition will discuss today with ICU possible options    Subjective/Objective     Subjective:  Patient re-scanned overnight for increasing abdominal pain  CT AP showed stable fluid collection of pancreatic bed without extra from gastrectomy site  Showed mildly dilated small bowel loops without transition point and mild small bowel wall thickening thickening of ascending colon as well cirrhosis of liver small bilateral pleural effusions and diffuse anasarca  Patient's NG tube placed to suction  Patient complaining of diffuse abdominal pain this morning he reports chest pain and shortness of breath as well as nausea  His received 800 cc and albumin and 2 8 L of fluid  Objective:    Blood pressure 101/68, pulse (!) 133, temperature 97 88 °F (36 6 °C), resp  rate 14, height 6' (1 829 m), weight (!) 143 kg (315 lb 0 6 oz), SpO2 98 %  ,Body mass index is 42 73 kg/m²        Intake/Output Summary (Last 24 hours) at 8/3/2022 0619  Last data filed at 8/3/2022 0400  Gross per 24 hour   Intake 3954 46 ml   Output 3405 ml   Net 549 46 ml       Invasive Devices Report    Central Venous Catheter Line  Duration           Port A Cath 03/30/22 Right Chest 126 days          Peripheral Intravenous Line  Duration           Peripheral IV 08/02/22 Right;Upper;Ventral (anterior) Arm <1 day    Peripheral IV 08/03/22 Left;Upper;Ventral (anterior) Arm <1 day          Epidural Line  Duration           Epidural Catheter 07/26/22 7 days          Arterial Line  Duration           Arterial Line 08/01/22 Radial 2 days          Drain  Duration           Closed/Suction Drain Right RLQ Bulb 19 Fr  7 days    Urethral Catheter Temperature probe 16 Fr  1 day    NG/OG/Enteral Tube Enteral Feeding Tube Right nare <1 day                Physical Exam  Vitals reviewed  Constitutional:       General: He is not in acute distress  Appearance: He is not ill-appearing, toxic-appearing or diaphoretic  HENT:      Head: Normocephalic and atraumatic  Eyes:      Extraocular Movements: Extraocular movements intact  Cardiovascular:      Rate and Rhythm: Normal rate  Pulmonary:      Effort: Pulmonary effort is normal  No respiratory distress  Abdominal:      General: There is distension  Palpations: Abdomen is soft  Tenderness: There is abdominal tenderness (diffuse)  There is guarding and rebound  Comments: Incisions clean, dry and intact  STU drain in place   Musculoskeletal:         General: Swelling present  Right lower leg: Edema present  Left lower leg: Edema present  Skin:     General: Skin is warm and dry  Neurological:      Mental Status: He is alert and oriented to person, place, and time     Psychiatric:         Mood and Affect: Mood normal          Behavior: Behavior normal              Results from last 7 days   Lab Units 08/03/22  0438 08/02/22  1650 08/02/22  0512   WBC Thousand/uL 11 79* 9 67 13 31*   HEMOGLOBIN g/dL 8 2* 10 0* 9 4*   HEMATOCRIT % 25 2* 30 0* 29 6*   PLATELETS Thousands/uL 95* 147* 126*     Results from last 7 days   Lab Units 08/03/22  0438 08/02/22  2222 08/02/22  1650 08/01/22  0528 08/01/22  0039   POTASSIUM mmol/L 4 0 4 2 4 1   < >  --    CHLORIDE mmol/L 106 105 106   < >  --    CO2 mmol/L 24 23 22   < >  --    CO2, I-STAT mmol/L  --   --   --   --  24   BUN mg/dL 29* 25 22   < >  --    CREATININE mg/dL 1 40* 1 35* 1 14   < >  --    GLUCOSE, ISTAT mg/dl  --   --   --   --  71   CALCIUM mg/dL 8 4 8 4 8 0*   < >  --     < > = values in this interval not displayed       Results from last 7 days   Lab Units 08/03/22  0438 08/02/22  0512 08/01/22  1152   INR  1 96* 1 78* 1 78*

## 2022-08-03 NOTE — PROCEDURES
Insert PICC line    Date/Time: 8/3/2022 12:27 PM  Performed by: Almas Deras RN  Authorized by: Yasmine Eid PA-C     Patient location:  Bedside  Other Assisting Provider: Yes (comment) (Abdi Locke Infusion Tech)    Consent:     Consent obtained:  Verbal (Physician obtained)    Consent given by:  Spouse    Procedural risks discussed: with MD Henry protocol:     Procedure explained and questions answered to patient or proxy's satisfaction: yes      Relevant documents present and verified: yes      Test results available and properly labeled: yes      Radiology Images displayed and confirmed  If images not available, report reviewed: yes      Required blood products, implants, devices, and special equipment available: yes      Site/side marked: yes      Immediately prior to procedure, a time out was called: yes      Patient identity confirmed:  Arm band and hospital-assigned identification number  Pre-procedure details:     Hand hygiene: Hand hygiene performed prior to insertion      Sterile barrier technique: All elements of maximal sterile technique followed      Skin preparation:  ChloraPrep    Skin preparation agent: Skin preparation agent completely dried prior to procedure    Indications:     PICC line indications: total parenteral nutrition    Anesthesia (see MAR for exact dosages):      Anesthesia method:  Local infiltration    Local anesthetic:  Lidocaine 1% w/o epi (5 ml)  Procedure details:     Location:  Cephalic    Vessel type: vein      Laterality:  Left    Site selection rationale:  RCW PAC    Approach: percutaneous technique used      Patient position:  Flat    Procedural supplies:  Triple lumen    Catheter size:  5 Fr    Landmarks identified: yes      Ultrasound guidance: yes      Ultrasound image availability:  Not saved    Sterile ultrasound techniques: Sterile gel and sterile probe covers were used      Number of attempts:  3    Successful placement: no    Comments:      Pt evaluated on left arm as he has RCW PAC  Only vein visualized/large enough was cephalic  Able to cannulate vein, unable to thread guidewire    Brachial vein too deep, attempted basilic but unable to cannulate

## 2022-08-03 NOTE — PROGRESS NOTES
Progress Note - Surgical Oncology  Maura De Jesus 67 y o  male MRN: [id number]  Unit/Bed#: [facility] 515-01 Encounter: [id number]    Assessment:  Patient is a 67 y o  male who presented with MD-IPMN s/p  IPMN, s/p Lap distal pancreatectomy (03/2019-Quiros), open subtotal pancreatectomy (02/2022-Quiros), now status post completion pancreatectomy with sims anastomosis, extensive SARAH, reduction of internal hernia and cholecystectomy on 7/26, now s/p IR drain placement on 8/3  Afebrile, HR in low 100-1-teens on amio gtt of 5 now better rate controlled  And 2L NC    STU:380 cc , serous   IR drain: 365, murky purulent material  UOP:656 cc       Plan:  Continue NPO/NGT  DC fluids once on TPN  PICC/TPN- reorder TPN  Epidural remains in place will discuss removal with APS today  Maintain [place],  Monitor STU and IR drain  Continue antibiotics  Appreciate ICU level of care  Prn pain control        Subjective/Objective     Subjective:  Pt went to IR for drain placement with 10 cc thick cloudy fluid sent for cx  Pt received PICC yesterday and was started on TPN overnight  Pt remains lethargic with abdominal pain and distention  Objective:    Blood pressure 115/56, pulse 104, temperature 98 06 °F (36 7 °C), resp  rate (!) 23, height 6' (1 829 m), weight 131 kg (288 lb 12 8 oz), SpO2 97 %  ,Body mass index is 39 17 kg/m²        Intake/Output Summary (Last 24 hours) at 8/3/2022 1943  Last data filed at 8/3/2022 1807  Gross per 24 hour   Intake 4755 02 ml   Output 1781 ml   Net 2974 02 ml       Invasive Devices  Report    Peripherally Inserted Central Catheter Line  Duration           PICC Line 08/03/22 <1 day          Central Venous Catheter Line  Duration           Port A Cath 03/30/22 Right Chest 126 days          Peripheral Intravenous Line  Duration           Peripheral IV 08/02/22 Right;Upper;Ventral (anterior) Arm 1 day          Arterial Line  Duration           Arterial Line 08/01/22 Radial 2 days          Drain  Duration Closed/Suction Drain Right RLQ Bulb 19 Fr  8 days    Urethral Catheter Temperature probe 16 Fr  2 days    NG/OG/Enteral Tube Enteral Feeding Tube Right nare 1 day    Abscess Drain LUQ <1 day                Physical Exam  Vitals reviewed  Constitutional:       General: He is not in acute distress  Appearance: He is not ill-appearing, toxic-appearing or diaphoretic  HENT:      Head: Normocephalic and atraumatic  Eyes:      Extraocular Movements: Extraocular movements intact  Cardiovascular:      Rate and Rhythm: Normal rate  Pulmonary:      Effort: Pulmonary effort is normal  No respiratory distress  Abdominal:      General: There is distension  Palpations: Abdomen is soft  Tenderness: There is abdominal tenderness  There is no guarding or rebound  Comments: Incisions clean, dry and intact  IR drain in place  Frandy drain in place    Musculoskeletal:         General: Swelling present  Right lower leg: Edema present  Left lower leg: Edema present  Skin:     General: Skin is warm and dry  Neurological:      Mental Status: He is alert and oriented to person, place, and time  Psychiatric:         Mood and Affect: Mood normal          Behavior: Behavior normal              Results from last 7 days   Lab Units 08/03/22  1540 08/03/22  0438 08/02/22  1650   WBC Thousand/uL 15 19* 11 79* 9 67   HEMOGLOBIN g/dL 7 9* 8 2* 10 0*   HEMATOCRIT % 24 9* 25 2* 30 0*   PLATELETS Thousands/uL 126* 95* 147*     Results from last 7 days   Lab Units 08/03/22  0438 08/02/22  2222 08/02/22  1650 08/01/22  0528 08/01/22  0039   POTASSIUM mmol/L 4 0 4 2 4 1   < >  --    CHLORIDE mmol/L 106 105 106   < >  --    CO2 mmol/L 24 23 22   < >  --    CO2, I-STAT mmol/L  --   --   --   --  24   BUN mg/dL 29* 25 22   < >  --    CREATININE mg/dL 1 40* 1 35* 1 14   < >  --    GLUCOSE, ISTAT mg/dl  --   --   --   --  71   CALCIUM mg/dL 8 4 8 4 8 0*   < >  --     < > = values in this interval not displayed  Results from last 7 days   Lab Units 08/03/22  1540 08/03/22  0438 08/02/22  0512   INR  1 61* 1 96* 1 78*

## 2022-08-03 NOTE — NURSING NOTE
Epidural removed at bedside by APS  Excess Ropivicaine/Fentanyl wasted with witness for a total of 150cc waste

## 2022-08-03 NOTE — PROGRESS NOTES
Progress Note - Jacy Pennington 67 y o  male MRN: [id number]    Unit/Bed#: [facility] 907-52 Encounter: [id number]      CC: diabetes f/u    Subjective:   Jacy Pennington is a 67y o  year old male with type 2  diabetes  S/p pancreatectomy  , currently TPN was started today, no episodes of hypoglycemia    Objective:     Vitals: Blood pressure 125/61, pulse 105, temperature 98 4 °F (36 9 °C), resp  rate 18, height 6' (1 829 m), weight 131 kg (288 lb 12 8 oz), SpO2 98 %  ,Body mass index is 39 17 kg/m²  Intake/Output Summary (Last 24 hours) at 8/3/2022 1618  Last data filed at 8/3/2022 1545  Gross per 24 hour   Intake 4756 79 ml   Output 1936 ml   Net 2820 79 ml       Physical Exam:  General Appearance: awake, appears stated age and cooperative  Head: Normocephalic, without obvious abnormality, atraumatic  Extremities: moves all extremities  Skin: Skin color and temperature normal    Pulm: no labored breathing        POC Glucose (mg/dl)   Date Value   08/03/2022 82   08/03/2022 77   08/03/2022 82   08/02/2022 88   08/02/2022 93   08/02/2022 102   08/02/2022 107   08/02/2022 102   08/02/2022 108   08/01/2022 94       Assessment:  68 yo M with pmhx of DM type 2, and pancreatic mass that was admitted to the Hospital for a total pancreatectomy   Home regimen: metformin 1000 mg bid, basaglar 20 units bedtime and fiasp 6 units tid       Plan:  Patient was started on TPN today we  recommend to add 20 units of regular insulin to the TPN bag as pt has high chances of hyperglycemia in setting of total Pancreatectomy and Dextrose 30% in TPN  Currently patient is on Lantus 5 units at bedtime and correctional scale  Continue with Accu-Cheks      Portions of the record may have been created with voice recognition software

## 2022-08-03 NOTE — PROGRESS NOTES
Daily Progress Note - Critical Care   Jemal Ramos 67 y o  male MRN: [id number]  Unit/Bed#: 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), open subtotal pancreatectomy (02/2022) and now admitted s/p completion pancreatectomy with sims anastomosis, extensive SARAH, reduction of internal hernia and cholecystectomy on 7/26, currently POD #7  Decompensated on the floor 7/31-8/1 with sepsis and transferred back to the ICU  24 hr events:  · Acute event of increased abdominal pain and distension, STU drain was grey brown from serous   · CT scan grossly stable without clear evidence of leak   · Hypotension overnight requiring levophed for short period of time, now off   · Received in total 500 mL 5% and 100 mL 25% albumin      ---------------------------------------------------------------------------------------  SUBJECTIVE  "So much pain"    Review of Systems  Review of systems was reviewed and negative unless stated above in HPI/24-hour events   ---------------------------------------------------------------------------------------  Assessment and Plan:    Neuro:   · Diagnosis: Acute post-operative pain   ? Analgesia: APS following, recs appreciated   ? Epidural in place with plan to remove when coagulopathy is corrected   § INR 1 96 this AM - ? FFP to facilitate removal   § SQH on hold this AM    ? [facility] tylenol, oxycodone 5-10 mg PRN mod-severe, dilaudid 0 5 mg PRN breakthrough   ? Robaxin PRN   · Delirium precautions  § CAM-ICU daily  § Regulate sleep/wake cycle         CV:   · Diagnosis: Septic shock    ? Source - bacteremia +/- intra-abdominal source  ? Levophed weaned to off, MAP goal >65   ? Trend lactic until clearance   ? Continue Zosyn   ? F/u cultures   · Diagnosis: Atrial fibrillation   ?  Bolus Amiodarone this AM, start infusion if persistent   ? Holding home atenolol with shock   ? Follow rhythm on telemetry  ? 8/1 Echo EF 60% improved from 45% on 7/27, no RV dysfunction   ? Holding home lasix in setting of shock         Pulm:  · Diagnosis: Bilateral pleural effusions   ? Small BL pleural effusions on CT chest with atelectasis   ? No role for intervention at this time, continue to monitor   ? On RA   ? Pulmonary toileting      GI:   · Diagnosis: IPMN now s/p completion pancreatectomy, reduction of internal hernia and cholecystectomy on 7/26  ? 8/1 CT - new partially loculated fluid collection in post-pancreatectomy bed  ? 8/2 CT - stable fluid collection in pancreatectomy bed, no evidence of enteric contrast extravasation from staple line, stable pneumoperitoneum  ? No surgical intervention at this time per white surgery   ? Trend abdominal exam , STU drain output/character and end points   ? IV abx as below   ? NPO with mental status, NGT in place with high output would avoid trickle TF   ? Plan for TPN when able to get central access based on blood cultures   ? Will need Creon when taking PO again   · Stress ulcer PPX: Pantoprazole IV  · Bowel regimen: Dulcolax suppository daily   ? Last BM: 7/31        :   · Diagnosis: TERESE, urinary retention   ? Baseline Cr 0 8  ? Current Cr 1 40  ? Trend BUN/CR, Strict I/O   ? Hanna: Yes  § Urinary catheter still needed for Strict I and O in a critically ill patient  ? Urinary retention protocol when hanna removed         F/E/N:   · Fluid/Diuretic plan: Isolyte 125 mL/hr   ? Albumin PRN   · E: Replete for goal K >4, Phos >3, Mg >2   · N: NPO with NGT, start TPN when able to get enteral access         Heme/Onc:   · Diagnosis: Anemia  ? In setting of critical illness, acute blood loss   ? Hgb 8 2 down from 10, recheck this afternoon   ? VTE PPX: Heparin on hold for epidural removal, start after removed and SCDs        Endo:   · Diagnosis: T2DM  ? Now s/p pancreatectomy   ?  Lantus 5 u QHS ? Would not d/c as he could go into DKA   ? Close BG monitoring with NPO status   ? BG goal 140-180         ID:   · Diagnosis: Sepsis, possible intra-abdominal abscess   ? Zosyn day 4  ? Blood cx - 1/2 streptococcus/G+ cocci in chains, ? Contamination  ? Blood cx - 1/2 negative x 24 hrs   ? Repeat BCx - in process    ? Consider ID consult with gram positive bacteremia   ? Body fluid cs - no bacteria seen   ? Trend fever curve and WBC count      MSK/Skin:   · Frequent turning and repositioning   · PT/OT as appropriate     Patient appropriate for transfer out of the ICU today?: No  Disposition: Continue Critical Care   Code Status: Level 1 - Full Code  ---------------------------------------------------------------------------------------  ICU CORE MEASURES    Prophylaxis   VTE Pharmacologic Prophylaxis: On hold for epidural removal   VTE Mechanical Prophylaxis: sequential compression device  Stress Ulcer Prophylaxis: Pantoprazole IV    ABCDE Protocol (if indicated)  CAM-ICU: Positive    Invasive Devices Review  Invasive Devices  Report    Central Venous Catheter Line  Duration           Port A Cath 03/30/22 Right Chest 126 days          Peripheral Intravenous Line  Duration           Peripheral IV 08/02/22 Right;Upper;Ventral (anterior) Arm 1 day    Peripheral IV 08/03/22 Left;Upper;Ventral (anterior) Arm <1 day          Epidural Line  Duration           Epidural Catheter 07/26/22 7 days          Arterial Line  Duration           Arterial Line 08/01/22 Radial 2 days          Drain  Duration           Closed/Suction Drain Right RLQ Bulb 19 Fr  7 days    Urethral Catheter Temperature probe 16 Fr  1 day    NG/OG/Enteral Tube Enteral Feeding Tube Right nare <1 day              Can any invasive devices be discontinued today?  No  ---------------------------------------------------------------------------------------  OBJECTIVE    Vitals   Vitals:    08/03/22 0500 08/03/22 0534 08/03/22 0600 08/03/22 0700   BP: 109/53 122/55 138/60   BP Location:   Right arm    Pulse: (!) 128  (!) 130 (!) 132   Resp: 14  13 15   Temp: 97 9 °F (36 6 °C) 97 88 °F (36 6 °C) 97 9 °F (36 6 °C) 98 2 °F (36 8 °C)   TempSrc:   Bladder    SpO2: 99%  98% 98%   Weight:   131 kg (288 lb 12 8 oz)    Height:         Temp (24hrs), Av 2 °F (36 8 °C), Min:97 88 °F (36 6 °C), Max:98 96 °F (37 2 °C)  Current: Temperature: 98 2 °F (36 8 °C)    Respiratory:  SpO2: SpO2: 98 %, SpO2 Device: O2 Device: Nasal cannula  Nasal Cannula O2 Flow Rate (L/min): 6 L/min    Invasive/non-invasive ventilation settings   Respiratory  Report   Lab Data (Last 4 hours)    None         O2/Vent Data (Last 4 hours)    None                Physical Exam  Vitals reviewed  Constitutional:       Appearance: He is obese  He is ill-appearing  Interventions: Nasal cannula in place  Comments: Uncomfortable appearing   HENT:      Head: Normocephalic and atraumatic  Eyes:      Pupils: Pupils are equal, round, and reactive to light  Cardiovascular:      Rate and Rhythm: Tachycardia present  Rhythm irregularly irregular  Pulmonary:      Effort: Pulmonary effort is normal       Breath sounds: Normal breath sounds  Abdominal:      General: Abdomen is protuberant  There is distension  Palpations: Abdomen is soft  Tenderness: There is generalized abdominal tenderness  Comments: Significant abdominal tenderness to palpation   Midline incision c/d/i  STU drain serous   NGT with dark bilious drainage   Musculoskeletal:      Right lower leg: 3+ Edema present  Left lower leg: 3+ Edema present  Skin:     General: Skin is warm and dry  Neurological:      General: No focal deficit present  Mental Status: He is oriented to person, place, and time  He is lethargic               Laboratory and Diagnostics:  Results from last 7 days   Lab Units 22  8784 22  1650 22  4647 22  9980 22  0039 22  0011 22  0543 22  0542 07/29/22  0406 07/28/22  1536 07/28/22  0525   WBC Thousand/uL 11 79* 9 67 13 31* 11 89*  --  3 88* 14 55* 10 37* 6 90   < > 4 18*   HEMOGLOBIN g/dL 8 2* 10 0* 9 4* 9 9*  --  9 9* 8 0* 7 8* 7 4*   < > 7 1*   I STAT HEMOGLOBIN g/dl  --   --   --   --  9 5*  --   --   --   --   --   --    HEMATOCRIT % 25 2* 30 0* 29 6* 29 5*  --  31 2* 24 8* 24 4* 22 8*   < > 21 8*   HEMATOCRIT, ISTAT %  --   --   --   --  28*  --   --   --   --   --   --    PLATELETS Thousands/uL 95* 147* 126* 147*  --  126* 92* 97* 73*   < > 50*   NEUTROS PCT %  --   --   --   --   --   --   --   --  73  --  68   BANDS PCT %  --   --  15*  --   --   --   --   --   --   --   --    MONOS PCT %  --   --   --   --   --   --   --   --  11  --  13*   MONO PCT %  --   --  4  --   --   --   --   --   --   --   --     < > = values in this interval not displayed       Results from last 7 days   Lab Units 08/03/22  0438 08/02/22  2222 08/02/22  1650 08/02/22  0512 08/01/22  0528 08/01/22  0039 08/01/22  0011 07/31/22  0543 07/30/22  0542 07/29/22  0340   SODIUM mmol/L 138 135 138 137 137  137  --  138 140 143 143   POTASSIUM mmol/L 4 0 4 2 4 1 4 1 3 9  3 9  --  3 5 3 8 3 8 3 9   CHLORIDE mmol/L 106 105 106 106 106  106  --  105 107 111* 113*   CO2 mmol/L 24 23 22 24 23  23  --  23 28 29 28   CO2, I-STAT mmol/L  --   --   --   --   --  24  --   --   --   --    ANION GAP mmol/L 8 7 10 7 8  8  --  10 5 3* 2*   BUN mg/dL 29* 25 22 19 15  15  --  16 10 8 10   CREATININE mg/dL 1 40* 1 35* 1 14 1 00 1 03  1 03  --  1 09 0 76 0 54* 0 47*   CALCIUM mg/dL 8 4 8 4 8 0* 7 5* 7 6*  7 6*  --  7 9* 8 0* 8 0* 8 0*   GLUCOSE RANDOM mg/dL 84 94 111 111 62*  62*  --  76 157* 156* 149*   ALT U/L 24  --  25  --  28  --   --   --  57 68   AST U/L 25  --  29  --  22  --   --   --  45 69*   ALK PHOS U/L 94  --  128*  --  99  --   --   --  112 73   ALBUMIN g/dL 2 4*  --  1 8*  --  1 6*  --   --   --  2 6* 2 5*   TOTAL BILIRUBIN mg/dL 2 96*  --  2 33*  --  1 72*  --   --   -- 1 49* 1 14*     Results from last 7 days   Lab Units 08/03/22  0438 08/02/22  1650 08/02/22  0512 08/01/22  0528 08/01/22  0011 07/31/22  0543 07/30/22  0542   MAGNESIUM mg/dL 2 2 2 5 2 4 1 6 1 5* 2 0 1 9   PHOSPHORUS mg/dL 4 8* 4 7* 4 8* 4 2* 4 4* 2 4 1 8*      Results from last 7 days   Lab Units 08/03/22  0438 08/02/22  0512 08/01/22  1152   INR  1 96* 1 78* 1 78*          Results from last 7 days   Lab Units 08/03/22  0443 08/02/22  2225 08/02/22  1650 08/02/22  0901 08/02/22  0512 08/02/22  0027 08/01/22  1954   LACTIC ACID mmol/L 3 7* 4 9* 5 0* 3 3* 4 0* 3 9* 4 1*     ABG:  Results from last 7 days   Lab Units 08/02/22 2226   PH ART  7 424   PCO2 ART mm Hg 34 2*   PO2 ART mm Hg 138 2*   HCO3 ART mmol/L 21 9*   BASE EXC ART mmol/L -2 1   ABG SOURCE  Line, Arterial     VBG:  Results from last 7 days   Lab Units 08/02/22  2226 08/01/22  0815 08/01/22  0011   PH ELIU   --   --  7 403*   PCO2 ELIU mm Hg  --   --  31 6*   PO2 ELIU mm Hg  --   --  50 9*   HCO3 ELIU mmol/L  --   --  19 3*   BASE EXC ELIU mmol/L  --   --  -4 6   ABG SOURCE  Line, Arterial   < >  --     < > = values in this interval not displayed  Micro  Results from last 7 days   Lab Units 08/02/22  1225 08/01/22  0200 08/01/22  0036   BLOOD CULTURE  Received in Microbiology Lab  Culture in Progress  Received in Microbiology Lab  Culture in Progress  No Growth at 48 hrs   --    GRAM STAIN RESULT   --  3+ Polys  No bacteria seen Gram positive cocci in chains*   BODY FLUID CULTURE, STERILE   --  Culture too young- will reincubate  --        EKG: Atrial fibrillation rate 125 on telemetry   Imaging:  I have personally reviewed pertinent reports  and I have personally reviewed pertinent films in PACS    Intake and Output  I/O       08/01 0701 08/02 0700 08/02 0701 08/03 0700    P  O  0 0    I V  (mL/kg) 4612 3 (32 3) 2850 3 (19 9)    NG/GT  0    IV Piggyback 250 1104 2    Total Intake(mL/kg) 4862 3 (34) 3954 5 (27 7)    Urine (mL/kg/hr) 1215 (0 4) 790 (0 2)    Emesis/NG output  1260    Drains 1605 1355    Stool 0     Total Output 2820 3405    Net +2042 3 +549 5          Unmeasured Stool Occurrence 0 x         Height and Weights   Height: 6' (182 9 cm)  IBW (Ideal Body Weight): 77 6 kg  Body mass index is 39 17 kg/m²  Weight (last 2 days)     Date/Time Weight    08/03/22 0600 131 (288 8)    08/02/22 0600 143 (315 04)    08/01/22 0800 138 (305)    08/01/22 0546 138 (305 12)            Nutrition       Diet Orders   (From admission, onward)             Start     Ordered    08/02/22 1334  Diet NPO  Diet effective now        References:    Nutrtion Support Algorithm Enteral vs  Parenteral   Question Answer Comment   Diet Type NPO    RD to adjust diet per protocol?  No        08/02/22 1333    07/30/22 1111  Dietary nutrition supplements  Once        Question Answer Comment   Select Supplement: Glucerna-Strawberry    Frequency Breakfast, Lunch, Dinner        07/30/22 1111              Active Medications  Scheduled Meds:  Current Facility-Administered Medications   Medication Dose Route Frequency Provider Last Rate    acetaminophen  975 mg Oral Q6H [facility] Puja Isaacs PA-C      amiodarone (CORDARONE) IV bolus  150 mg Intravenous Once Yana Valencia PA-C      bisacodyl  10 mg Rectal Daily Yana Valencia PA-C      heparin (porcine)  5,000 Units Subcutaneous Q8H [facility] Corrine Guerrero DO      HYDROmorphone  0 2 mg Intravenous Q4H PRN Yana Valencia PA-C      insulin glargine  5 Units Subcutaneous HS Mackenzie Childress MD      insulin lispro  1-5 Units Subcutaneous Q6H Puja Isaacs PA-C      iohexol  30 mL Oral 90 min pre-procedure [address] [facility] city, CRNP      melatonin  6 mg Oral HS Doris Overall, PA-C      methocarbamol  500 mg Oral Q6H PRN Weston Rhein, CRNP      multi-electrolyte  125 mL/hr Intravenous Continuous Doris Overall, PA-C 125 mL/hr (08/02/22 1912)    norepinephrine  1-30 mcg/min Intravenous Titrated MIRNA Padilla Stopped (08/02/22 2250)    ondansetron  4 mg Intravenous Q6H PRN [place], [place]      oxyCODONE  5 mg Oral Q4H PRN Reva Mediate NenzelMIRNA      Or    oxyCODONE  10 mg Oral Q4H PRN Reva Mediate NenzelMIRNA      pantoprazole  40 mg Intravenous Q24H [address] [place], [place]      phenol  1 spray Mouth/Throat Q2H PRN Mark Ramos PA-C      piperacillin-tazobactam  4 5 g Intravenous Q8H Bctruong Abdul MD 4 5 g (08/03/22 0412)     Continuous Infusions:  multi-electrolyte, 125 mL/hr, Last Rate: 125 mL/hr (08/02/22 1912)  norepinephrine, 1-30 mcg/min, Last Rate: Stopped (08/02/22 2250)      PRN Meds:   HYDROmorphone, 0 2 mg, Q4H PRN  methocarbamol, 500 mg, Q6H PRN  ondansetron, 4 mg, Q6H PRN  oxyCODONE, 5 mg, Q4H PRN   Or  oxyCODONE, 10 mg, Q4H PRN  phenol, 1 spray, Q2H PRN        Allergies   No Known Allergies  ---------------------------------------------------------------------------------------  Advance Directive and Living Will:      Power of :    POLST:    ---------------------------------------------------------------------------------------  Care Time Delivered:   No Critical Care time spent     Kacie Kessler PA-C      Portions of the record may have been created with voice recognition software  Occasional wrong word or "sound a like" substitutions may have occurred due to the inherent limitations of voice recognition software    Read the chart carefully and recognize, using context, where substitutions have occurred

## 2022-08-03 NOTE — RESTORATIVE TECHNICIAN NOTE
Restorative Technician Note      Patient Name: Joe Roger     Note Type: Mobility  Activity Performed: Repositioned  Patient Position at End of Consult: Supine;  All needs within reach

## 2022-08-03 NOTE — TELEMEDICINE
e-Consult (IPC)  - Interventional Radiology  Cheryl Ill 67 y o  male MRN: [id number]  Unit/Bed#: [facility] 515-01 Encounter: [id number]          Interventional Radiology has been consulted to evaluate Cheryl Ill    We were consulted by critical care concerning this patient with fluid collection  IP Consult to IR  Consult performed by: MIRNA Armijo  Consult ordered by: Raquel Catherine MD        08/03/22    Assessment/Recommendation:     67year old male s/p pancreatectomy with sims anastomosis on 7/26/22 with Dr Handy Paulson, now with tachycardia and lactic acidosis and back in the ICU  CT A/P from 8/2 demonstrated a Fluid collection in the pancreatectomy bed measuring about 9 4 x 2 4 x 2 3 cm  Unfortunately, this fluid collection is not well formed, however, there is a smaller left anterior fluid collection that may connect to it that may drain it that is amenable to drainage under CT guidance  Of note, several requests for PICC placement, PICC team is first line for PICC placement, if unsuccessful, then to IR for placement or reposition  - keep NPO  - CT guided drain placement  - will send fluid for cultures  - PICC placement if PICC team unsuccessful    Total time spent in review of data, discussion with requesting provider and rendering advice was 25 minutes  Thank you for allowing Interventional Radiology to participate in the care of Cheryl Ill  Please don't hesitate to call or TigerText us with any questions       [address] [place] [address] Darlene Weaver

## 2022-08-03 NOTE — PROCEDURES
PICC Line Insertion    Date/Time: 8/3/2022 3:26 PM  Performed by: Venice Stoll RN  Authorized by: Natalya Lazcano MD     Patient location:  IR  Consent:     Consent obtained:  Written    Consent given by:  Spouse    Risks discussed:  Arterial puncture, incorrect placement, nerve damage, bleeding and infection    Alternatives discussed:  No treatment and delayed treatment  Universal protocol:     Procedure explained and questions answered to patient or proxy's satisfaction: yes      Relevant documents present and verified: yes      Test results available and properly labeled: yes      Radiology Images displayed and confirmed  If images not available, report reviewed: yes      Required blood products, implants, devices, and special equipment available: yes      Site/side marked: yes      Immediately prior to procedure, a time out was called: yes      Patient identity confirmed:  Verbally with patient and arm band  Pre-procedure details:     Hand hygiene: Hand hygiene performed prior to insertion      Sterile barrier technique: All elements of maximal sterile technique followed      Skin preparation:  ChloraPrep  Indications:     PICC line indications: medications requiring central line    Anesthesia (see MAR for exact dosages):      Anesthesia method:  Local infiltration    Local anesthetic:  Lidocaine 1% w/o epi  Procedure details:     Location:  Cephalic    Vessel type: vein      Laterality:  Left    Approach: percutaneous technique used      Procedural supplies:  Double lumen    Catheter size:  5 Fr    Landmarks identified: yes      Ultrasound guidance: yes      Ultrasound image availability:  Images available in PACS    Sterile ultrasound techniques: Sterile gel and sterile probe covers were used      Number of attempts:  1    Total catheter length (cm):  50    Catheter out on skin (cm):  0    Max flow rate:  999    Arm circumference:  40  Post-procedure details:     Post-procedure:  Securement device placed    Assessment:  Blood return through all ports, no pneumothorax on x-ray and placement verified by x-ray    Post-procedure complications: none      Patient tolerance of procedure:   Tolerated well, no immediate complications

## 2022-08-03 NOTE — PHYSICAL THERAPY NOTE
Physical Therapy Cancellation Note    Patient's Name: Cheryl Ill       08/03/22 1329   PT Last Visit   PT Visit Date 08/03/22   Note Type   Note Type Cancelled Session   Cancel Reasons Other  (RN presently in the room working w/ pt, plans to go down to IR soon   Will hold PT today + continue to follow as appropriate + able )     Tomer Niño, PT, DPT

## 2022-08-03 NOTE — DISCHARGE INSTRUCTIONS
TUBE CARE INSTRUCTIONS    Care after your procedure:    Resume your normal diet  Small sips of flat soda will help with nausea  1  The properly functioning catheter should be forward flushed once (1x) daily with 10ml of normal saline using clean technique  You will be given a prescription for flushes  To flush the tube, clean both connections with alcohol swab  Twist off the drainage bag/ bulb  tubing and twist the saline syringe into the drainage tube and flush  Remove the syringe and twist the drainage bag / bulb tubing tubing back on     2  The drainage bag/bulb may be emptied as necessary  Keep a record of the amount of fluid you drain from your tube  This should be done with clean technique as well  3  A fresh dressing should be applied daily over the tube insertion site  4  As the tube is secured to the skin with only a suture,try not to pull on your tube  Tub baths are not permitted  Showers are permitted if the patient's skin entry site is prevented from getting wet  Similarly, washcloth "baths" are acceptable  Contact Interventional Radiology at [phone] [place] PATIENTS: Contact Interventional Radiology at [phone]) Israel Hager PATIENTS: Contact Interventional Radiology at [phone]) if:    1  Leakage or large amounts of liquid around the catheter  2  Fever of 101 degrees lasting several hours without other obvious cause (such as sore throat, flu, etc)  3  Persistent nausea or vomiting  4  Diminished drainage, which may be associated with pressure or pain  Or when the     drainage from your tube is less than 10mls for 48 hours  5  Catheter pulled back or falls out  The following pharmacies carry the flush syringes         [facility]                     [facility]  [address] [facility]                         [address] [facility]  Phone [phone]            Phone [phone]   [place] [address]                                [phone]  [address] [facility] [place]                      [address] [facility]  Phone [phone]            Phone [phone]                      [facility]                                                                                                          [phone]  [facility]  [facility] [address]    [address]  Phone [phone]

## 2022-08-03 NOTE — BRIEF OP NOTE (RAD/CATH)
INTERVENTIONAL RADIOLOGY PROCEDURE NOTE    Date: 8/3/2022    Procedure: Anterior abdominal drain placement    Preoperative diagnosis:   1  IPMN (intraductal papillary mucinous neoplasm)    2  Overlapping malignant neoplasm of pancreas ([facility] [address] )    3  Type 2 diabetes mellitus with hyperglycemia, with long-term current use of insulin [facility] )    4  Permanent atrial fibrillation ([facility] )    5  Intra-abdominal fluid collection         Postoperative diagnosis: Same  Surgeon: Mitali Alba MD     Assistant: None  No qualified resident was available  Blood loss: None    Specimens: 10 cc thick cloudy fluid     Findings: 10 F drain placed into left anterior abdominal fluid collection  Cloudy thick fluid removed and sent to lab  Complications: None immediate      Anesthesia: conscious sedation

## 2022-08-03 NOTE — PROGRESS NOTES
Epidural Follow-up Note - Acute Pain Service    Min Chirinos 67 y o  male MRN: [id number]  Unit/Bed#: [facility] 515-01 Encounter: [id number]      Assessment:   Principal Problem:    IPMN (intraductal papillary mucinous neoplasm)  Active Problems:    Permanent atrial fibrillation (HCC)    Lower extremity edema    Overlapping malignant neoplasm of pancreas (HCC)    Type 2 diabetes mellitus with hyperglycemia, with long-term current use of insulin (HCC)    Thrombocytopenia (HCC)    Intra-abdominal fluid collection    Transaminitis    TERESE (acute kidney injury) ([facility] [address] )      Min Chirinos is a 67y o  year old male PMH A-fib, T2DM, intraductal papillary mucinous neoplasm s/p distal pancreatectomy (3/2019), open subtotal pancreatectomy (02/2022) and now admitted s/p completion pancreatectomy with sims anastomosis, extensive SARAH, reduction of internal hernia and cholecystectomy on 7/26, currently POD #7  Decompensated on the floor 7/31-8/1 with sepsis and transferred back to the ICU      Patient's PCEA has been turned off since his rapid response called for acute hypotension and hypoglycemia  The intent is to pull the epidural however he continues to be coagulopathic with most recent INR of 1 96  He was scanned yesterday out of concern for anastomotic leak however no evidence was seen  His LFTs are worsening as well as his coagulopathy  The concern is that the indwelling epidural will cause issues now that his liver and coagulopathy are worsening   Risks/benefits of pulling the epidural discussed with ICU team and will give FFP and platelets in attempt to optimize his coagulation as much as possible and pull epidural    Plan:  Analgesia:  - Continue MMA regimen as written, patient has significant pain  - Transfuse 2U FFP and 1 6-pk of platelets in order to optimize coagulation  - If platelets >17X and ANDI<2 1 would consider pulling epidural in setting of worsening prognosis    Pain History  Current pain location(s): Abdomen  Pain Scale:  8/10  Quality: Sore  24 hour history: See above    Meds/Allergies     No Known Allergies    Objective     Temp:  [97 88 °F (36 6 °C)-98 96 °F (37 2 °C)] 98 42 °F (36 9 °C)  HR:  [114-139] 116  Resp:  [8-19] 19  BP: (100-154)/(50-72) 152/66  Arterial Line BP: ()/() 131/62    Physical Exam  Vitals and nursing note reviewed  Constitutional:       Appearance: Normal appearance  He is normal weight  HENT:      Head: Normocephalic and atraumatic  Right Ear: External ear normal       Left Ear: External ear normal       Nose: Nose normal       Mouth/Throat:      Mouth: Mucous membranes are moist       Pharynx: Oropharynx is clear  Eyes:      Conjunctiva/sclera: Conjunctivae normal    Cardiovascular:      Rate and Rhythm: Normal rate and regular rhythm  Pulses: Normal pulses  Heart sounds: Normal heart sounds  Pulmonary:      Effort: Pulmonary effort is normal    Abdominal:      Tenderness: There is abdominal tenderness  Musculoskeletal:         General: Normal range of motion  Cervical back: Normal range of motion  Skin:     General: Skin is warm and dry  Neurological:      General: No focal deficit present  Mental Status: He is alert and oriented to person, place, and time  Mental status is at baseline     Psychiatric:         Mood and Affect: Mood normal        Epidural: Catheter in good position, site clean and not tender    Lab Results:   Results from last 7 days   Lab Units 08/03/22  0438   WBC Thousand/uL 11 79*   HEMOGLOBIN g/dL 8 2*   HEMATOCRIT % 25 2*   PLATELETS Thousands/uL 95*      Results from last 7 days   Lab Units 08/03/22  0438 08/01/22  0528 08/01/22  0039   POTASSIUM mmol/L 4 0   < >  --    CHLORIDE mmol/L 106   < >  --    CO2 mmol/L 24   < >  --    CO2, I-STAT mmol/L  --   --  24   BUN mg/dL 29*   < >  --    CREATININE mg/dL 1 40*   < >  --    CALCIUM mg/dL 8 4   < >  --    ALK PHOS U/L 94   < >  --    ALT U/L 24   < >  --    AST U/L 25   < >  --    GLUCOSE, ISTAT mg/dl  --   --  71    < > = values in this interval not displayed  Results from last 7 days   Lab Units 08/03/22  0438   INR  1 96*       Counseling / Coordination of Care  Total floor / unit time spent today 15 min  Greater than 50% of total time was spent with the patient and / or family counseling and / or coordination of care  Please note that the APS provides consultative services regarding pain management only  With the exception of ketamine, peripheral nerve catheters, and epidural infusions (and except when indicated), final decisions regarding starting or changing doses of analgesic medications are at the discretion of the consulting service  Off hours consultation and/or medication management is generally not available      Abhishek Ta MD  Acute Pain Service

## 2022-08-04 NOTE — PHYSICAL THERAPY NOTE
PHYSICAL THERAPY NOTE          Patient Name: Sandra Capps  VNAEF'T Date: 8/4/2022 08/04/22 1131   PT Last Visit   PT Visit Date 08/04/22   End of Consult   Patient Position at End of Consult Supine;Bed/Chair alarm activated; All needs within reach   Pain Assessment   Pain Assessment Tool FLACC   Pain Rating: FLACC (Rest) - Face 1   Pain Rating: FLACC (Rest) - Legs 0   Pain Rating: FLACC (Rest) - Activity 0   Pain Rating: FLACC (Rest) - Cry 1   Pain Rating: FLACC (Rest) - Consolability 1   Score: FLACC (Rest) 3   Pain Rating: FLACC (Activity) - Face 2   Pain Rating: FLACC (Activity) - Legs 1   Pain Rating: FLACC (Activity) - Activity 1   Pain Rating: FLACC (Activity) - Cry 1   Pain Rating: FLACC (Activity) - Consolability 1   Score: FLACC (Activity) 6   Restrictions/Precautions   Weight Bearing Precautions Per Order No   Other Precautions Cognitive; Chair Alarm; Bed Alarm; Restraints;Multiple lines;Telemetry;O2;Fall Risk;Pain  (Hand Mitts, NGT)   General   Family/Caregiver Present No   Cognition   Overall Cognitive Status Impaired   Attention Difficulty attending to directions   Orientation Level Oriented to person;Disoriented to place; Disoriented to time;Disoriented to situation  (Responds to name only)   Memory Decreased recall of recent events;Decreased recall of precautions   Following Commands Follows one step commands inconsistently   Comments Pt w/ significant decline in cognition since the evaluation  Pt unable to hold conversation and only opens eyes with repetetive cues  Pt does not keep his eyes open during mobility  Pt with intermittent verbal outbursts and does communicate that he does not want/can't perform any further mobility  Subjective   Subjective Pt w/ limited subjective input due to current cognitive status as above  Bed Mobility   Rolling R 1  Dependent   Additional items Assist x 2; Increased time required;Verbal cues;LE management   Rolling L 1  Dependent   Additional items Assist x 2; Increased time required;Verbal cues;LE management   Supine to Sit 1  Dependent   Additional items Assist x 3;HOB elevated; Increased time required;Verbal cues;LE management   Sit to Supine 2  Maximal assistance   Additional items Assist x 3; Increased time required;Verbal cues;LE management   Additional Comments Pt supine in bed upon PT consult  Pt was transferred dependently to EOB and maintained in position for ~ 10 minutes  He was unable to contribute any movement of his legs to come off of the bed and unable to bring or hold his chest up  Assistance needed at each leg and behind his back for mobility  Pt demonstrated retropulsive and pushing behavior posteriorly while EOB  Pt also performed rolling R and L one time each for placement of new pad underneath  Pt left supine in bed with bed alarm intact, hand mitts put back in place, and all needs within reach  Balance   Static Sitting Zero   Endurance Deficit   Endurance Deficit Yes   Endurance Deficit Description Pt w/ significant difficulty performing any mobility at this time  Activity Tolerance   Activity Tolerance Treatment limited secondary to medical complications (Comment)  (Pt w/ significant cognitive and functional decline since evaluation )   Medical Staff Made Aware [facility]   Nurse Made Aware RN cleared pt to be seen by PT  RN present for session  Assessment   Prognosis Guarded   Problem List Decreased strength;Decreased endurance; Impaired balance;Decreased mobility; Decreased cognition; Impaired judgement;Decreased safety awareness;Pain   Assessment Pt presents for PT treatment session consisting of bed mobility and sitting at EOB  Pt has declined since the initial evaluation and has not made any progress toward his goals due to a recent decline in cognition   Pt demonstrates difficulty holding conversation, following commands, and keeping his eyes open throughout session; he is able to open his eyes upon request but does not keep them open  He is able to inconsistently respond and answer questions but at times is difficult to understand; he had several verbal outbursts while sitting at EOB but did not demonstrate any physical aggression  Pt also was only able to initiate movement of his arms slightly elevated upon request but was not able to help move his legs or bring his chest forward to sit EOB requiring complete dependence for mobility in and OOB at this time  PT will continue to follow and recommend rehab  The patient's AM-PAC Basic Mobility Inpatient Short Form Raw Score is 6  A Raw score of less than or equal to 16 suggests the patient may benefit from discharge to post-acute rehabilitation services  Please also refer to the recommendation of the Physical Therapist for safe discharge planning  Barriers to Discharge Inaccessible home environment;Decreased caregiver support   Goals   Patient Goals to lay back down   STG Expiration Date 08/12/22   Plan   Treatment/Interventions Functional transfer training;LE strengthening/ROM; Therapeutic exercise; Endurance training;Cognitive reorientation;Patient/family training;Equipment eval/education; Bed mobility;Gait training;Spoke to nursing   PT Frequency 2-3x/wk   Recommendation   PT Discharge Recommendation Post acute rehabilitation services   Equipment Recommended   (TBD)   AM-PAC Basic Mobility Inpatient   Turning in Bed Without Bedrails 1   Lying on Back to Sitting on Edge of Flat Bed 1   Moving Bed to Chair 1   Standing Up From Chair 1   Walk in Room 1   Climb 3-5 Stairs 1   Basic Mobility Inpatient Raw Score 6   Turning Head Towards Sound 3   Follow Simple Instructions 2   Low Function Basic Mobility Raw Score 11   Low Function Basic Mobility Standardized Score 16 55   Highest Level Of Mobility   JH-HLM Goal 2: Bed activities/Dependent transfer   JH-HLM Achieved 3: Sit at edge of bed   End of Consult Patient Position at End of Consult Supine;Bed/Chair alarm activated; All needs within reach     Sincere Kent, SPT

## 2022-08-04 NOTE — CONSULTS
Consultation - Infectious Disease   Michell Carlisle 67 y o  male MRN: [id number]  Unit/Bed#: [facility] 877-10 Encounter: [id number]      IMPRESSION & RECOMMENDATIONS:   Impression/Recommendations:  1  Septic shock  Evolving 7/31:  Fever, HR, refractory hypotension  Due to #2/3  No other appreciable source  ROS limited by lethargy  Exam otherwise benign  UA, LFTs, CT chest negative  Improving with antibiotics, drainage  Off pressors but remains critically ill  Rec:  · Continue antibiotics as below  · Follow up repeat blood cultures as below  · Follow temperatures closely  · Check CBC in AM  · Supportive care as per the primary service    2  Strep anginosus bacteremia  Low-grade with 1 of 2 positive  Due to #3  No other appreciable source  Has Portacath but low suspicion for infection  Repeat blood cultures 8/2, TTE negative  Rec:  · Narrow antibiotics to Unasyn  · Follow up repeat blood cultures  · Will need 2 weeks IV antibiotics    3  LUQ abscess  In setting of #4  S  Anginosus, Candida, Klebsiella, Group C Strep from exisiting surgical drain  Status post IR-guided drain 8/3 yielding cloudy, thick fluid  Drain cultures pending  Gram stain with GPRs, yeast   No radiographic evidence of leak from distal gastrectomy staple line  Rec:  · Narrow antibiotics to Unasyn  · Change fluconazole to micafungin while on Amiodarone given potential for QTc prolongation  · Follow up IR drain cultures and tailor antibiotics as indicated  · Follow drain outputs closely    4  Intraductal papillary mucinous neoplasm  Status post distal pancreatectomy 03/2019, open subtotal pancreatectomy 02/2022  Now admitted for completion pancreatectomy with sims anastomosis, extensive SARAH, reduction of densely adherent internal hernia,cholecystectomy 7/26/22  Postoperative course complicated by above  5   TERESE  Likely multifactorial due partly due to infection, shock as above    Rec:  · Follow creatinine closely and dose-adjust antibiotics as indicated  · Recheck BMP in AM  · If creatinine worsens consider Nephrology consult    6  Afib with RVR  On Amiodarone    7  Cirrhosis  In setting of chronic alcohol use  8   DM  Recent A1c 8 7     9   Chronic thrombocytopenia  The above impression and plan was discussed in detail with the patients wife at the bedside and the [facility] team in the unit  Antibiotics:  Zosyn #4  Fluconazole #1    Thank you for this consultation  We will follow along with you  HISTORY OF PRESENT ILLNESS:  Reason for Consult: Intraabdominal collection    HPI: Priyanka Mills is a 67 y o  male with intraductal papillary mucinous neoplasm, status post distal pancreatectomy 03/2019, open subtotal pancreatectomy 02/2022  Admitted on 7/26/22 for completion pancreatectomy with sims anastomosis, extensive SARAH, reduction of densely adherent internal hernia, cholecystectomy  Initially did well postoperatively but then 7/31 developed fever, tachycardia, and refractory hypotension requiring transfer to the ICU and initiation of pressors  He was started empiricially on Zosyn  Blood cultures grew Strep anginosus  Cultures collected from existing surgical STU grew Strep anginosus, Klebsiella, Group C Strep, and Candida  A CT A/P showed inttraabdominal collection  IR placed a drain which yielded murky fluid  He has improved with decreased temps and D/C of pressors  His WBC remains elevated  We are asked to comment on further evaluation and management  In performing this consult, I have reviewed prior admission and outpatient visit records in detail  REVIEW OF SYSTEMS:  Unable to obtain due to lethargy  A complete system-based review of systems is otherwise negative      PAST MEDICAL HISTORY:  Past Medical History:   Diagnosis Date    A-fib [facility])     Abdominal wall strain     last assessed: 10/21/14    Allergic rhinitis     last assessed: 05/03/16    Arthritis     Cancer ([facility] [address] )     PACNCREATIC  COVID-19 virus infection 3/3/2021    CPAP (continuous positive airway pressure) dependence     Diabetes mellitus ([facility] [address] )     Erectile dysfunction of non-organic origin     last assessed: 03/17/14    Irregular heart beat     Pt with A fib     Lumbar strain     last assessed: 10/21/14    Multiple acquired skin tags     last assessed: 2/18/16    Palpitations     last assessed: 03/17/14    Pancreas cyst     Plantar fasciitis     Skin disorder     last assessed: 05/28/13    Sleep apnea     CPAP BROKE IN OCTOBER HAS BEEN TRYING TO GET NEW ONE BUT UNABLE AS OF YET    [facility]      Past Surgical History:   Procedure Laterality Date    BOTOX INJECTION N/A 11/12/2021    Procedure: Magalie Bradley;  Surgeon: Maru Lewis MD;  Location: [address] [place] [address] MAIN OR;  Service: Urology    CATARACT EXTRACTION Bilateral     CHOLECYSTECTOMY N/A 7/26/2022    Procedure: CHOLECYSTECTOMY;  Surgeon: Lauro Wagoner MD;  [facility] MAIN OR;  Service: Surgical Oncology    COLONOSCOPY  01/17/2013    COLONOSCOPY  01/08/2018    COLONOSCOPY  04/2021    CYSTOSCOPY      INJECT WITH BOTOX    DISTAL PANCREATECTOMY N/A 2/15/2022    Procedure: PANCREATECTOMY SUB-TOTALL-OPEN;  Surgeon: Lauro Wagoner MD;  Location: [facility] MAIN OR;  Service: Surgical Oncology    EGD  06/05/2020    EGD AND COLONOSCOPY  02/06/2014, 2/8/2017    FL GUIDED CENTRAL VENOUS ACCESS DEVICE INSERTION  4/23/2019    FLEXIBLE SIGMOIDOSCOPY  06/11/2019    FOOT SURGERY      Due to plantar fascitis    IR DRAINAGE TUBE PLACEMENT  8/3/2022    IR PICC PLACEMENT DOUBLE LUMEN  8/3/2022    IR PORT PLACEMENT  3/30/2022    JOINT REPLACEMENT Left     LTK    LINEAR ENDOSCOPIC U/S      PANCREATECTOMY N/A 7/26/2022    Procedure: COMPLETION OF PANCREATECTOMY W/MUHAMMAD ANASTOMOSIS, EXTENSIVE LYSIS OF ADHESIONS, REDUCTION OF INTERNAL HERNIA;  Surgeon: Lauro Wagoner MD;  [facility] MAIN OR;  Service: Surgical Oncology    PANCREATECTOMY LAPAROSCOPIC N/A 3/12/2019    Procedure: DISTAL PANCREATECTOMY LAPAROSCOPIC/POSSIBLE OPEN;  Surgeon: Ellis Bosch MD;  [facility] MAIN OR;  Service: Surgical Oncology    MA EDG US EXAM SURGICAL ALTER STOM DUODENUM/JEJUNUM N/A 2019    Procedure: LINEAR ENDOSCOPIC U/S;  Surgeon: aJne Pickett MD;  Location: [facility] GI LAB; Service: Gastroenterology    REPLACEMENT TOTAL KNEE Left     TUNNELED VENOUS PORT PLACEMENT Left 2019    Procedure: INSERTION VENOUS PORT (PORT-A-CATH); Surgeon: Ellis Bosch MD;  [facility] MAIN OR;  Service: Surgical Oncology- 21 Pt reports PAC removed     UMBILICAL HERNIA REPAIR         FAMILY HISTORY:  Non-contributory    SOCIAL HISTORY:  Social History     Substance and Sexual Activity   Alcohol Use Yes    Alcohol/week: 2 0 standard drinks    Types: 2 Cans of beer per week    Comment: couple times per week; Social History     Substance and Sexual Activity   Drug Use Never    Comment: Denies      Social History     Tobacco Use   Smoking Status Never Smoker   Smokeless Tobacco Never Used       ALLERGIES:  No Known Allergies    MEDICATIONS:  All current active medications have been reviewed      PHYSICAL EXAM:  Vitals:  Temp:  [97 52 °F (36 4 °C)-98 42 °F (36 9 °C)] 98 4 °F (36 9 °C)  HR:  [100-119] 106  Resp:  [7-23] 19  BP: ()/(44-78) 82/51  SpO2:  [71 %-100 %] 93 %  Temp (24hrs), Av 9 °F (36 6 °C), Min:97 52 °F (36 4 °C), Max:98 42 °F (36 9 °C)  Current: Temperature: 98 4 °F (36 9 °C)     Physical Exam:  General:  Well-nourished, well-developed, in no acute distress  Eyes:  Conjunctive clear with no hemorrhages or effusions  Oropharynx:  No ulcers, no lesions  Neck:  Supple, no lymphadenopathy  Lungs:  Normal respiratory excursion, no accessory muscle use  Cardiac:  Tachycardia on montior, extremities well perfused  Abdomen:  Soft, diffuse tenderness, right STU with serous fluid, left IR drain with serosanguinous fluid with purulence sediment  Extremities:  No peripheral cyanosis, clubbing, generalized anasarca  Skin:  No rashes, no ulcers  Neurological:  Limited assessment due to lethargy, weakness  Able to tell me his name  LABS, IMAGING, & OTHER STUDIES:  Lab Results:  I have personally reviewed pertinent labs  Results from last 7 days   Lab Units 08/04/22  0403 08/03/22  0438 08/02/22  2222 08/02/22  1650 08/01/22  0528 08/01/22  0039   POTASSIUM mmol/L 3 9 4 0 4 2 4 1   < >  --    CHLORIDE mmol/L 105 106 105 106   < >  --    CO2 mmol/L 25 24 23 22   < >  --    CO2, I-STAT mmol/L  --   --   --   --   --  24   BUN mg/dL 32* 29* 25 22   < >  --    CREATININE mg/dL 1 98* 1 40* 1 35* 1 14   < >  --    EGFR ml/min/1 73sq m 32 49 51 63   < >  --    GLUCOSE, ISTAT mg/dl  --   --   --   --   --  71   CALCIUM mg/dL 7 8* 8 4 8 4 8 0*   < >  --    AST U/L 35 25  --  29   < >  --    ALT U/L 26 24  --  25   < >  --    ALK PHOS U/L 113 94  --  128*   < >  --     < > = values in this interval not displayed  Results from last 7 days   Lab Units 08/04/22  0403 08/03/22  1540 08/03/22  0438   WBC Thousand/uL 14 91* 15 19* 11 79*   HEMOGLOBIN g/dL 7 4* 7 9* 8 2*   PLATELETS Thousands/uL 112* 126* 95*     Results from last 7 days   Lab Units 08/03/22  1631 08/02/22  1225 08/01/22  0200 08/01/22  0036   BLOOD CULTURE   --  No Growth at 24 hrs  No Growth at 24 hrs  No Growth at 72 hrs  Streptococcus anginosus*   GRAM STAIN RESULT  4+ Polys*  2+ Gram positive rods*  2+ Yeast*  --  3+ Polys  No bacteria seen Gram positive cocci in chains*   BODY FLUID CULTURE, STERILE   --   --  2+ Growth of Streptococcus anginosus*  Few Colonies of Candida albicans*  Few Colonies of Klebsiella pneumoniae*  1+ Growth of   Few Colonies of Beta Hemolytic Streptococcus Group C*  --        Imaging Studies:   I have personally reviewed pertinent imaging study reports and images in PACS    CT A/P reviewed personally collection in pancreatectomy bed    EKG, Pathology, and Other Studies:   I have personally reviewed pertinent reports

## 2022-08-04 NOTE — PROGRESS NOTES
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), open subtotal pancreatectomy (02/2022) and now admitted s/p completion pancreatectomy with sims anastomosis, extensive SARAH, reduction of internal hernia and cholecystectomy on 7/26, currently POD #7  Decompensated on the floor 7/31-8/1 with sepsis and transferred back to the ICU  24 hr events:   · Drain placed by IR into left anterior abdominal fluid collection with drainage of cloudy thick fluid  · Amiodarone bolus and drip with improvement in AFib rates to low 100  · PICC line placed and started on TPN  · Received 2 U FFP and 1 U platelet and epidural was able to be removed    ---------------------------------------------------------------------------------------  SUBJECTIVE  Complains of abdominal pain     Review of Systems  Review of systems was reviewed and negative unless stated above in HPI/24-hour events   ---------------------------------------------------------------------------------------  Assessment and Plan:    Neuro:   · Diagnosis: Acute post-operative pain   ? Analgesia: APS following, recs appreciated   ? Epidural removed 8/3   ? [address] tylenol, oxycodone 5-10 mg PRN mod-severe, dilaudid 0 5 mg PRN breakthrough   ? Robaxin PRN   · Delirium precautions  § CAM-ICU daily  § Regulate sleep/wake cycle         CV:   · Diagnosis: Septic shock, resolved   ? Source - intra-abdominal infection   ? MAP goal >65, no pressors required   ? Trend lactic until clearance   ? Continue Zosyn + fluconazole   ? F/u cultures   · Diagnosis: Atrial fibrillation   ? Amiodarone infusion 0 5 mg/min   ? Rebolus as needed for RVR     ? Holding home atenolol with shock   ? Follow rhythm on telemetry  ?  8/1 Echo EF 60% improved from 45% on 7/27, no RV dysfunction   ? Holding home lasix in setting of shock         Pulm:  · Diagnosis: Bilateral pleural effusions   ? Small BL pleural effusions on CT chest with atelectasis   ? No role for intervention at this time, continue to monitor   ? On 2 L NC   ? Pulmonary toileting      GI:   · Diagnosis: IPMN now s/p completion pancreatectomy, reduction of internal hernia and cholecystectomy on 7/26  ? 8/1 CT - new partially loculated fluid collection in post-pancreatectomy bed  ? 8/2 CT - stable fluid collection in pancreatectomy bed, no evidence of enteric contrast extravasation from staple line, stable pneumoperitoneum  ? 8/3 IR drain in L anterior collection   ? F/u cultures   ? Trend abdominal exam , STU drain output/character and end points   ? IV abx as below   ? NGT, NPO   ? PICC line with TPN  ? Will need Creon when taking PO again   · Stress ulcer PPX: Pantoprazole IV  · Bowel regimen: senna/colace, Dulcolax suppository daily   ? Last BM: 7/31        :   · Diagnosis: TERESE, urinary retention   ? Baseline Cr 0 8  ? Current Cr 1 98  ? Trend BUN/CR, Strict I/O   ? Hanna: Yes  § Urinary catheter still needed for Strict I and O in a critically ill patient  ? Urinary retention protocol when hanna removed         F/E/N:   · Fluid/Diuretic plan: No IVF while on TPN   ? Albumin PRN   · E: Replete for goal K >4, Phos >3, Mg >2   · N: TPN, NGT/NPO         Heme/Onc:   · Diagnosis: Anemia  ? In setting of critical illness, acute blood loss   ? Hgb 7 4 from 7 9  ? VTE PPX: SQH, SCDs        Endo:   · Diagnosis: T2DM  ? Now s/p pancreatectomy   ? Lantus 5 u QHS   § Can increase now on TPN  ? ISS algorithm 2   ? BG goal 140-180  ? Endocrinology following          ID:   · Diagnosis: Sepsis, possible intra-abdominal abscess   ? Zosyn day 5  ? Fluconazole day 1   ? Blood cx - 1/2 streptococcus anginosus - Contamination  ? Blood cx - 1/2 negative x 48 hrs   ?  Blood cx - 2/2 negative x 24 hrs ? R drain cx - +2 alpha hemolytic strep, few colonies candidia, few colonies GNR  species   ? L drain cx - GP rods, yeast  ? Trend fever curve and WBC count   ? Consider ID consult          MSK/Skin:   · Frequent turning and repositioning   · PT/OT as appropriate     Patient appropriate for transfer out of the ICU today?: No  Disposition: Continue Critical Care   Code Status: Level 1 - Full Code  ---------------------------------------------------------------------------------------  ICU CORE MEASURES    Prophylaxis   VTE Pharmacologic Prophylaxis: Heparin  VTE Mechanical Prophylaxis: sequential compression device  Stress Ulcer Prophylaxis: Pantoprazole IV     ABCDE Protocol (if indicated)  CAM-ICU: Positive    Invasive Devices Review  Invasive Devices  Report    Peripherally Inserted Central Catheter Line  Duration           PICC Line 22 <1 day          Central Venous Catheter Line  Duration           Port A Cath 22 Right Chest 127 days          Peripheral Intravenous Line  Duration           Peripheral IV 22 Right;Upper;Ventral (anterior) Arm 2 days          Arterial Line  Duration           Arterial Line 22 Radial 3 days          Drain  Duration           Closed/Suction Drain Right RLQ Bulb 19 Fr  8 days    Urethral Catheter Temperature probe 16 Fr  2 days    NG/OG/Enteral Tube Enteral Feeding Tube Right nare 1 day    Abscess Drain LUQ <1 day              Can any invasive devices be discontinued today?  No  ---------------------------------------------------------------------------------------  OBJECTIVE    Vitals   Vitals:    22 0500 22 0600 22 0610 22 0710   BP: 129/54      BP Location: Left arm      Pulse: 104  103 102   Resp: 13  17 16   Temp: 97 88 °F (36 6 °C)  97 7 °F (36 5 °C) 97 88 °F (36 6 °C)   TempSrc:       SpO2: 96%  97% 97%   Weight:  (!) 138 kg (304 lb 0 2 oz)     Height:         Temp (24hrs), Av 9 °F (36 6 °C), Min:97 52 °F (36 4 °C), Max:98 42 °F (36 9 °C)  Current: Temperature: 97 88 °F (36 6 °C)    Respiratory:  SpO2: SpO2: 97 %, SpO2 Device: O2 Device: Nasal cannula  Nasal Cannula O2 Flow Rate (L/min): 2 L/min    Invasive/non-invasive ventilation settings   Respiratory  Report   Lab Data (Last 4 hours)    None         O2/Vent Data (Last 4 hours)    None                Physical Exam  Vitals reviewed  Constitutional:       General: He is awake  He is not in acute distress  Appearance: He is ill-appearing  He is not toxic-appearing or diaphoretic  Interventions: Nasal cannula in place  HENT:      Head: Normocephalic  Eyes:      Pupils: Pupils are equal, round, and reactive to light  Cardiovascular:      Rate and Rhythm: Normal rate  Rhythm irregularly irregular  Heart sounds: Normal heart sounds  Pulmonary:      Effort: Pulmonary effort is normal  No tachypnea  Breath sounds: Normal breath sounds  Abdominal:      General: There is distension  Tenderness: There is abdominal tenderness  Comments: Abdomen distended and slightly firm to palpation, tender throughout   R STU drain serous   L STU drain cloudy brown fluid    Musculoskeletal:      Right lower leg: 3+ Pitting Edema present  Left lower leg: 3+ Pitting Edema present  Skin:     General: Skin is warm and dry  Neurological:      General: No focal deficit present  Mental Status: He is alert  He is disoriented        Comments: Oriented to place, year, disoriented to situation   Agitated, throwing blankets, pillows on floor              Laboratory and Diagnostics:  Results from last 7 days   Lab Units 08/04/22  0403 08/03/22  1540 08/03/22  0438 08/02/22  1650 08/02/22  0512 08/01/22  0528 08/01/22  0039 08/01/22  0011 07/30/22  0542 07/29/22  0406   WBC Thousand/uL 14 91* 15 19* 11 79* 9 67 13 31* 11 89*  --  3 88*   < > 6 90   HEMOGLOBIN g/dL 7 4* 7 9* 8 2* 10 0* 9 4* 9 9*  --  9 9*   < > 7 4*   I STAT HEMOGLOBIN g/dl  --   --   --   --   -- --  9 5*  --   --   --    HEMATOCRIT % 22 9* 24 9* 25 2* 30 0* 29 6* 29 5*  --  31 2*   < > 22 8*   HEMATOCRIT, ISTAT %  --   --   --   --   --   --  28*  --   --   --    PLATELETS Thousands/uL 112* 126* 95* 147* 126* 147*  --  126*   < > 73*   NEUTROS PCT % 87*  --   --   --   --   --   --   --   --  73   BANDS PCT %  --   --   --   --  15*  --   --   --   --   --    MONOS PCT % 6  --   --   --   --   --   --   --   --  11   MONO PCT %  --   --   --   --  4  --   --   --   --   --     < > = values in this interval not displayed  Results from last 7 days   Lab Units 08/04/22  0403 08/03/22  7102 08/02/22  2222 08/02/22  1650 08/02/22  5003 08/01/22  0528 08/01/22  0039 08/01/22  0011 07/31/22  0543 07/30/22  0542 07/29/22  0340   SODIUM mmol/L 138 138 135 138 137 137  137  --  138   < > 143 143   POTASSIUM mmol/L 3 9 4 0 4 2 4 1 4 1 3 9  3 9  --  3 5   < > 3 8 3 9   CHLORIDE mmol/L 105 106 105 106 106 106  106  --  105   < > 111* 113*   CO2 mmol/L 25 24 23 22 24 23  23  --  23   < > 29 28   CO2, I-STAT mmol/L  --   --   --   --   --   --  24  --   --   --   --    ANION GAP mmol/L 8 8 7 10 7 8  8  --  10   < > 3* 2*   BUN mg/dL 32* 29* 25 22 19 15  15  --  16   < > 8 10   CREATININE mg/dL 1 98* 1 40* 1 35* 1 14 1 00 1 03  1 03  --  1 09   < > 0 54* 0 47*   CALCIUM mg/dL 7 8* 8 4 8 4 8 0* 7 5* 7 6*  7 6*  --  7 9*   < > 8 0* 8 0*   GLUCOSE RANDOM mg/dL 157* 84 94 111 111 62*  62*  --  76   < > 156* 149*   ALT U/L 26 24  --  25  --  28  --   --   --  57 68   AST U/L 35 25  --  29  --  22  --   --   --  45 69*   ALK PHOS U/L 113 94  --  128*  --  99  --   --   --  112 73   ALBUMIN g/dL 2 1* 2 4*  --  1 8*  --  1 6*  --   --   --  2 6* 2 5*   TOTAL BILIRUBIN mg/dL 3 31* 2 96*  --  2 33*  --  1 72*  --   --   --  1 49* 1 14*    < > = values in this interval not displayed       Results from last 7 days   Lab Units 08/04/22  0403 08/03/22  9584 08/02/22  1650 08/02/22  9549 08/01/22  0528 08/01/22  0011 07/31/22  0543   MAGNESIUM mg/dL 2 5 2 2 2 5 2 4 1 6 1 5* 2 0   PHOSPHORUS mg/dL 5 4* 4 8* 4 7* 4 8* 4 2* 4 4* 2 4      Results from last 7 days   Lab Units 08/03/22  1540 08/03/22  0438 08/02/22  0512 08/01/22  1152   INR  1 61* 1 96* 1 78* 1 78*          Results from last 7 days   Lab Units 08/04/22  0403 08/03/22  1027 08/03/22  0443 08/02/22  2225 08/02/22  1650 08/02/22  0901 08/02/22  0512   LACTIC ACID mmol/L 3 5* 3 7* 3 7* 4 9* 5 0* 3 3* 4 0*     ABG:  Results from last 7 days   Lab Units 08/02/22  2226   PH ART  7 424   PCO2 ART mm Hg 34 2*   PO2 ART mm Hg 138 2*   HCO3 ART mmol/L 21 9*   BASE EXC ART mmol/L -2 1   ABG SOURCE  Line, Arterial     VBG:  Results from last 7 days   Lab Units 08/02/22  2226 08/01/22  0815 08/01/22  0011   PH ELIU   --   --  7 403*   PCO2 ELIU mm Hg  --   --  31 6*   PO2 ELIU mm Hg  --   --  50 9*   HCO3 ELIU mmol/L  --   --  19 3*   BASE EXC ELIU mmol/L  --   --  -4 6   ABG SOURCE  Line, Arterial   < >  --     < > = values in this interval not displayed  Micro  Results from last 7 days   Lab Units 08/03/22  1631 08/02/22  1225 08/01/22  0200 08/01/22  0036   BLOOD CULTURE   --  No Growth at 24 hrs  No Growth at 24 hrs  No Growth at 72 hrs  Streptococcus anginosus*   GRAM STAIN RESULT  4+ Polys*  2+ Gram positive rods*  2+ Yeast*  --  3+ Polys  No bacteria seen Gram positive cocci in chains*   BODY FLUID CULTURE, STERILE   --   --  2+ Growth of Alpha Hemolytic Streptococcus*  Few Colonies of Candida sp  presumptively albicans*  Few Colonies of Gram-negative tamar- species*  --        EKG: Atrial fibrillation rate 90s  Imaging:  I have personally reviewed pertinent reports  and I have personally reviewed pertinent films in PACS    Intake and Output  I/O       08/02 0701  08/03 0700 08/03 0701 08/04 0700    P  O  0 0    I V  (mL/kg) 3100 3 (23 7) 2207 (16 8)    Blood  650    NG/GT 0 150    IV Piggyback 1104 2 424 6    TPN  282 9    Total Intake(mL/kg) 4204 5 (32  1) 3714 4 (28 4)    Urine (mL/kg/hr) 890 (0 3) 656 (0 2)    Emesis/NG output 1280 290    Drains 1405 745    Total Output 3575 1691    Net +629 5 +2023 4              UOP: 30 ml/hr     Height and Weights   Height: 6' (182 9 cm)  IBW (Ideal Body Weight): 77 6 kg  Body mass index is 41 23 kg/m²  Weight (last 2 days)     Date/Time Weight    08/04/22 0600 138 (304 01)    08/03/22 0600 131 (288 8)    08/02/22 0600 143 (315 04)            Nutrition       Diet Orders   (From admission, onward)             Start     Ordered    08/02/22 1334  Diet NPO  Diet effective now        References:    Nutrtion Support Algorithm Enteral vs  Parenteral   Question Answer Comment   Diet Type NPO    RD to adjust diet per protocol?  No        08/02/22 1333    07/30/22 1111  Dietary nutrition supplements  Once        Question Answer Comment   Select Supplement: Glucerna-Strawberry    Frequency Breakfast, Lunch, Dinner        07/30/22 1111                Active Medications  Scheduled Meds:  Current Facility-Administered Medications   Medication Dose Route Frequency Provider Last Rate    acetaminophen  975 mg Oral Q6H [facility] Babar Bar PA-C      Adult TPN (STANDARD BASE/STANDARD ELECTROLYTE)   Intravenous Continuous TPN Jenny Burk PA-C 41 6 mL/hr at 08/03/22 2137    amiodarone  0 5 mg/min Intravenous Continuous [employer], CRNP 0 5 mg/min (08/03/22 1818)    bisacodyl  10 mg Rectal Daily Jenny Burk PA-C      calcium gluconate  3 g Intravenous Once [facility]      fluconazole  400 mg Intravenous Q24H Jennytherese Burk PA-C      heparin (porcine)  5,000 Units Subcutaneous [facility]      HYDROmorphone  0 2 mg Intravenous Q4H PRN Jenny Burk PA-C      insulin glargine  5 Units Subcutaneous HS Lamin Munguia MD      insulin lispro  1-5 Units Subcutaneous Q6H Puja Isaacs PA-C      iohexol  30 mL Oral 90 min pre-procedure MIRNA Mascorro      melatonin  6 mg Oral HS Doris Overall, ALYSA      methocarbamol  500 mg Oral Q6H  [facility]      ondansetron  4 mg Intravenous Q6H PRN [place], [place]      oxyCODONE  5 mg Oral Q4H  [facility]      Or    oxyCODONE  10 mg Oral Q4H  [facility]      pantoprazole  40 mg Intravenous Q24H [address] [place], [place]      phenol  1 spray Mouth/Throat Q2H PRN Doris Overall, ALYSA      piperacillin-tazobactam  4 5 g Intravenous Q8H Tessa Lanza MD 4 5 g (08/04/22 0109)    senna-docusate sodium  1 tablet Oral BID Yana ALYSA Valencia       Continuous Infusions:  Adult TPN (STANDARD BASE/STANDARD ELECTROLYTE), , Last Rate: 41 6 mL/hr at 08/03/22 2137  amiodarone, 0 5 mg/min, Last Rate: 0 5 mg/min (08/03/22 1818)      PRN Meds:   HYDROmorphone, 0 2 mg, Q4H PRN  methocarbamol, 500 mg, Q6H PRN  ondansetron, 4 mg, Q6H PRN  oxyCODONE, 5 mg, Q4H PRN   Or  oxyCODONE, 10 mg, Q4H PRN  phenol, 1 spray, Q2H PRN        Allergies   No Known Allergies  ---------------------------------------------------------------------------------------  Advance Directive and Living Will:      Power of :    POLST:    ---------------------------------------------------------------------------------------  Care Time Delivered:   No Critical Care time spent     Yana Valencia PA-C      Portions of the record may have been created with voice recognition software  Occasional wrong word or "sound a like" substitutions may have occurred due to the inherent limitations of voice recognition software    Read the chart carefully and recognize, using context, where substitutions have occurred

## 2022-08-04 NOTE — PLAN OF CARE
Problem: PHYSICAL THERAPY ADULT  Goal: Performs mobility at highest level of function for planned discharge setting  See evaluation for individualized goals  Description: Treatment/Interventions: LE strengthening/ROM, Functional transfer training, Therapeutic exercise, Elevations, Endurance training, Patient/family training, Equipment eval/education, Bed mobility, Gait training, Spoke to nursing          See flowsheet documentation for full assessment, interventions and recommendations  Outcome: Not Progressing  Note: Prognosis: Guarded  Problem List: Decreased strength, Decreased endurance, Impaired balance, Decreased mobility, Decreased cognition, Impaired judgement, Decreased safety awareness, Pain  Assessment: Pt presents for PT treatment session consisting of bed mobility and sitting at EOB  Pt has declined since the initial evaluation and has not made any progress toward his goals due to a recent decline in cognition  Pt demonstrates difficulty holding conversation, following commands, and keeping his eyes open throughout session; he is able to open his eyes upon request but does not keep them open  He is able to inconsistently respond and answer questions but at times is difficult to understand; he had several verbal outbursts while sitting at EOB but did not demonstrate any physical aggression  Pt also was only able to initiate movement of his arms slightly elevated upon request but was not able to help move his legs or bring his chest forward to sit EOB requiring complete dependence for mobility in and OOB at this time  PT will continue to follow and recommend rehab  The patient's AM-PAC Basic Mobility Inpatient Short Form Raw Score is 6  A Raw score of less than or equal to 16 suggests the patient may benefit from discharge to post-acute rehabilitation services  Please also refer to the recommendation of the Physical Therapist for safe discharge planning    Barriers to Discharge: Inaccessible home environment, Decreased caregiver support     PT Discharge Recommendation: Post acute rehabilitation services    See flowsheet documentation for full assessment

## 2022-08-04 NOTE — PROGRESS NOTES
Progress Note -Interventional Radiology NP  Maura De Jesus 67 y o  male MRN: [id number]  Unit/Bed#: [facility] 515-01 Encounter: [id number]    Assessment/Plan:    67year old male s/p pancreatectomy with sims anastomosis on 7/26/22 with Dr Odilon Green, now with tachycardia and lactic acidosis and bounce back in the ICU on 7/31  Request for IR drain placement into fluid collection in the pancreatectomy bed measuring about 9 4 x 2 4 x 2 3 cm as seen on CT from 8/2  Unfortunately, this fluid collection is difficult to reach, and not well defined as of now  There, is however, a left anterior fluid collection that may connect to the fluid collection that a 10f tube was placed into  15 mL of cloudy thick fluid was aspirated and sent for culture  - record I/O's  - continue to flush drain with 10 mLs NS Q8H  - routine 2 week tube check orders  - home flushes ordered  - please reach out to IR with any questions or concerns      Subjective: Maura De Jesus is a 67 y o  male who presented with completion pancreatectomy  He remains on O2 and critically ill in the ICU        Patient Active Problem List   Diagnosis    Obstructive sleep apnea syndrome    Hypersomnia    Permanent atrial fibrillation (HCC)    Morbid obesity with BMI of 40 0-44 9, adult ([address] )    Lower extremity edema    Pancreatic duct dilated    Overlapping malignant neoplasm of pancreas (HCC)    Type 2 diabetes mellitus with hyperglycemia, with long-term current use of insulin (HCC)    IPMN (intraductal papillary mucinous neoplasm)    Thrombocytopenia (HCC)    Urgency incontinence    Balanitis    OAB (overactive bladder)    BPH without obstruction/lower urinary tract symptoms    Encounter for geriatric assessment    Counseling regarding advanced care planning and goals of care    Port-A-Cath in place    Chemotherapy induced neutropenia ([address] )    Intra-abdominal fluid collection    Transaminitis    TERESE (acute kidney injury) ([address] ) Objective:    Vitals:  BP (!) 82/51   Pulse (!) 106   Temp 98 4 °F (36 9 °C)   Resp 19   Ht 6' (1 829 m)   Wt (!) 138 kg (304 lb 0 2 oz)   SpO2 93%   BMI 41 23 kg/m²   Body mass index is 41 23 kg/m²  Weight (last 2 days)     Date/Time Weight    08/04/22 0600 138 (304 01)    08/03/22 0600 131 (288 8)    08/02/22 0600 143 (315 04)          I/Os:    Intake/Output Summary (Last 24 hours) at 8/4/2022 1247  Last data filed at 8/4/2022 1221  Gross per 24 hour   Intake 4313 55 ml   Output 1981 ml   Net 2332 55 ml       Invasive Devices  Report    Peripherally Inserted Central Catheter Line  Duration           PICC Line 08/03/22 <1 day          Central Venous Catheter Line  Duration           Port A Cath 03/30/22 Right Chest 127 days          Peripheral Intravenous Line  Duration           Peripheral IV 08/02/22 Right;Upper;Ventral (anterior) Arm 2 days    Peripheral IV 08/04/22 Right;Ventral (anterior) Forearm <1 day          Arterial Line  Duration           Arterial Line 08/01/22 Radial 3 days          Drain  Duration           Closed/Suction Drain Right RLQ Bulb 19 Fr  8 days    Urethral Catheter Temperature probe 16 Fr  3 days    NG/OG/Enteral Tube Enteral Feeding Tube Right nare 1 day    Abscess Drain LUQ <1 day                Physical Exam:  Physical Exam  Vitals and nursing note reviewed  Constitutional:       Appearance: He is well-developed  He is obese  Interventions: Nasal cannula in place  HENT:      Head: Normocephalic and atraumatic  Eyes:      Conjunctiva/sclera: Conjunctivae normal    Cardiovascular:      Rate and Rhythm: Normal rate and regular rhythm  Heart sounds: No murmur heard  Pulmonary:      Effort: Pulmonary effort is normal  No respiratory distress  Breath sounds: Normal breath sounds  Abdominal:      General: There is distension  Tenderness: There is no abdominal tenderness  There is no left CVA tenderness        Comments: Midline staples  Left upper abdomen drain    Musculoskeletal:      Cervical back: Neck supple  Right lower leg: Edema present  Left lower leg: Edema present  Skin:     General: Skin is warm and dry  Capillary Refill: Capillary refill takes 2 to 3 seconds  Neurological:      Mental Status: He is lethargic and disoriented                        Lab Results and Cultures:   CBC with diff:   Lab Results   Component Value Date    WBC 14 91 (H) 08/04/2022    HGB 7 4 (L) 08/04/2022    HCT 22 9 (L) 08/04/2022     (H) 08/04/2022     (L) 08/04/2022    MCH 36 1 (H) 08/04/2022    MCHC 32 3 08/04/2022    RDW 17 0 (H) 08/04/2022    MPV 11 4 08/04/2022    NRBC 0 08/04/2022      BMP/CMP:  Lab Results   Component Value Date     08/22/2017    K 3 9 08/04/2022    K 4 2 08/22/2017     08/04/2022     08/22/2017    CO2 25 08/04/2022    CO2 24 08/01/2022    BUN 32 (H) 08/04/2022    BUN 13 08/22/2017    CREATININE 1 98 (H) 08/04/2022    CREATININE 0 75 08/22/2017    GLUCOSE 71 08/01/2022    CALCIUM 7 8 (L) 08/04/2022    CALCIUM 9 4 08/22/2017    AST 35 08/04/2022    AST 31 08/22/2017    ALT 26 08/04/2022    ALT 27 08/22/2017    ALKPHOS 113 08/04/2022    ALKPHOS 74 08/22/2017    PROT 6 6 08/22/2017    BILITOT 0 4 08/22/2017    EGFR 32 08/04/2022   ,     Coags:   Lab Results   Component Value Date    PTT 32 07/05/2022    INR 1 61 (H) 08/03/2022   ,   Results from last 7 days   Lab Units 08/03/22  1540 08/03/22  0438 08/02/22  0512 08/01/22  1152   INR  1 61* 1 96* 1 78* 1 78*        Lipid Panel:   Lab Results   Component Value Date    CHOL 208 (H) 05/07/2015     Lab Results   Component Value Date    HDL 45 07/09/2021     Lab Results   Component Value Date    HDL 45 07/09/2021     Lab Results   Component Value Date    LDLCALC 99 07/09/2021     Lab Results   Component Value Date    TRIG 105 08/03/2022       HgbA1c:   Lab Results   Component Value Date    HGBA1C 8 7 (H) 07/05/2022    HGBA1C 9 2 (H) 04/25/2022    HGBA1C 6 2 (H) 03/08/2022       Blood Culture:   Lab Results   Component Value Date    BLOODCX No Growth at 24 hrs  08/02/2022    BLOODCX No Growth at 24 hrs  08/02/2022   ,   Urinalysis:   Lab Results   Component Value Date    COLORU Yellow 08/01/2022    CLARITYU Clear 08/01/2022    SPECGRAV 1 043 (H) 08/01/2022    PHUR 5 5 08/01/2022    LEUKOCYTESUR Negative 08/01/2022    NITRITE Negative 08/01/2022    GLUCOSEU Negative 08/01/2022    KETONESU Negative 08/01/2022    BILIRUBINUR Negative 08/01/2022    BLOODU Negative 08/01/2022   ,   Urine Culture:   Lab Results   Component Value Date    URINECX No Growth <1000 cfu/mL 10/11/2021   ,   Wound Culure:  No results found for: WOUNDCULT    VTE Pharmacologic Prophylaxis: Sequential compression device (Venodyne)       Thank you for allowing me to participate in the care of [employer]  Please don't hesitate to call, text, email, or TigerText with any questions  This text is generated with voice recognition software  There may be translation, syntax,  or grammatical errors  If you have any questions, please contact the dictating provider

## 2022-08-04 NOTE — NUTRITION
08/04/22 0910   Recommendations/Interventions   Summary Pt currently receiving Standard TPN regimen  Per discussion with team, plans to start trickle feeds  See below for recommendations  Recommendations to Provider 1  Trickle feed recommendations: Vital High Protein at 10 mL/hr  2  TPN recommendations: AA15% in 800 mL , D30% in 765 mL , efwdkj41% in 100 mL  Provides: 1445 kcal and 120g protein daily

## 2022-08-04 NOTE — CONSULTS
Consultation - Palliative and Supportive Care   Kristen Clement 67 y o  male [id number]    Patient Active Problem List   Diagnosis    Obstructive sleep apnea syndrome    Hypersomnia    Permanent atrial fibrillation (HCC)    Morbid obesity with BMI of 40 0-44 9, adult ([facility] [address]    Lower extremity edema    Pancreatic duct dilated    Overlapping malignant neoplasm of pancreas (HCC)    Type 2 diabetes mellitus with hyperglycemia, with long-term current use of insulin (HCC)    IPMN (intraductal papillary mucinous neoplasm)    Thrombocytopenia (HCC)    Urgency incontinence    Balanitis    OAB (overactive bladder)    BPH without obstruction/lower urinary tract symptoms    Encounter for geriatric assessment    Counseling regarding advanced care planning and goals of care    Port-A-Cath in place    Chemotherapy induced neutropenia ([facility]    Intra-abdominal fluid collection    Transaminitis    TERESE (acute kidney injury) ([facility] )     Active issues specifically addressed today include:    Palliative care encounter   GOC discussion   TERESE   Hypervolemia   Pancreatic cancer s/p whipple   hypotension     Plan:  1  Symptom management -    low dose oxycodone 5mg via NGT Q4H PRN moderate-severe pain with hold parameters (oxy10 dose d/c given lethargy, hypotension, and minimal use of PRNs in past 24H)   Hydromorphone 0 2mg IV Q4H PRN BT pain   Acetaminophen 975mg Q6H [address] --> recommend switching to oral solution and reducing frequency to Q8H   dulcolax suppository QD (per primary)   Melatonin 6mg HS (per primary)   seroquel 50mg BID (per primary)    2  Goals -    Ongoing disease directed cares without limits at this time   Patient is unable to engage in [address] discussion as he is encephalopathic  Spouse, Jo, at bedside reports she is POA (Daughters share secondary)  Notes that advanced directive requests resuscitation if "any chance of recovery"   Discussed that patient is in a very fragile state  Encouraged consideration of limited code status  However, if future [address] discussions are indicated would be helpful to coordinate with surg-onc team, critical care, palliative medicine, spouse, and 2 daughters together   Overall, spouse remains hopeful for maximum physical recovery and is willing to undergo all measures if there is slow incremental improvement  She is aware that further discussions will need to be had if patient's condition continues to worsen  Code Status: full - Level 1   Decisional apparatus:  Patient is not competent on my exam today  If competence is lost, patient's substitute decision maker would default to spouse by [place]  Advance Directive / Living Will / POLST:  None on file, but reportedly completed  Asked spouse to bring this to the hospital      3  Social support   Patient is supported by spouse, 2 daughters, and 4 grandchildren   Pastoral care following    4  Follow-up   Palliative care will continue to follow for support, symptom management, and [address] discussions as indicated     I have reviewed the patient's controlled substance dispensing history in the Prescription Drug Monitoring Program in compliance with the [facility] regulations before prescribing any controlled substances  We appreciate the invitation to be involved in this patient's care  We will continue to follow  Please do not hesitate to reach our on call provider through our clinic answering service at  should you have acute symptom control concerns  Kasey Gong PA-C  Palliative and Supportive Care  Clinic/Answering Service: [phone]  You can find me on TigerConnect!        IDENTIFICATION:  Inpatient consult to Palliative Care  Consult performed by: Kasey Gong PA-C  Consult ordered by: MIRNA Madison        Physician Requesting Consult: Tessa Lanza MD  Reason for Consult / Principal Problem: symptom managment  Hx and PE limited by: AMS    HISTORY OF PRESENT ILLNESS:       Aspen Robles is a 67 y o  male with intraductal papillary mucinous neoplasm s/p neoadjuvant chemo,  S/p distal pancreatetcomy in 2019 followed by subtotal pancreatectomy in 2022, now presenting for completion of pancreatectomy and sims anastomosis due to positive surgical margins from last procedure  Patient was transferred to ICU post-procedure  Post-operative course has been complicated by hypotension requiring vasopressors  Patient was extubated on 7/28  Patient was subsequently transferred out of the ICU, but was transferred back on 8/1 due to hypotension  APS was initially consulted for management of epidural  It was paused during transfer back to ICU but unable to be removed secondary to coagulopathy  Removed today (8/4) after administration of FFP  Palliative care was consulted to assist in symptom management, support, and [address] discussions if needed in the future  Pain management has been limited by drowsiness and hypotension  Vasopressors have been re-initiated today after being weaned off  Awaiting blood culture results  However, diflucan initiated for concern of fungemia  Patient is now fluid overloaded by diuresis is limited by hypotension and TERESE  TPN has also been initiated  Prior to admission patient was working as a  in his own business, was active, and independent  He is supported by his wife (Jas York) of 48 years, 2 daughters, and 4 grandchildren who live locally       Review of Systems   Unable to perform ROS: mental status change       Past Medical History:   Diagnosis Date    A-fib [facility] [facility] )     Abdominal wall strain     last assessed: 10/21/14    Allergic rhinitis     last assessed: 05/03/16    Arthritis     Cancer ([facility] )     PACNCREATIC    COVID-19 virus infection 3/3/2021    CPAP (continuous positive airway pressure) dependence     Diabetes mellitus ([facility] )     Erectile dysfunction of non-organic origin     last assessed: 03/17/14    Irregular heart beat     Pt with A fib     Lumbar strain     last assessed: 10/21/14    Multiple acquired skin tags     last assessed: 2/18/16    Palpitations     last assessed: 03/17/14    Pancreas cyst     Plantar fasciitis     Skin disorder     last assessed: 05/28/13    Sleep apnea     CPAP BROKE IN OCTOBER HAS BEEN TRYING TO GET NEW ONE BUT UNABLE AS OF YET    Thrombocytopenia [facility] )      Past Surgical History:   Procedure Laterality Date    BOTOX INJECTION N/A 11/12/2021    Procedure: Cristopher Adams;  Surgeon: Nadeem Parisi MD;  Location: [address] [place] [address] MAIN OR;  Service: Urology    CATARACT EXTRACTION Bilateral     CHOLECYSTECTOMY N/A 7/26/2022    Procedure: CHOLECYSTECTOMY;  Surgeon: Bc Abdul MD;  Location: [facility] MAIN OR;  Service: Surgical Oncology    COLONOSCOPY  01/17/2013    COLONOSCOPY  01/08/2018    COLONOSCOPY  04/2021    CYSTOSCOPY      INJECT WITH BOTOX    DISTAL PANCREATECTOMY N/A 2/15/2022    Procedure: PANCREATECTOMY SUB-TOTALL-OPEN;  Surgeon: Bc Abdul MD;  Location: [facility] MAIN OR;  Service: Surgical Oncology    EGD  06/05/2020    EGD AND COLONOSCOPY  02/06/2014, 2/8/2017    FL GUIDED CENTRAL VENOUS ACCESS DEVICE INSERTION  4/23/2019    FLEXIBLE SIGMOIDOSCOPY  06/11/2019    FOOT SURGERY      Due to plantar fascitis    IR DRAINAGE TUBE PLACEMENT  8/3/2022    IR PICC PLACEMENT DOUBLE LUMEN  8/3/2022    IR PORT PLACEMENT  3/30/2022    JOINT REPLACEMENT Left     LTK    LINEAR ENDOSCOPIC U/S      PANCREATECTOMY N/A 7/26/2022    Procedure: COMPLETION OF PANCREATECTOMY W/MUHAMMAD ANASTOMOSIS, EXTENSIVE LYSIS OF ADHESIONS, REDUCTION OF INTERNAL HERNIA;  Surgeon: Bc Abdul MD;  Location: [facility] MAIN OR;  Service: Surgical Oncology    PANCREATECTOMY LAPAROSCOPIC N/A 3/12/2019    Procedure: DISTAL PANCREATECTOMY LAPAROSCOPIC/POSSIBLE OPEN;  Surgeon: Bc Abdul MD;  [facility] MAIN OR;  Service: Surgical Oncology    IA EDG US EXAM SURGICAL ALTER STOM DUODENUM/JEJUNUM N/A 1/24/2019    Procedure: LINEAR ENDOSCOPIC U/S;  Surgeon: Ching Castrejon MD;  Location: [facility] GI LAB; Service: Gastroenterology    REPLACEMENT TOTAL KNEE Left     TUNNELED VENOUS PORT PLACEMENT Left 4/23/2019    Procedure: INSERTION VENOUS PORT (PORT-A-CATH); Surgeon: Aicha Coles MD;  Location: [facility] MAIN OR;  Service: Surgical Oncology- 11/8/21 Pt reports PAC removed     UMBILICAL HERNIA REPAIR       Social History     Socioeconomic History    Marital status: /Civil Union     Spouse name: Not on file    Number of children: Not on file    Years of education: Not on file    Highest education level: Not on file   Occupational History    Not on file   Tobacco Use    Smoking status: Never Smoker    Smokeless tobacco: Never Used   Vaping Use    Vaping Use: Never used   Substance and Sexual Activity    Alcohol use: Yes     Alcohol/week: 2 0 standard drinks     Types: 2 Cans of beer per week     Comment: couple times per week;     Drug use: Never     Comment: Denies     Sexual activity: Yes     Comment: Denies any chest pain or shortness of breath with activity   Other Topics Concern    Not on file   Social History Narrative    Not on file     Social Determinants of Health     Financial Resource Strain: Not on file   Food Insecurity: No Food Insecurity    Worried About Running Out of Food in the Last Year: Never true    Tisha of Food in the Last Year: Never true   Transportation Needs: No Transportation Needs    Lack of Transportation (Medical): No    Lack of Transportation (Non-Medical):  No   Physical Activity: Not on file   Stress: Not on file   Social Connections: Not on file   Intimate Partner Violence: Not on file   Housing Stability: Low Risk     Unable to Pay for Housing in the Last Year: No    Number of Places Lived in the Last Year: 1    Unstable Housing in the Last Year: No     Family History   Problem Relation Age of Onset    Breast cancer Mother     Cervical cancer Paternal Aunt     Liver cancer Other  No Known Problems Father        MEDICATIONS / ALLERGIES:    all current active meds have been reviewed    No Known Allergies    OBJECTIVE:    Physical Exam  Physical Exam  Constitutional:       Appearance: He is ill-appearing  HENT:      Head: Normocephalic and atraumatic  Eyes:      Conjunctiva/sclera: Conjunctivae normal    Neck:      Comments: Central line in place  Cardiovascular:      Rate and Rhythm: Normal rate  Comments: hypoxic  Pulmonary:      Effort: No respiratory distress  Abdominal:      General: There is no distension  Tenderness: There is no guarding  Genitourinary:     Comments: Urinary catheter in place  Musculoskeletal:         General: Swelling present  Neurological:      Comments: Oriented to self and hospital but otherwise disoriented, lethargic   Psychiatric:      Comments: Restless         Lab Results:  Results from last 7 days   Lab Units 08/04/22  0403 08/03/22  1540 08/03/22  0438 08/02/22  1650 08/02/22  0512 07/30/22  0542 07/29/22  0406   WBC Thousand/uL 14 91* 15 19* 11 79*   < > 13 31*   < > 6 90   HEMOGLOBIN g/dL 7 4* 7 9* 8 2*   < > 9 4*   < > 7 4*   I STAT HEMOGLOBIN   --   --   --   --   --    < >  --    HEMATOCRIT % 22 9* 24 9* 25 2*   < > 29 6*   < > 22 8*   HEMATOCRIT, ISTAT   --   --   --   --   --    < >  --    PLATELETS Thousands/uL 112* 126* 95*   < > 126*   < > 73*   NEUTROS PCT % 87*  --   --   --   --   --  73   MONOS PCT % 6  --   --   --   --   --  11   MONO PCT %  --   --   --   --  4  --   --     < > = values in this interval not displayed       Results from last 7 days   Lab Units 08/04/22  0403 08/03/22  0438 08/02/22  2222 08/02/22  1650 08/01/22  0528 08/01/22  0039   POTASSIUM mmol/L 3 9 4 0 4 2 4 1   < >  --    CHLORIDE mmol/L 105 106 105 106   < >  --    CO2 mmol/L 25 24 23 22   < >  --    CO2, I-STAT mmol/L  --   --   --   --   --  24   BUN mg/dL 32* 29* 25 22   < >  --    CREATININE mg/dL 1 98* 1 40* 1 35* 1 14   < >  --    CALCIUM mg/dL 7 8* 8 4 8 4 8 0*   < >  --    ALK PHOS U/L 113 94  --  128*   < >  --    ALT U/L 26 24  --  25   < >  --    AST U/L 35 25  --  29   < >  --    GLUCOSE, ISTAT mg/dl  --   --   --   --   --  71    < > = values in this interval not displayed  Imaging Studies: Reviewed pertinent studies  EKG, Pathology, and Other Studies: reviewed pertinent studies    Counseling / Coordination of Care    Total floor / unit time spent today 45 minutes  Greater than 50% of total time was spent with the patient and / or family counseling and / or coordination of care  A description of the counseling / coordination of care: time spent assessing patient, communicating with APS, surgical critical care team, RN, CM, discussing goals of care with spouse at bedside, complex symptom management        This note was not shared with the patient due to privacy exception: note includes other individuals

## 2022-08-04 NOTE — PROGRESS NOTES
Epidural Follow-up Note - Acute Pain Service    Reuben Mayo 67 y o  male MRN: [id number]  Unit/Bed#: [facility] 515-01 Encounter: [id number]      Assessment:   Principal Problem:    IPMN (intraductal papillary mucinous neoplasm)  Active Problems:    Permanent atrial fibrillation (HCC)    Lower extremity edema    Overlapping malignant neoplasm of pancreas (HCC)    Type 2 diabetes mellitus with hyperglycemia, with long-term current use of insulin (HCC)    Thrombocytopenia (HCC)    Intra-abdominal fluid collection    Transaminitis    TERESE (acute kidney injury) ([facility] [address] )      Reuben Mayo is a 67y o  year old male PMH A-fib, T2DM, intraductal papillary mucinous neoplasm s/p distal pancreatectomy (3/2019), open subtotal pancreatectomy (02/2022) and now admitted s/p completion pancreatectomy with sims anastomosis, extensive SARAH, reduction of internal hernia and cholecystectomy on 7/26, currently POD #7  Decompensated on the floor 7/31-8/1 with sepsis and transferred back to the ICU  PCEA has been turned off since this time and as patient became coagulopathic, efforts made to remove epidural when safely able  Pt seen and examined today, remains in ICU critical condition  Epidural removed yesterday evening after 2u FFP and 1 pack of Platelets, INR 1 6 Plts 126 on removal   Given patient's INR has steadily been increasing and was 1 9 prior to this last value, r/b discussion had and decision to pull epidural at elevated but much improved INR of 1 6  Pt VILCHIS today, denies LE weakness, denies bladder/bowel incontinence  Epidural site c/d/i, no erythema/hematoma/seroma  Patient awake and alert however in restraints as not completely oriented  Given patient's history and diagnosis, Palliative medicine consulted for ongoing pain management post epidural, appreciate their involvement       Plan:  - Epidural pulled yesterday after coagulopathy corrections   - no signs of epidural hematoma   - decrease freq neuro checks to q4hrs    - Analgesia   - Palliative Medicine consulted, appreciate their involvement   - pain regimen post epidural per Palliative rec's    - Current Pain Regimen   - oxycodone 5-10mg q4hrs PRN mod-severe pain   - IV Dilaudid 0 2mg q4hrs PRN breakthrough pain   - Robaxin 500mg q6hrs PRN spasm pain   - Tylenol 975mg TID   - Seroquel 50mg BID  - Bowel regimen ordered per primary team for OIC prevention    APS will sign off at this time  Thank you for the consult  All opioids and other analgesics to be written at discretion of primary team  Please contact Acute Pain Service - [facility] via [facility] from [phone] with additional questions or concerns  See [employer] or [place] for additional contacts and after hours information  Plan discussed with primary team    Pain History  Current pain location(s): incision, back  Pain Scale:   5  Quality: "yeh, hurts"  24 hour history: as above    PCEA use: n/a  Opioid requirement previous 24 hours: 100mcg fentanyl IV, 0 4mg IV Dilaudid, 20mg oxycodone    Meds/Allergies   all current active meds have been reviewed    No Known Allergies    Objective     Temp:  [97 52 °F (36 4 °C)-98 42 °F (36 9 °C)] 98 4 °F (36 9 °C)  HR:  [100-121] 106  Resp:  [6-23] 19  BP: ()/(44-78) 82/51  Arterial Line BP: ()/() 82/42    Physical Exam  Vitals and nursing note reviewed  Constitutional:       General: He is in acute distress  Appearance: He is ill-appearing  HENT:      Head: Normocephalic and atraumatic  Mouth/Throat:      Mouth: Mucous membranes are dry  Eyes:      Extraocular Movements: Extraocular movements intact  Cardiovascular:      Rate and Rhythm: Tachycardia present  Pulmonary:      Comments: 2LNC  Abdominal:      Palpations: Abdomen is soft  Tenderness: There is abdominal tenderness  There is no guarding  Musculoskeletal:         General: No tenderness  Cervical back: Neck supple  Skin:     General: Skin is warm and dry     Neurological: Mental Status: He is alert  He is disoriented  Psychiatric:         Mood and Affect: Mood normal          Behavior: Behavior normal        Epidural: Site clean/dry/intact, no surrounding erythema/edema/induration    Lab Results:   Results from last 7 days   Lab Units 08/04/22  0403   WBC Thousand/uL 14 91*   HEMOGLOBIN g/dL 7 4*   HEMATOCRIT % 22 9*   PLATELETS Thousands/uL 112*      Results from last 7 days   Lab Units 08/04/22  0403 08/01/22  0528 08/01/22  0039   POTASSIUM mmol/L 3 9   < >  --    CHLORIDE mmol/L 105   < >  --    CO2 mmol/L 25   < >  --    CO2, I-STAT mmol/L  --   --  24   BUN mg/dL 32*   < >  --    CREATININE mg/dL 1 98*   < >  --    CALCIUM mg/dL 7 8*   < >  --    ALK PHOS U/L 113   < >  --    ALT U/L 26   < >  --    AST U/L 35   < >  --    GLUCOSE, ISTAT mg/dl  --   --  71    < > = values in this interval not displayed  Results from last 7 days   Lab Units 08/03/22  1540   INR  1 61*       Imaging Studies: I have personally reviewed pertinent reports  EKG, Pathology, and Other Studies: I have personally reviewed pertinent reports  Counseling / Coordination of Care  Total floor / unit time spent today 20 minutes  Greater than 50% of total time was spent with the patient and / or family counseling and / or coordination of care  A description of the counseling / coordination of care: chart review, post op pain and regional/neuraxial pain management, discussion/planning with nursing/medical/surgical teams    Please note that the APS provides consultative services regarding pain management only  With the exception of ketamine, peripheral nerve catheters, and epidural infusions (and except when indicated), final decisions regarding starting or changing doses of analgesic medications are at the discretion of the consulting service  Off hours consultation and/or medication management is generally not available      Maite Daly MD  Acute Pain Service

## 2022-08-05 NOTE — CONSULTS
Consultation    Nephrology   Dalila Cassidy 67 y o  male MRN: [id number]  Unit/Bed#: [facility] 311-21 Encounter: [id number]    History of Present Illness   Physician Requesting Consult: Iglesia Rodarte MD  Reason for Consult : - acute kidney injury    ASSESSMENT/PLAN:   67 y o   male with pmh of morbid obesity, MORALES, BPH with intraductal papillary mucinous neoplasm status post distal pancreatectomy 03/2019 and subtotal pancreatectomy 02/22 now admitted on 07/26/2022 for completion pancreatectomy/anastomosis extensive lysis of adhesions/cholecystectomy, postoperatively initially did well subsequently became septic and was found to have intra-abdominal collection status post IR drainage placement now with decreasing urine output subsequently nephrology has been consulted for evaluation and management of acute kidney injury and evaluation for possible need for dialysis  Acute kidney injury :  TERESE multifactorial most likely secondary to septic shock plus ANANYA (08/01/2022 and 08/02/2022) plus ischemic injury secondary to hemodynamics in light of underlying comorbidities  After review of records In [facility] as well as Care everywhere it appears that the patient has a baseline Creatinine of 0 7-0 9 mg/dL  patient was admitted with a creatinine of 0 83 mg/dL on 07/26/2022  patient's creatinine today is at 2 80 mg/dL, continues to rise  Recommend Bumex 2 mg IV x1 then start Bumex drip at 1 milligram/hour if no significant response in 2-3 hours and give Diuril 500 mg IV x1  Continue close follow-up and let Nephrology know with regards to volume status and BMP repeat updates  If no significant improvement then may need to start patient on CVVHD  Called and spoke to patient's spouse abated from renal perspective all risks benefits of renal replacement therapy were discussed in depth consent obtained and placed in the chart on 08/05/2022  check BMP in a m  And at 4:00 p m  Today  Await renal recovery    Optimize hemodynamic status to avoid delay in renal recovery  Avoid nephrotoxins, adjust meds to appropriate GFR  Strict I/O  Daily weights  Urinary retention protocol if patient does not have a [place]  will need to set up patient for follow up with Nephrology as an outpatient post hospitalization  as an outpatient for nephrology, patient follows up with no nephrologist     Blood pressure/hypotension:  home medications: HCTZ 25 mg p o  Q day, Lasix 20 mg p o  Q day  current medications:  Low-dose Levophed  recommendations:  Taper off of pressors as tolerable  Give Bumex 2 mg IV x1 start Bumex drip at 1 milligram/hour if no response give Diuril 500 mg IV x1  Optimize hemodynamics  Maintain MAP > 65mmHg  Avoid BP fluctuations  H/H/anemia:  most recent hemoglobin at 6 6 grams/deciliter  maintain hemoglobin greater than 8 grams/deciliter  Recommend PRBC transfusion per primary team    Acid-base electrolytes:    Electrolytes:    Stable  Hyperphosphatemia:  Most recent phosphorus 4 8  Monitor for now    Acid-base:    Most recent bicarb at 22 stable for now    Other medical problems:  Intraductal papillary mucinous neoplasm:  Management per primary team   Status post completion of pancreatectomy and extensive lysis of adhesions with cholecystectomy on 07/26/2022  Follow-up with surgery  AFib with RVR:  Management primary team on amiodarone  Liver cirrhosis:  History of chronic alcohol use  Septic shock/strep anginosus bacteremia:  Management per primary team   On Unasyn, micafungin  Follow-up with ID  Thanks for the consult  Will continue to follow  Please call with questions/ concerns    Above-mentioned orders and Plan in terms of acute kidney injury was discussed with the team in depth via tiger text    Yasmin Davis MD, FASN, 8/5/2022, 10:41 AM          HISTORY OF PRESENT ILLNESS:   Li Sinclair is a 67 y o   male with pmh of morbid obesity, MORALES, BPH with intraductal papillary mucinous neoplasm status post distal pancreatectomy 03/2019 and subtotal pancreatectomy 0 2/22 now admitted on 07/26/2022 for completion pancreatectomy/anastomosis extensive lysis of adhesions/cholecystectomy, postoperatively initially did well subsequently became septic and was found to have intra-abdominal collection status post IR drainage placement now with decreasing urine output subsequently nephrology has been consulted for evaluation and management of acute kidney injury and evaluation for possible need for dialysis  Patient seen and examined in the ICU earlier today remains on BiPAP remains in restraints on low-dose Levophed no significant urine output got Lasix 20 mg IV x1 serum creatinine baseline is normal, admitted with creatinine of 0 83 mg/dL most recent creatinine today at 2 80 mg/dL  Status post IV contrast exposure on 08/01/2022 and 08/02/2022  Has a baseline creatinine 0 70 9 mg/dL    History obtained from spouse and chart review    Consults    Review of Systems   Unable to perform ROS: Mental status change        Historical Information   Patient Active Problem List   Diagnosis    Obstructive sleep apnea syndrome    Hypersomnia    Permanent atrial fibrillation ([facility] )    Morbid obesity with BMI of 40 0-44 9, adult ([address] )    Lower extremity edema    Pancreatic duct dilated    Overlapping malignant neoplasm of pancreas ([address] )    Type 2 diabetes mellitus with hyperglycemia, with long-term current use of insulin ([address] )    IPMN (intraductal papillary mucinous neoplasm)    Thrombocytopenia (HCC)    Urgency incontinence    Balanitis    OAB (overactive bladder)    BPH without obstruction/lower urinary tract symptoms    Encounter for geriatric assessment    Counseling regarding advanced care planning and goals of care    Port-A-Cath in place    Chemotherapy induced neutropenia ([address] )    Intra-abdominal fluid collection    Transaminitis    TERESE (acute kidney injury) ([address] )     Past Medical History:   Diagnosis Date    A-fib ([facility] )     Abdominal wall strain     last assessed: 10/21/14    Allergic rhinitis     last assessed: 05/03/16    Arthritis     Cancer ([facility] )     PACNCREATIC    COVID-19 virus infection 3/3/2021    CPAP (continuous positive airway pressure) dependence     Diabetes mellitus ([facility] )     Erectile dysfunction of non-organic origin     last assessed: 03/17/14    Irregular heart beat     Pt with A fib     Lumbar strain     last assessed: 10/21/14    Multiple acquired skin tags     last assessed: 2/18/16    Palpitations     last assessed: 03/17/14    Pancreas cyst     Plantar fasciitis     Skin disorder     last assessed: 05/28/13    Sleep apnea     CPAP BROKE IN OCTOBER HAS BEEN TRYING TO GET NEW ONE BUT UNABLE AS OF YET    Thrombocytopenia ([facility] )      Past Surgical History:   Procedure Laterality Date    BOTOX INJECTION N/A 11/12/2021    Procedure: Kenyatta Wolf;  Surgeon: Can Hernandez MD;  Location: [address] [place] [address] MAIN OR;  Service: Urology    CATARACT EXTRACTION Bilateral     CHOLECYSTECTOMY N/A 7/26/2022    Procedure: CHOLECYSTECTOMY;  Surgeon: Iglesia Rodarte MD;  [facility] MAIN [facility];  Service: Surgical Oncology    COLONOSCOPY  01/17/2013    COLONOSCOPY  01/08/2018    COLONOSCOPY  04/2021    CYSTOSCOPY      INJECT WITH BOTOX    DISTAL PANCREATECTOMY N/A 2/15/2022    Procedure: PANCREATECTOMY SUB-TOTALL-OPEN;  Surgeon: Iglesia Rodarte MD;  Location: [facility] MAIN OR;  Service: Surgical Oncology    EGD  06/05/2020    EGD AND COLONOSCOPY  02/06/2014, 2/8/2017    FL GUIDED CENTRAL VENOUS ACCESS DEVICE INSERTION  4/23/2019    FLEXIBLE SIGMOIDOSCOPY  06/11/2019    FOOT SURGERY      Due to plantar fascitis    IR DRAINAGE TUBE PLACEMENT  8/3/2022    IR PICC PLACEMENT DOUBLE LUMEN  8/3/2022    IR PORT PLACEMENT  3/30/2022    JOINT REPLACEMENT Left     LTK    LINEAR ENDOSCOPIC U/S      PANCREATECTOMY N/A 7/26/2022    Procedure: COMPLETION OF PANCREATECTOMY W/MUHAMMAD ANASTOMOSIS, EXTENSIVE LYSIS OF ADHESIONS, REDUCTION OF INTERNAL HERNIA;  Surgeon: Beckie Hernandes MD;  Location: [facility] MAIN OR;  Service: Surgical Oncology    PANCREATECTOMY LAPAROSCOPIC N/A 3/12/2019    Procedure: DISTAL PANCREATECTOMY LAPAROSCOPIC/POSSIBLE OPEN;  Surgeon: Beckie Hernandes MD;  [facility];  Service: Surgical Oncology    NH EDG US EXAM SURGICAL ALTER STOM DUODENUM/JEJUNUM N/A 1/24/2019    Procedure: LINEAR ENDOSCOPIC U/S;  Surgeon: Elizabeth Nuno MD;  Location: [facility] GI LAB; Service: Gastroenterology    REPLACEMENT TOTAL KNEE Left     TUNNELED VENOUS PORT PLACEMENT Left 4/23/2019    Procedure: INSERTION VENOUS PORT (PORT-A-CATH); Surgeon: Beckie Hernandes MD;  [facility] MA[facility];  Service: Surgical Oncology- 11/8/21 Pt reports PAC removed     UMBILICAL HERNIA REPAIR       Social History   Social History     Substance and Sexual Activity   Alcohol Use Yes    Alcohol/week: 2 0 standard drinks    Types: 2 Cans of beer per week    Comment: couple times per week;       Social History     Substance and Sexual Activity   Drug Use Never    Comment: Denies      Social History     Tobacco Use   Smoking Status Never Smoker   Smokeless Tobacco Never Used     Family History   Problem Relation Age of Onset    Breast cancer Mother     Cervical cancer Paternal Aunt     Liver cancer Other     No Known Problems Father        Meds/Allergies   current meds:   Current Facility-Administered Medications   Medication Dose Route Frequency    acetaminophen (TYLENOL) oral suspension 975 mg  975 mg Per NG Tube Q8H    Adult 3-in-1 TPN (custom base / custom electrolytes)   Intravenous Continuous TPN    amiodarone (CORDARONE) 900 mg in dextrose 5 % 500 mL infusion  0 5 mg/min Intravenous Continuous    ampicillin-sulbactam (UNASYN) 3 g in sodium chloride 0 9 % 100 mL IVPB  3 g Intravenous Q6H    bisacodyl (DULCOLAX) rectal suppository 10 mg  10 mg Rectal Daily    bumetanide (BUMEX) 12 5 mg infusion 50 mL  1 mg/hr Intravenous Continuous  heparin (porcine) subcutaneous injection 5,000 Units  5,000 Units Subcutaneous Q8H [address]    HYDROmorphone HCl (DILAUDID) injection 0 2 mg  0 2 mg Intravenous Q4H PRN    insulin glargine (LANTUS) subcutaneous injection 5 Units 0 05 mL  5 Units Subcutaneous HS    insulin lispro (HumaLOG) 100 units/mL subcutaneous injection 1-6 Units  1-6 Units Subcutaneous Q6H [address]    iohexol (OMNIPAQUE) 240 MG/ML solution 30 mL  30 mL Oral 90 min pre-procedure    ipratropium-albuterol (DUO-NEB) 0 5-2 5 mg/3 mL inhalation solution 3 mL  3 mL Nebulization Q6H    melatonin tablet 6 mg  6 mg Oral HS    methocarbamol (ROBAXIN) tablet 500 mg  500 mg Oral Q6H PRN    micafungin (MYCAMINE) 100 mg in sodium chloride 0 9 % 100 mL IVPB  100 mg Intravenous Q24H    norepinephrine (LEVOPHED) 4 mg (STANDARD CONCENTRATION) IV in sodium chloride 0 9% 250 mL  1-30 mcg/min Intravenous Titrated    ondansetron (ZOFRAN) injection 4 mg  4 mg Intravenous Q6H PRN    oxyCODONE (ROXICODONE) IR tablet 5 mg  5 mg Oral Q4H PRN    pantoprazole (PROTONIX) injection 40 mg  40 mg Intravenous Q24H SHAHLA    phenol (CHLORASEPTIC) 1 4 % mucosal liquid 1 spray  1 spray Mouth/Throat Q2H PRN    polyethylene glycol (MIRALAX) packet 17 g  17 g Per NG Tube Daily    QUEtiapine (SEROquel) tablet 50 mg  50 mg Oral BID    senna-docusate sodium (SENOKOT S) 8 6-50 mg per tablet 2 tablet  2 tablet Oral BID    and PTA meds:   Prior to Admission Medications   Prescriptions Last Dose Informant Patient Reported? Taking? Blood Glucose Calibration (OneTouch Verio) SOLN 7/25/2022 at Unknown time Self No Yes   Sig: Use as needed to calibrate test strips   Blood Glucose Monitoring Suppl (OneTouch Verio) w/Device KIT 7/25/2022 at Unknown time Self No Yes   Sig: Test BG up to 1x daily as directed   Cholecalciferol (VITAMIN D) 2000 units CAPS 7/26/2022 Self Yes Yes   Sig: Take 1,000 Units by mouth in the morning  11/8/21 Pt takes three tabs of Vitamin D daily      Cyanocobalamin (VITAMIN B 12 PO) 2022 Self Yes No   Sig: Take by mouth daily 21 Pt reports takes three tabs daily  Insulin Pen Needle (BD Pen Needle Tia U/F) 32G X 4 MM MISC 2022 at Unknown time  No Yes   Sig: Use 4 per day   Lancets (OneTouch Delica Plus [id number]) MISC 2022 at Unknown time  No Yes   Sig: Test BG up to 3x daily as directed   acetaminophen (TYLENOL) 325 mg tablet  Self No No   Sig: Take 2 tablets (650 mg total) by mouth every 6 (six) hours as needed for mild pain   ascorbic acid (VITAMIN C) 1000 MG tablet 2022 Self Yes No   Sig: Take 2,000 mg by mouth in the morning  aspirin (ECOTRIN) 325 mg EC tablet 2022 Self Yes No   Sig: Take 325 mg by mouth in the morning     atenolol (TENORMIN) 25 mg tablet 2022 at 0430 Self No Yes   Sig: TAKE 1 TABLET BY MOUTH EVERY DAY   furosemide (LASIX) 20 mg tablet 2022  No No   Sig: Take 1 tablet (20 mg total) by mouth 2 (two) times a day   hydrochlorothiazide (HYDRODIURIL) 25 mg tablet Past Month at Unknown time  No Yes   Sig: Take 1 tablet (25 mg total) by mouth daily   insulin aspart, w/niacinamide, (Fiasp FlexTouch) 100 Units/mL injection pen   No No   Sig: Take before meals according to sliding scale up to 30 units per day 150-200: 2 units 201-250: 4 units 251-300: 6 units 301-350: 8 units Over 350: 10 units   insulin aspart, w/niacinamide, (Fiasp FlexTouch) 100 Units/mL injection pen 2022 at Unknown time  No Yes   Sig: Take before meals according to sliding scale up to 30 units per day 150-200: 2 units 201-250: 4 units 251-300: 6 units 301-350: 8 units Over 350: 10 units      insulin glargine (Basaglar KwikPen) 100 units/mL injection pen 2022 at Unknown time  No Yes   Si units daily   metFORMIN (GLUCOPHAGE) 1000 MG tablet   No No   Sig: TAKE 1 TABLET BY MOUTH TWICE A DAY WITH MEALS   pancrelipase, Lip-Prot-Amyl, (CREON) 6,000 units delayed release capsule 2022 at Unknown time Self No Yes   Sig: Take 2 pills, 3 times a day with meals      Facility-Administered Medications: None       Scheduled Meds:  Current Facility-Administered Medications   Medication Dose Route Frequency Provider Last Rate    acetaminophen  975 mg Per NG Tube Q8H Corrinebhupendra Guerrero DO      Adult TPN (CUSTOM BASE/CUSTOM ELECTROLYTE)   Intravenous Continuous TPN Bertrand Dawson MD      amiodarone  0 5 mg/min Intravenous Continuous [employer], CRNP 0 5 mg/min (08/04/22 2115)    ampicillin-sulbactam  3 g Intravenous Q6H Mohawkhugo Mcnulty MD 3 g (08/05/22 0857)    bisacodyl  10 mg Rectal Daily Jt Downs PA-C      bumetanide  1 mg/hr Intravenous Continuous Jt Downs PA-C 1 mg/hr (08/05/22 1021)    heparin (porcine)  5,000 Units Subcutaneous [facility] Barbi Reddy      HYDROmorphone  0 2 mg Intravenous Q4H PRN Tai Adams PA-C      insulin glargine  5 Units Subcutaneous HS Chito Varghese MD      insulin lispro  1-6 Units Subcutaneous Q6H [facility] Chito Varghese MD      iohexol  30 mL Oral 90 min pre-procedure [address] [place]      ipratropium-albuterol  3 mL Nebulization Q6H Barbi Reddy      melatonin  6 mg Oral HS Alphonzo Bumpers, PA-C      methocarbamol  500 mg Oral Q6H  [facility], CRNP      micafungin  100 mg Intravenous Q24H Mohawkhugo Mcnulty MD      norepinephrine  1-30 mcg/min Intravenous Titrated Jt Downs PA-C Stopped (08/05/22 1000)    ondansetron  4 mg Intravenous Q6H PRN Jt Downs PA-C      oxyCODONE  5 mg Oral Q4H PRN Tai Adams PA-C      pantoprazole  40 mg Intravenous Q24H [facility] Jt Downs PA-C      phenol  1 spray Mouth/Throat Q2H PRN Alphonzo Bumpers, PA-C      polyethylene glycol  17 g Per NG Tube Daily Jt Downs PA-C      QUEtiapine  50 mg Oral BID Barbi Reddy      senna-docusate sodium  2 tablet Oral BID Jt Downs PA-C         PRN Meds:   HYDROmorphone    methocarbamol    ondansetron    oxyCODONE **OR** [DISCONTINUED] oxyCODONE    phenol    Continuous Infusions:Adult TPN (CUSTOM BASE/CUSTOM ELECTROLYTE),   amiodarone, 0 5 mg/min, Last Rate: 0 5 mg/min (22)  bumetanide, 1 mg/hr, Last Rate: 1 mg/hr (22 1021)  norepinephrine, 1-30 mcg/min, Last Rate: Stopped (22 1000)        No Known Allergies      Invasive Devices: Invasive Devices  Report    Peripherally Inserted Central Catheter Line  Duration           PICC Line 22 1 day          Central Venous Catheter Line  Duration           Port A Cath 22 Right Chest 128 days          Peripheral Intravenous Line  Duration           Peripheral IV 22 Right;Upper;Ventral (anterior) Arm 3 days    Peripheral IV 22 Right;Ventral (anterior) Forearm 1 day          Arterial Line  Duration           Arterial Line 22 Radial 4 days          Drain  Duration           Closed/Suction Drain Right RLQ Bulb 19 Fr  9 days    Urethral Catheter Temperature probe 16 Fr  4 days    NG/OG/Enteral Tube Enteral Feeding Tube Right nare 2 days    Abscess Drain LUQ 1 day                  PHYSICAL EXAM  BP 99/66   Pulse 96   Temp 99 14 °F (37 3 °C)   Resp (!) 23   Ht 6' (1 829 m)   Wt (!) 143 kg (314 lb 13 1 oz)   SpO2 98%   BMI 42 70 kg/m²   Temp (24hrs), Av °F (37 2 °C), Min:98 1 °F (36 7 °C), Max:99 5 °F (37 5 °C)        Intake/Output Summary (Last 24 hours) at 2022 1041  Last data filed at 2022 1011  Gross per 24 hour   Intake 5210 84 ml   Output 1557 ml   Net 3653 84 ml       I/O last 24 hours: In: 5639 1 [I V :604; NG/GT:426; IV Piggyback:2837 3; TPN:1605 9; Feedings:166]  Out: 1702 [Urine:747; Drains:955]          Current Weight: Weight - Scale: (!) 143 kg (314 lb 13 1 oz)  First Weight: Weight - Scale: 127 kg (280 lb)  Physical Exam  Vitals and nursing note reviewed  Constitutional:       General: He is not in acute distress  Appearance: Normal appearance  He is obese  He is ill-appearing   He is not toxic-appearing or diaphoretic  HENT:      Head: Normocephalic and atraumatic  Mouth/Throat:      Pharynx: No oropharyngeal exudate  Eyes:      General: No scleral icterus  Conjunctiva/sclera: Conjunctivae normal    Cardiovascular:      Rate and Rhythm: Normal rate  Heart sounds: Normal heart sounds  No friction rub  Pulmonary:      Effort: Pulmonary effort is normal  No respiratory distress  Breath sounds: Normal breath sounds  No stridor  No wheezing  Abdominal:      General: There is no distension  Palpations: Abdomen is soft  Tenderness: There is no abdominal tenderness  Comments: Drain in place   Musculoskeletal:         General: Swelling present  Cervical back: Normal range of motion and neck supple  Comments: Plus one edema bilateral extremities trace edema bilateral upper extremities   Skin:     General: Skin is warm  Coloration: Skin is not jaundiced  Neurological:      General: No focal deficit present  Mental Status: He is alert        Comments: Moving extremities           LABORATORY:    Results from last 7 days   Lab Units 08/05/22  0436 08/04/22  0403 08/03/22  1540 08/03/22  0438 08/02/22  2222 08/02/22  1650 08/02/22  0512 08/01/22  0528 08/01/22  0039 08/01/22  0011   WBC Thousand/uL 11 21* 14 91* 15 19* 11 79*  --  9 67 13 31* 11 89*  --  3 88*   HEMOGLOBIN g/dL 6 6* 7 4* 7 9* 8 2*  --  10 0* 9 4* 9 9*  --  9 9*   I STAT HEMOGLOBIN g/dl  --   --   --   --   --   --   --   --  9 5*  --    HEMATOCRIT % 21 2* 22 9* 24 9* 25 2*  --  30 0* 29 6* 29 5*  --  31 2*   HEMATOCRIT, ISTAT %  --   --   --   --   --   --   --   --  28*  --    PLATELETS Thousands/uL  --  112* 126* 95*  --  147* 126* 147*  --  126*   POTASSIUM mmol/L 3 8 3 9  --  4 0 4 2 4 1 4 1 3 9  3 9  --  3 5   CHLORIDE mmol/L 103 105  --  106 105 106 106 106  106  --  105   CO2 mmol/L 22 25  --  24 23 22 24 23  23  --  23   CO2, I-STAT mmol/L  --   --   --   --   --   --   --   --  24  -- BUN mg/dL 41* 32*  --  29* 25 22 19 15  15  --  16   CREATININE mg/dL 2 80* 1 98*  --  1 40* 1 35* 1 14 1 00 1 03  1 03  --  1 09   CALCIUM mg/dL 8 0* 7 8*  --  8 4 8 4 8 0* 7 5* 7 6*  7 6*  --  7 9*   MAGNESIUM mg/dL 2 5 2 5  --  2 2  --  2 5 2 4 1 6  --  1 5*   PHOSPHORUS mg/dL 4 8* 5 4*  --  4 8*  --  4 7* 4 8* 4 2*  --  4 4*   GLUCOSE, ISTAT mg/dl  --   --   --   --   --   --   --   --  71  --       rest all reviewed    RADIOLOGY:  XR chest portable ICU   Final Result by Diane Low MD (08/05 1019)      Increased bilateral opacities, predominantly in the mid and lower lung fields, likely represents worsening edema with effusions  Concomitant infection is to be excluded on clinical grounds  Workstation performed: [id number]         XR abdomen 1 view kub   Final Result by Diane Low MD (08/05 1026)      Since August 2, 2022, persistent dilation of the small bowel, though the number of air-filled loops of dilated small bowel has decreased  Workstation performed: [id number]         IR drainage tube placement   Final Result by Cecile Becerra MD (08/03 1934)   Impression: Successful placement of a 10 [place] all-purpose drainage catheter into the left upper abdominal fluid collection  Workstation performed: [id number]         IR PICC line placement double lumen   Final Result by Myra Foster MD (08/03 1637)   Impression: Technically successful placement of line  Workstation performed: [id number]         CT chest abdomen pelvis w contrast   Final Result by Emeterio Kamara DO (08/02 1710)      1  History as above  Grossly stable 9 4 x 2 4 x 2 3 cm fluid collection in the pancreatectomy bed  No definitive evidence for enteric contrast extravasation from the distal gastrectomy staple line  Continued CT surveillance is suggested  Grossly    stable pneumoperitoneum        2  Small amount of abdominopelvic ascites and diffuse mesenteric edema, the former slightly increased from previous study  No definitive rim-enhancing collections to suggest abscess  3  Mildly dilated small bowel loop left midabdomen without focal transition to suggest mechanical obstruction  This could reflect transient peristalsis  Mild wall thickening small bowel loop in the right lower quadrant could represent infectious or    inflammatory enteritis  4  Thickening of the ascending colon could be secondary to underdistention, infectious/inflammatory colitis and/or element of portal hypertensive colopathy  5  Lobular surface contour of the liver suspicious for cirrhosis  6  Small bilateral pleural effusions and mild bilateral lower lobe compressive atelectasis, similar  7  Diffuse anasarca again noted  Additional incidental findings as above  Workstation performed: [id number]         XR chest portable ICU   Final Result by Parminder Vincent MD (25/83 9572)      Malpositioned enteric tube  However, correlation with subsequently performed radiograph of the abdomen demonstrates that the tube had been repositioned prior to dictation of the study  Bilateral pleural effusions  Workstation performed: [id number]         XR chest 1 view   Final Result by Parminder Vincent MD (68/66 9592)      Malpositioned enteric tube  However, correlation with subsequently performed radiograph of the abdomen demonstrates that the tube had been repositioned prior to dictation of the study  Bilateral pleural effusions  Workstation performed: [id number]         XR abdomen 1 vw portable   Final Result by Parminder Vincent MD (08/04 2723)      Tip of enteric tube projects over the stomach                 Workstation performed: [id number]         CT chest abdomen pelvis w contrast   Final Result by Kiley Doran MD (66/52 2463)   Addendum 1 of 1 by Kiley Doran MD (08/01 1684)   ADDENDUM:         I personally discussed pertinent findings on this study with Kelly Gauthier on 8/1/2022 at 9:45 AM                Final      CT chest:      New small bilateral pleural effusions with minimal adjacent subsegmental atelectasis  Findings may be sequela of fluid overload or third spacing among other etiologies  CT abdomen and pelvis:      Interval postsurgical changes as above  New pneumoperitoneum is nonspecific and likely related to recent surgery  Cannot entirely exclude staple line dehiscence at the partial distal gastrectomy staple line  Progressive small volume ascites, mesenteric and omental edema and anasarca  Findings may be sequela of fluid overload or third spacing among other etiologies  New partially loculated fluid collection in the post pancreatectomy bed measures approximately 9 5 x 2 5 x 2 8 cm  New diffuse bladder wall thickening with tiny gas droplets in the lumen of the bladder likely due to under distention and recent catheterization  Suggest correlation with UA to exclude cystitis  Stable 1 2 cm short axis anterior para-aortic low-density lymph node  The study was marked in [facility] for immediate notification  Workstation performed: [id number]         XR chest portable   Final Result by Ismael Kilgore MD (08/01 6503)      Bibasilar subsegmental atelectasis  Workstation performed: [id number]         XR abdomen 1 view kub   Final Result by Xin Langston MD (07/28 0403)      Limited examination as the right and lower abdomen is excluded from the study  Nonobstructive bowel gas pattern  Adequately positioned nasogastric tube  Workstation performed: [id number]         XR chest portable   Final Result by Surinder Borja MD (07/27 9350)      Lines and tubes as described  No pneumothorax                    Workstation performed: [id number]         IR drainage tube check/change/reposition/reinsertion/upsize (Results Pending)     Rest all reviewed    Portions of the record may have been created with voice recognition software  Occasional wrong word or "sound a like" substitutions may have occurred due to the inherent limitations of voice recognition software  Read the chart carefully and recognize, using context, where substitutions have occurred  If you have any questions, please contact the dictating provider

## 2022-08-05 NOTE — PROGRESS NOTES
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), open subtotal pancreatectomy (02/2022) and now admitted s/p completion pancreatectomy with sims anastomosis, extensive SARAH, reduction of internal hernia and cholecystectomy on 7/26, currently POD #7  Decompensated on the floor 7/31-8/1 with sepsis and transferred back to the ICU       24 hr events:   · Hypotensive after sitting on side of bed with PT, received 500 cc albumin without improvement and started on levophed which has been on/off low dose   · Received NAC with subsequent hyperglycemia since its mixed in D5  · Poor UOP  · Hypoxia and increased work of breathing this morning, placed on BiPAP, given DuoNeb and 20 of Lasix    ---------------------------------------------------------------------------------------  SUBJECTIVE  Unable to offer, groaning ni pain     Review of Systems  Review of systems was unable to be performed secondary to Encephalopathy  ---------------------------------------------------------------------------------------  Assessment and Plan:    Neuro:   · Diagnosis: Acute post-operative pain   ? Epidural removed 8/3   ? [address] tylenol, oxycodone 5-10 mg PRN mod-severe, dilaudid 0 5 mg PRN breakthrough   ? Robaxin PRN   · Delirium precautions  § CAM-ICU daily  § Regulate sleep/wake cycle         CV:   · Diagnosis: Shock   ? Likely septic with source - intra-abdominal infection and component of intravascular depletion   ? MAP goal >65, levophed 2   ? Trend lactic until clearance   ? Continue Unasyn + micafungin   ? F/u cultures   · Diagnosis: Atrial fibrillation   ? Amiodarone infusion 0 5 mg/min   ?  Rebolus as needed for RVR     ? Holding systemic AC - discuss timing for starting with surgery team   ? Holding home atenolol with shock   ? Follow rhythm on telemetry  ? 8/1 Echo EF 60% improved from 45% on 7/27, no RV dysfunction   ? Holding home lasix in setting of shock         Pulm:  · Diagnosis:  Acute hypoxic respiratory failure , Bilateral pleural effusions   ? Small BL pleural effusions on CT chest with atelectasis   ? Start on BiPAP in give 20 mg Lasix now  ? DuoNebs q 6 hours   ? Repeat CXR  ? Pulmonary toileting      GI:   · Diagnosis: IPMN now s/p completion pancreatectomy, reduction of internal hernia and cholecystectomy on 7/26  ? 8/1 CT - new partially loculated fluid collection in post-pancreatectomy bed  ? 8/2 CT - stable fluid collection in pancreatectomy bed, no evidence of enteric contrast extravasation from staple line, stable pneumoperitoneum  ? 8/3 IR drain in L anterior collection   § F/u cultures   ? Increasing abdominal distension, check KUB  ? Concern for developing abdominal compartment syndrome  ? Check STAT bladder pressure  ? IV abx as below   ? NGT with trickle TF  ? PICC line with TPN  ? Will need Creon when taking PO again   · Stress ulcer PPX: Pantoprazole IV  · Bowel regimen: senna/colace, Dulcolax suppository daily, miralax down NGT  ? Last BM: 7/31        :   · Diagnosis: TERESE, urinary retention   ? Baseline Cr 0 8  ? Current Cr 2 80  ? Intravascularly depleted but anasarca   ? Give 20 mg IV Lasix now with increased work of breathing  ? Concern TERESE secondary to developing abdominal compartment syndrome  ? Trend BUN/CR, Strict I/O   ? Hanna: Yes  § Urinary catheter still needed for Strict I and O in a critically ill patient  ? Urinary retention protocol when hanna removed         F/E/N:   · Fluid/Diuretic plan: No IVF while on TPN   ? Albumin PRN   · E: Replete for goal K >4, Phos >3, Mg >2   · N: TPN, NGT with trickle TF        Heme/Onc:   · Diagnosis: Anemia  ? In setting of critical illness, acute blood loss   ?  Hgb 6 6 from 7 4, order 1 u PRBC ? VTE PPX: SQH, SCDs        Endo:   · Diagnosis: T2DM  ? Now s/p pancreatectomy   ? Lantus 5 u QHS   § Increase  ? ISS algorithm 2   ? BG goal 140-180  ? Endocrinology following          ID:   · Diagnosis: Sepsis, possible intra-abdominal abscess   ? Zosyn day 6  ? Fluconazole day 2   ? Blood cx - 1/2 streptococcus anginosus - ? contamination  ? Blood cx - 1/2 negative x 72 hrs   ? Blood cx - 2/2 negative x 48 hrs   ? R drain cx - +2 strep, few colonies candidia, few colonies klebsiella    ? L drain cx - GP rods, yeast  ? Trend fever curve and WBC count   ? Consider ID consult            MSK/Skin:   · Frequent turning and repositioning   · PT/OT as appropriate       Patient appropriate for transfer out of the ICU today?: No  Disposition: Continue Critical Care   Code Status: Level 1 - Full Code  ---------------------------------------------------------------------------------------  ICU CORE MEASURES    Prophylaxis   VTE Pharmacologic Prophylaxis: Heparin  VTE Mechanical Prophylaxis: sequential compression device  Stress Ulcer Prophylaxis: Pantoprazole IV     ABCDE Protocol (if indicated)  CAM-ICU: Positive    Invasive Devices Review  Invasive Devices  Report    Peripherally Inserted Central Catheter Line  Duration           PICC Line 08/03/22 1 day          Central Venous Catheter Line  Duration           Port A Cath 03/30/22 Right Chest 128 days          Peripheral Intravenous Line  Duration           Peripheral IV 08/02/22 Right;Upper;Ventral (anterior) Arm 3 days    Peripheral IV 08/04/22 Right;Ventral (anterior) Forearm <1 day          Arterial Line  Duration           Arterial Line 08/01/22 Radial 4 days          Drain  Duration           Closed/Suction Drain Right RLQ Bulb 19 Fr  9 days    Urethral Catheter Temperature probe 16 Fr  3 days    NG/OG/Enteral Tube Enteral Feeding Tube Right nare 2 days    Abscess Drain LUQ 1 day              Can any invasive devices be discontinued today? No  ---------------------------------------------------------------------------------------  OBJECTIVE    Vitals   Vitals:    22 0603 22 0618 22 0700 22 0720   BP:  (!) 83/45 96/72 100/58   Pulse: 100 100 100 (!) 108   Resp: (!) 29 (!) 31 (!) 29 (!) 27   Temp: 99 14 °F (37 3 °C) 99 14 °F (37 3 °C) 99 32 °F (37 4 °C)    TempSrc:       SpO2: (!) 89% 98% 97%    Weight:  (!) 143 kg (314 lb 13 1 oz)     Height:         Temp (24hrs), Av °F (37 2 °C), Min:97 9 °F (36 6 °C), Max:99 5 °F (37 5 °C)  Current: Temperature: 99 32 °F (37 4 °C)    Respiratory:  SpO2: SpO2: 97 %, SpO2 Device: O2 Device: Nasal cannula  Nasal Cannula O2 Flow Rate (L/min): 6 L/min    Invasive/non-invasive ventilation settings   Respiratory  Report   Lab Data (Last 4 hours)    None         O2/Vent Data (Last 4 hours)    None                Physical Exam  Vitals reviewed  Constitutional:       General: He is in acute distress  Appearance: He is obese  He is ill-appearing  He is not toxic-appearing or diaphoretic  Interventions: Nasal cannula in place  Eyes:      Pupils: Pupils are equal, round, and reactive to light  Cardiovascular:      Rate and Rhythm: Tachycardia present  Rhythm irregularly irregular  Heart sounds: Normal heart sounds  Comments: Anasarca  Pulmonary:      Effort: Tachypnea present  Breath sounds: Decreased breath sounds ( bilateral bases), wheezing and rhonchi present  Comments: Increased work of breathing  Abdominal:      General: Abdomen is protuberant  There is distension  Tenderness: There is generalized abdominal tenderness  Comments: Abdomen increasingly firm  Diffusely tender to palpation  Left STU drain the cloudy drainage  Right STU drain Gavin Dowse     Musculoskeletal:      Right lower le+ Pitting Edema present  Left lower le+ Pitting Edema present  Skin:     General: Skin is warm and dry     Neurological:      Mental Status: He is lethargic and disoriented  GCS: GCS eye subscore is 3  GCS verbal subscore is 3  GCS motor subscore is 4  Comments: Groaning and mumbling to questions  Moving upper extremities spontaneously             Laboratory and Diagnostics:  Results from last 7 days   Lab Units 08/05/22  0436 08/04/22  0403 08/03/22  1540 08/03/22  0438 08/02/22  1650 08/02/22  0512 08/01/22  0528 08/01/22  0039 08/01/22  0011   WBC Thousand/uL 11 21* 14 91* 15 19* 11 79* 9 67 13 31* 11 89*  --  3 88*   HEMOGLOBIN g/dL 6 6* 7 4* 7 9* 8 2* 10 0* 9 4* 9 9*  --  9 9*   I STAT HEMOGLOBIN   --   --   --   --   --   --   --    < >  --    HEMATOCRIT % 21 2* 22 9* 24 9* 25 2* 30 0* 29 6* 29 5*  --  31 2*   HEMATOCRIT, ISTAT   --   --   --   --   --   --   --    < >  --    PLATELETS Thousands/uL  --  112* 126* 95* 147* 126* 147*  --  126*   NEUTROS PCT %  --  87*  --   --   --   --   --   --   --    BANDS PCT %  --   --   --   --   --  15*  --   --   --    MONOS PCT %  --  6  --   --   --   --   --   --   --    MONO PCT %  --   --   --   --   --  4  --   --   --     < > = values in this interval not displayed       Results from last 7 days   Lab Units 08/05/22  0436 08/04/22  0403 08/03/22  0438 08/02/22  2222 08/02/22  1650 08/02/22  0512 08/01/22  0528 07/31/22  0543 07/30/22  0542   SODIUM mmol/L 138 138 138 135 138 137 137  137   < > 143   POTASSIUM mmol/L 3 8 3 9 4 0 4 2 4 1 4 1 3 9  3 9   < > 3 8   CHLORIDE mmol/L 103 105 106 105 106 106 106  106   < > 111*   CO2 mmol/L 22 25 24 23 22 24 23  23   < > 29   CO2, I-STAT   --   --   --   --   --   --   --    < >  --    ANION GAP mmol/L 13 8 8 7 10 7 8  8   < > 3*   BUN mg/dL 41* 32* 29* 25 22 19 15  15   < > 8   CREATININE mg/dL 2 80* 1 98* 1 40* 1 35* 1 14 1 00 1 03  1 03   < > 0 54*   CALCIUM mg/dL 8 0* 7 8* 8 4 8 4 8 0* 7 5* 7 6*  7 6*   < > 8 0*   GLUCOSE RANDOM mg/dL 410* 157* 84 94 111 111 62*  62*   < > 156*   ALT U/L 25 26 24  --  25  --  28  --  57   AST U/L 32 35 25  --  29  --  22  -- 45   ALK PHOS U/L 116 113 94  --  128*  --  99  --  112   ALBUMIN g/dL 2 3* 2 1* 2 4*  --  1 8*  --  1 6*  --  2 6*   TOTAL BILIRUBIN mg/dL 3 74* 3 31* 2 96*  --  2 33*  --  1 72*  --  1 49*    < > = values in this interval not displayed  Results from last 7 days   Lab Units 08/05/22  0436 08/04/22  0403 08/03/22  0438 08/02/22  1650 08/02/22  0512 08/01/22  0528 08/01/22  0011   MAGNESIUM mg/dL 2 5 2 5 2 2 2 5 2 4 1 6 1 5*   PHOSPHORUS mg/dL 4 8* 5 4* 4 8* 4 7* 4 8* 4 2* 4 4*      Results from last 7 days   Lab Units 08/03/22  1540 08/03/22  0438 08/02/22  0512 08/01/22  1152   INR  1 61* 1 96* 1 78* 1 78*          Results from last 7 days   Lab Units 08/04/22  0754 08/04/22  0403 08/03/22  1027 08/03/22  0443 08/02/22  2225 08/02/22  1650 08/02/22  0901   LACTIC ACID mmol/L 3 2* 3 5* 3 7* 3 7* 4 9* 5 0* 3 3*     ABG:  Results from last 7 days   Lab Units 08/04/22 2237   PH ART  7 446   PCO2 ART mm Hg 30 1*   PO2 ART mm Hg 77 1   HCO3 ART mmol/L 20 3*   BASE EXC ART mmol/L -3 3   ABG SOURCE  Line, Arterial     VBG:  Results from last 7 days   Lab Units 08/04/22 2237 08/01/22  0815 08/01/22  0011   PH ELIU   --   --  7 403*   PCO2 ELIU mm Hg  --   --  31 6*   PO2 ELIU mm Hg  --   --  50 9*   HCO3 ELIU mmol/L  --   --  19 3*   BASE EXC ELIU mmol/L  --   --  -4 6   ABG SOURCE  Line, Arterial   < >  --     < > = values in this interval not displayed  Micro  Results from last 7 days   Lab Units 08/03/22  1631 08/02/22  1225 08/01/22  0200 08/01/22  0036   BLOOD CULTURE   --  No Growth at 48 hrs  No Growth at 48 hrs  No Growth After 4 Days   Streptococcus anginosus*   GRAM STAIN RESULT  4+ Polys*  2+ Gram positive rods*  2+ Yeast*  --  3+ Polys  No bacteria seen Gram positive cocci in chains*   BODY FLUID CULTURE, STERILE  Culture too young- will reincubate  --  2+ Growth of Streptococcus anginosus*  Few Colonies of Candida albicans*  Few Colonies of Klebsiella pneumoniae*  1+ Growth of   Few Colonies of Beta Hemolytic Streptococcus Group C*  --        EKG:  Atrial fibrillation rate 102 on telemetry  Imaging:  I have personally reviewed pertinent reports  Intake and Output  I/O       08/03 0701 08/04 0700 08/04 0701 08/05 0700    P  O  0 0    I V  (mL/kg) 2207 (16) 480 1 (3 5)    Blood 650     NG/ 216    IV Piggyback 424 6 2613 1     9 1194 7    Feedings  131    Total Intake(mL/kg) 3714 4 (26 9) 4634 9 (33 6)    Urine (mL/kg/hr) 726 (0 2) 585 (0 2)    Emesis/NG output 340 0    Drains 815 735    Total Output 1881 1320    Net +1833 4 +3314 9              Height and Weights   Height: 6' (182 9 cm)  IBW (Ideal Body Weight): 77 6 kg  Body mass index is 42 7 kg/m²  Weight (last 2 days)     Date/Time Weight    08/05/22 0618 143 (314 82)    08/04/22 0600 138 (304 01)    08/03/22 0600 131 (288 8)            Nutrition       Diet Orders   (From admission, onward)             Start     Ordered    08/04/22 0925  Diet Enteral/Parenteral; Tube Feeding with Oral Diet; Vital AF 1 2; Continuous; 10; Surgical; NPO  Diet effective now        References:    Nutrtion Support Algorithm Enteral vs  Parenteral   Question Answer Comment   Diet Type Enteral/Parenteral    Enteral/Parenteral Tube Feeding with Oral Diet    Tube Feeding Formula: Vital AF 1 2    Bolus/Cyclic/Continuous Continuous    Tube Feeding Goal Rate (mL/hr): 10    Diet Type Surgical    Surgical NPO    RD to adjust diet per protocol? No        08/04/22 0926    07/30/22 1111  Dietary nutrition supplements  Once        Question Answer Comment   Select Supplement: Glucerna-Strawberry    Frequency Breakfast, Lunch, Dinner        07/30/22 1111              TF currently on hold goal of 10 ml/hr   Formula: Vital AF      Active Medications  Scheduled Meds:  Current Facility-Administered Medications   Medication Dose Route Frequency Provider Last Rate    Adult TPN (CUSTOM BASE/CUSTOM ELECTROLYTE)   Intravenous Continuous TPN Danuta Mahan MD 71 5 mL/hr at 08/04/22 2123    amiodarone  0 5 mg/min Intravenous Continuous MIRNA Oliver 0 5 mg/min (08/04/22 2115)    ampicillin-sulbactam  3 g Intravenous Q6H Nik Padilla MD 3 g (08/05/22 0230)    bisacodyl  10 mg Rectal Daily Rishi Daly PA-C      heparin (porcine)  5,000 Units Subcutaneous [facility] [place]      HYDROmorphone  0 2 mg Intravenous Q4H PRN Loc Patterson PA-C      insulin glargine  5 Units Subcutaneous HS Harry Butler MD      insulin lispro  1-6 Units Subcutaneous Q6H [facility] Harry Butler MD      iohexol  30 mL Oral 90 min pre-procedure [facility] [place] [address]      ipratropium-albuterol  3 mL Nebulization Q6H Barbi Copeland      melatonin  6 mg Oral HS Charlie Olsen PA-C      methocarbamol  500 mg Oral Q6H  [facility] [facility] CR[facility]      micafungin  100 mg Intravenous Q24H Nik Padilla MD      norepinephrine  1-30 mcg/min Intravenous Titrated Rishi Daly PA-C 2 mcg/min (08/05/22 0617)    ondansetron  4 mg Intravenous Q6H PRN Rishi Daly PA-C      oxyCODONE  5 mg Oral Q4H PRN Loc Patterson PA-C      pantoprazole  40 mg Intravenous Q24H [facility] Rishi Daly PA-C      phenol  1 spray Mouth/Throat Q2H PRN Charlie Olsen PA-C      polyethylene glycol  17 g Per NG Tube Daily Rishi Daly PA-C      QUEtiapine  50 mg Oral BID Rishi Daly PA-C      senna-docusate sodium  2 tablet Oral BID Rishi Daly PA-C       Continuous Infusions:  Adult TPN (CUSTOM BASE/CUSTOM ELECTROLYTE), , Last Rate: 71 5 mL/hr at 08/04/22 2123  amiodarone, 0 5 mg/min, Last Rate: 0 5 mg/min (08/04/22 2115)  norepinephrine, 1-30 mcg/min, Last Rate: 2 mcg/min (08/05/22 0617)      PRN Meds:   HYDROmorphone, 0 2 mg, Q4H PRN  methocarbamol, 500 mg, Q6H PRN  ondansetron, 4 mg, Q6H PRN  oxyCODONE, 5 mg, Q4H PRN  phenol, 1 spray, Q2H PRN        Allergies   No Known Allergies  ---------------------------------------------------------------------------------------  Advance Directive and Living Will:      Power of :    POLST:    ---------------------------------------------------------------------------------------  Care Time Delivered:   Upon my evaluation, this patient had a high probability of imminent or life-threatening deterioration due to Acute hypoxic respiratory failure, increasing abdominal distension, which required my direct attention, intervention, and personal management  I have personally provided 45 minutes (5045 to [phone]) of critical care time, exclusive of procedures, teaching, family meetings, and any prior time recorded by providers other than myself  Darrel Damian PA-C      Portions of the record may have been created with voice recognition software  Occasional wrong word or "sound a like" substitutions may have occurred due to the inherent limitations of voice recognition software    Read the chart carefully and recognize, using context, where substitutions have occurred

## 2022-08-05 NOTE — PROGRESS NOTES
Progress Note - Infectious Disease   Sinda Foot 67 y o  male MRN: [id number]  Unit/Bed#: [facility] 515-01 Encounter: [id number]      Impression/Recommendations:  1  Septic shock  Evolving 7/31:  Fever, HR, refractory hypotension  Due to #2/3  No other appreciable source  ROS limited by lethargy  Exam otherwise benign  UA, LFTs, CT chest negative  Improving with antibiotics, drainage but remains critically ill  Rec:  · Continue antibiotics as below  · Follow temperatures closely  · Check CBC in AM  · Supportive care as per the primary service     2  Strep anginosus bacteremia  Low-grade with 1 of 2 positive  Due to #3  No other appreciable source  Has Portacath but low suspicion for infection  Repeat blood cultures 8/2, TTE negative  Rec:  · Continue Unasyn for now  · Follow up final repeat blood cultures  · Will need 2 weeks IV antibiotics     3  LUQ abscess  In setting of #4  S  Anginosus, Candida, Klebsiella, Group C Strep from exisiting surgical drain  Status post IR-guided drain 8/3 yielding cloudy, thick fluid  Drain cultures pending  Gram stain with GPRs, yeast   No radiographic evidence of leak from distal gastrectomy staple line  Rec:  · Continue Unasyn for now  · Continue micafungin for now (drug-drug interaction between Fluconazole and Amiodarone given potential for QTc prolongation)  · Follow up IR drain cultures and tailor antibiotics as indicated  · Follow drain outputs closely     4  Intraductal papillary mucinous neoplasm  Status post distal pancreatectomy 03/2019, open subtotal pancreatectomy 02/2022  Now admitted for completion pancreatectomy with sims anastomosis, extensive SARAH, reduction of densely adherent internal hernia,cholecystectomy 7/26/22  Postoperative course complicated by above      5  TERESE  Likely multifactorial due partly due to infection, shock as above    Rec:  · Follow creatinine closely and dose-adjust antibiotics as indicated  · Recheck BMP in AM  · Nephrology follow-up ongoing     6  Acute hypoxic respiratory failure  Suspect due to volume overload, encephalopathy  Doubt pneumonia  Rec:  · Diuresis per Nephrology  · BiPAP per SCC    7  Afib with RVR  On Amiodarone     8  Cirrhosis  In setting of chronic alcohol use  Bilirubin rising      9   DM  Recent A1c 8 7      10  Chronic thrombocytopenia      The above impression and plan was discussed in detail with the patients wife at the bedside and the [facility] team in the unit      Antibiotics:  Unasyn #2  Antibiotics #5  Micafungin #2    Subjective:  Patient seen on AM rounds  Unable to provide ROS due to encephalopathy, BiPAP    24 Hour Events: Worsening encephalopathy, abdominal distenstion, labored breathing, placed on BiPAP  Also on levophed  Noted +17L since admission  No documented fevers, chills, sweats, nausea, vomiting, or diarrhea  WBC trending down  Cr worse  Seen by Nephrology    Objective:  Vitals:  Temp:  [98 6 °F (37 °C)-99 5 °F (37 5 °C)] 98 6 °F (37 °C)  HR:  [] 97  Resp:  [5-41] 13  BP: ()/(45-78) 107/59  SpO2:  [89 %-99 %] 96 %  Temp (24hrs), Av 1 °F (37 3 °C), Min:98 6 °F (37 °C), Max:99 5 °F (37 5 °C)  Current: Temperature: 98 6 °F (37 °C)    Physical Exam:   General:  No acute distress  Eyes:  Normal lids, eyes closed  ENT:  Normal external ears and nose  Neck:  Neck symmetric with midline trachea  Pulmonary:  On BiPAP, no accessory muscle use  Cardiovascular:  Tachycardia on monitor, massive anasarca  Gastrointestinal:  No tenderness or distention  Musculoskeletal:  No digital clubbing or cyanosis  Skin:  No visible rashes; No palpable nodules  Neurologic:  Not moving extremities spontaneously  Psychiatric:  Obtunded, unresponsive to light touch    Lab Results:  I have personally reviewed pertinent labs    Results from last 7 days   Lab Units 22  1155 22  0436 22  0403 22  0438 22  0528 22  0039   POTASSIUM mmol/L 3 7 3 8 3 9 4 0   < >  --    CHLORIDE mmol/L 102 103 105 106   < >  --    CO2 mmol/L 22 22 25 24   < >  --    CO2, I-STAT mmol/L  --   --   --   --   --  24   BUN mg/dL 41* 41* 32* 29*   < >  --    CREATININE mg/dL 3 02* 2 80* 1 98* 1 40*   < >  --    EGFR ml/min/1 73sq m 19 21 32 49   < >  --    GLUCOSE, ISTAT mg/dl  --   --   --   --   --  71   CALCIUM mg/dL 7 9* 8 0* 7 8* 8 4   < >  --    AST U/L  --  32 35 25   < >  --    ALT U/L  --  25 26 24   < >  --    ALK PHOS U/L  --  116 113 94   < >  --     < > = values in this interval not displayed  Results from last 7 days   Lab Units 08/05/22  0436 08/04/22  0403 08/03/22  1540 08/03/22  0438   WBC Thousand/uL 11 21* 14 91* 15 19* 11 79*   HEMOGLOBIN g/dL 6 6* 7 4* 7 9* 8 2*   PLATELETS Thousands/uL  --  112* 126* 95*     Results from last 7 days   Lab Units 08/03/22  1631 08/02/22  1225 08/01/22  0200 08/01/22  0036   BLOOD CULTURE   --  No Growth at 48 hrs  No Growth at 48 hrs  No Growth After 4 Days  Streptococcus anginosus*   GRAM STAIN RESULT  4+ Polys*  2+ Gram positive rods*  2+ Yeast*  --  3+ Polys  No bacteria seen Gram positive cocci in chains*   BODY FLUID CULTURE, STERILE  Culture too young- will reincubate  --  2+ Growth of Streptococcus anginosus*  Few Colonies of Candida albicans*  Few Colonies of Klebsiella pneumoniae*  1+ Growth of   Few Colonies of Beta Hemolytic Streptococcus Group C*  --        Imaging Studies:   I have personally reviewed pertinent imaging study reports and images in PACS  CXR reviewed personally worsening edema and effusions  EKG, Pathology, and Other Studies:   I have personally reviewed pertinent reports

## 2022-08-05 NOTE — PROGRESS NOTES
Progress Note - Surgical Oncology  Adrian Akins 67 y o  male MRN: [id number]  Unit/Bed#: [facility] 515-01 Encounter: [id number]    Assessment:  67 y o  male who presented with MD-IPMN s/p  IPMN, s/p Lap distal pancreatectomy (03/2019-Quiros), open subtotal pancreatectomy (02/2022-Quiros), now status post completion pancreatectomy with sims anastomosis, extensive SARAH, reduction of internal hernia and cholecystectomy on 7/26, now s/p IR drain placement on 8/3  Afebrile tachycardic  Was on pressors overnight, however currently off    STU:  560 cc serous  IR drain:  175, murky tan fluid  UOP:  585 cc     Labs  ABG 7 45/30/77/20 3/-3 3  Worsening hepatic and kidney function, concern for hepatorenal syndrome  Cr 2 80 (1 98)  Tbili 3 74 (3 31) D bili 2 42)  Prealbumin 3 8      Plan:  Advance tube feeds to goal, DC TPN once tolerating full feeds  Very volume overloaded nephrology c/s may eventually require dialysis  Diuresis  Finish NAC  Follow up fungal cx, repeat blood cx NGTD  Appreciate ID recommendations, continue IV abx and antifungals  Maintain STU drains, monitor output  Pain control PRN  DVT ppx  Remainder of care per ICU      Subjective/Objective     Subjective:   Poor Urine output overnight  Patient continues to be encephalopathic  He reports that he is having abdominal pain  However he is confused this morning  Objective:    Blood pressure 106/61, pulse 105, temperature 98 96 °F (37 2 °C), resp  rate (!) 29, height 6' (1 829 m), weight (!) 138 kg (304 lb 0 2 oz), SpO2 96 %  ,Body mass index is 41 23 kg/m²        Intake/Output Summary (Last 24 hours) at 8/5/2022 0558  Last data filed at 8/5/2022 0415  Gross per 24 hour   Intake 4767 2 ml   Output 1510 ml   Net 3257 2 ml       Invasive Devices  Report    Peripherally Inserted Central Catheter Line  Duration           PICC Line 08/03/22 1 day          Central Venous Catheter Line  Duration           Port A Cath 03/30/22 Right Chest 128 days          Peripheral Intravenous Line  Duration           Peripheral IV 08/02/22 Right;Upper;Ventral (anterior) Arm 2 days    Peripheral IV 08/04/22 Right;Ventral (anterior) Forearm <1 day          Arterial Line  Duration           Arterial Line 08/01/22 Radial 4 days          Drain  Duration           Closed/Suction Drain Right RLQ Bulb 19 Fr  9 days    Urethral Catheter Temperature probe 16 Fr  3 days    NG/OG/Enteral Tube Enteral Feeding Tube Right nare 2 days    Abscess Drain LUQ 1 day                Physical Exam  General: Critically ill  HENT: NG in place with TF  Neck: supple, no JVD  CV:  Tachycardic  Lungs: Tachypneic  No resp distress  ABD:  Tense, tender to palpation in epigastric region, distended  Right lower quadrant STU is serous  Pancreatic IR drain has a murky tan appearance  Extrem:  Diffusely anasarca  With 3+ bilateral pitting edema  Neuro:  Encephalopathic        Results from last 7 days   Lab Units 08/04/22  0403 08/03/22  1540 08/03/22  0438   WBC Thousand/uL 14 91* 15 19* 11 79*   HEMOGLOBIN g/dL 7 4* 7 9* 8 2*   HEMATOCRIT % 22 9* 24 9* 25 2*   PLATELETS Thousands/uL 112* 126* 95*     Results from last 7 days   Lab Units 08/05/22  0436 08/04/22  0403 08/03/22  0438 08/01/22  0528 08/01/22  0039   POTASSIUM mmol/L 3 8 3 9 4 0   < >  --    CHLORIDE mmol/L 103 105 106   < >  --    CO2 mmol/L 22 25 24   < >  --    CO2, I-STAT mmol/L  --   --   --   --  24   BUN mg/dL 41* 32* 29*   < >  --    CREATININE mg/dL 2 80* 1 98* 1 40*   < >  --    GLUCOSE, ISTAT mg/dl  --   --   --   --  71   CALCIUM mg/dL 8 0* 7 8* 8 4   < >  --     < > = values in this interval not displayed       Results from last 7 days   Lab Units 08/03/22  1540 08/03/22  0438 08/02/22  0512   INR  1 61* 1 96* 1 78*

## 2022-08-05 NOTE — PROGRESS NOTES
Progress note - Palliative and Supportive Care   Michell Carlisle 67 y o  male [id number]    Patient Active Problem List   Diagnosis    Obstructive sleep apnea syndrome    Hypersomnia    Permanent atrial fibrillation (HCC)    Morbid obesity with BMI of 40 0-44 9, adult (HCC)    Lower extremity edema    Pancreatic duct dilated    Overlapping malignant neoplasm of pancreas (HCC)    Type 2 diabetes mellitus with hyperglycemia, with long-term current use of insulin (HCC)    IPMN (intraductal papillary mucinous neoplasm)    Thrombocytopenia (HCC)    Urgency incontinence    Balanitis    OAB (overactive bladder)    BPH without obstruction/lower urinary tract symptoms    Encounter for geriatric assessment    Counseling regarding advanced care planning and goals of care    Port-A-Cath in place    Chemotherapy induced neutropenia ([facility] [address] )    Intra-abdominal fluid collection    Transaminitis    TERESE (acute kidney injury) ([facility] [address] )     Active issues specifically addressed today include:   · Palliative care encounter  · [address] discussion  · TERESE  · Hypervolemia  · Pancreatic cancer s/p whipple  · hypotension     Plan:  1  Symptom management -   · low dose oxycodone 5mg via NGT Q4H PRN moderate-severe pain with hold parameters   · Hydromorphone 0 2mg IV Q4H PRN BT pain  · Acetaminophen 975mg Q8 hours scheduled   · dulcolax suppository QD (per primary)  · Melatonin 6mg HS (per primary)  · seroquel 50mg BID (per primary)      2  Goals -   -  Ongoing disease directed cares without limits  -   if future [address] discussions are indicated would be helpful to coordinate with surg-onc team, critical care, palliative medicine, spouse, and 2 daughters together  Code Status: Full Code - Level 1     Decisional apparatus:  Patient is not competent on my exam today  If competence is lost, patient's substitute decision maker would default to spouse by [place]     Advance Directive / Living Will / POLST:  None on file     Interval history:      Per nursing, the patient's symptoms are well controlled  He is volume overloaded with worsening renal function  He is intermittently requiring vasopressors for support  Currently on BIPAP           MEDICATIONS / ALLERGIES:     current meds:   Current Facility-Administered Medications   Medication Dose Route Frequency    acetaminophen (TYLENOL) oral suspension 975 mg  975 mg Per NG Tube Q8H    Adult 3-in-1 TPN (custom base / custom electrolytes)   Intravenous Continuous TPN    amiodarone (CORDARONE) 900 mg in dextrose 5 % 500 mL infusion  0 5 mg/min Intravenous Continuous    ampicillin-sulbactam (UNASYN) 3 g in sodium chloride 0 9 % 100 mL IVPB  3 g Intravenous Q6H    bisacodyl (DULCOLAX) rectal suppository 10 mg  10 mg Rectal Daily    heparin (porcine) subcutaneous injection 5,000 Units  5,000 Units Subcutaneous Q8H [address]    HYDROmorphone HCl (DILAUDID) injection 0 2 mg  0 2 mg Intravenous Q4H PRN    insulin glargine (LANTUS) subcutaneous injection 5 Units 0 05 mL  5 Units Subcutaneous HS    insulin lispro (HumaLOG) 100 units/mL subcutaneous injection 1-6 Units  1-6 Units Subcutaneous Q6H [address]    iohexol (OMNIPAQUE) 240 MG/ML solution 30 mL  30 mL Oral 90 min pre-procedure    ipratropium-albuterol (DUO-NEB) 0 5-2 5 mg/3 mL inhalation solution 3 mL  3 mL Nebulization Q6H    melatonin tablet 6 mg  6 mg Oral HS    methocarbamol (ROBAXIN) tablet 500 mg  500 mg Oral Q6H PRN    micafungin (MYCAMINE) 100 mg in sodium chloride 0 9 % 100 mL IVPB  100 mg Intravenous Q24H    norepinephrine (LEVOPHED) 4 mg (STANDARD CONCENTRATION) IV in sodium chloride 0 9% 250 mL  1-30 mcg/min Intravenous Titrated    ondansetron (ZOFRAN) injection 4 mg  4 mg Intravenous Q6H PRN    oxyCODONE (ROXICODONE) IR tablet 5 mg  5 mg Oral Q4H PRN    pantoprazole (PROTONIX) injection 40 mg  40 mg Intravenous Q24H SHAHLA    phenol (CHLORASEPTIC) 1 4 % mucosal liquid 1 spray  1 spray Mouth/Throat Q2H PRN    polyethylene glycol (MIRALAX) packet 17 g  17 g Per NG Tube Daily    QUEtiapine (SEROquel) tablet 50 mg  50 mg Oral BID    senna-docusate sodium (SENOKOT S) 8 6-50 mg per tablet 2 tablet  2 tablet Oral BID       No Known Allergies    OBJECTIVE:    Physical Exam  Physical Exam  Vitals (Central line ) and nursing note reviewed  Constitutional:       Appearance: He is ill-appearing  Comments: BIPAP   Cardiovascular:      Rate and Rhythm: Normal rate  Pulses: Decreased pulses  Pulmonary:      Effort: Tachypnea present  Breath sounds: Decreased breath sounds present  Comments: BIPAP   Abdominal:      General: There is no distension  Tenderness: There is abdominal tenderness  Genitourinary:     Comments: Indwelling urinary catheter   Musculoskeletal:      Cervical back: Neck supple  Right lower le+ Pitting Edema present  Left lower le+ Pitting Edema present  Comments: Generally weak    Skin:     General: Skin is cool and dry  Neurological:      Mental Status: He is lethargic  GCS: GCS eye subscore is 3  Psychiatric:         Attention and Perception: Attention normal          Behavior: Behavior is withdrawn  Cognition and Memory: Cognition is impaired  Memory is impaired           Lab Results:   CBC:   Lab Results   Component Value Date    WBC 11 21 (H) 2022    HGB 6 6 (LL) 2022    HCT 21 2 (L) 2022     (H) 2022    PLT  2022      Comment:      Unable to perform due to clumped platelets    MCH 60 8 (H) 2022    MCHC 31 1 (L) 2022    RDW 17 2 (H) 2022    MPV 11 6 2022    NRBC 1 2022   , CMP:   Lab Results   Component Value Date    SODIUM 138 2022    K 3 8 2022     2022    CO2 22 2022    BUN 41 (H) 2022    CREATININE 2 80 (H) 2022    CALCIUM 8 0 (L) 2022    AST 32 2022    ALT 25 2022    ALKPHOS 116 2022    EGFR 21 2022   , PT/PTT:No results found for: PT, PTT      Counseling / Coordination of Care    Total floor / unit time spent today 30+  minutes  Greater than 50% of total time was spent with the patient and / or family counseling and / or coordination of care  A description of the counseling / coordination of care: review of chart, review of symptoms, support given, collaboration with nursing

## 2022-08-05 NOTE — PROGRESS NOTES
Progress Note - Joel Carlos 67 y o  male MRN: [id number]    Unit/Bed#: [facility] 134-80 Encounter: [id number]      CC: diabetes f/u    Subjective:   Eli Huddleston Y 62 y  o  year old male with type 2  diabetes   S/p pancreatectomy  , currently on TPN  no episodes of hypoglycemia    Objective:     Vitals: Blood pressure 129/60, pulse 97, temperature 98 78 °F (37 1 °C), temperature source Bladder, resp  rate (!) 30, height 6' (1 829 m), weight (!) 143 kg (314 lb 13 1 oz), SpO2 97 %  ,Body mass index is 42 7 kg/m²  Intake/Output Summary (Last 24 hours) at 8/5/2022 1648  Last data filed at 8/5/2022 1601  Gross per 24 hour   Intake 4433 91 ml   Output 1830 ml   Net 2603 91 ml       Physical Exam:  General Appearance: awake, appears stated age and cooperative  Head: Normocephalic, without obvious abnormality, atraumatic  Extremities: moves all extremities  Skin: Skin color and temperature normal    Pulm: no labored breathing        POC Glucose (mg/dl)   Date Value   08/05/2022 >500 ([facility])   08/05/2022 492 (H)   08/05/2022 418 (H)   08/05/2022 356 (H)   08/04/2022 370 (H)   08/04/2022 306 (H)   08/04/2022 254 (H)   08/04/2022 168 (H)   08/04/2022 134   08/03/2022 103       Assessment:  66 yo M with pmhx of DM type 2, and pancreatic mass that was admitted to the Hospital for a total pancreatectomy   Home regimen: metformin 1000 mg bid, basaglar 20 units bedtime and fiasp 6 units tid       Plan:  Currently patient is on TPN  Pt had multiple episodes of hyperglycemia in the 400s, we recommend to add 30 units of regular insulin to TPN bag that will start tonight,  , currently episodes of hyperglycemia in the 400s consider to start insulin drip in the mean time  Continue with Accu-Cheks      Portions of the record may have been created with voice recognition software

## 2022-08-05 NOTE — PLAN OF CARE
Problem: Potential for Falls  Goal: Patient will remain free of falls  Description: INTERVENTIONS:  - Educate patient/family on patient safety including physical limitations  - Instruct patient to call for assistance with activity   - Consult OT/PT to assist with strengthening/mobility   - Keep Call bell within reach  - Keep bed low and locked with side rails adjusted as appropriate  - Keep care items and personal belongings within reach  - Initiate and maintain comfort rounds  - Make Fall Risk Sign visible to staff  - Offer Toileting every 2 Hours, in advance of need  - Initiate/Maintain bed alarm  - Obtain necessary fall risk management equipment:  - Apply yellow socks and bracelet for high fall risk patients  - Consider moving patient to room near nurses station  Outcome: Progressing     Problem: SAFETY ADULT  Goal: Patient will remain free of falls  Description: INTERVENTIONS:  - Educate patient/family on patient safety including physical limitations  - Instruct patient to call for assistance with activity   - Consult OT/PT to assist with strengthening/mobility   - Keep Call bell within reach  - Keep bed low and locked with side rails adjusted as appropriate  - Keep care items and personal belongings within reach  - Initiate and maintain comfort rounds  - Make Fall Risk Sign visible to staff  - Offer Toileting every 2 Hours, in advance of need  - Initiate/Maintain bed alarm  - Obtain necessary fall risk management equipment:   - Apply yellow socks and bracelet for high fall risk patients  - Consider moving patient to room near nurses station  Outcome: Progressing     Problem: Knowledge Deficit  Goal: Patient/family/caregiver demonstrates understanding of disease process, treatment plan, medications, and discharge instructions  Description: Complete learning assessment and assess knowledge base    Interventions:  - Provide teaching at level of understanding  - Provide teaching via preferred learning methods  Outcome: Progressing     Problem: MOBILITY - ADULT  Goal: Maintain or return to baseline ADL function  Description: INTERVENTIONS:  -  Assess patient's ability to carry out ADLs; assess patient's baseline for ADL function and identify physical deficits which impact ability to perform ADLs (bathing, care of mouth/teeth, toileting, grooming, dressing, etc )  - Assess/evaluate cause of self-care deficits   - Assess range of motion  - Assess patient's mobility; develop plan if impaired  - Assess patient's need for assistive devices and provide as appropriate  - Encourage maximum independence but intervene and supervise when necessary  - Involve family in performance of ADLs  - Assess for home care needs following discharge   - Consider OT consult to assist with ADL evaluation and planning for discharge  - Provide patient education as appropriate  Outcome: Not Progressing  Goal: Maintains/Returns to pre admission functional level  Description: INTERVENTIONS:  - Perform BMAT or MOVE assessment daily    - Set and communicate daily mobility goal to care team and patient/family/caregiver  - Collaborate with rehabilitation services on mobility goals if consulted  - Perform Range of Motion 3 times a day  - Reposition patient every 2 hours    - Dangle patient 3 times a day  - Stand patient 3 times a day  - Ambulate patient 3 times a day  - Out of bed to chair 3 times a day   - Out of bed for meals 3 times a day  - Out of bed for toileting  - Record patient progress and toleration of activity level   Outcome: Not Progressing     Problem: Prexisting or High Potential for Compromised Skin Integrity  Goal: Skin integrity is maintained or improved  Description: INTERVENTIONS:  - Identify patients at risk for skin breakdown  - Assess and monitor skin integrity  - Assess and monitor nutrition and hydration status  - Monitor labs   - Assess for incontinence   - Turn and reposition patient  - Assist with mobility/ambulation  - Relieve pressure over bony prominences  - Avoid friction and shearing  - Provide appropriate hygiene as needed including keeping skin clean and dry  - Evaluate need for skin moisturizer/barrier cream  - Collaborate with interdisciplinary team   - Patient/family teaching  - Consider wound care consult   Outcome: Not Progressing     Problem: Nutrition/Hydration-ADULT  Goal: Nutrient/Hydration intake appropriate for improving, restoring or maintaining nutritional needs  Description: Monitor and assess patient's nutrition/hydration status for malnutrition  Collaborate with interdisciplinary team and initiate plan and interventions as ordered  Monitor patient's weight and dietary intake as ordered or per policy  Utilize nutrition screening tool and intervene as necessary  Determine patient's food preferences and provide high-protein, high-caloric foods as appropriate       INTERVENTIONS:  - Monitor oral intake, urinary output, labs, and treatment plans  - Assess nutrition and hydration status and recommend course of action  - Evaluate amount of meals eaten  - Assist patient with eating if necessary   - Allow adequate time for meals  - Recommend/ encourage appropriate diets, oral nutritional supplements, and vitamin/mineral supplements  - Order, calculate, and assess calorie counts as needed  - Recommend, monitor, and adjust tube feedings and TPN/PPN based on assessed needs  - Assess need for intravenous fluids  - Provide specific nutrition/hydration education as appropriate  - Include patient/family/caregiver in decisions related to nutrition  Outcome: Not Progressing     Problem: PAIN - ADULT  Goal: Verbalizes/displays adequate comfort level or baseline comfort level  Description: Interventions:  - Encourage patient to monitor pain and request assistance  - Assess pain using appropriate pain scale  - Administer analgesics based on type and severity of pain and evaluate response  - Implement non-pharmacological measures as appropriate and evaluate response  - Consider cultural and social influences on pain and pain management  - Notify physician/advanced practitioner if interventions unsuccessful or patient reports new pain  Outcome: Not Progressing     Problem: INFECTION - ADULT  Goal: Absence or prevention of progression during hospitalization  Description: INTERVENTIONS:  - Assess and monitor for signs and symptoms of infection  - Monitor lab/diagnostic results  - Monitor all insertion sites, i e  indwelling lines, tubes, and drains  - Monitor endotracheal if appropriate and nasal secretions for changes in amount and color  - [place] appropriate cooling/warming therapies per order  - Administer medications as ordered  - Instruct and encourage patient and family to use good hand hygiene technique  - Identify and instruct in appropriate isolation precautions for identified infection/condition  Outcome: Not Progressing     Problem: SAFETY ADULT  Goal: Maintain or return to baseline ADL function  Description: INTERVENTIONS:  -  Assess patient's ability to carry out ADLs; assess patient's baseline for ADL function and identify physical deficits which impact ability to perform ADLs (bathing, care of mouth/teeth, toileting, grooming, dressing, etc )  - Assess/evaluate cause of self-care deficits   - Assess range of motion  - Assess patient's mobility; develop plan if impaired  - Assess patient's need for assistive devices and provide as appropriate  - Encourage maximum independence but intervene and supervise when necessary  - Involve family in performance of ADLs  - Assess for home care needs following discharge   - Consider OT consult to assist with ADL evaluation and planning for discharge  - Provide patient education as appropriate  Outcome: Not Progressing  Goal: Maintains/Returns to pre admission functional level  Description: INTERVENTIONS:  - Perform BMAT or MOVE assessment daily    - Set and communicate daily mobility goal to care team and patient/family/caregiver  - Collaborate with rehabilitation services on mobility goals if consulted  - Perform Range of Motion 3 times a day  - Reposition patient every 2 hours    - Dangle patient 3 times a day  - Stand patient 3 times a day  - Ambulate patient 3 times a day  - Out of bed to chair 3 times a day   - Out of bed for meals 3 times a day  - Out of bed for toileting  - Record patient progress and toleration of activity level   Outcome: Not Progressing     Problem: DISCHARGE PLANNING  Goal: Discharge to home or other facility with appropriate resources  Description: INTERVENTIONS:  - Identify barriers to discharge w/patient and caregiver  - Arrange for needed discharge resources and transportation as appropriate  - Identify discharge learning needs (meds, wound care, etc )  - Arrange for interpretive services to assist at discharge as needed  - Refer to Case Management Department for coordinating discharge planning if the patient needs post-hospital services based on physician/advanced practitioner order or complex needs related to functional status, cognitive ability, or social support system  Outcome: Not Progressing

## 2022-08-05 NOTE — RESPIRATORY THERAPY NOTE
RT Protocol Note  Jemal Ramos 67 y o  male MRN: [id number]  Unit/Bed#: [facility] 515-01 Encounter: [id number]    Assessment    Principal Problem:    IPMN (intraductal papillary mucinous neoplasm)  Active Problems:    Permanent atrial fibrillation (HCC)    Lower extremity edema    Overlapping malignant neoplasm of pancreas (HCC)    Type 2 diabetes mellitus with hyperglycemia, with long-term current use of insulin (HCC)    Thrombocytopenia (HCC)    Intra-abdominal fluid collection    Transaminitis    TERESE (acute kidney injury) ([facility] [address] )      Home Pulmonary Medications:  none       Past Medical History:   Diagnosis Date    A-fib ([facility] )     Abdominal wall strain     last assessed: 10/21/14    Allergic rhinitis     last assessed: 05/03/16    Arthritis     Cancer ([place] [address] )     PACNCREATIC    COVID-19 virus infection 3/3/2021    CPAP (continuous positive airway pressure) dependence     Diabetes mellitus (HCC)     Erectile dysfunction of non-organic origin     last assessed: 03/17/14    Irregular heart beat     Pt with A fib     Lumbar strain     last assessed: 10/21/14    Multiple acquired skin tags     last assessed: 2/18/16    Palpitations     last assessed: 03/17/14    Pancreas cyst     Plantar fasciitis     Skin disorder     last assessed: 05/28/13    Sleep apnea     CPAP BROKE IN OCTOBER HAS BEEN TRYING TO GET NEW ONE BUT UNABLE AS OF YET    Thrombocytopenia ([facility] )      Social History     Socioeconomic History    Marital status: /Civil Union     Spouse name: Not on file    Number of children: Not on file    Years of education: Not on file    Highest education level: Not on file   Occupational History    Not on file   Tobacco Use    Smoking status: Never Smoker    Smokeless tobacco: Never Used   Vaping Use    Vaping Use: Never used   Substance and Sexual Activity    Alcohol use:  Yes     Alcohol/week: 2 0 standard drinks     Types: 2 Cans of beer per week     Comment: couple times per week;     Drug use: Never     Comment: Denies     Sexual activity: Yes     Comment: Denies any chest pain or shortness of breath with activity   Other Topics Concern    Not on file   Social History Narrative    Not on file     Social Determinants of Health     Financial Resource Strain: Not on file   Food Insecurity: No Food Insecurity    Worried About Running Out of Food in the Last Year: Never true    Ran Out of Food in the Last Year: Never true   Transportation Needs: No Transportation Needs    Lack of Transportation (Medical): No    Lack of Transportation (Non-Medical): No   Physical Activity: Not on file   Stress: Not on file   Social Connections: Not on file   Intimate Partner Violence: Not on file   Housing Stability: Low Risk     Unable to Pay for Housing in the Last Year: No    Number of Places Lived in the Last Year: 1    Unstable Housing in the Last Year: No       Subjective         Objective    Physical Exam:   Assessment Type: Pre-treatment  General Appearance: Lethargic  Respiratory Pattern: Assisted, Spontaneous  Chest Assessment: Chest expansion symmetrical  Bilateral Breath Sounds: Diminished, Clear  Cough: None    Vitals:  Blood pressure 98/51, pulse 90, temperature 98 78 °F (37 1 °C), resp  rate 21, height 6' (1 829 m), weight (!) 143 kg (314 lb 13 1 oz), SpO2 99 %  Results from last 7 days   Lab Units 08/05/22  0804 08/04/22  2237 08/02/22  2226   PH ART  7 413   < > 7 424   PCO2 ART mm Hg 34 8*   < > 34 2*   PO2 ART mm Hg 130 8*   < > 138 2*   HCO3 ART mmol/L 21 7*   < > 21 9*   BASE EXC ART mmol/L -2 6   < > -2 1   O2 CONTENT ART mL/dL 10 0*   < > 13 9*   O2 HGB, ARTERIAL % 96 9   < > 98 1*   ABG SOURCE  Line, Arterial   < > Line, Arterial   JOYCE TEST   --   --  Yes    < > = values in this interval not displayed  Imaging and other studies: I have personally reviewed pertinent reports        O2 Device: vent     Plan    Respiratory Plan: Vent/NIV/HFNC        Resp Comments: (P) assessed per resp protocol; pt has no pulm hx nor home resp meds; does have MORALES; pt s/p surgery for distal pancreatectomy; was on vent 7/26-7/28;now with labored breathing;was placed on BIPAP; no wheezing heard; CXR showed increasing B/L opacities mid and lower lungs, likely worsening edema;  will change udn to prn

## 2022-08-06 NOTE — PROCEDURES
Intubation    Date/Time: 8/5/2022 8:48 PM  Performed by: Hernesto Breaux PA-C  Authorized by: Hernesto Breaux PA-C     Patient location:  Bedside  Other Assisting Provider: Yes (comment) (Dr Clyde Arce)    Consent:     Consent obtained:  Verbal    Consent given by:  Spouse    Risks discussed:  Aspiration, bleeding, brain injury, death, dental trauma, hypoxia and laryngeal injury    Alternatives discussed:  No treatment, delayed treatment and alternative treatment  Universal protocol:     Procedure explained and questions answered to patient or proxy's satisfaction: yes      Immediately prior to procedure, a time out was called: yes      Patient identity confirmed: Anonymous protocol, patient vented/unresponsive  Pre-procedure details:     Patient status:  Altered mental status    Mallampati score:  2    Pretreatment medications:  Etomidate    Paralytics:  Succinylcholine  Indications:     Indications for intubation: respiratory distress and airway protection    Procedure details:     Preoxygenation:  BiPAP    CPR in progress: no      Intubation method:  Oral    Oral intubation technique: MacGrath  Laryngoscope blade: Mac 4    Tube size (mm):  8 0    Tube type:  Cuffed    Number of attempts:  1    Tube visualized through cords: yes    Placement assessment:     Tube secured with:  ETT ling    Breath sounds:  Equal and absent over the epigastrium    Placement verification: chest rise, CXR verification, direct visualization, equal breath sounds, ETCO2 detector and tube exhalation      CXR findings:  ETT in proper place  Post-procedure details:     Patient tolerance of procedure: Tolerated with difficulty  Comments:      After administration of etomidate and just prior to succinylcholine, patient vomited approximately 500cc of liquid  Head turned and patient was suctioned with Yankauer  Vocal cords visualized without difficult and patient quickly intubated    Immediately after ETT placed patient desaturated likely secondary to the aspiration event, but quickly recovered to 100% SpO2 with bagging

## 2022-08-06 NOTE — PROCEDURES
Temporary HD Catheter    Date/Time: 8/5/2022 11:30 PM  Performed by: Kashif Erickson PA-C  Authorized by: Kashif Erickson PA-C     Consent:     Consent obtained:  Verbal    Consent given by:  Spouse    Risks discussed:  Incorrect placement, bleeding and infection    Alternatives discussed:  No treatment and delayed treatment  Universal protocol:     Procedure explained and questions answered to patient or proxy's satisfaction: yes      Site/side marked: yes      Immediately prior to procedure, a time out was called: yes      Patient identity confirmed: Anonymous protocol, patient vented/unresponsive  Pre-procedure details:     Hand hygiene: Hand hygiene performed prior to insertion      Sterile barrier technique: All elements of maximal sterile technique followed      Skin preparation:  ChloraPrep    Skin preparation agent: Skin preparation agent completely dried prior to procedure    Indications:     Central line indications: dialysis    Anesthesia (see MAR for exact dosages): Anesthesia method:  None  Procedure details:     Location:  Right internal jugular    Vessel type: vein      Laterality:  Right    Catheter size: 12Fr  Catheter length:  20 cm    Number of attempts:  1    Successful placement: yes    Post-procedure details:     Post-procedure:  Dressing applied and line sutured    Assessment:  Blood return through all ports, free fluid flow, no pneumothorax on x-ray and placement verified by x-ray    Post-procedure complications: none      Patient tolerance of procedure: Tolerated well, no immediate complications  Comments:      Initial HD catheter was 16cm deemed too short on CXR  Site cleansed thoroughly, wire placed through blue port  16cm catheter removed and 20cm catheter inserted over wire and secured

## 2022-08-06 NOTE — PROGRESS NOTES
Progress Note - Evelyne Failing 67 y o  male MRN: [id number]    Unit/Bed#: [facility] 515-01 Encounter: [id number]      Assessment:  67 y o  male who presented with MD-IPMN s/p  IPMN, s/p Lap distal pancreatectomy (03/2019-Marlee), open subtotal pancreatectomy (02/2022-Quiros), now status post completion pancreatectomy with sims anastomosis, extensive SARAH, reduction of internal hernia and cholecystectomy on 7/26, now s/p IR drain placement on 8/3    Intubated and placed on cvvh overnight  Pt had aspiration event during intubation and then developed profound sirs response around 2 am  Pt with profound mixed respiratory and metabolic acidosis  PH and base deficit improving 7 1; -9 5 -> 7 2; -6  Abdomen remains distended  R STU drain serosang  L IR drain now turned bilious  Wbc: 8 2-> 8 6      STU: 530 serosang  L IR drain: 128cc bilious  UOP: 1007 cc  NGT: 785 thick output    Plan:  Wean vent  Continue cvvh  Wean pressors  Maintain STU and IR drain  Continue antibiotics and antifungals  Follow endpoints  Supportive care per icu    Subjective:   Off sedation  gcs 3T  ROS unable to obtain 2/2 intubation and AMS  Objective:     Vitals: Blood pressure 122/59, pulse (!) 111, temperature 99 32 °F (37 4 °C), resp  rate 22, height 6' (1 829 m), weight (!) 143 kg (314 lb 13 1 oz), SpO2 98 %  ,Body mass index is 42 7 kg/m²  Intake/Output Summary (Last 24 hours) at 8/5/2022 2337  Last data filed at 8/5/2022 2318  Gross per 24 hour   Intake 4247 02 ml   Output 1945 ml   Net 2302 02 ml       Physical Exam  General: NAD  HEENT: NC/AT  MMM  Cv: RRR     Lungs: normal effort  Ab: Soft, NT/ND  Ex: no CCE  Neuro: intubated and gcs 3T    Scheduled Meds:  Current Facility-Administered Medications   Medication Dose Route Frequency Provider Last Rate    acetaminophen  975 mg Per NG Tube Q8H Corrine Florencioatore, DO      Adult TPN (CUSTOM BASE/CUSTOM ELECTROLYTE)   Intravenous Continuous TPN Jose G Bhatt PA-C 71 5 mL/hr at 08/05/22 2232    amiodarone  0 5 mg/min Intravenous Continuous [employer], CRNP 0 5 mg/min (08/04/22 2115)    [START ON 8/6/2022] ampicillin-sulbactam  3 g Intravenous Q12H Yadiel Vaca MD      bisacodyl  10 mg Rectal Daily Fede [place], [place]      bumetanide  2 mg/hr Intravenous Continuous Fede Maravilla PA-C 2 mg/hr (08/05/22 2101)    chlorhexidine  15 mL Mouth/Throat Q12H [address]      etomidate  30 mg Intravenous Once Brea [place], [place]      fentaNYL  50 mcg/hr Intravenous Continuous Laurita Palmira [place], [place] 50 mcg/hr (08/05/22 2154)    fentanyl citrate (PF)  50 mcg Intravenous Q1H PRN Laurita Dow PA-C      fentanyl citrate (PF)  50 mcg Intravenous Once Laurita Dow PA-C      heparin (porcine)  5,000 Units Subcutaneous [facility] Fede [place], [place]      HYDROmorphone  0 2 mg Intravenous Q4H PRN Bo Choudhary PA-C      insulin regular (HumuLIN R,NovoLIN R) infusion  0 3-21 Units/hr Intravenous Titrated Fede Maravilla PA-C 17 Units/hr (08/05/22 2312)    iohexol  30 mL Oral 90 min pre-procedure MIRNA Marx      ipratropium  0 5 mg Nebulization Q6H Dieter Baez MD      levalbuterol  1 25 mg Nebulization Q6H Dieter Baez MD      melatonin  6 mg Oral HS Deondre Soliman PA-C      methocarbamol  500 mg Oral Q6H PRN MIRNA Marx      metoprolol  2 5 mg Intravenous Once Betzy Albarado MD      micafungin  100 mg Intravenous Q24H Yadiel Vaca  mg (08/05/22 1642)    norepinephrine           norepinephrine  1-30 mcg/min Intravenous Titrated Fede Maravilla PA-C 12 mcg/min (08/05/22 2331)    NxStage K 4/Ca 3  20,000 mL Dialysis Continuous Neisha Chi MD      ondansetron  4 mg Intravenous Q6H PRN Fede Maravilla PA-C      oxyCODONE  5 mg Oral Q4H PRN Bo Choudhary PA-C      pantoprazole  40 mg Intravenous Q24H [address] Fede Maravilla PA-C      phenol  1 spray Mouth/Throat Q2H PRN Deondre Soliman PA-C      polyethylene glycol  17 g Per NG Tube Daily Fede Maravilla PA-C      potassium chloride  20 mEq Intravenous BID Betzy Albarado MD      potassium chloride  40 mEq Intravenous Once Laurita Dow PA-C      Followed by   Ana Crain ON 8/6/2022] potassium chloride  40 mEq Intravenous Once Laurita Barriga PA-C      propofol  5-50 mcg/kg/min Intravenous Titrated Laurita Dow PA-C 5 mcg/kg/min (08/05/22 2322)    QUEtiapine  50 mg Oral BID Fede Maravilla PA-C      senna-docusate sodium  2 tablet Oral BID Fede Maravilla PA-C       Continuous Infusions:Adult TPN (CUSTOM BASE/CUSTOM ELECTROLYTE), , Last Rate: 71 5 mL/hr at 08/05/22 2232  amiodarone, 0 5 mg/min, Last Rate: 0 5 mg/min (08/04/22 2115)  bumetanide, 2 mg/hr, Last Rate: 2 mg/hr (08/05/22 2101)  fentaNYL, 50 mcg/hr, Last Rate: 50 mcg/hr (08/05/22 2154)  insulin regular (HumuLIN R,NovoLIN R) infusion, 0 3-21 Units/hr, Last Rate: 17 Units/hr (08/05/22 2312)  norepinephrine, 1-30 mcg/min, Last Rate: 12 mcg/min (08/05/22 2331)  NxStage K 4/Ca 3, 20,000 mL  propofol, 5-50 mcg/kg/min, Last Rate: 5 mcg/kg/min (08/05/22 2322)      PRN Meds:   fentanyl citrate (PF)    HYDROmorphone    methocarbamol    ondansetron    oxyCODONE **OR** [DISCONTINUED] oxyCODONE    phenol      Invasive Devices  Report    Peripherally Inserted Central Catheter Line  Duration           PICC Line 08/03/22 2 days          Peripheral Intravenous Line  Duration           Peripheral IV 08/04/22 Right;Ventral (anterior) Forearm 1 day    Peripheral IV 08/05/22 Distal;Left;Ventral (anterior) Wrist <1 day    Peripheral IV 08/05/22 Left;Ventral (anterior) Forearm <1 day          Arterial Line  Duration           Arterial Line 08/01/22 Radial 4 days          Drain  Duration           Closed/Suction Drain Right RLQ Bulb 19 Fr   10 days    Urethral Catheter Temperature probe 16 Fr  4 days    NG/OG/Enteral Tube Enteral Feeding Tube Right nare 3 days    Abscess Drain LUQ 2 days Airway  Duration           ETT  Oral;Cuffed; Hi-Lo 8 mm <1 day                Lab, Imaging and other studies: I have personally reviewed pertinent reports      VTE Pharmacologic Prophylaxis: Sequential compression device (Venodyne)   VTE Mechanical Prophylaxis: sequential compression device

## 2022-08-06 NOTE — QUICK NOTE
Chart reviewed  Overnight events noted  This is a 53-year-old man status post total pancreatectomy  He is currently on TPN along with IV insulin  His blood sugars are well controlled on the current regimen  Endocrinology will plan to see him as needed  Please call with additional questions

## 2022-08-06 NOTE — PROGRESS NOTES
Follow up Consultation    Nephrology   Evie Pardo 67 y o  male MRN: [id number]  Unit/Bed#: [facility] 085-44 Encounter: [id number]      Physician Requesting Consult: Rafael Fleischer, MD        ASSESSMENT/PLAN:  67 y o   male with pmh of morbid obesity, MORALES, BPH with intraductal papillary mucinous neoplasm status post distal pancreatectomy 03/2019 and subtotal pancreatectomy 02/22 now admitted on 07/26/2022 for completion pancreatectomy/anastomosis extensive lysis of adhesions/cholecystectomy, postoperatively initially did well subsequently became septic and was found to have intra-abdominal collection status post IR drainage placement now with decreasing urine output subsequently nephrology has been consulted for evaluation and management of acute kidney injury and evaluation for possible need for dialysis      Acute kidney injury :  TERESE multifactorial most likely secondary to septic shock plus ANANYA (08/01/2022 and 08/02/2022) plus ischemic injury secondary to hemodynamics in light of underlying comorbidities  After review of records In [facility] as well as Care everywhere it appears that the patient has a baseline Creatinine of 0 7-0 9 mg/dL  patient was admitted with a creatinine of 0 83 mg/dL on 07/26/2022  patient's creatinine today is at  2 60  mg/dL, adequate clearance  Started on CVVHD on 08/05/2022  Continue on CVVHD with 4K bath for now titrate to run net even if tolerable  Depending on hemodynamics then may progress towards running net negative  Await renal recovery  Optimize hemodynamic status to avoid delay in renal recovery  Avoid nephrotoxins, adjust meds to appropriate GFR  Strict I/O  Daily weights  Urinary retention protocol if patient does not have a [place]  will need to set up patient for follow up with Nephrology as an outpatient post hospitalization  as an outpatient for nephrology, patient follows up with no nephrologist      Blood pressure/hypotension:  home medications: HCTZ 25 mg p o  Q day, Lasix 20 mg p o  Q day  current medications:   Levophed and vasopressin  recommendations:  Taper off of pressors as tolerable  Optimize hemodynamics  Maintain MAP > 65mmHg  Avoid BP fluctuations      H/H/anemia:  most recent hemoglobin at 8 6 grams/deciliter  maintain hemoglobin greater than 8 grams/deciliter  Recommend PRBC transfusion per primary team  Avoid IV iron     Acid-base electrolytes:     Electrolytes:    Stable  Hyperphosphatemia:  Most recent phosphorus 2 5  Resolved  Currently on CVVHD repletion protocol     Acid-base:    Most recent bicarb at 22 stable for now  Earlier lactic acid was as high as 7     Other medical problems:  Intraductal papillary mucinous neoplasm:  Management per primary team   Status post completion of pancreatectomy and extensive lysis of adhesions with cholecystectomy on 2022  Follow-up with surgery  AFib with RVR:  Management primary team on amiodarone  Liver cirrhosis:  History of chronic alcohol use  Septic shock/strep anginosus bacteremia:  Management per primary team   On Unasyn, micafungin, Solu-Cortef  Follow-up with ID        Thanks for the consult  Will continue to follow  Please call with questions/ concerns  Above-mentioned orders and Plan in terms of acute kidney injury was discussed with the team in depth via tiger text    Severiano Downing MD, FASN, 2022, 5:49 AM              Objective :    Patient seen and examined in the ICU while on CVVHD at 6:50 a m  Overnight had multiple issues urine output only close to 1 L, status post initiation of CVVHD overnight is currently running net positive 100 an hour  Currently on vasopressin and Levophed titrating to words running even  Intubated overnight        PHYSICAL EXAM  /66   Pulse (!) 122   Temp 97 88 °F (36 6 °C)   Resp (!) 28   Ht 6' (1 829 m)   Wt (!) 143 kg (314 lb 13 1 oz)   SpO2 (!) 85%   BMI 42 70 kg/m²   Temp (24hrs), Av 2 °F (37 3 °C), Min:97 88 °F (36 6 °C), Max:100 22 °F (37 9 °C)        Intake/Output Summary (Last 24 hours) at 8/6/2022 0549  Last data filed at 8/6/2022 0400  Gross per 24 hour   Intake 4167 21 ml   Output 2560 ml   Net 1607 21 ml       I/O last 24 hours: In: 6233 4 [I V :1386 3; Blood:350; NG/GT:576; IV Piggyback:1496 5; TPN:2326 6; Feedings:98]  Out: 4671 [Urine:1292; Emesis/NG output:785; Drains:1128]      Current Weight: Weight - Scale: (!) 143 kg (314 lb 13 1 oz)  First Weight: Weight - Scale: 127 kg (280 lb)  Physical Exam  Vitals and nursing note reviewed  Constitutional:       General: He is not in acute distress  Appearance: Normal appearance  He is obese  He is ill-appearing  He is not toxic-appearing or diaphoretic  HENT:      Head: Normocephalic and atraumatic  Mouth/Throat:      Comments: Intubated  Eyes:      General: No scleral icterus  Conjunctiva/sclera: Conjunctivae normal    Neck:      Comments: Temporary dialysis catheter  Cardiovascular:      Rate and Rhythm: Normal rate  Heart sounds: Normal heart sounds  No friction rub  Pulmonary:      Effort: Pulmonary effort is normal  No respiratory distress  Breath sounds: Normal breath sounds  No stridor  No wheezing  Abdominal:      Palpations: Abdomen is soft  Tenderness: There is abdominal tenderness  Comments: Staples midline drain in place   Musculoskeletal:         General: Swelling present  Cervical back: Neck supple  Comments: Plus two edema bilateral upper extremities +1 edema bilateral lower extremities   Skin:     General: Skin is warm  Coloration: Skin is not jaundiced  Neurological:      Mental Status: He is alert               Review of Systems   Unable to perform ROS: Intubated       Scheduled Meds:  Current Facility-Administered Medications   Medication Dose Route Frequency Provider Last Rate    acetaminophen  975 mg Per NG Tube Q8H Corrine Imperatore, DO      Adult TPN (CUSTOM BASE/CUSTOM ELECTROLYTE)   Intravenous Continuous TPN Jose G Bhatt PA-C 71 5 mL/hr at 08/05/22 2232    amiodarone  0 5 mg/min Intravenous Continuous MIRNA Martinez 0 5 mg/min (08/04/22 2115)    ampicillin-sulbactam  3 g Intravenous Q12H Juju Ojeda MD 3 g (08/06/22 0410)    bisacodyl  10 mg Rectal Daily Jose G Bhatt PA-C      Calcium Gluconate  2 g Intravenous Once Aicha Coles MD 2 g (08/06/22 0456)    chlorhexidine  15 mL Mouth/Throat Q12H [address]      EPINEPHrine PF           EPINEPHrine PF           fentaNYL  50 mcg/hr Intravenous Continuous Rhianna Dow PA-C Stopped (08/06/22 0200)    fentanyl citrate (PF)  50 mcg Intravenous Q1H PRN Rhianna Dow PA-C      fentanyl citrate (PF)  50 mcg Intravenous Once Rhianna Dow PA-C      heparin (porcine)  5,000 Units Subcutaneous [facility]      insulin regular (HumuLIN R,NovoLIN R) infusion  0 3-21 Units/hr Intravenous Titrated Jose G Bhatt PA-C 4 Units/hr (08/06/22 0355)    iohexol  30 mL Oral 90 min [facility], CRNP      ipratropium  0 5 mg Nebulization Q6H Aicha Coles MD      levalbuterol  1 25 mg Nebulization Q6H Aicha Coles MD      melatonin  6 mg Oral HS Cecy Cornelius PA-C      methocarbamol  500 mg Oral Q6H  [facility], CRNP      micafungin  100 mg Intravenous Q24H Juju Ojeda  mg (08/05/22 1642)    norepinephrine           norepinephrine  1-30 mcg/min Intravenous Titrated Rhianna Dow PA-C 29 mcg/min (08/06/22 0526)    NxStage K 4/Ca 3  20,000 mL Dialysis Continuous Simona Cabrera MD      ondansetron  4 mg Intravenous Q6H PRN Jose G Bhatt PA-C      pantoprazole  40 mg Intravenous Q24H [address] Jose G Bhatt PA-C      phenol  1 spray Mouth/Throat Q2H PRN Cecy Cornelius PA-C      polyethylene glycol  17 g Per NG Tube Daily Jose G Bhatt PA-C      potassium chloride  20 mEq Intravenous BID Shellie James MD      potassium chloride  40 mEq Intravenous Once Fredi Dow PA-C 40 mEq (08/06/22 0200)    Followed by   [facility] potassium chloride  40 mEq Intravenous Once Fredi Dow PA-C 40 mEq (08/06/22 0358)    propofol  5-50 mcg/kg/min Intravenous Titrated Fredi Dow PA-C Stopped (08/05/22 2330)    QUEtiapine  50 mg Oral BID Rollen ProALYSA borges      senna-docusate sodium  2 tablet Oral BID Rollen ProALYSA borges      [START ON 8/7/2022] vancomycin  15 mg/kg (Adjusted) Intravenous Daily PRN Nicole Marte MD      vancomycin  20 mg/kg (Adjusted) Intravenous Once Fredi Dow PA-C 2,000 mg (08/06/22 0458)    vasopressin (PITRESSIN) in 0 9 % sodium chloride 100 mL  0 04 Units/min Intravenous Continuous Nicole Marte MD 0 04 Units/min (08/06/22 0132)       PRN Meds:   fentanyl citrate (PF)    methocarbamol    ondansetron    phenol    [START ON 8/7/2022] vancomycin    Continuous Infusions:Adult TPN (CUSTOM BASE/CUSTOM ELECTROLYTE), , Last Rate: 71 5 mL/hr at 08/05/22 2232  amiodarone, 0 5 mg/min, Last Rate: 0 5 mg/min (08/04/22 2115)  fentaNYL, 50 mcg/hr, Last Rate: Stopped (08/06/22 0200)  insulin regular (HumuLIN R,NovoLIN R) infusion, 0 3-21 Units/hr, Last Rate: 4 Units/hr (08/06/22 0355)  norepinephrine, 1-30 mcg/min, Last Rate: 29 mcg/min (08/06/22 0526)  NxStage K 4/Ca 3, 20,000 mL  propofol, 5-50 mcg/kg/min, Last Rate: Stopped (08/05/22 2330)  vasopressin (PITRESSIN) in 0 9 % sodium chloride 100 mL, 0 04 Units/min, Last Rate: 0 04 Units/min (08/06/22 0132)          Invasive Devices:      Invasive Devices  Report    Peripherally Inserted Central Catheter Line  Duration           PICC Line 08/03/22 2 days          Central Venous Catheter Line  Duration           Port A Cath 03/30/22 Right Subclavian 128 days          Peripheral Intravenous Line  Duration           Peripheral IV 08/04/22 Right;Ventral (anterior) Forearm 1 day    Peripheral IV 08/05/22 Distal;Left;Ventral (anterior) Wrist <1 day    Peripheral IV 08/05/22 Left;Ventral (anterior) Forearm <1 day          Arterial Line  Duration           Arterial Line 08/06/22 Radial <1 day          Hemodialysis Catheter  Duration           HD Temporary Double Catheter <1 day          Drain  Duration           Closed/Suction Drain Right RLQ Bulb 19 Fr  10 days    Urethral Catheter Temperature probe 16 Fr  4 days    NG/OG/Enteral Tube Enteral Feeding Tube Right nare 3 days    Abscess Drain LUQ 2 days          Airway  Duration           ETT  Oral;Cuffed; Hi-Lo 8 mm <1 day                  LABORATORY:    Results from last 7 days   Lab Units 08/06/22  0129 08/05/22 2015 08/05/22  1848 08/05/22  1641 08/05/22  1155 08/05/22  0436 08/04/22  0403 08/03/22  1540 08/03/22  0438 08/02/22  2222 08/02/22  1650 08/02/22  0512 08/01/22  0528 08/01/22  0039 08/01/22  0011   WBC Thousand/uL 4 62  --   --   --   --  11 21* 14 91* 15 19* 11 79*  --  9 67 13 31* 11 89*  --  3 88*   HEMOGLOBIN g/dL 8 2*  --  7 8*  --   --  6 6* 7 4* 7 9* 8 2*  --  10 0* 9 4* 9 9*  --  9 9*   I STAT HEMOGLOBIN g/dl  --   --   --   --   --   --   --   --   --   --   --   --   --  9 5*  --    HEMATOCRIT % 26 6*  --  24 4*  --   --  21 2* 22 9* 24 9* 25 2*  --  30 0* 29 6* 29 5*  --  31 2*   HEMATOCRIT, ISTAT %  --   --   --   --   --   --   --   --   --   --   --   --   --  28*  --    PLATELETS Thousands/uL  --   --   --   --   --   --  112* 126* 95*  --  147* 126* 147*  --  126*   POTASSIUM mmol/L 3 1* 3 2*  --  3 5 3 7 3 8 3 9  --  4 0   < > 4 1 4 1 3 9  3 9  --  3 5   CHLORIDE mmol/L 109* 104  --  103 102 103 105  --  106   < > 106 106 106  106  --  105   CO2 mmol/L 23 23  --  22 22 22 25  --  24   < > 22 24 23  23  --  23   CO2, I-STAT mmol/L  --   --   --   --   --   --   --   --   --   --   --   --   --  24  --    BUN mg/dL 42* 45*  --  43* 41* 41* 32*  --  29*   < > 22 19 15  15  --  16   CREATININE mg/dL 2 97* 3 14*  --  3 14* 3 02* 2 80* 1 98*  --  1 40*   < > 1 14 1 00 1 03  1 03  --  1 09   CALCIUM mg/dL 7 7* 8 0*  --  7 8* 7 9* 8 0* 7 8*  --  8 4   < > 8 0* 7 5* 7 6*  7 6*  --  7 9*   MAGNESIUM mg/dL 2 2 2 5  --   --   --  2 5 2 5  --  2 2  --  2 5 2 4 1 6  --  1 5*   PHOSPHORUS mg/dL 3 8 3 7  --   --   --  4 8* 5 4*  --  4 8*  --  4 7* 4 8* 4 2*  --  4 4*   GLUCOSE, ISTAT mg/dl  --   --   --   --   --   --   --   --   --   --   --   --   --  71  --     < > = values in this interval not displayed  rest all reviewed    RADIOLOGY:  XR chest portable ICU   Final Result by Chasity Nieves MD (08/05 1019)      Increased bilateral opacities, predominantly in the mid and lower lung fields, likely represents worsening edema with effusions  Concomitant infection is to be excluded on clinical grounds  Workstation performed: [id number]         XR abdomen 1 view kub   Final Result by Chasity Nieves MD (08/05 1026)      Since August 2, 2022, persistent dilation of the small bowel, though the number of air-filled loops of dilated small bowel has decreased  Workstation performed: [id number]         IR drainage tube placement   Final Result by Sandie Busby MD (08/03 1934)   Impression: Successful placement of a 10 [place] all-purpose drainage catheter into the left upper abdominal fluid collection  Workstation performed: [id number]         IR PICC line placement double lumen   Final Result by Ismael Kilgore MD (08/03 1637)   Impression: Technically successful placement of line  Workstation performed: [id number]         CT chest abdomen pelvis w contrast   Final Result by Estee Fontaine DO (08/02 1710)      1  History as above  Grossly stable 9 4 x 2 4 x 2 3 cm fluid collection in the pancreatectomy bed  No definitive evidence for enteric contrast extravasation from the distal gastrectomy staple line  Continued CT surveillance is suggested  Grossly    stable pneumoperitoneum        2  Small amount of abdominopelvic ascites and diffuse mesenteric edema, the former slightly increased from previous study  No definitive rim-enhancing collections to suggest abscess  3  Mildly dilated small bowel loop left midabdomen without focal transition to suggest mechanical obstruction  This could reflect transient peristalsis  Mild wall thickening small bowel loop in the right lower quadrant could represent infectious or    inflammatory enteritis  4  Thickening of the ascending colon could be secondary to underdistention, infectious/inflammatory colitis and/or element of portal hypertensive colopathy  5  Lobular surface contour of the liver suspicious for cirrhosis  6  Small bilateral pleural effusions and mild bilateral lower lobe compressive atelectasis, similar  7  Diffuse anasarca again noted  Additional incidental findings as above  Workstation performed: [id number]         XR chest portable ICU   Final Result by Mandy Hay MD (93/78 1933)      Malpositioned enteric tube  However, correlation with subsequently performed radiograph of the abdomen demonstrates that the tube had been repositioned prior to dictation of the study  Bilateral pleural effusions  Workstation performed: [id number]         XR chest 1 view   Final Result by Mandy Hay MD (49/90 7638)      Malpositioned enteric tube  However, correlation with subsequently performed radiograph of the abdomen demonstrates that the tube had been repositioned prior to dictation of the study  Bilateral pleural effusions  Workstation performed: [id number]         XR abdomen 1 vw portable   Final Result by Mandy Hay MD (08/04 8361)      Tip of enteric tube projects over the stomach                 Workstation performed: [id number]         CT chest abdomen pelvis w contrast   Final Result by Annita Haji MD (92/11 2130)   Addendum 1 of 1 by Annita Haji MD (08/01 5041)   ADDENDUM:         I personally discussed pertinent findings on this study with Houston Wolf on 8/1/2022 at 9:45 AM                Final      CT chest:      New small bilateral pleural effusions with minimal adjacent subsegmental atelectasis  Findings may be sequela of fluid overload or third spacing among other etiologies  CT abdomen and pelvis:      Interval postsurgical changes as above  New pneumoperitoneum is nonspecific and likely related to recent surgery  Cannot entirely exclude staple line dehiscence at the partial distal gastrectomy staple line  Progressive small volume ascites, mesenteric and omental edema and anasarca  Findings may be sequela of fluid overload or third spacing among other etiologies  New partially loculated fluid collection in the post pancreatectomy bed measures approximately 9 5 x 2 5 x 2 8 cm  New diffuse bladder wall thickening with tiny gas droplets in the lumen of the bladder likely due to under distention and recent catheterization  Suggest correlation with UA to exclude cystitis  Stable 1 2 cm short axis anterior para-aortic low-density lymph node  The study was marked in [facility] for immediate notification  Workstation performed: [id number]         XR chest portable   Final Result by Annita Berkowitz MD (08/01 1815)      Bibasilar subsegmental atelectasis  Workstation performed: [id number]         XR abdomen 1 view kub   Final Result by Stephane Washington MD (07/28 0059)      Limited examination as the right and lower abdomen is excluded from the study  Nonobstructive bowel gas pattern  Adequately positioned nasogastric tube  Workstation performed: [id number]         XR chest portable   Final Result by Sara Chen MD (07/27 4423)      Lines and tubes as described  No pneumothorax                    Workstation performed: [id number]         IR drainage tube check/change/reposition/reinsertion/upsize (Results Pending)   XR chest portable    (Results Pending)   XR chest portable    (Results Pending)   XR chest portable    (Results Pending)   XR chest portable ICU    (Results Pending)     Rest all reviewed    Portions of the record may have been created with voice recognition software  Occasional wrong word or "sound a like" substitutions may have occurred due to the inherent limitations of voice recognition software  Read the chart carefully and recognize, using context, where substitutions have occurred  If you have any questions, please contact the dictating provider

## 2022-08-06 NOTE — RESPIRATORY THERAPY NOTE
08/06/22 1900   Respiratory Assessment   Assessment Type During-treatment   General Appearance Sedated   Respiratory Pattern Assisted   Chest Assessment Chest expansion symmetrical   Bilateral Breath Sounds Diminished   Resp Comments Pt received vented; on current settings  Found on PC of 13  Cont w/ resp tx via vent  Also getting Veletri tx via vent  Will continue to monitor and assess per protocol  Additional Assessments   Pulse 105   Respirations 21   SpO2 94 %   RT Ventilator Management Note  Agustin Mitchell 67 y o  male MRN: [id number]  Unit/Bed#: [facility] 257-84 Encounter: [id number]      Daily Screen         7/28/2022  0725 8/6/2022  0724          Patient safety screen outcome[de-identified] Failed Failed      Not Ready for Weaning due to[de-identified] Underline problem not resolved PEEP > 8cmH2O;FiO2 >60%; Underline problem not resolved                Physical Exam:   Assessment Type: (P) During-treatment  General Appearance: (P) Sedated  Respiratory Pattern: (P) Assisted  Chest Assessment: (P) Chest expansion symmetrical  Bilateral Breath Sounds: (P) Diminished      Resp Comments: (P) Pt received vented; on current settings  Found on PC of 13  Cont w/ resp tx via vent  Also getting Veletri tx via vent  Will continue to monitor and assess per protocol

## 2022-08-06 NOTE — RESPIRATORY THERAPY NOTE
RT Ventilator Management Note  Aspen Foot 67 y o  male MRN: [id number]  Unit/Bed#: [facility] 483-30 Encounter: [id number]      Daily Screen         7/27/2022  1412 7/28/2022  0741          Patient safety screen outcome[de-identified] Failed Failed      Not Ready for Weaning due to[de-identified] Underline problem not resolved Underline problem not resolved                Physical Exam:   Assessment Type: (P) Assess only  General Appearance: (P) Sedated  Respiratory Pattern: (P) Assisted, Tachypneic  Chest Assessment: Chest expansion symmetrical  Bilateral Breath Sounds: Rhonchi  Cough: Productive  Suction: (P) ET Tube  O2 Device: (P) Ventilator      Resp Comments: (P) Pt  remains on CMV/VC mode  Vt increased to 520 and RR increased to 28 by physician  Will continue to assess and monitor pt per protocol

## 2022-08-06 NOTE — PROCEDURES
Arterial Line Insertion    Date/Time: 8/6/2022 3:00 AM  Performed by: Echo Hagan PA-C  Authorized by: Echo Hagan PA-C     Consent:     Consent obtained:  Emergent situation  Universal protocol:     Site/side marked: yes      Immediately prior to procedure a time out was called: yes      Patient identity confirmed: Anonymous protocol, patient vented/unresponsive  Indications:     Indications: hemodynamic monitoring and continuous blood pressure monitoring    Pre-procedure details:     Skin preparation:  Chlorhexidine    Preparation: Patient was prepped and draped in sterile fashion    Anesthesia (see MAR for exact dosages):      Anesthesia method:  None  Procedure details:     Location / Tip of Catheter:  Radial    Laterality:  Right    Needle gauge:  20 G    Placement technique:  Percutaneous and ultrasound guided    Ultrasound image availability:  Not saved    Sterile ultrasound techniques: Sterile gel and sterile probe covers were used      Number of attempts:  1    Successful placement: no    Comments:      Unable to thread wire

## 2022-08-06 NOTE — PROCEDURES
Arterial Line Insertion    Date/Time: 8/6/2022 3:25 AM  Performed by: Ellen Klein PA-C  Authorized by: Ellen Klein PA-C     Patient location:  Bedside  Consent:     Consent obtained:  Emergent situation  Universal protocol:     Site/side marked: yes      Immediately prior to procedure a time out was called: yes      Patient identity confirmed: Anonymous protocol, patient vented/unresponsive  Indications:     Indications: hemodynamic monitoring and continuous blood pressure monitoring    Pre-procedure details:     Skin preparation:  Chlorhexidine    Preparation: Patient was prepped and draped in sterile fashion    Anesthesia (see MAR for exact dosages): Anesthesia method:  None  Procedure details:     Location / Tip of Catheter:  Radial    Laterality:  Left    Needle gauge:  20 G    Placement technique:  Percutaneous and ultrasound guided    Ultrasound image availability:  Not saved    Sterile ultrasound techniques: Sterile gel and sterile probe covers were used      Number of attempts:  1    Successful placement: yes      Transducer: waveform confirmed    Post-procedure details:     Post-procedure:  Biopatch applied, sterile dressing applied, sutured and wrist guard applied    CMS:  Unable to assess    Patient tolerance of procedure:   Tolerated well, no immediate complications

## 2022-08-06 NOTE — PROGRESS NOTES
Progress Note - Infectious Disease   Corrine Waller 67 y o  male MRN: [id number]  Unit/Bed#: [facility] 515-01 Encounter: [id number]      Impression/Recommendations:  1   Septic shock   Evolving 7/31:  Fever, HR, refractory hypotension   Due to #2/3   No other appreciable source   ROS limited by lethargy   Exam otherwise benign   UA, LFTs, CT chest negative   Fevers, WBC initially improved with antibiotics, drainage, but now clinically worse, critically ill in setting of aspiration during intubation , progressive renal dysfunction , volume overload  Rec:  · Continue antibiotics as below  · Follow temperatures closely  · Check CBC in AM  · Supportive care as per the primary service     2   Strep anginosus bacteremia   Low-grade with 1 of 2 positive   Due to #3   No other appreciable source   Has Portacath but low suspicion for infection   Repeat blood cultures 8/2, TTE negative     Rec:  · Continue antibiotics as below  · Follow up final repeat blood cultures  · Will need 2 weeks IV antibiotics     3   LUQ abscess   In setting of #4   S  Anginosus, Candida, Klebsiella, Group C Strep from exisiting surgical drain   Status post IR-guided drain 8/3 yielding cloudy, thick fluid   Drain cultures with GNR x2  Tonna Balsam stain with yeast   No radiographic evidence of leak from distal gastrectomy staple line    Rec:  · Change antibiotics back to Zosyn  · Hold on further doses of vancomycin  · Continue micafungin for now (drug-drug interaction between Fluconazole and Amiodarone given potential for QTc prolongation)  · Follow up IR drain cultures and tailor antibiotics as indicated  · Follow drain outputs closely     4   Intraductal papillary mucinous neoplasm   Status post distal pancreatectomy 03/2019, open subtotal pancreatectomy 02/2022   Now admitted for completion pancreatectomy with sims anastomosis, extensive SARAH, reduction of densely adherent internal hernia,cholecystectomy 7/26/22   Postoperative course complicated by above      5   TERESE   Likely multifactorial due partly due to infection, shock as above  Rec:  · CVVHD and volume management per Nephrology with close follow-up ongoing  · Dose adjust antibiotics for CRRT  · Recheck BMP in AM     6   Acute hypoxic respiratory failure  Suspect due to volume overload, encephalopathy  Doubt pneumonia  Status post intubation 8/5  Concern for aspiration, ARDS, on maximal ventilatory support  Rec:  · Volume management per Nephrology  · Supportive care per [facility]     7  Afib with RVR   On Amiodarone     8   Cirrhosis   In setting of chronic alcohol use  Bilirubin rising      9   DM   Recent A1c 8 7      10   Chronic thrombocytopenia      Antibiotics:  Unasyn #3  Antibiotics #6  Micafungin #3  Vancomycin # 2    Subjective:  Patient seen on AM rounds  Unable to provide ROS due to intubation  24 Hour Events:  Intubated with aspiration event during procedure, on 100% FiO2  On 2 pressors, getting CRRT  No noted diarrhea  Objective:  Vitals:  Temp:  [96 98 °F (36 1 °C)-100 22 °F (37 9 °C)] 96 98 °F (36 1 °C)  HR:  [] 127  Resp:  [12-37] 28  BP: ()/(39-80) 124/54  SpO2:  [83 %-98 %] 91 %  Temp (24hrs), Av 6 °F (37 °C), Min:96 98 °F (36 1 °C), Max:100 22 °F (37 9 °C)  Current: Temperature: (!) 96 98 °F (36 1 °C)    Physical Exam:   General:  No acute distress  Eyes:  Normal lids, eyes closed  ENT:  Normal external ears and nose  Neck:  Neck symmetric with midline trachea  Pulmonary:  Agonal breathing effort on ventilator  Cardiovascular:  Tachycardia; massive anasarca  Gastrointestinal:  Firm due to edema, incision intact with staples, serosanguineous fluid right STU, yellow fluid left STU with particulate sediment in tubing  Musculoskeletal:  No digital clubbing or cyanosis  Skin:  No visible rashes;  No palpable nodules  Neurologic:  Limited assessment due to sedation  Psychiatric:  Intubated and sedated    Lab Results:  I have personally reviewed pertinent labs   Results from last 7 days   Lab Units 08/06/22  0600 08/06/22  0129 08/05/22  2015 08/05/22  1155 08/05/22  0436 08/04/22  0403 08/01/22  0528 08/01/22  0039   POTASSIUM mmol/L 3 9 3 1* 3 2*   < > 3 8 3 9   < >  --    CHLORIDE mmol/L 111* 109* 104   < > 103 105   < >  --    CO2 mmol/L 22 23 23   < > 22 25   < >  --    CO2, I-STAT mmol/L  --   --   --   --   --   --   --  24   BUN mg/dL 34* 42* 45*   < > 41* 32*   < >  --    CREATININE mg/dL 2 60* 2 97* 3 14*   < > 2 80* 1 98*   < >  --    EGFR ml/min/1 73sq m 23 19 18   < > 21 32   < >  --    GLUCOSE, ISTAT mg/dl  --   --   --   --   --   --   --  71   CALCIUM mg/dL 7 9* 7 7* 8 0*   < > 8 0* 7 8*   < >  --    AST U/L 43  --   --   --  32 35   < >  --    ALT U/L 25  --   --   --  25 26   < >  --    ALK PHOS U/L 191*  --   --   --  116 113   < >  --     < > = values in this interval not displayed  Results from last 7 days   Lab Units 08/06/22  0600 08/06/22  0129 08/05/22  1848 08/05/22  0436 08/04/22  0403 08/03/22  1540   WBC Thousand/uL 12 80* 4 62  --  11 21* 14 91* 15 19*   HEMOGLOBIN g/dL 8 6* 8 2* 7 8* 6 6* 7 4* 7 9*   PLATELETS Thousands/uL 60*  --   --   --  112* 126*     Results from last 7 days   Lab Units 08/03/22  1631 08/02/22  1225 08/01/22  0200 08/01/22  0036   BLOOD CULTURE   --  No Growth at 72 hrs  No Growth at 72 hrs  No Growth After 5 Days  Streptococcus anginosus*   GRAM STAIN RESULT  4+ Polys*  2+ Gram positive rods*  2+ Yeast*  --  3+ Polys  No bacteria seen Gram positive cocci in chains*   BODY FLUID CULTURE, STERILE  2+ Growth of Gram Negative Roland*  2+ Growth of Gram Negative Roland*  --  2+ Growth of Streptococcus anginosus*  Few Colonies of Candida albicans*  Few Colonies of Klebsiella pneumoniae*  1+ Growth of   Few Colonies of Beta Hemolytic Streptococcus Group C*  --        Imaging Studies:   I have personally reviewed pertinent imaging study reports and images in PACS    Chest x-ray reviewed personally diffuse edema    EKG, Pathology, and Other Studies:   I have personally reviewed pertinent reports

## 2022-08-06 NOTE — PROGRESS NOTES
INTERVAL Progress Note - Critical Care    Humberto Flores 67 y o  male MRN: [id number]  [facility]   Unit/Bed#: [address] 900-65 Encounter: [id number]    Impression:  1  Acute hypoxic respiratory failure  2  Metabolic acidosis   3  Aspiration pneumonitis  4  SIRS  5  Oliguria/TERESE  6  Hyperglycemia in the setting of DM  7  IPMN s/p lap distal panc, open subtotal panc, completion panc w/sims anastomosis/SARAH/reduction of internal hernia and cholecystectomy      Plan:  1  Wean vent as tolerated  2  F/u 3:30am ABG  3  Abx escalated with addition of vancomycin, f/u MRSA cx, monitor WBC/fever curve, appreciate ID assistance  4  Monitor hemodynamics, continue resuscitation, continue full code status, goals of care discussions  5  Continue CVVH, net even, appreciate Nephrology assistance  6  Continue insulin gtt  7  Appreciate Surg Onc assistance, monitor STU and IR drain outputs      ______________________________________________________________________    Recent Events / Nursing Concern:   6:30pm Repeat Hgb ordered s/p 1u PRBC during the day for Hgb 6 6; Repeat Hgb stable at 7 8  FSBG noted to be >500  Insulin gtt algorithm increased  7:30pm Patient ill-appearing with new worsening tachypnea in 30s while on Bipap (which had been initiated earlier in the day)  Maintained on [email or web address] with minimal improvement in UOP  Patient diffusely volume overloaded  After discussion with Critical Care attending, Nephrology, Surgical Oncology team, and patient's family, decision made to intubate patient for airway protection and concern for impending fatigue  Plan for CVVH initiation post intubation  8:30pm - 1:30am Patient was noted to have large volume emesis during intubation   He became hypotensive during this time and pressors were initiated, which were quickly escalated ([email or web address], vaso 0 04) to maintain MAP goal  12 5Fr 16cm RIJ HD cath placed and noted to be short on cxr, subsequently placed over a wire for 12Fr 20cm HD cath in good positioning on cxr  ABG 7  8 / -1  Labs remarkable for lactic acid 5 6 and K 3 2  Repletion ordered and CVVH initiated  Repeat STAT labs placed  Several discussions bedside with family were held during this time  Surgical oncology resident, Critical Care attending, AP, and resident bedside throughout events  1:51am Nursing unable to obtain cuff pressure  Patient immediately assessed bedside and noted to have SBP in 60s via tasha  Placed in Trendelenburg and given calcium chloride  Bedside ultrasound revealing lung sliding  On [email or web address] and vaso 0 04  Vent: ACVC 18 / 520 / 55% / 10 w/  / 57  /  2 / -9 5  Vent settings adjusted  Oral suctioning with gastric output  CXR concerning for aspiration  Labs with Hgb stable at 8 2, lactic acid 7  Trop mildly elevated at 63  MRSA swab obtained and vancomycin initiated  Single dose hydrocortisone provided                Vitals:   Vitals:    22 2054 22 2100 22 0013   BP:   122/59    BP Location:       Pulse: (!) 109  (!) 111    Resp: (!) 36  22    Temp: 98 96 °F (37 2 °C)  99 32 °F (37 4 °C)    TempSrc:       SpO2: 95% 98%  (!) 89%   Weight:       Height:           Arterial Line:  Arterial Line BP: 105/46  Arterial Line MAP (mmHg): (!) 64 mmHg    Temperature: Temp (24hrs), Av °F (37 2 °C), Min:98 6 °F (37 °C), Max:99 5 °F (37 5 °C)  Current: Temperature: 99 32 °F (37 4 °C)    Hemodynamic Monitoring:  PAP:  , PAP mean:   , CVP: CVP (mean): 4 mmHg (bladder pressure), PCWP:   , SvO2:   , ScvO2:  , CO:  , CI:  , SVR:  , SVRI:  , PVR:  , SV:  , SVI:  , ICP Mean:  , CPP:         Respiratory:  SpO2: SpO2: (!) 89 %  , SpO2 Activity: SpO2 Activity: At Rest, SpO2 Device: O2 Device: BiPAP, Capnography: Capnography: No       Pertinent Exam Findings:  General - ill-appearing, anasarca  CV - warm, irregular  Pulm - mechanically ventilated w/peak pressures 30s  Abd - distended, tympanitic, tender diffusely, STU w/serous output, IR drain w/sero-purulent output, staples intact with serous ascitic appearing drainage to midline  Neuro - encephalopathic, sedated  Ext - 2+ edema b/l upper and lower extremities      Medications:   Scheduled Meds:  Current Facility-Administered Medications   Medication Dose Route Frequency Provider Last Rate    acetaminophen  975 mg Per NG Tube Q8H Corrine Imperatore, DO      Adult TPN (CUSTOM BASE/CUSTOM ELECTROLYTE)   Intravenous Continuous TPN Juju Walter PA-C 71 5 mL/hr at 08/05/22 2232    amiodarone  0 5 mg/min Intravenous Continuous MIRNA Hammonds 0 5 mg/min (08/04/22 2115)    ampicillin-sulbactam  3 g Intravenous Q12H Jie Manuel MD      bisacodyl  10 mg Rectal Daily [place], [place]      chlorhexidine  15 mL Mouth/Throat Q12H [address]      EPINEPHrine PF           EPINEPHrine PF           etomidate  30 mg Intravenous Once [place], [place]      fentaNYL  50 mcg/hr Intravenous Continuous Melodie Zheng [place], [place] 50 mcg/hr (08/05/22 2154)    fentanyl citrate (PF)  50 mcg Intravenous Q1H PRN Melodie Dow PA-C      fentanyl citrate (PF)  50 mcg Intravenous Once Melodie Dow PA-C      heparin (porcine)  5,000 Units Subcutaneous [place], [place]      HYDROmorphone  0 2 mg Intravenous Q4H PRN Erick Crews PA-C      insulin regular (HumuLIN R,NovoLIN R) infusion  0 3-21 Units/hr Intravenous Titrated Jujuhugo Walter PA-C 8 Units/hr (08/06/22 0209)    iohexol  30 mL Oral 90 min pre-procedure MIRNA Hammonds      ipratropium  0 5 mg Nebulization Q6H Juan Carlos Philip MD      levalbuterol  1 25 mg Nebulization Q6H Juan Carlos Philip MD      melatonin  6 mg Oral HS Woody Esquivel PA-C      methocarbamol  500 mg Oral Q6H PRN MIRNA Ahumada      micafungin  100 mg Intravenous Q24H Jie Manuel  mg (08/05/22 1642)    norepinephrine           norepinephrine  1-30 mcg/min Intravenous Titrated Michele Dow PA-C 30 mcg/min (08/06/22 0250)   [facility] NxStage K 4/Ca 3  20,000 mL Dialysis Continuous Kole Gunn MD      ondansetron  4 mg Intravenous Q6H PRN Rishi Daly PA-C      oxyCODONE  5 mg Oral Q4H PRN Loc Patterson PA-C      pantoprazole  40 mg Intravenous Q24H [address] Barbi Copeland      phenol  1 spray Mouth/Throat Q2H PRN Charlie Olsen PA-C      polyethylene glycol  17 g Per NG Tube Daily Rishi Daly PA-C      potassium chloride  20 mEq Intravenous BID Maria Ines Valenzuela MD      potassium chloride  40 mEq Intravenous Once Michele Dow PA-C      Followed by   [facility] potassium chloride  40 mEq Intravenous Once Michele Barriga PA-C      propofol  5-50 mcg/kg/min Intravenous Titrated Michele Dow PA-C 5 mcg/kg/min (08/05/22 2322)    QUEtiapine  50 mg Oral BID Rishi Daly PA-C      senna-docusate sodium  2 tablet Oral BID Rishi Daly PA-C      [START ON 8/7/2022] vancomycin  15 mg/kg (Adjusted) Intravenous Daily PRN Maria Ines Valenzuela MD      vancomycin  20 mg/kg (Adjusted) Intravenous Once Michele Dow PA-C      vasopressin (PITRESSIN) in 0 9 % sodium chloride 100 mL  0 04 Units/min Intravenous Continuous Maria Ines Valenzuela MD 0 04 Units/min (08/06/22 0132)     Continuous Infusions:Adult TPN (CUSTOM BASE/CUSTOM ELECTROLYTE), , Last Rate: 71 5 mL/hr at 08/05/22 2232  amiodarone, 0 5 mg/min, Last Rate: 0 5 mg/min (08/04/22 2115)  fentaNYL, 50 mcg/hr, Last Rate: 50 mcg/hr (08/05/22 2154)  insulin regular (HumuLIN R,NovoLIN R) infusion, 0 3-21 Units/hr, Last Rate: 8 Units/hr (08/06/22 0209)  norepinephrine, 1-30 mcg/min, Last Rate: 30 mcg/min (08/06/22 0250)  NxStage K 4/Ca 3, 20,000 mL  propofol, 5-50 mcg/kg/min, Last Rate: 5 mcg/kg/min (08/05/22 2322)  vasopressin (PITRESSIN) in 0 9 % sodium chloride 100 mL, 0 04 Units/min, Last Rate: 0 04 Units/min (08/06/22 0132)      PRN Meds:  fentanyl citrate (PF), 50 mcg, Q1H PRN  HYDROmorphone, 0 2 mg, Q4H PRN  methocarbamol, 500 mg, Q6H PRN  ondansetron, 4 mg, Q6H PRN  oxyCODONE, 5 mg, Q4H PRN  phenol, 1 spray, Q2H PRN  [START ON 8/7/2022] vancomycin, 15 mg/kg (Adjusted), Daily PRN        Labs:   Results from last 7 days   Lab Units 08/06/22  0129 08/05/22  1848 08/05/22  0436 08/04/22  0403 08/03/22  1540 08/03/22  0438 08/02/22  1650 08/02/22  0512   WBC Thousand/uL 4 62  --  11 21* 14 91* 15 19* 11 79*   < > 13 31*   HEMOGLOBIN g/dL 8 2* 7 8* 6 6* 7 4* 7 9* 8 2*   < > 9 4*   HEMATOCRIT % 26 6* 24 4* 21 2* 22 9* 24 9* 25 2*   < > 29 6*   PLATELETS Thousands/uL  --   --   --  112* 126* 95*   < > 126*   NEUTROS PCT %  --   --   --  87*  --   --   --   --    MONOS PCT %  --   --   --  6  --   --   --   --    MONO PCT % 1*  --   --   --   --   --   --  4    < > = values in this interval not displayed  Results from last 7 days   Lab Units 08/06/22  0129 08/05/22  2015 08/05/22  1641 08/05/22  1155 08/05/22  0436 08/04/22  0403 08/03/22  0438 08/01/22  0528 08/01/22  0039   SODIUM mmol/L 143 138 137   < > 138 138 138   < >  --    POTASSIUM mmol/L 3 1* 3 2* 3 5   < > 3 8 3 9 4 0   < >  --    CHLORIDE mmol/L 109* 104 103   < > 103 105 106   < >  --    CO2 mmol/L 23 23 22   < > 22 25 24   < >  --    CO2, I-STAT mmol/L  --   --   --   --   --   --   --   --  24   BUN mg/dL 42* 45* 43*   < > 41* 32* 29*   < >  --    CREATININE mg/dL 2 97* 3 14* 3 14*   < > 2 80* 1 98* 1 40*   < >  --    CALCIUM mg/dL 7 7* 8 0* 7 8*   < > 8 0* 7 8* 8 4   < >  --    ALK PHOS U/L  --   --   --   --  116 113 94   < >  --    ALT U/L  --   --   --   --  25 26 24   < >  --    AST U/L  --   --   --   --  32 35 25   < >  --    GLUCOSE, ISTAT mg/dl  --   --   --   --   --   --   --   --  71    < > = values in this interval not displayed       Results from last 7 days   Lab Units 08/06/22  0129 08/05/22 2015 08/05/22  0436   MAGNESIUM mg/dL 2 2 2 5 2 5     Results from last 7 days   Lab Units 08/06/22  0129 22  0436   PHOSPHORUS mg/dL 3 8 3 7 4 8*      Results from last 7 days   Lab Units 22  1540 22  0438 22  0512   INR  1 61* 1 96* 1 78*     Results from last 7 days   Lab Units 22  0129 22  2316 22  0835   LACTIC ACID mmol/L 7 0* 5 6* 4 1*     No results found for: TROPONINI  Results from last 7 days   Lab Units 22  0212 22  1939 22  0804 22  2237 22  2226 22  1650 22  0707   PH ART  7 143* 7 444 7 413 7 446 7 424 7 439 7 460*   PCO2 ART mm Hg 57 4* 34 0* 34 8* 30 1* 34 2* 32 5* 33 8*   PO2 ART mm Hg 72 0* 84 9 130 8* 77 1 138 2* 137 1* 81 7   HCO3 ART mmol/L 19 2* 22 8 21 7* 20 3* 21 9* 21 5* 23 5   BASE EXC ART mmol/L -9 5 -1 0 -2 6 -3 3 -2 1 -2 0 0 0   ABG SOURCE  Artery Line, Arterial Line, Arterial Line, Arterial Line, Arterial Line, Arterial Line, Arterial       Imagin22 CXR: adequate positioning of RIJ HD catheter, worsening left sided effusion/infiltrates I have personally reviewed pertinent films in PACS    Code Status: Level 1 - Full Code    Counseling / Coordination of Care: Total time spent today 120 minutes  Greater than 50% of total time was spent with the patient and / or family counseling and / or coordination of care      Critical care time provided between 6:30pm and 3:30am     SIGNATURE: Irving Ruby MD  DATE: 2022  TIME: 3:29 AM

## 2022-08-06 NOTE — PROGRESS NOTES
Brigitte Louis is a 67 y o  M who was receiving Vancomycin IV with management by the Pharmacy Consult service  The patients Vancomycin therapy has been discontinued  Thank you for allowing us to take part in this patient's care  Pharmacy will sign-off now; please call or re-consult if there are any questions        Tenisha Chambers, Jm  Emergency Medicine Clinical Pharmacist    or Alan

## 2022-08-06 NOTE — PROGRESS NOTES
Daily Progress Note - Critical Care   Jenelle Hurtado 67 y o  male MRN: [id number]  Unit/Bed#: [facility] 301-54 Encounter: [id number]        ----------------------------------------------------------------------------------------  HPI:  Jenelle Hurtado is a 67 y o  male w/MD-IPMN s/p lap distal panc (), open subtotal panc (), completion panc w/sims anastomosis/SARAH/reduction of internal hernia/cholecystectomy on 2022  Decompensated and transferred back to ICU  w/c/f sepsis, s/p IR drain placement 8/3, s/p 1u pRBC  for Hgb 6 6, intubated and CVVH initiated     24hr events:   As noted in interval critical care progress note overnight  - 1u pRBC during the day for Hgb 6 6, rpt 7 8, 8 2  - Intubated, w/hypotension and emesis at time of intubation  - Abx broadened to include vanc  - CVVH initiated d/t oliguria/TERESE  - Insulin gtt initiated    Vent: SCMV 28 / 520 / 95% / 10    I/O:  UOP 1L/24hr; on CVVH net even goal; currently +1L net per Epic  RJP 128cc serous  IR drain 530cc seropurulent  No BM    Labs:  AB  / 51 8 /  4 / -6 1   Hgb 8 2 from 7 8; WBC 4 6 from 11 2  Lactic 7    ---------------------------------------------------------------------------------------  SUBJECTIVE  Overnight events as noted  Patient intubated  Off sedation  Encephalopathic  Review of Systems  Review of systems was unable to be performed secondary to intubated/encephalopathic  ---------------------------------------------------------------------------------------  Assessment and Plan:    Neuro:   · Delirium precautions  § CAM-ICU   ? Regulate sleep/wake cycle   · Analgesia  ? Fentanyl, PO Tylenol via OGT  · Sedation  § Propofol - currently off  § RASS goal 0         CV:   · Diagnosis: Shock   ? Likely septic with source - intra-abdominal infection and component of intravascular depletion   ? MAP goal >65, levophed 29, vaso 0 04  ? Wean pressors as tolerated   ? Trend lactic acid  ? 7 from 5 6  ?  Continue Unasyn + micafungin + vancomycin   ? F/u cultures   ? 8/4 fungal bcx pending  ? 8/3 body fluid cx 4+ polys, 2+ yeast, 2+ GNR  ? 8/2 bcx x 2 [id number]  · Diagnosis: Atrial fibrillation   ? Amiodarone infusion 0 5 mg/min   ? Rebolus as needed for RVR     ? Holding systemic AC - discuss timing for starting with surgery team   ? On DVT ppx SQH  ? Holding home atenolol with shock   ? Follow rhythm on telemetry  ? 8/1 Echo EF 60% improved from 45% on 7/27, no RV dysfunction   ? Holding home lasix in setting of shock, on CVVH        Pulm:  · Diagnosis:  Acute hypoxic respiratory failure  ? Intubated 8/5   ? Most recent cxr w/bilateral infiltrates  ? Wean vent as tolerated  ? SCMV 28 / 26 / 95% / 10     GI:   · Diagnosis: IPMN now s/p completion pancreatectomy, reduction of internal hernia and cholecystectomy on 7/26  ? 8/1 CT - new partially loculated fluid collection in post-pancreatectomy bed  ? 8/2 CT - stable fluid collection in pancreatectomy bed, no evidence of enteric contrast extravasation from staple line, stable pneumoperitoneum  ? 8/3 IR drain in L anterior collection   § F/u cultures   ? Increasing abdominal distension  § Concern for developing abdominal compartment syndrome  § Bladder pressures normal  ? IV abx as below   ? NGT with trickle TF  ? PICC line with TPN  ? Will need Creon when taking PO again   · Stress ulcer PPX: Pantoprazole IV  · Bowel regimen: senna/colace, Dulcolax suppository daily, miralax down NGT  ? Last BM: 7/31        :   · Diagnosis: TERESE, urinary retention   ? Baseline Cr 0 8  ? Current Cr 2 97  ? Trend BUN/CR, Strict I/O   ? CVVH per nephrology  ? Net even goal  ? Hanna: Yes  § Urinary catheter still needed for Strict I and O in a critically ill patient  ? Urinary retention protocol when hanna removed         F/E/N:   · Fluid/Diuretic plan: No IVF while on TPN   ? Albumin PRN   · E: Replete for goal K >4, Phos >3, Mg >2   · N: TPN        Heme/Onc:   · Diagnosis: Anemia  ?  In setting of critical illness, acute blood loss   ? Hgb 8 2 from 7 8; 1u pRBC for 6 6 yesterday   ? VTE PPX: SQH, SCDs        Endo:   · Diagnosis: T2DM  ? Now s/p pancreatectomy   ? Insulin gtt  ? BG goal 140-180  ? Endocrinology following          ID:   · Diagnosis: Sepsis, possible intra-abdominal abscess   ? Unasyn   ? Micafungin  ? Vancomycin   ? Blood cx - 1/2 streptococcus anginosus - ? contamination  ? Blood cx - 1/2 negative x 72 hrs   ? Blood cx - 2/2 negative x 48 hrs   ? R drain cx - +2 strep, few colonies candidia, few colonies klebsiella    ? L drain cx - GP rods, yeast  ? Trend fever curve and WBC count   ? ID consulted           MSK/Skin:   · At risk for deconditioning  · Frequent turning and repositioning   · PT/OT as appropriate   · LWC PRN          Patient appropriate for transfer out of the ICU today?: No  Disposition: Continue Critical Care   Code Status: Level 1 - Full Code  ---------------------------------------------------------------------------------------  ICU CORE MEASURES    Prophylaxis   VTE Pharmacologic Prophylaxis: Heparin  VTE Mechanical Prophylaxis: sequential compression device  Stress Ulcer Prophylaxis: Pantoprazole IV     ABCDE Protocol (if indicated)  Plan to perform spontaneous awakening trial today? Yes  Plan to perform spontaneous breathing trial today? Yes  Obvious barriers to extubation?  Yes  CAM-ICU: Positive    Invasive Devices Review  Invasive Devices  Report    Peripherally Inserted Central Catheter Line  Duration           PICC Line 08/03/22 2 days          Central Venous Catheter Line  Duration           Port A Cath 03/30/22 Right Subclavian 128 days          Peripheral Intravenous Line  Duration           Peripheral IV 08/04/22 Right;Ventral (anterior) Forearm 1 day    Peripheral IV 08/05/22 Distal;Left;Ventral (anterior) Wrist <1 day    Peripheral IV 08/05/22 Left;Ventral (anterior) Forearm <1 day          Arterial Line  Duration           Arterial Line 08/06/22 Radial <1 day          Hemodialysis Catheter  Duration           HD Temporary Double Catheter <1 day          Drain  Duration           Closed/Suction Drain Right RLQ Bulb 19 Fr  10 days    Urethral Catheter Temperature probe 16 Fr  4 days    NG/OG/Enteral Tube Enteral Feeding Tube Right nare 3 days    Abscess Drain LUQ 2 days          Airway  Duration           ETT  Oral;Cuffed; Hi-Lo 8 mm <1 day              Can any invasive devices be discontinued today? No  ---------------------------------------------------------------------------------------  OBJECTIVE    Vitals   Vitals:    22 0013   BP:   122/59    BP Location:       Pulse: (!) 109  (!) 111    Resp: (!) 36  22    Temp: 98 96 °F (37 2 °C)  99 32 °F (37 4 °C)    TempSrc:       SpO2: 95% 98%  (!) 89%   Weight:       Height:         Temp (24hrs), Av 9 °F (37 2 °C), Min:98 6 °F (37 °C), Max:99 5 °F (37 5 °C)  Current: Temperature: 99 32 °F (37 4 °C)  HR: 122  BP: 130/66  RR: 28  SpO2: 85%    Respiratory:  SpO2: SpO2: (!) 85 %  , SpO2 Activity: SpO2 Activity: At Rest, SpO2 Device: O2 Device: BiPAP, Capnography: Capnography: No  Nasal Cannula O2 Flow Rate (L/min): 6 L/min    Invasive/non-invasive ventilation settings   Respiratory  Report   Lab Data (Last 4 hours)       0212            pH, Arterial       7  143             pCO2, Arterial       57 4             pO2, Arterial       72 0             HCO3, Arterial       19 2             Base Excess, Arterial       -9 5                  O2/Vent Data     None                Physical Exam  Vitals reviewed  Constitutional:       Appearance: He is obese  He is ill-appearing and diaphoretic  HENT:      Right Ear: External ear normal       Left Ear: External ear normal       Nose: Nose normal       Mouth/Throat:      Pharynx: Oropharynx is clear        Comments: OGT, ETT  Eyes:      Conjunctiva/sclera: Conjunctivae normal    Neck:      Comments: RIJ HD catheter with CVVH running  Cardiovascular:      Rate and Rhythm: Rhythm irregular  Pulmonary:      Comments: Mechanically ventilated  Abdominal:      General: There is distension  Comments: Midline incision stapled w/serous/ascites-appearing fluid drainage, R-STU drain serous, IR drain sero-purulent, distended, tympanitic, OGT w/gastric appearing output   Genitourinary:     Comments: jacques  Musculoskeletal:      Right lower leg: Edema present  Left lower leg: Edema present  Skin:     Coloration: Skin is pale  Comments: Cool b/l lower extremities   Neurological:      Comments: Encephalopathic, altered              Laboratory and Diagnostics:  Results from last 7 days   Lab Units 08/06/22  0129 08/05/22  1848 08/05/22  0436 08/04/22  0403 08/03/22  1540 08/03/22  0438 08/02/22  1650 08/02/22  0512 08/01/22  0528 08/01/22  0039 08/01/22  0011   WBC Thousand/uL 4 62  --  11 21* 14 91* 15 19* 11 79* 9 67 13 31* 11 89*  --  3 88*   HEMOGLOBIN g/dL 8 2* 7 8* 6 6* 7 4* 7 9* 8 2* 10 0* 9 4* 9 9*  --  9 9*   I STAT HEMOGLOBIN   --   --   --   --   --   --   --   --   --    < >  --    HEMATOCRIT % 26 6* 24 4* 21 2* 22 9* 24 9* 25 2* 30 0* 29 6* 29 5*  --  31 2*   HEMATOCRIT, ISTAT   --   --   --   --   --   --   --   --   --    < >  --    PLATELETS Thousands/uL  --   --   --  112* 126* 95* 147* 126* 147*  --  126*   NEUTROS PCT %  --   --   --  87*  --   --   --   --   --   --   --    BANDS PCT %  --   --   --   --   --   --   --  15*  --   --   --    MONOS PCT %  --   --   --  6  --   --   --   --   --   --   --    MONO PCT % 1*  --   --   --   --   --   --  4  --   --   --     < > = values in this interval not displayed       Results from last 7 days   Lab Units 08/06/22  0129 08/05/22 2015 08/05/22  1641 08/05/22  1155 08/05/22  0436 08/04/22  0403 08/03/22  8958 08/02/22  2222 08/02/22  1650 08/02/22  6199 08/01/22  0528 07/31/22  0543 07/30/22  0542   SODIUM mmol/L 143 138 137 138 138 138 138   < > 138   < > 137  137   < > 143   POTASSIUM mmol/L 3 1* 3 2* 3 5 3 7 3 8 3 9 4 0   < > 4 1   < > 3 9  3 9   < > 3 8   CHLORIDE mmol/L 109* 104 103 102 103 105 106   < > 106   < > 106  106   < > 111*   CO2 mmol/L 23 23 22 22 22 25 24   < > 22   < > 23  23   < > 29   CO2, I-STAT   --   --   --   --   --   --   --   --   --   --   --    < >  --    ANION GAP mmol/L 11 11 12 14* 13 8 8   < > 10   < > 8  8   < > 3*   BUN mg/dL 42* 45* 43* 41* 41* 32* 29*   < > 22   < > 15  15   < > 8   CREATININE mg/dL 2 97* 3 14* 3 14* 3 02* 2 80* 1 98* 1 40*   < > 1 14   < > 1 03  1 03   < > 0 54*   CALCIUM mg/dL 7 7* 8 0* 7 8* 7 9* 8 0* 7 8* 8 4   < > 8 0*   < > 7 6*  7 6*   < > 8 0*   GLUCOSE RANDOM mg/dL 190* 422* 475* 467* 410* 157* 84   < > 111   < > 62*  62*   < > 156*   ALT U/L  --   --   --   --  25 26 24  --  25  --  28  --  57   AST U/L  --   --   --   --  32 35 25  --  29  --  22  --  45   ALK PHOS U/L  --   --   --   --  116 113 94  --  128*  --  99  --  112   ALBUMIN g/dL  --   --   --   --  2 3* 2 1* 2 4*  --  1 8*  --  1 6*  --  2 6*   TOTAL BILIRUBIN mg/dL  --   --   --   --  3 74* 3 31* 2 96*  --  2 33*  --  1 72*  --  1 49*    < > = values in this interval not displayed       Results from last 7 days   Lab Units 08/06/22  0129 08/05/22 2015 08/05/22  0436 08/04/22  0403 08/03/22  0438 08/02/22  1650 08/02/22  0512   MAGNESIUM mg/dL 2 2 2 5 2 5 2 5 2 2 2 5 2 4   PHOSPHORUS mg/dL 3 8 3 7 4 8* 5 4* 4 8* 4 7* 4 8*      Results from last 7 days   Lab Units 08/03/22  1540 08/03/22  0438 08/02/22  0512 08/01/22  1152   INR  1 61* 1 96* 1 78* 1 78*          Results from last 7 days   Lab Units 08/06/22  0129 08/05/22  2316 08/05/22  0835 08/05/22  0720 08/04/22  0754 08/04/22  0403 08/03/22  1027   LACTIC ACID mmol/L 7 0* 5 6* 4 1* 3 8* 3 2* 3 5* 3 7*     ABG:  Results from last 7 days   Lab Units 08/06/22  0212   PH ART  7 143*   PCO2 ART mm Hg 57 4*   PO2 ART mm Hg 72 0*   HCO3 ART mmol/L 19 2*   BASE EXC ART mmol/L -9 5   ABG SOURCE  Artery VBG:  Results from last 7 days   Lab Units 08/06/22  0212 08/01/22  0815 08/01/22  0011   PH LEIU   --   --  7 403*   PCO2 ELIU mm Hg  --   --  31 6*   PO2 ELIU mm Hg  --   --  50 9*   HCO3 ELIU mmol/L  --   --  19 3*   BASE EXC ELIU mmol/L  --   --  -4 6   ABG SOURCE  Artery   < >  --     < > = values in this interval not displayed  Micro  Results from last 7 days   Lab Units 08/03/22  1631 08/02/22  1225 08/01/22  0200 08/01/22  0036   BLOOD CULTURE   --  No Growth at 72 hrs  No Growth at 72 hrs  No Growth After 4 Days  Streptococcus anginosus*   GRAM STAIN RESULT  4+ Polys*  2+ Gram positive rods*  2+ Yeast*  --  3+ Polys  No bacteria seen Gram positive cocci in chains*   BODY FLUID CULTURE, STERILE  2+ Growth of Gram Negative Roland*  2+ Growth of Gram Negative Roland*  --  2+ Growth of Streptococcus anginosus*  Few Colonies of Candida albicans*  Few Colonies of Klebsiella pneumoniae*  1+ Growth of   Few Colonies of Beta Hemolytic Streptococcus Group C*  --        EKG: on monitor w/afib  Imaging: cxr as prior note indicates I have personally reviewed pertinent films in PACS    Intake and Output  I/O       08/04 0701 08/05 0700 08/05 0701 08/06 0700    P  O  0 0    I V  (mL/kg) 509 6 (3 6) 386 6 (2 7)    Blood  350    NG/ 310    IV Piggyback 2729 308 2    TPN 1321 983  1    Feedings 150 16    Total Intake(mL/kg) 5015 6 (35 1) 2354 (16 5)    Urine (mL/kg/hr) 625 (0 2) 1007 (0 3)    Emesis/NG output 0 525    Drains 805 628    Stool  0    Total Output 1430 2160    Net +3585 6 +194          Unmeasured Stool Occurrence  0 x        UOP: 1L/24hrs     Height and Weights   Height: 6' (182 9 cm)  IBW (Ideal Body Weight): 77 6 kg  Body mass index is 42 7 kg/m²    Weight (last 2 days)     Date/Time Weight    08/05/22 0618 143 (314 82)    08/04/22 0600 138 (304 01)            Nutrition       Diet Orders   (From admission, onward)             Start     Ordered    08/04/22 4800  Diet Enteral/Parenteral; Tube Feeding with Oral Diet; Vital AF 1 2; Continuous; 10; Surgical; NPO  Diet effective now        References:    Nutrtion Support Algorithm Enteral vs  Parenteral   Question Answer Comment   Diet Type Enteral/Parenteral    Enteral/Parenteral Tube Feeding with Oral Diet    Tube Feeding Formula: Vital AF 1 2    Bolus/Cyclic/Continuous Continuous    Tube Feeding Goal Rate (mL/hr): 10    Diet Type Surgical    Surgical NPO    RD to adjust diet per protocol?  No        08/04/22 0926                    Active Medications  Scheduled Meds:  Current Facility-Administered Medications   Medication Dose Route Frequency Provider Last Rate    acetaminophen  975 mg Per NG Tube Q8H Corrinebhupendra Guerrero,       Adult TPN (CUSTOM BASE/CUSTOM ELECTROLYTE)   Intravenous Continuous TPN Rishi Daly PA-C 71 5 mL/hr at 08/05/22 2232    amiodarone  0 5 mg/min Intravenous Continuous MIRNA Oliver 0 5 mg/min (08/04/22 2115)    ampicillin-sulbactam  3 g Intravenous Q12H Nik Padilla MD 3 g (08/06/22 0410)    bisacodyl  10 mg Rectal Daily Rishi Daly PA-C      Calcium Gluconate  2 g Intravenous Once Gertrude Fisher MD      chlorhexidine  15 mL Mouth/Throat Q12H [address]      EPINEPHrine PF           EPINEPHrine PF           fentaNYL  50 mcg/hr Intravenous Continuous Michele Barriga PA-C 50 mcg/hr (08/05/22 2154)    fentanyl citrate (PF)  50 mcg Intravenous Q1H PRN Michele Dow PA-C      fentanyl citrate (PF)  50 mcg Intravenous Once Michele Dow PA-C      heparin (porcine)  5,000 Units Subcutaneous [facility] Barbi Copeland      HYDROmorphone  0 2 mg Intravenous Q4H PRN Loc Patterson PA-C      insulin regular (HumuLIN R,NovoLIN R) infusion  0 3-21 Units/hr Intravenous Titrated Rishi Daly PA-C 4 Units/hr (08/06/22 0355)    iohexol  30 mL Oral 90 min pre-procedure MIRNA Oliver      ipratropium  0 5 mg Nebulization Q6H MD Julia Riddle levalbuterol  1 25 mg Nebulization Q6H Lauro Wagoner MD      melatonin  6 mg Oral HS Justina Brown PA-C      methocarbamol  500 mg Oral Q6H PRN Sally Noel [place] city[place]IRNA      micafungin  100 mg Intravenous Q24H Leandro Khan  mg (08/05/22 1642)    norepinephrine           norepinephrine  1-30 mcg/min Intravenous Titrated Sita Dow PA-C 30 mcg/min (08/06/22 0250)   Krishna García NxStage K 4/Ca 3  20,000 mL Dialysis Continuous Justine Weir MD      ondansetron  4 mg Intravenous Q6H PRN Gurdeep Glez PA-C      oxyCODONE  5 mg Oral Q4H PRN Isabelle Cai PA-C      pantoprazole  40 mg Intravenous Q24H [address] Gurdeep Glez PA-C      phenol  1 spray Mouth/Throat Q2H PRN Justina Brown PA-C      polyethylene glycol  17 g Per NG Tube Daily Gurdeep Glez PA-C      potassium chloride  20 mEq Intravenous BID Lizabeth Douglas MD      potassium chloride  40 mEq Intravenous Once Sita Dow PA-C 40 mEq (08/06/22 0200)    Followed by   Krishna García potassium chloride  40 mEq Intravenous Once Sita Dow PA-C 40 mEq (08/06/22 0358)    propofol  5-50 mcg/kg/min Intravenous Titrated Sita Dow PA-C 5 mcg/kg/min (08/05/22 2322)    QUEtiapine  50 mg Oral BID Gurdeep Glez PA-C      senna-docusate sodium  2 tablet Oral BID Gurdeep Glez PA-C      [START ON 8/7/2022] vancomycin  15 mg/kg (Adjusted) Intravenous Daily PRN Lizabeth Douglas MD      vancomycin  20 mg/kg (Adjusted) Intravenous Once Sita Dow PA-C      vasopressin (PITRESSIN) in 0 9 % sodium chloride 100 mL  0 04 Units/min Intravenous Continuous Lizabeth Douglas MD 0 04 Units/min (08/06/22 0132)     Continuous Infusions:  Adult TPN (CUSTOM BASE/CUSTOM ELECTROLYTE), , Last Rate: 71 5 mL/hr at 08/05/22 2232  amiodarone, 0 5 mg/min, Last Rate: 0 5 mg/min (08/04/22 2115)  fentaNYL, 50 mcg/hr, Last Rate: 50 mcg/hr (08/05/22 2154)  insulin regular (HumuLIN R,NovoLIN R) infusion, 0 3-21 Units/hr, Last Rate: 4 Units/hr (08/06/22 8417)  norepinephrine, 1-30 mcg/min, Last Rate: 30 mcg/min (08/06/22 0250)  NxStage K 4/Ca 3, 20,000 mL  propofol, 5-50 mcg/kg/min, Last Rate: 5 mcg/kg/min (08/05/22 2322)  vasopressin (PITRESSIN) in 0 9 % sodium chloride 100 mL, 0 04 Units/min, Last Rate: 0 04 Units/min (08/06/22 0132)      PRN Meds:   fentanyl citrate (PF), 50 mcg, Q1H PRN  HYDROmorphone, 0 2 mg, Q4H PRN  methocarbamol, 500 mg, Q6H PRN  ondansetron, 4 mg, Q6H PRN  oxyCODONE, 5 mg, Q4H PRN  phenol, 1 spray, Q2H PRN  [START ON 8/7/2022] vancomycin, 15 mg/kg (Adjusted), Daily PRN        Allergies   No Known Allergies  ---------------------------------------------------------------------------------------  Advance Directive and Living Will:      Power of :    POLST:    ---------------------------------------------------------------------------------------  Care Time Delivered:   Upon my evaluation, this patient had a high probability of imminent or life-threatening deterioration due to decompensation/hypoxia, which required my direct attention, intervention, and personal management  I have personally provided 45 minutes of critical care time, exclusive of procedures, teaching, family meetings, and any prior time recorded by providers other than myself  Shanae Thomas MD      Portions of the record may have been created with voice recognition software  Occasional wrong word or "sound a like" substitutions may have occurred due to the inherent limitations of voice recognition software    Read the chart carefully and recognize, using context, where substitutions have occurred

## 2022-08-06 NOTE — PROCEDURES
Temporary HD Catheter    Date/Time: 8/5/2022 10:31 PM  Performed by: Janett Walls MD  Authorized by: Janett Walls MD     Patient location:  Bedside  Other Assisting Provider: Yes (comment) Criss Bernal    Consent:     Consent obtained:  Written    Consent given by:  Spouse    Risks discussed:  Incorrect placement, arterial puncture, bleeding, pneumothorax, infection and nerve damage    Alternatives discussed:  No treatment  Universal protocol:     Procedure explained and questions answered to patient or proxy's satisfaction: yes      Relevant documents present and verified: yes      Test results available and properly labeled: yes      Radiology Images displayed and confirmed  If images not available, report reviewed: yes      Required blood products, implants, devices, and special equipment available: yes      Immediately prior to procedure, a time out was called: yes      Patient identity confirmed:  Arm band  Pre-procedure details:     Hand hygiene: Hand hygiene performed prior to insertion      Sterile barrier technique: All elements of maximal sterile technique followed      Skin preparation:  2% chlorhexidine    Skin preparation agent: Skin preparation agent completely dried prior to procedure    Indications:     Central line indications: medications requiring central line and dialysis    Sedation:     Sedation type: Moderate (conscious) sedation  Anesthesia (see MAR for exact dosages):      Anesthesia method:  Local infiltration    Local anesthetic:  Lidocaine 1% WITH epi  Procedure details:     Location:  Right internal jugular    Vessel type: vein      Laterality:  Right    Patient position:  Trendelenburg    Catheter type:  Triple lumen    Catheter size:  12 5 Fr    Catheter length:  16 cm    Landmarks identified: yes      Ultrasound guidance: yes      Ultrasound image availability:  Video obtained and still images obtained    Successful placement: yes    Post-procedure details:     Post-procedure: Dressing applied and line sutured    Assessment:  Free fluid flow, placement verified by x-ray, no pneumothorax on x-ray and blood return through all ports    Post-procedure complications: none      Patient tolerance of procedure: Tolerated well, no immediate complications  Comments:        CXR obtained  HD line too short and therefore was replaced over wire with new 20cm HD cath  See separate progress note for further details

## 2022-08-06 NOTE — PROGRESS NOTES
Vancomycin Assessment    Michell Carlisle is a 67 y o  male who is currently receiving vancomycin 1500mg daily prn when trough is less than 20 for Pneumonia     Relevant clinical data and objective history reviewed:  Creatinine   Date Value Ref Range Status   08/06/2022 2 97 (H) 0 60 - 1 30 mg/dL Final     Comment:     Standardized to IDMS reference method   08/05/2022 3 14 (H) 0 60 - 1 30 mg/dL Final     Comment:     Standardized to IDMS reference method   08/05/2022 3 14 (H) 0 60 - 1 30 mg/dL Final     Comment:     Standardized to IDMS reference method   08/22/2017 0 75 0 70 - 1 25 mg/dL Final     Comment:     Result Comment: For patients >52years of age, the reference limit  for Creatinine is approximately 13% higher for people  identified as -American  /59   Pulse (!) 111   Temp 99 32 °F (37 4 °C)   Resp 22   Ht 6' (1 829 m)   Wt (!) 143 kg (314 lb 13 1 oz)   SpO2 (!) 89%   BMI 42 70 kg/m²   I/O last 3 completed shifts: In: 7189 1 [I V :842; Blood:350; NG/GT:616; IV Piggyback:3037 3; OCI:3758 5; Feedings:166]  Out: 2934 [Urine:1407; Drains:1223]  Lab Results   Component Value Date/Time    BUN 42 (H) 08/06/2022 01:29 AM    BUN 13 08/22/2017 08:35 AM    WBC 4 62 08/06/2022 01:29 AM    WBC 5 1 08/22/2017 08:35 AM    HGB 8 2 (L) 08/06/2022 01:29 AM    HGB 13 5 08/22/2017 08:35 AM    HCT 26 6 (L) 08/06/2022 01:29 AM    HCT 40 2 08/22/2017 08:35 AM     (H) 08/06/2022 01:29 AM     3 (H) 08/22/2017 08:35 AM    PLT  08/06/2022 01:29 AM      Comment:      Unable to perform due to clumped platelets    This is a corrected result   Previous result was 65 Thousands/uL on 8/6/2022 at 0313 EDT     08/22/2017 08:35 AM     Temp Readings from Last 3 Encounters:   08/05/22 99 32 °F (37 4 °C)   07/20/22 97 8 °F (36 6 °C)   07/05/22 (!) 96 °F (35 6 °C) (Tympanic)     Vancomycin Days of Therapy: 1    Assessment/Plan  The patient is currently on vancomycin utilizing pulse dosing based on adjusted body weight (due to obesity)  Baseline risks associated with therapy include: pre-existing renal impairment  The patient is currently receiving 1500mg daily prn when trough is less than 20 and is clinically appropriate and dose will be continued  Pharmacy will also follow closely for s/sx of nephrotoxicity, infusion reactions, and appropriateness of therapy  BMP and CBC will be ordered per protocol  Plan for trough as patient approaches steady state, prior to the other  dose at approximately 1400 on 08/06 22  Due to infection severity, will target a trough of 15-20 (appropriate for most indications)   Pharmacy will continue to follow the patients culture results and clinical progress daily      Darin Paulson, Pharmacist

## 2022-08-06 NOTE — PROCEDURES
Temporary HD Catheter    Date/Time: 8/6/2022 12:06 AM  Performed by: Shanae Thomas MD  Authorized by: Shanae Thomas MD     Patient location:  Bedside  Other Assisting Provider: Yes (comment) Stacia Aguilar)    Consent:     Consent obtained:  Written    Consent given by:  Spouse  Universal protocol:     Procedure explained and questions answered to patient or proxy's satisfaction: yes      Relevant documents present and verified: yes      Radiology Images displayed and confirmed  If images not available, report reviewed: yes      Required blood products, implants, devices, and special equipment available: yes    Indications:     Central line indications: medications requiring central line and dialysis    Procedure details:     Location:  Right internal jugular    Vessel type: vein      Laterality:  Right    Approach: percutaneous technique used      Patient position:  Trendelenburg    Catheter type:  Triple lumen    Catheter size:  12 5 Fr    Catheter length:  20 cm    Successful placement: yes    Post-procedure details:     Post-procedure:  Dressing applied and line sutured    Assessment:  Blood return through all ports, placement verified by x-ray and free fluid flow    Post-procedure complications: none      Patient tolerance of procedure: Tolerated well, no immediate complications  Comments:          HD cath exchanged over wire with successful placement confirmed with cxr

## 2022-08-06 NOTE — PROCEDURES
Bronchoscopy (Bedside)    Date/Time: 8/6/2022 12:55 PM  Performed by: Brian Jacobo DO  Authorized by: Brian Jacobo DO     Patient location:  Bedside  Other Assisting Provider: Yes (comment) (Dr Nunez)    Consent:     Consent obtained:  Written    Consent given by:  Spouse    Risks discussed: Adverse reaction to sedation    Alternatives discussed:  No treatment and observation  Universal protocol:     Procedure explained and questions answered to patient or proxy's satisfaction: yes      Patient identity confirmed:  Arm band  Indications:     Procedure Purpose: diagnostic and therapeutic      Indications: other (comment)      Indications comment:  Aspiration  Sedation:     Sedation type:  Continuous (ICU/vent)  Upper Airway:     Trachea: normal      Angeline:  Normal  Airway:     Airway:  Airway not erythematous or friable bilaterally  No obvious sputum to suction  No BAL performed  Post-procedure details:     Patient tolerance of procedure: Tolerated well, no immediate complications  Final Diagnosis/Findings:      No obvious signs of aspiration pneumonitis at this time  No sputum noted  Anatomy normal  Airway intact with no erythema or friability noted bilaterally  No specimen obtained

## 2022-08-06 NOTE — RESPIRATORY THERAPY NOTE
RT Ventilator Management Note  Babar Jurado 67 y o  male MRN: [id number]  Unit/Bed#: [facility] 157-20 Encounter: [id number]      Daily Screen         7/27/2022  1412 7/28/2022  0725          Patient safety screen outcome[de-identified] Failed Failed      Not Ready for Weaning due to[de-identified] Underline problem not resolved Underline problem not resolved                Physical Exam:   Assessment Type: (P) Assess only  General Appearance: (P) Sleeping  Respiratory Pattern: (P) Assisted  Chest Assessment: (P) Chest expansion symmetrical  Bilateral Breath Sounds: (P) Rhonchi  Cough: (P) Productive  Suction: (P) ET Tube  O2 Device: (P) Ventilator      Resp Comments: (P) Pt  intubated at bedside by Bri W[place] with 8 0 ETT marked 26 @ lip and secured to rt  side  Pt  did vomit during intubation attempt and was sx'd rigorously prior to placement of ETT     +ETCO2 color change with 6th breath and pt has bilateral breath sounds  Pt  placed on Burns G5 ventilator at this time  CXR pending for ETT placement  Will continue to assess and monitor pt per protocol

## 2022-08-07 NOTE — PROGRESS NOTES
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 03/2019 and subtotal pancreatectomy 02/22 now admitted on 07/26/2022 for completion pancreatectomy/anastomosis extensive lysis of adhesions/cholecystectomy, postoperatively initially did well subsequently became septic and was found to have intra-abdominal collection status post IR drainage placement now with decreasing urine output subsequently nephrology has been consulted for evaluation and management of acute kidney injury and evaluation for possible need for dialysis      Acute kidney injury :  TERESE multifactorial most likely secondary to septic shock plus ANANYA (08/01/2022 and 08/02/2022) plus ischemic injury secondary to hemodynamics in light of underlying comorbidities  After review of records In [facility] as well as Care everywhere it appears that the patient has a baseline Creatinine of 0 7-0 9 mg/dL  patient was admitted with a creatinine of 0 83 mg/dL on 07/26/2022  patient's creatinine today is at  1 27 mg/dL at this time having appropriate clearance  Started on CVVHD on 08/05/2022  Continue on CVVHD with 4K bath for now  Will continue run net even for now depending on hemodynamics if pressor requirements improve then can start running net -20 an hour  Await renal recovery  Optimize hemodynamic status to avoid delay in renal recovery  Avoid nephrotoxins, adjust meds to appropriate GFR  Strict I/O  Daily weights  Urinary retention protocol if patient does not have a [place]  will need to set up patient for follow up with Nephrology as an outpatient post hospitalization    as an outpatient for nephrology, patient follows up with no nephrologist      Blood pressure/hypotension:  home medications: HCTZ 25 mg p o  Q day, Lasix 20 mg p o  Q day  current medications:   Levophed and vasopressin  recommendations:  Taper off of pressors as tolerable  Optimize hemodynamics  Maintain MAP > 65mmHg  Avoid BP fluctuations      H/H/anemia:  most recent hemoglobin at 8 1 grams/deciliter  maintain hemoglobin greater than 8 grams/deciliter  Recommend PRBC transfusion per primary team  Avoid IV iron     Acid-base electrolytes:     Electrolytes:    Stable  Hyperphosphatemia:  Most recent phosphorus 2 5  Resolved  Currently on CVVHD repletion protocol  Stable for now     Acid-base:    Most recent bicarb at 26 stable for now  Acidosis improved     Other medical problems:  Intraductal papillary mucinous neoplasm:  Management per primary team   Status post completion of pancreatectomy and extensive lysis of adhesions with cholecystectomy on 2022  Follow-up with surgery  AFib with RVR:  Management primary team on amiodarone  Liver cirrhosis:  History of chronic alcohol use  Septic shock/strep anginosus bacteremia:  Management per primary team   On Zosyn, micafungin, Solu-Cortef  Follow-up with ID  Most recent WBC count 48,000       Thanks for the consult  Will continue to follow  Please call with questions/ concerns  Above-mentioned orders and Plan in terms of acute kidney injury was discussed with the team in depth via tiger text    Ella Garza MD, FASN, 2022, 5:46 AM              Objective :   Patient seen and examined the ICU while on CVVHD running even at 6:35 a m  On Levophed and vasopressin  Remains intubated  Urine output close to 1 7 L last 24 hours  Overnight had issues with hemodynamics leading high-dose pressors issues with the vent and ARDS        PHYSICAL EXAM  BP 93/50   Pulse 97   Temp (!) 97 16 °F (36 2 °C) (Bladder)   Resp 16   Ht 6' (1 829 m)   Wt (!) 150 kg (330 lb 7 5 oz)   SpO2 97%   BMI 44 82 kg/m²   Temp (24hrs), Av 1 °F (36 2 °C), Min:96 44 °F (35 8 °C), Max:97 7 °F (36 5 °C)        Intake/Output Summary (Last 24 hours) at 8/7/2022 0546  Last data filed at 8/7/2022 0500  Gross per 24 hour   Intake 7343 ml   Output 6836 ml   Net 507 ml       I/O last 24 hours: In: 9246 1 [I V :4024 2; NG/GT:340; IV Piggyback:2616; TPN:2265 8]  Out: 5261 [OKSWZ:1478; Emesis/NG output:1825; Drains:631; BZYRR:3115]      Current Weight: Weight - Scale: (!) 150 kg (330 lb 7 5 oz)  First Weight: Weight - Scale: 127 kg (280 lb)  Physical Exam  Vitals and nursing note reviewed  Constitutional:       General: He is not in acute distress  Appearance: Normal appearance  He is obese  He is ill-appearing  He is not toxic-appearing or diaphoretic  HENT:      Head: Normocephalic and atraumatic  Mouth/Throat:      Comments: Intubated  Eyes:      General: No scleral icterus  Neck:      Comments: Right IJ temporary dialysis catheter  Cardiovascular:      Rate and Rhythm: Normal rate  Heart sounds: Normal heart sounds  No friction rub  Pulmonary:      Effort: Pulmonary effort is normal  No respiratory distress  Breath sounds: No stridor  No wheezing  Comments: Decreased overall breath sounds  Abdominal:      General: There is distension  Palpations: Abdomen is soft  Comments: STU drain in place   Musculoskeletal:         General: Swelling present  Cervical back: Neck supple  Comments: Plus two edema bilateral upper lower extremities   Skin:     General: Skin is warm  Coloration: Skin is not jaundiced  Neurological:      Mental Status: He is alert               Review of Systems   Unable to perform ROS: Intubated       Scheduled Meds:  Current Facility-Administered Medications   Medication Dose Route Frequency Provider Last Rate    acetaminophen  975 mg Per NG Tube Q8H Corrine Imperatore, DO      Adult TPN (CUSTOM BASE/CUSTOM ELECTROLYTE)   Intravenous Continuous TPN MIRNA Barrientos 71 5 mL/hr at 08/06/22 6444    amiodarone  0 5 mg/min Intravenous Continuous MIRNA Chu 0 5 mg/min (08/04/22 2115)    bisacodyl  10 mg Rectal Daily Reddy Bowling [place]      chlorhexidine  15 mL Mouth/Throat Q12H [address]      cisatracurium (NIMBEX) in 0 9% sodium chloride infusion  0 1-5 mcg/kg/min Intravenous Titrated Adal Cart, CRNP 1 mcg/kg/min (08/07/22 0515)    dexmedetomidine  0 1-0 7 mcg/kg/hr Intravenous Titrated Adal Cart, CRNP 0 3 mcg/kg/hr (08/07/22 0108)    epinephrine  1-10 mcg/min Intravenous Titrated Bison Cart, CRNP 2 mcg/min (08/07/22 0541)    epoprostenol  6 25-50 ng/kg/min (Ideal) Inhalation Titrated Bison Cart, CRNP 50 ng/kg/min (08/07/22 0340)    fentaNYL  100 mcg/hr Intravenous Continuous Adal Cart, CRNP 100 mcg/hr (08/07/22 0345)    fentanyl citrate (PF)  50 mcg Intravenous Q1H PRN [facility], [place]      heparin (porcine)  5,000 Units Subcutaneous [facility] Reddy Bowling [place]      hydrocortisone sodium succinate  50 mg Intravenous Q6H [facility] Adal Cart, CRNP      insulin regular (HumuLIN R,NovoLIN R) infusion  0 3-21 Units/hr Intravenous Titrated Reddy Bowling PA-C 6 Units/hr (08/07/22 0416)    iohexol  30 mL Oral 90 min pre-procedure MIRNA Chu      ipratropium  0 5 mg Nebulization Q6H Caron Landeros MD      levalbuterol  1 25 mg Nebulization Q6H Caron Landeros MD      methocarbamol  500 mg Oral Q6H PRN Salvador Kenyon SandyMIRNA      micafungin  100 mg Intravenous Q24H Emmanuel Ramirez MD Stopped (08/06/22 1727)    norepinephrine  1-30 mcg/min Intravenous Titrated Gm Dow PA-C 20 mcg/min (08/07/22 0537)   Payton Lama NxStage K 4/Ca 3  20,000 mL Dialysis Continuous Ng Arina, CRNP Stopped (08/06/22 1013)    NxStage K 4/Ca 3  20,000 mL Dialysis Continuous Jacquie Bolden MD      ondansetron  4 mg Intravenous Q6H PRN Reddy Bowling PA-C      pantoprazole  40 mg Intravenous Q24H [facility] Reddy Bowling PA-C      phenol  1 spray Mouth/Throat Q2H PRN Britney Cifuentes ALYSA Isaacs      piperacillin-tazobactam  3 375 g Intravenous Travis Delvalle MD Stopped (08/07/22 0500)    propofol  5-50 mcg/kg/min Intravenous Titrated Osvaldo Dow PA-C Stopped (08/05/22 2330)    vasopressin (PITRESSIN) in 0 9 % sodium chloride 100 mL  0 04 Units/min Intravenous Continuous Walter Hernandez MD 0 04 Units/min (08/07/22 0004)       PRN Meds:   fentanyl citrate (PF)    methocarbamol    ondansetron    phenol    Continuous Infusions:Adult TPN (CUSTOM BASE/CUSTOM ELECTROLYTE), , Last Rate: 71 5 mL/hr at 08/06/22 2123  amiodarone, 0 5 mg/min, Last Rate: 0 5 mg/min (08/04/22 2115)  cisatracurium (NIMBEX) in 0 9% sodium chloride infusion, 0 1-5 mcg/kg/min, Last Rate: 1 mcg/kg/min (08/07/22 0515)  dexmedetomidine, 0 1-0 7 mcg/kg/hr, Last Rate: 0 3 mcg/kg/hr (08/07/22 0108)  epinephrine, 1-10 mcg/min, Last Rate: 2 mcg/min (08/07/22 0541)  epoprostenol, 6 25-50 ng/kg/min (Ideal), Last Rate: 50 ng/kg/min (08/07/22 0340)  fentaNYL, 100 mcg/hr, Last Rate: 100 mcg/hr (08/07/22 0345)  insulin regular (HumuLIN R,NovoLIN R) infusion, 0 3-21 Units/hr, Last Rate: 6 Units/hr (08/07/22 0416)  norepinephrine, 1-30 mcg/min, Last Rate: 20 mcg/min (08/07/22 0537)  NxStage K 4/Ca 3, 20,000 mL, Last Rate: Stopped (08/06/22 1013)  NxStage K 4/Ca 3, 20,000 mL  propofol, 5-50 mcg/kg/min, Last Rate: Stopped (08/05/22 2330)  vasopressin (PITRESSIN) in 0 9 % sodium chloride 100 mL, 0 04 Units/min, Last Rate: 0 04 Units/min (08/07/22 0004)          Invasive Devices:      Invasive Devices  Report    Peripherally Inserted Central Catheter Line  Duration           PICC Line 08/03/22 3 days          Central Venous Catheter Line  Duration           Port A Cath 03/30/22 Right Subclavian 129 days          Peripheral Intravenous Line  Duration           Peripheral IV 08/05/22 Distal;Left;Ventral (anterior) Wrist 1 day          Arterial Line  Duration           Arterial Line 08/06/22 Radial 1 day          Hemodialysis Catheter Duration           HD Temporary Double Catheter 1 day          Drain  Duration           Closed/Suction Drain Right RLQ Bulb 19 Fr  11 days    Urethral Catheter Temperature probe 16 Fr  5 days    NG/OG/Enteral Tube Enteral Feeding Tube Right nare 4 days    Abscess Drain LUQ 3 days          Airway  Duration           ETT  Oral;Cuffed; Hi-Lo 8 mm 1 day                  LABORATORY:    Results from last 7 days   Lab Units 08/07/22  0029 08/06/22  2356 08/06/22  1808 08/06/22  1220 08/06/22  0600 08/06/22  0129 08/05/22 2015 08/05/22  1848 08/05/22  1641 08/05/22  1155 08/05/22  0436 08/04/22  0403 08/03/22  1540 08/03/22  0438 08/02/22  2222 08/02/22  1650 08/02/22  0512 08/01/22  0528 08/01/22  0039   WBC Thousand/uL  --   --   --   --  12 80* 4 62  --   --   --   --  11 21* 14 91* 15 19* 11 79*  --  9 67 13 31* 11 89*  --    HEMOGLOBIN g/dL  --   --   --   --  8 6* 8 2*  --  7 8*  --   --  6 6* 7 4* 7 9* 8 2*  --  10 0* 9 4* 9 9*  --    I STAT HEMOGLOBIN g/dl  --  7 8*  --   --   --   --   --   --   --   --   --   --   --   --   --   --   --   --  9 5*   HEMATOCRIT %  --   --   --   --  27 9* 26 6*  --  24 4*  --   --  21 2* 22 9* 24 9* 25 2*  --  30 0* 29 6* 29 5*  --    HEMATOCRIT, ISTAT %  --  23*  --   --   --   --   --   --   --   --   --   --   --   --   --   --   --   --  28*   PLATELETS Thousands/uL  --   --   --   --  60*  --   --   --   --   --   --  112* 126* 95*  --  147* 126* 147*  --    POTASSIUM mmol/L 4 9  --  4 3 4 6 3 9 3 1* 3 2*  --  3 5   < > 3 8 3 9  --  4 0   < > 4 1 4 1 3 9  3 9  --    CHLORIDE mmol/L 108  --  109* 109* 111* 109* 104  --  103   < > 103 105  --  106   < > 106 106 106  106  --    CO2 mmol/L 28  --  25 23 22 23 23  --  22   < > 22 25  --  24   < > 22 24 23  23  --    CO2, I-STAT mmol/L  --  39*  --   --   --   --   --   --   --   --   --   --   --   --   --   --   --   --  24   BUN mg/dL 27*  --  31* 33* 34* 42* 45*  --  43*   < > 41* 32*  --  29*   < > 22 19 15  15  -- CREATININE mg/dL 1 39*  --  1 74* 2 18* 2 60* 2 97* 3 14*  --  3 14*   < > 2 80* 1 98*  --  1 40*   < > 1 14 1 00 1 03  1 03  --    CALCIUM mg/dL 7 9*  --  7 9* 8 0* 7 9* 7 7* 8 0*  --  7 8*   < > 8 0* 7 8*  --  8 4   < > 8 0* 7 5* 7 6*  7 6*  --    MAGNESIUM mg/dL 2 0  --  1 9 1 9 2 0 2 2 2 5  --   --   --  2 5 2 5  --  2 2  --  2 5 2 4 1 6  --    PHOSPHORUS mg/dL 2 9  --  2 3 3 0 2 5 3 8 3 7  --   --   --  4 8* 5 4*  --  4 8*  --  4 7* 4 8* 4 2*  --    GLUCOSE, ISTAT mg/dl  --  121  --   --   --   --   --   --   --   --   --   --   --   --   --   --   --   --  71    < > = values in this interval not displayed  rest all reviewed    RADIOLOGY:  XR chest portable ICU   Final Result by Agustin Mei DO (08/06 6050)      Stable airspace disease suggestive of pulmonary edema  Suspected left basilar effusion  Workstation performed: [id number]         XR chest portable   Final Result by Daisy 43,  (08/06 5841)      Stable pulmonary edema and left basilar effusion  Left-sided PICC line extends to the cavoatrial junction  Workstation performed: [id number]         XR chest portable   Final Result by Agustin Mei DO (08/06 2187)      PICC line tip is difficult to visualize but appears at the cavoatrial junction  Persistent pulmonary edema and suspected left basilar effusion  Workstation performed: [id number]         XR chest portable   Final Result by Agustin Mei DO (08/06 0809)      Tubes and lines as described above  Interval development of central vascular congestion with peripheral opacity suggestive of moderate pulmonary edema  A component of superimposed infection not excluded                    Workstation performed: [id number]         XR chest portable ICU   Final Result by Karin Macedo MD (08/05 1019)      Increased bilateral opacities, predominantly in the mid and lower lung fields, likely represents worsening edema with effusions  Concomitant infection is to be excluded on clinical grounds  Workstation performed: [id number]         XR abdomen 1 view kub   Final Result by Ritu Mott MD (08/05 1026)      Since August 2, 2022, persistent dilation of the small bowel, though the number of air-filled loops of dilated small bowel has decreased  Workstation performed: [id number]         IR drainage tube placement   Final Result by Kiera Macdonald MD (08/03 1934)   Impression: Successful placement of a 10 [place] all-purpose drainage catheter into the left upper abdominal fluid collection  Workstation performed: [id number]         IR PICC line placement double lumen   Final Result by Darline Harris MD (08/03 1637)   Impression: Technically successful placement of line  Workstation performed: [id number]         CT chest abdomen pelvis w contrast   Final Result by Rosa Ashford DO (08/02 1710)      1  History as above  Grossly stable 9 4 x 2 4 x 2 3 cm fluid collection in the pancreatectomy bed  No definitive evidence for enteric contrast extravasation from the distal gastrectomy staple line  Continued CT surveillance is suggested  Grossly    stable pneumoperitoneum  2  Small amount of abdominopelvic ascites and diffuse mesenteric edema, the former slightly increased from previous study  No definitive rim-enhancing collections to suggest abscess  3  Mildly dilated small bowel loop left midabdomen without focal transition to suggest mechanical obstruction  This could reflect transient peristalsis  Mild wall thickening small bowel loop in the right lower quadrant could represent infectious or    inflammatory enteritis  4  Thickening of the ascending colon could be secondary to underdistention, infectious/inflammatory colitis and/or element of portal hypertensive colopathy        5  Lobular surface contour of the liver suspicious for cirrhosis  6  Small bilateral pleural effusions and mild bilateral lower lobe compressive atelectasis, similar  7  Diffuse anasarca again noted  Additional incidental findings as above  Workstation performed: [id number]         XR chest portable ICU   Final Result by Perri Tay MD (15/86 3771)      Malpositioned enteric tube  However, correlation with subsequently performed radiograph of the abdomen demonstrates that the tube had been repositioned prior to dictation of the study  Bilateral pleural effusions  Workstation performed: [id number]         XR chest 1 view   Final Result by Perri Tay MD (23/48 9663)      Malpositioned enteric tube  However, correlation with subsequently performed radiograph of the abdomen demonstrates that the tube had been repositioned prior to dictation of the study  Bilateral pleural effusions  Workstation performed: [id number]         XR abdomen 1 vw portable   Final Result by Perri Tay MD (08/04 9953)      Tip of enteric tube projects over the stomach  Workstation performed: [id number]         CT chest abdomen pelvis w contrast   Final Result by Maury Forman MD (95/91 2618)   Addendum 1 of 1 by Maury Forman MD (08/01 9852)   ADDENDUM:         I personally discussed pertinent findings on this study with Royal Morris on 8/1/2022 at 9:45 AM                Final      CT chest:      New small bilateral pleural effusions with minimal adjacent subsegmental atelectasis  Findings may be sequela of fluid overload or third spacing among other etiologies  CT abdomen and pelvis:      Interval postsurgical changes as above  New pneumoperitoneum is nonspecific and likely related to recent surgery  Cannot entirely exclude staple line dehiscence at the partial distal gastrectomy staple line        Progressive small volume ascites, mesenteric and omental edema and anasarca  Findings may be sequela of fluid overload or third spacing among other etiologies  New partially loculated fluid collection in the post pancreatectomy bed measures approximately 9 5 x 2 5 x 2 8 cm  New diffuse bladder wall thickening with tiny gas droplets in the lumen of the bladder likely due to under distention and recent catheterization  Suggest correlation with UA to exclude cystitis  Stable 1 2 cm short axis anterior para-aortic low-density lymph node  The study was marked in [facility] for immediate notification  Workstation performed: [id number]         XR chest portable   Final Result by Madelin Lindsay MD (08/01 2981)      Bibasilar subsegmental atelectasis  Workstation performed: [id number]         XR abdomen 1 view kub   Final Result by Jigna Sosa MD (07/28 5160)      Limited examination as the right and lower abdomen is excluded from the study  Nonobstructive bowel gas pattern  Adequately positioned nasogastric tube  Workstation performed: [id number]         XR chest portable   Final Result by Garret Christie MD (07/27 6008)      Lines and tubes as described  No pneumothorax  Workstation performed: [id number]         IR drainage tube check/change/reposition/reinsertion/upsize    (Results Pending)   XR chest portable ICU    (Results Pending)     Rest all reviewed    Portions of the record may have been created with voice recognition software  Occasional wrong word or "sound a like" substitutions may have occurred due to the inherent limitations of voice recognition software  Read the chart carefully and recognize, using context, where substitutions have occurred  If you have any questions, please contact the dictating provider

## 2022-08-07 NOTE — PROGRESS NOTES
Daily Progress Note - Critical Care   Li Sinclair 67 y o  male MRN: [id number]  Unit/Bed#: [facility] 515-01 Encounter: [id number]        ----------------------------------------------------------------------------------------  HPI/24hr events: Bronched with no findings of aspiration  Changed to PC ventilation and added paralytic with Nimbex  O/n acute desaturation with hypotension  Required manual bagging, noted that Vent filter was clogged and pt  not getting volumes  Vent filter changed which resolved issues, now on APRV        ---------------------------------------------------------------------------------------  SUBJECTIVE  Examined pt  At bedside  Pt  Sedated and pharmacologically paralyzed    Review of Systems  Review of systems was unable to be performed secondary to acuity of condition  ---------------------------------------------------------------------------------------  Assessment and Plan:  Neuro:   · Chemical paralysis            Continue Nimbex until pulmonary status more stable            Train of four per nursing            Goal 2/4 twitches  · Analgesia  § Fentanyl gtt,   § PO Tylenol via OGT  · Sedation  § Continue Precedex  § Titrate to BIS goal of 50-60  § RASS goal -3  § PRN Versed if unable to meet goals      CV:   · Diagnosis: Shock   ? Likely septic with source - intra-abdominal infection and component of intravascular depletion   ? MAP goal >65  § Continue Vaso at 0 04  § Titrate Levophed as needed for MAP goal  ? Trend lactic acid  ? Continue Unasyn + micafungin + vancomycin   ? F/u cultures   § 8/4 fungal bcx pending  § 8/3 body fluid cx 4+ polys, 2+ yeast, 2+ GNR  § 8/2 bcx x 2 [id number]  · Diagnosis: Atrial fibrillation   ? Amiodarone infusion 0 5 mg/min   § Rebolus as needed for RVR     ? Holding systemic AC - discuss timing for starting with surgery team   ? Holding home atenolol with shock   ? 8/1 Echo EF 60% improved from 45% on 7/27, no RV dysfunction   ?  Holding home lasix in setting of shock, on CVVH        Pulm:  · Diagnosis:  Acute hypoxic respiratory failure Vent dependent  ? Intubated 8/5   ? Continue APRV  ? Wean vent as tolerated          Diagnosis: ARDS                          Severe per Susen Spikes Criteria                          Did not tolerate low stretch, Pplat 35                          Continue APRV                          Keep -525 if able                          Permissive Hypercapnia, PH goal >7 2  GI:   · Diagnosis: IPMN now s/p completion pancreatectomy, reduction of internal hernia and cholecystectomy on 7/26  ? 8/1 CT - new partially loculated fluid collection in post-pancreatectomy bed  ? 8/2 CT - stable fluid collection in pancreatectomy bed, no evidence of enteric contrast extravasation from staple line, stable pneumoperitoneum  ? 8/3 IR drain in L anterior collection   § F/u cultures   ? Increasing abdominal distension  § Likely no abdominal compartment syndrome on assessment  ? IV abx as below   ? Hold Feeds with hig pressor use  ? Continue TPN  ? Will need Creon when taking PO again   · Stress ulcer PPX: Pantoprazole IV  · Bowel regimen: senna/colace, Dulcolax suppository daily, miralax down NGT  ? Last BM: 7/31        :   · Diagnosis: TERESE with oliguria   ? Baseline Cr 0 8  ? Trend BUN/CR, Strict I/O   ? CVVH per nephrology  § Net even goal  § Hanna: Yes  § Urinary catheter still needed for Strict I and O in a critically ill patient  ? Urinary retention protocol when hanna removed         F/E/N:   · Fluid/Diuretic plan: No IVF while on TPN   · E: Replete for goal K >4, Phos >3, Mg >2   · N: TPN        Heme/Onc:   · Diagnosis: Anemia  ? In setting of critical illness, acute blood loss   ? Transfusion trigger Hgb<7 or active signs of bleeding  ? VTE PPX: SQH, SCDs        Endo:   · Diagnosis: T2DM  ? Now s/p pancreatectomy   ? Insulin gtt  ? BG goal 140-180  ?  Endocrinology following          ID:   · Diagnosis: Sepsis, possible intra-abdominal abscess   ? Unasyn   ? Micafungin  ? Vancomycin   § Blood cx - 1/2 streptococcus anginosus - ? contamination  § Blood cx - 1/2 negative x 72 hrs   § Blood cx - 2/2 negative x 48 hrs   § R drain cx - +2 strep, few colonies candidia, few colonies klebsiella    § L drain cx - GP rods, yeast  ? Trend fever curve and WBC count   ? ID consulted           MSK/Skin:   · At risk for deconditioning  · Frequent turning and repositioning   · PT/OT as appropriate   · LWC PRN       Disposition: Continue Critical Care   Code Status: Level 1 - Full Code  ---------------------------------------------------------------------------------------  ICU CORE MEASURES    Prophylaxis   VTE Pharmacologic Prophylaxis: Heparin  VTE Mechanical Prophylaxis: sequential compression device  Stress Ulcer Prophylaxis: Pantoprazole IV     ABCDE Protocol (if indicated)  Plan to perform spontaneous awakening trial today? No  Plan to perform spontaneous breathing trial today? Not applicable  Obvious barriers to extubation? Yes  CAM-ICU: Unable to assess, paralyzed    Invasive Devices Review  Invasive Devices  Report    Peripherally Inserted Central Catheter Line  Duration           PICC Line 08/03/22 3 days          Central Venous Catheter Line  Duration           Port A Cath 03/30/22 Right Subclavian 129 days          Peripheral Intravenous Line  Duration           Peripheral IV 08/05/22 Distal;Left;Ventral (anterior) Wrist 1 day          Arterial Line  Duration           Arterial Line 08/06/22 Radial 1 day          Hemodialysis Catheter  Duration           HD Temporary Double Catheter 1 day          Drain  Duration           Closed/Suction Drain Right RLQ Bulb 19 Fr  11 days    Urethral Catheter Temperature probe 16 Fr  5 days    NG/OG/Enteral Tube Enteral Feeding Tube Right nare 4 days    Abscess Drain LUQ 3 days          Airway  Duration           ETT  Oral;Cuffed; Hi-Lo 8 mm 1 day              Can any invasive devices be discontinued today? No  ---------------------------------------------------------------------------------------  OBJECTIVE    Vitals   Vitals:    22 0315 22 0400 22 0415 22 0430   BP: 94/54 112/75 99/62 93/50   BP Location:  Right arm     Pulse: 100 97     Resp: 16 16     Temp: (!) 97 2 °F (36 2 °C) (!) 97 16 °F (36 2 °C)     TempSrc:  Bladder     SpO2: 98% 97%     Weight:       Height:         Temp (24hrs), Av °F (36 1 °C), Min:96 44 °F (35 8 °C), Max:97 52 °F (36 4 °C)  Current: Temperature: (!) 97 16 °F (36 2 °C)      Respiratory:  SpO2: SpO2: 97 %  Nasal Cannula O2 Flow Rate (L/min): 6 L/min    Invasive/non-invasive ventilation settings   Respiratory  Report   Lab Data (Last 4 hours)    None         O2/Vent Data (Last 4 hours)       0347          T High (S) (sec) 3 4       T Low (S) (sec) 0 4       P High (cmH2O) (cm) 34       P Low (cmH2O) (cm) 0                   Physical Exam  Vitals and nursing note reviewed  Constitutional:       Appearance: He is ill-appearing and toxic-appearing  HENT:      Head: Normocephalic and atraumatic  Nose: Nose normal       Mouth/Throat:      Mouth: Mucous membranes are dry  Pharynx: Oropharynx is clear  Eyes:      General: Scleral icterus present  Pupils: Pupils are equal, round, and reactive to light  Neck:      Comments: Not assessed  Cardiovascular:      Rate and Rhythm: Regular rhythm  Tachycardia present  Pulses: Normal pulses  Heart sounds: Normal heart sounds  Pulmonary:      Breath sounds: Rhonchi and rales present  Abdominal:      General: There is distension  Palpations: Abdomen is soft  Comments: Hypoactive bowel sounds   Genitourinary:     Comments: Scrotal edema  Musculoskeletal:      Comments: Chemically paralyzed   Skin:     Capillary Refill: Capillary refill takes 2 to 3 seconds  Coloration: Skin is pale        Comments: Anasarca   Neurological:      Comments: Sedated, paralyzed   Psychiatric: Comments: Unable to assess         Laboratory and Diagnostics:  Results from last 7 days   Lab Units 08/06/22  2356 08/06/22  0600 08/06/22  0129 08/05/22  1848 08/05/22  0436 08/04/22  0403 08/03/22  1540 08/03/22  0438 08/02/22  1650 08/02/22  0512 08/01/22  0528   WBC Thousand/uL  --  12 80* 4 62  --  11 21* 14 91* 15 19* 11 79* 9 67 13 31* 11 89*   HEMOGLOBIN g/dL  --  8 6* 8 2* 7 8* 6 6* 7 4* 7 9* 8 2* 10 0* 9 4* 9 9*   I STAT HEMOGLOBIN g/dl 7 8*  --   --   --   --   --   --   --   --   --   --    HEMATOCRIT %  --  27 9* 26 6* 24 4* 21 2* 22 9* 24 9* 25 2* 30 0* 29 6* 29 5*   HEMATOCRIT, ISTAT % 23*  --   --   --   --   --   --   --   --   --   --    PLATELETS Thousands/uL  --  60*  --   --   --  112* 126* 95* 147* 126* 147*   NEUTROS PCT %  --   --   --   --   --  87*  --   --   --   --   --    BANDS PCT %  --   --   --   --   --   --   --   --   --  15*  --    MONOS PCT %  --   --   --   --   --  6  --   --   --   --   --    MONO PCT %  --   --  1*  --   --   --   --   --   --  4  --      Results from last 7 days   Lab Units 08/07/22  0029 08/06/22  2356 08/06/22  1808 08/06/22  1220 08/06/22  0600 08/06/22  0129 08/05/22 2015 08/05/22  1641 08/05/22  1155 08/05/22  0436 08/04/22  0403 08/03/22  0438 08/02/22 2222 08/02/22  1650 08/02/22  0512 08/01/22  0528   SODIUM mmol/L 139  --  138 138 141 143 138 137   < > 138 138 138   < > 138   < > 137  137   POTASSIUM mmol/L 4 9  --  4 3 4 6 3 9 3 1* 3 2* 3 5   < > 3 8 3 9 4 0   < > 4 1   < > 3 9  3 9   CHLORIDE mmol/L 108  --  109* 109* 111* 109* 104 103   < > 103 105 106   < > 106   < > 106  106   CO2 mmol/L 28  --  25 23 22 23 23 22   < > 22 25 24   < > 22   < > 23  23   CO2, I-STAT mmol/L  --  39*  --   --   --   --   --   --   --   --   --   --   --   --   --   --    ANION GAP mmol/L 3*  --  4 6 8 11 11 12   < > 13 8 8   < > 10   < > 8  8   BUN mg/dL 27*  --  31* 33* 34* 42* 45* 43*   < > 41* 32* 29*   < > 22   < > 15  15   CREATININE mg/dL 1 39*  -- 1 74* 2 18* 2 60* 2 97* 3 14* 3 14*   < > 2 80* 1 98* 1 40*   < > 1 14   < > 1 03  1 03   CALCIUM mg/dL 7 9*  --  7 9* 8 0* 7 9* 7 7* 8 0* 7 8*   < > 8 0* 7 8* 8 4   < > 8 0*   < > 7 6*  7 6*   GLUCOSE RANDOM mg/dL 115  --  131 124 104 190* 422* 475*   < > 410* 157* 84   < > 111   < > 62*  62*   ALT U/L  --   --   --   --  25  --   --   --   --  25 26 24  --  25  --  28   AST U/L  --   --   --   --  43  --   --   --   --  32 35 25  --  29  --  22   ALK PHOS U/L  --   --   --   --  191*  --   --   --   --  116 113 94  --  128*  --  99   ALBUMIN g/dL  --   --   --   --  2 0*  --   --   --   --  2 3* 2 1* 2 4*  --  1 8*  --  1 6*   TOTAL BILIRUBIN mg/dL  --   --   --   --  4 03*  --   --   --   --  3 74* 3 31* 2 96*  --  2 33*  --  1 72*    < > = values in this interval not displayed  Results from last 7 days   Lab Units 08/07/22  0029 08/06/22  1808 08/06/22  1220 08/06/22  0600 08/06/22  0129 08/05/22 2015 08/05/22  0436   MAGNESIUM mg/dL 2 0 1 9 1 9 2 0 2 2 2 5 2 5   PHOSPHORUS mg/dL 2 9 2 3 3 0 2 5 3 8 3 7 4 8*      Results from last 7 days   Lab Units 08/03/22  1540 08/03/22  0438 08/02/22  0512 08/01/22  1152   INR  1 61* 1 96* 1 78* 1 78*          Results from last 7 days   Lab Units 08/06/22  2041 08/06/22  1659 08/06/22  1622 08/06/22  1333 08/06/22  0600 08/06/22  0129 08/05/22  2316   LACTIC ACID mmol/L 3 3* 4 1* 3 6* 4 1* 5 7* 7 0* 5 6*     ABG:  Results from last 7 days   Lab Units 08/07/22  0052   PH ART  7 257*   PCO2 ART mm Hg 62 2*   PO2 ART mm Hg 65 5*   HCO3 ART mmol/L 27 1   BASE EXC ART mmol/L -0 5   ABG SOURCE  Line, Arterial     VBG:  Results from last 7 days   Lab Units 08/07/22  0052 08/01/22  0815 08/01/22  0011   PH ELIU   --   --  7 403*   PCO2 ELIU mm Hg  --   --  31 6*   PO2 ELIU mm Hg  --   --  50 9*   HCO3 ELIU mmol/L  --   --  19 3*   BASE EXC ELIU mmol/L  --   --  -4 6   ABG SOURCE  Line, Arterial   < >  --     < > = values in this interval not displayed             Micro  Results from last 7 days   Lab Units 08/03/22  1631 08/02/22  1225 08/01/22  0200 08/01/22  0036   BLOOD CULTURE   --  No Growth After 4 Days  No Growth After 4 Days  No Growth After 5 Days  Streptococcus anginosus*   GRAM STAIN RESULT  4+ Polys*  2+ Gram positive rods*  2+ Yeast*  --  3+ Polys  No bacteria seen Gram positive cocci in chains*   BODY FLUID CULTURE, STERILE  2+ Growth of Gram Negative Roland*  2+ Growth of Gram Negative Roland*  --  2+ Growth of Streptococcus anginosus*  Few Colonies of Candida albicans*  Few Colonies of Klebsiella pneumoniae*  1+ Growth of   Few Colonies of Beta Hemolytic Streptococcus Group C*  --        EKG: Tele reviewed   Imaging:  I have personally reviewed pertinent reports  and I have personally reviewed pertinent films in PACS    Intake and Output  I/O       08/05 0701 08/06 0700 08/06 0701 08/07 0700    P  O  0 0    I V  (mL/kg) 1371 6 (9 6) 3071 (20 5)    Blood 350     NG/ 340    IV Piggyback 1245 2 1679    TPN 1706 6 1416    Feedings 16     Total Intake(mL/kg) 4999 5 (35) 6506 (43 4)    Urine (mL/kg/hr) 1012 (0 3) 1648 (0 5)    Emesis/NG output 875 950    Drains 708 341    Other 456 3623    Stool 0 0    Total Output 3051 6562    Net +1948 5 -56          Unmeasured Stool Occurrence 0 x 0 x          Height and Weights   Height: 6' (182 9 cm)  IBW (Ideal Body Weight): 77 6 kg  Body mass index is 44 82 kg/m²    Weight (last 2 days)     Date/Time Weight    08/06/22 0754 150 (330 47)    08/05/22 0618 143 (314 82)            Nutrition       Diet Orders   (From admission, onward)             Start     Ordered    08/04/22 0925  Diet Enteral/Parenteral; Tube Feeding with Oral Diet; Vital AF 1 2; Continuous; 10; Surgical; NPO  Diet effective now        References:    Nutrtion Support Algorithm Enteral vs  Parenteral   Question Answer Comment   Diet Type Enteral/Parenteral    Enteral/Parenteral Tube Feeding with Oral Diet    Tube Feeding Formula: Vital AF 1 2 Bolus/Cyclic/Continuous Continuous    Tube Feeding Goal Rate (mL/hr): 10    Diet Type Surgical    Surgical NPO    RD to adjust diet per protocol?  No        08/04/22 0926                  Active Medications  Scheduled Meds:  Current Facility-Administered Medications   Medication Dose Route Frequency Provider Last Rate    acetaminophen  975 mg Per NG Tube Q8H Corrine Guerrero,       Adult TPN (CUSTOM BASE/CUSTOM ELECTROLYTE)   Intravenous Continuous TPN MIRNA Hernandez 71 5 mL/hr at 08/06/22 2123    Adult TPN (CUSTOM BASE/CUSTOM ELECTROLYTE)   Intravenous Continuous TPN MIRNA Hernandez      amiodarone  0 5 mg/min Intravenous Continuous Nicolás Alvine, CRNP 0 5 mg/min (08/04/22 2115)    bisacodyl  10 mg Rectal Daily Sheila Worthy PA-C      chlorhexidine  15 mL Mouth/Throat Q12H [address] J Carlos Barriga      cisatracurium (NIMBEX) in 0 9% sodium chloride infusion  0 1-5 mcg/kg/min Intravenous Titrated MIRNA Hernandez 1 5 mcg/kg/min (08/07/22 0610)    dexmedetomidine  0 1-0 7 mcg/kg/hr Intravenous Titrated MIRNA Hernandez 0 3 mcg/kg/hr (08/07/22 0108)    epinephrine  1-10 mcg/min Intravenous Titrated MIRNA Hernandez Stopped (08/07/22 0547)    epoprostenol  6 25-50 ng/kg/min (Ideal) Inhalation Titrated MIRNA Hernandez 50 ng/kg/min (08/07/22 0340)    fentaNYL  100 mcg/hr Intravenous Continuous Ng Arina, CRNP 100 mcg/hr (08/07/22 0345)    fentanyl citrate (PF)  50 mcg Intravenous Q1H PRN Eluterrubin Rubalcava PA-C      heparin (porcine)  5,000 Units Subcutaneous [facility] J Carlos Cabrera      hydrocortisone sodium succinate  50 mg Intravenous Q6H [facility] Ng Arina, CRNP      insulin regular (HumuLIN R,NovoLIN R) infusion  0 3-21 Units/hr Intravenous Titrated Sheila Worthy PA-C 6 Units/hr (08/07/22 0416)    iohexol  30 mL Oral 90 min pre-procedure Nicolás Alvine, CRNP      ipratropium  0 5 mg Nebulization Q6H Glynn Peace MD      levalbuterol  1 25 mg Nebulization Q6H Glynn Peace MD     [facility] methocarbamol  500 mg Oral Q6H PRN Karyle Freeze [place], CRNP      micafungin  100 mg Intravenous Q24H Melanie Gaines MD Stopped (08/06/22 1727)    norepinephrine  1-30 mcg/min Intravenous Titrated Verónica Dow PA-C 26 mcg/min (08/07/22 0601)    NxStage K 4/Ca 3  20,000 mL Dialysis Continuous Ng Arina, CRANDER Stopped (08/06/22 1013)    NxStage K 4/Ca 3  20,000 mL Dialysis Continuous Vimal PopMIRNA mast      ondansetron  4 mg Intravenous Q6H PRN Robin Vasquez PA-C      pantoprazole  40 mg Intravenous Q24H [address] Robin Vasquez PA-C      phenol  1 spray Mouth/Throat Q2H PRN Jay David PA-C      piperacillin-tazobactam  3 375 g Intravenous Q8H Melanie Gaines MD Stopped (08/07/22 0500)    propofol  5-50 mcg/kg/min Intravenous Titrated Verónicajameson Dow PA-C Stopped (08/05/22 2330)    vasopressin (PITRESSIN) in 0 9 % sodium chloride 100 mL  0 04 Units/min Intravenous Continuous Asuncion Royal MD 0 04 Units/min (08/07/22 0004)     Continuous Infusions:  Adult TPN (CUSTOM BASE/CUSTOM ELECTROLYTE), , Last Rate: 71 5 mL/hr at 08/06/22 2123  Adult TPN (CUSTOM BASE/CUSTOM ELECTROLYTE),   amiodarone, 0 5 mg/min, Last Rate: 0 5 mg/min (08/04/22 2115)  cisatracurium (NIMBEX) in 0 9% sodium chloride infusion, 0 1-5 mcg/kg/min, Last Rate: 1 5 mcg/kg/min (08/07/22 0610)  dexmedetomidine, 0 1-0 7 mcg/kg/hr, Last Rate: 0 3 mcg/kg/hr (08/07/22 0108)  epinephrine, 1-10 mcg/min, Last Rate: Stopped (08/07/22 0547)  epoprostenol, 6 25-50 ng/kg/min (Ideal), Last Rate: 50 ng/kg/min (08/07/22 0340)  fentaNYL, 100 mcg/hr, Last Rate: 100 mcg/hr (08/07/22 0345)  insulin regular (HumuLIN R,NovoLIN R) infusion, 0 3-21 Units/hr, Last Rate: 6 Units/hr (08/07/22 0416)  norepinephrine, 1-30 mcg/min, Last Rate: 26 mcg/min (08/07/22 0601)  NxStage K 4/Ca 3, 20,000 mL, Last Rate: Stopped (08/06/22 1013)  NxStage K 4/Ca 3, 20,000 mL  propofol, 5-50 mcg/kg/min, Last Rate: Stopped (08/05/22 2330)  vasopressin (PITRESSIN) in 0 9 % sodium chloride 100 mL, 0 04 Units/min, Last Rate: 0 04 Units/min (08/07/22 0004)      PRN Meds:   fentanyl citrate (PF), 50 mcg, Q1H PRN  methocarbamol, 500 mg, Q6H PRN  ondansetron, 4 mg, Q6H PRN  phenol, 1 spray, Q2H PRN        Allergies   No Known Allergies  ---------------------------------------------------------------------------------------  Advance Directive and Living Will:      Power of :    POLST:    ---------------------------------------------------------------------------------------  Care Time Delivered:   No Critical Care time spent     MIRNA Farley      Portions of the record may have been created with voice recognition software  Occasional wrong word or "sound a like" substitutions may have occurred due to the inherent limitations of voice recognition software    Read the chart carefully and recognize, using context, where substitutions have occurred

## 2022-08-07 NOTE — PROGRESS NOTES
Progress Note - Joshua Caban 67 y o  male MRN: [id number]    Unit/Bed#: [facility] 515-01 Encounter: [id number]      Assessment:  67 y o  male who presented with MD-IPMN s/p  IPMN, s/p Lap distal pancreatectomy (03/2019-Marlee), open subtotal pancreatectomy (02/2022-Marlee), now status post completion pancreatectomy with sims anastomosis, extensive SARAH, reduction of internal hernia and cholecystectomy on 7/26, now s/p IR drain placement on 8/3  Now w/ ARDS    Patient underwent bronch yesterday  Overnight worsening oxygenation, transitioned to APRV with improvement    APRV Phi 32, 70%    CVVH:babar [email or web address]  Vaso@0 04    STU: 311 serous  L IR drain:30 serous   UOP:1 648    Labs pending  ABG 7 25/62 2/65 5/27 1/-0 5  WBC 48 85 (12 80)  Hgb 8 1 (8 6)  Plt 21 (60)  Lactic pending    Plan:  -ARDS and vent management per ICU  -Continue CVVH  -Wean pressors as able  -Continue drains, monitor output  -Continue abx/antifungals per ID, appreciate recommendations  -Not stable enough for repeat CT scan  -Continue to trend endpoints  -Remainder of care per ICU    Subjective: On nimbex for ARDS  Was able to have levo weaned to 14 overnight, now coming back up this AM  Issues with oxygenation overnight as vent filter was clogged    Objective:     Vitals: Blood pressure 93/50, pulse 97, temperature (!) 97 16 °F (36 2 °C), temperature source Bladder, resp  rate 16, height 6' (1 829 m), weight (!) 150 kg (330 lb 0 5 oz), SpO2 97 %  ,Body mass index is 44 76 kg/m²  Intake/Output Summary (Last 24 hours) at 8/7/2022 4483  Last data filed at 8/7/2022 0500  Gross per 24 hour   Intake 6850 ml   Output 6995 ml   Net -145 ml       Physical Exam  General: Critically ill  HENT: intubated  Neck: supple, no JVD  CV: tachycardic  Lungs: mechanical breath sounds  ABD: Soft, nontender, distended  incsion c/d/i  STU x 2 with serous output  Extrem: No CCE  Neuro: paralyzed   GCS 3T      Scheduled Meds:  Current Facility-Administered Medications Medication Dose Route Frequency Provider Last Rate    acetaminophen  975 mg Per NG Tube Q8H Corrine Imperatore, DO      Adult TPN (CUSTOM BASE/CUSTOM ELECTROLYTE)   Intravenous Continuous TPN MIRNA Otto 71 5 mL/hr at 08/06/22 2123    Adult TPN (CUSTOM BASE/CUSTOM ELECTROLYTE)   Intravenous Continuous TPN MIRNA Otto      amiodarone  0 5 mg/min Intravenous Continuous Joseline  TooMIRNA 0 5 mg/min (08/07/22 0629)    bisacodyl  10 mg Rectal Daily Jose M Mcgarry PA-C      chlorhexidine  15 mL Mouth/Throat Q12H [address]      cisatracurium (NIMBEX) in 0 9% sodium chloride infusion  0 1-5 mcg/kg/min Intravenous Titrated MIRNA Otto 1 5 mcg/kg/min (08/07/22 0610)    dexmedetomidine  0 1-0 7 mcg/kg/hr Intravenous Titrated MIRNA Otto 0 3 mcg/kg/hr (08/07/22 2522)    epinephrine  1-10 mcg/min Intravenous Titrated MIRNA Otto Stopped (08/07/22 0547)    epoprostenol  6 25-50 ng/kg/min (Ideal) Inhalation Titrated LUCILLE OttoNP 50 ng/kg/min (08/07/22 0340)    fentaNYL  100 mcg/hr Intravenous Continuous Ng Arina, CRNP 100 mcg/hr (08/07/22 0345)    fentanyl citrate (PF)  50 mcg Intravenous Q1H PRN Cristopher Cisneros PA-C      heparin (porcine)  5,000 Units Subcutaneous [facility]      hydrocortisone sodium succinate  50 mg Intravenous Q6H [facility] MIRNA Otto      insulin regular (HumuLIN R,NovoLIN R) infusion  0 3-21 Units/hr Intravenous Titrated Jose M Mcgarry PA-C 3 Units/hr (08/07/22 0622)    iohexol  30 mL Oral 90 min pre-procedure MIRNA Philip      ipratropium  0 5 mg Nebulization Q6H Rafael Fleischer, MD      levalbuterol  1 25 mg Nebulization Q6H Rafael Fleischer, MD      methocarbamol  500 mg Oral Q6H PRN Joselinehugo Montesinos [place], MIRNA      micafungin  100 mg Intravenous Q24H Delia Espinoza MD Stopped (08/06/22 1727)    norepinephrine  1-30 mcg/min Intravenous Titrated Andrade Dow PA-C 25 mcg/min (08/07/22 0631)   [place] NxStage K 4/Ca 3 20,000 mL Dialysis Continuous Stephanie Ocean, CRNP Stopped (08/06/22 1013)    NxStage K 4/Ca 3  20,000 mL Dialysis Continuous Stephanie Ocean, CRNP      ondansetron  4 mg Intravenous Q6H PRN Jt Downs PA-C      pantoprazole  40 mg Intravenous Q24H [address] Jt Downs PA-C      phenol  1 spray Mouth/Throat Q2H PRN Alphonzo Bumpers, PA-C      piperacillin-tazobactam  3 375 g Intravenous Q8H Lacy MD Hamlet Stopped (08/07/22 0500)    propofol  5-50 mcg/kg/min Intravenous Titrated Ines Dow PA-C Stopped (08/05/22 2330)    vasopressin (PITRESSIN) in 0 9 % sodium chloride 100 mL  0 04 Units/min Intravenous Continuous Irving Ruby MD 0 04 Units/min (08/07/22 0004)     Continuous Infusions:Adult TPN (CUSTOM BASE/CUSTOM ELECTROLYTE), , Last Rate: 71 5 mL/hr at 08/06/22 2123  Adult TPN (CUSTOM BASE/CUSTOM ELECTROLYTE),   amiodarone, 0 5 mg/min, Last Rate: 0 5 mg/min (08/07/22 0629)  cisatracurium (NIMBEX) in 0 9% sodium chloride infusion, 0 1-5 mcg/kg/min, Last Rate: 1 5 mcg/kg/min (08/07/22 0610)  dexmedetomidine, 0 1-0 7 mcg/kg/hr, Last Rate: 0 3 mcg/kg/hr (08/07/22 0628)  epinephrine, 1-10 mcg/min, Last Rate: Stopped (08/07/22 0547)  epoprostenol, 6 25-50 ng/kg/min (Ideal), Last Rate: 50 ng/kg/min (08/07/22 0340)  fentaNYL, 100 mcg/hr, Last Rate: 100 mcg/hr (08/07/22 0345)  insulin regular (HumuLIN R,NovoLIN R) infusion, 0 3-21 Units/hr, Last Rate: 3 Units/hr (08/07/22 0622)  norepinephrine, 1-30 mcg/min, Last Rate: 25 mcg/min (08/07/22 0631)  NxStage K 4/Ca 3, 20,000 mL, Last Rate: Stopped (08/06/22 1013)  NxStage K 4/Ca 3, 20,000 mL  propofol, 5-50 mcg/kg/min, Last Rate: Stopped (08/05/22 2330)  vasopressin (PITRESSIN) in 0 9 % sodium chloride 100 mL, 0 04 Units/min, Last Rate: 0 04 Units/min (08/07/22 0004)      PRN Meds:   fentanyl citrate (PF)    methocarbamol    ondansetron    phenol      Invasive Devices  Report    Peripherally Inserted Central Catheter Line  Duration           PICC Line 08/03/22 3 days Central Venous Catheter Line  Duration           Port A Cath 03/30/22 Right Subclavian 129 days          Peripheral Intravenous Line  Duration           Peripheral IV 08/05/22 Distal;Left;Ventral (anterior) Wrist 1 day          Arterial Line  Duration           Arterial Line 08/06/22 Radial 1 day          Hemodialysis Catheter  Duration           HD Temporary Double Catheter 1 day          Drain  Duration           Closed/Suction Drain Right RLQ Bulb 19 Fr  11 days    Urethral Catheter Temperature probe 16 Fr  5 days    NG/OG/Enteral Tube Enteral Feeding Tube Right nare 4 days    Abscess Drain LUQ 3 days          Airway  Duration           ETT  Oral;Cuffed; Hi-Lo 8 mm 1 day                Lab, Imaging and other studies: I have personally reviewed pertinent reports      VTE Pharmacologic Prophylaxis: Sequential compression device (Venodyne)   VTE Mechanical Prophylaxis: sequential compression device

## 2022-08-07 NOTE — PROGRESS NOTES
Critical Care Services- Interval Progress Note   Lorren Homans 67 y o  male MRN: [id number]  Unit/Bed#: [facility] 515-01 Encounter: [id number]  Assessment and Plan  Interval Events:  Contacted by primary nurse due to hypoxia and noted low TV on ventilator  Came to bedside and found patient to be saturating 85% with TV 250cc  Patient with bilateral breath sounds, no obvious ETT balloon leak  STAT CXR ordered  Initially attempted to increased Pi and patient on PC with no improvement in TV, additionally FiO2 increased to 100% from 85%  At that point patient started to become hemodynamically unstable presumed from hypercarbia creating worsening acidosis prompting removal of patient off of ventilator and was manually bagged  With bagging patient improved on O2, but became hypotensive  Patient given 2 amps of bicarb for presumed acidosis and epinephrine gtt started  Patient quickly recovered both in saturation and hemodynamics  Ventilator found to have clogged filter restricting TV   Diagnosis: Acute hypoxic/hypercarbic respiratory failure in the setting of severe ARDS  o Plan:   - Changed filter on ventilator   - Changed vent settings to APRV 34/0 16 releases  - Check STAT ABG in 30 minutes to ensure adequate CO2 clearance       Upon my evaluation, this patient had a high probability of imminent or life-threatening deterioration due to acute hypercapneic/hypoxic respiratory failure in setting of severe ARDS, which required my direct attention, intervention, and personal management  I have personally provided 60 minutes ([phone] to [phone]) on 08/06/22-08/07/22 of critical care time, exclusive of procedures, teaching, family meetings, and any prior time recorded by providers other than myself  Time includes review of laboratory data, review of imaging/radiology results, monitoring for potential decompensation  Interventions were performed as documented above  -------------------------------------------------------------------------------------------------------------------------------------  Hemodynamic Monitoring:  Vital Signs:   HR: 102  BP: 103/55  MAP: 74  RR: 16  SpO2: 86 on 100% oxygen  Cardiac Monitoring:   Telemetry rhythm: afib      Laboratory   Results from last 7 days   Lab Units 08/06/22  2356 08/06/22  0600 08/06/22  0129 08/05/22  1848 08/05/22  0436 08/04/22  0403 08/03/22  1540 08/03/22  0438 08/02/22  1650 08/02/22  0512 08/01/22  0528   WBC Thousand/uL  --  12 80* 4 62  --  11 21* 14 91* 15 19* 11 79* 9 67 13 31* 11 89*   HEMOGLOBIN g/dL  --  8 6* 8 2* 7 8* 6 6* 7 4* 7 9* 8 2* 10 0* 9 4* 9 9*   I STAT HEMOGLOBIN g/dl 7 8*  --   --   --   --   --   --   --   --   --   --    HEMATOCRIT %  --  27 9* 26 6* 24 4* 21 2* 22 9* 24 9* 25 2* 30 0* 29 6* 29 5*   HEMATOCRIT, ISTAT % 23*  --   --   --   --   --   --   --   --   --   --    PLATELETS Thousands/uL  --  60*  --   --   --  112* 126* 95* 147* 126* 147*   NEUTROS PCT %  --   --   --   --   --  87*  --   --   --   --   --    BANDS PCT %  --   --   --   --   --   --   --   --   --  15*  --    MONOS PCT %  --   --   --   --   --  6  --   --   --   --   --    MONO PCT %  --   --  1*  --   --   --   --   --   --  4  --      Results from last 7 days   Lab Units 08/06/22  2356 08/06/22  1808 08/06/22  1220 08/06/22  0600 08/06/22  0129 08/05/22 2015 08/05/22  1641 08/05/22  1155 08/05/22  0436 08/04/22  0403 08/03/22  0438 08/02/22  2222 08/02/22  1650 08/02/22  0512 08/01/22  0528   SODIUM mmol/L  --  138 138 141 143 138 137 138 138 138 138   < > 138   < > 137  137   POTASSIUM mmol/L  --  4 3 4 6 3 9 3 1* 3 2* 3 5 3 7 3 8 3 9 4 0   < > 4 1   < > 3 9  3 9   CHLORIDE mmol/L  --  109* 109* 111* 109* 104 103 102 103 105 106   < > 106   < > 106  106   CO2 mmol/L  --  25 23 22 23 23 22 22 22 25 24   < > 22   < > 23  23   CO2, I-STAT mmol/L 39*  --   --   --   --   --   --   --   --   --   --   --   --   --   -- ANION GAP mmol/L  --  4 6 8 11 11 12 14* 13 8 8   < > 10   < > 8  8   BUN mg/dL  --  31* 33* 34* 42* 45* 43* 41* 41* 32* 29*   < > 22   < > 15  15   CREATININE mg/dL  --  1 74* 2 18* 2 60* 2 97* 3 14* 3 14* 3 02* 2 80* 1 98* 1 40*   < > 1 14   < > 1 03  1 03   CALCIUM mg/dL  --  7 9* 8 0* 7 9* 7 7* 8 0* 7 8* 7 9* 8 0* 7 8* 8 4   < > 8 0*   < > 7 6*  7 6*   GLUCOSE RANDOM mg/dL  --  131 124 104 190* 422* 475* 467* 410* 157* 84   < > 111   < > 62*  62*   ALT U/L  --   --   --  25  --   --   --   --  25 26 24  --  25  --  28   AST U/L  --   --   --  43  --   --   --   --  32 35 25  --  29  --  22   ALK PHOS U/L  --   --   --  191*  --   --   --   --  116 113 94  --  128*  --  99   ALBUMIN g/dL  --   --   --  2 0*  --   --   --   --  2 3* 2 1* 2 4*  --  1 8*  --  1 6*   TOTAL BILIRUBIN mg/dL  --   --   --  4 03*  --   --   --   --  3 74* 3 31* 2 96*  --  2 33*  --  1 72*    < > = values in this interval not displayed       Results from last 7 days   Lab Units 08/06/22  1808 08/06/22  1220 08/06/22  0600 08/06/22  0129 08/05/22 2015 08/05/22  0436 08/04/22  0403   MAGNESIUM mg/dL 1 9 1 9 2 0 2 2 2 5 2 5 2 5   PHOSPHORUS mg/dL 2 3 3 0 2 5 3 8 3 7 4 8* 5 4*      Results from last 7 days   Lab Units 08/03/22  1540 08/03/22  0438 08/02/22  0512 08/01/22  1152   INR  1 61* 1 96* 1 78* 1 78*          Results from last 7 days   Lab Units 08/06/22  2041 08/06/22  1659 08/06/22  1622 08/06/22  1333 08/06/22  0600 08/06/22  0129 08/05/22  2316   LACTIC ACID mmol/L 3 3* 4 1* 3 6* 4 1* 5 7* 7 0* 5 6*     ABG:  Results from last 7 days   Lab Units 08/06/22  2153   PH ART  7 258*   PCO2 ART mm Hg 58 0*   PO2 ART mm Hg 79 0   HCO3 ART mmol/L 25 3   BASE EXC ART mmol/L -2 1   ABG SOURCE  Line, Arterial     VBG:  Results from last 7 days   Lab Units 08/06/22  2153 08/01/22  0815 08/01/22  0011   PH ELIU   --   --  7 403*   PCO2 ELIU mm Hg  --   --  31 6*   PO2 ELIU mm Hg  --   --  50 9*   HCO3 ELIU mmol/L  --   --  19 3*   BASE EXC ELIU mmol/L  --   --  -4 6   ABG SOURCE  Line, Arterial   < >  --     < > = values in this interval not displayed  Micro  Results from last 7 days   Lab Units 08/03/22  1631 08/02/22  1225 08/01/22  0200 08/01/22  0036   BLOOD CULTURE   --  No Growth After 4 Days  No Growth After 4 Days  No Growth After 5 Days   Streptococcus anginosus*   GRAM STAIN RESULT  4+ Polys*  2+ Gram positive rods*  2+ Yeast*  --  3+ Polys  No bacteria seen Gram positive cocci in chains*   BODY FLUID CULTURE, STERILE  2+ Growth of Gram Negative Roland*  2+ Growth of Gram Negative Roland*  --  2+ Growth of Streptococcus anginosus*  Few Colonies of Candida albicans*  Few Colonies of Klebsiella pneumoniae*  1+ Growth of   Few Colonies of Beta Hemolytic Streptococcus Group C*  --        Diagnostic Imaging / Data: CXR with no obvious PTX and ETT in reasonable position I have personally reviewed pertinent films in PACS    Medications:  Current Facility-Administered Medications   Medication Dose Route Frequency    acetaminophen (TYLENOL) oral suspension 975 mg  975 mg Per NG Tube Q8H    Adult 3-in-1 TPN (custom base / custom electrolytes)   Intravenous Continuous TPN    amiodarone (CORDARONE) 900 mg in dextrose 5 % 500 mL infusion  0 5 mg/min Intravenous Continuous    bisacodyl (DULCOLAX) rectal suppository 10 mg  10 mg Rectal Daily    chlorhexidine (PERIDEX) 0 12 % oral rinse 15 mL  15 mL Mouth/Throat Q12H [facility]    cisatracurium (NIMBEX) 200 mg in sodium chloride 0 9 % 500 mL infusion  0 1-5 mcg/kg/min Intravenous Titrated    dexmedeTOMIDine (Precedex) 400 mcg in sodium chloride 0 9% 100 mL  0 1-0 7 mcg/kg/hr Intravenous Titrated    EPINEPHrine 3000 mcg (STANDARD CONCENTRATION) IV in sodium chloride 0 9% 250 mL  1-10 mcg/min Intravenous Titrated    epoprostenol (VELETRI) 30,000 ng/mL in sodium chloride 0 9 % 50 mL inhalation solution  6 25-50 ng/kg/min (Ideal) Inhalation Titrated    fentaNYL 1000 mcg in sodium chloride 0 9% 100mL infusion  100 mcg/hr Intravenous Continuous    fentanyl citrate (PF) 100 MCG/2ML 50 mcg  50 mcg Intravenous Q1H PRN    heparin (porcine) subcutaneous injection 5,000 Units  5,000 Units Subcutaneous Q8H [facility]    hydrocortisone sodium succinate (PF) (Solu-CORTEF) injection 50 mg  50 mg Intravenous Q6H [facility]    insulin regular (HumuLIN R,NovoLIN R) 1 Units/mL in sodium chloride 0 9 % 100 mL infusion  0 3-21 Units/hr Intravenous Titrated    iohexol (OMNIPAQUE) 240 MG/ML solution 30 mL  30 mL Oral 90 min pre-procedure    ipratropium (ATROVENT) 0 02 % inhalation solution 0 5 mg  0 5 mg Nebulization Q6H    levalbuterol (XOPENEX) inhalation solution 1 25 mg  1 25 mg Nebulization Q6H    methocarbamol (ROBAXIN) tablet 500 mg  500 mg Oral Q6H PRN    micafungin (MYCAMINE) 100 mg in sodium chloride 0 9 % 100 mL IVPB  100 mg Intravenous Q24H    norepinephrine (LEVOPHED) 8 mg (DOUBLE CONCENTRATION) IV in sodium chloride 0 9% 250 mL  1-30 mcg/min Intravenous Titrated    NxStage K 4/Ca 3 dialysis solution (RFP-401) 20,000 mL  20,000 mL Dialysis Continuous    NxStage K 4/Ca 3 dialysis solution (RFP-401) 20,000 mL  20,000 mL Dialysis Continuous    ondansetron (ZOFRAN) injection 4 mg  4 mg Intravenous Q6H PRN    pantoprazole (PROTONIX) injection 40 mg  40 mg Intravenous Q24H SHAHLA    phenol (CHLORASEPTIC) 1 4 % mucosal liquid 1 spray  1 spray Mouth/Throat Q2H PRN    piperacillin-tazobactam (ZOSYN) 3 375 g in sodium chloride 0 9 % 100 mL IVPB  3 375 g Intravenous Q8H    propofol (DIPRIVAN) 1000 mg in 100 mL infusion (premix)  5-50 mcg/kg/min Intravenous Titrated    vasopressin (PITRESSIN) 20 Units in sodium chloride 0 9 % 100 mL infusion  0 04 Units/min Intravenous Continuous       SIGNATURE: Benjamin Mccormick PA-C    Portions of the record may have been created with voice recognition software    Occasional wrong word or "sound a like" substitutions may have occurred due to the inherent limitations of voice recognition software    Read the chart carefully and recognize, using context, where substitutions have occurred

## 2022-08-07 NOTE — PLAN OF CARE
Problem: MOBILITY - ADULT  Goal: Maintain or return to baseline ADL function  Description: INTERVENTIONS:  -  Assess patient's ability to carry out ADLs; assess patient's baseline for ADL function and identify physical deficits which impact ability to perform ADLs (bathing, care of mouth/teeth, toileting, grooming, dressing, etc )  - Assess/evaluate cause of self-care deficits   - Assess range of motion  - Assess patient's mobility; develop plan if impaired  - Assess patient's need for assistive devices and provide as appropriate  - Encourage maximum independence but intervene and supervise when necessary  - Involve family in performance of ADLs  - Assess for home care needs following discharge   - Consider OT consult to assist with ADL evaluation and planning for discharge  - Provide patient education as appropriate  Outcome: Progressing  Goal: Maintains/Returns to pre admission functional level  Description: INTERVENTIONS:  - Perform BMAT or MOVE assessment daily    - Set and communicate daily mobility goal to care team and patient/family/caregiver  - Collaborate with rehabilitation services on mobility goals if consulted  - Perform Range of Motion 3 times a day  - Reposition patient every 2 hours    - Dangle patient 3 times a day  - Stand patient 3 times a day  - Ambulate patient 3 times a day  - Out of bed to chair 3 times a day   - Out of bed for meals 3 times a day  - Out of bed for toileting  - Record patient progress and toleration of activity level   Outcome: Progressing     Problem: Prexisting or High Potential for Compromised Skin Integrity  Goal: Skin integrity is maintained or improved  Description: INTERVENTIONS:  - Identify patients at risk for skin breakdown  - Assess and monitor skin integrity  - Assess and monitor nutrition and hydration status  - Monitor labs   - Assess for incontinence   - Turn and reposition patient  - Assist with mobility/ambulation  - Relieve pressure over bony prominences  - Avoid friction and shearing  - Provide appropriate hygiene as needed including keeping skin clean and dry  - Evaluate need for skin moisturizer/barrier cream  - Collaborate with interdisciplinary team   - Patient/family teaching  - Consider wound care consult   Outcome: Progressing     Problem: Nutrition/Hydration-ADULT  Goal: Nutrient/Hydration intake appropriate for improving, restoring or maintaining nutritional needs  Description: Monitor and assess patient's nutrition/hydration status for malnutrition  Collaborate with interdisciplinary team and initiate plan and interventions as ordered  Monitor patient's weight and dietary intake as ordered or per policy  Utilize nutrition screening tool and intervene as necessary  Determine patient's food preferences and provide high-protein, high-caloric foods as appropriate       INTERVENTIONS:  - Monitor oral intake, urinary output, labs, and treatment plans  - Assess nutrition and hydration status and recommend course of action  - Evaluate amount of meals eaten  - Assist patient with eating if necessary   - Allow adequate time for meals  - Recommend/ encourage appropriate diets, oral nutritional supplements, and vitamin/mineral supplements  - Order, calculate, and assess calorie counts as needed  - Recommend, monitor, and adjust tube feedings and TPN/PPN based on assessed needs  - Assess need for intravenous fluids  - Provide specific nutrition/hydration education as appropriate  - Include patient/family/caregiver in decisions related to nutrition  Outcome: Progressing     Problem: Potential for Falls  Goal: Patient will remain free of falls  Description: INTERVENTIONS:  - Educate patient/family on patient safety including physical limitations  - Instruct patient to call for assistance with activity   - Consult OT/PT to assist with strengthening/mobility   - Keep Call bell within reach  - Keep bed low and locked with side rails adjusted as appropriate  - Keep care items and personal belongings within reach  - Initiate and maintain comfort rounds  - Make Fall Risk Sign visible to staff  - Offer Toileting every 2 Hours, in advance of need  - Initiate/Maintain bed alarm  - Obtain necessary fall risk management equipment:  - Apply yellow socks and bracelet for high fall risk patients  - Consider moving patient to room near nurses station  Outcome: Progressing     Problem: INFECTION - ADULT  Goal: Absence or prevention of progression during hospitalization  Description: INTERVENTIONS:  - Assess and monitor for signs and symptoms of infection  - Monitor lab/diagnostic results  - Monitor all insertion sites, i e  indwelling lines, tubes, and drains  - Monitor endotracheal if appropriate and nasal secretions for changes in amount and color  - Leslie appropriate cooling/warming therapies per order  - Administer medications as ordered  - Instruct and encourage patient and family to use good hand hygiene technique  - Identify and instruct in appropriate isolation precautions for identified infection/condition  Outcome: Progressing  Goal: Absence of fever/infection during neutropenic period  Description: INTERVENTIONS:  - Monitor WBC    Outcome: Progressing     Problem: SAFETY ADULT  Goal: Maintain or return to baseline ADL function  Description: INTERVENTIONS:  -  Assess patient's ability to carry out ADLs; assess patient's baseline for ADL function and identify physical deficits which impact ability to perform ADLs (bathing, care of mouth/teeth, toileting, grooming, dressing, etc )  - Assess/evaluate cause of self-care deficits   - Assess range of motion  - Assess patient's mobility; develop plan if impaired  - Assess patient's need for assistive devices and provide as appropriate  - Encourage maximum independence but intervene and supervise when necessary  - Involve family in performance of ADLs  - Assess for home care needs following discharge   - Consider OT consult to assist with ADL evaluation and planning for discharge  - Provide patient education as appropriate  Outcome: Progressing  Goal: Maintains/Returns to pre admission functional level  Description: INTERVENTIONS:  - Perform BMAT or MOVE assessment daily    - Set and communicate daily mobility goal to care team and patient/family/caregiver  - Collaborate with rehabilitation services on mobility goals if consulted  - Perform Range of Motion 3 times a day  - Reposition patient every 2 hours    - Dangle patient 3 times a day  - Stand patient 3 times a day  - Ambulate patient 3 times a day  - Out of bed to chair 3 times a day   - Out of bed for meals 3 times a day  - Out of bed for toileting  - Record patient progress and toleration of activity level   Outcome: Progressing  Goal: Patient will remain free of falls  Description: INTERVENTIONS:  - Educate patient/family on patient safety including physical limitations  - Instruct patient to call for assistance with activity   - Consult OT/PT to assist with strengthening/mobility   - Keep Call bell within reach  - Keep bed low and locked with side rails adjusted as appropriate  - Keep care items and personal belongings within reach  - Initiate and maintain comfort rounds  - Make Fall Risk Sign visible to staff  - Offer Toileting every 2 Hours, in advance of need  - Initiate/Maintain bed alarm  - Obtain necessary fall risk management equipment:   - Apply yellow socks and bracelet for high fall risk patients  - Consider moving patient to room near nurses station  Outcome: Progressing     Problem: DISCHARGE PLANNING  Goal: Discharge to home or other facility with appropriate resources  Description: INTERVENTIONS:  - Identify barriers to discharge w/patient and caregiver  - Arrange for needed discharge resources and transportation as appropriate  - Identify discharge learning needs (meds, wound care, etc )  - Arrange for interpretive services to assist at discharge as needed  - Refer to Case Management Department for coordinating discharge planning if the patient needs post-hospital services based on physician/advanced practitioner order or complex needs related to functional status, cognitive ability, or social support system  Outcome: Progressing     Problem: Knowledge Deficit  Goal: Patient/family/caregiver demonstrates understanding of disease process, treatment plan, medications, and discharge instructions  Description: Complete learning assessment and assess knowledge base    Interventions:  - Provide teaching at level of understanding  - Provide teaching via preferred learning methods  Outcome: Progressing     Problem: PAIN - ADULT  Goal: Verbalizes/displays adequate comfort level or baseline comfort level  Description: Interventions:  - Encourage patient to monitor pain and request assistance  - Assess pain using appropriate pain scale  - Administer analgesics based on type and severity of pain and evaluate response  - Implement non-pharmacological measures as appropriate and evaluate response  - Consider cultural and social influences on pain and pain management  - Notify physician/advanced practitioner if interventions unsuccessful or patient reports new pain  Outcome: Not Progressing

## 2022-08-07 NOTE — PROGRESS NOTES
Progress Note - Infectious Disease   Herminia Harrell 67 y o  male MRN: [id number]  Unit/Bed#: [facility] 515-01 Encounter: [id number]      Impression/Recommendations:  1   Septic shock   Evolving 7/31:  Fever, HR, refractory hypotension   Due to #2/3   No other appreciable source   ROS limited by lethargy   Exam otherwise benign   UA, LFTs, CT chest negative   Fevers, WBC initially improved with antibiotics, drainage, but now clinically worse, critically ill in setting of aspiration during intubation, progressive renal dysfunction , volume overload  Rec:  · Continue antibiotics as below  · Follow temperatures closely  · Check CBC in AM  · Supportive care as per the primary service     2   Strep anginosus bacteremia   Low-grade with 1 of 2 positive   Due to #3   No other appreciable source   Has Portacath but low suspicion for infection   Repeat blood cultures 8/2, TTE negative     Rec:  · Continue antibiotics as below  · Follow up final repeat blood cultures  · Will need 2 weeks IV antibiotics     3   LUQ abscess   In setting of #5   S  Anginosus, Candida, Klebsiella, Group C Strep from exisiting surgical drain   Status post IR-guided drain 8/3 yielding cloudy, thick fluid   Drain cultures with GNR x2  Gwendalyn Crisp stain with yeast   No radiographic evidence of leak from distal gastrectomy staple line  Rec:  · Continue Zosyn for now  · Continue micafungin for now (drug-drug interaction between Fluconazole and Amiodarone given potential for QTc prolongation)  · Follow up IR drain cultures and tailor antibiotics as indicated  · Follow drain outputs closely     4   Acute hypoxic respiratory failure   Suspect initially due to volume overload, encephalopathy   No clinical or radiographic evidence of neumonia  Status post intubation 8/5  Concern for aspiration, ARDS, on maximal ventilatory support  Bronch 8/6 negative for secretions or purulence  Rec:  · Volume management per Nephrology  · Supportive care per [facility]     5   Intraductal papillary mucinous neoplasm   Status post distal pancreatectomy 2019, open subtotal pancreatectomy 2022   Now admitted for completion pancreatectomy with sims anastomosis, extensive SARAH, reduction of densely adherent internal hernia,cholecystectomy 22   Postoperative course complicated by above      6   TERESE   Likely multifactorial due partly due to infection, shock as above  Rec:  · CVVHD and volume management per Nephrology with close follow-up ongoing  · Dose adjust antibiotics for CRRT  · Recheck BMP in AM    7   Afib with RVR   On Amiodarone     8   Cirrhosis   In setting of chronic alcohol use   Bilirubin rising      9   DM   Recent A1c 8 7      10   Chronic thrombocytopenia      The above plan was discussed in detail with the patient's wife at the bedside  Antibiotics:  Zosyn #2  Antibiotics #7  Micafungin #4    Subjective:  Patient seen on AM rounds  Unable to obtain ROS due to intubation  24 Hour Events:  Paralyzed and sedated  FiO2 down to 50% on vent  On CRRT, starting to remove fluid today  No fevers but WBC shot up  Started on stress steroids yesterday  Objective:  Vitals:  Temp:  [96 44 °F (35 8 °C)-98 06 °F (36 7 °C)] 97 88 °F (36 6 °C)  HR:  [] 102  Resp:  [14-56] 14  BP: ()/(43-86) 118/58  SpO2:  [81 %-100 %] 97 %  Temp (24hrs), Av 2 °F (36 2 °C), Min:96 44 °F (35 8 °C), Max:98 06 °F (36 7 °C)  Current: Temperature: 97 88 °F (36 6 °C)    Physical Exam:   General:  No acute distress  Eyes:  Normal lids, eyes closed  ENT:  Normal external ears and nose  Neck:  Neck symmetric with midline trachea  Pulmonary:  Ventilated respiratory effort without accessory muscle use  Cardiovascular:  Tachycardia on monitor; massive anasarca  Gastrointestinal:  Obese, JPs with serous fluid  Musculoskeletal:  No digital clubbing or cyanosis  Skin:  No visible rashes;  No palpable nodules  Neurologic:  Not moving extremities due to paralytics  Psychiatric:  Inbutated and sedated    Lab Results:  I have personally reviewed pertinent labs  Results from last 7 days   Lab Units 08/07/22  1202 08/07/22  0922 08/07/22  0618 08/07/22  0029 08/06/22  2356 08/06/22  1220 08/06/22  0600 08/05/22  1155 08/05/22  0436 08/01/22  0528 08/01/22  0039   POTASSIUM mmol/L 4 5  --  4 5 4 9  --    < > 3 9   < > 3 8   < >  --    CHLORIDE mmol/L 108  --  109* 108  --    < > 111*   < > 103   < >  --    CO2 mmol/L 29  --  26 28  --    < > 22   < > 22   < >  --    CO2, I-STAT mmol/L  --   --   --   --  39*  --   --   --   --   --  24   BUN mg/dL 24  --  23 27*  --    < > 34*   < > 41*   < >  --    CREATININE mg/dL 1 17  --  1 27 1 39*  --    < > 2 60*   < > 2 80*   < >  --    EGFR ml/min/1 73sq m 61  --  55 49  --    < > 23   < > 21   < >  --    GLUCOSE, ISTAT mg/dl  --   --   --   --  121  --   --   --   --   --  71   CALCIUM mg/dL 8 1*  --  7 5* 7 9*  --    < > 7 9*   < > 8 0*   < >  --    AST U/L  --  64*  --   --   --   --  43  --  32   < >  --    ALT U/L  --  26  --   --   --   --  25  --  25   < >  --    ALK PHOS U/L  --  142*  --   --   --   --  191*  --  116   < >  --     < > = values in this interval not displayed  Results from last 7 days   Lab Units 08/07/22  0922 08/07/22  0802 08/07/22  0618   WBC Thousand/uL 53 13* 53 82* 48 85*   HEMOGLOBIN g/dL 8 3* 8 2* 8 1*   PLATELETS Thousands/uL 20* 22* 21*     Results from last 7 days   Lab Units 08/03/22  1631 08/02/22  1225 08/01/22  0200 08/01/22  0036   BLOOD CULTURE   --  No Growth After 4 Days  No Growth After 4 Days  No Growth After 5 Days   Streptococcus anginosus*   GRAM STAIN RESULT  4+ Polys*  2+ Gram positive rods*  2+ Yeast*  --  3+ Polys  No bacteria seen Gram positive cocci in chains*   BODY FLUID CULTURE, STERILE  2+ Growth of Gram Negative Roland*  2+ Growth of Gram Negative Roland*  --  2+ Growth of Streptococcus anginosus*  Few Colonies of Candida albicans*  Few Colonies of Klebsiella pneumoniae*  1+ Growth of   Few Colonies of Beta Hemolytic Streptococcus Group C*  --        Imaging Studies:   I have personally reviewed pertinent imaging study reports and images in PACS  EKG, Pathology, and Other Studies:   I have personally reviewed pertinent reports

## 2022-08-07 NOTE — QUICK NOTE
Surgery  Quick update    Pt seen and examined  Pt remains in critical condition however with significant improvement in pressor requirements since earlier this morning  Levophed reduced from 24-> 12 despite tolerating running negative 20 on CVVH  Last abg 7/27/ 57/ 96/ 25 7/ -1 5  remains paralyzed for ARDS on APRV  Abdomen full, soft  B/l drains w serous output  Making adequate urine  cc/h  Plan:   Continue supportive care per icu   Wean vent as tolerated  Wean pressors   Follow up endpoints  Will follow closely       Ti Larry MD  8/7/22  5:17 PM

## 2022-08-08 NOTE — CONSULTS
Consult Service: Gastroenterology      PATIENT INFORMATION      Jenelle Hurtado 67 y o  male MRN: [id number]  Unit/Bed#: 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 initially followed by chemotherapy and now s/p total pancreatectomy who is currently post op with sepsis     Gastroenterology has been consulted for assistance with management of elevated liver enzymes    Patient is being evaluated for abnormal liver chemistry  · The patient has acute Mild elevation of liver enzymes (2-5x ULN)  · Jaundice present: yes  · R- ratio suggests- Cholestatic  · Signs of acute liver failure?- no  · This is likely due to cholestasis of sepsis, however can also be due to TPN  · Treatment is to treat sepsis which patient is receiving, avoid hypotension to prevent ischemic injury to the liver and avoid hepatotoxic medications as much as possible   · Continue to trend liver enzymes     Early onset cirrhosis  · Unclear if patient has cirrhosis because his thrombocytopenia is more chronic and possibly related to chemotherapy, although lobular contours and elevated INR do suggest cirrhotic changes to the liver which goes with patients history of excessive alcohol use  · This will need close monitoring as an outpatient for variceal surveillance, MELD labs, alcohol abstinence     Sepsis  · Due to LUQ abscess which is post operative  · Strep anginosus bacteremia  · Infectious disease is following and patient is on zosyn and steroids    HISTORY OF PRESENT ILLNESS      Jenelle Hurtado is a 67 y o  male who is originally admitted for total pancreatectomy  and is being consulted for elevation of liver enzymes   He is intubated and sedated and unable to provide history      REVIEW OF SYSTEMS     Intubated and sedated      PAST MEDICAL & SURGICAL HISTORY      Past Medical History:   Diagnosis Date    [facility]fib [facility] )     Abdominal wall strain     last assessed: 10/21/14    Allergic rhinitis     last assessed: 05/03/16    Arthritis     Cancer ([facility] )     PACNCREATIC    COVID-19 virus infection 3/3/2021    CPAP (continuous positive airway pressure) dependence     Diabetes mellitus ([facility] )     Erectile dysfunction of non-organic origin     last assessed: 03/17/14    Irregular heart beat     Pt with A fib     Lumbar strain     last assessed: 10/21/14    Multiple acquired skin tags     last assessed: 2/18/16    Palpitations     last assessed: 03/17/14    Pancreas cyst     Plantar fasciitis     Skin disorder     last assessed: 05/28/13    Sleep apnea     CPAP BROKE IN OCTOBER HAS BEEN TRYING TO GET NEW ONE BUT UNABLE AS OF YET    Thrombocytopenia ([facility] )        Past Surgical History:   Procedure Laterality Date    BOTOX INJECTION N/A 11/12/2021    Procedure: [place];  Surgeon: Rima Calvert MD;  Location: [facility] MAIN OR;  Service: Urology    CATARACT EXTRACTION Bilateral     CHOLECYSTECTOMY N/A 7/26/2022    Procedure: CHOLECYSTECTOMY;  Surgeon: Juan Carlos Philip MD;  [facility]: [facility] MAIN [facility];  Service: Surgical Oncology    COLONOSCOPY  01/17/2013    COLONOSCOPY  01/08/2018    COLONOSCOPY  04/2021    CYSTOSCOPY      INJECT WITH BOTOX    DISTAL PANCREATECTOMY N/A 2/15/2022    Procedure: PANCREATECTOMY SUB-TOTALL-OPEN;  Surgeon: Juan Carlos Philip MD;  [facility]: [facility] MA[facility];  Service: Surgical Oncology    EGD  06/05/2020    EGD AND COLONOSCOPY  02/06/2014, 2/8/2017    FL GUIDED CENTRAL VENOUS ACCESS DEVICE INSERTION  4/23/2019    FLEXIBLE SIGMOIDOSCOPY  06/11/2019    FOOT SURGERY      Due to plantar fascitis    IR DRAINAGE TUBE PLACEMENT  8/3/2022    IR PICC PLACEMENT DOUBLE LUMEN  8/3/2022    IR PORT PLACEMENT  3/30/2022    JOINT REPLACEMENT Left     LTK    LINEAR ENDOSCOPIC U/S      PANCREATECTOMY N/A 7/26/2022    Procedure: COMPLETION OF PANCREATECTOMY W/[place]UH[place] ANASTOMOSIS, EXTENSIVE LYSIS OF ADHESIONS, REDUCTION OF INTERNAL HERNIA;  Surgeon: Rafael Fleischer, MD;  Location: [facility] MA[facility];  Service: Surgical Oncology    PANCREATECTOMY LAPAROSCOPIC N/A 3/12/2019    Procedure: DISTAL PANCREATECTOMY LAPAROSCOPIC/POSSIBLE OPEN;  Surgeon: Rafael Fleischer, MD;  [facility];  Service: Surgical Oncology    VT EDG US EXAM SURGICAL ALTER STOM DUODENUM/JEJUNUM N/A 1/24/2019    Procedure: LINEAR ENDOSCOPIC U/S;  Surgeon: Britt Ibarra MD;  Location: [facility]; Service: Gastroenterology    REPLACEMENT TOTAL KNEE Left     TUNNELED VENOUS PORT PLACEMENT Left 4/23/2019    Procedure: INSERTION VENOUS PORT (PORT-A-CATH); Surgeon: Rafael Fleischer, MD;  [facility];  Service: Surgical Oncology- 11/8/21 Pt reports PAC removed     UMBILICAL HERNIA REPAIR           MEDICATIONS & ALLERGIES       Medications:   Prior to Admission medications    Medication Sig Start Date End Date Taking? Authorizing Provider   atenolol (TENORMIN) 25 mg tablet TAKE 1 TABLET BY MOUTH EVERY DAY 1/31/22  Yes Anika Bhatt MD   Blood Glucose Calibration (OneTouch Verio) SOLN Use as needed to calibrate test strips 4/19/22  Yes Biju Arnold MD   Blood Glucose Monitoring Suppl (OneTouch Verio) w/Device KIT Test BG up to 1x daily as directed 4/19/22  Yes Biju Arnold MD   Cholecalciferol (VITAMIN D) 2000 units CAPS Take 1,000 Units by mouth in the morning  11/8/21 Pt takes three tabs of Vitamin D daily    5/13/15  Yes Historical Provider, MD   hydrochlorothiazide (HYDRODIURIL) 25 mg tablet Take 1 tablet (25 mg total) by mouth daily 7/11/22  Yes Inocencio Velez,    insulin aspart, w/niacinamide, (Fiasp FlexTouch) 100 Units/mL injection pen Take before meals according to sliding scale up to 30 units per day 150-200: 2 units 201-250: 4 units 251-300: 6 units 301-350: 8 units Over 350: 10 units    6/20/22  Yes Amol Connors PA-C   insulin glargine (Basaglar KwikPen) 100 units/mL injection pen 20 units daily 6/20/22  Yes Vanessa Gutiérrez PA-C   Insulin Pen Needle (BD Pen Needle Tia U/F) 32G X 4 MM MISC Use 4 per day 6/16/22  Yes Vanessa Gutiérrez PA-C   Lancets (OneTouch Delica Plus [id number]) MISC Test BG up to 3x daily as directed 7/19/22  Yes Damon Cook MD   pancrelipase, Lip-Prot-Amyl, (CREON) 6,000 units delayed release capsule Take 2 pills, 3 times a day with meals 4/11/22  Yes Brisa Orozco MD   sodium chloride, PF, 0 9 % 10 mL by Intracatheter route daily Intracatheter flushing daily  May substitute prefilled syringe with normal saline 10 mL vials, 10 mL syringes, and 18 g blunt needles 8/3/22 11/1/22 Yes Mark Luke MD   acetaminophen (TYLENOL) 325 mg tablet Take 2 tablets (650 mg total) by mouth every 6 (six) hours as needed for mild pain 3/19/19   Glo Mcguire PA-C   ascorbic acid (VITAMIN C) 1000 MG tablet Take 2,000 mg by mouth in the morning  Historical Provider, MD   aspirin (ECOTRIN) 325 mg EC tablet Take 325 mg by mouth in the morning  Historical Provider, MD   Cyanocobalamin (VITAMIN B 12 PO) Take by mouth daily 11/8/21 Pt reports takes three tabs daily      Historical Provider, MD   furosemide (LASIX) 20 mg tablet Take 1 tablet (20 mg total) by mouth 2 (two) times a day 7/18/22   Rahel Blum DO   insulin aspart, w/niacinamide, (Fiasp FlexTouch) 100 Units/mL injection pen Take before meals according to sliding scale up to 30 units per day 150-200: 2 units 201-250: 4 units 251-300: 6 units 301-350: 8 units Over 350: 10 units 6/16/22   Vansesa Gutiérrez PA-C   metFORMIN (GLUCOPHAGE) 1000 MG tablet TAKE 1 TABLET BY MOUTH TWICE A DAY WITH MEALS 7/31/22   Rahel Blum DO       Allergies: No Known Allergies      SOCIAL HISTORY      Marital Status: /Civil Union    Substance Use History:   Social History     Substance and Sexual Activity   Alcohol Use Yes    Alcohol/week: 2 0 standard drinks    Types: 2 Cans of beer per week    Comment: couple times per week; Social History     Tobacco Use   Smoking Status Never Smoker   Smokeless Tobacco Never Used     Social History     Substance and Sexual Activity   Drug Use Never    Comment: Denies          FAMILY HISTORY      Non-Contributory      PHYSICAL EXAM     Vitals:   Blood Pressure: (!) 86/55 (08/08/22 1500)  Pulse: 79 (08/08/22 1500)  Temperature: 97 7 °F (36 5 °C) (08/08/22 1500)  Temp Source: Bladder (08/08/22 0800)  Respirations: 15 (08/08/22 1500)  Height: 6' (182 9 cm) (08/01/22 0800)  Weight - Scale: (!) 141 kg (310 lb 3 oz) (08/08/22 0536)  SpO2: 99 % (Simultaneous filing  User may not have seen previous data ) (08/08/22 1500)    Physical Exam:   GENERAL: NAD, intubated and sedated   HEENT:  NC/AT, MMM  CARDIAC:  RRR, +S1/S2, no S3/S4 heard  PULMONARY:  CTA B/L, no wheezing/rales/rhonci, non-labored breathing  ABDOMEN:  Intra abdominal drain present  DIGITAL RECTAL EXAM: not indicated   NEUROLOGIC:  Alert/oriented x0     SKIN:  No rashes or erythema       ADDITIONAL DATA     Lab Results:     Results from last 7 days   Lab Units 08/08/22  1423 08/08/22  0548 08/07/22  0922   WBC Thousand/uL  --  27 57* 53 13*   HEMOGLOBIN g/dL  --  7 2* 8 3*   HEMATOCRIT %  --  22 6* 25 6*   PLATELETS Thousands/uL 35* 14* 20*   NEUTROS PCT %  --   --  91*   LYMPHS PCT %  --   --  3*   LYMPHO PCT %  --  2*  --    MONOS PCT %  --   --  1*   MONO PCT %  --  3*  --    EOS PCT %  --  0 0     Results from last 7 days   Lab Units 08/08/22  1156 08/08/22  0548 08/07/22  0029 08/06/22  2356   POTASSIUM mmol/L 4 4 4 7   < >  --    CHLORIDE mmol/L 109* 110*   < >  --    CO2 mmol/L 24 26   < >  --    CO2, I-STAT mmol/L  --   --   --  39*   BUN mg/dL 16 18   < >  --    CREATININE mg/dL 0 97 0 90   < >  --    CALCIUM mg/dL 8 0* 7 8*   < >  --    ALK PHOS U/L  --  155*   < >  --    ALT U/L  --  26   < >  --    AST U/L  --  56*   < > --    GLUCOSE, ISTAT mg/dl  --   --   --  121    < > = values in this interval not displayed  Results from last 7 days   Lab Units 08/03/22  1540   INR  1 61*       Imaging:    XR chest portable    Result Date: 8/6/2022  Narrative: CHEST INDICATION:   HD cath exchange, confirm placement  COMPARISON:  8/5 EXAM PERFORMED/VIEWS:  XR CHEST PORTABLE  The frontal view was performed utilizing dual energy radiographic technique  Images: 3 FINDINGS:  Stable Port-A-Cath, ET tube, NG tube  Left-sided PICC line projects at the cavoatrial junction  Stable cardiomediastinal structures  Stable central vascular congestion and bilateral opacities suggestive of pulmonary edema  Suspected left basilar effusion  Impression: Stable pulmonary edema and left basilar effusion  Left-sided PICC line extends to the cavoatrial junction  Workstation performed: [id number]     XR chest portable    Result Date: 8/6/2022  Narrative: CHEST INDICATION:   s/p hd cath placement  COMPARISON:  8/5  EXAM PERFORMED/VIEWS:  XR CHEST PORTABLE  The frontal view was performed utilizing dual energy radiographic technique  Images: 3 FINDINGS:  Stable Port-A-Cath, NG tube, ET tube  A left-sided PICC line projects at the cavoatrial junction  Stable cardiomegaly  Stable central vascular congestion and peripheral opacities suggestive of pulmonary edema  Suspected left basilar effusion  Impression: PICC line tip is difficult to visualize but appears at the cavoatrial junction  Persistent pulmonary edema and suspected left basilar effusion  Workstation performed: [id number]     XR chest portable    Result Date: 8/6/2022  Narrative: CHEST INDICATION:   s/p intubation  COMPARISON:  8/5/2022 EXAM PERFORMED/VIEWS:  XR CHEST PORTABLE  The frontal view was performed utilizing dual energy radiographic technique  Images: 3 FINDINGS:  A right chest wall Port-A-Cath is unchanged in position    ET tube extends inferiorly below the GE junction and below the scope of this examination  ET tube projects 4 1 cm above the barbara  A left-sided PICC line is present projecting over the right atrium  Stable cardiomegaly  Central pulmonary vascular congestion with bilateral opacities  Limited evaluation of the hemidiaphragms suggestive of small basilar effusions, left greater than right  Osseous structures appear within normal limits for patient age  Impression: Tubes and lines as described above  Interval development of central vascular congestion with peripheral opacity suggestive of moderate pulmonary edema  A component of superimposed infection not excluded  Workstation performed: [id number]     XR chest portable    Result Date: 8/1/2022  Narrative: CHEST INDICATION:   fever workup  COMPARISON:  July 26, 2022 EXAM PERFORMED/VIEWS:  XR CHEST PORTABLE FINDINGS:  Right Port-A-Cath is unchanged  Remaining tubes and lines have been removed  Heart shadow is enlarged but unchanged from prior exam   Atherosclerotic calcifications in the aorta are noted  Subsegmental bibasilar atelectasis  No pneumothorax or pleural effusion  Osseous structures appear within normal limits for patient age  Impression: Bibasilar subsegmental atelectasis  Workstation performed: [id number]     XR chest portable    Result Date: 7/27/2022  Narrative: CHEST INDICATION:   s/p[ intubation, RIJ TLC placement  COMPARISON:  7/12/2022 EXAM PERFORMED/VIEWS:  XR CHEST PORTABLE FINDINGS:  Endotracheal tube is present, in satisfactory position with its tip approximately 6 cm above the level of the barbara  Enteric tube is present with its tip extending below the left hemidiaphragm  There is a right internal jugular central venous catheter  with tip over the cavoatrial junction  There is a stable right-sided Port-A-Cath  Cardiomediastinal silhouette appears unremarkable  The lungs are clear  No pneumothorax or pleural effusion  Osseous structures appear within normal limits for patient age  Impression: Lines and tubes as described  No pneumothorax  Workstation performed: [id number]     XR chest pa & lateral    Result Date: 7/12/2022  Narrative: CHEST INDICATION:   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  COMPARISON:  Chest x-ray January 2022, CT abdomen and pelvis June 2023 EXAM PERFORMED/VIEWS:  XR CHEST PA & LATERAL  The frontal view was performed utilizing dual energy radiographic technique  FINDINGS: Cardiac enlargement is seen and right portacatheter terminating in the distal superior vena cava/right atrium  Bilateral basilar atelectasis is seen with minimal blunting of the costophrenic angles which could be secondary to pleural thickening  Osseous structures appear within normal limits for patient age  Impression: Basilar subsegmental atelectasis with no active disease Workstation performed: [id number]     XR abdomen 1 view kub    Result Date: 8/5/2022  Narrative: ABDOMEN INDICATION:   Abdominal distension  COMPARISON:  Radiographs of the abdomen August 2, 2022  Correlation CT abdomen and pelvis August 2, 2022 VIEWS:  AP supine FINDINGS: Tip of enteric tube projects over the stomach  Drainage catheter projects over the midabdomen to the right of midline  Surgical staples project over the abdomen  Mildly dilated loops of air-filled small bowel  Luminal caliber is similar to the August 2, 2022 CT, though the number of dilated loops of air-filled small bowel has decreased  Normal caliber air filled colon  Impression: Since August 2, 2022, persistent dilation of the small bowel, though the number of air-filled loops of dilated small bowel has decreased  Workstation performed: [id number]     XR abdomen 1 view kub    Result Date: 7/28/2022  Narrative: ABDOMEN INDICATION:   eval NGT position  COMPARISON:  None VIEWS:  AP supine FINDINGS: Limited examination as the right and lower abdomen is excluded from the study  There is a nonobstructive bowel gas pattern   Tip of nasogastric tube overlies the stomach  Impression: Limited examination as the right and lower abdomen is excluded from the study  Nonobstructive bowel gas pattern  Adequately positioned nasogastric tube  Workstation performed: [id number]     Bronchoscopy (Bedside)    Result Date: 8/6/2022  Narrative: Maurice Méndez DO     8/6/2022  1:00 PM Bronchoscopy (Bedside) Date/Time: 8/6/2022 12:55 PM Performed by: Maurice Méndez DO Authorized by: Maurice Méndez DO Patient location:  Bedside Other Assisting Provider: Yes (comment) (Dr Nunez)  Consent:   Consent obtained:  Written   Consent given by:  Spouse   Risks discussed: Adverse reaction to sedation   Alternatives discussed:  No treatment and observation Universal protocol:   Procedure explained and questions answered to patient or proxy's satisfaction: yes    Patient identity confirmed:  Arm band Indications:   Procedure Purpose: diagnostic and therapeutic    Indications: other (comment)    Indications comment:  Aspiration Sedation:   Sedation type:  Continuous (ICU/vent) Upper Airway:   Trachea: normal    Angeline:  Normal Airway:   Airway:  Airway not erythematous or friable bilaterally  No obvious sputum to suction  No BAL performed  Post-procedure details:   Patient tolerance of procedure: Tolerated well, no immediate complications Final Diagnosis/Findings:    No obvious signs of aspiration pneumonitis at this time  No sputum noted  Anatomy normal  Airway intact with no erythema or friability noted bilaterally  No specimen obtained  XR abdomen 1 vw portable    Result Date: 8/4/2022  Narrative: ABDOMEN INDICATION:   keofed advanced  COMPARISON:  Abdominal radiograph July 28, 2022 VIEWS:  AP supine FINDINGS: The lower abdomen is excluded from field-of-view  Tip of enteric tube projects over the stomach  Surgical staples project over the midline of the abdomen  Impression: Tip of enteric tube projects over the stomach   Workstation performed: [id number]     XR chest 1 view    Result Date: 8/4/2022  Narrative: CHEST INDICATION:   Keofed insertion series  COMPARISON:  Chest radiograph August 1, 2022  Correlation with chest CT August 1, 2022 and subsequently performed abdominal radiograph  EXAM PERFORMED/VIEWS:  XR CHEST PORTABLE ICU, XR CHEST 1 VIEW FINDINGS:  2 cm radiograph from August 2, 2022 (12:56 and 13:00) are dictated in a combined report  On the final 2 radiographs, the enteric tube is curved with tip facing upwards and projecting over the expected position of the lower thoracic esophagus  Right chest wall PowerPort with catheter tip projecting over the right atrium  Cardiomediastinal silhouette is magnified by technique  Bilateral pleural effusions with passive atelectasis  No pneumothorax  Impression: Malpositioned enteric tube  However, correlation with subsequently performed radiograph of the abdomen demonstrates that the tube had been repositioned prior to dictation of the study  Bilateral pleural effusions  Workstation performed: [id number]     CT chest abdomen pelvis w contrast    Result Date: 8/2/2022  Narrative: CT CHEST, ABDOMEN AND PELVIS WITH IV CONTRAST INDICATION:  Recent abdominal surgery including completion of pancreatectomy, extensive lysis of adhesions, reduction of internal hernia and cholecystectomy (7/26/2022)  Pneumoperitoneum on yesterday's CT study, evaluate for leak  COMPARISON:  CT chest, abdomen and pelvis 8/1/2022, MRI abdomen 12/10/2021 TECHNIQUE: CT examination of the chest, abdomen and pelvis was performed  Axial, sagittal, and coronal 2D reformatted images were created from the source data and submitted for interpretation  Radiation dose length product (DLP) for this visit:  1644 62 mGy-cm    This examination, like all CT scans performed in the [facility], was performed utilizing techniques to minimize radiation dose exposure, including the use of iterative  reconstruction and automated exposure control  IV Contrast:  70 mL iohexol (OMNIPAQUE)  350 Enteric Contrast: 30 mL Omnipaque 240 FINDINGS: CHEST LUNGS:  Mild bibasilar compressive atelectasis  Mild subsegmental atelectasis also seen in the lingula  No endobronchial lesions  PLEURA:  Small bilateral pleural effusions, similar  HEART/GREAT VESSELS: Heart is upper normal size  Atherosclerotic changes thoracic aorta and coronary arteries  No thoracic aortic aneurysm  MEDIASTINUM AND JOSE:  Mediastinal lipomatosis with shotty lymph nodes, similar to prior study  CHEST WALL AND LOWER NECK:  Right chest Port-A-Cath, tip in the right atrium  Mild left sided gynecomastia  ABDOMEN LIVER/BILIARY TREE:  Lobulated liver surface contour again suspicious for cirrhosis  No focal hepatic lesion is seen (noting arterial phase imaging was not performed which limits sensitivity for detection of [address] )  The portal vein is patent  No intrahepatic biliary dilatation  Status post choledochojejunostomy  GALLBLADDER:  Gallbladder is surgically absent  SPLEEN:  Unremarkable  PANCREAS:  Status post pancreatectomy  ADRENAL GLANDS:  Unremarkable  KIDNEYS/URETERS:  Unremarkable  No hydronephrosis  STOMACH AND BOWEL:  Enteric tube tip at the GE junction  Contrast material within the distal esophagus, stomach and small bowel  Status post gastrojejunostomy  No convincing evidence for extraluminal oral contrast extravasation  Slightly dilated small bowel loop in the left abdomen without transition to suggest mechanical obstruction  Mildly thickened small bowel loop in the right lower quadrant may represent mild infectious or inflammatory enteritis  A rectal tube is now seen  Scattered colonic diverticulosis without evidence of diverticulitis  Thickening of the ascending colon could reflect underdistention, infectious or inflammatory colitis or element of portal hypertensive colopathy cannot be completely excluded  APPENDIX:  No findings to suggest appendicitis  ABDOMINOPELVIC CAVITY:  Small amount of pneumoperitoneum again seen, not significantly changed  Small amount of abdominal and pelvic free fluid, slightly increased from previous study  Diffuse edema throughout the mesentery is similar  Right upper quadrant drain  No discrete fluid collection here  Fluid collection in the pancreatectomy bed measuring about 9 4 x 2 4 x 2 3 cm is unchanged (series 2/59 and series 601 image 82)  Shotty peripancreatic lymph nodes, similar and presumably reactive  VESSELS: Atherosclerotic changes abdominal aorta without evidence of aneurysm  PELVIS REPRODUCTIVE ORGANS:  Unremarkable for patient's age  URINARY BLADDER:  Urinary bladder collapsed by Dockery catheter, assessment limited  ABDOMINAL WALL/INGUINAL REGIONS:  Diffuse anasarca again noted  Midline ventral abdominal wall postoperative changes  Small fat-containing bilateral inguinal hernias  OSSEOUS STRUCTURES:  No acute fracture or destructive osseous lesion  Multilevel degenerative changes of the spine with suggestive evidence of DISH  Grade 1 spondylolisthesis L4 on L5 secondary to facet arthropathy  Impression: 1  History as above  Grossly stable 9 4 x 2 4 x 2 3 cm fluid collection in the pancreatectomy bed  No definitive evidence for enteric contrast extravasation from the distal gastrectomy staple line  Continued CT surveillance is suggested  Grossly stable pneumoperitoneum  2  Small amount of abdominopelvic ascites and diffuse mesenteric edema, the former slightly increased from previous study  No definitive rim-enhancing collections to suggest abscess  3  Mildly dilated small bowel loop left midabdomen without focal transition to suggest mechanical obstruction  This could reflect transient peristalsis  Mild wall thickening small bowel loop in the right lower quadrant could represent infectious or inflammatory enteritis   4  Thickening of the ascending colon could be secondary to underdistention, infectious/inflammatory colitis and/or element of portal hypertensive colopathy  5  Lobular surface contour of the liver suspicious for cirrhosis  6  Small bilateral pleural effusions and mild bilateral lower lobe compressive atelectasis, similar  7  Diffuse anasarca again noted  Additional incidental findings as above  Workstation performed: [id number]     CT chest abdomen pelvis w contrast    Addendum Date: 8/1/2022 Addendum:   ADDENDUM: I personally discussed pertinent findings on this study with Glynn Peace on 8/1/2022 at 9:45 AM      Result Date: 8/1/2022  Narrative: CT CHEST, ABDOMEN AND PELVIS WITH IV CONTRAST INDICATION:   rapid  COMPARISON:  CT chest 12/19/2020, outside CT chest 5/4/2022 and CT abdomen and pelvis 6/23/2022 TECHNIQUE: CT examination of the chest, abdomen and pelvis was performed  Axial, sagittal, and coronal 2D reformatted images were created from the source data and submitted for interpretation  Radiation dose length product (DLP) for this visit:  1712 mGy-cm   This examination, like all CT scans performed in the [facility], was performed utilizing techniques to minimize radiation dose exposure, including the use of iterative reconstruction and automated exposure control  IV Contrast:  75 mL of iohexol (OMNIPAQUE)   350 Enteric Contrast: Enteric contrast was not administered  FINDINGS: CHEST LUNGS:  No suspicious pulmonary nodule in the aerated lungs  Stable 3 mm nodule in the right upper lobe marked on image 44 series 3 compatible with a benign nodule  Additional 5 mm nodule present on the prior study likely obscured by atelectasis  This nodule however is visualized on the outside chest CT from May 2022 and appears stable  Minimal bilateral lower lobe subsegmental atelectasis  Scattered areas of juxtapleural pulmonary scarring  Central airways are clear  PLEURA:  New small bilateral pleural effusions  HEART/GREAT VESSELS: Heart is enlarged    No pericardial effusion  Aortic and coronary artery calcification  No thoracic aortic aneurysm  Tip of portacatheter in the inferior right atrium  MEDIASTINUM AND JOSE:  No enlarged lymph nodes  Stable prominent subcentimeter short axis mediastinal lymph nodes  1 3 cm degenerative cyst contiguous with the inferior aspect of the right first costochondral junction is minimally smaller  CHEST WALL AND LOWER NECK:  Right anterior chest wall zohra catheter  Mild body wall edema  Stable minimal left-sided gynecomastia  ABDOMEN LIVER/BILIARY TREE:  Stable lobulated hepatic contour  Liver otherwise unremarkable  No biliary dilation  Post choledochojejunostomy  GALLBLADDER:  Post cholecystectomy  SPLEEN:  Unremarkable  PANCREAS:  Post pancreatectomy  ADRENAL GLANDS:  Unremarkable  KIDNEYS/URETERS:  Unremarkable  No hydronephrosis  STOMACH AND BOWEL:  Post gastrojejunostomy, partial distal gastrectomy and choledochojejunostomy  Mild hyperemia of the proximal stomach  No bowel obstruction  Minimal colonic diverticulosis without diverticulitis  APPENDIX:  Stable fluid flow appendix distended up to 1 2 cm  ABDOMINOPELVIC CAVITY:  Small-volume ascites has increased  Right ventral drainage catheter in place distal tip medial to the right lobe of the liver  No significant fluid collection at the tip of the catheter  Fluid collection in the post pancreatectomy  bed measuring approximately 9 5 x 2 5 x 2 8 cm image 61 series 2 and image 72 series 601  New pneumoperitoneum is nonspecific given recent surgery  Somewhat clustered gastropathy and the vicinity of the distal partial gastrectomy staple line  Stable 1 2 cm short axis anterior periaortic low-density lymph node image 85 series 2  No new enlarged lymph nodes  Progressive diffuse omental and mesenteric edema  VESSELS:  Aortoiliac calcification  Stable infrarenal aortic aneurysm maximum diameter 3 cm  PELVIS REPRODUCTIVE ORGANS:  Unremarkable for patient's age   URINARY BLADDER:  Diffuse thickening of the bladder likely underdistention  Tiny gas droplets in the bladder lumen presumably due to recent catheterization  ABDOMINAL WALL/INGUINAL REGIONS:  New diffuse body wall edema  Ventral midline surgical abdominal wall staple line  Ventral periumbilical postsurgical scar  Small fat-containing bilateral inguinal hernias  OSSEOUS STRUCTURES:  No acute fracture or osseous destructive lesion identified  Degenerative changes of the spine, pubic symphysis, and multiple joints  Multilevel thoracolumbar spondylosis and paravertebral ossification in a pattern suggestive of diffuse idiopathic skeletal hyperostosis  Stable minimal grade 1 degenerative anterolisthesis of L4 on L5  Impression: CT chest: New small bilateral pleural effusions with minimal adjacent subsegmental atelectasis  Findings may be sequela of fluid overload or third spacing among other etiologies  CT abdomen and pelvis: Interval postsurgical changes as above  New pneumoperitoneum is nonspecific and likely related to recent surgery  Cannot entirely exclude staple line dehiscence at the partial distal gastrectomy staple line  Progressive small volume ascites, mesenteric and omental edema and anasarca  Findings may be sequela of fluid overload or third spacing among other etiologies  New partially loculated fluid collection in the post pancreatectomy bed measures approximately 9 5 x 2 5 x 2 8 cm  New diffuse bladder wall thickening with tiny gas droplets in the lumen of the bladder likely due to under distention and recent catheterization  Suggest correlation with UA to exclude cystitis  Stable 1 2 cm short axis anterior para-aortic low-density lymph node  The study was marked in [facility] for immediate notification  Workstation performed: [id number]     XR chest portable ICU    Result Date: 8/7/2022  Narrative: CHEST INDICATION:   desaturation   COMPARISON:  8/6/2022 EXAM PERFORMED/VIEWS:  XR CHEST PORTABLE ICU FINDINGS:  Endotracheal tube 4 3 cm above the barbara  Nasogastric tube tip excluded from the film but clearly distal to the GE junction  Stable right chest tube  Left central line tip overlies the cavoatrial junction  Mild cardiomegaly appears stable  Right greater than left Central opacification consistent with pulmonary edema pattern with left basilar effusion  Osseous structures appear within normal limits for patient age  Impression: Tubes and lines as above without pneumothorax  Pulmonary edema pattern with left basilar pleural effusion  Workstation performed: [id number]     XR chest portable ICU    Result Date: 8/6/2022  Narrative: CHEST INDICATION:   r/o pneumothorax  COMPARISON:  8/5 EXAM PERFORMED/VIEWS:  XR CHEST PORTABLE ICU FINDINGS:  Port-A-Cath, ET tube and NG tube are unchanged  Left-sided central line difficult to visualize but appears to extend to the cavoatrial junction  Stable cardiomegaly  Central pulmonary vascular congestion with bilateral opacities, right greater than left  Suspected left basilar effusion  Impression: Stable airspace disease suggestive of pulmonary edema  Suspected left basilar effusion  Workstation performed: [id number]     XR chest portable ICU    Result Date: 8/5/2022  Narrative: CHEST INDICATION:   Hypoxia, increased WOB  COMPARISON:  Chest radiograph and CT August 2, 2022 EXAM PERFORMED/VIEWS:  XR CHEST PORTABLE ICU FINDINGS:  Tip of right chest wall PowerPort projects over the superior cavoatrial junction  Tip of left upper extremity PICC projects over the superior cavoatrial junction  Most visualized distal component of the enteric tube projects over the diaphragm; tip is excluded from field-of-view  Heart shadow is enlarged but unchanged from prior exam  Increased bilateral opacities, predominantly within the mid and lower lung zones  No pneumothorax       Impression: Increased bilateral opacities, predominantly in the mid and lower lung fields, likely represents worsening edema with effusions  Concomitant infection is to be excluded on clinical grounds  Workstation performed: [id number]     XR chest portable ICU    Result Date: 8/4/2022  Narrative: CHEST INDICATION:   Keofed insertion series  COMPARISON:  Chest radiograph August 1, 2022  Correlation with chest CT August 1, 2022 and subsequently performed abdominal radiograph  EXAM PERFORMED/VIEWS:  XR CHEST PORTABLE ICU, XR CHEST 1 VIEW FINDINGS:  2 cm radiograph from August 2, 2022 (12:56 and 13:00) are dictated in a combined report  On the final 2 radiographs, the enteric tube is curved with tip facing upwards and projecting over the expected position of the lower thoracic esophagus  Right chest wall PowerPort with catheter tip projecting over the right atrium  Cardiomediastinal silhouette is magnified by technique  Bilateral pleural effusions with passive atelectasis  No pneumothorax  Impression: Malpositioned enteric tube  However, correlation with subsequently performed radiograph of the abdomen demonstrates that the tube had been repositioned prior to dictation of the study  Bilateral pleural effusions  Workstation performed: [id number]     IR drainage tube placement    Result Date: 8/3/2022  Narrative: CT scan guided abscess drainage This examination, like all CT scans performed in the [facility], was performed utilizing techniques to minimize radiation dose exposure, including the use of iterative reconstruction and automated exposure control  History: 65-year-old male status post pancreatectomy, now with abdominal fluid collection and sepsis  Images: Multiple Sedation: Moderate conscious sedation was utilized under my direct supervision administered by trained independent provider  A total of 30 minutes sedation time was utilized  I was present at the initial dose of sedation medication  Technique: The patient was brought to the CT scanner and placed supine on the table   After axial images were obtained through the abdomen, an area of the skin was then marked, prepped, and draped in usual sterile fashion  All elements of maximal sterile barrier technique were followed (cap, mask, sterile gown, sterile gloves, large sterile sheet, hand hygiene, and 2% chlorhexidine for cutaneous antisepsis)  Lidocaine was administered to the skin and a small skin incision was made  A 18-gauge needle was advanced into the collection under CT-scan guidance  A Florian wire was coiled within the lesion, and after tract dilatation, a 10 [place] all-purpose drainage catheter was advanced and the loop formed within the collection  Approximately 15 cc of cloudy thick fluid was aspirated  A sample sent to lab for further analysis  The catheter was sutured in place, sterilely dressed, and connected to bulb suction  The patient tolerated the procedure well and suffered no complications  Impression: Impression: Successful placement of a 10 [place] all-purpose drainage catheter into the left upper abdominal fluid collection  Workstation performed: [id number]     US bedside procedure    Result Date: 8/5/2022  Narrative: 1 2 [id number] 2 323 [id number]    IR PICC line placement double lumen    Result Date: 8/3/2022  Narrative: Examination: PIC line placement History: Need central venous access,  TPN  Levophed  Fluoroscopy time: 0 3 minutes Technique: The patient was informed of the name of the procedure  The patient was informed of the nature of the disease or condition  The patient was informed of the nature of the procedure  The patient was informed of the likelihood of success  The patient was informed of the risks, benefits and potential complications of the procedure  The alternatives to the procedure, and their complications were explained  Informed consent was signed  The patient was identified verbally, and by wrist band " Time out" was performed  The left upper arm was prepped and draped in usual sterile fashion   All elements of maximal sterile barrier technique, cap and mask and sterile gown and sterile gloves and sterile full-body drape and hand hygiene and 2% chlorhexidine for cutaneous antisepsis  Sterile ultrasound technique with sterile gel and sterile probe covers was also utilized  Lidocaine was given as local anesthesia  Using ultrasound guidance the left cephalic vein was cannulated using a modified, singlewall, Seldinger technique  The vein was evaluated as a potential access site  The vein was patent and free of thrombus  Static images of real-time needle entry into the vessel were obtained  A wire was advanced  A peel-away sheath was placed over the wire  A 5 Italian power PIC line was advanced via the peel-away sheath  The line was placed, using fluoroscopic guidance, so that the tip lies within the mid superior vena cava  The line was secured in place and sterilely dressed  The patient tolerated the procedure well  There were no immediate complications or complaints  The line is available for immediate use  Findings: No pneumothorax is seen  Line position is appropriate  Impression: Impression: Technically successful placement of line  Workstation performed: [id number]     Echo complete w/ contrast if indicated    Result Date: 7/27/2022  Narrative: Ruslan Coronado  Left Ventricle: Left ventricle is not well visualized  Left ventricular cavity size is mildly dilated  Wall thickness is normal  The left ventricular ejection fraction is 45%  Systolic function is mildly reduced  There is mild global hypokinesis    Right Ventricle: Right ventricle is not well visualized  Right ventricular cavity size is mildly dilated  Systolic function is mildly reduced    Left Atrium: The atrium is moderately dilated    Right Atrium: The atrium is moderately dilated    Aorta: The aortic root is mildly dilated       Echo follow up/limited w/ contrast if indicated    Result Date: 8/1/2022  Narrative: Ruslan Coronado  Left Ventricle: Left ventricular cavity size is normal  Wall thickness is normal  The left ventricular ejection fraction is 60% by visual estimation  Systolic function is normal  Although no diagnostic regional wall motion abnormality was identified, this possibility cannot be completely excluded on the basis of this study  Very technically difficult study with poor acoustic windows  EKG, Pathology, and Other Studies Reviewed on Admission:   · EKG: Reviewed      Counseling / Coordination of Care Time: 30 total mins spent n consult  Greater than 50% of total time spent on patient counseling and coordination of care  Matheus Bhatt MD   PGY-5,   Gastroenterology         ** Please Note: This note is constructed using a voice recognition dictation system   **

## 2022-08-08 NOTE — PROGRESS NOTES
Progress note - Palliative and Supportive Care   Cheryl Ill 67 y o  male [id number]    Patient Active Problem List   Diagnosis    Obstructive sleep apnea syndrome    Hypersomnia    Permanent atrial fibrillation (HCC)    Morbid obesity with BMI of 40 0-44 9, adult (HCC)    Lower extremity edema    Pancreatic duct dilated    Overlapping malignant neoplasm of pancreas (HCC)    Type 2 diabetes mellitus with hyperglycemia, with long-term current use of insulin (HCC)    IPMN (intraductal papillary mucinous neoplasm)    Thrombocytopenia (HCC)    Urgency incontinence    Balanitis    OAB (overactive bladder)    BPH without obstruction/lower urinary tract symptoms    Encounter for geriatric assessment    Counseling regarding advanced care planning and goals of care    Port-A-Cath in place    Chemotherapy induced neutropenia ([facility] )    Intra-abdominal fluid collection    Transaminitis    TERESE (acute kidney injury) ([facility] )     Active issues specifically addressed today include:   · Palliative care encounter  · [address] discussion  · TERESE  · Hypervolemia  · Pancreatic cancer s/p whipple  · hypotension     Plan:  1  Symptom management -   -  Per critical care team     2  Goals -  -  Full disease focused cares without limits  Code Status: Full Code  - Level 1   Decisional apparatus:  Patient is not competent on my exam today  If competence is lost, patient's substitute decision maker would default to patient's spouse Jie Paulson [place]  Advance Directive / Living Will / POLST:  None on file   Interval history:       Since last PCM assessment the patient has been intubated and started on CVVH  He remains critically ill at this time  Support offered to wife Jie Paulson at the bedside  She states she is doing okay and feels encouraged as Corie Basket is "getting better"  She is supported by her two daughters who live locally       MEDICATIONS / ALLERGIES:     all current active meds have been reviewed    No Known Allergies    OBJECTIVE:    Physical Exam  Physical Exam  Vitals and nursing note reviewed  Constitutional:       Appearance: He is obese  He is ill-appearing  Interventions: He is sedated, intubated and restrained  HENT:      Mouth/Throat:      Mouth: Mucous membranes are moist       Comments: ETT OGT  Eyes:      General: Scleral icterus present  Extraocular Movements: Extraocular movements intact  Cardiovascular:      Rate and Rhythm: Normal rate  Pulmonary:      Effort: He is intubated  Skin:     General: Skin is cool and dry  Coloration: Skin is jaundiced and pale  Neurological:      Mental Status: He is unresponsive  Comments: sedated   Psychiatric:         Speech: He is noncommunicative  Lab Results: I have personally reviewed pertinent labs  Counseling / Coordination of Care    Total floor / unit time spent today 25+  minutes  Greater than 50% of total time was spent with the patient and / or family counseling and / or coordination of care  A description of the counseling / coordination of care: review of chart, supportive listening with family, Collaboration with critical care

## 2022-08-08 NOTE — PROGRESS NOTES
Daily Progress Note - Critical Care   Teto Ferraro 67 y o  male MRN: [id number]  Unit/Bed#: [facility] 515-01 Encounter: [id number]        ----------------------------------------------------------------------------------------  HPI/24hr events: Pt  made significant progress overnight  Off Flolan, Fio2 down to 40%, Levo down to 2mcg/hr, and pulling -75ml/hr on CRRT      ---------------------------------------------------------------------------------------  SUBJECTIVE  Examined patient at bedside  Pt  Paralyzed and sedated, unable to contribute to exam      Review of Systems  Review of systems was unable to be performed secondary to chemical paralysis  ---------------------------------------------------------------------------------------  Assessment and Plan:  Neuro:   · Chemical paralysis            Stop Nimbex today  · Analgesia  § Continue Fentanyl gtt,   § PO Tylenol via OGT  · Sedation  § Continue Precedex  § RASS goal -1  § DC Versed      CV:   · Diagnosis: Shock   ? Likely septic with source - intra-abdominal infection and component of intravascular depletion   ? MAP goal >65  § Continue Vaso at 0 04  § Titrate Levophed as needed for MAP goal  ? Stop trending lactate unless clinically indicated  ? Continue Unasyn + micafungin + vancomycin   ? F/u cultures   § 8/4 fungal bcx pending  § Strep bacteremia  § TTE negative for valve vegetation  · Diagnosis: Atrial fibrillation   ? Amiodarone infusion 0 5 mg/min   § Rebolus as needed for RVR     ? Holding systemic AC - discuss timing for starting with surgery team   ? Holding home atenolol with shock   ? 8/1 Echo EF 60% improved from 45% on 7/27, no RV dysfunction   ? Holding home lasix in setting of shock, on CVVH        Pulm:  · Diagnosis:  Acute hypoxic respiratory failure Vent dependent  ? Intubated 8/5   ? Continue APRV  ?  Wean vent as tolerated           Diagnosis: ARDS                          Severe per [facility] Criteria                          Did not tolerate low stretch, Pplat 35                          Continue APRV                          Keep -525 if able                          Permissive Hypercapnia, PH goal >7 2                          Flolan off                          Continue to drop and stretch if Spontaneous breathes off Nimbex  GI:   · Diagnosis: IPMN now s/p completion pancreatectomy, reduction of internal hernia and cholecystectomy on 7/26  ? 8/1 CT - new partially loculated fluid collection in post-pancreatectomy bed  ? 8/2 CT - stable fluid collection in pancreatectomy bed, no evidence of enteric contrast extravasation from staple line, stable pneumoperitoneum  ? 8/3 IR drain in L anterior collection   § F/u cultures   ? Increasing abdominal distension  § Likely no abdominal compartment syndrome on assessment  ? IV abx as below   ? Hold Feeds with hig pressor use  ? Continue TPN  ? Will need Creon when taking PO again   · Stress ulcer PPX: Pantoprazole IV  · Bowel regimen: senna/colace, Dulcolax suppository daily, miralax down NGT  ? Last BM: 7/31        :   · Diagnosis: TERESE with oliguria   ? Baseline Cr 0 8  ? Trend BUN/CR, Strict I/O   ? CVVH per nephrology  § Currently pulling -75ml/hr  § Continue to pull as tolerated  § Hanna: Yes  § Urinary catheter still needed for Strict I and O in a critically ill patient  ? Urinary retention protocol when hanna removed         F/E/N:   · Fluid: TPN   · E: Replete for goal K >4, Phos >3, Mg >2   · N: TPN        Heme/Onc:   · Diagnosis: Anemia  ? In setting of critical illness, acute blood loss   ? Transfusion trigger Hgb<7 or active signs of bleeding  ? VTE PPX: SQH, SCDs        Diagnosis: thrombocytopenia                     Secondary to sepsis and CRRT                     Gvie plts  Is less than 20 or active signs of bleeding  Endo:   · Diagnosis: T2DM  ? Now s/p pancreatectomy   ? Insulin gtt  ? BG goal 140-180  ?  Endocrinology following          ID:   · Diagnosis: Sepsis, possible intra-abdominal abscess   ? Unasyn   ? Micafungin  ? Vancomycin   § Blood cx - 1/2 streptococcus anginosus - ? contamination  § Blood cx - 1/2 negative x 72 hrs   § Blood cx - 2/2 negative x 48 hrs   § R drain cx - +2 strep, few colonies candidia, few colonies klebsiella    § L drain cx - GP rods, yeast  ? Trend fever curve and WBC count   ? ID consulted           MSK/Skin:   · At risk for deconditioning  · Frequent turning and repositioning   · PT/OT as appropriate   · LWC PRN       Disposition: Continue Critical Care   Code Status: Level 1 - Full Code  ---------------------------------------------------------------------------------------  ICU CORE MEASURES    Prophylaxis   VTE Pharmacologic Prophylaxis: Heparin  VTE Mechanical Prophylaxis: sequential compression device  Stress Ulcer Prophylaxis: Pantoprazole IV     ABCDE Protocol (if indicated)  Plan to perform spontaneous awakening trial today? Not applicable  Plan to perform spontaneous breathing trial today? No  Obvious barriers to extubation? Yes  CAM-ICU: Negative    Invasive Devices Review  Invasive Devices  Report    Peripherally Inserted Central Catheter Line  Duration           PICC Line 08/03/22 4 days          Central Venous Catheter Line  Duration           Port A Cath 03/30/22 Right Subclavian 130 days          Peripheral Intravenous Line  Duration           Peripheral IV 08/05/22 Distal;Left;Ventral (anterior) Wrist 2 days          Arterial Line  Duration           Arterial Line 08/06/22 Radial 2 days          Hemodialysis Catheter  Duration           HD Temporary Double Catheter 2 days          Drain  Duration           Closed/Suction Drain Right RLQ Bulb 19 Fr  12 days    Urethral Catheter Temperature probe 16 Fr  6 days    NG/OG/Enteral Tube Enteral Feeding Tube Right nare 5 days    Abscess Drain LUQ 4 days          Airway  Duration           ETT  Oral;Cuffed; Hi-Lo 8 mm 2 days              Can any invasive devices be discontinued today? No  ---------------------------------------------------------------------------------------  OBJECTIVE    Vitals   Vitals:    22 0536 22 0545 22 0600 22 0615   BP:  (!) 84/50 105/54 93/52   BP Location:       Pulse:   71    Resp:   15    Temp:   (!) 96 98 °F (36 1 °C)    TempSrc:       SpO2:   99%    Weight: (!) 141 kg (310 lb 3 oz)      Height:         Temp (24hrs), Av 9 °F (36 6 °C), Min:96 8 °F (36 °C), Max:98 96 °F (37 2 °C)  Current: Temperature: (!) 96 98 °F (36 1 °C)    Respiratory:  SpO2: SpO2: 99 %  Nasal Cannula O2 Flow Rate (L/min): 6 L/min    Invasive/non-invasive ventilation settings   Respiratory  Report   Lab Data (Last 4 hours)       0608            pH, Arterial       7 322             pCO2, Arterial       53 9             pO2, Arterial       110 3             HCO3, Arterial       27 3             Base Excess, Arterial       0 9                  O2/Vent Data     None                Physical Exam  Vitals and nursing note reviewed  Constitutional:       Appearance: He is ill-appearing and toxic-appearing  Comments: Chemically paralyzed   HENT:      Head: Normocephalic and atraumatic  Nose: Nose normal       Mouth/Throat:      Mouth: Mucous membranes are dry  Pharynx: Oropharynx is clear  Eyes:      Pupils: Pupils are equal, round, and reactive to light  Cardiovascular:      Rate and Rhythm: Normal rate  Rhythm irregular  Pulses: Normal pulses  Heart sounds: Normal heart sounds  Pulmonary:      Breath sounds: Rhonchi present  Comments: Mechanical ventialtion  Abdominal:      General: There is distension  Palpations: Abdomen is soft  Comments: Large, obese   Genitourinary:     Comments: Scrotal edema  Musculoskeletal:      Comments: Chemical paralysis   Skin:     Capillary Refill: Capillary refill takes 2 to 3 seconds        Comments: Anasarca   Neurological:      Comments: Unable to asess   Psychiatric:      Comments: Unable to assess         Laboratory and Diagnostics:  Results from last 7 days   Lab Units 08/07/22  0922 08/07/22  0802 08/07/22  0618 08/06/22  2356 08/06/22  0600 08/06/22  0129 08/05/22  1848 08/05/22  0436 08/04/22  0403 08/03/22  1540 08/03/22  0438 08/02/22  1650 08/02/22  0512   WBC Thousand/uL 53 13* 53 82* 48 85*  --  12 80* 4 62  --  11 21* 14 91* 15 19* 11 79*   < > 13 31*   HEMOGLOBIN g/dL 8 3* 8 2* 8 1*  --  8 6* 8 2* 7 8* 6 6* 7 4* 7 9* 8 2*   < > 9 4*   I STAT HEMOGLOBIN g/dl  --   --   --  7 8*  --   --   --   --   --   --   --   --   --    HEMATOCRIT % 25 6* 25 5* 25 7*  --  27 9* 26 6* 24 4* 21 2* 22 9* 24 9* 25 2*   < > 29 6*   HEMATOCRIT, ISTAT %  --   --   --  23*  --   --   --   --   --   --   --   --   --    PLATELETS Thousands/uL 20* 22* 21*  --  60*  --   --   --  112* 126* 95*   < > 126*   NEUTROS PCT % 91*  --   --   --   --   --   --   --  87*  --   --   --   --    BANDS PCT %  --   --  17*  --   --   --   --   --   --   --   --   --  15*   MONOS PCT % 1*  --   --   --   --   --   --   --  6  --   --   --   --    MONO PCT %  --   --  0*  --   --  1*  --   --   --   --   --   --  4    < > = values in this interval not displayed       Results from last 7 days   Lab Units 08/08/22  0548 08/07/22  2356 08/07/22  1802 08/07/22  1202 08/07/22  6328 08/07/22  0618 08/07/22  0029 08/06/22 2356 08/06/22  1808 08/06/22  1220 08/06/22  0600 08/05/22  1155 08/05/22  0436 08/04/22  0403 08/03/22  0438 08/02/22  2222 08/02/22  1650   SODIUM mmol/L 139 138 139 137  --  139 139  --  138   < > 141   < > 138 138 138   < > 138   POTASSIUM mmol/L 4 7 4 6 4 6 4 5  --  4 5 4 9  --  4 3   < > 3 9   < > 3 8 3 9 4 0   < > 4 1   CHLORIDE mmol/L 110* 108 110* 108  --  109* 108  --  109*   < > 111*   < > 103 105 106   < > 106   CO2 mmol/L 26 28 28 29  --  26 28  --  25   < > 22   < > 22 25 24   < > 22   CO2, I-STAT mmol/L  --   --   --   --   --   --   --  39*  --   --   --   --   --   --   --   --   --    Jacquie Moncada mmol/L 3* 2* 1* 0*  --  4 3*  --  4   < > 8   < > 13 8 8   < > 10   BUN mg/dL 18 21 23 24  --  23 27*  --  31*   < > 34*   < > 41* 32* 29*   < > 22   CREATININE mg/dL 0 90 0 97 1 16 1 17  --  1 27 1 39*  --  1 74*   < > 2 60*   < > 2 80* 1 98* 1 40*   < > 1 14   CALCIUM mg/dL 7 8* 8 0* 7 3* 8 1*  --  7 5* 7 9*  --  7 9*   < > 7 9*   < > 8 0* 7 8* 8 4   < > 8 0*   GLUCOSE RANDOM mg/dL 129 139 145* 137  --  133 115  --  131   < > 104   < > 410* 157* 84   < > 111   ALT U/L 26  --   --   --  26  --   --   --   --   --  25  --  25 26 24  --  25   AST U/L 56*  --   --   --  64*  --   --   --   --   --  43  --  32 35 25  --  29   ALK PHOS U/L 155*  --   --   --  142*  --   --   --   --   --  191*  --  116 113 94  --  128*   ALBUMIN g/dL 1 6*  --   --   --  1 8*  --   --   --   --   --  2 0*  --  2 3* 2 1* 2 4*  --  1 8*   TOTAL BILIRUBIN mg/dL 3 98*  --   --   --  4 73*  --   --   --   --   --  4 03*  --  3 74* 3 31* 2 96*  --  2 33*    < > = values in this interval not displayed       Results from last 7 days   Lab Units 08/08/22  0548 08/07/22  2356 08/07/22  1802 08/07/22  1202 08/07/22  0618 08/07/22  0029 08/06/22  1808   MAGNESIUM mg/dL 2 0 1 8 2 0 1 9 1 9 2 0 1 9   PHOSPHORUS mg/dL 2 3 2 1* 2 9 2 5 2 5 2 9 2 3      Results from last 7 days   Lab Units 08/03/22  1540 08/03/22  0438 08/02/22  0512 08/01/22  1152   INR  1 61* 1 96* 1 78* 1 78*          Results from last 7 days   Lab Units 08/07/22  0922 08/07/22  0617 08/06/22  2041 08/06/22  1659 08/06/22  1622 08/06/22  1333 08/06/22  0600   LACTIC ACID mmol/L 2 4* 2 5* 3 3* 4 1* 3 6* 4 1* 5 7*     ABG:  Results from last 7 days   Lab Units 08/08/22  0608   PH ART  7 322*   PCO2 ART mm Hg 53 9*   PO2 ART mm Hg 110 3   HCO3 ART mmol/L 27 3   BASE EXC ART mmol/L 0 9   ABG SOURCE  Line, Arterial     VBG:  Results from last 7 days   Lab Units 08/08/22  0608   ABG SOURCE  Line, Arterial           Micro  Results from last 7 days   Lab Units 08/03/22  1631 08/02/22  1225 BLOOD CULTURE   --  No Growth After 5 Days  No Growth After 5 Days  GRAM STAIN RESULT  4+ Polys*  2+ Gram positive rods*  2+ Yeast*  --    BODY FLUID CULTURE, STERILE  2+ Growth of Gram Negative Roland*  2+ Growth of Gram Negative Roland*  --        EKG: Tele reviewed  Imaging: I have personally reviewed pertinent reports  and I have personally reviewed pertinent films in PACS    Intake and Output  I/O       08/06 0701 08/07 0700 08/07 0701 08/08 0700 08/08 0701 08/09 0700    P  O  0 0     I V  (mL/kg) 3929 (26 2) 4456 (31 6)     Blood       NG/ 220     IV Piggyback 1679 1557     TPN 1555 1635     Feedings       Total Intake(mL/kg) 7573 (50 5) 7868 (55 8)     Urine (mL/kg/hr) 1743 (0 5) 702 (0 2)     Emesis/NG output 1100 730     Drains 381 494     Other 4255 6321     Stool 0 0     Total Output 7479 8247     Net +94 -379            Unmeasured Stool Occurrence 0 x 0 x           Height and Weights   Height: 6' (182 9 cm)  IBW (Ideal Body Weight): 77 6 kg  Body mass index is 42 07 kg/m²  Weight (last 2 days)     Date/Time Weight    08/08/22 0536 141 (310 19)    08/07/22 0600 150 (330 03)    08/06/22 0754 150 (330 47)            Nutrition       Diet Orders   (From admission, onward)             Start     Ordered    08/04/22 0925  Diet Enteral/Parenteral; Tube Feeding with Oral Diet; Vital AF 1 2; Continuous; 10; Surgical; NPO  Diet effective now        References:    Nutrtion Support Algorithm Enteral vs  Parenteral   Question Answer Comment   Diet Type Enteral/Parenteral    Enteral/Parenteral Tube Feeding with Oral Diet    Tube Feeding Formula: Vital AF 1 2    Bolus/Cyclic/Continuous Continuous    Tube Feeding Goal Rate (mL/hr): 10    Diet Type Surgical    Surgical NPO    RD to adjust diet per protocol?  No        08/04/22 0926                  Active Medications  Scheduled Meds:  Current Facility-Administered Medications   Medication Dose Route Frequency Provider Last Rate    acetaminophen  975 mg Per NG Tube Q8H Corrine Imperatore, DO      Adult TPN (CUSTOM BASE/CUSTOM ELECTROLYTE)   Intravenous Continuous TPN MIRNA Grace 71 5 mL/hr at 08/07/22 2118    Adult TPN (CUSTOM BASE/CUSTOM ELECTROLYTE)   Intravenous Continuous TPN MIRNA Grace      amiodarone  0 5 mg/min Intravenous Continuous MIRNA Jc 0 5 mg/min (08/07/22 0629)    bisacodyl  10 mg Rectal Daily Yasmine Eid PA-C      chlorhexidine  15 mL Mouth/Throat Q12H [address]      cisatracurium (NIMBEX) in 0 9% sodium chloride infusion  0 1-5 mcg/kg/min Intravenous Titrated MIRNA Grace 1 mcg/kg/min (08/08/22 0559)    dexmedetomidine  0 1-0 7 mcg/kg/hr Intravenous Titrated MIRNA Grace 0 4 mcg/kg/hr (08/08/22 0233)    fentaNYL  100 mcg/hr Intravenous Continuous MIRNA Grace 100 mcg/hr (08/08/22 0219)    fentanyl citrate (PF)  50 mcg Intravenous Q1H PRN Junior Walsh PA-C      heparin (porcine)  5,000 Units Subcutaneous [facility] Barbi Miner      hydrocortisone sodium succinate  50 mg Intravenous Q6H [facility] MIRNA Grace      insulin regular (HumuLIN R,NovoLIN R) infusion  0 3-21 Units/hr Intravenous Titrated Yasmine Eid PA-C 3 Units/hr (08/08/22 0407)    iohexol  30 mL Oral 90 min pre-procedure MIRNA Mascorro      ipratropium  0 5 mg Nebulization Q6H Christ Fu MD      levalbuterol  1 25 mg Nebulization Q6H Christ Fu MD      methocarbamol  500 mg Oral Q6H PRN Kamaljit Pretty [place], CRNP      micafungin  100 mg Intravenous Q24H Omid Frey MD Stopped (08/07/22 2000)    norepinephrine  1-30 mcg/min Intravenous Titrated Priyanka Dow PA-C 2 mcg/min (08/08/22 0631)    NxStage K 4/Ca 3  20,000 mL Dialysis Continuous Ng ArinaLUCILLENP 0 mL (08/08/22 0255)    ondansetron  4 mg Intravenous Q6H PRN Yasmine Eid PA-C      pantoprazole  40 mg Intravenous Q12H [facility] Ng Arina, CRNP      phenol  1 spray Mouth/Throat Q2H PRN Medardo Winchester PA-C      piperacillin-tazobactam  3 375 g Intravenous Q8H Zane Owens MD 3 375 g (08/08/22 0523)    sodium phosphate  6 mmol Intravenous Once Nate Orta MD      vasopressin (PITRESSIN) in 0 9 % sodium chloride 100 mL  0 04 Units/min Intravenous Continuous Shanae Thomas MD 0 04 Units/min (08/08/22 0210)     Continuous Infusions:  Adult TPN (CUSTOM BASE/CUSTOM ELECTROLYTE), , Last Rate: 71 5 mL/hr at 08/07/22 2118  Adult TPN (CUSTOM BASE/CUSTOM ELECTROLYTE),   amiodarone, 0 5 mg/min, Last Rate: 0 5 mg/min (08/07/22 0629)  cisatracurium (NIMBEX) in 0 9% sodium chloride infusion, 0 1-5 mcg/kg/min, Last Rate: 1 mcg/kg/min (08/08/22 0559)  dexmedetomidine, 0 1-0 7 mcg/kg/hr, Last Rate: 0 4 mcg/kg/hr (08/08/22 0233)  fentaNYL, 100 mcg/hr, Last Rate: 100 mcg/hr (08/08/22 0219)  insulin regular (HumuLIN R,NovoLIN R) infusion, 0 3-21 Units/hr, Last Rate: 3 Units/hr (08/08/22 0407)  norepinephrine, 1-30 mcg/min, Last Rate: 2 mcg/min (08/08/22 0631)  NxStage K 4/Ca 3, 20,000 mL, Last Rate: 0 mL (08/08/22 0255)  vasopressin (PITRESSIN) in 0 9 % sodium chloride 100 mL, 0 04 Units/min, Last Rate: 0 04 Units/min (08/08/22 0210)      PRN Meds:   fentanyl citrate (PF), 50 mcg, Q1H PRN  methocarbamol, 500 mg, Q6H PRN  ondansetron, 4 mg, Q6H PRN  phenol, 1 spray, Q2H PRN        Allergies   No Known Allergies  ---------------------------------------------------------------------------------------  Advance Directive and Living Will:      Power of :    POLST:    ---------------------------------------------------------------------------------------  Care Time Delivered:   No Critical Care time spent     CelMIRNA Raza      Portions of the record may have been created with voice recognition software  Occasional wrong word or "sound a like" substitutions may have occurred due to the inherent limitations of voice recognition software    Read the chart carefully and recognize, using context, where substitutions have occurred

## 2022-08-08 NOTE — PROGRESS NOTES
Progress Note - Surgical Oncology   Herminia Harrell 67 y o  male MRN: [id number]  Unit/Bed#: [facility] 347-36 Encounter: [id number]    Assessment:  67 y  o  male who presented with MD-IPMN s/p  IPMN, s/p Lap distal pancreatectomy (03/2019-Marlee), open subtotal pancreatectomy (02/2022-Marlee), now status post completion pancreatectomy with sims anastomosis, extensive SARAH, reduction of internal hernia and cholecystectomy on 7/26, now s/p IR drain placement on 8/3  Now w/ ARDS    APRV P I 26, P low 0, 40% FiO2  Levo at 3, vaso  Amio gtt  Dex/fent  Insulin drip  CVVH -20    Systolics as low as the 02L, labs as low as the 50s, afebrile  Labs pending        RUQ  serous  LUQ IR drain 69 serous  CVVH 6321  No stool recorded    Plan:  - wean vent as tolerated  - NPO/NGT  - PICC/TPN  - Dockery for accurate UOP monitoring  - wean pressors as tolerated  - RLQ, LUQ drains, monitor output and character  - nephro on board, continue with CVVH  - endocrinology on board, continue with insulin drip  - infectious disease on board for antibiotic/antifungal management, appreciate recommendations  - follow-up morning labs  - appreciate ICU care    Subjective/Objective   Subjective:   Waxing waning pressor requirements overnight, otherwise no acute events  Objective:     Blood pressure (!) 88/56, pulse 77, temperature (!) 96 98 °F (36 1 °C), resp  rate 15, height 6' (1 829 m), weight (!) 141 kg (310 lb 3 oz), SpO2 99 %  ,Body mass index is 42 07 kg/m²        Intake/Output Summary (Last 24 hours) at 8/8/2022 9538  Last data filed at 8/8/2022 0600  Gross per 24 hour   Intake 8178 ml   Output 8665 ml   Net -487 ml       Invasive Devices  Report    Peripherally Inserted Central Catheter Line  Duration           PICC Line 08/03/22 4 days          Central Venous Catheter Line  Duration           Port A Cath 03/30/22 Right Subclavian 130 days          Peripheral Intravenous Line  Duration           Peripheral IV 08/05/22 Distal;Left;Ventral (anterior) Wrist 2 days          Arterial Line  Duration           Arterial Line 08/06/22 Radial 2 days          Hemodialysis Catheter  Duration           HD Temporary Double Catheter 2 days          Drain  Duration           Closed/Suction Drain Right RLQ Bulb 19 Fr  12 days    Urethral Catheter Temperature probe 16 Fr  6 days    NG/OG/Enteral Tube Enteral Feeding Tube Right nare 5 days    Abscess Drain LUQ 4 days          Airway  Duration           ETT  Oral;Cuffed; Hi-Lo 8 mm 2 days                Physical Exam:   General:  Intubated, sedated  Skin: Warm, dry, anicteric  HEENT: Normocephalic, atraumatic  CV: RRR, no m/r/g  Pulm: CTA b/l, no inc WOB  Abd: Soft, ND/NT, drains as above  MSK: Symmetric, no edema, no tenderness, no deformity    Lab, Imaging and other studies:  I have personally reviewed pertinent lab results    , CBC:   Lab Results   Component Value Date    WBC 53 13 (HH) 08/07/2022    HGB 8 3 (L) 08/07/2022    HCT 25 6 (L) 08/07/2022     (H) 08/07/2022    PLT 20 (LL) 08/07/2022    MCH 35 8 (H) 08/07/2022    MCHC 32 4 08/07/2022    RDW 21 3 (H) 08/07/2022    NRBC 1 08/07/2022   , CMP:   Lab Results   Component Value Date    SODIUM 138 08/07/2022    K 4 6 08/07/2022     08/07/2022    CO2 28 08/07/2022    BUN 21 08/07/2022    CREATININE 0 97 08/07/2022    CALCIUM 8 0 (L) 08/07/2022    AST 64 (H) 08/07/2022    ALT 26 08/07/2022    ALKPHOS 142 (H) 08/07/2022    EGFR 77 08/07/2022     VTE Pharmacologic Prophylaxis: Sequential compression device (Venodyne)  and Heparin  VTE Mechanical Prophylaxis: sequential compression device

## 2022-08-08 NOTE — CONSULTS
Medical Oncology/Hematology Consult Note  Jemal Ramos, male, 67 y o , 1950,  PPHP 515/PPHP [id number], [id number]     Assessment and Plan  1  Thrombocytopenia  Patient does have chronic thrombocytopenia in setting of likely chronic cirrhosis and chemotherapy  Baseline around 100K  Patient did have  on admission which did drop to 50, requiring PLT transfusion  Platelets back up to 147 on 8/2 and then started dropping 8/3  Has been dropping since then until today's value of 14  Patient transfused platelets again today  Likely multifactorial  in the setting of bone marrow shock (as evidenced by severe leukocytosis with prominent immature cells, thrombocytopenia, anemia), ongoing critical illness, cirrhosis, sepsis/bacteremia, initiation of CRRT, medications  Less likely due to chemotherapy given last chemo was in early-mid June  Low suspicion for HIT given 4T score of 2 (low risk)  Given concurrent acute on chronic anemia with ongoing infection, cannot rule out DIC  · Continue to monitor PLT and transfuse for PLT < 10K; goal PLT around 20K  · Repeat PTT, PT, INR   · Obtain DIC labs including LDH, haptoglobin, D-dimer, ATIII, plasminogen, fibrinogen, fibrin split products, hemolysis smear  · Would hold any AP/AC until platelet recovery to 50K  · SCDs for DVT ppx    2  Anemia  Acute (in setting of infection, critical illness, blood loss) on chronic (anemia of chronic disease, ETOH)  Most recent Hgb 7 2  s/p 1 u PRBCs this admission  · Transfuse for Hgb <7  · DIC workup as mentioned above    3  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  S/p total pancreatectomy on 7/26  Postop course has been complicated by septic shock, ARDS, TERESE  · Surg-Onc is primary  · OP Med-Onc follow up    Outpatient follow up plan: Scheduled 8/26 with Dr Kareem Mccormick    Communication with patient/family:  Did update patient's daughter and wife at bedside       Communication with team:  Did communicate with Dr Ramos Richard, primary team     I did review this patient with Dr Mi Obregon and he is in agreement with this plan  Reason for consultation: Thrombocytopenia    History of present illness:  Jacy Pennington is a 67 y o  male with PMH of 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, permanent Afib not on anticoagulation, insulin dependent T2DM and liver cirrhosis who presented on 7/26 for complete pancreatectomy with Surg-Onc  Patient has history of malignant neoplasm of the body and distal pancreas initially diagnosed in 2019  Patient underwent distal pancreatectomy in March 2019 and 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)  Characterized as intraductal papillary mucinous neoplasm confined to the pancreas  Patient underwent 4 cycles of Folfirinox in June 2019  CT scan in October 2019 showed no disease recurrence  Patient continued surveillance with Surg-Onc  In Februrary 2022, patient underwent subtotal pancreatectomy as there appeared to be main duct IPMN in body of pancreas  Path from this showed 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 associated with IPMN of high grade dysplasia  Patient started on Folfirinox and 5-FU earlier in April of this year and completed 4 cycles (cycle 3 had to be stopped because of persistent thrombocytopenia, neutropenia)  Patient following Surg-Onc here and also went to [facility] opinion  Recommendations were for total pancreatectomy  Patient underwent complete pancreatectomy along with cholecystectomy with Surg-Onc on 7/26  Patient was doing well postoperatively until about 8/1 when he was upgraded to ICU level of care due to lethargy, tachycardia, and hypotension requiring IVF resuscitation and pressor support  Patient also with abdominal pain and elevated lactate  Patient found to be bacteremic at this time, with Strep anginosus   CTAP obtained 8/1 and showed stable pneumoperitoneum, ascites, anasarca, cirrhosis and stable fluid collection in the pancreatic bed  IR placed drain on 8/3 into this collection and cultures obtained, which shows GPRs and yeast  Patient on antibiotics and antifungals per ID  Hospital course as also been complicated by development severe fluid overload with ARDs requiring mechanical ventilation  On 8/5, patient having worsening abdominal distention and renal and hepatic function  Patient started on CVVH  Patient does have chronic thrombocytopenia (b/l 100s) with  on admission  Did drop to 50 on 7/28, for which patient was transfused 1 unit of platelets  Platelets recovered but then began to trend down on 8/3 until today's value of 14  Patient transfused another unit of platelets this AM      Review of Systems:   Review of Systems   Unable to complete 2/2 patient intubated and sedated    Oncology History:   Cancer Staging  Overlapping malignant neoplasm of pancreas [facility])  Staging form: Pancreas, AJCC 8th Edition  - Pathologic stage from 2/15/2022: Stage IA (ypT1b(m), pN0, cM0) - Signed by Cristopher Carey MD on 3/8/2022    Oncology History   Overlapping malignant neoplasm of pancreas ([facility] [address] )   3/12/2019 Surgery    Distal pancreatectomy  Several foci of invasive colloid cancer  Grade 1  IPMN with high grade dysplasia at margin  T1b, NO MX Stage IA     4/11/2019 - 6/21/2019 Chemotherapy    Saw Dr Shruti York  Will be starting Folfirinox 4/24   Ended 6/21/2019 4/24/2019 - 7/2/2019 Chemotherapy    fluorouracil (ADRUCIL), 400 mg/m2 = 1,010 mg, Intravenous, Once, 2 of 2 cycles  pegfilgrastim (Nancey Jonatan), 6 mg, Subcutaneous, Once, 2 of 2 cycles  Administration: 6 mg (6/21/2019), 6 mg (6/7/2019)  irinotecan (CAMPTOSAR) chemo infusion, 180 mg/m2 = 455 mg, Intravenous, Once, 4 of 4 cycles  Dose modification: 140 mg/m2 (original dose 180 mg/m2, Cycle 3, Reason: Other (Must fill in a comment)), 125 mg/m2 (original dose 180 mg/m2, Cycle 3, Reason: Dose Not Tolerated)  Administration: 316 mg (6/5/2019), 300 mg (6/19/2019)  leucovorin calcium IVPB, 400 mg/m2 = 1,012 mg, Intravenous, Once, 2 of 2 cycles  oxaliplatin (ELOXATIN) chemo infusion, 85 mg/m2 = 215 05 mg, Intravenous, Once, 4 of 4 cycles  Dose modification: 65 mg/m2 (original dose 85 mg/m2, Cycle 3, Reason: Other (Must fill in a comment)), 60 mg/m2 (original dose 85 mg/m2, Cycle 3, Reason: Dose Not Tolerated)  Administration: 151 8 mg (6/5/2019), 151 8 mg (6/19/2019)  fluorouracil (ADRUCIL) ambulatory infusion Soln, 1,200 mg/m2/day = 6,070 mg, Intravenous, Over 46 hours, 4 of 4 cycles     2/15/2022 Surgery    Pancreas (subtotal pancreatectomy):     - 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 (largest focus 0 8 cm) arising in association with intraductal papillary mucinous neoplasm (IPMN) with high-grade dysplasia  - Surgical margin positive for colloid carcinoma and IPMN with high-grade dysplasia       - Fifteen (15) lymph nodes, negative for carcinomaRestore     2/15/2022 -  Cancer Staged    Staging form: Pancreas, AJCC 8th Edition  - Pathologic stage from 2/15/2022: Stage IA (ypT1b(m), pN0, cM0) - Signed by Christal Jules MD on 3/8/2022  Stage prefix: Post-therapy  Total positive nodes: 0  Multiple tumors: Yes       4/12/2022 -  Chemotherapy    pegfilgrastim (Everlina Larve), 6 mg, Subcutaneous, Once, 4 of 6 cycles  Administration: 6 mg (4/14/2022), 6 mg (4/28/2022), 6 mg (5/26/2022), 6 mg (6/9/2022)  irinotecan (CAMPTOSAR) chemo infusion, 305 mg (69 4 % of original dose 180 mg/m2), Intravenous, Once, 4 of 6 cycles  Dose modification: 125 mg/m2 (original dose 180 mg/m2, Cycle 1, Reason: Anticipated Tolerance, Comment: Same dose received last time in 2019)  Administration: 300 mg (4/12/2022), 300 mg (4/26/2022), 300 mg (5/24/2022), 300 mg (6/7/2022)  oxaliplatin (ELOXATIN) chemo infusion, 146 4 mg (70 6 % of original dose 85 mg/m2), Intravenous, Once, 4 of 6 cycles  Dose modification: 60 mg/m2 (original dose 85 mg/m2, Cycle 1, Reason: Anticipated Tolerance, Comment: Same dose he received last time)  Administration: 150 mg (4/12/2022), 150 mg (4/26/2022), 150 mg (5/24/2022), 150 mg (6/7/2022)  fluorouracil (ADRUCIL) ambulatory infusion Soln, 1,200 mg/m2/day = 5,855 mg, Intravenous, Over 46 hours, 4 of 6 cycles     IPMN (intraductal papillary mucinous neoplasm)   2/15/2022 Surgery    Pancreas (subtotal pancreatectomy):     - 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 (largest focus 0 8 cm) arising in association with intraductal papillary mucinous neoplasm (IPMN) with high-grade dysplasia  - Surgical margin positive for colloid carcinoma and IPMN with high-grade dysplasia       - Fifteen (15) lymph nodes, negative for carcinoma         Past Medical History:   Past Medical History:   Diagnosis Date    A-fib ([facility] [address] )     Abdominal wall strain     last assessed: 10/21/14    Allergic rhinitis     last assessed: 05/03/16    Arthritis     Cancer ([facility] [address] )     PACNCREATIC    COVID-19 virus infection 3/3/2021    CPAP (continuous positive airway pressure) dependence     Diabetes mellitus ([address] )     Erectile dysfunction of non-organic origin     last assessed: 03/17/14    Irregular heart beat     Pt with A fib     Lumbar strain     last assessed: 10/21/14    Multiple acquired skin tags     last assessed: 2/18/16    Palpitations     last assessed: 03/17/14    Pancreas cyst     Plantar fasciitis     Skin disorder     last assessed: 05/28/13    Sleep apnea     CPAP BROKE IN OCTOBER HAS BEEN TRYING TO GET NEW ONE BUT UNABLE AS OF YET    Thrombocytopenia ([facility] [address] )        Past Surgical History:   Procedure Laterality Date    BOTOX INJECTION N/A 11/12/2021    Procedure: Mikki Shane;  Surgeon: Bradly Caro MD;  Location: [address] [place] [address];  Service: Urology    CATARACT EXTRACTION Bilateral     CHOLECYSTECTOMY N/A 7/26/2022    Procedure: CHOLECYSTECTOMY;  Surgeon: Marco Avitia MD;  [facility];  Service: Surgical Oncology    COLONOSCOPY  01/17/2013    COLONOSCOPY  01/08/2018    COLONOSCOPY  04/2021    CYSTOSCOPY      INJECT WITH BOTOX    DISTAL PANCREATECTOMY N/A 2/15/2022    Procedure: PANCREATECTOMY SUB-TOTALL-OPEN;  Surgeon: Marco Avitia MD;  [facility];  Service: Surgical Oncology    EGD  06/05/2020    EGD AND COLONOSCOPY  02/06/2014, 2/8/2017    FL GUIDED CENTRAL VENOUS ACCESS DEVICE INSERTION  4/23/2019    FLEXIBLE SIGMOIDOSCOPY  06/11/2019    FOOT SURGERY      Due to plantar fascitis    IR DRAINAGE TUBE PLACEMENT  8/3/2022    IR PICC PLACEMENT DOUBLE LUMEN  8/3/2022    IR PORT PLACEMENT  3/30/2022    JOINT REPLACEMENT Left     LTK    LINEAR ENDOSCOPIC U/S      PANCREATECTOMY N/A 7/26/2022    Procedure: COMPLETION OF PANCREATECTOMY W/MUHAMMAD ANASTOMOSIS, EXTENSIVE LYSIS OF ADHESIONS, REDUCTION OF INTERNAL HERNIA;  Surgeon: Marco Avitia MD;  [facility];  Service: Surgical Oncology    PANCREATECTOMY LAPAROSCOPIC N/A 3/12/2019    Procedure: DISTAL PANCREATECTOMY LAPAROSCOPIC/POSSIBLE OPEN;  Surgeon: Marco Avitia MD;  [facility];  Service: Surgical Oncology    NC EDG US EXAM SURGICAL ALTER STOM DUODENUM/JEJUNUM N/A 1/24/2019    Procedure: LINEAR ENDOSCOPIC U/S;  Surgeon: Bela Laura MD;  Location: [facility]; Service: Gastroenterology    REPLACEMENT TOTAL KNEE Left     TUNNELED VENOUS PORT PLACEMENT Left 4/23/2019    Procedure: INSERTION VENOUS PORT (PORT-A-CATH);   Surgeon: Marco Avitia MD;  [facility];  Service: Surgical Oncology- 11/8/21 Pt reports PAC removed     UMBILICAL HERNIA REPAIR         Family History   Problem Relation Age of Onset    Breast cancer Mother     Cervical cancer Paternal Aunt     Liver cancer Other     No Known Problems Father        Social History     Socioeconomic History    Marital status: /Civil Union     Spouse name: Not on file    Number of children: Not on file    Years of education: Not on file    Highest education level: Not on file   Occupational History    Not on file   Tobacco Use    Smoking status: Never Smoker    Smokeless tobacco: Never Used   Vaping Use    Vaping Use: Never used   Substance and Sexual Activity    Alcohol use: Yes     Alcohol/week: 2 0 standard drinks     Types: 2 Cans of beer per week     Comment: couple times per week;     Drug use: Never     Comment: Denies     Sexual activity: Yes     Comment: Denies any chest pain or shortness of breath with activity   Other Topics Concern    Not on file   Social History Narrative    Not on file     Social Determinants of Health     Financial Resource Strain: Not on file   Food Insecurity: No Food Insecurity    Worried About Running Out of Food in the Last Year: Never true    Tisha of Food in the Last Year: Never true   Transportation Needs: No Transportation Needs    Lack of Transportation (Medical): No    Lack of Transportation (Non-Medical):  No   Physical Activity: Not on file   Stress: Not on file   Social Connections: Not on file   Intimate Partner Violence: Not on file   Housing Stability: Low Risk     Unable to Pay for Housing in the Last Year: No    Number of Places Lived in the Last Year: 1    Unstable Housing in the Last Year: No         Current Facility-Administered Medications:     acetaminophen (TYLENOL) oral suspension 975 mg, 975 mg, Per NG Tube, Q8H, Corrine Imperatore, DO, 975 mg at 22 1317    Adult 3-in-1 TPN (custom base / custom electrolytes), , Intravenous, Continuous TPN, Ng Arina, CRNP, Last Rate: 71 5 mL/hr at 228, New Bag at 228    Adult 3-in-1 TPN (custom base / custom electrolytes), , Intravenous, Continuous TPN, Ng Arina, CRNP    [] amiodarone (CORDARONE) 900 mg in dextrose 5 % 500 mL infusion, 1 mg/min, Intravenous, Continuous, Stopped at 22 1807 **FOLLOWED BY** amiodarone (CORDARONE) 900 mg in dextrose 5 % 500 mL infusion, 0 5 mg/min, Intravenous, Continuous, [employer], CRNP, Last Rate: 16 7 mL/hr at 08/07/22 0629, 0 5 mg/min at 08/07/22 2593    bisacodyl (DULCOLAX) rectal suppository 10 mg, 10 mg, Rectal, Daily, Juju Walter PA-C, 10 mg at 08/05/22 0857    chlorhexidine (PERIDEX) 0 12 % oral rinse 15 mL, 15 mL, Mouth/Throat, Q12H [address], Melodie Dow PA-C, 15 mL at 08/08/22 1266    cisatracurium (NIMBEX) 200 mg in sodium chloride 0 9 % 500 mL infusion, 0 1-5 mcg/kg/min, Intravenous, Titrated, Ng Arina, CRNP, Held at 08/08/22 0709    dexmedeTOMIDine (Precedex) 400 mcg in sodium chloride 0 9% 100 mL, 0 1-0 7 mcg/kg/hr, Intravenous, Titrated, Ng Arina, CRNP, Last Rate: 11 3 mL/hr at 08/08/22 1320, 0 3 mcg/kg/hr at 08/08/22 1320    fentaNYL 1000 mcg in sodium chloride 0 9% 100mL infusion, 100 mcg/hr, Intravenous, Continuous, Ng Arina, CRNP, Last Rate: 10 mL/hr at 08/08/22 1316, 100 mcg/hr at 08/08/22 1316    fentanyl citrate (PF) 100 MCG/2ML 50 mcg, 50 mcg, Intravenous, Q1H PRN, Melodie Dow PA-C, 50 mcg at 08/05/22 2309    hydrocortisone sodium succinate (PF) (Solu-CORTEF) injection 50 mg, 50 mg, Intravenous, Q6H [address], Ng Arina, CRNP, 50 mg at 08/08/22 1218    insulin regular (HumuLIN R,NovoLIN R) 1 Units/mL in sodium chloride 0 9 % 100 mL infusion, 0 3-21 Units/hr, Intravenous, Titrated, Juju Walter PA-C, Last Rate: 6 mL/hr at 08/08/22 1402, 6 Units/hr at 08/08/22 1402    iohexol (OMNIPAQUE) 240 MG/ML solution 30 mL, 30 mL, Oral, 90 min pre-procedure, MIRNA Ahumada    ipratropium (ATROVENT) 0 02 % inhalation solution 0 5 mg, 0 5 mg, Nebulization, Q6H, Juan Carlos Philip MD, 0 5 mg at 08/08/22 1334    levalbuterol (XOPENEX) inhalation solution 1 25 mg, 1 25 mg, Nebulization, Q6H, Juan Carlos Philip MD, 1 25 mg at 08/08/22 1334    methocarbamol (ROBAXIN) tablet 500 mg, 500 mg, Oral, Q6H PRN, MIRNA Ahumada, 500 mg at 08/01/22 1404    micafungin (MYCAMINE) 100 mg in sodium chloride 0 9 % 100 mL IVPB, 100 mg, Intravenous, Q24H, Juju Ojeda MD, Stopped at 08/07/22 2000    norepinephrine (LEVOPHED) 8 mg (DOUBLE CONCENTRATION) IV in sodium chloride 0 9% 250 mL, 1-30 mcg/min, Intravenous, Titrated, Rhianna Dow PA-C, Last Rate: 5 6 mL/hr at 08/08/22 1449, 3 mcg/min at 08/08/22 1449    NxStage K 4/Ca 3 dialysis solution (RFP-401) 20,000 mL, 20,000 mL, Dialysis, Continuous, Guy Collier DO, Last Rate: 0 mL/hr at 08/08/22 0715, 20,000 mL at 08/08/22 1218    ondansetron (ZOFRAN) injection 4 mg, 4 mg, Intravenous, Q6H PRN, Jose G Bhatt PA-C, 4 mg at 08/03/22 0734    pantoprazole (PROTONIX) injection 40 mg, 40 mg, Intravenous, Q12H [address], Ng Arina, CRNP, 40 mg at 08/08/22 0917    phenol (CHLORASEPTIC) 1 4 % mucosal liquid 1 spray, 1 spray, Mouth/Throat, Q2H PRN, Sepideh Isaacs PA-C    piperacillin-tazobactam (ZOSYN) 3 375 g in sodium chloride 0 9 % 100 mL IVPB, 3 375 g, Intravenous, Q8H, Juju Ojeda MD, Last Rate: 200 mL/hr at 08/08/22 1305, 3 375 g at 08/08/22 1305    sodium phosphate 6 mmol in sodium chloride 0 9 % 100 mL Infusion, 6 mmol, Intravenous, Once, Simona Cabrera MD, Last Rate: 50 mL/hr at 08/08/22 1353, 6 mmol at 08/08/22 1353    vasopressin (PITRESSIN) 20 Units in sodium chloride 0 9 % 100 mL infusion, 0 04 Units/min, Intravenous, Continuous, Shellie James MD, Last Rate: 12 mL/hr at 08/08/22 1107, 0 04 Units/min at 08/08/22 1107    Medications Prior to Admission   Medication    atenolol (TENORMIN) 25 mg tablet    Blood Glucose Calibration (OneTouch Verio) SOLN    Blood Glucose Monitoring Suppl (OneTouch Verio) w/Device KIT    Cholecalciferol (VITAMIN D) 2000 units CAPS    hydrochlorothiazide (HYDRODIURIL) 25 mg tablet    insulin aspart, w/niacinamide, (Fiasp FlexTouch) 100 Units/mL injection pen    insulin glargine (Basaglar KwikPen) 100 units/mL injection pen    Insulin Pen Needle (BD Pen Needle Tia U/F) 32G X 4 MM MISC    Lancets (OneTouch Delica Plus [id number]) MISC    pancrelipase, Lip-Prot-Amyl, (CREON) 6,000 units delayed release capsule    acetaminophen (TYLENOL) 325 mg tablet    ascorbic acid (VITAMIN C) 1000 MG tablet    aspirin (ECOTRIN) 325 mg EC tablet    Cyanocobalamin (VITAMIN B 12 PO)    furosemide (LASIX) 20 mg tablet    insulin aspart, w/niacinamide, (Fiasp FlexTouch) 100 Units/mL injection pen       No Known Allergies      Physical Exam:     BP (!) 86/55   Pulse 79   Temp 97 7 °F (36 5 °C)   Resp 15   Ht 6' (1 829 m)   Wt (!) 141 kg (310 lb 3 oz)   SpO2 99% Comment: Simultaneous filing  User may not have seen previous data  BMI 42 07 kg/m²     Physical Exam  Constitutional:       Appearance: He is ill-appearing  Interventions: He is sedated and intubated  HENT:      Mouth/Throat:      Mouth: Mucous membranes are moist    Eyes:      General: Scleral icterus present  Cardiovascular:      Comments: Difficult to auscultate heart 2/2 body habitus  Pulmonary:      Effort: He is intubated  Comments: Coarse breath sounds throughout bilaterally  Abdominal:      General: There is distension  Comments: Abdomen rigid  Blisterering RLQ of abdomen  Oozing blood from subQ heparin site   Genitourinary:     Comments: Scrotal swelling  Musculoskeletal:      Right upper arm: Edema present  Left upper arm: Edema present  Right forearm: Edema present  Left forearm: Edema present  Right hand: Swelling present  Left hand: Swelling present  Right lower leg: 3+ Edema present  Left lower leg: 3+ Edema present  Right foot: Swelling present  Left foot: Swelling present  Lymphadenopathy:      Cervical: No cervical adenopathy  Skin:     General: Skin is warm  Comments: Multiple sites of oozing from extremities   Neurological:      Mental Status: He is unresponsive             Recent Results (from the past 48 hour(s))   Fingerstick Glucose (POCT)    Collection Time: 08/06/22  4:20 PM   Result Value Ref Range    POC Glucose 145 (H) 65 - 140 mg/dl   Lactic acid 2 Hours    Collection Time: 08/06/22  4:22 PM   Result Value Ref Range    LACTIC ACID 3 6 (HH) 0 5 - 2 0 mmol/L   Blood gas, arterial    Collection Time: 08/06/22  4:58 PM   Result Value Ref Range    pH, Arterial 7 314 (L) 7 350 - 7 450    pCO2, Arterial 48 1 (H) 36 0 - 44 0 mm Hg    pO2, Arterial 68 1 (L) 75 0 - 129 0 mm Hg    HCO3, Arterial 23 9 22 0 - 28 0 mmol/L    Base Excess, Arterial -2 3 mmol/L    O2 Content, Arterial 11 4 (L) 16 0 - 23 0 mL/dL    O2 HGB,Arterial  91 3 (L) 94 0 - 97 0 %    SOURCE Line, Arterial     Vent Type- AC AC     AC Rate 24     Tidal Volume 15 ml    Inspired Air (FIO2) 85     PEEP 8    Lactic acid, plasma    Collection Time: 08/06/22  4:59 PM   Result Value Ref Range    LACTIC ACID 4 1 (HH) 0 5 - 2 0 mmol/L   Fingerstick Glucose (POCT)    Collection Time: 08/06/22  6:05 PM   Result Value Ref Range    POC Glucose 140 65 - 140 mg/dl   Basic metabolic panel    Collection Time: 08/06/22  6:08 PM   Result Value Ref Range    Sodium 138 135 - 147 mmol/L    Potassium 4 3 3 5 - 5 3 mmol/L    Chloride 109 (H) 96 - 108 mmol/L    CO2 25 21 - 32 mmol/L    ANION GAP 4 4 - 13 mmol/L    BUN 31 (H) 5 - 25 mg/dL    Creatinine 1 74 (H) 0 60 - 1 30 mg/dL    Glucose 131 65 - 140 mg/dL    Calcium 7 9 (L) 8 3 - 10 1 mg/dL    eGFR 38 ml/min/1 73sq m   Magnesium    Collection Time: 08/06/22  6:08 PM   Result Value Ref Range    Magnesium 1 9 1 6 - 2 6 mg/dL   Phosphorus    Collection Time: 08/06/22  6:08 PM   Result Value Ref Range    Phosphorus 2 3 2 3 - 4 1 mg/dL   Calcium, ionized    Collection Time: 08/06/22  6:43 PM   Result Value Ref Range    Calcium, Ionized 1 14 1 12 - 1 32 mmol/L   Fingerstick Glucose (POCT)    Collection Time: 08/06/22  8:17 PM   Result Value Ref Range    POC Glucose 127 65 - 140 mg/dl   Lactic acid 2 Hours    Collection Time: 08/06/22  8:41 PM   Result Value Ref Range    LACTIC ACID 3 3 (HH) 0 5 - 2 0 mmol/L   Fingerstick Glucose (POCT)    Collection Time: 08/06/22  9:52 PM   Result Value Ref Range    POC Glucose 150 (H) 65 - 140 mg/dl   Blood gas, arterial    Collection Time: 08/06/22  9:53 PM   Result Value Ref Range    pH, Arterial 7 258 (L) 7 350 - 7 450    pCO2, Arterial 58 0 (HH) 36 0 - 44 0 mm Hg    pO2, Arterial 79 0 75 0 - 129 0 mm Hg    HCO3, Arterial 25 3 22 0 - 28 0 mmol/L    Base Excess, Arterial -2 1 mmol/L    O2 Content, Arterial 11 7 (L) 16 0 - 23 0 mL/dL    O2 HGB,Arterial  92 6 (L) 94 0 - 97 0 %    SOURCE Line, Arterial     Vent Type- AC AC     AC Rate 24     PC 13     Inspired Air (FIO2) 85     PEEP 8    POCT Blood Gas (CG8+)    Collection Time: 08/06/22 11:56 PM   Result Value Ref Range    pH, Art i-STAT 7 400 7 350 - 7 450    pCO2, Art i-STAT 59 8 (H) 36 0 - 44 0 mm HG    pO2, ART i-STAT 70 0 (L) 75 0 - 129 0 mm HG    BE, i-STAT 11 (H) -2 - 3 mmol/L    HCO3, Art i-STAT 37 1 (HH) 22 0 - 28 0 mmol/L    CO2, i-STAT 39 (H) 21 - 32 mmol/L    O2 Sat, i-STAT 93 (H) 60 - 85 %    SODIUM, I-STAT 142 136 - 145 mmol/l    Potassium, i-STAT 4 9 3 5 - 5 3 mmol/L    Calcium, Ionized i-STAT 1 17 1 12 - 1 32 mmol/L    Hct, i-STAT 23 (L) 36 5 - 49 3 %    Hgb, i-STAT 7 8 (L) 12 0 - 17 0 g/dl    Glucose, i-STAT 121 65 - 140 mg/dl    Specimen Type ARTERIAL    Basic metabolic panel    Collection Time: 08/07/22 12:29 AM   Result Value Ref Range    Sodium 139 135 - 147 mmol/L    Potassium 4 9 3 5 - 5 3 mmol/L    Chloride 108 96 - 108 mmol/L    CO2 28 21 - 32 mmol/L    ANION GAP 3 (L) 4 - 13 mmol/L    BUN 27 (H) 5 - 25 mg/dL    Creatinine 1 39 (H) 0 60 - 1 30 mg/dL    Glucose 115 65 - 140 mg/dL    Calcium 7 9 (L) 8 3 - 10 1 mg/dL    eGFR 49 ml/min/1 73sq m   Magnesium    Collection Time: 08/07/22 12:29 AM   Result Value Ref Range    Magnesium 2 0 1 6 - 2 6 mg/dL   Phosphorus    Collection Time: 08/07/22 12:29 AM   Result Value Ref Range    Phosphorus 2 9 2 3 - 4 1 mg/dL   Calcium, ionized    Collection Time: 08/07/22 12:29 AM   Result Value Ref Range    Calcium, Ionized 1 15 1 12 - 1 32 mmol/L   Fingerstick Glucose (POCT)    Collection Time: 08/07/22 12:29 AM   Result Value Ref Range    POC Glucose 128 65 - 140 mg/dl   Blood gas, arterial    Collection Time: 08/07/22 12:52 AM   Result Value Ref Range    pH, Arterial 7 257 (L) 7 350 - 7 450    pCO2, Arterial 62 2 (HH) 36 0 - 44 0 mm Hg    pO2, Arterial 65 5 (L) 75 0 - 129 0 mm Hg    HCO3, Arterial 27 1 22 0 - 28 0 mmol/L    Base Excess, Arterial -0 5 mmol/L    O2 Content, Arterial 10 7 (L) 16 0 - 23 0 mL/dL    O2 HGB,Arterial  88 1 (L) 94 0 - 97 0 %    SOURCE Line, Arterial     VENT- APRV APRV     P-low 0     FIO2 100 %   Fingerstick Glucose (POCT)    Collection Time: 08/07/22  1:58 AM   Result Value Ref Range    POC Glucose 128 65 - 140 mg/dl   Fingerstick Glucose (POCT)    Collection Time: 08/07/22  4:15 AM   Result Value Ref Range    POC Glucose 141 (H) 65 - 140 mg/dl   COVID only    Collection Time: 08/07/22  5:17 AM    Specimen: Nose; Nares   Result Value Ref Range    SARS-CoV-2 Negative Negative   Lactic acid, plasma    Collection Time: 08/07/22  6:17 AM   Result Value Ref Range    LACTIC ACID 2 5 (HH) 0 5 - 2 0 mmol/L   Basic metabolic panel    Collection Time: 08/07/22  6:18 AM   Result Value Ref Range    Sodium 139 135 - 147 mmol/L    Potassium 4 5 3 5 - 5 3 mmol/L    Chloride 109 (H) 96 - 108 mmol/L    CO2 26 21 - 32 mmol/L    ANION GAP 4 4 - 13 mmol/L    BUN 23 5 - 25 mg/dL    Creatinine 1 27 0 60 - 1 30 mg/dL    Glucose 133 65 - 140 mg/dL    Calcium 7 5 (L) 8 3 - 10 1 mg/dL    eGFR 55 ml/min/1 73sq m   Magnesium    Collection Time: 08/07/22  6:18 AM   Result Value Ref Range    Magnesium 1 9 1 6 - 2 6 mg/dL   Phosphorus    Collection Time: 08/07/22  6:18 AM   Result Value Ref Range    Phosphorus 2 5 2 3 - 4 1 mg/dL   Calcium, ionized    Collection Time: 08/07/22  6:18 AM   Result Value Ref Range    Calcium, Ionized 1  14 1 12 - 1 32 mmol/L   CBC and differential    Collection Time: 08/07/22  6:18 AM   Result Value Ref Range    WBC 48 85 (HH) 4 31 - 10 16 Thousand/uL    RBC 2 28 (L) 3 88 - 5 62 Million/uL    Hemoglobin 8 1 (L) 12 0 - 17 0 g/dL    Hematocrit 25 7 (L) 36 5 - 49 3 %     (H) 82 - 98 fL    MCH 35 5 (H) 26 8 - 34 3 pg    MCHC 31 5 31 4 - 37 4 g/dL    RDW 21 6 (H) 11 6 - 15 1 %    Platelets 21 (LL) 918 - 390 Thousands/uL   Manual Differential(PHLEBS Do Not Order)    Collection Time: 08/07/22  6:18 AM   Result Value Ref Range    Segmented % 82 (H) 43 - 75 %    Bands % 17 (H) 0 - 8 %    Lymphocytes % 0 (L) 14 - 44 %    Monocytes % 0 (L) 4 - 12 %    Eosinophils, % 0 0 - 6 %    Basophils % 0 0 - 1 %    Myelocytes % 1 0 - 1 %    Absolute Neutrophils 48 36 (H) 1 85 - 7 62 Thousand/uL    Lymphocytes Absolute 0 00 (L) 0 60 - 4 47 Thousand/uL    Monocytes Absolute 0 00 0 00 - 1 22 Thousand/uL    Eosinophils Absolute 0 00 0 00 - 0 40 Thousand/uL    Basophils Absolute 0 00 0 00 - 0 10 Thousand/uL    Total Counted      nRBC 2 0 - 2 /100 WBC    Toxic Granulation Present     RBC Morphology Present     Anisocytosis Present     Basophilic Stippling Present     Macrocytes Present     Pappenheimer Bodies Present     Poikilocytes Present     Polychromasia Present     Target Cells Present     Platelet Estimate Decreased (A) Adequate   Blood gas, arterial    Collection Time: 08/07/22  6:19 AM   Result Value Ref Range    pH, Arterial 7 271 (L) 7 350 - 7 450    pCO2, Arterial 58 5 (HH) 36 0 - 44 0 mm Hg    pO2, Arterial 100 4 75 0 - 129 0 mm Hg    HCO3, Arterial 26 3 22 0 - 28 0 mmol/L    Base Excess, Arterial -1 0 mmol/L    O2 Content, Arterial 12 4 (L) 16 0 - 23 0 mL/dL    O2 HGB,Arterial  96 4 94 0 - 97 0 %    SOURCE Line, Arterial    Fingerstick Glucose (POCT)    Collection Time: 08/07/22  6:21 AM   Result Value Ref Range    POC Glucose 135 65 - 140 mg/dl   CBC and Platelet    Collection Time: 08/07/22  8:02 AM   Result Value Ref Range    WBC 53 82 (HH) 4 31 - 10 16 Thousand/uL    RBC 2 31 (L) 3 88 - 5 62 Million/uL    Hemoglobin 8 2 (L) 12 0 - 17 0 g/dL    Hematocrit 25 5 (L) 36 5 - 49 3 %     (H) 82 - 98 fL    MCH 35 5 (H) 26 8 - 34 3 pg    MCHC 32 2 31 4 - 37 4 g/dL    RDW 21 6 (H) 11 6 - 15 1 %    Platelets 22 (LL) 467 - 390 Thousands/uL   Fingerstick Glucose (POCT)    Collection Time: 08/07/22  8:02 AM   Result Value Ref Range    POC Glucose 134 65 - 140 mg/dl   Lactic acid 2 Hours    Collection Time: 08/07/22  9:22 AM   Result Value Ref Range    LACTIC ACID 2 4 (HH) 0 5 - 2 0 mmol/L   CBC and differential    Collection Time: 08/07/22  9:22 AM   Result Value Ref Range    WBC 53 13 (HH) 4 31 - 10 16 Thousand/uL    RBC 2 32 (L) 3 88 - 5 62 Million/uL    Hemoglobin 8 3 (L) 12 0 - 17 0 g/dL    Hematocrit 25 6 (L) 36 5 - 49 3 %     (H) 82 - 98 fL    MCH 35 8 (H) 26 8 - 34 3 pg    MCHC 32 4 31 4 - 37 4 g/dL    RDW 21 3 (H) 11 6 - 15 1 %    Platelets 20 (LL) 644 - 390 Thousands/uL    nRBC 1 /100 WBCs    Neutrophils Relative 91 (H) 43 - 75 %    Immat GRANS % 5 (H) 0 - 2 %    Lymphocytes Relative 3 (L) 14 - 44 %    Monocytes Relative 1 (L) 4 - 12 %    Eosinophils Relative 0 0 - 6 %    Basophils Relative 0 0 - 1 %    Neutrophils Absolute 48 47 (H) 1 85 - 7 62 Thousands/µL    Immature Grans Absolute >0 50 (H) 0 00 - 0 20 Thousand/uL    Lymphocytes Absolute 1 31 0 60 - 4 47 Thousands/µL    Monocytes Absolute 0 74 0 17 - 1 22 Thousand/µL    Eosinophils Absolute 0 02 0 00 - 0 61 Thousand/µL    Basophils Absolute 0 03 0 00 - 0 10 Thousands/µL   Hepatic function panel    Collection Time: 08/07/22  9:22 AM   Result Value Ref Range    Total Bilirubin 4 73 (H) 0 20 - 1 00 mg/dL    Bilirubin, Direct 3 02 (H) 0 00 - 0 20 mg/dL    Alkaline Phosphatase 142 (H) 46 - 116 U/L    AST 64 (H) 5 - 45 U/L    ALT 26 12 - 78 U/L    Total Protein 5 2 (L) 6 4 - 8 4 g/dL    Albumin 1 8 (L) 3 5 - 5 0 g/dL   Fingerstick Glucose (POCT)    Collection Time: 08/07/22 10:14 AM   Result Value Ref Range    POC Glucose 165 (H) 65 - 140 mg/dl   Fingerstick Glucose (POCT)    Collection Time: 08/07/22 12:01 PM   Result Value Ref Range    POC Glucose 139 65 - 140 mg/dl   Basic metabolic panel    Collection Time: 08/07/22 12:02 PM   Result Value Ref Range    Sodium 137 135 - 147 mmol/L    Potassium 4 5 3 5 - 5 3 mmol/L    Chloride 108 96 - 108 mmol/L    CO2 29 21 - 32 mmol/L    ANION GAP 0 (L) 4 - 13 mmol/L    BUN 24 5 - 25 mg/dL    Creatinine 1 17 0 60 - 1 30 mg/dL    Glucose 137 65 - 140 mg/dL    Calcium 8 1 (L) 8 3 - 10 1 mg/dL    eGFR 61 ml/min/1 73sq m   Magnesium    Collection Time: 08/07/22 12:02 PM   Result Value Ref Range    Magnesium 1 9 1 6 - 2 6 mg/dL   Phosphorus    Collection Time: 08/07/22 12:02 PM   Result Value Ref Range    Phosphorus 2 5 2 3 - 4 1 mg/dL   Calcium, ionized    Collection Time: 08/07/22 12:02 PM   Result Value Ref Range    Calcium, Ionized 1 21 1 12 - 1 32 mmol/L   Blood gas, arterial    Collection Time: 08/07/22 12:02 PM   Result Value Ref Range    pH, Arterial 7 270 (L) 7 350 - 7 450    pCO2, Arterial 57 3 (HH) 36 0 - 44 0 mm Hg    pO2, Arterial 96 2 75 0 - 129 0 mm Hg    HCO3, Arterial 25 7 22 0 - 28 0 mmol/L    Base Excess, Arterial -1 5 mmol/L    O2 Content, Arterial 12 3 (L) 16 0 - 23 0 mL/dL    O2 HGB,Arterial  96 1 94 0 - 97 0 %    SOURCE Line, Arterial    Fingerstick Glucose (POCT)    Collection Time: 08/07/22  2:17 PM   Result Value Ref Range    POC Glucose 153 (H) 65 - 140 mg/dl   Fingerstick Glucose (POCT)    Collection Time: 08/07/22  4:00 PM   Result Value Ref Range    POC Glucose 174 (H) 65 - 140 mg/dl   Fingerstick Glucose (POCT)    Collection Time: 08/07/22  6:00 PM   Result Value Ref Range    POC Glucose 139 65 - 140 mg/dl   Blood gas, arterial    Collection Time: 08/07/22  6:01 PM   Result Value Ref Range    pH, Arterial 7 226 (LL) 7 350 - 7 450    pCO2, Arterial 69 9 (HH) 36 0 - 44 0 mm Hg    pO2, Arterial 110 5 75 0 - 129 0 mm Hg    HCO3, Arterial 28 4 (H) 22 0 - 28 0 mmol/L    Base Excess, Arterial 0 1 mmol/L    O2 Content, Arterial 11 6 (L) 16 0 - 23 0 mL/dL    O2 HGB,Arterial  96 8 94 0 - 97 0 %    SOURCE Line, Arterial    Basic metabolic panel    Collection Time: 08/07/22  6:02 PM   Result Value Ref Range    Sodium 139 135 - 147 mmol/L    Potassium 4 6 3 5 - 5 3 mmol/L    Chloride 110 (H) 96 - 108 mmol/L    CO2 28 21 - 32 mmol/L    ANION GAP 1 (L) 4 - 13 mmol/L    BUN 23 5 - 25 mg/dL    Creatinine 1 16 0 60 - 1 30 mg/dL    Glucose 145 (H) 65 - 140 mg/dL    Calcium 7 3 (L) 8 3 - 10 1 mg/dL    eGFR 62 ml/min/1 73sq m   Magnesium    Collection Time: 08/07/22  6:02 PM   Result Value Ref Range    Magnesium 2 0 1 6 - 2 6 mg/dL   Phosphorus    Collection Time: 08/07/22  6:02 PM   Result Value Ref Range    Phosphorus 2 9 2 3 - 4 1 mg/dL   Calcium, ionized    Collection Time: 08/07/22  6:02 PM   Result Value Ref Range    Calcium, Ionized 1 15 1 12 - 1 32 mmol/L   Fingerstick Glucose (POCT)    Collection Time: 08/07/22  8:10 PM   Result Value Ref Range    POC Glucose 129 65 - 140 mg/dl   Blood gas, arterial    Collection Time: 08/07/22  9:04 PM   Result Value Ref Range    pH, Arterial 7 295 (L) 7 350 - 7 450    pCO2, Arterial 57 7 (HH) 36 0 - 44 0 mm Hg    pO2, Arterial 108 5 75 0 - 129 0 mm Hg    HCO3, Arterial 27 4 22 0 - 28 0 mmol/L    Base Excess, Arterial 0 5 mmol/L    O2 Content, Arterial 11 4 (L) 16 0 - 23 0 mL/dL    O2 HGB,Arterial  97 1 (H) 94 0 - 97 0 %    SOURCE Line, Arterial    Fingerstick Glucose (POCT)    Collection Time: 08/07/22  9:56 PM   Result Value Ref Range    POC Glucose 138 65 - 140 mg/dl   Fingerstick Glucose (POCT)    Collection Time: 08/07/22 11:55 PM   Result Value Ref Range    POC Glucose 161 (H) 65 - 140 mg/dl   Basic metabolic panel    Collection Time: 08/07/22 11:56 PM   Result Value Ref Range    Sodium 138 135 - 147 mmol/L    Potassium 4 6 3 5 - 5 3 mmol/L    Chloride 108 96 - 108 mmol/L    CO2 28 21 - 32 mmol/L ANION GAP 2 (L) 4 - 13 mmol/L    BUN 21 5 - 25 mg/dL    Creatinine 0 97 0 60 - 1 30 mg/dL    Glucose 139 65 - 140 mg/dL    Calcium 8 0 (L) 8 3 - 10 1 mg/dL    eGFR 77 ml/min/1 73sq m   Magnesium    Collection Time: 08/07/22 11:56 PM   Result Value Ref Range    Magnesium 1 8 1 6 - 2 6 mg/dL   Phosphorus    Collection Time: 08/07/22 11:56 PM   Result Value Ref Range    Phosphorus 2 1 (L) 2 3 - 4 1 mg/dL   Calcium, ionized    Collection Time: 08/07/22 11:56 PM   Result Value Ref Range    Calcium, Ionized 1 16 1 12 - 1 32 mmol/L   Blood gas, arterial    Collection Time: 08/07/22 11:56 PM   Result Value Ref Range    pH, Arterial 7 309 (L) 7 350 - 7 450    pCO2, Arterial 54 4 (H) 36 0 - 44 0 mm Hg    pO2, Arterial 127 0 75 0 - 129 0 mm Hg    HCO3, Arterial 26 7 22 0 - 28 0 mmol/L    Base Excess, Arterial 0 1 mmol/L    O2 Content, Arterial 11 5 (L) 16 0 - 23 0 mL/dL    O2 HGB,Arterial  97 7 (H) 94 0 - 97 0 %    SOURCE Line, Arterial    Fingerstick Glucose (POCT)    Collection Time: 08/08/22  2:14 AM   Result Value Ref Range    POC Glucose 145 (H) 65 - 140 mg/dl   Fingerstick Glucose (POCT)    Collection Time: 08/08/22  4:07 AM   Result Value Ref Range    POC Glucose 126 65 - 140 mg/dl   Fingerstick Glucose (POCT)    Collection Time: 08/08/22  5:47 AM   Result Value Ref Range    POC Glucose 133 65 - 140 mg/dl   CBC and differential    Collection Time: 08/08/22  5:48 AM   Result Value Ref Range    WBC 27 57 (H) 4 31 - 10 16 Thousand/uL    RBC 2 04 (L) 3 88 - 5 62 Million/uL    Hemoglobin 7 2 (L) 12 0 - 17 0 g/dL    Hematocrit 22 6 (L) 36 5 - 49 3 %     (H) 82 - 98 fL    MCH 35 3 (H) 26 8 - 34 3 pg    MCHC 31 9 31 4 - 37 4 g/dL    RDW 21 3 (H) 11 6 - 15 1 %    Platelets 14 (LL) 739 - 390 Thousands/uL   Basic metabolic panel    Collection Time: 08/08/22  5:48 AM   Result Value Ref Range    Sodium 139 135 - 147 mmol/L    Potassium 4 7 3 5 - 5 3 mmol/L    Chloride 110 (H) 96 - 108 mmol/L    CO2 26 21 - 32 mmol/L ANION GAP 3 (L) 4 - 13 mmol/L    BUN 18 5 - 25 mg/dL    Creatinine 0 90 0 60 - 1 30 mg/dL    Glucose 129 65 - 140 mg/dL    Calcium 7 8 (L) 8 3 - 10 1 mg/dL    eGFR 84 ml/min/1 73sq m   Magnesium    Collection Time: 08/08/22  5:48 AM   Result Value Ref Range    Magnesium 2 0 1 6 - 2 6 mg/dL   Phosphorus    Collection Time: 08/08/22  5:48 AM   Result Value Ref Range    Phosphorus 2 3 2 3 - 4 1 mg/dL   Calcium, ionized    Collection Time: 08/08/22  5:48 AM   Result Value Ref Range    Calcium, Ionized 1 16 1 12 - 1 32 mmol/L   Hepatic function panel    Collection Time: 08/08/22  5:48 AM   Result Value Ref Range    Total Bilirubin 3 98 (H) 0 20 - 1 00 mg/dL    Bilirubin, Direct 2 79 (H) 0 00 - 0 20 mg/dL    Alkaline Phosphatase 155 (H) 46 - 116 U/L    AST 56 (H) 5 - 45 U/L    ALT 26 12 - 78 U/L    Total Protein 5 0 (L) 6 4 - 8 4 g/dL    Albumin 1 6 (L) 3 5 - 5 0 g/dL   Manual Differential(PHLEBS Do Not Order)    Collection Time: 08/08/22  5:48 AM   Result Value Ref Range    Segmented % 85 (H) 43 - 75 %    Bands % 10 (H) 0 - 8 %    Lymphocytes % 2 (L) 14 - 44 %    Monocytes % 3 (L) 4 - 12 %    Eosinophils, % 0 0 - 6 %    Basophils % 0 0 - 1 %    Absolute Neutrophils 26 19 (H) 1 85 - 7 62 Thousand/uL    Lymphocytes Absolute 0 55 (L) 0 60 - 4 47 Thousand/uL    Monocytes Absolute 0 83 0 00 - 1 22 Thousand/uL    Eosinophils Absolute 0 00 0 00 - 0 40 Thousand/uL    Basophils Absolute 0 00 0 00 - 0 10 Thousand/uL    Total Counted      RBC Morphology Present     Anisocytosis Present     Macrocytes Present     Poikilocytes Present     Polychromasia Present     Target Cells Present     Platelet Estimate Decreased (A) Adequate   Blood gas, arterial    Collection Time: 08/08/22  6:08 AM   Result Value Ref Range    pH, Arterial 7 322 (L) 7 350 - 7 450    pCO2, Arterial 53 9 (H) 36 0 - 44 0 mm Hg    pO2, Arterial 110 3 75 0 - 129 0 mm Hg    HCO3, Arterial 27 3 22 0 - 28 0 mmol/L    Base Excess, Arterial 0 9 mmol/L    O2 Content, Arterial 10 9 (L) 16 0 - 23 0 mL/dL    O2 HGB,Arterial  97 1 (H) 94 0 - 97 0 %    SOURCE Line, Arterial     VENT- APRV APRV     P-low 0     FIO2 40 %    T- low 0 40    Fingerstick Glucose (POCT)    Collection Time: 08/08/22  8:25 AM   Result Value Ref Range    POC Glucose 138 65 - 140 mg/dl   Type and screen    Collection Time: 08/08/22  9:18 AM   Result Value Ref Range    ABO Grouping A     Rh Factor Negative     Antibody Screen Negative     Specimen Expiration Date [id number]    Fingerstick Glucose (POCT)    Collection Time: 08/08/22 10:11 AM   Result Value Ref Range    POC Glucose 160 (H) 65 - 140 mg/dl   Prepare Leukoreduced Platelet Pheresis (1 pheresis product = 6-8 pooled units): 1 Product, Irradiated, Leukoreduced    Collection Time: 08/08/22 10:58 AM   Result Value Ref Range    Unit Product Code B5949G09     Unit Number [id number]     Unit ABO A     Unit [facility] POS     Unit Dispense Status Issued     Unit Product Volume 300 mL   Basic metabolic panel    Collection Time: 08/08/22 11:56 AM   Result Value Ref Range    Sodium 138 135 - 147 mmol/L    Potassium 4 4 3 5 - 5 3 mmol/L    Chloride 109 (H) 96 - 108 mmol/L    CO2 24 21 - 32 mmol/L    ANION GAP 5 4 - 13 mmol/L    BUN 16 5 - 25 mg/dL    Creatinine 0 97 0 60 - 1 30 mg/dL    Glucose 126 65 - 140 mg/dL    Calcium 8 0 (L) 8 3 - 10 1 mg/dL    eGFR 77 ml/min/1 73sq m   Magnesium    Collection Time: 08/08/22 11:56 AM   Result Value Ref Range    Magnesium 2 0 1 6 - 2 6 mg/dL   Phosphorus    Collection Time: 08/08/22 11:56 AM   Result Value Ref Range    Phosphorus 2 0 (L) 2 3 - 4 1 mg/dL   Calcium, ionized    Collection Time: 08/08/22 11:56 AM   Result Value Ref Range    Calcium, Ionized 1 15 1 12 - 1 32 mmol/L   Blood gas, arterial    Collection Time: 08/08/22 11:56 AM   Result Value Ref Range    pH, Arterial 7 396 7 350 - 7 450    PH ART TC 7 405 7 350 - 7 450    pCO2, Arterial 42 9 36 0 - 44 0 mm Hg    PCO2 (TC) Arterial 41 8 36 0 - 44 0 mm Hg    pO2, Arterial 109 0 75 0 - 129 0 mm Hg    PO2 (TC) Arterial 105 4 75 0 - 129 0 mm Hg    HCO3, Arterial 25 7 22 0 - 28 0 mmol/L    Base Excess, Arterial 0 8 mmol/L    O2 Content, Arterial 11 0 (L) 16 0 - 23 0 mL/dL    O2 HGB,Arterial  97 2 (H) 94 0 - 97 0 %    SOURCE Line, Arterial     JOYCE TEST Yes     Temperature 97 5 Degrees Fehrenheit    VENT- APRV APRV    Fingerstick Glucose (POCT)    Collection Time: 08/08/22 12:29 PM   Result Value Ref Range    POC Glucose 122 65 - 140 mg/dl   Fingerstick Glucose (POCT)    Collection Time: 08/08/22  2:01 PM   Result Value Ref Range    POC Glucose 155 (H) 65 - 140 mg/dl   LD,Blood    Collection Time: 08/08/22  2:23 PM   Result Value Ref Range     (H) 81 - 234 U/L   Antithrombin III Activity    Collection Time: 08/08/22  2:23 PM   Result Value Ref Range    AntiThrombIN III Activity 31 (L) 92 - 136 % of Normal   Plasminogen activity    Collection Time: 08/08/22  2:23 PM   Result Value Ref Range    Plasminogen 44 0 (L) 77 - 138 % of Normal   Platelet count    Collection Time: 08/08/22  2:23 PM   Result Value Ref Range    Platelets 35 (LL) 608 - 390 Thousands/uL    MPV 11 6 8 9 - 12 7 fL   APTT    Collection Time: 08/08/22  2:23 PM   Result Value Ref Range    PTT 37 23 - 37 seconds       XR chest portable    Result Date: 8/6/2022  Narrative: CHEST INDICATION:   HD cath exchange, confirm placement  COMPARISON:  8/5 EXAM PERFORMED/VIEWS:  XR CHEST PORTABLE  The frontal view was performed utilizing dual energy radiographic technique  Images: 3 FINDINGS:  Stable Port-A-Cath, ET tube, NG tube  Left-sided PICC line projects at the cavoatrial junction  Stable cardiomediastinal structures  Stable central vascular congestion and bilateral opacities suggestive of pulmonary edema  Suspected left basilar effusion  Impression: Stable pulmonary edema and left basilar effusion  Left-sided PICC line extends to the cavoatrial junction   Workstation performed: [id number]     XR chest portable    Result Date: 8/6/2022  Narrative: CHEST INDICATION:   s/p hd cath placement  COMPARISON:  8/5  EXAM PERFORMED/VIEWS:  XR CHEST PORTABLE  The frontal view was performed utilizing dual energy radiographic technique  Images: 3 FINDINGS:  Stable Port-A-Cath, NG tube, ET tube  A left-sided PICC line projects at the cavoatrial junction  Stable cardiomegaly  Stable central vascular congestion and peripheral opacities suggestive of pulmonary edema  Suspected left basilar effusion  Impression: PICC line tip is difficult to visualize but appears at the cavoatrial junction  Persistent pulmonary edema and suspected left basilar effusion  Workstation performed: [id number]     XR chest portable    Result Date: 8/6/2022  Narrative: CHEST INDICATION:   s/p intubation  COMPARISON:  8/5/2022 EXAM PERFORMED/VIEWS:  XR CHEST PORTABLE  The frontal view was performed utilizing dual energy radiographic technique  Images: 3 FINDINGS:  A right chest wall Port-A-Cath is unchanged in position  ET tube extends inferiorly below the GE junction and below the scope of this examination  ET tube projects 4 1 cm above the barbara  A left-sided PICC line is present projecting over the right atrium  Stable cardiomegaly  Central pulmonary vascular congestion with bilateral opacities  Limited evaluation of the hemidiaphragms suggestive of small basilar effusions, left greater than right  Osseous structures appear within normal limits for patient age  Impression: Tubes and lines as described above  Interval development of central vascular congestion with peripheral opacity suggestive of moderate pulmonary edema  A component of superimposed infection not excluded  Workstation performed: [id number]     XR chest portable    Result Date: 8/1/2022  Narrative: CHEST INDICATION:   fever workup  COMPARISON:  July 26, 2022 EXAM PERFORMED/VIEWS:  XR CHEST PORTABLE FINDINGS:  Right Port-A-Cath is unchanged    Remaining tubes and lines have been removed  Heart shadow is enlarged but unchanged from prior exam   Atherosclerotic calcifications in the aorta are noted  Subsegmental bibasilar atelectasis  No pneumothorax or pleural effusion  Osseous structures appear within normal limits for patient age  Impression: Bibasilar subsegmental atelectasis  Workstation performed: [id number]     XR chest portable    Result Date: 7/27/2022  Narrative: CHEST INDICATION:   s/p[ intubation, RIJ TLC placement  COMPARISON:  7/12/2022 EXAM PERFORMED/VIEWS:  XR CHEST PORTABLE FINDINGS:  Endotracheal tube is present, in satisfactory position with its tip approximately 6 cm above the level of the barbara  Enteric tube is present with its tip extending below the left hemidiaphragm  There is a right internal jugular central venous catheter  with tip over the cavoatrial junction  There is a stable right-sided Port-A-Cath  Cardiomediastinal silhouette appears unremarkable  The lungs are clear  No pneumothorax or pleural effusion  Osseous structures appear within normal limits for patient age  Impression: Lines and tubes as described  No pneumothorax  Workstation performed: [id number]     XR chest pa & lateral    Result Date: 7/12/2022  Narrative: CHEST INDICATION:   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  COMPARISON:  Chest x-ray January 2022, CT abdomen and pelvis June 2023 EXAM PERFORMED/VIEWS:  XR CHEST PA & LATERAL  The frontal view was performed utilizing dual energy radiographic technique  FINDINGS: Cardiac enlargement is seen and right portacatheter terminating in the distal superior vena cava/right atrium  Bilateral basilar atelectasis is seen with minimal blunting of the costophrenic angles which could be secondary to pleural thickening  Osseous structures appear within normal limits for patient age       Impression: Basilar subsegmental atelectasis with no active disease Workstation performed: [id number]     XR abdomen 1 view kub    Result Date: 8/5/2022  Narrative: ABDOMEN INDICATION:   Abdominal distension  COMPARISON:  Radiographs of the abdomen August 2, 2022  Correlation CT abdomen and pelvis August 2, 2022 VIEWS:  AP supine FINDINGS: Tip of enteric tube projects over the stomach  Drainage catheter projects over the midabdomen to the right of midline  Surgical staples project over the abdomen  Mildly dilated loops of air-filled small bowel  Luminal caliber is similar to the August 2, 2022 CT, though the number of dilated loops of air-filled small bowel has decreased  Normal caliber air filled colon  Impression: Since August 2, 2022, persistent dilation of the small bowel, though the number of air-filled loops of dilated small bowel has decreased  Workstation performed: [id number]     XR abdomen 1 view kub    Result Date: 7/28/2022  Narrative: ABDOMEN INDICATION:   eval NGT position  COMPARISON:  None VIEWS:  AP supine FINDINGS: Limited examination as the right and lower abdomen is excluded from the study  There is a nonobstructive bowel gas pattern  Tip of nasogastric tube overlies the stomach  Impression: Limited examination as the right and lower abdomen is excluded from the study  Nonobstructive bowel gas pattern  Adequately positioned nasogastric tube  Workstation performed: [id number]     Bronchoscopy (Bedside)    Result Date: 8/6/2022  Narrative: Lisa Youngblood DO     8/6/2022  1:00 PM Bronchoscopy (Bedside) Date/Time: 8/6/2022 12:55 PM Performed by: Lisa Youngblood DO Authorized by: Lisa Youngblood DO Patient location:  Bedside Other Assisting Provider: Yes (comment) (Dr Nunez)  Consent:   Consent obtained:  Written   Consent given by:  Spouse   Risks discussed:   Adverse reaction to sedation   Alternatives discussed:  No treatment and observation Universal protocol:   Procedure explained and questions answered to patient or proxy's satisfaction: yes    Patient identity confirmed:  Arm band Indications: Procedure Purpose: diagnostic and therapeutic    Indications: other (comment)    Indications comment:  Aspiration Sedation:   Sedation type:  Continuous (ICU/vent) Upper Airway:   Trachea: normal    Angeline:  Normal Airway:   Airway:  Airway not erythematous or friable bilaterally  No obvious sputum to suction  No BAL performed  Post-procedure details:   Patient tolerance of procedure: Tolerated well, no immediate complications Final Diagnosis/Findings:    No obvious signs of aspiration pneumonitis at this time  No sputum noted  Anatomy normal  Airway intact with no erythema or friability noted bilaterally  No specimen obtained  XR abdomen 1 vw portable    Result Date: 8/4/2022  Narrative: ABDOMEN INDICATION:   keofed advanced  COMPARISON:  Abdominal radiograph July 28, 2022 VIEWS:  AP supine FINDINGS: The lower abdomen is excluded from field-of-view  Tip of enteric tube projects over the stomach  Surgical staples project over the midline of the abdomen  Impression: Tip of enteric tube projects over the stomach  Workstation performed: [id number]     XR chest 1 view    Result Date: 8/4/2022  Narrative: CHEST INDICATION:   Keofed insertion series  COMPARISON:  Chest radiograph August 1, 2022  Correlation with chest CT August 1, 2022 and subsequently performed abdominal radiograph  EXAM PERFORMED/VIEWS:  XR CHEST PORTABLE ICU, XR CHEST 1 VIEW FINDINGS:  2 cm radiograph from August 2, 2022 (12:56 and 13:00) are dictated in a combined report  On the final 2 radiographs, the enteric tube is curved with tip facing upwards and projecting over the expected position of the lower thoracic esophagus  Right chest wall PowerPort with catheter tip projecting over the right atrium  Cardiomediastinal silhouette is magnified by technique  Bilateral pleural effusions with passive atelectasis  No pneumothorax  Impression: Malpositioned enteric tube    However, correlation with subsequently performed radiograph of the abdomen demonstrates that the tube had been repositioned prior to dictation of the study  Bilateral pleural effusions  Workstation performed: [id number]     CT chest abdomen pelvis w contrast    Result Date: 8/2/2022  Narrative: CT CHEST, ABDOMEN AND PELVIS WITH IV CONTRAST INDICATION:  Recent abdominal surgery including completion of pancreatectomy, extensive lysis of adhesions, reduction of internal hernia and cholecystectomy (7/26/2022)  Pneumoperitoneum on yesterday's CT study, evaluate for leak  COMPARISON:  CT chest, abdomen and pelvis 8/1/2022, MRI abdomen 12/10/2021 TECHNIQUE: CT examination of the chest, abdomen and pelvis was performed  Axial, sagittal, and coronal 2D reformatted images were created from the source data and submitted for interpretation  Radiation dose length product (DLP) for this visit:  1644 62 mGy-cm  This examination, like all CT scans performed in the [facility], was performed utilizing techniques to minimize radiation dose exposure, including the use of iterative  reconstruction and automated exposure control  IV Contrast:  70 mL iohexol (OMNIPAQUE)  350 Enteric Contrast: 30 mL Omnipaque 240 FINDINGS: CHEST LUNGS:  Mild bibasilar compressive atelectasis  Mild subsegmental atelectasis also seen in the lingula  No endobronchial lesions  PLEURA:  Small bilateral pleural effusions, similar  HEART/GREAT VESSELS: Heart is upper normal size  Atherosclerotic changes thoracic aorta and coronary arteries  No thoracic aortic aneurysm  MEDIASTINUM AND JOSE:  Mediastinal lipomatosis with shotty lymph nodes, similar to prior study  CHEST WALL AND LOWER NECK:  Right chest Port-A-Cath, tip in the right atrium  Mild left sided gynecomastia  ABDOMEN LIVER/BILIARY TREE:  Lobulated liver surface contour again suspicious for cirrhosis  No focal hepatic lesion is seen (noting arterial phase imaging was not performed which limits sensitivity for detection of [address] )    The portal vein is patent  No intrahepatic biliary dilatation  Status post choledochojejunostomy  GALLBLADDER:  Gallbladder is surgically absent  SPLEEN:  Unremarkable  PANCREAS:  Status post pancreatectomy  ADRENAL GLANDS:  Unremarkable  KIDNEYS/URETERS:  Unremarkable  No hydronephrosis  STOMACH AND BOWEL:  Enteric tube tip at the GE junction  Contrast material within the distal esophagus, stomach and small bowel  Status post gastrojejunostomy  No convincing evidence for extraluminal oral contrast extravasation  Slightly dilated small bowel loop in the left abdomen without transition to suggest mechanical obstruction  Mildly thickened small bowel loop in the right lower quadrant may represent mild infectious or inflammatory enteritis  A rectal tube is now seen  Scattered colonic diverticulosis without evidence of diverticulitis  Thickening of the ascending colon could reflect underdistention, infectious or inflammatory colitis or element of portal hypertensive colopathy cannot be completely excluded  APPENDIX:  No findings to suggest appendicitis  ABDOMINOPELVIC CAVITY:  Small amount of pneumoperitoneum again seen, not significantly changed  Small amount of abdominal and pelvic free fluid, slightly increased from previous study  Diffuse edema throughout the mesentery is similar  Right upper quadrant drain  No discrete fluid collection here  Fluid collection in the pancreatectomy bed measuring about 9 4 x 2 4 x 2 3 cm is unchanged (series 2/59 and series 601 image 82)  Shotty peripancreatic lymph nodes, similar and presumably reactive  VESSELS: Atherosclerotic changes abdominal aorta without evidence of aneurysm  PELVIS REPRODUCTIVE ORGANS:  Unremarkable for patient's age  URINARY BLADDER:  Urinary bladder collapsed by Dockery catheter, assessment limited  ABDOMINAL WALL/INGUINAL REGIONS:  Diffuse anasarca again noted  Midline ventral abdominal wall postoperative changes  Small fat-containing bilateral inguinal hernias  OSSEOUS STRUCTURES:  No acute fracture or destructive osseous lesion  Multilevel degenerative changes of the spine with suggestive evidence of DISH  Grade 1 spondylolisthesis L4 on L5 secondary to facet arthropathy  Impression: 1  History as above  Grossly stable 9 4 x 2 4 x 2 3 cm fluid collection in the pancreatectomy bed  No definitive evidence for enteric contrast extravasation from the distal gastrectomy staple line  Continued CT surveillance is suggested  Grossly stable pneumoperitoneum  2  Small amount of abdominopelvic ascites and diffuse mesenteric edema, the former slightly increased from previous study  No definitive rim-enhancing collections to suggest abscess  3  Mildly dilated small bowel loop left midabdomen without focal transition to suggest mechanical obstruction  This could reflect transient peristalsis  Mild wall thickening small bowel loop in the right lower quadrant could represent infectious or inflammatory enteritis  4  Thickening of the ascending colon could be secondary to underdistention, infectious/inflammatory colitis and/or element of portal hypertensive colopathy  5  Lobular surface contour of the liver suspicious for cirrhosis  6  Small bilateral pleural effusions and mild bilateral lower lobe compressive atelectasis, similar  7  Diffuse anasarca again noted  Additional incidental findings as above  Workstation performed: [id number]     CT chest abdomen pelvis w contrast    Addendum Date: 8/1/2022 Addendum:   ADDENDUM: I personally discussed pertinent findings on this study with Angeles Patel on 8/1/2022 at 9:45 AM      Result Date: 8/1/2022  Narrative: CT CHEST, ABDOMEN AND PELVIS WITH IV CONTRAST INDICATION:   rapid  COMPARISON:  CT chest 12/19/2020, outside CT chest 5/4/2022 and CT abdomen and pelvis 6/23/2022 TECHNIQUE: CT examination of the chest, abdomen and pelvis was performed   Axial, sagittal, and coronal 2D reformatted images were created from the source data and submitted for interpretation  Radiation dose length product (DLP) for this visit:  1712 mGy-cm   This examination, like all CT scans performed in the [facility], was performed utilizing techniques to minimize radiation dose exposure, including the use of iterative reconstruction and automated exposure control  IV Contrast:  75 mL of iohexol (OMNIPAQUE)   350 Enteric Contrast: Enteric contrast was not administered  FINDINGS: CHEST LUNGS:  No suspicious pulmonary nodule in the aerated lungs  Stable 3 mm nodule in the right upper lobe marked on image 44 series 3 compatible with a benign nodule  Additional 5 mm nodule present on the prior study likely obscured by atelectasis  This nodule however is visualized on the outside chest CT from May 2022 and appears stable  Minimal bilateral lower lobe subsegmental atelectasis  Scattered areas of juxtapleural pulmonary scarring  Central airways are clear  PLEURA:  New small bilateral pleural effusions  HEART/GREAT VESSELS: Heart is enlarged  No pericardial effusion  Aortic and coronary artery calcification  No thoracic aortic aneurysm  Tip of portacatheter in the inferior right atrium  MEDIASTINUM AND JSOE:  No enlarged lymph nodes  Stable prominent subcentimeter short axis mediastinal lymph nodes  1 3 cm degenerative cyst contiguous with the inferior aspect of the right first costochondral junction is minimally smaller  CHEST WALL AND LOWER NECK:  Right anterior chest wall zohra catheter  Mild body wall edema  Stable minimal left-sided gynecomastia  ABDOMEN LIVER/BILIARY TREE:  Stable lobulated hepatic contour  Liver otherwise unremarkable  No biliary dilation  Post choledochojejunostomy  GALLBLADDER:  Post cholecystectomy  SPLEEN:  Unremarkable  PANCREAS:  Post pancreatectomy  ADRENAL GLANDS:  Unremarkable  KIDNEYS/URETERS:  Unremarkable  No hydronephrosis   STOMACH AND BOWEL:  Post gastrojejunostomy, partial distal gastrectomy and choledochojejunostomy  Mild hyperemia of the proximal stomach  No bowel obstruction  Minimal colonic diverticulosis without diverticulitis  APPENDIX:  Stable fluid flow appendix distended up to 1 2 cm  ABDOMINOPELVIC CAVITY:  Small-volume ascites has increased  Right ventral drainage catheter in place distal tip medial to the right lobe of the liver  No significant fluid collection at the tip of the catheter  Fluid collection in the post pancreatectomy  bed measuring approximately 9 5 x 2 5 x 2 8 cm image 61 series 2 and image 72 series 601  New pneumoperitoneum is nonspecific given recent surgery  Somewhat clustered gastropathy and the vicinity of the distal partial gastrectomy staple line  Stable 1 2 cm short axis anterior periaortic low-density lymph node image 85 series 2  No new enlarged lymph nodes  Progressive diffuse omental and mesenteric edema  VESSELS:  Aortoiliac calcification  Stable infrarenal aortic aneurysm maximum diameter 3 cm  PELVIS REPRODUCTIVE ORGANS:  Unremarkable for patient's age  URINARY BLADDER:  Diffuse thickening of the bladder likely underdistention  Tiny gas droplets in the bladder lumen presumably due to recent catheterization  ABDOMINAL WALL/INGUINAL REGIONS:  New diffuse body wall edema  Ventral midline surgical abdominal wall staple line  Ventral periumbilical postsurgical scar  Small fat-containing bilateral inguinal hernias  OSSEOUS STRUCTURES:  No acute fracture or osseous destructive lesion identified  Degenerative changes of the spine, pubic symphysis, and multiple joints  Multilevel thoracolumbar spondylosis and paravertebral ossification in a pattern suggestive of diffuse idiopathic skeletal hyperostosis  Stable minimal grade 1 degenerative anterolisthesis of L4 on L5  Impression: CT chest: New small bilateral pleural effusions with minimal adjacent subsegmental atelectasis  Findings may be sequela of fluid overload or third spacing among other etiologies   CT abdomen and pelvis: Interval postsurgical changes as above  New pneumoperitoneum is nonspecific and likely related to recent surgery  Cannot entirely exclude staple line dehiscence at the partial distal gastrectomy staple line  Progressive small volume ascites, mesenteric and omental edema and anasarca  Findings may be sequela of fluid overload or third spacing among other etiologies  New partially loculated fluid collection in the post pancreatectomy bed measures approximately 9 5 x 2 5 x 2 8 cm  New diffuse bladder wall thickening with tiny gas droplets in the lumen of the bladder likely due to under distention and recent catheterization  Suggest correlation with UA to exclude cystitis  Stable 1 2 cm short axis anterior para-aortic low-density lymph node  The study was marked in [facility] for immediate notification  Workstation performed: [id number]     XR chest portable ICU    Result Date: 8/7/2022  Narrative: CHEST INDICATION:   desaturation  COMPARISON:  8/6/2022 EXAM PERFORMED/VIEWS:  XR CHEST PORTABLE ICU FINDINGS:  Endotracheal tube 4 3 cm above the barbara  Nasogastric tube tip excluded from the film but clearly distal to the GE junction  Stable right chest tube  Left central line tip overlies the cavoatrial junction  Mild cardiomegaly appears stable  Right greater than left Central opacification consistent with pulmonary edema pattern with left basilar effusion  Osseous structures appear within normal limits for patient age  Impression: Tubes and lines as above without pneumothorax  Pulmonary edema pattern with left basilar pleural effusion  Workstation performed: [id number]     XR chest portable ICU    Result Date: 8/6/2022  Narrative: CHEST INDICATION:   r/o pneumothorax  COMPARISON:  8/5 EXAM PERFORMED/VIEWS:  XR CHEST PORTABLE ICU FINDINGS:  Port-A-Cath, ET tube and NG tube are unchanged  Left-sided central line difficult to visualize but appears to extend to the cavoatrial junction  Stable cardiomegaly  Central pulmonary vascular congestion with bilateral opacities, right greater than left  Suspected left basilar effusion  Impression: Stable airspace disease suggestive of pulmonary edema  Suspected left basilar effusion  Workstation performed: [id number]     XR chest portable ICU    Result Date: 8/5/2022  Narrative: CHEST INDICATION:   Hypoxia, increased WOB  COMPARISON:  Chest radiograph and CT August 2, 2022 EXAM PERFORMED/VIEWS:  XR CHEST PORTABLE ICU FINDINGS:  Tip of right chest wall PowerPort projects over the superior cavoatrial junction  Tip of left upper extremity PICC projects over the superior cavoatrial junction  Most visualized distal component of the enteric tube projects over the diaphragm; tip is excluded from field-of-view  Heart shadow is enlarged but unchanged from prior exam  Increased bilateral opacities, predominantly within the mid and lower lung zones  No pneumothorax  Impression: Increased bilateral opacities, predominantly in the mid and lower lung fields, likely represents worsening edema with effusions  Concomitant infection is to be excluded on clinical grounds  Workstation performed: [id number]     XR chest portable ICU    Result Date: 8/4/2022  Narrative: CHEST INDICATION:   Keofed insertion series  COMPARISON:  Chest radiograph August 1, 2022  Correlation with chest CT August 1, 2022 and subsequently performed abdominal radiograph  EXAM PERFORMED/VIEWS:  XR CHEST PORTABLE ICU, XR CHEST 1 VIEW FINDINGS:  2 cm radiograph from August 2, 2022 (12:56 and 13:00) are dictated in a combined report  On the final 2 radiographs, the enteric tube is curved with tip facing upwards and projecting over the expected position of the lower thoracic esophagus  Right chest wall PowerPort with catheter tip projecting over the right atrium  Cardiomediastinal silhouette is magnified by technique  Bilateral pleural effusions with passive atelectasis  No pneumothorax       Impression: Malpositioned enteric tube  However, correlation with subsequently performed radiograph of the abdomen demonstrates that the tube had been repositioned prior to dictation of the study  Bilateral pleural effusions  Workstation performed: [id number]     IR drainage tube placement    Result Date: 8/3/2022  Narrative: CT scan guided abscess drainage This examination, like all CT scans performed in the [facility], was performed utilizing techniques to minimize radiation dose exposure, including the use of iterative reconstruction and automated exposure control  History: 79-year-old male status post pancreatectomy, now with abdominal fluid collection and sepsis  Images: Multiple Sedation: Moderate conscious sedation was utilized under my direct supervision administered by trained independent provider  A total of 30 minutes sedation time was utilized  I was present at the initial dose of sedation medication  Technique: The patient was brought to the CT scanner and placed supine on the table  After axial images were obtained through the abdomen, an area of the skin was then marked, prepped, and draped in usual sterile fashion  All elements of maximal sterile barrier technique were followed (cap, mask, sterile gown, sterile gloves, large sterile sheet, hand hygiene, and 2% chlorhexidine for cutaneous antisepsis)  Lidocaine was administered to the skin and a small skin incision was made  A 18-gauge needle was advanced into the collection under CT-scan guidance  A Florian wire was coiled within the lesion, and after tract dilatation, a 10 [place] all-purpose drainage catheter was advanced and the loop formed within the collection  Approximately 15 cc of cloudy thick fluid was aspirated  A sample sent to lab for further analysis  The catheter was sutured in place, sterilely dressed, and connected to bulb suction  The patient tolerated the procedure well and suffered no complications       Impression: Impression: Successful placement of a 10 Mauritian all-purpose drainage catheter into the left upper abdominal fluid collection  Workstation performed: [id number]     US bedside procedure    Result Date: 8/5/2022  Narrative: 1 2 [id number] 2 323 [id number]    IR PICC line placement double lumen    Result Date: 8/3/2022  Narrative: Examination: PIC line placement History: Need central venous access,  TPN  Levophed  Fluoroscopy time: 0 3 minutes Technique: The patient was informed of the name of the procedure  The patient was informed of the nature of the disease or condition  The patient was informed of the nature of the procedure  The patient was informed of the likelihood of success  The patient was informed of the risks, benefits and potential complications of the procedure  The alternatives to the procedure, and their complications were explained  Informed consent was signed  The patient was identified verbally, and by wrist band " Time out" was performed  The left upper arm was prepped and draped in usual sterile fashion  All elements of maximal sterile barrier technique, cap and mask and sterile gown and sterile gloves and sterile full-body drape and hand hygiene and 2% chlorhexidine for cutaneous antisepsis  Sterile ultrasound technique with sterile gel and sterile probe covers was also utilized  Lidocaine was given as local anesthesia  Using ultrasound guidance the left cephalic vein was cannulated using a modified, singlewall, Seldinger technique  The vein was evaluated as a potential access site  The vein was patent and free of thrombus  Static images of real-time needle entry into the vessel were obtained  A wire was advanced  A peel-away sheath was placed over the wire  A 5 Mauritian power PIC line was advanced via the peel-away sheath  The line was placed, using fluoroscopic guidance, so that the tip lies within the mid superior vena cava   The line was secured in place and sterilely dressed  The patient tolerated the procedure well  There were no immediate complications or complaints  The line is available for immediate use  Findings: No pneumothorax is seen  Line position is appropriate  Impression: Impression: Technically successful placement of line  Workstation performed: [id number]     Echo complete w/ contrast if indicated    Result Date: 7/27/2022  Narrative: Fatuma Castillo  Left Ventricle: Left ventricle is not well visualized  Left ventricular cavity size is mildly dilated  Wall thickness is normal  The left ventricular ejection fraction is 45%  Systolic function is mildly reduced  There is mild global hypokinesis    Right Ventricle: Right ventricle is not well visualized  Right ventricular cavity size is mildly dilated  Systolic function is mildly reduced    Left Atrium: The atrium is moderately dilated    Right Atrium: The atrium is moderately dilated    Aorta: The aortic root is mildly dilated  Echo follow up/limited w/ contrast if indicated    Result Date: 8/1/2022  Narrative: Fatuma Castillo  Left Ventricle: Left ventricular cavity size is normal  Wall thickness is normal  The left ventricular ejection fraction is 60% by visual estimation  Systolic function is normal  Although no diagnostic regional wall motion abnormality was identified, this possibility cannot be completely excluded on the basis of this study  Very technically difficult study with poor acoustic windows  Labs and pertinent reports reviewed  This note has been generated by voice recognition software system  Therefore, there may be spelling, grammar, and or syntax errors  Please contact if questions arise

## 2022-08-08 NOTE — PROGRESS NOTES
Progress Note - Infectious Disease   Joe Ferrarik 67 y o  male MRN: [id number]  Unit/Bed#: [facility] 515-01 Encounter: [id number]      Impression/Recommendations:  1   Septic shock   Evolving 7/31:  Fever, HR, refractory hypotension   Due to #2/3   No other appreciable source   ROS limited by lethargy   Exam otherwise benign   UA, LFTs, CT chest negative   Fevers, WBC initially improved with antibiotics, drainage, but now clinically worse, critically ill in setting of aspiration during intubation, progressive renal dysfunction , volume overload     Rec:  · Continue antibiotics as below  · Follow temperatures closely  · Check CBC in AM  · Supportive care as per the primary service     2   Strep anginosus bacteremia   Low-grade with 1 of 2 positive   Due to #3   No other appreciable source   Has Portacath but low suspicion for infection   Repeat blood cultures 8/2, TTE negative     Rec:  · Continue antibiotics as below  · Follow up final repeat blood cultures  · Will need 2 weeks IV antibiotics     3   LUQ abscess   In setting of #5   S  Anginosus, Candida, Klebsiella, Group C Strep from exisiting surgical drain   Status post IR-guided drain 8/3 yielding cloudy, thick fluid   Drain cultures with GNR x2   Gram stain with yeast   No radiographic evidence of leak from distal gastrectomy staple line  Rec:  · Continue Zosyn for now  · Continue micafungin for now (drug-drug interaction between Fluconazole and Amiodarone given potential for QTc prolongation)  · Follow up IR drain cultures and tailor antibiotics as indicated  · Follow drain outputs closely     4   Acute hypoxic respiratory failure   Suspect initially due to volume overload, encephalopathy   No clinical or radiographic evidence of neumonia   Status post intubation 8/5   Concern for aspiration, ARDS, on maximal ventilatory support  Bronch 8/6 negative for secretions or purulence  Rec:  · Volume management per Nephrology  · Supportive care per [facility]     5   Intraductal papillary mucinous neoplasm   Status post distal pancreatectomy 2019, open subtotal pancreatectomy 2022   Now admitted for completion pancreatectomy with sims anastomosis, extensive SARAH, reduction of densely adherent internal hernia,cholecystectomy 22   Postoperative course complicated by above      6   TERESE   Likely multifactorial due partly due to infection, shock as above  Rec:  · CVVHD and volume management per Nephrology with close follow-up ongoing  · Dose adjust antibiotics for CRRT  · Recheck BMP in AM     7   Afib with RVR   On Amiodarone     8   Cirrhosis   In setting of chronic alcohol use   Recent bump in LFTs liekly multifactorial   GI consult pending      9   DM   Recent A1c 8 7      10   Acute on chronic thrombocytopenia  Likely multifactorial   Hematology consult pending      The above plan was discussed in detail with the patient's wife at the bedside      Antibiotics:  Zosyn #3  Antibiotics #8  Micafungin #5    Subjective:  Patient seen on AM rounds  Unable to provide ROS due to intubation  24 Hour Events:  Down to 40% FiO2  Remains sedated  Paralytics and Flolan off  On levophed and vasopressin  No documented fevers, chills, sweats, nausea, vomiting, or diarrhea  On warming blanket  On CVVHD with slightly negative fluid balance      Objective:  Vitals:  Temp:  [96 8 °F (36 °C)-98 96 °F (37 2 °C)] 97 52 °F (36 4 °C)  HR:  [] 74  Resp:  [14-16] 15  BP: ()/(38-77) 106/59  SpO2:  [92 %-100 %] 99 %  Temp (24hrs), Av 7 °F (36 5 °C), Min:96 8 °F (36 °C), Max:98 96 °F (37 2 °C)  Current: Temperature: 97 52 °F (36 4 °C)    Physical Exam:   General:  No acute distress  Eyes:  Normal lids, eye closed  ENT:  Normal external ears and nose  Neck:  Neck symmetric with midline trachea  Pulmonary:  Ventilated respiratory effort without accessory muscle use  Cardiovascular:  Regular rate and rhythm on monitor; anasarca  Gastrointestinal:  Distended, serous drainage from both JPs  Musculoskeletal:  No digital clubbing or cyanosis  Skin:  No visible rashes; No palpable nodules  Neurologic:  Limited exam due to sedation  Psychiatric:  Intubated and sedated    Lab Results:  I have personally reviewed pertinent labs  Results from last 7 days   Lab Units 08/08/22  1156 08/08/22  0548 08/07/22  2356 08/07/22  1202 08/07/22  0922 08/07/22  0029 08/06/22  2356 08/06/22  1220 08/06/22  0600   POTASSIUM mmol/L 4 4 4 7 4 6   < >  --    < >  --    < > 3 9   CHLORIDE mmol/L 109* 110* 108   < >  --    < >  --    < > 111*   CO2 mmol/L 24 26 28   < >  --    < >  --    < > 22   CO2, I-STAT mmol/L  --   --   --   --   --   --  39*  --   --    BUN mg/dL 16 18 21   < >  --    < >  --    < > 34*   CREATININE mg/dL 0 97 0 90 0 97   < >  --    < >  --    < > 2 60*   EGFR ml/min/1 73sq m 77 84 77   < >  --    < >  --    < > 23   GLUCOSE, ISTAT mg/dl  --   --   --   --   --   --  121  --   --    CALCIUM mg/dL 8 0* 7 8* 8 0*   < >  --    < >  --    < > 7 9*   AST U/L  --  56*  --   --  64*  --   --   --  43   ALT U/L  --  26  --   --  26  --   --   --  25   ALK PHOS U/L  --  155*  --   --  142*  --   --   --  191*    < > = values in this interval not displayed  Results from last 7 days   Lab Units 08/08/22  0548 08/07/22  0922 08/07/22  0802   WBC Thousand/uL 27 57* 53 13* 53 82*   HEMOGLOBIN g/dL 7 2* 8 3* 8 2*   PLATELETS Thousands/uL 14* 20* 22*     Results from last 7 days   Lab Units 08/03/22  1631 08/02/22  1225   BLOOD CULTURE   --  No Growth After 5 Days  No Growth After 5 Days  GRAM STAIN RESULT  4+ Polys*  2+ Gram positive rods*  2+ Yeast*  --    BODY FLUID CULTURE, STERILE  2+ Growth of Gram Negative Roland*  2+ Growth of Gram Negative Roland*  --        Imaging Studies:   I have personally reviewed pertinent imaging study reports and images in PACS  EKG, Pathology, and Other Studies:   I have personally reviewed pertinent reports

## 2022-08-08 NOTE — PROGRESS NOTES
NEPHROLOGY CVVH PROCEDURE NOTE    Seen and examined on CVVH  QB: 250  Dialysate: 4 K / 3 Calcium  Ultrafiltration:  75 cc/hour  Filtration Fraction:25 %  Effluent:25 cc/kg/hour  Treatment Day: 3  Anticoagulation: saline flush q hour     Physical Exam:    /67   Pulse 74   Temp (!) 97 16 °F (36 2 °C)   Resp 15   Ht 6' (1 829 m)   Wt (!) 141 kg (310 lb 3 oz)   SpO2 96%   BMI 42 07 kg/m²     General:  Remains intubated/sedated  CVS:  Irregular  Lungs:  Coarse, decreased at bases  Abdomen:  Distended, soft  Access:  Temp dialysis catheter  Extremities:  Noted significant 2 to 3+ edema bilaterally    Assessment:  1  Acute kidney injury most likely secondary to ischemic/septic ATN plus possible component of contrast associated nephropathy  2  Sepsis/shock/hypotension, wean hemodynamic support as tolerated  3  Strep bacteremia/abdominal abscess, antibiotic treatment as per Infectious Disease  4  Intraductal papillary mucinous neoplasm status post pancreatectomy/lysis of adhesions/cholecystectomy, remains on TPN  5  Respiratory failure remains intubated - secondary to component of volume overload, continue with ultrafiltration as tolerated  6   Anemia chronic disease, continue monitor closely    Plan:   Maintain CVVH   Continue with ultrafiltration as blood pressure tolerates given patient's volume overload   No other changes in current regimen

## 2022-08-09 NOTE — PROGRESS NOTES
8/9/2022 7:56 AM -  Sonja Montero's chart and case were reviewed by Kasi Huang  Mode of review included electronic chart check  Per review, symptoms remain controlled on current regimen and no changes are made at this time  Symptoms at this time being managed by the critical care team in setting of critical illness  Palliative care will return on 8/10  For urgent issues or any questions/concerns, please notify on-call provider via [employer]  You may also call our answering service 24/7 at   [facility]  Palliative and Supportive Care  Clinic/Answering Service: [phone]  You can find me on [employer]!

## 2022-08-09 NOTE — RESPIRATORY THERAPY NOTE
RT Ventilator Management Note  Kassidy Heard 67 y o  male MRN: [id number]  Unit/Bed#: [facility] 075-15 Encounter: [id number]      Daily Screen         8/8/2022  0808 8/9/2022  0734          Patient safety screen outcome[de-identified] Failed Failed (P)       Not Ready for Weaning due to[de-identified] Alternative forms of ventilation Alternative forms of ventilation; Underline problem not resolved (P)                 Physical Exam:   Assessment Type: (P) During-treatment  General Appearance: (P) Sedated  Respiratory Pattern: (P) Assisted  Chest Assessment: (P) Chest expansion symmetrical  Bilateral Breath Sounds: (P) Clear, Diminished  O2 Device: vent      Resp Comments: (P) pt found stable on APRV settings  tolerating well at this time  no changes made  will continue to monitor per protocol

## 2022-08-09 NOTE — PROGRESS NOTES
Daily Progress Note - Critical Care   Herminia Harrell 67 y o  male MRN: [id number]  Unit/Bed#: [facility] 515-01 Encounter: [id number]        ----------------------------------------------------------------------------------------  HPI/24hr events: Nimbex off  Changed to BiLevel from APRV  Continued vent wean  Levo down to 1  1 unit of plts  Given  Heme/Onc and GI consulted per surgical recommendation  O/N 1 unit of blood from Hgb <7      ---------------------------------------------------------------------------------------  SUBJECTIVE  Examined pt  At bedside  Pt  Critically ill and minimally responsive  Unable to give subjective exam     Review of Systems  Review of systems was unable to be performed secondary to critical illness  ---------------------------------------------------------------------------------------  Assessment and Plan:    Neuro:   · Analgesia  § Currently minimally responsive post-Nimbex  § Hold Fentanyl to assess mental status  § Wean to off as tolerated  · Sedation  § Continue Precedex  § RASS goal -1  § DC Versed      CV:   · Diagnosis: Shock   ·             Resolving  Levo down to 1                    Stop steroid today  ? Likely septic with source - intra-abdominal infection and component of intravascular depletion   ? MAP goal >65  § Continue Vaso at 0 04  § Titrate Levophed as needed for MAP goal  ? Stop trending lactate unless clinically indicated  ? Continue Unasyn + micafungin + vancomycin   ? F/u cultures   § 8/4 fungal bcx pending  § Strep bacteremia  § TTE negative for valve vegetation  §   · Diagnosis: Atrial fibrillation   ? Amiodarone infusion 0 5 mg/min   § Rebolus as needed for RVR     ? Holding systemic AC in setting of thromcytopenia   ? Holding home atenolol with shock   ? 8/1 Echo EF 60% improved from 45% on 7/27, no RV dysfunction   ? Holding home lasix in setting of shock, on CVVH        Pulm:  · Diagnosis:  Acute hypoxic respiratory failure Vent dependent  ?  Intubated 8/5   ? Continue APRV  ? Wean vent as tolerated           Diagnosis: ARDS                          Resolving  Currently on 40% FIO2                          PaFiO2 ratio 224- Mild ARDS                          Did not tolerate low stretch, Pplat 35                          Continue BiLevel                          Keep -525 if able                          Permissive Hypercapnia, PH goal >7 2                          Flolan off                          GI:   · Diagnosis: IPMN now s/p completion pancreatectomy, reduction of internal hernia and cholecystectomy on 7/26  ? 8/1 CT - new partially loculated fluid collection in post-pancreatectomy bed  ? 8/2 CT - stable fluid collection in pancreatectomy bed, no evidence of enteric contrast extravasation from staple line, stable pneumoperitoneum  ? 8/3 IR drain in L anterior collection   § F/u cultures   ? Increasing abdominal distension  § Likely no abdominal compartment syndrome on assessment  ? IV abx as below   ? Restart trickle feeds today  ? Continue TPN  ? Will need Creon when taking PO again   · Stress ulcer PPX: Pantoprazole IV  · Bowel regimen: senna/colace, Dulcolax suppository daily, miralax down NGT  ? Last BM: 7/31        :   · Diagnosis: TERESE with oliguria   ? Baseline Cr 0 8  ? Trend BUN/CR, Strict I/O   ? CVVH per nephrology  § Currently pulling -75ml/hr  § Continue to pull as tolerated  § Hanna: Yes  § Urinary catheter still needed for Strict I and O in a critically ill patient  ? Urinary retention protocol when hanna removed         F/E/N:   · Fluid: TPN   · E: Replete for goal K >4, Phos >3, Mg >2   · N: TPN        Heme/Onc:   · Diagnosis: Anemia  ? In setting of critical illness, and acute blood loss from CRRT line clotting  ? Transfusion trigger Hgb<7 or active signs of bleeding  ? VTE PPX:  SCDs  ? Hold SQ heparin until Plts   Consistently above 50        Diagnosis: thrombocytopenia                     Secondary to sepsis and CRRT Gvie plts  Is less than 20 or active signs of bleeding                     Appreciate Heme/Onc recs  Endo:   · Diagnosis: T2DM  ? Now s/p pancreatectomy   ? Insulin gtt  ? BG goal 140-180  ? Endocrinology following          ID:   · Diagnosis: Sepsis, possible intra-abdominal abscess   ? Unasyn   ? Micafungin  ? Vancomycin   § Blood cx - 1/2 streptococcus anginosus - ? contamination  § Blood cx - 1/2 negative x 72 hrs   § Blood cx - 2/2 negative x 48 hrs   § R drain cx - +2 strep, few colonies candidia, few colonies klebsiella    § L drain cx - GP rods, yeast  ? Trend fever curve and WBC count   ? ID consulted           MSK/Skin:   · At risk for deconditioning  · Frequent turning and repositioning   · PT/OT as appropriate   · LWC PRN      Disposition: Continue Critical Care   Code Status: Level 1 - Full Code  ---------------------------------------------------------------------------------------  ICU CORE MEASURES    Prophylaxis   VTE Pharmacologic Prophylaxis: Pharmacologic VTE Prophylaxis contraindicated due to Thrombocytopenia  VTE Mechanical Prophylaxis: sequential compression device  Stress Ulcer Prophylaxis: Pantoprazole IV     ABCDE Protocol (if indicated)  Plan to perform spontaneous awakening trial today? No  Plan to perform spontaneous breathing trial today? Not applicable  Obvious barriers to extubation? Yes  CAM-ICU: Unable to assess    Invasive Devices Review  Invasive Devices  Report    Peripherally Inserted Central Catheter Line  Duration           PICC Line 08/03/22 5 days          Central Venous Catheter Line  Duration           Port A Cath 03/30/22 Right Subclavian 131 days          Peripheral Intravenous Line  Duration           Peripheral IV 08/05/22 Distal;Left;Ventral (anterior) Wrist 3 days    Peripheral IV 08/09/22 Dorsal (posterior); Left Forearm <1 day          Arterial Line  Duration           Arterial Line 08/06/22 Radial 3 days          Hemodialysis Catheter  Duration           HD Temporary Double Catheter 3 days          Drain  Duration           Closed/Suction Drain Right RLQ Bulb 19 Fr  13 days    Urethral Catheter Temperature probe 16 Fr  7 days    NG/OG/Enteral Tube Enteral Feeding Tube Right nare 6 days    Abscess Drain LUQ 5 days          Airway  Duration           ETT  Oral;Cuffed; Hi-Lo 8 mm 3 days              Can any invasive devices be discontinued today? No  ---------------------------------------------------------------------------------------  OBJECTIVE    Vitals   Vitals:    22 0530 22 0542 22 0547 22 0600   BP: 112/60 111/57 103/58 101/55   BP Location:       Pulse: 79 72 69 65   Resp: 16 15 15 15   Temp: 97 52 °F (36 4 °C) 97 5 °F (36 4 °C) 97 52 °F (36 4 °C) 97 7 °F (36 5 °C)   TempSrc:       SpO2: 95%  96% 99%   Weight:   (!) 142 kg (312 lb 13 3 oz)    Height:         Temp (24hrs), Av 8 °F (36 6 °C), Min:96 98 °F (36 1 °C), Max:98 78 °F (37 1 °C)  Current: Temperature: 97 7 °F (36 5 °C)    Respiratory:  SpO2: SpO2: 100 %  Nasal Cannula O2 Flow Rate (L/min): 6 L/min    Invasive/non-invasive ventilation settings   Respiratory  Report   Lab Data (Last 4 hours)       0553            pH, Arterial       7 443             pCO2, Arterial       39 4             pO2, Arterial       89 5             HCO3, Arterial       26 3             Base Excess, Arterial       2 1                  O2/Vent Data     None                Physical Exam  Vitals and nursing note reviewed  Constitutional:       Appearance: He is ill-appearing and toxic-appearing  HENT:      Head: Normocephalic and atraumatic  Nose: Nose normal       Mouth/Throat:      Mouth: Mucous membranes are dry  Pharynx: Oropharynx is clear  Eyes:      Pupils: Pupils are equal, round, and reactive to light  Neck:      Comments: Unable to assess  Cardiovascular:      Rate and Rhythm: Normal rate  Rhythm irregular  Pulses: Normal pulses  Heart sounds: Normal heart sounds  Pulmonary:      Effort: Pulmonary effort is normal       Breath sounds: Normal breath sounds  Abdominal:      General: There is distension  Genitourinary:     Comments: Scrotal edema  Musculoskeletal:      Comments: NO spontaneous movement noted  No withdrawal to pain   Skin:     General: Skin is warm  Capillary Refill: Capillary refill takes less than 2 seconds  Coloration: Skin is pale  Comments: Anasarca   Neurological:      Comments: Unable to assess   Psychiatric:      Comments: Unable to assess         Laboratory and Diagnostics:  Results from last 7 days   Lab Units 08/09/22 0418 08/08/22  1548 08/08/22  1423 08/08/22  0548 08/07/22  0922 08/07/22  0802 08/07/22  0618 08/06/22  2356 08/06/22  0600 08/06/22  0129 08/05/22  0436 08/04/22  0403   WBC Thousand/uL 18 40* 24 39*  --  27 57* 53 13* 53 82* 48 85*  --  12 80* 4 62   < > 14 91*   HEMOGLOBIN g/dL 6 6* 7 0*  --  7 2* 8 3* 8 2* 8 1*  --  8 6* 8 2*   < > 7 4*   I STAT HEMOGLOBIN g/dl  --   --   --   --   --   --   --  7 8*  --   --   --   --    HEMATOCRIT % 21 6* 21 9*  --  22 6* 25 6* 25 5* 25 7*  --  27 9* 26 6*   < > 22 9*   HEMATOCRIT, ISTAT %  --   --   --   --   --   --   --  23*  --   --   --   --    PLATELETS Thousands/uL 27* 39* 35* 14* 20* 22* 21*  --  60*  --   --  112*   NEUTROS PCT % 89*  --   --   --  91*  --   --   --   --   --   --  87*   BANDS PCT %  --   --   --  10*  --   --  17*  --   --   --   --   --    MONOS PCT % 4  --   --   --  1*  --   --   --   --   --   --  6   MONO PCT %  --   --   --  3*  --   --  0*  --   --  1*  --   --     < > = values in this interval not displayed       Results from last 7 days   Lab Units 08/09/22 0418 08/09/22  0000 08/08/22  1757 08/08/22  1156 08/08/22  0548 08/07/22  2356 08/07/22  1802 08/07/22  1202 08/07/22  1014 08/06/22  1220 08/06/22  0600 08/05/22  1155 08/05/22  0436 08/04/22  0403 08/03/22  0438   SODIUM mmol/L  --  138 139 138 139 138 139 137  --    < > 141   < > 138 138 138   POTASSIUM mmol/L  --  4 1 4 8 4 4 4 7 4 6 4 6 4 5  --    < > 3 9   < > 3 8 3 9 4 0   CHLORIDE mmol/L  --  107 110* 109* 110* 108 110* 108  --    < > 111*   < > 103 105 106   CO2 mmol/L  --  26 24 24 26 28 28 29  --    < > 22   < > 22 25 24   CO2, I-STAT   --   --   --   --   --   --   --   --   --    < >  --   --   --   --   --    ANION GAP mmol/L  --  5 5 5 3* 2* 1* 0*  --    < > 8   < > 13 8 8   BUN mg/dL  --  17 17 16 18 21 23 24  --    < > 34*   < > 41* 32* 29*   CREATININE mg/dL  --  0 90 0 98 0 97 0 90 0 97 1 16 1 17  --    < > 2 60*   < > 2 80* 1 98* 1 40*   CALCIUM mg/dL  --  8 5 8 1* 8 0* 7 8* 8 0* 7 3* 8 1*  --    < > 7 9*   < > 8 0* 7 8* 8 4   GLUCOSE RANDOM mg/dL  --  122 123 126 129 139 145* 137  --    < > 104   < > 410* 157* 84   ALT U/L 29  --   --   --  26  --   --   --  26  --  25  --  25 26 24   AST U/L 57*  --   --   --  56*  --   --   --  64*  --  43  --  32 35 25   ALK PHOS U/L 221*  --   --   --  155*  --   --   --  142*  --  191*  --  116 113 94   ALBUMIN g/dL 1 6*  --   --   --  1 6*  --   --   --  1 8*  --  2 0*  --  2 3* 2 1* 2 4*   TOTAL BILIRUBIN mg/dL 3 87*  --   --   --  3 98*  --   --   --  4 73*  --  4 03*  --  3 74* 3 31* 2 96*    < > = values in this interval not displayed       Results from last 7 days   Lab Units 08/09/22  0000 08/08/22  1757 08/08/22  1156 08/08/22  0548 08/07/22  2356 08/07/22  1802 08/07/22  1202   MAGNESIUM mg/dL 1 8 2 0 2 0 2 0 1 8 2 0 1 9   PHOSPHORUS mg/dL 1 7* 2 0* 2 0* 2 3 2 1* 2 9 2 5      Results from last 7 days   Lab Units 08/08/22  1423 08/03/22  1540 08/03/22  0438   INR  1 30* 1 61* 1 96*   PTT seconds 37  --   --           Results from last 7 days   Lab Units 08/07/22  0922 08/07/22  0617 08/06/22  2041 08/06/22  1659 08/06/22  1622 08/06/22  1333 08/06/22  0600   LACTIC ACID mmol/L 2 4* 2 5* 3 3* 4 1* 3 6* 4 1* 5 7*     ABG:  Results from last 7 days   Lab Units 08/09/22  0553   PH ART  7 443   PCO2 ART mm Hg 39 4   PO2 ART mm Hg 89 5 HCO3 ART mmol/L 26 3   BASE EXC ART mmol/L 2 1   ABG SOURCE  Line, Arterial     VBG:  Results from last 7 days   Lab Units 08/09/22  0553   ABG SOURCE  Line, Arterial           Micro  Results from last 7 days   Lab Units 08/03/22  1631 08/02/22  1225   BLOOD CULTURE   --  No Growth After 5 Days  No Growth After 5 Days  GRAM STAIN RESULT  4+ Polys*  2+ Gram positive rods*  2+ Yeast*  --    BODY FLUID CULTURE, STERILE  2+ Growth of Gram Negative Roland*  2+ Growth of Gram Negative Roland*  --        EKG: Tele reviewed  Imaging:  I have personally reviewed pertinent reports  and I have personally reviewed pertinent films in PACS    Intake and Output  I/O       08/07 0701 08/08 0700 08/08 0701 08/09 0700    P  O  0 0    I V  (mL/kg) 4536 (32 2) 2860 (20 1)    Blood  0    NG/ 150    IV Piggyback 1652 866    TPN 1705 1489    Feedings  0    Total Intake(mL/kg) 8113 (57 5) 5365 (37 8)    Urine (mL/kg/hr) 709 (0 2) 458 (0 1)    Emesis/NG output 730 270    Drains 494 515    Other 6685 6691    Stool 0 0    Total Output 8618 7934    Net -505 -2569          Unmeasured Stool Occurrence 0 x           Height and Weights   Height: 6' (182 9 cm)  IBW (Ideal Body Weight): 77 6 kg  Body mass index is 42 43 kg/m²    Weight (last 2 days)     Date/Time Weight    08/09/22 0547 142 (312 83)    08/08/22 0536 141 (310 19)    08/07/22 0600 150 (330 03)            Nutrition       Diet Orders   (From admission, onward)             Start     Ordered    08/04/22 0925  Diet Enteral/Parenteral; Tube Feeding with Oral Diet; Vital AF 1 2; Continuous; 10; Surgical; NPO  Diet effective now        References:    Nutrtion Support Algorithm Enteral vs  Parenteral   Question Answer Comment   Diet Type Enteral/Parenteral    Enteral/Parenteral Tube Feeding with Oral Diet    Tube Feeding Formula: Vital AF 1 2    Bolus/Cyclic/Continuous Continuous    Tube Feeding Goal Rate (mL/hr): 10    Diet Type Surgical    Surgical NPO    RD to adjust diet per protocol?  No        08/04/22 0926                  Active Medications  Scheduled Meds:  Current Facility-Administered Medications   Medication Dose Route Frequency Provider Last Rate    acetaminophen  975 mg Per NG Tube Q8H Corrinesa Guerrero, DO      Adult TPN (CUSTOM BASE/CUSTOM ELECTROLYTE)   Intravenous Continuous TPN MIRNA Galicia 71 5 mL/hr at 08/08/22 2035    amiodarone  0 5 mg/min Intravenous Continuous MIRNA Retana 0 5 mg/min (08/08/22 1544)    bisacodyl  10 mg Rectal Daily Jenny Burk PA-C      chlorhexidine  15 mL Mouth/Throat Q12H [address]      dexmedetomidine  0 1-0 7 mcg/kg/hr Intravenous Titrated MIRNA Galicia 0 4 mcg/kg/hr (08/09/22 0404)    fentaNYL  100 mcg/hr Intravenous Continuous MIRNA Galicia 100 mcg/hr (08/09/22 0019)    fentanyl citrate (PF)  50 mcg Intravenous Q1H PRN Chang Castellanos PA-C      hydrocortisone sodium succinate  50 mg Intravenous Q6H [address] Ng Arina, CRNP      insulin regular (HumuLIN R,NovoLIN R) infusion  0 3-21 Units/hr Intravenous Titrated Jenny Burk PA-C 6 Units/hr (08/09/22 0419)    iohexol  30 mL Oral 90 min pre-procedure MIRNA Sanchez      ipratropium  0 5 mg Nebulization Q6H Sharath Cooper MD      levalbuterol  1 25 mg Nebulization Q6H Sharath Cooper MD      methocarbamol  500 mg Oral Q6H PRN Elmer Haq HenryMIRNA      micafungin  100 mg Intravenous Q24H Hi Suarez  mg (08/08/22 1548)    norepinephrine  1-30 mcg/min Intravenous Titrated Loy Leyden Burkett, PA-C 1 mcg/min (08/09/22 0603)    NxStage K 4/Ca 3  20,000 mL Dialysis Continuous Clementine Collier DO 0 mL (08/08/22 0715)    ondansetron  4 mg Intravenous Q6H PRN Jenny Burk PA-C      pantoprazole  40 mg Intravenous Q12H [address] Ng Arina, CRNP      phenol  1 spray Mouth/Throat Q2H PRN Babar Bar PA-C      piperacillin-tazobactam  3 375 g Intravenous Q8H Hi Suarez MD 3 375 g (08/09/22 0791)    sodium phosphate  21 mmol Intravenous Once Veronica Zurita MD 21 mmol (08/09/22 0307)    vasopressin (PITRESSIN) in 0 9 % sodium chloride 100 mL  0 04 Units/min Intravenous Continuous Bettina Sommers MD 0 04 Units/min (08/09/22 0508)     Continuous Infusions:  Adult TPN (CUSTOM BASE/CUSTOM ELECTROLYTE), , Last Rate: 71 5 mL/hr at 08/08/22 2035  amiodarone, 0 5 mg/min, Last Rate: 0 5 mg/min (08/08/22 1544)  dexmedetomidine, 0 1-0 7 mcg/kg/hr, Last Rate: 0 4 mcg/kg/hr (08/09/22 0404)  fentaNYL, 100 mcg/hr, Last Rate: 100 mcg/hr (08/09/22 0019)  insulin regular (HumuLIN R,NovoLIN R) infusion, 0 3-21 Units/hr, Last Rate: 6 Units/hr (08/09/22 0419)  norepinephrine, 1-30 mcg/min, Last Rate: 1 mcg/min (08/09/22 0603)  NxStage K 4/Ca 3, 20,000 mL, Last Rate: 0 mL (08/08/22 0715)  vasopressin (PITRESSIN) in 0 9 % sodium chloride 100 mL, 0 04 Units/min, Last Rate: 0 04 Units/min (08/09/22 0508)      PRN Meds:   fentanyl citrate (PF), 50 mcg, Q1H PRN  methocarbamol, 500 mg, Q6H PRN  ondansetron, 4 mg, Q6H PRN  phenol, 1 spray, Q2H PRN        Allergies   No Known Allergies  ---------------------------------------------------------------------------------------  Advance Directive and Living Will:      Power of :    POLST:    ---------------------------------------------------------------------------------------  Care Time Delivered:   No Critical Care time spent     MIRNA Causey      Portions of the record may have been created with voice recognition software  Occasional wrong word or "sound a like" substitutions may have occurred due to the inherent limitations of voice recognition software    Read the chart carefully and recognize, using context, where substitutions have occurred

## 2022-08-09 NOTE — PROGRESS NOTES
Progress Note - Infectious Disease   Teto Ferraro 67 y o  male MRN: [id number]  Unit/Bed#: [facility] 515-01 Encounter: [id number]      Impression/Recommendations:  1   Septic shock   Evolving 7/31:  Fever, HR, refractory hypotension   Due to #2/3   No other appreciable source   ROS limited by lethargy   Exam otherwise benign   UA, LFTs, CT chest negative   Fevers, WBC initially improved with antibiotics, drainage, but then clinically worse in setting of aspiration during intubation, progressive renal dysfunction , volume overload   Slowly improving again but remains critically ill  Rec:  · Continue antibiotics as below  · Follow temperatures closely  · Check CBC in AM  · Supportive care as per the primary service     2   Strep anginosus bacteremia   Low-grade with 1 of 2 positive   Due to #3   No other appreciable source   Has Portacath but low suspicion for infection   Repeat blood cultures 8/2, TTE negative     Rec:  · Continue antibiotics as below  · Follow up final repeat blood cultures  · Will need 2 weeks IV antibiotics     3   LUQ abscess   In setting of #5   S  Anginosus, Candida, Klebsiella, Group C Strep from exisiting surgical drain   Status post IR-guided drain 8/3 yielding cloudy, thick fluid   Drain cultures with MDR- Citrobacter, Klebsiella, yeast   No radiographic evidence of leak from distal gastrectomy staple line  Rec:  · Continue Zosyn for now  · Continue micafungin for now (drug-drug interaction between Fluconazole and Amiodarone given potential for QTc prolongation)  · Follow up final IR drain cultures and tailor antibiotics as indicated  · Follow drain outputs closely     4   Acute hypoxic respiratory failure   Suspect initially due to volume overload, encephalopathy   No clinical or radiographic evidence of neumonia   Status post intubation 8/5   Concern for aspiration, ARDS, on maximal ventilatory support   Bronch 8/6 negative for secretions or purulence    Rec:  · Volume management per Nephrology  · Supportive care per [facility]     5   Intraductal papillary mucinous neoplasm   Status post distal pancreatectomy 2019, open subtotal pancreatectomy 2022   Now admitted for completion pancreatectomy with sims anastomosis, extensive SARAH, reduction of densely adherent internal hernia,cholecystectomy 22   Postoperative course complicated by above      6   TERESE   Likely multifactorial due partly due to infection, shock as above  Rec:  · CVVHD and volume management per Nephrology with close follow-up ongoing  · Dose adjust antibiotics for CRRT  · Recheck BMP in AM     7   Afib with RVR   On Amiodarone     8   Cirrhosis   In setting of chronic alcohol use   Recent bump in LFTs largely cholestatic and likely from TPN, cholestasis in setting of infection as above  GI follow-up ongoing      9   DM   Recent A1c 8 7      10   Acute on chronic thrombocytopenia  Likely multifactorial due to infection, acute illness, medications in setting of cirrhosis      Antibiotics:  Zosyn #4  Antibiotics #9  Micafungin #6    Subjective:  Patient seen on AM rounds  Unable to provide ROS due to intubation and sedation  24 Hour Events:  No documented fevers, chills, sweats, nausea, vomiting, or diarrhea  On 40% FiO2 on vent  Down to 1 pressor  Remains in CVVHD with negative fluid balance      Objective:  Vitals:  Temp:  [97 5 °F (36 4 °C)-98 78 °F (37 1 °C)] 98 24 °F (36 8 °C)  HR:  [64-79] 64  Resp:  [15-21] 15  BP: ()/(40-98) 94/59  SpO2:  [95 %-100 %] 100 %  Temp (24hrs), Av °F (36 7 °C), Min:97 5 °F (36 4 °C), Max:98 78 °F (37 1 °C)  Current: Temperature: 98 24 °F (36 8 °C)    Physical Exam:   General:  No acute distress  Eyes:  Normal lids, eyes closed  ENT:  Normal external ears and nose  Neck:  Neck symmetric with midline trachea  Pulmonary:  Ventilated respiratory effort without accessory muscle use  Cardiovascular:  Regular rate and rhythm on monitor; anasarca  Gastrointestinal:  Obese, left STU with serous fluid  Musculoskeletal:  No digital clubbing or cyanosis  Skin:  No visible rashes; No palpable nodules  Neurologic:  Does not respond to light touch  Psychiatric:  Intubated and sedated    Lab Results:  I have personally reviewed pertinent labs  Results from last 7 days   Lab Units 08/09/22  0553 08/09/22  0418 08/09/22  0000 08/08/22  1757 08/08/22  1156 08/08/22  0548 08/07/22  1202 08/07/22  0922 08/07/22  0029 08/06/22  2356   POTASSIUM mmol/L 4 0  --  4 1 4 8   < > 4 7   < >  --    < >  --    CHLORIDE mmol/L 107  --  107 110*   < > 110*   < >  --    < >  --    CO2 mmol/L 25  --  26 24   < > 26   < >  --    < >  --    CO2, I-STAT mmol/L  --   --   --   --   --   --   --   --   --  39*   BUN mg/dL 15  --  17 17   < > 18   < >  --    < >  --    CREATININE mg/dL 0 86  --  0 90 0 98   < > 0 90   < >  --    < >  --    EGFR ml/min/1 73sq m 86  --  84 76   < > 84   < >  --    < >  --    GLUCOSE, ISTAT mg/dl  --   --   --   --   --   --   --   --   --  121   CALCIUM mg/dL 8 3  --  8 5 8 1*   < > 7 8*   < >  --    < >  --    AST U/L  --  57*  --   --   --  56*  --  64*  --   --    ALT U/L  --  29  --   --   --  26  --  26  --   --    ALK PHOS U/L  --  221*  --   --   --  155*  --  142*  --   --     < > = values in this interval not displayed  Results from last 7 days   Lab Units 08/09/22  0418 08/08/22  1548 08/08/22  1423 08/08/22  0548   WBC Thousand/uL 18 40* 24 39*  --  27 57*   HEMOGLOBIN g/dL 6 6* 7 0*  --  7 2*   PLATELETS Thousands/uL 27* 39* 35* 14*     Results from last 7 days   Lab Units 08/03/22  1631   GRAM STAIN RESULT  4+ Polys*  2+ Gram positive rods*  2+ Yeast*   BODY FLUID CULTURE, STERILE  2+ Growth of Citrobacter freundii*  2+ Growth of Klebsiella pneumoniae*  Growth in Broth culture only Yeast*       Imaging Studies:   I have personally reviewed pertinent imaging study reports and images in PACS    CXR reviewed personally improved aeration RUL    EKG, Pathology, and Other Studies:   I have personally reviewed pertinent reports

## 2022-08-09 NOTE — PROGRESS NOTES
NEPHROLOGY CVVH PROCEDURE NOTE    Seen and examined on CVVH  Remains intubated/sedated  QB: 250  Dialysate: 4 K / 3 Calcium  Ultrafiltration: 100 cc/hour  Filtration Fraction:26 %  Effluent:25 cc/kg/hour  Treatment Day:4  Anticoagulation: -    Physical Exam:    BP (!) 73/40   Pulse 70   Temp 97 7 °F (36 5 °C)   Resp 15   Ht 6' (1 829 m)   Wt (!) 142 kg (312 lb 13 3 oz)   SpO2 96%   BMI 42 43 kg/m²     General:  No acute distress  CVS:  Irregular  Lungs:  Coarse, decreased at bases  Abdomen:  Soft, distended  Access:  Temp dialysis catheter  Extremities:  Extensive lower extremity edema    Assessment:  1  Acute kidney injury most likely secondary to ischemic/septic ATN plus possible component of contrast associated nephropathy  2  Sepsis/shock/hypotension, wean hemodynamic support as tolerated  3  Strep bacteremia/abdominal abscess, antibiotic treatment as per Infectious Disease  4  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

## 2022-08-09 NOTE — PHYSICAL THERAPY NOTE
Physical Therapy Cancellation Note           08/09/22 0755   PT Last Visit   PT Visit Date 08/09/22   Note Type   Note Type Cancelled Session   Cancel Reasons Intubated/sedated  (Pt chart reviewed  Pt currently intubated and sedated    PT to continue to follow and see pt as appropriate and able )     Paulino Monsivais, PT, DPT

## 2022-08-09 NOTE — RESPIRATORY THERAPY NOTE
RT Ventilator Management Note  Herminia Harrell 67 y o  male MRN: [id number]  Unit/Bed#: [facility] 979-72 Encounter: [id number]      Daily Screen         8/7/2022  0842 8/8/2022  0808          Patient safety screen outcome[de-identified] Failed Failed      Not Ready for Weaning due to[de-identified] FiO2 >60%;PEEP > 8cmH2O;Underline problem not resolved Alternative forms of ventilation                Physical Exam:   Assessment Type: Assess only  General Appearance: Sedated  Respiratory Pattern: Assisted  Chest Assessment: Chest expansion symmetrical  Bilateral Breath Sounds: Diminished  Cough: Unable to assess  Suction: ET Tube  O2 Device: (P) vent      Resp Comments: (P) Pt stable on current vent settings, Pt suctioned from scant amount of secretions, No other changes made at this time, will continue to monitor

## 2022-08-09 NOTE — PROGRESS NOTES
Progress Note - Surgical Oncology   Maura De Jesus 67 y o  male MRN: [id number]  Unit/Bed#: [facility] 793-28 Encounter: [id number]    Assessment:  67 y  o  male who presented with MD-IPMN s/p  IPMN, s/p Lap distal pancreatectomy (03/2019-Quiros), open subtotal pancreatectomy (02/2022-Quiros), now status post completion pancreatectomy with sims anastomosis, extensive SARAH, reduction of internal hernia and cholecystectomy on 7/26, now s/p IR drain placement on 8/3  Now w/ ARDS    APRV P I 20, P low 5, 40% FiO2  Levo at 1, vaso    CVVH -100          RUQ  serous  LUQ IR drain 85 thick tan  CVVH 6 1 L  No stool recorded    Plan:  Continue to wean vent as tolerated  Maintain NPO NG tube  Reorder TPN  Continue to monitor ins and outs  Continue to wean pressors as tolerated to maintain maps greater than 65  Continue CVVH  Continue broad-spectrum antibiotics Zosyn and micafungin per ID  Maintain STU in IR drains and monitor for output  Appreciate ICU level of care  Appreciate GI, heme Onc, Nephro and ID recommendations      Subjective/Objective   Subjective:   Pulling -100 on CVVH  Levo was on hold for a brief period but patient currently on 1 of levo and vaso continues on APRV on stable vent settings  Making 20-60 cc of urine per hour  Paralysis is currently off  Getting transfuse 1 unit PRBCs for hemoglobin of 6 6    Objective:     Blood pressure 103/58, pulse 69, temperature 97 52 °F (36 4 °C), resp  rate 15, height 6' (1 829 m), weight (!) 142 kg (312 lb 13 3 oz), SpO2 96 %  ,Body mass index is 42 43 kg/m²        Intake/Output Summary (Last 24 hours) at 8/9/2022 0551  Last data filed at 8/9/2022 0400  Gross per 24 hour   Intake 5733 ml   Output 8284 ml   Net -2551 ml       Invasive Devices  Report    Peripherally Inserted Central Catheter Line  Duration           PICC Line 08/03/22 5 days          Central Venous Catheter Line  Duration           Port A Cath 03/30/22 Right Subclavian 131 days Peripheral Intravenous Line  Duration           Peripheral IV 08/05/22 Distal;Left;Ventral (anterior) Wrist 3 days    Peripheral IV 08/09/22 Dorsal (posterior); Left Forearm <1 day          Arterial Line  Duration           Arterial Line 08/06/22 Radial 3 days          Hemodialysis Catheter  Duration           HD Temporary Double Catheter 3 days          Drain  Duration           Closed/Suction Drain Right RLQ Bulb 19 Fr  13 days    Urethral Catheter Temperature probe 16 Fr  7 days    NG/OG/Enteral Tube Enteral Feeding Tube Right nare 6 days    Abscess Drain LUQ 5 days          Airway  Duration           ETT  Oral;Cuffed; Hi-Lo 8 mm 3 days                Physical Exam  Vitals reviewed  Constitutional:       Interventions: He is sedated, intubated and restrained  HENT:      Head: Normocephalic and atraumatic  Pulmonary:      Effort: He is intubated  Abdominal:      General: There is distension  Palpations: Abdomen is soft  Comments: Incisions clean dry and intact  Right STU and loss IR drain in place   Musculoskeletal:      Right lower leg: Edema present  Left lower leg: Edema present  Skin:     General: Skin is warm  Lab, Imaging and other studies:  I have personally reviewed pertinent lab results    , CBC:   Lab Results   Component Value Date    WBC 18 40 (H) 08/09/2022    HGB 6 6 (LL) 08/09/2022    HCT 21 6 (L) 08/09/2022     (H) 08/09/2022    PLT 27 (LL) 08/09/2022    MCH 34 7 (H) 08/09/2022    MCHC 30 6 (L) 08/09/2022    RDW 21 7 (H) 08/09/2022    MPV 12 7 08/09/2022    NRBC 0 08/09/2022   , CMP:   Lab Results   Component Value Date    SODIUM 138 08/09/2022    K 4 1 08/09/2022     08/09/2022    CO2 26 08/09/2022    BUN 17 08/09/2022    CREATININE 0 90 08/09/2022    CALCIUM 8 5 08/09/2022    AST 57 (H) 08/09/2022    ALT 29 08/09/2022    ALKPHOS 221 (H) 08/09/2022    EGFR 84 08/09/2022     VTE Pharmacologic Prophylaxis: Sequential compression device (Venodyne)  and Heparin  VTE Mechanical Prophylaxis: sequential compression device

## 2022-08-09 NOTE — PLAN OF CARE
Problem: MOBILITY - ADULT  Goal: Maintain or return to baseline ADL function  Description: INTERVENTIONS:  -  Assess patient's ability to carry out ADLs; assess patient's baseline for ADL function and identify physical deficits which impact ability to perform ADLs (bathing, care of mouth/teeth, toileting, grooming, dressing, etc )  - Assess/evaluate cause of self-care deficits   - Assess range of motion  - Assess patient's mobility; develop plan if impaired  - Assess patient's need for assistive devices and provide as appropriate  - Encourage maximum independence but intervene and supervise when necessary  - Involve family in performance of ADLs  - Assess for home care needs following discharge   - Consider OT consult to assist with ADL evaluation and planning for discharge  - Provide patient education as appropriate  Outcome: Progressing  Goal: Maintains/Returns to pre admission functional level  Description: INTERVENTIONS:  - Perform BMAT or MOVE assessment daily    - Set and communicate daily mobility goal to care team and patient/family/caregiver  - Collaborate with rehabilitation services on mobility goals if consulted  - Perform Range of Motion 3 times a day  - Reposition patient every 2 hours    - Record patient progress and toleration of activity level   Outcome: Progressing     Problem: Prexisting or High Potential for Compromised Skin Integrity  Goal: Skin integrity is maintained or improved  Description: INTERVENTIONS:  - Identify patients at risk for skin breakdown  - Assess and monitor skin integrity  - Assess and monitor nutrition and hydration status  - Monitor labs   - Assess for incontinence   - Turn and reposition patient  - Assist with mobility/ambulation  - Relieve pressure over bony prominences  - Avoid friction and shearing  - Provide appropriate hygiene as needed including keeping skin clean and dry  - Evaluate need for skin moisturizer/barrier cream  - Collaborate with interdisciplinary team   - Patient/family teaching  - Consider wound care consult   Outcome: Progressing     Problem: Nutrition/Hydration-ADULT  Goal: Nutrient/Hydration intake appropriate for improving, restoring or maintaining nutritional needs  Description: Monitor and assess patient's nutrition/hydration status for malnutrition  Collaborate with interdisciplinary team and initiate plan and interventions as ordered  Monitor patient's weight and dietary intake as ordered or per policy  Utilize nutrition screening tool and intervene as necessary  Determine patient's food preferences and provide high-protein, high-caloric foods as appropriate       INTERVENTIONS:  - Monitor oral intake, urinary output, labs, and treatment plans  - Assess nutrition and hydration status and recommend course of action  - Evaluate amount of meals eaten  - Assist patient with eating if necessary   - Allow adequate time for meals  - Recommend/ encourage appropriate diets, oral nutritional supplements, and vitamin/mineral supplements  - Order, calculate, and assess calorie counts as needed  - Recommend, monitor, and adjust tube feedings and TPN/PPN based on assessed needs  - Assess need for intravenous fluids  - Provide specific nutrition/hydration education as appropriate  - Include patient/family/caregiver in decisions related to nutrition  Outcome: Progressing     Problem: Potential for Falls  Goal: Patient will remain free of falls  Description: INTERVENTIONS:  - Educate patient/family on patient safety including physical limitations  - Instruct patient to call for assistance with activity   - Consult OT/PT to assist with strengthening/mobility   - Keep Call bell within reach  - Keep bed low and locked with side rails adjusted as appropriate  - Keep care items and personal belongings within reach  - Initiate and maintain comfort rounds  - Make Fall Risk Sign visible to staff  - Offer Toileting every 2 Hours, in advance of need  - Initiate/Maintain bed alarm  - Obtain necessary fall risk management equipment  - Apply yellow socks and bracelet for high fall risk patients  - Consider moving patient to room near nurses station  Outcome: Progressing     Problem: PAIN - ADULT  Goal: Verbalizes/displays adequate comfort level or baseline comfort level  Description: Interventions:  - Encourage patient to monitor pain and request assistance  - Assess pain using appropriate pain scale  - Administer analgesics based on type and severity of pain and evaluate response  - Implement non-pharmacological measures as appropriate and evaluate response  - Consider cultural and social influences on pain and pain management  - Notify physician/advanced practitioner if interventions unsuccessful or patient reports new pain  Outcome: Progressing     Problem: INFECTION - ADULT  Goal: Absence or prevention of progression during hospitalization  Description: INTERVENTIONS:  - Assess and monitor for signs and symptoms of infection  - Monitor lab/diagnostic results  - Monitor all insertion sites, i e  indwelling lines, tubes, and drains  - Monitor endotracheal if appropriate and nasal secretions for changes in amount and color  - [place] appropriate cooling/warming therapies per order  - Administer medications as ordered  - Instruct and encourage patient and family to use good hand hygiene technique  - Identify and instruct in appropriate isolation precautions for identified infection/condition  Outcome: Progressing  Goal: Absence of fever/infection during neutropenic period  Description: INTERVENTIONS:  - Monitor WBC    Outcome: Progressing     Problem: SAFETY ADULT  Goal: Maintain or return to baseline ADL function  Description: INTERVENTIONS:  -  Assess patient's ability to carry out ADLs; assess patient's baseline for ADL function and identify physical deficits which impact ability to perform ADLs (bathing, care of mouth/teeth, toileting, grooming, dressing, etc )  - Assess/evaluate cause of self-care deficits   - Assess range of motion  - Assess patient's mobility; develop plan if impaired  - Assess patient's need for assistive devices and provide as appropriate  - Encourage maximum independence but intervene and supervise when necessary  - Involve family in performance of ADLs  - Assess for home care needs following discharge   - Consider OT consult to assist with ADL evaluation and planning for discharge  - Provide patient education as appropriate  Outcome: Progressing  Goal: Maintains/Returns to pre admission functional level  Description: INTERVENTIONS:  - Perform BMAT or MOVE assessment daily    - Set and communicate daily mobility goal to care team and patient/family/caregiver  - Collaborate with rehabilitation services on mobility goals if consulted  - Perform Range of Motion 3 times a day  - Reposition patient every 2 hours    - Record patient progress and toleration of activity level   Outcome: Progressing  Goal: Patient will remain free of falls  Description: INTERVENTIONS:  - Educate patient/family on patient safety including physical limitations  - Instruct patient to call for assistance with activity   - Consult OT/PT to assist with strengthening/mobility   - Keep Call bell within reach  - Keep bed low and locked with side rails adjusted as appropriate  - Keep care items and personal belongings within reach  - Initiate and maintain comfort rounds  - Make Fall Risk Sign visible to staff  - Offer Toileting every 2 Hours, in advance of need  - Initiate/Maintain bed alarm  - Obtain necessary fall risk management equipment  - Apply yellow socks and bracelet for high fall risk patients  - Consider moving patient to room near nurses station  Outcome: Progressing     Problem: DISCHARGE PLANNING  Goal: Discharge to home or other facility with appropriate resources  Description: INTERVENTIONS:  - Identify barriers to discharge w/patient and caregiver  - Arrange for needed discharge resources and transportation as appropriate  - Identify discharge learning needs (meds, wound care, etc )  - Arrange for interpretive services to assist at discharge as needed  - Refer to Case Management Department for coordinating discharge planning if the patient needs post-hospital services based on physician/advanced practitioner order or complex needs related to functional status, cognitive ability, or social support system  Outcome: Progressing     Problem: Knowledge Deficit  Goal: Patient/family/caregiver demonstrates understanding of disease process, treatment plan, medications, and discharge instructions  Description: Complete learning assessment and assess knowledge base    Interventions:  - Provide teaching at level of understanding  - Provide teaching via preferred learning methods  Outcome: Progressing

## 2022-08-10 NOTE — PROGRESS NOTES
Medical Oncology/Hematology Progress Note  Gregg Huang, male, 67 y o , 1950,  PPHP 515/PPHP [id number], [id number]     Assessment and Plan  1  DIC  Patient does have chronic thrombocytopenia in setting of likely chronic cirrhosis and chemotherapy  Baseline around 100K  Patient did have  on admission which did drop to 50, requiring PLT transfusion  Platelets back up to 147 on 8/2 and then started dropping 8/3  Had been dropping to davon of 14 on 8/8  Patient transfused platelets again at that time  Thought to be likely multifactorial  in the setting of bone marrow shock (as evidenced by severe leukocytosis with prominent immature cells, thrombocytopenia, anemia), ongoing critical illness, cirrhosis, sepsis/bacteremia, initiation of CRRT, medications  Also with acute (infection,critical illness, blood loss) on chronic (ACD,ETOH) anemia  Workup consistent with DIC  Hgb today 7 7 s/p 1U for 6 6 yesterday  Platelets 20, trending down s/p platelet transfusion on 8/8  · DIC panel ordered upon consultation:  · PTT 37, PT 16 4, INR 1 3  · Fibrinogen 201  · D dimer 12 43  · Fibrin split products >20 <40  · Plasminogen 44  · ATIII activity 31  · Hemolysis smear: schistocytes  · , haptoglobin 67  · Recommendations same as initial consut  · Hold AP/AC until platelet recovery to 50K  · Transfuse platelets for PLT <87J with goal above 20K  · SCDs for DVT ppx  · Transfuse PRBCs for Hgb <7    2  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  S/p total pancreatectomy on 7/26  Postop course c/b septic shock, ARDS, TERESE  · OP Oncology f/u    Outpatient follow up plan: Scheduled 8/26 with Dr Cinthia Al    Communication with team:  Did communicate with Dr Anita Park, primary team       I did review this patient with Dr Pina Silverio and they are in agreement with this plan  Subjective:  Intubated, sedated    Review of Systems  Intubated, sedated    Objective:     Medication Administration - last 24 hours from 08/09/2022 0759 to 08/10/2022 0759       Date/Time Order Dose Route Action Action by     08/09/2022 0821 bisacodyl (DULCOLAX) rectal suppository 10 mg 10 mg Rectal Given Teagan Barksdale     08/09/2022 2215 amiodarone (CORDARONE) 900 mg in dextrose 5 % 500 mL infusion 0 mg/min Intravenous Hold Maycol Hagan     08/09/2022 1832 micafungin (MYCAMINE) 100 mg in sodium chloride 0 9 % 100 mL IVPB 0 mg Intravenous Stopped [employer], RN     08/09/2022 1556 micafungin (MYCAMINE) 100 mg in sodium chloride 0 9 % 100 mL IVPB 100 mg Intravenous New Bag Teagan Barksdale     08/10/2022 0516 acetaminophen (TYLENOL) oral suspension 975 mg 975 mg Per NG Tube Not Given Williamson Leventhal     08/09/2022 2208 acetaminophen (TYLENOL) oral suspension 975 mg 975 mg Per NG Tube Given Sherly Leventhal     08/09/2022 1332 acetaminophen (TYLENOL) oral suspension 975 mg 975 mg Per NG Tube Given [employer], RN     08/10/2022 0421 insulin regular (HumuLIN R,NovoLIN R) 1 Units/mL in sodium chloride 0 9 % 100 mL infusion 3 Units/hr Intravenous Rate/Dose Change Maycol Hagan     08/10/2022 0155 insulin regular (HumuLIN R,NovoLIN R) 1 Units/mL in sodium chloride 0 9 % 100 mL infusion 0 5 Units/hr Intravenous Rate/Dose Change Maycol Hagan     08/09/2022 2354 insulin regular (HumuLIN R,NovoLIN R) 1 Units/mL in sodium chloride 0 9 % 100 mL infusion 1 Units/hr Intravenous Rate/Dose Change Maycol Hagan     08/09/2022 2213 insulin regular (HumuLIN R,NovoLIN R) 1 Units/mL in sodium chloride 0 9 % 100 mL infusion 3 Units/hr Intravenous Rate/Dose Change Maycol Hagan     08/09/2022 2004 insulin regular (HumuLIN R,NovoLIN R) 1 Units/mL in sodium chloride 0 9 % 100 mL infusion 6 Units/hr Intravenous Rate/Dose Change Maycol Hagan     08/09/2022 1811 insulin regular (HumuLIN R,NovoLIN R) 1 Units/mL in sodium chloride 0 9 % 100 mL infusion 3 Units/hr Intravenous New Ronny Alexander     08/09/2022 1019 insulin regular (HumuLIN R,NovoLIN R) 1 Units/mL in sodium chloride 0 9 % 100 mL infusion 3 Units/hr Intravenous Rate/Dose Change Rosa Dys     08/10/2022 0742 levalbuterol (XOPENEX) inhalation solution 1 25 mg 1 25 mg Nebulization Given Piyush Andrews, RT     08/10/2022 0221 levalbuterol (XOPENEX) inhalation solution 1 25 mg 1 25 mg Nebulization Given Lovena Blythe, RT     08/09/2022 2000 levalbuterol (Logan Dings) inhalation solution 1 25 mg   Nebulization Canceled Entry Lovena Blythe, RT     08/09/2022 1854 levalbuterol (XOPENEX) inhalation solution 1 25 mg 1 25 mg Nebulization Given Lovena Blythe, RT     08/09/2022 1322 levalbuterol (XOPENEX) inhalation solution 1 25 mg 1 25 mg Nebulization Given Hiwot Huguenin, RT     08/10/2022 0742 ipratropium (ATROVENT) 0 02 % inhalation solution 0 5 mg 0 5 mg Nebulization Given [facility], RT     08/10/2022 0222 ipratropium (ATROVENT) 0 02 % inhalation solution 0 5 mg 0 5 mg Nebulization Given Lovena Blythe, RT     08/09/2022 2000 ipratropium (ATROVENT) 0 02 % inhalation solution 0 5 mg   Nebulization Canceled Entry Abhay Montgomery, RT     08/09/2022 1854 ipratropium (ATROVENT) 0 02 % inhalation solution 0 5 mg 0 5 mg Nebulization Given Abhay Montgomery, RT     08/09/2022 1322 ipratropium (ATROVENT) 0 02 % inhalation solution 0 5 mg 0 5 mg Nebulization Given Hiwot Sengguenin, RT     08/09/2022 2042 chlorhexidine (PERIDEX) 0 12 % oral rinse 15 mL 15 mL Mouth/Throat Given [facility]     08/09/2022 0821 chlorhexidine (PERIDEX) 0 12 % oral rinse 15 mL 15 mL Mouth/Throat Given Rosa Dys     08/10/2022 0426 fentanyl citrate (PF) 100 MCG/2ML 50 mcg 0 mcg Intravenous Override Pull Maycol Hagan     08/10/2022 0324 fentanyl citrate (PF) 100 MCG/2ML 50 mcg 50 mcg Intravenous Given [facility]     08/10/2022 0157 fentanyl citrate (PF) 100 MCG/2ML 50 mcg 50 mcg Intravenous Given [facility] 08/09/2022 2224 fentanyl citrate (PF) 100 MCG/2ML 50 mcg 50 mcg Intravenous Given [facility] 08/09/2022 2020 fentanyl citrate (PF) 100 MCG/2ML 50 mcg 50 mcg Intravenous Given [facility]     08/09/2022 1621 fentanyl citrate (PF) 100 MCG/2ML 50 mcg 50 mcg Intravenous Given Jarsumi Bella     08/09/2022 1431 fentanyl citrate (PF) 100 MCG/2ML 50 mcg 50 mcg Intravenous Given Agustina Alexander     08/09/2022 1329 fentanyl citrate (PF) 100 MCG/2ML 50 mcg 50 mcg Intravenous Given [employer], RN     08/10/2022 0747 fentaNYL 1000 mcg in sodium chloride 0 9% 100mL infusion 0 mcg/hr Intravenous Hold Zain Conner RN     08/10/2022 0028 fentaNYL 1000 mcg in sodium chloride 0 9% 100mL infusion 100 mcg/hr Intravenous New Bag Maycol Hagan     08/09/2022 2346 fentaNYL 1000 mcg in sodium chloride 0 9% 100mL infusion 100 mcg/hr Intravenous Rate/Dose Change Maycol Hagan     08/09/2022 1800 fentaNYL 1000 mcg in sodium chloride 0 9% 100mL infusion 75 mcg/hr Intravenous Rate/Dose Change CHATO Early     08/09/2022 1754 fentaNYL 1000 mcg in sodium chloride 0 9% 100mL infusion 75 mcg/hr Intravenous Rate/Dose Change Merlyn Bella     08/09/2022 1428 fentaNYL 1000 mcg in sodium chloride 0 9% 100mL infusion 50 mcg/hr Intravenous Rate/Dose Change Agustina Alexander     08/09/2022 1144 fentaNYL 1000 mcg in sodium chloride 0 9% 100mL infusion 50 mcg/hr Intravenous Rate/Dose Change Jarold Shayne     08/09/2022 0844 fentaNYL 1000 mcg in sodium chloride 0 9% 100mL infusion 75 mcg/hr Intravenous New Bag Jarsumi Denisemer     08/09/2022 0843 fentaNYL 1000 mcg in sodium chloride 0 9% 100mL infusion 0 mcg/hr Intravenous Stopped Jarsumi Shayne     08/10/2022 0053 vasopressin (PITRESSIN) 20 Units in sodium chloride 0 9 % 100 mL infusion 0 04 Units/min Intravenous Smitha Griffin RN     08/09/2022 2120 vasopressin (PITRESSIN) 20 Units in sodium chloride 0 9 % 100 mL infusion 0 04 Units/min Intravenous Restarted Hussain Hagan     08/09/2022 2100 vasopressin (PITRESSIN) 20 Units in sodium chloride 0 9 % 100 mL infusion 0 Units/min Intravenous Hold David Chris     08/09/2022 1438 vasopressin (PITRESSIN) 20 Units in sodium chloride 0 9 % 100 mL infusion 0 04 Units/min Intravenous New Bag [facility]     08/09/2022 0929 vasopressin (PITRESSIN) 20 Units in sodium chloride 0 9 % 100 mL infusion 0 04 Units/min Intravenous Restarted [facility]     08/09/2022 0905 vasopressin (PITRESSIN) 20 Units in sodium chloride 0 9 % 100 mL infusion 0 Units/min Intravenous Hold [facility]     08/10/2022 0710 norepinephrine (LEVOPHED) 8 mg (DOUBLE CONCENTRATION) IV in sodium chloride 0 9% 250 mL 4 mcg/min Intravenous Rate/Dose Change Maycol Hagan     08/10/2022 3785 norepinephrine (LEVOPHED) 8 mg (DOUBLE CONCENTRATION) IV in sodium chloride 0 9% 250 mL 2 mcg/min Intravenous New Bag Maycol Hagan     08/09/2022 1052 norepinephrine (LEVOPHED) 8 mg (DOUBLE CONCENTRATION) IV in sodium chloride 0 9% 250 mL 0 mcg/min Intravenous Hold Agustina Alexander     08/09/2022 1022 norepinephrine (LEVOPHED) 8 mg (DOUBLE CONCENTRATION) IV in sodium chloride 0 9% 250 mL 1 mcg/min Intravenous Rate/Dose Change [facility]     08/09/2022 0952 norepinephrine (LEVOPHED) 8 mg (DOUBLE CONCENTRATION) IV in sodium chloride 0 9% 250 mL 2 mcg/min Intravenous Rate/Dose Change [facility]     08/09/2022 0940 norepinephrine (LEVOPHED) 8 mg (DOUBLE CONCENTRATION) IV in sodium chloride 0 9% 250 mL 3 mcg/min Intravenous Rate/Dose Change [facility]     08/09/2022 0927 norepinephrine (LEVOPHED) 8 mg (DOUBLE CONCENTRATION) IV in sodium chloride 0 9% 250 mL 4 mcg/min Intravenous Rate/Dose Change Tristian Cobos RN     08/09/2022 0915 norepinephrine (LEVOPHED) 8 mg (DOUBLE CONCENTRATION) IV in sodium chloride 0 9% 250 mL 2 mcg/min Intravenous Rate/Dose Change Tristian Cobos RN     08/09/2022 0805 norepinephrine (LEVOPHED) 8 mg (DOUBLE CONCENTRATION) IV in sodium chloride 0 9% 250 mL 0 mcg/min Intravenous Stopped [facility]     08/10/2022 0509 dexmedeTOMIDine (Precedex) 400 mcg in sodium chloride 0 9% 100 mL 0 mcg/kg/hr Intravenous Stopped Maycol Hagan     08/10/2022 0457 dexmedeTOMIDine (Precedex) 400 mcg in sodium chloride 0 9% 100 mL 0 9 mcg/kg/hr Intravenous New Bag Jorje Patel Bentley     08/10/2022 0210 dexmedeTOMIDine (Precedex) 400 mcg in sodium chloride 0 9% 100 mL 0 7 mcg/kg/hr Intravenous Rate/Dose Change Jorje Hagan     08/10/2022 0140 dexmedeTOMIDine (Precedex) 400 mcg in sodium chloride 0 9% 100 mL 0 5 mcg/kg/hr Intravenous Rate/Dose Change Jorje Patel Bentley     08/10/2022 0028 dexmedeTOMIDine (Precedex) 400 mcg in sodium chloride 0 9% 100 mL 0 4 mcg/kg/hr Intravenous New Bag Maycol Hagan     08/09/2022 2347 dexmedeTOMIDine (Precedex) 400 mcg in sodium chloride 0 9% 100 mL 0 5 mcg/kg/hr Intravenous Rate/Dose Change Jorje Patel Bentley     08/09/2022 2130 dexmedeTOMIDine (Precedex) 400 mcg in sodium chloride 0 9% 100 mL 0 7 mcg/kg/hr Intravenous New Bag Maycol Bentley     08/09/2022 1800 dexmedeTOMIDine (Precedex) 400 mcg in sodium chloride 0 9% 100 mL 0 7 mcg/kg/hr Intravenous Rate/Dose Change Nixon Weir RN     08/09/2022 1754 dexmedeTOMIDine (Precedex) 400 mcg in sodium chloride 0 9% 100 mL 0 7 mcg/kg/hr Intravenous Rate/Dose Change Annie Avery     08/09/2022 1737 dexmedeTOMIDine (Precedex) 400 mcg in sodium chloride 0 9% 100 mL 0 9 mcg/kg/hr Intravenous Rate/Dose Change Annie Avery     08/09/2022 1720 dexmedeTOMIDine (Precedex) 400 mcg in sodium chloride 0 9% 100 mL 0 7 mcg/kg/hr Intravenous New Deisy Asif RN     08/09/2022 1719 dexmedeTOMIDine (Precedex) 400 mcg in sodium chloride 0 9% 100 mL 0 mcg/kg/hr Intravenous Stopped Nixon Weir RN     08/09/2022 1415 dexmedeTOMIDine (Precedex) 400 mcg in sodium chloride 0 9% 100 mL 0 7 mcg/kg/hr Intravenous Rate/Dose Change Agustina Alexander     08/09/2022 1340 dexmedeTOMIDine (Precedex) 400 mcg in sodium chloride 0 9% 100 mL 0 6 mcg/kg/hr Intravenous Rate/Dose Change Kathya Panchla RN     08/09/2022 1243 dexmedeTOMIDine (Precedex) 400 mcg in sodium chloride 0 9% 100 mL 0 4 mcg/kg/hr Intravenous New Bag Normie Bustard 08/09/2022 1052 dexmedeTOMIDine (Precedex) 400 mcg in sodium chloride 0 9% 100 mL 0 4 mcg/kg/hr Intravenous Rate/Dose Change Normie Bustard 08/09/2022 0840 dexmedeTOMIDine (Precedex) 400 mcg in sodium chloride 0 9% 100 mL 0 2 mcg/kg/hr Intravenous Rate/Dose Change Normie Bustard     08/10/2022 0647 NxStage K 4/Ca 3 dialysis solution (RFP-401) 20,000 mL 20,000 mL Dialysis New Bag OLGA Roman 08/09/2022 2316 NxStage K 4/Ca 3 dialysis solution (RFP-401) 20,000 mL 20,000 mL Dialysis New Bag OLGA Roman 08/09/2022 1811 NxStage K 4/Ca 3 dialysis solution (RFP-401) 20,000 mL 20,000 mL Dialysis New Bag Elise Bustard 08/09/2022 0902 NxStage K 4/Ca 3 dialysis solution (RFP-401) 20,000 mL 20,000 mL Dialysis New Bag Agustinapolly Alexander     08/10/2022 0500 piperacillin-tazobactam (ZOSYN) 3 375 g in sodium chloride 0 9 % 100 mL IVPB 0 g Intravenous Stopped Maycol Bentley     08/10/2022 0423 piperacillin-tazobactam (ZOSYN) 3 375 g in sodium chloride 0 9 % 100 mL IVPB 3 375 g Intravenous New Bag Maycol Roman 08/09/2022 2100 piperacillin-tazobactam (ZOSYN) 3 375 g in sodium chloride 0 9 % 100 mL IVPB 0 g Intravenous Stopped Maycol Roman 08/09/2022 2005 piperacillin-tazobactam (ZOSYN) 3 375 g in sodium chloride 0 9 % 100 mL IVPB 3 375 g Intravenous New Bag Maycol Roman 08/09/2022 1316 piperacillin-tazobactam (ZOSYN) 3 375 g in sodium chloride 0 9 % 100 mL IVPB 0 g Intravenous Stopped Agustina Alexander     08/09/2022 1228 piperacillin-tazobactam (ZOSYN) 3 375 g in sodium chloride 0 9 % 100 mL IVPB 3 375 g Intravenous New Bag Elise Bustard 08/09/2022 2042 pantoprazole (PROTONIX) injection 40 mg 40 mg Intravenous Given [facility]     08/09/2022 0821 pantoprazole (PROTONIX) injection 40 mg 40 mg Intravenous Given Elise Carrion     08/09/2022 2109 Adult 3-in-1 TPN (custom base / custom electrolytes)   Intravenous Stopped [facility] 08/09/2022 1023 calcium gluconate 1 g in sodium chloride 0 9% 50 mL (premix) 0 g Intravenous Stopped Sky Segura     08/09/2022 1024 sodium phosphate 6 mmol in sodium chloride 0 9 % 100 mL Infusion 0 mmol Intravenous Stopped Sky Segura     08/09/2022 1448 calcium gluconate 1 g in sodium chloride 0 9% 50 mL (premix) 0 g Intravenous Stopped Sky Segura     08/09/2022 1331 calcium gluconate 1 g in sodium chloride 0 9% 50 mL (premix) 1 g Intravenous [address] [facility], RN     08/09/2022 1832 sodium phosphate 6 mmol in sodium chloride 0 9 % 100 mL Infusion 0 mmol Intravenous Stopped [employer], RN     08/09/2022 1436 sodium phosphate 6 mmol in sodium chloride 0 9 % 100 mL Infusion 6 mmol Intravenous New Bag Sky Segura     08/09/2022 2110 Adult 3-in-1 TPN (custom base / custom electrolytes)   Intravenous New Ronny Maycol Hagan     08/09/2022 2000 calcium gluconate 1 g in sodium chloride 0 9% 50 mL (premix) 0 g Intravenous Stopped [facility] 08/09/2022 1915 calcium gluconate 1 g in sodium chloride 0 9% 50 mL (premix) 1 g Intravenous New Ronny Maycol Hagan     08/09/2022 2200 magnesium sulfate IVPB (premix) SOLN 1 g 0 g Intravenous Stopped [facility] 08/09/2022 2051 magnesium sulfate IVPB (premix) SOLN 1 g 1 g Intravenous New Ronny Barajasmuna Hagan     08/09/2022 2200 sodium phosphate 6 mmol in sodium chloride 0 9 % 100 mL Infusion 0 mmol Intravenous Stopped [facility] 08/09/2022 2007 sodium phosphate 6 mmol in sodium chloride 0 9 % 100 mL Infusion 6 mmol Intravenous New Ronny Severino Bentley     08/10/2022 0300 calcium gluconate 1 g in sodium chloride 0 9% 50 mL (premix) 0 g Intravenous Stopped [facility]     08/10/2022 0200 calcium gluconate 1 g in sodium chloride 0 9% 50 mL (premix) 1 g Intravenous New Bag Maycol Bentley     08/10/2022 0500 magnesium sulfate IVPB (premix) SOLN 1 g 0 g Intravenous Stopped OLGA Hagan     08/10/2022 0327 magnesium sulfate IVPB (premix) SOLN 1 g 1 g Intravenous New [address] [place] [address] 08/10/2022 0500 potassium chloride 40 mEq IVPB (premix) 0 mEq Intravenous Stopped MYLENE     08/10/2022 0201 potassium chloride 40 mEq IVPB (premix) 40 mEq Intravenous Carrie Hagan     08/10/2022 0355 sodium phosphate 6 mmol in sodium chloride 0 9 % 100 mL Infusion 6 mmol Intravenous New Bag Maycolmuna Hagan     08/10/2022 0747 dexmedeTOMIDine (Precedex) 400 mcg in sodium chloride 0 9% 100 mL 0 mcg/kg/hr Intravenous Hold Perla Bolanos RN     08/10/2022 0744 dexmedeTOMIDine (Precedex) 400 mcg in sodium chloride 0 9% 100 mL 1 1 mcg/kg/hr Intravenous New Bag Perla Bolanos RN     08/10/2022 0743 dexmedeTOMIDine (Precedex) 400 mcg in sodium chloride 0 9% 100 mL 0 mcg/kg/hr Intravenous Stopped Perla Bolanos RN     08/10/2022 8864 dexmedeTOMIDine (Precedex) 400 mcg in sodium chloride 0 9% 100 mL 1 1 mcg/kg/hr Intravenous Rate/Dose Change Maycolmuna Hagan     08/10/2022 0547 dexmedeTOMIDine (Precedex) 400 mcg in sodium chloride 0 9% 100 mL 1 mcg/kg/hr Intravenous Rate/Dose Change Maycolmuna Hagan     08/10/2022 0510 dexmedeTOMIDine (Precedex) 400 mcg in sodium chloride 0 9% 100 mL 0 9 mcg/kg/hr Intravenous New Bag Maycolmuna Hagan     08/10/2022 0742 sodium chloride 3 % inhalation solution 4 mL 4 mL Nebulization Given Tania Dockery, RT     08/10/2022 0742 acetylcysteine (MUCOMYST) 200 mg/mL inhalation solution 600 mg 600 mg Nebulization Given Apple Sosa, RT          /55   Pulse 88   Temp 98 96 °F (37 2 °C)   Resp 20   Ht 6' (1 829 m)   Wt (!) 143 kg (315 lb 7 7 oz)   SpO2 94%   BMI 42 79 kg/m²       Physical Exam  Constitutional:       Appearance: He is ill-appearing  Interventions: He is sedated and intubated  Eyes:      General: Scleral icterus present  Cardiovascular:      Comments: Difficult to auscultate heart 2/2 body habitus  Pulmonary:      Effort: He is intubated  Abdominal:      General: There is distension     Genitourinary:     Comments: Scrotal swelling  Musculoskeletal:      Comments: Diffuse pitting edema   Skin:     General: Skin is cool     Neurological:      Comments: Wiggles toes bilaterally otherwise not following commands or tracking         Recent Results (from the past 48 hour(s))   Fingerstick Glucose (POCT)    Collection Time: 08/08/22  8:25 AM   Result Value Ref Range    POC Glucose 138 65 - 140 mg/dl   Type and screen    Collection Time: 08/08/22  9:18 AM   Result Value Ref Range    ABO Grouping A     Rh Factor Negative     Antibody Screen Negative     Specimen Expiration Date [id number]    Fingerstick Glucose (POCT)    Collection Time: 08/08/22 10:11 AM   Result Value Ref Range    POC Glucose 160 (H) 65 - 140 mg/dl   Basic metabolic panel    Collection Time: 08/08/22 11:56 AM   Result Value Ref Range    Sodium 138 135 - 147 mmol/L    Potassium 4 4 3 5 - 5 3 mmol/L    Chloride 109 (H) 96 - 108 mmol/L    CO2 24 21 - 32 mmol/L    ANION GAP 5 4 - 13 mmol/L    BUN 16 5 - 25 mg/dL    Creatinine 0 97 0 60 - 1 30 mg/dL    Glucose 126 65 - 140 mg/dL    Calcium 8 0 (L) 8 3 - 10 1 mg/dL    eGFR 77 ml/min/1 73sq m   Magnesium    Collection Time: 08/08/22 11:56 AM   Result Value Ref Range    Magnesium 2 0 1 6 - 2 6 mg/dL   Phosphorus    Collection Time: 08/08/22 11:56 AM   Result Value Ref Range    Phosphorus 2 0 (L) 2 3 - 4 1 mg/dL   Calcium, ionized    Collection Time: 08/08/22 11:56 AM   Result Value Ref Range    Calcium, Ionized 1 15 1 12 - 1 32 mmol/L   Blood gas, arterial    Collection Time: 08/08/22 11:56 AM   Result Value Ref Range    pH, Arterial 7 396 7 350 - 7 450    PH ART TC 7 405 7 350 - 7 450    pCO2, Arterial 42 9 36 0 - 44 0 mm Hg    PCO2 (TC) Arterial 41 8 36 0 - 44 0 mm Hg    pO2, Arterial 109 0 75 0 - 129 0 mm Hg    PO2 (TC) Arterial 105 4 75 0 - 129 0 mm Hg    HCO3, Arterial 25 7 22 0 - 28 0 mmol/L    Base Excess, Arterial 0 8 mmol/L    O2 Content, Arterial 11 0 (L) 16 0 - 23 0 mL/dL    O2 HGB,Arterial  97 2 (H) 94 0 - 97 0 %    SOURCE Line, Arterial     JOYCE TEST Yes     Temperature 97 5 Degrees Fehrenheit    VENT- APRV APRV    Fingerstick Glucose (POCT)    Collection Time: 08/08/22 12:29 PM   Result Value Ref Range    POC Glucose 122 65 - 140 mg/dl   Fingerstick Glucose (POCT)    Collection Time: 08/08/22  2:01 PM   Result Value Ref Range    POC Glucose 155 (H) 65 - 140 mg/dl   Haptoglobin    Collection Time: 08/08/22  2:23 PM   Result Value Ref Range    Haptoglobin 67 34 - 355 mg/dL   LD,Blood    Collection Time: 08/08/22  2:23 PM   Result Value Ref Range     (H) 81 - 234 U/L   Fibrin split products    Collection Time: 08/08/22  2:23 PM   Result Value Ref Range    FDP >20 <40 (A) <10   D-dimer, quantitative    Collection Time: 08/08/22  2:23 PM   Result Value Ref Range    D-Dimer, Quant 12 43 (H) <0 50 ug/ml FEU   Antithrombin III Activity    Collection Time: 08/08/22  2:23 PM   Result Value Ref Range    AntiThrombIN III Activity 31 (L) 92 - 136 % of Normal   Hemolysis Smear    Collection Time: 08/08/22  2:23 PM   Result Value Ref Range    Hemolysis Smear Schistocytes noted    Plasminogen activity    Collection Time: 08/08/22  2:23 PM   Result Value Ref Range    Plasminogen 44 0 (L) 77 - 138 % of Normal   Fibrinogen    Collection Time: 08/08/22  2:23 PM   Result Value Ref Range    Fibrinogen 201 (L) 227 - 495 mg/dL   Platelet count    Collection Time: 08/08/22  2:23 PM   Result Value Ref Range    Platelets 35 (LL) 127 - 390 Thousands/uL    MPV 11 6 8 9 - 12 7 fL   APTT    Collection Time: 08/08/22  2:23 PM   Result Value Ref Range    PTT 37 23 - 37 seconds   Protime-INR    Collection Time: 08/08/22  2:23 PM   Result Value Ref Range    Protime 16 4 (H) 11 6 - 14 5 seconds    INR 1 30 (H) 0 84 - 1 19   CBC and differential    Collection Time: 08/08/22  3:48 PM   Result Value Ref Range    WBC 24 39 (H) 4 31 - 10 16 Thousand/uL    RBC 1 96 (L) 3 88 - 5 62 Million/uL    Hemoglobin 7 0 (L) 12 0 - 17 0 g/dL    Hematocrit 21 9 (L) 36 5 - 49 3 %  (H) 82 - 98 fL    MCH 35 7 (H) 26 8 - 34 3 pg    MCHC 32 0 31 4 - 37 4 g/dL    RDW 21 3 (H) 11 6 - 15 1 %    MPV 12 0 8 9 - 12 7 fL    Platelets 39 (LL) 898 - 390 Thousands/uL    nRBC 0 /100 WBCs   Fingerstick Glucose (POCT)    Collection Time: 08/08/22  3:56 PM   Result Value Ref Range    POC Glucose 126 65 - 140 mg/dl   Basic metabolic panel    Collection Time: 08/08/22  5:57 PM   Result Value Ref Range    Sodium 139 135 - 147 mmol/L    Potassium 4 8 3 5 - 5 3 mmol/L    Chloride 110 (H) 96 - 108 mmol/L    CO2 24 21 - 32 mmol/L    ANION GAP 5 4 - 13 mmol/L    BUN 17 5 - 25 mg/dL    Creatinine 0 98 0 60 - 1 30 mg/dL    Glucose 123 65 - 140 mg/dL    Calcium 8 1 (L) 8 3 - 10 1 mg/dL    eGFR 76 ml/min/1 73sq m   Magnesium    Collection Time: 08/08/22  5:57 PM   Result Value Ref Range    Magnesium 2 0 1 6 - 2 6 mg/dL   Phosphorus    Collection Time: 08/08/22  5:57 PM   Result Value Ref Range    Phosphorus 2 0 (L) 2 3 - 4 1 mg/dL   Calcium, ionized    Collection Time: 08/08/22  5:57 PM   Result Value Ref Range    Calcium, Ionized 1 17 1 12 - 1 32 mmol/L   Blood gas, arterial    Collection Time: 08/08/22  5:57 PM   Result Value Ref Range    pH, Arterial 7 439 7 350 - 7 450    PH ART TC 7 438 7 350 - 7 450    pCO2, Arterial 38 7 36 0 - 44 0 mm Hg    PCO2 (TC) Arterial 38 9 36 0 - 44 0 mm Hg    pO2, Arterial 58 6 (LL) 75 0 - 129 0 mm Hg    PO2 (TC) Arterial 59 0 (LL) 75 0 - 129 0 mm Hg    HCO3, Arterial 25 6 22 0 - 28 0 mmol/L    Base Excess, Arterial 1 4 mmol/L    O2 Content, Arterial 9 5 (L) 16 0 - 23 0 mL/dL    O2 HGB,Arterial  91 0 (L) 94 0 - 97 0 %    SOURCE Line, Arterial     JOYCE TEST No     Temperature 98 8 Degrees Fehrenheit    VENT- APRV APRV     APRV 15     P-low 5     FIO2 40 %    T-High 2 90     T- low 1 00    Fingerstick Glucose (POCT)    Collection Time: 08/08/22  6:09 PM   Result Value Ref Range    POC Glucose 123 65 - 140 mg/dl   Fingerstick Glucose (POCT)    Collection Time: 08/08/22  8:06 PM Result Value Ref Range    POC Glucose 132 65 - 140 mg/dl   Fingerstick Glucose (POCT)    Collection Time: 08/08/22 10:00 PM   Result Value Ref Range    POC Glucose 146 (H) 65 - 140 mg/dl   Blood gas, arterial    Collection Time: 08/08/22 11:59 PM   Result Value Ref Range    pH, Arterial 7 452 (H) 7 350 - 7 450    pCO2, Arterial 35 8 (L) 36 0 - 44 0 mm Hg    pO2, Arterial 130 7 (H) 75 0 - 129 0 mm Hg    HCO3, Arterial 24 4 22 0 - 28 0 mmol/L    Base Excess, Arterial 0 5 mmol/L    O2 Content, Arterial 10 5 (L) 16 0 - 23 0 mL/dL    O2 HGB,Arterial  97 7 (H) 94 0 - 97 0 %    SOURCE Line, Arterial     VENT- APRV APRV     P-low 5     FIO2 40 %    T-High 2 9     T- low 1    Fingerstick Glucose (POCT)    Collection Time: 08/08/22 11:59 PM   Result Value Ref Range    POC Glucose 122 65 - 140 mg/dl   Basic metabolic panel    Collection Time: 08/09/22 12:00 AM   Result Value Ref Range    Sodium 138 135 - 147 mmol/L    Potassium 4 1 3 5 - 5 3 mmol/L    Chloride 107 96 - 108 mmol/L    CO2 26 21 - 32 mmol/L    ANION GAP 5 4 - 13 mmol/L    BUN 17 5 - 25 mg/dL    Creatinine 0 90 0 60 - 1 30 mg/dL    Glucose 122 65 - 140 mg/dL    Calcium 8 5 8 3 - 10 1 mg/dL    eGFR 84 ml/min/1 73sq m   Magnesium    Collection Time: 08/09/22 12:00 AM   Result Value Ref Range    Magnesium 1 8 1 6 - 2 6 mg/dL   Phosphorus    Collection Time: 08/09/22 12:00 AM   Result Value Ref Range    Phosphorus 1 7 (L) 2 3 - 4 1 mg/dL   Calcium, ionized    Collection Time: 08/09/22 12:00 AM   Result Value Ref Range    Calcium, Ionized 1 17 1 12 - 1 32 mmol/L   Fingerstick Glucose (POCT)    Collection Time: 08/09/22  2:08 AM   Result Value Ref Range    POC Glucose 135 65 - 140 mg/dl   CBC and differential    Collection Time: 08/09/22  4:18 AM   Result Value Ref Range    WBC 18 40 (H) 4 31 - 10 16 Thousand/uL    RBC 1 90 (L) 3 88 - 5 62 Million/uL    Hemoglobin 6 6 (LL) 12 0 - 17 0 g/dL    Hematocrit 21 6 (L) 36 5 - 49 3 %     (H) 82 - 98 fL    MCH 34 7 (H) 26 8 - 34 3 pg    MCHC 30 6 (L) 31 4 - 37 4 g/dL    RDW 21 7 (H) 11 6 - 15 1 %    MPV 12 7 8 9 - 12 7 fL    Platelets 27 (LL) 547 - 390 Thousands/uL    nRBC 0 /100 WBCs    Neutrophils Relative 89 (H) 43 - 75 %    Immat GRANS % 2 0 - 2 %    Lymphocytes Relative 5 (L) 14 - 44 %    Monocytes Relative 4 4 - 12 %    Eosinophils Relative 0 0 - 6 %    Basophils Relative 0 0 - 1 %    Neutrophils Absolute 16 33 (H) 1 85 - 7 62 Thousands/µL    Immature Grans Absolute 0 44 (H) 0 00 - 0 20 Thousand/uL    Lymphocytes Absolute 0 86 0 60 - 4 47 Thousands/µL    Monocytes Absolute 0 74 0 17 - 1 22 Thousand/µL    Eosinophils Absolute 0 01 0 00 - 0 61 Thousand/µL    Basophils Absolute 0 02 0 00 - 0 10 Thousands/µL   Hepatic function panel    Collection Time: 08/09/22  4:18 AM   Result Value Ref Range    Total Bilirubin 3 87 (H) 0 20 - 1 00 mg/dL    Bilirubin, Direct 2 68 (H) 0 00 - 0 20 mg/dL    Alkaline Phosphatase 221 (H) 46 - 116 U/L    AST 57 (H) 5 - 45 U/L    ALT 29 12 - 78 U/L    Total Protein 5 5 (L) 6 4 - 8 4 g/dL    Albumin 1 6 (L) 3 5 - 5 0 g/dL   Fingerstick Glucose (POCT)    Collection Time: 08/09/22  4:18 AM   Result Value Ref Range    POC Glucose 154 (H) 65 - 140 mg/dl   Basic metabolic panel    Collection Time: 08/09/22  5:53 AM   Result Value Ref Range    Sodium 138 135 - 147 mmol/L    Potassium 4 0 3 5 - 5 3 mmol/L    Chloride 107 96 - 108 mmol/L    CO2 25 21 - 32 mmol/L    ANION GAP 6 4 - 13 mmol/L    BUN 15 5 - 25 mg/dL    Creatinine 0 86 0 60 - 1 30 mg/dL    Glucose 140 65 - 140 mg/dL    Calcium 8 3 8 3 - 10 1 mg/dL    eGFR 86 ml/min/1 73sq m   Magnesium    Collection Time: 08/09/22  5:53 AM   Result Value Ref Range    Magnesium 1 9 1 6 - 2 6 mg/dL   Phosphorus    Collection Time: 08/09/22  5:53 AM   Result Value Ref Range    Phosphorus 2 4 2 3 - 4 1 mg/dL   Calcium, ionized    Collection Time: 08/09/22  5:53 AM   Result Value Ref Range    Calcium, Ionized 1 14 1 12 - 1 32 mmol/L   Blood gas, arterial    Collection Time: 08/09/22  5:53 AM   Result Value Ref Range    pH, Arterial 7 443 7 350 - 7 450    pCO2, Arterial 39 4 36 0 - 44 0 mm Hg    pO2, Arterial 89 5 75 0 - 129 0 mm Hg    HCO3, Arterial 26 3 22 0 - 28 0 mmol/L    Base Excess, Arterial 2 1 mmol/L    O2 Content, Arterial 10 0 (L) 16 0 - 23 0 mL/dL    O2 HGB,Arterial  96 1 94 0 - 97 0 %    SOURCE Line, Arterial     VENT- APRV APRV     APRV 15     P-low 5     FIO2 40 %    T-High 2 9     T- low 1    Fingerstick Glucose (POCT)    Collection Time: 08/09/22  5:53 AM   Result Value Ref Range    POC Glucose 149 (H) 65 - 140 mg/dl   Prepare Leukoreduced Platelet Pheresis (1 pheresis product = 6-8 pooled units): 1 Product, Irradiated, Leukoreduced    Collection Time: 08/09/22  5:55 AM   Result Value Ref Range    Unit Product Code V5697C70     Unit Number [id number]     Unit ABO A     [facility]     Unit Dispense Status Presumed Trans     Unit Product Volume 300 mL   Fingerstick Glucose (POCT)    Collection Time: 08/09/22  8:13 AM   Result Value Ref Range    POC Glucose 145 (H) 65 - 140 mg/dl   Fingerstick Glucose (POCT)    Collection Time: 08/09/22 10:18 AM   Result Value Ref Range    POC Glucose 120 65 - 140 mg/dl   Blood gas, arterial    Collection Time: 08/09/22 12:13 PM   Result Value Ref Range    pH, Arterial 7 447 7 350 - 7 450    PH ART TC 7 450 7 350 - 7 450    pCO2, Arterial 39 4 36 0 - 44 0 mm Hg    PCO2 (TC) Arterial 39 1 36 0 - 44 0 mm Hg    pO2, Arterial 130 2 (H) 75 0 - 129 0 mm Hg    PO2 (TC) Arterial 129 0 75 0 - 129 0 mm Hg    HCO3, Arterial 26 6 22 0 - 28 0 mmol/L    Base Excess, Arterial 2 4 mmol/L    O2 Content, Arterial 11 7 (L) 16 0 - 23 0 mL/dL    O2 HGB,Arterial  98 1 (H) 94 0 - 97 0 %    SOURCE Line, Arterial     JOYCE TEST No     Temperature 98 2 Degrees Fehrenheit    VENT- APRV APRV     APRV 15     P-low 5     FIO2 40 %    T-High 2 9     T- low 1 0    Basic metabolic panel    Collection Time: 08/09/22 12:14 PM   Result Value Ref Range    Sodium 136 135 - 147 mmol/L    Potassium 4 1 3 5 - 5 3 mmol/L    Chloride 107 96 - 108 mmol/L    CO2 26 21 - 32 mmol/L    ANION GAP 3 (L) 4 - 13 mmol/L    BUN 14 5 - 25 mg/dL    Creatinine 0 79 0 60 - 1 30 mg/dL    Glucose 117 65 - 140 mg/dL    Calcium 8 2 (L) 8 3 - 10 1 mg/dL    eGFR 89 ml/min/1 73sq m   Magnesium    Collection Time: 08/09/22 12:14 PM   Result Value Ref Range    Magnesium 2 1 1 6 - 2 6 mg/dL   Phosphorus    Collection Time: 08/09/22 12:14 PM   Result Value Ref Range    Phosphorus 2 2 (L) 2 3 - 4 1 mg/dL   Calcium, ionized    Collection Time: 08/09/22 12:14 PM   Result Value Ref Range    Calcium, Ionized 1 19 1 12 - 1 32 mmol/L   Fingerstick Glucose (POCT)    Collection Time: 08/09/22 12:25 PM   Result Value Ref Range    POC Glucose 118 65 - 140 mg/dl   Fingerstick Glucose (POCT)    Collection Time: 08/09/22  2:13 PM   Result Value Ref Range    POC Glucose 126 65 - 140 mg/dl   Fingerstick Glucose (POCT)    Collection Time: 08/09/22  4:04 PM   Result Value Ref Range    POC Glucose 132 65 - 140 mg/dl   Basic metabolic panel    Collection Time: 08/09/22  6:14 PM   Result Value Ref Range    Sodium 136 135 - 147 mmol/L    Potassium 4 0 3 5 - 5 3 mmol/L    Chloride 108 96 - 108 mmol/L    CO2 27 21 - 32 mmol/L    ANION GAP 1 (L) 4 - 13 mmol/L    BUN 14 5 - 25 mg/dL    Creatinine 0 81 0 60 - 1 30 mg/dL    Glucose 125 65 - 140 mg/dL    Calcium 8 2 (L) 8 3 - 10 1 mg/dL    eGFR 88 ml/min/1 73sq m   Magnesium    Collection Time: 08/09/22  6:14 PM   Result Value Ref Range    Magnesium 1 9 1 6 - 2 6 mg/dL   Phosphorus    Collection Time: 08/09/22  6:14 PM   Result Value Ref Range    Phosphorus 2 2 (L) 2 3 - 4 1 mg/dL   Calcium, ionized    Collection Time: 08/09/22  6:14 PM   Result Value Ref Range    Calcium, Ionized 1 18 1 12 - 1 32 mmol/L   Blood gas, arterial    Collection Time: 08/09/22  6:14 PM   Result Value Ref Range    pH, Arterial 7 437 7 350 - 7 450    pCO2, Arterial 40 1 36 0 - 44 0 mm Hg    pO2, Arterial 89 3 75 0 - 129 0 mm Hg    HCO3, Arterial 26 4 22 0 - 28 0 mmol/L    Base Excess, Arterial 2 1 mmol/L    O2 Content, Arterial 11 2 (L) 16 0 - 23 0 mL/dL    O2 HGB,Arterial  95 8 94 0 - 97 0 %    SOURCE Line, Arterial     JOYCE TEST No     VENT- APRV APRV     P-low 5     FIO2 40 %    T- low 1    Fingerstick Glucose (POCT)    Collection Time: 08/09/22  6:23 PM   Result Value Ref Range    POC Glucose 125 65 - 140 mg/dl   Fingerstick Glucose (POCT)    Collection Time: 08/09/22  8:04 PM   Result Value Ref Range    POC Glucose 148 (H) 65 - 140 mg/dl   Fingerstick Glucose (POCT)    Collection Time: 08/09/22 10:13 PM   Result Value Ref Range    POC Glucose 114 65 - 140 mg/dl   Fingerstick Glucose (POCT)    Collection Time: 08/09/22 11:53 PM   Result Value Ref Range    POC Glucose 93 65 - 140 mg/dl   Basic metabolic panel    Collection Time: 08/09/22 11:55 PM   Result Value Ref Range    Sodium 136 135 - 147 mmol/L    Potassium 3 8 3 5 - 5 3 mmol/L    Chloride 106 96 - 108 mmol/L    CO2 27 21 - 32 mmol/L    ANION GAP 3 (L) 4 - 13 mmol/L    BUN 13 5 - 25 mg/dL    Creatinine 0 64 0 60 - 1 30 mg/dL    Glucose 92 65 - 140 mg/dL    Calcium 8 4 8 3 - 10 1 mg/dL    eGFR 97 ml/min/1 73sq m   Magnesium    Collection Time: 08/09/22 11:55 PM   Result Value Ref Range    Magnesium 1 8 1 6 - 2 6 mg/dL   Phosphorus    Collection Time: 08/09/22 11:55 PM   Result Value Ref Range    Phosphorus 2 0 (L) 2 3 - 4 1 mg/dL   Calcium, ionized    Collection Time: 08/09/22 11:55 PM   Result Value Ref Range    Calcium, Ionized 1 17 1 12 - 1 32 mmol/L   Blood gas, arterial    Collection Time: 08/09/22 11:55 PM   Result Value Ref Range    pH, Arterial 7 417 7 350 - 7 450    pCO2, Arterial 41 5 36 0 - 44 0 mm Hg    pO2, Arterial 82 7 75 0 - 129 0 mm Hg    HCO3, Arterial 26 1 22 0 - 28 0 mmol/L    Base Excess, Arterial 1 5 mmol/L    O2 Content, Arterial 15 4 (L) 16 0 - 23 0 mL/dL    O2 HGB,Arterial  94 6 94 0 - 97 0 %    SOURCE Line, Arterial     VENT- APRV APRV     APRV 15     P-low 5     FIO2 40 %    T-High 2 9     T- low 1    Fingerstick Glucose (POCT)    Collection Time: 08/10/22  1:55 AM   Result Value Ref Range    POC Glucose 108 65 - 140 mg/dl   Fingerstick Glucose (POCT)    Collection Time: 08/10/22  4:21 AM   Result Value Ref Range    POC Glucose 162 (H) 65 - 140 mg/dl   Blood gas, arterial    Collection Time: 08/10/22  5:54 AM   Result Value Ref Range    pH, Arterial 7 469 (H) 7 350 - 7 450    pCO2, Arterial 32 0 (L) 36 0 - 44 0 mm Hg    pO2, Arterial 101 2 75 0 - 129 0 mm Hg    HCO3, Arterial 22 7 22 0 - 28 0 mmol/L    Base Excess, Arterial -0 7 mmol/L    O2 Content, Arterial 11 4 (L) 16 0 - 23 0 mL/dL    O2 HGB,Arterial  96 8 94 0 - 97 0 %   Prepare Leukoreduced RBC: 1 Units, Irradiated, Leukoreduced    Collection Time: 08/10/22  5:55 AM   Result Value Ref Range    Unit Product Code N6485T53     Unit Number [id number]     Unit ABO A     Unit [facility]     Crossmatch Compatible     Unit Dispense Status Presumed Trans     Unit Product Volume 350 ml   Basic metabolic panel    Collection Time: 08/10/22  6:00 AM   Result Value Ref Range    Sodium 137 135 - 147 mmol/L    Potassium 4 0 3 5 - 5 3 mmol/L    Chloride 107 96 - 108 mmol/L    CO2 24 21 - 32 mmol/L    ANION GAP 6 4 - 13 mmol/L    BUN 16 5 - 25 mg/dL    Creatinine 0 80 0 60 - 1 30 mg/dL    Glucose 133 65 - 140 mg/dL    Calcium 8 4 8 3 - 10 1 mg/dL    eGFR 88 ml/min/1 73sq m   Magnesium    Collection Time: 08/10/22  6:00 AM   Result Value Ref Range    Magnesium 2 0 1 6 - 2 6 mg/dL   Phosphorus    Collection Time: 08/10/22  6:00 AM   Result Value Ref Range    Phosphorus 1 7 (L) 2 3 - 4 1 mg/dL   Calcium, ionized    Collection Time: 08/10/22  6:00 AM   Result Value Ref Range    Calcium, Ionized 1 20 1  12 - 1 32 mmol/L   CBC and Platelet    Collection Time: 08/10/22  6:00 AM   Result Value Ref Range    WBC 15 01 (H) 4 31 - 10 16 Thousand/uL    RBC 2 20 (L) 3 88 - 5 62 Million/uL    Hemoglobin 7 7 (L) 12 0 - 17 0 g/dL    Hematocrit 24 1 (L) 36 5 - 49 3 %     (H) 82 - 98 fL    MCH 35 0 (H) 26 8 - 34 3 pg    MCHC 32 0 31 4 - 37 4 g/dL    RDW 24 7 (H) 11 6 - 15 1 %    Platelets 20 (LL) 961 - 390 Thousands/uL    MPV 13 4 (H) 8 9 - 12 7 fL   Fingerstick Glucose (POCT)    Collection Time: 08/10/22  6:00 AM   Result Value Ref Range    POC Glucose 140 65 - 140 mg/dl       XR chest portable    Result Date: 8/6/2022  Narrative: CHEST INDICATION:   HD cath exchange, confirm placement  COMPARISON:  8/5 EXAM PERFORMED/VIEWS:  XR CHEST PORTABLE  The frontal view was performed utilizing dual energy radiographic technique  Images: 3 FINDINGS:  Stable Port-A-Cath, ET tube, NG tube  Left-sided PICC line projects at the cavoatrial junction  Stable cardiomediastinal structures  Stable central vascular congestion and bilateral opacities suggestive of pulmonary edema  Suspected left basilar effusion  Impression: Stable pulmonary edema and left basilar effusion  Left-sided PICC line extends to the cavoatrial junction  Workstation performed: [id number]     XR chest portable    Result Date: 8/6/2022  Narrative: CHEST INDICATION:   s/p hd cath placement  COMPARISON:  8/5  EXAM PERFORMED/VIEWS:  XR CHEST PORTABLE  The frontal view was performed utilizing dual energy radiographic technique  Images: 3 FINDINGS:  Stable Port-A-Cath, NG tube, ET tube  A left-sided PICC line projects at the cavoatrial junction  Stable cardiomegaly  Stable central vascular congestion and peripheral opacities suggestive of pulmonary edema  Suspected left basilar effusion  Impression: PICC line tip is difficult to visualize but appears at the cavoatrial junction  Persistent pulmonary edema and suspected left basilar effusion  Workstation performed: [id number]     XR chest portable    Result Date: 8/6/2022  Narrative: CHEST INDICATION:   s/p intubation   COMPARISON:  8/5/2022 EXAM PERFORMED/VIEWS:  XR CHEST PORTABLE  The frontal view was performed utilizing dual energy radiographic technique  Images: 3 FINDINGS:  A right chest wall Port-A-Cath is unchanged in position  ET tube extends inferiorly below the GE junction and below the scope of this examination  ET tube projects 4 1 cm above the barbara  A left-sided PICC line is present projecting over the right atrium  Stable cardiomegaly  Central pulmonary vascular congestion with bilateral opacities  Limited evaluation of the hemidiaphragms suggestive of small basilar effusions, left greater than right  Osseous structures appear within normal limits for patient age  Impression: Tubes and lines as described above  Interval development of central vascular congestion with peripheral opacity suggestive of moderate pulmonary edema  A component of superimposed infection not excluded  Workstation performed: [id number]     XR chest portable    Result Date: 8/1/2022  Narrative: CHEST INDICATION:   fever workup  COMPARISON:  July 26, 2022 EXAM PERFORMED/VIEWS:  XR CHEST PORTABLE FINDINGS:  Right Port-A-Cath is unchanged  Remaining tubes and lines have been removed  Heart shadow is enlarged but unchanged from prior exam   Atherosclerotic calcifications in the aorta are noted  Subsegmental bibasilar atelectasis  No pneumothorax or pleural effusion  Osseous structures appear within normal limits for patient age  Impression: Bibasilar subsegmental atelectasis  Workstation performed: [id number]     XR chest portable    Result Date: 7/27/2022  Narrative: CHEST INDICATION:   s/p[ intubation, RIJ TLC placement  COMPARISON:  7/12/2022 EXAM PERFORMED/VIEWS:  XR CHEST PORTABLE FINDINGS:  Endotracheal tube is present, in satisfactory position with its tip approximately 6 cm above the level of the barbara  Enteric tube is present with its tip extending below the left hemidiaphragm  There is a right internal jugular central venous catheter  with tip over the cavoatrial junction    There is a stable right-sided Port-A-Cath  Cardiomediastinal silhouette appears unremarkable  The lungs are clear  No pneumothorax or pleural effusion  Osseous structures appear within normal limits for patient age  Impression: Lines and tubes as described  No pneumothorax  Workstation performed: [id number]     XR chest pa & lateral    Result Date: 7/12/2022  Narrative: CHEST INDICATION:   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  COMPARISON:  Chest x-ray January 2022, CT abdomen and pelvis June 2023 EXAM PERFORMED/VIEWS:  XR CHEST PA & LATERAL  The frontal view was performed utilizing dual energy radiographic technique  FINDINGS: Cardiac enlargement is seen and right portacatheter terminating in the distal superior vena cava/right atrium  Bilateral basilar atelectasis is seen with minimal blunting of the costophrenic angles which could be secondary to pleural thickening  Osseous structures appear within normal limits for patient age  Impression: Basilar subsegmental atelectasis with no active disease Workstation performed: [id number]     XR abdomen 1 view kub    Result Date: 8/5/2022  Narrative: ABDOMEN INDICATION:   Abdominal distension  COMPARISON:  Radiographs of the abdomen August 2, 2022  Correlation CT abdomen and pelvis August 2, 2022 VIEWS:  AP supine FINDINGS: Tip of enteric tube projects over the stomach  Drainage catheter projects over the midabdomen to the right of midline  Surgical staples project over the abdomen  Mildly dilated loops of air-filled small bowel  Luminal caliber is similar to the August 2, 2022 CT, though the number of dilated loops of air-filled small bowel has decreased  Normal caliber air filled colon  Impression: Since August 2, 2022, persistent dilation of the small bowel, though the number of air-filled loops of dilated small bowel has decreased   Workstation performed: [id number]     XR abdomen 1 view kub    Result Date: 7/28/2022  Narrative: ABDOMEN INDICATION:   eval NGT position  COMPARISON:  None VIEWS:  AP supine FINDINGS: Limited examination as the right and lower abdomen is excluded from the study  There is a nonobstructive bowel gas pattern  Tip of nasogastric tube overlies the stomach  Impression: Limited examination as the right and lower abdomen is excluded from the study  Nonobstructive bowel gas pattern  Adequately positioned nasogastric tube  Workstation performed: [id number]     Bronchoscopy (Bedside)    Result Date: 8/6/2022  Narrative: Althea Muñiz DO     8/6/2022  1:00 PM Bronchoscopy (Bedside) Date/Time: 8/6/2022 12:55 PM Performed by: Althea Muñiz DO Authorized by: Althea Muñiz DO Patient location:  Bedside Other Assisting Provider: Yes (comment) (Dr Nunez)  Consent:   Consent obtained:  Written   Consent given by:  Spouse   Risks discussed: Adverse reaction to sedation   Alternatives discussed:  No treatment and observation Universal protocol:   Procedure explained and questions answered to patient or proxy's satisfaction: yes    Patient identity confirmed:  Arm band Indications:   Procedure Purpose: diagnostic and therapeutic    Indications: other (comment)    Indications comment:  Aspiration Sedation:   Sedation type:  Continuous (ICU/vent) Upper Airway:   Trachea: normal    Angeline:  Normal Airway:   Airway:  Airway not erythematous or friable bilaterally  No obvious sputum to suction  No BAL performed  Post-procedure details:   Patient tolerance of procedure: Tolerated well, no immediate complications Final Diagnosis/Findings:    No obvious signs of aspiration pneumonitis at this time  No sputum noted  Anatomy normal  Airway intact with no erythema or friability noted bilaterally  No specimen obtained  XR abdomen 1 vw portable    Result Date: 8/4/2022  Narrative: ABDOMEN INDICATION:   keofed advanced  COMPARISON:  Abdominal radiograph July 28, 2022 VIEWS:  AP supine FINDINGS: The lower abdomen is excluded from field-of-view   Tip of enteric tube projects over the stomach  Surgical staples project over the midline of the abdomen  Impression: Tip of enteric tube projects over the stomach  Workstation performed: [id number]     XR chest 1 view    Result Date: 8/4/2022  Narrative: CHEST INDICATION:   Keofed insertion series  COMPARISON:  Chest radiograph August 1, 2022  Correlation with chest CT August 1, 2022 and subsequently performed abdominal radiograph  EXAM PERFORMED/VIEWS:  XR CHEST PORTABLE ICU, XR CHEST 1 VIEW FINDINGS:  2 cm radiograph from August 2, 2022 (12:56 and 13:00) are dictated in a combined report  On the final 2 radiographs, the enteric tube is curved with tip facing upwards and projecting over the expected position of the lower thoracic esophagus  Right chest wall PowerPort with catheter tip projecting over the right atrium  Cardiomediastinal silhouette is magnified by technique  Bilateral pleural effusions with passive atelectasis  No pneumothorax  Impression: Malpositioned enteric tube  However, correlation with subsequently performed radiograph of the abdomen demonstrates that the tube had been repositioned prior to dictation of the study  Bilateral pleural effusions  Workstation performed: [id number]     CT chest abdomen pelvis w contrast    Result Date: 8/2/2022  Narrative: CT CHEST, ABDOMEN AND PELVIS WITH IV CONTRAST INDICATION:  Recent abdominal surgery including completion of pancreatectomy, extensive lysis of adhesions, reduction of internal hernia and cholecystectomy (7/26/2022)  Pneumoperitoneum on yesterday's CT study, evaluate for leak  COMPARISON:  CT chest, abdomen and pelvis 8/1/2022, MRI abdomen 12/10/2021 TECHNIQUE: CT examination of the chest, abdomen and pelvis was performed  Axial, sagittal, and coronal 2D reformatted images were created from the source data and submitted for interpretation  Radiation dose length product (DLP) for this visit:  1644 62 mGy-cm    This examination, like all CT scans performed in the [facility], was performed utilizing techniques to minimize radiation dose exposure, including the use of iterative  reconstruction and automated exposure control  IV Contrast:  70 mL iohexol (OMNIPAQUE)  350 Enteric Contrast: 30 mL Omnipaque 240 FINDINGS: CHEST LUNGS:  Mild bibasilar compressive atelectasis  Mild subsegmental atelectasis also seen in the lingula  No endobronchial lesions  PLEURA:  Small bilateral pleural effusions, similar  HEART/GREAT VESSELS: Heart is upper normal size  Atherosclerotic changes thoracic aorta and coronary arteries  No thoracic aortic aneurysm  MEDIASTINUM AND JOSE:  Mediastinal lipomatosis with shotty lymph nodes, similar to prior study  CHEST WALL AND LOWER NECK:  Right chest Port-A-Cath, tip in the right atrium  Mild left sided gynecomastia  ABDOMEN LIVER/BILIARY TREE:  Lobulated liver surface contour again suspicious for cirrhosis  No focal hepatic lesion is seen (noting arterial phase imaging was not performed which limits sensitivity for detection of [address] )  The portal vein is patent  No intrahepatic biliary dilatation  Status post choledochojejunostomy  GALLBLADDER:  Gallbladder is surgically absent  SPLEEN:  Unremarkable  PANCREAS:  Status post pancreatectomy  ADRENAL GLANDS:  Unremarkable  KIDNEYS/URETERS:  Unremarkable  No hydronephrosis  STOMACH AND BOWEL:  Enteric tube tip at the GE junction  Contrast material within the distal esophagus, stomach and small bowel  Status post gastrojejunostomy  No convincing evidence for extraluminal oral contrast extravasation  Slightly dilated small bowel loop in the left abdomen without transition to suggest mechanical obstruction  Mildly thickened small bowel loop in the right lower quadrant may represent mild infectious or inflammatory enteritis  A rectal tube is now seen  Scattered colonic diverticulosis without evidence of diverticulitis    Thickening of the ascending colon could reflect underdistention, infectious or inflammatory colitis or element of portal hypertensive colopathy cannot be completely excluded  APPENDIX:  No findings to suggest appendicitis  ABDOMINOPELVIC CAVITY:  Small amount of pneumoperitoneum again seen, not significantly changed  Small amount of abdominal and pelvic free fluid, slightly increased from previous study  Diffuse edema throughout the mesentery is similar  Right upper quadrant drain  No discrete fluid collection here  Fluid collection in the pancreatectomy bed measuring about 9 4 x 2 4 x 2 3 cm is unchanged (series 2/59 and series 601 image 82)  Shotty peripancreatic lymph nodes, similar and presumably reactive  VESSELS: Atherosclerotic changes abdominal aorta without evidence of aneurysm  PELVIS REPRODUCTIVE ORGANS:  Unremarkable for patient's age  URINARY BLADDER:  Urinary bladder collapsed by Dockery catheter, assessment limited  ABDOMINAL WALL/INGUINAL REGIONS:  Diffuse anasarca again noted  Midline ventral abdominal wall postoperative changes  Small fat-containing bilateral inguinal hernias  OSSEOUS STRUCTURES:  No acute fracture or destructive osseous lesion  Multilevel degenerative changes of the spine with suggestive evidence of DISH  Grade 1 spondylolisthesis L4 on L5 secondary to facet arthropathy  Impression: 1  History as above  Grossly stable 9 4 x 2 4 x 2 3 cm fluid collection in the pancreatectomy bed  No definitive evidence for enteric contrast extravasation from the distal gastrectomy staple line  Continued CT surveillance is suggested  Grossly stable pneumoperitoneum  2  Small amount of abdominopelvic ascites and diffuse mesenteric edema, the former slightly increased from previous study  No definitive rim-enhancing collections to suggest abscess  3  Mildly dilated small bowel loop left midabdomen without focal transition to suggest mechanical obstruction  This could reflect transient peristalsis    Mild wall thickening small bowel loop in the right lower quadrant could represent infectious or inflammatory enteritis  4  Thickening of the ascending colon could be secondary to underdistention, infectious/inflammatory colitis and/or element of portal hypertensive colopathy  5  Lobular surface contour of the liver suspicious for cirrhosis  6  Small bilateral pleural effusions and mild bilateral lower lobe compressive atelectasis, similar  7  Diffuse anasarca again noted  Additional incidental findings as above  Workstation performed: [id number]     CT chest abdomen pelvis w contrast    Addendum Date: 8/1/2022 Addendum:   ADDENDUM: I personally discussed pertinent findings on this study with Devora Arreola on 8/1/2022 at 9:45 AM      Result Date: 8/1/2022  Narrative: CT CHEST, ABDOMEN AND PELVIS WITH IV CONTRAST INDICATION:   rapid  COMPARISON:  CT chest 12/19/2020, outside CT chest 5/4/2022 and CT abdomen and pelvis 6/23/2022 TECHNIQUE: CT examination of the chest, abdomen and pelvis was performed  Axial, sagittal, and coronal 2D reformatted images were created from the source data and submitted for interpretation  Radiation dose length product (DLP) for this visit:  1712 mGy-cm   This examination, like all CT scans performed in the [facility], was performed utilizing techniques to minimize radiation dose exposure, including the use of iterative reconstruction and automated exposure control  IV Contrast:  75 mL of iohexol (OMNIPAQUE)   350 Enteric Contrast: Enteric contrast was not administered  FINDINGS: CHEST LUNGS:  No suspicious pulmonary nodule in the aerated lungs  Stable 3 mm nodule in the right upper lobe marked on image 44 series 3 compatible with a benign nodule  Additional 5 mm nodule present on the prior study likely obscured by atelectasis  This nodule however is visualized on the outside chest CT from May 2022 and appears stable  Minimal bilateral lower lobe subsegmental atelectasis   Scattered areas of juxtapleural pulmonary scarring  Central airways are clear  PLEURA:  New small bilateral pleural effusions  HEART/GREAT VESSELS: Heart is enlarged  No pericardial effusion  Aortic and coronary artery calcification  No thoracic aortic aneurysm  Tip of portacatheter in the inferior right atrium  MEDIASTINUM AND JOSE:  No enlarged lymph nodes  Stable prominent subcentimeter short axis mediastinal lymph nodes  1 3 cm degenerative cyst contiguous with the inferior aspect of the right first costochondral junction is minimally smaller  CHEST WALL AND LOWER NECK:  Right anterior chest wall zohra catheter  Mild body wall edema  Stable minimal left-sided gynecomastia  ABDOMEN LIVER/BILIARY TREE:  Stable lobulated hepatic contour  Liver otherwise unremarkable  No biliary dilation  Post choledochojejunostomy  GALLBLADDER:  Post cholecystectomy  SPLEEN:  Unremarkable  PANCREAS:  Post pancreatectomy  ADRENAL GLANDS:  Unremarkable  KIDNEYS/URETERS:  Unremarkable  No hydronephrosis  STOMACH AND BOWEL:  Post gastrojejunostomy, partial distal gastrectomy and choledochojejunostomy  Mild hyperemia of the proximal stomach  No bowel obstruction  Minimal colonic diverticulosis without diverticulitis  APPENDIX:  Stable fluid flow appendix distended up to 1 2 cm  ABDOMINOPELVIC CAVITY:  Small-volume ascites has increased  Right ventral drainage catheter in place distal tip medial to the right lobe of the liver  No significant fluid collection at the tip of the catheter  Fluid collection in the post pancreatectomy  bed measuring approximately 9 5 x 2 5 x 2 8 cm image 61 series 2 and image 72 series 601  New pneumoperitoneum is nonspecific given recent surgery  Somewhat clustered gastropathy and the vicinity of the distal partial gastrectomy staple line  Stable 1 2 cm short axis anterior periaortic low-density lymph node image 85 series 2  No new enlarged lymph nodes  Progressive diffuse omental and mesenteric edema   VESSELS:  Aortoiliac calcification  Stable infrarenal aortic aneurysm maximum diameter 3 cm  PELVIS REPRODUCTIVE ORGANS:  Unremarkable for patient's age  URINARY BLADDER:  Diffuse thickening of the bladder likely underdistention  Tiny gas droplets in the bladder lumen presumably due to recent catheterization  ABDOMINAL WALL/INGUINAL REGIONS:  New diffuse body wall edema  Ventral midline surgical abdominal wall staple line  Ventral periumbilical postsurgical scar  Small fat-containing bilateral inguinal hernias  OSSEOUS STRUCTURES:  No acute fracture or osseous destructive lesion identified  Degenerative changes of the spine, pubic symphysis, and multiple joints  Multilevel thoracolumbar spondylosis and paravertebral ossification in a pattern suggestive of diffuse idiopathic skeletal hyperostosis  Stable minimal grade 1 degenerative anterolisthesis of L4 on L5  Impression: CT chest: New small bilateral pleural effusions with minimal adjacent subsegmental atelectasis  Findings may be sequela of fluid overload or third spacing among other etiologies  CT abdomen and pelvis: Interval postsurgical changes as above  New pneumoperitoneum is nonspecific and likely related to recent surgery  Cannot entirely exclude staple line dehiscence at the partial distal gastrectomy staple line  Progressive small volume ascites, mesenteric and omental edema and anasarca  Findings may be sequela of fluid overload or third spacing among other etiologies  New partially loculated fluid collection in the post pancreatectomy bed measures approximately 9 5 x 2 5 x 2 8 cm  New diffuse bladder wall thickening with tiny gas droplets in the lumen of the bladder likely due to under distention and recent catheterization  Suggest correlation with UA to exclude cystitis  Stable 1 2 cm short axis anterior para-aortic low-density lymph node  The study was marked in [facility] for immediate notification   Workstation performed: [id number]     XR chest portable ICU    Result Date: 8/9/2022  Narrative: CHEST INDICATION:   APRV Vent Mode, eval for ETT migration  COMPARISON:  8/7/2022  EXAM PERFORMED/VIEWS:  XR CHEST PORTABLE ICU FINDINGS: ET tube is approximately 6 cm above the barbara  Lines and tubes are otherwise unchanged in position  Right-sided Mediport is noted  Stable cardiomegaly  Pulmonary edema is mildly improved  Stable left effusion  Osseous structures appear within normal limits for patient age  Impression: Mildly improved pulmonary edema  Workstation performed: [id number]     XR chest portable ICU    Result Date: 8/7/2022  Narrative: CHEST INDICATION:   desaturation  COMPARISON:  8/6/2022 EXAM PERFORMED/VIEWS:  XR CHEST PORTABLE ICU FINDINGS:  Endotracheal tube 4 3 cm above the barbara  Nasogastric tube tip excluded from the film but clearly distal to the GE junction  Stable right chest tube  Left central line tip overlies the cavoatrial junction  Mild cardiomegaly appears stable  Right greater than left Central opacification consistent with pulmonary edema pattern with left basilar effusion  Osseous structures appear within normal limits for patient age  Impression: Tubes and lines as above without pneumothorax  Pulmonary edema pattern with left basilar pleural effusion  Workstation performed: [id number]     XR chest portable ICU    Result Date: 8/6/2022  Narrative: CHEST INDICATION:   r/o pneumothorax  COMPARISON:  8/5 EXAM PERFORMED/VIEWS:  XR CHEST PORTABLE ICU FINDINGS:  Port-A-Cath, ET tube and NG tube are unchanged  Left-sided central line difficult to visualize but appears to extend to the cavoatrial junction  Stable cardiomegaly  Central pulmonary vascular congestion with bilateral opacities, right greater than left  Suspected left basilar effusion  Impression: Stable airspace disease suggestive of pulmonary edema  Suspected left basilar effusion   Workstation performed: [id number]     XR chest portable ICU    Result Date: 8/5/2022  Narrative: CHEST INDICATION:   Hypoxia, increased WOB  COMPARISON:  Chest radiograph and CT August 2, 2022 EXAM PERFORMED/VIEWS:  XR CHEST PORTABLE ICU FINDINGS:  Tip of right chest wall PowerPort projects over the superior cavoatrial junction  Tip of left upper extremity PICC projects over the superior cavoatrial junction  Most visualized distal component of the enteric tube projects over the diaphragm; tip is excluded from field-of-view  Heart shadow is enlarged but unchanged from prior exam  Increased bilateral opacities, predominantly within the mid and lower lung zones  No pneumothorax  Impression: Increased bilateral opacities, predominantly in the mid and lower lung fields, likely represents worsening edema with effusions  Concomitant infection is to be excluded on clinical grounds  Workstation performed: [id number]     XR chest portable ICU    Result Date: 8/4/2022  Narrative: CHEST INDICATION:   Keofed insertion series  COMPARISON:  Chest radiograph August 1, 2022  Correlation with chest CT August 1, 2022 and subsequently performed abdominal radiograph  EXAM PERFORMED/VIEWS:  XR CHEST PORTABLE ICU, XR CHEST 1 VIEW FINDINGS:  2 cm radiograph from August 2, 2022 (12:56 and 13:00) are dictated in a combined report  On the final 2 radiographs, the enteric tube is curved with tip facing upwards and projecting over the expected position of the lower thoracic esophagus  Right chest wall PowerPort with catheter tip projecting over the right atrium  Cardiomediastinal silhouette is magnified by technique  Bilateral pleural effusions with passive atelectasis  No pneumothorax  Impression: Malpositioned enteric tube  However, correlation with subsequently performed radiograph of the abdomen demonstrates that the tube had been repositioned prior to dictation of the study  Bilateral pleural effusions   Workstation performed: [id number]     IR drainage tube placement    Result Date: 8/3/2022  Narrative: CT scan guided abscess drainage This examination, like all CT scans performed in the [facility], was performed utilizing techniques to minimize radiation dose exposure, including the use of iterative reconstruction and automated exposure control  History: 25-year-old male status post pancreatectomy, now with abdominal fluid collection and sepsis  Images: Multiple Sedation: Moderate conscious sedation was utilized under my direct supervision administered by trained independent provider  A total of 30 minutes sedation time was utilized  I was present at the initial dose of sedation medication  Technique: The patient was brought to the CT scanner and placed supine on the table  After axial images were obtained through the abdomen, an area of the skin was then marked, prepped, and draped in usual sterile fashion  All elements of maximal sterile barrier technique were followed (cap, mask, sterile gown, sterile gloves, large sterile sheet, hand hygiene, and 2% chlorhexidine for cutaneous antisepsis)  Lidocaine was administered to the skin and a small skin incision was made  A 18-gauge needle was advanced into the collection under CT-scan guidance  A Florian wire was coiled within the lesion, and after tract dilatation, a 10 [place] all-purpose drainage catheter was advanced and the loop formed within the collection  Approximately 15 cc of cloudy thick fluid was aspirated  A sample sent to lab for further analysis  The catheter was sutured in place, sterilely dressed, and connected to bulb suction  The patient tolerated the procedure well and suffered no complications  Impression: Impression: Successful placement of a 10 Surinamese all-purpose drainage catheter into the left upper abdominal fluid collection   Workstation performed: [id number]      bedside procedure    Result Date: 8/5/2022  Narrative: 1 2 [id number] 2 323 [id number] 2    IR PICC line placement double lumen    Result Date: 8/3/2022  Narrative: Examination: PIC line placement History: Need central venous access,  TPN  Levophed  Fluoroscopy time: 0 3 minutes Technique: The patient was informed of the name of the procedure  The patient was informed of the nature of the disease or condition  The patient was informed of the nature of the procedure  The patient was informed of the likelihood of success  The patient was informed of the risks, benefits and potential complications of the procedure  The alternatives to the procedure, and their complications were explained  Informed consent was signed  The patient was identified verbally, and by wrist band " Time out" was performed  The left upper arm was prepped and draped in usual sterile fashion  All elements of maximal sterile barrier technique, cap and mask and sterile gown and sterile gloves and sterile full-body drape and hand hygiene and 2% chlorhexidine for cutaneous antisepsis  Sterile ultrasound technique with sterile gel and sterile probe covers was also utilized  Lidocaine was given as local anesthesia  Using ultrasound guidance the left cephalic vein was cannulated using a modified, singlewall, Seldinger technique  The vein was evaluated as a potential access site  The vein was patent and free of thrombus  Static images of real-time needle entry into the vessel were obtained  A wire was advanced  A peel-away sheath was placed over the wire  A 5 Tajik power PIC line was advanced via the peel-away sheath  The line was placed, using fluoroscopic guidance, so that the tip lies within the mid superior vena cava  The line was secured in place and sterilely dressed  The patient tolerated the procedure well  There were no immediate complications or complaints  The line is available for immediate use  Findings: No pneumothorax is seen  Line position is appropriate  Impression: Impression: Technically successful placement of line   Workstation performed: [id number]     Echo complete w/ contrast if indicated    Result Date: 7/27/2022  Narrative: Purvi Peters  Left Ventricle: Left ventricle is not well visualized  Left ventricular cavity size is mildly dilated  Wall thickness is normal  The left ventricular ejection fraction is 45%  Systolic function is mildly reduced  There is mild global hypokinesis    Right Ventricle: Right ventricle is not well visualized  Right ventricular cavity size is mildly dilated  Systolic function is mildly reduced    Left Atrium: The atrium is moderately dilated    Right Atrium: The atrium is moderately dilated    Aorta: The aortic root is mildly dilated  Echo follow up/limited w/ contrast if indicated    Result Date: 8/1/2022  Narrative: Purvi Peters  Left Ventricle: Left ventricular cavity size is normal  Wall thickness is normal  The left ventricular ejection fraction is 60% by visual estimation  Systolic function is normal  Although no diagnostic regional wall motion abnormality was identified, this possibility cannot be completely excluded on the basis of this study  Very technically difficult study with poor acoustic windows  I have personally reviewed labs, imaging studies, and pertinent reports  This note has been generated by voice recognition software system  Therefore, there may be spelling, grammar, and or syntax errors  Please contact if questions arise

## 2022-08-10 NOTE — PROGRESS NOTES
NEPHROLOGY CVVH PROCEDURE NOTE    Seen and examined on CVVH  Remains sedated/intubated  Remains on combination of Levophed plus vasopressin for blood pressure support  Tolerating -100 cc/hour    QB: 300  Dialysate: 4 K / 3 Calcium  Ultrafiltration: 100 cc/hour  Filtration Fraction:21 %  Effluent:25 cc/kg/hour  Treatment Day: 5  Anticoagulation: -    Physical Exam:    /60   Pulse 90   Temp 98 24 °F (36 8 °C)   Resp (!) 23   Ht 6' (1 829 m)   Wt (!) 143 kg (315 lb 7 7 oz)   SpO2 93%   BMI 42 79 kg/m²     General:  Remains intubated/sedated  CVS:  Irregular  Lungs:  Coarse, decreased at bases  Abdomen:  Obese, soft, nontender  Access:  Right IJ temporary dialysis catheter  Extremities:  Significant bilateral lower extremity    Assessment:  1  Acute kidney injury most likely secondary to ischemic/septic ATN plus possible component of contrast associated nephropathy  2  Sepsis/shock/hypotension, wean hemodynamic support as tolerated  3  Strep bacteremia/abdominal abscess, antibiotic treatment as per Infectious Disease  4  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

## 2022-08-10 NOTE — PHYSICAL THERAPY NOTE
Physical Therapy Cancellation Note           08/10/22 0755   PT Last Visit   PT Visit Date 08/10/22   Note Type   Note type Cancelled Session   Cancel Reasons Intubated/sedated   Additional Comments Pt remains intubated and sedated, not appropriate for therapy at this time  PT to continue to follow and see pt as appropriate and able       Sheila Marks, PT, DPT

## 2022-08-10 NOTE — PROGRESS NOTES
8/10/2022 10:28 AM -  Abhay Montero's chart and case were reviewed by Ana Payton  Mode of review included electronic chart check  Per review, symptoms remain controlled on current regimen and no changes are made at this time  Please continue the regimen in place, and review our last note for details  For dispo plan, please review Case Management notes  Palliative care will return on 8/11, for assessment  For urgent issues or any questions/concerns, please notify on-call provider via [employer]  You may also call our answering service 24/7 at   [facility]  Palliative and Supportive [facility]  Clinic/Answering Service: [phone]  You can find me on [employer]!

## 2022-08-10 NOTE — OCCUPATIONAL THERAPY NOTE
Occupational Therapy cx        Patient Name: Jacy Pennington  HMIYG'Q Date: 8/10/2022       08/10/22 1684   Note Type   Note Type Cancelled Session   Cancel Reasons Intubated/sedated  (pt intubated/sedated at this time   Will hold and address as clinical course allows )       Elsa Espinoza, BARBARA, OTR/L

## 2022-08-10 NOTE — ANESTHESIA POSTPROCEDURE EVALUATION
Post-Op Assessment Note    CV Status:  Unstable  Pain Score: 0    Pain management: adequate     Mental Status:  Alert and awake   Hydration Status:  Euvolemic   PONV Controlled:  Controlled   Airway Patency:  Patent  Airway: intubated      Post Op Vitals Reviewed: Yes      Staff: Anesthesiologist     Post-op block assessment: no complicationsReason for prolonged intubation > 24 hours:  UnstableReason for prolonged intubation > 48 hours:  Unstable      No complications documented      BP     Temp      Pulse     Resp      SpO2

## 2022-08-10 NOTE — PROGRESS NOTES
Daily Progress Note - Critical Care   Min Chirinos 67 y o  male MRN: [id number]  Unit/Bed#: [facility] 515-01 Encounter: [id number]         ----------------------------------------------------------------------------------------  HPI/24hr events: 67 YOM s/p compl pancreatectomy w/ sims anastomosis, extensive SARAH, reduction of internal hernia, cholecystectomy 7/26 Returned to ICU 8/1 with hypotension, worsening AHRF  With IR drain placement 8/3 into 9x2x2 cm fluid collection within pancreatectomy bed  With worsening AMS and shock 8/54 requiring reintubation and HD cath placement for initiation of CVVH  24hr events: Remains on CVVH, tolerating 100cc of volume removal  Patient remained on APRV 8/9 however with increasing vent desynchrony overnight requiring increasing sedation  Patient trialed on 10/6 PSV and tolerating well  Continued on Zosyn and Micafungin given Anginosus, Candida, Klebsiella, GCS, growth   Yesterday drain cultures positive for MDR-Citrobacter, klebsiella and yeast  ID continuing to follow    ---------------------------------------------------------------------------------------  SUBJECTIVE  Remains intubated    Review of Systems   Unable to perform ROS: Intubated     Review of systems was unable to be performed secondary to ETT/Intubation  ---------------------------------------------------------------------------------------  Assessment and Plan:    Neuro:    Diagnosis: Acute Metabolic Encephalopathy  o Plan: Following commands yesterday, appears to be improving  o In setting of critical illness, septic shock  o Delierium precautions  o CAM ICU  o Sleep/wake cycle   Diagnosis: Analgesia/Sedation  o Plan: S/p Paralysis with nimbex, d/c'd  o Currently on Fentanyl gtt at 100, increased overnight  o Precedex gtt at 1 2  o PRN dilaudid added given apparent tolerance to fentanyl  o Fentanyl Prn (8 doses/24hrs)      CV:    Diagnosis: Septic Shock  o Plan: Levophed held throughout the day yesterday, being restarted now at 1 given hypotension likely 2/2 increasing sedation d/t vent desynchrony  o IV steroids stopped yesterday  o Currently with vasopressin at 0 04, with hypotension with SBPs to 70s upon discontinuation  o Consider initiating midodrine today  o Wean vasopressin, levophed to maintain MAP >65  o F/u ID plan as below    Diagnosis: Atrial fibrillation  o Plan: With amiodarone infusion at 0 5  o Continue with IV amio given concern for PO absorbtion  o Continue to hold [facility] given thrombocytopemoa  o 8/1 ECHO EF 60% from 45%, no RV dysfunction  o Holding home lasix in setting of shock, CVVH    Pulm:   Diagnosis: Acute Hypoxic Respiratory Failure   o Plan: Reintubated 8/5  o S/p Veletri  o Previously on APRV 15/phigh19/plow5/50%  o With ventilator desynchrony overnight requiring increasing sedation  o Transitioned to PSV, Currently on 8/6/50% with adequate tidal volumes  o Continue to wean PS in anticipation of extubation  o CXR 8/10- subjectively worsening bilateral opacities with moderate vascular congestion  o Continue volume removal with CVVH  o VAP Bundle, Pulm Toilet   Diagnosis: ARDS  o Plan: Vent requirments improving as above  o PF ratio this   o Continue mechanical ventilation pending successful wean      GI:    Diagnosis: IPMN, s/p completion pancreatectomy, reduction of internal hernia, cholecystectomy 7/26  ? Plan: 8/1 CT - new partially loculated fluid collection in post-pancreatectomy bed  ? 8/2 CT - stable fluid collection in pancreatectomy bed, no evidence of enteric contrast extravasation from staple line, stable pneumoperitoneum  ? 8/3 IR drain in L anterior collection   § Drain cultures positive for MDR Citrobacter, Klebsiella, Yeast  § Existing drain cultures positive for S  Anginosus, Candida, Klebsiella, GCS  § Drain 1 100cc/24hrs  § Drain 2 52 cc/24hrs  § Continue Zosyn and [place] as below  § ID following  ?  Increasing abdominal distension  § Stable from prior, no concern for abdominal compartment syndrome at this time  ? Restarted trickle TF yesterday at rate of 10, residual obtained of 560mL, stopped given concern for absorption  ? Continue TPN  ? Will need Creon when taking PO again   · Stress ulcer PPX: Pantoprazole IV  · Bowel regimen: senna/colace and miralax added Dulcolax suppository daily  ? Last BM: 7/31    :    Diagnosis: TERESE with Oliguria  o Plan: Baseline Cr of 0 8  o BUN/Cr 16/8  o Currently removing 100cc of volume per hour  o I- 3 375L  o O- 5 954L  o -2,578 cc  o /24hrs  o Nephrology following, adjusted blood flow rate to 300 given frequent CVVH filter clotting  o Unable to start prefilter heparin given thrombocytopenia, Filters lost overnight: 2   Diagnosis: Hanna cath present  o Plan: Strict I and O  o BID hanna care      F/E/N:    F- No maintenance fluids   E- Repleted per CVVH protocol, Phos   N- TPN via PICC      Heme/Onc:    Diagnosis: Anemia in setting of critical illness, CRRT filter loss  o Plan: Hgb currently 7 7  o S/p 1u PRBC 8/9  o Continue to trend CBC  o Transfuse as needed for hgb <7  o DVT ppx- AC on holding given thrombocytopenia, SCDs   Diagnosis: Thrombocytopenia  o Plan: PLT currently 20 from 32 yesterday  o 2/2 Sepsis vs Mechanical d/t CVVH  o Hematology following  o Transfuse as needed for PLTS less than 20, continue to trend      Endo:    Diagnosis: DMII  o Plan: Continue insulin gtt- 76u / 24hrs  o Endocrine following  o Maintain -180  ID:    Diagnosis: Septic Shock d/t concern for possible intra abdominal abcess  o Plan: ID following  o Vasopressor requirments stable as above  o Currently on Micafungin, Zosyn per ID  o Drain cultures positive for MDRO  o Previous Drain positive for: S   Anginosus,   § BC Blood cx - 1/2 streptococcus anginosus  § Blood cx - 1/2 negative x  5 days  § Blood cx - 2/2 negative x 72 hrs   o Trend WBC and Fever  o Appreciate ID consult      MSK/Skin:    Diagnosis: Pressure Ulcer PPX  o Plan: Q2H turning and repositioning  o Reengage PT OT once appropriate    Patient appropriate for transfer out of the ICU today?: No  Disposition: Continue Critical Care   Code Status: Level 1 - Full Code  ---------------------------------------------------------------------------------------  ICU CORE MEASURES    Prophylaxis   VTE Pharmacologic Prophylaxis: Pharmacologic VTE Prophylaxis contraindicated due to Thrombocytopenia  VTE Mechanical Prophylaxis: sequential compression device  Stress Ulcer Prophylaxis: Pantoprazole IV     ABCDE Protocol (if indicated)  Plan to perform spontaneous awakening trial today? Yes  Plan to perform spontaneous breathing trial today? Yes  Obvious barriers to extubation? Mentation, high PEEP    Invasive Devices Review  Invasive Devices  Report    Peripherally Inserted Central Catheter Line  Duration           PICC Line 08/03/22 6 days          Central Venous Catheter Line  Duration           Port A Cath 03/30/22 Right Subclavian 132 days          Peripheral Intravenous Line  Duration           Peripheral IV 08/09/22 Dorsal (posterior); Right Forearm 1 day          Arterial Line  Duration           Arterial Line 08/06/22 Radial 4 days          Hemodialysis Catheter  Duration           HD Temporary Double Catheter 4 days          Drain  Duration           Closed/Suction Drain Right RLQ Bulb 19 Fr  14 days    Urethral Catheter Temperature probe 16 Fr  8 days    NG/OG/Enteral Tube Enteral Feeding Tube Right nare 7 days    Abscess Drain LUQ 6 days          Airway  Duration           ETT  Oral;Cuffed; Hi-Lo 8 mm 4 days              Can any invasive devices be discontinued today?  No  ---------------------------------------------------------------------------------------  OBJECTIVE    Vitals   Vitals:    08/10/22 0000 08/10/22 0222 08/10/22 0500 08/10/22 0542   BP: 94/55  107/56    BP Location:       Pulse: 61  87    Resp: 15  20    Temp: (!) 97 16 °F (36 2 °C)  99 5 °F (37 5 °C)    TempSrc:       SpO2: 96% 96% 95% 95%   Weight:       Height:         Temp (24hrs), Av 8 °F (36 6 °C), Min:96 98 °F (36 1 °C), Max:99 5 °F (37 5 °C)  Current: Temperature: 99 5 °F (37 5 °C)    Respiratory:  SpO2: SpO2: 96 %  Nasal Cannula O2 Flow Rate (L/min): 6 L/min    Invasive/non-invasive ventilation settings   Respiratory  Report   Lab Data (Last 4 hours)      08/10 0554            pH, Arterial       7 469             pCO2, Arterial       32 0             pO2, Arterial       101 2             HCO3, Arterial       22 7             Base Excess, Arterial       -0 7                  O2/Vent Data           Most Recent        T High (S) (sec)   2 9      T Low (S) (sec)   1      P High (cmH2O) (cm)   19      P Low (cmH2O) (cm)   5                  Physical Exam  Vitals and nursing note reviewed  Constitutional:       Interventions: He is sedated and intubated  HENT:      Head: Normocephalic  Right Ear: External ear normal       Left Ear: External ear normal       Nose: Nose normal       Mouth/Throat:      Mouth: Mucous membranes are dry  Pharynx: Oropharynx is clear  Eyes:      Pupils: Pupils are equal, round, and reactive to light  Cardiovascular:      Rate and Rhythm: Normal rate  Rhythm irregular  Pulses:           Radial pulses are 2+ on the right side and 2+ on the left side  Dorsalis pedis pulses are 1+ on the right side and 1+ on the left side  Heart sounds: S1 normal and S2 normal    Pulmonary:      Effort: Pulmonary effort is normal  He is intubated  Abdominal:      General: Abdomen is flat  Bowel sounds are normal  There is distension  Palpations: Abdomen is soft  Tenderness: There is no abdominal tenderness  Musculoskeletal:         General: Normal range of motion  Right lower le+ Edema present  Left lower le+ Edema present  Skin:     General: Skin is warm and dry  Capillary Refill: Capillary refill takes less than 2 seconds     Neurological:      GCS: GCS eye subscore is 4  GCS verbal subscore is 1  GCS motor subscore is 5  Comments: GCS 10T       Laboratory and Diagnostics:  Results from last 7 days   Lab Units 08/09/22  0418 08/08/22  1548 08/08/22  1423 08/08/22  0548 08/07/22  6072 08/07/22  0802 08/07/22  0618 08/06/22  2356 08/06/22  0600 08/06/22  0129 08/05/22  0436 08/04/22  0403   WBC Thousand/uL 18 40* 24 39*  --  27 57* 53 13* 53 82* 48 85*  --  12 80* 4 62   < > 14 91*   HEMOGLOBIN g/dL 6 6* 7 0*  --  7 2* 8 3* 8 2* 8 1*  --  8 6* 8 2*   < > 7 4*   I STAT HEMOGLOBIN g/dl  --   --   --   --   --   --   --  7 8*  --   --   --   --    HEMATOCRIT % 21 6* 21 9*  --  22 6* 25 6* 25 5* 25 7*  --  27 9* 26 6*   < > 22 9*   HEMATOCRIT, ISTAT %  --   --   --   --   --   --   --  23*  --   --   --   --    PLATELETS Thousands/uL 27* 39* 35* 14* 20* 22* 21*  --  60*  --   --  112*   NEUTROS PCT % 89*  --   --   --  91*  --   --   --   --   --   --  87*   BANDS PCT %  --   --   --  10*  --   --  17*  --   --   --   --   --    MONOS PCT % 4  --   --   --  1*  --   --   --   --   --   --  6   MONO PCT %  --   --   --  3*  --   --  0*  --   --  1*  --   --     < > = values in this interval not displayed       Results from last 7 days   Lab Units 08/09/22  2355 08/09/22  1814 08/09/22  1214 08/09/22  0553 08/09/22  0418 08/09/22  0000 08/08/22  1757 08/08/22  1156 08/08/22  0548 08/07/22  1202 08/07/22  0922 08/06/22  1220 08/06/22  0600 08/05/22  1155 08/05/22  0436 08/04/22  0403   SODIUM mmol/L 136 136 136 138  --  138 139 138 139   < >  --    < > 141   < > 138 138   POTASSIUM mmol/L 3 8 4 0 4 1 4 0  --  4 1 4 8 4 4 4 7   < >  --    < > 3 9   < > 3 8 3 9   CHLORIDE mmol/L 106 108 107 107  --  107 110* 109* 110*   < >  --    < > 111*   < > 103 105   CO2 mmol/L 27 27 26 25  --  26 24 24 26   < >  --    < > 22   < > 22 25   CO2, I-STAT   --   --   --   --   --   --   --   --   --   --   --    < >  --   --   --   --    ANION GAP mmol/L 3* 1* 3* 6  --  5 5 5 3*   < >  --    < > 8   < > 13 8   BUN mg/dL 13 14 14 15  --  17 17 16 18   < >  --    < > 34*   < > 41* 32*   CREATININE mg/dL 0 64 0 81 0 79 0 86  --  0 90 0 98 0 97 0 90   < >  --    < > 2 60*   < > 2 80* 1 98*   CALCIUM mg/dL 8 4 8 2* 8 2* 8 3  --  8 5 8 1* 8 0* 7 8*   < >  --    < > 7 9*   < > 8 0* 7 8*   GLUCOSE RANDOM mg/dL 92 125 117 140  --  122 123 126 129   < >  --    < > 104   < > 410* 157*   ALT U/L  --   --   --   --  29  --   --   --  26  --  26  --  25  --  25 26   AST U/L  --   --   --   --  57*  --   --   --  56*  --  64*  --  43  --  32 35   ALK PHOS U/L  --   --   --   --  221*  --   --   --  155*  --  142*  --  191*  --  116 113   ALBUMIN g/dL  --   --   --   --  1 6*  --   --   --  1 6*  --  1 8*  --  2 0*  --  2 3* 2 1*   TOTAL BILIRUBIN mg/dL  --   --   --   --  3 87*  --   --   --  3 98*  --  4 73*  --  4 03*  --  3 74* 3 31*    < > = values in this interval not displayed       Results from last 7 days   Lab Units 08/09/22  2355 08/09/22  1814 08/09/22  1214 08/09/22  0553 08/09/22  0000 08/08/22  1757 08/08/22  1156   MAGNESIUM mg/dL 1 8 1 9 2 1 1 9 1 8 2 0 2 0   PHOSPHORUS mg/dL 2 0* 2 2* 2 2* 2 4 1 7* 2 0* 2 0*      Results from last 7 days   Lab Units 08/08/22  1423 08/03/22  1540   INR  1 30* 1 61*   PTT seconds 37  --           Results from last 7 days   Lab Units 08/07/22  0922 08/07/22  0617 08/06/22  2041 08/06/22  1659 08/06/22  1622 08/06/22  1333 08/06/22  0600   LACTIC ACID mmol/L 2 4* 2 5* 3 3* 4 1* 3 6* 4 1* 5 7*     ABG:  Results from last 7 days   Lab Units 08/10/22  0554 08/09/22  2355   PH ART  7 469* 7 417   PCO2 ART mm Hg 32 0* 41 5   PO2 ART mm Hg 101 2 82 7   HCO3 ART mmol/L 22 7 26 1   BASE EXC ART mmol/L -0 7 1 5   ABG SOURCE   --  Line, Arterial     VBG:  Results from last 7 days   Lab Units 08/09/22  2355   ABG SOURCE  Line, Arterial     Micro  Results from last 7 days   Lab Units 08/03/22  1631   GRAM STAIN RESULT  4+ Polys*  2+ Gram positive rods*  2+ Yeast*   BODY FLUID CULTURE, STERILE  2+ Growth of Citrobacter freundii*  2+ Growth of Klebsiella pneumoniae*  Growth in Broth culture only Candida glabrata*  Growth in Broth culture only Candida albicans*       EKG: Atrial fibrillation rate 85  Imaging:   CXR 8/10 subjectively worsening bilateral opacities with moderate vascular congestion   I have personally reviewed pertinent reports  Intake and Output  I/O       08/08 0701  08/09 0700 08/09 0701  08/10 0700    P  O  0 0    I V  (mL/kg) 2982 (21) 1758 9 (12 4)    Blood 350     NG/ 90    IV Piggyback 1210 350    TPN 1632 1134 8    Feedings 0 42    Total Intake(mL/kg) 6384 (45) 3375 7 (23 8)    Urine (mL/kg/hr) 515 (0 2) 850 (0 2)    Emesis/NG output 270 550    Drains 555 366    Other 7000 4188    Stool 0 0    Total Output 8340 5954    Net -7976 -2328 3              UOP: 40 ml/hr     Height and Weights   Height: 6' (182 9 cm)  IBW (Ideal Body Weight): 77 6 kg  Body mass index is 42 43 kg/m²  Weight (last 2 days)     Date/Time Weight    08/09/22 0547 142 (312 83)    08/08/22 0536 141 (310 19)        Nutrition       Diet Orders   (From admission, onward)             Start     Ordered    08/04/22 0925  Diet Enteral/Parenteral; Tube Feeding with Oral Diet; Vital AF 1 2; Continuous; 10; Surgical; NPO  Diet effective now        References:    Nutrtion Support Algorithm Enteral vs  Parenteral   Question Answer Comment   Diet Type Enteral/Parenteral    Enteral/Parenteral Tube Feeding with Oral Diet    Tube Feeding Formula: Vital AF 1 2    Bolus/Cyclic/Continuous Continuous    Tube Feeding Goal Rate (mL/hr): 10    Diet Type Surgical    Surgical NPO    RD to adjust diet per protocol?  No        08/04/22 0926              Active Medications  Scheduled Meds:  Current Facility-Administered Medications   Medication Dose Route Frequency Provider Last Rate    acetaminophen  975 mg Per NG Tube Q8H Corrinesa Matiasatormuna, DO      acetylcysteine  3 mL Nebulization Q8H Elidia Owen ALYSA      Adult TPN (CUSTOM BASE/CUSTOM ELECTROLYTE)   Intravenous Continuous TPN MIRNA Grace 71 5 mL/hr at 08/09/22 2110    amiodarone  0 5 mg/min Intravenous Continuous MIRNA Lofton Stopped (08/09/22 2215)    bisacodyl  10 mg Rectal Daily Barbi Miner      chlorhexidine  15 mL Mouth/Throat Q12H [address]      dexmedetomidine  0 1-1 2 mcg/kg/hr Intravenous Titrated Yasmine Eid PA-C 1 mcg/kg/hr (08/10/22 0547)    fentaNYL  100 mcg/hr Intravenous Continuous Yasmine Eid PA-C 100 mcg/hr (08/10/22 0028)    HYDROmorphone  0 5 mg Intravenous Q3H PRN Yasmine Eid PA-C      insulin regular (HumuLIN R,NovoLIN R) infusion  0 3-21 Units/hr Intravenous Titrated Yasmine Eid PA-C 3 Units/hr (08/10/22 0421)    iohexol  30 mL Oral 90 min pre-procedure MIRNA Lofton      ipratropium  0 5 mg Nebulization Q6H Christ Fu MD      levalbuterol  1 25 mg Nebulization Q6H Christ Fu MD      methocarbamol  500 mg Oral Q6H  [facility]      micafungin  100 mg Intravenous Q24H Omid Frey MD Stopped (08/09/22 1832)    norepinephrine  1-30 mcg/min Intravenous Titrated Osvaldo Barriga PA-C Stopped (08/09/22 1052)    NxStage K 4/Ca 3  20,000 mL Dialysis Continuous [facility] 0 mL (08/08/22 0715)    ondansetron  4 mg Intravenous Q6H PRN Yasmine Eid PA-C      pantoprazole  40 mg Intravenous Q12H [address] MIRNA Grace      phenol  1 spray Mouth/Throat Q2H PRN Medardo Winchester PA-C      piperacillin-tazobactam  3 375 g Intravenous Q8H Omid Frey MD 3 375 g (08/10/22 0423)    sodium chloride  4 mL Nebulization TID Yasmine Eid PA-C      vasopressin (PITRESSIN) in 0 9 % sodium chloride 100 mL  0 04 Units/min Intravenous Continuous Thor MD David 0 04 Units/min (08/10/22 0053)     Continuous Infusions:  Adult TPN (CUSTOM BASE/CUSTOM ELECTROLYTE), , Last Rate: 71 5 mL/hr at 08/09/22 2110  amiodarone, 0 5 mg/min, Last Rate: Stopped (08/09/22 2215)  dexmedetomidine, 0 1-1 2 mcg/kg/hr, Last Rate: 1 mcg/kg/hr (08/10/22 8847)  fentaNYL, 100 mcg/hr, Last Rate: 100 mcg/hr (08/10/22 0028)  insulin regular (HumuLIN R,NovoLIN R) infusion, 0 3-21 Units/hr, Last Rate: 3 Units/hr (08/10/22 0421)  norepinephrine, 1-30 mcg/min, Last Rate: Stopped (08/09/22 1052)  NxStage K 4/Ca 3, 20,000 mL, Last Rate: 0 mL (08/08/22 0715)  vasopressin (PITRESSIN) in 0 9 % sodium chloride 100 mL, 0 04 Units/min, Last Rate: 0 04 Units/min (08/10/22 0053)      PRN Meds:   HYDROmorphone, 0 5 mg, Q3H PRN  methocarbamol, 500 mg, Q6H PRN  ondansetron, 4 mg, Q6H PRN  phenol, 1 spray, Q2H PRN        Allergies   No Known Allergies  ---------------------------------------------------------------------------------------  Advance Directive and Living Will:      Power of :    POLST:    ---------------------------------------------------------------------------------------  Care Time Delivered:   No Critical Care time spent     MIRNA Oliver    Portions of the record may have been created with voice recognition software  Occasional wrong word or "sound a like" substitutions may have occurred due to the inherent limitations of voice recognition software    Read the chart carefully and recognize, using context, where substitutions have occurred

## 2022-08-10 NOTE — PROGRESS NOTES
Progress Note - Infectious Disease   Carson Keith 67 y o  male MRN: [id number]  Unit/Bed#: [facility] 515-01 Encounter: [id number]      Impression/Recommendations:  1   Septic shock   Evolving 7/31:  Fever, HR, refractory hypotension   Due to #2/3   No other appreciable source   ROS limited by lethargy   Exam otherwise benign   UA, LFTs, CT chest negative   Fevers, WBC initially improved with antibiotics, drainage, but then clinically worse in setting of aspiration during intubation, progressive renal dysfunction, volume overload   Slowly improving again but remains critically ill  Rec:  · Continue antibiotics as below  · Follow temperatures closely  · Check CBC in AM  · Supportive care as per the primary service     2   Strep anginosus bacteremia   Low-grade with 1 of 2 positive   Due to #3   No other appreciable source   Has Portacath but low suspicion for infection   Repeat blood cultures 8/2, TTE negative     Rec:  · Continue antibiotics as below  · Follow up final repeat blood cultures  · Will need 2 weeks IV antibiotics     3   LUQ abscess   In setting of #5   S  Anginosus, Candida, Klebsiella, Group C Strep from exisiting surgical drain   Status post IR-guided drain 8/3 yielding cloudy, thick fluid   Drain cultures with MDR- Citrobacter, Klebsiella, Candida glabrata/albicans   No radiographic evidence of leak from distal gastrectomy staple line  Rec:  · Continue Zosyn for now  · Continue micafungin for now (drug-drug interaction between Fluconazole and Amiodarone given potential for QTc prolongation)  · Follow drain outputs closely     4   Acute hypoxic respiratory failure   Suspect initially due to volume overload, encephalopathy   No clinical or radiographic evidence of neumonia   Status post intubation 8/5   Concern for aspiration, ARDS, on maximal ventilatory support   Bronch 8/6 negative for secretions or purulence    Rec:  · Volume management per Nephrology  · Supportive care per [facility]     5   Intraductal papillary mucinous neoplasm   Status post distal pancreatectomy 2019, open subtotal pancreatectomy 2022   Now admitted for completion pancreatectomy with sims anastomosis, extensive SARAH, reduction of densely adherent internal hernia,cholecystectomy 22   Postoperative course complicated by above      6   TERESE   Likely multifactorial due partly due to infection, shock as above  Rec:  · CVVHD and volume management per Nephrology with close follow-up ongoing  · Dose adjust antibiotics for CRRT  · Recheck BMP in AM     7   Afib with RVR   On Amiodarone     8   Cirrhosis   In setting of chronic alcohol use   Recent bump in LFTs largely cholestatic and likely from TPN, cholestasis in setting of infection as above   GI follow-up ongoing      9   DM   Recent A1c 8 7      10   Acute on chronic thrombocytopenia   Likely multifactorial due to infection, acute illness, medications in setting of cirrhosis      Antibiotics:  Zosyn #5  Antibiotics #10  Micafungin #7    Subjective:  Patient seen on AM rounds  Unable to provide ROS due to intubation and sedation  24 Hour Events:  Remains in CRRT with ongoing volume removal   Now on PSV  More awake on vent, following commands per nursing  High residuals attributed to ileus  Noted to be in DIC, receiving PRBC and platelet transfusions      Objective:  Vitals:  Temp:  [96 98 °F (36 1 °C)-99 5 °F (37 5 °C)] 98 24 °F (36 8 °C)  HR:  [59-92] 90  Resp:  [15-25] 23  BP: ()/(48-76) 116/60  SpO2:  [92 %-100 %] 93 %  Temp (24hrs), Av 1 °F (36 7 °C), Min:96 98 °F (36 1 °C), Max:99 5 °F (37 5 °C)  Current: Temperature: 98 24 °F (36 8 °C)    Physical Exam:   General:  No acute distress  Eyes:  Normal lids and conjunctivae  ENT:  Normal external ears and nose  Neck:  Neck symmetric with midline trachea  Pulmonary:  Ventilated respiratory effort without accessory muscle use  Cardiovascular:  LUQ STU with more purulent fluid, midline incision intact with staples  Musculoskeletal:  No digital clubbing or cyanosis  Skin:  No visible rashes; No palpable nodules  Neurologic:  Sensation grossly intact to light touch  Psychiatric:  Intubated and sedated, eyes partly opened    Lab Results:  I have personally reviewed pertinent labs  Results from last 7 days   Lab Units 08/10/22  0600 08/09/22  2355 08/09/22  1814 08/09/22  0553 08/09/22  0418 08/08/22  1156 08/08/22  0548 08/07/22  1202 08/07/22  0922 08/07/22  0029 08/06/22  2356   POTASSIUM mmol/L 4 0 3 8 4 0   < >  --    < > 4 7   < >  --    < >  --    CHLORIDE mmol/L 107 106 108   < >  --    < > 110*   < >  --    < >  --    CO2 mmol/L 24 27 27   < >  --    < > 26   < >  --    < >  --    CO2, I-STAT mmol/L  --   --   --   --   --   --   --   --   --   --  39*   BUN mg/dL 16 13 14   < >  --    < > 18   < >  --    < >  --    CREATININE mg/dL 0 80 0 64 0 81   < >  --    < > 0 90   < >  --    < >  --    EGFR ml/min/1 73sq m 88 97 88   < >  --    < > 84   < >  --    < >  --    GLUCOSE, ISTAT mg/dl  --   --   --   --   --   --   --   --   --   --  121   CALCIUM mg/dL 8 4 8 4 8 2*   < >  --    < > 7 8*   < >  --    < >  --    AST U/L  --   --   --   --  57*  --  56*  --  64*  --   --    ALT U/L  --   --   --   --  29  --  26  --  26  --   --    ALK PHOS U/L  --   --   --   --  221*  --  155*  --  142*  --   --     < > = values in this interval not displayed       Results from last 7 days   Lab Units 08/10/22  0600 08/09/22  0418 08/08/22  1548   WBC Thousand/uL 15 01* 18 40* 24 39*   HEMOGLOBIN g/dL 7 7* 6 6* 7 0*   PLATELETS Thousands/uL 20* 27* 39*     Results from last 7 days   Lab Units 08/03/22  1631   GRAM STAIN RESULT  4+ Polys*  2+ Gram positive rods*  2+ Yeast*   BODY FLUID CULTURE, STERILE  2+ Growth of Citrobacter freundii*  2+ Growth of Klebsiella pneumoniae*  Growth in Broth culture only Candida glabrata*  Growth in Broth culture only Candida albicans*       Imaging Studies:   I have personally reviewed pertinent imaging study reports and images in PACS  EKG, Pathology, and Other Studies:   I have personally reviewed pertinent reports

## 2022-08-10 NOTE — RESPIRATORY THERAPY NOTE
RT Ventilator Management Note  Jelena Mckinney 67 y o  male MRN: [id number]  Unit/Bed#: [facility] 443-49 Encounter: [id number]      Daily Screen         8/8/2022  0808 8/9/2022  0734          Patient safety screen outcome[de-identified] Failed Failed      Not Ready for Weaning due to[de-identified] Alternative forms of ventilation Alternative forms of ventilation; Underline problem not resolved                Physical Exam:   Assessment Type: Assess only  General Appearance: Sedated  Respiratory Pattern: Assisted  Chest Assessment: Chest expansion symmetrical  Bilateral Breath Sounds: Diminished, Clear  Cough: Unable to assess  Suction: (P) ET Tube  O2 Device: (P) Vent      Resp Comments: (P) Pt doing well on current vent settings, Pt had a oment of low tidal volume, it was brief  pt has not alarmed, Pt desat a bit so FiO2 increased to 50%  no other changes made at this time, will continue to monitor

## 2022-08-10 NOTE — PROGRESS NOTES
Progress Note - Surgical Oncology   Qing Burgess 67 y o  male MRN: [id number]  Unit/Bed#: [facility] 413-08 Encounter: [id number]    Assessment:  67 y  o  male who presented with MD-IPMN s/p  IPMN, s/p Lap distal pancreatectomy (03/2019-Quiros), open subtotal pancreatectomy (02/2022-Quiros), now status post completion pancreatectomy with sims anastomosis, extensive SARAH, reduction of internal hernia and cholecystectomy on 7/26, now s/p IR drain placement on 8/3  Now w/ ARDS     On spont 10/6 60%  Vaso, levo currently on hold  Dex/fent  Insulin drip  CVVH -100     Afebrile  ABG 7 47/32/101 2/22 7 base excess -0 7  Labs pending         RLQ  serosanguineous  LLQ IR drain 55 murky/slightly purulent  CVVH 5064  No stool recorded    Plan:  - wean vent as tolerated  - wean pressors as tolerated  - NPO/NGT  - PICC/TPN  - Dockery for accurate UOP monitoring  - RLQ, LUQ drains, monitor output and character  - nephro on board, continue with CVVH  - endocrinology on board, continue with insulin drip  - infectious disease on board for antibiotic/antifungal management, appreciate recommendations  - follow-up morning labs  - appreciate ICU care    Subjective/Objective   Subjective:   Agitated overnight, did have bite block placed, required increasing Precedex  Did not tolerate trickle tube feeds and required NGT to be placed back to low continuous suction  Objective:     Blood pressure (!) 100/48, pulse 89, temperature 99 32 °F (37 4 °C), resp  rate (!) 25, height 6' (1 829 m), weight (!) 142 kg (312 lb 13 3 oz), SpO2 96 %  ,Body mass index is 42 43 kg/m²        Intake/Output Summary (Last 24 hours) at 8/10/2022 0622  Last data filed at 8/10/2022 0600  Gross per 24 hour   Intake 4224 07 ml   Output 7149 ml   Net -2924 93 ml       Invasive Devices  Report    Peripherally Inserted Central Catheter Line  Duration           PICC Line 08/03/22 6 days          Central Venous Catheter Line  Duration           Port A Cath 03/30/22 Right Subclavian 132 days          Peripheral Intravenous Line  Duration           Peripheral IV 08/09/22 Dorsal (posterior); Right Forearm 1 day          Arterial Line  Duration           Arterial Line 08/06/22 Radial 4 days          Hemodialysis Catheter  Duration           HD Temporary Double Catheter 4 days          Drain  Duration           Closed/Suction Drain Right RLQ Bulb 19 Fr  14 days    Urethral Catheter Temperature probe 16 Fr  8 days    NG/OG/Enteral Tube Enteral Feeding Tube Right nare 7 days    Abscess Drain LUQ 6 days          Airway  Duration           ETT  Oral;Cuffed; Hi-Lo 8 mm 4 days                Physical Exam:  General:  Intubated, sedated, agitated  Skin: Warm, dry, anicteric  HEENT: Normocephalic, atraumatic  CV: RRR, no m/r/g  Pulm: CTA b/l, no inc WOB, spontaneous vent settings  Abd: Soft, ND/NT, drains as above, incisions clean dry and intact  MSK: Symmetric, no tenderness, no deformity    Lab, Imaging and other studies:  I have personally reviewed pertinent lab results    , CBC: No results found for: WBC, HGB, HCT, MCV, PLT, ADJUSTEDWBC, MCH, MCHC, RDW, MPV, NRBC, CMP:   Lab Results   Component Value Date    SODIUM 136 08/09/2022    K 3 8 08/09/2022     08/09/2022    CO2 27 08/09/2022    BUN 13 08/09/2022    CREATININE 0 64 08/09/2022    CALCIUM 8 4 08/09/2022    EGFR 97 08/09/2022     VTE Pharmacologic Prophylaxis: Sequential compression device (Venodyne)   VTE Mechanical Prophylaxis: sequential compression device

## 2022-08-11 NOTE — PROGRESS NOTES
Progress Note - Infectious Disease   Adrian Akins 67 y o  male MRN: [id number]  Unit/Bed#: [facility] 515-01 Encounter: [id number]      Impression/Recommendations:  1   Septic shock   Evolving 7/31:  Fever, HR, refractory hypotension   Due to #2/3   No other appreciable source   ROS limited by lethargy   Exam otherwise benign   UA, LFTs, CT chest negative   Fevers, WBC initially improved with antibiotics, drainage, but then clinically worse in setting of aspiration during intubation, progressive renal dysfunction, volume overload   Slowly improving again but remains critically ill  Rec:  · Continue antibiotics as below  · Follow temperatures closely  · Check CBC in AM  · Supportive care as per the primary service     2   Strep anginosus bacteremia   Low-grade with 1 of 2 positive   Due to #3   No other appreciable source   Has Portacath but low suspicion for infection   Repeat blood cultures 8/2, TTE negative     Rec:  · Continue antibiotics as below  · Follow up final repeat blood cultures  · Will need 2 weeks IV antibiotics     3   LUQ abscess   In setting of #5   S  Anginosus, Candida, Klebsiella, Group C Strep from exisiting surgical drain   Status post IR-guided drain 8/3 yielding cloudy, thick fluid   Drain cultures with MDR- Citrobacter, Klebsiella, Candida glabrata/albicans   No radiographic evidence of leak from distal gastrectomy staple line  Rec:  · Continue Zosyn for now  · Continue micafungin for now (drug-drug interaction between Fluconazole and Amiodarone given potential for QTc prolongation)  · Follow drain outputs closely     4   Acute hypoxic respiratory failure   Suspect initially due to volume overload, encephalopathy   No clinical or radiographic evidence of neumonia   Status post intubation 8/5   Concern for aspiration, ARDS, on maximal ventilatory support   Bronch 8/6 negative for secretions or purulence    Rec:  · Volume management per Nephrology  · Supportive care per [facility]     5   Intraductal papillary mucinous neoplasm   Status post distal pancreatectomy 2019, open subtotal pancreatectomy 2022   Now admitted for completion pancreatectomy with sims anastomosis, extensive SARAH, reduction of densely adherent internal hernia,cholecystectomy 22   Postoperative course complicated by above      6   TERESE   Likely multifactorial due partly due to infection, shock as above  Rec:  · CVVHD and volume management per Nephrology with close follow-up ongoing  · Dose adjust antibiotics for CRRT  · Recheck BMP in AM     7   Afib with RVR   On Amiodarone     8   Cirrhosis   In setting of chronic alcohol use   Recent bump in LFTs largely cholestatic and likely from TPN, cholestasis in setting of infection as above   GI follow-up ongoing      9   DM   Recent A1c 8 7      10   Acute on chronic thrombocytopenia   Likely multifactorial due to infection, acute illness, medications in setting of cirrhosis      Antibiotics:  Zosyn #6  Antibiotics #11  Micafungin #8    Subjective:  Patient seen on AM rounds  Unable to provide ROS due to intubation and sedation  24 Hour Events: On PSV 50% FiO2  Remains on 2 pressors, CVVHD  No documented fevers, chills, sweats, nausea, vomiting, or diarrhea  WBC trending donw      Objective:  Vitals:  Temp:  [97 5 °F (36 4 °C)-98 24 °F (36 8 °C)] 98 24 °F (36 8 °C)  HR:  [] 76  Resp:  [9-26] 13  BP: ()/(50-81) 115/53  SpO2:  [89 %-100 %] 97 %  Temp (24hrs), Av 6 °F (36 4 °C), Min:97 5 °F (36 4 °C), Max:98 24 °F (36 8 °C)  Current: Temperature: 98 24 °F (36 8 °C)    Physical Exam:   General:  No acute distress  Eyes:  Normal lids and conjunctivae  ENT:  Normal external ears and nose  Neck:  Neck symmetric with midline trachea  Pulmonary:  Ventilated respiratory effort without accessory muscle use  Cardiovascular:  Regular rate and rhythm; generalized edema but feet softer  Gastrointestinal:  Obese, soft  Musculoskeletal:  No digital clubbing or cyanosis  Skin:  No visible rashes; No palpable nodules  Neurologic:  Sensation grossly intact to light touch  Psychiatric:  Intubated and sedated    Lab Results:  I have personally reviewed pertinent labs  Results from last 7 days   Lab Units 08/11/22  0549 08/10/22  2347 08/10/22  1800 08/09/22  0553 08/09/22  0418 08/08/22  1156 08/08/22  0548 08/07/22  0029 08/06/22  2356   POTASSIUM mmol/L 4 0 4 2 3 9   < >  --    < > 4 7   < >  --    CHLORIDE mmol/L 108 107 108   < >  --    < > 110*   < >  --    CO2 mmol/L 26 26 25   < >  --    < > 26   < >  --    CO2, I-STAT mmol/L  --   --   --   --   --   --   --   --  39*   BUN mg/dL 14 14 13   < >  --    < > 18   < >  --    CREATININE mg/dL 0 66 0 66 0 73   < >  --    < > 0 90   < >  --    EGFR ml/min/1 73sq m 95 95 92   < >  --    < > 84   < >  --    GLUCOSE, ISTAT mg/dl  --   --   --   --   --   --   --   --  121   CALCIUM mg/dL 7 9* 8 5 7 8*   < >  --    < > 7 8*   < >  --    AST U/L 59*  --   --   --  57*  --  56*   < >  --    ALT U/L 28  --   --   --  29  --  26   < >  --    ALK PHOS U/L 161*  --   --   --  221*  --  155*   < >  --     < > = values in this interval not displayed  Results from last 7 days   Lab Units 08/11/22  0549 08/10/22  0600 08/09/22  0418   WBC Thousand/uL 13 64* 15 01* 18 40*   HEMOGLOBIN g/dL 7 6* 7 7* 6 6*   PLATELETS Thousands/uL 21* 20* 27*           Imaging Studies:   I have personally reviewed pertinent imaging study reports and images in PACS  EKG, Pathology, and Other Studies:   I have personally reviewed pertinent reports

## 2022-08-11 NOTE — RESTORATIVE TECHNICIAN NOTE
Restorative Technician Note      Patient Name: Jelena Mckinney     Note Type: Mobility  Patient Position Upon Consult: Supine  Activity Performed: Repositioned

## 2022-08-11 NOTE — PROGRESS NOTES
Progress note - Palliative and Supportive Care   Joe Roegr 67 y o  male [id number]    Patient Active Problem List   Diagnosis    Obstructive sleep apnea syndrome    Hypersomnia    Permanent atrial fibrillation (HCC)    Morbid obesity with BMI of 40 0-44 9, adult (HCC)    Lower extremity edema    Pancreatic duct dilated    Overlapping malignant neoplasm of pancreas (HCC)    Type 2 diabetes mellitus with hyperglycemia, with long-term current use of insulin (HCC)    IPMN (intraductal papillary mucinous neoplasm)    Thrombocytopenia (HCC)    Urgency incontinence    Balanitis    OAB (overactive bladder)    BPH without obstruction/lower urinary tract symptoms    Encounter for geriatric assessment    Counseling regarding advanced care planning and goals of care    Port-A-Cath in place    Chemotherapy induced neutropenia ([facility] [address] )    Intra-abdominal fluid collection    Transaminitis    TERESE (acute kidney injury) ([facility] [address] )     Active issues specifically addressed today include:   · Palliative care encounter  · [address] discussion  · TERESE  · Hypervolemia  · Pancreatic cancer s/p whipple  · hypotension     Plan:  1  Symptom management -   -  Per critical care tea    2  Goals -  -  Full disease directed cares without limits  Code Status: Full Code, level 1   Decisional apparatus:  Patient is not competent on my exam today  If competence is lost, patient's substitute decision maker would default to spouse  by [place]  Advance Directive / Living Will / POLST:  None on file     Interval history:       Patient remains sedated and intubated on vasopressors to maintain BP  Critical care team has been managing symptoms at this time  Goals of care are clear  PCM will sign off    IF PCM services needed in coming days please reach back out to the team       MEDICATIONS / ALLERGIES:     current meds:   Current Facility-Administered Medications   Medication Dose Route Frequency    acetaminophen (TYLENOL) oral suspension 975 mg  975 mg Per NG Tube Q8H    acetylcysteine (MUCOMYST) 200 mg/mL inhalation solution 600 mg  3 mL Nebulization Q8H    Adult 3-in-1 TPN (custom base / custom electrolytes)   Intravenous Continuous TPN    Adult 3-in-1 TPN (custom base / custom electrolytes)   Intravenous Continuous TPN    bisacodyl (DULCOLAX) rectal suppository 10 mg  10 mg Rectal Daily    chlorhexidine (PERIDEX) 0 12 % oral rinse 15 mL  15 mL Mouth/Throat Q12H [facility]    dexmedeTOMIDine (Precedex) 400 mcg in sodium chloride 0 9% 100 mL  0 1-1 2 mcg/kg/hr Intravenous Titrated    fentaNYL 1000 mcg in sodium chloride 0 9% 100mL infusion  100 mcg/hr Intravenous Continuous    HYDROmorphone (DILAUDID) injection 0 5 mg  0 5 mg Intravenous Q3H PRN    insulin regular (HumuLIN R,NovoLIN R) 1 Units/mL in sodium chloride 0 9 % 100 mL infusion  0 3-21 Units/hr Intravenous Titrated    iohexol (OMNIPAQUE) 240 MG/ML solution 30 mL  30 mL Oral 90 min pre-procedure    ipratropium (ATROVENT) 0 02 % inhalation solution 0 5 mg  0 5 mg Nebulization Q6H    levalbuterol (XOPENEX) inhalation solution 1 25 mg  1 25 mg Nebulization Q6H    methocarbamol (ROBAXIN) tablet 500 mg  500 mg Oral Q6H PRN    methylnaltrexone (RELISTOR) subcutaneous injection 8 mg  8 mg Subcutaneous Once    micafungin (MYCAMINE) 100 mg in sodium chloride 0 9 % 100 mL IVPB  100 mg Intravenous Q24H    norepinephrine (LEVOPHED) 8 mg (DOUBLE CONCENTRATION) IV in sodium chloride 0 9% 250 mL  1-30 mcg/min Intravenous Titrated    NxStage K 4/Ca 3 dialysis solution (RFP-401) 20,000 mL  20,000 mL Dialysis Continuous    OLANZapine (ZyPREXA) IM injection 10 mg  10 mg Intramuscular HS    ondansetron (ZOFRAN) injection 4 mg  4 mg Intravenous Q6H PRN    pantoprazole (PROTONIX) injection 40 mg  40 mg Intravenous Q12H [facility]    phenol (CHLORASEPTIC) 1 4 % mucosal liquid 1 spray  1 spray Mouth/Throat Q2H PRN    piperacillin-tazobactam (ZOSYN) 3 375 g in sodium chloride 0 9 % 100 mL IVPB 3 375 g Intravenous Q8H    polyethylene glycol (MIRALAX) packet 17 g  17 g Oral Daily    senna-docusate sodium (SENOKOT S) 8 6-50 mg per tablet 1 tablet  1 tablet Oral BID    sodium chloride 3 % inhalation solution 4 mL  4 mL Nebulization TID    vasopressin (PITRESSIN) 20 Units in sodium chloride 0 9 % 100 mL infusion  0 04 Units/min Intravenous Continuous       No Known Allergies    OBJECTIVE:    Physical Exam  Physical Exam  Vitals and nursing note reviewed  Constitutional:       General: He is sleeping  Appearance: He is ill-appearing  Interventions: He is sedated, intubated and restrained  HENT:      Head: Normocephalic and atraumatic  Mouth/Throat:      Comments: ETT, OGT  Eyes:      General: Lids are normal    Cardiovascular:      Rate and Rhythm: Normal rate and regular rhythm  Pulmonary:      Effort: No respiratory distress  He is intubated  Skin:     General: Skin is cool  Coloration: Skin is jaundiced and pale  Neurological:      Comments: Intubated, sedated,    Psychiatric:         Attention and Perception: He is inattentive  Speech: He is noncommunicative  Cognition and Memory: Cognition is impaired         Vitals:    08/11/22 1200   BP: 115/53   Pulse: 76   Resp: 13   Temp: 98 24 °F (36 8 °C)   SpO2: 97%   Lab Results:   CBC:   Lab Results   Component Value Date    WBC 13 64 (H) 08/11/2022    HGB 7 6 (L) 08/11/2022    HCT 24 3 (L) 08/11/2022     (H) 08/11/2022    PLT 21 (LL) 08/11/2022    MCH 34 9 (H) 08/11/2022    MCHC 31 3 (L) 08/11/2022    RDW 23 7 (H) 08/11/2022   , CMP:   Lab Results   Component Value Date    SODIUM 137 08/11/2022    K 4 0 08/11/2022     08/11/2022    CO2 26 08/11/2022    BUN 14 08/11/2022    CREATININE 0 66 08/11/2022    CALCIUM 7 9 (L) 08/11/2022    AST 59 (H) 08/11/2022    ALT 28 08/11/2022    ALKPHOS 161 (H) 08/11/2022    EGFR 95 08/11/2022   , PT/PTT:No results found for: PT, PTT      Counseling / Coordination of Care    Total floor / unit time spent today 20+  minutes  Greater than 50% of total time was spent with the patient and / or family counseling and / or coordination of care  A description of the counseling / coordination of care:collaboration with critical care team, supportive listening, chart review review of medications

## 2022-08-11 NOTE — PROCEDURES
Bronchoscopy    Date/Time: 8/11/2022 2:02 PM  Performed by: Catracho Guy DO  Authorized by: Dav Donald PA-C     Patient location:  Bedside  Other Assisting Provider: Yes (comment) (Dr Melony Smith)    Consent:     Consent obtained:  Written    Consent given by:  Spouse    Risks discussed: Adverse reaction to sedation, infection and bleeding    Alternatives discussed:  Alternative treatment and observation  Universal protocol:     Procedure explained and questions answered to patient or proxy's satisfaction: yes      Patient identity confirmed:  Arm band  Indications:     Procedure Purpose: diagnostic and therapeutic      Indications: pneumonia/infiltrate    Sedation:     Sedation type:  Continuous (ICU/vent)  Post-procedure details:     Chest x-ray performed: yes      Chest x-ray findings: Pending  Patient tolerance of procedure: Tolerated well, no immediate complications  Final Diagnosis/Findings:      Patient remains on fentanyl and precedex gtt  Pre-medicated with fentanyl 50 and versed 2  Anatomy appeared normal  Minimal secretions right lower noted  Washings performed and specimen obtained  No BAL performed at this time  Some edema noted to airways, no obvious erythema noted  Some secretions near ETT  Placement of ETT noted, advanced after procedure by respiratory technician  Patient tolerated procedure  No complications  Specimen to be sent for analysis  Post-procedure CXR pending at this time  Nurse present during procedure and aware of plans

## 2022-08-11 NOTE — PROGRESS NOTES
Progress Note - Surgical Oncology   Reuben Mayo 67 y o  male MRN: [id number]  Unit/Bed#: [facility] 033-71 Encounter: [id number]    Assessment:  67 y  o  male who presented with MD-IPMN s/p  IPMN, s/p Lap distal pancreatectomy (03/2019-Quiros), open subtotal pancreatectomy (02/2022-Quiros), now status post completion pancreatectomy with sims anastomosis, extensive SARAH, reduction of internal hernia and cholecystectomy on 7/26, now s/p IR drain placement on 8/3  Now w/ ARDS  Afebrile, MAPS:53-96;   Levo at 4; b/w 2-4 overnight, vaso  Intubated on PS: 7/12/60%    CVVH -50  UOP: 972 cc   NGT:  700 cc  RUQ STU: 475 serous  LUQ IR drain: 95 minimal in bulb  CVVH: -5 L  No stool recorded    Plan:  Appreciate ID recs: continue zosyn and micafungin  Appreciate nephro recs: continue CVVH for now  Continue to wean to extubate  Continue TPN  Appreciate ICU level of care  Maintain STU and IR drain and monitor outputs      Subjective/Objective   Subjective: Agitated overnight requring increase in precedex  Otherwise events as detailed above    Objective:     Blood pressure 98/53, pulse 70, temperature 97 5 °F (36 4 °C), resp  rate 15, height 6' (1 829 m), weight (!) 143 kg (315 lb 7 7 oz), SpO2 98 %  ,Body mass index is 42 79 kg/m²  Intake/Output Summary (Last 24 hours) at 8/11/2022 0031  Last data filed at 8/10/2022 2200  Gross per 24 hour   Intake 4870 66 ml   Output 6462 ml   Net -1591 34 ml       Invasive Devices  Report    Peripherally Inserted Central Catheter Line  Duration           PICC Line 08/03/22 7 days          Central Venous Catheter Line  Duration           Port A Cath 03/30/22 Right Subclavian 133 days          Peripheral Intravenous Line  Duration           Peripheral IV 08/09/22 Dorsal (posterior); Right Forearm 1 day          Arterial Line  Duration           Arterial Line 08/06/22 Radial 4 days          Hemodialysis Catheter  Duration           HD Temporary Double Catheter 5 days          Drain  Duration Closed/Suction Drain Right RLQ Bulb 19 Fr  15 days    Urethral Catheter Temperature probe 16 Fr  9 days    NG/OG/Enteral Tube Enteral Feeding Tube Right nare 8 days    Abscess Drain LUQ 7 days          Airway  Duration           ETT  Oral;Cuffed; Hi-Lo 8 mm 5 days                Physical Exam  Vitals reviewed  Constitutional:       Interventions: He is sedated, intubated and restrained  HENT:      Head: Normocephalic and atraumatic  Cardiovascular:      Rate and Rhythm: Normal rate  Pulmonary:      Effort: He is intubated  Abdominal:      General: There is no distension  Palpations: Abdomen is soft  Comments: Incision clean, dry and intact  Frandy drain and IR drain in place   Genitourinary:     Comments: Swelling of scrotum  Dockery in place  Musculoskeletal:      Right lower leg: Edema present  Left lower leg: Edema present  Skin:     General: Skin is warm  Lab, Imaging and other studies:  I have personally reviewed pertinent lab results    , CBC:   Lab Results   Component Value Date    WBC 15 01 (H) 08/10/2022    HGB 7 7 (L) 08/10/2022    HCT 24 1 (L) 08/10/2022     (H) 08/10/2022    PLT 20 (LL) 08/10/2022    MCH 35 0 (H) 08/10/2022    MCHC 32 0 08/10/2022    RDW 24 7 (H) 08/10/2022    MPV 13 4 (H) 08/10/2022   , CMP:   Lab Results   Component Value Date    SODIUM 136 08/10/2022    K 4 2 08/10/2022     08/10/2022    CO2 26 08/10/2022    BUN 14 08/10/2022    CREATININE 0 66 08/10/2022    CALCIUM 8 5 08/10/2022    EGFR 95 08/10/2022     VTE Pharmacologic Prophylaxis: Sequential compression device (Venodyne)  and Heparin  VTE Mechanical Prophylaxis: sequential compression device

## 2022-08-11 NOTE — PROGRESS NOTES
NEPHROLOGY CVVH PROCEDURE NOTE    Seen and examined on CVVH  Remains sedated/intubated  Ultrafiltration reduced to 50 cc/hour given patient's marginal blood pressure  Family at bedside  Discussed with Critical Care Nursing  QB: 300  Dialysate: 4 k / 3 calcium  Ultrafiltration: 50 cc/hour  Filtration Fraction:22 %  Effluent:25 cc/kg/hour  Treatment Day:6  Anticoagulation: -    Physical Exam:    /55   Pulse 94   Temp 98 24 °F (36 8 °C)   Resp (!) 26   Ht 6' (1 829 m)   Wt (!) 136 kg (300 lb 11 3 oz)   SpO2 93%   BMI 40 78 kg/m²     General:  Sedated/intubated  CVS:  Regular  Lungs:  Coarse, decreased at bases  Abdomen:  Obese, soft  Access:  Temp dialysis catheter  Extremities:  2+ edema      Assessment:  1  Acute kidney injury most likely secondary to ischemic/septic ATN plus possible component of contrast associated nephropathy  2  Sepsis/shock/hypotension, wean hemodynamic support as tolerated  3  Strep bacteremia/abdominal abscess, antibiotic treatment as per Infectious Disease  4  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

## 2022-08-11 NOTE — TELEPHONE ENCOUNTER
Spoke to patient's wife and let her know that since patient is still in the hospital, I will cancel his post op appt and we will reschedule when he is out of the hospital  Eleno Aranda verbalized understanding and thanks

## 2022-08-11 NOTE — OCCUPATIONAL THERAPY NOTE
Occupational Therapy Cancel Note        Patient Name: Humberto Flores  GQTZU'O Date: 8/11/2022 08/11/22 1440   OT Last Visit   OT Visit Date 08/11/22   Note Type   Note Type Cancelled Session   Cancel Reasons Intubated/sedated     OT attempted to see pt however pt currently intubated/sedated @ this time  Will continue to follow on caseload and see when medically and clinically appropriate       Sarah Mendoza MS, OTR/L

## 2022-08-12 NOTE — RESPIRATORY THERAPY NOTE
RT Ventilator Management Note  Jenni Luna 67 y o  male MRN: [id number]  Unit/Bed#: [facility] 599-32 Encounter: [id number]      Daily Screen         8/11/2022  0900 8/12/2022  0818          Patient safety screen outcome[de-identified] Passed Passed (P)                 Physical Exam:   Assessment Type: (P) During-treatment  General Appearance: (P) Sedated  Respiratory Pattern: (P) Assisted  Chest Assessment: (P) Chest expansion symmetrical  Bilateral Breath Sounds: (P) Diminished      Resp Comments: (P) pt found stable on spont settings 16/+12 50%  currently tolerating well  no changes made at this time  will continue to monitor per protocol

## 2022-08-12 NOTE — WOUND OSTOMY CARE
Consult Note - Wound   Dolphus Schooling 67 y o  male MRN: [id number]  Unit/Bed#: [facility] 515-01 Encounter: [id number]        History and Present Illness:  Patient is 54 yo male admitted to [facility] CCU s/p IPMN with subsequent complications from sepsis and acute respiratory failure  Patient has history of pancreatic cancer, sleep apnea, and diabetes  Patient seen today on critical care low air loss mattress being repositioned with green foam wedges, allevyn heels foams in place for prevention  Patient is critically ill  Intubated and sedated, with multiple pressors on CVVH, and on TPN  Patient is dependent for all care, has indwelling hanna catheter, and rectal tube  Assessment Findings:     1  HA DTI to B/L buttocks- some partial thickness skin loss with pink tissue with surrounding nonblanchable erythema, scant amount of drainage  2  HA stage II scrotum- partial thickness skin loss with pink tissue, scant drainage  3  Left anterior knee- purple, nonblanchable area, unsure of etiology, due to location unlikely pressure  No induration, fluctuance, odor, warmth/temperature differences, redness, or purulence noted to the above noted wounds and skin areas assessed  New dressings applied per orders listed below  Patient tolerated well- no s/s of non-verbal pain or discomfort observed during the encounter  Bedside nurse aware of plan of care  See flow sheets for more detailed assessment findings  Wound care will continue to follow  Skin care plans:  1-Cleanse B/L buttocks and scrotum with soap and water, pat dry, Apply triad paste to wounds TID and PRN incontinence care  2-Elevate heels to offload pressure  3-Ehob cushion in chair when out of bed  4-Moisturize skin daily with skin nourishing cream   5-Turn/reposition q2h or when medically stable for pressure re-distribution on skin      6-Cleanse B/L heels with soap and water, apply Allevyn heel foams, tommy P for prevention, change every 3 days, check skin qshift       Wounds:  Wound 08/10/22 Pressure Injury Buttocks Right (Active)   Wound Image   08/12/22 1117   Wound Description Non-blanchable erythema;Fragile;Epithelialization;Granulation tissue 08/12/22 1117   Pressure Injury Stage DTPI 08/12/22 1117   Alexia-wound Assessment Fragile; Hyperpigmented 08/12/22 1117   Wound Length (cm) 6 cm 08/12/22 1117   Wound Width (cm) 6 cm 08/12/22 1117   Wound Depth (cm) 0 1 cm 08/12/22 1117   Wound Surface Area (cm^2) 36 cm^2 08/12/22 1117   Wound Volume (cm^3) 3 6 cm^3 08/12/22 1117   Calculated Wound Volume (cm^3) 3 6 cm^3 08/12/22 1117   Tunneling 0 cm 08/12/22 1117   Tunneling in depth located at 0 08/12/22 1117   Undermining 0 08/12/22 1117   Undermining is depth extending from 0 08/12/22 1117   Wound Site Closure ZAHRA 08/12/22 1117   Drainage Amount Scant 08/12/22 1117   Drainage Description Serosanguineous 08/12/22 1117   Non-staged Wound Description Partial thickness 08/12/22 1117   Treatments Cleansed 08/12/22 1117   Dressing Moisture barrier 08/12/22 1117   Wound packed? No 08/12/22 1117   Packing- # removed 0 08/12/22 1117   Packing- # inserted 0 08/12/22 1117   Dressing Changed New 08/12/22 1117   Patient Tolerance Tolerated well 08/12/22 1117   Dressing Status Clean;Dry; Intact 08/12/22 1117       Wound 08/11/22 Pressure Injury Scrotum (Active)   Wound Image   08/12/22 1117   Wound Description Granulation tissue 08/12/22 1117   Pressure Injury Stage 2 08/12/22 1117   Alexia-wound Assessment Clean;Edema 08/12/22 1117   Wound Length (cm) 3 cm 08/12/22 1117   Wound Width (cm) 2 cm 08/12/22 1117   Wound Depth (cm) 0 1 cm 08/12/22 1117   Wound Surface Area (cm^2) 6 cm^2 08/12/22 1117   Wound Volume (cm^3) 0 6 cm^3 08/12/22 1117   Calculated Wound Volume (cm^3) 0 6 cm^3 08/12/22 1117   Tunneling 0 cm 08/12/22 1117   Tunneling in depth located at 0 08/12/22 1117   Undermining 0 08/12/22 1117   Undermining is depth extending from 0 08/12/22 1117   Wound Site Closure ZAHRA 08/12/22 1117   Drainage Amount Scant 08/12/22 1117   Drainage Description Serous 08/12/22 1117   Non-staged Wound Description Partial thickness 08/12/22 1117   Treatments Cleansed 08/12/22 1117   Dressing Moisture barrier 08/12/22 1117   Wound packed? No 08/12/22 1117   Packing- # removed 0 08/12/22 1117   Packing- # inserted 0 08/12/22 1117   Dressing Changed New 08/12/22 1117   Patient Tolerance Tolerated well 08/12/22 1117   Dressing Status Clean;Dry; Intact 08/12/22 1117       Wound 08/12/22 Pressure Injury Knee Anterior; Left (Active)   Wound Image   08/12/22 1112   Wound Length (cm) 1 5 cm 08/12/22 1112   Wound Width (cm) 1 5 cm 08/12/22 1112   Wound Depth (cm) 0 cm 08/12/22 1112   Wound Surface Area (cm^2) 2 25 cm^2 08/12/22 1112   Wound Volume (cm^3) 0 cm^3 08/12/22 1112   Calculated Wound Volume (cm^3) 0 cm^3 08/12/22 1112       Finger discoloration and edema to right hand              Veronica Floyd BSN, RN, [id number]

## 2022-08-12 NOTE — PROGRESS NOTES
Daily Progress Note - Critical Care   Priyanka Nails 67 y o  male MRN: [id number]  Unit/Bed#: [facility] 515-01 Encounter: [id number]  ----------------------------------------------------------------------------------------  HPI/24hr events: 67 YOM s/p complete pancreatectomy w/ sims anastomosis, extensive SARAH, reduction of internal hernia, cholecystectomy 7/26 Returned to ICU 8/1 with hypotension, worsening AHRF  With IR drain placement 8/3 into 9x2x2 cm fluid collection within pancreatectomy bed  With worsening AMS and shock 8/5 requiring reintubation and HD cath placement for initiation of CVVH  Yesterday with bronchoscopy d/t thick secretions, bronch cultures sent and pending  Pulling -100cc/hr via CVVH with no filter losses overnight  Anemia and thrombocytopenia stable  Patient appearing hypodynamic via POCUS, plan to trial 12 5g and decrease CVVH rate to -50   ---------------------------------------------------------------------------------------  SUBJECTIVE  Remains intubated    Review of Systems   Unable to perform ROS: Intubated     Review of systems was unable to be performed secondary to ETT  ---------------------------------------------------------------------------------------  Assessment and Plan:    Neuro:   · Diagnosis: Acute Metabolic Encephalopathy  ? Plan: Following commands intermittently appears to be improving  ? In setting of critical illness, septic shock  ? Delierium precautions  ? CAM ICU  ? Sleep/wake cycle  · Diagnosis: Analgesia/Sedation  ? Plan: S/p Paralysis with nimbex, d/c'd  ? Currently on Fentanyl gtt at 100, increased overnight  ? Precedex gtt at 1 2  ? PRN dilaudid added given apparent tolerance to fentanyl  ? Dilaudid (3 doses/24hrs)     CV:   · Diagnosis: Septic Shock  ? Plan: Levophed held throughout the day yesterday, being restarted now at 1 given hypotension likely 2/2 increasing sedation d/t vent desynchrony  ? IV steroids stopped 8/9  ?  Currently with vasopressin at 0 04 and levophed at 7  ? Will trial 12 5g 25% albumin now  ? POCUS completed, appears slightly hyperdynamic  ? CVVH to -50  ? Wean vasopressin, levophed to maintain MAP >65  ? F/u ID plan as below   · Diagnosis: Atrial fibrillation  ? Plan: Amio infusion held given bradycardia 8/10 AM  ? Continue to hold [facility] given thrombocytopenia  ? 8/1 ECHO EF 60% from 45%, no RV dysfunction  ? Holding home lasix in setting of shock, CVVH     Pulm:  · Diagnosis: Acute Hypoxic Respiratory Failure   ? Plan: Reintubated 8/5  ? S/p Veletri  ? Previously on APRV  ? With ventilator desynchrony overnight requiring increasing sedation  ? Transitioned to PSV, Currently on 16/12/50% with adequate tidal volumes  ? Continue to wean PS in anticipation of extubation  ? CXR 8/11 stable from prior  ? S/p Bronch 8/11, sputum culture pending  ? Continue gentle volume removal with CVVH  ? VAP Bundle, Pulm Toilet  · Diagnosis: ARDS  ? Plan: Vent requirments stable   ? Continue mechanical ventilation pending successful wean        GI:   · Diagnosis: IPMN, s/p completion pancreatectomy, reduction of internal hernia, cholecystectomy 7/26  ? Plan: 8/1 CT - new partially loculated fluid collection in post-pancreatectomy bed  ? 8/2 CT - stable fluid collection in pancreatectomy bed, no evidence of enteric contrast extravasation from staple line, stable pneumoperitoneum  ? 8/3 IR drain in L anterior collection   § Drain cultures positive for MDR Citrobacter, Klebsiella, Yeast  § Existing drain cultures positive for S  Anginosus, Candida, Klebsiella, GCS  § Drain 1 70cc/24hrs  § Drain 2 460 cc/24hrs  § Blue slushy down NGT, suctioned out of NGT after clamping x3hrs  § Continue Zosyn and Yue as below  § ID following  ? Increasing abdominal distension  § Stable from prior, no concern for abdominal compartment syndrome at this time  ? TF held in setting of poor GI absorption  ? Continue TPN  ?  Will need Creon when taking PO again   · Stress ulcer PPX: Pantoprazole IV  · Bowel regimen: senna/colace and miralax added Dulcolax suppository daily  ? Last BM: 7/31     :   · Diagnosis: TERESE with Oliguria  ? Plan: Baseline Cr of 0 8  ? BUN/Cr 12/0 58  ? Currently removing -50cc of volume per hour, was negative 100cc  ? I- 5 966L  ? O- 7 611L  ? -1 645 cc  ? UO 1203/24hrs  ? Nephrology following, appreciate recs  · Diagnosis: Hanna cath present  ? Plan: Strict I and O  ? BID hanna care        F/E/N:   · F- No maintenance fluids  · E- Repleted per CVVH protocol, Phos  · N- TPN via PICC        Heme/Onc:   · Diagnosis: Anemia in setting of critical illness, CRRT filter loss  ? Plan: Hgb currently 7 8, stable  ? Continue to trend CBC  ? Transfuse as needed for hgb <7  ? DVT ppx- AC on holding given thrombocytopenia, SCDs  · Diagnosis: Thrombocytopenia  ? Plan: PLT currently 29  ? 2/2 Sepsis vs Mechanical d/t CVVH  ? Hematology following  ? Transfuse as needed for PLTS less than 20, continue to trend        Endo:   · Diagnosis: DMII  ? Plan: Continue insulin gtt: 50u / 24hrs  ? Endocrine following  ? Maintain -180  ID:   · Diagnosis: Septic Shock d/t concern for possible intra abdominal abcess  ? Plan: ID following  ? WBC down to 10 98 from 13 64  ? T max overnight 98 2  ? Vasopressor requirments as above  ? Currently on Micafungin, Zosyn per ID  ? Drain cultures positive for MDRO  ? Previous Drain positive for: S  Anginosus,   § BC Blood cx - 1/2 streptococcus anginosus  § Blood cx - 1/2 negative x  5 days  § Blood cx - 2/2 negative x 5 hrs   ? Trend WBC and Fever  ? Appreciate ID consult     MSK/Skin:   · Diagnosis: Pressure Ulcer PPX  ? Plan: Q2H turning and repositioning  ?  Reengage PT OT once appropriate    Patient appropriate for transfer out of the ICU today?: No  Disposition: Continue Critical Care   Code Status: Level 1 - Full Code  ---------------------------------------------------------------------------------------  ICU CORE MEASURES    Prophylaxis VTE Pharmacologic Prophylaxis: Pharmacologic VTE Prophylaxis contraindicated due to thrombocytopenia  VTE Mechanical Prophylaxis: sequential compression device  Stress Ulcer Prophylaxis: Pantoprazole IV     ABCDE Protocol (if indicated)  Plan to perform spontaneous awakening trial today? Yes  Plan to perform spontaneous breathing trial today? Yes  Obvious barriers to extubation? Oxygenation, mentation    Invasive Devices Review  Invasive Devices  Report    Peripherally Inserted Central Catheter Line  Duration           PICC Line 22 8 days          Central Venous Catheter Line  Duration           Port A Cath 22 Right Subclavian 134 days          Peripheral Intravenous Line  Duration           Peripheral IV 22 Dorsal (posterior); Right Forearm 3 days          Arterial Line  Duration           Arterial Line 22 Radial 6 days          Hemodialysis Catheter  Duration           HD Temporary Double Catheter 6 days          Drain  Duration           Closed/Suction Drain Right RLQ Bulb 19 Fr  16 days    Urethral Catheter Temperature probe 16 Fr  10 days    NG/OG/Enteral Tube Enteral Feeding Tube Right nare 9 days    Abscess Drain LUQ 8 days    Rectal Tube With balloon <1 day          Airway  Duration           ETT  Oral;Cuffed; Hi-Lo 8 mm 6 days              Can any invasive devices be discontinued today?  No  ---------------------------------------------------------------------------------------  OBJECTIVE    Vitals   Vitals:    22 0200 22 0300 22 0400 22 0500   BP: 123/58 124/57 125/59 119/57   BP Location: Left arm      Pulse: 75 87 80 77   Resp: (!)   12 13   Temp: 97 9 °F (36 6 °C) 97 7 °F (36 5 °C) 97 7 °F (36 5 °C) 97 7 °F (36 5 °C)   TempSrc: Bladder      SpO2: 99% 100% 100% 94%   Weight:    (!) 144 kg (316 lb 9 3 oz)   Height:         Temp (24hrs), Av °F (36 7 °C), Min:97 7 °F (36 5 °C), Max:98 6 °F (37 °C)  Current: Temperature: 97 7 °F (36 5 °C)    Respiratory:  SpO2: SpO2: 94 %, SpO2 Activity: SpO2 Activity: At Rest  Nasal Cannula O2 Flow Rate (L/min): 6 L/min    Invasive/non-invasive ventilation settings   Respiratory  Report   Lab Data (Last 4 hours)    None         O2/Vent Data (Last 4 hours)    None              Physical Exam  Vitals and nursing note reviewed  Constitutional:       Interventions: He is sedated and intubated  HENT:      Head: Normocephalic  Right Ear: External ear normal       Left Ear: External ear normal       Nose: Nose normal       Mouth/Throat:      Pharynx: Oropharynx is clear  Eyes:      Pupils: Pupils are equal, round, and reactive to light  Cardiovascular:      Rate and Rhythm: Normal rate  Rhythm irregular  Pulses:           Radial pulses are 2+ on the right side and 2+ on the left side  Dorsalis pedis pulses are 1+ on the right side and 1+ on the left side  Heart sounds: S1 normal and S2 normal  No murmur heard  Pulmonary:      Effort: Pulmonary effort is normal  No respiratory distress  He is intubated  Breath sounds: Normal breath sounds  Abdominal:      General: Abdomen is flat  Bowel sounds are normal  There is no distension  Palpations: Abdomen is soft  Tenderness: There is no abdominal tenderness  Musculoskeletal:      Right lower leg: No edema  Left lower leg: No edema  Skin:     General: Skin is warm and dry  Capillary Refill: Capillary refill takes less than 2 seconds  Neurological:      GCS: GCS eye subscore is 4  GCS verbal subscore is 1  GCS motor subscore is 5         Laboratory and Diagnostics:  Results from last 7 days   Lab Units 08/12/22  0510 08/11/22  0549 08/10/22  0600 08/09/22  0418 08/08/22  1548 08/08/22  1423 08/08/22  0548 08/07/22  0922 08/07/22  0802 08/07/22  0618 08/06/22  0600 08/06/22  0129   WBC Thousand/uL 10 98* 13 64* 15 01* 18 40* 24 39*  --  27 57* 53 13*   < > 48 85*   < > 4 62   HEMOGLOBIN g/dL 7 8* 7 6* 7 7* 6 6* 7 0* --  7 2* 8 3*   < > 8 1*   < > 8 2*   I STAT HEMOGLOBIN   --   --   --   --   --   --   --   --   --   --    < >  --    HEMATOCRIT % 24 5* 24 3* 24 1* 21 6* 21 9*  --  22 6* 25 6*   < > 25 7*   < > 26 6*   HEMATOCRIT, ISTAT   --   --   --   --   --   --   --   --   --   --    < >  --    PLATELETS Thousands/uL 29* 21* 20* 27* 39* 35* 14* 20*   < > 21*   < >  --    NEUTROS PCT %  --   --   --  89*  --   --   --  91*  --   --   --   --    BANDS PCT %  --  6  --   --   --   --  10*  --   --  17*  --   --    MONOS PCT %  --   --   --  4  --   --   --  1*  --   --   --   --    MONO PCT %  --  0*  --   --   --   --  3*  --   --  0*  --  1*    < > = values in this interval not displayed       Results from last 7 days   Lab Units 08/12/22  0510 08/11/22  2342 08/11/22  1815 08/11/22  1148 08/11/22  0549 08/10/22  2347 08/10/22  1800 08/09/22  0553 08/09/22  0418 08/08/22  1156 08/08/22  0548 08/07/22  1202 08/07/22  0922 08/06/22  1220 08/06/22  0600   SODIUM mmol/L 136 138 139 137 137 136 137   < >  --    < > 139   < >  --    < > 141   POTASSIUM mmol/L 4 1 4 2 4 3 3 9 4 0 4 2 3 9   < >  --    < > 4 7   < >  --    < > 3 9   CHLORIDE mmol/L 107 108 110* 108 108 107 108   < >  --    < > 110*   < >  --    < > 111*   CO2 mmol/L 26 27 25 25 26 26 25   < >  --    < > 26   < >  --    < > 22   CO2, I-STAT   --   --   --   --   --   --   --   --   --   --   --   --   --    < >  --    ANION GAP mmol/L 3* 3* 4 4 3* 3* 4   < >  --    < > 3*   < >  --    < > 8   BUN mg/dL 12 13 14 12 14 14 13   < >  --    < > 18   < >  --    < > 34*   CREATININE mg/dL 0 58* 0 63 0 67 0 67 0 66 0 66 0 73   < >  --    < > 0 90   < >  --    < > 2 60*   CALCIUM mg/dL 8 4 8 2* 8 0* 7 9* 7 9* 8 5 7 8*   < >  --    < > 7 8*   < >  --    < > 7 9*   GLUCOSE RANDOM mg/dL 154* 140 139 142* 163* 176* 157*   < >  --    < > 129   < >  --    < > 104   ALT U/L  --   --   --   --  28  --   --   --  29  --  26  --  26  --  25   AST U/L  --   --   --   --  61*  --   --   -- 57*  --  56*  --  64*  --  43   ALK PHOS U/L  --   --   --   --  161*  --   --   --  221*  --  155*  --  142*  --  191*   ALBUMIN g/dL  --   --   --   --  1 5*  --   --   --  1 6*  --  1 6*  --  1 8*  --  2 0*   TOTAL BILIRUBIN mg/dL  --   --   --   --  5 25*  --   --   --  3 87*  --  3 98*  --  4 73*  --  4 03*    < > = values in this interval not displayed  Results from last 7 days   Lab Units 08/12/22  0510 08/11/22  2342 08/11/22  1815 08/11/22  1148 08/11/22  0549 08/10/22  2347 08/10/22  1800   MAGNESIUM mg/dL 1 8 1 7 1 8 2 0 1 8 1 9 1 9   PHOSPHORUS mg/dL 1 7* 4 0 2 3 1 8* 2 1* 2 0* 2 1*     Micro  8/11 Fungal culture pending, bronchial culture pending  COVID negative  Fungal blood culture negative  Body fluid Citrobactor, Klebsiella, candida, yeast  8/2 BC negative x5 days    EKG: Atrial fibrillation rate 77  Imaging:CXR 8/11 unchaged from 8/10   I have personally reviewed pertinent reports  Intake and Output  I/O       08/10 0701  08/11 0700 08/11 0701  08/12 0700    P  O  0 0    I V  (mL/kg) 1312 (9 6) 1619 (11 2)    NG/ 881    IV Piggyback 2182 1364    TPN 1550 1495    Total Intake(mL/kg) 5464 (40 2) 5359 (37 2)    Urine (mL/kg/hr) 1132 (0 3) 1148 (0 3)    Emesis/NG output 700 930    Drains 595 490    Other 4890 3631    Stool 0 0    Total Output 7317 6199    Net -1853 -840          Unmeasured Stool Occurrence 0 x 0 x        UOP: 50 ml/hr     Height and Weights   Height: 6' (182 9 cm)  IBW (Ideal Body Weight): 77 6 kg  Body mass index is 42 94 kg/m²  Weight (last 2 days)     Date/Time Weight    08/12/22 0500 144 (316 58)    08/11/22 0551 136 (300 71)    08/10/22 0600 143 (315 48)            Nutrition       Diet Orders   (From admission, onward)             Start     Ordered    08/11/22 1048  Diet NPO; Sips with meds  Diet effective now        References:    Nutrtion Support Algorithm Enteral vs  Parenteral   Question Answer Comment   Diet Type NPO    NPO Except:  Sips with meds    RD to adjust diet per protocol?  No        08/11/22 1048              Active Medications  Scheduled Meds:  Current Facility-Administered Medications   Medication Dose Route Frequency Provider Last Rate    acetaminophen  975 mg Per NG Tube Q8H Corrine Florencioatore, DO      acetylcysteine  3 mL Nebulization Q8H Jt Downs PA-C      Adult TPN (CUSTOM BASE/CUSTOM ELECTROLYTE)   Intravenous Continuous TPN Alphonzo Bumpers, PA-C 71 5 mL/hr at 08/11/22 2214    bisacodyl  10 mg Rectal Daily Jt Downs PA-C      chlorhexidine  15 mL Mouth/Throat Q12H [address]      dexmedetomidine  0 1-1 2 mcg/kg/hr Intravenous Titrated Jt Downs PA-C 1 2 mcg/kg/hr (08/12/22 0537)    fentaNYL  100 mcg/hr Intravenous Continuous Jt Downs PA-C 100 mcg/hr (08/11/22 2219)    HYDROmorphone  0 5 mg Intravenous Q3H PRN Jt Downs PA-C      insulin regular (HumuLIN R,NovoLIN R) infusion  0 3-21 Units/hr Intravenous Titrated Jt Downs PA-C 3 Units/hr (08/12/22 0305)    iohexol  30 mL Oral 90 min pre-procedure MIRNA [place]      ipratropium  0 5 mg Nebulization Q6H Natalya Lazcano MD      levalbuterol  1 25 mg Nebulization Q6H Natalya Lazcano MD      methocarbamol  500 mg Oral Q6H PRN Peterson Mitchell [place], CRNP      micafungin  100 mg Intravenous Q24H Lacy Mcnulty  mg (08/11/22 1717)    norepinephrine  1-30 mcg/min Intravenous Titrated Ines Dow PA-C 5 mcg/min (08/12/22 0011)    NxStage K 4/Ca 3  20,000 mL Dialysis Continuous Clark Collier DO 0 mL (08/08/22 0715)    OLANZapine  10 mg Intramuscular HS Terrance Isaacs PA-C      ondansetron  4 mg Intravenous Q6H PRN Jt Downs PA-C      pantoprazole  40 mg Intravenous Q12H [address] [place]      phenol  1 spray Mouth/Throat Q2H PRN Alphonzo Bumpers, PA-C      piperacillin-tazobactam  3 375 g Intravenous Q8H [place] 3 375 g (08/12/22 1168)    polyethylene glycol  17 g Oral Daily Sandra Brownmagaly [place], MIRNA      senna-docusate sodium  1 tablet Oral BID Lorrie Goldmann [place], [address]      sodium chloride  4 mL Nebulization TID Sheila Worthy PA-C      vasopressin (PITRESSIN) in 0 9 % sodium chloride 100 mL  0 04 Units/min Intravenous Continuous Ricky Romero MD 0 04 Units/min (08/12/22 0504)     Continuous Infusions:  Adult TPN (CUSTOM BASE/CUSTOM ELECTROLYTE), , Last Rate: 71 5 mL/hr at 08/11/22 2214  dexmedetomidine, 0 1-1 2 mcg/kg/hr, Last Rate: 1 2 mcg/kg/hr (08/12/22 0537)  fentaNYL, 100 mcg/hr, Last Rate: 100 mcg/hr (08/11/22 2219)  insulin regular (HumuLIN R,NovoLIN R) infusion, 0 3-21 Units/hr, Last Rate: 3 Units/hr (08/12/22 0305)  norepinephrine, 1-30 mcg/min, Last Rate: 5 mcg/min (08/12/22 0011)  NxStage K 4/Ca 3, 20,000 mL, Last Rate: 0 mL (08/08/22 0715)  vasopressin (PITRESSIN) in 0 9 % sodium chloride 100 mL, 0 04 Units/min, Last Rate: 0 04 Units/min (08/12/22 0504)      PRN Meds:   HYDROmorphone, 0 5 mg, Q3H PRN  methocarbamol, 500 mg, Q6H PRN  ondansetron, 4 mg, Q6H PRN  phenol, 1 spray, Q2H PRN      Allergies   No Known Allergies  ---------------------------------------------------------------------------------------  Advance Directive and Living Will:      Power of :    POLST:    ---------------------------------------------------------------------------------------  Care Time Delivered:   No Critical Care time spent     MIRNA Alcantar    Portions of the record may have been created with voice recognition software  Occasional wrong word or "sound a like" substitutions may have occurred due to the inherent limitations of voice recognition software    Read the chart carefully and recognize, using context, where substitutions have occurred

## 2022-08-12 NOTE — PROGRESS NOTES
Progress Note - Infectious Disease   Marii Merida 67 y o  male MRN: [id number]  Unit/Bed#: [facility] 515-01 Encounter: [id number]      Impression/Recommendations:  1   Septic shock   Evolving 7/31:  Fever, HR, refractory hypotension   Due to #2/3   No other appreciable source   ROS limited by lethargy   Exam otherwise benign   UA, LFTs, CT chest negative   Fevers, WBC initially improved with antibiotics, drainage, but then clinically worse in setting of aspiration during intubation 8/5, progressive renal dysfunction, volume overload   Slowly improving again with decreased WBC, but remains critically ill  Suspect persistent pressor requirements are due to overall volume derangements (grossly volume overloaded but intravascularly depleted) as opposed to ongoing/uncontrolled infection  Rec:  · Continue antibiotics/antifungals as below  · Follow temperatures closely  · Check CBC in AM  · Supportive care as per the primary service     2   Strep anginosus bacteremia   Low-grade with 1 of 2 positive   Due to #3   No other appreciable source   Has Portacath but low suspicion for infection   Repeat blood cultures 8/2, TTE negative     Rec:  · Continue antibiotics/antifungals as below     3   LUQ abscess   In setting of #5   S  Anginosus, Candida, Klebsiella, Group C Strep from exisiting right-sided surgical drain   Status post IR-guided drain 8/3 yielding cloudy, thick fluid   IR drain cultures with MDR- Citrobacter, Klebsiella, Candida glabrata/albicans   No radiographic evidence of leak from distal gastrectomy staple line    Rec:  · Continue Zosyn and micafungin through 8/17 to complete 14 days post drain placement  · Follow drain outputs closely     4   Acute hypoxic respiratory failure   Suspect initially due to volume overload, encephalopathy   No clinical or radiographic evidence of neumonia   Status post intubation 8/5   Concern for aspiration, ARDS, on maximal ventilatory support   Bronch 8/6, 8/12 negative for secretions or purulence  Rec:  · Volume management per Nephrology  · Supportive care per [facility]  · Follow up bronch cultures but given lack of clinical suspicion for pneumonia, would not necessarily adjust antibiotics     5   Intraductal papillary mucinous neoplasm   Status post distal pancreatectomy 03/2019, open subtotal pancreatectomy 02/2022   Now admitted for completion pancreatectomy with sims anastomosis, extensive SARAH, reduction of densely adherent internal hernia,cholecystectomy 7/26/22   Postoperative course complicated by above      6   TERESE   Likely multifactorial due partly due to infection, shock as above  Rec:  · CVVHD and volume management per Nephrology with close follow-up ongoing  · Dose adjust antibiotics for CRRT     7   Afib with RVR   On Amiodarone      8   Cirrhosis   In setting of chronic alcohol use   Recent bump in LFTs largely cholestatic and likely from TPN, cholestasis in setting of infection as above        9   DM   Recent A1c 8 7      10   Acute on chronic thrombocytopenia   Likely multifactorial due to infection, acute illness, medications in setting of cirrhosis  Also found to be in DIC  11   Ileus  On TPN      The above plan was discussed in detail with Dr Jacquie Bower will reassess the patient on Monday 8/15  Please call in the interim with new questions  Antibiotics:  Zosyn #7  Antibiotics #12  Micafungin #9  Drain #9    Subjective:  Patient seen on AM rounds  Unable to provide ROS due to intubation and sedation  24 Hour Events:  Had bronch yesterday which showed minimal secretions  Food coloring put down NGT - no evidence of leak into LUQ drain  No documented fevers, chills, sweats, nausea, vomiting, or diarrhea  WBC continues to trend downward  Remains on 2 pressors      Objective:  Vitals:  Temp:  [97 5 °F (36 4 °C)-98 6 °F (37 °C)] 97 9 °F (36 6 °C)  HR:  [71-87] 82  Resp:  [8-19] 11  BP: ()/(53-77) 123/77  SpO2:  [90 %-100 %] 98 %  Temp (24hrs), Av °F (36 7 °C), Min:97 5 °F (36 4 °C), Max:98 6 °F (37 °C)  Current: Temperature: 97 9 °F (36 6 °C)    Physical Exam:   General:  No acute distress  Eyes:  Normal lids and conjunctivae  ENT:  Normal external ears and nose  Neck:  Neck symmetric with midline trachea  Pulmonary:  Ventilated respiratory effort without accessory muscle use  Cardiovascular:  Regular rate and rhythm; persistent anasarca  Gastrointestinal:  STU LUQ with yellow drainage, purulent exudate  Musculoskeletal:  No digital clubbing or cyanosis  Skin:  No visible rashes; No palpable nodules  Neurologic:  Sensation to light touch limited due to sedation  Psychiatric:  Intubated and sedated    Lab Results:  I have personally reviewed pertinent labs  Results from last 7 days   Lab Units 22  0510 22  2342 22  1815 22  1148 22  0549 22  0553 22  0418 22  1156 22  0548 22  0029 22  2356   POTASSIUM mmol/L 4 1 4 2 4 3   < > 4 0   < >  --    < > 4 7   < >  --    CHLORIDE mmol/L 107 108 110*   < > 108   < >  --    < > 110*   < >  --    CO2 mmol/L 26 27 25   < > 26   < >  --    < > 26   < >  --    CO2, I-STAT mmol/L  --   --   --   --   --   --   --   --   --   --  39*   BUN mg/dL 12 13 14   < > 14   < >  --    < > 18   < >  --    CREATININE mg/dL 0 58* 0 63 0 67   < > 0 66   < >  --    < > 0 90   < >  --    EGFR ml/min/1 73sq m 101 97 95   < > 95   < >  --    < > 84   < >  --    GLUCOSE, ISTAT mg/dl  --   --   --   --   --   --   --   --   --   --  121   CALCIUM mg/dL 8 4 8 2* 8 0*   < > 7 9*   < >  --    < > 7 8*   < >  --    AST U/L  --   --   --   --  59*  --  57*  --  56*   < >  --    ALT U/L  --   --   --   --  28  --  29  --  26   < >  --    ALK PHOS U/L  --   --   --   --  161*  --  221*  --  155*   < >  --     < > = values in this interval not displayed       Results from last 7 days   Lab Units 22  0510 22  0549 08/10/22  0600   WBC Thousand/uL 10 98* 13 64* 15 01* HEMOGLOBIN g/dL 7 8* 7 6* 7 7*   PLATELETS Thousands/uL 29* 21* 20*     Results from last 7 days   Lab Units 08/11/22  1422   GRAM STAIN RESULT  2+ Polys  No bacteria seen       Imaging Studies:   I have personally reviewed pertinent imaging study reports and images in PACS  CXR reviewed personally diffuse bilateral opacities    EKG, Pathology, and Other Studies:   I have personally reviewed pertinent reports

## 2022-08-12 NOTE — DISCHARGE INSTR - OTHER ORDERS
Skin care plans:  1-Cleanse B/L buttocks and scrotum with soap and water, pat dry, Apply triad paste to wounds TID and PRN incontinence care  2-Elevate heels to offload pressure  3-Ehob cushion in chair when out of bed  4-Moisturize skin daily with skin nourishing cream   5-Turn/reposition q2h or when medically stable for pressure re-distribution on skin      6-Cleanse B/L heels with soap and water, apply Allevyn heel foams, tommy P for prevention, change every 3 days, check skin qshift

## 2022-08-12 NOTE — OCCUPATIONAL THERAPY NOTE
Occupational Therapy Cancel Note        Patient Name: Babar Jurado  DXQAK'S Date: 8/12/2022 08/12/22 0831   OT Last Visit   OT Visit Date 08/12/22   Note Type   Note Type Cancelled Session   Cancel Reasons Intubated/sedated     Per chart review, pt currently intubated/sedated  Will continue to follow on caseload and see when medically/clinically appropriate      Alver Serve, MS, OTR/L

## 2022-08-12 NOTE — RESPIRATORY THERAPY NOTE
08/12/22 1905   Respiratory Assessment   Assessment Type During-treatment   General Appearance Sedated   Respiratory Pattern Assisted   Chest Assessment Chest expansion symmetrical   Bilateral Breath Sounds Diminished   Resp Comments Pt recieved on current vent settings  Change Exh valve  Tolerating Schedule resp tx well  Minimal thick yellow secretions noted  Will continue to monitor and assess per protocol  O2 Device vent   Airway Suctioning/Secretions   Suction Type Endotracheal tube   Suction Device Inline   Secretion Amount Moderate   Secretion Color Yellow; Tan   Secretion Consistency Thick   Suction Tolerance Tolerated well   Suctioning Adverse Effects None   ETT  Oral;Cuffed; Hi-Lo 8 mm   Placement Date/Time: 08/05/22 2045   Type: Oral;Cuffed; Hi-Lo  Tube Size: 8 mm  Laryngoscope: Victoria  Location: Oral  Placement Verification: End tidal CO2;Fiberoptic visualization;Symmetrical chest wall movement; Auscultation  Secured at (cm): 27  Com       Secured at (cm) 27   Measured from Lips   Secured Location Right   Secured by Commercial tube ling   Cuff Pressure (cm H2O) 28 cm H2O   HI-LO Suction  Intermittent suction

## 2022-08-12 NOTE — CASE MANAGEMENT
Case Management Discharge Planning Note    Patient name Jenni Luna  Location [address] [place] [address] [address] [id number] MRN [id number]  : 1950 Date 2022       Current Admission Date: 2022  Current Admission Diagnosis:IPMN (intraductal papillary mucinous neoplasm)   Patient Active Problem List    Diagnosis Date Noted    Intra-abdominal fluid collection 2022    Transaminitis 2022    TERESE (acute kidney injury) ([facility] ) 2022    Chemotherapy induced neutropenia ([facility] ) 2022    Port-A-Cath in place 2022    Counseling regarding advanced care planning and goals of care 2022    Encounter for geriatric assessment 2022    OAB (overactive bladder) 12/15/2020    BPH without obstruction/lower urinary tract symptoms 12/15/2020    Urgency incontinence 2020    Balanitis 2020    Thrombocytopenia ([facility] ) 2020    IPMN (intraductal papillary mucinous neoplasm) 2020    Type 2 diabetes mellitus with hyperglycemia, with long-term current use of insulin ([facility] ) 2019    Overlapping malignant neoplasm of pancreas ([facility] ) 2019    Pancreatic duct dilated 2018    Lower extremity edema 2018    Obstructive sleep apnea syndrome 2018    Hypersomnia 2018    Permanent atrial fibrillation ([facility] ) 2018    Morbid obesity with BMI of 40 0-44 9, adult ([facility] ) 2018      LOS (days): 17  Geometric Mean LOS (GMLOS) (days): 9 50  Days to GMLOS:-7 9     OBJECTIVE:  Risk of Unplanned Readmission Score: 35 65         Current admission status: Inpatient   Preferred Pharmacy:   [facility] Aria Joseph [address] [place] [address]  Phone: [phone] Fax: [address], [place] [address] [facility]  Phone: [phone] Fax: [phone]    [facility]/pharmacy #[id number] [id number] [address] [facility] [address]  Phone: [phone] Fax: [phone]    Primary Care Provider: Tahmina Lopez DO    Primary Insurance: MEDICARE  Secondary Insurance: [employer]    DISCHARGE DETAILS:    Additional Comments: Patient remians in critical care  DC needs uncertain at this time  CM remains available to assist as needed

## 2022-08-12 NOTE — PROGRESS NOTES
NEPHROLOGY CVVH PROCEDURE NOTE    Seen and examined on CVVH  Patient remains intubated/sedated  Remains on pressors for hemodynamic support  Discussed with Critical Care Nursing and surgical Critical Care  Filter exchanged yesterday  Filter life seems to be between 12-18 hours    QB: 300  Dialysate: 4 K / 3 Calcium  Ultrafiltration:  cc/hour  Filtration Fraction:20 %  Effluent:25 cc/kg/hour  Treatment Day:7  Anticoagulation: flush    Physical Exam:    /60   Pulse 101   Temp 98 1 °F (36 7 °C)   Resp 19   Ht 6' (1 829 m)   Wt (!) 144 kg (316 lb 9 3 oz)   SpO2 96%   BMI 42 94 kg/m²     General:  Remains sedated/intubated  CVS:  Irregular  Lungs:  Coarse, decreased at bases  Abdomen:  Distended, soft, noted abdominal edema  Access:  Temporary dialysis catheter  Extremities:  Diffuse anasarca    Assessment:  1  Acute kidney injury most likely secondary to ischemic/septic ATN plus possible component of contrast associated nephropathy  2  Sepsis/shock/hypotension, wean hemodynamic support as tolerated  3  Strep bacteremia/abdominal abscess, antibiotic treatment as per Infectious Disease  4  Intraductal papillary mucinous neoplasm status post pancreatectomy/lysis of adhesions/cholecystectomy, remains on TPN currently not able to tolerate tube feeds  5  Respiratory failure remains intubated - secondary to component of volume overload/anasarca, continue with ultrafiltration as tolerated  6  Anemia chronic disease, continue monitor closely  7  Thrombocytopenia, , haptoglobin 67, noted schistocytes, low fibrinogen  8  Cirrhosis with history of chronic alcohol use  9  Atrial fibrillation     Discussed with Critical Care anticipating PRBC transfusion for intravascular support  Will continue with CVVHD, ultrafiltrate as blood pressure tolerates    Continued current care as per previous discussions  Unfortunately given patient's length and continue need for continued CRRT, multi-organ dysfunction, volume overload, respiratory failure - overall high risk for mortality (greater than 60-80%)

## 2022-08-12 NOTE — PROGRESS NOTES
Progress Note - Surgical Oncology   Joe Roger 67 y o  male MRN: [id number]  Unit/Bed#: [facility] 827-36 Encounter: [id number]    Assessment:  67 y  o  male who presented with MD-IPMN s/p  IPMN, s/p Lap distal pancreatectomy (03/2019-Quiros), open subtotal pancreatectomy (02/2022-Quiros), now status post completion pancreatectomy with sims anastomosis, extensive SARAH, reduction of internal hernia and cholecystectomy on 7/26, now s/p IR drain placement on 8/3  Now w/ ARDS  Underwent bronch yesterday which was negative  Blue dye given down NG tube negative for leakage in drains     Spont 50% 16/12  Afebrile     Dave@[employer], vaso@0 04     Uo-1 1L  NG-930  LUQ IR drain-70 cc serous  RLQ STU drain-420 serous  CVVH running at - 100    Labs  WBC 10(13)  Hgb 7 8 (7 6)  Plt 29    Plan:  - wean vent/pressors as tolerated  - NPO/NGT  - PICC/TPN  - Continue CVVH per nephrology  - Continue drains, monitor output  - Abx/Antifungals per ID, appreciate recommendations  -Appreciat hematology recs  -Continue ICU    Subjective/Objective   Subjective: When sedation is weaned he will wake up, vasovagal causing a brief increase in pressors  Still requiring significant oxygen requirements, bronch yesterday was unremarkable  Blue dye test negative  Objective:     Blood pressure 143/77, pulse 85, temperature 97 7 °F (36 5 °C), resp  rate 14, height 6' (1 829 m), weight (!) 144 kg (316 lb 9 3 oz), SpO2 91 %  ,Body mass index is 42 94 kg/m²  Intake/Output Summary (Last 24 hours) at 8/12/2022 9398  Last data filed at 8/12/2022 0400  Gross per 24 hour   Intake 5567 ml   Output 6370 ml   Net -803 ml       Invasive Devices  Report    Peripherally Inserted Central Catheter Line  Duration           PICC Line 08/03/22 8 days          Central Venous Catheter Line  Duration           Port A Cath 03/30/22 Right Subclavian 134 days          Peripheral Intravenous Line  Duration           Peripheral IV 08/09/22 Dorsal (posterior); Right Forearm 3 days          Arterial Line  Duration           Arterial Line 08/06/22 Radial 6 days          Hemodialysis Catheter  Duration           HD Temporary Double Catheter 6 days          Drain  Duration           Closed/Suction Drain Right RLQ Bulb 19 Fr  16 days    Urethral Catheter Temperature probe 16 Fr  10 days    NG/OG/Enteral Tube Enteral Feeding Tube Right nare 9 days    Abscess Drain LUQ 8 days    Rectal Tube With balloon <1 day          Airway  Duration           ETT  Oral;Cuffed; Hi-Lo 8 mm 6 days                Physical Exam:  General: Critically ill  HENT: NG tube in place with blue output  Neck: supple, no JVD  CV: nl rate  Lungs: Mechanical ventilation  ABD: Soft, nontender, + distention, weeping of serous fluid from incision  Drains x2 with serous output  Extrem: diffuse anasarca   Neuro: Intubated/sedated      Lab, Imaging and other studies:  I have personally reviewed pertinent lab results    , CBC:   Lab Results   Component Value Date    WBC 10 98 (H) 08/12/2022    HGB 7 8 (L) 08/12/2022    HCT 24 5 (L) 08/12/2022     (H) 08/12/2022    PLT 29 (LL) 08/12/2022    MCH 34 4 (H) 08/12/2022    MCHC 31 8 08/12/2022    RDW 23 3 (H) 08/12/2022   , CMP:   Lab Results   Component Value Date    SODIUM 136 08/12/2022    K 4 1 08/12/2022     08/12/2022    CO2 26 08/12/2022    BUN 12 08/12/2022    CREATININE 0 58 (L) 08/12/2022    CALCIUM 8 4 08/12/2022    EGFR 101 08/12/2022     VTE Pharmacologic Prophylaxis: Sequential compression device (Venodyne)   VTE Mechanical Prophylaxis: sequential compression device

## 2022-08-13 PROBLEM — E03.8 SUBCLINICAL HYPOTHYROIDISM: Status: ACTIVE | Noted: 2022-01-01

## 2022-08-13 NOTE — PROGRESS NOTES
Daily Progress Note - Critical Care   Priyanka Nails 67 y o  male MRN: [id number]  Unit/Bed#: [facility] 515-01 Encounter: [id number]  ----------------------------------------------------------------------------------------  HPI/24hr events: 67 YOM s/p complete pancreatectomy w/ sims anastomosis, extensive SARAH, reduction of internal hernia, cholecystectomy 7/26 Returned to ICU 8/1 with hypotension, worsening AHRF  With IR drain placement 8/3 into 9x2x2 cm fluid collection within pancreatectomy bed  With worsening AMS and shock 8/5 requiring reintubation and HD cath placement for initiation of CVVH  Yesterday POCUS done revealing patient hyperdynamic, CVVH ran even with 1uPRBC and 1uPLTs given for volume resuscitation  Remained on Levo at 8, vasopressin 0 04  Remained on PSV 16/12/60%  No acute events overnight    ---------------------------------------------------------------------------------------  SUBJECTIVE  Remains Intubated    Review of Systems   Unable to perform ROS: Intubated     Review of systems was unable to be performed secondary to ETT/ Intubation  ---------------------------------------------------------------------------------------  Assessment and Plan:    Neuro:   · Diagnosis:Acute Metabolic Encephalopathy  ? Plan: Following commands intermittently appears to be improving  ? In setting of critical illness, septic shock  ? Delierium precautions  ? CAM ICU  ? Sleep/wake cycle  ? Consider PM antipsychotics   · Diagnosis: Analgesia/Sedation  ? Plan: S/p Paralysis with nimbex, d/c'd  ? Currently on Fentanyl gtt at 100, increased overnight  ? Precedex gtt at 1 2  ? PRN dilaudid added given apparent tolerance to fentanyl  ? Dilaudid (1 doses/24hrs)     CV:   · Diagnosis: Septic Shock  ? Plan: Levophed restarted 8/11, up to Levophed at 8 currently  ? Currently with vasopressin at 0 04  ? POCUS completed 8/12, appeared slightly hyperdynamic  ? S/P 1uPRBC and 1uPLT  ? CVVH running even  ?  Wean vasopressin, levophed to maintain MAP >65  ? F/u ID plan as below   · Diagnosis: Atrial fibrillation  ? Plan: Amio infusion held given bradycardia 8/10 AM  ? [facility] given thrombocytopenia  ? 8/1 ECHO EF 60% from 45%, no RV dysfunction  ? Holding home lasix in setting of shock, CVVH     Pulm:  · Diagnosis: Acute Hypoxic Respiratory Failure  2/2 volume overload, PNA  ? Plan: Reintubated 8/5, S/p Veletri  ? With ventilator desynchrony 8/9 into 8/10 transitioned from Bilevel to PSV  ? Currently on PSV 16/12/60%  ? Continue to wean PS as able to maintain SPO2 92%  ? CXR 8/11 stable from prior  ? S/p Bronch 8/11, sputum culture- 2+ polys, fungal cultures pending  ? VAP Bundle, Pulm Toilet  · Diagnosis: ARDS  ? Plan: Vent requirments stable   ? Continue mechanical ventilation, wean as able     GI:   · Diagnosis: IPMN, s/p completion pancreatectomy, reduction of internal hernia, cholecystectomy 7/26  ? Plan: 8/1 CT - new partially loculated fluid collection in post-pancreatectomy bed  ? 8/2 CT - stable fluid collection in pancreatectomy bed, no evidence of enteric contrast extravasation from staple line, stable pneumoperitoneum  ? 8/3 IR drain in L anterior collection   § Drain cultures positive for MDR Citrobacter, Klebsiella, Yeast  § Existing drain cultures positive for S  Anginosus, Candida, Klebsiella, GCS  § Drain 1 50cc/24hrs  § Drain 2 270 cc/24hrs  § Blue slushy down NGT 8/11, suctioned out of NGT after clamping x3hrs  § Continue Zosyn and Yue as below  § ID following  ? Increasing abdominal distension  § Stable from prior, no concern for abdominal compartment syndrome at this time  ? TF held in setting of poor GI absorption  ? Continue TPN  ? Will need Creon when taking PO again   · Stress ulcer PPX: Pantoprazole IV  · Bowel regimen: senna/colace and miralax added Dulcolax suppository daily  ? Last BM: 7/31     :   · Diagnosis: TERESE with Oliguria  ? Plan: Baseline Cr of 0 8  ? BUN/Cr 11/0 58  ?  Currently removing -50cc of volume per hour, was negative 100cc  ? I- 4 28L  ? O- 4 5L  ? --267 cc  ? /24hrs, down from 1200 previously  ? Nephrology following, appreciate recs  · Diagnosis: Hanna cath present  ? Plan: Strict I and O  ? BID hanna care     F/E/N:   · F- No maintenance fluids  · E- Repleted per CVVH protocol, Phos  · N- TPN via PICC        Heme/Onc:   · Diagnosis: Anemia in setting of critical illness, CRRT filter loss  ? Plan: Hgb currently 8 2, stable  ? S/p 1uPRBC 8/13  ? Continue to trend CBC  ? Transfuse as needed for hgb <7  ? DVT ppx- AC on holding given thrombocytopenia, SCDs  · Diagnosis: Thrombocytopenia  ? Plan: PLT currently 44, s/p 1u PLTs 8/13  ? 2/2 Sepsis vs Mechanical d/t CVVH  ? Hematology following  ? Transfuse as needed for PLTS less than 20, continue to trend        Endo:   · Diagnosis: DMII  ? Plan: Continue insulin gtt: 24 9u / 24hrs  ? Endocrine following  ? Maintain -180  ID:   · Diagnosis: Septic Shock d/t concern for possible intra abdominal abcess  ? Plan: ID following  ? WBC down to 10 98 from 13 64  ? T max overnight 98 2  ? Vasopressor requirments as above  ? Currently on Micafungin, Zosyn per ID  ? Drain cultures positive for MDRO  ? Previous Drain positive for: S  Anginosus,   § BC Blood cx - 1/2 streptococcus anginosus  § Blood cx - 1/2 negative x  5 days  § Blood cx - 2/2 negative x 5 hrs   ? Trend WBC and Fever  ? Appreciate ID consult     MSK/Skin:   · Diagnosis: Pressure Ulcer PPX  ? Plan: Q2H turning and repositioning  ?  Reengage PT OT once appropriate    Patient appropriate for transfer out of the ICU today?: No  Disposition: Continue Critical Care   Code Status: Level 1 - Full Code  ---------------------------------------------------------------------------------------  ICU CORE MEASURES    Prophylaxis   VTE Pharmacologic Prophylaxis: Pharmacologic VTE Prophylaxis contraindicated due to thrombocytopenia  VTE Mechanical Prophylaxis: sequential compression device  Stress Ulcer Prophylaxis: Pantoprazole IV     ABCDE Protocol (if indicated)  Plan to perform spontaneous awakening trial today? Yes  Plan to perform spontaneous breathing trial today? Yes  Obvious barriers to extubation? Mentation, oxygenation    Invasive Devices Review  Invasive Devices  Report    Peripherally Inserted Central Catheter Line  Duration           PICC Line 22 9 days          Central Venous Catheter Line  Duration           Port A Cath 22 Right Subclavian 135 days          Peripheral Intravenous Line  Duration           Peripheral IV 22 Dorsal (posterior); Right Forearm 4 days          Arterial Line  Duration           Arterial Line 22 Radial 7 days          Hemodialysis Catheter  Duration           HD Temporary Double Catheter 7 days          Drain  Duration           Closed/Suction Drain Right RLQ Bulb 19 Fr  17 days    Urethral Catheter Temperature probe 16 Fr  11 days    NG/OG/Enteral Tube Enteral Feeding Tube Right nare 10 days    Abscess Drain LUQ 9 days          Airway  Duration           ETT  Oral;Cuffed; Hi-Lo 8 mm 7 days              Can any invasive devices be discontinued today?  No  ---------------------------------------------------------------------------------------  OBJECTIVE    Vitals   Vitals:    22 0300 22 0345 22 0400 22 0500   BP: 130/58 (!) 86/47 167/91 135/72   Pulse: 94 85 89 82   Resp: 15  15 14   Temp: 97 7 °F (36 5 °C)  (!) 97 34 °F (36 3 °C) (!) 96 62 °F (35 9 °C)   TempSrc:       SpO2: 95%  95% 97%   Weight:       Height:         Temp (24hrs), Av 9 °F (36 6 °C), Min:96 62 °F (35 9 °C), Max:98 24 °F (36 8 °C)  Current: Temperature: (!) 96 62 °F (35 9 °C)    Respiratory:  SpO2: SpO2: 97 %, SpO2 Activity: SpO2 Activity: At Rest  Nasal Cannula O2 Flow Rate (L/min): 6 L/min    Invasive/non-invasive ventilation settings   Respiratory  Report   Lab Data (Last 4 hours)    None         O2/Vent Data (Last 4 hours)    None Physical Exam  Vitals and nursing note reviewed  Constitutional:       Interventions: He is sedated and intubated  HENT:      Head: Normocephalic and atraumatic  Right Ear: External ear normal       Left Ear: External ear normal       Nose: Nose normal       Mouth/Throat:      Mouth: Mucous membranes are dry  Pharynx: Oropharynx is clear  Eyes:      Pupils: Pupils are equal, round, and reactive to light  Cardiovascular:      Rate and Rhythm: Normal rate and regular rhythm  Pulses:           Radial pulses are 2+ on the right side and 2+ on the left side  Dorsalis pedis pulses are 1+ on the right side and 1+ on the left side  Heart sounds: S1 normal and S2 normal  No murmur heard  Pulmonary:      Effort: Pulmonary effort is normal  He is intubated  Breath sounds: Normal breath sounds  Abdominal:      General: Abdomen is flat  Bowel sounds are normal  There is distension  Palpations: Abdomen is soft  Tenderness: There is no abdominal tenderness  Skin:     General: Skin is warm and dry  Capillary Refill: Capillary refill takes less than 2 seconds  Laboratory and Diagnostics:  Results from last 7 days   Lab Units 08/12/22  0510 08/11/22  0549 08/10/22  0600 08/09/22  0418 08/08/22  1548 08/08/22  1423 08/08/22  0548 08/07/22  0922 08/07/22  0802 08/07/22  0618   WBC Thousand/uL 10 98* 13 64* 15 01* 18 40* 24 39*  --  27 57* 53 13*   < > 48 85*   HEMOGLOBIN g/dL 7 8* 7 6* 7 7* 6 6* 7 0*  --  7 2* 8 3*   < > 8 1*   HEMATOCRIT % 24 5* 24 3* 24 1* 21 6* 21 9*  --  22 6* 25 6*   < > 25 7*   PLATELETS Thousands/uL 29* 21* 20* 27* 39* 35* 14* 20*   < > 21*   NEUTROS PCT %  --   --   --  89*  --   --   --  91*  --   --    BANDS PCT %  --  6  --   --   --   --  10*  --   --  17*   MONOS PCT %  --   --   --  4  --   --   --  1*  --   --    MONO PCT %  --  0*  --   --   --   --  3*  --   --  0*    < > = values in this interval not displayed       Results from last 7 days   Lab Units 08/13/22  0059 08/12/22  1809 08/12/22  1202 08/12/22  0510 08/11/22  2342 08/11/22  1815 08/11/22  1148 08/11/22  0549 08/09/22  0553 08/09/22  0418 08/08/22  1156 08/08/22  0548 08/07/22  1202 08/07/22  0922   SODIUM mmol/L 136 137 137 136 138 139 137 137   < >  --    < > 139   < >  --    POTASSIUM mmol/L 4 4 4 0 4 1 4 1 4 2 4 3 3 9 4 0   < >  --    < > 4 7   < >  --    CHLORIDE mmol/L 107 108 108 107 108 110* 108 108   < >  --    < > 110*   < >  --    CO2 mmol/L 26 24 24 26 27 25 25 26   < >  --    < > 26   < >  --    ANION GAP mmol/L 3* 5 5 3* 3* 4 4 3*   < >  --    < > 3*   < >  --    BUN mg/dL 11 13 13 12 13 14 12 14   < >  --    < > 18   < >  --    CREATININE mg/dL 0 58* 0 60 0 65 0 58* 0 63 0 67 0 67 0 66   < >  --    < > 0 90   < >  --    CALCIUM mg/dL 8 4 8 3 8 2* 8 4 8 2* 8 0* 7 9* 7 9*   < >  --    < > 7 8*   < >  --    GLUCOSE RANDOM mg/dL 136 107 153* 154* 140 139 142* 163*   < >  --    < > 129   < >  --    ALT U/L  --   --  25  --   --   --   --  28  --  29  --  26  --  26   AST U/L  --   --  51*  --   --   --   --  59*  --  57*  --  56*  --  64*   ALK PHOS U/L  --   --  177*  --   --   --   --  161*  --  221*  --  155*  --  142*   ALBUMIN g/dL  --   --  1 5*  --   --   --   --  1 5*  --  1 6*  --  1 6*  --  1 8*   TOTAL BILIRUBIN mg/dL  --   --  5 34*  --   --   --   --  5 25*  --  3 87*  --  3 98*  --  4 73*    < > = values in this interval not displayed       Results from last 7 days   Lab Units 08/13/22  0059 08/12/22  1809 08/12/22  1202 08/12/22  0510 08/11/22  2342 08/11/22  1815 08/11/22  1148   MAGNESIUM mg/dL 1 7 1 7 2 0 1 8 1 7 1 8 2 0   PHOSPHORUS mg/dL 2 2* 1 7* 2 0* 1 7* 4 0 2 3 1 8*      Results from last 7 days   Lab Units 08/12/22  1020 08/08/22  1423   INR  1 67* 1 30*   PTT seconds  --  37          Results from last 7 days   Lab Units 08/07/22  0922 08/07/22  0617 08/06/22  2041 08/06/22  1659 08/06/22  1622 08/06/22  1333   LACTIC ACID mmol/L 2 4* 2 5* 3  3* 4 1* 3 6* 4 1*     ABG:  Results from last 7 days   Lab Units 08/10/22  0554 22  2355   PH ART  7 469* 7 417   PCO2 ART mm Hg 32 0* 41 5   PO2 ART mm Hg 101 2 82 7   HCO3 ART mmol/L 22 7 26 1   BASE EXC ART mmol/L -0 7 1 5   ABG SOURCE   --  Line, Arterial     VBG:  Results from last 7 days   Lab Units 22  2355   ABG SOURCE  Line, Arterial           Micro  Results from last 7 days   Lab Units 22  1422   GRAM STAIN RESULT  2+ Polys  No bacteria seen       EKG: Atrial fibrillation rate 82  Imagin/11 CXR stable bilateral opacities from 8/10     I have personally reviewed pertinent reports  Intake and Output  I/O        07 07 07 07    P  O  0 0    I V  (mL/kg) 1840 (12 8) 1567 9 (10 9)    Blood  525    NG/ 60    IV Piggyback 1524 731    TPN 1721 1404 5    Total Intake(mL/kg) 5966 (41 4) 4288 4 (29 8)    Urine (mL/kg/hr) 1203 (0 3) 450 (0 1)    Emesis/NG output 930 850    Drains 530 320    Other 4948 2736    Stool 0 200    Total Output 7611 4556    Net -1645 -267 6          Unmeasured Stool Occurrence 0 x         UOP: 0 200 ml/kg/hr     Height and Weights   Height: 6' (182 9 cm)  IBW (Ideal Body Weight): 77 6 kg  Body mass index is 42 94 kg/m²  Weight (last 2 days)     Date/Time Weight    22 0500 144 (316 58)    22 0551 136 (300 71)        Nutrition       Diet Orders   (From admission, onward)             Start     Ordered    22 1048  Diet NPO; Sips with meds  Diet effective now        References:    Nutrtion Support Algorithm Enteral vs  Parenteral   Question Answer Comment   Diet Type NPO    NPO Except: Sips with meds    RD to adjust diet per protocol?  No        22 1048              Active Medications  Scheduled Meds:  Current Facility-Administered Medications   Medication Dose Route Frequency Provider Last Rate    acetaminophen  975 mg Per NG Tube Q8H Corrine Cesar, DO      Adult TPN (CUSTOM BASE/CUSTOM ELECTROLYTE) Intravenous Continuous TPN MIRNA Rodas 71 9 mL/hr at 08/12/22 2244    bisacodyl  10 mg Rectal Daily Barbi Carlos      chlorhexidine  15 mL Mouth/Throat Q12H [address]      dexmedetomidine  0 1-1 2 mcg/kg/hr Intravenous Titrated Saritha Eisenberg PA-C 1 2 mcg/kg/hr (08/13/22 0421)    fentaNYL  100 mcg/hr Intravenous Continuous Saritha Eisenberg PA-C 100 mcg/hr (08/12/22 2224)    HYDROmorphone  0 5 mg Intravenous Q3H PRN Saritha Eisenberg PA-C      insulin regular (HumuLIN R,NovoLIN R) infusion  0 3-21 Units/hr Intravenous Titrated Saritha Eisenberg PA-C 1 Units/hr (08/13/22 0418)    iohexol  30 mL Oral 90 min pre-procedure MIRNA Rodas      ipratropium  0 5 mg Nebulization Q6H Yaakov Colon MD      levalbuterol  1 25 mg Nebulization Q6H Yaakov Colon MD      methocarbamol  500 mg Oral Q6H PRN Marika Lira [place], CRNP      micafungin  100 mg Intravenous Q24H Marv Arreola  mg (08/12/22 1616)    norepinephrine  1-30 mcg/min Intravenous Titrated Celestine Dow PA-C 9 mcg/min (08/13/22 0415)   Earna Romina NxStage K 4/Ca 3  20,000 mL Dialysis Continuous Juliene Scheuermann Dunn, DO 0 mL (08/08/22 0715)    OLANZapine  10 mg Intramuscular HS Randall Isaacs PA-C      ondansetron  4 mg Intravenous Q6H PRN Saritha Eisenberg PA-C      pantoprazole  40 mg Intravenous Q12H [facility] MIRNA Barrera      phenol  1 spray Mouth/Throat Q2H PRN Desi Andrews PA-C      piperacillin-tazobactam  3 375 g Intravenous Q8H Marv Arreola MD 3 375 g (08/13/22 0406)    polyethylene glycol  17 g Oral Daily [facility]      senna-docusate sodium  1 tablet Oral BID [place], CR[place]      sodium chloride  4 mL Nebulization TID Saritha Eisenberg PA-C      sodium phosphate  6 mmol Intravenous Once Celestina Kitchen MD 6 mmol (08/13/22 0407)    vasopressin (PITRESSIN) in 0 9 % sodium chloride 100 mL  0 04 Units/min Intravenous Continuous Celsa Prudencio Maynard MD 0 04 Units/min (08/12/22 2229)     Continuous Infusions:  Adult TPN (CUSTOM BASE/CUSTOM ELECTROLYTE), , Last Rate: 71 9 mL/hr at 08/12/22 2244  dexmedetomidine, 0 1-1 2 mcg/kg/hr, Last Rate: 1 2 mcg/kg/hr (08/13/22 0421)  fentaNYL, 100 mcg/hr, Last Rate: 100 mcg/hr (08/12/22 2224)  insulin regular (HumuLIN R,NovoLIN R) infusion, 0 3-21 Units/hr, Last Rate: 1 Units/hr (08/13/22 0418)  norepinephrine, 1-30 mcg/min, Last Rate: 9 mcg/min (08/13/22 0415)  NxStage K 4/Ca 3, 20,000 mL, Last Rate: 0 mL (08/08/22 0715)  vasopressin (PITRESSIN) in 0 9 % sodium chloride 100 mL, 0 04 Units/min, Last Rate: 0 04 Units/min (08/12/22 2229)      PRN Meds:   HYDROmorphone, 0 5 mg, Q3H PRN  methocarbamol, 500 mg, Q6H PRN  ondansetron, 4 mg, Q6H PRN  phenol, 1 spray, Q2H PRN      Allergies   No Known Allergies  ---------------------------------------------------------------------------------------  Advance Directive and Living Will:      Power of :    POLST:    ---------------------------------------------------------------------------------------  Care Time Delivered:   No Critical Care time spent     [address] [facility]      Portions of the record may have been created with voice recognition software  Occasional wrong word or "sound a like" substitutions may have occurred due to the inherent limitations of voice recognition software    Read the chart carefully and recognize, using context, where substitutions have occurred,m

## 2022-08-13 NOTE — RESPIRATORY THERAPY NOTE
08/13/22 0314   Respiratory Assessment   Assessment Type Assess only   General Appearance Sedated   Respiratory Pattern Assisted   Chest Assessment Chest expansion symmetrical   Bilateral Breath Sounds Diminished   Resp Comments No new changes at this time  Pt remains on same vent settings; tolerating well  Will continue to monitor and assess per protocol   ETT  Oral;Cuffed; Hi-Lo 8 mm   Placement Date/Time: 08/05/22 2045   Type: Oral;Cuffed; Hi-Lo  Tube Size: 8 mm  Laryngoscope: Victoria  Location: Oral  Placement Verification: End tidal CO2;Fiberoptic visualization;Symmetrical chest wall movement; Auscultation  Secured at (cm): 27  Com       Secured at (cm) 27   Measured from Lips   Secured Location Right   Secured by Commercial tube ling   HI-LO Suction  Intermittent suction

## 2022-08-13 NOTE — PROGRESS NOTES
Progress Note - Surgical Oncology   Kassidy Heard 67 y o  male MRN: [id number]  Unit/Bed#: [facility] 262-23 Encounter: [id number]    Assessment:  67 y  o  male who presented with MD-IPMN s/p  IPMN, s/p Lap distal pancreatectomy (2019-), open subtotal pancreatectomy (2022-), now status post completion pancreatectomy with sims anastomosis, extensive SARAH, reduction of internal hernia and cholecystectomy on , now s/p IR drain placement on 8/3  Now w/ ARDS  Afebrile, MAPS:50-60s ; Levo at 17, vaso: 0 04  Intubated on PS: 16/12/60%    CVVH running even- positive 25  UOP: 126 cc   NGT:  650 cc; bilious  RUQ FRANDY: 195 cc serous  LUQ IR drain: 77 cc   CVVH: 3L  No stools recorded       /44  5/-3 5  plts 30 from 31; hb 8 from 8 3      Plan:  Appreciate ICU level of care  Continue to wean pressors as tolerated  Continue TPN  Continue to wean to extubate-pending continued family discussions early next week   Continue abx per ID; appreciate recs  Continue CVVH per nephro; appreciate recs  Maintain Frandy and IR drain and continue to monitor output        Subjective/Objective   Subjective:  Given 1 unit PRBCs 1 unit platelets and packed of FFP  Running positive 25 to even on CVVH  Began scheduled albumin 12 5g q6 hours for 5 doses  Started on stress dose steroids  Objective:     Blood pressure 97/50, pulse 90, temperature (!) 97 34 °F (36 3 °C), resp  rate (!) 10, height 6' (1 829 m), weight 133 kg (293 lb 6 9 oz), SpO2 95 %  ,Body mass index is 39 8 kg/m²        Intake/Output Summary (Last 24 hours) at 2022 1655  Last data filed at 2022 1601  Gross per 24 hour   Intake 5216 9 ml   Output 5567 ml   Net -350 1 ml       Invasive Devices  Report    Peripherally Inserted Central Catheter Line  Duration           PICC Line 22 10 days          Central Venous Catheter Line  Duration           Port A Cath 22 Right Subclavian 136 days          Peripheral Intravenous Line Duration           Peripheral IV 08/13/22 Proximal;Right;Ventral (anterior) Forearm <1 day    Peripheral IV 08/13/22 Right;Upper;Ventral (anterior) Arm <1 day          Arterial Line  Duration           Arterial Line 08/06/22 Radial 7 days          Hemodialysis Catheter  Duration           HD Temporary Double Catheter 7 days          Drain  Duration           Closed/Suction Drain Right RLQ Bulb 19 Fr  17 days    Urethral Catheter Temperature probe 16 Fr  12 days    NG/OG/Enteral Tube Enteral Feeding Tube Right nare 11 days    Abscess Drain LUQ 10 days          Airway  Duration           ETT  Oral;Cuffed; Hi-Lo 8 mm 7 days                Physical Exam  Vitals reviewed  Constitutional:       Interventions: He is sedated, intubated and restrained  HENT:      Head: Normocephalic and atraumatic  Cardiovascular:      Rate and Rhythm: Normal rate  Pulmonary:      Effort: He is intubated  Abdominal:      General: There is no distension  Palpations: Abdomen is soft  Comments: Frandy and IR drain in place  Midline incision clean dry and intact   Genitourinary:     Comments: Dockery in place  Scrotal swelling  Musculoskeletal:      Right lower leg: Edema present  Left lower leg: Edema present  Skin:     General: Skin is warm  Lab, Imaging and other studies:  I have personally reviewed pertinent lab results    , CBC:   Lab Results   Component Value Date    WBC 11 46 (H) 08/13/2022    HGB 8 3 (L) 08/13/2022    HCT 26 4 (L) 08/13/2022     (H) 08/13/2022    PLT 31 (LL) 08/13/2022    MCH 33 5 08/13/2022    MCHC 31 4 08/13/2022    RDW 24 3 (H) 08/13/2022    MPV 14 5 (H) 08/13/2022    NRBC 0 08/13/2022   , CMP:   Lab Results   Component Value Date    SODIUM 136 08/13/2022    K 4 4 08/13/2022     08/13/2022    CO2 27 08/13/2022    BUN 10 08/13/2022    CREATININE 0 57 (L) 08/13/2022    CALCIUM 8 3 08/13/2022    AST 50 (H) 08/13/2022    ALT 21 08/13/2022    ALKPHOS 193 (H) 08/13/2022    EGFR 101 08/13/2022     VTE Pharmacologic Prophylaxis: Sequential compression device (Venodyne)  and Heparin  VTE Mechanical Prophylaxis: sequential compression device

## 2022-08-13 NOTE — RESPIRATORY THERAPY NOTE
RT Ventilator Management Note  Teto Ferraro 67 y o  male MRN: [id number]  Unit/Bed#: [facility] 492-54 Encounter: [id number]      Daily Screen         8/12/2022  1349 8/13/2022  0754          Patient safety screen outcome[de-identified] Passed Passed (P)                 Physical Exam:   Assessment Type: (P) During-treatment  General Appearance: (P) Sedated  Respiratory Pattern: (P) Assisted  Chest Assessment: (P) Chest expansion symmetrical  Bilateral Breath Sounds: (P) Diminished      Resp Comments: (P) pt found stable on spont settings 16/+12, tolerating well  no changes made at this time  will continue to monitor per protocol

## 2022-08-13 NOTE — PROGRESS NOTES
NEPHROLOGY PROGRESS NOTE   Babar Jurado 67 y o  male MRN: [id number]  Unit/Bed#: [facility] 811-19 Encounter: [id number]  Reason for Consult:  Acute kidney injury    Assessment:  1  Acute kidney injury most likely secondary to ischemic/septic ATN plus possible component of contrast associated nephropathy  2  Sepsis/shock/hypotension, wean hemodynamic support as tolerated  3  Strep bacteremia/abdominal abscess, antibiotic treatment as per Infectious Disease  4  Intraductal papillary mucinous neoplasm status post pancreatectomy/lysis of adhesions/cholecystectomy, surgery following closely, possibly starting trickle feeds  5  Elevated bilirubin, workup as per primary service  6  Respiratory failure remains intubated - secondary to component of volume overload/anasarca, continue with ultrafiltration as tolerated  7  Anemia chronic disease, continue monitor closely  8  Thrombocytopenia, , haptoglobin 67, noted schistocytes, low fibrinogen  9  Cirrhosis with history of chronic alcohol use  10  Atrial fibrillation     · Will continue with CVVH  · Currently patient even with out significant ultrafiltration given persistent hypotension  · Wean pressors as tolerated  · To start trickle feeds, if patient able to tolerate oral, could consider midodrine 10-50 mg 3 times daily to help wean pressor dosing  · Ultrafiltrate 1 blood pressure improved  · Overall prognosis appears poor, persistent multiorgan dysfunction, volume overload, respiratory failure  SUBJECTIVE:  Filter being changed, no reports of repeated clotting  Remains intubated/sedated  Remains on multiple pressors for hemodynamic support      Review of Systems    OBJECTIVE:  Current Weight: Weight - Scale: 133 kg (293 lb 6 9 oz)  Vitals:    08/13/22 1619 08/13/22 1630 08/13/22 1631 08/13/22 1632   BP:  97/50     Pulse: 91 91 90 90   Resp: (!) 10 (!) 11 (!) 10 (!) 10   Temp: (!) 97 34 °F (36 3 °C) (!) 97 34 °F (36 3 °C) (!) 97 34 °F (36 3 °C) (!) 97 34 °F (36 3 °C)   TempSrc:       SpO2: 96% 95% 95% 95%   Weight:       Height:           Intake/Output Summary (Last 24 hours) at 8/13/2022 1634  Last data filed at 8/13/2022 1601  Gross per 24 hour   Intake 5216 9 ml   Output 5567 ml   Net -350 1 ml       Physical Exam  Constitutional:       Interventions: He is sedated and intubated  Cardiovascular:      Rate and Rhythm: Normal rate  Rhythm irregular  Pulmonary:      Effort: He is intubated  Breath sounds: Examination of the right-lower field reveals decreased breath sounds  Examination of the left-lower field reveals decreased breath sounds  Decreased breath sounds present  Abdominal:      General: There is distension  Tenderness: There is no abdominal tenderness  Musculoskeletal:      Right lower leg: Edema present  Left lower leg: Edema present  Comments: Anasarca   Skin:     General: Skin is warm and dry           Medications:    Current Facility-Administered Medications:     acetaminophen (TYLENOL) oral suspension 975 mg, 975 mg, Per NG Tube, Q8H, Corrine Imperatore, DO, 975 mg at 08/13/22 1517    Adult 3-in-1 TPN (custom base / custom electrolytes), , Intravenous, Continuous TPN, MIRNA Poon, Last Rate: 71 9 mL/hr at 08/12/22 2244, New Bag at 08/12/22 2244    Adult 3-in-1 TPN (custom base / custom electrolytes), , Intravenous, Continuous TPN, Kacie Kessler PA-C    albumin human (FLEXBUMIN) 25 % injection 12 5 g, 12 5 g, Intravenous, Q6H, Kacie Kessler PA-C, Last Rate: 0 mL/hr at 08/13/22 1016, 12 5 g at 08/13/22 1504    bisacodyl (DULCOLAX) rectal suppository 10 mg, 10 mg, Rectal, Daily, Kacie Kessler PA-C, 10 mg at 08/12/22 0913    chlorhexidine (PERIDEX) 0 12 % oral rinse 15 mL, 15 mL, Mouth/Throat, Q12H [facility], Simone Dow PA-C, 15 mL at 08/13/22 0817    dexmedeTOMIDine (Precedex) 400 mcg in sodium chloride 0 9% 100 mL, 0 1-1 2 mcg/kg/hr, Intravenous, Titrated, Kacie Kessler PA-C, Last Rate: 24 9 mL/hr at 08/13/22 1515, 0 7 mcg/kg/hr at 08/13/22 1515    fentaNYL 1000 mcg in sodium chloride 0 9% 100mL infusion, 100 mcg/hr, Intravenous, Continuous, Saritha Eisenberg PA-C, Last Rate: 10 mL/hr at 08/13/22 1628, 100 mcg/hr at 08/13/22 1628    HYDROmorphone (DILAUDID) injection 0 5 mg, 0 5 mg, Intravenous, Q3H PRN, Saritha Eisenberg PA-C, 0 5 mg at 08/13/22 0727    insulin lispro (HumaLOG) 100 units/mL subcutaneous injection 1-5 Units, 1-5 Units, Subcutaneous, Q6H [address] **AND** Fingerstick Glucose (POCT), , , Q6H, MIRNA Warren    iohexol (OMNIPAQUE) 240 MG/ML solution 30 mL, 30 mL, Oral, 90 min pre-procedure, MIRNA Mascorro    ipratropium (ATROVENT) 0 02 % inhalation solution 0 5 mg, 0 5 mg, Nebulization, Q6H, Yaakov Colon MD, 0 5 mg at 08/13/22 1325    levalbuterol (Pecolia Wilman) inhalation solution 1 25 mg, 1 25 mg, Nebulization, Q6H, Yaakov Colon MD, 1 25 mg at 08/13/22 1325    levothyroxine (Synthroid) suspension 25 mcg, 25 mcg, Oral, Early Morning, Saritha Eisenberg PA-C    methocarbamol (ROBAXIN) tablet 500 mg, 500 mg, Oral, Q6H PRN, 100 [employer], 500 mg at 08/01/22 1404    micafungin (MYCAMINE) 100 mg in sodium chloride 0 9 % 100 mL IVPB, 100 mg, Intravenous, Q24H, Saritha Eisenberg PA-C, Last Rate: 100 mL/hr at 08/12/22 1616, 100 mg at 08/12/22 1616    norepinephrine (LEVOPHED) 8 mg (DOUBLE CONCENTRATION) IV in sodium chloride 0 9% 250 mL, 1-30 mcg/min, Intravenous, Titrated, Celestine Dow PA-C, Last Rate: 26 3 mL/hr at 08/13/22 1252, 14 mcg/min at 08/13/22 1252    NxStage K 4/Ca 3 dialysis solution (RFP-401) 20,000 mL, 20,000 mL, Dialysis, Continuous, Juliene Scheuermann Dunn, DO, Last Rate: 0 mL/hr at 08/08/22 0715, 20,000 mL at 08/13/22 1355    OLANZapine (ZyPREXA) IM injection 10 mg, 10 mg, Intramuscular, HS, Puja Isaacs PA-C, 10 mg at 08/12/22 2307    ondansetron (ZOFRAN) injection 4 mg, 4 mg, Intravenous, Q6H PRN, Saritha Eisenberg PA-C, 4 mg at 08/03/22 0734    pantoprazole (PROTONIX) injection 40 mg, 40 mg, Intravenous, Q12H [facility],  Arina, MIRNA, 40 mg at 08/13/22 0817    phenol (CHLORASEPTIC) 1 4 % mucosal liquid 1 spray, 1 spray, Mouth/Throat, Q2H PRN, Ailin Isaacs PA-C    piperacillin-tazobactam (ZOSYN) 3 375 g in sodium chloride 0 9 % 100 mL IVPB, 3 375 g, Intravenous, Q8H, Karolyn Hoang PA-C, Stopped at 08/13/22 1312    polyethylene glycol (MIRALAX) packet 17 g, 17 g, Oral, Daily, MIRNA Mascorro, 17 g at 08/13/22 1002    senna-docusate sodium (SENOKOT S) 8 6-50 mg per tablet 1 tablet, 1 tablet, Oral, BID, MIRNA Mascorro, 1 tablet at 08/13/22 1001    sodium chloride 3 % inhalation solution 4 mL, 4 mL, Nebulization, TID, Karolyn Hoang PA-C, 4 mL at 08/13/22 1325    vasopressin (PITRESSIN) 20 Units in sodium chloride 0 9 % 100 mL infusion, 0 04 Units/min, Intravenous, Continuous, George Márquez MD, Last Rate: 12 mL/hr at 08/13/22 1515, 0 04 Units/min at 08/13/22 1515    Laboratory Results:  Results from last 7 days   Lab Units 08/13/22  1325 08/13/22  1157 08/13/22  0607 08/13/22  0059 08/12/22  1809 08/12/22  1202 08/12/22  0510 08/11/22  2342 08/11/22  1148 08/11/22  0549 08/10/22  1215 08/10/22  0600 08/09/22  0553 08/09/22  0418 08/08/22  1757 08/08/22  1548 08/07/22  0029 08/06/22  2356   WBC Thousand/uL 11 46*  --  10 47*  --   --   --  10 98*  --   --  13 64*  --  15 01*  --  18 40*  --  24 39*   < >  --    HEMOGLOBIN g/dL 8 3*  --  8 2*  --   --   --  7 8*  --   --  7 6*  --  7 7*  --  6 6*  --  7 0*   < >  --    I STAT HEMOGLOBIN g/dl  --   --   --   --   --   --   --   --   --   --   --   --   --   --   --   --   --  7 8*   HEMATOCRIT % 26 4*  --  25 9*  --   --   --  24 5*  --   --  24 3*  --  24 1*  --  21 6*  --  21 9*   < >  --    HEMATOCRIT, ISTAT %  --   --   --   --   --   --   --   --   --   --   --   --   --   --   --   --   --  23*   PLATELETS Thousands/uL 31*  --  39*  --   --   --  29*  -- --  21*  --  20*  --  27*  --  39*   < >  --    POTASSIUM mmol/L  --  4 4 4 3 4 4 4 0 4 1 4 1 4 2   < > 4 0   < > 4 0   < >  --    < >  --    < >  --    CHLORIDE mmol/L  --  107 106 107 108 108 107 108   < > 108   < > 107   < >  --    < >  --    < >  --    CO2 mmol/L  --  27 24 26 24 24 26 27   < > 26   < > 24   < >  --    < >  --    < >  --    CO2, I-STAT mmol/L  --   --   --   --   --   --   --   --   --   --   --   --   --   --   --   --   --  39*   BUN mg/dL  --  10 10 11 13 13 12 13   < > 14   < > 16   < >  --    < >  --    < >  --    CREATININE mg/dL  --  0 57* 0 57* 0 58* 0 60 0 65 0 58* 0 63   < > 0 66   < > 0 80   < >  --    < >  --    < >  --    CALCIUM mg/dL  --  8 3 8 1* 8 4 8 3 8 2* 8 4 8 2*   < > 7 9*   < > 8 4   < >  --    < >  --    < >  --    MAGNESIUM mg/dL  --  1 7 2 0 1 7 1 7 2 0 1 8 1 7   < > 1 8   < > 2 0   < >  --    < >  --    < >  --    PHOSPHORUS mg/dL  --  2 3 2 6 2 2* 1 7* 2 0* 1 7* 4 0   < > 2 1*   < > 1 7*   < >  --    < >  --    < >  --    GLUCOSE, ISTAT mg/dl  --   --   --   --   --   --   --   --   --   --   --   --   --   --   --   --   --  121    < > = values in this interval not displayed

## 2022-08-13 NOTE — PROGRESS NOTES
Progress Note - Surgical Oncology   Carson Keith 67 y o  male MRN: [id number]  Unit/Bed#: [facility] 460-58 Encounter: [id number]    Assessment:  67 y  o  male who presented with MD-IPMN s/p  IPMN, s/p Lap distal pancreatectomy (03/2019-Marlee), open subtotal pancreatectomy (02/2022-Marlee), now status post completion pancreatectomy with sims anastomosis, extensive SARAH, reduction of internal hernia and cholecystectomy on 7/26, now s/p IR drain placement on 8/3  Now w/ ARDS and ileus    Spont 60% 16/12  Afebrile     Ludin@Off & Away, vaso@0 04     Uo-450  NG-850  LUQ IR drain-50 cc murky serous  RLQ STU drain-270 serous  CVVH running even    Labs  WBC 10 4 (10 98)  Hgb 8 2 (7 8)  Plt 39 (29)  BMP pending    Plan:  - Wean vent/pressors as tolerated, will likely need tracheostomy  - NPO/NGT, continue to hold TFs  - PICC/TPN  - Continue CVVH per nephrology appreciate recommendations  - Continue drains, monitor output  - Abx/Antifungals per ID, appreciate recommendations  -Appreciat hematology recs  -Continue ICU    Subjective/Objective   Subjective:   No acute events overnight  Has hypotension/desaturations when moves  Is not following commands per nursing when sedation is weaned    Objective:     Blood pressure 128/68, pulse 78, temperature (!) 96 44 °F (35 8 °C), resp  rate 12, height 6' (1 829 m), weight 133 kg (293 lb 6 9 oz), SpO2 97 %  ,Body mass index is 39 8 kg/m²  Intake/Output Summary (Last 24 hours) at 8/13/2022 0658  Last data filed at 8/13/2022 0300  Gross per 24 hour   Intake 4464 4 ml   Output 4799 ml   Net -334 6 ml       Invasive Devices  Report    Peripherally Inserted Central Catheter Line  Duration           PICC Line 08/03/22 9 days          Central Venous Catheter Line  Duration           Port A Cath 03/30/22 Right Subclavian 135 days          Peripheral Intravenous Line  Duration           Peripheral IV 08/09/22 Dorsal (posterior); Right Forearm 4 days          Arterial Line  Duration           Arterial Line 08/06/22 Radial 7 days          Hemodialysis Catheter  Duration           HD Temporary Double Catheter 7 days          Drain  Duration           Closed/Suction Drain Right RLQ Bulb 19 Fr  17 days    Urethral Catheter Temperature probe 16 Fr  11 days    NG/OG/Enteral Tube Enteral Feeding Tube Right nare 10 days    Abscess Drain LUQ 9 days          Airway  Duration           ETT  Oral;Cuffed; Hi-Lo 8 mm 7 days                Physical Exam:  General: Critically ill  HENT: NG with bilious output  Neck: supple, no JVD  CV: nl rate  Lungs: mechanically ventilated  ABD: Soft, nontender, +distention  Incision c/d/i  JPx2 with serous  Left charles is murky  Extrem: diffuse anasarca  Neuro: sedated        Lab, Imaging and other studies:  I have personally reviewed pertinent lab results    , CBC:   Lab Results   Component Value Date    WBC 10 47 (H) 08/13/2022    HGB 8 2 (L) 08/13/2022    HCT 25 9 (L) 08/13/2022     (H) 08/13/2022    PLT 39 (LL) 08/13/2022    MCH 33 5 08/13/2022    MCHC 31 7 08/13/2022    RDW 24 5 (H) 08/13/2022    MPV 14 5 (H) 08/13/2022    NRBC 0 08/13/2022   , CMP:   Lab Results   Component Value Date    SODIUM 137 08/13/2022    K 4 3 08/13/2022     08/13/2022    CO2 24 08/13/2022    BUN 10 08/13/2022    CREATININE 0 57 (L) 08/13/2022    CALCIUM 8 1 (L) 08/13/2022    AST 51 (H) 08/12/2022    ALT 25 08/12/2022    ALKPHOS 177 (H) 08/12/2022    EGFR 101 08/13/2022     VTE Pharmacologic Prophylaxis: Sequential compression device (Venodyne)   VTE Mechanical Prophylaxis: sequential compression device

## 2022-08-14 NOTE — RESPIRATORY THERAPY NOTE
RT Ventilator Management Note  Humberto Flores 67 y o  male MRN: [id number]  Unit/Bed#: [facility] 847-26 Encounter: [id number]      Daily Screen         8/13/2022  1327 8/14/2022  0730          Patient safety screen outcome[de-identified] Passed Passed (P)                 Physical Exam:   Assessment Type: During-treatment  General Appearance: Sedated  Respiratory Pattern: Assisted  Chest Assessment: Chest expansion symmetrical  Bilateral Breath Sounds: Coarse, Diminished  Cough: Unable to assess  Suction: ET Tube      Resp Comments: (P) pt found stable on spont settings 16/+12 60%  tolerating well  sx moderate amount of tan secretions  no changes made at this time  will continue to monitor per protocol

## 2022-08-14 NOTE — PROGRESS NOTES
NEPHROLOGY CVVH PROCEDURE NOTE    Seen and examined on CVVH  Remains sedated/intubated  Remains on vasopressin plus Levophed for blood pressure support  Unable to tolerate significant ultrafiltration given patient's blood pressure  QB: 300  Dialysate: 4 K / 3 calcium  Ultrafiltration: 25 cc/hour  Filtration Fraction:20 %  Effluent:25 cc/kg/hour  Treatment Day:9  Anticoagulation: flush    Physical Exam:    /57   Pulse (!) 114   Temp 97 52 °F (36 4 °C)   Resp (!) 9   Ht 6' (1 829 m)   Wt 135 kg (297 lb 9 9 oz)   SpO2 96%   BMI 40 36 kg/m²     General:  Sedated intubated  CVS:  Irregular  Lungs:  Coarse, decreased at bases  Abdomen:  Distended, noted abdominal wall edema  Access:  Temp dialysis catheter  Extremities:  Diffuse anasarca    Assessment:  1  Acute kidney injury most likely secondary to ischemic/septic ATN plus possible component of contrast associated nephropathy  2  Sepsis/shock/hypotension, wean hemodynamic support as tolerated  3  Strep bacteremia/abdominal abscess, antibiotic treatment as per Infectious Disease, status post IR drain  4  Intraductal papillary mucinous neoplasm status post pancreatectomy/lysis of adhesions/cholecystectomy, surgery following closely, possibly starting trickle feeds as per surgery  5  Elevated bilirubin, workup as per primary service  6  Respiratory failure remains intubated - secondary to component of volume overload/anasarca, continue with ultrafiltration as tolerated  7  Anemia chronic disease, continue monitor closely  8  Thrombocytopenia, , haptoglobin 67, noted schistocytes, low fibrinogen  9  Cirrhosis with history of chronic alcohol use  10   Atrial fibrillation     Will continue with current renal replacement therapy (CVVHD)   Ultrafiltrate 1 blood pressure improved   Continue to wean pressors as tolerated   Unfortunately given patient's prolonged need for mechanical ventilation, renal replacement therapy, prolonged need for vasopressor support, overall prognosis appears poor     Anticipating family discussions as per primary service   No other changes in current regimen

## 2022-08-14 NOTE — PROGRESS NOTES
Daily Progress Note - Critical Care   Evelyne De Paz 67 y o  male MRN: [id number]  Unit/Bed#: [facility] 515-01 Encounter: [id number]    ----------------------------------------------------------------------------------------  HPI/24hr events: 66 yo PMH A-fib, T2DM, IPMN s/p distal pancreatectomy (3/2019), open subtotal pancreatectomy (02/2022) and now admitted s/p completion pancreatectomy with sims anastomosis, extensive SARAH, reduction of internal hernia and cholecystectomy on 7/26  S/p IR LUQ drain placement 8/3  Now with ARDS and shock  · Increasing vasopressor requirements throughout the day and overnight, now on 17 levophed and vasopressin 0 04  · Stress dose steroids started   · Running + 25 on CRRT, received 25% albumin q6h and 1 U FFP   · Family meeting scheduled for tomorrow 8/15 at 12 pm     ---------------------------------------------------------------------------------------  SUBJECTIVE  Unable to offer     Review of Systems  Review of systems was unable to be performed secondary to intuabtion, encephalopathy  ---------------------------------------------------------------------------------------  Assessment and Plan:    Neuro:   · Diagnosis: Acute metabolic encephalopathy    ? Secondary to sepsis, shock, critical illness, following commands on sedation breaks    ? Sedation: Precedex 0 6 mcg/kg/hr for RASS goal 0 to -1   ? Analgesia: Fentanyl 100 mcg/hr, [address] tylenol   ? PRN dilaudid 0 5 mg q3h pain/agitation - 3 doses/24 hrs   ? Zyprexa 10 mg IM qHS   ? Monitor QTc  · Delirium precautions  § CAM-ICU daily  § Regulate sleep/wake cycle         CV:   · Diagnosis: Shock   ? Likely septic with intra-abdominal source, now with worsening shock, concern for intravascular depletion   ? Levophed 17 mcg/min, Vasopressin 0 04 U/min   ? Wean for MAP goal >65  ? Hydrocortisone 50 mg q6h   ? Albumin 25 g/25% q6h - continue for another 24 hrs   ? CVVH + 25 mL/hr   ? Continue Zosyn + micafungin per ID   ?  Trend end points   · Diagnosis: Atrial fibrillation   ? Rate controlled, amiodarone infusion held 2/2 bradycardia on 8/10  ? Holding systemic AC with severe thrombocytopenia   ? Holding home atenolol with shock   ? Follow rhythm on telemetry  ? 8/1 Echo EF 60% improved from 45% on 7/27, no RV dysfunction         Pulm:  · Diagnosis:  Acute hypoxic respiratory failure, ARDS  ? 2/2 volume overload, encephalopathy, pneumonia   ? Re-intubated 8/5  ? With ventilator dyssynchrony - currently comfortable on PS 16/12 60%  ? Wean FIO2 for Spo2 >92%    ? Atrovent/Xopanex q6h [address]   ? 8/11 bronch - klebsiella oxytoca   ? Continue Zosyn  ? Pulmonary toileting      GI:   · Diagnosis: IPMN s/p completion pancreatectomy, reduction of internal hernia and cholecystectomy on 7/26  ? 8/1 CT - new partially loculated fluid collection in post-pancreatectomy bed  ? 8/2 CT - stable fluid collection in pancreatectomy bed, no evidence of enteric contrast extravasation from staple line, stable pneumoperitoneum  ? 8/3 IR drain in L anterior collection   § Cultures - + Citrobacter, Klebsiella, Candidia   § Surgical drain cultures - + Strep anginosus, Clandida, Klebsiella, Group C Streptococcus    § Zosyn + Micafungin per ID   § Continue to monitor drain output   ? Serial abdominal exams  ? Trickle TF held with increasing vasopressor requirements   ? PICC line in place with TPN  ? Check TG in AM   ? Will need Creon when taking PO again   · Stress ulcer PPX: Pantoprazole IV  · Bowel regimen: senna/colace, Dulcolax suppository daily, miralax daily  ? Last BM: 8/12        :   · Diagnosis: Acute renal failure with oliguria, urinary retention    ? Baseline Cr 0 8  ? CRRT started 8/6   ? Currently running + 25 mL/hr   ? Trend BUN/CR, Strict I/O   ? Hanna: Yes - maintaining 2/2 urinary retention and significant scrotal edema   ? Urinary retention protocol when hanna removed         F/E/N:   · Fluid/Diuretic plan:  Albumin 25 g/25% q6h [address], + 25 mL/hr on CRRT   · E: Replete for goal K >4, Phos >3, Mg >2 per CRRT protocol   · N: TPN        Heme/Onc:   · Diagnosis: Anemia  ? In setting of critical illness, acute blood loss   ? Hgb 7 8 from 8 3  ? VTE PPX: SCDS  ? SQH and AC on hold with thrombocytopenia   · Diagnosis: Acute on chronicThrombocytopenia   · Chronic mild thrombocytopenia 2/2 cancer/chemo   · Now with superimposed acute thrombocytopenia, plts currently 30  · 2/2 sepsis and mechanical sheering with CRRT   · HIT KATERINA sent 8/13  · Hematology following   · Transfused for plts <20,000 or bleeding         Endo:   · Diagnosis: T2DM  ? Now s/p pancreatectomy   ? Insulin infusion - maintain with critical illness   ? BG goal 140-180  ? Endocrinology following          ID:   · Diagnosis: Septic shock, LUQ intraabdominal abscess, strep anginosus bacteremia, Klebsiella PNA    ? ID following, recs appreciated   ? Zosyn + Micafungin for total 2 weeks s/p IR drainage of abscess (end date 8/17)  ? Fever curve masked by CRRT   ? Cultures   § IR drain: + Citrobacter, Klebsiella, Candidia   § Surgical drain: + Strep anginosus, Clandida, Klebsiella, Group C Streptococcus  § Bronch 8/11: Klebsiella oxytoca   § Fungal blood: NGTD  § 8/2 Blood: NGTD  § 8/1 Blood: Strep anginosus            MSK/Skin:   · Frequent turning and repositioning     Lines:   · PICC   · HD cath   · A line   · Dockery   · R port - deaccess       Patient appropriate for transfer out of the ICU today?: No  Disposition: Continue Critical Care   Code Status: Level 1 - Full Code  ---------------------------------------------------------------------------------------  ICU CORE MEASURES    Prophylaxis   VTE Pharmacologic Prophylaxis: Pharmacologic VTE Prophylaxis contraindicated due to thrombocytopenia   VTE Mechanical Prophylaxis: sequential compression device  Stress Ulcer Prophylaxis: Pantoprazole IV     ABCDE Protocol (if indicated)  Plan to perform spontaneous awakening trial today?  Yes  Plan to perform spontaneous breathing trial today? Yes  Obvious barriers to extubation? Yes  CAM-ICU: Positive    Invasive Devices Review  Invasive Devices  Report    Peripherally Inserted Central Catheter Line  Duration           PICC Line 22 10 days          Central Venous Catheter Line  Duration           Port A Cath 22 Right Subclavian 136 days          Peripheral Intravenous Line  Duration           Peripheral IV 22 Proximal;Right;Ventral (anterior) Forearm <1 day    Peripheral IV 22 Right;Upper;Ventral (anterior) Arm <1 day          Arterial Line  Duration           Arterial Line 22 Radial 8 days          Hemodialysis Catheter  Duration           HD Temporary Double Catheter 8 days          Drain  Duration           Closed/Suction Drain Right RLQ Bulb 19 Fr  18 days    Urethral Catheter Temperature probe 16 Fr  12 days    NG/OG/Enteral Tube Enteral Feeding Tube Right nare 11 days    Abscess Drain LUQ 10 days          Airway  Duration           ETT  Oral;Cuffed; Hi-Lo 8 mm 8 days              Can any invasive devices be discontinued today? Yes - deaccess port   ---------------------------------------------------------------------------------------  OBJECTIVE    Vitals   Vitals:    22 0554 22 0600 22 0707 22 0730   BP:  114/58  111/54   Pulse:  (!) 108 103 104   Resp:  (!) 11 (!) 8 (!) 9   Temp:  97 52 °F (36 4 °C) 97 52 °F (36 4 °C) 97 52 °F (36 4 °C)   TempSrc:       SpO2:  95% 96% 96%   Weight: 135 kg (297 lb 9 9 oz)      Height:         Temp (24hrs), Av 2 °F (36 2 °C), Min:87 98 °F (31 1 °C), Max:97 88 °F (36 6 °C)  Current: Temperature: 97 52 °F (36 4 °C)    Respiratory:  SpO2: SpO2: 96 %, SpO2 Device: O2 Device: Ventilator  Nasal Cannula O2 Flow Rate (L/min): 6 L/min    Invasive/non-invasive ventilation settings   Respiratory  Report   Lab Data (Last 4 hours)    None         O2/Vent Data (Last 4 hours)    None                Physical Exam  Vitals reviewed  Constitutional:       General: He is not in acute distress  Appearance: He is ill-appearing and toxic-appearing  He is not diaphoretic  Interventions: He is sedated, intubated and restrained  Eyes:      Pupils: Pupils are equal, round, and reactive to light  Neck:      Comments: R IJ HD cath in place   Cardiovascular:      Rate and Rhythm: Normal rate  Rhythm irregularly irregular  Heart sounds: Normal heart sounds  Comments: Anasarca  Pulmonary:      Effort: Pulmonary effort is normal  No tachypnea  He is intubated  Breath sounds: Rhonchi (throughout) present  Abdominal:      General: There is distension  Palpations: Abdomen is soft  Comments: BL STU drains serous    Musculoskeletal:      Right lower leg: 3+ Edema present  Left lower leg: 3+ Edema present  Skin:     General: Skin is warm and dry  Neurological:      Comments: Not following commands on sedation              Laboratory and Diagnostics:  Results from last 7 days   Lab Units 08/14/22  0524 08/14/22  0006 08/13/22  1325 08/13/22  0607 08/12/22  0510 08/11/22  0549 08/10/22  0600 08/09/22  0418 08/08/22  1423 08/08/22  0548 08/07/22  0922   WBC Thousand/uL 12 71* 8 65 11 46* 10 47* 10 98* 13 64* 15 01* 18 40*   < > 27 57* 53 13*   HEMOGLOBIN g/dL 7 9* 7 8* 8 3* 8 2* 7 8* 7 6* 7 7* 6 6*   < > 7 2* 8 3*   HEMATOCRIT % 25 0* 25 1* 26 4* 25 9* 24 5* 24 3* 24 1* 21 6*   < > 22 6* 25 6*   PLATELETS Thousands/uL 30* 30* 31* 39* 29* 21* 20* 27*   < > 14* 20*   NEUTROS PCT %  --   --   --   --   --   --   --  89*  --   --  91*   BANDS PCT %  --   --   --   --   --  6  --   --   --  10*  --    MONOS PCT %  --   --   --   --   --   --   --  4  --   --  1*   MONO PCT %  --  2*  --   --   --  0*  --   --   --  3*  --     < > = values in this interval not displayed       Results from last 7 days   Lab Units 08/14/22  0524 08/13/22  2348 08/13/22  1811 08/13/22  1157 08/13/22  0607 08/13/22  0059 08/12/22  1809 08/12/22  1202 08/11/22  1148 08/11/22  0549 08/09/22  0553 08/09/22  0418 08/08/22  1156 08/08/22  0548 08/07/22  1202 08/07/22  0922   SODIUM mmol/L 135 135 135 136 137 136 137 137   < > 137   < >  --    < > 139   < >  --    POTASSIUM mmol/L 4 9 4 4 4 5 4 4 4 3 4 4 4 0 4 1   < > 4 0   < >  --    < > 4 7   < >  --    CHLORIDE mmol/L 106 105 106 107 106 107 108 108   < > 108   < >  --    < > 110*   < >  --    CO2 mmol/L 24 24 25 27 24 26 24 24   < > 26   < >  --    < > 26   < >  --    ANION GAP mmol/L 5 6 4 2* 7 3* 5 5   < > 3*   < >  --    < > 3*   < >  --    BUN mg/dL 9 9 9 10 10 11 13 13   < > 14   < >  --    < > 18   < >  --    CREATININE mg/dL 0 74 0 70 0 60 0 57* 0 57* 0 58* 0 60 0 65   < > 0 66   < >  --    < > 0 90   < >  --    CALCIUM mg/dL 8 7 8 6 8 6 8 3 8 1* 8 4 8 3 8 2*   < > 7 9*   < >  --    < > 7 8*   < >  --    GLUCOSE RANDOM mg/dL 177* 154* 145* 133 151* 136 107 153*   < > 163*   < >  --    < > 129   < >  --    ALT U/L  --   --   --  21  --   --   --  25  --  28  --  29  --  26  --  26   AST U/L  --   --   --  50*  --   --   --  51*  --  59*  --  57*  --  56*  --  64*   ALK PHOS U/L  --   --   --  193*  --   --   --  177*  --  161*  --  221*  --  155*  --  142*   ALBUMIN g/dL  --   --   --  1 5*  --   --   --  1 5*  --  1 5*  --  1 6*  --  1 6*  --  1 8*   TOTAL BILIRUBIN mg/dL  --   --   --  5 38*  --   --   --  5 34*  --  5 25*  --  3 87*  --  3 98*  --  4 73*    < > = values in this interval not displayed       Results from last 7 days   Lab Units 08/14/22  0524 08/13/22  2348 08/13/22  1811 08/13/22  1157 08/13/22  0607 08/13/22  0059 08/12/22  1809   MAGNESIUM mg/dL 2 1 1 9 1 8 1 7 2 0 1 7 1 7   PHOSPHORUS mg/dL 3 0 2 8 2 4 2 3 2 6 2 2* 1 7*      Results from last 7 days   Lab Units 08/13/22  1325 08/12/22  1020 08/08/22  1423   INR  1 82* 1 67* 1 30*   PTT seconds 48*  --  37          Results from last 7 days   Lab Units 08/07/22  0922   LACTIC ACID mmol/L 2 4*     ABG:  Results from last 7 days   Lab Units 08/14/22  0006   PH ART  7 317*   PCO2 ART mm Hg 44 9*   PO2 ART mm Hg 91 0   HCO3 ART mmol/L 22 5   BASE EXC ART mmol/L -3 5   ABG SOURCE  Line, Arterial     VBG:  Results from last 7 days   Lab Units 08/14/22  0006   ABG SOURCE  Line, Arterial           Micro  Results from last 7 days   Lab Units 08/11/22  1422   GRAM STAIN RESULT  2+ Polys  No bacteria seen       EKG: Atrial fibrillation on telemetry   Imaging:  I have personally reviewed pertinent reports  Intake and Output  I/O       08/12 0701 08/13 0700 08/13 0701 08/14 0700    P  O  0 0    I V  (mL/kg) 1888 9 (14 2) 1836 (13 6)    Blood 525 250    NG/GT 90 235    IV Piggyback 1175 492    TPN 1562 5 1288    Total Intake(mL/kg) 5241 4 (39 4) 4101 (30 4)    Urine (mL/kg/hr) 535 (0 2) 106 (0)    Emesis/NG output 950 650    Drains 345 272    Other 3498 2790    Stool 200 0    Total Output 5528 3818    Net -286 6 +283          Unmeasured Stool Occurrence  0 x        Height and Weights   Height: 6' (182 9 cm)  IBW (Ideal Body Weight): 77 6 kg  Body mass index is 40 36 kg/m²  Weight (last 2 days)     Date/Time Weight    08/14/22 0554 135 (297 62)    08/13/22 0600 133 (293 43)    08/12/22 0500 144 (316 58)            Nutrition       Diet Orders   (From admission, onward)             Start     Ordered    08/11/22 1048  Diet NPO; Sips with meds  Diet effective now        References:    Nutrtion Support Algorithm Enteral vs  Parenteral   Question Answer Comment   Diet Type NPO    NPO Except: Sips with meds    RD to adjust diet per protocol?  No        08/11/22 1048                Active Medications  Scheduled Meds:  Current Facility-Administered Medications   Medication Dose Route Frequency Provider Last Rate    acetaminophen  975 mg Per NG Tube Q8H Corrine Guerrero,       Adult TPN (CUSTOM BASE/CUSTOM ELECTROLYTE)   Intravenous Continuous TPN Jose G Bhatt PA-C 71 9 mL/hr at 08/13/22 2119    Adult TPN (CUSTOM BASE/CUSTOM ELECTROLYTE) Intravenous Continuous TPN Jenny Burk PA-C      albumin human  25 g Intravenous Q6H [facility]      bisacodyl  10 mg Rectal Daily [facility]      chlorhexidine  15 mL Mouth/Throat Q12H [address]      dexmedetomidine  0 1-1 2 mcg/kg/hr Intravenous Titrated PADMINI Gama-C 0 5 mcg/kg/hr (08/14/22 0713)    fentaNYL  100 mcg/hr Intravenous Continuous Jenny PADMINI Burk-C 100 mcg/hr (08/14/22 0427)    hydrocortisone sodium succinate  50 mg Intravenous Q6H [facility], [address]      HYDROmorphone  0 5 mg Intravenous Q3H PRN Jennytherese Burk PA-C      insulin lispro  1-5 Units Subcutaneous Q6H [facility] Hernandez Sol[place], [address]      iohexol  30 mL Oral 90 min pre-procedure 100 [facility], CRANDER      ipratropium  0 5 mg Nebulization Q6H Sharath Cooper MD      levalbuterol  1 25 mg Nebulization Q6H Sharath Cooper MD      levothyroxine  25 mcg Oral Early Morning [facility]      methocarbamol  500 mg Oral Q6H  [facility], MIRNA      micafungin  100 mg Intravenous Q24H Jenny Burk PA-C 100 mg (08/13/22 1703)    norepinephrine  1-30 mcg/min Intravenous Titrated Loy Leyden Burkett, PA-C 15 mcg/min (08/14/22 0730)    NxStage K 4/Ca 3  20,000 mL Dialysis Continuous Clementine Collier, DO 0 mL (08/08/22 0715)    OLANZapine  10 mg Intramuscular HS Michael Isaacs PA-C      ondansetron  4 mg Intravenous Q6H PRN Jennytherese Burk PA-C      pantoprazole  40 mg Intravenous Q12H [facility] MIRNA Galicia      phenol  1 spray Mouth/Throat Q2H PRN Babar Bar PA-C      piperacillin-tazobactam  3 375 g Intravenous Q8H Jenny Burk PA-C 3 375 g (08/14/22 0427)    polyethylene glycol  17 g Oral Daily [facility]      senna-docusate sodium  1 tablet Oral  [facility]      sodium chloride  4 mL Nebulization TID Jenny Burk PA-C      sterile water           vasopressin (PITRESSIN) in 0 9 % sodium chloride 100 mL  0 04 Units/min Intravenous Continuous Gladys Tyson MD 0 04 Units/min (08/14/22 0025)     Continuous Infusions:  Adult TPN (CUSTOM BASE/CUSTOM ELECTROLYTE), , Last Rate: 71 9 mL/hr at 08/13/22 2119  Adult TPN (CUSTOM BASE/CUSTOM ELECTROLYTE),   dexmedetomidine, 0 1-1 2 mcg/kg/hr, Last Rate: 0 5 mcg/kg/hr (08/14/22 0713)  fentaNYL, 100 mcg/hr, Last Rate: 100 mcg/hr (08/14/22 0427)  norepinephrine, 1-30 mcg/min, Last Rate: 15 mcg/min (08/14/22 0730)  NxStage K 4/Ca 3, 20,000 mL, Last Rate: 0 mL (08/08/22 0715)  vasopressin (PITRESSIN) in 0 9 % sodium chloride 100 mL, 0 04 Units/min, Last Rate: 0 04 Units/min (08/14/22 0025)      PRN Meds:   HYDROmorphone, 0 5 mg, Q3H PRN  methocarbamol, 500 mg, Q6H PRN  ondansetron, 4 mg, Q6H PRN  phenol, 1 spray, Q2H PRN        Allergies   No Known Allergies  ---------------------------------------------------------------------------------------  Advance Directive and Living Will:      Power of :    POLST:    ---------------------------------------------------------------------------------------  Care Time Delivered:   Upon my evaluation, this patient had a high probability of imminent or life-threatening deterioration due to septic shock, renal failure , which required my direct attention, intervention, and personal management  I have personally provided 35 minutes ([phone] [phone]) of critical care time, exclusive of procedures, teaching, family meetings, and any prior time recorded by providers other than myself  Jordana Diaz PA-C      Portions of the record may have been created with voice recognition software  Occasional wrong word or "sound a like" substitutions may have occurred due to the inherent limitations of voice recognition software    Read the chart carefully and recognize, using context, where substitutions have occurred

## 2022-08-14 NOTE — ACP (ADVANCE CARE PLANNING)
Provided a medical update at bedside  Discussed that patient has required increased pressors, positive CVVH, and addition of stress dose steroids over the last 48 hours  We are actively trying to wean pressors and sedation as able  After discussion wife and daughter agree that if he as to decline overnight they would not want patient to undergo cardioversion or CPR  Will transition to a level 2 code  Plans for further discussion in family multidisciplinary meeting 8/15/2022  I have spent a total of 15 minutes in the care of this patient  Of this time, 15 minutes was spent in directly providing critical care services, which may include (but not limited to) titration of vasoactive medications, ventilator management, interpretation of hemodynamic data, managing enteral or parenteral nutritional support, complex medical decision making, management of multiple organ system failure, evaluating for the presence of life-threatening injuries or illnesses, or interpretation of complex medical databases  This does not include time spent on procedures or in family discussions      Megan Turk DO

## 2022-08-15 LAB — PF4 HEPARIN CMPLX AB SER-ACNC: 0.21 OD (ref 0–0.4)

## 2022-08-15 NOTE — ACP (ADVANCE CARE PLANNING)
Critical Care Advanced Care Planning Note  Evie Pardo 67 y o  male MRN: [id number]  Unit/Bed#: [facility] 397-13 Encounter: [id number]    Evie Pardo is a 67 y o  male requiring critical care evaluation and advanced care planning  Due to the severity of the patient's acute condition and/or the extent of chronic conditions, additional conversations pertaining to advanced care planning were required  Today's discussion, which was held in a face-to-face meeting, included Angelo Chinchilla (spouse), daughters, and other family members, and it was established that all stake holders understood the rationale for the advanced care planning  The patient was unable to participate in the discussion due to intubation  Summary of Discussion:  We discussed Arash's declining clinical status, particularly increasing pressor requirement and worsening lab values despite aggressive medical treatments  After a discussion with Dr Mary Ellen Kessler and the rest of the surgical and ICU team, the family does not wish for any further treatments at this time and would like to change to comfort measures  Total time spent, (30) minutes (2155 to 2225)        CODE STATUS: COMFORT CARE - Level 4  POA:    POLST:        SIGNATURE: Marcos Patel MD  DATE: August 14, 2022  TIME: 10:36 PM

## 2022-08-15 NOTE — DEATH NOTE
INPATIENT DEATH NOTE  Joe Roger 67 y o  male MRN: [id number]  Unit/Bed#: [facility] 310-64 Encounter: [id number]    Date, Time and Cause of Death    Date of Death: 22  Time of Death: 11:23 PM  Preliminary Cause of Death: Septic shock ([facility] [address] )  Entered by: Black Madera MD[[id number]]     Attribution     JM1 1 Leslie Garza MD 22 23:24           Patient's Information  Pronounced by: Black Madera MD  Did the patient's death occur in the ED?: No  Did the patient's death occur in the OR?: No  Did the patient's death occur less than 10 days post-op?: No  Did the patient's death occur within 24 hours of admission?: No  Was code status DNR at the time of death?: Yes    PHYSICAL EXAM:  Unresponsive to noxious stimuli, Spontaneous respirations absent, Breath sounds absent, Carotid pulse absent and Heart sounds absent    Medical Examiner notification criteria:  NONE APPLICABLE   Medical Examiner's office notified?:  No, does not meet ME notification criteria     Family Notification  Was the family notified?: Yes  Date Notified: 22  Time Notified: 7777  Notified by: Black Madera MD  Name of Family Notified of Death: Lynn Crest   Relationship to Patient: Spouse  Family Notification Route:  At bedside  Was the family told to contact a  home?: Yes  Name of [facility][de-identified] Caroline Oscar, [address]    Autopsy Options:  Post-mortem examination declined by next of kin    Primary Service Attending Physician notified?:  yes - Attending:  Marylou Rodriguez MD    Physician/Resident responsible for completing Discharge Summary:  Black Madera MD

## 2022-08-15 NOTE — DISCHARGE SUMMARY
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), subtotal pancreatectomy (2/15/2022), chemotherapy, NST radiation oncology who presents to ICU following completion of pancreatectomy with sims anastomosis, SARAH, reduction of internal hernia and cholecystectomy  Following his surgery in February 2022, surgical margins were found to be positive, prompting further surgical evaluation and completion of the above procedure  He arrived to the unit post operatively intubated with NGT, on levo, sandip and propofol  Intra operatively, he received 6 7 L crystalloid, 1 7 L colloid, 1u pRBC with UOP 1 4 L and EBL 1L "       Procedures Performed:   Orders Placed This Encounter   Procedures    Intubation    Temporary HD Catheter    Temporary HD Catheter    Temporary HD Catheter     Summary of Hospital Course: Clovis Lin was admitted to the ICU following completion pancreatectomy on 7/26/22  Discharged from ICU on 7/29  Readmitted to ICU on 8/1 due to hypotension, fever, and altered mental status  At that time, he was found to have multiple abdominal fluid collections concerning for abscesses  He was treated with antibiotics and continued aggressive medical management in the ICU  On 8/5 he developed worsening respiratory distress and was re-intubated   He continued to require critical care management over the subsequent days with increasing pressor requirements, CVVHD requirement, respiratory distress, and ultimately went into septic shock on the evening of   At that time, family made the decision to transition to comfort care      Significant Findings, Care, Treatment and Services Provided:     completion of pancreatectomy with sims anastomosis, SARAH, reduction of internal hernia and cholecystectomy Melanie Aaron)   Extubated   CT C/A/P: multiple fluid collections; multifocal ascites; nodular liver; fat stranding within pancreatic bed   ECHO EF 96%, normal systolic function   BCx GPC chains   CT C/A/P: stable fluid collection, ascites, edema, cirrhosis, anasarca   CT C/A/P: grossly stable fluid collection  8/3 IR cx showing 4+ polys, 2+ GPR, 2+ yeast (multi-drug resistant citrobacter, klebsiella, candida glabrata, candida albicans)  8/3 IR drainage tube placed into LUQ fluid collection   Intubated, RIJ HD Cath placed, started on CVVHD   Bronchoscopy   Bronchoscopy, sputum culture growing Klebsiella    Complications: AHRF, ARDS, MDRO pneumonia, post-operative ileus, acute renal failure, septic shock    Disposition:      Final Diagnosis: septic shock leading to multi-system organ failure    Medical Problems             Resolved Problems  Date Reviewed: 2022   None                 Condition at Time of Death: Critical    Date, Time and Cause of Death    Date of Death: 22  Time of Death: 11:23 PM  Preliminary Cause of Death: Sept[facility] shock ([facility] )  Entered by: Tj Judge MD[JM1 1]     Attribution     JM1 1 Javed Tran MD 22 23:24          Death Note:    INPATIENT DEATH NOTE  Herberth Kiser 67 y o  male MRN: [id number]  Unit/Bed#: [facility] 515-01 Encounter: [id number]    Date, Time and Cause of Death    Date of Death: 22  Time of Death: 11:23 PM  Preliminary Cause of Death: Septic shock ([facility] )  Entered by: Tj Judge MD[JM1 1]     Attribution     JM1 1 Javed Tran MD 22 23:24           Patient's Information  Pronounced by: Tj Judge MD  Did the patient's death occur in the ED?: No  Did the patient's death occur in the OR?: No  Did the patient's death occur less than 10 days post-op?: No  Did the patient's death occur within 24 hours of admission?: No  Was code status DNR at the time of death?: Yes    PHYSICAL EXAM:  Unresponsive to noxious stimuli, Spontaneous respirations absent, Breath sounds absent, Carotid pulse absent and Heart sounds absent    Medical Examiner notification criteria:  NONE APPLICABLE   Medical Examiner's office notified?:  No, does not meet ME notification criteria     Family Notification  Was the family notified?: Yes  Date Notified: 22  Time Notified: 7304  Notified by: Anibal Palacio MD  Name of Family Notified of Death: Kelsey Danielson   Relationship to Patient: Spouse  Family Notification Route:  At bedside  Was the family told to contact a  home?: Yes  Name of [facility][de-identified] [place], [place]    Autopsy Options:  Post-mortem examination declined by next of kin    Primary Service Attending Physician notified?:  yes - Attending:  Justin Brizuela MD    Physician/Resident responsible for completing Discharge Summary:  Anibal Palacio MD

## 2022-08-15 NOTE — CLINICAL RISK MANAGEMENT
Progress Note - Death in Restraints   Agustin Mitchell 67 y o  male MRN: [id number]  Unit/Bed#: [facility] 515-01 Encounter: [id number]      Patient  within 24 hours of restraint  with Soft restraint right wrist and Soft restraint left wrist  Death unrelated to use of restraints  This situation was tracked internally  CMS and PA-CLARI  notification not required

## 2022-08-17 LAB
FUNGUS SPEC CULT: ABNORMAL
FUNGUS SPEC CULT: ABNORMAL

## 2022-08-18 LAB
SRA .2 IU/ML UFH SER-ACNC: <1 % (ref 0–20)
SRA 100IU/ML UFH SER-ACNC: <1 % (ref 0–20)
SRA UFH SER-IMP: NORMAL

## 2022-08-22 LAB — FUNGAL BLOOD CULTURE: NORMAL

## 2022-08-29 NOTE — PROGRESS NOTES
Patient, a 76-year-old type 2 diabetic presents for palliative nail care  Patient has been cutting his toenails himself without difficulty  Pedal pulses are palpable  Only the left great toenail is difficult for him as it is a pincer nail  Treatment consisted of nail trimming  Patient to handle his toenails himself while he can  He is rescheduled for a diabetic foot exam in 1 year  Patient is having pain in each lower leg  He has tight skin secondary to venous insufficiency  Patient was referred to the vascular team further assessment  Also noted is an apparent bone prominence off the right tibia  This may be related to his vascular problem but patient may need a general surgeon  He is advised to 1st discuss this with the vascular surgeon  Note Text (......Xxx Chief Complaint.): This diagnosis correlates with the Other (Free Text): Margins were not clear from punch exc on back; however, there is no pigmentation today. Detail Level: Detailed

## 2022-09-05 LAB — FUNGAL BLOOD CULTURE: NORMAL

## 2022-09-12 ENCOUNTER — TELEPHONE (OUTPATIENT)
Dept: HEMATOLOGY ONCOLOGY | Facility: CLINIC | Age: 72
End: 2022-09-12

## 2022-09-12 LAB — FUNGAL BLOOD CULTURE: NORMAL

## 2022-09-16 ENCOUNTER — TELEPHONE (OUTPATIENT)
Dept: HEMATOLOGY ONCOLOGY | Facility: CLINIC | Age: 72
End: 2022-09-16

## 2022-09-16 NOTE — TELEPHONE ENCOUNTER
Spoke with patient spouse Yrn Benavides in regards to itemized bill needed for supplemental insurance  Gave number our for [employer] counselor to assist  That is listed on consent forms  Patient spouse appreciative  Reviewed patient spouse able to call me back if patient spouse has any difficulty getting in touch with the finance department  Reviewed that patient called on Monday and message was sent to Oncology Finance department

## 2022-09-19 LAB — FUNGAL BLOOD CULTURE: NORMAL

## 2023-02-24 ENCOUNTER — ANESTHESIA (OUTPATIENT)
Dept: PREADMISSION TESTING | Facility: HOSPITAL | Age: 73
End: 2023-02-24

## 2023-02-24 ENCOUNTER — ANESTHESIA EVENT (OUTPATIENT)
Dept: PREADMISSION TESTING | Facility: HOSPITAL | Age: 73
End: 2023-02-24

## 2023-11-25 NOTE — PROGRESS NOTES
CADD order released Normal vision: sees adequately in most situations; can see medication labels, newsprint

## (undated) DEVICE — ENSEAL X1 TISSUE SEALER, CURVED JAW, 37 CM SHAFT LENGTH: Brand: ENSEAL

## (undated) DEVICE — INTENDED FOR TISSUE SEPARATION, AND OTHER PROCEDURES THAT REQUIRE A SHARP SURGICAL BLADE TO PUNCTURE OR CUT.: Brand: BARD-PARKER SAFETY BLADES SIZE 10, STERILE

## (undated) DEVICE — SUT SILK 2-0 TIES 144 IN LA55G

## (undated) DEVICE — THE ECHELON, ECHELON ENDOPATH™ AND ECHELON FLEX™ FAMILIES OF ENDOSCOPIC LINEAR CUTTERS AND RELOADS ARE STERILE, SINGLE PATIENT USE INSTRUMENTS THAT SIMULTANEOUSLY CUT AND STAPLE TISSUE. THERE ARE SIX STAGGERED ROWS OF STAPLES, THREE ON EITHER SIDE OF THE CUT LINE. THE 45 MM INSTRUMENTS HAVE A STAPLE LINE THATIS APPROXIMATELY 45 MM LONG AND A CUT LINE THAT IS APPROXIMATELY 42 MM LONG. THE SHAFT CAN ROTATE FREELY IN BOTH DIRECTIONS AND AN ARTICULATION MECHANISM ON ARTICULATING INSTRUMENTS ENABLES BENDING THE DISTAL PORTIONOF THE SHAFT TO FACILITATE LATERAL ACCESS OF THE OPERATIVE SITE.THE INSTRUMENTS ARE SHIPPED WITHOUT A RELOAD AND MUST BE LOADED PRIOR TO USE. A STAPLE RETAINING CAP ON THE RELOAD PROTECTS THE STAPLE LEG POINTS DURING SHIPPING AND TRANSPORTATION. THE INSTRUMENTS’ LOCK-OUT FEATURE IS DESIGNED TO PREVENT A USED RELOAD FROM BEING REFIRED.: Brand: ECHELON ENDOPATH

## (undated) DEVICE — SUT PDS PLUS 3-0 SH 27IN PDP316H

## (undated) DEVICE — 40601 PROLONGED POSITIONING SYSTEM: Brand: 40601 PROLONGED POSITIONING SYSTEM

## (undated) DEVICE — 3M™ STERI-STRIP™ COMPOUND BENZOIN TINCTURE 40 BAGS/CARTON 4 CARTONS/CASE C1544: Brand: 3M™ STERI-STRIP™

## (undated) DEVICE — LIGACLIP MCA MULTIPLE CLIP APPLIERS, 20 MEDIUM CLIPS: Brand: LIGACLIP

## (undated) DEVICE — PROXIMATE SKIN STAPLERS (35 WIDE) CONTAINS 35 STAINLESS STEEL STAPLES (FIXED HEAD): Brand: PROXIMATE

## (undated) DEVICE — SUT SILK 2-0 SH 30 IN K833H

## (undated) DEVICE — TROCAR: Brand: KII FIOS FIRST ENTRY

## (undated) DEVICE — GAUZE SPONGES,16 PLY: Brand: CURITY

## (undated) DEVICE — PAD GROUNDING ADULT

## (undated) DEVICE — PLUMEPEN PRO 10FT

## (undated) DEVICE — SPONGE LAP 18 X 18 IN

## (undated) DEVICE — SUT SILK 3-0 18 IN A184H

## (undated) DEVICE — INSUFLATION TUBING INSUFLOW (LEXION)

## (undated) DEVICE — UROCATCH BAG

## (undated) DEVICE — INVIEW CLEAR LEGGINGS: Brand: CONVERTORS

## (undated) DEVICE — ENSEAL LAPAROSCOPIC TISSUE SEALER G2 CURVED JAW FOR USE WITH G2 GENERATOR 5MM DIAMETER 35CM SHAFT LENGTH: Brand: ENSEAL

## (undated) DEVICE — GLOVE INDICATOR PI UNDERGLOVE SZ 7 BLUE

## (undated) DEVICE — CHLORAPREP HI-LITE 26ML ORANGE

## (undated) DEVICE — SUT VICRYL 2-0 D-SPECIAL 27 IN D8114

## (undated) DEVICE — GLOVE SRG BIOGEL ECLIPSE 7

## (undated) DEVICE — ELECTRODE BLADE MOD E-Z CLEAN 2.5IN 6.4CM -0012M

## (undated) DEVICE — SURGICEL 4 X 8

## (undated) DEVICE — MEDI-VAC YANK SUCT HNDL W/TPRD BULBOUS TIP: Brand: CARDINAL HEALTH

## (undated) DEVICE — TUBING SUCTION 5MM X 12 FT

## (undated) DEVICE — 3M™ IOBAN™ 2 ANTIMICROBIAL INCISE DRAPE 6650EZ: Brand: IOBAN™ 2

## (undated) DEVICE — SUT MONOCRYL 4-0 PS-2 27 IN Y426H

## (undated) DEVICE — JP CHANNEL DRAIN 19FR, FULL FLUTES: Brand: JACKSON-PRATT

## (undated) DEVICE — SPONGE LAP 18 X 18 IN STRL RFD

## (undated) DEVICE — 3M™ TEGADERM™ TRANSPARENT FILM DRESSING FRAME STYLE, 1628, 6 IN X 8 IN (15 CM X 20 CM), 10/CT 8CT/CASE: Brand: 3M™ TEGADERM™

## (undated) DEVICE — BETHLEHEM MAJOR GENERAL PACK: Brand: CARDINAL HEALTH

## (undated) DEVICE — INTENDED FOR TISSUE SEPARATION, AND OTHER PROCEDURES THAT REQUIRE A SHARP SURGICAL BLADE TO PUNCTURE OR CUT.: Brand: BARD-PARKER ® CARBON RIB-BACK BLADES

## (undated) DEVICE — HEMOSTATIC MATRIX SURGIFLO 8ML W/THROMBIN

## (undated) DEVICE — BULB SYRINGE,IRRIGATION WITH PROTECTIVE CAP: Brand: DOVER

## (undated) DEVICE — 3M™ STERI-STRIP™ REINFORCED ADHESIVE SKIN CLOSURES, R1547, 1/2 IN X 4 IN (12 MM X 100 MM), 6 STRIPS/ENVELOPE: Brand: 3M™ STERI-STRIP™

## (undated) DEVICE — TRAY FOLEY 16FR URIMETER SURESTEP

## (undated) DEVICE — BLANKET HYPOTHERMIA ADULT GAYMAR

## (undated) DEVICE — NEEDLE 25G X 1 1/2

## (undated) DEVICE — 3M™ TEGADERM™ TRANSPARENT FILM DRESSING FRAME STYLE, 1626W, 4 IN X 4-3/4 IN (10 CM X 12 CM), 50/CT 4CT/CASE: Brand: 3M™ TEGADERM™

## (undated) DEVICE — Device

## (undated) DEVICE — GAUZE SPONGES,USP TYPE VII GAUZE, 12 PLY: Brand: CURITY

## (undated) DEVICE — SUT SILK 2-0 18 IN A185H

## (undated) DEVICE — SUT VICRYL 3-0 SH 27 IN J416H

## (undated) DEVICE — THE ECHELON FLEX POWERED PLUS ARTICULATING ENDOSCOPIC LINEAR CUTTERS ARE STERILE, SINGLE PATIENT USE INSTRUMENTS THAT SIMULTANEOUSLYCUT AND STAPLE TISSUE. THERE ARE SIX STAGGERED ROWS OF STAPLES, THREE ON EITHER SIDE OF THE CUT LINE. THE ECHELON FLEX 45 POWERED PLUSINSTRUMENTS HAVE A STAPLE LINE THAT IS APPROXIMATELY 45 MM LONG AND A CUT LINE THAT IS APPROXIMATELY 42 MM LONG. THE SHAFT CAN ROTATE FREELYIN BOTH DIRECTIONS AND AN ARTICULATION MECHANISM ENABLES THE DISTAL PORTION OF THE SHAFT TO PIVOT TO FACILITATE LATERAL ACCESS TO THE OPERATIVESITE.THE INSTRUMENTS ARE PACKAGED WITH A PRIMARY LITHIUM BATTERY PACK THAT MUST BE INSTALLED PRIOR TO USE. THERE ARE SPECIFIC REQUIREMENTS FORDISPOSING OF THE BATTERY PACK. REFER TO THE BATTERY PACK DISPOSAL SECTION.THE INSTRUMENTS ARE PACKAGED WITHOUT A RELOAD AND MUST BE LOADED PRIOR TO USE. A STAPLE RETAINING CAP ON THE RELOAD PROTECTS THE STAPLE LEGPOINTS DURING SHIPPING AND TRANSPORTATION. THE INSTRUMENTS’ LOCK-OUT FEATURE IS DESIGNED TO PREVENT A USED OR IMPROPERLY INSTALLED RELOADFROM BEING REFIRED OR AN INSTRUMENT FROM BEING FIRED WITHOUT A RELOAD.: Brand: ECHELON FLEX

## (undated) DEVICE — PROXIMATE LINEAR CUTTER RELOAD, BLUE, 75MM: Brand: PROXIMATE

## (undated) DEVICE — VESSEL LOOPS X-RAY DETECTABLE: Brand: DEROYAL

## (undated) DEVICE — SUT PROLENE 3-0 SH 36 IN 8522H

## (undated) DEVICE — SYRINGE 20ML LL

## (undated) DEVICE — MAGNETIC INSTRUMENT PAD 16" X 20"; LARGE; DISPOSABLE: Brand: CARDINAL HEALTH

## (undated) DEVICE — JACKSON-PRATT 100CC BULB RESERVOIR: Brand: CARDINAL HEALTH

## (undated) DEVICE — INTENDED FOR TISSUE SEPARATION, AND OTHER PROCEDURES THAT REQUIRE A SHARP SURGICAL BLADE TO PUNCTURE OR CUT.: Brand: BARD-PARKER SAFETY BLADES SIZE 11, STERILE

## (undated) DEVICE — SUT SILK 3-0 SH 30 IN K832H

## (undated) DEVICE — POOLE SUCTION HANDLE: Brand: CARDINAL HEALTH

## (undated) DEVICE — SUT SILK 3-0 SH CR/8 18 IN C013D

## (undated) DEVICE — INTENDED FOR TISSUE SEPARATION, AND OTHER PROCEDURES THAT REQUIRE A SHARP SURGICAL BLADE TO PUNCTURE OR CUT.: Brand: BARD-PARKER SAFETY BLADES SIZE 15, STERILE

## (undated) DEVICE — 5075 IMPAD LARGE PAIR: Brand: A-V IMPULSE

## (undated) DEVICE — ACCESS PLATFORM FOR MINIMALLY INVASIVE SURGERY.: Brand: GELPORT® LAPAROSCOPIC  SYSTEM

## (undated) DEVICE — SUT PDS II 1 CTX 36 IN Z371T

## (undated) DEVICE — DRESSING GUAZE ADH BORDER 4 X 4 IN

## (undated) DEVICE — PROXIMATE RELOADABLE LINEAR STAPLER, 60MM: Brand: PROXIMATE

## (undated) DEVICE — BAG DECANTER

## (undated) DEVICE — ASTOUND STANDARD SURGICAL GOWN, XL: Brand: CONVERTORS

## (undated) DEVICE — SUT ETHILON 3-0 FSLX 30 IN 1673H

## (undated) DEVICE — STERILE POLYISOPRENE POWDER-FREE SURGICAL GLOVES: Brand: PROTEXIS

## (undated) DEVICE — 1/2 FORCE SURGICAL SPRING CLIP: Brand: STEALTH® SPRING CLIP

## (undated) DEVICE — SUT VICRYL 0 UR-6 27 IN J603H

## (undated) DEVICE — SUT SILK 2-0 SH CR/8 18 IN C012D

## (undated) DEVICE — ENDOSCOPIC ULTRASOUND ASPIRATION NEEDLE: Brand: EXPECT SLIMLINE SL

## (undated) DEVICE — IRRIG ENDO FLO TUBING

## (undated) DEVICE — BONEE® NEEDLE FOR BLADDER INJECTION CH FR 05, 22G, 35 CM, BOX OF 1: Brand: PORGES COLOPLAST

## (undated) DEVICE — PACK TUR

## (undated) DEVICE — ABTHERA OPEN ABDOMEN DRESSING WITH SENSATRAC PAD: Brand: ABTHERA™ SENSAT.R.A.C.™

## (undated) DEVICE — SUT PROLENE 2-0 SH 36 IN 8523H

## (undated) DEVICE — PROXIMATE RELOADABLE LINEAR CUTTER WITH SAFETY LOCK-OUT, 75MM: Brand: PROXIMATE

## (undated) DEVICE — SUT MONOCRYL PLUS 4-0 PS-2 18 IN MCP496G

## (undated) DEVICE — ROSEBUD DISSECTORS: Brand: DEROYAL

## (undated) DEVICE — ADHESIVE SKN CLSR HISTOACRYL FLEX 0.5ML LF

## (undated) DEVICE — TROCAR: Brand: KII SLEEVE

## (undated) DEVICE — 3000CC GUARDIAN II: Brand: GUARDIAN

## (undated) DEVICE — TISSEEL FIBRIN 10 ML FROZEN

## (undated) DEVICE — PROXIMATE LINEAR STAPLER RELOADS: Brand: PROXIMATE

## (undated) DEVICE — LUBRICANT SURGILUBE TUBE 4 OZ  FLIP TOP

## (undated) DEVICE — STRL BETHLACCESS CATHETER PACK: Brand: CARDINAL HEALTH